# Patient Record
Sex: FEMALE | Race: OTHER | NOT HISPANIC OR LATINO
[De-identification: names, ages, dates, MRNs, and addresses within clinical notes are randomized per-mention and may not be internally consistent; named-entity substitution may affect disease eponyms.]

---

## 2020-07-20 ENCOUNTER — APPOINTMENT (OUTPATIENT)
Dept: SURGERY | Facility: CLINIC | Age: 65
End: 2020-07-20
Payer: MEDICARE

## 2020-07-20 VITALS
TEMPERATURE: 95.8 F | HEART RATE: 89 BPM | SYSTOLIC BLOOD PRESSURE: 118 MMHG | OXYGEN SATURATION: 100 % | BODY MASS INDEX: 27.92 KG/M2 | WEIGHT: 195 LBS | HEIGHT: 70 IN | DIASTOLIC BLOOD PRESSURE: 90 MMHG

## 2020-07-20 DIAGNOSIS — N28.9 DISORDER OF KIDNEY AND URETER, UNSPECIFIED: ICD-10-CM

## 2020-07-20 DIAGNOSIS — Z90.710 ACQUIRED ABSENCE OF BOTH CERVIX AND UTERUS: ICD-10-CM

## 2020-07-20 DIAGNOSIS — K56.609 UNSPECIFIED INTESTINAL OBSTRUCTION, UNSPECIFIED AS TO PARTIAL VERSUS COMPLETE OBSTRUCTION: ICD-10-CM

## 2020-07-20 DIAGNOSIS — E78.00 PURE HYPERCHOLESTEROLEMIA, UNSPECIFIED: ICD-10-CM

## 2020-07-20 PROCEDURE — 99204 OFFICE O/P NEW MOD 45 MIN: CPT

## 2020-07-20 RX ORDER — ASPIRIN 81 MG
81 TABLET, DELAYED RELEASE (ENTERIC COATED) ORAL
Refills: 0 | Status: ACTIVE | COMMUNITY

## 2020-07-20 RX ORDER — FOLIC ACID 20 MG
CAPSULE ORAL
Refills: 0 | Status: ACTIVE | COMMUNITY

## 2020-07-20 RX ORDER — SACUBITRIL AND VALSARTAN 49; 51 MG/1; MG/1
TABLET, FILM COATED ORAL
Refills: 0 | Status: ACTIVE | COMMUNITY

## 2020-07-20 RX ORDER — SEVELAMER CARBONATE 800 MG/1
TABLET, FILM COATED ORAL
Refills: 0 | Status: ACTIVE | COMMUNITY

## 2020-07-20 RX ORDER — ATORVASTATIN CALCIUM 80 MG/1
TABLET, FILM COATED ORAL
Refills: 0 | Status: ACTIVE | COMMUNITY

## 2020-07-29 NOTE — HISTORY OF PRESENT ILLNESS
[de-identified] : 64 year old female. Referred by Dr. Joey Buckley. Multiple medical comorbid conditions. Reports having procedure in 2014 secondary to blockage of intestine requiring intestine to be removed. Reports in hospital for 1 month during that time. Sees Dr. Buckley for vascular access secondary to longstanding end stage renal disease. Gets dialysis M/W/F through vascular dialysis. She reports having bulge and abdominal pain/discomfort associated with an incisional hernia. Denies any obstructive symptoms. Wishes to discuss potential repair.

## 2020-07-29 NOTE — ASSESSMENT
[FreeTextEntry1] : 64 year old female with incisional hernias. Potentially multiple. She does have significant medical comorbidity. She requires a CT scan of the abdomen and pelvis to determine anatomy and evaluate musculature and masses as well as for potential pre operative planning. She may be having procedure with Dr. Marcio farr. CT scan ordered. Notably greater than 50% of today's 45 minute initial visit was spent on counseling and coordination of care.

## 2020-07-29 NOTE — PHYSICAL EXAM
[JVD] : no jugular venous distention  [Normal Breath Sounds] : Normal breath sounds [Normal Heart Sounds] : normal heart sounds [Abdomen Tenderness] : ~T ~M No abdominal tenderness [Abdominal Masses] : No abdominal masses [Tender] : was nontender [Enlarged] : not enlarged [Alert] : alert [Oriented to Time] : oriented to time [Oriented to Person] : oriented to person [Oriented to Place] : oriented to place [Calm] : calm [de-identified] : DEANA. Mady. Appropriate. Comfortable. [de-identified] : Abdomen is soft, non tender and non distended. Do not appreciate any overt mass. There are incisions that area healed abdominal wall. Reducible incisional hernias. Potentially multiple fascial defects.  [de-identified] : Pupils equal. No Scleral Icterus. NCAT. [de-identified] : Supple. Trachea midline. No overt lymphadenopathy. No JVD - Dialysis catheter chest.

## 2020-08-10 ENCOUNTER — APPOINTMENT (OUTPATIENT)
Dept: CT IMAGING | Facility: HOSPITAL | Age: 65
End: 2020-08-10

## 2021-11-01 ENCOUNTER — APPOINTMENT (OUTPATIENT)
Dept: SURGERY | Facility: CLINIC | Age: 66
End: 2021-11-01
Payer: MEDICARE

## 2021-11-01 VITALS
DIASTOLIC BLOOD PRESSURE: 62 MMHG | WEIGHT: 201 LBS | HEIGHT: 70 IN | SYSTOLIC BLOOD PRESSURE: 96 MMHG | HEART RATE: 90 BPM | TEMPERATURE: 97.4 F | OXYGEN SATURATION: 98 % | BODY MASS INDEX: 28.77 KG/M2

## 2021-11-01 DIAGNOSIS — R19.07 GENERALIZED INTRA-ABDOMINAL AND PELVIC SWELLING, MASS AND LUMP: ICD-10-CM

## 2021-11-01 PROCEDURE — 99212 OFFICE O/P EST SF 10 MIN: CPT

## 2021-11-04 ENCOUNTER — NON-APPOINTMENT (OUTPATIENT)
Age: 66
End: 2021-11-04

## 2021-11-04 PROBLEM — R19.07 GENERALIZED ABDOMINAL MASS: Status: ACTIVE | Noted: 2020-07-20

## 2021-11-04 RX ORDER — PREDNISONE 50 MG/1
50 TABLET ORAL
Qty: 3 | Refills: 0 | Status: ACTIVE | COMMUNITY
Start: 2021-11-04 | End: 1900-01-01

## 2021-11-04 NOTE — HISTORY OF PRESENT ILLNESS
[de-identified] : 64 year old female. Referred by Dr. Joey Buckley. Multiple medical comorbid conditions. Reports having procedure in 2014 secondary to blockage of intestine requiring intestine to be removed. Reports in hospital for 1 month during that time. Sees Dr. Buckley for vascular access secondary to longstanding end stage renal disease. Gets dialysis M/W/F through vascular dialysis. She reports having bulge and abdominal pain/discomfort associated with an incisional hernia. Denies any obstructive symptoms. Wishes to discuss potential repair.  [de-identified] : 11-1-2021: Patient returns to office today. Reports she is overall doing well. Reports since last visit the hernia has increased in size. Reports it continues to be bothersome to her, causing intermittent abdominal pain and discomfort. Continues to get dialysis. Reports that  and her are discussing PD cath placement, and would like to have the hernia repaired prior to this. She has not had the CT scan performed yet that was discussed last visit. She denies any fever, chills, chest pain, nausea, or constipation.

## 2021-11-04 NOTE — PHYSICAL EXAM
[Normal Breath Sounds] : Normal breath sounds [Normal Heart Sounds] : normal heart sounds [Alert] : alert [Oriented to Person] : oriented to person [Oriented to Place] : oriented to place [Oriented to Time] : oriented to time [Calm] : calm [JVD] : no jugular venous distention  [Abdominal Masses] : No abdominal masses [Abdomen Tenderness] : ~T ~M No abdominal tenderness [Tender] : was nontender [Enlarged] : not enlarged [de-identified] : EDANA. Mady. Appropriate. Comfortable. [de-identified] : Pupils equal. No Scleral Icterus. NCAT. [de-identified] : Supple. Trachea midline. No overt lymphadenopathy. No JVD - Dialysis catheter chest.  [de-identified] : Abdomen is soft, non tender and non distended. Do not appreciate any overt mass. There are incisions that area healed abdominal wall. Reducible incisional hernias. Potentially multiple fascial defects.

## 2021-11-04 NOTE — ASSESSMENT
[FreeTextEntry1] : Case discussed with attending, . 67 y/o female with incisional hernias, likely multiple. Discussed plan for a CT scan of the abdomen and pelvis to further evaluate abdomen/presence of hernia, abdominal wall integrity and for pre operative planning. Offered to help patient set up time/date for CT scan, patient wishes to do so on her own as she gets dialysis and needs to plan around that. Will call patient with results. Discussed her coming in to the office to review CT scan and talk about surgical options, as there is potential to fix the hernia and place the PD catheter together. Will discuss in detail with her and speak with  after her next visit. All questions answered. \par \par Notably greater than 50% of today's 15 minute follow up visit was spent on counseling and coordination of her care.

## 2021-11-10 ENCOUNTER — APPOINTMENT (OUTPATIENT)
Dept: CT IMAGING | Facility: HOSPITAL | Age: 66
End: 2021-11-10

## 2021-11-10 ENCOUNTER — OUTPATIENT (OUTPATIENT)
Dept: OUTPATIENT SERVICES | Facility: HOSPITAL | Age: 66
LOS: 1 days | End: 2021-11-10
Payer: MEDICARE

## 2021-11-10 ENCOUNTER — RESULT REVIEW (OUTPATIENT)
Age: 66
End: 2021-11-10

## 2021-11-10 PROCEDURE — 74177 CT ABD & PELVIS W/CONTRAST: CPT | Mod: MH

## 2021-11-10 PROCEDURE — 74177 CT ABD & PELVIS W/CONTRAST: CPT | Mod: 26,MH

## 2021-11-16 ENCOUNTER — NON-APPOINTMENT (OUTPATIENT)
Age: 66
End: 2021-11-16

## 2021-11-29 ENCOUNTER — APPOINTMENT (OUTPATIENT)
Dept: SURGERY | Facility: CLINIC | Age: 66
End: 2021-11-29
Payer: MEDICARE

## 2021-11-29 VITALS
WEIGHT: 198 LBS | OXYGEN SATURATION: 96 % | BODY MASS INDEX: 28.35 KG/M2 | HEIGHT: 70 IN | TEMPERATURE: 97.3 F | HEART RATE: 101 BPM

## 2021-11-29 VITALS
DIASTOLIC BLOOD PRESSURE: 82 MMHG | WEIGHT: 198 LBS | OXYGEN SATURATION: 96 % | HEIGHT: 70 IN | HEART RATE: 101 BPM | SYSTOLIC BLOOD PRESSURE: 141 MMHG | TEMPERATURE: 97.3 F | BODY MASS INDEX: 28.35 KG/M2

## 2021-11-29 DIAGNOSIS — K83.8 OTHER SPECIFIED DISEASES OF BILIARY TRACT: ICD-10-CM

## 2021-11-29 PROCEDURE — 99213 OFFICE O/P EST LOW 20 MIN: CPT

## 2021-12-22 NOTE — DATA REVIEWED
[FreeTextEntry1] : CT scan abd/pevlis (11/16/21): Discussed and reviewed in detail. \par 1.  Large supraumbilical midline ventral hernia containing large and small bowel loops.\par 2.  Mild biliary dilatation, indeterminate etiology. No radiopaque biliary calculi. Consider MRI/MRCP with contrast for further assessment as clinically warranted.\par 3.  Age-indeterminate nondisplaced fractures without healing bridging callus formation right pubic tubercle and left superior and inferior pubic rami.\par 4.  Enlarged polycystic kidneys. Few hyperattenuating lesions may be related to hemorrhagic or proteinaceous content, assessment limited without dedicated renal protocol.

## 2021-12-22 NOTE — HISTORY OF PRESENT ILLNESS
[de-identified] : 64 year old female. Referred by Dr. Joey Buckley. Multiple medical comorbid conditions. Reports having procedure in 2014 secondary to blockage of intestine requiring intestine to be removed. Reports in hospital for 1 month during that time. Sees Dr. Buckley for vascular access secondary to longstanding end stage renal disease. Gets dialysis M/W/F through vascular dialysis. She reports having bulge and abdominal pain/discomfort associated with an incisional hernia. Denies any obstructive symptoms. Wishes to discuss potential repair.  [de-identified] : 11-1-2021: Patient returns to office today. Reports she is overall doing well. Reports since last visit the hernia has increased in size. Reports it continues to be bothersome to her, causing intermittent abdominal pain and discomfort. Continues to get dialysis. Reports that  and her are discussing PD cath placement, and would like to have the hernia repaired prior to this. She has not had the CT scan performed yet that was discussed last visit. She denies any fever, chills, chest pain, nausea, or constipation. \par \par 11-: Returns to office today. Continues to overall do well. Report the hernia has not changed in size, however still causing some discomfort. She underwent a CT scan of the abd/pevlis and is here today to discuss results/next steps.

## 2021-12-22 NOTE — ASSESSMENT
[FreeTextEntry1] : Case discussed with attending, . 65 y/o female with incisional hernias, likely multiple. Discussed next step in care for her to undergo a MRCP to further evaluate mild biliary dilatation found on CT. Will call patient with results. \par \par ATTENDING: Patient requires MRCP to determine potential cause of her biliary dilation prior to surgical planning.

## 2022-01-01 ENCOUNTER — INPATIENT (INPATIENT)
Facility: HOSPITAL | Age: 67
LOS: 8 days | Discharge: HOME | DRG: 252 | End: 2022-11-26
Attending: SURGERY | Admitting: SURGERY
Payer: MEDICARE

## 2022-01-01 ENCOUNTER — TRANSCRIPTION ENCOUNTER (OUTPATIENT)
Age: 67
End: 2022-01-01

## 2022-01-01 ENCOUNTER — RESULT REVIEW (OUTPATIENT)
Age: 67
End: 2022-01-01

## 2022-01-01 ENCOUNTER — OUTPATIENT (OUTPATIENT)
Dept: OUTPATIENT SERVICES | Facility: HOSPITAL | Age: 67
LOS: 1 days | End: 2022-01-01
Payer: MEDICARE

## 2022-01-01 VITALS
OXYGEN SATURATION: 94 % | SYSTOLIC BLOOD PRESSURE: 93 MMHG | RESPIRATION RATE: 17 BRPM | HEART RATE: 106 BPM | DIASTOLIC BLOOD PRESSURE: 70 MMHG

## 2022-01-01 VITALS
HEART RATE: 95 BPM | RESPIRATION RATE: 20 BRPM | DIASTOLIC BLOOD PRESSURE: 88 MMHG | OXYGEN SATURATION: 98 % | SYSTOLIC BLOOD PRESSURE: 130 MMHG | WEIGHT: 194.01 LBS | TEMPERATURE: 98 F | HEIGHT: 70 IN

## 2022-01-01 DIAGNOSIS — Z98.890 OTHER SPECIFIED POSTPROCEDURAL STATES: Chronic | ICD-10-CM

## 2022-01-01 DIAGNOSIS — Z90.710 ACQUIRED ABSENCE OF BOTH CERVIX AND UTERUS: ICD-10-CM

## 2022-01-01 DIAGNOSIS — I25.10 ATHEROSCLEROTIC HEART DISEASE OF NATIVE CORONARY ARTERY WITHOUT ANGINA PECTORIS: ICD-10-CM

## 2022-01-01 DIAGNOSIS — Z99.2 DEPENDENCE ON RENAL DIALYSIS: ICD-10-CM

## 2022-01-01 DIAGNOSIS — Z88.2 ALLERGY STATUS TO SULFONAMIDES: ICD-10-CM

## 2022-01-01 DIAGNOSIS — E83.39 OTHER DISORDERS OF PHOSPHORUS METABOLISM: ICD-10-CM

## 2022-01-01 DIAGNOSIS — I13.2 HYPERTENSIVE HEART AND CHRONIC KIDNEY DISEASE WITH HEART FAILURE AND WITH STAGE 5 CHRONIC KIDNEY DISEASE, OR END STAGE RENAL DISEASE: ICD-10-CM

## 2022-01-01 DIAGNOSIS — T82.898A OTHER SPECIFIED COMPLICATION OF VASCULAR PROSTHETIC DEVICES, IMPLANTS AND GRAFTS, INITIAL ENCOUNTER: ICD-10-CM

## 2022-01-01 DIAGNOSIS — I42.8 OTHER CARDIOMYOPATHIES: ICD-10-CM

## 2022-01-01 DIAGNOSIS — Z88.8 ALLERGY STATUS TO OTHER DRUGS, MEDICAMENTS AND BIOLOGICAL SUBSTANCES STATUS: ICD-10-CM

## 2022-01-01 DIAGNOSIS — I50.22 CHRONIC SYSTOLIC (CONGESTIVE) HEART FAILURE: ICD-10-CM

## 2022-01-01 DIAGNOSIS — Z87.891 PERSONAL HISTORY OF NICOTINE DEPENDENCE: ICD-10-CM

## 2022-01-01 DIAGNOSIS — N18.6 END STAGE RENAL DISEASE: ICD-10-CM

## 2022-01-01 DIAGNOSIS — Z88.0 ALLERGY STATUS TO PENICILLIN: ICD-10-CM

## 2022-01-01 DIAGNOSIS — Z01.818 ENCOUNTER FOR OTHER PREPROCEDURAL EXAMINATION: ICD-10-CM

## 2022-01-01 DIAGNOSIS — M16.12 UNILATERAL PRIMARY OSTEOARTHRITIS, LEFT HIP: ICD-10-CM

## 2022-01-01 DIAGNOSIS — Z79.82 LONG TERM (CURRENT) USE OF ASPIRIN: ICD-10-CM
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
## 2022-01-01 DIAGNOSIS — Z86.711 PERSONAL HISTORY OF PULMONARY EMBOLISM: ICD-10-CM

## 2022-01-01 DIAGNOSIS — Z90.710 ACQUIRED ABSENCE OF BOTH CERVIX AND UTERUS: Chronic | ICD-10-CM

## 2022-01-01 DIAGNOSIS — Z86.718 PERSONAL HISTORY OF OTHER VENOUS THROMBOSIS AND EMBOLISM: ICD-10-CM

## 2022-01-01 DIAGNOSIS — E78.5 HYPERLIPIDEMIA, UNSPECIFIED: ICD-10-CM

## 2022-01-01 DIAGNOSIS — R57.8 OTHER SHOCK: ICD-10-CM
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
## 2022-01-01 DIAGNOSIS — I77.0 ARTERIOVENOUS FISTULA, ACQUIRED: Chronic | ICD-10-CM

## 2022-01-01 DIAGNOSIS — D63.1 ANEMIA IN CHRONIC KIDNEY DISEASE: ICD-10-CM

## 2022-01-01 DIAGNOSIS — E87.5 HYPERKALEMIA: ICD-10-CM

## 2022-01-01 LAB
A1C WITH ESTIMATED AVERAGE GLUCOSE RESULT: 5.3 % — SIGNIFICANT CHANGE UP (ref 4–5.6)
ALBUMIN SERPL ELPH-MCNC: 3.5 G/DL — SIGNIFICANT CHANGE UP (ref 3.3–5)
ALBUMIN SERPL ELPH-MCNC: 3.8 G/DL — SIGNIFICANT CHANGE UP (ref 3.3–5)
ALBUMIN SERPL ELPH-MCNC: 4.2 G/DL — SIGNIFICANT CHANGE UP (ref 3.3–5)
ALBUMIN SERPL ELPH-MCNC: 4.3 G/DL — SIGNIFICANT CHANGE UP (ref 3.3–5)
ALP SERPL-CCNC: 105 U/L — SIGNIFICANT CHANGE UP (ref 40–120)
ALP SERPL-CCNC: 116 U/L — SIGNIFICANT CHANGE UP (ref 40–120)
ALP SERPL-CCNC: 150 U/L — HIGH (ref 40–120)
ALP SERPL-CCNC: 87 U/L — SIGNIFICANT CHANGE UP (ref 40–120)
ALT FLD-CCNC: 5 U/L — LOW (ref 10–45)
ALT FLD-CCNC: 6 U/L — LOW (ref 10–45)
ALT FLD-CCNC: 6 U/L — LOW (ref 10–45)
ALT FLD-CCNC: <5 U/L — LOW (ref 10–45)
ANION GAP SERPL CALC-SCNC: 10 MMOL/L — SIGNIFICANT CHANGE UP (ref 5–17)
ANION GAP SERPL CALC-SCNC: 10 MMOL/L — SIGNIFICANT CHANGE UP (ref 5–17)
ANION GAP SERPL CALC-SCNC: 11 MMOL/L — SIGNIFICANT CHANGE UP (ref 5–17)
ANION GAP SERPL CALC-SCNC: 12 MMOL/L — SIGNIFICANT CHANGE UP (ref 5–17)
ANION GAP SERPL CALC-SCNC: 12 MMOL/L — SIGNIFICANT CHANGE UP (ref 5–17)
ANION GAP SERPL CALC-SCNC: 13 MMOL/L — SIGNIFICANT CHANGE UP (ref 5–17)
ANION GAP SERPL CALC-SCNC: 15 MMOL/L — SIGNIFICANT CHANGE UP (ref 5–17)
ANION GAP SERPL CALC-SCNC: 16 MMOL/L — SIGNIFICANT CHANGE UP (ref 5–17)
ANION GAP SERPL CALC-SCNC: 18 MMOL/L — HIGH (ref 5–17)
ANION GAP SERPL CALC-SCNC: 18 MMOL/L — HIGH (ref 5–17)
ANION GAP SERPL CALC-SCNC: 23 MMOL/L — HIGH (ref 5–17)
ANION GAP SERPL CALC-SCNC: 8 MMOL/L — SIGNIFICANT CHANGE UP (ref 5–17)
ANION GAP SERPL CALC-SCNC: 8 MMOL/L — SIGNIFICANT CHANGE UP (ref 5–17)
ANION GAP SERPL CALC-SCNC: 9 MMOL/L — SIGNIFICANT CHANGE UP (ref 5–17)
ANISOCYTOSIS BLD QL: SLIGHT — SIGNIFICANT CHANGE UP
APTT BLD: 27.3 SEC — LOW (ref 27.5–35.5)
APTT BLD: 28.2 SEC — SIGNIFICANT CHANGE UP (ref 27.5–35.5)
APTT BLD: 29.1 SEC — SIGNIFICANT CHANGE UP (ref 27.5–35.5)
APTT BLD: 30.5 SEC — SIGNIFICANT CHANGE UP (ref 27.5–35.5)
AST SERPL-CCNC: 12 U/L — SIGNIFICANT CHANGE UP (ref 10–40)
AST SERPL-CCNC: 12 U/L — SIGNIFICANT CHANGE UP (ref 10–40)
AST SERPL-CCNC: 8 U/L — LOW (ref 10–40)
AST SERPL-CCNC: 9 U/L — LOW (ref 10–40)
BASOPHILS # BLD AUTO: 0 K/UL — SIGNIFICANT CHANGE UP (ref 0–0.2)
BASOPHILS # BLD AUTO: 0.02 K/UL — SIGNIFICANT CHANGE UP (ref 0–0.2)
BASOPHILS # BLD AUTO: 0.02 K/UL — SIGNIFICANT CHANGE UP (ref 0–0.2)
BASOPHILS NFR BLD AUTO: 0 % — SIGNIFICANT CHANGE UP (ref 0–2)
BASOPHILS NFR BLD AUTO: 0.4 % — SIGNIFICANT CHANGE UP (ref 0–2)
BASOPHILS NFR BLD AUTO: 0.6 % — SIGNIFICANT CHANGE UP (ref 0–2)
BILIRUB DIRECT SERPL-MCNC: 0.2 MG/DL — SIGNIFICANT CHANGE UP (ref 0–0.3)
BILIRUB INDIRECT FLD-MCNC: 0.2 MG/DL — SIGNIFICANT CHANGE UP (ref 0.2–1)
BILIRUB SERPL-MCNC: 0.4 MG/DL — SIGNIFICANT CHANGE UP (ref 0.2–1.2)
BILIRUB SERPL-MCNC: 0.4 MG/DL — SIGNIFICANT CHANGE UP (ref 0.2–1.2)
BILIRUB SERPL-MCNC: 0.5 MG/DL — SIGNIFICANT CHANGE UP (ref 0.2–1.2)
BILIRUB SERPL-MCNC: 0.5 MG/DL — SIGNIFICANT CHANGE UP (ref 0.2–1.2)
BILIRUB SERPL-MCNC: 0.6 MG/DL — SIGNIFICANT CHANGE UP (ref 0.2–1.2)
BLD GP AB SCN SERPL QL: NEGATIVE — SIGNIFICANT CHANGE UP
BUN SERPL-MCNC: 19 MG/DL — SIGNIFICANT CHANGE UP (ref 7–23)
BUN SERPL-MCNC: 22 MG/DL — SIGNIFICANT CHANGE UP (ref 7–23)
BUN SERPL-MCNC: 22 MG/DL — SIGNIFICANT CHANGE UP (ref 7–23)
BUN SERPL-MCNC: 25 MG/DL — HIGH (ref 7–23)
BUN SERPL-MCNC: 27 MG/DL — HIGH (ref 7–23)
BUN SERPL-MCNC: 32 MG/DL — HIGH (ref 7–23)
BUN SERPL-MCNC: 35 MG/DL — HIGH (ref 7–23)
BUN SERPL-MCNC: 39 MG/DL — HIGH (ref 7–23)
BUN SERPL-MCNC: 42 MG/DL — HIGH (ref 7–23)
BUN SERPL-MCNC: 42 MG/DL — HIGH (ref 7–23)
BUN SERPL-MCNC: 43 MG/DL — HIGH (ref 7–23)
BUN SERPL-MCNC: 44 MG/DL — HIGH (ref 7–23)
BUN SERPL-MCNC: 47 MG/DL — HIGH (ref 7–23)
BUN SERPL-MCNC: 56 MG/DL — HIGH (ref 7–23)
BUN SERPL-MCNC: 56 MG/DL — HIGH (ref 7–23)
BUN SERPL-MCNC: 64 MG/DL — HIGH (ref 7–23)
BURR CELLS BLD QL SMEAR: SLIGHT — SIGNIFICANT CHANGE UP
CALCIUM SERPL-MCNC: 7.4 MG/DL — LOW (ref 8.4–10.5)
CALCIUM SERPL-MCNC: 7.4 MG/DL — LOW (ref 8.4–10.5)
CALCIUM SERPL-MCNC: 7.9 MG/DL — LOW (ref 8.4–10.5)
CALCIUM SERPL-MCNC: 7.9 MG/DL — LOW (ref 8.4–10.5)
CALCIUM SERPL-MCNC: 8 MG/DL — LOW (ref 8.4–10.5)
CALCIUM SERPL-MCNC: 8.2 MG/DL — LOW (ref 8.4–10.5)
CALCIUM SERPL-MCNC: 8.3 MG/DL — LOW (ref 8.4–10.5)
CALCIUM SERPL-MCNC: 8.5 MG/DL — SIGNIFICANT CHANGE UP (ref 8.4–10.5)
CALCIUM SERPL-MCNC: 8.5 MG/DL — SIGNIFICANT CHANGE UP (ref 8.4–10.5)
CALCIUM SERPL-MCNC: 8.7 MG/DL — SIGNIFICANT CHANGE UP (ref 8.4–10.5)
CALCIUM SERPL-MCNC: 8.7 MG/DL — SIGNIFICANT CHANGE UP (ref 8.4–10.5)
CALCIUM SERPL-MCNC: 9.2 MG/DL — SIGNIFICANT CHANGE UP (ref 8.4–10.5)
CHLORIDE SERPL-SCNC: 100 MMOL/L — SIGNIFICANT CHANGE UP (ref 96–108)
CHLORIDE SERPL-SCNC: 100 MMOL/L — SIGNIFICANT CHANGE UP (ref 96–108)
CHLORIDE SERPL-SCNC: 101 MMOL/L — SIGNIFICANT CHANGE UP (ref 96–108)
CHLORIDE SERPL-SCNC: 101 MMOL/L — SIGNIFICANT CHANGE UP (ref 96–108)
CHLORIDE SERPL-SCNC: 102 MMOL/L — SIGNIFICANT CHANGE UP (ref 96–108)
CHLORIDE SERPL-SCNC: 103 MMOL/L — SIGNIFICANT CHANGE UP (ref 96–108)
CHLORIDE SERPL-SCNC: 104 MMOL/L — SIGNIFICANT CHANGE UP (ref 96–108)
CHLORIDE SERPL-SCNC: 104 MMOL/L — SIGNIFICANT CHANGE UP (ref 96–108)
CHLORIDE SERPL-SCNC: 105 MMOL/L — SIGNIFICANT CHANGE UP (ref 96–108)
CHLORIDE SERPL-SCNC: 96 MMOL/L — SIGNIFICANT CHANGE UP (ref 96–108)
CHLORIDE SERPL-SCNC: 97 MMOL/L — SIGNIFICANT CHANGE UP (ref 96–108)
CHLORIDE SERPL-SCNC: 99 MMOL/L — SIGNIFICANT CHANGE UP (ref 96–108)
CK MB CFR SERPL CALC: 2.2 NG/ML — SIGNIFICANT CHANGE UP (ref 0–6.7)
CK SERPL-CCNC: 49 U/L — SIGNIFICANT CHANGE UP (ref 25–170)
CO2 SERPL-SCNC: 16 MMOL/L — LOW (ref 22–31)
CO2 SERPL-SCNC: 18 MMOL/L — LOW (ref 22–31)
CO2 SERPL-SCNC: 19 MMOL/L — LOW (ref 22–31)
CO2 SERPL-SCNC: 21 MMOL/L — LOW (ref 22–31)
CO2 SERPL-SCNC: 22 MMOL/L — SIGNIFICANT CHANGE UP (ref 22–31)
CO2 SERPL-SCNC: 23 MMOL/L — SIGNIFICANT CHANGE UP (ref 22–31)
CO2 SERPL-SCNC: 23 MMOL/L — SIGNIFICANT CHANGE UP (ref 22–31)
CO2 SERPL-SCNC: 24 MMOL/L — SIGNIFICANT CHANGE UP (ref 22–31)
CO2 SERPL-SCNC: 24 MMOL/L — SIGNIFICANT CHANGE UP (ref 22–31)
CO2 SERPL-SCNC: 25 MMOL/L — SIGNIFICANT CHANGE UP (ref 22–31)
CO2 SERPL-SCNC: 28 MMOL/L — SIGNIFICANT CHANGE UP (ref 22–31)
CREAT SERPL-MCNC: 10.57 MG/DL — HIGH (ref 0.5–1.3)
CREAT SERPL-MCNC: 10.92 MG/DL — HIGH (ref 0.5–1.3)
CREAT SERPL-MCNC: 11.45 MG/DL — HIGH (ref 0.5–1.3)
CREAT SERPL-MCNC: 2.95 MG/DL — HIGH (ref 0.5–1.3)
CREAT SERPL-MCNC: 3.17 MG/DL — HIGH (ref 0.5–1.3)
CREAT SERPL-MCNC: 3.34 MG/DL — HIGH (ref 0.5–1.3)
CREAT SERPL-MCNC: 3.64 MG/DL — HIGH (ref 0.5–1.3)
CREAT SERPL-MCNC: 3.8 MG/DL — HIGH (ref 0.5–1.3)
CREAT SERPL-MCNC: 3.82 MG/DL — HIGH (ref 0.5–1.3)
CREAT SERPL-MCNC: 5.03 MG/DL — HIGH (ref 0.5–1.3)
CREAT SERPL-MCNC: 6.04 MG/DL — HIGH (ref 0.5–1.3)
CREAT SERPL-MCNC: 6.38 MG/DL — HIGH (ref 0.5–1.3)
CREAT SERPL-MCNC: 6.59 MG/DL — HIGH (ref 0.5–1.3)
CREAT SERPL-MCNC: 6.98 MG/DL — HIGH (ref 0.5–1.3)
CREAT SERPL-MCNC: 7.71 MG/DL — HIGH (ref 0.5–1.3)
CREAT SERPL-MCNC: 8.83 MG/DL — HIGH (ref 0.5–1.3)
CULTURE RESULTS: NO GROWTH — SIGNIFICANT CHANGE UP
CULTURE RESULTS: NO GROWTH — SIGNIFICANT CHANGE UP
DACRYOCYTES BLD QL SMEAR: SLIGHT — SIGNIFICANT CHANGE UP
EGFR: 13 ML/MIN/1.73M2 — LOW
EGFR: 15 ML/MIN/1.73M2 — LOW
EGFR: 16 ML/MIN/1.73M2 — LOW
EGFR: 17 ML/MIN/1.73M2 — LOW
EGFR: 3 ML/MIN/1.73M2 — LOW
EGFR: 4 ML/MIN/1.73M2 — LOW
EGFR: 4 ML/MIN/1.73M2 — LOW
EGFR: 5 ML/MIN/1.73M2 — LOW
EGFR: 5 ML/MIN/1.73M2 — LOW
EGFR: 6 ML/MIN/1.73M2 — LOW
EGFR: 7 ML/MIN/1.73M2 — LOW
EGFR: 9 ML/MIN/1.73M2 — LOW
ELLIPTOCYTES BLD QL SMEAR: SLIGHT — SIGNIFICANT CHANGE UP
EOSINOPHIL # BLD AUTO: 0 K/UL — SIGNIFICANT CHANGE UP (ref 0–0.5)
EOSINOPHIL # BLD AUTO: 0.05 K/UL — SIGNIFICANT CHANGE UP (ref 0–0.5)
EOSINOPHIL # BLD AUTO: 0.19 K/UL — SIGNIFICANT CHANGE UP (ref 0–0.5)
EOSINOPHIL NFR BLD AUTO: 0 % — SIGNIFICANT CHANGE UP (ref 0–6)
EOSINOPHIL NFR BLD AUTO: 1.6 % — SIGNIFICANT CHANGE UP (ref 0–6)
EOSINOPHIL NFR BLD AUTO: 4.2 % — SIGNIFICANT CHANGE UP (ref 0–6)
ESTIMATED AVERAGE GLUCOSE: 105 MG/DL — SIGNIFICANT CHANGE UP (ref 68–114)
GIANT PLATELETS BLD QL SMEAR: PRESENT — SIGNIFICANT CHANGE UP
GLUCOSE BLDC GLUCOMTR-MCNC: 132 MG/DL — HIGH (ref 70–99)
GLUCOSE BLDC GLUCOMTR-MCNC: 134 MG/DL — HIGH (ref 70–99)
GLUCOSE BLDC GLUCOMTR-MCNC: 96 MG/DL — SIGNIFICANT CHANGE UP (ref 70–99)
GLUCOSE SERPL-MCNC: 103 MG/DL — HIGH (ref 70–99)
GLUCOSE SERPL-MCNC: 103 MG/DL — HIGH (ref 70–99)
GLUCOSE SERPL-MCNC: 104 MG/DL — HIGH (ref 70–99)
GLUCOSE SERPL-MCNC: 110 MG/DL — HIGH (ref 70–99)
GLUCOSE SERPL-MCNC: 112 MG/DL — HIGH (ref 70–99)
GLUCOSE SERPL-MCNC: 114 MG/DL — HIGH (ref 70–99)
GLUCOSE SERPL-MCNC: 116 MG/DL — HIGH (ref 70–99)
GLUCOSE SERPL-MCNC: 117 MG/DL — HIGH (ref 70–99)
GLUCOSE SERPL-MCNC: 127 MG/DL — HIGH (ref 70–99)
GLUCOSE SERPL-MCNC: 128 MG/DL — HIGH (ref 70–99)
GLUCOSE SERPL-MCNC: 148 MG/DL — HIGH (ref 70–99)
GLUCOSE SERPL-MCNC: 73 MG/DL — SIGNIFICANT CHANGE UP (ref 70–99)
GLUCOSE SERPL-MCNC: 82 MG/DL — SIGNIFICANT CHANGE UP (ref 70–99)
GLUCOSE SERPL-MCNC: 85 MG/DL — SIGNIFICANT CHANGE UP (ref 70–99)
GLUCOSE SERPL-MCNC: 90 MG/DL — SIGNIFICANT CHANGE UP (ref 70–99)
GLUCOSE SERPL-MCNC: 98 MG/DL — SIGNIFICANT CHANGE UP (ref 70–99)
GRAM STN FLD: SIGNIFICANT CHANGE UP
GRAM STN FLD: SIGNIFICANT CHANGE UP
HBV CORE AB SER-ACNC: SIGNIFICANT CHANGE UP
HBV SURFACE AB SER-ACNC: SIGNIFICANT CHANGE UP
HBV SURFACE AG SER-ACNC: SIGNIFICANT CHANGE UP
HCT VFR BLD CALC: 29.1 % — LOW (ref 34.5–45)
HCT VFR BLD CALC: 30.6 % — LOW (ref 34.5–45)
HCT VFR BLD CALC: 32.1 % — LOW (ref 34.5–45)
HCT VFR BLD CALC: 32.7 % — LOW (ref 34.5–45)
HCT VFR BLD CALC: 34.6 % — SIGNIFICANT CHANGE UP (ref 34.5–45)
HCT VFR BLD CALC: 35.2 % — SIGNIFICANT CHANGE UP (ref 34.5–45)
HCT VFR BLD CALC: 35.7 % — SIGNIFICANT CHANGE UP (ref 34.5–45)
HCT VFR BLD CALC: 37.4 % — SIGNIFICANT CHANGE UP (ref 34.5–45)
HCT VFR BLD CALC: 38 % — SIGNIFICANT CHANGE UP (ref 34.5–45)
HCT VFR BLD CALC: 41.5 % — SIGNIFICANT CHANGE UP (ref 34.5–45)
HCT VFR BLD CALC: 42.7 % — SIGNIFICANT CHANGE UP (ref 34.5–45)
HCT VFR BLD CALC: 43.1 % — SIGNIFICANT CHANGE UP (ref 34.5–45)
HCT VFR BLD CALC: 50.6 % — HIGH (ref 34.5–45)
HCV AB S/CO SERPL IA: 0.04 S/CO — SIGNIFICANT CHANGE UP
HCV AB SERPL-IMP: SIGNIFICANT CHANGE UP
HEPARIN-PF4 AB RESULT: <0.6 U/ML — SIGNIFICANT CHANGE UP (ref 0–0.9)
HGB BLD-MCNC: 10 G/DL — LOW (ref 11.5–15.5)
HGB BLD-MCNC: 10.6 G/DL — LOW (ref 11.5–15.5)
HGB BLD-MCNC: 10.8 G/DL — LOW (ref 11.5–15.5)
HGB BLD-MCNC: 10.8 G/DL — LOW (ref 11.5–15.5)
HGB BLD-MCNC: 11.3 G/DL — LOW (ref 11.5–15.5)
HGB BLD-MCNC: 11.6 G/DL — SIGNIFICANT CHANGE UP (ref 11.5–15.5)
HGB BLD-MCNC: 12.9 G/DL — SIGNIFICANT CHANGE UP (ref 11.5–15.5)
HGB BLD-MCNC: 13 G/DL — SIGNIFICANT CHANGE UP (ref 11.5–15.5)
HGB BLD-MCNC: 13.4 G/DL — SIGNIFICANT CHANGE UP (ref 11.5–15.5)
HGB BLD-MCNC: 15.2 G/DL — SIGNIFICANT CHANGE UP (ref 11.5–15.5)
HGB BLD-MCNC: 9.2 G/DL — LOW (ref 11.5–15.5)
HGB BLD-MCNC: 9.6 G/DL — LOW (ref 11.5–15.5)
HGB BLD-MCNC: 9.8 G/DL — LOW (ref 11.5–15.5)
IMM GRANULOCYTES NFR BLD AUTO: 0.3 % — SIGNIFICANT CHANGE UP (ref 0–0.9)
IMM GRANULOCYTES NFR BLD AUTO: 0.9 % — SIGNIFICANT CHANGE UP (ref 0–0.9)
INR BLD: 1.05 — SIGNIFICANT CHANGE UP (ref 0.88–1.16)
INR BLD: 1.09 — SIGNIFICANT CHANGE UP (ref 0.88–1.16)
INR BLD: 1.1 — SIGNIFICANT CHANGE UP (ref 0.88–1.16)
INR BLD: 1.11 — SIGNIFICANT CHANGE UP (ref 0.88–1.16)
ISTAT ARTERIAL BE: -4 MMOL/L — LOW (ref -2–3)
ISTAT ARTERIAL BE: -5 MMOL/L — LOW (ref -2–3)
ISTAT ARTERIAL BE: -6 MMOL/L — LOW (ref -2–3)
ISTAT ARTERIAL BE: -7 MMOL/L — LOW (ref -2–3)
ISTAT ARTERIAL GLUCOSE: 101 MG/DL — HIGH (ref 70–99)
ISTAT ARTERIAL GLUCOSE: 127 MG/DL — HIGH (ref 70–99)
ISTAT ARTERIAL GLUCOSE: 157 MG/DL — HIGH (ref 70–99)
ISTAT ARTERIAL GLUCOSE: 162 MG/DL — HIGH (ref 70–99)
ISTAT ARTERIAL HCO3: 18 MMOL/L — LOW (ref 22–26)
ISTAT ARTERIAL HCO3: 18 MMOL/L — LOW (ref 22–26)
ISTAT ARTERIAL HCO3: 20 MMOL/L — LOW (ref 22–26)
ISTAT ARTERIAL HCO3: 22 MMOL/L — SIGNIFICANT CHANGE UP (ref 22–26)
ISTAT ARTERIAL HEMATOCRIT: 43 % — SIGNIFICANT CHANGE UP (ref 34.5–45)
ISTAT ARTERIAL HEMATOCRIT: 44 % — SIGNIFICANT CHANGE UP (ref 34.5–45)
ISTAT ARTERIAL HEMATOCRIT: 44 % — SIGNIFICANT CHANGE UP (ref 34.5–45)
ISTAT ARTERIAL HEMATOCRIT: 45 % — SIGNIFICANT CHANGE UP (ref 34.5–45)
ISTAT ARTERIAL HEMOGLOBIN: 14.6 G/DL — SIGNIFICANT CHANGE UP (ref 11.5–15.5)
ISTAT ARTERIAL HEMOGLOBIN: 15 G/DL — SIGNIFICANT CHANGE UP (ref 11.5–15.5)
ISTAT ARTERIAL HEMOGLOBIN: 15 G/DL — SIGNIFICANT CHANGE UP (ref 11.5–15.5)
ISTAT ARTERIAL HEMOGLOBIN: 15.3 G/DL — SIGNIFICANT CHANGE UP (ref 11.5–15.5)
ISTAT ARTERIAL IONIZED CALCIUM: 1.09 MMOL/L — LOW (ref 1.12–1.3)
ISTAT ARTERIAL IONIZED CALCIUM: 1.13 MMOL/L — SIGNIFICANT CHANGE UP (ref 1.12–1.3)
ISTAT ARTERIAL IONIZED CALCIUM: 1.13 MMOL/L — SIGNIFICANT CHANGE UP (ref 1.12–1.3)
ISTAT ARTERIAL IONIZED CALCIUM: 1.23 MMOL/L — SIGNIFICANT CHANGE UP (ref 1.12–1.3)
ISTAT ARTERIAL PCO2: 32 MMHG — LOW (ref 35–45)
ISTAT ARTERIAL PCO2: 33 MMHG — LOW (ref 35–45)
ISTAT ARTERIAL PCO2: 38 MMHG — SIGNIFICANT CHANGE UP (ref 35–45)
ISTAT ARTERIAL PCO2: 42 MMHG — SIGNIFICANT CHANGE UP (ref 35–45)
ISTAT ARTERIAL PH: 7.32 — LOW (ref 7.35–7.45)
ISTAT ARTERIAL PH: 7.33 — LOW (ref 7.35–7.45)
ISTAT ARTERIAL PH: 7.35 — SIGNIFICANT CHANGE UP (ref 7.35–7.45)
ISTAT ARTERIAL PH: 7.35 — SIGNIFICANT CHANGE UP (ref 7.35–7.45)
ISTAT ARTERIAL PO2: 191 MMHG — HIGH (ref 80–105)
ISTAT ARTERIAL PO2: 76 MMHG — LOW (ref 80–105)
ISTAT ARTERIAL PO2: 86 MMHG — SIGNIFICANT CHANGE UP (ref 80–105)
ISTAT ARTERIAL PO2: <66 MMHG — LOW (ref 80–105)
ISTAT ARTERIAL POTASSIUM: 4.9 MMOL/L — SIGNIFICANT CHANGE UP (ref 3.5–5.3)
ISTAT ARTERIAL POTASSIUM: 5.4 MMOL/L — HIGH (ref 3.5–5.3)
ISTAT ARTERIAL POTASSIUM: 6.6 MMOL/L — CRITICAL HIGH (ref 3.5–5.3)
ISTAT ARTERIAL POTASSIUM: 6.9 MMOL/L — CRITICAL HIGH (ref 3.5–5.3)
ISTAT ARTERIAL SO2: 100 % — HIGH (ref 95–98)
ISTAT ARTERIAL SO2: 84 % — LOW (ref 95–98)
ISTAT ARTERIAL SO2: 95 % — SIGNIFICANT CHANGE UP (ref 95–98)
ISTAT ARTERIAL SO2: 96 % — SIGNIFICANT CHANGE UP (ref 95–98)
ISTAT ARTERIAL SODIUM: 138 MMOL/L — SIGNIFICANT CHANGE UP (ref 135–145)
ISTAT ARTERIAL SODIUM: 139 MMOL/L — SIGNIFICANT CHANGE UP (ref 135–145)
ISTAT ARTERIAL SODIUM: 140 MMOL/L — SIGNIFICANT CHANGE UP (ref 135–145)
ISTAT ARTERIAL SODIUM: 142 MMOL/L — SIGNIFICANT CHANGE UP (ref 135–145)
ISTAT ARTERIAL TCO2: 19 MMOL/L — LOW (ref 22–31)
ISTAT ARTERIAL TCO2: 19 MMOL/L — LOW (ref 22–31)
ISTAT ARTERIAL TCO2: 21 MMOL/L — LOW (ref 22–31)
ISTAT ARTERIAL TCO2: 23 MMOL/L — SIGNIFICANT CHANGE UP (ref 22–31)
ISTAT VENOUS BE: -1 MMOL/L — SIGNIFICANT CHANGE UP (ref -2–3)
ISTAT VENOUS GLUCOSE: 79 MG/DL — SIGNIFICANT CHANGE UP (ref 70–99)
ISTAT VENOUS HCO3: 24 MMOL/L — SIGNIFICANT CHANGE UP (ref 23–28)
ISTAT VENOUS HEMATOCRIT: 44 % — SIGNIFICANT CHANGE UP (ref 34.5–45)
ISTAT VENOUS HEMOGLOBIN: 15 GM/DL — SIGNIFICANT CHANGE UP (ref 11.5–15.5)
ISTAT VENOUS IONIZED CALCIUM: 1.15 MMOL/L — SIGNIFICANT CHANGE UP (ref 1.12–1.3)
ISTAT VENOUS PCO2: 42 MMHG — SIGNIFICANT CHANGE UP (ref 41–51)
ISTAT VENOUS PH: 7.38 — SIGNIFICANT CHANGE UP (ref 7.31–7.41)
ISTAT VENOUS PO2: <66 MMHG — SIGNIFICANT CHANGE UP (ref 35–40)
ISTAT VENOUS POTASSIUM: 5.1 MMOL/L — SIGNIFICANT CHANGE UP (ref 3.5–5.3)
ISTAT VENOUS SO2: 70 % — SIGNIFICANT CHANGE UP
ISTAT VENOUS SODIUM: 141 MMOL/L — SIGNIFICANT CHANGE UP (ref 135–145)
ISTAT VENOUS TCO2: 26 MMOL/L — SIGNIFICANT CHANGE UP (ref 22–31)
LACTATE SERPL-SCNC: 1.8 MMOL/L — SIGNIFICANT CHANGE UP (ref 0.5–2)
LACTATE SERPL-SCNC: 2.9 MMOL/L — HIGH (ref 0.5–2)
LYMPHOCYTES # BLD AUTO: 0.15 K/UL — LOW (ref 1–3.3)
LYMPHOCYTES # BLD AUTO: 0.52 K/UL — LOW (ref 1–3.3)
LYMPHOCYTES # BLD AUTO: 1 K/UL — SIGNIFICANT CHANGE UP (ref 1–3.3)
LYMPHOCYTES # BLD AUTO: 1.7 % — LOW (ref 13–44)
LYMPHOCYTES # BLD AUTO: 16.7 % — SIGNIFICANT CHANGE UP (ref 13–44)
LYMPHOCYTES # BLD AUTO: 22.3 % — SIGNIFICANT CHANGE UP (ref 13–44)
MAGNESIUM SERPL-MCNC: 1.6 MG/DL — SIGNIFICANT CHANGE UP (ref 1.6–2.6)
MAGNESIUM SERPL-MCNC: 1.6 MG/DL — SIGNIFICANT CHANGE UP (ref 1.6–2.6)
MAGNESIUM SERPL-MCNC: 1.7 MG/DL — SIGNIFICANT CHANGE UP (ref 1.6–2.6)
MAGNESIUM SERPL-MCNC: 1.7 MG/DL — SIGNIFICANT CHANGE UP (ref 1.6–2.6)
MAGNESIUM SERPL-MCNC: 1.8 MG/DL — SIGNIFICANT CHANGE UP (ref 1.6–2.6)
MAGNESIUM SERPL-MCNC: 1.8 MG/DL — SIGNIFICANT CHANGE UP (ref 1.6–2.6)
MAGNESIUM SERPL-MCNC: 1.9 MG/DL — SIGNIFICANT CHANGE UP (ref 1.6–2.6)
MAGNESIUM SERPL-MCNC: 2 MG/DL — SIGNIFICANT CHANGE UP (ref 1.6–2.6)
MANUAL SMEAR VERIFICATION: SIGNIFICANT CHANGE UP
MCHC RBC-ENTMCNC: 29.3 PG — SIGNIFICANT CHANGE UP (ref 27–34)
MCHC RBC-ENTMCNC: 29.4 PG — SIGNIFICANT CHANGE UP (ref 27–34)
MCHC RBC-ENTMCNC: 29.4 PG — SIGNIFICANT CHANGE UP (ref 27–34)
MCHC RBC-ENTMCNC: 29.5 PG — SIGNIFICANT CHANGE UP (ref 27–34)
MCHC RBC-ENTMCNC: 29.7 PG — SIGNIFICANT CHANGE UP (ref 27–34)
MCHC RBC-ENTMCNC: 29.7 PG — SIGNIFICANT CHANGE UP (ref 27–34)
MCHC RBC-ENTMCNC: 29.8 PG — SIGNIFICANT CHANGE UP (ref 27–34)
MCHC RBC-ENTMCNC: 29.9 GM/DL — LOW (ref 32–36)
MCHC RBC-ENTMCNC: 29.9 PG — SIGNIFICANT CHANGE UP (ref 27–34)
MCHC RBC-ENTMCNC: 29.9 PG — SIGNIFICANT CHANGE UP (ref 27–34)
MCHC RBC-ENTMCNC: 30 GM/DL — LOW (ref 32–36)
MCHC RBC-ENTMCNC: 30 PG — SIGNIFICANT CHANGE UP (ref 27–34)
MCHC RBC-ENTMCNC: 30 PG — SIGNIFICANT CHANGE UP (ref 27–34)
MCHC RBC-ENTMCNC: 30.1 PG — SIGNIFICANT CHANGE UP (ref 27–34)
MCHC RBC-ENTMCNC: 30.1 PG — SIGNIFICANT CHANGE UP (ref 27–34)
MCHC RBC-ENTMCNC: 30.2 GM/DL — LOW (ref 32–36)
MCHC RBC-ENTMCNC: 30.3 GM/DL — LOW (ref 32–36)
MCHC RBC-ENTMCNC: 30.5 GM/DL — LOW (ref 32–36)
MCHC RBC-ENTMCNC: 30.5 GM/DL — LOW (ref 32–36)
MCHC RBC-ENTMCNC: 30.6 GM/DL — LOW (ref 32–36)
MCHC RBC-ENTMCNC: 30.6 GM/DL — LOW (ref 32–36)
MCHC RBC-ENTMCNC: 30.7 GM/DL — LOW (ref 32–36)
MCHC RBC-ENTMCNC: 31.3 GM/DL — LOW (ref 32–36)
MCHC RBC-ENTMCNC: 31.4 GM/DL — LOW (ref 32–36)
MCHC RBC-ENTMCNC: 31.4 GM/DL — LOW (ref 32–36)
MCHC RBC-ENTMCNC: 31.6 GM/DL — LOW (ref 32–36)
MCV RBC AUTO: 94.8 FL — SIGNIFICANT CHANGE UP (ref 80–100)
MCV RBC AUTO: 95 FL — SIGNIFICANT CHANGE UP (ref 80–100)
MCV RBC AUTO: 95.5 FL — SIGNIFICANT CHANGE UP (ref 80–100)
MCV RBC AUTO: 96.1 FL — SIGNIFICANT CHANGE UP (ref 80–100)
MCV RBC AUTO: 96.2 FL — SIGNIFICANT CHANGE UP (ref 80–100)
MCV RBC AUTO: 96.7 FL — SIGNIFICANT CHANGE UP (ref 80–100)
MCV RBC AUTO: 97.3 FL — SIGNIFICANT CHANGE UP (ref 80–100)
MCV RBC AUTO: 97.7 FL — SIGNIFICANT CHANGE UP (ref 80–100)
MCV RBC AUTO: 97.9 FL — SIGNIFICANT CHANGE UP (ref 80–100)
MCV RBC AUTO: 98 FL — SIGNIFICANT CHANGE UP (ref 80–100)
MCV RBC AUTO: 98.1 FL — SIGNIFICANT CHANGE UP (ref 80–100)
MCV RBC AUTO: 98.4 FL — SIGNIFICANT CHANGE UP (ref 80–100)
MCV RBC AUTO: 98.8 FL — SIGNIFICANT CHANGE UP (ref 80–100)
MELD SCORE WITH DIALYSIS: 24 POINTS — SIGNIFICANT CHANGE UP
MELD SCORE WITHOUT DIALYSIS: 21 POINTS — SIGNIFICANT CHANGE UP
MONOCYTES # BLD AUTO: 0 K/UL — SIGNIFICANT CHANGE UP (ref 0–0.9)
MONOCYTES # BLD AUTO: 0.31 K/UL — SIGNIFICANT CHANGE UP (ref 0–0.9)
MONOCYTES # BLD AUTO: 0.56 K/UL — SIGNIFICANT CHANGE UP (ref 0–0.9)
MONOCYTES NFR BLD AUTO: 0 % — LOW (ref 2–14)
MONOCYTES NFR BLD AUTO: 10 % — SIGNIFICANT CHANGE UP (ref 2–14)
MONOCYTES NFR BLD AUTO: 12.5 % — SIGNIFICANT CHANGE UP (ref 2–14)
NEUTROPHILS # BLD AUTO: 2.2 K/UL — SIGNIFICANT CHANGE UP (ref 1.8–7.4)
NEUTROPHILS # BLD AUTO: 2.68 K/UL — SIGNIFICANT CHANGE UP (ref 1.8–7.4)
NEUTROPHILS # BLD AUTO: 8.55 K/UL — HIGH (ref 1.8–7.4)
NEUTROPHILS NFR BLD AUTO: 59.7 % — SIGNIFICANT CHANGE UP (ref 43–77)
NEUTROPHILS NFR BLD AUTO: 70.8 % — SIGNIFICANT CHANGE UP (ref 43–77)
NEUTROPHILS NFR BLD AUTO: 96.5 % — HIGH (ref 43–77)
NRBC # BLD: 0 /100 WBCS — SIGNIFICANT CHANGE UP (ref 0–0)
OVALOCYTES BLD QL SMEAR: SLIGHT — SIGNIFICANT CHANGE UP
PF4 HEPARIN CMPLX AB SER-ACNC: NEGATIVE — SIGNIFICANT CHANGE UP
PHOSPHATE SERPL-MCNC: 2.2 MG/DL — LOW (ref 2.5–4.5)
PHOSPHATE SERPL-MCNC: 2.3 MG/DL — LOW (ref 2.5–4.5)
PHOSPHATE SERPL-MCNC: 2.3 MG/DL — LOW (ref 2.5–4.5)
PHOSPHATE SERPL-MCNC: 2.6 MG/DL — SIGNIFICANT CHANGE UP (ref 2.5–4.5)
PHOSPHATE SERPL-MCNC: 2.6 MG/DL — SIGNIFICANT CHANGE UP (ref 2.5–4.5)
PHOSPHATE SERPL-MCNC: 3.2 MG/DL — SIGNIFICANT CHANGE UP (ref 2.5–4.5)
PHOSPHATE SERPL-MCNC: 3.5 MG/DL — SIGNIFICANT CHANGE UP (ref 2.5–4.5)
PHOSPHATE SERPL-MCNC: 3.6 MG/DL — SIGNIFICANT CHANGE UP (ref 2.5–4.5)
PHOSPHATE SERPL-MCNC: 4 MG/DL — SIGNIFICANT CHANGE UP (ref 2.5–4.5)
PHOSPHATE SERPL-MCNC: 4.5 MG/DL — SIGNIFICANT CHANGE UP (ref 2.5–4.5)
PHOSPHATE SERPL-MCNC: 4.5 MG/DL — SIGNIFICANT CHANGE UP (ref 2.5–4.5)
PHOSPHATE SERPL-MCNC: 4.7 MG/DL — HIGH (ref 2.5–4.5)
PLAT MORPH BLD: ABNORMAL
PLATELET # BLD AUTO: 102 K/UL — LOW (ref 150–400)
PLATELET # BLD AUTO: 118 K/UL — LOW (ref 150–400)
PLATELET # BLD AUTO: 122 K/UL — LOW (ref 150–400)
PLATELET # BLD AUTO: 129 K/UL — LOW (ref 150–400)
PLATELET # BLD AUTO: 149 K/UL — LOW (ref 150–400)
PLATELET # BLD AUTO: 154 K/UL — SIGNIFICANT CHANGE UP (ref 150–400)
PLATELET # BLD AUTO: 155 K/UL — SIGNIFICANT CHANGE UP (ref 150–400)
PLATELET # BLD AUTO: 155 K/UL — SIGNIFICANT CHANGE UP (ref 150–400)
PLATELET # BLD AUTO: 158 K/UL — SIGNIFICANT CHANGE UP (ref 150–400)
PLATELET # BLD AUTO: 174 K/UL — SIGNIFICANT CHANGE UP (ref 150–400)
PLATELET # BLD AUTO: 76 K/UL — LOW (ref 150–400)
PLATELET # BLD AUTO: 78 K/UL — LOW (ref 150–400)
PLATELET # BLD AUTO: 84 K/UL — LOW (ref 150–400)
POIKILOCYTOSIS BLD QL AUTO: SLIGHT — SIGNIFICANT CHANGE UP
POLYCHROMASIA BLD QL SMEAR: SLIGHT — SIGNIFICANT CHANGE UP
POTASSIUM SERPL-MCNC: 3.7 MMOL/L — SIGNIFICANT CHANGE UP (ref 3.5–5.3)
POTASSIUM SERPL-MCNC: 3.9 MMOL/L — SIGNIFICANT CHANGE UP (ref 3.5–5.3)
POTASSIUM SERPL-MCNC: 3.9 MMOL/L — SIGNIFICANT CHANGE UP (ref 3.5–5.3)
POTASSIUM SERPL-MCNC: 4.1 MMOL/L — SIGNIFICANT CHANGE UP (ref 3.5–5.3)
POTASSIUM SERPL-MCNC: 4.3 MMOL/L — SIGNIFICANT CHANGE UP (ref 3.5–5.3)
POTASSIUM SERPL-MCNC: 4.3 MMOL/L — SIGNIFICANT CHANGE UP (ref 3.5–5.3)
POTASSIUM SERPL-MCNC: 4.4 MMOL/L — SIGNIFICANT CHANGE UP (ref 3.5–5.3)
POTASSIUM SERPL-MCNC: 4.4 MMOL/L — SIGNIFICANT CHANGE UP (ref 3.5–5.3)
POTASSIUM SERPL-MCNC: 4.5 MMOL/L — SIGNIFICANT CHANGE UP (ref 3.5–5.3)
POTASSIUM SERPL-MCNC: 4.6 MMOL/L — SIGNIFICANT CHANGE UP (ref 3.5–5.3)
POTASSIUM SERPL-MCNC: 5 MMOL/L — SIGNIFICANT CHANGE UP (ref 3.5–5.3)
POTASSIUM SERPL-MCNC: 5.1 MMOL/L — SIGNIFICANT CHANGE UP (ref 3.5–5.3)
POTASSIUM SERPL-MCNC: 5.2 MMOL/L — SIGNIFICANT CHANGE UP (ref 3.5–5.3)
POTASSIUM SERPL-MCNC: 5.2 MMOL/L — SIGNIFICANT CHANGE UP (ref 3.5–5.3)
POTASSIUM SERPL-MCNC: 5.3 MMOL/L — SIGNIFICANT CHANGE UP (ref 3.5–5.3)
POTASSIUM SERPL-MCNC: 5.9 MMOL/L — HIGH (ref 3.5–5.3)
POTASSIUM SERPL-SCNC: 3.7 MMOL/L — SIGNIFICANT CHANGE UP (ref 3.5–5.3)
POTASSIUM SERPL-SCNC: 3.9 MMOL/L — SIGNIFICANT CHANGE UP (ref 3.5–5.3)
POTASSIUM SERPL-SCNC: 3.9 MMOL/L — SIGNIFICANT CHANGE UP (ref 3.5–5.3)
POTASSIUM SERPL-SCNC: 4.1 MMOL/L — SIGNIFICANT CHANGE UP (ref 3.5–5.3)
POTASSIUM SERPL-SCNC: 4.3 MMOL/L — SIGNIFICANT CHANGE UP (ref 3.5–5.3)
POTASSIUM SERPL-SCNC: 4.3 MMOL/L — SIGNIFICANT CHANGE UP (ref 3.5–5.3)
POTASSIUM SERPL-SCNC: 4.4 MMOL/L — SIGNIFICANT CHANGE UP (ref 3.5–5.3)
POTASSIUM SERPL-SCNC: 4.4 MMOL/L — SIGNIFICANT CHANGE UP (ref 3.5–5.3)
POTASSIUM SERPL-SCNC: 4.5 MMOL/L — SIGNIFICANT CHANGE UP (ref 3.5–5.3)
POTASSIUM SERPL-SCNC: 4.6 MMOL/L — SIGNIFICANT CHANGE UP (ref 3.5–5.3)
POTASSIUM SERPL-SCNC: 5 MMOL/L — SIGNIFICANT CHANGE UP (ref 3.5–5.3)
POTASSIUM SERPL-SCNC: 5.1 MMOL/L — SIGNIFICANT CHANGE UP (ref 3.5–5.3)
POTASSIUM SERPL-SCNC: 5.2 MMOL/L — SIGNIFICANT CHANGE UP (ref 3.5–5.3)
POTASSIUM SERPL-SCNC: 5.2 MMOL/L — SIGNIFICANT CHANGE UP (ref 3.5–5.3)
POTASSIUM SERPL-SCNC: 5.3 MMOL/L — SIGNIFICANT CHANGE UP (ref 3.5–5.3)
POTASSIUM SERPL-SCNC: 5.9 MMOL/L — HIGH (ref 3.5–5.3)
PROT SERPL-MCNC: 5.4 G/DL — LOW (ref 6–8.3)
PROT SERPL-MCNC: 6.2 G/DL — SIGNIFICANT CHANGE UP (ref 6–8.3)
PROT SERPL-MCNC: 6.3 G/DL — SIGNIFICANT CHANGE UP (ref 6–8.3)
PROT SERPL-MCNC: 7 G/DL — SIGNIFICANT CHANGE UP (ref 6–8.3)
PROTHROM AB SERPL-ACNC: 12.5 SEC — SIGNIFICANT CHANGE UP (ref 10.5–13.4)
PROTHROM AB SERPL-ACNC: 13 SEC — SIGNIFICANT CHANGE UP (ref 10.5–13.4)
PROTHROM AB SERPL-ACNC: 13.1 SEC — SIGNIFICANT CHANGE UP (ref 10.5–13.4)
PROTHROM AB SERPL-ACNC: 13.2 SEC — SIGNIFICANT CHANGE UP (ref 10.5–13.4)
RBC # BLD: 3.07 M/UL — LOW (ref 3.8–5.2)
RBC # BLD: 3.22 M/UL — LOW (ref 3.8–5.2)
RBC # BLD: 3.28 M/UL — LOW (ref 3.8–5.2)
RBC # BLD: 3.4 M/UL — LOW (ref 3.8–5.2)
RBC # BLD: 3.54 M/UL — LOW (ref 3.8–5.2)
RBC # BLD: 3.59 M/UL — LOW (ref 3.8–5.2)
RBC # BLD: 3.67 M/UL — LOW (ref 3.8–5.2)
RBC # BLD: 3.8 M/UL — SIGNIFICANT CHANGE UP (ref 3.8–5.2)
RBC # BLD: 3.93 M/UL — SIGNIFICANT CHANGE UP (ref 3.8–5.2)
RBC # BLD: 4.32 M/UL — SIGNIFICANT CHANGE UP (ref 3.8–5.2)
RBC # BLD: 4.4 M/UL — SIGNIFICANT CHANGE UP (ref 3.8–5.2)
RBC # BLD: 4.47 M/UL — SIGNIFICANT CHANGE UP (ref 3.8–5.2)
RBC # BLD: 5.12 M/UL — SIGNIFICANT CHANGE UP (ref 3.8–5.2)
RBC # FLD: 14.9 % — HIGH (ref 10.3–14.5)
RBC # FLD: 15.2 % — HIGH (ref 10.3–14.5)
RBC # FLD: 15.2 % — HIGH (ref 10.3–14.5)
RBC # FLD: 15.3 % — HIGH (ref 10.3–14.5)
RBC # FLD: 15.4 % — HIGH (ref 10.3–14.5)
RBC # FLD: 15.5 % — HIGH (ref 10.3–14.5)
RBC # FLD: 15.6 % — HIGH (ref 10.3–14.5)
RBC # FLD: 15.7 % — HIGH (ref 10.3–14.5)
RBC # FLD: 15.9 % — HIGH (ref 10.3–14.5)
RBC BLD AUTO: ABNORMAL
RH IG SCN BLD-IMP: POSITIVE — SIGNIFICANT CHANGE UP
SARS-COV-2 RNA SPEC QL NAA+PROBE: NEGATIVE — SIGNIFICANT CHANGE UP
SARS-COV-2 RNA SPEC QL NAA+PROBE: SIGNIFICANT CHANGE UP
SODIUM SERPL-SCNC: 132 MMOL/L — LOW (ref 135–145)
SODIUM SERPL-SCNC: 132 MMOL/L — LOW (ref 135–145)
SODIUM SERPL-SCNC: 133 MMOL/L — LOW (ref 135–145)
SODIUM SERPL-SCNC: 133 MMOL/L — LOW (ref 135–145)
SODIUM SERPL-SCNC: 134 MMOL/L — LOW (ref 135–145)
SODIUM SERPL-SCNC: 135 MMOL/L — SIGNIFICANT CHANGE UP (ref 135–145)
SODIUM SERPL-SCNC: 136 MMOL/L — SIGNIFICANT CHANGE UP (ref 135–145)
SODIUM SERPL-SCNC: 136 MMOL/L — SIGNIFICANT CHANGE UP (ref 135–145)
SODIUM SERPL-SCNC: 137 MMOL/L — SIGNIFICANT CHANGE UP (ref 135–145)
SODIUM SERPL-SCNC: 139 MMOL/L — SIGNIFICANT CHANGE UP (ref 135–145)
SODIUM SERPL-SCNC: 139 MMOL/L — SIGNIFICANT CHANGE UP (ref 135–145)
SODIUM SERPL-SCNC: 144 MMOL/L — SIGNIFICANT CHANGE UP (ref 135–145)
SODIUM SERPL-SCNC: 146 MMOL/L — HIGH (ref 135–145)
SPECIMEN SOURCE: SIGNIFICANT CHANGE UP
SPHEROCYTES BLD QL SMEAR: SLIGHT — SIGNIFICANT CHANGE UP
SRA INTERP SER-IMP: SIGNIFICANT CHANGE UP
SURGICAL PATHOLOGY STUDY: SIGNIFICANT CHANGE UP
SURGICAL PATHOLOGY STUDY: SIGNIFICANT CHANGE UP
TROPONIN T SERPL-MCNC: <0.01 NG/ML — SIGNIFICANT CHANGE UP (ref 0–0.01)
TRYPTASE SERPL-MCNC: 5.4 UG/L — SIGNIFICANT CHANGE UP
VANCOMYCIN FLD-MCNC: 13.8 UG/ML — SIGNIFICANT CHANGE UP
VANCOMYCIN TROUGH SERPL-MCNC: 13.4 UG/ML — SIGNIFICANT CHANGE UP (ref 10–20)
VANCOMYCIN TROUGH SERPL-MCNC: 18.3 UG/ML — SIGNIFICANT CHANGE UP (ref 10–20)
VARIANT LYMPHS # BLD: 1.8 % — SIGNIFICANT CHANGE UP (ref 0–6)
WBC # BLD: 2.88 K/UL — LOW (ref 3.8–10.5)
WBC # BLD: 3.08 K/UL — LOW (ref 3.8–10.5)
WBC # BLD: 3.12 K/UL — LOW (ref 3.8–10.5)
WBC # BLD: 3.24 K/UL — LOW (ref 3.8–10.5)
WBC # BLD: 3.66 K/UL — LOW (ref 3.8–10.5)
WBC # BLD: 3.69 K/UL — LOW (ref 3.8–10.5)
WBC # BLD: 4.41 K/UL — SIGNIFICANT CHANGE UP (ref 3.8–10.5)
WBC # BLD: 4.49 K/UL — SIGNIFICANT CHANGE UP (ref 3.8–10.5)
WBC # BLD: 4.83 K/UL — SIGNIFICANT CHANGE UP (ref 3.8–10.5)
WBC # BLD: 4.97 K/UL — SIGNIFICANT CHANGE UP (ref 3.8–10.5)
WBC # BLD: 6.97 K/UL — SIGNIFICANT CHANGE UP (ref 3.8–10.5)
WBC # BLD: 8.69 K/UL — SIGNIFICANT CHANGE UP (ref 3.8–10.5)
WBC # BLD: 8.86 K/UL — SIGNIFICANT CHANGE UP (ref 3.8–10.5)
WBC # FLD AUTO: 2.88 K/UL — LOW (ref 3.8–10.5)
WBC # FLD AUTO: 3.08 K/UL — LOW (ref 3.8–10.5)
WBC # FLD AUTO: 3.12 K/UL — LOW (ref 3.8–10.5)
WBC # FLD AUTO: 3.24 K/UL — LOW (ref 3.8–10.5)
WBC # FLD AUTO: 3.66 K/UL — LOW (ref 3.8–10.5)
WBC # FLD AUTO: 3.69 K/UL — LOW (ref 3.8–10.5)
WBC # FLD AUTO: 4.41 K/UL — SIGNIFICANT CHANGE UP (ref 3.8–10.5)
WBC # FLD AUTO: 4.49 K/UL — SIGNIFICANT CHANGE UP (ref 3.8–10.5)
WBC # FLD AUTO: 4.83 K/UL — SIGNIFICANT CHANGE UP (ref 3.8–10.5)
WBC # FLD AUTO: 4.97 K/UL — SIGNIFICANT CHANGE UP (ref 3.8–10.5)
WBC # FLD AUTO: 6.97 K/UL — SIGNIFICANT CHANGE UP (ref 3.8–10.5)
WBC # FLD AUTO: 8.69 K/UL — SIGNIFICANT CHANGE UP (ref 3.8–10.5)
WBC # FLD AUTO: 8.86 K/UL — SIGNIFICANT CHANGE UP (ref 3.8–10.5)

## 2022-01-01 PROCEDURE — 93306 TTE W/DOPPLER COMPLETE: CPT

## 2022-01-01 PROCEDURE — 83735 ASSAY OF MAGNESIUM: CPT

## 2022-01-01 PROCEDURE — 87070 CULTURE OTHR SPECIMN AEROBIC: CPT

## 2022-01-01 PROCEDURE — 88304 TISSUE EXAM BY PATHOLOGIST: CPT

## 2022-01-01 PROCEDURE — P9037: CPT

## 2022-01-01 PROCEDURE — 93971 EXTREMITY STUDY: CPT

## 2022-01-01 PROCEDURE — 88304 TISSUE EXAM BY PATHOLOGIST: CPT | Mod: 26

## 2022-01-01 PROCEDURE — 93971 EXTREMITY STUDY: CPT | Mod: 26,LT

## 2022-01-01 PROCEDURE — 85730 THROMBOPLASTIN TIME PARTIAL: CPT

## 2022-01-01 PROCEDURE — 71045 X-RAY EXAM CHEST 1 VIEW: CPT | Mod: 26

## 2022-01-01 PROCEDURE — 86850 RBC ANTIBODY SCREEN: CPT

## 2022-01-01 PROCEDURE — 85610 PROTHROMBIN TIME: CPT

## 2022-01-01 PROCEDURE — 97116 GAIT TRAINING THERAPY: CPT

## 2022-01-01 PROCEDURE — 87205 SMEAR GRAM STAIN: CPT

## 2022-01-01 PROCEDURE — 97161 PT EVAL LOW COMPLEX 20 MIN: CPT

## 2022-01-01 PROCEDURE — 76000 FLUOROSCOPY <1 HR PHYS/QHP: CPT

## 2022-01-01 PROCEDURE — 87075 CULTR BACTERIA EXCEPT BLOOD: CPT

## 2022-01-01 PROCEDURE — 80076 HEPATIC FUNCTION PANEL: CPT

## 2022-01-01 PROCEDURE — 93005 ELECTROCARDIOGRAM TRACING: CPT

## 2022-01-01 PROCEDURE — 80053 COMPREHEN METABOLIC PANEL: CPT

## 2022-01-01 PROCEDURE — 99233 SBSQ HOSP IP/OBS HIGH 50: CPT | Mod: GC

## 2022-01-01 PROCEDURE — 86900 BLOOD TYPING SEROLOGIC ABO: CPT

## 2022-01-01 PROCEDURE — 86022 PLATELET ANTIBODIES: CPT

## 2022-01-01 PROCEDURE — 86803 HEPATITIS C AB TEST: CPT

## 2022-01-01 PROCEDURE — 85027 COMPLETE CBC AUTOMATED: CPT

## 2022-01-01 PROCEDURE — 84132 ASSAY OF SERUM POTASSIUM: CPT

## 2022-01-01 PROCEDURE — 83520 IMMUNOASSAY QUANT NOS NONAB: CPT

## 2022-01-01 PROCEDURE — 82330 ASSAY OF CALCIUM: CPT

## 2022-01-01 PROCEDURE — 86901 BLOOD TYPING SEROLOGIC RH(D): CPT

## 2022-01-01 PROCEDURE — 97530 THERAPEUTIC ACTIVITIES: CPT

## 2022-01-01 PROCEDURE — 36430 TRANSFUSION BLD/BLD COMPNT: CPT

## 2022-01-01 PROCEDURE — 99223 1ST HOSP IP/OBS HIGH 75: CPT | Mod: GC

## 2022-01-01 PROCEDURE — P9100: CPT

## 2022-01-01 PROCEDURE — 99291 CRITICAL CARE FIRST HOUR: CPT

## 2022-01-01 PROCEDURE — 71045 X-RAY EXAM CHEST 1 VIEW: CPT

## 2022-01-01 PROCEDURE — 84484 ASSAY OF TROPONIN QUANT: CPT

## 2022-01-01 PROCEDURE — 93010 ELECTROCARDIOGRAM REPORT: CPT

## 2022-01-01 PROCEDURE — 80048 BASIC METABOLIC PNL TOTAL CA: CPT

## 2022-01-01 PROCEDURE — 83605 ASSAY OF LACTIC ACID: CPT

## 2022-01-01 PROCEDURE — 80202 ASSAY OF VANCOMYCIN: CPT

## 2022-01-01 PROCEDURE — 36415 COLL VENOUS BLD VENIPUNCTURE: CPT

## 2022-01-01 PROCEDURE — U0005: CPT

## 2022-01-01 PROCEDURE — 82803 BLOOD GASES ANY COMBINATION: CPT

## 2022-01-01 PROCEDURE — 90935 HEMODIALYSIS ONE EVALUATION: CPT

## 2022-01-01 PROCEDURE — 83036 HEMOGLOBIN GLYCOSYLATED A1C: CPT

## 2022-01-01 PROCEDURE — 99232 SBSQ HOSP IP/OBS MODERATE 35: CPT | Mod: GC

## 2022-01-01 PROCEDURE — 85014 HEMATOCRIT: CPT

## 2022-01-01 PROCEDURE — 90935 HEMODIALYSIS ONE EVALUATION: CPT | Mod: GC

## 2022-01-01 PROCEDURE — C1768: CPT

## 2022-01-01 PROCEDURE — 86706 HEP B SURFACE ANTIBODY: CPT

## 2022-01-01 PROCEDURE — P9045: CPT

## 2022-01-01 PROCEDURE — 85025 COMPLETE CBC W/AUTO DIFF WBC: CPT

## 2022-01-01 PROCEDURE — 82553 CREATINE MB FRACTION: CPT

## 2022-01-01 PROCEDURE — 99233 SBSQ HOSP IP/OBS HIGH 50: CPT

## 2022-01-01 PROCEDURE — C1889: CPT

## 2022-01-01 PROCEDURE — 82550 ASSAY OF CK (CPK): CPT

## 2022-01-01 PROCEDURE — 90945 DIALYSIS ONE EVALUATION: CPT

## 2022-01-01 PROCEDURE — 86704 HEP B CORE ANTIBODY TOTAL: CPT

## 2022-01-01 PROCEDURE — 82962 GLUCOSE BLOOD TEST: CPT

## 2022-01-01 PROCEDURE — 84295 ASSAY OF SERUM SODIUM: CPT

## 2022-01-01 PROCEDURE — 86923 COMPATIBILITY TEST ELECTRIC: CPT

## 2022-01-01 PROCEDURE — U0003: CPT

## 2022-01-01 PROCEDURE — 82947 ASSAY GLUCOSE BLOOD QUANT: CPT

## 2022-01-01 PROCEDURE — 87635 SARS-COV-2 COVID-19 AMP PRB: CPT

## 2022-01-01 PROCEDURE — 87340 HEPATITIS B SURFACE AG IA: CPT

## 2022-01-01 PROCEDURE — 93306 TTE W/DOPPLER COMPLETE: CPT | Mod: 26

## 2022-01-01 PROCEDURE — C9399: CPT

## 2022-01-01 PROCEDURE — 84100 ASSAY OF PHOSPHORUS: CPT

## 2022-01-01 PROCEDURE — 36000 PLACE NEEDLE IN VEIN: CPT

## 2022-01-01 DEVICE — IMPLANTABLE DEVICE: Type: IMPLANTABLE DEVICE | Site: RIGHT | Status: FUNCTIONAL

## 2022-01-01 DEVICE — SURGIFLO HEMOSTATIC MATRIX KIT: Type: IMPLANTABLE DEVICE | Site: RIGHT | Status: FUNCTIONAL

## 2022-01-01 DEVICE — CLIP APPLIER ETHICON LIGACLIP 9 3/8" SMALL: Type: IMPLANTABLE DEVICE | Site: RIGHT | Status: FUNCTIONAL

## 2022-01-01 DEVICE — SURGICEL FIBRILLAR 4 X 4": Type: IMPLANTABLE DEVICE | Site: RIGHT | Status: FUNCTIONAL

## 2022-01-01 DEVICE — CLIP APPLIER ETHICON LIGACLIP 11.5" MEDIUM: Type: IMPLANTABLE DEVICE | Site: RIGHT | Status: FUNCTIONAL

## 2022-01-01 RX ORDER — DIPHENHYDRAMINE HCL 50 MG
50 CAPSULE ORAL ONCE
Refills: 0 | Status: COMPLETED | OUTPATIENT
Start: 2022-01-01 | End: 2022-01-01

## 2022-01-01 RX ORDER — SEVELAMER CARBONATE 2400 MG/1
2 POWDER, FOR SUSPENSION ORAL
Qty: 0 | Refills: 0 | DISCHARGE

## 2022-01-01 RX ORDER — INSULIN HUMAN 100 [IU]/ML
10 INJECTION, SOLUTION SUBCUTANEOUS ONCE
Refills: 0 | Status: COMPLETED | OUTPATIENT
Start: 2022-01-01 | End: 2022-01-01

## 2022-01-01 RX ORDER — SODIUM CHLORIDE 9 MG/ML
500 INJECTION INTRAMUSCULAR; INTRAVENOUS; SUBCUTANEOUS ONCE
Refills: 0 | Status: COMPLETED | OUTPATIENT
Start: 2022-01-01 | End: 2022-01-01

## 2022-01-01 RX ORDER — BENZOCAINE AND MENTHOL 5; 1 G/100ML; G/100ML
1 LIQUID ORAL
Refills: 0 | Status: DISCONTINUED | OUTPATIENT
Start: 2022-01-01 | End: 2022-01-01

## 2022-01-01 RX ORDER — SEVELAMER CARBONATE 2400 MG/1
800 POWDER, FOR SUSPENSION ORAL
Refills: 0 | Status: DISCONTINUED | OUTPATIENT
Start: 2022-01-01 | End: 2022-01-01

## 2022-01-01 RX ORDER — CEFAZOLIN SODIUM 1 G
2000 VIAL (EA) INJECTION EVERY 12 HOURS
Refills: 0 | Status: DISCONTINUED | OUTPATIENT
Start: 2022-01-01 | End: 2022-01-01

## 2022-01-01 RX ORDER — DEXTROSE 50 % IN WATER 50 %
50 SYRINGE (ML) INTRAVENOUS ONCE
Refills: 0 | Status: COMPLETED | OUTPATIENT
Start: 2022-01-01 | End: 2022-01-01

## 2022-01-01 RX ORDER — MIDODRINE HYDROCHLORIDE 2.5 MG/1
2.5 TABLET ORAL EVERY 8 HOURS
Refills: 0 | Status: DISCONTINUED | OUTPATIENT
Start: 2022-01-01 | End: 2022-01-01

## 2022-01-01 RX ORDER — LIDOCAINE 4 G/100G
1 CREAM TOPICAL DAILY
Refills: 0 | Status: DISCONTINUED | OUTPATIENT
Start: 2022-01-01 | End: 2022-01-01

## 2022-01-01 RX ORDER — LIDOCAINE HCL 20 MG/ML
1 VIAL (ML) INJECTION ONCE
Refills: 0 | Status: DISCONTINUED | OUTPATIENT
Start: 2022-01-01 | End: 2022-01-01

## 2022-01-01 RX ORDER — ASPIRIN/CALCIUM CARB/MAGNESIUM 324 MG
81 TABLET ORAL DAILY
Refills: 0 | Status: DISCONTINUED | OUTPATIENT
Start: 2022-01-01 | End: 2022-01-01

## 2022-01-01 RX ORDER — VANCOMYCIN HCL 1 G
750 VIAL (EA) INTRAVENOUS ONCE
Refills: 0 | Status: COMPLETED | OUTPATIENT
Start: 2022-01-01 | End: 2022-01-01

## 2022-01-01 RX ORDER — NOREPINEPHRINE BITARTRATE/D5W 8 MG/250ML
0.05 PLASTIC BAG, INJECTION (ML) INTRAVENOUS
Qty: 8 | Refills: 0 | Status: DISCONTINUED | OUTPATIENT
Start: 2022-01-01 | End: 2022-01-01

## 2022-01-01 RX ORDER — SACUBITRIL AND VALSARTAN 24; 26 MG/1; MG/1
1 TABLET, FILM COATED ORAL
Qty: 0 | Refills: 0 | DISCHARGE

## 2022-01-01 RX ORDER — MAGNESIUM SULFATE 500 MG/ML
2 VIAL (ML) INJECTION ONCE
Refills: 0 | Status: COMPLETED | OUTPATIENT
Start: 2022-01-01 | End: 2022-01-01

## 2022-01-01 RX ORDER — ACETAMINOPHEN 500 MG
650 TABLET ORAL ONCE
Refills: 0 | Status: COMPLETED | OUTPATIENT
Start: 2022-01-01 | End: 2022-01-01

## 2022-01-01 RX ORDER — SEVELAMER CARBONATE 2400 MG/1
1 POWDER, FOR SUSPENSION ORAL
Qty: 0 | Refills: 0 | DISCHARGE

## 2022-01-01 RX ORDER — VANCOMYCIN HCL 1 G
500 VIAL (EA) INTRAVENOUS ONCE
Refills: 0 | Status: COMPLETED | OUTPATIENT
Start: 2022-01-01 | End: 2022-01-01

## 2022-01-01 RX ORDER — VANCOMYCIN HCL 1 G
1000 VIAL (EA) INTRAVENOUS ONCE
Refills: 0 | Status: DISCONTINUED | OUTPATIENT
Start: 2022-01-01 | End: 2022-01-01

## 2022-01-01 RX ORDER — ALBUMIN HUMAN 25 %
250 VIAL (ML) INTRAVENOUS
Refills: 0 | Status: COMPLETED | OUTPATIENT
Start: 2022-01-01 | End: 2022-01-01

## 2022-01-01 RX ORDER — GLUCAGON INJECTION, SOLUTION 0.5 MG/.1ML
1 INJECTION, SOLUTION SUBCUTANEOUS ONCE
Refills: 0 | Status: DISCONTINUED | OUTPATIENT
Start: 2022-01-01 | End: 2022-01-01

## 2022-01-01 RX ORDER — VANCOMYCIN HCL 1 G
1000 VIAL (EA) INTRAVENOUS ONCE
Refills: 0 | Status: COMPLETED | OUTPATIENT
Start: 2022-01-01 | End: 2022-01-01

## 2022-01-01 RX ORDER — ALBUMIN HUMAN 25 %
250 VIAL (ML) INTRAVENOUS ONCE
Refills: 0 | Status: COMPLETED | OUTPATIENT
Start: 2022-01-01 | End: 2022-01-01

## 2022-01-01 RX ORDER — SODIUM CHLORIDE 9 MG/ML
1000 INJECTION, SOLUTION INTRAVENOUS
Refills: 0 | Status: DISCONTINUED | OUTPATIENT
Start: 2022-01-01 | End: 2022-01-01

## 2022-01-01 RX ORDER — INSULIN LISPRO 100/ML
VIAL (ML) SUBCUTANEOUS ONCE
Refills: 0 | Status: COMPLETED | OUTPATIENT
Start: 2022-01-01 | End: 2022-01-01

## 2022-01-01 RX ORDER — EPINEPHRINE 0.3 MG/.3ML
0.03 INJECTION INTRAMUSCULAR; SUBCUTANEOUS
Qty: 4 | Refills: 0 | Status: DISCONTINUED | OUTPATIENT
Start: 2022-01-01 | End: 2022-01-01

## 2022-01-01 RX ORDER — ONDANSETRON 8 MG/1
4 TABLET, FILM COATED ORAL ONCE
Refills: 0 | Status: COMPLETED | OUTPATIENT
Start: 2022-01-01 | End: 2022-01-01

## 2022-01-01 RX ORDER — SODIUM CHLORIDE 9 MG/ML
500 INJECTION INTRAMUSCULAR; INTRAVENOUS; SUBCUTANEOUS ONCE
Refills: 0 | Status: DISCONTINUED | OUTPATIENT
Start: 2022-01-01 | End: 2022-01-01

## 2022-01-01 RX ORDER — MIDODRINE HYDROCHLORIDE 2.5 MG/1
5 TABLET ORAL ONCE
Refills: 0 | Status: COMPLETED | OUTPATIENT
Start: 2022-01-01 | End: 2022-01-01

## 2022-01-01 RX ORDER — DEXTROSE 50 % IN WATER 50 %
25 SYRINGE (ML) INTRAVENOUS ONCE
Refills: 0 | Status: DISCONTINUED | OUTPATIENT
Start: 2022-01-01 | End: 2022-01-01

## 2022-01-01 RX ORDER — MIDODRINE HYDROCHLORIDE 2.5 MG/1
1 TABLET ORAL
Qty: 0 | Refills: 0 | DISCHARGE
Start: 2022-01-01

## 2022-01-01 RX ORDER — ACETAMINOPHEN 500 MG
1000 TABLET ORAL ONCE
Refills: 0 | Status: COMPLETED | OUTPATIENT
Start: 2022-01-01 | End: 2022-01-01

## 2022-01-01 RX ORDER — CHLORHEXIDINE GLUCONATE 213 G/1000ML
1 SOLUTION TOPICAL ONCE
Refills: 0 | Status: COMPLETED | OUTPATIENT
Start: 2022-01-01 | End: 2022-01-01

## 2022-01-01 RX ORDER — LIDOCAINE 4 G/100G
1 CREAM TOPICAL EVERY 24 HOURS
Refills: 0 | Status: DISCONTINUED | OUTPATIENT
Start: 2022-01-01 | End: 2022-01-01

## 2022-01-01 RX ORDER — CLOPIDOGREL BISULFATE 75 MG/1
75 TABLET, FILM COATED ORAL DAILY
Refills: 0 | Status: DISCONTINUED | OUTPATIENT
Start: 2022-01-01 | End: 2022-01-01

## 2022-01-01 RX ORDER — DEXTROSE 50 % IN WATER 50 %
15 SYRINGE (ML) INTRAVENOUS ONCE
Refills: 0 | Status: DISCONTINUED | OUTPATIENT
Start: 2022-01-01 | End: 2022-01-01

## 2022-01-01 RX ORDER — BACITRACIN ZINC 500 UNIT/G
1 OINTMENT IN PACKET (EA) TOPICAL ONCE
Refills: 0 | Status: COMPLETED | OUTPATIENT
Start: 2022-01-01 | End: 2022-01-01

## 2022-01-01 RX ORDER — METOPROLOL TARTRATE 50 MG
1 TABLET ORAL
Qty: 0 | Refills: 0 | DISCHARGE

## 2022-01-01 RX ORDER — AMLODIPINE BESYLATE 2.5 MG/1
1 TABLET ORAL
Qty: 0 | Refills: 0 | DISCHARGE

## 2022-01-01 RX ORDER — EPINEPHRINE 0.3 MG/.3ML
0.01 INJECTION INTRAMUSCULAR; SUBCUTANEOUS
Qty: 4 | Refills: 0 | Status: DISCONTINUED | OUTPATIENT
Start: 2022-01-01 | End: 2022-01-01

## 2022-01-01 RX ORDER — POTASSIUM PHOSPHATE, MONOBASIC POTASSIUM PHOSPHATE, DIBASIC 236; 224 MG/ML; MG/ML
15 INJECTION, SOLUTION INTRAVENOUS ONCE
Refills: 0 | Status: COMPLETED | OUTPATIENT
Start: 2022-01-01 | End: 2022-01-01

## 2022-01-01 RX ORDER — VANCOMYCIN HCL 1 G
500 VIAL (EA) INTRAVENOUS ONCE
Refills: 0 | Status: DISCONTINUED | OUTPATIENT
Start: 2022-01-01 | End: 2022-01-01

## 2022-01-01 RX ORDER — MIDODRINE HYDROCHLORIDE 2.5 MG/1
10 TABLET ORAL EVERY 8 HOURS
Refills: 0 | Status: DISCONTINUED | OUTPATIENT
Start: 2022-01-01 | End: 2022-01-01

## 2022-01-01 RX ORDER — BACITRACIN ZINC 500 UNIT/G
1 OINTMENT IN PACKET (EA) TOPICAL DAILY
Refills: 0 | Status: DISCONTINUED | OUTPATIENT
Start: 2022-01-01 | End: 2022-01-01

## 2022-01-01 RX ORDER — MIDODRINE HYDROCHLORIDE 2.5 MG/1
5 TABLET ORAL EVERY 8 HOURS
Refills: 0 | Status: DISCONTINUED | OUTPATIENT
Start: 2022-01-01 | End: 2022-01-01

## 2022-01-01 RX ORDER — BENZOCAINE 10 %
1 GEL (GRAM) MUCOUS MEMBRANE ONCE
Refills: 0 | Status: COMPLETED | OUTPATIENT
Start: 2022-01-01 | End: 2022-01-01

## 2022-01-01 RX ORDER — ACETAMINOPHEN 500 MG
650 TABLET ORAL EVERY 6 HOURS
Refills: 0 | Status: DISCONTINUED | OUTPATIENT
Start: 2022-01-01 | End: 2022-01-01

## 2022-01-01 RX ORDER — SENNA PLUS 8.6 MG/1
2 TABLET ORAL AT BEDTIME
Refills: 0 | Status: DISCONTINUED | OUTPATIENT
Start: 2022-01-01 | End: 2022-01-01

## 2022-01-01 RX ORDER — ERYTHROPOIETIN 10000 [IU]/ML
4000 INJECTION, SOLUTION INTRAVENOUS; SUBCUTANEOUS ONCE
Refills: 0 | Status: COMPLETED | OUTPATIENT
Start: 2022-01-01 | End: 2022-01-01

## 2022-01-01 RX ORDER — HYDROMORPHONE HYDROCHLORIDE 2 MG/ML
0.25 INJECTION INTRAMUSCULAR; INTRAVENOUS; SUBCUTANEOUS ONCE
Refills: 0 | Status: DISCONTINUED | OUTPATIENT
Start: 2022-01-01 | End: 2022-01-01

## 2022-01-01 RX ORDER — POLYETHYLENE GLYCOL 3350 17 G/17G
17 POWDER, FOR SOLUTION ORAL DAILY
Refills: 0 | Status: DISCONTINUED | OUTPATIENT
Start: 2022-01-01 | End: 2022-01-01

## 2022-01-01 RX ORDER — DEXTROSE 50 % IN WATER 50 %
12.5 SYRINGE (ML) INTRAVENOUS ONCE
Refills: 0 | Status: DISCONTINUED | OUTPATIENT
Start: 2022-01-01 | End: 2022-01-01

## 2022-01-01 RX ORDER — CHLORHEXIDINE GLUCONATE 213 G/1000ML
1 SOLUTION TOPICAL DAILY
Refills: 0 | Status: DISCONTINUED | OUTPATIENT
Start: 2022-01-01 | End: 2022-01-01

## 2022-01-01 RX ORDER — FAMOTIDINE 10 MG/ML
20 INJECTION INTRAVENOUS ONCE
Refills: 0 | Status: COMPLETED | OUTPATIENT
Start: 2022-01-01 | End: 2022-01-01

## 2022-01-01 RX ORDER — FLUCONAZOLE 150 MG/1
1 TABLET ORAL
Qty: 0 | Refills: 0 | DISCHARGE

## 2022-01-01 RX ADMIN — Medication 25 GRAM(S): at 06:42

## 2022-01-01 RX ADMIN — MIDODRINE HYDROCHLORIDE 5 MILLIGRAM(S): 2.5 TABLET ORAL at 10:16

## 2022-01-01 RX ADMIN — SEVELAMER CARBONATE 800 MILLIGRAM(S): 2400 POWDER, FOR SUSPENSION ORAL at 07:22

## 2022-01-01 RX ADMIN — Medication 100 MILLIGRAM(S): at 12:32

## 2022-01-01 RX ADMIN — Medication 125 MILLILITER(S): at 15:06

## 2022-01-01 RX ADMIN — LIDOCAINE 1 PATCH: 4 CREAM TOPICAL at 08:49

## 2022-01-01 RX ADMIN — Medication 8.25 MICROGRAM(S)/KG/MIN: at 11:19

## 2022-01-01 RX ADMIN — Medication 50 MILLIGRAM(S): at 18:27

## 2022-01-01 RX ADMIN — Medication 81 MILLIGRAM(S): at 11:20

## 2022-01-01 RX ADMIN — Medication 1000 MILLIGRAM(S): at 00:22

## 2022-01-01 RX ADMIN — Medication 100 MILLIGRAM(S): at 21:14

## 2022-01-01 RX ADMIN — SEVELAMER CARBONATE 800 MILLIGRAM(S): 2400 POWDER, FOR SUSPENSION ORAL at 12:02

## 2022-01-01 RX ADMIN — MIDODRINE HYDROCHLORIDE 5 MILLIGRAM(S): 2.5 TABLET ORAL at 21:25

## 2022-01-01 RX ADMIN — MIDODRINE HYDROCHLORIDE 10 MILLIGRAM(S): 2.5 TABLET ORAL at 16:05

## 2022-01-01 RX ADMIN — Medication 400 MILLIGRAM(S): at 10:10

## 2022-01-01 RX ADMIN — MIDODRINE HYDROCHLORIDE 10 MILLIGRAM(S): 2.5 TABLET ORAL at 16:18

## 2022-01-01 RX ADMIN — Medication 1 SPRAY(S): at 08:36

## 2022-01-01 RX ADMIN — CLOPIDOGREL BISULFATE 75 MILLIGRAM(S): 75 TABLET, FILM COATED ORAL at 15:31

## 2022-01-01 RX ADMIN — SEVELAMER CARBONATE 800 MILLIGRAM(S): 2400 POWDER, FOR SUSPENSION ORAL at 08:07

## 2022-01-01 RX ADMIN — Medication 10 MILLIGRAM(S): at 17:50

## 2022-01-01 RX ADMIN — LIDOCAINE 1 PATCH: 4 CREAM TOPICAL at 21:00

## 2022-01-01 RX ADMIN — MIDODRINE HYDROCHLORIDE 2.5 MILLIGRAM(S): 2.5 TABLET ORAL at 22:04

## 2022-01-01 RX ADMIN — ONDANSETRON 4 MILLIGRAM(S): 8 TABLET, FILM COATED ORAL at 21:51

## 2022-01-01 RX ADMIN — SEVELAMER CARBONATE 800 MILLIGRAM(S): 2400 POWDER, FOR SUSPENSION ORAL at 14:22

## 2022-01-01 RX ADMIN — POLYETHYLENE GLYCOL 3350 17 GRAM(S): 17 POWDER, FOR SOLUTION ORAL at 14:10

## 2022-01-01 RX ADMIN — SEVELAMER CARBONATE 800 MILLIGRAM(S): 2400 POWDER, FOR SUSPENSION ORAL at 12:58

## 2022-01-01 RX ADMIN — MIDODRINE HYDROCHLORIDE 5 MILLIGRAM(S): 2.5 TABLET ORAL at 21:09

## 2022-01-01 RX ADMIN — Medication 650 MILLIGRAM(S): at 15:50

## 2022-01-01 RX ADMIN — LIDOCAINE 1 PATCH: 4 CREAM TOPICAL at 09:55

## 2022-01-01 RX ADMIN — MIDODRINE HYDROCHLORIDE 5 MILLIGRAM(S): 2.5 TABLET ORAL at 05:16

## 2022-01-01 RX ADMIN — LIDOCAINE 1 PATCH: 4 CREAM TOPICAL at 18:27

## 2022-01-01 RX ADMIN — SEVELAMER CARBONATE 800 MILLIGRAM(S): 2400 POWDER, FOR SUSPENSION ORAL at 12:46

## 2022-01-01 RX ADMIN — MIDODRINE HYDROCHLORIDE 5 MILLIGRAM(S): 2.5 TABLET ORAL at 16:40

## 2022-01-01 RX ADMIN — MIDODRINE HYDROCHLORIDE 5 MILLIGRAM(S): 2.5 TABLET ORAL at 13:38

## 2022-01-01 RX ADMIN — Medication 650 MILLIGRAM(S): at 12:20

## 2022-01-01 RX ADMIN — SEVELAMER CARBONATE 800 MILLIGRAM(S): 2400 POWDER, FOR SUSPENSION ORAL at 07:26

## 2022-01-01 RX ADMIN — Medication 25 GRAM(S): at 06:36

## 2022-01-01 RX ADMIN — Medication 650 MILLIGRAM(S): at 01:33

## 2022-01-01 RX ADMIN — CHLORHEXIDINE GLUCONATE 1 APPLICATION(S): 213 SOLUTION TOPICAL at 10:22

## 2022-01-01 RX ADMIN — Medication 81 MILLIGRAM(S): at 14:23

## 2022-01-01 RX ADMIN — Medication 50 MILLIGRAM(S): at 01:03

## 2022-01-01 RX ADMIN — Medication 250 MILLIGRAM(S): at 08:09

## 2022-01-01 RX ADMIN — Medication 8.25 MICROGRAM(S)/KG/MIN: at 16:44

## 2022-01-01 RX ADMIN — MIDODRINE HYDROCHLORIDE 2.5 MILLIGRAM(S): 2.5 TABLET ORAL at 13:57

## 2022-01-01 RX ADMIN — Medication 81 MILLIGRAM(S): at 16:19

## 2022-01-01 RX ADMIN — Medication 125 MILLILITER(S): at 18:49

## 2022-01-01 RX ADMIN — Medication 250 MILLIGRAM(S): at 19:47

## 2022-01-01 RX ADMIN — MIDODRINE HYDROCHLORIDE 10 MILLIGRAM(S): 2.5 TABLET ORAL at 00:49

## 2022-01-01 RX ADMIN — CLOPIDOGREL BISULFATE 75 MILLIGRAM(S): 75 TABLET, FILM COATED ORAL at 11:27

## 2022-01-01 RX ADMIN — Medication 400 MILLIGRAM(S): at 23:52

## 2022-01-01 RX ADMIN — CLOPIDOGREL BISULFATE 75 MILLIGRAM(S): 75 TABLET, FILM COATED ORAL at 12:00

## 2022-01-01 RX ADMIN — Medication 650 MILLIGRAM(S): at 11:27

## 2022-01-01 RX ADMIN — ERYTHROPOIETIN 4000 UNIT(S): 10000 INJECTION, SOLUTION INTRAVENOUS; SUBCUTANEOUS at 12:05

## 2022-01-01 RX ADMIN — LIDOCAINE 1 PATCH: 4 CREAM TOPICAL at 22:47

## 2022-01-01 RX ADMIN — SEVELAMER CARBONATE 800 MILLIGRAM(S): 2400 POWDER, FOR SUSPENSION ORAL at 18:17

## 2022-01-01 RX ADMIN — Medication 650 MILLIGRAM(S): at 07:30

## 2022-01-01 RX ADMIN — SEVELAMER CARBONATE 800 MILLIGRAM(S): 2400 POWDER, FOR SUSPENSION ORAL at 07:50

## 2022-01-01 RX ADMIN — SEVELAMER CARBONATE 800 MILLIGRAM(S): 2400 POWDER, FOR SUSPENSION ORAL at 16:50

## 2022-01-01 RX ADMIN — Medication 1000 MILLIGRAM(S): at 19:30

## 2022-01-01 RX ADMIN — MIDODRINE HYDROCHLORIDE 10 MILLIGRAM(S): 2.5 TABLET ORAL at 05:50

## 2022-01-01 RX ADMIN — SODIUM CHLORIDE 500 MILLILITER(S): 9 INJECTION INTRAMUSCULAR; INTRAVENOUS; SUBCUTANEOUS at 17:56

## 2022-01-01 RX ADMIN — BENZOCAINE AND MENTHOL 1 LOZENGE: 5; 1 LIQUID ORAL at 10:21

## 2022-01-01 RX ADMIN — Medication 250 MILLIGRAM(S): at 16:25

## 2022-01-01 RX ADMIN — CLOPIDOGREL BISULFATE 75 MILLIGRAM(S): 75 TABLET, FILM COATED ORAL at 11:20

## 2022-01-01 RX ADMIN — INSULIN HUMAN 10 UNIT(S): 100 INJECTION, SOLUTION SUBCUTANEOUS at 06:49

## 2022-01-01 RX ADMIN — SEVELAMER CARBONATE 800 MILLIGRAM(S): 2400 POWDER, FOR SUSPENSION ORAL at 17:18

## 2022-01-01 RX ADMIN — LIDOCAINE 1 PATCH: 4 CREAM TOPICAL at 21:11

## 2022-01-01 RX ADMIN — Medication 8.25 MICROGRAM(S)/KG/MIN: at 21:51

## 2022-01-01 RX ADMIN — MIDODRINE HYDROCHLORIDE 10 MILLIGRAM(S): 2.5 TABLET ORAL at 07:49

## 2022-01-01 RX ADMIN — LIDOCAINE 1 PATCH: 4 CREAM TOPICAL at 20:20

## 2022-01-01 RX ADMIN — CLOPIDOGREL BISULFATE 75 MILLIGRAM(S): 75 TABLET, FILM COATED ORAL at 16:19

## 2022-01-01 RX ADMIN — SODIUM CHLORIDE 500 MILLILITER(S): 9 INJECTION INTRAMUSCULAR; INTRAVENOUS; SUBCUTANEOUS at 22:00

## 2022-01-01 RX ADMIN — MIDODRINE HYDROCHLORIDE 5 MILLIGRAM(S): 2.5 TABLET ORAL at 12:01

## 2022-01-01 RX ADMIN — CHLORHEXIDINE GLUCONATE 1 APPLICATION(S): 213 SOLUTION TOPICAL at 11:47

## 2022-01-01 RX ADMIN — CHLORHEXIDINE GLUCONATE 1 APPLICATION(S): 213 SOLUTION TOPICAL at 21:57

## 2022-01-01 RX ADMIN — Medication 125 MILLILITER(S): at 14:33

## 2022-01-01 RX ADMIN — MIDODRINE HYDROCHLORIDE 2.5 MILLIGRAM(S): 2.5 TABLET ORAL at 09:40

## 2022-01-01 RX ADMIN — Medication 1000 MILLIGRAM(S): at 09:25

## 2022-01-01 RX ADMIN — BENZOCAINE AND MENTHOL 1 LOZENGE: 5; 1 LIQUID ORAL at 14:16

## 2022-01-01 RX ADMIN — LIDOCAINE 1 PATCH: 4 CREAM TOPICAL at 18:06

## 2022-01-01 RX ADMIN — CHLORHEXIDINE GLUCONATE 1 APPLICATION(S): 213 SOLUTION TOPICAL at 06:14

## 2022-01-01 RX ADMIN — MIDODRINE HYDROCHLORIDE 10 MILLIGRAM(S): 2.5 TABLET ORAL at 07:33

## 2022-01-01 RX ADMIN — Medication 81 MILLIGRAM(S): at 13:57

## 2022-01-01 RX ADMIN — Medication 81 MILLIGRAM(S): at 11:46

## 2022-01-01 RX ADMIN — LIDOCAINE 1 PATCH: 4 CREAM TOPICAL at 07:26

## 2022-01-01 RX ADMIN — Medication 81 MILLIGRAM(S): at 15:31

## 2022-01-01 RX ADMIN — MIDODRINE HYDROCHLORIDE 5 MILLIGRAM(S): 2.5 TABLET ORAL at 09:21

## 2022-01-01 RX ADMIN — Medication 81 MILLIGRAM(S): at 12:00

## 2022-01-01 RX ADMIN — LIDOCAINE 1 PATCH: 4 CREAM TOPICAL at 15:21

## 2022-01-01 RX ADMIN — Medication 650 MILLIGRAM(S): at 08:30

## 2022-01-01 RX ADMIN — POTASSIUM PHOSPHATE, MONOBASIC POTASSIUM PHOSPHATE, DIBASIC 62.5 MILLIMOLE(S): 236; 224 INJECTION, SOLUTION INTRAVENOUS at 07:25

## 2022-01-01 RX ADMIN — Medication 400 MILLIGRAM(S): at 09:07

## 2022-01-01 RX ADMIN — MIDODRINE HYDROCHLORIDE 5 MILLIGRAM(S): 2.5 TABLET ORAL at 05:15

## 2022-01-01 RX ADMIN — Medication 50 MILLILITER(S): at 06:52

## 2022-01-01 RX ADMIN — MIDODRINE HYDROCHLORIDE 10 MILLIGRAM(S): 2.5 TABLET ORAL at 10:24

## 2022-01-01 RX ADMIN — Medication 650 MILLIGRAM(S): at 16:54

## 2022-01-01 RX ADMIN — MIDODRINE HYDROCHLORIDE 2.5 MILLIGRAM(S): 2.5 TABLET ORAL at 06:18

## 2022-01-01 RX ADMIN — FAMOTIDINE 20 MILLIGRAM(S): 10 INJECTION INTRAVENOUS at 22:21

## 2022-01-01 RX ADMIN — Medication 650 MILLIGRAM(S): at 00:33

## 2022-01-01 RX ADMIN — Medication 125 MILLILITER(S): at 22:45

## 2022-01-01 RX ADMIN — CLOPIDOGREL BISULFATE 75 MILLIGRAM(S): 75 TABLET, FILM COATED ORAL at 14:23

## 2022-01-01 RX ADMIN — MIDODRINE HYDROCHLORIDE 5 MILLIGRAM(S): 2.5 TABLET ORAL at 05:27

## 2022-01-01 RX ADMIN — SEVELAMER CARBONATE 800 MILLIGRAM(S): 2400 POWDER, FOR SUSPENSION ORAL at 10:36

## 2022-01-01 RX ADMIN — Medication 50 MILLIGRAM(S): at 21:58

## 2022-01-01 RX ADMIN — Medication 81 MILLIGRAM(S): at 11:27

## 2022-01-01 RX ADMIN — MIDODRINE HYDROCHLORIDE 5 MILLIGRAM(S): 2.5 TABLET ORAL at 21:38

## 2022-01-01 RX ADMIN — LIDOCAINE 1 PATCH: 4 CREAM TOPICAL at 09:13

## 2022-01-01 RX ADMIN — Medication 50 MILLIGRAM(S): at 07:05

## 2022-01-01 RX ADMIN — SODIUM CHLORIDE 2000 MILLILITER(S): 9 INJECTION INTRAMUSCULAR; INTRAVENOUS; SUBCUTANEOUS at 11:49

## 2022-01-01 RX ADMIN — SEVELAMER CARBONATE 800 MILLIGRAM(S): 2400 POWDER, FOR SUSPENSION ORAL at 17:21

## 2022-01-01 RX ADMIN — CHLORHEXIDINE GLUCONATE 1 APPLICATION(S): 213 SOLUTION TOPICAL at 06:35

## 2022-01-01 RX ADMIN — SEVELAMER CARBONATE 800 MILLIGRAM(S): 2400 POWDER, FOR SUSPENSION ORAL at 11:46

## 2022-01-01 RX ADMIN — Medication 400 MILLIGRAM(S): at 19:00

## 2022-01-01 RX ADMIN — Medication 650 MILLIGRAM(S): at 15:54

## 2022-01-01 RX ADMIN — MIDODRINE HYDROCHLORIDE 10 MILLIGRAM(S): 2.5 TABLET ORAL at 00:33

## 2022-01-01 RX ADMIN — Medication 650 MILLIGRAM(S): at 14:58

## 2022-01-01 RX ADMIN — MIDODRINE HYDROCHLORIDE 10 MILLIGRAM(S): 2.5 TABLET ORAL at 21:06

## 2022-01-01 RX ADMIN — CHLORHEXIDINE GLUCONATE 1 APPLICATION(S): 213 SOLUTION TOPICAL at 05:16

## 2022-01-01 RX ADMIN — MIDODRINE HYDROCHLORIDE 10 MILLIGRAM(S): 2.5 TABLET ORAL at 07:36

## 2022-01-01 RX ADMIN — Medication 1000 MILLIGRAM(S): at 10:40

## 2022-01-01 RX ADMIN — Medication 1 APPLICATION(S): at 19:53

## 2022-01-01 RX ADMIN — LIDOCAINE 1 PATCH: 4 CREAM TOPICAL at 21:10

## 2022-01-01 RX ADMIN — MIDODRINE HYDROCHLORIDE 10 MILLIGRAM(S): 2.5 TABLET ORAL at 00:01

## 2022-01-01 RX ADMIN — SEVELAMER CARBONATE 800 MILLIGRAM(S): 2400 POWDER, FOR SUSPENSION ORAL at 16:48

## 2022-01-01 RX ADMIN — Medication 1 APPLICATION(S): at 09:51

## 2022-01-01 RX ADMIN — MIDODRINE HYDROCHLORIDE 10 MILLIGRAM(S): 2.5 TABLET ORAL at 15:31

## 2022-01-01 RX ADMIN — SEVELAMER CARBONATE 800 MILLIGRAM(S): 2400 POWDER, FOR SUSPENSION ORAL at 17:22

## 2022-01-01 RX ADMIN — MIDODRINE HYDROCHLORIDE 10 MILLIGRAM(S): 2.5 TABLET ORAL at 14:10

## 2022-01-12 ENCOUNTER — APPOINTMENT (OUTPATIENT)
Dept: MRI IMAGING | Facility: HOSPITAL | Age: 67
End: 2022-01-12

## 2022-01-12 ENCOUNTER — OUTPATIENT (OUTPATIENT)
Dept: OUTPATIENT SERVICES | Facility: HOSPITAL | Age: 67
LOS: 1 days | End: 2022-01-12
Payer: MEDICARE

## 2022-01-12 PROCEDURE — 74183 MRI ABD W/O CNTR FLWD CNTR: CPT | Mod: 26,MG

## 2022-01-12 PROCEDURE — G1004: CPT

## 2022-01-12 PROCEDURE — 74183 MRI ABD W/O CNTR FLWD CNTR: CPT | Mod: MG

## 2022-01-12 PROCEDURE — A9585: CPT

## 2022-02-07 ENCOUNTER — APPOINTMENT (OUTPATIENT)
Dept: SURGERY | Facility: CLINIC | Age: 67
End: 2022-02-07
Payer: MEDICARE

## 2022-02-07 VITALS
WEIGHT: 200 LBS | DIASTOLIC BLOOD PRESSURE: 82 MMHG | BODY MASS INDEX: 28.63 KG/M2 | TEMPERATURE: 96.5 F | SYSTOLIC BLOOD PRESSURE: 137 MMHG | HEART RATE: 84 BPM | HEIGHT: 70 IN

## 2022-02-07 DIAGNOSIS — Z99.2 END STAGE RENAL DISEASE: ICD-10-CM

## 2022-02-07 DIAGNOSIS — K46.9 UNSPECIFIED ABDOMINAL HERNIA W/OUT OBSTRUCTION OR GANGRENE: ICD-10-CM

## 2022-02-07 DIAGNOSIS — N18.6 END STAGE RENAL DISEASE: ICD-10-CM

## 2022-02-07 PROCEDURE — 99213 OFFICE O/P EST LOW 20 MIN: CPT

## 2022-02-17 PROBLEM — K46.9 HERNIA: Status: ACTIVE | Noted: 2020-07-20

## 2022-02-17 PROBLEM — N18.6 ESRD ON DIALYSIS: Status: ACTIVE | Noted: 2020-07-20

## 2022-02-17 NOTE — ASSESSMENT
[FreeTextEntry1] : Case discussed/pt seen with attending, . Pleasant 67 y/o female with ESRD and incisional hernia. Wishes to have hernia repaired.\par \par We discussed the risk,benefits and alternatives to OPEN incision hernia repair with mesh, abdominal wall reconstruction, and placement of PD catheter in detail including but not limited to bleeding, infection, death, disability, blood clots, cardiac, pulmonary, neurological problems, prolonged hospital course, need for additional procedures, need for implants, recurrence of the hernia, displeasure with cosmetic outcome and other issues. Patient expressed understanding and wishes to proceed with surgery. Will coordinate surgery with . All questions were answered. Notably greater than 50% of today's 20 minute visit was spent on counseling and coordination of her care.

## 2022-02-17 NOTE — PHYSICAL EXAM
[Normal Breath Sounds] : Normal breath sounds [Normal Heart Sounds] : normal heart sounds [Alert] : alert [Oriented to Person] : oriented to person [Oriented to Place] : oriented to place [Oriented to Time] : oriented to time [Calm] : calm [Abdominal Masses] : No abdominal masses [JVD] : no jugular venous distention  [Abdomen Tenderness] : ~T ~M No abdominal tenderness [Tender] : was nontender [Enlarged] : not enlarged [de-identified] : DEANA. Mady. Appropriate. Comfortable. [de-identified] : Pupils equal. No Scleral Icterus. NCAT. [de-identified] : Supple. Trachea midline. No overt lymphadenopathy. No JVD - Dialysis catheter chest.  [de-identified] : Abdomen is soft, non tender and non distended. Do not appreciate any overt mass. There are incisions that area healed abdominal wall. Reducible incisional hernias. Potentially multiple fascial defects.

## 2022-02-17 NOTE — HISTORY OF PRESENT ILLNESS
[de-identified] : 64 year old female. Referred by Dr. Joey Buckley. Multiple medical comorbid conditions. Reports having procedure in 2014 secondary to blockage of intestine requiring intestine to be removed. Reports in hospital for 1 month during that time. Sees Dr. Buckley for vascular access secondary to longstanding end stage renal disease. Gets dialysis M/W/F through vascular dialysis. She reports having bulge and abdominal pain/discomfort associated with an incisional hernia. Denies any obstructive symptoms. Wishes to discuss potential repair.  [de-identified] : 11-1-2021: Patient returns to office today. Reports she is overall doing well. Reports since last visit the hernia has increased in size. Reports it continues to be bothersome to her, causing intermittent abdominal pain and discomfort. Continues to get dialysis. Reports that  and her are discussing PD cath placement, and would like to have the hernia repaired prior to this. She has not had the CT scan performed yet that was discussed last visit. She denies any fever, chills, chest pain, nausea, or constipation. \par \par 11-: Returns to office today. Continues to overall do well. Report the hernia has not changed in size, however still causing some discomfort. She underwent a CT scan of the abd/pevlis and is here today to discuss results/next steps. \par \par 02-: Returns to office. Continues to do well. Recently underwent MRCP. Still having discomfort at site of hernia. Wants to discuss surgery today.

## 2022-11-16 NOTE — ASU PATIENT PROFILE, ADULT - FALL HARM RISK - UNIVERSAL INTERVENTIONS
Bed in lowest position, wheels locked, appropriate side rails in place/Call bell, personal items and telephone in reach/Instruct patient to call for assistance before getting out of bed or chair/Non-slip footwear when patient is out of bed/[place] to call system/Physically safe environment - no spills, clutter or unnecessary equipment/Purposeful Proactive Rounding/Room/bathroom lighting operational, light cord in reach

## 2022-11-16 NOTE — ASU PATIENT PROFILE, ADULT - NSICDXPASTSURGICALHX_GEN_ALL_CORE_FT
PAST SURGICAL HISTORY:  AVF (arteriovenous fistula) right left    History of surgery IVC filter    History of surgery RLE PTA stent / atherectomy  7/2021    S/P AIEDN-BSO 2003    Stented coronary artery      PAST SURGICAL HISTORY:  AVF (arteriovenous fistula) right left    History of atherectomy stent    History of surgery IVC filter    History of surgery RLE PTA stent / atherectomy  7/2021    S/P AIDEN-BSO 2003

## 2022-11-16 NOTE — ASU PATIENT PROFILE, ADULT - NSICDXPASTMEDICALHX_GEN_ALL_CORE_FT
PAST MEDICAL HISTORY:  Brown tumor brain    CAD (coronary atherosclerotic disease)     ESRD on dialysis     H/O ventral hernia     HLD (hyperlipidemia)     HTN (hypertension)      PAST MEDICAL HISTORY:  Brown tumor brain    CAD (coronary atherosclerotic disease)     DVT, lower extremity left    ESRD on dialysis last 11/15/22    H/O ventral hernia     HLD (hyperlipidemia)     HTN (hypertension)

## 2022-11-17 NOTE — PATIENT PROFILE ADULT - FALL HARM RISK - HARM RISK INTERVENTIONS
Assistance with ambulation/Assistance OOB with selected safe patient handling equipment/Communicate Risk of Fall with Harm to all staff/Discuss with provider need for PT consult/Monitor for mental status changes/Monitor gait and stability/Provide patient with walking aids - walker, cane, crutches/Reinforce activity limits and safety measures with patient and family/Review medications for side effects contributing to fall risk/Sit up slowly, dangle for a short time, stand at bedside before walking/Tailored Fall Risk Interventions/Toileting schedule using arm’s reach rule for commode and bathroom/Use of alarms - bed, chair and/or voice tab/Visual Cue: Yellow wristband and red socks/Bed in lowest position, wheels locked, appropriate side rails in place/Call bell, personal items and telephone in reach/Instruct patient to call for assistance before getting out of bed or chair/Non-slip footwear when patient is out of bed/[place] to call system/Physically safe environment - no spills, clutter or unnecessary equipment/Purposeful Proactive Rounding/Room/bathroom lighting operational, light cord in reach

## 2022-11-17 NOTE — H&P ADULT - NSHPPHYSICALEXAM_GEN_ALL_CORE
Vital Signs Last 24 Hrs  T(C): 36.6 (17 Nov 2022 11:40), Max: 36.6 (17 Nov 2022 11:40)  T(F): --  HR: 95 (17 Nov 2022 11:40) (95 - 95)  BP: 130/88 (17 Nov 2022 11:40) (130/88 - 130/88)  BP(mean): --  RR: 20 (17 Nov 2022 11:40) (20 - 20)  SpO2: 98% (17 Nov 2022 11:40) (98% - 98%)    General: AAOx3, NAD, laying comfortably in bed  Cardio: S1,S2, No MRG, RRR  Pulm: nonlabored breahting  Chest: L TDC in place  Abdomen: soft, NTND  Extremities: WWP, peripheral pulses appreciated  Vasc: s/p RUE AVG revision, incisions CDI

## 2022-11-17 NOTE — H&P ADULT - HISTORY OF PRESENT ILLNESS
Patient understands condition, prognosis and need for follow up care. 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

## 2022-11-17 NOTE — PATIENT PROFILE ADULT - FUNCTIONAL ASSESSMENT - BASIC MOBILITY SECTION LABEL
Lab: [id number] Anticipated Plan (Based On Presumed Biopsy Results)?: No Depth Of Biopsy: dermis Billing Type: Third-Party Bill Cryotherapy Text: The wound bed was treated with cryotherapy after the biopsy was performed. Additional Anesthesia Volume In Cc (Will Not Render If 0): 0 Wound Care: Bacitracin Biopsy Type: H and E Lab Facility: [id number] Post-Care Instructions: I reviewed with the patient in detail post-care instructions. Patient is to keep the biopsy site dry overnight, and then apply bacitracin twice daily until healed. Patient may apply hydrogen peroxide soaks to remove any crusting. X Size Of Lesion In Cm: 0.5 Electrodesiccation Text: The wound bed was treated with electrodesiccation after the biopsy was performed. Notification Instructions: Patient will be notified of biopsy results. However, patient instructed to call the office if not contacted within 2 weeks. Consent: Verbal consent was obtained and risks were reviewed including but not limited to scarring, infection, bleeding, scabbing, incomplete removal, nerve damage and allergy to anesthesia. Was A Bandage Applied: Yes Type Of Destruction Used: Curettage Anesthesia Volume In Cc (Will Not Render If 0): 0.4 Dressing: bandage . Electrodesiccation And Curettage Text: The wound bed was treated with electrodesiccation and curettage after the biopsy was performed. Detail Level: Detailed Hemostasis: Drysol and Electrocautery Biopsy Method: double edge Personna blade Silver Nitrate Text: The wound bed was treated with silver nitrate after the biopsy was performed. Curettage Text: The wound bed was treated with curettage after the biopsy was performed. Anesthesia Type: 2% lidocaine with epinephrine Size Of Lesion In Cm: 0.6

## 2022-11-17 NOTE — H&P ADULT - ASSESSMENT
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 Now s/p AVG revision, thrombectomy, and explantation of old graft on 11/17. Admitted to the SICU post-operatively for hemodynamic monitoring    Neuro: Pain/nausea control PRN, avoid narcotics  CV: MAP>65, h/o HFrEF (40%), CAD, AS/MR, PAD, restart ASA (11/18), holding home meclazine/entresto/atorvastatin  Pulm: On NC, wean as tolerated  GI: NPO, no IVF  : doesnt, void 2/2 ESRD  Renal: Nephro recs for HD, renvela 800mg  Endo: mISS  ID: (Vanc 1g preop), no post-op abx  DVT Ppx: no HSQ, SCDs  Wounds/Lines/Drains: RUE incision x3, L TDC, L femoral chong, PIV  Dispo: SICU

## 2022-11-17 NOTE — H&P ADULT - NSICDXPASTMEDICALHX_GEN_ALL_CORE_FT
PAST MEDICAL HISTORY:  Brown tumor brain    CAD (coronary atherosclerotic disease)     DVT, lower extremity left    ESRD on dialysis last 11/15/22    H/O ventral hernia     HLD (hyperlipidemia)     HTN (hypertension)

## 2022-11-17 NOTE — H&P ADULT - NSICDXPASTSURGICALHX_GEN_ALL_CORE_FT
PAST SURGICAL HISTORY:  AVF (arteriovenous fistula) right left    History of atherectomy stent    History of surgery IVC filter    History of surgery RLE PTA stent / atherectomy  7/2021    S/P AIDEN-BSO 2003

## 2022-11-18 NOTE — CONSULT NOTE ADULT - SUBJECTIVE AND OBJECTIVE BOX
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 on 11/17 for RUE AVG revision.     PMH: HFrEF (EF 40%), nonobstructive CAD, NICM, HTN, HLD, ESRD 2/2 PCKD on HD, PE (2018) s/p IVC filter, mild AS, mild MR, PAD, OA, h/o thrombus in vascular access x3 ( R AVG, R AVF, L AVG),   PSx: IVC Filter placement (2018), cardiac cath (2018), h/o R TDC, L AVF, R AVG, AIDEN/BSO (2003), L TDC (6/8/2022), RLE PTA/stent/atherectomy (7/19/2021)  Social Hx: fomer smoker, no etoh, no illicits    Meds: aspirin 80mg qd, atorvastatin 40mg qd, meclazine 25mg qd, entresto 51mg qd, renvela 800mg   (17 Nov 2022 20:35)      SICU Addendum:  67F w/ HFrEF of 40%, CAD, HTN, HLD, ESRD on HD s/p multiple AVFs and AVGs, prev PE s/p IVC filter presents for elective RUE AVG revision today. Pt went to the OR for previous graft excision, new looped brachiobrachial graft today. Pt was noted to be oozy from the arterial anastamosis s/p fibrillar. Pt was given DDAVP and promptly was noted to be desatting to the high 80s, and hypotensive. Pt was stabilized and case was finished. Pt was taken to SICU post-op w/ oxygen and pressor requirements for HD monitoring.    PAST MEDICAL & SURGICAL HISTORY:  HTN (hypertension)      HLD (hyperlipidemia)      CAD (coronary atherosclerotic disease)      ESRD on dialysis  last 11/15/22      H/O ventral hernia      Brown tumor  brain      DVT, lower extremity  left      History of surgery  IVC filter      AVF (arteriovenous fistula)  right left      S/P AIDEN-BSO  2003      History of surgery  RLE PTA stent / atherectomy  7/2021      History of atherectomy  stent          MEDICATIONS  (STANDING):  dextrose 5%. 1000 milliLiter(s) (50 mL/Hr) IV Continuous <Continuous>  dextrose 5%. 1000 milliLiter(s) (100 mL/Hr) IV Continuous <Continuous>  dextrose 50% Injectable 25 Gram(s) IV Push Once  dextrose 50% Injectable 12.5 Gram(s) IV Push Once  dextrose 50% Injectable 25 Gram(s) IV Push Once  EPINEPHrine    Infusion 0.01 MICROgram(s)/kG/Min (3.3 mL/Hr) IV Continuous <Continuous>  glucagon  Injectable 1 milliGRAM(s) IntraMuscular Once  norepinephrine Infusion 0.05 MICROgram(s)/kG/Min (8.25 mL/Hr) IV Continuous <Continuous>  sodium chloride 0.9% Bolus 500 milliLiter(s) IV Bolus once    MEDICATIONS  (PRN):  dextrose Oral Gel 15 Gram(s) Oral Once PRN Blood Glucose LESS THAN 70 milliGRAM(s)/deciliter      Allergies    iodine (Hives; Pruritus)  penicillin (Swelling)  sulfa drugs (Angioedema)    Intolerances        SOCIAL HISTORY:    FAMILY HISTORY:  No pertinent family history in first degree relatives      ICU Vital Signs Last 24 Hrs  T(C): 36.8 (17 Nov 2022 21:25), Max: 36.8 (17 Nov 2022 21:25)  T(F): 98.2 (17 Nov 2022 21:25), Max: 98.2 (17 Nov 2022 21:25)  HR: 95 (17 Nov 2022 11:40) (95 - 95)  BP: 130/88 (17 Nov 2022 11:40) (130/88 - 130/88)  RR: 20 (17 Nov 2022 11:40) (20 - 20)  SpO2: 98% (17 Nov 2022 11:40) (98% - 98%)      Physical Exam:  General: NAD  HEENT: NC/AT, EOMI  Pulmonary: Nonlabored breathing, no respiratory distress, CTA-B, POCUS A lines w/ minimal B lines  Cardiovascular: NSR, no murmurs  Abdominal: soft, NT/ND, +BS, no organomegaly  Extremities: WWP, 5/5 strength x 4, no clubbing/cyanosis/edema, RUE incisions w/ kerlix & gauze wrapping  Neuro: A/O x3, CNs II-XII grossly intact, normal motor/sensation, no focal deficits    LABS:                        13.4   8.86  )-----------( 129      ( 17 Nov 2022 21:30 )             42.7     11-17    139  |  103  |  56<H>  ----------------------------<  110<H>  5.2   |  18<L>  |  11.45<H>    Ca    9.2      17 Nov 2022 21:30  Phos  4.5     11-17  Mg     2.0     11-17    TPro  6.3  /  Alb  3.8  /  TBili  0.5  /  DBili  x   /  AST  9<L>  /  ALT  <5<L>  /  AlkPhos  116  11-17    PT/INR - ( 17 Nov 2022 21:30 )   PT: 12.5 sec;   INR: 1.05          PTT - ( 17 Nov 2022 21:30 )  PTT:27.3 sec    CAPILLARY BLOOD GLUCOSE      POCT Blood Glucose.: 96 mg/dL (17 Nov 2022 21:30)    LIVER FUNCTIONS - ( 17 Nov 2022 21:30 )  Alb: 3.8 g/dL / Pro: 6.3 g/dL / ALK PHOS: 116 U/L / ALT: <5 U/L / AST: 9 U/L / GGT: x       
  HPI:  67F w/ HFrEF of 40%, CAD, HTN, HLD, ESRD 2/2 PCKD on HD s/p multiple AVFs and AVGs, prev PE s/p IVC filter presents for elective RUE AVG revision today. Pt went to the OR for previous graft excision, new looped brachiobrachial graft today. Pt was noted to be oozy from the arterial anastamosis s/p fibrillar. Pt was given DDAVP and promptly was noted to be desatting to the high 80s, and hypotensive. Pt was stabilized and case was finished. Pt was taken to SICU post-op w/ oxygen and pressor requirements for HD monitoring. Nephrology consulted for RRT.           Meds: aspirin 80mg qd, atorvastatin 40mg qd, meclazine 25mg qd, entresto 51mg qd, renvela 800mg   (17 Nov 2022 20:35)   Nephrology consulted for dialysis.     PAST MEDICAL & SURGICAL HISTORY:  HTN (hypertension)      HLD (hyperlipidemia)      CAD (coronary atherosclerotic disease)      ESRD on dialysis  last 11/15/22      H/O ventral hernia      Brown tumor  brain      DVT, lower extremity  left      History of surgery  IVC filter      AVF (arteriovenous fistula)  right left      S/P ADIEN-BSO  2003      History of surgery  RLE PTA stent / atherectomy  7/2021      History of atherectomy  stent            Allergies:  iodine (Hives; Pruritus)  penicillin (Swelling)  sulfa drugs (Angioedema)      Home Medications:   Aspir 81 oral delayed release tablet: 1 tab(s) orally once a day  atorvastatin 40 mg oral tablet: 1 tab(s) orally once a day  clopidogrel 75 mg oral tablet: 1 tab(s) orally once a day  Entresto 49 mg-51 mg oral tablet: 1 tab(s) orally 2 times a day  fluconazole 100 mg oral tablet: 1 tab(s) orally once a day  folic acid 1 mg oral tablet: 1 tab(s) orally once a day  meclizine 25 mg oral tablet: 1 tab(s) orally 3 times a day, As Needed  methylPREDNISolone 32 mg oral tablet:   metoprolol succinate 25 mg oral tablet, extended release: 1 tab(s) orally once a day  Renvela 800 mg oral tablet: 1 tab(s) orally 3 times a day (with meals)  sacubitril-valsartan 24 mg-26 mg oral tablet: 1 tab(s) orally 2 times a day  sevelamer carbonate 2.4 g oral powder for reconstitution: 1 each orally 3 times a day (with meals)  sevelamer hydrochloride 800 mg oral tablet: 2 tab(s) orally 3 times a day  thiamine 100 mg oral tablet: 1 tab(s) orally once a day      Hospital Medications:   MEDICATIONS  (STANDING):  aspirin  chewable 81 milliGRAM(s) Oral daily  ceFAZolin   IVPB 2000 milliGRAM(s) IV Intermittent every 12 hours  chlorhexidine 2% Cloths 1 Application(s) Topical daily  CRRT Treatment    <Continuous>  dextrose 5%. 1000 milliLiter(s) (50 mL/Hr) IV Continuous <Continuous>  dextrose 5%. 1000 milliLiter(s) (100 mL/Hr) IV Continuous <Continuous>  dextrose 50% Injectable 25 Gram(s) IV Push Once  dextrose 50% Injectable 12.5 Gram(s) IV Push Once  dextrose 50% Injectable 25 Gram(s) IV Push Once  glucagon  Injectable 1 milliGRAM(s) IntraMuscular Once  midodrine 2.5 milliGRAM(s) Oral every 8 hours  norepinephrine Infusion 0.05 MICROgram(s)/kG/Min (8.25 mL/Hr) IV Continuous <Continuous>  PureFlow Dialysate RFP-400 (K 2 / Ca 3) 5000 milliLiter(s) (3000 mL/Hr) CRRT <Continuous>  sevelamer carbonate 800 milliGRAM(s) Oral three times a day with meals      SOCIAL HISTORY:  Denies ETOh, Smoking,     Family History:  FAMILY HISTORY:  No pertinent family history in first degree relatives          VITALS:  T(F): 97.5 (11-18-22 @ 18:32), Max: 98.5 (11-18-22 @ 09:00)  HR: 60 (11-18-22 @ 19:38)  BP: 127/60 (11-18-22 @ 19:00)  RR: 20 (11-18-22 @ 19:38)  SpO2: 97% (11-18-22 @ 19:38)  Wt(kg): --    11-17 @ 07:01  -  11-18 @ 07:00  --------------------------------------------------------  IN: 875.8 mL / OUT: 0 mL / NET: 875.8 mL    11-18 @ 07:01  -  11-18 @ 19:51  --------------------------------------------------------  IN: 2268.8 mL / OUT: 0 mL / NET: 2268.8 mL        CAPILLARY BLOOD GLUCOSE      POCT Blood Glucose.: 132 mg/dL (18 Nov 2022 06:51)  POCT Blood Glucose.: 134 mg/dL (18 Nov 2022 02:38)  POCT Blood Glucose.: 96 mg/dL (17 Nov 2022 21:30)      Review of Systems:  CONSTITUTIONAL: No fever   RESPIRATORY: No shortness of breath, cough  CARDIOVASCULAR: No Chest pain, shortness of breath  GASTROINTESTINAL: No abdominal pain, nausea, vomiting, diarrhea  GENITOURINARY: No urinary frequency, gross hematuria, dysuria  NEUROLOGICAL: No headache, weakness  SKIN: No rash or skin lesion  MUSCULOSKELETAL: No swelling  Psych: Denies suicidal or homicidal ideation    PHYSICAL EXAM:  GENERAL: Alert, awake, oriented x3 on NC  HEENT: ZOHAIB, EOMI, neck supple, no JVP  CHEST/LUNG: Bilateral clear breath sounds  HEART: Regular rate and rhythm, no murmur, no gallops, no rub   ABDOMEN: Soft, nontender, non distended  : No flank or supra pubic tenderness.  EXTREMITIES: no pedal edema  Neurology: AAOx3, no asterixis   SKIN: No rash or skin lesion   ACCESS: LUE AVF w/bruit and thrill     LABS:  11-18    139  |  105  |  64<H>  ----------------------------<  148<H>  5.9<H>   |  16<L>  |  10.92<H>    Ca    8.5      18 Nov 2022 05:23  Phos  4.7     11-18  Mg     1.9     11-18    TPro  6.3  /  Alb  3.8  /  TBili  0.5  /  DBili      /  AST  9<L>  /  ALT  <5<L>  /  AlkPhos  116  11-17    Creatinine Trend: 10.92 <--, 11.45 <--                        13.0   8.69  )-----------( 149      ( 18 Nov 2022 05:23 )             41.5     Urine Studies:

## 2022-11-18 NOTE — DIETITIAN INITIAL EVALUATION ADULT - NS FNS DIET ORDER
Diet, Renal Restrictions:   For patients receiving Renal Replacement - No Protein Restr, No Conc K, No Conc Phos, Low Sodium (11-18-22 @ 08:03)

## 2022-11-18 NOTE — DIETITIAN INITIAL EVALUATION ADULT - PERTINENT MEDS FT
MEDICATIONS  (STANDING):  chlorhexidine 2% Cloths 1 Application(s) Topical daily  CRRT Treatment    <Continuous>  dextrose 5%. 1000 milliLiter(s) (50 mL/Hr) IV Continuous <Continuous>  dextrose 5%. 1000 milliLiter(s) (100 mL/Hr) IV Continuous <Continuous>  dextrose 50% Injectable 25 Gram(s) IV Push Once  dextrose 50% Injectable 12.5 Gram(s) IV Push Once  dextrose 50% Injectable 25 Gram(s) IV Push Once  glucagon  Injectable 1 milliGRAM(s) IntraMuscular Once  midodrine 2.5 milliGRAM(s) Oral every 8 hours  norepinephrine Infusion 0.05 MICROgram(s)/kG/Min (8.25 mL/Hr) IV Continuous <Continuous>  PureFlow Dialysate RFP-400 (K 2 / Ca 3) 5000 milliLiter(s) (3000 mL/Hr) CRRT <Continuous>  sevelamer carbonate 800 milliGRAM(s) Oral three times a day with meals  sodium chloride 0.9% Bolus 500 milliLiter(s) IV Bolus once    MEDICATIONS  (PRN):  dextrose Oral Gel 15 Gram(s) Oral Once PRN Blood Glucose LESS THAN 70 milliGRAM(s)/deciliter

## 2022-11-18 NOTE — PROGRESS NOTE ADULT - ASSESSMENT
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 Now s/p RUE AVG revision w/ brachiobrachial graft, thrombectomy, and explantation of old graft on 11/17. Operative course complicated by difficult hemostasis requiring 24 ug DDAVP which resulted in desaturation and hypotension. POC iSTAT K 6.6 at that time, which downtrended to 4.9 s/p 10u insulin, 1g Caglu. , . Pt admitted to SICU post op for persistent hypotension requiring pressors.    Neuro: Pain/nausea control PRN, avoid narcotics  CV: Vasodilitory and hypovolemic shock s/p 500cc bolus, SBP goal > 90, Levo GTT, h/o HFrEF (40%), CAD, AS/MR, PAD, restart ASA (11/18), holding home meclazine/entresto/atorvastatin. Midodrine 2.5q8.  Pulm: On NC, wean as tolerated  GI: Regular, no IVF  : anuric, ESRD  Renal: Nephro recs for HD, cont home renvela 800mg, hold hep w/ HD x1 wk  Endo: mISS  ID: (Vanc 1g preop), no post-op abx  DVT Ppx: no HSQ, SCDs  Wounds/Lines/Drains: RUE incision x3, L TDC (11/17-), L femoral chong (11/17-), PIV  Dispo: SICU

## 2022-11-18 NOTE — CONSULT NOTE ADULT - ASSESSMENT
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 on 11/17 for RUE AVG revision. Now s/p RUE AVG revision w/ brachiobrachial graft, thrombectomy, and explantation of old graft on 11/17. Operative course complicated by difficult hemostasis requiring 24 ug DDAVP which resulted in desaturation and hypotension. POC iSTAT K 6.6 at that time, which downtrended to 4.9 s/p 10u insulin, 1g Caglu. , . Pt admitted to SICU post op for persistent hypotension requiring pressors.    Neuro: Pain/nausea control PRN, avoid narcotics  CV: MAP>65, Levo GTT, h/o HFrEF (40%), CAD, AS/MR, PAD, restart ASA (11/18), holding home meclazine/entresto/atorvastatin  Pulm: On NC, wean as tolerated  GI: NPO, no IVF  : anuric, ESRD  Renal: Nephro recs for HD, renvela 800mg, hold hep w/ HD x1 wk  Endo: mISS  ID: (Vanc 1g preop), no post-op abx  DVT Ppx: no HSQ, SCDs  Wounds/Lines/Drains: RUE incision x3, L TDC (11/17-), L femoral chong (11/17-), PIV  Dispo: SICU    
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 Pt went to the OR for previous graft excision, new looped brachiobrachial graft today. Operative course complicated by oozy from the arterial anastamosis P t was given DDAVP and promptly was noted to be desatting to the high 80s, and hypotensive. Transferred to SICU post-op w/ oxygen and pressor requirements for HD monitoring. Nephrology consulted for RRT.       ESRD 2/2 PCKD   Electrolytes noted, on pressors   Will start CVVHD to help with clearance   CRRT Treatment:   Modality: CVVHD, Filter: NxStage CAR-505, Target Blood Flow: 300 mL/Min  Target Fluid Balance: No fluid removal (11-18-22 @ 11:08) [Active]  Q6hrs BMP and phos while on CVVHD   Renal Diet   Okay to give bolus of NS as needed   Once patient hemodynamically stable will transition back to iHD   Continue with midodrine

## 2022-11-18 NOTE — PROGRESS NOTE ADULT - SUBJECTIVE AND OBJECTIVE BOX
SUBJECTIVE: Patient seen and examined bedside in the SICU. Pt reports feeling well this morning with no acute complaints. Pt denies headache, chest pain, SOB, nausea, or palpitations RUE pain well controlled.     midodrine 2.5 milliGRAM(s) Oral every 8 hours  norepinephrine Infusion 0.05 MICROgram(s)/kG/Min IV Continuous <Continuous>      Vital Signs Last 24 Hrs  T(C): 36.9 (18 Nov 2022 09:00), Max: 36.9 (18 Nov 2022 09:00)  T(F): 98.5 (18 Nov 2022 09:00), Max: 98.5 (18 Nov 2022 09:00)  HR: 89 (18 Nov 2022 11:00) (44 - 100)  BP: 117/55 (18 Nov 2022 11:00) (89/54 - 149/66)  BP(mean): 79 (18 Nov 2022 11:00) (60 - 95)  RR: 22 (18 Nov 2022 11:00) (11 - 26)  SpO2: 98% (18 Nov 2022 11:00) (94% - 100%)    Parameters below as of 18 Nov 2022 11:00  Patient On (Oxygen Delivery Method): room air      I&O's Detail    17 Nov 2022 07:01  -  18 Nov 2022 07:00  --------------------------------------------------------  IN:    IV PiggyBack: 200 mL    Norepinephrine: 175.8 mL    Sodium Chloride 0.9% Bolus: 500 mL  Total IN: 875.8 mL    OUT:  Total OUT: 0 mL    Total NET: 875.8 mL      18 Nov 2022 07:01  -  18 Nov 2022 12:04  --------------------------------------------------------  IN:    Norepinephrine: 13.2 mL    Oral Fluid: 100 mL    Sodium Chloride 0.9% Bolus: 500 mL  Total IN: 613.2 mL    OUT:  Total OUT: 0 mL    Total NET: 613.2 mL          General: NAD, resting comfortably in bed  C/V: NSR  Pulm: Nonlabored breathing, no respiratory distress  Abd: soft, NT/ND.  RUE: s/p AVG revision, incision CDI, flow through graft appreciated with U/S. Distal pulses dopplerable  Extrem: WWP, no edema, SCDs in place        LABS:                        13.0   8.69  )-----------( 149      ( 18 Nov 2022 05:23 )             41.5     11-18    139  |  105  |  64<H>  ----------------------------<  148<H>  5.9<H>   |  16<L>  |  10.92<H>    Ca    8.5      18 Nov 2022 05:23  Phos  4.7     11-18  Mg     1.9     11-18    TPro  6.3  /  Alb  3.8  /  TBili  0.5  /  DBili  x   /  AST  9<L>  /  ALT  <5<L>  /  AlkPhos  116  11-17    PT/INR - ( 17 Nov 2022 21:30 )   PT: 12.5 sec;   INR: 1.05          PTT - ( 17 Nov 2022 21:30 )  PTT:27.3 sec      RADIOLOGY & ADDITIONAL STUDIES:

## 2022-11-18 NOTE — PROVIDER CONTACT NOTE (MEDICATION) - SITUATION
Cefazolin ivpb in progress, pt c/o itching from inside her ear, arms and legs, no hives noted, no c/o sob. PA Sampson called stat seen patient.

## 2022-11-18 NOTE — DIETITIAN INITIAL EVALUATION ADULT - PERTINENT LABORATORY DATA
11-18    139  |  105  |  64<H>  ----------------------------<  148<H>  5.9<H>   |  16<L>  |  10.92<H>    Ca    8.5      18 Nov 2022 05:23  Phos  4.7     11-18  Mg     1.9     11-18    TPro  6.3  /  Alb  3.8  /  TBili  0.5  /  DBili  x   /  AST  9<L>  /  ALT  <5<L>  /  AlkPhos  116  11-17  POCT Blood Glucose.: 132 mg/dL (11-18-22 @ 06:51)  A1C with Estimated Average Glucose Result: 5.3 % (11-18-22 @ 05:23)

## 2022-11-18 NOTE — DIETITIAN INITIAL EVALUATION ADULT - ADD RECOMMEND
1. Monitor PO intake/appetite, GI distress, diet tolerance, labs, weights.  2. Honor pt food preferences as able.  3. SW consult.   4. RD to remain available for additional nutrition interventions as needed.  * High Nutrition Risk.

## 2022-11-18 NOTE — PROGRESS NOTE ADULT - SUBJECTIVE AND OBJECTIVE BOX
SUBJECTIVE: Pt seen at bedside and reports she is feeling well and has no complaints overnight. Pt states she did not pass flatus overnight and does not urinate at baseline due to her ESRD. She does not complain of any pain. She denies headache, dizziness, lightheadedness, sob, chest pain, abdominal pain.       MEDICATIONS  (STANDING):  chlorhexidine 2% Cloths 1 Application(s) Topical daily  dextrose 5%. 1000 milliLiter(s) (50 mL/Hr) IV Continuous <Continuous>  dextrose 5%. 1000 milliLiter(s) (100 mL/Hr) IV Continuous <Continuous>  dextrose 50% Injectable 25 Gram(s) IV Push Once  dextrose 50% Injectable 12.5 Gram(s) IV Push Once  dextrose 50% Injectable 25 Gram(s) IV Push Once  glucagon  Injectable 1 milliGRAM(s) IntraMuscular Once  midodrine 2.5 milliGRAM(s) Oral every 8 hours  norepinephrine Infusion 0.05 MICROgram(s)/kG/Min (8.25 mL/Hr) IV Continuous <Continuous>  sevelamer carbonate 800 milliGRAM(s) Oral three times a day with meals    MEDICATIONS  (PRN):  dextrose Oral Gel 15 Gram(s) Oral Once PRN Blood Glucose LESS THAN 70 milliGRAM(s)/deciliter      Vital Signs Last 24 Hrs  T(C): 36.9 (18 Nov 2022 09:00), Max: 36.9 (18 Nov 2022 09:00)  T(F): 98.5 (18 Nov 2022 09:00), Max: 98.5 (18 Nov 2022 09:00)  HR: 93 (18 Nov 2022 09:00) (44 - 100)  BP: 97/51 (18 Nov 2022 09:00) (90/45 - 149/66)  BP(mean): 69 (18 Nov 2022 09:00) (62 - 95)  RR: 28 (18 Nov 2022 09:00) (11 - 28)  SpO2: 100% (18 Nov 2022 09:00) (96% - 100%)    Parameters below as of 18 Nov 2022 08:39  Patient On (Oxygen Delivery Method): room air        Physical Exam:  General: NAD, resting comfortably in bed  Pulmonary: Nonlabored breathing, no respiratory distress  Cardiovascular: NSR  Abdominal: soft, NT/ND  Extremities: WWP, normal strength; R arm dressing appears dry and intact  Neuro: A/O x 3,Pulses: palpable distal pulses    I&O's Summary    17 Nov 2022 07:01  -  18 Nov 2022 07:00  --------------------------------------------------------  IN: 875.8 mL / OUT: 0 mL / NET: 875.8 mL    18 Nov 2022 07:01  -  18 Nov 2022 10:20  --------------------------------------------------------  IN: 13.2 mL / OUT: 0 mL / NET: 13.2 mL        LABS:                        13.0   8.69  )-----------( 149      ( 18 Nov 2022 05:23 )             41.5     11-18    139  |  105  |  64<H>  ----------------------------<  148<H>  5.9<H>   |  16<L>  |  10.92<H>    Ca    8.5      18 Nov 2022 05:23  Phos  4.7     11-18  Mg     1.9     11-18    TPro  6.3  /  Alb  3.8  /  TBili  0.5  /  DBili  x   /  AST  9<L>  /  ALT  <5<L>  /  AlkPhos  116  11-17    PT/INR - ( 17 Nov 2022 21:30 )   PT: 12.5 sec;   INR: 1.05          PTT - ( 17 Nov 2022 21:30 )  PTT:27.3 sec    CAPILLARY BLOOD GLUCOSE      POCT Blood Glucose.: 132 mg/dL (18 Nov 2022 06:51)  POCT Blood Glucose.: 134 mg/dL (18 Nov 2022 02:38)  POCT Blood Glucose.: 96 mg/dL (17 Nov 2022 21:30)    LIVER FUNCTIONS - ( 17 Nov 2022 21:30 )  Alb: 3.8 g/dL / Pro: 6.3 g/dL / ALK PHOS: 116 U/L / ALT: <5 U/L / AST: 9 U/L / GGT: x             RADIOLOGY & ADDITIONAL STUDIES:

## 2022-11-18 NOTE — PROGRESS NOTE ADULT - ASSESSMENT
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 on 11/17 for RUE AVG revision. Now s/p RUE AVG revision w/ brachiobrachial graft, thrombectomy, and explantation of old graft on 11/17. Operative course complicated by difficult hemostasis requiring 24 ug DDAVP which resulted in desaturation and hypotension. POC iSTAT K 6.6 at that time, which downtrended to 4.9 s/p 10u insulin, 1g Caglu. , . Pt admitted to SICU on 11/17 post op for persistent hypotension requiring pressors. Overnight on 11/17 Pt was placed on Levo up to .2 for pressure control. Pt looked clinically dry and was provided 500 cc bolus.Post op Labs: LA 2.9, K: 5.2, cardiac enzymes neg, tryptase sent, no Eos on diff. Weaning off of Levo slowly. Repeat LA 1.8 @3am.  11/18 Plan:  -  Mag was mildly low and repleted  - Insulin and glucose for K 5.9  - F/u tryptase to evaluate potential anaphylaxis to ddavp  - TTE ordered to continue to evaluate hypotension; SBP goal > 90, Midodrine 2.5q8 started. Renal consulted  - Pt persistently anuric secondary to ESRD  - Consider starting pt on CVVH  given persistent miild hypotension   - Holding HSQ  - F/u dopplers of R shoulder

## 2022-11-18 NOTE — DIETITIAN INITIAL EVALUATION ADULT - OTHER INFO
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 Now s/p AVG revision, thrombectomy, and explantation of old graft on 11/17. Admitted to the SICU post-operatively for hemodynamic monitoring. Pt may need to start on CVVH  given persistent mild hypotension. TTE ordered to continue to evaluate hypotension; SBP goal > 90. BP This /55 while in SICU. Seen this AM, playing on phone and having a hard time staying focused. Ordered for Renal diet. Breakfast dee at bedside, untouched during visit. Unsure reason why. Reports PTA only eating 1 meal/day, breakfast which is usually a bagel. Reports if her daughter is not home to cook she does not eat. Does report recently getting a home aide. Unsure if they help with meal prep. Reports she cannot make food herself d/t issues standing 2/2 numbness in leg. Suggest use of [employer] however pt reports she does not know how to use microwave and does not want to learn. Suggested meal prep from daughter and offered to call pt daughter, however pt declined. Provided pt with RD contact info should she change her mind. Pt reports wt loss d/t PO intake x1/day, not taking ONS (assume Meeting<75% EER). UBW per pt 299 pounds and reports being down to 194 pounds. Would suggest wt loss of 105 pounds, ? accuracy as pt able to provide little details/ did not seem confident. NFPE had been deferred. Is At risk for malnutrition however not able to DX today. No pain. Britton 18, 1+right hand Edema, No pressure ulcers (SX site and skin tear noted; L under nostril).  Please see below for nutritions recommendations. Paged team.

## 2022-11-18 NOTE — CONSULT NOTE ADULT - ATTENDING COMMENTS
I:   Hypotension after surgical procedure after DDAVP administered.   A: ESRD.  Possible allergic reaction to DDAVP.   P: Keep MAP > 65. Continue CVVHD. Recommend consutling cardiology and allergy. Follow K.
67 yoF with ESRD on HD with multiple fistula in the past with permacath was taken to the OR today for AVF revision. Pt was having issues with bleeding from the fistula and subsequently given DDAVP by anesthesia. Post DDVAP pt became hypotensive and was started on vasopressor. Pt also noted to be hyperkalemic on initial labs and was medically treated.   Pt subsequently admitted to the SICU on vasopressor.   I have seen and evaluated pt at bedside with medical resident.   I have personally reviewed pt's vitals, lab, imaging.     A/P   Fistula malfunction s/p revision  Hyperkalemia  Hypotension    Currently is awake and alert.   PRN pain meds.   On NC as needed to keep O2 sat>90  Continue pt on levo and titrate up to keep MAP>60.   Continues to be bradycardic. No evidence of hypoperfusion. Does not need epi drip at this time.   Need to r/o MI. Check EKG and trop.   Periop abx.   IVF bolus.   Recheck lactic acid.   Monitor urine output. Replace electrolytes as needed.   Continue all other supportive measures.   Admit to SICU.

## 2022-11-18 NOTE — PROGRESS NOTE ADULT - SUBJECTIVE AND OBJECTIVE BOX
POD #1 s/p revision of right avg with intraop hypoperfusion ? sec to anaphylactic reaction to DDAVP ?     Now alert, eating, pain controlled  122/59  95%  Levo drip  RUE: no thrill, no Q in AVG via duplex, hand warm, pos pulsox tracing  left groin - chong  feet - cool Bilat, wiggles toes    9>42<149  k 5.9    A: AVG - thrombosed, ongoing low dose pressor requirement    P: Wean pressors as tolerated  Aim for open  avg thrombectomy/fistulagram in OR Sunday if hemodynamics good and off pressors  Steroid prep for allergy  CVVH per renal  IV abx until femoral line out    dw ICU resident and family

## 2022-11-18 NOTE — PROVIDER CONTACT NOTE (MEDICATION) - ASSESSMENT
Patient itching inside her ears, arms and legs, no c/o sob, no hives noted. ALTAGRACIA Mock at bedside assessed patient

## 2022-11-18 NOTE — DIETITIAN INITIAL EVALUATION ADULT - OTHER CALCULATIONS
5'10''  pounds+-10%  Wt 194 pounds BMI 27.8 %IEQ=903  IBW used to calculate energy needs due to pt's current body weight exceeding 120% of IBW   adjust for age and plan for possible CVVHD, will adjust for iHD PRN; fluids per team

## 2022-11-19 NOTE — PROGRESS NOTE ADULT - ASSESSMENT
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 on 11/17 for RUE AVG revision. Now s/p RUE AVG revision w/ brachiobrachial graft, thrombectomy, and explantation of old graft on 11/17. Operative course complicated by difficult hemostasis requiring 24 ug DDAVP which resulted in desaturation and hypotension. POC iSTAT K 6.6 at that time, which downtrended to 4.9 s/p 10u insulin, 1g Caglu. , . Pt admitted to SICU post op for persistent hypotension requiring pressors.    Neuro: Pain: Tylenol and Lidoderm. Avoid narcotics.   CV: Vasodilitory and hypovolemic shock s/p 500cc bolus, SBP goal > 90, now off levo gtt, h/o HFrEF (40%), CAD, AS/MR, Repeat TTE (11/18) EF 55-60%, mild AS, PAD: restart ASA (11/18), holding home meclazine/entresto/atorvastatin. Increase Midodrine 5q8.   Pulm: On NC, wean as tolerated  GI: Regular, no IVF  /Renal: anuric, ESRD. Continue CVVHD until OR, cont home renvela 800mg, hold hep w/ HD x1 wk. Plan for OR 11/20 revision, NPOatMN, Covid neg, T&S/Coags for AM.   Endo: mISS  Heme: Thrombocytopenia pending HIT panel  ID: (Vanc 1g preop), no post-op abx  DVT Ppx: no HSQ, SCDs  Wounds/Lines/Drains: RUE incision x3, L TDC (11/17-), L femoral chong (11/17-), PIV  Dispo: SICU

## 2022-11-19 NOTE — PROGRESS NOTE ADULT - SUBJECTIVE AND OBJECTIVE BOX
Patient is a 67y Female seen and evaluated at bedside on CVVHD patient states that she is feeling well her breathing is improving on RA- she denies any CP SOB N V tolerating CVVHD /59 K 3.9 Hgb 10.8      Meds:    acetaminophen     Tablet .. 650 every 6 hours PRN  aspirin  chewable 81 daily  benzocaine 15 mG/menthol 3.6 mG Lozenge 1 every 2 hours PRN  chlorhexidine 2% Cloths 1 daily  CRRT Treatment  <Continuous>  CRRT Treatment  <Continuous>  dextrose 5%. 1000 <Continuous>  dextrose 5%. 1000 <Continuous>  dextrose 50% Injectable 25 Once  dextrose 50% Injectable 12.5 Once  dextrose 50% Injectable 25 Once  dextrose Oral Gel 15 Once PRN  glucagon  Injectable 1 Once  lidocaine   4% Patch 1 every 24 hours  midodrine. 5 every 8 hours  Phoxillum Filtration BK 4 / 2.5 5000 <Continuous>  predniSONE   Tablet 50 <User Schedule>  PureFlow Dialysate RFP-400 (K 2 / Ca 3) 5000 <Continuous>  sevelamer carbonate 800 three times a day with meals      T(C): , Max: 36.4 (11-18-22 @ 18:32)  T(F): , Max: 97.6 (11-18-22 @ 22:05)  HR: 56 (11-19-22 @ 14:00)  BP: 125/59 (11-19-22 @ 14:00)  BP(mean): 84 (11-19-22 @ 14:00)  RR: 17 (11-19-22 @ 14:00)  SpO2: 95% (11-19-22 @ 14:00)  Wt(kg): --    11-18 @ 07:01  -  11-19 @ 07:00  --------------------------------------------------------  IN: 2383.8 mL / OUT: 0 mL / NET: 2383.8 mL    11-19 @ 07:01  -  11-19 @ 14:38  --------------------------------------------------------  IN: 240 mL / OUT: 0 mL / NET: 240 mL          Review of Systems:  all other ROS negative       PHYSICAL EXAM:  GENERAL: NAD resting in bed   CHEST/LUNG: CTA no accessory muscle use   HEART: normal S1S2, RRR  ABDOMEN: Soft, Nontender, +BS, No flank tenderness bilateral  EXTREMITIES: No clubbing, cyanosis, or edema   ACCESS: AVG (thrombosed)       LABS:                        10.8   3.12  )-----------( 84       ( 19 Nov 2022 06:17 )             35.2     11-19    137  |  103  |  22  ----------------------------<  90  3.9   |  25  |  3.80<H>    Ca    8.3<L>      19 Nov 2022 10:58  Phos  2.3     11-19  Mg     1.7     11-19    TPro  6.3  /  Alb  3.8  /  TBili  0.5  /  DBili  x   /  AST  9<L>  /  ALT  <5<L>  /  AlkPhos  116  11-17      PT/INR - ( 17 Nov 2022 21:30 )   PT: 12.5 sec;   INR: 1.05          PTT - ( 17 Nov 2022 21:30 )  PTT:27.3 sec          RADIOLOGY & ADDITIONAL STUDIES:           Patient is a 67y Female seen and evaluated at bedside on CVVHD patient states that she is feeling well her breathing is improving on RA- she denies any CP SOB N V tolerating CVVHD /59 K 3.9 Hgb 10.8      Meds:    acetaminophen     Tablet .. 650 every 6 hours PRN  aspirin  chewable 81 daily  benzocaine 15 mG/menthol 3.6 mG Lozenge 1 every 2 hours PRN  chlorhexidine 2% Cloths 1 daily  CRRT Treatment  <Continuous>  CRRT Treatment  <Continuous>  dextrose 5%. 1000 <Continuous>  dextrose 5%. 1000 <Continuous>  dextrose 50% Injectable 25 Once  dextrose 50% Injectable 12.5 Once  dextrose 50% Injectable 25 Once  dextrose Oral Gel 15 Once PRN  glucagon  Injectable 1 Once  lidocaine   4% Patch 1 every 24 hours  midodrine. 5 every 8 hours  Phoxillum Filtration BK 4 / 2.5 5000 <Continuous>  predniSONE   Tablet 50 <User Schedule>  PureFlow Dialysate RFP-400 (K 2 / Ca 3) 5000 <Continuous>  sevelamer carbonate 800 three times a day with meals      T(C): , Max: 36.4 (11-18-22 @ 18:32)  T(F): , Max: 97.6 (11-18-22 @ 22:05)  HR: 56 (11-19-22 @ 14:00)  BP: 125/59 (11-19-22 @ 14:00)  BP(mean): 84 (11-19-22 @ 14:00)  RR: 17 (11-19-22 @ 14:00)  SpO2: 95% (11-19-22 @ 14:00)  Wt(kg): --    11-18 @ 07:01  -  11-19 @ 07:00  --------------------------------------------------------  IN: 2383.8 mL / OUT: 0 mL / NET: 2383.8 mL    11-19 @ 07:01  -  11-19 @ 14:38  --------------------------------------------------------  IN: 240 mL / OUT: 0 mL / NET: 240 mL          Review of Systems:  all other ROS negative       PHYSICAL EXAM:  GENERAL: NAD resting in bed   CHEST/LUNG: CTA no accessory muscle use   HEART: normal S1S2, RRR  ABDOMEN: Soft, Nontender, +BS, No flank tenderness bilateral  EXTREMITIES: No clubbing, cyanosis, or edema   ACCESS: AVG (thrombosed) , L HD cath accessed    LABS:                        10.8   3.12  )-----------( 84       ( 19 Nov 2022 06:17 )             35.2     11-19    137  |  103  |  22  ----------------------------<  90  3.9   |  25  |  3.80<H>    Ca    8.3<L>      19 Nov 2022 10:58  Phos  2.3     11-19  Mg     1.7     11-19    TPro  6.3  /  Alb  3.8  /  TBili  0.5  /  DBili  x   /  AST  9<L>  /  ALT  <5<L>  /  AlkPhos  116  11-17      PT/INR - ( 17 Nov 2022 21:30 )   PT: 12.5 sec;   INR: 1.05          PTT - ( 17 Nov 2022 21:30 )  PTT:27.3 sec          RADIOLOGY & ADDITIONAL STUDIES:

## 2022-11-19 NOTE — PROGRESS NOTE ADULT - ASSESSMENT
67F w/ ESRD 2/2 PCKD on  HD HTN HLD s/p AVG revision on 11/18 with intraop complication of hypotension/hypoxia 2/2 ? DDAVp anaphylactic reaction now initiated on CVVHD on 11/18  ESRD 2/2 PCKD   Electrolytes noted, on pressors   CVVHD prescription as follows: Qb 300ml/min no UF DFR 1.8L/hour phoxillum given hypophosphatemia  Target Fluid Balance: No fluid removal (11-18-22 @ 11:08) [Active]  Q6hrs BMP and phos while on CVVHD   Renal Diet   Once patient hemodynamically stable will transition back to iHD   Continue with midodrine   maintain MAP >70  pending OR for AVG thrombectomy/fistulogram on 11/20 if HDS    Saumya Jo D.O  PGY 5 nephrology fellow  [phone]

## 2022-11-19 NOTE — PROGRESS NOTE ADULT - SUBJECTIVE AND OBJECTIVE BOX
INTERVAL/OVERNIGHT EVENTS: Weaned off Levo gtt at 6:30pm. Ancef given for groin Argyle prophylaxis, but pt noted itching and restlessness so Ancef discontinued, benadryl and pepcid given w relief. CVVH cartridge clotted, so it was exchanged and functional now.     SUBJECTIVE: This AM is feeling well without complaints. No N/V or abd pain. Only complaint is sore throat and L hip pain--which is chronic OA pain that resolves w Tylenol, Bengay, and massage gun.     Neurologic Medications  acetaminophen     Tablet .. 650 milliGRAM(s) Oral every 6 hours PRN Mild Pain (1 - 3)    Cardiovascular Medications  midodrine. 5 milliGRAM(s) Oral every 8 hours    Hematologic/Oncologic Medications  aspirin  chewable 81 milliGRAM(s) Oral daily    Endocrine/Metabolic Medications  predniSONE   Tablet 50 milliGRAM(s) Oral <User Schedule>    Topical/Other Medications  benzocaine 15 mG/menthol 3.6 mG Lozenge 1 Lozenge Oral every 2 hours PRN Sore Throat  chlorhexidine 2% Cloths 1 Application(s) Topical daily  CRRT Treatment    <Continuous>  lidocaine   4% Patch 1 Patch Transdermal every 24 hours  Phoxillum Filtration BK 4 / 2.5 5000 milliLiter(s) CRRT <Continuous>  sevelamer carbonate 800 milliGRAM(s) Oral three times a day with meals    MEDICATIONS  (PRN):  acetaminophen     Tablet .. 650 milliGRAM(s) Oral every 6 hours PRN Mild Pain (1 - 3)  benzocaine 15 mG/menthol 3.6 mG Lozenge 1 Lozenge Oral every 2 hours PRN Sore Throat    I&O's Detail    18 Nov 2022 07:01  -  19 Nov 2022 07:00  --------------------------------------------------------  IN:    Albumin 5%  - 250 mL: 250 mL    IV PiggyBack: 525 mL    Norepinephrine: 13.2 mL    Norepinephrine: 105.6 mL    Oral Fluid: 490 mL    Sodium Chloride 0.9% Bolus: 1000 mL  Total IN: 2383.8 mL    OUT:    Other (mL): 0 mL  Total OUT: 0 mL    Total NET: 2383.8 mL    19 Nov 2022 07:01  -  19 Nov 2022 17:51  --------------------------------------------------------  IN:    Oral Fluid: 240 mL  Total IN: 240 mL    OUT:    Other (mL): 0 mL  Total OUT: 0 mL    Total NET: 240 mL    Vital Signs Last 24 Hrs  T(C): 35.9 (19 Nov 2022 13:27), Max: 36.4 (18 Nov 2022 18:32)  T(F): 96.6 (19 Nov 2022 13:27), Max: 97.6 (18 Nov 2022 22:05)  HR: 86 (19 Nov 2022 17:00) (52 - 94)  BP: 128/74 (19 Nov 2022 17:00) (100/54 - 134/65)  BP(mean): 95 (19 Nov 2022 17:00) (72 - 95)  RR: 22 (19 Nov 2022 17:00) (12 - 27)  SpO2: 98% (19 Nov 2022 17:00) (94% - 100%)    Parameters below as of 19 Nov 2022 17:00  Patient On (Oxygen Delivery Method): room air    GENERAL: NAD, resting comfortably in bed, follows commands and answers questions appropriately, AxOx3  HEENT: NCAT, MMM, no JVD  C/V: Normal rate, normal peripheral perfusion, no murmurs  PULM: Nonlabored breathing, no respiratory distress, CTA b/l  ABD: Soft, ND, NT, no rebound tenderness, no guarding  EXTREM: WWP, no edema, SCDs in place, R arm dressing in place and tegaderm is dry and intact.   NEURO: No focal deficits    LABS:                        10.8   3.12  )-----------( 84       ( 19 Nov 2022 06:17 )             35.2     11-19    137  |  103  |  22  ----------------------------<  90  3.9   |  25  |  3.80<H>    Ca    8.3<L>      19 Nov 2022 10:58  Phos  2.3     11-19  Mg     1.7     11-19    TPro  6.3  /  Alb  3.8  /  TBili  0.5  /  DBili  x   /  AST  9<L>  /  ALT  <5<L>  /  AlkPhos  116  11-17    PT/INR - ( 17 Nov 2022 21:30 )   PT: 12.5 sec;   INR: 1.05        PTT - ( 17 Nov 2022 21:30 )  PTT:27.3 sec    RADIOLOGY & ADDITIONAL STUDIES:    Culture - Body Fluid with Gram Stain (collected 11-17-22 @ 15:06)  Source: .Body Fluid right arm graft fluid #2 or spec  Gram Stain (11-17-22 @ 15:52):    No organisms seen    Rare to few White blood cells  Preliminary Report (11-18-22 @ 09:12):    No growth to date    Culture - Body Fluid with Gram Stain (collected 11-17-22 @ 15:06)  Source: .Body Fluid right arm graft fluid  Gram Stain (11-17-22 @ 15:53):    No organisms seen    Rare to few White blood cells  Preliminary Report (11-18-22 @ 09:12):    No growth to date

## 2022-11-19 NOTE — PROGRESS NOTE ADULT - SUBJECTIVE AND OBJECTIVE BOX
Subjective: Patient seen and examined bedside in the SICU. Pt reports feeling well this morning with no acute complaints. Pt denies headache, chest pain, SOB, nausea, or palpitations RUE pain well controlled. Off pressors.     Objective:     Vital Signs Last 24 Hrs  T(C): 36.3 (19 Nov 2022 09:13), Max: 36.9 (18 Nov 2022 12:00)  T(F): 97.3 (19 Nov 2022 09:13), Max: 98.5 (18 Nov 2022 12:00)  HR: 55 (19 Nov 2022 09:00) (51 - 94)  BP: 121/60 (19 Nov 2022 09:00) (86/44 - 128/62)  BP(mean): 86 (19 Nov 2022 09:00) (51 - 89)  RR: 20 (19 Nov 2022 09:00) (12 - 27)  SpO2: 98% (19 Nov 2022 09:00) (94% - 100%)    Parameters below as of 19 Nov 2022 09:00  Patient On (Oxygen Delivery Method): room air    I&O's Summary    18 Nov 2022 07:01  -  19 Nov 2022 07:00  --------------------------------------------------------  IN: 2383.8 mL / OUT: 0 mL / NET: 2383.8 mL    19 Nov 2022 07:01  -  19 Nov 2022 10:06  --------------------------------------------------------  IN: 240 mL / OUT: 0 mL / NET: 240 mL    Physical Exam  General: NAD, resting comfortably in bed  C/V: NSR  Pulm: Nonlabored breathing, no respiratory distress  Abd: soft, NT/ND.  RUE: s/p AVG revision, incision CDI, flow through graft appreciated with U/S. Distal pulses dopplerable  Extrem: WWP, no edema, SCDs in place    LABS:                        10.8   3.12  )-----------( 84       ( 19 Nov 2022 06:17 )             35.2     11-19    137  |  103  |  39<H>  ----------------------------<  98  4.1   |  22  |  6.04<H>    Ca    8.3<L>      19 Nov 2022 03:44  Phos  3.6     11-19  Mg     1.8     11-19    TPro  6.3  /  Alb  3.8  /  TBili  0.5  /  DBili  x   /  AST  9<L>  /  ALT  <5<L>  /  AlkPhos  116  11-17    PT/INR - ( 17 Nov 2022 21:30 )   PT: 12.5 sec;   INR: 1.05          PTT - ( 17 Nov 2022 21:30 )  PTT:27.3 sec    A/P: 66 yo F pt with PMH HFrEF (EF 40%), nonobstructive CAD, NICM, HTN, HLD, ESRD 2/2 PCKD on HD, PE (2018) s/p IVC filter, mild AS, mild MR, PAD, OA, h/o thrombus in vascular access x3 ( R AVG, R AVF, L AVG), ventral hernia, 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 Now s/p RUE AVG revision w/ brachiobrachial graft, thrombectomy, and explantation of old graft on 11/17. Operative course complicated by difficult hemostasis requiring 24 ug DDAVP which resulted in desaturation and hypotension. POC iSTAT K 6.6 at that time, which downtrended to 4.9 s/p 10u insulin, 1g Caglu. , . Pt admitted to SICU post op for persistent hypotension requiring pressors, now off pressors.     -Preop for angio/declot/possible AVG revision tomorrow.   -Patient requires premedication due to iodine allergy  -Care per SICU with attention to hemodynamics.     Appreciate SICU care.

## 2022-11-19 NOTE — PROGRESS NOTE ADULT - SUBJECTIVE AND OBJECTIVE BOX
pod # 2    no c/o    af  133/61 52 100%ra off Levo sp bolus of albumin  RT UE: inc cdi. no dc or soi    B LE: cool, wiggles toes    3>35<84  K 3.9    A: doing well  P: OR tomorrow for open declot, fg  premedicate for iodine allergy  CVVH per renal  follow PLT count  NPO p MN

## 2022-11-20 NOTE — PROGRESS NOTE ADULT - ASSESSMENT
67F w/ ESRD 2/2 PCKD on  HD HTN HLD s/p AVG revision on 11/18 with intraop complication of hypotension/hypoxia 2/2 ? DDAVp anaphylactic reaction now initiated on CVVHD on 11/18-11/20; patient now s.p AVG thrombectomy on 11/20  ESRD 2/2 PCKD   Electrolytes noted, on pressors   no acute indications for HD today given borderline hemodynamics and acceptable electrolytes  will assess need for iHD v. CVVHD tomorrow 11/21 based on patients hemodynamics and pressor requirements   Renal Diet   Continue with midodrine   maintain MAP >70      Saumya Jo D.O  PGY 5 nephrology fellow  [phone]

## 2022-11-20 NOTE — PROGRESS NOTE ADULT - SUBJECTIVE AND OBJECTIVE BOX
O/N: No acute events overnight. CVVHD held at 6AM.     S: No acute events. NPO since MN. OR today for RUE graft revision.     O: ICU Vital Signs Last 24 Hrs  T(F): 97.8 (11-20-22 @ 04:30), Max: 98.7 (11-19-22 @ 21:55)  HR: 69 (11-20-22 @ 08:23) (52 - 90)  BP: 142/65 (11-20-22 @ 08:23) (110/69 - 152/66)  BP(mean): 94 (11-20-22 @ 08:23) (78 - 97)  ABP: 111/59 (11-20-22 @ 08:00)  RR: 24 (11-20-22 @ 08:23) (15 - 32)  SpO2: 93% (11-20-22 @ 08:23) (91% - 100%)    PHYSICAL EXAM:   Neurological: NAD, resting comfortably in bed, follows commands and answers questions appropriately, AxOx3  HEENT: NCAT, MMM, no JVD  C/V: Normal rate, normal peripheral perfusion, no murmurs  PULM: Nonlabored breathing, no respiratory distress, CTA b/l  ABD: Soft, ND, NT, no rebound tenderness, no guarding  EXTREM: WWP, no edema, SCDs in place, R arm dressing in place and tegaderm is dry and intact.   NEURO: No focal deficits      LABS:    11-20    133<L>  |  100  |  22  ----------------------------<  116<H>  5.0   |  21<L>  |  2.95<H>    Ca    7.9<L>      20 Nov 2022 05:04  Phos  3.2     11-20  Mg     2.0     11-20    TPro  x   /  Alb  x   /  TBili  0.6  /  DBili  x   /  AST  x   /  ALT  x   /  AlkPhos  x   11-20                        11.3   3.69  )-----------( 76       ( 20 Nov 2022 05:04 )             37.4   PT/INR - ( 20 Nov 2022 05:04 )   PT: 13.0 sec;   INR: 1.09          PTT - ( 20 Nov 2022 05:04 )  PTT:28.2 sec  CAPILLARY BLOOD GLUCOSE        MEDICATIONS  (STANDING):  aspirin  chewable 81 milliGRAM(s) Oral daily  chlorhexidine 2% Cloths 1 Application(s) Topical daily  CRRT Treatment    <Continuous>  dextrose 5%. 1000 milliLiter(s) (50 mL/Hr) IV Continuous <Continuous>  dextrose 5%. 1000 milliLiter(s) (100 mL/Hr) IV Continuous <Continuous>  dextrose 50% Injectable 25 Gram(s) IV Push Once  dextrose 50% Injectable 12.5 Gram(s) IV Push Once  dextrose 50% Injectable 25 Gram(s) IV Push Once  glucagon  Injectable 1 milliGRAM(s) IntraMuscular Once  lidocaine   4% Patch 1 Patch Transdermal every 24 hours  midodrine. 5 milliGRAM(s) Oral every 8 hours  Phoxillum Filtration BK 4 / 2.5 5000 milliLiter(s) (1800 mL/Hr) CRRT <Continuous>  sevelamer carbonate 800 milliGRAM(s) Oral three times a day with meals    MEDICATIONS  (PRN):  acetaminophen     Tablet .. 650 milliGRAM(s) Oral every 6 hours PRN Mild Pain (1 - 3)  benzocaine 15 mG/menthol 3.6 mG Lozenge 1 Lozenge Oral every 2 hours PRN Sore Throat  dextrose Oral Gel 15 Gram(s) Oral Once PRN Blood Glucose LESS THAN 70 milliGRAM(s)/deciliter      Wu:	  [X] None	[ ] Daily Wu Order Placed	   Indication:	  [ ] Strict I and O's    [ ] Obstruction     [ ] Incontinence + Stage 3 or 4 Decubitus  Central Line:  [ ] None	   [ ]  Medication / TPN Administration     [ ] No Peripheral IV

## 2022-11-20 NOTE — PRE-ANESTHESIA EVALUATION ADULT - NSANTHOSAYNRD_GEN_A_CORE
No. LIZ screening performed.  STOP BANG Legend: 0-2 = LOW Risk; 3-4 = INTERMEDIATE Risk; 5-8 = HIGH Risk
No. LIZ screening performed.  STOP BANG Legend: 0-2 = LOW Risk; 3-4 = INTERMEDIATE Risk; 5-8 = HIGH Risk

## 2022-11-20 NOTE — PROGRESS NOTE ADULT - SUBJECTIVE AND OBJECTIVE BOX
POST-OPERATIVE NOTE    Procedure: Left AV graft revision and thrombectomy    Diagnosis/Indication: AVG occlusion    Surgeon: Marcio    S: Pt has no complaints. Denies CP, SOB, N/V. Pain controlled with medication.    O:  T(C): 36.6 (11-20-22 @ 18:09), Max: 36.6 (11-20-22 @ 18:09)  T(F): 97.9 (11-20-22 @ 18:09), Max: 97.9 (11-20-22 @ 18:09)  HR: 89 (11-20-22 @ 18:00) (60 - 89)  BP: 131/56 (11-20-22 @ 18:00) (108/53 - 132/63)  RR: 16 (11-20-22 @ 18:00) (12 - 18)  SpO2: 99% (11-20-22 @ 18:00) (95% - 100%)  Wt(kg): --                        11.6   3.24  )-----------( 122      ( 20 Nov 2022 14:11 )             38.0     11-20    132<L>  |  100  |  27<H>  ----------------------------<  128<H>  5.2   |  22  |  3.64<H>    Ca    8.0<L>      20 Nov 2022 14:11  Phos  4.0     11-20  Mg     1.8     11-20    TPro  x   /  Alb  x   /  TBili  0.6  /  DBili  x   /  AST  x   /  ALT  x   /  AlkPhos  x   11-20    General: AAOX3  Neurological: NAD, resting comfortably in bed, follows commands and answers questions appropriately, AxOx3  HEENT: NCAT, MMM, no JVD  C/V: Normal rate, normal peripheral perfusion, no murmurs  PULM: Nonlabored breathing, no respiratory distress, CTA b/l  ABD: Soft, ND, NT, no rebound tenderness, no guarding  EXTREM: WWP, no edema, SCDs in place, R arm dressing in place and tegaderm is dry and intact.   NEURO: No focal deficits      A/P: 67yFemale s/p above procedure  Diet: Renal  IVF: no  Pain/nausea control  Dialysis per Renal

## 2022-11-20 NOTE — BRIEF OPERATIVE NOTE - OPERATION/FINDINGS
Pt placed in supine position. Prepped and draped in sterile fashion. Vessels marked with ultrasound pre-procedure. Decision made to proceed with brachio-brachial loop AVG. Incision made along right medial arm and subcutaneous tissue dissected with electrocautery. Brachial artery, vein and nerve indentitied. Vein and artery skeletonized. Incision extended proximally. Old occluded AVG identified and dissected off of tissue with electrocautery and removed in segments. Cultures sent to micro for gram stain which revealed no organisms. Graft followed to anastomosis with brachial artery and removed. Pt heparinized and 6mm PTFE graft then anastamosed to brachial artery at previous site. Loop tunnel created with two counter incisions and graft brought through to brachial vein in loop fashion. Heparin reversed with protamine. Anastamosis created and wound packed with fibrilar. After 10 minutes, continued oozing noted from arterial anastamosis and decision made to give 6amps of DDVAP. Hemostasis subsequently achieved, however pt became hypotensive and desatted to 89 requiring  epi pushes (stabilized by anesthesia). Tissue closed in layers. Appropriate signals appreciated on doppler post-operatively.
RUE AVF open thrombectomy. Graftotomy made close to arterial anastomosis. Arterial plug removed. thrombectomy of graft performed with return of some clot. Graft closed with hemashield patch. fistulogram performed showing brisk wash out of graft with occlusion of central veins. Incision closed in 3 layers. great thrill on doppler at conclusion of case. good radial signal.  Contrast: 85cc  IVF: 800u, 1u plt  EBL: 100cc

## 2022-11-20 NOTE — PROGRESS NOTE ADULT - ASSESSMENT
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 Now s/p RUE AVG revision w/ brachiobrachial graft, thrombectomy, and explantation of old graft on 11/17. Operative course complicated by difficult hemostasis requiring 24 ug DDAVP which resulted in desaturation and hypotension. POC iSTAT K 6.6 at that time, which downtrended to 4.9 s/p 10u insulin, 1g Caglu. , . Pt admitted to SICU post op for persistent hypotension requiring pressors.    Neuro: AOx3. Pain: Tylenol and Lidoderm. Avoid narcotics.   CV: Vasodilatory and hypovolemic shock s/p 500cc bolus, SBP goal > 90, now off levo gtt, h/o HFrEF (40%), CAD, AS/MR, Repeat TTE (11/18) EF 55-60%, mild AS, PAD: restart ASA (11/18), holding home meclazine/entresto/atorvastatin. Midodrine 5q8.   Pulm: On NC, wean as tolerated  GI: NPO- OR today 11/20  : anuric, ESRD. S/p CVVHD (11/19-20)  Renal: Nephro recs for HD, cont home renvela 800mg, hold hep w/ HD x1 wk  Endo: mISS  Heme: Thrombocytopenia HIT panel neg. Hgb 11.3 >10.8.   ID: (Vanc 1g preop), no post-op abx  DVT Ppx: no HSQ, SCDs  Wounds/Lines/Drains: RUE incision x3, L TDC (11/17-), L femoral chong (11/17-), PIV  Dispo: SICU

## 2022-11-20 NOTE — BRIEF OPERATIVE NOTE - NSICDXBRIEFPOSTOP_GEN_ALL_CORE_FT
POST-OP DIAGNOSIS:  AV fistula occlusion 17-Nov-2022 21:28:53  Cristhian Lee  
POST-OP DIAGNOSIS:  AV fistula occlusion 17-Nov-2022 21:28:53  Cristhian Lee

## 2022-11-20 NOTE — BRIEF OPERATIVE NOTE - NSICDXBRIEFPREOP_GEN_ALL_CORE_FT
PRE-OP DIAGNOSIS:  AV fistula occlusion 17-Nov-2022 21:28:48  Cristhian Lee  
PRE-OP DIAGNOSIS:  AV fistula occlusion 17-Nov-2022 21:28:48  Cristhian Lee

## 2022-11-20 NOTE — BRIEF OPERATIVE NOTE - NSICDXBRIEFPROCEDURE_GEN_ALL_CORE_FT
PROCEDURES:  Revision, AV fistula dialysis graft, with thrombectomy 17-Nov-2022 21:28:22  Cristhian Lee  
PROCEDURES:  Revision, AV fistula dialysis graft, with thrombectomy 17-Nov-2022 21:28:22  Cristhian Lee

## 2022-11-20 NOTE — PROGRESS NOTE ADULT - SUBJECTIVE AND OBJECTIVE BOX
Patient is a 67y Female seen and evaluated at bedside. patient is s/p AVG thrombectomy with borderline hemodynamics currently on levophed /46 K 5.2 bicarb 22       Meds:    acetaminophen     Tablet .. 650 every 6 hours PRN  albumin human  5% IVPB 250 every 2 hours  aspirin  chewable 81 daily  benzocaine 15 mG/menthol 3.6 mG Lozenge 1 every 2 hours PRN  chlorhexidine 2% Cloths 1 daily  CRRT Treatment  <Continuous>  dextrose 5%. 1000 <Continuous>  dextrose 5%. 1000 <Continuous>  dextrose 50% Injectable 25 Once  dextrose 50% Injectable 12.5 Once  dextrose 50% Injectable 25 Once  dextrose Oral Gel 15 Once PRN  glucagon  Injectable 1 Once  lidocaine   4% Patch 1 every 24 hours  midodrine. 5 every 8 hours  norepinephrine Infusion 0.05 <Continuous>  Phoxillum Filtration BK 4 / 2.5 5000 <Continuous>  sevelamer carbonate 800 three times a day with meals      T(C): , Max: 37.1 (11-19-22 @ 21:55)  T(F): , Max: 98.7 (11-19-22 @ 21:55)  HR: 75 (11-20-22 @ 14:30)  BP: 107/46 (11-20-22 @ 14:30)  BP(mean): 67 (11-20-22 @ 14:30)  RR: 18 (11-20-22 @ 14:30)  SpO2: 100% (11-20-22 @ 14:30)  Wt(kg): --    11-19 @ 07:01  -  11-20 @ 07:00  --------------------------------------------------------  IN: 610 mL / OUT: 0 mL / NET: 610 mL      Height (cm): 177.8 (11-20 @ 08:23)  Weight (kg): 88 (11-20 @ 08:23)  BMI (kg/m2): 27.8 (11-20 @ 08:23)  BSA (m2): 2.06 (11-20 @ 08:23)    Review of Systems:  all other ROS negative       PHYSICAL EXAM:  GENERAL: resting on NC  CHEST/LUNG: decreased breath sounds at the bases  HEART: normal S1S2, RRR  ABDOMEN: Soft, Nontender, +BS, No flank tenderness bilateral  EXTREMITIES: No clubbing, cyanosis, or edema   NEUROLOGY: AAO x3, no focal neurological deficit  ACCESS: R AVG (s/p revision on 11/20) L IJ Vas Cath      LABS:                        11.6   3.24  )-----------( 122      ( 20 Nov 2022 14:11 )             38.0     11-20    132<L>  |  100  |  27<H>  ----------------------------<  128<H>  5.2   |  22  |  3.64<H>    Ca    8.0<L>      20 Nov 2022 14:11  Phos  4.0     11-20  Mg     1.8     11-20    TPro  x   /  Alb  x   /  TBili  0.6  /  DBili  x   /  AST  x   /  ALT  x   /  AlkPhos  x   11-20      PT/INR - ( 20 Nov 2022 05:04 )   PT: 13.0 sec;   INR: 1.09          PTT - ( 20 Nov 2022 05:04 )  PTT:28.2 sec          RADIOLOGY & ADDITIONAL STUDIES:           Patient is a 67y Female seen and evaluated at bedside. patient is s/p AVG thrombectomy with borderline hemodynamics currently on levophed /46 K 5.2 bicarb 22       Meds:    acetaminophen     Tablet .. 650 every 6 hours PRN  albumin human  5% IVPB 250 every 2 hours  aspirin  chewable 81 daily  benzocaine 15 mG/menthol 3.6 mG Lozenge 1 every 2 hours PRN  chlorhexidine 2% Cloths 1 daily  CRRT Treatment  <Continuous>  dextrose 5%. 1000 <Continuous>  dextrose 5%. 1000 <Continuous>  dextrose 50% Injectable 25 Once  dextrose 50% Injectable 12.5 Once  dextrose 50% Injectable 25 Once  dextrose Oral Gel 15 Once PRN  glucagon  Injectable 1 Once  lidocaine   4% Patch 1 every 24 hours  midodrine. 5 every 8 hours  norepinephrine Infusion 0.05 <Continuous>  Phoxillum Filtration BK 4 / 2.5 5000 <Continuous>  sevelamer carbonate 800 three times a day with meals      T(C): , Max: 37.1 (11-19-22 @ 21:55)  T(F): , Max: 98.7 (11-19-22 @ 21:55)  HR: 75 (11-20-22 @ 14:30)  BP: 107/46 (11-20-22 @ 14:30)  BP(mean): 67 (11-20-22 @ 14:30)  RR: 18 (11-20-22 @ 14:30)  SpO2: 100% (11-20-22 @ 14:30)  Wt(kg): --    11-19 @ 07:01  -  11-20 @ 07:00  --------------------------------------------------------  IN: 610 mL / OUT: 0 mL / NET: 610 mL      Height (cm): 177.8 (11-20 @ 08:23)  Weight (kg): 88 (11-20 @ 08:23)  BMI (kg/m2): 27.8 (11-20 @ 08:23)  BSA (m2): 2.06 (11-20 @ 08:23)    Review of Systems:  all other ROS negative       PHYSICAL EXAM:  GENERAL: resting on NC  CHEST/LUNG: decreased breath sounds at the bases  HEART: normal S1S2, RRR  ABDOMEN: Soft, Nontender, +BS, No flank tenderness bilateral  EXTREMITIES: No clubbing, cyanosis, or edema   NEUROLOGY: Awake, no focal neurological deficit  ACCESS: R AVG (s/p revision on 11/20) L IJ Vas Cath      LABS:                        11.6   3.24  )-----------( 122      ( 20 Nov 2022 14:11 )             38.0     11-20    132<L>  |  100  |  27<H>  ----------------------------<  128<H>  5.2   |  22  |  3.64<H>    Ca    8.0<L>      20 Nov 2022 14:11  Phos  4.0     11-20  Mg     1.8     11-20    TPro  x   /  Alb  x   /  TBili  0.6  /  DBili  x   /  AST  x   /  ALT  x   /  AlkPhos  x   11-20      PT/INR - ( 20 Nov 2022 05:04 )   PT: 13.0 sec;   INR: 1.09          PTT - ( 20 Nov 2022 05:04 )  PTT:28.2 sec          RADIOLOGY & ADDITIONAL STUDIES:

## 2022-11-20 NOTE — PROGRESS NOTE ADULT - TIME BILLING
As above; Coordination of care with primary team, discussed KRT planning  This excludes any time spent on separate procedures or teaching.

## 2022-11-21 NOTE — PROGRESS NOTE ADULT - ASSESSMENT
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 Now s/p RUE AVG revision w/ brachiobrachial graft, thrombectomy, and explantation of old graft on 11/17. Operative course complicated by difficult hemostasis requiring 24 ug DDAVP which resulted in desaturation and hypotension. POC iSTAT K 6.6 at that time, which downtrended to 4.9 s/p 10u insulin, 1g Caglu. , . Pt admitted to SICU post op for persistent hypotension requiring pressors.    Neuro: AOx3. Pain: Tylenol and Lidoderm. Avoid narcotics.   CV: Vasodilatory and hypovolemic shock, SBP goal > 90,  h/o HFrEF (40%), CAD, AS/MR, Repeat TTE (11/18) EF 55-60%, mild AS, PAD: restart ASA (11/18), holding home meclazine/entresto/atorvastatin. Midodrine 5q8. ; pt on minimal levo; goal to wean off ; pt is s/p 500 and 250 bolus of albumin  Pulm: On NC, wean as tolerated  GI: Renal diet  : anuric, ESRD. S/p CVVHD (11/19-20); Consider CVVHH again today  Renal: Nephro recs for HD, cont home renvela 800mg, hold hep w/ HD x1 wk  Endo: mISS  Heme: Thrombocytopenia HIT panel neg. Hgb 11.3 >10.8. ; Consider resuming ASA  ID: (Vanc 1g preop), no post-op abx  DVT Ppx: no HSQ, SCDs  Wounds/Lines/Drains: RUE incision x3, L TDC (11/17-), L femoral chong (11/17-), PIV ; Consider removing A-line and provide pt with dose of Vancomycin prior to removal    Dispo: SICU

## 2022-11-21 NOTE — PROGRESS NOTE ADULT - SUBJECTIVE AND OBJECTIVE BOX
Overnight: Levo wasincreased back to 0.1, albumin 5% 250 was provided, levo uptrended, IV was replaced at this time and levo downtrended to 0.05    Subjective: No acute events after RUE graft revision and thrombectomy. Pt feels well and has no acute complaints, no pain, headache, dizziness, sob, chest pain, palpitations, abodminal pain.       MEDICATIONS  (STANDING):  aspirin  chewable 81 milliGRAM(s) Oral daily  chlorhexidine 2% Cloths 1 Application(s) Topical daily  dextrose 5%. 1000 milliLiter(s) (50 mL/Hr) IV Continuous <Continuous>  dextrose 5%. 1000 milliLiter(s) (100 mL/Hr) IV Continuous <Continuous>  dextrose 50% Injectable 25 Gram(s) IV Push Once  dextrose 50% Injectable 12.5 Gram(s) IV Push Once  dextrose 50% Injectable 25 Gram(s) IV Push Once  glucagon  Injectable 1 milliGRAM(s) IntraMuscular Once  lidocaine   4% Patch 1 Patch Transdermal every 24 hours  midodrine. 5 milliGRAM(s) Oral every 8 hours  norepinephrine Infusion 0.05 MICROgram(s)/kG/Min (8.25 mL/Hr) IV Continuous <Continuous>  sevelamer carbonate 800 milliGRAM(s) Oral three times a day with meals    MEDICATIONS  (PRN):  acetaminophen     Tablet .. 650 milliGRAM(s) Oral every 6 hours PRN Mild Pain (1 - 3)  benzocaine 15 mG/menthol 3.6 mG Lozenge 1 Lozenge Oral every 2 hours PRN Sore Throat  dextrose Oral Gel 15 Gram(s) Oral Once PRN Blood Glucose LESS THAN 70 milliGRAM(s)/deciliter      Vital Signs Last 24 Hrs  T(C): 36.7 (21 Nov 2022 04:29), Max: 37 (20 Nov 2022 13:45)  T(F): 98.1 (21 Nov 2022 04:29), Max: 98.6 (20 Nov 2022 13:45)  HR: 62 (21 Nov 2022 06:00) (58 - 89)  BP: 119/56 (21 Nov 2022 06:00) (88/44 - 147/67)  BP(mean): 81 (21 Nov 2022 06:00) (63 - 97)  RR: 14 (21 Nov 2022 06:00) (12 - 25)  SpO2: 97% (21 Nov 2022 06:00) (91% - 100%)    Parameters below as of 21 Nov 2022 06:00  Patient On (Oxygen Delivery Method): room air        Physical Exam:  Neurological: NAD, resting comfortably in bed, follows commands and answers questions appropriately, AxOx3  HEENT: NCAT, MMM, no JVD  C/V: Normal rate, normal peripheral perfusion, no murmurs  PULM: Nonlabored breathing, no respiratory distress, CTA b/l  ABD: Soft, ND, NT, no rebound tenderness, no guarding  EXTREM: WWP, no edema, SCDs in place, R arm dressing in place and tegaderm is dry and intact.   NEURO: No focal deficits  I&O's Summary    19 Nov 2022 07:01  -  20 Nov 2022 07:00  --------------------------------------------------------  IN: 610 mL / OUT: 0 mL / NET: 610 mL    20 Nov 2022 07:01  -  21 Nov 2022 06:31  --------------------------------------------------------  IN: 846.3 mL / OUT: 0 mL / NET: 846.3 mL        LABS:                        10.8   6.97  )-----------( 154      ( 21 Nov 2022 06:05 )             35.7     11-20    132<L>  |  100  |  27<H>  ----------------------------<  128<H>  5.2   |  22  |  3.64<H>    Ca    8.0<L>      20 Nov 2022 14:11  Phos  4.0     11-20  Mg     1.8     11-20    TPro  x   /  Alb  x   /  TBili  0.6  /  DBili  x   /  AST  x   /  ALT  x   /  AlkPhos  x   11-20    PT/INR - ( 20 Nov 2022 05:04 )   PT: 13.0 sec;   INR: 1.09          PTT - ( 20 Nov 2022 05:04 )  PTT:28.2 sec    CAPILLARY BLOOD GLUCOSE            RADIOLOGY & ADDITIONAL STUDIES:

## 2022-11-21 NOTE — PROGRESS NOTE ADULT - SUBJECTIVE AND OBJECTIVE BOX
Patient is a 67y Female seen and evaluated at bedside. No acute distress, does not offer any complaints. Patient Hemodynamically stable on low dose levophed. Patient states that at her outpatient unit her blood pressure tend to run around 90-110SBP. She also states that when she reaches her EDW of 89Kg is when she starts to feel dizzy, and light headed. Patient seen on HD today, started on low dose levophed and tolerating well with blood pressure of 125/86.       Meds:    acetaminophen     Tablet .. 650 every 6 hours PRN  aspirin  chewable 81 daily  benzocaine 15 mG/menthol 3.6 mG Lozenge 1 every 2 hours PRN  chlorhexidine 2% Cloths 1 daily  clopidogrel Tablet 75 daily  dextrose 5%. 1000 <Continuous>  dextrose 5%. 1000 <Continuous>  dextrose 50% Injectable 25 Once  dextrose 50% Injectable 12.5 Once  dextrose 50% Injectable 25 Once  dextrose Oral Gel 15 Once PRN  glucagon  Injectable 1 Once  lidocaine   4% Patch 1 every 24 hours  midodrine. 5 every 8 hours  norepinephrine Infusion 0.05 <Continuous>  sevelamer carbonate 800 three times a day with meals      T(C): , Max: 37 (11-20-22 @ 13:45)  T(F): , Max: 98.6 (11-20-22 @ 13:45)  HR: 76 (11-21-22 @ 12:00)  BP: 125/86 (11-21-22 @ 12:00)  BP(mean): 85 (11-21-22 @ 12:00)  RR: 18 (11-21-22 @ 12:00)  SpO2: 97% (11-21-22 @ 12:00)  Wt(kg): --    11-20 @ 07:01  -  11-21 @ 07:00  --------------------------------------------------------  IN: 852.9 mL / OUT: 0 mL / NET: 852.9 mL    11-21 @ 07:01  -  11-21 @ 13:13  --------------------------------------------------------  IN: 383.2 mL / OUT: 0 mL / NET: 383.2 mL          Review of Systems:  ROS negative except as per HPI      PHYSICAL EXAM:  GENERAL: NAD, awake and alert   NECK: supple, No JVD  CHEST/LUNG: Clear to auscultation bilaterally  HEART: normal S1S2, RRR  ABDOMEN: Soft, Nontender, +BS, No flank tenderness bilateral  EXTREMITIES: No clubbing, cyanosis, b/l 1+ edema   NEUROLOGY: AAO x3, no focal neurological deficit  ACCESS: Left TDC, c/d/i, RUE AVF, with thrill (not ready for use)       LABS:                        10.8   6.97  )-----------( 154      ( 21 Nov 2022 06:05 )             35.7     11-21    133<L>  |  101  |  42<H>  ----------------------------<  114<H>  5.1   |  23  |  5.03<H>    Ca    8.0<L>      21 Nov 2022 06:05  Phos  4.5     11-21  Mg     2.0     11-21    TPro  5.4<L>  /  Alb  3.5  /  TBili  0.4  /  DBili  0.2  /  AST  8<L>  /  ALT  5<L>  /  AlkPhos  87  11-21    Hepatitis B Surface Antibody: Nonreact (11-21 @ 09:43)  Hepatitis C Virus S/CO Ratio: 0.04 S/CO (11-21 @ 09:43)    PT/INR - ( 20 Nov 2022 05:04 )   PT: 13.0 sec;   INR: 1.09          PTT - ( 20 Nov 2022 05:04 )  PTT:28.2 sec          RADIOLOGY & ADDITIONAL STUDIES:

## 2022-11-21 NOTE — PROGRESS NOTE ADULT - ASSESSMENT
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 Now s/p RUE AVG revision w/ brachiobrachial graft, thrombectomy, and explantation of old graft on (11/17). Operative course complicated by difficult hemostasis requiring 24 ug DDAVP which resulted in desaturation and hypotension. Pt transferred to SICU, now off levophed gtt, but course c/b graft failure. Pt s/p repeat RUE AVG revision with thrombectomy, angiogram (11/20).     Keep RUE elevated  Continue midodrine and wean levo as tolerated  Okay to restart home plavix (no loading dose)  HD per renal - no heparin for 1 week  Care per SICU

## 2022-11-21 NOTE — PROGRESS NOTE ADULT - SUBJECTIVE AND OBJECTIVE BOX
sp Declot, revision, fg    pain controlled  af vss, good bp off levo, chong out, 99% sat  RT UE: good doppler signal over axillary vein, inc some serous dc, no soi, mild 4arm edema    7>36<154    A: patent avg, normal hemodynamics  P: incisional care, elevation, ace wrap  HD per renal

## 2022-11-21 NOTE — PROGRESS NOTE ADULT - ASSESSMENT
67F w/ ESRD 2/2 PCKD on  HD HTN HLD s/p AVG revision on  with intraop complication of hypotension/hypoxia 2/2  DDAVp anaphylactic reaction  initiated on CVVHD on -; patient now s.p AVG thrombectomy on . Nephro consulted for HD     ESRD 2/2 PCKD   HD at [facility] center   EDW: 90Kg   Nephrologist: Dr. Thompson  Electrolytes noted, on low dose levophed hemodynamically stable     Plan:  HD today with UF and clearance as prescribed below   Hemodialysis Treatment.:     Schedule: Once, Modality: Hemodialysis, Access: Internal Jugular Central Venous Catheter    Dialyzer: Optiflux [id number], Time: 240 Min    Blood Flow: 400 mL/Min , Dialysate Flow: 500 mL/Min, Dialysate Temp: 36.5, Tubinmm (Adult)    Target Fluid Removal: 1 Liters    Dialysate Electrolytes (mEq/L): Potassium 2, Calcium 2.5, Sodium 138, Bicarbonate 35     Please adjust the dose of midodrine to 8M-12PM and 4PM and continue with 5mg   Decrease Temp on HD to help with BP   Renal diet   Daily BMP   Please obtain Hep panel before initial HD treatment     Access:  Left TDC c/d/i   RUE AVG     Hypotension   SBP low 90s requiring levophed, and midodrine.   ECHO shows no pericardial effusion, making cardiac tamponade less likely   Continue midodrine as above     Anemia   Hgb stable at 10.8  No indication for EPO or iron at this time     Renal Bone Disease:  Calcium: 8.0  Phos: 4.5  Trend phos daily with labs   C/w sevelamer 800mg TID with meals

## 2022-11-21 NOTE — PROGRESS NOTE ADULT - SUBJECTIVE AND OBJECTIVE BOX
SUBJECTIVE: Patient seen and examined bedside in the SICU. Pt reports feeling well this morning with no acute complaints. States RUE pain well controlled. Denies chest pain, sob, nausea, vomiting. Tolerating diet.     aspirin  chewable 81 milliGRAM(s) Oral daily  clopidogrel Tablet 75 milliGRAM(s) Oral daily  midodrine. 5 milliGRAM(s) Oral every 8 hours  norepinephrine Infusion 0.05 MICROgram(s)/kG/Min IV Continuous <Continuous>      Vital Signs Last 24 Hrs  T(C): 36.7 (21 Nov 2022 04:29), Max: 37 (20 Nov 2022 13:45)  T(F): 98.1 (21 Nov 2022 04:29), Max: 98.6 (20 Nov 2022 13:45)  HR: 74 (21 Nov 2022 09:00) (58 - 89)  BP: 151/63 (21 Nov 2022 09:00) (88/44 - 151/63)  BP(mean): 91 (21 Nov 2022 09:00) (63 - 91)  RR: 18 (21 Nov 2022 09:00) (12 - 25)  SpO2: 95% (21 Nov 2022 09:00) (95% - 100%)    Parameters below as of 21 Nov 2022 10:00  Patient On (Oxygen Delivery Method): room air      I&O's Detail    20 Nov 2022 07:01  -  21 Nov 2022 07:00  --------------------------------------------------------  IN:    Albumin 5%  - 250 mL: 625 mL    Norepinephrine: 227.9 mL  Total IN: 852.9 mL    OUT:  Total OUT: 0 mL    Total NET: 852.9 mL      21 Nov 2022 07:01  -  21 Nov 2022 10:58  --------------------------------------------------------  IN:    IV PiggyBack: 250 mL    Norepinephrine: 13.2 mL    Oral Fluid: 120 mL  Total IN: 383.2 mL    OUT:  Total OUT: 0 mL    Total NET: 383.2 mL          General: NAD, resting comfortably in bed  C/V: NSR  Pulm: Nonlabored breathing, no respiratory distress  Abd: soft, NT/ND.  Extrem: WWP  RUE: incisions CDI, mildly tender, edematous to the hand (nonpitting), doppler signal appreciated proximal to graft          LABS:                        10.8   6.97  )-----------( 154      ( 21 Nov 2022 06:05 )             35.7     11-21    133<L>  |  101  |  42<H>  ----------------------------<  114<H>  5.1   |  23  |  5.03<H>    Ca    8.0<L>      21 Nov 2022 06:05  Phos  4.5     11-21  Mg     2.0     11-21    TPro  5.4<L>  /  Alb  3.5  /  TBili  0.4  /  DBili  0.2  /  AST  8<L>  /  ALT  5<L>  /  AlkPhos  87  11-21    PT/INR - ( 20 Nov 2022 05:04 )   PT: 13.0 sec;   INR: 1.09          PTT - ( 20 Nov 2022 05:04 )  PTT:28.2 sec      RADIOLOGY & ADDITIONAL STUDIES:

## 2022-11-22 NOTE — PROGRESS NOTE ADULT - SUBJECTIVE AND OBJECTIVE BOX
Overnight: Pt had local wound care with Dr. Buckley ; during day shift a-line was d/c, Vanc trough was obtained and found to be 13.8 prior to providing 1 dose of vanc 1000. Plavix was restarted. HD was performed with 1L taken off with levo at 0.3. Levo was weaned off at that point.     Subjective: No acute events overnight.  Pt feels well and has no acute complaints, no pain, headache, dizziness, sob, chest pain, palpitations, abdominal pain. Pt is eating, drinking normally.     Objective:  MEDICATIONS  (STANDING):  aspirin  chewable 81 milliGRAM(s) Oral daily  chlorhexidine 2% Cloths 1 Application(s) Topical daily  clopidogrel Tablet 75 milliGRAM(s) Oral daily  dextrose 5%. 1000 milliLiter(s) (50 mL/Hr) IV Continuous <Continuous>  dextrose 5%. 1000 milliLiter(s) (100 mL/Hr) IV Continuous <Continuous>  dextrose 50% Injectable 25 Gram(s) IV Push Once  dextrose 50% Injectable 12.5 Gram(s) IV Push Once  dextrose 50% Injectable 25 Gram(s) IV Push Once  glucagon  Injectable 1 milliGRAM(s) IntraMuscular Once  lidocaine   4% Patch 1 Patch Transdermal every 24 hours  midodrine. 5 milliGRAM(s) Oral every 8 hours  potassium phosphate IVPB 15 milliMole(s) IV Intermittent once  sevelamer carbonate 800 milliGRAM(s) Oral three times a day with meals    MEDICATIONS  (PRN):  acetaminophen     Tablet .. 650 milliGRAM(s) Oral every 6 hours PRN Mild Pain (1 - 3)  benzocaine 15 mG/menthol 3.6 mG Lozenge 1 Lozenge Oral every 2 hours PRN Sore Throat  dextrose Oral Gel 15 Gram(s) Oral Once PRN Blood Glucose LESS THAN 70 milliGRAM(s)/deciliter      Vital Signs Last 24 Hrs  T(C): 36.4 (22 Nov 2022 06:05), Max: 37.1 (21 Nov 2022 18:28)  T(F): 97.6 (22 Nov 2022 06:05), Max: 98.7 (21 Nov 2022 18:28)  HR: 81 (22 Nov 2022 06:00) (66 - 100)  BP: 108/44 (22 Nov 2022 06:00) (86/69 - 151/63)  BP(mean): 63 (22 Nov 2022 06:00) (63 - 91)  RR: 11 (22 Nov 2022 06:00) (11 - 24)  SpO2: 96% (22 Nov 2022 06:00) (92% - 100%)    Parameters below as of 22 Nov 2022 06:00  Patient On (Oxygen Delivery Method): room air      Physical Exam:  Neurological: NAD, resting comfortably in bed, follows commands and answers questions appropriately, AxOx3  HEENT: NCAT, MMM, no JVD  C/V: Normal rate, normal peripheral perfusion, no murmurs  PULM: Nonlabored breathing, no respiratory distress, CTA b/l  ABD: Soft, ND, NT, no rebound tenderness, no guarding  EXTREM: WWP, no edema, SCDs in place, R arm dressing in place and tegaderm is dry and intact.   NEURO: No focal deficits    I&O's Summary    20 Nov 2022 07:01  -  21 Nov 2022 07:00  --------------------------------------------------------  IN: 852.9 mL / OUT: 0 mL / NET: 852.9 mL    21 Nov 2022 07:01  -  22 Nov 2022 06:54  --------------------------------------------------------  IN: 673.2 mL / OUT: 0 mL / NET: 673.2 mL        LABS:                        9.8    4.41  )-----------( 102      ( 22 Nov 2022 05:37 )             32.1     11-22    135  |  102  |  32<H>  ----------------------------<  73  4.3   |  25  |  3.82<H>    Ca    7.4<L>      22 Nov 2022 05:37  Phos  2.6     11-22  Mg     1.6     11-22    TPro  5.4<L>  /  Alb  3.5  /  TBili  0.4  /  DBili  0.2  /  AST  8<L>  /  ALT  5<L>  /  AlkPhos  87  11-21        CAPILLARY BLOOD GLUCOSE        LIVER FUNCTIONS - ( 21 Nov 2022 06:05 )  Alb: 3.5 g/dL / Pro: 5.4 g/dL / ALK PHOS: 87 U/L / ALT: 5 U/L / AST: 8 U/L / GGT: x             RADIOLOGY & ADDITIONAL STUDIES:

## 2022-11-22 NOTE — PROGRESS NOTE ADULT - SUBJECTIVE AND OBJECTIVE BOX
SUBJECTIVE: Patient seen and examined bedside in the SICU. Pt reports feeling well this morning. States that she has mild right arm pain and swelling. Denies chest pain, sob, nausea, or vomiting. Tolerating diet.    aspirin  chewable 81 milliGRAM(s) Oral daily  clopidogrel Tablet 75 milliGRAM(s) Oral daily  midodrine. 5 milliGRAM(s) Oral every 8 hours      Vital Signs Last 24 Hrs  T(C): 36.9 (22 Nov 2022 09:00), Max: 37.1 (21 Nov 2022 18:28)  T(F): 98.5 (22 Nov 2022 09:00), Max: 98.7 (21 Nov 2022 18:28)  HR: 104 (22 Nov 2022 09:00) (68 - 104)  BP: 118/58 (22 Nov 2022 08:00) (86/69 - 138/51)  BP(mean): 83 (22 Nov 2022 08:00) (63 - 85)  RR: 26 (22 Nov 2022 09:00) (11 - 26)  SpO2: 97% (22 Nov 2022 09:00) (92% - 100%)    Parameters below as of 22 Nov 2022 08:00  Patient On (Oxygen Delivery Method): room air      I&O's Detail    21 Nov 2022 07:01  -  22 Nov 2022 07:00  --------------------------------------------------------  IN:    IV PiggyBack: 300 mL    Norepinephrine: 13.2 mL    Oral Fluid: 360 mL  Total IN: 673.2 mL    OUT:  Total OUT: 0 mL    Total NET: 673.2 mL      22 Nov 2022 07:01  -  22 Nov 2022 09:03  --------------------------------------------------------  IN:    IV PiggyBack: 62.5 mL    Oral Fluid: 120 mL  Total IN: 182.5 mL    OUT:  Total OUT: 0 mL    Total NET: 182.5 mL        General: NAD, resting comfortably in bed  C/V: NSR  Pulm: Nonlabored breathing, no respiratory distress  Abd: soft, NT/ND.  Extrem: WWP  RUE: incisions CDI, mildly tender, edematous to the hand (nonpitting), doppler signal appreciated proximal to graft        LABS:                        9.8    4.41  )-----------( 102      ( 22 Nov 2022 05:37 )             32.1     11-22    135  |  102  |  32<H>  ----------------------------<  73  4.3   |  25  |  3.82<H>    Ca    7.4<L>      22 Nov 2022 05:37  Phos  2.6     11-22  Mg     1.6     11-22    TPro  5.4<L>  /  Alb  3.5  /  TBili  0.4  /  DBili  0.2  /  AST  8<L>  /  ALT  5<L>  /  AlkPhos  87  11-21          RADIOLOGY & ADDITIONAL STUDIES:

## 2022-11-22 NOTE — PROGRESS NOTE ADULT - ASSESSMENT
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 Now s/p RUE AVG revision w/ brachiobrachial graft, thrombectomy, and explantation of old graft on 11/17. Operative course complicated by difficult hemostasis requiring 24 ug DDAVP which resulted in desaturation and hypotension. POC iSTAT K 6.6 at that time, which downtrended to 4.9 s/p 10u insulin, 1g Caglu. , . Pt admitted to SICU post op for persistent hypotension requiring pressors.      Neuro: Pain: Tylenol and Lidoderm. Avoid narcotics.   CV: Vasodilitory and hypovolemic shock  SBP goal > 90, now off levo gtt, h/o HFrEF (40%), CAD, AS/MR, Repeat TTE (11/18) EF 55-60%, mild AS, PAD: restarted ASA and Plavix holding home meclazine/entresto/atorvastatin. Continue pt on Midodrine 5q8 and consider weaning as tolerated   Pulm: On NC, wean as tolerated  GI: Regular, no IVF  : anuric, ESRD  Renal: Nephro recs for HD, cont home renvela 800mg, hold hep w/ HD x1 wk  Endo: mISS  Heme: Thrombocytopenia HIT panel neg,; Consider LUE duplex prior to possible midline if pt requires addition IV access  ID: (Vanc 1g preop), no post-op abx; 1 additional dose Vanc with a-line removal 11/21  DVT Ppx: no HSQ, SCDs  Wounds/Lines/Drains: RUE incision x3, L TDC (11/17-11/19), L femoral chong (11/17-11/21), PIV  Dispo: Consider stepping down pt today

## 2022-11-22 NOTE — PROGRESS NOTE ADULT - ASSESSMENT
68 yo woman with ESRD due to PKD , HTN,  developed acute bleed and hypotension at time of AVG revision- given dDAVP for bleeding--sustained anaphylactic reaction. Needed to undergo CVVHD 11/18-20, s/p AVG thrombectomy on 11/20. Started back on iHD with aid of low dose levophed, tolerated procedure well and met goal of 1L UF.         ESRD 2/2 PCKD   HD at [facility]   EDW: 90Kg   Nephrologist: Dr. Thompson  Electrolytes noted, euvolemic on exam     Plan:  Next HD session 11/23 with UF   Please adjust the dose of midodrine to 8M-12PM and 4PM and continue with 10mg   Decrease Temp on HD to help with BP   Encourage PO intake   Renal diet   Daily BMP   Please obtain daily weight on patient, will help to obtain her EDW and how much fluid off patient can tolerate.     Access:  Left TDC c/d/i   RUE AVG     Hypotension   SBP low 90s requiring levophed, and midodrine.   ECHO shows no pericardial effusion, making cardiac tamponade less likely   Continue midodrine as above     Anemia   Hgb stable at 9.8  No indication for EPO or iron at this time     Renal Bone Disease:  Calcium: 7.4  Phos: 2.6  Trend phos daily with labs   Hold  sevelamer 800mg TID with meals

## 2022-11-22 NOTE — PHYSICAL THERAPY INITIAL EVALUATION ADULT - IMPAIRMENTS CONTRIBUTING TO GAIT DEVIATIONS, PT EVAL
[facility] [place] [facility]    [address]    [place] [address]    Phone:  [phone]       Thank You for choosing us for your health care visit. We are glad to serve you and happy to provide you with this summary of your visit. Please help us to ensure we have accurate records. If you find anything that needs to be changed, please let our staff know as soon as possible.          Your Demographic Information     Patient Name Sex Robel Moreno Male 1985       Ethnic Group Patient Race    Not of  or  Origin White      Your Visit Details     Date & Time Provider Department    2017 5:00 PM Gabby Mccord, MARGRET [facility]      Your To Do List     Follow-Up    Return if symptoms worsen or fail to improve.      We Ordered or Performed the Following     AEROSOL INHALATION ACUTE     XR CHEST PA AND LATERAL       Conditions Discussed Today or Order-Related Diagnoses        Comments    Wheezing    -  Primary     Acute viral bronchitis           Your Vitals Were     BP Pulse Temp Resp Height Weight    122/75 110 98.8 °F (37.1 °C) 18 6' 1\" (1.854 m) 200 lb (90.7 kg)    BMI Smoking Status                26.39 kg/m2 Current Every Day Smoker          Medications Prescribed or Re-Ordered Today     methylPREDNISolone (MEDROL, LOKESH,) 4 MG tablet    Sig: Take as directed    Class: Eprescribe    Pharmacy: [id number] - [place], [address] AT [facility] [address] [employer] Ph #: [phone]      Your Current Medications Are        Disp Refills Start End    methylPREDNISolone (MEDROLLOKESH,) 4 MG tablet 1 packet 0 2017     Sig: Take as directed    Class: Eprescribe    VICODIN 5-500 MG PO TABS 28 0 2005     Sig - Route: 1 TABLET EVERY 4 TO 6 HOURS AS NEEDED - Oral      Allergies     No Known Allergies      Medications Administered Today        Admin Date Action Route                albuterol-ipratropium 2.5 mg/0.5 mg (DUONEB) nebulizer solution 3 mL 05/05/2017 Given Nebulization                 Results of Testing Done Today       Patient Portal Signup     Manage health care for you and your family anytime, anywhere with the new [employer], your free online resource for quick and easy access to personal health information, scheduling appointments, refilling prescriptions, viewing test results, paying bills and more.  Sign up for a free and secure account. Please follow the instructions below to securely complete your enrollment.     1. Go to [email or web address]  2. Click Sign Up Now   3. Enter the Activation Code when prompted     Activation Code: [email or web address]  Expires: 6/4/2017  5:37 PM    If you have questions related to [facility], you can email [email or web address] or call [phone] to talk to our [facility] staff.  For questions related to your health, contact your physician’s office.  Please remember to dial 911 for medical emergencies.                Patient Instructions      Bronchitis, Viral (Adult)    You have a viral bronchitis. Bronchitis is inflammation and swelling of the lining of the lungs. This is often caused by an infection. Symptoms include a dry, hacking cough that is worse at night. The cough may bring up yellow-green mucus. You may also feel short of breath or wheeze. Other symptoms may include tiredness, chest discomfort, and chills.  Bronchitis that is caused by a virus is not treated with antibiotics. Instead, medicines may be given to help relieve symptoms. Symptoms can last up to 2 weeks, although the cough may last much longer.  This illness is contagious during the first few days and is spread through the air by coughing and sneezing, or by direct contact (touching the sick person and then touching your own eyes, nose, or mouth).  Most viral illnesses resolve within 10 to 14 days with rest and simple home remedies, although they may sometimes last for  several weeks.  Home care  · If symptoms are severe, rest at home for the first 2 to 3 days. When you go back to your usual activities, don't let yourself get too tired.  · Do not smoke. Also avoid being exposed to secondhand smoke.  · You may use over-the-counter medicine to control fever or pain, unless another pain medicine was prescribed. (Note: If you have chronic liver or kidney disease or have ever had a stomach ulcer or gastrointestinal bleeding, talk with your healthcare provider before using these medicines. Also talk to your provider if you are taking medicine to prevent blood clots.) Aspirin should never be given to anyone younger than 18 years of age who is ill with a viral infection or fever. It may cause severe liver or brain damage.  · Your appetite may be poor, so a light diet is fine. Avoid dehydration by drinking 6 to 8 glasses of fluids per day (such as water, soft drinks, sports drinks, juices, tea, or soup). Extra fluids will help loosen secretions in the nose and lungs.  · Over-the-counter cough, cold, and sore-throat medicines will not shorten the length of the illness, but they may help to reduce symptoms. (Note: Do not use decongestants if you have high blood pressure.)  Follow-up care  Follow up with your healthcare provider, or as advised.  If you had an X-ray or ECG (electrocardiogram), a specialist will review it. You will be notified of any new findings that may affect your care.  Note: If you are age 65 or older, or if you have a chronic lung disease or condition that affects your immune system, or you smoke, talk to your healthcare provider about having pneumococcal vaccinations and a yearly influenza vaccination (flu shot).  When to seek medical advice   Call your healthcare provider right away if any of these occur:  · Fever of 100.4°F (38°C) or higher  · Coughing up increased amounts of colored sputum  · Weakness, drowsiness, headache, facial pain, ear pain, or a stiff neck  Call  911, or get immediate medical care  Contact emergency services right away if any of these occur:  · Coughing up blood  · Worsening weakness, drowsiness, headache, or stiff neck  · Trouble breathing, wheezing, or pain with breathing  © 7848-0025 [employer]. [address] [place] [address]. All rights reserved. This information is not intended as a substitute for professional medical care. Always follow your healthcare professional's instructions.    Interested in decreasing your wait time in the Walk-in/Urgent Care Clinic?  Same day reservations can now be made on line.  Go to [email or web address] to see the wait times at each [facility] Walk-in[facility]Urgent Care and to make a reservation at the site that is most convenient for you. We will do our best to honor your reservation time but please understand that wait times are subject to change once you arrive at the clinic.      Thank you for visiting the [facility], [place]     It is difficult to recognize all elements of any illness or injury in a single visit. The examination, treatment and x-rays received are on a preliminary basis only. A radiologist will also review your x-rays for final reading. Call your primary care provider if you have questions or problems before your next appointment. If you are unable to  reach your Primary Care Provider (PCP), please call or return to the Walk-In Clinic.  If symptoms worsen or do not resolve please follow-up with your Primary Care Provider (PCP), Walk-In or the nearest  Emergency Room for emergency symptoms.  If you are unsure of whom to follow up with, call [phone] and ask to speak with either your PCP, the Walk-In nurse or the on-call Provider if you are calling after hours.      If you are referred to a specialist or scheduled for a test, our Referrals department will call you with your appointment date and time within 3 business days. If you have not heard from them in this time  frame, please call [phone] and ask for the Referrals department.     Test results: Unless otherwise instructed, you should be notified of test results within one week. Please call our office if you do not hear from us within this time frame at [phone].     Hours:  Monday through Friday: 7:00 am to 7:00 pm  Saturday: 8:00 am to 2:00 pm  Sunday: 8:00 am to 12:00 noon.  Holiday hours may vary, please call [phone] on Holidays.  Walk-In is closed on Memorial Day, Thanksgiving Day, Ritesh Day and  New Year's Day.      Thank you again for visiting [facility], [place].  Gabby Mccord NP    Help us to grow our quality of service!  We want to improve - and you can help!  You may receive a survey in the mail.  This is your opportunity to tell us what excellent service you received and where we could use improvement.  We value your input!             impaired balance/pain

## 2022-11-22 NOTE — PROGRESS NOTE ADULT - SUBJECTIVE AND OBJECTIVE BOX
Patient is a 67y Female seen and evaluated at bedside. No acute distress, does not offer any complaints. Patient off levophed and blood pressure stable. Tolerated HD yesterday with 1L UF with aid of low dose levo.       Meds:    acetaminophen     Tablet .. 650 every 6 hours PRN  aspirin  chewable 81 daily  benzocaine 15 mG/menthol 3.6 mG Lozenge 1 every 2 hours PRN  chlorhexidine 2% Cloths 1 daily  clopidogrel Tablet 75 daily  dextrose 5%. 1000 <Continuous>  dextrose 5%. 1000 <Continuous>  dextrose 50% Injectable 25 Once  dextrose 50% Injectable 12.5 Once  dextrose 50% Injectable 25 Once  dextrose Oral Gel 15 Once PRN  glucagon  Injectable 1 Once  lidocaine   4% Patch 1 every 24 hours  midodrine. 10 every 8 hours  polyethylene glycol 3350 17 daily  senna 2 at bedtime  sevelamer carbonate 800 three times a day with meals      T(C): , Max: 37.1 (11-21-22 @ 18:28)  T(F): , Max: 98.7 (11-21-22 @ 18:28)  HR: 108 (11-22-22 @ 14:00)  BP: 121/45 (11-22-22 @ 14:00)  BP(mean): 65 (11-22-22 @ 14:00)  RR: 17 (11-22-22 @ 14:00)  SpO2: 98% (11-22-22 @ 14:00)  Wt(kg): --    11-21 @ 07:01  -  11-22 @ 07:00  --------------------------------------------------------  IN: 673.2 mL / OUT: 0 mL / NET: 673.2 mL    11-22 @ 07:01  -  11-22 @ 14:12  --------------------------------------------------------  IN: 182.5 mL / OUT: 0 mL / NET: 182.5 mL          Review of Systems:  ROS negative except as per HPI      PHYSICAL EXAM:  GENERAL: NAD, awake and alert   NECK: supple, No JVD  CHEST/LUNG: Clear to auscultation bilaterally  HEART: normal S1S2, RRR  ABDOMEN: Soft, Nontender, +BS, No flank tenderness bilateral  EXTREMITIES: No clubbing, cyanosis, b/l 1+ edema   NEUROLOGY: AAO x3, no focal neurological deficit  ACCESS: Left TDC, c/d/i, RUE AVF, with thrill (not ready for use)     LABS:                        9.8    4.41  )-----------( 102      ( 22 Nov 2022 05:37 )             32.1     11-22    135  |  102  |  32<H>  ----------------------------<  73  4.3   |  25  |  3.82<H>    Ca    7.4<L>      22 Nov 2022 05:37  Phos  2.6     11-22  Mg     1.6     11-22    TPro  5.4<L>  /  Alb  3.5  /  TBili  0.4  /  DBili  0.2  /  AST  8<L>  /  ALT  5<L>  /  AlkPhos  87  11-21                RADIOLOGY & ADDITIONAL STUDIES:

## 2022-11-22 NOTE — PHYSICAL THERAPY INITIAL EVALUATION ADULT - GENERAL OBSERVATIONS, REHAB EVAL
Pt received in chair with complaints of pain and wanting to return to bed, +ace wrap RUE, IV, ekg, L chest port. Pt presents with decreased functional strength with transfers.

## 2022-11-22 NOTE — PHYSICAL THERAPY INITIAL EVALUATION ADULT - PERTINENT HX OF CURRENT PROBLEM, REHAB EVAL
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 on 11/17 for RUE AVG revision. Now s/p AVG revision, thrombectomy, and explantation of old graft on 11/17. Admitted to the SICU post-operatively for hemodynamic monitoring

## 2022-11-22 NOTE — PHYSICAL THERAPY INITIAL EVALUATION ADULT - ADDITIONAL COMMENTS
Pt resides with her daughter Tsering in a first floor apartment with 4STE. PTA patient used a cane  to ambulate but also has walker which patient reports not using. Patient has shower chair, and grab  bars. Patient has HHA (M-F/5H)., daughter assists on weekends. Pt states she is able to ambulate 1-2 blocks.

## 2022-11-22 NOTE — CHART NOTE - NSCHARTNOTEFT_GEN_A_CORE
Admitting Diagnosis:   Patient is a 67y old  Female who presents with a chief complaint of AVG revision (2022 09:01)      PAST MEDICAL & SURGICAL HISTORY:  HTN (hypertension)      HLD (hyperlipidemia)      CAD (coronary atherosclerotic disease)      ESRD on dialysis  last 11/15/22      H/O ventral hernia      Brown tumor  brain      DVT, lower extremity  left      History of surgery  IVC filter      AVF (arteriovenous fistula)  right left      S/P AIDEN-BSO        History of surgery  RLE PTA stent / atherectomy  2021      History of atherectomy  stent          Current Nutrition Order:  Renal Diet, nepro x2/day 425kcal/20gm prot per 1 can      PO Intake: Good (%) [   ]  Fair (50-75%) [   ] Poor (<25%) [   ]--Please see Below     GI Issues:   Denies N/V, c/o constipation - would like bowel reg ordered       Pain:  C/o R Arm Pain     Skin Integrity:  Britton 18  1+ Edema, right arm  No pressure ulcers; SX site noted, Skin Tear to L nostril noted     Labs:       135  |  102  |  32<H>  ----------------------------<  73  4.3   |  25  |  3.82<H>    Ca    7.4<L>      2022 05:37  Phos  2.6       Mg     1.6         TPro  5.4<L>  /  Alb  3.5  /  TBili  0.4  /  DBili  0.2  /  AST  8<L>  /  ALT  5<L>  /  AlkPhos  87      CAPILLARY BLOOD GLUCOSE          Medications:  MEDICATIONS  (STANDING):  aspirin  chewable 81 milliGRAM(s) Oral daily  chlorhexidine 2% Cloths 1 Application(s) Topical daily  clopidogrel Tablet 75 milliGRAM(s) Oral daily  dextrose 5%. 1000 milliLiter(s) (50 mL/Hr) IV Continuous <Continuous>  dextrose 5%. 1000 milliLiter(s) (100 mL/Hr) IV Continuous <Continuous>  dextrose 50% Injectable 25 Gram(s) IV Push Once  dextrose 50% Injectable 12.5 Gram(s) IV Push Once  dextrose 50% Injectable 25 Gram(s) IV Push Once  glucagon  Injectable 1 milliGRAM(s) IntraMuscular Once  lidocaine   4% Patch 1 Patch Transdermal every 24 hours  midodrine. 10 milliGRAM(s) Oral every 8 hours  sevelamer carbonate 800 milliGRAM(s) Oral three times a day with meals    MEDICATIONS  (PRN):  acetaminophen     Tablet .. 650 milliGRAM(s) Oral every 6 hours PRN Mild Pain (1 - 3)  benzocaine 15 mG/menthol 3.6 mG Lozenge 1 Lozenge Oral every 2 hours PRN Sore Throat  dextrose Oral Gel 15 Gram(s) Oral Once PRN Blood Glucose LESS THAN 70 milliGRAM(s)/deciliter      '10'  pounds+-10%  Wt 194 pounds BMI 27.8 %BMB=638     239.4 pounds   235.8 pounds, 238 pounds   *Daily wts are more consistent compared to admit wt, assume admit wt is stated wt from pt    **PCM: Reports PTA only eating 1 meal/day, breakfast which is usually a bagel. Reports if her daughter is not home to cook she does not eat. Does report recently getting a home aide. Unsure if they help with meal prep. Reports she cannot make food herself d/t issues standing 2/2 numbness in leg. Suggested use of [employer] however pt reports she does not know how to use microwave and does not want to learn. Suggested meal prep from daughter and offered to call pt daughter, however pt declined. Provided pt with RD contact info should she change her mind. Pt reports wt loss d/t PO intake x1/day, not taking ONS (assume Meeting<75% EER). UBW per pt 299 pounds and reports being down to 194 pounds. Would suggest wt loss of 105 pounds, ? accuracy as pt able to provide little details/ did not seem confident. NFPE had been deferred. Is At risk for malnutrition however not able to DX today.     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 Now s/p AVG revision, thrombectomy, and explantation of old graft on . Operative course complicated by difficult hemostasis requiring 24 ug DDAVP which resulted in desaturation and hypotension. Admitted to the SICU post-operatively for hemodynamic monitoring. Pt course c/b graft failure. Pt needed to undergo CVVHD -. Now S/p RUE AVF open thrombectomy . Possible for SDU.     Pt Seen this afternoon, consuming lunch. Ordered for Renal diet + ONS. At Breakfast had 100% of bread turkey baccon and tea, 50% of eggs (does not usually like). Lunch at bedside consisted of salad, crackers, steak, fruit. Limited PO thus far during meal. Seen with Tabatha at bedside. Reports she has not yet tried however is agreeable to do so.   Labs: Na 135-low prior, K Mg Phos WDL (renvela ordered), Ca 7.4, AST SGOT 8, ALT SGPT 5, POCT 132 134.   Please see below for nutritions recommendations. Recs made with team.     Previous Nutrition Diagnosis: Increased Nutrient Needs RT increased needs AEB HD / CVVHD, post op, Malnutrition Risk  Active [X   ]  Resolved [   ]  GOAL: Pt will meet at least 75% of nutrient needs    Recommendations:  1. Cont with Diet/ oral nutrition supplements.   2. Monitor %PO intake.  >> If pt unwilling to drink oral nutrition supplements would d/c order.  >> If PO remains poor vs BP remains low d/c Renal. Add No Conc K/Phos Pending Labs.   3. Monitor Skin, Wt, Pain, GI (add bowel reg PRN), GOC.  4. RD to remain available for additional nutrition interventions as needed.     Education: Encouraged PO intake during meals & reviewed importance. Spoke about protein foods and oral nutrition supplements. Understanding stated, provide additional motivation as needed.     Risk Level: High [  ] Moderate [ X  ] Low [   ]

## 2022-11-22 NOTE — PROGRESS NOTE ADULT - ASSESSMENT
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 Now s/p RUE AVG revision w/ brachiobrachial graft, thrombectomy, and explantation of old graft on (11/17). Operative course complicated by difficult hemostasis requiring 24 ug DDAVP which resulted in desaturation and hypotension. Pt transferred to SICU, now off levophed gtt, but course c/b graft failure. Pt s/p repeat RUE AVG revision with thrombectomy, angiogram (11/20).     Incisional care, elevation  F/U renal recs  Rest of care per SICU

## 2022-11-22 NOTE — PROGRESS NOTE ADULT - SUBJECTIVE AND OBJECTIVE BOX
up in chair, pain controlled  afebrile, bp ~ 150,   RT UE:mild edema, low amanda doppler over avg, ss dc from incision    A/P: wound care, elevation, ace wrap - continue abx until drainage stops

## 2022-11-23 NOTE — PROGRESS NOTE ADULT - SUBJECTIVE AND OBJECTIVE BOX
Patient is a 67y Female seen and evaluated at bedside. No acute distress, does not offer any complaints. Seen on HD and tolerating procedure well. Blood pressure during -66. Goal UF 1L.       Meds:    acetaminophen     Tablet .. 650 every 6 hours PRN  aspirin  chewable 81 daily  benzocaine 15 mG/menthol 3.6 mG Lozenge 1 every 2 hours PRN  chlorhexidine 2% Cloths 1 daily  clopidogrel Tablet 75 daily  dextrose 5%. 1000 <Continuous>  dextrose 5%. 1000 <Continuous>  dextrose 50% Injectable 25 Once  dextrose 50% Injectable 12.5 Once  dextrose 50% Injectable 25 Once  dextrose Oral Gel 15 Once PRN  glucagon  Injectable 1 Once  lidocaine   4% Patch 1 every 24 hours  midodrine. 10 every 8 hours  polyethylene glycol 3350 17 daily  senna 2 at bedtime  sevelamer carbonate 800 three times a day with meals  vancomycin  IVPB 750 once      T(C): , Max: 37.8 (11-22-22 @ 15:31)  T(F): , Max: 100 (11-22-22 @ 15:31)  HR: 78 (11-23-22 @ 13:00)  BP: 113/54 (11-23-22 @ 13:00)  BP(mean): 78 (11-23-22 @ 13:00)  RR: 20 (11-23-22 @ 13:00)  SpO2: 100% (11-23-22 @ 13:00)  Wt(kg): --    11-22 @ 07:01  -  11-23 @ 07:00  --------------------------------------------------------  IN: 982.5 mL / OUT: 0 mL / NET: 982.5 mL    11-23 @ 07:01  -  11-23 @ 13:59  --------------------------------------------------------  IN: 340 mL / OUT: 0 mL / NET: 340 mL          Review of Systems:  ROS negative except as per HPI      PHYSICAL EXAM:  GENERAL: NAD, awake and alert   NECK: supple, No JVD  CHEST/LUNG: Clear to auscultation bilaterally  HEART: normal S1S2, RRR  ABDOMEN: Soft, Nontender, +BS, No flank tenderness bilateral  EXTREMITIES: No clubbing, cyanosis, no edema   NEUROLOGY: AAO x3, no focal neurological deficit  ACCESS: Left TDC, c/d/i, RUE AVF, with thrill (not ready for use)       LABS:                        9.6    4.83  )-----------( 118      ( 23 Nov 2022 10:57 )             30.6     11-23    132<L>  |  99  |  43<H>  ----------------------------<  112<H>  3.9   |  25  |  6.38<H>    Ca    7.4<L>      23 Nov 2022 10:57  Phos  2.3     11-23  Mg     1.9     11-23                  RADIOLOGY & ADDITIONAL STUDIES:

## 2022-11-23 NOTE — PROGRESS NOTE ADULT - ASSESSMENT
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 Now s/p RUE AVG revision w/ brachiobrachial graft, thrombectomy, and explantation of old graft on (11/17). Operative course complicated by difficult hemostasis requiring 24 ug DDAVP which resulted in desaturation and hypotension. Pt transferred to SICU, now off levophed gtt, but course c/b graft failure. Pt s/p repeat RUE AVG revision with thrombectomy, angiogram (11/20).     Neuro: Pain: Tylenol and Lidoderm. Avoid narcotics.   CV: Vasodilitory and hypovolemic shock  SBP goal > 90, now off levo gtt, h/o HFrEF (40%), CAD, AS/MR, Repeat TTE (11/18) EF 55-60%, mild AS, PAD: home ASA and Plavix, holding home meclazine/entresto/atorvastatin. Midodrine 10q8.   Pulm: On NC, wean as tolerated  GI: Renal Diet, Constipation- Cont Senna Miralax. Dulcolax x1 on 11/22  : anuric, ESRD  Renal: Nephro recs for HD, cont home renvela 800mg, hold hep w/ HD x1 wk  Endo: mISS  Heme: Thrombocytopenia HIT panel neg,  ID:Vanco( 11/23-) no post-op abx  (Vanc 1g preop),   DVT Ppx: no HSQ, SCDs + Superficial thrombus of Left Cephalic vein.   Wounds/Lines/Drains: RUE incision x3, PIV D/c: L TDC (11/17-11/19), L femoral chong (11/17-11/21),   Dispo: SICU

## 2022-11-23 NOTE — DISCHARGE NOTE PROVIDER - NSDCCPCAREPLAN_GEN_ALL_CORE_FT
PRINCIPAL DISCHARGE DIAGNOSIS  Diagnosis: AV graft thrombosis  Assessment and Plan of Treatment:       SECONDARY DISCHARGE DIAGNOSES  Diagnosis: ESRD on dialysis  Assessment and Plan of Treatment:     Diagnosis: Chronic HFrEF (heart failure with reduced ejection fraction)  Assessment and Plan of Treatment:     Diagnosis: CAD (coronary artery disease)  Assessment and Plan of Treatment:     Diagnosis: HTN (hypertension)  Assessment and Plan of Treatment:     Diagnosis: HLD (hyperlipidemia)  Assessment and Plan of Treatment:     Diagnosis: Polycystic renal disease  Assessment and Plan of Treatment:

## 2022-11-23 NOTE — DISCHARGE NOTE PROVIDER - NSDCCPTREATMENT_GEN_ALL_CORE_FT
PRINCIPAL PROCEDURE  Procedure: Revision, AV fistula dialysis graft, with thrombectomy  Findings and Treatment:       SECONDARY PROCEDURE  Procedure: Revision, AV fistula dialysis graft, with thrombectomy  Findings and Treatment:     Procedure: Fistulogram  Findings and Treatment:

## 2022-11-23 NOTE — PROGRESS NOTE ADULT - SUBJECTIVE AND OBJECTIVE BOX
Alert, pain controlled  afebrile vss sat - ok  RT UE: 4arm soft, not swollen, upper arm increasingly swollen. + doppler signal over avg, some serous drainage on dsg  Tolerated HD without pressors    A/P: patent avg. Increasing upper arm swelling suggests seroma/possible wound or graft transudate.  wound care  elevate arm  ace wrap  follow course  vanco with HD until drainage ceases to decrease risk of graft infection

## 2022-11-23 NOTE — DISCHARGE NOTE PROVIDER - NSDCFUADDINST_GEN_ALL_CORE_FT
Follow up with Dr. Buckley in 1 week. Call the office at the number below to schedule your appointment. You may shower; soap and water over incision sites. Do not scrub. Pat dry when done. No tub bathing or swimming until cleared. Keep incision sites out of the sun as scars will darken. Ambulate as tolerated, but no heavy lifting (>10lbs) or strenuous exercise. You may resume regular diet. You should be urinating at least 3-4x per day. Call the office if you experience increasing abdominal pain, nausea, vomiting, or temperature >101 F.   Follow up with Dr. Buckley on Monday 11/28. Call the office at the number below to schedule your appointment. You may shower; soap and water over incision sites. Do not scrub. Pat dry when done. No tub bathing or swimming until cleared. Keep incision sites out of the sun as scars will darken. Ambulate as tolerated, but no heavy lifting (>10lbs) or strenuous exercise. You may resume regular diet. You should be urinating at least 3-4x per day. Call the office if you experience increasing abdominal pain, nausea, vomiting, or temperature >101 F.  ACTIVITY: Ambulate as tolerated, but no heavy lifting (>10lbs) or strenuous exercise.  DIET: You may resume your renal diet. Call the office if you experience increasing pain, redness, swelling or drainage from incision sites/wounds, or temperature >101.4F.   NEW MEDICATIONS:  ADDITIONAL FOLLOW UP AFTER DISCHARGE:  DISCHARGE DESTINATION:     Follow up with Dr. Buckley on Monday 11/28. Call the office at the number below to schedule your appointment.   You may shower; soap and water over incision sites. Do not scrub. Pat dry when done. No tub bathing or swimming until cleared. Keep incision sites out of the sun as scars will darken. Ambulate as tolerated, but no heavy lifting (>10lbs) or strenuous exercise. You may resume regular diet. You should be urinating at least 3-4x per day. Call the office if you experience increasing abdominal pain, nausea, vomiting, or temperature >101 F.  ACTIVITY: Ambulate as tolerated, but no heavy lifting (>10lbs) or strenuous exercise.  DIET: You may resume your renal diet. Call the office if you experience increasing pain, redness, swelling or drainage from incision sites/wounds, or temperature >101.4F.   NEW MEDICATIONS: Please take midodrine 10mg before AND after dialysis (should be taken on 3 days weekly). Please continue holding sevelamer as per nephrology.  Please stop taking entresto for now until you follow up with your nephrologist and primary care physician in 1 week for medication optimization, since your blood pressure has been soft throughout this hospitalization.   ADDITIONAL FOLLOW UP AFTER DISCHARGE:  Please follow up with nephrologist at [facility] [place] in 1 week from discharge.   Please follow up with your primary care physician in 1-2 weeks for medication optimization.   DISCHARGE DESTINATION: home with home services

## 2022-11-23 NOTE — PROGRESS NOTE ADULT - SUBJECTIVE AND OBJECTIVE BOX
SUBJECTIVE: Patient seen and examined bedside in the SICU. Pt reports feeling well without acute complaints. Denies chest pain, sob, nausea, or vomiting. Incisional pain well controlled. Denies numbness/tingling of the extremities. Tolerating diet.     aspirin  chewable 81 milliGRAM(s) Oral daily  clopidogrel Tablet 75 milliGRAM(s) Oral daily  midodrine. 10 milliGRAM(s) Oral every 8 hours  vancomycin  IVPB 1000 milliGRAM(s) IV Intermittent once      Vital Signs Last 24 Hrs  T(C): 36.7 (23 Nov 2022 10:55), Max: 37.8 (22 Nov 2022 15:31)  T(F): 98 (23 Nov 2022 10:55), Max: 100 (22 Nov 2022 15:31)  HR: 73 (23 Nov 2022 11:00) (73 - 117)  BP: 105/50 (23 Nov 2022 11:00) (91/43 - 182/99)  BP(mean): 72 (23 Nov 2022 11:00) (60 - 118)  RR: 10 (23 Nov 2022 11:00) (10 - 43)  SpO2: 93% (23 Nov 2022 11:00) (92% - 100%)    Parameters below as of 23 Nov 2022 12:00  Patient On (Oxygen Delivery Method): room air      I&O's Detail    22 Nov 2022 07:01  -  23 Nov 2022 07:00  --------------------------------------------------------  IN:    IV PiggyBack: 262.5 mL    Oral Fluid: 720 mL  Total IN: 982.5 mL    OUT:  Total OUT: 0 mL    Total NET: 982.5 mL      23 Nov 2022 07:01  -  23 Nov 2022 11:43  --------------------------------------------------------  IN:    IV PiggyBack: 100 mL    Oral Fluid: 240 mL  Total IN: 340 mL    OUT:  Total OUT: 0 mL    Total NET: 340 mL        General: NAD, resting comfortably in bed  C/V: NSR  Pulm: Nonlabored breathing, no respiratory distress  Abd: soft, NT/ND.  Extrem: WWP  RUE: incisions CDI, mildly tender, edematous to the hand (nonpitting), doppler signal appreciated proximal to graft          LABS:                        9.6    4.83  )-----------( 118      ( 23 Nov 2022 10:57 )             30.6     11-23    132<L>  |  99  |  43<H>  ----------------------------<  112<H>  3.9   |  25  |  6.38<H>    Ca    7.4<L>      23 Nov 2022 10:57  Phos  2.3     11-23  Mg     1.9     11-23            RADIOLOGY & ADDITIONAL STUDIES:

## 2022-11-23 NOTE — PROGRESS NOTE ADULT - SUBJECTIVE AND OBJECTIVE BOX
Overnight: No acute events overnight. Pt declined AM blood draw, will collect labs before dialysis.    SUBJECTIVE: Pt feels well and has no acute complaints. No CP, SOB, palpitations, abd pain. Pt eating and drinking normally.      MEDICATIONS  (STANDING):  aspirin  chewable 81 milliGRAM(s) Oral daily  chlorhexidine 2% Cloths 1 Application(s) Topical daily  clopidogrel Tablet 75 milliGRAM(s) Oral daily  dextrose 5%. 1000 milliLiter(s) (50 mL/Hr) IV Continuous <Continuous>  dextrose 5%. 1000 milliLiter(s) (100 mL/Hr) IV Continuous <Continuous>  dextrose 50% Injectable 25 Gram(s) IV Push Once  dextrose 50% Injectable 12.5 Gram(s) IV Push Once  dextrose 50% Injectable 25 Gram(s) IV Push Once  glucagon  Injectable 1 milliGRAM(s) IntraMuscular Once  lidocaine   4% Patch 1 Patch Transdermal every 24 hours  midodrine. 10 milliGRAM(s) Oral every 8 hours  polyethylene glycol 3350 17 Gram(s) Oral daily  senna 2 Tablet(s) Oral at bedtime  sevelamer carbonate 800 milliGRAM(s) Oral three times a day with meals    MEDICATIONS  (PRN):  acetaminophen     Tablet .. 650 milliGRAM(s) Oral every 6 hours PRN Mild Pain (1 - 3)  benzocaine 15 mG/menthol 3.6 mG Lozenge 1 Lozenge Oral every 2 hours PRN Sore Throat  dextrose Oral Gel 15 Gram(s) Oral Once PRN Blood Glucose LESS THAN 70 milliGRAM(s)/deciliter      Vital Signs Last 24 Hrs  T(C): 37 (23 Nov 2022 09:00), Max: 37.8 (22 Nov 2022 15:31)  T(F): 98.6 (23 Nov 2022 09:00), Max: 100 (22 Nov 2022 15:31)  HR: 87 (23 Nov 2022 09:00) (78 - 117)  BP: 112/53 (23 Nov 2022 09:00) (91/43 - 182/99)  BP(mean): 77 (23 Nov 2022 09:00) (56 - 118)  RR: 12 (23 Nov 2022 09:00) (10 - 43)  SpO2: 95% (23 Nov 2022 09:00) (93% - 100%)    Parameters below as of 23 Nov 2022 10:00  Patient On (Oxygen Delivery Method): room air        Physical Exam:  General: NAD, resting comfortably and eating in bed,   HEENT: MMM, no JVD  C/V: RRR  PULM: CTAB  ABD: Soft, ND, NT, no rebound tenderness, no guarding  EXTREM: WWP, no edema, SCDs in place, R arm dressing in place and tegaderm is dry and intact.   NEURO: AOx3, follows commands and answers questions appropriately    I&O's Summary    22 Nov 2022 07:01  -  23 Nov 2022 07:00  --------------------------------------------------------  IN: 982.5 mL / OUT: 0 mL / NET: 982.5 mL    23 Nov 2022 07:01  -  23 Nov 2022 09:15  --------------------------------------------------------  IN: 340 mL / OUT: 0 mL / NET: 340 mL        LABS:                        9.8    4.41  )-----------( 102      ( 22 Nov 2022 05:37 )             32.1     11-22    135  |  102  |  32<H>  ----------------------------<  73  4.3   |  25  |  3.82<H>    Ca    7.4<L>      22 Nov 2022 05:37  Phos  2.6     11-22  Mg     1.6     11-22          CAPILLARY BLOOD GLUCOSE            RADIOLOGY & ADDITIONAL STUDIES:    < from: US Duplex Venous Upper Ext Ltd, Left (11.22.22 @ 15:17) >  FINDINGS:    The left internal jugular, subclavian, axillary and brachial veins are   patent and compressible where applicable.  The basilic veins (superficial   vein) is patent and without thrombus.    There is noncompressibility of the left cephalic vein at the level of the   forearm secondary to nonocclusive intraluminal thrombus. Cephalic vein   not visualized in the arm/above antecubital fossa.    Dialysis catheter seen within left subclavian vein.    IMPRESSION:  1.  Superficial thrombosis left cephalic vein in forearm.  2.  No evidence of left upper extremity deep venous thrombosis.    < end of copied text >     Overnight: No acute events overnight. Pt declined AM blood draw, will collect labs before dialysis.    SUBJECTIVE: Pt feels well and has no acute complaints. No CP, SOB, palpitations, abd pain. Pt eating and drinking normally. Last BM was yesterday at around 2pm; she does not feel the need to have another BM at this time.      MEDICATIONS  (STANDING):  aspirin  chewable 81 milliGRAM(s) Oral daily  chlorhexidine 2% Cloths 1 Application(s) Topical daily  clopidogrel Tablet 75 milliGRAM(s) Oral daily  dextrose 5%. 1000 milliLiter(s) (50 mL/Hr) IV Continuous <Continuous>  dextrose 5%. 1000 milliLiter(s) (100 mL/Hr) IV Continuous <Continuous>  dextrose 50% Injectable 25 Gram(s) IV Push Once  dextrose 50% Injectable 12.5 Gram(s) IV Push Once  dextrose 50% Injectable 25 Gram(s) IV Push Once  glucagon  Injectable 1 milliGRAM(s) IntraMuscular Once  lidocaine   4% Patch 1 Patch Transdermal every 24 hours  midodrine. 10 milliGRAM(s) Oral every 8 hours  polyethylene glycol 3350 17 Gram(s) Oral daily  senna 2 Tablet(s) Oral at bedtime  sevelamer carbonate 800 milliGRAM(s) Oral three times a day with meals    MEDICATIONS  (PRN):  acetaminophen     Tablet .. 650 milliGRAM(s) Oral every 6 hours PRN Mild Pain (1 - 3)  benzocaine 15 mG/menthol 3.6 mG Lozenge 1 Lozenge Oral every 2 hours PRN Sore Throat  dextrose Oral Gel 15 Gram(s) Oral Once PRN Blood Glucose LESS THAN 70 milliGRAM(s)/deciliter      Vital Signs Last 24 Hrs  T(C): 37 (23 Nov 2022 09:00), Max: 37.8 (22 Nov 2022 15:31)  T(F): 98.6 (23 Nov 2022 09:00), Max: 100 (22 Nov 2022 15:31)  HR: 87 (23 Nov 2022 09:00) (78 - 117)  BP: 112/53 (23 Nov 2022 09:00) (91/43 - 182/99)  BP(mean): 77 (23 Nov 2022 09:00) (56 - 118)  RR: 12 (23 Nov 2022 09:00) (10 - 43)  SpO2: 95% (23 Nov 2022 09:00) (93% - 100%)    Parameters below as of 23 Nov 2022 10:00  Patient On (Oxygen Delivery Method): room air        Physical Exam:  General: NAD, resting comfortably and eating in bed,   HEENT: MMM, no JVD  C/V: RRR  PULM: CTAB  ABD: Soft, ND, NT, no rebound tenderness, no guarding  EXTREM: WWP, no edema, SCDs in place, R arm dressing in place and tegaderm is dry and intact.   NEURO: AOx3, follows commands and answers questions appropriately    I&O's Summary    22 Nov 2022 07:01  -  23 Nov 2022 07:00  --------------------------------------------------------  IN: 982.5 mL / OUT: 0 mL / NET: 982.5 mL    23 Nov 2022 07:01  -  23 Nov 2022 09:15  --------------------------------------------------------  IN: 340 mL / OUT: 0 mL / NET: 340 mL        LABS:                        9.8    4.41  )-----------( 102      ( 22 Nov 2022 05:37 )             32.1     11-22    135  |  102  |  32<H>  ----------------------------<  73  4.3   |  25  |  3.82<H>    Ca    7.4<L>      22 Nov 2022 05:37  Phos  2.6     11-22  Mg     1.6     11-22          CAPILLARY BLOOD GLUCOSE            RADIOLOGY & ADDITIONAL STUDIES:    < from: US Duplex Venous Upper Ext Ltd, Left (11.22.22 @ 15:17) >  FINDINGS:    The left internal jugular, subclavian, axillary and brachial veins are   patent and compressible where applicable.  The basilic veins (superficial   vein) is patent and without thrombus.    There is noncompressibility of the left cephalic vein at the level of the   forearm secondary to nonocclusive intraluminal thrombus. Cephalic vein   not visualized in the arm/above antecubital fossa.    Dialysis catheter seen within left subclavian vein.    IMPRESSION:  1.  Superficial thrombosis left cephalic vein in forearm.  2.  No evidence of left upper extremity deep venous thrombosis.    < end of copied text >

## 2022-11-23 NOTE — DISCHARGE NOTE PROVIDER - CARE PROVIDER_API CALL
Joey Buckley)  Surgery; Vascular Surgery  [address] [facility], [address] [place] [address]  Phone: [phone]  Fax: [phone]  Follow Up Time:    Joey Buckley)  Surgery; Vascular Surgery  [address] [facility], [address] [place] [address]  Phone: [phone]  Fax: [phone]  Scheduled Appointment: 11/28/2022

## 2022-11-23 NOTE — PROGRESS NOTE ADULT - ASSESSMENT
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 on 11/17 for RUE AVG revision. Now s/p RUE AVG revision w/ brachiobrachial graft, thrombectomy, and explantation of old graft on 11/17. Operative course complicated by difficult hemostasis requiring 24 ug DDAVP which resulted in desaturation and hypotension. POC iSTAT K 6.6 at that time, which downtrended to 4.9 s/p 10u insulin, 1g Caglu. , . Pt admitted to SICU post op for persistent hypotension requiring pressors.      Neuro: Pain: Tylenol and Lidoderm. Avoid narcotics.   CV: Vasodilitory and hypovolemic shock  SBP goal > 90, now off levo gtt, h/o HFrEF (40%), CAD, AS/MR, Repeat TTE (11/18) EF 55-60%, mild AS, PAD: restarted ASA and Plavix holding home meclazine/entresto/atorvastatin. Midodrine increased to 10mg q8h.  Pulm: RA  GI: Regular, no IVF  : anuric, ESRD  Renal: Nephro recs for HD, cont home renvela 800mg, hold hep w/ HD x1 wk; plan for HD today and reevaluate BP after HD  Endo: mISS  Heme: Thrombocytopenia HIT panel neg; LUE duplex showed superficial thrombosis left cephalic vein in forearm and no evidence of left upper extremity deep venous thrombosis.  ID: (Vanc 1g preop), no post-op abx; 1 additional dose Vanc with a-line removal 11/21; surgery team starting vanc given reported incisional drainage and cefazolin/PCN allergy, will obtain pre-HD vanc level and dose accordingly after HD  DVT Ppx: no HSQ, SCDs  Wounds/Lines/Drains: RUE incision x3, L TDC (11/17-11/19), L femoral chong (11/17-11/21), PIV  Dispo: Consider stepping down pt today   67-year-old woman with PMH of HFrEF (EF 40%), nonobstructive CAD, NICM, HTN, HLD, ESRD 2/2 PCKD on HD, PE (2018) s/p IVC filter, mild AS, mild MR, PAD, OA, h/o thrombus in vascular access x3 (R AVG, R AVF, L AVG), ventral hernia, 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 Now s/p RUE AVG revision w/ brachiobrachial graft, thrombectomy, and explantation of old graft on 11/17. Operative course complicated by difficult hemostasis requiring 24 ug DDAVP which resulted in desaturation and hypotension. POC iSTAT K 6.6 at that time, which downtrended to 4.9 s/p 10u insulin, 1g Caglu. , . Pt admitted to SICU post op for persistent hypotension requiring pressors.      Neuro: Pain: Tylenol and Lidoderm. Avoid narcotics.   CV: Vasodilitory and hypovolemic shock  SBP goal > 90, now off levo gtt, h/o HFrEF (40%), CAD, AS/MR, Repeat TTE (11/18) EF 55-60%, mild AS, PAD: restarted ASA and Plavix holding home meclazine/entresto/atorvastatin. Midodrine increased to 10mg q8h.  Pulm: RA  GI: Regular, no IVF  : anuric, ESRD  Renal: Nephro recs for HD, cont home renvela 800mg, hold hep w/ HD x1 wk; plan for HD today and reevaluate BP after HD  Endo: mISS  Heme: Thrombocytopenia HIT panel neg; LUE duplex showed superficial thrombosis left cephalic vein in forearm and no evidence of left upper extremity deep venous thrombosis.  ID: (Vanc 1g preop), no post-op abx; 1 additional dose Vanc with a-line removal 11/21; surgery team starting vanc given reported incisional drainage and cefazolin/PCN allergy, will obtain pre-HD vanc level and dose accordingly after HD  DVT Ppx: no HSQ, SCDs  Wounds/Lines/Drains: RUE incision x3, L TDC (11/17-11/19), L femoral chong (11/17-11/21), PIV  Dispo: Consider stepping down pt today

## 2022-11-23 NOTE — DISCHARGE NOTE PROVIDER - NSDCMRMEDTOKEN_GEN_ALL_CORE_FT
Aspir 81 oral delayed release tablet: 1 tab(s) orally once a day  atorvastatin 40 mg oral tablet: 1 tab(s) orally once a day  clopidogrel 75 mg oral tablet: 1 tab(s) orally once a day  Entresto 49 mg-51 mg oral tablet: 1 tab(s) orally 2 times a day  fluconazole 100 mg oral tablet: 1 tab(s) orally once a day  folic acid 1 mg oral tablet: 1 tab(s) orally once a day  meclizine 25 mg oral tablet: 1 tab(s) orally 3 times a day, As Needed  methylPREDNISolone 32 mg oral tablet:   metoprolol succinate 25 mg oral tablet, extended release: 1 tab(s) orally once a day  Renvela 800 mg oral tablet: 1 tab(s) orally 3 times a day (with meals)  sacubitril-valsartan 24 mg-26 mg oral tablet: 1 tab(s) orally 2 times a day  sevelamer carbonate 2.4 g oral powder for reconstitution: 1 each orally 3 times a day (with meals)  sevelamer hydrochloride 800 mg oral tablet: 2 tab(s) orally 3 times a day  thiamine 100 mg oral tablet: 1 tab(s) orally once a day   3 in 1 commode: 1 unit(s) implant prn   Aspir 81 oral delayed release tablet: 1 tab(s) orally once a day  atorvastatin 40 mg oral tablet: 1 tab(s) orally once a day  clopidogrel 75 mg oral tablet: 1 tab(s) orally once a day  Entresto 49 mg-51 mg oral tablet: 1 tab(s) orally 2 times a day  fluconazole 100 mg oral tablet: 1 tab(s) orally once a day  folic acid 1 mg oral tablet: 1 tab(s) orally once a day  meclizine 25 mg oral tablet: 1 tab(s) orally 3 times a day, As Needed  methylPREDNISolone 32 mg oral tablet:   metoprolol succinate 25 mg oral tablet, extended release: 1 tab(s) orally once a day  Renvela 800 mg oral tablet: 1 tab(s) orally 3 times a day (with meals)  sacubitril-valsartan 24 mg-26 mg oral tablet: 1 tab(s) orally 2 times a day  sevelamer carbonate 2.4 g oral powder for reconstitution: 1 each orally 3 times a day (with meals)  sevelamer hydrochloride 800 mg oral tablet: 2 tab(s) orally 3 times a day  thiamine 100 mg oral tablet: 1 tab(s) orally once a day   3 in 1 commode: 1 unit(s) implant prn   Aspir 81 oral delayed release tablet: 1 tab(s) orally once a day  atorvastatin 40 mg oral tablet: 1 tab(s) orally once a day  clopidogrel 75 mg oral tablet: 1 tab(s) orally once a day  folic acid 1 mg oral tablet: 1 tab(s) orally once a day  meclizine 25 mg oral tablet: 1 tab(s) orally 3 times a day, As Needed  midodrine 10 mg oral tablet: 1 tab(s) orally every 8 hours  thiamine 100 mg oral tablet: 1 tab(s) orally once a day

## 2022-11-23 NOTE — PROGRESS NOTE ADULT - ASSESSMENT
68 yo woman with ESRD due to PKD , HTN,  developed acute bleed and hypotension at time of AVG revision- given dDAVP for bleeding--sustained anaphylactic reaction. Needed to undergo CVVHD 11/18-20, s/p AVG thrombectomy on 11/20 Tolerated HD today and met goal of 1L UF.       ESRD 2/2 PCKD   HD at [facility]   EDW: 90Kg   Nephrologist: Dr. Thompson  Electrolytes noted, euvolemic on exam     Plan:  Tolerated HD well with no hypotensive episodes  Next HD session will be 11/25 will reassess volume status for UF   Encourage PO intake   Renal diet   Daily BMP   Please obtain daily weight on patient, will help to obtain her EDW and how much fluid off patient can tolerate.     Access:  Left TDC c/d/i   RUE AVG not ready to use     Hypotension:   SBP remained stable   ECHO shows no pericardial effusion, making cardiac tamponade less likely   Continue midodrine - 8M-12PM and 4PM and continue with 10mg   UF with HD as tolerated   Decrease Dialysate Temp to 35 degrees C to help with BP     Anemia   Hgb stable at 9.6  No indication for EPO or iron at this time     Renal Bone Disease:  Calcium: 7.4  Phos: 2.3  Trend phos daily with labs   Hold  sevelamer 800mg TID with meals

## 2022-11-24 NOTE — PROGRESS NOTE ADULT - SUBJECTIVE AND OBJECTIVE BOX
Subjective: Continues to have swelling of RUE. denies numbness or weakness of right hand   denies fevers or chills   Denies CP, SOB     ROS:   Denies Headache, blurred vision, Chest Pain, SOB, Abdominal pain, nausea or vomiting     Social   aspirin  chewable 81  clopidogrel Tablet 75  midodrine. 10      Allergies    Ancef (Rash; Urticaria)  DDAVP (Hypotension)  iodine (Hives; Pruritus)  penicillin (Swelling)  sulfa drugs (Angioedema)    Intolerances        Vital Signs Last 24 Hrs  T(C): 36.3 (24 Nov 2022 06:16), Max: 37 (23 Nov 2022 09:00)  T(F): 97.3 (24 Nov 2022 06:16), Max: 98.6 (23 Nov 2022 09:00)  HR: 78 (24 Nov 2022 08:12) (67 - 121)  BP: 115/58 (24 Nov 2022 08:12) (103/54 - 146/62)  BP(mean): 89 (23 Nov 2022 17:21) (72 - 89)  RR: 16 (24 Nov 2022 08:12) (10 - 23)  SpO2: 95% (24 Nov 2022 08:12) (92% - 100%)    Parameters below as of 24 Nov 2022 08:12  Patient On (Oxygen Delivery Method): room air      I&O's Summary    23 Nov 2022 07:01  -  24 Nov 2022 07:00  --------------------------------------------------------  IN: 340 mL / OUT: 0 mL / NET: 340 mL        Physical Exam:  General: NAD  Pulmonary: b/l breath sounds   Cardiovascular: s1s2  Abdominal: soft   Extremities: RUE with 2+ swelling of upper arm noted   Right hand motor and sensation intact   incision clean and dry         LABS:                        9.6    4.83  )-----------( 118      ( 23 Nov 2022 10:57 )             30.6     11-23    132<L>  |  99  |  43<H>  ----------------------------<  112<H>  3.9   |  25  |  6.38<H>    Ca    7.4<L>      23 Nov 2022 10:57  Phos  2.3     11-23  Mg     1.9     11-23        ---------------------------------------------------------------------------  PLEASE CHECK WHEN PRESENT:     [  ]Heart Failure     [  ] Acute     [  ] Acute on Chronic     [  ] Chronic  -------------------------------------------------------------------     [  ]Diastolic [HFpEF]     [ x]Systolic [HFrEF]     [  ]Combined [HFpEF & HFrEF]  .................................................................................     [  ]Other:     [ ] Pulmonary Hypertension     [ ] Afib     [ x] Hypertensive Heart Disease  -------------------------------------------------------------------  [ ] Respiratory failure  [ ] Acute cor pulmonale  [ ] Asthma/COPD Exacerbation  [ ] Pleural effusion  [ ] Aspiration pneumonia  [ ] Obstructive Sleep Apnea  -------------------------------------------------------------------  [  ]RICARDO     [  ]ATN     [  ]Reneal Medullary Necrosis     [  ]Renal Cortical Necrosis     [  ]Other Pathological Lesions:    [  ]CKD 1  [  ]CKD 2  [  ]CKD 3  [  ]CKD 4  [x  ]CKD 5  [  ]Other  -------------------------------------------------------------------  [  ]Diabetes  [  ] Diabetic PVD Ulcer  [  ] Neuropathic ulcer to DM  [  ] Diabetes with Nephropathy  [  ] Osteomyelitis due to diabetes  [  ] Hyperglycemia   [  ]hypoglycemia   --------------------------------------------------------------------  [  ]Malnutrition: See Nutrition Note  [  ]Cachexia  [  ]Other:   [  ]Supplement Ordered:  [  ]Morbid Obesity (BMI >=40]  ---------------------------------------------------------------------  [ ] Sepsis/severe sepsis/septic shock  [ x] hypovolemic shock   [ ] Noninfectious SIRS  [ ] UTI  [ ] Pneumonia  -----------------------------------------------------------------------  [ ] Acidosis/alkalosis  [ ] Fluid overload  [ ] Hypokalemia  [x ] Hyperkalemia  [ ] Hypomagnesemia  [ x] Hypophosphatemia  [x ] Hyperphosphatemia  [ x] HYponatremia  ------------------------------------------------------------------------  [ ] Acute blood loss anemia  [ ] Post op blood loss anemia  [ ] Iron deficiency anemia  [x ] Anemia due to chronic disease  [ ] Hypercoagulable state  [x] Thrombocytopenia  ----------------------------------------------------------------------  [ ] Cerebral infarction  [ ] Transient ischemia attack  [ ] Encephalopathy - Toxic or Metabolic    68 YO f w/ Hx of HF, CAD, HTN, HLD, ESRD on HD who is s/p RUE AVG revision complicated by hematoma and graft thrombosis now s/p thrombectomy     RUE AVG:   -s/p revision and thrombectomy   -Doppler tone noted in graft   -Continue local wound care  -Plan for bedside doppler today and possible OR for drainage of seroma   -continue compression and elevation of arm for edema   -continue ASA and plavix     HFrEF:/ HTN  -Last echo 11/18 with EF 55-60%   Hypovolemic shock now resolved   - Midodrin 10 q8 for soft BP   -holding home Entresto and statin       CKD:   -ESRD on HD   last HD 11/23  -HD through Left HD catheter   -Holding Sevelamer per renal     Anemia:   -continue to monitor Hgb   -stable Hgb at 9.6 today     ID:  -afebrile and off abx     Diet:   -NPO for possible OR today     Activity:   -as tolerated   elevate right arm at all times     DVTPPx:   -Holding SQH     Dispo: Pending progress   PT recs home OT

## 2022-11-24 NOTE — PROGRESS NOTE ADULT - SUBJECTIVE AND OBJECTIVE BOX
alert, pain controlled  af vss, good BP    RT UE: increased 4arm edema, upper arm: soft, less swollen, some skin breakdown, mild ss dc on dsg    DUPLEX: patent avg, no dario-graft fluid on superficial loop, some pockets of compressible fluid in deep incision.    AP: improved vs yesterday. DDX edema: postop,  htn secondary to central vein issues, possible graft ultrafiltration - since the arm is softer and less tense will observe. Continue elevation, compression, chx dsg change, topical abx to areas of skin breakdown

## 2022-11-25 NOTE — OCCUPATIONAL THERAPY INITIAL EVALUATION ADULT - DIAGNOSIS, OT EVAL
Patient POD #5 s/p RUE AVG revision with thrombectomy, angiogram, presents with decreased RUE strength/ROM, deficits with overall balance and activity tolerance impacting independence with functional activities and mobility.

## 2022-11-25 NOTE — PROGRESS NOTE ADULT - SUBJECTIVE AND OBJECTIVE BOX
O/N: DARREL NOONAN               ---------------------------------------------------------------------------  PLEASE CHECK WHEN PRESENT:     [  ]Heart Failure     [  ] Acute     [  ] Acute on Chronic     [  ] Chronic  -------------------------------------------------------------------     [  ]Diastolic [HFpEF]     [ x]Systolic [HFrEF]     [  ]Combined [HFpEF & HFrEF]  .................................................................................     [  ]Other:     [ ] Pulmonary Hypertension     [ ] Afib     [ x] Hypertensive Heart Disease  -------------------------------------------------------------------  [ ] Respiratory failure  [ ] Acute cor pulmonale  [ ] Asthma/COPD Exacerbation  [ ] Pleural effusion  [ ] Aspiration pneumonia  [ ] Obstructive Sleep Apnea  -------------------------------------------------------------------  [  ]RICARDO     [  ]ATN     [  ]Reneal Medullary Necrosis     [  ]Renal Cortical Necrosis     [  ]Other Pathological Lesions:    [  ]CKD 1  [  ]CKD 2  [  ]CKD 3  [  ]CKD 4  [x  ]CKD 5  [  ]Other  -------------------------------------------------------------------  [  ]Diabetes  [  ] Diabetic PVD Ulcer  [  ] Neuropathic ulcer to DM  [  ] Diabetes with Nephropathy  [  ] Osteomyelitis due to diabetes  [  ] Hyperglycemia   [  ]hypoglycemia   --------------------------------------------------------------------  [  ]Malnutrition: See Nutrition Note  [  ]Cachexia  [  ]Other:   [  ]Supplement Ordered:  [  ]Morbid Obesity (BMI >=40]  ---------------------------------------------------------------------  [ ] Sepsis/severe sepsis/septic shock  [ x] hypovolemic shock   [ ] Noninfectious SIRS  [ ] UTI  [ ] Pneumonia  -----------------------------------------------------------------------  [ ] Acidosis/alkalosis  [ ] Fluid overload  [ ] Hypokalemia  [x ] Hyperkalemia  [ ] Hypomagnesemia  [ x] Hypophosphatemia  [x ] Hyperphosphatemia  [ x] HYponatremia  ------------------------------------------------------------------------  [ ] Acute blood loss anemia  [ ] Post op blood loss anemia  [ ] Iron deficiency anemia  [x ] Anemia due to chronic disease  [ ] Hypercoagulable state  [x] Thrombocytopenia  ----------------------------------------------------------------------  [ ] Cerebral infarction  [ ] Transient ischemia attack  [ ] Encephalopathy - Toxic or Metabolic    68 YO f w/ Hx of HF, CAD, HTN, HLD, ESRD on HD who is s/p RUE AVG revision complicated by hematoma and graft thrombosis now s/p thrombectomy     RUE AVG:   -s/p revision and thrombectomy   -Doppler tone noted in graft   -Continue local wound care  -Plan for bedside doppler today and possible OR for drainage of seroma   -continue compression and elevation of arm for edema   -continue ASA and plavix     HFrEF:/ HTN  -Last echo 11/18 with EF 55-60%   Hypovolemic shock now resolved   - Midodrin 10 q8 for soft BP   -holding home Entresto and statin       CKD:   -ESRD on HD   last HD 11/23  -HD through Left HD catheter   -Holding Sevelamer per renal     Anemia:   -continue to monitor Hgb   -stable Hgb at 9.6 today     ID:  -afebrile and off abx     Diet:   -NPO for possible OR today     Activity:   -as tolerated   elevate right arm at all times     DVTPPx:   -Holding SQH     Dispo: Pending progress   PT recs home OT    O/N: SHAISTA, VSS     Subjective: Patient seen and examined at the bedside. No complaints. Denies RUE pain.     ROS:   Denies Headache, blurred vision, Chest Pain, SOB, Abdominal pain, nausea or vomiting     Social   aspirin  chewable 81  clopidogrel Tablet 75  midodrine. 10      Allergies    Ancef (Rash; Urticaria)  DDAVP (Hypotension)  iodine (Hives; Pruritus)  penicillin (Swelling)  sulfa drugs (Angioedema)    Intolerances        Vital Signs Last 24 Hrs  T(C): 36.4 (25 Nov 2022 13:35), Max: 36.9 (24 Nov 2022 22:39)  T(F): 97.5 (25 Nov 2022 13:35), Max: 98.5 (24 Nov 2022 22:39)  HR: 83 (25 Nov 2022 14:00) (68 - 88)  BP: 100/46 (25 Nov 2022 14:00) (100/46 - 154/70)  BP(mean): 67 (25 Nov 2022 14:00) (65 - 105)  RR: 18 (25 Nov 2022 14:00) (16 - 18)  SpO2: 97% (25 Nov 2022 14:00) (95% - 98%)    Parameters below as of 25 Nov 2022 14:00  Patient On (Oxygen Delivery Method): room air      I&O's Summary    24 Nov 2022 07:01  -  25 Nov 2022 07:00  --------------------------------------------------------  IN: 600 mL / OUT: 0 mL / NET: 600 mL    25 Nov 2022 07:01  -  25 Nov 2022 14:52  --------------------------------------------------------  IN: 400 mL / OUT: 1400 mL / NET: -1000 mL        Physical Exam:  General: NAD  Pulmonary: No respiratory distress  Cardiovascular: RRR  Abdominal: soft   Extremities: RUE with 2+ swelling of upper arm noted   Right hand motor and sensation intact   incision clean and dry           LABS:                        10.0   4.97  )-----------( 174      ( 25 Nov 2022 10:57 )             32.7     11-25    136  |  97  |  47<H>  ----------------------------<  82  4.4   |  24  |  7.71<H>    Ca    8.3<L>      25 Nov 2022 10:57  Phos  3.6     11-25  Mg     2.0     11-25      ---------------------------------------------------------------------------  PLEASE CHECK WHEN PRESENT:     [  ]Heart Failure     [  ] Acute     [  ] Acute on Chronic     [  ] Chronic  -------------------------------------------------------------------     [  ]Diastolic [HFpEF]     [ x]Systolic [HFrEF]     [  ]Combined [HFpEF & HFrEF]  .................................................................................     [  ]Other:     [ ] Pulmonary Hypertension     [ ] Afib     [ x] Hypertensive Heart Disease  -------------------------------------------------------------------  [ ] Respiratory failure  [ ] Acute cor pulmonale  [ ] Asthma/COPD Exacerbation  [ ] Pleural effusion  [ ] Aspiration pneumonia  [ ] Obstructive Sleep Apnea  -------------------------------------------------------------------  [  ]RICARDO     [  ]ATN     [  ]Reneal Medullary Necrosis     [  ]Renal Cortical Necrosis     [  ]Other Pathological Lesions:    [  ]CKD 1  [  ]CKD 2  [  ]CKD 3  [  ]CKD 4  [x  ]CKD 5  [  ]Other  -------------------------------------------------------------------  [  ]Diabetes  [  ] Diabetic PVD Ulcer  [  ] Neuropathic ulcer to DM  [  ] Diabetes with Nephropathy  [  ] Osteomyelitis due to diabetes  [  ] Hyperglycemia   [  ]hypoglycemia   --------------------------------------------------------------------  [  ]Malnutrition: See Nutrition Note  [  ]Cachexia  [  ]Other:   [  ]Supplement Ordered:  [  ]Morbid Obesity (BMI >=40]  ---------------------------------------------------------------------  [ ] Sepsis/severe sepsis/septic shock  [ x] hypovolemic shock   [ ] Noninfectious SIRS  [ ] UTI  [ ] Pneumonia  -----------------------------------------------------------------------  [ ] Acidosis/alkalosis  [ ] Fluid overload  [ ] Hypokalemia  [x ] Hyperkalemia  [ ] Hypomagnesemia  [ x] Hypophosphatemia  [x ] Hyperphosphatemia  [ x] HYponatremia  ------------------------------------------------------------------------  [ ] Acute blood loss anemia  [ ] Post op blood loss anemia  [ ] Iron deficiency anemia  [x ] Anemia due to chronic disease  [ ] Hypercoagulable state  [x] Thrombocytopenia  ----------------------------------------------------------------------  [ ] Cerebral infarction  [ ] Transient ischemia attack  [ ] Encephalopathy - Toxic or Metabolic    66 YO f w/ Hx of HF, CAD, HTN, HLD, ESRD on HD who is s/p RUE AVG revision complicated by hematoma and graft thrombosis now s/p thrombectomy     RUE AVG:   -s/p revision and thrombectomy   -Doppler tone noted in graft   -Continue local wound care  -Plan for bedside doppler today and possible OR for drainage of seroma   -continue compression and elevation of arm for edema   -continue ASA and plavix     HFrEF:/ HTN  -Last echo 11/18 with EF 55-60%   Hypovolemic shock now resolved   - Midodrin 10 q8 for soft BP   -holding home Entresto and statin       CKD:   -ESRD on HD   last HD 11/23  -HD through Left HD catheter   -Holding Sevelamer per renal     Anemia:   -continue to monitor Hgb   -stable Hgb at 9.6 today     ID:  -afebrile and off abx     Diet:   -NPO for possible OR today     Activity:   -as tolerated   elevate right arm at all times     DVTPPx:   -Holding SQH     Dispo: Pending progress   PT recs home OT

## 2022-11-25 NOTE — PROGRESS NOTE ADULT - SUBJECTIVE AND OBJECTIVE BOX
Patient is a 67y Female seen and evaluated at bedside. No acute distress, does not offer any complaints. Blood pressure stable, tolerated HD 11/23 with 1L UF. Plan for HD today.       Meds:    acetaminophen     Tablet .. 650 every 6 hours PRN  aspirin  chewable 81 daily  bacitracin   Ointment 1 daily  benzocaine 15 mG/menthol 3.6 mG Lozenge 1 every 2 hours PRN  chlorhexidine 2% Cloths 1 daily  clopidogrel Tablet 75 daily  dextrose 5%. 1000 <Continuous>  dextrose 5%. 1000 <Continuous>  dextrose 50% Injectable 25 Once  dextrose 50% Injectable 12.5 Once  dextrose 50% Injectable 25 Once  dextrose Oral Gel 15 Once PRN  glucagon  Injectable 1 Once  lidocaine   4% Patch 1 every 24 hours  midodrine. 10 every 8 hours  polyethylene glycol 3350 17 daily  senna 2 at bedtime      T(C): , Max: 36.9 (11-24-22 @ 22:39)  T(F): , Max: 98.5 (11-24-22 @ 22:39)  HR: 68 (11-25-22 @ 08:36)  BP: 114/45 (11-25-22 @ 08:36)  BP(mean): 65 (11-25-22 @ 08:36)  RR: 18 (11-25-22 @ 08:36)  SpO2: 97% (11-25-22 @ 08:36)  Wt(kg): --    11-24 @ 07:01  -  11-25 @ 07:00  --------------------------------------------------------  IN: 600 mL / OUT: 0 mL / NET: 600 mL    11-25 @ 07:01  -  11-25 @ 10:38  --------------------------------------------------------  IN: 0 mL / OUT: 0 mL / NET: 0 mL          Review of Systems:  ROS negative except as per HPI      PHYSICAL EXAM:  GENERAL: NAD, awake and alert   NECK: supple, No JVD  CHEST/LUNG: Clear to auscultation bilaterally  HEART: normal S1S2, RRR  ABDOMEN: Soft, Nontender, +BS, No flank tenderness bilateral  EXTREMITIES: No clubbing, cyanosis, no edema   NEUROLOGY: AAO x3, no focal neurological deficit  ACCESS: Left TDC, c/d/i, RUE AVF, with thrill (not ready for use)        LABS:                        9.6    4.83  )-----------( 118      ( 23 Nov 2022 10:57 )             30.6     11-23    132<L>  |  99  |  43<H>  ----------------------------<  112<H>  3.9   |  25  |  6.38<H>    Ca    7.4<L>      23 Nov 2022 10:57  Phos  2.3     11-23  Mg     1.9     11-23                  RADIOLOGY & ADDITIONAL STUDIES:

## 2022-11-25 NOTE — PROGRESS NOTE ADULT - ASSESSMENT
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 Needed to undergo CVVHD 11/18-20, s/p AVG thrombectomy on 11/20,  Tolerated HD  11/23 and met goal of 1L UF.       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 Thompson  Electrolytes noted, euvolemic on exam     Plan:  HD session today with 1L UF   Encourage PO intake   Renal diet   Daily BMP   Weight Before HD today 101.9kg standing     Access:  Left TDC c/d/i   RUE AVG not ready to use     Hypotension:   SBP remained stable   ECHO shows no pericardial effusion, making cardiac tamponade less likely     Plan:   Continue midodrine - 8M-12PM and 4PM and continue with 10mg, patient to continue with this dose as outpatient   Estimated DRY Weight should be 100kg   UF with HD as tolerated   Decrease Dialysate Temp to 35 degrees C to help with BP     Anemia   Hgb stable at 9.6  No indication for EPO or iron at this time     Renal Bone Disease:  Calcium: 7.4  Phos: 2.3  Trend phos daily with labs   Hold  sevelamer 800mg TID with meals

## 2022-11-25 NOTE — OCCUPATIONAL THERAPY INITIAL EVALUATION ADULT - ADDITIONAL COMMENTS
Patient reports living with her daughter in a first floor apartment building with 4 JACKIE. Patient states she was independent with most ADL's and functional mobility with SC. Patient has a HHA M-F for 5 hours a day who assist with cooking/cleaning/grocery shopping. Patient daughter assists on the weekends. Patient is R hand dominant and owns a bathtub shower with shower chair, RW and grab bars. Patient states she was able to ambulate 1-2 blocks prior to admission.

## 2022-11-25 NOTE — OCCUPATIONAL THERAPY INITIAL EVALUATION ADULT - PERTINENT HX OF CURRENT PROBLEM, REHAB EVAL
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 Now s/p RUE AVG revision w/ brachiobrachial graft, thrombectomy, and explantation of old graft on (11/17). Operative course complicated by difficult hemostasis requiring 24 ug DDAVP which resulted in desaturation and hypotension. Pt transferred to SICU, now off levophed gtt, but course c/b graft failure. Pt s/p repeat RUE AVG revision with thrombectomy, angiogram (11/20).

## 2022-11-25 NOTE — PROGRESS NOTE ADULT - SUBJECTIVE AND OBJECTIVE BOX
alert, less pain    af vss    RT US: decreased edema, upper arm softer, stable skin breakdown, less dc on dsg    DUPLEX: patent avg with good color Q at PRF of 80. Stable fluid in deep incision. Minimal to no fluid in tunnel.    A: improving  P: wound care, elevation, dc planning, HD

## 2022-11-25 NOTE — OCCUPATIONAL THERAPY INITIAL EVALUATION ADULT - GENERAL OBSERVATIONS, REHAB EVAL
Patient A&Ox4, agreeable and tolerated session fairly. Patient received semisupine in bed +tele, +heplock IV, +RUE ace wrapped C/D/I and elevated, L Chest port, room air, NAD.

## 2022-11-26 NOTE — DISCHARGE NOTE NURSING/CASE MANAGEMENT/SOCIAL WORK - PATIENT PORTAL LINK FT
You can access the FollowMyHealth Patient Portal offered by [facility] by registering at the following website: [email or web address]. By joining [employer]’s FollowMyHealth portal, you will also be able to view your health information using other applications (apps) compatible with our system.

## 2022-11-26 NOTE — PROGRESS NOTE ADULT - SUBJECTIVE AND OBJECTIVE BOX
alert, no pain  af vss  RT UE: decreased edema, no dc, no soi, pos LRDS over axilla    A: doing well    P: dc home, fu office monday, wound care, vanco sp hd

## 2022-11-26 NOTE — PROGRESS NOTE ADULT - SUBJECTIVE AND OBJECTIVE BOX
O/N: vanc given,                     ---------------------------------------------------------------------------  PLEASE CHECK WHEN PRESENT:     [  ]Heart Failure     [  ] Acute     [  ] Acute on Chronic     [  ] Chronic  -------------------------------------------------------------------     [  ]Diastolic [HFpEF]     [ x]Systolic [HFrEF]     [  ]Combined [HFpEF & HFrEF]  .................................................................................     [  ]Other:     [ ] Pulmonary Hypertension     [ ] Afib     [ x] Hypertensive Heart Disease  -------------------------------------------------------------------  [ ] Respiratory failure  [ ] Acute cor pulmonale  [ ] Asthma/COPD Exacerbation  [ ] Pleural effusion  [ ] Aspiration pneumonia  [ ] Obstructive Sleep Apnea  -------------------------------------------------------------------  [  ]RICARDO     [  ]ATN     [  ]Reneal Medullary Necrosis     [  ]Renal Cortical Necrosis     [  ]Other Pathological Lesions:    [  ]CKD 1  [  ]CKD 2  [  ]CKD 3  [  ]CKD 4  [x  ]CKD 5  [  ]Other  -------------------------------------------------------------------  [  ]Diabetes  [  ] Diabetic PVD Ulcer  [  ] Neuropathic ulcer to DM  [  ] Diabetes with Nephropathy  [  ] Osteomyelitis due to diabetes  [  ] Hyperglycemia   [  ]hypoglycemia   --------------------------------------------------------------------  [  ]Malnutrition: See Nutrition Note  [  ]Cachexia  [  ]Other:   [  ]Supplement Ordered:  [  ]Morbid Obesity (BMI >=40]  ---------------------------------------------------------------------  [ ] Sepsis/severe sepsis/septic shock  [ x] hypovolemic shock   [ ] Noninfectious SIRS  [ ] UTI  [ ] Pneumonia  -----------------------------------------------------------------------  [ ] Acidosis/alkalosis  [ ] Fluid overload  [ ] Hypokalemia  [x ] Hyperkalemia  [ ] Hypomagnesemia  [ x] Hypophosphatemia  [x ] Hyperphosphatemia  [ x] HYponatremia  ------------------------------------------------------------------------  [ ] Acute blood loss anemia  [ ] Post op blood loss anemia  [ ] Iron deficiency anemia  [x ] Anemia due to chronic disease  [ ] Hypercoagulable state  [x] Thrombocytopenia  ----------------------------------------------------------------------  [ ] Cerebral infarction  [ ] Transient ischemia attack  [ ] Encephalopathy - Toxic or Metabolic    68 YO f w/ Hx of HF, CAD, HTN, HLD, ESRD on HD who is s/p RUE AVG revision complicated by hematoma and graft thrombosis now s/p thrombectomy     RUE AVG:   -s/p revision and thrombectomy   -Doppler tone noted in graft   -Continue local wound care  -Plan for bedside doppler today and possible OR for drainage of seroma   -continue compression and elevation of arm for edema   -continue ASA and plavix     HFrEF:/ HTN  -Last echo 11/18 with EF 55-60%   Hypovolemic shock now resolved   - Midodrin 10 q8 for soft BP   -holding home Entresto and statin       CKD:   -ESRD on HD   last HD 11/23  -HD through Left HD catheter   -Holding Sevelamer per renal     Anemia:   -continue to monitor Hgb   -stable Hgb at 9.6 today     ID:  -afebrile and off abx     Diet:   -NPO for possible OR today     Activity:   -as tolerated   elevate right arm at all times     DVTPPx:   -Holding SQH     Dispo: Pending progress   PT recs home OT      O/N: vanc given.    Subjective:   Patient seen and examined by chief resident and team on AM rounds. Patient without any acute complaints. Patient will undergo HD this AM.     ROS:   Denies Headache, blurred vision, Chest Pain, SOB, Abdominal pain, nausea or vomiting     Social   aspirin  chewable 81  clopidogrel Tablet 75  midodrine. 10      Allergies    Ancef (Rash; Urticaria)  DDAVP (Hypotension)  iodine (Hives; Pruritus)  penicillin (Swelling)  sulfa drugs (Angioedema)    Intolerances        Vital Signs Last 24 Hrs  T(C): 37.1 (26 Nov 2022 05:32), Max: 37.1 (26 Nov 2022 05:32)  T(F): 98.8 (26 Nov 2022 05:32), Max: 98.8 (26 Nov 2022 05:32)  HR: 82 (26 Nov 2022 05:33) (68 - 96)  BP: 105/46 (26 Nov 2022 05:33) (89/53 - 154/70)  BP(mean): 66 (26 Nov 2022 05:33) (65 - 105)  RR: 17 (26 Nov 2022 05:33) (17 - 18)  SpO2: 100% (26 Nov 2022 05:33) (95% - 100%)    Parameters below as of 26 Nov 2022 05:33  Patient On (Oxygen Delivery Method): nasal cannula  O2 Flow (L/min): 2    I&O's Summary    25 Nov 2022 07:01  -  26 Nov 2022 07:00  --------------------------------------------------------  IN: 580 mL / OUT: 1400 mL / NET: -820 mL        Physical Exam:  General: NAD  Pulmonary: No respiratory distress, breathing comfortably on RA.   Cardiovascular: RRR  Abdominal: soft, NTND  Extremities: RUE with swelling of upper arm. Motor and sensation grossly intact. Incision c/d/i.       LABS:                        10.0   4.97  )-----------( 174      ( 25 Nov 2022 10:57 )             32.7     11-25    136  |  97  |  47<H>  ----------------------------<  82  4.4   |  24  |  7.71<H>    Ca    8.3<L>      25 Nov 2022 10:57  Phos  3.6     11-25  Mg     2.0     11-25      Radiology and Additional Studies:    ---------------------------------------------------------------------------  PLEASE CHECK WHEN PRESENT:     [  ]Heart Failure     [  ] Acute     [  ] Acute on Chronic     [  ] Chronic  -------------------------------------------------------------------     [  ]Diastolic [HFpEF]     [ x]Systolic [HFrEF]     [  ]Combined [HFpEF & HFrEF]  .................................................................................     [  ]Other:     [ ] Pulmonary Hypertension     [ ] Afib     [ x] Hypertensive Heart Disease  -------------------------------------------------------------------  [ ] Respiratory failure  [ ] Acute cor pulmonale  [ ] Asthma/COPD Exacerbation  [ ] Pleural effusion  [ ] Aspiration pneumonia  [ ] Obstructive Sleep Apnea  -------------------------------------------------------------------  [  ]RICARDO     [  ]ATN     [  ]Reneal Medullary Necrosis     [  ]Renal Cortical Necrosis     [  ]Other Pathological Lesions:    [  ]CKD 1  [  ]CKD 2  [  ]CKD 3  [  ]CKD 4  [x  ]CKD 5  [  ]Other  -------------------------------------------------------------------  [  ]Diabetes  [  ] Diabetic PVD Ulcer  [  ] Neuropathic ulcer to DM  [  ] Diabetes with Nephropathy  [  ] Osteomyelitis due to diabetes  [  ] Hyperglycemia   [  ]hypoglycemia   --------------------------------------------------------------------  [  ]Malnutrition: See Nutrition Note  [  ]Cachexia  [  ]Other:   [  ]Supplement Ordered:  [  ]Morbid Obesity (BMI >=40]  ---------------------------------------------------------------------  [ ] Sepsis/severe sepsis/septic shock  [ x] hypovolemic shock   [ ] Noninfectious SIRS  [ ] UTI  [ ] Pneumonia  -----------------------------------------------------------------------  [ ] Acidosis/alkalosis  [ ] Fluid overload  [ ] Hypokalemia  [x ] Hyperkalemia  [ ] Hypomagnesemia  [ x] Hypophosphatemia  [x ] Hyperphosphatemia  [ x] HYponatremia  ------------------------------------------------------------------------  [ ] Acute blood loss anemia  [ ] Post op blood loss anemia  [ ] Iron deficiency anemia  [x ] Anemia due to chronic disease  [ ] Hypercoagulable state  [x] Thrombocytopenia  ----------------------------------------------------------------------  [ ] Cerebral infarction  [ ] Transient ischemia attack  [ ] Encephalopathy - Toxic or Metabolic    66 YO f w/ Hx of HF, CAD, HTN, HLD, ESRD on HD who is s/p RUE AVG revision complicated by hematoma and graft thrombosis now s/p thrombectomy     RUE AVG:   -s/p revision and thrombectomy   -Doppler tone noted in graft   -Continue local wound care  -Plan for bedside doppler today and possible OR for drainage of seroma   -continue compression and elevation of arm for edema   -continue ASA and plavix     HFrEF:/ HTN  -Last echo 11/18 with EF 55-60%   Hypovolemic shock now resolved   - Midodrin 10 q8 for soft BP   -holding home Entresto and statin       CKD:   -ESRD on HD   last HD 11/23  -HD through Left HD catheter   -Holding Sevelamer per renal     Anemia:   -continue to monitor Hgb   -stable Hgb     ID:  - c/w Vancomycin after HD    Diet:   - Renal diet    Activity:   -as tolerated   elevate right arm at all times     DVTPPx:   -Holding SQH     Dispo:  - medically cleared for discharge, will d/c after HD today  - PT recs home OT

## 2022-11-26 NOTE — DISCHARGE NOTE NURSING/CASE MANAGEMENT/SOCIAL WORK - NSDCPEFALRISK_GEN_ALL_CORE
For information on Fall & Injury Prevention, visit: [email or web address] OR  [email or web address] OR  https://www.cdc.gov/steadi/patient.html

## 2022-11-26 NOTE — PROGRESS NOTE ADULT - REASON FOR ADMISSION
AVG revision

## 2022-11-26 NOTE — PROGRESS NOTE ADULT - SUBJECTIVE AND OBJECTIVE BOX
Patient is a 67y Female seen and evaluated at bedside during dialysis. Tolerating well. VS stable. Pt denies any symptoms.        Meds:    acetaminophen     Tablet .. 650 every 6 hours PRN  aspirin  chewable 81 daily  bacitracin   Ointment 1 daily  benzocaine 15 mG/menthol 3.6 mG Lozenge 1 every 2 hours PRN  clopidogrel Tablet 75 daily  dextrose 5%. 1000 <Continuous>  dextrose 5%. 1000 <Continuous>  dextrose 50% Injectable 25 Once  dextrose 50% Injectable 12.5 Once  dextrose 50% Injectable 25 Once  dextrose Oral Gel 15 Once PRN  glucagon  Injectable 1 Once  lidocaine   4% Patch 1 every 24 hours  midodrine. 10 every 8 hours  polyethylene glycol 3350 17 daily  senna 2 at bedtime  vancomycin  IVPB 500 once      T(C): , Max: 37.1 (11-26-22 @ 05:32)  T(F): , Max: 98.8 (11-26-22 @ 05:32)  HR: 75 (11-26-22 @ 10:00)  BP: 151/71 (11-26-22 @ 10:00)  BP(mean): 90 (11-26-22 @ 08:40)  RR: 18 (11-26-22 @ 10:00)  SpO2: 97% (11-26-22 @ 10:00)  Wt(kg): --    11-25 @ 07:01  -  11-26 @ 07:00  --------------------------------------------------------  IN: 580 mL / OUT: 1400 mL / NET: -820 mL    11-26 @ 07:01  -  11-26 @ 12:25  --------------------------------------------------------  IN: 180 mL / OUT: 0 mL / NET: 180 mL          Review of Systems:  ROS negative except as per HPI      PHYSICAL EXAM:  GENERAL: NAD, awake and alert   NECK: supple, No JVD  CHEST/LUNG: Clear to auscultation bilaterally  HEART: normal S1S2, RRR  ABDOMEN: Soft, Nontender, +BS, No flank tenderness bilateral  EXTREMITIES: No clubbing, cyanosis, no edema   NEUROLOGY: AAO x3, no focal neurological deficit  ACCESS: Left TDC, c/d/i, RUE AVF, with thrill (not ready for use)        LABS:                        9.2    4.49  )-----------( 158      ( 26 Nov 2022 11:00 )             29.1     11-26    137  |  96  |  42<H>  ----------------------------<  104<H>  4.4   |  28  |  6.59<H>    Ca    8.0<L>      26 Nov 2022 11:00  Phos  3.5     11-26  Mg     1.9     11-26                  RADIOLOGY & ADDITIONAL STUDIES:

## 2022-11-26 NOTE — PROGRESS NOTE ADULT - ASSESSMENT
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 Needed to undergo CVVHD -, s/p AVG thrombectomy on ,  Tolerated HD   and met goal of 1L UF.       ESRD 2/2 PCKD   HD at [facility]   EDW: 100kg   Nephrologist: Dr. Thompson  Electrolytes noted, euvolemic on exam     Plan:  HD session today as below:    Hemodialysis Treatment.:     Schedule: Once, Modality: Hemodialysis, Access: Internal Jugular Central Venous Catheter    Dialyzer: Optiflux [id number], Time: 150 Min    Blood Flow: 400 mL/Min , Dialysate Flow: 500 mL/Min, Dialysate Temp: 36.5, Tubinmm (Adult)    Target Fluid Removal: 1 Liters    Dialysate Electrolytes (mEq/L): Potassium 2, Calcium 2.5, Sodium 138, Bicarbonate 35 (22 @ 08:18) [Completed]  epoetin александр-epbx (RETACRIT) Injectable 4000 Unit(s) IV Push once, 22 @ 08:20, Routine, Stop order after: 1 Doses      Encourage PO intake   Renal diet   Daily BMP       Access:  Left TDC c/d/i   RUE AVG not ready to use     Hypotension:   SBP remained stable   ECHO shows no pericardial effusion, making cardiac tamponade less likely     Plan:   Continue midodrine - 8M-12PM and 4PM and continue with 10mg, patient to continue with this dose as outpatient   Estimated DRY Weight should be 100kg   UF with HD as tolerated   Decrease Dialysate Temp to 35 degrees C to help with BP     Anemia   Hgb stable at 9.2  EPO w/ HD    Renal Bone Disease:  Calcium: 8  Phos: 3.5  Trend phos daily with labs   Hold  sevelamer 800mg TID with meals

## 2022-11-26 NOTE — PROGRESS NOTE ADULT - ATTENDING COMMENTS
I:   Seen on dialysis.   A: ESRD.   P: Continue dialysis. Keep MAP > 65.
I:  Hypotension improved.   A: ESRD.   P: Continue dialysis. Keep MAP > 65.
events noted, chart reviewed  68 yo woman with ESRD developed acute bleed and hypotension at time of AVG revision- given dDAVP for bleeding--sustained anaphylactic reaction.  needed to undergo CVVHD 11/18-20  s/p AVG thrombectomy on 11/20.    h/o HTN, PKD    seen and evaluated while on dialysis  tolerating the procedure well  continue with full treatment as presctibed    right upper arm in ACE wrap and 
seen on HD with Dr Muniz, agree with above  tolerating rx, VSS  cont HD as above
I agree with the fellow's findings and plans as written above with the following additions/amendments:    Seen and examined at bedside on CVVHD, tolerating well. improved hemodynamics. Continue CVVHD as above, further recs as above
I agree with the fellow's findings and plans as written above with the following additions/amendments:    Seen and examined at bedside. BPs low after surgery, monitoring for stability. No acute indication for KRT, lytes stable and well dialyzed, no signs of hypervolemia. Will monitor for stability for either iHD or CRRT tomorrow. Further recs as above

## 2022-11-26 NOTE — PROGRESS NOTE ADULT - PROVIDER SPECIALTY LIST ADULT
Nephrology
Nephrology
SICU
SICU
Vascular Surgery
Nephrology
SICU
Vascular Surgery
Nephrology
Nephrology
SICU
SICU
Vascular Surgery
Nephrology
Nephrology
SICU
Vascular Surgery

## 2023-01-01 ENCOUNTER — APPOINTMENT (OUTPATIENT)
Dept: HEART AND VASCULAR | Facility: CLINIC | Age: 68
End: 2023-01-01

## 2023-01-01 ENCOUNTER — TRANSCRIPTION ENCOUNTER (OUTPATIENT)
Age: 68
End: 2023-01-01

## 2023-01-01 ENCOUNTER — RESULT REVIEW (OUTPATIENT)
Age: 68
End: 2023-01-01

## 2023-01-01 ENCOUNTER — INPATIENT (INPATIENT)
Facility: HOSPITAL | Age: 68
LOS: 23 days | DRG: 853 | End: 2023-06-12
Attending: INTERNAL MEDICINE
Payer: MEDICARE

## 2023-01-01 ENCOUNTER — APPOINTMENT (OUTPATIENT)
Dept: BREAST CENTER | Facility: CLINIC | Age: 68
End: 2023-01-01

## 2023-01-01 ENCOUNTER — APPOINTMENT (OUTPATIENT)
Dept: OTOLARYNGOLOGY | Facility: CLINIC | Age: 68
End: 2023-01-01

## 2023-01-01 ENCOUNTER — APPOINTMENT (OUTPATIENT)
Dept: ENDOCRINOLOGY | Facility: CLINIC | Age: 68
End: 2023-01-01

## 2023-01-01 ENCOUNTER — INPATIENT (INPATIENT)
Facility: HOSPITAL | Age: 68
LOS: 5 days | Discharge: NURSING, REHAB OR LONG-TERM CARE | DRG: 377 | End: 2023-05-09
Attending: INTERNAL MEDICINE
Payer: MEDICARE

## 2023-01-01 ENCOUNTER — INPATIENT (INPATIENT)
Facility: HOSPITAL | Age: 68
LOS: 30 days | Discharge: NURSING, REHAB OR LONG-TERM CARE | DRG: 871 | End: 2023-02-02
Attending: STUDENT IN AN ORGANIZED HEALTH CARE EDUCATION/TRAINING PROGRAM | Admitting: STUDENT IN AN ORGANIZED HEALTH CARE EDUCATION/TRAINING PROGRAM
Payer: MEDICARE

## 2023-01-01 VITALS
SYSTOLIC BLOOD PRESSURE: 103 MMHG | OXYGEN SATURATION: 100 % | DIASTOLIC BLOOD PRESSURE: 57 MMHG | WEIGHT: 203.27 LBS | RESPIRATION RATE: 17 BRPM | HEART RATE: 88 BPM | TEMPERATURE: 98 F

## 2023-01-01 VITALS
OXYGEN SATURATION: 97 % | TEMPERATURE: 98 F | HEART RATE: 99 BPM | RESPIRATION RATE: 17 BRPM | DIASTOLIC BLOOD PRESSURE: 99 MMHG | WEIGHT: 268.96 LBS | SYSTOLIC BLOOD PRESSURE: 125 MMHG

## 2023-01-01 VITALS — TEMPERATURE: 98 F | WEIGHT: 175.05 LBS | RESPIRATION RATE: 18 BRPM | HEART RATE: 70 BPM

## 2023-01-01 VITALS
WEIGHT: 188.05 LBS | HEIGHT: 70 IN | HEART RATE: 108 BPM | RESPIRATION RATE: 16 BRPM | TEMPERATURE: 98 F | SYSTOLIC BLOOD PRESSURE: 109 MMHG | DIASTOLIC BLOOD PRESSURE: 86 MMHG

## 2023-01-01 VITALS
HEART RATE: 100 BPM | DIASTOLIC BLOOD PRESSURE: 45 MMHG | OXYGEN SATURATION: 97 % | SYSTOLIC BLOOD PRESSURE: 73 MMHG | RESPIRATION RATE: 18 BRPM | TEMPERATURE: 98 F

## 2023-01-01 DIAGNOSIS — Z98.890 OTHER SPECIFIED POSTPROCEDURAL STATES: Chronic | ICD-10-CM

## 2023-01-01 DIAGNOSIS — K43.9 VENTRAL HERNIA WITHOUT OBSTRUCTION OR GANGRENE: ICD-10-CM

## 2023-01-01 DIAGNOSIS — N18.6 END STAGE RENAL DISEASE: ICD-10-CM

## 2023-01-01 DIAGNOSIS — L89.152 PRESSURE ULCER OF SACRAL REGION, STAGE 2: ICD-10-CM

## 2023-01-01 DIAGNOSIS — Z95.828 PRESENCE OF OTHER VASCULAR IMPLANTS AND GRAFTS: ICD-10-CM

## 2023-01-01 DIAGNOSIS — Z20.822 CONTACT WITH AND (SUSPECTED) EXPOSURE TO COVID-19: ICD-10-CM

## 2023-01-01 DIAGNOSIS — Z29.9 ENCOUNTER FOR PROPHYLACTIC MEASURES, UNSPECIFIED: ICD-10-CM

## 2023-01-01 DIAGNOSIS — Z79.82 LONG TERM (CURRENT) USE OF ASPIRIN: ICD-10-CM

## 2023-01-01 DIAGNOSIS — Z88.2 ALLERGY STATUS TO SULFONAMIDES: ICD-10-CM

## 2023-01-01 DIAGNOSIS — B96.4 PROTEUS (MIRABILIS) (MORGANII) AS THE CAUSE OF DISEASES CLASSIFIED ELSEWHERE: ICD-10-CM

## 2023-01-01 DIAGNOSIS — K92.2 GASTROINTESTINAL HEMORRHAGE, UNSPECIFIED: ICD-10-CM

## 2023-01-01 DIAGNOSIS — Z90.710 ACQUIRED ABSENCE OF BOTH CERVIX AND UTERUS: Chronic | ICD-10-CM

## 2023-01-01 DIAGNOSIS — I99.9 UNSPECIFIED DISORDER OF CIRCULATORY SYSTEM: ICD-10-CM

## 2023-01-01 DIAGNOSIS — I50.22 CHRONIC SYSTOLIC (CONGESTIVE) HEART FAILURE: ICD-10-CM

## 2023-01-01 DIAGNOSIS — Z16.12 EXTENDED SPECTRUM BETA LACTAMASE (ESBL) RESISTANCE: ICD-10-CM

## 2023-01-01 DIAGNOSIS — I34.0 NONRHEUMATIC MITRAL (VALVE) INSUFFICIENCY: ICD-10-CM

## 2023-01-01 DIAGNOSIS — Z51.5 ENCOUNTER FOR PALLIATIVE CARE: ICD-10-CM

## 2023-01-01 DIAGNOSIS — N85.8 OTHER SPECIFIED NONINFLAMMATORY DISORDERS OF UTERUS: ICD-10-CM

## 2023-01-01 DIAGNOSIS — J98.11 ATELECTASIS: ICD-10-CM

## 2023-01-01 DIAGNOSIS — Z99.2 DEPENDENCE ON RENAL DIALYSIS: ICD-10-CM

## 2023-01-01 DIAGNOSIS — I95.9 HYPOTENSION, UNSPECIFIED: ICD-10-CM

## 2023-01-01 DIAGNOSIS — R63.8 OTHER SYMPTOMS AND SIGNS CONCERNING FOOD AND FLUID INTAKE: ICD-10-CM

## 2023-01-01 DIAGNOSIS — N61.0 MASTITIS WITHOUT ABSCESS: ICD-10-CM

## 2023-01-01 DIAGNOSIS — I95.89 OTHER HYPOTENSION: ICD-10-CM

## 2023-01-01 DIAGNOSIS — I50.32 CHRONIC DIASTOLIC (CONGESTIVE) HEART FAILURE: ICD-10-CM

## 2023-01-01 DIAGNOSIS — I42.8 OTHER CARDIOMYOPATHIES: ICD-10-CM

## 2023-01-01 DIAGNOSIS — R65.21 SEVERE SEPSIS WITH SEPTIC SHOCK: ICD-10-CM

## 2023-01-01 DIAGNOSIS — E04.2 NONTOXIC MULTINODULAR GOITER: ICD-10-CM

## 2023-01-01 DIAGNOSIS — Z90.710 ACQUIRED ABSENCE OF BOTH CERVIX AND UTERUS: ICD-10-CM

## 2023-01-01 DIAGNOSIS — G47.33 OBSTRUCTIVE SLEEP APNEA (ADULT) (PEDIATRIC): ICD-10-CM

## 2023-01-01 DIAGNOSIS — I77.0 ARTERIOVENOUS FISTULA, ACQUIRED: Chronic | ICD-10-CM

## 2023-01-01 DIAGNOSIS — R53.2 FUNCTIONAL QUADRIPLEGIA: ICD-10-CM

## 2023-01-01 DIAGNOSIS — Z66 DO NOT RESUSCITATE: ICD-10-CM

## 2023-01-01 DIAGNOSIS — R92.1 MAMMOGRAPHIC CALCIFICATION FOUND ON DIAGNOSTIC IMAGING OF BREAST: ICD-10-CM

## 2023-01-01 DIAGNOSIS — I08.0 RHEUMATIC DISORDERS OF BOTH MITRAL AND AORTIC VALVES: ICD-10-CM

## 2023-01-01 DIAGNOSIS — E78.5 HYPERLIPIDEMIA, UNSPECIFIED: ICD-10-CM

## 2023-01-01 DIAGNOSIS — E87.1 HYPO-OSMOLALITY AND HYPONATREMIA: ICD-10-CM

## 2023-01-01 DIAGNOSIS — A41.9 SEPSIS, UNSPECIFIED ORGANISM: ICD-10-CM

## 2023-01-01 DIAGNOSIS — R57.8 OTHER SHOCK: ICD-10-CM

## 2023-01-01 DIAGNOSIS — K59.00 CONSTIPATION, UNSPECIFIED: ICD-10-CM

## 2023-01-01 DIAGNOSIS — D62 ACUTE POSTHEMORRHAGIC ANEMIA: ICD-10-CM

## 2023-01-01 DIAGNOSIS — I73.9 PERIPHERAL VASCULAR DISEASE, UNSPECIFIED: ICD-10-CM

## 2023-01-01 DIAGNOSIS — I13.2 HYPERTENSIVE HEART AND CHRONIC KIDNEY DISEASE WITH HEART FAILURE AND WITH STAGE 5 CHRONIC KIDNEY DISEASE, OR END STAGE RENAL DISEASE: ICD-10-CM

## 2023-01-01 DIAGNOSIS — R53.81 OTHER MALAISE: ICD-10-CM

## 2023-01-01 DIAGNOSIS — D63.1 ANEMIA IN CHRONIC KIDNEY DISEASE: ICD-10-CM

## 2023-01-01 DIAGNOSIS — I25.10 ATHEROSCLEROTIC HEART DISEASE OF NATIVE CORONARY ARTERY WITHOUT ANGINA PECTORIS: ICD-10-CM

## 2023-01-01 DIAGNOSIS — I26.99 OTHER PULMONARY EMBOLISM WITHOUT ACUTE COR PULMONALE: ICD-10-CM

## 2023-01-01 DIAGNOSIS — D48.0 NEOPLASM OF UNCERTAIN BEHAVIOR OF BONE AND ARTICULAR CARTILAGE: ICD-10-CM

## 2023-01-01 DIAGNOSIS — Z91.041 RADIOGRAPHIC DYE ALLERGY STATUS: ICD-10-CM

## 2023-01-01 DIAGNOSIS — E44.0 MODERATE PROTEIN-CALORIE MALNUTRITION: ICD-10-CM

## 2023-01-01 DIAGNOSIS — R60.0 LOCALIZED EDEMA: ICD-10-CM

## 2023-01-01 DIAGNOSIS — Z79.01 LONG TERM (CURRENT) USE OF ANTICOAGULANTS: ICD-10-CM

## 2023-01-01 DIAGNOSIS — N25.0 RENAL OSTEODYSTROPHY: ICD-10-CM

## 2023-01-01 DIAGNOSIS — M19.90 UNSPECIFIED OSTEOARTHRITIS, UNSPECIFIED SITE: ICD-10-CM

## 2023-01-01 DIAGNOSIS — Z86.718 PERSONAL HISTORY OF OTHER VENOUS THROMBOSIS AND EMBOLISM: ICD-10-CM

## 2023-01-01 DIAGNOSIS — R52 PAIN, UNSPECIFIED: ICD-10-CM

## 2023-01-01 DIAGNOSIS — Z90.722 ACQUIRED ABSENCE OF OVARIES, BILATERAL: ICD-10-CM

## 2023-01-01 DIAGNOSIS — E21.3 HYPERPARATHYROIDISM, UNSPECIFIED: ICD-10-CM

## 2023-01-01 DIAGNOSIS — R60.9 EDEMA, UNSPECIFIED: ICD-10-CM

## 2023-01-01 DIAGNOSIS — E43 UNSPECIFIED SEVERE PROTEIN-CALORIE MALNUTRITION: ICD-10-CM

## 2023-01-01 DIAGNOSIS — Q78.9 OSTEOCHONDRODYSPLASIA, UNSPECIFIED: ICD-10-CM

## 2023-01-01 DIAGNOSIS — Z79.02 LONG TERM (CURRENT) USE OF ANTITHROMBOTICS/ANTIPLATELETS: ICD-10-CM

## 2023-01-01 DIAGNOSIS — N28.1 CYST OF KIDNEY, ACQUIRED: ICD-10-CM

## 2023-01-01 DIAGNOSIS — R65.10 SYSTEMIC INFLAMMATORY RESPONSE SYNDROME (SIRS) OF NON-INFECTIOUS ORIGIN WITHOUT ACUTE ORGAN DYSFUNCTION: ICD-10-CM

## 2023-01-01 DIAGNOSIS — N25.81 SECONDARY HYPERPARATHYROIDISM OF RENAL ORIGIN: ICD-10-CM

## 2023-01-01 DIAGNOSIS — R79.1 ABNORMAL COAGULATION PROFILE: ICD-10-CM

## 2023-01-01 DIAGNOSIS — E87.5 HYPERKALEMIA: ICD-10-CM

## 2023-01-01 DIAGNOSIS — D64.9 ANEMIA, UNSPECIFIED: ICD-10-CM

## 2023-01-01 DIAGNOSIS — Q61.3 POLYCYSTIC KIDNEY, UNSPECIFIED: ICD-10-CM

## 2023-01-01 DIAGNOSIS — Z71.89 OTHER SPECIFIED COUNSELING: ICD-10-CM

## 2023-01-01 DIAGNOSIS — K92.1 MELENA: ICD-10-CM

## 2023-01-01 DIAGNOSIS — I82.402 ACUTE EMBOLISM AND THROMBOSIS OF UNSPECIFIED DEEP VEINS OF LEFT LOWER EXTREMITY: ICD-10-CM

## 2023-01-01 DIAGNOSIS — Z79.899 OTHER LONG TERM (CURRENT) DRUG THERAPY: ICD-10-CM

## 2023-01-01 DIAGNOSIS — E83.59 OTHER DISORDERS OF CALCIUM METABOLISM: ICD-10-CM

## 2023-01-01 DIAGNOSIS — F03.90 UNSPECIFIED DEMENTIA WITHOUT BEHAVIORAL DISTURBANCE: ICD-10-CM

## 2023-01-01 DIAGNOSIS — I42.9 CARDIOMYOPATHY, UNSPECIFIED: ICD-10-CM

## 2023-01-01 DIAGNOSIS — E16.2 HYPOGLYCEMIA, UNSPECIFIED: ICD-10-CM

## 2023-01-01 DIAGNOSIS — D68.9 COAGULATION DEFECT, UNSPECIFIED: ICD-10-CM

## 2023-01-01 DIAGNOSIS — Z86.711 PERSONAL HISTORY OF PULMONARY EMBOLISM: ICD-10-CM

## 2023-01-01 DIAGNOSIS — Z88.0 ALLERGY STATUS TO PENICILLIN: ICD-10-CM

## 2023-01-01 DIAGNOSIS — T14.8XXA OTHER INJURY OF UNSPECIFIED BODY REGION, INITIAL ENCOUNTER: ICD-10-CM

## 2023-01-01 DIAGNOSIS — K62.89 OTHER SPECIFIED DISEASES OF ANUS AND RECTUM: ICD-10-CM

## 2023-01-01 DIAGNOSIS — G89.29 OTHER CHRONIC PAIN: ICD-10-CM

## 2023-01-01 DIAGNOSIS — M89.8X9 OTHER SPECIFIED DISORDERS OF BONE, UNSPECIFIED SITE: ICD-10-CM

## 2023-01-01 DIAGNOSIS — Z95.820 PERIPHERAL VASCULAR ANGIOPLASTY STATUS WITH IMPLANTS AND GRAFTS: ICD-10-CM

## 2023-01-01 DIAGNOSIS — R53.83 OTHER FATIGUE: ICD-10-CM

## 2023-01-01 DIAGNOSIS — K64.9 UNSPECIFIED HEMORRHOIDS: ICD-10-CM

## 2023-01-01 DIAGNOSIS — N64.4 MASTODYNIA: ICD-10-CM

## 2023-01-01 DIAGNOSIS — I50.42 CHRONIC COMBINED SYSTOLIC (CONGESTIVE) AND DIASTOLIC (CONGESTIVE) HEART FAILURE: ICD-10-CM

## 2023-01-01 DIAGNOSIS — Z80.3 FAMILY HISTORY OF MALIGNANT NEOPLASM OF BREAST: ICD-10-CM

## 2023-01-01 DIAGNOSIS — R50.9 FEVER, UNSPECIFIED: ICD-10-CM

## 2023-01-01 DIAGNOSIS — B95.2 ENTEROCOCCUS AS THE CAUSE OF DISEASES CLASSIFIED ELSEWHERE: ICD-10-CM

## 2023-01-01 DIAGNOSIS — R41.82 ALTERED MENTAL STATUS, UNSPECIFIED: ICD-10-CM

## 2023-01-01 DIAGNOSIS — K63.3 ULCER OF INTESTINE: ICD-10-CM

## 2023-01-01 DIAGNOSIS — N64.1 FAT NECROSIS OF BREAST: ICD-10-CM

## 2023-01-01 DIAGNOSIS — I11.0 HYPERTENSIVE HEART DISEASE WITH HEART FAILURE: ICD-10-CM

## 2023-01-01 DIAGNOSIS — E83.52 HYPERCALCEMIA: ICD-10-CM

## 2023-01-01 DIAGNOSIS — Z91.15 PATIENT'S NONCOMPLIANCE WITH RENAL DIALYSIS: ICD-10-CM

## 2023-01-01 LAB
% ALBUMIN: 46.9 % — SIGNIFICANT CHANGE UP
% ALPHA 1: 13.3 % — SIGNIFICANT CHANGE UP
% ALPHA 2: 15.1 % — SIGNIFICANT CHANGE UP
% BETA: 13.1 % — SIGNIFICANT CHANGE UP
% GAMMA: 11.6 % — SIGNIFICANT CHANGE UP
-  AMPICILLIN/SULBACTAM: SIGNIFICANT CHANGE UP
-  AMPICILLIN/SULBACTAM: SIGNIFICANT CHANGE UP
-  AMPICILLIN: SIGNIFICANT CHANGE UP
-  AZTREONAM: SIGNIFICANT CHANGE UP
-  CEFAZOLIN: SIGNIFICANT CHANGE UP
-  CEFAZOLIN: SIGNIFICANT CHANGE UP
-  CEFEPIME: SIGNIFICANT CHANGE UP
-  CEFTRIAXONE: SIGNIFICANT CHANGE UP
-  CEFTRIAXONE: SIGNIFICANT CHANGE UP
-  CIPROFLOXACIN: SIGNIFICANT CHANGE UP
-  DAPTOMYCIN: SIGNIFICANT CHANGE UP
-  ERTAPENEM: SIGNIFICANT CHANGE UP
-  ERTAPENEM: SIGNIFICANT CHANGE UP
-  GENTAMICIN: SIGNIFICANT CHANGE UP
-  GENTAMICIN: SIGNIFICANT CHANGE UP
-  LEVOFLOXACIN: SIGNIFICANT CHANGE UP
-  LINEZOLID: SIGNIFICANT CHANGE UP
-  MEROPENEM: SIGNIFICANT CHANGE UP
-  MEROPENEM: SIGNIFICANT CHANGE UP
-  PIPERACILLIN/TAZOBACTAM: SIGNIFICANT CHANGE UP
-  TOBRAMYCIN: SIGNIFICANT CHANGE UP
-  TOBRAMYCIN: SIGNIFICANT CHANGE UP
-  TRIMETHOPRIM/SULFAMETHOXAZOLE: SIGNIFICANT CHANGE UP
-  TRIMETHOPRIM/SULFAMETHOXAZOLE: SIGNIFICANT CHANGE UP
-  VANCOMYCIN: SIGNIFICANT CHANGE UP
24R-OH-CALCIDIOL SERPL-MCNC: 38.5 NG/ML — SIGNIFICANT CHANGE UP (ref 30–80)
A1C WITH ESTIMATED AVERAGE GLUCOSE RESULT: 4.3 % — SIGNIFICANT CHANGE UP (ref 4–5.6)
ACANTHOCYTES BLD QL SMEAR: SIGNIFICANT CHANGE UP
ACANTHOCYTES BLD QL SMEAR: SLIGHT — SIGNIFICANT CHANGE UP
ACANTHOCYTES BLD QL SMEAR: SLIGHT — SIGNIFICANT CHANGE UP
ALBUMIN SERPL ELPH-MCNC: 1.8 G/DL — LOW (ref 3.3–5)
ALBUMIN SERPL ELPH-MCNC: 1.8 G/DL — LOW (ref 3.3–5)
ALBUMIN SERPL ELPH-MCNC: 1.9 G/DL — LOW (ref 3.3–5)
ALBUMIN SERPL ELPH-MCNC: 2 G/DL — LOW (ref 3.3–5)
ALBUMIN SERPL ELPH-MCNC: 2.1 G/DL — LOW (ref 3.3–5)
ALBUMIN SERPL ELPH-MCNC: 2.2 G/DL — LOW (ref 3.3–5)
ALBUMIN SERPL ELPH-MCNC: 2.3 G/DL — LOW (ref 3.3–5)
ALBUMIN SERPL ELPH-MCNC: 2.3 G/DL — LOW (ref 3.3–5)
ALBUMIN SERPL ELPH-MCNC: 2.4 G/DL — LOW (ref 3.3–5)
ALBUMIN SERPL ELPH-MCNC: 2.5 G/DL — LOW (ref 3.3–5)
ALBUMIN SERPL ELPH-MCNC: 2.6 G/DL — LOW (ref 3.3–5)
ALBUMIN SERPL ELPH-MCNC: 2.7 G/DL — LOW (ref 3.3–5)
ALBUMIN SERPL ELPH-MCNC: 2.8 G/DL — LOW (ref 3.3–5)
ALBUMIN SERPL ELPH-MCNC: 2.8 G/DL — LOW (ref 3.6–5.5)
ALBUMIN SERPL ELPH-MCNC: 3 G/DL — LOW (ref 3.3–5)
ALBUMIN SERPL ELPH-MCNC: 3.2 G/DL — LOW (ref 3.3–5)
ALBUMIN SERPL ELPH-MCNC: 3.6 G/DL — SIGNIFICANT CHANGE UP (ref 3.3–5)
ALBUMIN SERPL ELPH-MCNC: 3.7 G/DL — SIGNIFICANT CHANGE UP (ref 3.3–5)
ALBUMIN/GLOB SERPL ELPH: 0.9 RATIO — SIGNIFICANT CHANGE UP
ALDOST SERPL-MCNC: 14.5 NG/DL — SIGNIFICANT CHANGE UP
ALP SERPL-CCNC: 101 U/L — SIGNIFICANT CHANGE UP (ref 40–120)
ALP SERPL-CCNC: 103 U/L — SIGNIFICANT CHANGE UP (ref 40–120)
ALP SERPL-CCNC: 107 U/L — SIGNIFICANT CHANGE UP (ref 40–120)
ALP SERPL-CCNC: 108 U/L — SIGNIFICANT CHANGE UP (ref 40–120)
ALP SERPL-CCNC: 111 U/L — SIGNIFICANT CHANGE UP (ref 40–120)
ALP SERPL-CCNC: 113 U/L — SIGNIFICANT CHANGE UP (ref 40–120)
ALP SERPL-CCNC: 114 U/L — SIGNIFICANT CHANGE UP (ref 40–120)
ALP SERPL-CCNC: 119 U/L — SIGNIFICANT CHANGE UP (ref 40–120)
ALP SERPL-CCNC: 121 U/L — HIGH (ref 40–120)
ALP SERPL-CCNC: 122 U/L — HIGH (ref 40–120)
ALP SERPL-CCNC: 126 U/L — HIGH (ref 40–120)
ALP SERPL-CCNC: 128 U/L — HIGH (ref 40–120)
ALP SERPL-CCNC: 128 U/L — HIGH (ref 40–120)
ALP SERPL-CCNC: 129 U/L — HIGH (ref 40–120)
ALP SERPL-CCNC: 131 U/L — HIGH (ref 40–120)
ALP SERPL-CCNC: 132 U/L — HIGH (ref 40–120)
ALP SERPL-CCNC: 132 U/L — HIGH (ref 40–120)
ALP SERPL-CCNC: 134 U/L — HIGH (ref 40–120)
ALP SERPL-CCNC: 135 U/L — HIGH (ref 40–120)
ALP SERPL-CCNC: 136 U/L — HIGH (ref 40–120)
ALP SERPL-CCNC: 143 U/L — HIGH (ref 40–120)
ALP SERPL-CCNC: 151 U/L — HIGH (ref 40–120)
ALP SERPL-CCNC: 151 U/L — HIGH (ref 40–120)
ALP SERPL-CCNC: 152 U/L — HIGH (ref 40–120)
ALP SERPL-CCNC: 154 U/L — HIGH (ref 40–120)
ALP SERPL-CCNC: 155 U/L — HIGH (ref 40–120)
ALP SERPL-CCNC: 167 U/L — HIGH (ref 40–120)
ALP SERPL-CCNC: 175 U/L — HIGH (ref 40–120)
ALP SERPL-CCNC: 61 U/L — SIGNIFICANT CHANGE UP (ref 40–120)
ALP SERPL-CCNC: 62 U/L — SIGNIFICANT CHANGE UP (ref 40–120)
ALP SERPL-CCNC: 65 U/L — SIGNIFICANT CHANGE UP (ref 40–120)
ALP SERPL-CCNC: 65 U/L — SIGNIFICANT CHANGE UP (ref 40–120)
ALP SERPL-CCNC: 70 U/L — SIGNIFICANT CHANGE UP (ref 40–120)
ALP SERPL-CCNC: 74 U/L — SIGNIFICANT CHANGE UP (ref 40–120)
ALP SERPL-CCNC: 75 U/L — SIGNIFICANT CHANGE UP (ref 40–120)
ALP SERPL-CCNC: 78 U/L — SIGNIFICANT CHANGE UP (ref 40–120)
ALP SERPL-CCNC: 85 U/L — SIGNIFICANT CHANGE UP (ref 40–120)
ALP SERPL-CCNC: 86 U/L — SIGNIFICANT CHANGE UP (ref 40–120)
ALP SERPL-CCNC: 94 U/L — SIGNIFICANT CHANGE UP (ref 40–120)
ALP SERPL-CCNC: 95 U/L — SIGNIFICANT CHANGE UP (ref 40–120)
ALP SERPL-CCNC: SIGNIFICANT CHANGE UP U/L (ref 40–120)
ALPHA1 GLOB SERPL ELPH-MCNC: 0.8 G/DL — HIGH (ref 0.1–0.4)
ALPHA2 GLOB SERPL ELPH-MCNC: 0.9 G/DL — SIGNIFICANT CHANGE UP (ref 0.5–1)
ALT FLD-CCNC: 10 U/L — SIGNIFICANT CHANGE UP (ref 10–45)
ALT FLD-CCNC: 11 U/L — SIGNIFICANT CHANGE UP (ref 10–45)
ALT FLD-CCNC: 13 U/L — SIGNIFICANT CHANGE UP (ref 10–45)
ALT FLD-CCNC: 13 U/L — SIGNIFICANT CHANGE UP (ref 10–45)
ALT FLD-CCNC: 14 U/L — SIGNIFICANT CHANGE UP (ref 10–45)
ALT FLD-CCNC: 14 U/L — SIGNIFICANT CHANGE UP (ref 10–45)
ALT FLD-CCNC: 16 U/L — SIGNIFICANT CHANGE UP (ref 10–45)
ALT FLD-CCNC: 19 U/L — SIGNIFICANT CHANGE UP (ref 10–45)
ALT FLD-CCNC: 20 U/L — SIGNIFICANT CHANGE UP (ref 10–45)
ALT FLD-CCNC: 21 U/L — SIGNIFICANT CHANGE UP (ref 10–45)
ALT FLD-CCNC: 5 U/L — LOW (ref 10–45)
ALT FLD-CCNC: 6 U/L — LOW (ref 10–45)
ALT FLD-CCNC: 7 U/L — LOW (ref 10–45)
ALT FLD-CCNC: 8 U/L — LOW (ref 10–45)
ALT FLD-CCNC: 9 U/L — LOW (ref 10–45)
ALT FLD-CCNC: <5 U/L — LOW (ref 10–45)
ALT FLD-CCNC: SIGNIFICANT CHANGE UP (ref 10–45)
ALT FLD-CCNC: SIGNIFICANT CHANGE UP U/L (ref 10–45)
ANA TITR SER: NEGATIVE — SIGNIFICANT CHANGE UP
ANION GAP SERPL CALC-SCNC: 10 MMOL/L — SIGNIFICANT CHANGE UP (ref 5–17)
ANION GAP SERPL CALC-SCNC: 10 MMOL/L — SIGNIFICANT CHANGE UP (ref 5–17)
ANION GAP SERPL CALC-SCNC: 11 MMOL/L — SIGNIFICANT CHANGE UP (ref 5–17)
ANION GAP SERPL CALC-SCNC: 12 MMOL/L — SIGNIFICANT CHANGE UP (ref 5–17)
ANION GAP SERPL CALC-SCNC: 13 MMOL/L — SIGNIFICANT CHANGE UP (ref 5–17)
ANION GAP SERPL CALC-SCNC: 14 MMOL/L — SIGNIFICANT CHANGE UP (ref 5–17)
ANION GAP SERPL CALC-SCNC: 15 MMOL/L — SIGNIFICANT CHANGE UP (ref 5–17)
ANION GAP SERPL CALC-SCNC: 16 MMOL/L — SIGNIFICANT CHANGE UP (ref 5–17)
ANION GAP SERPL CALC-SCNC: 16 MMOL/L — SIGNIFICANT CHANGE UP (ref 5–17)
ANION GAP SERPL CALC-SCNC: 19 MMOL/L — HIGH (ref 5–17)
ANION GAP SERPL CALC-SCNC: 20 MMOL/L — HIGH (ref 5–17)
ANION GAP SERPL CALC-SCNC: 20 MMOL/L — HIGH (ref 5–17)
ANION GAP SERPL CALC-SCNC: 21 MMOL/L — HIGH (ref 5–17)
ANION GAP SERPL CALC-SCNC: 23 MMOL/L — HIGH (ref 5–17)
ANION GAP SERPL CALC-SCNC: 23 MMOL/L — HIGH (ref 5–17)
ANION GAP SERPL CALC-SCNC: 31 MMOL/L — HIGH (ref 5–17)
ANION GAP SERPL CALC-SCNC: 5 MMOL/L — SIGNIFICANT CHANGE UP (ref 5–17)
ANION GAP SERPL CALC-SCNC: 9 MMOL/L — SIGNIFICANT CHANGE UP (ref 5–17)
ANION GAP SERPL CALC-SCNC: 9 MMOL/L — SIGNIFICANT CHANGE UP (ref 5–17)
ANISOCYTOSIS BLD QL: SIGNIFICANT CHANGE UP
ANISOCYTOSIS BLD QL: SLIGHT — SIGNIFICANT CHANGE UP
APCR PPP: 2.08 RATIO — SIGNIFICANT CHANGE UP
APTT BLD: 102.3 SEC — HIGH (ref 27.5–35.5)
APTT BLD: 115.2 SEC — HIGH (ref 27.5–35.5)
APTT BLD: 132.5 SEC — CRITICAL HIGH (ref 27.5–35.5)
APTT BLD: 27.5 SEC — SIGNIFICANT CHANGE UP (ref 27.5–35.5)
APTT BLD: 29.7 SEC — SIGNIFICANT CHANGE UP (ref 27.5–35.5)
APTT BLD: 30.3 SEC — SIGNIFICANT CHANGE UP (ref 27.5–35.5)
APTT BLD: 30.4 SEC — SIGNIFICANT CHANGE UP (ref 27.5–35.5)
APTT BLD: 30.4 SEC — SIGNIFICANT CHANGE UP (ref 27.5–35.5)
APTT BLD: 30.5 SEC — SIGNIFICANT CHANGE UP (ref 27.5–35.5)
APTT BLD: 31.6 SEC — SIGNIFICANT CHANGE UP (ref 27.5–35.5)
APTT BLD: 35.3 SEC — SIGNIFICANT CHANGE UP (ref 27.5–35.5)
APTT BLD: 35.7 SEC — HIGH (ref 27.5–35.5)
APTT BLD: 36.2 SEC — HIGH (ref 27.5–35.5)
APTT BLD: 37.5 SEC — HIGH (ref 27.5–35.5)
APTT BLD: 38.6 SEC — HIGH (ref 27.5–35.5)
APTT BLD: 39.4 SEC — HIGH (ref 27.5–35.5)
APTT BLD: 41.5 SEC — HIGH (ref 27.5–35.5)
APTT BLD: 43.2 SEC — HIGH (ref 27.5–35.5)
APTT BLD: 44.3 SEC — HIGH (ref 27.5–35.5)
APTT BLD: 44.4 SEC — HIGH (ref 27.5–35.5)
APTT BLD: 48.1 SEC — HIGH (ref 27.5–35.5)
APTT BLD: 48.3 SEC — HIGH (ref 27.5–35.5)
APTT BLD: 50.4 SEC — HIGH (ref 27.5–35.5)
APTT BLD: 51.2 SEC — HIGH (ref 27.5–35.5)
APTT BLD: 56 SEC — HIGH (ref 27.5–35.5)
APTT BLD: 59.6 SEC — HIGH (ref 27.5–35.5)
APTT BLD: 61 SEC — HIGH (ref 27.5–35.5)
APTT BLD: 61.8 SEC — HIGH (ref 27.5–35.5)
APTT BLD: 62 SEC — HIGH (ref 27.5–35.5)
APTT BLD: 62.9 SEC — HIGH (ref 27.5–35.5)
APTT BLD: 64.1 SEC — HIGH (ref 27.5–35.5)
APTT BLD: 64.6 SEC — HIGH (ref 27.5–35.5)
APTT BLD: 65.7 SEC — HIGH (ref 27.5–35.5)
APTT BLD: 66.4 SEC — HIGH (ref 27.5–35.5)
APTT BLD: 68.3 SEC — HIGH (ref 27.5–35.5)
APTT BLD: 69.2 SEC — HIGH (ref 27.5–35.5)
APTT BLD: 72.1 SEC — HIGH (ref 27.5–35.5)
APTT BLD: 77.8 SEC — HIGH (ref 27.5–35.5)
APTT BLD: 78.8 SEC — HIGH (ref 27.5–35.5)
APTT BLD: 78.9 SEC — HIGH (ref 27.5–35.5)
APTT BLD: 79 SEC — HIGH (ref 27.5–35.5)
APTT BLD: 79.4 SEC — HIGH (ref 27.5–35.5)
APTT BLD: 79.6 SEC — HIGH (ref 27.5–35.5)
APTT BLD: 84.9 SEC — HIGH (ref 27.5–35.5)
APTT BLD: 90.8 SEC — HIGH (ref 27.5–35.5)
APTT BLD: 91.6 SEC — HIGH (ref 27.5–35.5)
APTT BLD: 92.6 SEC — HIGH (ref 27.5–35.5)
APTT BLD: 99.9 SEC — HIGH (ref 27.5–35.5)
APTT BLD: >200 SEC — CRITICAL HIGH (ref 27.5–35.5)
AST SERPL-CCNC: 10 U/L — SIGNIFICANT CHANGE UP (ref 10–40)
AST SERPL-CCNC: 10 U/L — SIGNIFICANT CHANGE UP (ref 10–40)
AST SERPL-CCNC: 11 U/L — SIGNIFICANT CHANGE UP (ref 10–40)
AST SERPL-CCNC: 12 U/L — SIGNIFICANT CHANGE UP (ref 10–40)
AST SERPL-CCNC: 13 U/L — SIGNIFICANT CHANGE UP (ref 10–40)
AST SERPL-CCNC: 14 U/L — SIGNIFICANT CHANGE UP (ref 10–40)
AST SERPL-CCNC: 14 U/L — SIGNIFICANT CHANGE UP (ref 10–40)
AST SERPL-CCNC: 15 U/L — SIGNIFICANT CHANGE UP (ref 10–40)
AST SERPL-CCNC: 18 U/L — SIGNIFICANT CHANGE UP (ref 10–40)
AST SERPL-CCNC: 19 U/L — SIGNIFICANT CHANGE UP (ref 10–40)
AST SERPL-CCNC: 21 U/L — SIGNIFICANT CHANGE UP (ref 10–40)
AST SERPL-CCNC: 22 U/L — SIGNIFICANT CHANGE UP (ref 10–40)
AST SERPL-CCNC: 23 U/L — SIGNIFICANT CHANGE UP (ref 10–40)
AST SERPL-CCNC: 25 U/L — SIGNIFICANT CHANGE UP (ref 10–40)
AST SERPL-CCNC: 28 U/L — SIGNIFICANT CHANGE UP (ref 10–40)
AST SERPL-CCNC: 30 U/L — SIGNIFICANT CHANGE UP (ref 10–40)
AST SERPL-CCNC: 31 U/L — SIGNIFICANT CHANGE UP (ref 10–40)
AST SERPL-CCNC: 33 U/L — SIGNIFICANT CHANGE UP (ref 10–40)
AST SERPL-CCNC: 9 U/L — LOW (ref 10–40)
AST SERPL-CCNC: SIGNIFICANT CHANGE UP (ref 10–40)
AST SERPL-CCNC: SIGNIFICANT CHANGE UP U/L (ref 10–40)
AT III ACT/NOR PPP CHRO: 54 % — LOW (ref 85–135)
AT III AG PPP IA-MCNC: 16 MG/DL — LOW (ref 19–31)
AUTO DIFF PNL BLD: NEGATIVE — SIGNIFICANT CHANGE UP
B-GLOBULIN SERPL ELPH-MCNC: 0.8 G/DL — SIGNIFICANT CHANGE UP (ref 0.5–1)
B2 GLYCOPROT1 AB SER QL: NEGATIVE — SIGNIFICANT CHANGE UP
B2 GLYCOPROT1 AB SER QL: NEGATIVE — SIGNIFICANT CHANGE UP
BASE EXCESS BLDV CALC-SCNC: -12.7 MMOL/L — LOW (ref -2–3)
BASOPHILS # BLD AUTO: 0 K/UL — SIGNIFICANT CHANGE UP (ref 0–0.2)
BASOPHILS # BLD AUTO: 0.01 K/UL — SIGNIFICANT CHANGE UP (ref 0–0.2)
BASOPHILS # BLD AUTO: 0.02 K/UL — SIGNIFICANT CHANGE UP (ref 0–0.2)
BASOPHILS # BLD AUTO: 0.03 K/UL — SIGNIFICANT CHANGE UP (ref 0–0.2)
BASOPHILS # BLD AUTO: 0.04 K/UL — SIGNIFICANT CHANGE UP (ref 0–0.2)
BASOPHILS # BLD AUTO: 0.05 K/UL — SIGNIFICANT CHANGE UP (ref 0–0.2)
BASOPHILS # BLD AUTO: 0.06 K/UL — SIGNIFICANT CHANGE UP (ref 0–0.2)
BASOPHILS # BLD AUTO: 0.06 K/UL — SIGNIFICANT CHANGE UP (ref 0–0.2)
BASOPHILS # BLD AUTO: 0.09 K/UL — SIGNIFICANT CHANGE UP (ref 0–0.2)
BASOPHILS # BLD AUTO: 0.09 K/UL — SIGNIFICANT CHANGE UP (ref 0–0.2)
BASOPHILS # BLD AUTO: 0.12 K/UL — SIGNIFICANT CHANGE UP (ref 0–0.2)
BASOPHILS NFR BLD AUTO: 0 % — SIGNIFICANT CHANGE UP (ref 0–2)
BASOPHILS NFR BLD AUTO: 0.1 % — SIGNIFICANT CHANGE UP (ref 0–2)
BASOPHILS NFR BLD AUTO: 0.2 % — SIGNIFICANT CHANGE UP (ref 0–2)
BASOPHILS NFR BLD AUTO: 0.3 % — SIGNIFICANT CHANGE UP (ref 0–2)
BASOPHILS NFR BLD AUTO: 0.4 % — SIGNIFICANT CHANGE UP (ref 0–2)
BASOPHILS NFR BLD AUTO: 0.5 % — SIGNIFICANT CHANGE UP (ref 0–2)
BASOPHILS NFR BLD AUTO: 0.5 % — SIGNIFICANT CHANGE UP (ref 0–2)
BASOPHILS NFR BLD AUTO: 0.7 % — SIGNIFICANT CHANGE UP (ref 0–2)
BASOPHILS NFR BLD AUTO: 0.8 % — SIGNIFICANT CHANGE UP (ref 0–2)
BASOPHILS NFR BLD AUTO: 0.9 % — SIGNIFICANT CHANGE UP (ref 0–2)
BASOPHILS NFR BLD AUTO: 1 % — SIGNIFICANT CHANGE UP (ref 0–2)
BILIRUB DIRECT SERPL-MCNC: 0.2 MG/DL — SIGNIFICANT CHANGE UP (ref 0–0.3)
BILIRUB INDIRECT FLD-MCNC: 0.2 MG/DL — SIGNIFICANT CHANGE UP (ref 0.2–1)
BILIRUB SERPL-MCNC: 0.2 MG/DL — SIGNIFICANT CHANGE UP (ref 0.2–1.2)
BILIRUB SERPL-MCNC: 0.3 MG/DL — SIGNIFICANT CHANGE UP (ref 0.2–1.2)
BILIRUB SERPL-MCNC: 0.4 MG/DL — SIGNIFICANT CHANGE UP (ref 0.2–1.2)
BILIRUB SERPL-MCNC: 0.5 MG/DL — SIGNIFICANT CHANGE UP (ref 0.2–1.2)
BILIRUB SERPL-MCNC: 0.6 MG/DL — SIGNIFICANT CHANGE UP (ref 0.2–1.2)
BILIRUB SERPL-MCNC: 0.8 MG/DL — SIGNIFICANT CHANGE UP (ref 0.2–1.2)
BILIRUB SERPL-MCNC: 0.8 MG/DL — SIGNIFICANT CHANGE UP (ref 0.2–1.2)
BILIRUB SERPL-MCNC: 1 MG/DL — SIGNIFICANT CHANGE UP (ref 0.2–1.2)
BLD GP AB SCN SERPL QL: NEGATIVE — SIGNIFICANT CHANGE UP
BUN SERPL-MCNC: 12 MG/DL — SIGNIFICANT CHANGE UP (ref 7–23)
BUN SERPL-MCNC: 14 MG/DL — SIGNIFICANT CHANGE UP (ref 7–23)
BUN SERPL-MCNC: 15 MG/DL — SIGNIFICANT CHANGE UP (ref 7–23)
BUN SERPL-MCNC: 16 MG/DL — SIGNIFICANT CHANGE UP (ref 7–23)
BUN SERPL-MCNC: 169 MG/DL — HIGH (ref 7–23)
BUN SERPL-MCNC: 17 MG/DL — SIGNIFICANT CHANGE UP (ref 7–23)
BUN SERPL-MCNC: 18 MG/DL — SIGNIFICANT CHANGE UP (ref 7–23)
BUN SERPL-MCNC: 19 MG/DL — SIGNIFICANT CHANGE UP (ref 7–23)
BUN SERPL-MCNC: 20 MG/DL — SIGNIFICANT CHANGE UP (ref 7–23)
BUN SERPL-MCNC: 20 MG/DL — SIGNIFICANT CHANGE UP (ref 7–23)
BUN SERPL-MCNC: 21 MG/DL — SIGNIFICANT CHANGE UP (ref 7–23)
BUN SERPL-MCNC: 22 MG/DL — SIGNIFICANT CHANGE UP (ref 7–23)
BUN SERPL-MCNC: 22 MG/DL — SIGNIFICANT CHANGE UP (ref 7–23)
BUN SERPL-MCNC: 24 MG/DL — HIGH (ref 7–23)
BUN SERPL-MCNC: 24 MG/DL — HIGH (ref 7–23)
BUN SERPL-MCNC: 26 MG/DL — HIGH (ref 7–23)
BUN SERPL-MCNC: 28 MG/DL — HIGH (ref 7–23)
BUN SERPL-MCNC: 29 MG/DL — HIGH (ref 7–23)
BUN SERPL-MCNC: 30 MG/DL — HIGH (ref 7–23)
BUN SERPL-MCNC: 31 MG/DL — HIGH (ref 7–23)
BUN SERPL-MCNC: 31 MG/DL — HIGH (ref 7–23)
BUN SERPL-MCNC: 32 MG/DL — HIGH (ref 7–23)
BUN SERPL-MCNC: 33 MG/DL — HIGH (ref 7–23)
BUN SERPL-MCNC: 34 MG/DL — HIGH (ref 7–23)
BUN SERPL-MCNC: 35 MG/DL — HIGH (ref 7–23)
BUN SERPL-MCNC: 36 MG/DL — HIGH (ref 7–23)
BUN SERPL-MCNC: 39 MG/DL — HIGH (ref 7–23)
BUN SERPL-MCNC: 39 MG/DL — HIGH (ref 7–23)
BUN SERPL-MCNC: 40 MG/DL — HIGH (ref 7–23)
BUN SERPL-MCNC: 42 MG/DL — HIGH (ref 7–23)
BUN SERPL-MCNC: 43 MG/DL — HIGH (ref 7–23)
BUN SERPL-MCNC: 45 MG/DL — HIGH (ref 7–23)
BUN SERPL-MCNC: 45 MG/DL — HIGH (ref 7–23)
BUN SERPL-MCNC: 46 MG/DL — HIGH (ref 7–23)
BUN SERPL-MCNC: 47 MG/DL — HIGH (ref 7–23)
BUN SERPL-MCNC: 48 MG/DL — HIGH (ref 7–23)
BUN SERPL-MCNC: 49 MG/DL — HIGH (ref 7–23)
BUN SERPL-MCNC: 49 MG/DL — HIGH (ref 7–23)
BUN SERPL-MCNC: 50 MG/DL — HIGH (ref 7–23)
BUN SERPL-MCNC: 53 MG/DL — HIGH (ref 7–23)
BUN SERPL-MCNC: 57 MG/DL — HIGH (ref 7–23)
BUN SERPL-MCNC: 62 MG/DL — HIGH (ref 7–23)
BUN SERPL-MCNC: 83 MG/DL — HIGH (ref 7–23)
BUN SERPL-MCNC: 93 MG/DL — HIGH (ref 7–23)
BURR CELLS BLD QL SMEAR: PRESENT — SIGNIFICANT CHANGE UP
BURR CELLS BLD QL SMEAR: SIGNIFICANT CHANGE UP
BURR CELLS BLD QL SMEAR: SIGNIFICANT CHANGE UP
BURR CELLS BLD QL SMEAR: SLIGHT — SIGNIFICANT CHANGE UP
C-ANCA SER-ACNC: NEGATIVE — SIGNIFICANT CHANGE UP
C3 SERPL-MCNC: 101 MG/DL — SIGNIFICANT CHANGE UP (ref 81–157)
C4 SERPL-MCNC: 48 MG/DL — HIGH (ref 13–39)
CA-I BLDA-SCNC: SIGNIFICANT CHANGE UP MMOL/L (ref 1.15–1.33)
CA-I SERPL-SCNC: 1.04 MMOL/L — LOW (ref 1.15–1.33)
CALCIUM SERPL-MCNC: 10 MG/DL — SIGNIFICANT CHANGE UP (ref 8.4–10.5)
CALCIUM SERPL-MCNC: 10.2 MG/DL — SIGNIFICANT CHANGE UP (ref 8.4–10.5)
CALCIUM SERPL-MCNC: 10.5 MG/DL — SIGNIFICANT CHANGE UP (ref 8.4–10.5)
CALCIUM SERPL-MCNC: 10.5 MG/DL — SIGNIFICANT CHANGE UP (ref 8.4–10.5)
CALCIUM SERPL-MCNC: 10.6 MG/DL — HIGH (ref 8.4–10.5)
CALCIUM SERPL-MCNC: 10.7 MG/DL — HIGH (ref 8.4–10.5)
CALCIUM SERPL-MCNC: 11 MG/DL — HIGH (ref 8.4–10.5)
CALCIUM SERPL-MCNC: 11 MG/DL — HIGH (ref 8.4–10.5)
CALCIUM SERPL-MCNC: 11.1 MG/DL — HIGH (ref 8.4–10.5)
CALCIUM SERPL-MCNC: 11.9 MG/DL — HIGH (ref 8.4–10.5)
CALCIUM SERPL-MCNC: 7.6 MG/DL — LOW (ref 8.4–10.5)
CALCIUM SERPL-MCNC: 7.6 MG/DL — LOW (ref 8.4–10.5)
CALCIUM SERPL-MCNC: 7.7 MG/DL — LOW (ref 8.4–10.5)
CALCIUM SERPL-MCNC: 7.7 MG/DL — LOW (ref 8.4–10.5)
CALCIUM SERPL-MCNC: 7.8 MG/DL — LOW (ref 8.4–10.5)
CALCIUM SERPL-MCNC: 7.9 MG/DL — LOW (ref 8.4–10.5)
CALCIUM SERPL-MCNC: 7.9 MG/DL — LOW (ref 8.4–10.5)
CALCIUM SERPL-MCNC: 8 MG/DL — LOW (ref 8.4–10.5)
CALCIUM SERPL-MCNC: 8.1 MG/DL — LOW (ref 8.4–10.5)
CALCIUM SERPL-MCNC: 8.2 MG/DL — LOW (ref 8.4–10.5)
CALCIUM SERPL-MCNC: 8.3 MG/DL — LOW (ref 8.4–10.5)
CALCIUM SERPL-MCNC: 8.4 MG/DL — SIGNIFICANT CHANGE UP (ref 8.4–10.5)
CALCIUM SERPL-MCNC: 8.5 MG/DL — SIGNIFICANT CHANGE UP (ref 8.4–10.5)
CALCIUM SERPL-MCNC: 8.6 MG/DL — SIGNIFICANT CHANGE UP (ref 8.4–10.5)
CALCIUM SERPL-MCNC: 8.7 MG/DL — SIGNIFICANT CHANGE UP (ref 8.4–10.5)
CALCIUM SERPL-MCNC: 8.8 MG/DL — SIGNIFICANT CHANGE UP (ref 8.4–10.5)
CALCIUM SERPL-MCNC: 8.9 MG/DL — SIGNIFICANT CHANGE UP (ref 8.4–10.5)
CALCIUM SERPL-MCNC: 9 MG/DL — SIGNIFICANT CHANGE UP (ref 8.4–10.5)
CALCIUM SERPL-MCNC: 9 MG/DL — SIGNIFICANT CHANGE UP (ref 8.4–10.5)
CALCIUM SERPL-MCNC: 9.1 MG/DL — SIGNIFICANT CHANGE UP (ref 8.4–10.5)
CALCIUM SERPL-MCNC: 9.4 MG/DL — SIGNIFICANT CHANGE UP (ref 8.4–10.5)
CALCIUM SERPL-MCNC: 9.5 MG/DL — SIGNIFICANT CHANGE UP (ref 8.4–10.5)
CALCIUM SERPL-MCNC: 9.5 MG/DL — SIGNIFICANT CHANGE UP (ref 8.4–10.5)
CALCIUM SERPL-MCNC: 9.6 MG/DL — SIGNIFICANT CHANGE UP (ref 8.4–10.5)
CALCIUM SERPL-MCNC: 9.6 MG/DL — SIGNIFICANT CHANGE UP (ref 8.4–10.5)
CALCIUM SERPL-MCNC: 9.7 MG/DL — SIGNIFICANT CHANGE UP (ref 8.4–10.5)
CALCIUM SERPL-MCNC: 9.8 MG/DL — SIGNIFICANT CHANGE UP (ref 8.4–10.5)
CALCIUM SERPL-MCNC: 9.8 MG/DL — SIGNIFICANT CHANGE UP (ref 8.4–10.5)
CALCIUM SERPL-MCNC: 9.9 MG/DL — SIGNIFICANT CHANGE UP (ref 8.4–10.5)
CANCER AG125 SERPL-ACNC: 196 U/ML — HIGH
CANCER AG19-9 SERPL-ACNC: 172 U/ML — HIGH
CARDIOLIPIN AB SER-ACNC: NEGATIVE — SIGNIFICANT CHANGE UP
CARDIOLIPIN AB SER-ACNC: NEGATIVE — SIGNIFICANT CHANGE UP
CEA SERPL-MCNC: 6.1 NG/ML — HIGH (ref 0–3.8)
CHLORIDE SERPL-SCNC: 100 MMOL/L — SIGNIFICANT CHANGE UP (ref 96–108)
CHLORIDE SERPL-SCNC: 102 MMOL/L — SIGNIFICANT CHANGE UP (ref 96–108)
CHLORIDE SERPL-SCNC: 84 MMOL/L — LOW (ref 96–108)
CHLORIDE SERPL-SCNC: 88 MMOL/L — LOW (ref 96–108)
CHLORIDE SERPL-SCNC: 89 MMOL/L — LOW (ref 96–108)
CHLORIDE SERPL-SCNC: 90 MMOL/L — LOW (ref 96–108)
CHLORIDE SERPL-SCNC: 91 MMOL/L — LOW (ref 96–108)
CHLORIDE SERPL-SCNC: 92 MMOL/L — LOW (ref 96–108)
CHLORIDE SERPL-SCNC: 93 MMOL/L — LOW (ref 96–108)
CHLORIDE SERPL-SCNC: 94 MMOL/L — LOW (ref 96–108)
CHLORIDE SERPL-SCNC: 95 MMOL/L — LOW (ref 96–108)
CHLORIDE SERPL-SCNC: 96 MMOL/L — SIGNIFICANT CHANGE UP (ref 96–108)
CHLORIDE SERPL-SCNC: 97 MMOL/L — SIGNIFICANT CHANGE UP (ref 96–108)
CHLORIDE SERPL-SCNC: 97 MMOL/L — SIGNIFICANT CHANGE UP (ref 96–108)
CHLORIDE SERPL-SCNC: 98 MMOL/L — SIGNIFICANT CHANGE UP (ref 96–108)
CHLORIDE SERPL-SCNC: 99 MMOL/L — SIGNIFICANT CHANGE UP (ref 96–108)
CK SERPL-CCNC: 13 U/L — LOW (ref 25–170)
CK SERPL-CCNC: 21 U/L — LOW (ref 25–170)
CO2 BLDV-SCNC: 16 MMOL/L — LOW (ref 22–26)
CO2 SERPL-SCNC: 16 MMOL/L — LOW (ref 22–31)
CO2 SERPL-SCNC: 19 MMOL/L — LOW (ref 22–31)
CO2 SERPL-SCNC: 20 MMOL/L — LOW (ref 22–31)
CO2 SERPL-SCNC: 20 MMOL/L — LOW (ref 22–31)
CO2 SERPL-SCNC: 21 MMOL/L — LOW (ref 22–31)
CO2 SERPL-SCNC: 22 MMOL/L — SIGNIFICANT CHANGE UP (ref 22–31)
CO2 SERPL-SCNC: 23 MMOL/L — SIGNIFICANT CHANGE UP (ref 22–31)
CO2 SERPL-SCNC: 24 MMOL/L — SIGNIFICANT CHANGE UP (ref 22–31)
CO2 SERPL-SCNC: 25 MMOL/L — SIGNIFICANT CHANGE UP (ref 22–31)
CO2 SERPL-SCNC: 26 MMOL/L — SIGNIFICANT CHANGE UP (ref 22–31)
CO2 SERPL-SCNC: 27 MMOL/L — SIGNIFICANT CHANGE UP (ref 22–31)
CO2 SERPL-SCNC: 28 MMOL/L — SIGNIFICANT CHANGE UP (ref 22–31)
CO2 SERPL-SCNC: 29 MMOL/L — SIGNIFICANT CHANGE UP (ref 22–31)
CO2 SERPL-SCNC: 29 MMOL/L — SIGNIFICANT CHANGE UP (ref 22–31)
CONFIRM APTT STACLOT: POSITIVE
CORTICOSTEROID BINDING GLOBULIN RESULT: 0.7 MG/DL — LOW
CORTICOSTEROID BINDING GLOBULIN RESULT: 1.4 MG/DL — LOW
CORTIS AM PEAK SERPL-MCNC: 18.97 UG/DL — HIGH (ref 6.02–18.4)
CORTIS F PM SERPL-MCNC: 26.43 UG/DL — HIGH (ref 2.68–10.5)
CORTIS F PM SERPL-MCNC: 33.92 UG/DL — HIGH (ref 2.68–10.5)
CORTIS F PM SERPL-MCNC: 36.05 UG/DL — HIGH (ref 2.68–10.5)
CORTIS F/TOTAL MFR SERPL: 76 % — SIGNIFICANT CHANGE UP
CORTIS F/TOTAL MFR SERPL: 78 % — SIGNIFICANT CHANGE UP
CORTIS SERPL-MCNC: 21 UG/DL — HIGH
CORTIS SERPL-MCNC: 39 UG/DL — HIGH
CORTISOL, FREE RESULT: 16 UG/DL — HIGH
CORTISOL, FREE RESULT: 29 UG/DL — HIGH
CREAT SERPL-MCNC: 1.81 MG/DL — HIGH (ref 0.5–1.3)
CREAT SERPL-MCNC: 10.54 MG/DL — HIGH (ref 0.5–1.3)
CREAT SERPL-MCNC: 11.13 MG/DL — HIGH (ref 0.5–1.3)
CREAT SERPL-MCNC: 17.58 MG/DL — HIGH (ref 0.5–1.3)
CREAT SERPL-MCNC: 2.06 MG/DL — HIGH (ref 0.5–1.3)
CREAT SERPL-MCNC: 2.08 MG/DL — HIGH (ref 0.5–1.3)
CREAT SERPL-MCNC: 2.18 MG/DL — HIGH (ref 0.5–1.3)
CREAT SERPL-MCNC: 2.23 MG/DL — HIGH (ref 0.5–1.3)
CREAT SERPL-MCNC: 2.25 MG/DL — HIGH (ref 0.5–1.3)
CREAT SERPL-MCNC: 2.28 MG/DL — HIGH (ref 0.5–1.3)
CREAT SERPL-MCNC: 2.29 MG/DL — HIGH (ref 0.5–1.3)
CREAT SERPL-MCNC: 2.36 MG/DL — HIGH (ref 0.5–1.3)
CREAT SERPL-MCNC: 2.49 MG/DL — HIGH (ref 0.5–1.3)
CREAT SERPL-MCNC: 2.54 MG/DL — HIGH (ref 0.5–1.3)
CREAT SERPL-MCNC: 2.57 MG/DL — HIGH (ref 0.5–1.3)
CREAT SERPL-MCNC: 2.57 MG/DL — HIGH (ref 0.5–1.3)
CREAT SERPL-MCNC: 2.64 MG/DL — HIGH (ref 0.5–1.3)
CREAT SERPL-MCNC: 2.68 MG/DL — HIGH (ref 0.5–1.3)
CREAT SERPL-MCNC: 2.71 MG/DL — HIGH (ref 0.5–1.3)
CREAT SERPL-MCNC: 2.72 MG/DL — HIGH (ref 0.5–1.3)
CREAT SERPL-MCNC: 2.75 MG/DL — HIGH (ref 0.5–1.3)
CREAT SERPL-MCNC: 2.83 MG/DL — HIGH (ref 0.5–1.3)
CREAT SERPL-MCNC: 2.85 MG/DL — HIGH (ref 0.5–1.3)
CREAT SERPL-MCNC: 2.94 MG/DL — HIGH (ref 0.5–1.3)
CREAT SERPL-MCNC: 2.97 MG/DL — HIGH (ref 0.5–1.3)
CREAT SERPL-MCNC: 3.06 MG/DL — HIGH (ref 0.5–1.3)
CREAT SERPL-MCNC: 3.06 MG/DL — HIGH (ref 0.5–1.3)
CREAT SERPL-MCNC: 3.12 MG/DL — HIGH (ref 0.5–1.3)
CREAT SERPL-MCNC: 3.14 MG/DL — HIGH (ref 0.5–1.3)
CREAT SERPL-MCNC: 3.3 MG/DL — HIGH (ref 0.5–1.3)
CREAT SERPL-MCNC: 3.56 MG/DL — HIGH (ref 0.5–1.3)
CREAT SERPL-MCNC: 3.61 MG/DL — HIGH (ref 0.5–1.3)
CREAT SERPL-MCNC: 3.9 MG/DL — HIGH (ref 0.5–1.3)
CREAT SERPL-MCNC: 4.08 MG/DL — HIGH (ref 0.5–1.3)
CREAT SERPL-MCNC: 4.28 MG/DL — HIGH (ref 0.5–1.3)
CREAT SERPL-MCNC: 4.57 MG/DL — HIGH (ref 0.5–1.3)
CREAT SERPL-MCNC: 4.63 MG/DL — HIGH (ref 0.5–1.3)
CREAT SERPL-MCNC: 4.84 MG/DL — HIGH (ref 0.5–1.3)
CREAT SERPL-MCNC: 5.39 MG/DL — HIGH (ref 0.5–1.3)
CREAT SERPL-MCNC: 5.55 MG/DL — HIGH (ref 0.5–1.3)
CREAT SERPL-MCNC: 5.62 MG/DL — HIGH (ref 0.5–1.3)
CREAT SERPL-MCNC: 5.83 MG/DL — HIGH (ref 0.5–1.3)
CREAT SERPL-MCNC: 5.83 MG/DL — HIGH (ref 0.5–1.3)
CREAT SERPL-MCNC: 5.84 MG/DL — HIGH (ref 0.5–1.3)
CREAT SERPL-MCNC: 5.89 MG/DL — HIGH (ref 0.5–1.3)
CREAT SERPL-MCNC: 6.03 MG/DL — HIGH (ref 0.5–1.3)
CREAT SERPL-MCNC: 6.04 MG/DL — HIGH (ref 0.5–1.3)
CREAT SERPL-MCNC: 6.13 MG/DL — HIGH (ref 0.5–1.3)
CREAT SERPL-MCNC: 6.14 MG/DL — HIGH (ref 0.5–1.3)
CREAT SERPL-MCNC: 6.31 MG/DL — HIGH (ref 0.5–1.3)
CREAT SERPL-MCNC: 6.35 MG/DL — HIGH (ref 0.5–1.3)
CREAT SERPL-MCNC: 6.44 MG/DL — HIGH (ref 0.5–1.3)
CREAT SERPL-MCNC: 6.52 MG/DL — HIGH (ref 0.5–1.3)
CREAT SERPL-MCNC: 6.59 MG/DL — HIGH (ref 0.5–1.3)
CREAT SERPL-MCNC: 6.65 MG/DL — HIGH (ref 0.5–1.3)
CREAT SERPL-MCNC: 6.7 MG/DL — HIGH (ref 0.5–1.3)
CREAT SERPL-MCNC: 6.81 MG/DL — HIGH (ref 0.5–1.3)
CREAT SERPL-MCNC: 6.9 MG/DL — HIGH (ref 0.5–1.3)
CREAT SERPL-MCNC: 6.94 MG/DL — HIGH (ref 0.5–1.3)
CREAT SERPL-MCNC: 7.02 MG/DL — HIGH (ref 0.5–1.3)
CREAT SERPL-MCNC: 7.14 MG/DL — HIGH (ref 0.5–1.3)
CREAT SERPL-MCNC: 7.38 MG/DL — HIGH (ref 0.5–1.3)
CREAT SERPL-MCNC: 7.49 MG/DL — HIGH (ref 0.5–1.3)
CREAT SERPL-MCNC: 7.75 MG/DL — HIGH (ref 0.5–1.3)
CREAT SERPL-MCNC: 8.38 MG/DL — HIGH (ref 0.5–1.3)
CRYOGLOB SERPL-MCNC: NEGATIVE — SIGNIFICANT CHANGE UP
CULTURE RESULTS: NO GROWTH — SIGNIFICANT CHANGE UP
CULTURE RESULTS: SIGNIFICANT CHANGE UP
DACRYOCYTES BLD QL SMEAR: SLIGHT — SIGNIFICANT CHANGE UP
DNA PLOIDY SPEC FC-IMP: SIGNIFICANT CHANGE UP
DRVVT SCREEN TO CONFIRM RATIO: SIGNIFICANT CHANGE UP
EGFR: 10 ML/MIN/1.73M2 — LOW
EGFR: 10 ML/MIN/1.73M2 — LOW
EGFR: 11 ML/MIN/1.73M2 — LOW
EGFR: 12 ML/MIN/1.73M2 — LOW
EGFR: 12 ML/MIN/1.73M2 — LOW
EGFR: 13 ML/MIN/1.73M2 — LOW
EGFR: 14 ML/MIN/1.73M2 — LOW
EGFR: 15 ML/MIN/1.73M2 — LOW
EGFR: 16 ML/MIN/1.73M2 — LOW
EGFR: 17 ML/MIN/1.73M2 — LOW
EGFR: 17 ML/MIN/1.73M2 — LOW
EGFR: 18 ML/MIN/1.73M2 — LOW
EGFR: 18 ML/MIN/1.73M2 — LOW
EGFR: 19 ML/MIN/1.73M2 — LOW
EGFR: 2 ML/MIN/1.73M2 — LOW
EGFR: 20 ML/MIN/1.73M2 — LOW
EGFR: 21 ML/MIN/1.73M2 — LOW
EGFR: 22 ML/MIN/1.73M2 — LOW
EGFR: 23 ML/MIN/1.73M2 — LOW
EGFR: 23 ML/MIN/1.73M2 — LOW
EGFR: 24 ML/MIN/1.73M2 — LOW
EGFR: 24 ML/MIN/1.73M2 — LOW
EGFR: 25 ML/MIN/1.73M2 — LOW
EGFR: 26 ML/MIN/1.73M2 — LOW
EGFR: 26 ML/MIN/1.73M2 — LOW
EGFR: 3 ML/MIN/1.73M2 — LOW
EGFR: 31 ML/MIN/1.73M2 — LOW
EGFR: 4 ML/MIN/1.73M2 — LOW
EGFR: 5 ML/MIN/1.73M2 — LOW
EGFR: 5 ML/MIN/1.73M2 — LOW
EGFR: 6 ML/MIN/1.73M2 — LOW
EGFR: 7 ML/MIN/1.73M2 — LOW
EGFR: 8 ML/MIN/1.73M2 — LOW
EGFR: 9 ML/MIN/1.73M2 — LOW
ELLIPTOCYTES BLD QL SMEAR: SIGNIFICANT CHANGE UP
EOSINOPHIL # BLD AUTO: 0 K/UL — SIGNIFICANT CHANGE UP (ref 0–0.5)
EOSINOPHIL # BLD AUTO: 0.01 K/UL — SIGNIFICANT CHANGE UP (ref 0–0.5)
EOSINOPHIL # BLD AUTO: 0.02 K/UL — SIGNIFICANT CHANGE UP (ref 0–0.5)
EOSINOPHIL # BLD AUTO: 0.03 K/UL — SIGNIFICANT CHANGE UP (ref 0–0.5)
EOSINOPHIL # BLD AUTO: 0.04 K/UL — SIGNIFICANT CHANGE UP (ref 0–0.5)
EOSINOPHIL # BLD AUTO: 0.04 K/UL — SIGNIFICANT CHANGE UP (ref 0–0.5)
EOSINOPHIL # BLD AUTO: 0.05 K/UL — SIGNIFICANT CHANGE UP (ref 0–0.5)
EOSINOPHIL # BLD AUTO: 0.05 K/UL — SIGNIFICANT CHANGE UP (ref 0–0.5)
EOSINOPHIL # BLD AUTO: 0.06 K/UL — SIGNIFICANT CHANGE UP (ref 0–0.5)
EOSINOPHIL # BLD AUTO: 0.07 K/UL — SIGNIFICANT CHANGE UP (ref 0–0.5)
EOSINOPHIL # BLD AUTO: 0.07 K/UL — SIGNIFICANT CHANGE UP (ref 0–0.5)
EOSINOPHIL # BLD AUTO: 0.08 K/UL — SIGNIFICANT CHANGE UP (ref 0–0.5)
EOSINOPHIL # BLD AUTO: 0.09 K/UL — SIGNIFICANT CHANGE UP (ref 0–0.5)
EOSINOPHIL # BLD AUTO: 0.11 K/UL — SIGNIFICANT CHANGE UP (ref 0–0.5)
EOSINOPHIL # BLD AUTO: 0.13 K/UL — SIGNIFICANT CHANGE UP (ref 0–0.5)
EOSINOPHIL # BLD AUTO: 0.22 K/UL — SIGNIFICANT CHANGE UP (ref 0–0.5)
EOSINOPHIL NFR BLD AUTO: 0 % — SIGNIFICANT CHANGE UP (ref 0–6)
EOSINOPHIL NFR BLD AUTO: 0.1 % — SIGNIFICANT CHANGE UP (ref 0–6)
EOSINOPHIL NFR BLD AUTO: 0.2 % — SIGNIFICANT CHANGE UP (ref 0–6)
EOSINOPHIL NFR BLD AUTO: 0.3 % — SIGNIFICANT CHANGE UP (ref 0–6)
EOSINOPHIL NFR BLD AUTO: 0.4 % — SIGNIFICANT CHANGE UP (ref 0–6)
EOSINOPHIL NFR BLD AUTO: 0.5 % — SIGNIFICANT CHANGE UP (ref 0–6)
EOSINOPHIL NFR BLD AUTO: 0.5 % — SIGNIFICANT CHANGE UP (ref 0–6)
EOSINOPHIL NFR BLD AUTO: 0.6 % — SIGNIFICANT CHANGE UP (ref 0–6)
EOSINOPHIL NFR BLD AUTO: 0.9 % — SIGNIFICANT CHANGE UP (ref 0–6)
EOSINOPHIL NFR BLD AUTO: 1 % — SIGNIFICANT CHANGE UP (ref 0–6)
EOSINOPHIL NFR BLD AUTO: 1.3 % — SIGNIFICANT CHANGE UP (ref 0–6)
EOSINOPHIL NFR BLD AUTO: 1.3 % — SIGNIFICANT CHANGE UP (ref 0–6)
EOSINOPHIL NFR BLD AUTO: 1.7 % — SIGNIFICANT CHANGE UP (ref 0–6)
EOSINOPHIL NFR BLD AUTO: 2.1 % — SIGNIFICANT CHANGE UP (ref 0–6)
ERYTHROCYTE [SEDIMENTATION RATE] IN BLOOD: 109 MM/HR — HIGH
ESTIMATED AVERAGE GLUCOSE: 77 MG/DL — SIGNIFICANT CHANGE UP (ref 68–114)
FERRITIN SERPL-MCNC: 1209 NG/ML — HIGH (ref 15–150)
FERRITIN SERPL-MCNC: 1857 NG/ML — HIGH (ref 15–150)
FERRITIN SERPL-MCNC: 1899 NG/ML — HIGH (ref 15–150)
FERRITIN SERPL-MCNC: 1938 NG/ML — HIGH (ref 15–150)
FLUAV AG NPH QL: SIGNIFICANT CHANGE UP
FLUBV AG NPH QL: SIGNIFICANT CHANGE UP
GAMMA GLOBULIN: 0.7 G/DL — SIGNIFICANT CHANGE UP (ref 0.6–1.6)
GAMMA INTERFERON BACKGROUND BLD IA-ACNC: 0.09 IU/ML — SIGNIFICANT CHANGE UP
GAS PNL BLDA: SIGNIFICANT CHANGE UP
GAS PNL BLDV: 129 MMOL/L — LOW (ref 136–145)
GAS PNL BLDV: SIGNIFICANT CHANGE UP
GIANT PLATELETS BLD QL SMEAR: PRESENT — SIGNIFICANT CHANGE UP
GLUCOSE BLDC GLUCOMTR-MCNC: 100 MG/DL — HIGH (ref 70–99)
GLUCOSE BLDC GLUCOMTR-MCNC: 100 MG/DL — HIGH (ref 70–99)
GLUCOSE BLDC GLUCOMTR-MCNC: 101 MG/DL — HIGH (ref 70–99)
GLUCOSE BLDC GLUCOMTR-MCNC: 101 MG/DL — HIGH (ref 70–99)
GLUCOSE BLDC GLUCOMTR-MCNC: 102 MG/DL — HIGH (ref 70–99)
GLUCOSE BLDC GLUCOMTR-MCNC: 102 MG/DL — HIGH (ref 70–99)
GLUCOSE BLDC GLUCOMTR-MCNC: 103 MG/DL — HIGH (ref 70–99)
GLUCOSE BLDC GLUCOMTR-MCNC: 103 MG/DL — HIGH (ref 70–99)
GLUCOSE BLDC GLUCOMTR-MCNC: 104 MG/DL — HIGH (ref 70–99)
GLUCOSE BLDC GLUCOMTR-MCNC: 104 MG/DL — HIGH (ref 70–99)
GLUCOSE BLDC GLUCOMTR-MCNC: 105 MG/DL — HIGH (ref 70–99)
GLUCOSE BLDC GLUCOMTR-MCNC: 105 MG/DL — HIGH (ref 70–99)
GLUCOSE BLDC GLUCOMTR-MCNC: 107 MG/DL — HIGH (ref 70–99)
GLUCOSE BLDC GLUCOMTR-MCNC: 109 MG/DL — HIGH (ref 70–99)
GLUCOSE BLDC GLUCOMTR-MCNC: 111 MG/DL — HIGH (ref 70–99)
GLUCOSE BLDC GLUCOMTR-MCNC: 112 MG/DL — HIGH (ref 70–99)
GLUCOSE BLDC GLUCOMTR-MCNC: 113 MG/DL — HIGH (ref 70–99)
GLUCOSE BLDC GLUCOMTR-MCNC: 113 MG/DL — HIGH (ref 70–99)
GLUCOSE BLDC GLUCOMTR-MCNC: 114 MG/DL — HIGH (ref 70–99)
GLUCOSE BLDC GLUCOMTR-MCNC: 115 MG/DL — HIGH (ref 70–99)
GLUCOSE BLDC GLUCOMTR-MCNC: 117 MG/DL — HIGH (ref 70–99)
GLUCOSE BLDC GLUCOMTR-MCNC: 118 MG/DL — HIGH (ref 70–99)
GLUCOSE BLDC GLUCOMTR-MCNC: 118 MG/DL — HIGH (ref 70–99)
GLUCOSE BLDC GLUCOMTR-MCNC: 119 MG/DL — HIGH (ref 70–99)
GLUCOSE BLDC GLUCOMTR-MCNC: 119 MG/DL — HIGH (ref 70–99)
GLUCOSE BLDC GLUCOMTR-MCNC: 122 MG/DL — HIGH (ref 70–99)
GLUCOSE BLDC GLUCOMTR-MCNC: 122 MG/DL — HIGH (ref 70–99)
GLUCOSE BLDC GLUCOMTR-MCNC: 123 MG/DL — HIGH (ref 70–99)
GLUCOSE BLDC GLUCOMTR-MCNC: 124 MG/DL — HIGH (ref 70–99)
GLUCOSE BLDC GLUCOMTR-MCNC: 126 MG/DL — HIGH (ref 70–99)
GLUCOSE BLDC GLUCOMTR-MCNC: 126 MG/DL — HIGH (ref 70–99)
GLUCOSE BLDC GLUCOMTR-MCNC: 127 MG/DL — HIGH (ref 70–99)
GLUCOSE BLDC GLUCOMTR-MCNC: 128 MG/DL — HIGH (ref 70–99)
GLUCOSE BLDC GLUCOMTR-MCNC: 129 MG/DL — HIGH (ref 70–99)
GLUCOSE BLDC GLUCOMTR-MCNC: 131 MG/DL — HIGH (ref 70–99)
GLUCOSE BLDC GLUCOMTR-MCNC: 132 MG/DL — HIGH (ref 70–99)
GLUCOSE BLDC GLUCOMTR-MCNC: 133 MG/DL — HIGH (ref 70–99)
GLUCOSE BLDC GLUCOMTR-MCNC: 135 MG/DL — HIGH (ref 70–99)
GLUCOSE BLDC GLUCOMTR-MCNC: 136 MG/DL — HIGH (ref 70–99)
GLUCOSE BLDC GLUCOMTR-MCNC: 139 MG/DL — HIGH (ref 70–99)
GLUCOSE BLDC GLUCOMTR-MCNC: 140 MG/DL — HIGH (ref 70–99)
GLUCOSE BLDC GLUCOMTR-MCNC: 140 MG/DL — HIGH (ref 70–99)
GLUCOSE BLDC GLUCOMTR-MCNC: 141 MG/DL — HIGH (ref 70–99)
GLUCOSE BLDC GLUCOMTR-MCNC: 142 MG/DL — HIGH (ref 70–99)
GLUCOSE BLDC GLUCOMTR-MCNC: 143 MG/DL — HIGH (ref 70–99)
GLUCOSE BLDC GLUCOMTR-MCNC: 144 MG/DL — HIGH (ref 70–99)
GLUCOSE BLDC GLUCOMTR-MCNC: 152 MG/DL — HIGH (ref 70–99)
GLUCOSE BLDC GLUCOMTR-MCNC: 160 MG/DL — HIGH (ref 70–99)
GLUCOSE BLDC GLUCOMTR-MCNC: 160 MG/DL — HIGH (ref 70–99)
GLUCOSE BLDC GLUCOMTR-MCNC: 164 MG/DL — HIGH (ref 70–99)
GLUCOSE BLDC GLUCOMTR-MCNC: 192 MG/DL — HIGH (ref 70–99)
GLUCOSE BLDC GLUCOMTR-MCNC: 193 MG/DL — HIGH (ref 70–99)
GLUCOSE BLDC GLUCOMTR-MCNC: 196 MG/DL — HIGH (ref 70–99)
GLUCOSE BLDC GLUCOMTR-MCNC: 40 MG/DL — CRITICAL LOW (ref 70–99)
GLUCOSE BLDC GLUCOMTR-MCNC: 414 MG/DL — HIGH (ref 70–99)
GLUCOSE BLDC GLUCOMTR-MCNC: 45 MG/DL — CRITICAL LOW (ref 70–99)
GLUCOSE BLDC GLUCOMTR-MCNC: 45 MG/DL — CRITICAL LOW (ref 70–99)
GLUCOSE BLDC GLUCOMTR-MCNC: 47 MG/DL — CRITICAL LOW (ref 70–99)
GLUCOSE BLDC GLUCOMTR-MCNC: 51 MG/DL — CRITICAL LOW (ref 70–99)
GLUCOSE BLDC GLUCOMTR-MCNC: 52 MG/DL — CRITICAL LOW (ref 70–99)
GLUCOSE BLDC GLUCOMTR-MCNC: 60 MG/DL — LOW (ref 70–99)
GLUCOSE BLDC GLUCOMTR-MCNC: 65 MG/DL — LOW (ref 70–99)
GLUCOSE BLDC GLUCOMTR-MCNC: 66 MG/DL — LOW (ref 70–99)
GLUCOSE BLDC GLUCOMTR-MCNC: 67 MG/DL — LOW (ref 70–99)
GLUCOSE BLDC GLUCOMTR-MCNC: 68 MG/DL — LOW (ref 70–99)
GLUCOSE BLDC GLUCOMTR-MCNC: 69 MG/DL — LOW (ref 70–99)
GLUCOSE BLDC GLUCOMTR-MCNC: 70 MG/DL — SIGNIFICANT CHANGE UP (ref 70–99)
GLUCOSE BLDC GLUCOMTR-MCNC: 71 MG/DL — SIGNIFICANT CHANGE UP (ref 70–99)
GLUCOSE BLDC GLUCOMTR-MCNC: 71 MG/DL — SIGNIFICANT CHANGE UP (ref 70–99)
GLUCOSE BLDC GLUCOMTR-MCNC: 72 MG/DL — SIGNIFICANT CHANGE UP (ref 70–99)
GLUCOSE BLDC GLUCOMTR-MCNC: 73 MG/DL — SIGNIFICANT CHANGE UP (ref 70–99)
GLUCOSE BLDC GLUCOMTR-MCNC: 73 MG/DL — SIGNIFICANT CHANGE UP (ref 70–99)
GLUCOSE BLDC GLUCOMTR-MCNC: 75 MG/DL — SIGNIFICANT CHANGE UP (ref 70–99)
GLUCOSE BLDC GLUCOMTR-MCNC: 76 MG/DL — SIGNIFICANT CHANGE UP (ref 70–99)
GLUCOSE BLDC GLUCOMTR-MCNC: 76 MG/DL — SIGNIFICANT CHANGE UP (ref 70–99)
GLUCOSE BLDC GLUCOMTR-MCNC: 77 MG/DL — SIGNIFICANT CHANGE UP (ref 70–99)
GLUCOSE BLDC GLUCOMTR-MCNC: 77 MG/DL — SIGNIFICANT CHANGE UP (ref 70–99)
GLUCOSE BLDC GLUCOMTR-MCNC: 78 MG/DL — SIGNIFICANT CHANGE UP (ref 70–99)
GLUCOSE BLDC GLUCOMTR-MCNC: 78 MG/DL — SIGNIFICANT CHANGE UP (ref 70–99)
GLUCOSE BLDC GLUCOMTR-MCNC: 79 MG/DL — SIGNIFICANT CHANGE UP (ref 70–99)
GLUCOSE BLDC GLUCOMTR-MCNC: 80 MG/DL — SIGNIFICANT CHANGE UP (ref 70–99)
GLUCOSE BLDC GLUCOMTR-MCNC: 81 MG/DL — SIGNIFICANT CHANGE UP (ref 70–99)
GLUCOSE BLDC GLUCOMTR-MCNC: 81 MG/DL — SIGNIFICANT CHANGE UP (ref 70–99)
GLUCOSE BLDC GLUCOMTR-MCNC: 82 MG/DL — SIGNIFICANT CHANGE UP (ref 70–99)
GLUCOSE BLDC GLUCOMTR-MCNC: 83 MG/DL — SIGNIFICANT CHANGE UP (ref 70–99)
GLUCOSE BLDC GLUCOMTR-MCNC: 83 MG/DL — SIGNIFICANT CHANGE UP (ref 70–99)
GLUCOSE BLDC GLUCOMTR-MCNC: 84 MG/DL — SIGNIFICANT CHANGE UP (ref 70–99)
GLUCOSE BLDC GLUCOMTR-MCNC: 85 MG/DL — SIGNIFICANT CHANGE UP (ref 70–99)
GLUCOSE BLDC GLUCOMTR-MCNC: 86 MG/DL — SIGNIFICANT CHANGE UP (ref 70–99)
GLUCOSE BLDC GLUCOMTR-MCNC: 86 MG/DL — SIGNIFICANT CHANGE UP (ref 70–99)
GLUCOSE BLDC GLUCOMTR-MCNC: 87 MG/DL — SIGNIFICANT CHANGE UP (ref 70–99)
GLUCOSE BLDC GLUCOMTR-MCNC: 88 MG/DL — SIGNIFICANT CHANGE UP (ref 70–99)
GLUCOSE BLDC GLUCOMTR-MCNC: 89 MG/DL — SIGNIFICANT CHANGE UP (ref 70–99)
GLUCOSE BLDC GLUCOMTR-MCNC: 90 MG/DL — SIGNIFICANT CHANGE UP (ref 70–99)
GLUCOSE BLDC GLUCOMTR-MCNC: 91 MG/DL — SIGNIFICANT CHANGE UP (ref 70–99)
GLUCOSE BLDC GLUCOMTR-MCNC: 92 MG/DL — SIGNIFICANT CHANGE UP (ref 70–99)
GLUCOSE BLDC GLUCOMTR-MCNC: 93 MG/DL — SIGNIFICANT CHANGE UP (ref 70–99)
GLUCOSE BLDC GLUCOMTR-MCNC: 93 MG/DL — SIGNIFICANT CHANGE UP (ref 70–99)
GLUCOSE BLDC GLUCOMTR-MCNC: 94 MG/DL — SIGNIFICANT CHANGE UP (ref 70–99)
GLUCOSE BLDC GLUCOMTR-MCNC: 95 MG/DL — SIGNIFICANT CHANGE UP (ref 70–99)
GLUCOSE BLDC GLUCOMTR-MCNC: 96 MG/DL — SIGNIFICANT CHANGE UP (ref 70–99)
GLUCOSE BLDC GLUCOMTR-MCNC: 97 MG/DL — SIGNIFICANT CHANGE UP (ref 70–99)
GLUCOSE BLDC GLUCOMTR-MCNC: 98 MG/DL — SIGNIFICANT CHANGE UP (ref 70–99)
GLUCOSE BLDC GLUCOMTR-MCNC: 99 MG/DL — SIGNIFICANT CHANGE UP (ref 70–99)
GLUCOSE BLDC GLUCOMTR-MCNC: 99 MG/DL — SIGNIFICANT CHANGE UP (ref 70–99)
GLUCOSE SERPL-MCNC: 100 MG/DL — HIGH (ref 70–99)
GLUCOSE SERPL-MCNC: 101 MG/DL — HIGH (ref 70–99)
GLUCOSE SERPL-MCNC: 102 MG/DL — HIGH (ref 70–99)
GLUCOSE SERPL-MCNC: 105 MG/DL — HIGH (ref 70–99)
GLUCOSE SERPL-MCNC: 105 MG/DL — HIGH (ref 70–99)
GLUCOSE SERPL-MCNC: 106 MG/DL — HIGH (ref 70–99)
GLUCOSE SERPL-MCNC: 106 MG/DL — HIGH (ref 70–99)
GLUCOSE SERPL-MCNC: 107 MG/DL — HIGH (ref 70–99)
GLUCOSE SERPL-MCNC: 108 MG/DL — HIGH (ref 70–99)
GLUCOSE SERPL-MCNC: 108 MG/DL — HIGH (ref 70–99)
GLUCOSE SERPL-MCNC: 109 MG/DL — HIGH (ref 70–99)
GLUCOSE SERPL-MCNC: 110 MG/DL — HIGH (ref 70–99)
GLUCOSE SERPL-MCNC: 111 MG/DL — HIGH (ref 70–99)
GLUCOSE SERPL-MCNC: 115 MG/DL — HIGH (ref 70–99)
GLUCOSE SERPL-MCNC: 118 MG/DL — HIGH (ref 70–99)
GLUCOSE SERPL-MCNC: 127 MG/DL — HIGH (ref 70–99)
GLUCOSE SERPL-MCNC: 129 MG/DL — HIGH (ref 70–99)
GLUCOSE SERPL-MCNC: 130 MG/DL — HIGH (ref 70–99)
GLUCOSE SERPL-MCNC: 138 MG/DL — HIGH (ref 70–99)
GLUCOSE SERPL-MCNC: 147 MG/DL — HIGH (ref 70–99)
GLUCOSE SERPL-MCNC: 172 MG/DL — HIGH (ref 70–99)
GLUCOSE SERPL-MCNC: 228 MG/DL — HIGH (ref 70–99)
GLUCOSE SERPL-MCNC: 50 MG/DL — CRITICAL LOW (ref 70–99)
GLUCOSE SERPL-MCNC: 55 MG/DL — LOW (ref 70–99)
GLUCOSE SERPL-MCNC: 59 MG/DL — LOW (ref 70–99)
GLUCOSE SERPL-MCNC: 64 MG/DL — LOW (ref 70–99)
GLUCOSE SERPL-MCNC: 66 MG/DL — LOW (ref 70–99)
GLUCOSE SERPL-MCNC: 69 MG/DL — LOW (ref 70–99)
GLUCOSE SERPL-MCNC: 70 MG/DL — SIGNIFICANT CHANGE UP (ref 70–99)
GLUCOSE SERPL-MCNC: 72 MG/DL — SIGNIFICANT CHANGE UP (ref 70–99)
GLUCOSE SERPL-MCNC: 74 MG/DL — SIGNIFICANT CHANGE UP (ref 70–99)
GLUCOSE SERPL-MCNC: 75 MG/DL — SIGNIFICANT CHANGE UP (ref 70–99)
GLUCOSE SERPL-MCNC: 75 MG/DL — SIGNIFICANT CHANGE UP (ref 70–99)
GLUCOSE SERPL-MCNC: 76 MG/DL — SIGNIFICANT CHANGE UP (ref 70–99)
GLUCOSE SERPL-MCNC: 76 MG/DL — SIGNIFICANT CHANGE UP (ref 70–99)
GLUCOSE SERPL-MCNC: 78 MG/DL — SIGNIFICANT CHANGE UP (ref 70–99)
GLUCOSE SERPL-MCNC: 79 MG/DL — SIGNIFICANT CHANGE UP (ref 70–99)
GLUCOSE SERPL-MCNC: 80 MG/DL — SIGNIFICANT CHANGE UP (ref 70–99)
GLUCOSE SERPL-MCNC: 80 MG/DL — SIGNIFICANT CHANGE UP (ref 70–99)
GLUCOSE SERPL-MCNC: 81 MG/DL — SIGNIFICANT CHANGE UP (ref 70–99)
GLUCOSE SERPL-MCNC: 83 MG/DL — SIGNIFICANT CHANGE UP (ref 70–99)
GLUCOSE SERPL-MCNC: 83 MG/DL — SIGNIFICANT CHANGE UP (ref 70–99)
GLUCOSE SERPL-MCNC: 84 MG/DL — SIGNIFICANT CHANGE UP (ref 70–99)
GLUCOSE SERPL-MCNC: 84 MG/DL — SIGNIFICANT CHANGE UP (ref 70–99)
GLUCOSE SERPL-MCNC: 85 MG/DL — SIGNIFICANT CHANGE UP (ref 70–99)
GLUCOSE SERPL-MCNC: 86 MG/DL — SIGNIFICANT CHANGE UP (ref 70–99)
GLUCOSE SERPL-MCNC: 87 MG/DL — SIGNIFICANT CHANGE UP (ref 70–99)
GLUCOSE SERPL-MCNC: 89 MG/DL — SIGNIFICANT CHANGE UP (ref 70–99)
GLUCOSE SERPL-MCNC: 90 MG/DL — SIGNIFICANT CHANGE UP (ref 70–99)
GLUCOSE SERPL-MCNC: 91 MG/DL — SIGNIFICANT CHANGE UP (ref 70–99)
GLUCOSE SERPL-MCNC: 91 MG/DL — SIGNIFICANT CHANGE UP (ref 70–99)
GLUCOSE SERPL-MCNC: 92 MG/DL — SIGNIFICANT CHANGE UP (ref 70–99)
GLUCOSE SERPL-MCNC: 92 MG/DL — SIGNIFICANT CHANGE UP (ref 70–99)
GLUCOSE SERPL-MCNC: 93 MG/DL — SIGNIFICANT CHANGE UP (ref 70–99)
GLUCOSE SERPL-MCNC: 97 MG/DL — SIGNIFICANT CHANGE UP (ref 70–99)
GLUCOSE SERPL-MCNC: 99 MG/DL — SIGNIFICANT CHANGE UP (ref 70–99)
GRAM STN FLD: SIGNIFICANT CHANGE UP
GRAM STN FLD: SIGNIFICANT CHANGE UP
HAPTOGLOB SERPL-MCNC: 203 MG/DL — HIGH (ref 34–200)
HAV IGM SER-ACNC: SIGNIFICANT CHANGE UP
HBV CORE AB SER-ACNC: SIGNIFICANT CHANGE UP
HBV CORE IGM SER-ACNC: SIGNIFICANT CHANGE UP
HBV SURFACE AB SER-ACNC: SIGNIFICANT CHANGE UP
HBV SURFACE AB SER-ACNC: SIGNIFICANT CHANGE UP
HBV SURFACE AG SER-ACNC: SIGNIFICANT CHANGE UP
HCO3 BLDV-SCNC: SIGNIFICANT CHANGE UP MMOL/L (ref 22–29)
HCT VFR BLD CALC: 22.4 % — LOW (ref 34.5–45)
HCT VFR BLD CALC: 22.9 % — LOW (ref 34.5–45)
HCT VFR BLD CALC: 23 % — LOW (ref 34.5–45)
HCT VFR BLD CALC: 23.1 % — LOW (ref 34.5–45)
HCT VFR BLD CALC: 23.2 % — LOW (ref 34.5–45)
HCT VFR BLD CALC: 23.5 % — LOW (ref 34.5–45)
HCT VFR BLD CALC: 23.9 % — LOW (ref 34.5–45)
HCT VFR BLD CALC: 24.1 % — LOW (ref 34.5–45)
HCT VFR BLD CALC: 24.3 % — LOW (ref 34.5–45)
HCT VFR BLD CALC: 24.3 % — LOW (ref 34.5–45)
HCT VFR BLD CALC: 24.4 % — LOW (ref 34.5–45)
HCT VFR BLD CALC: 24.4 % — LOW (ref 34.5–45)
HCT VFR BLD CALC: 24.5 % — LOW (ref 34.5–45)
HCT VFR BLD CALC: 24.9 % — LOW (ref 34.5–45)
HCT VFR BLD CALC: 25 % — LOW (ref 34.5–45)
HCT VFR BLD CALC: 25.2 % — LOW (ref 34.5–45)
HCT VFR BLD CALC: 25.2 % — LOW (ref 34.5–45)
HCT VFR BLD CALC: 25.4 % — LOW (ref 34.5–45)
HCT VFR BLD CALC: 25.5 % — LOW (ref 34.5–45)
HCT VFR BLD CALC: 26.1 % — LOW (ref 34.5–45)
HCT VFR BLD CALC: 26.3 % — LOW (ref 34.5–45)
HCT VFR BLD CALC: 26.3 % — LOW (ref 34.5–45)
HCT VFR BLD CALC: 26.5 % — LOW (ref 34.5–45)
HCT VFR BLD CALC: 26.6 % — LOW (ref 34.5–45)
HCT VFR BLD CALC: 27 % — LOW (ref 34.5–45)
HCT VFR BLD CALC: 27 % — LOW (ref 34.5–45)
HCT VFR BLD CALC: 27.1 % — LOW (ref 34.5–45)
HCT VFR BLD CALC: 27.2 % — LOW (ref 34.5–45)
HCT VFR BLD CALC: 27.2 % — LOW (ref 34.5–45)
HCT VFR BLD CALC: 27.3 % — LOW (ref 34.5–45)
HCT VFR BLD CALC: 27.4 % — LOW (ref 34.5–45)
HCT VFR BLD CALC: 27.4 % — LOW (ref 34.5–45)
HCT VFR BLD CALC: 27.5 % — LOW (ref 34.5–45)
HCT VFR BLD CALC: 27.5 % — LOW (ref 34.5–45)
HCT VFR BLD CALC: 27.6 % — LOW (ref 34.5–45)
HCT VFR BLD CALC: 27.7 % — LOW (ref 34.5–45)
HCT VFR BLD CALC: 28 % — LOW (ref 34.5–45)
HCT VFR BLD CALC: 28.2 % — LOW (ref 34.5–45)
HCT VFR BLD CALC: 28.4 % — LOW (ref 34.5–45)
HCT VFR BLD CALC: 28.5 % — LOW (ref 34.5–45)
HCT VFR BLD CALC: 28.6 % — LOW (ref 34.5–45)
HCT VFR BLD CALC: 28.7 % — LOW (ref 34.5–45)
HCT VFR BLD CALC: 28.7 % — LOW (ref 34.5–45)
HCT VFR BLD CALC: 28.8 % — LOW (ref 34.5–45)
HCT VFR BLD CALC: 29.1 % — LOW (ref 34.5–45)
HCT VFR BLD CALC: 29.1 % — LOW (ref 34.5–45)
HCT VFR BLD CALC: 29.7 % — LOW (ref 34.5–45)
HCT VFR BLD CALC: 29.8 % — LOW (ref 34.5–45)
HCT VFR BLD CALC: 30.1 % — LOW (ref 34.5–45)
HCT VFR BLD CALC: 30.1 % — LOW (ref 34.5–45)
HCT VFR BLD CALC: 30.6 % — LOW (ref 34.5–45)
HCT VFR BLD CALC: 30.8 % — LOW (ref 34.5–45)
HCT VFR BLD CALC: 31.1 % — LOW (ref 34.5–45)
HCT VFR BLD CALC: 31.2 % — LOW (ref 34.5–45)
HCT VFR BLD CALC: 31.4 % — LOW (ref 34.5–45)
HCT VFR BLD CALC: 31.4 % — LOW (ref 34.5–45)
HCT VFR BLD CALC: 31.8 % — LOW (ref 34.5–45)
HCT VFR BLD CALC: 31.8 % — LOW (ref 34.5–45)
HCT VFR BLD CALC: 32 % — LOW (ref 34.5–45)
HCT VFR BLD CALC: 32.9 % — LOW (ref 34.5–45)
HCT VFR BLD CALC: 33 % — LOW (ref 34.5–45)
HCV AB S/CO SERPL IA: 0.04 S/CO — SIGNIFICANT CHANGE UP
HCV AB S/CO SERPL IA: 0.04 S/CO — SIGNIFICANT CHANGE UP
HCV AB S/CO SERPL IA: 0.1 S/CO — SIGNIFICANT CHANGE UP (ref 0–0.99)
HCV AB SERPL-IMP: SIGNIFICANT CHANGE UP
HCYS SERPL-MCNC: 7.7 UMOL/L — SIGNIFICANT CHANGE UP
HGB BLD-MCNC: 6.2 G/DL — CRITICAL LOW (ref 11.5–15.5)
HGB BLD-MCNC: 6.7 G/DL — CRITICAL LOW (ref 11.5–15.5)
HGB BLD-MCNC: 6.8 G/DL — CRITICAL LOW (ref 11.5–15.5)
HGB BLD-MCNC: 6.9 G/DL — CRITICAL LOW (ref 11.5–15.5)
HGB BLD-MCNC: 6.9 G/DL — CRITICAL LOW (ref 11.5–15.5)
HGB BLD-MCNC: 7 G/DL — CRITICAL LOW (ref 11.5–15.5)
HGB BLD-MCNC: 7.1 G/DL — LOW (ref 11.5–15.5)
HGB BLD-MCNC: 7.1 G/DL — LOW (ref 11.5–15.5)
HGB BLD-MCNC: 7.2 G/DL — LOW (ref 11.5–15.5)
HGB BLD-MCNC: 7.2 G/DL — LOW (ref 11.5–15.5)
HGB BLD-MCNC: 7.3 G/DL — LOW (ref 11.5–15.5)
HGB BLD-MCNC: 7.4 G/DL — LOW (ref 11.5–15.5)
HGB BLD-MCNC: 7.4 G/DL — LOW (ref 11.5–15.5)
HGB BLD-MCNC: 7.5 G/DL — LOW (ref 11.5–15.5)
HGB BLD-MCNC: 7.6 G/DL — LOW (ref 11.5–15.5)
HGB BLD-MCNC: 7.7 G/DL — LOW (ref 11.5–15.5)
HGB BLD-MCNC: 7.8 G/DL — LOW (ref 11.5–15.5)
HGB BLD-MCNC: 7.8 G/DL — LOW (ref 11.5–15.5)
HGB BLD-MCNC: 7.9 G/DL — LOW (ref 11.5–15.5)
HGB BLD-MCNC: 8 G/DL — LOW (ref 11.5–15.5)
HGB BLD-MCNC: 8.1 G/DL — LOW (ref 11.5–15.5)
HGB BLD-MCNC: 8.2 G/DL — LOW (ref 11.5–15.5)
HGB BLD-MCNC: 8.3 G/DL — LOW (ref 11.5–15.5)
HGB BLD-MCNC: 8.4 G/DL — LOW (ref 11.5–15.5)
HGB BLD-MCNC: 8.5 G/DL — LOW (ref 11.5–15.5)
HGB BLD-MCNC: 8.6 G/DL — LOW (ref 11.5–15.5)
HGB BLD-MCNC: 8.7 G/DL — LOW (ref 11.5–15.5)
HGB BLD-MCNC: 8.8 G/DL — LOW (ref 11.5–15.5)
HGB BLD-MCNC: 8.8 G/DL — LOW (ref 11.5–15.5)
HGB BLD-MCNC: 8.9 G/DL — LOW (ref 11.5–15.5)
HGB BLD-MCNC: 8.9 G/DL — LOW (ref 11.5–15.5)
HGB BLD-MCNC: 9 G/DL — LOW (ref 11.5–15.5)
HGB BLD-MCNC: 9.1 G/DL — LOW (ref 11.5–15.5)
HGB BLD-MCNC: 9.2 G/DL — LOW (ref 11.5–15.5)
HGB BLD-MCNC: 9.2 G/DL — LOW (ref 11.5–15.5)
HGB BLD-MCNC: 9.4 G/DL — LOW (ref 11.5–15.5)
HGB BLD-MCNC: 9.5 G/DL — LOW (ref 11.5–15.5)
HGB BLD-MCNC: 9.6 G/DL — LOW (ref 11.5–15.5)
HGB BLD-MCNC: 9.8 G/DL — LOW (ref 11.5–15.5)
HYPOCHROMIA BLD QL: SIGNIFICANT CHANGE UP
HYPOCHROMIA BLD QL: SLIGHT — SIGNIFICANT CHANGE UP
IMM GRANULOCYTES NFR BLD AUTO: 0.5 % — SIGNIFICANT CHANGE UP (ref 0–0.9)
IMM GRANULOCYTES NFR BLD AUTO: 0.6 % — SIGNIFICANT CHANGE UP (ref 0–0.9)
IMM GRANULOCYTES NFR BLD AUTO: 0.7 % — SIGNIFICANT CHANGE UP (ref 0–0.9)
IMM GRANULOCYTES NFR BLD AUTO: 0.8 % — SIGNIFICANT CHANGE UP (ref 0–0.9)
IMM GRANULOCYTES NFR BLD AUTO: 0.9 % — SIGNIFICANT CHANGE UP (ref 0–0.9)
IMM GRANULOCYTES NFR BLD AUTO: 1 % — HIGH (ref 0–0.9)
IMM GRANULOCYTES NFR BLD AUTO: 1.1 % — HIGH (ref 0–0.9)
IMM GRANULOCYTES NFR BLD AUTO: 1.2 % — HIGH (ref 0–0.9)
IMM GRANULOCYTES NFR BLD AUTO: 1.3 % — HIGH (ref 0–0.9)
IMM GRANULOCYTES NFR BLD AUTO: 1.4 % — HIGH (ref 0–0.9)
IMM GRANULOCYTES NFR BLD AUTO: 1.6 % — HIGH (ref 0–0.9)
IMM GRANULOCYTES NFR BLD AUTO: 2 % — HIGH (ref 0–0.9)
IMM GRANULOCYTES NFR BLD AUTO: 2.1 % — HIGH (ref 0–0.9)
IMM GRANULOCYTES NFR BLD AUTO: 2.1 % — HIGH (ref 0–0.9)
IMM GRANULOCYTES NFR BLD AUTO: 2.8 % — HIGH (ref 0–0.9)
IMM GRANULOCYTES NFR BLD AUTO: 4.6 % — HIGH (ref 0–0.9)
INR BLD: 1.14 — SIGNIFICANT CHANGE UP (ref 0.88–1.16)
INR BLD: 1.19 — HIGH (ref 0.88–1.16)
INR BLD: 1.25 — HIGH (ref 0.88–1.16)
INR BLD: 1.26 — HIGH (ref 0.88–1.16)
INR BLD: 1.27 — HIGH (ref 0.88–1.16)
INR BLD: 1.27 — HIGH (ref 0.88–1.16)
INR BLD: 1.29 — HIGH (ref 0.88–1.16)
INR BLD: 1.29 — HIGH (ref 0.88–1.16)
INR BLD: 1.3 — HIGH (ref 0.88–1.16)
INR BLD: 1.32 — HIGH (ref 0.88–1.16)
INR BLD: 1.32 — HIGH (ref 0.88–1.16)
INR BLD: 1.35 — HIGH (ref 0.88–1.16)
INR BLD: 1.36 — HIGH (ref 0.88–1.16)
INR BLD: 1.39 — HIGH (ref 0.88–1.16)
INR BLD: 1.39 — HIGH (ref 0.88–1.16)
INR BLD: 1.41 — HIGH (ref 0.88–1.16)
INR BLD: 1.54 — HIGH (ref 0.88–1.16)
INR BLD: 1.75 — HIGH (ref 0.88–1.16)
INR BLD: 1.76 — HIGH (ref 0.88–1.16)
INR BLD: 2.03 — HIGH (ref 0.88–1.16)
INR BLD: 2.48 — HIGH (ref 0.88–1.16)
INR BLD: 2.5 — HIGH (ref 0.88–1.16)
INR BLD: 3.58 — HIGH (ref 0.88–1.16)
INTERPRETATION SERPL IFE-IMP: SIGNIFICANT CHANGE UP
IRON SATN MFR SERPL: 15 UG/DL — LOW (ref 30–160)
IRON SATN MFR SERPL: 15 UG/DL — LOW (ref 30–160)
IRON SATN MFR SERPL: 19 % — SIGNIFICANT CHANGE UP (ref 14–50)
IRON SATN MFR SERPL: 20 % — SIGNIFICANT CHANGE UP (ref 14–50)
IRON SATN MFR SERPL: 24 UG/DL — LOW (ref 30–160)
IRON SATN MFR SERPL: 32 UG/DL — SIGNIFICANT CHANGE UP (ref 30–160)
IRON SATN MFR SERPL: 35 % — SIGNIFICANT CHANGE UP (ref 14–50)
IRON SATN MFR SERPL: 37 % — SIGNIFICANT CHANGE UP (ref 14–50)
LA NT DPL PPP QL: 40.1 SEC — SIGNIFICANT CHANGE UP
LACTATE SERPL-SCNC: 0.8 MMOL/L — SIGNIFICANT CHANGE UP (ref 0.5–2)
LACTATE SERPL-SCNC: 1.2 MMOL/L — SIGNIFICANT CHANGE UP (ref 0.5–2)
LACTATE SERPL-SCNC: 1.2 MMOL/L — SIGNIFICANT CHANGE UP (ref 0.5–2)
LACTATE SERPL-SCNC: 1.4 MMOL/L — SIGNIFICANT CHANGE UP (ref 0.5–2)
LACTATE SERPL-SCNC: 1.5 MMOL/L — SIGNIFICANT CHANGE UP (ref 0.5–2)
LACTATE SERPL-SCNC: 1.7 MMOL/L — SIGNIFICANT CHANGE UP (ref 0.5–2)
LACTATE SERPL-SCNC: 1.8 MMOL/L — SIGNIFICANT CHANGE UP (ref 0.5–2)
LACTATE SERPL-SCNC: 1.9 MMOL/L — SIGNIFICANT CHANGE UP (ref 0.5–2)
LACTATE SERPL-SCNC: 2.7 MMOL/L — HIGH (ref 0.5–2)
LDH SERPL L TO P-CCNC: 107 U/L — SIGNIFICANT CHANGE UP (ref 50–242)
LDH SERPL L TO P-CCNC: 171 U/L — SIGNIFICANT CHANGE UP (ref 50–242)
LYMPHOCYTES # BLD AUTO: 0.35 K/UL — LOW (ref 1–3.3)
LYMPHOCYTES # BLD AUTO: 0.45 K/UL — LOW (ref 1–3.3)
LYMPHOCYTES # BLD AUTO: 0.46 K/UL — LOW (ref 1–3.3)
LYMPHOCYTES # BLD AUTO: 0.54 K/UL — LOW (ref 1–3.3)
LYMPHOCYTES # BLD AUTO: 0.6 K/UL — LOW (ref 1–3.3)
LYMPHOCYTES # BLD AUTO: 0.62 K/UL — LOW (ref 1–3.3)
LYMPHOCYTES # BLD AUTO: 0.66 K/UL — LOW (ref 1–3.3)
LYMPHOCYTES # BLD AUTO: 0.7 K/UL — LOW (ref 1–3.3)
LYMPHOCYTES # BLD AUTO: 0.71 K/UL — LOW (ref 1–3.3)
LYMPHOCYTES # BLD AUTO: 0.72 K/UL — LOW (ref 1–3.3)
LYMPHOCYTES # BLD AUTO: 0.74 K/UL — LOW (ref 1–3.3)
LYMPHOCYTES # BLD AUTO: 0.78 K/UL — LOW (ref 1–3.3)
LYMPHOCYTES # BLD AUTO: 0.79 K/UL — LOW (ref 1–3.3)
LYMPHOCYTES # BLD AUTO: 0.81 K/UL — LOW (ref 1–3.3)
LYMPHOCYTES # BLD AUTO: 0.84 K/UL — LOW (ref 1–3.3)
LYMPHOCYTES # BLD AUTO: 0.85 K/UL — LOW (ref 1–3.3)
LYMPHOCYTES # BLD AUTO: 0.89 K/UL — LOW (ref 1–3.3)
LYMPHOCYTES # BLD AUTO: 0.93 K/UL — LOW (ref 1–3.3)
LYMPHOCYTES # BLD AUTO: 0.96 K/UL — LOW (ref 1–3.3)
LYMPHOCYTES # BLD AUTO: 0.97 K/UL — LOW (ref 1–3.3)
LYMPHOCYTES # BLD AUTO: 1.05 K/UL — SIGNIFICANT CHANGE UP (ref 1–3.3)
LYMPHOCYTES # BLD AUTO: 1.09 K/UL — SIGNIFICANT CHANGE UP (ref 1–3.3)
LYMPHOCYTES # BLD AUTO: 1.09 K/UL — SIGNIFICANT CHANGE UP (ref 1–3.3)
LYMPHOCYTES # BLD AUTO: 1.14 K/UL — SIGNIFICANT CHANGE UP (ref 1–3.3)
LYMPHOCYTES # BLD AUTO: 1.16 K/UL — SIGNIFICANT CHANGE UP (ref 1–3.3)
LYMPHOCYTES # BLD AUTO: 1.17 K/UL — SIGNIFICANT CHANGE UP (ref 1–3.3)
LYMPHOCYTES # BLD AUTO: 1.17 K/UL — SIGNIFICANT CHANGE UP (ref 1–3.3)
LYMPHOCYTES # BLD AUTO: 1.22 K/UL — SIGNIFICANT CHANGE UP (ref 1–3.3)
LYMPHOCYTES # BLD AUTO: 1.23 K/UL — SIGNIFICANT CHANGE UP (ref 1–3.3)
LYMPHOCYTES # BLD AUTO: 1.23 K/UL — SIGNIFICANT CHANGE UP (ref 1–3.3)
LYMPHOCYTES # BLD AUTO: 1.24 K/UL — SIGNIFICANT CHANGE UP (ref 1–3.3)
LYMPHOCYTES # BLD AUTO: 1.27 K/UL — SIGNIFICANT CHANGE UP (ref 1–3.3)
LYMPHOCYTES # BLD AUTO: 1.32 K/UL — SIGNIFICANT CHANGE UP (ref 1–3.3)
LYMPHOCYTES # BLD AUTO: 1.32 K/UL — SIGNIFICANT CHANGE UP (ref 1–3.3)
LYMPHOCYTES # BLD AUTO: 1.41 K/UL — SIGNIFICANT CHANGE UP (ref 1–3.3)
LYMPHOCYTES # BLD AUTO: 1.46 K/UL — SIGNIFICANT CHANGE UP (ref 1–3.3)
LYMPHOCYTES # BLD AUTO: 1.46 K/UL — SIGNIFICANT CHANGE UP (ref 1–3.3)
LYMPHOCYTES # BLD AUTO: 1.47 K/UL — SIGNIFICANT CHANGE UP (ref 1–3.3)
LYMPHOCYTES # BLD AUTO: 1.5 K/UL — SIGNIFICANT CHANGE UP (ref 1–3.3)
LYMPHOCYTES # BLD AUTO: 1.52 K/UL — SIGNIFICANT CHANGE UP (ref 1–3.3)
LYMPHOCYTES # BLD AUTO: 1.55 K/UL — SIGNIFICANT CHANGE UP (ref 1–3.3)
LYMPHOCYTES # BLD AUTO: 1.6 K/UL — SIGNIFICANT CHANGE UP (ref 1–3.3)
LYMPHOCYTES # BLD AUTO: 1.63 K/UL — SIGNIFICANT CHANGE UP (ref 1–3.3)
LYMPHOCYTES # BLD AUTO: 1.65 K/UL — SIGNIFICANT CHANGE UP (ref 1–3.3)
LYMPHOCYTES # BLD AUTO: 1.72 K/UL — SIGNIFICANT CHANGE UP (ref 1–3.3)
LYMPHOCYTES # BLD AUTO: 1.8 K/UL — SIGNIFICANT CHANGE UP (ref 1–3.3)
LYMPHOCYTES # BLD AUTO: 1.81 K/UL — SIGNIFICANT CHANGE UP (ref 1–3.3)
LYMPHOCYTES # BLD AUTO: 1.95 K/UL — SIGNIFICANT CHANGE UP (ref 1–3.3)
LYMPHOCYTES # BLD AUTO: 10.3 % — LOW (ref 13–44)
LYMPHOCYTES # BLD AUTO: 10.7 % — LOW (ref 13–44)
LYMPHOCYTES # BLD AUTO: 11.4 % — LOW (ref 13–44)
LYMPHOCYTES # BLD AUTO: 11.5 % — LOW (ref 13–44)
LYMPHOCYTES # BLD AUTO: 12.1 % — LOW (ref 13–44)
LYMPHOCYTES # BLD AUTO: 12.2 % — LOW (ref 13–44)
LYMPHOCYTES # BLD AUTO: 12.3 % — LOW (ref 13–44)
LYMPHOCYTES # BLD AUTO: 13 % — SIGNIFICANT CHANGE UP (ref 13–44)
LYMPHOCYTES # BLD AUTO: 13.8 % — SIGNIFICANT CHANGE UP (ref 13–44)
LYMPHOCYTES # BLD AUTO: 14 % — SIGNIFICANT CHANGE UP (ref 13–44)
LYMPHOCYTES # BLD AUTO: 15.1 % — SIGNIFICANT CHANGE UP (ref 13–44)
LYMPHOCYTES # BLD AUTO: 15.5 % — SIGNIFICANT CHANGE UP (ref 13–44)
LYMPHOCYTES # BLD AUTO: 16.9 % — SIGNIFICANT CHANGE UP (ref 13–44)
LYMPHOCYTES # BLD AUTO: 17.7 % — SIGNIFICANT CHANGE UP (ref 13–44)
LYMPHOCYTES # BLD AUTO: 19.8 % — SIGNIFICANT CHANGE UP (ref 13–44)
LYMPHOCYTES # BLD AUTO: 2.17 K/UL — SIGNIFICANT CHANGE UP (ref 1–3.3)
LYMPHOCYTES # BLD AUTO: 2.6 % — LOW (ref 13–44)
LYMPHOCYTES # BLD AUTO: 2.6 % — LOW (ref 13–44)
LYMPHOCYTES # BLD AUTO: 20.9 % — SIGNIFICANT CHANGE UP (ref 13–44)
LYMPHOCYTES # BLD AUTO: 24.1 % — SIGNIFICANT CHANGE UP (ref 13–44)
LYMPHOCYTES # BLD AUTO: 24.5 % — SIGNIFICANT CHANGE UP (ref 13–44)
LYMPHOCYTES # BLD AUTO: 25.1 % — SIGNIFICANT CHANGE UP (ref 13–44)
LYMPHOCYTES # BLD AUTO: 27.1 % — SIGNIFICANT CHANGE UP (ref 13–44)
LYMPHOCYTES # BLD AUTO: 29 % — SIGNIFICANT CHANGE UP (ref 13–44)
LYMPHOCYTES # BLD AUTO: 3.5 % — LOW (ref 13–44)
LYMPHOCYTES # BLD AUTO: 3.5 % — LOW (ref 13–44)
LYMPHOCYTES # BLD AUTO: 3.6 % — LOW (ref 13–44)
LYMPHOCYTES # BLD AUTO: 3.7 % — LOW (ref 13–44)
LYMPHOCYTES # BLD AUTO: 30.8 % — SIGNIFICANT CHANGE UP (ref 13–44)
LYMPHOCYTES # BLD AUTO: 30.9 % — SIGNIFICANT CHANGE UP (ref 13–44)
LYMPHOCYTES # BLD AUTO: 4.2 % — LOW (ref 13–44)
LYMPHOCYTES # BLD AUTO: 4.3 % — LOW (ref 13–44)
LYMPHOCYTES # BLD AUTO: 4.9 % — LOW (ref 13–44)
LYMPHOCYTES # BLD AUTO: 5.2 % — LOW (ref 13–44)
LYMPHOCYTES # BLD AUTO: 5.3 % — LOW (ref 13–44)
LYMPHOCYTES # BLD AUTO: 5.3 % — LOW (ref 13–44)
LYMPHOCYTES # BLD AUTO: 6.1 % — LOW (ref 13–44)
LYMPHOCYTES # BLD AUTO: 6.4 % — LOW (ref 13–44)
LYMPHOCYTES # BLD AUTO: 6.6 % — LOW (ref 13–44)
LYMPHOCYTES # BLD AUTO: 6.8 % — LOW (ref 13–44)
LYMPHOCYTES # BLD AUTO: 6.8 % — LOW (ref 13–44)
LYMPHOCYTES # BLD AUTO: 6.9 % — LOW (ref 13–44)
LYMPHOCYTES # BLD AUTO: 6.9 % — LOW (ref 13–44)
LYMPHOCYTES # BLD AUTO: 7.1 % — LOW (ref 13–44)
LYMPHOCYTES # BLD AUTO: 7.1 % — LOW (ref 13–44)
LYMPHOCYTES # BLD AUTO: 7.9 % — LOW (ref 13–44)
LYMPHOCYTES # BLD AUTO: 8.4 % — LOW (ref 13–44)
LYMPHOCYTES # BLD AUTO: 8.5 % — LOW (ref 13–44)
LYMPHOCYTES # BLD AUTO: 8.6 % — LOW (ref 13–44)
LYMPHOCYTES # BLD AUTO: 8.7 % — LOW (ref 13–44)
LYMPHOCYTES # BLD AUTO: 8.7 % — LOW (ref 13–44)
LYMPHOCYTES # BLD AUTO: 9.7 % — LOW (ref 13–44)
M TB IFN-G BLD-IMP: NEGATIVE — SIGNIFICANT CHANGE UP
M TB IFN-G CD4+ BCKGRND COR BLD-ACNC: 0.01 IU/ML — SIGNIFICANT CHANGE UP
M TB IFN-G CD4+CD8+ BCKGRND COR BLD-ACNC: 0.01 IU/ML — SIGNIFICANT CHANGE UP
MACROCYTES BLD QL: SIGNIFICANT CHANGE UP
MACROCYTES BLD QL: SLIGHT — SIGNIFICANT CHANGE UP
MAGNESIUM SERPL-MCNC: 1.5 MG/DL — LOW (ref 1.6–2.6)
MAGNESIUM SERPL-MCNC: 1.6 MG/DL — SIGNIFICANT CHANGE UP (ref 1.6–2.6)
MAGNESIUM SERPL-MCNC: 1.7 MG/DL — SIGNIFICANT CHANGE UP (ref 1.6–2.6)
MAGNESIUM SERPL-MCNC: 1.8 MG/DL — SIGNIFICANT CHANGE UP (ref 1.6–2.6)
MAGNESIUM SERPL-MCNC: 1.9 MG/DL — SIGNIFICANT CHANGE UP (ref 1.6–2.6)
MAGNESIUM SERPL-MCNC: 2 MG/DL — SIGNIFICANT CHANGE UP (ref 1.6–2.6)
MAGNESIUM SERPL-MCNC: 2.1 MG/DL — SIGNIFICANT CHANGE UP (ref 1.6–2.6)
MAGNESIUM SERPL-MCNC: 2.2 MG/DL — SIGNIFICANT CHANGE UP (ref 1.6–2.6)
MANUAL SMEAR VERIFICATION: SIGNIFICANT CHANGE UP
MCHC RBC-ENTMCNC: 23.6 PG — LOW (ref 27–34)
MCHC RBC-ENTMCNC: 24.4 PG — LOW (ref 27–34)
MCHC RBC-ENTMCNC: 24.8 PG — LOW (ref 27–34)
MCHC RBC-ENTMCNC: 24.9 PG — LOW (ref 27–34)
MCHC RBC-ENTMCNC: 25.2 PG — LOW (ref 27–34)
MCHC RBC-ENTMCNC: 25.3 PG — LOW (ref 27–34)
MCHC RBC-ENTMCNC: 25.4 PG — LOW (ref 27–34)
MCHC RBC-ENTMCNC: 25.5 PG — LOW (ref 27–34)
MCHC RBC-ENTMCNC: 25.6 PG — LOW (ref 27–34)
MCHC RBC-ENTMCNC: 25.6 PG — LOW (ref 27–34)
MCHC RBC-ENTMCNC: 25.7 PG — LOW (ref 27–34)
MCHC RBC-ENTMCNC: 25.9 PG — LOW (ref 27–34)
MCHC RBC-ENTMCNC: 26 PG — LOW (ref 27–34)
MCHC RBC-ENTMCNC: 26.1 PG — LOW (ref 27–34)
MCHC RBC-ENTMCNC: 26.3 PG — LOW (ref 27–34)
MCHC RBC-ENTMCNC: 26.3 PG — LOW (ref 27–34)
MCHC RBC-ENTMCNC: 26.4 PG — LOW (ref 27–34)
MCHC RBC-ENTMCNC: 26.4 PG — LOW (ref 27–34)
MCHC RBC-ENTMCNC: 26.5 PG — LOW (ref 27–34)
MCHC RBC-ENTMCNC: 26.5 PG — LOW (ref 27–34)
MCHC RBC-ENTMCNC: 26.6 PG — LOW (ref 27–34)
MCHC RBC-ENTMCNC: 26.7 GM/DL — LOW (ref 32–36)
MCHC RBC-ENTMCNC: 26.8 PG — LOW (ref 27–34)
MCHC RBC-ENTMCNC: 26.9 PG — LOW (ref 27–34)
MCHC RBC-ENTMCNC: 27 PG — SIGNIFICANT CHANGE UP (ref 27–34)
MCHC RBC-ENTMCNC: 27 PG — SIGNIFICANT CHANGE UP (ref 27–34)
MCHC RBC-ENTMCNC: 27.1 PG — SIGNIFICANT CHANGE UP (ref 27–34)
MCHC RBC-ENTMCNC: 27.2 PG — SIGNIFICANT CHANGE UP (ref 27–34)
MCHC RBC-ENTMCNC: 27.3 PG — SIGNIFICANT CHANGE UP (ref 27–34)
MCHC RBC-ENTMCNC: 27.4 PG — SIGNIFICANT CHANGE UP (ref 27–34)
MCHC RBC-ENTMCNC: 27.5 GM/DL — LOW (ref 32–36)
MCHC RBC-ENTMCNC: 27.5 PG — SIGNIFICANT CHANGE UP (ref 27–34)
MCHC RBC-ENTMCNC: 27.6 GM/DL — LOW (ref 32–36)
MCHC RBC-ENTMCNC: 27.7 GM/DL — LOW (ref 32–36)
MCHC RBC-ENTMCNC: 27.7 PG — SIGNIFICANT CHANGE UP (ref 27–34)
MCHC RBC-ENTMCNC: 27.8 GM/DL — LOW (ref 32–36)
MCHC RBC-ENTMCNC: 27.9 GM/DL — LOW (ref 32–36)
MCHC RBC-ENTMCNC: 27.9 GM/DL — LOW (ref 32–36)
MCHC RBC-ENTMCNC: 28.2 GM/DL — LOW (ref 32–36)
MCHC RBC-ENTMCNC: 28.2 GM/DL — LOW (ref 32–36)
MCHC RBC-ENTMCNC: 28.3 PG — SIGNIFICANT CHANGE UP (ref 27–34)
MCHC RBC-ENTMCNC: 28.4 PG — SIGNIFICANT CHANGE UP (ref 27–34)
MCHC RBC-ENTMCNC: 28.4 PG — SIGNIFICANT CHANGE UP (ref 27–34)
MCHC RBC-ENTMCNC: 28.5 GM/DL — LOW (ref 32–36)
MCHC RBC-ENTMCNC: 28.6 GM/DL — LOW (ref 32–36)
MCHC RBC-ENTMCNC: 28.6 PG — SIGNIFICANT CHANGE UP (ref 27–34)
MCHC RBC-ENTMCNC: 28.6 PG — SIGNIFICANT CHANGE UP (ref 27–34)
MCHC RBC-ENTMCNC: 28.7 GM/DL — LOW (ref 32–36)
MCHC RBC-ENTMCNC: 28.8 GM/DL — LOW (ref 32–36)
MCHC RBC-ENTMCNC: 28.8 GM/DL — LOW (ref 32–36)
MCHC RBC-ENTMCNC: 28.8 PG — SIGNIFICANT CHANGE UP (ref 27–34)
MCHC RBC-ENTMCNC: 28.9 GM/DL — LOW (ref 32–36)
MCHC RBC-ENTMCNC: 29 GM/DL — LOW (ref 32–36)
MCHC RBC-ENTMCNC: 29.1 GM/DL — LOW (ref 32–36)
MCHC RBC-ENTMCNC: 29.1 GM/DL — LOW (ref 32–36)
MCHC RBC-ENTMCNC: 29.1 PG — SIGNIFICANT CHANGE UP (ref 27–34)
MCHC RBC-ENTMCNC: 29.2 GM/DL — LOW (ref 32–36)
MCHC RBC-ENTMCNC: 29.2 GM/DL — LOW (ref 32–36)
MCHC RBC-ENTMCNC: 29.2 PG — SIGNIFICANT CHANGE UP (ref 27–34)
MCHC RBC-ENTMCNC: 29.2 PG — SIGNIFICANT CHANGE UP (ref 27–34)
MCHC RBC-ENTMCNC: 29.3 GM/DL — LOW (ref 32–36)
MCHC RBC-ENTMCNC: 29.3 PG — SIGNIFICANT CHANGE UP (ref 27–34)
MCHC RBC-ENTMCNC: 29.3 PG — SIGNIFICANT CHANGE UP (ref 27–34)
MCHC RBC-ENTMCNC: 29.4 PG — SIGNIFICANT CHANGE UP (ref 27–34)
MCHC RBC-ENTMCNC: 29.4 PG — SIGNIFICANT CHANGE UP (ref 27–34)
MCHC RBC-ENTMCNC: 29.6 GM/DL — LOW (ref 32–36)
MCHC RBC-ENTMCNC: 29.6 PG — SIGNIFICANT CHANGE UP (ref 27–34)
MCHC RBC-ENTMCNC: 29.7 GM/DL — LOW (ref 32–36)
MCHC RBC-ENTMCNC: 29.7 PG — SIGNIFICANT CHANGE UP (ref 27–34)
MCHC RBC-ENTMCNC: 29.8 GM/DL — LOW (ref 32–36)
MCHC RBC-ENTMCNC: 29.9 GM/DL — LOW (ref 32–36)
MCHC RBC-ENTMCNC: 30 GM/DL — LOW (ref 32–36)
MCHC RBC-ENTMCNC: 30.1 GM/DL — LOW (ref 32–36)
MCHC RBC-ENTMCNC: 30.2 GM/DL — LOW (ref 32–36)
MCHC RBC-ENTMCNC: 30.2 GM/DL — LOW (ref 32–36)
MCHC RBC-ENTMCNC: 30.3 GM/DL — LOW (ref 32–36)
MCHC RBC-ENTMCNC: 30.5 GM/DL — LOW (ref 32–36)
MCHC RBC-ENTMCNC: 30.6 GM/DL — LOW (ref 32–36)
MCHC RBC-ENTMCNC: 30.7 GM/DL — LOW (ref 32–36)
MCHC RBC-ENTMCNC: 30.8 GM/DL — LOW (ref 32–36)
MCHC RBC-ENTMCNC: 30.9 GM/DL — LOW (ref 32–36)
MCHC RBC-ENTMCNC: 31.3 GM/DL — LOW (ref 32–36)
MCHC RBC-ENTMCNC: 31.4 GM/DL — LOW (ref 32–36)
MCHC RBC-ENTMCNC: 31.6 GM/DL — LOW (ref 32–36)
MCHC RBC-ENTMCNC: 31.9 GM/DL — LOW (ref 32–36)
MCHC RBC-ENTMCNC: 32.1 GM/DL — SIGNIFICANT CHANGE UP (ref 32–36)
MCHC RBC-ENTMCNC: 32.5 GM/DL — SIGNIFICANT CHANGE UP (ref 32–36)
MCV RBC AUTO: 84.5 FL — SIGNIFICANT CHANGE UP (ref 80–100)
MCV RBC AUTO: 85.3 FL — SIGNIFICANT CHANGE UP (ref 80–100)
MCV RBC AUTO: 85.6 FL — SIGNIFICANT CHANGE UP (ref 80–100)
MCV RBC AUTO: 85.9 FL — SIGNIFICANT CHANGE UP (ref 80–100)
MCV RBC AUTO: 85.9 FL — SIGNIFICANT CHANGE UP (ref 80–100)
MCV RBC AUTO: 86.2 FL — SIGNIFICANT CHANGE UP (ref 80–100)
MCV RBC AUTO: 86.3 FL — SIGNIFICANT CHANGE UP (ref 80–100)
MCV RBC AUTO: 86.6 FL — SIGNIFICANT CHANGE UP (ref 80–100)
MCV RBC AUTO: 86.9 FL — SIGNIFICANT CHANGE UP (ref 80–100)
MCV RBC AUTO: 87 FL — SIGNIFICANT CHANGE UP (ref 80–100)
MCV RBC AUTO: 87.1 FL — SIGNIFICANT CHANGE UP (ref 80–100)
MCV RBC AUTO: 87.2 FL — SIGNIFICANT CHANGE UP (ref 80–100)
MCV RBC AUTO: 87.3 FL — SIGNIFICANT CHANGE UP (ref 80–100)
MCV RBC AUTO: 87.6 FL — SIGNIFICANT CHANGE UP (ref 80–100)
MCV RBC AUTO: 87.7 FL — SIGNIFICANT CHANGE UP (ref 80–100)
MCV RBC AUTO: 87.7 FL — SIGNIFICANT CHANGE UP (ref 80–100)
MCV RBC AUTO: 87.8 FL — SIGNIFICANT CHANGE UP (ref 80–100)
MCV RBC AUTO: 88 FL — SIGNIFICANT CHANGE UP (ref 80–100)
MCV RBC AUTO: 88.2 FL — SIGNIFICANT CHANGE UP (ref 80–100)
MCV RBC AUTO: 88.4 FL — SIGNIFICANT CHANGE UP (ref 80–100)
MCV RBC AUTO: 88.5 FL — SIGNIFICANT CHANGE UP (ref 80–100)
MCV RBC AUTO: 88.9 FL — SIGNIFICANT CHANGE UP (ref 80–100)
MCV RBC AUTO: 89.1 FL — SIGNIFICANT CHANGE UP (ref 80–100)
MCV RBC AUTO: 89.1 FL — SIGNIFICANT CHANGE UP (ref 80–100)
MCV RBC AUTO: 89.4 FL — SIGNIFICANT CHANGE UP (ref 80–100)
MCV RBC AUTO: 89.5 FL — SIGNIFICANT CHANGE UP (ref 80–100)
MCV RBC AUTO: 89.7 FL — SIGNIFICANT CHANGE UP (ref 80–100)
MCV RBC AUTO: 89.8 FL — SIGNIFICANT CHANGE UP (ref 80–100)
MCV RBC AUTO: 89.9 FL — SIGNIFICANT CHANGE UP (ref 80–100)
MCV RBC AUTO: 90 FL — SIGNIFICANT CHANGE UP (ref 80–100)
MCV RBC AUTO: 90.4 FL — SIGNIFICANT CHANGE UP (ref 80–100)
MCV RBC AUTO: 90.5 FL — SIGNIFICANT CHANGE UP (ref 80–100)
MCV RBC AUTO: 90.6 FL — SIGNIFICANT CHANGE UP (ref 80–100)
MCV RBC AUTO: 90.8 FL — SIGNIFICANT CHANGE UP (ref 80–100)
MCV RBC AUTO: 90.9 FL — SIGNIFICANT CHANGE UP (ref 80–100)
MCV RBC AUTO: 91.2 FL — SIGNIFICANT CHANGE UP (ref 80–100)
MCV RBC AUTO: 91.2 FL — SIGNIFICANT CHANGE UP (ref 80–100)
MCV RBC AUTO: 91.3 FL — SIGNIFICANT CHANGE UP (ref 80–100)
MCV RBC AUTO: 91.3 FL — SIGNIFICANT CHANGE UP (ref 80–100)
MCV RBC AUTO: 91.4 FL — SIGNIFICANT CHANGE UP (ref 80–100)
MCV RBC AUTO: 91.6 FL — SIGNIFICANT CHANGE UP (ref 80–100)
MCV RBC AUTO: 91.7 FL — SIGNIFICANT CHANGE UP (ref 80–100)
MCV RBC AUTO: 91.8 FL — SIGNIFICANT CHANGE UP (ref 80–100)
MCV RBC AUTO: 92 FL — SIGNIFICANT CHANGE UP (ref 80–100)
MCV RBC AUTO: 92.1 FL — SIGNIFICANT CHANGE UP (ref 80–100)
MCV RBC AUTO: 92.2 FL — SIGNIFICANT CHANGE UP (ref 80–100)
MCV RBC AUTO: 92.3 FL — SIGNIFICANT CHANGE UP (ref 80–100)
MCV RBC AUTO: 92.3 FL — SIGNIFICANT CHANGE UP (ref 80–100)
MCV RBC AUTO: 92.6 FL — SIGNIFICANT CHANGE UP (ref 80–100)
MCV RBC AUTO: 92.8 FL — SIGNIFICANT CHANGE UP (ref 80–100)
MCV RBC AUTO: 92.9 FL — SIGNIFICANT CHANGE UP (ref 80–100)
MCV RBC AUTO: 92.9 FL — SIGNIFICANT CHANGE UP (ref 80–100)
MCV RBC AUTO: 93 FL — SIGNIFICANT CHANGE UP (ref 80–100)
MCV RBC AUTO: 93 FL — SIGNIFICANT CHANGE UP (ref 80–100)
MCV RBC AUTO: 93.4 FL — SIGNIFICANT CHANGE UP (ref 80–100)
MCV RBC AUTO: 93.5 FL — SIGNIFICANT CHANGE UP (ref 80–100)
MCV RBC AUTO: 93.9 FL — SIGNIFICANT CHANGE UP (ref 80–100)
MCV RBC AUTO: 94.2 FL — SIGNIFICANT CHANGE UP (ref 80–100)
MCV RBC AUTO: 94.2 FL — SIGNIFICANT CHANGE UP (ref 80–100)
MCV RBC AUTO: 94.3 FL — SIGNIFICANT CHANGE UP (ref 80–100)
MCV RBC AUTO: 94.7 FL — SIGNIFICANT CHANGE UP (ref 80–100)
MCV RBC AUTO: 94.7 FL — SIGNIFICANT CHANGE UP (ref 80–100)
MCV RBC AUTO: 94.8 FL — SIGNIFICANT CHANGE UP (ref 80–100)
MCV RBC AUTO: 95.1 FL — SIGNIFICANT CHANGE UP (ref 80–100)
MCV RBC AUTO: 95.2 FL — SIGNIFICANT CHANGE UP (ref 80–100)
MCV RBC AUTO: 95.5 FL — SIGNIFICANT CHANGE UP (ref 80–100)
MCV RBC AUTO: 96.4 FL — SIGNIFICANT CHANGE UP (ref 80–100)
METHOD TYPE: SIGNIFICANT CHANGE UP
MICROCYTES BLD QL: SIGNIFICANT CHANGE UP
MICROCYTES BLD QL: SIGNIFICANT CHANGE UP
MICROCYTES BLD QL: SLIGHT — SIGNIFICANT CHANGE UP
MONOCYTES # BLD AUTO: 0 K/UL — SIGNIFICANT CHANGE UP (ref 0–0.9)
MONOCYTES # BLD AUTO: 0 K/UL — SIGNIFICANT CHANGE UP (ref 0–0.9)
MONOCYTES # BLD AUTO: 0.09 K/UL — SIGNIFICANT CHANGE UP (ref 0–0.9)
MONOCYTES # BLD AUTO: 0.1 K/UL — SIGNIFICANT CHANGE UP (ref 0–0.9)
MONOCYTES # BLD AUTO: 0.1 K/UL — SIGNIFICANT CHANGE UP (ref 0–0.9)
MONOCYTES # BLD AUTO: 0.11 K/UL — SIGNIFICANT CHANGE UP (ref 0–0.9)
MONOCYTES # BLD AUTO: 0.12 K/UL — SIGNIFICANT CHANGE UP (ref 0–0.9)
MONOCYTES # BLD AUTO: 0.13 K/UL — SIGNIFICANT CHANGE UP (ref 0–0.9)
MONOCYTES # BLD AUTO: 0.14 K/UL — SIGNIFICANT CHANGE UP (ref 0–0.9)
MONOCYTES # BLD AUTO: 0.15 K/UL — SIGNIFICANT CHANGE UP (ref 0–0.9)
MONOCYTES # BLD AUTO: 0.16 K/UL — SIGNIFICANT CHANGE UP (ref 0–0.9)
MONOCYTES # BLD AUTO: 0.17 K/UL — SIGNIFICANT CHANGE UP (ref 0–0.9)
MONOCYTES # BLD AUTO: 0.23 K/UL — SIGNIFICANT CHANGE UP (ref 0–0.9)
MONOCYTES # BLD AUTO: 0.25 K/UL — SIGNIFICANT CHANGE UP (ref 0–0.9)
MONOCYTES # BLD AUTO: 0.27 K/UL — SIGNIFICANT CHANGE UP (ref 0–0.9)
MONOCYTES # BLD AUTO: 0.3 K/UL — SIGNIFICANT CHANGE UP (ref 0–0.9)
MONOCYTES # BLD AUTO: 0.32 K/UL — SIGNIFICANT CHANGE UP (ref 0–0.9)
MONOCYTES # BLD AUTO: 0.35 K/UL — SIGNIFICANT CHANGE UP (ref 0–0.9)
MONOCYTES # BLD AUTO: 0.35 K/UL — SIGNIFICANT CHANGE UP (ref 0–0.9)
MONOCYTES # BLD AUTO: 0.38 K/UL — SIGNIFICANT CHANGE UP (ref 0–0.9)
MONOCYTES # BLD AUTO: 0.41 K/UL — SIGNIFICANT CHANGE UP (ref 0–0.9)
MONOCYTES # BLD AUTO: 0.42 K/UL — SIGNIFICANT CHANGE UP (ref 0–0.9)
MONOCYTES # BLD AUTO: 0.42 K/UL — SIGNIFICANT CHANGE UP (ref 0–0.9)
MONOCYTES # BLD AUTO: 0.43 K/UL — SIGNIFICANT CHANGE UP (ref 0–0.9)
MONOCYTES # BLD AUTO: 0.44 K/UL — SIGNIFICANT CHANGE UP (ref 0–0.9)
MONOCYTES # BLD AUTO: 0.48 K/UL — SIGNIFICANT CHANGE UP (ref 0–0.9)
MONOCYTES # BLD AUTO: 0.5 K/UL — SIGNIFICANT CHANGE UP (ref 0–0.9)
MONOCYTES # BLD AUTO: 0.51 K/UL — SIGNIFICANT CHANGE UP (ref 0–0.9)
MONOCYTES # BLD AUTO: 0.55 K/UL — SIGNIFICANT CHANGE UP (ref 0–0.9)
MONOCYTES # BLD AUTO: 0.55 K/UL — SIGNIFICANT CHANGE UP (ref 0–0.9)
MONOCYTES # BLD AUTO: 0.57 K/UL — SIGNIFICANT CHANGE UP (ref 0–0.9)
MONOCYTES # BLD AUTO: 0.58 K/UL — SIGNIFICANT CHANGE UP (ref 0–0.9)
MONOCYTES # BLD AUTO: 0.59 K/UL — SIGNIFICANT CHANGE UP (ref 0–0.9)
MONOCYTES # BLD AUTO: 0.61 K/UL — SIGNIFICANT CHANGE UP (ref 0–0.9)
MONOCYTES # BLD AUTO: 0.61 K/UL — SIGNIFICANT CHANGE UP (ref 0–0.9)
MONOCYTES # BLD AUTO: 0.62 K/UL — SIGNIFICANT CHANGE UP (ref 0–0.9)
MONOCYTES # BLD AUTO: 0.63 K/UL — SIGNIFICANT CHANGE UP (ref 0–0.9)
MONOCYTES # BLD AUTO: 0.65 K/UL — SIGNIFICANT CHANGE UP (ref 0–0.9)
MONOCYTES # BLD AUTO: 0.66 K/UL — SIGNIFICANT CHANGE UP (ref 0–0.9)
MONOCYTES # BLD AUTO: 0.69 K/UL — SIGNIFICANT CHANGE UP (ref 0–0.9)
MONOCYTES # BLD AUTO: 0.7 K/UL — SIGNIFICANT CHANGE UP (ref 0–0.9)
MONOCYTES # BLD AUTO: 0.71 K/UL — SIGNIFICANT CHANGE UP (ref 0–0.9)
MONOCYTES # BLD AUTO: 0.72 K/UL — SIGNIFICANT CHANGE UP (ref 0–0.9)
MONOCYTES # BLD AUTO: 0.76 K/UL — SIGNIFICANT CHANGE UP (ref 0–0.9)
MONOCYTES # BLD AUTO: 0.84 K/UL — SIGNIFICANT CHANGE UP (ref 0–0.9)
MONOCYTES # BLD AUTO: 0.92 K/UL — HIGH (ref 0–0.9)
MONOCYTES # BLD AUTO: 0.93 K/UL — HIGH (ref 0–0.9)
MONOCYTES NFR BLD AUTO: 0 % — LOW (ref 2–14)
MONOCYTES NFR BLD AUTO: 0 % — LOW (ref 2–14)
MONOCYTES NFR BLD AUTO: 0.6 % — LOW (ref 2–14)
MONOCYTES NFR BLD AUTO: 0.8 % — LOW (ref 2–14)
MONOCYTES NFR BLD AUTO: 0.9 % — LOW (ref 2–14)
MONOCYTES NFR BLD AUTO: 1.2 % — LOW (ref 2–14)
MONOCYTES NFR BLD AUTO: 1.4 % — LOW (ref 2–14)
MONOCYTES NFR BLD AUTO: 1.8 % — LOW (ref 2–14)
MONOCYTES NFR BLD AUTO: 10.4 % — SIGNIFICANT CHANGE UP (ref 2–14)
MONOCYTES NFR BLD AUTO: 10.4 % — SIGNIFICANT CHANGE UP (ref 2–14)
MONOCYTES NFR BLD AUTO: 10.6 % — SIGNIFICANT CHANGE UP (ref 2–14)
MONOCYTES NFR BLD AUTO: 10.9 % — SIGNIFICANT CHANGE UP (ref 2–14)
MONOCYTES NFR BLD AUTO: 2 % — SIGNIFICANT CHANGE UP (ref 2–14)
MONOCYTES NFR BLD AUTO: 2.6 % — SIGNIFICANT CHANGE UP (ref 2–14)
MONOCYTES NFR BLD AUTO: 2.7 % — SIGNIFICANT CHANGE UP (ref 2–14)
MONOCYTES NFR BLD AUTO: 2.7 % — SIGNIFICANT CHANGE UP (ref 2–14)
MONOCYTES NFR BLD AUTO: 2.8 % — SIGNIFICANT CHANGE UP (ref 2–14)
MONOCYTES NFR BLD AUTO: 2.9 % — SIGNIFICANT CHANGE UP (ref 2–14)
MONOCYTES NFR BLD AUTO: 3.5 % — SIGNIFICANT CHANGE UP (ref 2–14)
MONOCYTES NFR BLD AUTO: 3.5 % — SIGNIFICANT CHANGE UP (ref 2–14)
MONOCYTES NFR BLD AUTO: 3.7 % — SIGNIFICANT CHANGE UP (ref 2–14)
MONOCYTES NFR BLD AUTO: 3.9 % — SIGNIFICANT CHANGE UP (ref 2–14)
MONOCYTES NFR BLD AUTO: 4.2 % — SIGNIFICANT CHANGE UP (ref 2–14)
MONOCYTES NFR BLD AUTO: 4.2 % — SIGNIFICANT CHANGE UP (ref 2–14)
MONOCYTES NFR BLD AUTO: 4.3 % — SIGNIFICANT CHANGE UP (ref 2–14)
MONOCYTES NFR BLD AUTO: 4.4 % — SIGNIFICANT CHANGE UP (ref 2–14)
MONOCYTES NFR BLD AUTO: 4.6 % — SIGNIFICANT CHANGE UP (ref 2–14)
MONOCYTES NFR BLD AUTO: 5.2 % — SIGNIFICANT CHANGE UP (ref 2–14)
MONOCYTES NFR BLD AUTO: 5.4 % — SIGNIFICANT CHANGE UP (ref 2–14)
MONOCYTES NFR BLD AUTO: 5.5 % — SIGNIFICANT CHANGE UP (ref 2–14)
MONOCYTES NFR BLD AUTO: 5.7 % — SIGNIFICANT CHANGE UP (ref 2–14)
MONOCYTES NFR BLD AUTO: 5.7 % — SIGNIFICANT CHANGE UP (ref 2–14)
MONOCYTES NFR BLD AUTO: 5.8 % — SIGNIFICANT CHANGE UP (ref 2–14)
MONOCYTES NFR BLD AUTO: 6.5 % — SIGNIFICANT CHANGE UP (ref 2–14)
MONOCYTES NFR BLD AUTO: 7 % — SIGNIFICANT CHANGE UP (ref 2–14)
MONOCYTES NFR BLD AUTO: 7.1 % — SIGNIFICANT CHANGE UP (ref 2–14)
MONOCYTES NFR BLD AUTO: 7.4 % — SIGNIFICANT CHANGE UP (ref 2–14)
MONOCYTES NFR BLD AUTO: 8 % — SIGNIFICANT CHANGE UP (ref 2–14)
MONOCYTES NFR BLD AUTO: 8.5 % — SIGNIFICANT CHANGE UP (ref 2–14)
MONOCYTES NFR BLD AUTO: 9 % — SIGNIFICANT CHANGE UP (ref 2–14)
MONOCYTES NFR BLD AUTO: 9.2 % — SIGNIFICANT CHANGE UP (ref 2–14)
MONOCYTES NFR BLD AUTO: 9.2 % — SIGNIFICANT CHANGE UP (ref 2–14)
MPO AB + PR3 PNL SER: SIGNIFICANT CHANGE UP
MRSA PCR RESULT.: NEGATIVE — SIGNIFICANT CHANGE UP
MYELOCYTES NFR BLD: 0.9 % — HIGH (ref 0–0)
MYELOCYTES NFR BLD: 1.7 % — HIGH (ref 0–0)
NEUTROPHILS # BLD AUTO: 10.11 K/UL — HIGH (ref 1.8–7.4)
NEUTROPHILS # BLD AUTO: 10.29 K/UL — HIGH (ref 1.8–7.4)
NEUTROPHILS # BLD AUTO: 10.68 K/UL — HIGH (ref 1.8–7.4)
NEUTROPHILS # BLD AUTO: 11.05 K/UL — HIGH (ref 1.8–7.4)
NEUTROPHILS # BLD AUTO: 11.28 K/UL — HIGH (ref 1.8–7.4)
NEUTROPHILS # BLD AUTO: 11.49 K/UL — HIGH (ref 1.8–7.4)
NEUTROPHILS # BLD AUTO: 11.51 K/UL — HIGH (ref 1.8–7.4)
NEUTROPHILS # BLD AUTO: 11.58 K/UL — HIGH (ref 1.8–7.4)
NEUTROPHILS # BLD AUTO: 11.79 K/UL — HIGH (ref 1.8–7.4)
NEUTROPHILS # BLD AUTO: 12.22 K/UL — HIGH (ref 1.8–7.4)
NEUTROPHILS # BLD AUTO: 12.34 K/UL — HIGH (ref 1.8–7.4)
NEUTROPHILS # BLD AUTO: 12.52 K/UL — HIGH (ref 1.8–7.4)
NEUTROPHILS # BLD AUTO: 12.77 K/UL — HIGH (ref 1.8–7.4)
NEUTROPHILS # BLD AUTO: 12.79 K/UL — HIGH (ref 1.8–7.4)
NEUTROPHILS # BLD AUTO: 13.11 K/UL — HIGH (ref 1.8–7.4)
NEUTROPHILS # BLD AUTO: 13.14 K/UL — HIGH (ref 1.8–7.4)
NEUTROPHILS # BLD AUTO: 13.56 K/UL — HIGH (ref 1.8–7.4)
NEUTROPHILS # BLD AUTO: 14.11 K/UL — HIGH (ref 1.8–7.4)
NEUTROPHILS # BLD AUTO: 14.12 K/UL — HIGH (ref 1.8–7.4)
NEUTROPHILS # BLD AUTO: 14.2 K/UL — HIGH (ref 1.8–7.4)
NEUTROPHILS # BLD AUTO: 15.01 K/UL — HIGH (ref 1.8–7.4)
NEUTROPHILS # BLD AUTO: 15.84 K/UL — HIGH (ref 1.8–7.4)
NEUTROPHILS # BLD AUTO: 16.16 K/UL — HIGH (ref 1.8–7.4)
NEUTROPHILS # BLD AUTO: 17.09 K/UL — HIGH (ref 1.8–7.4)
NEUTROPHILS # BLD AUTO: 18.06 K/UL — HIGH (ref 1.8–7.4)
NEUTROPHILS # BLD AUTO: 2.88 K/UL — SIGNIFICANT CHANGE UP (ref 1.8–7.4)
NEUTROPHILS # BLD AUTO: 3.12 K/UL — SIGNIFICANT CHANGE UP (ref 1.8–7.4)
NEUTROPHILS # BLD AUTO: 3.61 K/UL — SIGNIFICANT CHANGE UP (ref 1.8–7.4)
NEUTROPHILS # BLD AUTO: 3.91 K/UL — SIGNIFICANT CHANGE UP (ref 1.8–7.4)
NEUTROPHILS # BLD AUTO: 3.94 K/UL — SIGNIFICANT CHANGE UP (ref 1.8–7.4)
NEUTROPHILS # BLD AUTO: 3.96 K/UL — SIGNIFICANT CHANGE UP (ref 1.8–7.4)
NEUTROPHILS # BLD AUTO: 4.41 K/UL — SIGNIFICANT CHANGE UP (ref 1.8–7.4)
NEUTROPHILS # BLD AUTO: 4.45 K/UL — SIGNIFICANT CHANGE UP (ref 1.8–7.4)
NEUTROPHILS # BLD AUTO: 4.86 K/UL — SIGNIFICANT CHANGE UP (ref 1.8–7.4)
NEUTROPHILS # BLD AUTO: 4.89 K/UL — SIGNIFICANT CHANGE UP (ref 1.8–7.4)
NEUTROPHILS # BLD AUTO: 5.06 K/UL — SIGNIFICANT CHANGE UP (ref 1.8–7.4)
NEUTROPHILS # BLD AUTO: 5.12 K/UL — SIGNIFICANT CHANGE UP (ref 1.8–7.4)
NEUTROPHILS # BLD AUTO: 6.48 K/UL — SIGNIFICANT CHANGE UP (ref 1.8–7.4)
NEUTROPHILS # BLD AUTO: 7.97 K/UL — HIGH (ref 1.8–7.4)
NEUTROPHILS # BLD AUTO: 8.78 K/UL — HIGH (ref 1.8–7.4)
NEUTROPHILS # BLD AUTO: 8.9 K/UL — HIGH (ref 1.8–7.4)
NEUTROPHILS # BLD AUTO: 8.97 K/UL — HIGH (ref 1.8–7.4)
NEUTROPHILS # BLD AUTO: 9.03 K/UL — HIGH (ref 1.8–7.4)
NEUTROPHILS # BLD AUTO: 9.08 K/UL — HIGH (ref 1.8–7.4)
NEUTROPHILS # BLD AUTO: 9.47 K/UL — HIGH (ref 1.8–7.4)
NEUTROPHILS # BLD AUTO: 9.62 K/UL — HIGH (ref 1.8–7.4)
NEUTROPHILS # BLD AUTO: 9.65 K/UL — HIGH (ref 1.8–7.4)
NEUTROPHILS # BLD AUTO: 9.66 K/UL — HIGH (ref 1.8–7.4)
NEUTROPHILS # BLD AUTO: 9.68 K/UL — HIGH (ref 1.8–7.4)
NEUTROPHILS # BLD AUTO: 9.7 K/UL — HIGH (ref 1.8–7.4)
NEUTROPHILS # BLD AUTO: 9.82 K/UL — HIGH (ref 1.8–7.4)
NEUTROPHILS NFR BLD AUTO: 54.5 % — SIGNIFICANT CHANGE UP (ref 43–77)
NEUTROPHILS NFR BLD AUTO: 55.9 % — SIGNIFICANT CHANGE UP (ref 43–77)
NEUTROPHILS NFR BLD AUTO: 58.1 % — SIGNIFICANT CHANGE UP (ref 43–77)
NEUTROPHILS NFR BLD AUTO: 61.4 % — SIGNIFICANT CHANGE UP (ref 43–77)
NEUTROPHILS NFR BLD AUTO: 66 % — SIGNIFICANT CHANGE UP (ref 43–77)
NEUTROPHILS NFR BLD AUTO: 66.1 % — SIGNIFICANT CHANGE UP (ref 43–77)
NEUTROPHILS NFR BLD AUTO: 69.3 % — SIGNIFICANT CHANGE UP (ref 43–77)
NEUTROPHILS NFR BLD AUTO: 70.5 % — SIGNIFICANT CHANGE UP (ref 43–77)
NEUTROPHILS NFR BLD AUTO: 70.6 % — SIGNIFICANT CHANGE UP (ref 43–77)
NEUTROPHILS NFR BLD AUTO: 71 % — SIGNIFICANT CHANGE UP (ref 43–77)
NEUTROPHILS NFR BLD AUTO: 73 % — SIGNIFICANT CHANGE UP (ref 43–77)
NEUTROPHILS NFR BLD AUTO: 73.4 % — SIGNIFICANT CHANGE UP (ref 43–77)
NEUTROPHILS NFR BLD AUTO: 74.6 % — SIGNIFICANT CHANGE UP (ref 43–77)
NEUTROPHILS NFR BLD AUTO: 78.5 % — HIGH (ref 43–77)
NEUTROPHILS NFR BLD AUTO: 80.4 % — HIGH (ref 43–77)
NEUTROPHILS NFR BLD AUTO: 80.9 % — HIGH (ref 43–77)
NEUTROPHILS NFR BLD AUTO: 81.9 % — HIGH (ref 43–77)
NEUTROPHILS NFR BLD AUTO: 82.3 % — HIGH (ref 43–77)
NEUTROPHILS NFR BLD AUTO: 82.9 % — HIGH (ref 43–77)
NEUTROPHILS NFR BLD AUTO: 83.2 % — HIGH (ref 43–77)
NEUTROPHILS NFR BLD AUTO: 83.7 % — HIGH (ref 43–77)
NEUTROPHILS NFR BLD AUTO: 84.1 % — HIGH (ref 43–77)
NEUTROPHILS NFR BLD AUTO: 84.4 % — HIGH (ref 43–77)
NEUTROPHILS NFR BLD AUTO: 84.8 % — HIGH (ref 43–77)
NEUTROPHILS NFR BLD AUTO: 85 % — HIGH (ref 43–77)
NEUTROPHILS NFR BLD AUTO: 85 % — HIGH (ref 43–77)
NEUTROPHILS NFR BLD AUTO: 86 % — HIGH (ref 43–77)
NEUTROPHILS NFR BLD AUTO: 86.1 % — HIGH (ref 43–77)
NEUTROPHILS NFR BLD AUTO: 86.6 % — HIGH (ref 43–77)
NEUTROPHILS NFR BLD AUTO: 86.6 % — HIGH (ref 43–77)
NEUTROPHILS NFR BLD AUTO: 87 % — HIGH (ref 43–77)
NEUTROPHILS NFR BLD AUTO: 87.1 % — HIGH (ref 43–77)
NEUTROPHILS NFR BLD AUTO: 87.8 % — HIGH (ref 43–77)
NEUTROPHILS NFR BLD AUTO: 87.9 % — HIGH (ref 43–77)
NEUTROPHILS NFR BLD AUTO: 88.7 % — HIGH (ref 43–77)
NEUTROPHILS NFR BLD AUTO: 88.8 % — HIGH (ref 43–77)
NEUTROPHILS NFR BLD AUTO: 89.1 % — HIGH (ref 43–77)
NEUTROPHILS NFR BLD AUTO: 89.3 % — HIGH (ref 43–77)
NEUTROPHILS NFR BLD AUTO: 90.6 % — HIGH (ref 43–77)
NEUTROPHILS NFR BLD AUTO: 91.2 % — HIGH (ref 43–77)
NEUTROPHILS NFR BLD AUTO: 91.2 % — HIGH (ref 43–77)
NEUTROPHILS NFR BLD AUTO: 91.7 % — HIGH (ref 43–77)
NEUTROPHILS NFR BLD AUTO: 92 % — HIGH (ref 43–77)
NEUTROPHILS NFR BLD AUTO: 92.2 % — HIGH (ref 43–77)
NEUTROPHILS NFR BLD AUTO: 92.2 % — HIGH (ref 43–77)
NEUTROPHILS NFR BLD AUTO: 93.1 % — HIGH (ref 43–77)
NEUTROPHILS NFR BLD AUTO: 93.7 % — HIGH (ref 43–77)
NEUTROPHILS NFR BLD AUTO: 94 % — HIGH (ref 43–77)
NEUTROPHILS NFR BLD AUTO: 94.5 % — HIGH (ref 43–77)
NEUTROPHILS NFR BLD AUTO: 95.6 % — HIGH (ref 43–77)
NEUTROPHILS NFR BLD AUTO: 96.5 % — HIGH (ref 43–77)
NEUTS BAND # BLD: 1.7 % — SIGNIFICANT CHANGE UP (ref 0–8)
NEUTS BAND # BLD: 1.8 % — SIGNIFICANT CHANGE UP (ref 0–8)
NEUTS BAND # BLD: 2.6 % — SIGNIFICANT CHANGE UP (ref 0–8)
NEUTS BAND # BLD: 5.2 % — SIGNIFICANT CHANGE UP (ref 0–8)
NIGHT BLUE STAIN TISS: SIGNIFICANT CHANGE UP
NON-GYNECOLOGICAL CYTOLOGY STUDY: SIGNIFICANT CHANGE UP
NON-GYNECOLOGICAL CYTOLOGY STUDY: SIGNIFICANT CHANGE UP
NRBC # BLD: 0 /100 WBCS — SIGNIFICANT CHANGE UP (ref 0–0)
NRBC # BLD: 2 /100 — HIGH (ref 0–0)
NRBC # BLD: 4 /100 — HIGH (ref 0–0)
NRBC # BLD: SIGNIFICANT CHANGE UP /100 WBCS (ref 0–0)
NRBC # BLD: SIGNIFICANT CHANGE UP /100 WBCS (ref 0–0)
OB PNL STL: POSITIVE
ORGANISM # SPEC MICROSCOPIC CNT: SIGNIFICANT CHANGE UP
OVALOCYTES BLD QL SMEAR: SIGNIFICANT CHANGE UP
OVALOCYTES BLD QL SMEAR: SLIGHT — SIGNIFICANT CHANGE UP
P-ANCA SER-ACNC: NEGATIVE — SIGNIFICANT CHANGE UP
PCO2 BLDV: 40 MMHG — SIGNIFICANT CHANGE UP (ref 39–42)
PH BLDV: 7.18 — SIGNIFICANT CHANGE UP (ref 7.32–7.43)
PHOSPHATE SERPL-MCNC: 1.4 MG/DL — LOW (ref 2.5–4.5)
PHOSPHATE SERPL-MCNC: 2.2 MG/DL — LOW (ref 2.5–4.5)
PHOSPHATE SERPL-MCNC: 2.2 MG/DL — LOW (ref 2.5–4.5)
PHOSPHATE SERPL-MCNC: 2.4 MG/DL — LOW (ref 2.5–4.5)
PHOSPHATE SERPL-MCNC: 2.5 MG/DL — SIGNIFICANT CHANGE UP (ref 2.5–4.5)
PHOSPHATE SERPL-MCNC: 2.5 MG/DL — SIGNIFICANT CHANGE UP (ref 2.5–4.5)
PHOSPHATE SERPL-MCNC: 2.6 MG/DL — SIGNIFICANT CHANGE UP (ref 2.5–4.5)
PHOSPHATE SERPL-MCNC: 2.7 MG/DL — SIGNIFICANT CHANGE UP (ref 2.5–4.5)
PHOSPHATE SERPL-MCNC: 2.7 MG/DL — SIGNIFICANT CHANGE UP (ref 2.5–4.5)
PHOSPHATE SERPL-MCNC: 2.8 MG/DL — SIGNIFICANT CHANGE UP (ref 2.5–4.5)
PHOSPHATE SERPL-MCNC: 2.9 MG/DL — SIGNIFICANT CHANGE UP (ref 2.5–4.5)
PHOSPHATE SERPL-MCNC: 3 MG/DL — SIGNIFICANT CHANGE UP (ref 2.5–4.5)
PHOSPHATE SERPL-MCNC: 3.1 MG/DL — SIGNIFICANT CHANGE UP (ref 2.5–4.5)
PHOSPHATE SERPL-MCNC: 3.1 MG/DL — SIGNIFICANT CHANGE UP (ref 2.5–4.5)
PHOSPHATE SERPL-MCNC: 3.2 MG/DL — SIGNIFICANT CHANGE UP (ref 2.5–4.5)
PHOSPHATE SERPL-MCNC: 3.2 MG/DL — SIGNIFICANT CHANGE UP (ref 2.5–4.5)
PHOSPHATE SERPL-MCNC: 3.3 MG/DL — SIGNIFICANT CHANGE UP (ref 2.5–4.5)
PHOSPHATE SERPL-MCNC: 3.4 MG/DL — SIGNIFICANT CHANGE UP (ref 2.5–4.5)
PHOSPHATE SERPL-MCNC: 3.7 MG/DL — SIGNIFICANT CHANGE UP (ref 2.5–4.5)
PHOSPHATE SERPL-MCNC: 3.8 MG/DL — SIGNIFICANT CHANGE UP (ref 2.5–4.5)
PHOSPHATE SERPL-MCNC: 3.9 MG/DL — SIGNIFICANT CHANGE UP (ref 2.5–4.5)
PHOSPHATE SERPL-MCNC: 4 MG/DL — SIGNIFICANT CHANGE UP (ref 2.5–4.5)
PHOSPHATE SERPL-MCNC: 4.1 MG/DL — SIGNIFICANT CHANGE UP (ref 2.5–4.5)
PHOSPHATE SERPL-MCNC: 4.2 MG/DL — SIGNIFICANT CHANGE UP (ref 2.5–4.5)
PHOSPHATE SERPL-MCNC: 4.3 MG/DL — SIGNIFICANT CHANGE UP (ref 2.5–4.5)
PHOSPHATE SERPL-MCNC: 4.4 MG/DL — SIGNIFICANT CHANGE UP (ref 2.5–4.5)
PHOSPHATE SERPL-MCNC: 4.4 MG/DL — SIGNIFICANT CHANGE UP (ref 2.5–4.5)
PHOSPHATE SERPL-MCNC: 4.7 MG/DL — HIGH (ref 2.5–4.5)
PHOSPHATE SERPL-MCNC: 4.9 MG/DL — HIGH (ref 2.5–4.5)
PHOSPHATE SERPL-MCNC: 5 MG/DL — HIGH (ref 2.5–4.5)
PHOSPHATE SERPL-MCNC: 5.2 MG/DL — HIGH (ref 2.5–4.5)
PHOSPHATE SERPL-MCNC: 5.2 MG/DL — HIGH (ref 2.5–4.5)
PHOSPHATE SERPL-MCNC: 5.4 MG/DL — HIGH (ref 2.5–4.5)
PHOSPHATE SERPL-MCNC: 5.4 MG/DL — HIGH (ref 2.5–4.5)
PHOSPHATE SERPL-MCNC: 5.5 MG/DL — HIGH (ref 2.5–4.5)
PHOSPHATE SERPL-MCNC: 5.8 MG/DL — HIGH (ref 2.5–4.5)
PHOSPHATE SERPL-MCNC: 5.8 MG/DL — HIGH (ref 2.5–4.5)
PHOSPHATE SERPL-MCNC: 6 MG/DL — HIGH (ref 2.5–4.5)
PHOSPHATE SERPL-MCNC: 6.3 MG/DL — HIGH (ref 2.5–4.5)
PHOSPHATE SERPL-MCNC: 7 MG/DL — HIGH (ref 2.5–4.5)
PHOSPHATE SERPL-MCNC: 7.2 MG/DL — HIGH (ref 2.5–4.5)
PLAT MORPH BLD: ABNORMAL
PLAT MORPH BLD: NORMAL — SIGNIFICANT CHANGE UP
PLATELET # BLD AUTO: 105 K/UL — LOW (ref 150–400)
PLATELET # BLD AUTO: 115 K/UL — LOW (ref 150–400)
PLATELET # BLD AUTO: 121 K/UL — LOW (ref 150–400)
PLATELET # BLD AUTO: 125 K/UL — LOW (ref 150–400)
PLATELET # BLD AUTO: 129 K/UL — LOW (ref 150–400)
PLATELET # BLD AUTO: 133 K/UL — LOW (ref 150–400)
PLATELET # BLD AUTO: 135 K/UL — LOW (ref 150–400)
PLATELET # BLD AUTO: 142 K/UL — LOW (ref 150–400)
PLATELET # BLD AUTO: 145 K/UL — LOW (ref 150–400)
PLATELET # BLD AUTO: 146 K/UL — LOW (ref 150–400)
PLATELET # BLD AUTO: 147 K/UL — LOW (ref 150–400)
PLATELET # BLD AUTO: 149 K/UL — LOW (ref 150–400)
PLATELET # BLD AUTO: 151 K/UL — SIGNIFICANT CHANGE UP (ref 150–400)
PLATELET # BLD AUTO: 153 K/UL — SIGNIFICANT CHANGE UP (ref 150–400)
PLATELET # BLD AUTO: 153 K/UL — SIGNIFICANT CHANGE UP (ref 150–400)
PLATELET # BLD AUTO: 155 K/UL — SIGNIFICANT CHANGE UP (ref 150–400)
PLATELET # BLD AUTO: 164 K/UL — SIGNIFICANT CHANGE UP (ref 150–400)
PLATELET # BLD AUTO: 172 K/UL — SIGNIFICANT CHANGE UP (ref 150–400)
PLATELET # BLD AUTO: 176 K/UL — SIGNIFICANT CHANGE UP (ref 150–400)
PLATELET # BLD AUTO: 179 K/UL — SIGNIFICANT CHANGE UP (ref 150–400)
PLATELET # BLD AUTO: 181 K/UL — SIGNIFICANT CHANGE UP (ref 150–400)
PLATELET # BLD AUTO: 184 K/UL — SIGNIFICANT CHANGE UP (ref 150–400)
PLATELET # BLD AUTO: 185 K/UL — SIGNIFICANT CHANGE UP (ref 150–400)
PLATELET # BLD AUTO: 199 K/UL — SIGNIFICANT CHANGE UP (ref 150–400)
PLATELET # BLD AUTO: 200 K/UL — SIGNIFICANT CHANGE UP (ref 150–400)
PLATELET # BLD AUTO: 201 K/UL — SIGNIFICANT CHANGE UP (ref 150–400)
PLATELET # BLD AUTO: 203 K/UL — SIGNIFICANT CHANGE UP (ref 150–400)
PLATELET # BLD AUTO: 204 K/UL — SIGNIFICANT CHANGE UP (ref 150–400)
PLATELET # BLD AUTO: 210 K/UL — SIGNIFICANT CHANGE UP (ref 150–400)
PLATELET # BLD AUTO: 215 K/UL — SIGNIFICANT CHANGE UP (ref 150–400)
PLATELET # BLD AUTO: 219 K/UL — SIGNIFICANT CHANGE UP (ref 150–400)
PLATELET # BLD AUTO: 222 K/UL — SIGNIFICANT CHANGE UP (ref 150–400)
PLATELET # BLD AUTO: 223 K/UL — SIGNIFICANT CHANGE UP (ref 150–400)
PLATELET # BLD AUTO: 236 K/UL — SIGNIFICANT CHANGE UP (ref 150–400)
PLATELET # BLD AUTO: 240 K/UL — SIGNIFICANT CHANGE UP (ref 150–400)
PLATELET # BLD AUTO: 254 K/UL — SIGNIFICANT CHANGE UP (ref 150–400)
PLATELET # BLD AUTO: 256 K/UL — SIGNIFICANT CHANGE UP (ref 150–400)
PLATELET # BLD AUTO: 264 K/UL — SIGNIFICANT CHANGE UP (ref 150–400)
PLATELET # BLD AUTO: 269 K/UL — SIGNIFICANT CHANGE UP (ref 150–400)
PLATELET # BLD AUTO: 269 K/UL — SIGNIFICANT CHANGE UP (ref 150–400)
PLATELET # BLD AUTO: 296 K/UL — SIGNIFICANT CHANGE UP (ref 150–400)
PLATELET # BLD AUTO: 299 K/UL — SIGNIFICANT CHANGE UP (ref 150–400)
PLATELET # BLD AUTO: 303 K/UL — SIGNIFICANT CHANGE UP (ref 150–400)
PLATELET # BLD AUTO: 309 K/UL — SIGNIFICANT CHANGE UP (ref 150–400)
PLATELET # BLD AUTO: 32 K/UL — LOW (ref 150–400)
PLATELET # BLD AUTO: 322 K/UL — SIGNIFICANT CHANGE UP (ref 150–400)
PLATELET # BLD AUTO: 33 K/UL — LOW (ref 150–400)
PLATELET # BLD AUTO: 342 K/UL — SIGNIFICANT CHANGE UP (ref 150–400)
PLATELET # BLD AUTO: 35 K/UL — LOW (ref 150–400)
PLATELET # BLD AUTO: 352 K/UL — SIGNIFICANT CHANGE UP (ref 150–400)
PLATELET # BLD AUTO: 353 K/UL — SIGNIFICANT CHANGE UP (ref 150–400)
PLATELET # BLD AUTO: 364 K/UL — SIGNIFICANT CHANGE UP (ref 150–400)
PLATELET # BLD AUTO: 44 K/UL — LOW (ref 150–400)
PLATELET # BLD AUTO: 46 K/UL — LOW (ref 150–400)
PLATELET # BLD AUTO: 46 K/UL — LOW (ref 150–400)
PLATELET # BLD AUTO: 51 K/UL — LOW (ref 150–400)
PLATELET # BLD AUTO: 52 K/UL — LOW (ref 150–400)
PLATELET # BLD AUTO: 52 K/UL — LOW (ref 150–400)
PLATELET # BLD AUTO: 53 K/UL — LOW (ref 150–400)
PLATELET # BLD AUTO: 66 K/UL — LOW (ref 150–400)
PLATELET # BLD AUTO: 81 K/UL — LOW (ref 150–400)
PLATELET # BLD AUTO: 81 K/UL — LOW (ref 150–400)
PLATELET # BLD AUTO: 84 K/UL — LOW (ref 150–400)
PLATELET # BLD AUTO: 91 K/UL — LOW (ref 150–400)
PLATELET # BLD AUTO: 94 K/UL — LOW (ref 150–400)
PLATELET # BLD AUTO: 95 K/UL — LOW (ref 150–400)
PLATELET # BLD AUTO: 96 K/UL — LOW (ref 150–400)
PO2 BLDV: <33 MMHG — SIGNIFICANT CHANGE UP (ref 25–45)
POIKILOCYTOSIS BLD QL AUTO: SIGNIFICANT CHANGE UP
POIKILOCYTOSIS BLD QL AUTO: SLIGHT — SIGNIFICANT CHANGE UP
POLYCHROMASIA BLD QL SMEAR: SIGNIFICANT CHANGE UP
POLYCHROMASIA BLD QL SMEAR: SLIGHT — SIGNIFICANT CHANGE UP
POTASSIUM BLDV-SCNC: 8 MMOL/L — CRITICAL HIGH (ref 3.5–5.1)
POTASSIUM SERPL-MCNC: 3 MMOL/L — LOW (ref 3.5–5.3)
POTASSIUM SERPL-MCNC: 3.2 MMOL/L — LOW (ref 3.5–5.3)
POTASSIUM SERPL-MCNC: 3.3 MMOL/L — LOW (ref 3.5–5.3)
POTASSIUM SERPL-MCNC: 3.3 MMOL/L — LOW (ref 3.5–5.3)
POTASSIUM SERPL-MCNC: 3.4 MMOL/L — LOW (ref 3.5–5.3)
POTASSIUM SERPL-MCNC: 3.5 MMOL/L — SIGNIFICANT CHANGE UP (ref 3.5–5.3)
POTASSIUM SERPL-MCNC: 3.6 MMOL/L — SIGNIFICANT CHANGE UP (ref 3.5–5.3)
POTASSIUM SERPL-MCNC: 3.7 MMOL/L — SIGNIFICANT CHANGE UP (ref 3.5–5.3)
POTASSIUM SERPL-MCNC: 3.8 MMOL/L — SIGNIFICANT CHANGE UP (ref 3.5–5.3)
POTASSIUM SERPL-MCNC: 3.9 MMOL/L — SIGNIFICANT CHANGE UP (ref 3.5–5.3)
POTASSIUM SERPL-MCNC: 4 MMOL/L — SIGNIFICANT CHANGE UP (ref 3.5–5.3)
POTASSIUM SERPL-MCNC: 4.1 MMOL/L — SIGNIFICANT CHANGE UP (ref 3.5–5.3)
POTASSIUM SERPL-MCNC: 4.2 MMOL/L — SIGNIFICANT CHANGE UP (ref 3.5–5.3)
POTASSIUM SERPL-MCNC: 4.3 MMOL/L — SIGNIFICANT CHANGE UP (ref 3.5–5.3)
POTASSIUM SERPL-MCNC: 4.4 MMOL/L — SIGNIFICANT CHANGE UP (ref 3.5–5.3)
POTASSIUM SERPL-MCNC: 4.5 MMOL/L — SIGNIFICANT CHANGE UP (ref 3.5–5.3)
POTASSIUM SERPL-MCNC: 4.6 MMOL/L — SIGNIFICANT CHANGE UP (ref 3.5–5.3)
POTASSIUM SERPL-MCNC: 4.6 MMOL/L — SIGNIFICANT CHANGE UP (ref 3.5–5.3)
POTASSIUM SERPL-MCNC: 4.7 MMOL/L — SIGNIFICANT CHANGE UP (ref 3.5–5.3)
POTASSIUM SERPL-MCNC: 4.7 MMOL/L — SIGNIFICANT CHANGE UP (ref 3.5–5.3)
POTASSIUM SERPL-MCNC: 4.8 MMOL/L — SIGNIFICANT CHANGE UP (ref 3.5–5.3)
POTASSIUM SERPL-MCNC: 4.8 MMOL/L — SIGNIFICANT CHANGE UP (ref 3.5–5.3)
POTASSIUM SERPL-MCNC: 4.9 MMOL/L — SIGNIFICANT CHANGE UP (ref 3.5–5.3)
POTASSIUM SERPL-MCNC: 5 MMOL/L — SIGNIFICANT CHANGE UP (ref 3.5–5.3)
POTASSIUM SERPL-MCNC: 5.2 MMOL/L — SIGNIFICANT CHANGE UP (ref 3.5–5.3)
POTASSIUM SERPL-MCNC: 5.4 MMOL/L — HIGH (ref 3.5–5.3)
POTASSIUM SERPL-MCNC: 7.8 MMOL/L — CRITICAL HIGH (ref 3.5–5.3)
POTASSIUM SERPL-MCNC: SIGNIFICANT CHANGE UP (ref 3.5–5.3)
POTASSIUM SERPL-MCNC: SIGNIFICANT CHANGE UP MMOL/L (ref 3.5–5.3)
POTASSIUM SERPL-SCNC: 3 MMOL/L — LOW (ref 3.5–5.3)
POTASSIUM SERPL-SCNC: 3.2 MMOL/L — LOW (ref 3.5–5.3)
POTASSIUM SERPL-SCNC: 3.3 MMOL/L — LOW (ref 3.5–5.3)
POTASSIUM SERPL-SCNC: 3.3 MMOL/L — LOW (ref 3.5–5.3)
POTASSIUM SERPL-SCNC: 3.4 MMOL/L — LOW (ref 3.5–5.3)
POTASSIUM SERPL-SCNC: 3.5 MMOL/L — SIGNIFICANT CHANGE UP (ref 3.5–5.3)
POTASSIUM SERPL-SCNC: 3.6 MMOL/L — SIGNIFICANT CHANGE UP (ref 3.5–5.3)
POTASSIUM SERPL-SCNC: 3.7 MMOL/L — SIGNIFICANT CHANGE UP (ref 3.5–5.3)
POTASSIUM SERPL-SCNC: 3.8 MMOL/L — SIGNIFICANT CHANGE UP (ref 3.5–5.3)
POTASSIUM SERPL-SCNC: 3.9 MMOL/L — SIGNIFICANT CHANGE UP (ref 3.5–5.3)
POTASSIUM SERPL-SCNC: 4 MMOL/L — SIGNIFICANT CHANGE UP (ref 3.5–5.3)
POTASSIUM SERPL-SCNC: 4.1 MMOL/L — SIGNIFICANT CHANGE UP (ref 3.5–5.3)
POTASSIUM SERPL-SCNC: 4.2 MMOL/L — SIGNIFICANT CHANGE UP (ref 3.5–5.3)
POTASSIUM SERPL-SCNC: 4.3 MMOL/L — SIGNIFICANT CHANGE UP (ref 3.5–5.3)
POTASSIUM SERPL-SCNC: 4.4 MMOL/L — SIGNIFICANT CHANGE UP (ref 3.5–5.3)
POTASSIUM SERPL-SCNC: 4.5 MMOL/L — SIGNIFICANT CHANGE UP (ref 3.5–5.3)
POTASSIUM SERPL-SCNC: 4.6 MMOL/L — SIGNIFICANT CHANGE UP (ref 3.5–5.3)
POTASSIUM SERPL-SCNC: 4.6 MMOL/L — SIGNIFICANT CHANGE UP (ref 3.5–5.3)
POTASSIUM SERPL-SCNC: 4.7 MMOL/L — SIGNIFICANT CHANGE UP (ref 3.5–5.3)
POTASSIUM SERPL-SCNC: 4.7 MMOL/L — SIGNIFICANT CHANGE UP (ref 3.5–5.3)
POTASSIUM SERPL-SCNC: 4.8 MMOL/L — SIGNIFICANT CHANGE UP (ref 3.5–5.3)
POTASSIUM SERPL-SCNC: 4.8 MMOL/L — SIGNIFICANT CHANGE UP (ref 3.5–5.3)
POTASSIUM SERPL-SCNC: 4.9 MMOL/L — SIGNIFICANT CHANGE UP (ref 3.5–5.3)
POTASSIUM SERPL-SCNC: 5 MMOL/L — SIGNIFICANT CHANGE UP (ref 3.5–5.3)
POTASSIUM SERPL-SCNC: 5.2 MMOL/L — SIGNIFICANT CHANGE UP (ref 3.5–5.3)
POTASSIUM SERPL-SCNC: 5.4 MMOL/L — HIGH (ref 3.5–5.3)
POTASSIUM SERPL-SCNC: 7.8 MMOL/L — CRITICAL HIGH (ref 3.5–5.3)
POTASSIUM SERPL-SCNC: SIGNIFICANT CHANGE UP (ref 3.5–5.3)
POTASSIUM SERPL-SCNC: SIGNIFICANT CHANGE UP MMOL/L (ref 3.5–5.3)
PROCALCITONIN SERPL-MCNC: 12.06 NG/ML — HIGH (ref 0.02–0.1)
PROCALCITONIN SERPL-MCNC: 13.29 NG/ML — HIGH (ref 0.02–0.1)
PROCALCITONIN SERPL-MCNC: 74.26 NG/ML — HIGH (ref 0.02–0.1)
PROMYELOCYTES # FLD: 1.7 % — HIGH (ref 0–0)
PROT C ACT/NOR PPP: 83 % — SIGNIFICANT CHANGE UP (ref 74–150)
PROT PATTERN SERPL ELPH-IMP: SIGNIFICANT CHANGE UP
PROT S FREE PPP-ACNC: 45 % — LOW (ref 63–140)
PROT SERPL-MCNC: 4.1 G/DL — LOW (ref 6–8.3)
PROT SERPL-MCNC: 4.4 G/DL — LOW (ref 6–8.3)
PROT SERPL-MCNC: 4.4 G/DL — LOW (ref 6–8.3)
PROT SERPL-MCNC: 4.5 G/DL — LOW (ref 6–8.3)
PROT SERPL-MCNC: 4.5 G/DL — LOW (ref 6–8.3)
PROT SERPL-MCNC: 4.6 G/DL — LOW (ref 6–8.3)
PROT SERPL-MCNC: 4.6 G/DL — LOW (ref 6–8.3)
PROT SERPL-MCNC: 4.7 G/DL — LOW (ref 6–8.3)
PROT SERPL-MCNC: 4.8 G/DL — LOW (ref 6–8.3)
PROT SERPL-MCNC: 4.8 G/DL — LOW (ref 6–8.3)
PROT SERPL-MCNC: 4.9 G/DL — LOW (ref 6–8.3)
PROT SERPL-MCNC: 5 G/DL — LOW (ref 6–8.3)
PROT SERPL-MCNC: 5.3 G/DL — LOW (ref 6–8.3)
PROT SERPL-MCNC: 5.3 G/DL — LOW (ref 6–8.3)
PROT SERPL-MCNC: 5.4 G/DL — LOW (ref 6–8.3)
PROT SERPL-MCNC: 5.5 G/DL — LOW (ref 6–8.3)
PROT SERPL-MCNC: 5.6 G/DL — LOW (ref 6–8.3)
PROT SERPL-MCNC: 5.7 G/DL — LOW (ref 6–8.3)
PROT SERPL-MCNC: 5.8 G/DL — LOW (ref 6–8.3)
PROT SERPL-MCNC: 5.9 G/DL — LOW (ref 6–8.3)
PROT SERPL-MCNC: 6 G/DL — SIGNIFICANT CHANGE UP (ref 6–8.3)
PROT SERPL-MCNC: 6.6 G/DL — SIGNIFICANT CHANGE UP (ref 6–8.3)
PROT SERPL-MCNC: 6.7 G/DL — SIGNIFICANT CHANGE UP (ref 6–8.3)
PROT SERPL-MCNC: 7.1 G/DL — SIGNIFICANT CHANGE UP (ref 6–8.3)
PROT SERPL-MCNC: 7.1 G/DL — SIGNIFICANT CHANGE UP (ref 6–8.3)
PROTHROM AB SERPL-ACNC: 13.6 SEC — HIGH (ref 10.5–13.4)
PROTHROM AB SERPL-ACNC: 14.2 SEC — HIGH (ref 10.5–13.4)
PROTHROM AB SERPL-ACNC: 14.9 SEC — HIGH (ref 10.5–13.4)
PROTHROM AB SERPL-ACNC: 15 SEC — HIGH (ref 10.5–13.4)
PROTHROM AB SERPL-ACNC: 15.1 SEC — HIGH (ref 10.5–13.4)
PROTHROM AB SERPL-ACNC: 15.2 SEC — HIGH (ref 10.5–13.4)
PROTHROM AB SERPL-ACNC: 15.4 SEC — HIGH (ref 10.5–13.4)
PROTHROM AB SERPL-ACNC: 15.4 SEC — HIGH (ref 10.5–13.4)
PROTHROM AB SERPL-ACNC: 15.5 SEC — HIGH (ref 10.5–13.4)
PROTHROM AB SERPL-ACNC: 15.8 SEC — HIGH (ref 10.5–13.4)
PROTHROM AB SERPL-ACNC: 15.8 SEC — HIGH (ref 10.5–13.4)
PROTHROM AB SERPL-ACNC: 16.1 SEC — HIGH (ref 10.5–13.4)
PROTHROM AB SERPL-ACNC: 16.2 SEC — HIGH (ref 10.5–13.4)
PROTHROM AB SERPL-ACNC: 16.6 SEC — HIGH (ref 10.5–13.4)
PROTHROM AB SERPL-ACNC: 16.6 SEC — HIGH (ref 10.5–13.4)
PROTHROM AB SERPL-ACNC: 16.8 SEC — HIGH (ref 10.5–13.4)
PROTHROM AB SERPL-ACNC: 18.4 SEC — HIGH (ref 10.5–13.4)
PROTHROM AB SERPL-ACNC: 21 SEC — HIGH (ref 10.5–13.4)
PROTHROM AB SERPL-ACNC: 21.1 SEC — HIGH (ref 10.5–13.4)
PROTHROM AB SERPL-ACNC: 24.3 SEC — HIGH (ref 10.5–13.4)
PROTHROM AB SERPL-ACNC: 29.8 SEC — HIGH (ref 10.5–13.4)
PROTHROM AB SERPL-ACNC: 30 SEC — HIGH (ref 10.5–13.4)
PROTHROM AB SERPL-ACNC: 43.1 SEC — HIGH (ref 10.5–13.4)
PS EXPRESSION INTERPRETATION: SIGNIFICANT CHANGE UP
PS EXPRESSION PF4 30 MCG/100U HEPARIN: 0 % — SIGNIFICANT CHANGE UP
PS EXPRESSION PF4 30 MCG: 0 % — SIGNIFICANT CHANGE UP
PS EXPRESSION RESULT: NEGATIVE — SIGNIFICANT CHANGE UP
PTH-INTACT FLD-MCNC: 351 PG/ML — HIGH (ref 15–65)
PTH-INTACT FLD-MCNC: 479 PG/ML — HIGH (ref 15–65)
PTH-INTACT FLD-MCNC: 492 PG/ML — HIGH (ref 15–65)
PTH-INTACT FLD-MCNC: 532 PG/ML — HIGH (ref 15–65)
PTH-INTACT FLD-MCNC: 799 PG/ML — HIGH (ref 15–65)
PTR INTERPRETATION: SIGNIFICANT CHANGE UP
QUANT TB PLUS MITOGEN MINUS NIL: 0.97 IU/ML — SIGNIFICANT CHANGE UP
RAPID RVP RESULT: SIGNIFICANT CHANGE UP
RAPID RVP RESULT: SIGNIFICANT CHANGE UP
RBC # BLD: 2.43 M/UL — LOW (ref 3.8–5.2)
RBC # BLD: 2.43 M/UL — LOW (ref 3.8–5.2)
RBC # BLD: 2.44 M/UL — LOW (ref 3.8–5.2)
RBC # BLD: 2.45 M/UL — LOW (ref 3.8–5.2)
RBC # BLD: 2.45 M/UL — LOW (ref 3.8–5.2)
RBC # BLD: 2.46 M/UL — LOW (ref 3.8–5.2)
RBC # BLD: 2.5 M/UL — LOW (ref 3.8–5.2)
RBC # BLD: 2.52 M/UL — LOW (ref 3.8–5.2)
RBC # BLD: 2.52 M/UL — LOW (ref 3.8–5.2)
RBC # BLD: 2.59 M/UL — LOW (ref 3.8–5.2)
RBC # BLD: 2.59 M/UL — LOW (ref 3.8–5.2)
RBC # BLD: 2.63 M/UL — LOW (ref 3.8–5.2)
RBC # BLD: 2.64 M/UL — LOW (ref 3.8–5.2)
RBC # BLD: 2.73 M/UL — LOW (ref 3.8–5.2)
RBC # BLD: 2.74 M/UL — LOW (ref 3.8–5.2)
RBC # BLD: 2.75 M/UL — LOW (ref 3.8–5.2)
RBC # BLD: 2.76 M/UL — LOW (ref 3.8–5.2)
RBC # BLD: 2.78 M/UL — LOW (ref 3.8–5.2)
RBC # BLD: 2.82 M/UL — LOW (ref 3.8–5.2)
RBC # BLD: 2.85 M/UL — LOW (ref 3.8–5.2)
RBC # BLD: 2.88 M/UL — LOW (ref 3.8–5.2)
RBC # BLD: 2.89 M/UL — LOW (ref 3.8–5.2)
RBC # BLD: 2.9 M/UL — LOW (ref 3.8–5.2)
RBC # BLD: 2.9 M/UL — LOW (ref 3.8–5.2)
RBC # BLD: 2.93 M/UL — LOW (ref 3.8–5.2)
RBC # BLD: 2.93 M/UL — LOW (ref 3.8–5.2)
RBC # BLD: 2.98 M/UL — LOW (ref 3.8–5.2)
RBC # BLD: 2.98 M/UL — LOW (ref 3.8–5.2)
RBC # BLD: 2.99 M/UL — LOW (ref 3.8–5.2)
RBC # BLD: 3.01 M/UL — LOW (ref 3.8–5.2)
RBC # BLD: 3.03 M/UL — LOW (ref 3.8–5.2)
RBC # BLD: 3.05 M/UL — LOW (ref 3.8–5.2)
RBC # BLD: 3.06 M/UL — LOW (ref 3.8–5.2)
RBC # BLD: 3.06 M/UL — LOW (ref 3.8–5.2)
RBC # BLD: 3.07 M/UL — LOW (ref 3.8–5.2)
RBC # BLD: 3.08 M/UL — LOW (ref 3.8–5.2)
RBC # BLD: 3.08 M/UL — LOW (ref 3.8–5.2)
RBC # BLD: 3.11 M/UL — LOW (ref 3.8–5.2)
RBC # BLD: 3.12 M/UL — LOW (ref 3.8–5.2)
RBC # BLD: 3.13 M/UL — LOW (ref 3.8–5.2)
RBC # BLD: 3.13 M/UL — LOW (ref 3.8–5.2)
RBC # BLD: 3.14 M/UL — LOW (ref 3.8–5.2)
RBC # BLD: 3.16 M/UL — LOW (ref 3.8–5.2)
RBC # BLD: 3.17 M/UL — LOW (ref 3.8–5.2)
RBC # BLD: 3.17 M/UL — LOW (ref 3.8–5.2)
RBC # BLD: 3.2 M/UL — LOW (ref 3.8–5.2)
RBC # BLD: 3.22 M/UL — LOW (ref 3.8–5.2)
RBC # BLD: 3.22 M/UL — LOW (ref 3.8–5.2)
RBC # BLD: 3.24 M/UL — LOW (ref 3.8–5.2)
RBC # BLD: 3.25 M/UL — LOW (ref 3.8–5.2)
RBC # BLD: 3.26 M/UL — LOW (ref 3.8–5.2)
RBC # BLD: 3.29 M/UL — LOW (ref 3.8–5.2)
RBC # BLD: 3.33 M/UL — LOW (ref 3.8–5.2)
RBC # BLD: 3.33 M/UL — LOW (ref 3.8–5.2)
RBC # BLD: 3.36 M/UL — LOW (ref 3.8–5.2)
RBC # BLD: 3.36 M/UL — LOW (ref 3.8–5.2)
RBC # BLD: 3.39 M/UL — LOW (ref 3.8–5.2)
RBC # BLD: 3.42 M/UL — LOW (ref 3.8–5.2)
RBC # BLD: 3.48 M/UL — LOW (ref 3.8–5.2)
RBC # BLD: 3.49 M/UL — LOW (ref 3.8–5.2)
RBC # BLD: 3.5 M/UL — LOW (ref 3.8–5.2)
RBC # BLD: 3.54 M/UL — LOW (ref 3.8–5.2)
RBC # FLD: 17.1 % — HIGH (ref 10.3–14.5)
RBC # FLD: 17.2 % — HIGH (ref 10.3–14.5)
RBC # FLD: 17.3 % — HIGH (ref 10.3–14.5)
RBC # FLD: 17.3 % — HIGH (ref 10.3–14.5)
RBC # FLD: 17.4 % — HIGH (ref 10.3–14.5)
RBC # FLD: 17.4 % — HIGH (ref 10.3–14.5)
RBC # FLD: 17.5 % — HIGH (ref 10.3–14.5)
RBC # FLD: 17.6 % — HIGH (ref 10.3–14.5)
RBC # FLD: 17.6 % — HIGH (ref 10.3–14.5)
RBC # FLD: 17.7 % — HIGH (ref 10.3–14.5)
RBC # FLD: 17.8 % — HIGH (ref 10.3–14.5)
RBC # FLD: 17.9 % — HIGH (ref 10.3–14.5)
RBC # FLD: 17.9 % — HIGH (ref 10.3–14.5)
RBC # FLD: 18 % — HIGH (ref 10.3–14.5)
RBC # FLD: 18.1 % — HIGH (ref 10.3–14.5)
RBC # FLD: 18.2 % — HIGH (ref 10.3–14.5)
RBC # FLD: 18.3 % — HIGH (ref 10.3–14.5)
RBC # FLD: 18.4 % — HIGH (ref 10.3–14.5)
RBC # FLD: 18.7 % — HIGH (ref 10.3–14.5)
RBC # FLD: 18.8 % — HIGH (ref 10.3–14.5)
RBC # FLD: 18.9 % — HIGH (ref 10.3–14.5)
RBC # FLD: 19 % — HIGH (ref 10.3–14.5)
RBC # FLD: 19 % — HIGH (ref 10.3–14.5)
RBC # FLD: 19.3 % — HIGH (ref 10.3–14.5)
RBC # FLD: 19.4 % — HIGH (ref 10.3–14.5)
RBC # FLD: 19.4 % — HIGH (ref 10.3–14.5)
RBC # FLD: 19.6 % — HIGH (ref 10.3–14.5)
RBC # FLD: 19.7 % — HIGH (ref 10.3–14.5)
RBC # FLD: 19.7 % — HIGH (ref 10.3–14.5)
RBC # FLD: 20 % — HIGH (ref 10.3–14.5)
RBC # FLD: 20.2 % — HIGH (ref 10.3–14.5)
RBC # FLD: 21.5 % — HIGH (ref 10.3–14.5)
RBC # FLD: 22.7 % — HIGH (ref 10.3–14.5)
RBC # FLD: 23 % — HIGH (ref 10.3–14.5)
RBC # FLD: 23.2 % — HIGH (ref 10.3–14.5)
RBC # FLD: 23.3 % — HIGH (ref 10.3–14.5)
RBC # FLD: 23.4 % — HIGH (ref 10.3–14.5)
RBC # FLD: 23.5 % — HIGH (ref 10.3–14.5)
RBC # FLD: 23.5 % — HIGH (ref 10.3–14.5)
RBC # FLD: 23.6 % — HIGH (ref 10.3–14.5)
RBC # FLD: 23.6 % — HIGH (ref 10.3–14.5)
RBC # FLD: 23.7 % — HIGH (ref 10.3–14.5)
RBC # FLD: 23.8 % — HIGH (ref 10.3–14.5)
RBC # FLD: 23.9 % — HIGH (ref 10.3–14.5)
RBC # FLD: 23.9 % — HIGH (ref 10.3–14.5)
RBC # FLD: 24.3 % — HIGH (ref 10.3–14.5)
RBC # FLD: 24.4 % — HIGH (ref 10.3–14.5)
RBC # FLD: 24.5 % — HIGH (ref 10.3–14.5)
RBC # FLD: 24.9 % — HIGH (ref 10.3–14.5)
RBC # FLD: 25.1 % — HIGH (ref 10.3–14.5)
RBC # FLD: 25.3 % — HIGH (ref 10.3–14.5)
RBC # FLD: 25.8 % — HIGH (ref 10.3–14.5)
RBC BLD AUTO: ABNORMAL
RBC BLD AUTO: SIGNIFICANT CHANGE UP
RBC BLD AUTO: SIGNIFICANT CHANGE UP
RENIN PLAS-CCNC: 0.56 NG/ML/HR — SIGNIFICANT CHANGE UP (ref 0.17–5.38)
RETICS #: 73.7 K/UL — SIGNIFICANT CHANGE UP (ref 25–125)
RETICS #: 85.6 K/UL — SIGNIFICANT CHANGE UP (ref 25–125)
RETICS/RBC NFR: 3 % — HIGH (ref 0.5–2.5)
RETICS/RBC NFR: 3.6 % — HIGH (ref 0.5–2.5)
RH IG SCN BLD-IMP: POSITIVE — SIGNIFICANT CHANGE UP
RHEUMATOID FACT SERPL-ACNC: 10 IU/ML — SIGNIFICANT CHANGE UP (ref 0–13)
RSV RNA NPH QL NAA+NON-PROBE: SIGNIFICANT CHANGE UP
S AUREUS DNA NOSE QL NAA+PROBE: NEGATIVE — SIGNIFICANT CHANGE UP
SAO2 % BLDV: 35 % — LOW (ref 67–88)
SARS-COV-2 RNA SPEC QL NAA+PROBE: NEGATIVE — SIGNIFICANT CHANGE UP
SARS-COV-2 RNA SPEC QL NAA+PROBE: SIGNIFICANT CHANGE UP
SCHISTOCYTES BLD QL AUTO: SIGNIFICANT CHANGE UP
SCHISTOCYTES BLD QL AUTO: SLIGHT — SIGNIFICANT CHANGE UP
SMUDGE CELLS # BLD: PRESENT — SIGNIFICANT CHANGE UP
SMUDGE CELLS # BLD: PRESENT — SIGNIFICANT CHANGE UP
SODIUM SERPL-SCNC: 124 MMOL/L — LOW (ref 135–145)
SODIUM SERPL-SCNC: 125 MMOL/L — LOW (ref 135–145)
SODIUM SERPL-SCNC: 126 MMOL/L — LOW (ref 135–145)
SODIUM SERPL-SCNC: 127 MMOL/L — LOW (ref 135–145)
SODIUM SERPL-SCNC: 128 MMOL/L — LOW (ref 135–145)
SODIUM SERPL-SCNC: 129 MMOL/L — LOW (ref 135–145)
SODIUM SERPL-SCNC: 130 MMOL/L — LOW (ref 135–145)
SODIUM SERPL-SCNC: 131 MMOL/L — LOW (ref 135–145)
SODIUM SERPL-SCNC: 132 MMOL/L — LOW (ref 135–145)
SODIUM SERPL-SCNC: 133 MMOL/L — LOW (ref 135–145)
SODIUM SERPL-SCNC: 134 MMOL/L — LOW (ref 135–145)
SODIUM SERPL-SCNC: 135 MMOL/L — SIGNIFICANT CHANGE UP (ref 135–145)
SODIUM SERPL-SCNC: 137 MMOL/L — SIGNIFICANT CHANGE UP (ref 135–145)
SODIUM SERPL-SCNC: 138 MMOL/L — SIGNIFICANT CHANGE UP (ref 135–145)
SODIUM SERPL-SCNC: 139 MMOL/L — SIGNIFICANT CHANGE UP (ref 135–145)
SODIUM SERPL-SCNC: 139 MMOL/L — SIGNIFICANT CHANGE UP (ref 135–145)
SODIUM SERPL-SCNC: 140 MMOL/L — SIGNIFICANT CHANGE UP (ref 135–145)
SODIUM SERPL-SCNC: 141 MMOL/L — SIGNIFICANT CHANGE UP (ref 135–145)
SODIUM SERPL-SCNC: 143 MMOL/L — SIGNIFICANT CHANGE UP (ref 135–145)
SPECIMEN SOURCE: SIGNIFICANT CHANGE UP
SPHEROCYTES BLD QL SMEAR: SIGNIFICANT CHANGE UP
SPHEROCYTES BLD QL SMEAR: SLIGHT — SIGNIFICANT CHANGE UP
STOMATOCYTES BLD QL SMEAR: SLIGHT — SIGNIFICANT CHANGE UP
SURGICAL PATHOLOGY STUDY: SIGNIFICANT CHANGE UP
T4 FREE SERPL-MCNC: 0.68 NG/DL — LOW (ref 0.93–1.7)
T4 FREE SERPL-MCNC: 0.96 NG/DL — SIGNIFICANT CHANGE UP (ref 0.93–1.7)
TARGETS BLD QL SMEAR: SIGNIFICANT CHANGE UP
TARGETS BLD QL SMEAR: SLIGHT — SIGNIFICANT CHANGE UP
TIBC SERPL-MCNC: 68 UG/DL — LOW (ref 220–430)
TIBC SERPL-MCNC: 75 UG/DL — LOW (ref 220–430)
TIBC SERPL-MCNC: 79 UG/DL — LOW (ref 220–430)
TIBC SERPL-MCNC: 86 UG/DL — LOW (ref 220–430)
TRANSFERRIN SERPL-MCNC: 65 MG/DL — LOW (ref 200–360)
TRANSFERRIN SERPL-MCNC: 76 MG/DL — LOW (ref 200–360)
TSH SERPL-MCNC: 1.52 UIU/ML — SIGNIFICANT CHANGE UP (ref 0.27–4.2)
TSH SERPL-MCNC: 1.61 UIU/ML — SIGNIFICANT CHANGE UP (ref 0.27–4.2)
TSH SERPL-MCNC: 2.86 UIU/ML — SIGNIFICANT CHANGE UP (ref 0.27–4.2)
TSH SERPL-MCNC: 3.24 UIU/ML — SIGNIFICANT CHANGE UP (ref 0.27–4.2)
UIBC SERPL-MCNC: 51 UG/DL — LOW (ref 110–370)
UIBC SERPL-MCNC: 54 UG/DL — LOW (ref 110–370)
UIBC SERPL-MCNC: 60 UG/DL — LOW (ref 110–370)
UIBC SERPL-MCNC: 64 UG/DL — LOW (ref 110–370)
URATE SERPL-MCNC: 3.7 MG/DL — SIGNIFICANT CHANGE UP (ref 2.5–7)
URATE SERPL-MCNC: 3.9 MG/DL — SIGNIFICANT CHANGE UP (ref 2.5–7)
VANCOMYCIN FLD-MCNC: 15.3 UG/ML — SIGNIFICANT CHANGE UP
VANCOMYCIN FLD-MCNC: 17 UG/ML — SIGNIFICANT CHANGE UP
VANCOMYCIN FLD-MCNC: 17.5 UG/ML — SIGNIFICANT CHANGE UP
VANCOMYCIN TROUGH SERPL-MCNC: 13.2 UG/ML — SIGNIFICANT CHANGE UP (ref 10–20)
VANCOMYCIN TROUGH SERPL-MCNC: 18.9 UG/ML — SIGNIFICANT CHANGE UP (ref 10–20)
VANCOMYCIN TROUGH SERPL-MCNC: 20.3 UG/ML — HIGH (ref 10–20)
VANCOMYCIN TROUGH SERPL-MCNC: 21.8 UG/ML — HIGH (ref 10–20)
VIT D25+D1,25 OH+D1,25 PNL SERPL-MCNC: 33.9 PG/ML — SIGNIFICANT CHANGE UP (ref 19.9–79.3)
WBC # BLD: 10.28 K/UL — SIGNIFICANT CHANGE UP (ref 3.8–10.5)
WBC # BLD: 10.43 K/UL — SIGNIFICANT CHANGE UP (ref 3.8–10.5)
WBC # BLD: 10.47 K/UL — SIGNIFICANT CHANGE UP (ref 3.8–10.5)
WBC # BLD: 10.73 K/UL — HIGH (ref 3.8–10.5)
WBC # BLD: 10.75 K/UL — HIGH (ref 3.8–10.5)
WBC # BLD: 10.85 K/UL — HIGH (ref 3.8–10.5)
WBC # BLD: 10.85 K/UL — HIGH (ref 3.8–10.5)
WBC # BLD: 10.88 K/UL — HIGH (ref 3.8–10.5)
WBC # BLD: 11.06 K/UL — HIGH (ref 3.8–10.5)
WBC # BLD: 11.16 K/UL — HIGH (ref 3.8–10.5)
WBC # BLD: 11.4 K/UL — HIGH (ref 3.8–10.5)
WBC # BLD: 11.67 K/UL — HIGH (ref 3.8–10.5)
WBC # BLD: 11.72 K/UL — HIGH (ref 3.8–10.5)
WBC # BLD: 11.95 K/UL — HIGH (ref 3.8–10.5)
WBC # BLD: 12.11 K/UL — HIGH (ref 3.8–10.5)
WBC # BLD: 12.33 K/UL — HIGH (ref 3.8–10.5)
WBC # BLD: 12.34 K/UL — HIGH (ref 3.8–10.5)
WBC # BLD: 12.35 K/UL — HIGH (ref 3.8–10.5)
WBC # BLD: 12.59 K/UL — HIGH (ref 3.8–10.5)
WBC # BLD: 13.14 K/UL — HIGH (ref 3.8–10.5)
WBC # BLD: 13.18 K/UL — HIGH (ref 3.8–10.5)
WBC # BLD: 13.21 K/UL — HIGH (ref 3.8–10.5)
WBC # BLD: 13.21 K/UL — HIGH (ref 3.8–10.5)
WBC # BLD: 13.38 K/UL — HIGH (ref 3.8–10.5)
WBC # BLD: 13.77 K/UL — HIGH (ref 3.8–10.5)
WBC # BLD: 13.96 K/UL — HIGH (ref 3.8–10.5)
WBC # BLD: 14.3 K/UL — HIGH (ref 3.8–10.5)
WBC # BLD: 14.33 K/UL — HIGH (ref 3.8–10.5)
WBC # BLD: 14.38 K/UL — HIGH (ref 3.8–10.5)
WBC # BLD: 14.42 K/UL — HIGH (ref 3.8–10.5)
WBC # BLD: 14.66 K/UL — HIGH (ref 3.8–10.5)
WBC # BLD: 14.68 K/UL — HIGH (ref 3.8–10.5)
WBC # BLD: 14.75 K/UL — HIGH (ref 3.8–10.5)
WBC # BLD: 14.8 K/UL — HIGH (ref 3.8–10.5)
WBC # BLD: 15.57 K/UL — HIGH (ref 3.8–10.5)
WBC # BLD: 15.76 K/UL — HIGH (ref 3.8–10.5)
WBC # BLD: 15.84 K/UL — HIGH (ref 3.8–10.5)
WBC # BLD: 15.88 K/UL — HIGH (ref 3.8–10.5)
WBC # BLD: 17.68 K/UL — HIGH (ref 3.8–10.5)
WBC # BLD: 17.71 K/UL — HIGH (ref 3.8–10.5)
WBC # BLD: 17.83 K/UL — HIGH (ref 3.8–10.5)
WBC # BLD: 17.85 K/UL — HIGH (ref 3.8–10.5)
WBC # BLD: 19.27 K/UL — HIGH (ref 3.8–10.5)
WBC # BLD: 5.09 K/UL — SIGNIFICANT CHANGE UP (ref 3.8–10.5)
WBC # BLD: 5.22 K/UL — SIGNIFICANT CHANGE UP (ref 3.8–10.5)
WBC # BLD: 5.28 K/UL — SIGNIFICANT CHANGE UP (ref 3.8–10.5)
WBC # BLD: 5.51 K/UL — SIGNIFICANT CHANGE UP (ref 3.8–10.5)
WBC # BLD: 5.58 K/UL — SIGNIFICANT CHANGE UP (ref 3.8–10.5)
WBC # BLD: 5.69 K/UL — SIGNIFICANT CHANGE UP (ref 3.8–10.5)
WBC # BLD: 5.97 K/UL — SIGNIFICANT CHANGE UP (ref 3.8–10.5)
WBC # BLD: 6 K/UL — SIGNIFICANT CHANGE UP (ref 3.8–10.5)
WBC # BLD: 6.16 K/UL — SIGNIFICANT CHANGE UP (ref 3.8–10.5)
WBC # BLD: 6.21 K/UL — SIGNIFICANT CHANGE UP (ref 3.8–10.5)
WBC # BLD: 6.35 K/UL — SIGNIFICANT CHANGE UP (ref 3.8–10.5)
WBC # BLD: 6.42 K/UL — SIGNIFICANT CHANGE UP (ref 3.8–10.5)
WBC # BLD: 6.79 K/UL — SIGNIFICANT CHANGE UP (ref 3.8–10.5)
WBC # BLD: 6.88 K/UL — SIGNIFICANT CHANGE UP (ref 3.8–10.5)
WBC # BLD: 6.94 K/UL — SIGNIFICANT CHANGE UP (ref 3.8–10.5)
WBC # BLD: 6.96 K/UL — SIGNIFICANT CHANGE UP (ref 3.8–10.5)
WBC # BLD: 6.97 K/UL — SIGNIFICANT CHANGE UP (ref 3.8–10.5)
WBC # BLD: 7.19 K/UL — SIGNIFICANT CHANGE UP (ref 3.8–10.5)
WBC # BLD: 8.64 K/UL — SIGNIFICANT CHANGE UP (ref 3.8–10.5)
WBC # BLD: 8.66 K/UL — SIGNIFICANT CHANGE UP (ref 3.8–10.5)
WBC # BLD: 8.67 K/UL — SIGNIFICANT CHANGE UP (ref 3.8–10.5)
WBC # BLD: 9.73 K/UL — SIGNIFICANT CHANGE UP (ref 3.8–10.5)
WBC # BLD: 9.83 K/UL — SIGNIFICANT CHANGE UP (ref 3.8–10.5)
WBC # BLD: 9.96 K/UL — SIGNIFICANT CHANGE UP (ref 3.8–10.5)
WBC # FLD AUTO: 10.28 K/UL — SIGNIFICANT CHANGE UP (ref 3.8–10.5)
WBC # FLD AUTO: 10.43 K/UL — SIGNIFICANT CHANGE UP (ref 3.8–10.5)
WBC # FLD AUTO: 10.47 K/UL — SIGNIFICANT CHANGE UP (ref 3.8–10.5)
WBC # FLD AUTO: 10.73 K/UL — HIGH (ref 3.8–10.5)
WBC # FLD AUTO: 10.75 K/UL — HIGH (ref 3.8–10.5)
WBC # FLD AUTO: 10.85 K/UL — HIGH (ref 3.8–10.5)
WBC # FLD AUTO: 10.85 K/UL — HIGH (ref 3.8–10.5)
WBC # FLD AUTO: 10.88 K/UL — HIGH (ref 3.8–10.5)
WBC # FLD AUTO: 11.06 K/UL — HIGH (ref 3.8–10.5)
WBC # FLD AUTO: 11.16 K/UL — HIGH (ref 3.8–10.5)
WBC # FLD AUTO: 11.4 K/UL — HIGH (ref 3.8–10.5)
WBC # FLD AUTO: 11.67 K/UL — HIGH (ref 3.8–10.5)
WBC # FLD AUTO: 11.72 K/UL — HIGH (ref 3.8–10.5)
WBC # FLD AUTO: 11.95 K/UL — HIGH (ref 3.8–10.5)
WBC # FLD AUTO: 12.11 K/UL — HIGH (ref 3.8–10.5)
WBC # FLD AUTO: 12.33 K/UL — HIGH (ref 3.8–10.5)
WBC # FLD AUTO: 12.34 K/UL — HIGH (ref 3.8–10.5)
WBC # FLD AUTO: 12.35 K/UL — HIGH (ref 3.8–10.5)
WBC # FLD AUTO: 12.59 K/UL — HIGH (ref 3.8–10.5)
WBC # FLD AUTO: 13.14 K/UL — HIGH (ref 3.8–10.5)
WBC # FLD AUTO: 13.18 K/UL — HIGH (ref 3.8–10.5)
WBC # FLD AUTO: 13.21 K/UL — HIGH (ref 3.8–10.5)
WBC # FLD AUTO: 13.21 K/UL — HIGH (ref 3.8–10.5)
WBC # FLD AUTO: 13.38 K/UL — HIGH (ref 3.8–10.5)
WBC # FLD AUTO: 13.77 K/UL — HIGH (ref 3.8–10.5)
WBC # FLD AUTO: 13.96 K/UL — HIGH (ref 3.8–10.5)
WBC # FLD AUTO: 14.3 K/UL — HIGH (ref 3.8–10.5)
WBC # FLD AUTO: 14.33 K/UL — HIGH (ref 3.8–10.5)
WBC # FLD AUTO: 14.38 K/UL — HIGH (ref 3.8–10.5)
WBC # FLD AUTO: 14.42 K/UL — HIGH (ref 3.8–10.5)
WBC # FLD AUTO: 14.66 K/UL — HIGH (ref 3.8–10.5)
WBC # FLD AUTO: 14.68 K/UL — HIGH (ref 3.8–10.5)
WBC # FLD AUTO: 14.75 K/UL — HIGH (ref 3.8–10.5)
WBC # FLD AUTO: 14.8 K/UL — HIGH (ref 3.8–10.5)
WBC # FLD AUTO: 15.57 K/UL — HIGH (ref 3.8–10.5)
WBC # FLD AUTO: 15.76 K/UL — HIGH (ref 3.8–10.5)
WBC # FLD AUTO: 15.84 K/UL — HIGH (ref 3.8–10.5)
WBC # FLD AUTO: 15.88 K/UL — HIGH (ref 3.8–10.5)
WBC # FLD AUTO: 17.68 K/UL — HIGH (ref 3.8–10.5)
WBC # FLD AUTO: 17.71 K/UL — HIGH (ref 3.8–10.5)
WBC # FLD AUTO: 17.83 K/UL — HIGH (ref 3.8–10.5)
WBC # FLD AUTO: 17.85 K/UL — HIGH (ref 3.8–10.5)
WBC # FLD AUTO: 19.27 K/UL — HIGH (ref 3.8–10.5)
WBC # FLD AUTO: 5.09 K/UL — SIGNIFICANT CHANGE UP (ref 3.8–10.5)
WBC # FLD AUTO: 5.22 K/UL — SIGNIFICANT CHANGE UP (ref 3.8–10.5)
WBC # FLD AUTO: 5.28 K/UL — SIGNIFICANT CHANGE UP (ref 3.8–10.5)
WBC # FLD AUTO: 5.51 K/UL — SIGNIFICANT CHANGE UP (ref 3.8–10.5)
WBC # FLD AUTO: 5.58 K/UL — SIGNIFICANT CHANGE UP (ref 3.8–10.5)
WBC # FLD AUTO: 5.69 K/UL — SIGNIFICANT CHANGE UP (ref 3.8–10.5)
WBC # FLD AUTO: 5.97 K/UL — SIGNIFICANT CHANGE UP (ref 3.8–10.5)
WBC # FLD AUTO: 6 K/UL — SIGNIFICANT CHANGE UP (ref 3.8–10.5)
WBC # FLD AUTO: 6.16 K/UL — SIGNIFICANT CHANGE UP (ref 3.8–10.5)
WBC # FLD AUTO: 6.21 K/UL — SIGNIFICANT CHANGE UP (ref 3.8–10.5)
WBC # FLD AUTO: 6.35 K/UL — SIGNIFICANT CHANGE UP (ref 3.8–10.5)
WBC # FLD AUTO: 6.42 K/UL — SIGNIFICANT CHANGE UP (ref 3.8–10.5)
WBC # FLD AUTO: 6.79 K/UL — SIGNIFICANT CHANGE UP (ref 3.8–10.5)
WBC # FLD AUTO: 6.88 K/UL — SIGNIFICANT CHANGE UP (ref 3.8–10.5)
WBC # FLD AUTO: 6.94 K/UL — SIGNIFICANT CHANGE UP (ref 3.8–10.5)
WBC # FLD AUTO: 6.96 K/UL — SIGNIFICANT CHANGE UP (ref 3.8–10.5)
WBC # FLD AUTO: 6.97 K/UL — SIGNIFICANT CHANGE UP (ref 3.8–10.5)
WBC # FLD AUTO: 7.19 K/UL — SIGNIFICANT CHANGE UP (ref 3.8–10.5)
WBC # FLD AUTO: 8.64 K/UL — SIGNIFICANT CHANGE UP (ref 3.8–10.5)
WBC # FLD AUTO: 8.66 K/UL — SIGNIFICANT CHANGE UP (ref 3.8–10.5)
WBC # FLD AUTO: 8.67 K/UL — SIGNIFICANT CHANGE UP (ref 3.8–10.5)
WBC # FLD AUTO: 9.73 K/UL — SIGNIFICANT CHANGE UP (ref 3.8–10.5)
WBC # FLD AUTO: 9.83 K/UL — SIGNIFICANT CHANGE UP (ref 3.8–10.5)
WBC # FLD AUTO: 9.96 K/UL — SIGNIFICANT CHANGE UP (ref 3.8–10.5)

## 2023-01-01 PROCEDURE — 99233 SBSQ HOSP IP/OBS HIGH 50: CPT

## 2023-01-01 PROCEDURE — 86431 RHEUMATOID FACTOR QUANT: CPT

## 2023-01-01 PROCEDURE — 90935 HEMODIALYSIS ONE EVALUATION: CPT

## 2023-01-01 PROCEDURE — 76937 US GUIDE VASCULAR ACCESS: CPT | Mod: 26

## 2023-01-01 PROCEDURE — 83970 ASSAY OF PARATHORMONE: CPT

## 2023-01-01 PROCEDURE — 85730 THROMBOPLASTIN TIME PARTIAL: CPT

## 2023-01-01 PROCEDURE — 96374 THER/PROPH/DIAG INJ IV PUSH: CPT

## 2023-01-01 PROCEDURE — 87205 SMEAR GRAM STAIN: CPT

## 2023-01-01 PROCEDURE — 83735 ASSAY OF MAGNESIUM: CPT

## 2023-01-01 PROCEDURE — 83605 ASSAY OF LACTIC ACID: CPT

## 2023-01-01 PROCEDURE — 93306 TTE W/DOPPLER COMPLETE: CPT

## 2023-01-01 PROCEDURE — 78815 PET IMAGE W/CT SKULL-THIGH: CPT

## 2023-01-01 PROCEDURE — 99233 SBSQ HOSP IP/OBS HIGH 50: CPT | Mod: GC

## 2023-01-01 PROCEDURE — 99291 CRITICAL CARE FIRST HOUR: CPT

## 2023-01-01 PROCEDURE — 93971 EXTREMITY STUDY: CPT

## 2023-01-01 PROCEDURE — 83550 IRON BINDING TEST: CPT

## 2023-01-01 PROCEDURE — C8929: CPT

## 2023-01-01 PROCEDURE — 85303 CLOT INHIBIT PROT C ACTIVITY: CPT

## 2023-01-01 PROCEDURE — 76641 ULTRASOUND BREAST COMPLETE: CPT

## 2023-01-01 PROCEDURE — 83090 ASSAY OF HOMOCYSTEINE: CPT

## 2023-01-01 PROCEDURE — 74018 RADEX ABDOMEN 1 VIEW: CPT | Mod: 26

## 2023-01-01 PROCEDURE — 10005 FNA BX W/US GDN 1ST LES: CPT

## 2023-01-01 PROCEDURE — 80053 COMPREHEN METABOLIC PANEL: CPT

## 2023-01-01 PROCEDURE — 88173 CYTOPATH EVAL FNA REPORT: CPT

## 2023-01-01 PROCEDURE — 36556 INSERT NON-TUNNEL CV CATH: CPT | Mod: GC

## 2023-01-01 PROCEDURE — 87635 SARS-COV-2 COVID-19 AMP PRB: CPT

## 2023-01-01 PROCEDURE — 93308 TTE F-UP OR LMTD: CPT | Mod: 26,GC

## 2023-01-01 PROCEDURE — 74176 CT ABD & PELVIS W/O CONTRAST: CPT

## 2023-01-01 PROCEDURE — 87340 HEPATITIS B SURFACE AG IA: CPT

## 2023-01-01 PROCEDURE — C1769: CPT

## 2023-01-01 PROCEDURE — 94660 CPAP INITIATION&MGMT: CPT

## 2023-01-01 PROCEDURE — 93970 EXTREMITY STUDY: CPT | Mod: 26

## 2023-01-01 PROCEDURE — 93306 TTE W/DOPPLER COMPLETE: CPT | Mod: 26

## 2023-01-01 PROCEDURE — 10006 FNA BX W/US GDN EA ADDL: CPT

## 2023-01-01 PROCEDURE — 86146 BETA-2 GLYCOPROTEIN ANTIBODY: CPT

## 2023-01-01 PROCEDURE — 36620 INSERTION CATHETER ARTERY: CPT

## 2023-01-01 PROCEDURE — 0225U NFCT DS DNA&RNA 21 SARSCOV2: CPT

## 2023-01-01 PROCEDURE — 86901 BLOOD TYPING SEROLOGIC RH(D): CPT

## 2023-01-01 PROCEDURE — 85300 ANTITHROMBIN III ACTIVITY: CPT

## 2023-01-01 PROCEDURE — 87116 MYCOBACTERIA CULTURE: CPT

## 2023-01-01 PROCEDURE — 87015 SPECIMEN INFECT AGNT CONCNTJ: CPT

## 2023-01-01 PROCEDURE — 85025 COMPLETE CBC W/AUTO DIFF WBC: CPT

## 2023-01-01 PROCEDURE — 99223 1ST HOSP IP/OBS HIGH 75: CPT | Mod: GC

## 2023-01-01 PROCEDURE — 86038 ANTINUCLEAR ANTIBODIES: CPT

## 2023-01-01 PROCEDURE — 70491 CT SOFT TISSUE NECK W/DYE: CPT | Mod: 26

## 2023-01-01 PROCEDURE — 99232 SBSQ HOSP IP/OBS MODERATE 35: CPT

## 2023-01-01 PROCEDURE — 99232 SBSQ HOSP IP/OBS MODERATE 35: CPT | Mod: GC

## 2023-01-01 PROCEDURE — 76604 US EXAM CHEST: CPT | Mod: 26,GC

## 2023-01-01 PROCEDURE — 90937 HEMODIALYSIS REPEATED EVAL: CPT

## 2023-01-01 PROCEDURE — 70496 CT ANGIOGRAPHY HEAD: CPT | Mod: 26

## 2023-01-01 PROCEDURE — 99497 ADVNCD CARE PLAN 30 MIN: CPT

## 2023-01-01 PROCEDURE — 86923 COMPATIBILITY TEST ELECTRIC: CPT

## 2023-01-01 PROCEDURE — 80202 ASSAY OF VANCOMYCIN: CPT

## 2023-01-01 PROCEDURE — 85652 RBC SED RATE AUTOMATED: CPT

## 2023-01-01 PROCEDURE — 36573 INSJ PICC RS&I 5 YR+: CPT

## 2023-01-01 PROCEDURE — 87206 SMEAR FLUORESCENT/ACID STAI: CPT

## 2023-01-01 PROCEDURE — 36000 PLACE NEEDLE IN VEIN: CPT

## 2023-01-01 PROCEDURE — C1729: CPT

## 2023-01-01 PROCEDURE — 36600 WITHDRAWAL OF ARTERIAL BLOOD: CPT | Mod: 59

## 2023-01-01 PROCEDURE — 88305 TISSUE EXAM BY PATHOLOGIST: CPT

## 2023-01-01 PROCEDURE — 99221 1ST HOSP IP/OBS SF/LOW 40: CPT

## 2023-01-01 PROCEDURE — 99231 SBSQ HOSP IP/OBS SF/LOW 25: CPT | Mod: GC

## 2023-01-01 PROCEDURE — 11043 DBRDMT MUSC&/FSCA 1ST 20/<: CPT

## 2023-01-01 PROCEDURE — 93971 EXTREMITY STUDY: CPT | Mod: 26,RT

## 2023-01-01 PROCEDURE — 85610 PROTHROMBIN TIME: CPT

## 2023-01-01 PROCEDURE — 87070 CULTURE OTHR SPECIMN AEROBIC: CPT

## 2023-01-01 PROCEDURE — U0005: CPT

## 2023-01-01 PROCEDURE — 76705 ECHO EXAM OF ABDOMEN: CPT

## 2023-01-01 PROCEDURE — 99222 1ST HOSP IP/OBS MODERATE 55: CPT

## 2023-01-01 PROCEDURE — 99285 EMERGENCY DEPT VISIT HI MDM: CPT

## 2023-01-01 PROCEDURE — 76376 3D RENDER W/INTRP POSTPROCES: CPT | Mod: 26

## 2023-01-01 PROCEDURE — A9556: CPT

## 2023-01-01 PROCEDURE — 82595 ASSAY OF CRYOGLOBULIN: CPT

## 2023-01-01 PROCEDURE — 99358 PROLONG SERVICE W/O CONTACT: CPT | Mod: NC

## 2023-01-01 PROCEDURE — 90935 HEMODIALYSIS ONE EVALUATION: CPT | Mod: GC

## 2023-01-01 PROCEDURE — 76775 US EXAM ABDO BACK WALL LIM: CPT | Mod: 26,GC

## 2023-01-01 PROCEDURE — 80076 HEPATIC FUNCTION PANEL: CPT

## 2023-01-01 PROCEDURE — 85598 HEXAGNAL PHOSPH PLTLT NEUTRL: CPT

## 2023-01-01 PROCEDURE — 74174 CTA ABD&PLVS W/CONTRAST: CPT

## 2023-01-01 PROCEDURE — 86922 COMPATIBILITY TEST ANTIGLOB: CPT

## 2023-01-01 PROCEDURE — 99498 ADVNCD CARE PLAN ADDL 30 MIN: CPT | Mod: 25

## 2023-01-01 PROCEDURE — 93971 EXTREMITY STUDY: CPT | Mod: 26,LT

## 2023-01-01 PROCEDURE — 74018 RADEX ABDOMEN 1 VIEW: CPT

## 2023-01-01 PROCEDURE — 82533 TOTAL CORTISOL: CPT

## 2023-01-01 PROCEDURE — A9552: CPT

## 2023-01-01 PROCEDURE — 82803 BLOOD GASES ANY COMBINATION: CPT

## 2023-01-01 PROCEDURE — 99232 SBSQ HOSP IP/OBS MODERATE 35: CPT | Mod: 59

## 2023-01-01 PROCEDURE — 97162 PT EVAL MOD COMPLEX 30 MIN: CPT

## 2023-01-01 PROCEDURE — 74176 CT ABD & PELVIS W/O CONTRAST: CPT | Mod: 26

## 2023-01-01 PROCEDURE — 80048 BASIC METABOLIC PNL TOTAL CA: CPT

## 2023-01-01 PROCEDURE — 99497 ADVNCD CARE PLAN 30 MIN: CPT | Mod: 25

## 2023-01-01 PROCEDURE — 71045 X-RAY EXAM CHEST 1 VIEW: CPT | Mod: 26

## 2023-01-01 PROCEDURE — 94640 AIRWAY INHALATION TREATMENT: CPT

## 2023-01-01 PROCEDURE — 86078 PHYS BLOOD BANK SERV REACTJ: CPT

## 2023-01-01 PROCEDURE — 82728 ASSAY OF FERRITIN: CPT

## 2023-01-01 PROCEDURE — 82962 GLUCOSE BLOOD TEST: CPT

## 2023-01-01 PROCEDURE — 85027 COMPLETE CBC AUTOMATED: CPT

## 2023-01-01 PROCEDURE — 86850 RBC ANTIBODY SCREEN: CPT

## 2023-01-01 PROCEDURE — P9016: CPT

## 2023-01-01 PROCEDURE — 99239 HOSP IP/OBS DSCHRG MGMT >30: CPT

## 2023-01-01 PROCEDURE — 84295 ASSAY OF SERUM SODIUM: CPT

## 2023-01-01 PROCEDURE — 85613 RUSSELL VIPER VENOM DILUTED: CPT

## 2023-01-01 PROCEDURE — 71045 X-RAY EXAM CHEST 1 VIEW: CPT

## 2023-01-01 PROCEDURE — 88173 CYTOPATH EVAL FNA REPORT: CPT | Mod: 26

## 2023-01-01 PROCEDURE — 70450 CT HEAD/BRAIN W/O DYE: CPT | Mod: 26

## 2023-01-01 PROCEDURE — 76536 US EXAM OF HEAD AND NECK: CPT

## 2023-01-01 PROCEDURE — 97530 THERAPEUTIC ACTIVITIES: CPT

## 2023-01-01 PROCEDURE — 86706 HEP B SURFACE ANTIBODY: CPT

## 2023-01-01 PROCEDURE — 10030 IMG GID FLU COLL DRG SFT TIS: CPT

## 2023-01-01 PROCEDURE — 84100 ASSAY OF PHOSPHORUS: CPT

## 2023-01-01 PROCEDURE — 99222 1ST HOSP IP/OBS MODERATE 55: CPT | Mod: GC

## 2023-01-01 PROCEDURE — 86147 CARDIOLIPIN ANTIBODY EA IG: CPT

## 2023-01-01 PROCEDURE — 81240 F2 GENE: CPT

## 2023-01-01 PROCEDURE — 84443 ASSAY THYROID STIM HORMONE: CPT

## 2023-01-01 PROCEDURE — 93005 ELECTROCARDIOGRAM TRACING: CPT

## 2023-01-01 PROCEDURE — 74178 CT ABD&PLV WO CNTR FLWD CNTR: CPT | Mod: 26

## 2023-01-01 PROCEDURE — 71250 CT THORAX DX C-: CPT

## 2023-01-01 PROCEDURE — 99291 CRITICAL CARE FIRST HOUR: CPT | Mod: 25

## 2023-01-01 PROCEDURE — 84132 ASSAY OF SERUM POTASSIUM: CPT

## 2023-01-01 PROCEDURE — 84244 ASSAY OF RENIN: CPT

## 2023-01-01 PROCEDURE — 82306 VITAMIN D 25 HYDROXY: CPT

## 2023-01-01 PROCEDURE — 76536 US EXAM OF HEAD AND NECK: CPT | Mod: 26

## 2023-01-01 PROCEDURE — 85301 ANTITHROMBIN III ANTIGEN: CPT

## 2023-01-01 PROCEDURE — 84155 ASSAY OF PROTEIN SERUM: CPT

## 2023-01-01 PROCEDURE — 86304 IMMUNOASSAY TUMOR CA 125: CPT

## 2023-01-01 PROCEDURE — 84145 PROCALCITONIN (PCT): CPT

## 2023-01-01 PROCEDURE — 82310 ASSAY OF CALCIUM: CPT

## 2023-01-01 PROCEDURE — 36000 PLACE NEEDLE IN VEIN: CPT | Mod: 59

## 2023-01-01 PROCEDURE — 97110 THERAPEUTIC EXERCISES: CPT

## 2023-01-01 PROCEDURE — 82585 ASSAY OF CRYOFIBRINOGEN: CPT

## 2023-01-01 PROCEDURE — 84550 ASSAY OF BLOOD/URIC ACID: CPT

## 2023-01-01 PROCEDURE — 83615 LACTATE (LD) (LDH) ENZYME: CPT

## 2023-01-01 PROCEDURE — 87102 FUNGUS ISOLATION CULTURE: CPT

## 2023-01-01 PROCEDURE — 86480 TB TEST CELL IMMUN MEASURE: CPT

## 2023-01-01 PROCEDURE — 87640 STAPH A DNA AMP PROBE: CPT

## 2023-01-01 PROCEDURE — 86334 IMMUNOFIX E-PHORESIS SERUM: CPT

## 2023-01-01 PROCEDURE — 83540 ASSAY OF IRON: CPT

## 2023-01-01 PROCEDURE — 36415 COLL VENOUS BLD VENIPUNCTURE: CPT

## 2023-01-01 PROCEDURE — 82272 OCCULT BLD FECES 1-3 TESTS: CPT

## 2023-01-01 PROCEDURE — 76705 ECHO EXAM OF ABDOMEN: CPT | Mod: 26

## 2023-01-01 PROCEDURE — 86803 HEPATITIS C AB TEST: CPT

## 2023-01-01 PROCEDURE — 71250 CT THORAX DX C-: CPT | Mod: 26

## 2023-01-01 PROCEDURE — 84466 ASSAY OF TRANSFERRIN: CPT

## 2023-01-01 PROCEDURE — 87075 CULTR BACTERIA EXCEPT BLOOD: CPT

## 2023-01-01 PROCEDURE — 85307 ASSAY ACTIVATED PROTEIN C: CPT

## 2023-01-01 PROCEDURE — C1751: CPT

## 2023-01-01 PROCEDURE — 81241 F5 GENE: CPT

## 2023-01-01 PROCEDURE — 82652 VIT D 1 25-DIHYDROXY: CPT

## 2023-01-01 PROCEDURE — 87040 BLOOD CULTURE FOR BACTERIA: CPT

## 2023-01-01 PROCEDURE — 36556 INSERT NON-TUNNEL CV CATH: CPT

## 2023-01-01 PROCEDURE — 78815 PET IMAGE W/CT SKULL-THIGH: CPT | Mod: 26,PI

## 2023-01-01 PROCEDURE — 87641 MR-STAPH DNA AMP PROBE: CPT

## 2023-01-01 PROCEDURE — 78802 RP LOCLZJ TUM WHBDY 1 D IMG: CPT | Mod: 26

## 2023-01-01 PROCEDURE — 93970 EXTREMITY STUDY: CPT

## 2023-01-01 PROCEDURE — 76641 ULTRASOUND BREAST COMPLETE: CPT | Mod: 26,50

## 2023-01-01 PROCEDURE — 76830 TRANSVAGINAL US NON-OB: CPT

## 2023-01-01 PROCEDURE — 36430 TRANSFUSION BLD/BLD COMPNT: CPT

## 2023-01-01 PROCEDURE — 85306 CLOT INHIBIT PROT S FREE: CPT

## 2023-01-01 PROCEDURE — P9047: CPT

## 2023-01-01 PROCEDURE — 86036 ANCA SCREEN EACH ANTIBODY: CPT

## 2023-01-01 PROCEDURE — 88305 TISSUE EXAM BY PATHOLOGIST: CPT | Mod: 26

## 2023-01-01 PROCEDURE — 86160 COMPLEMENT ANTIGEN: CPT

## 2023-01-01 PROCEDURE — 76830 TRANSVAGINAL US NON-OB: CPT | Mod: 26

## 2023-01-01 PROCEDURE — 70450 CT HEAD/BRAIN W/O DYE: CPT

## 2023-01-01 PROCEDURE — ZZZZZ: CPT

## 2023-01-01 PROCEDURE — 86900 BLOOD TYPING SEROLOGIC ABO: CPT

## 2023-01-01 PROCEDURE — 86704 HEP B CORE ANTIBODY TOTAL: CPT

## 2023-01-01 PROCEDURE — 70492 CT SFT TSUE NCK W/O & W/DYE: CPT

## 2023-01-01 PROCEDURE — 82330 ASSAY OF CALCIUM: CPT

## 2023-01-01 PROCEDURE — 76856 US EXAM PELVIC COMPLETE: CPT | Mod: 26

## 2023-01-01 PROCEDURE — 77001 FLUOROGUIDE FOR VEIN DEVICE: CPT | Mod: 26

## 2023-01-01 PROCEDURE — 86301 IMMUNOASSAY TUMOR CA 19-9: CPT

## 2023-01-01 PROCEDURE — 74174 CTA ABD&PLVS W/CONTRAST: CPT | Mod: 26

## 2023-01-01 PROCEDURE — 82378 CARCINOEMBRYONIC ANTIGEN: CPT

## 2023-01-01 PROCEDURE — 80074 ACUTE HEPATITIS PANEL: CPT

## 2023-01-01 PROCEDURE — 93975 VASCULAR STUDY: CPT | Mod: 26

## 2023-01-01 PROCEDURE — 36558 INSERT TUNNELED CV CATH: CPT | Mod: LT

## 2023-01-01 PROCEDURE — 78802 RP LOCLZJ TUM WHBDY 1 D IMG: CPT

## 2023-01-01 PROCEDURE — 93010 ELECTROCARDIOGRAM REPORT: CPT

## 2023-01-01 PROCEDURE — 99223 1ST HOSP IP/OBS HIGH 75: CPT

## 2023-01-01 PROCEDURE — 70496 CT ANGIOGRAPHY HEAD: CPT

## 2023-01-01 PROCEDURE — 87637 SARSCOV2&INF A&B&RSV AMP PRB: CPT

## 2023-01-01 PROCEDURE — 85045 AUTOMATED RETICULOCYTE COUNT: CPT

## 2023-01-01 PROCEDURE — 84439 ASSAY OF FREE THYROXINE: CPT

## 2023-01-01 PROCEDURE — U0003: CPT

## 2023-01-01 PROCEDURE — 84165 PROTEIN E-PHORESIS SERUM: CPT

## 2023-01-01 PROCEDURE — 76856 US EXAM PELVIC COMPLETE: CPT

## 2023-01-01 PROCEDURE — 82088 ASSAY OF ALDOSTERONE: CPT

## 2023-01-01 RX ORDER — POTASSIUM CHLORIDE 20 MEQ
20 PACKET (EA) ORAL
Refills: 0 | Status: DISCONTINUED | OUTPATIENT
Start: 2023-01-01 | End: 2023-01-01

## 2023-01-01 RX ORDER — MIDODRINE HYDROCHLORIDE 2.5 MG/1
20 TABLET ORAL EVERY 8 HOURS
Refills: 0 | Status: DISCONTINUED | OUTPATIENT
Start: 2023-01-01 | End: 2023-01-01

## 2023-01-01 RX ORDER — MIDODRINE HYDROCHLORIDE 2.5 MG/1
40 TABLET ORAL EVERY 8 HOURS
Refills: 0 | Status: DISCONTINUED | OUTPATIENT
Start: 2023-01-01 | End: 2023-01-01

## 2023-01-01 RX ORDER — CHLORHEXIDINE GLUCONATE 213 G/1000ML
1 SOLUTION TOPICAL
Refills: 0 | Status: DISCONTINUED | OUTPATIENT
Start: 2023-01-01 | End: 2023-01-01

## 2023-01-01 RX ORDER — FENTANYL CITRATE 50 UG/ML
25 INJECTION INTRAVENOUS ONCE
Refills: 0 | Status: DISCONTINUED | OUTPATIENT
Start: 2023-01-01 | End: 2023-01-01

## 2023-01-01 RX ORDER — ATORVASTATIN CALCIUM 80 MG/1
1 TABLET, FILM COATED ORAL
Qty: 0 | Refills: 0 | DISCHARGE

## 2023-01-01 RX ORDER — MEROPENEM 1 G/30ML
500 INJECTION INTRAVENOUS EVERY 24 HOURS
Refills: 0 | Status: COMPLETED | OUTPATIENT
Start: 2023-01-01 | End: 2023-01-01

## 2023-01-01 RX ORDER — ATORVASTATIN CALCIUM 80 MG/1
40 TABLET, FILM COATED ORAL AT BEDTIME
Refills: 0 | Status: DISCONTINUED | OUTPATIENT
Start: 2023-01-01 | End: 2023-01-01

## 2023-01-01 RX ORDER — FLUDROCORTISONE ACETATE 0.1 MG/1
0.2 TABLET ORAL EVERY 24 HOURS
Refills: 0 | Status: DISCONTINUED | OUTPATIENT
Start: 2023-01-01 | End: 2023-01-01

## 2023-01-01 RX ORDER — DEXTROSE 10 % IN WATER 10 %
250 INTRAVENOUS SOLUTION INTRAVENOUS ONCE
Refills: 0 | Status: COMPLETED | OUTPATIENT
Start: 2023-01-01 | End: 2023-01-01

## 2023-01-01 RX ORDER — MAGNESIUM SULFATE 500 MG/ML
1 VIAL (ML) INJECTION ONCE
Refills: 0 | Status: COMPLETED | OUTPATIENT
Start: 2023-01-01 | End: 2023-01-01

## 2023-01-01 RX ORDER — DEXTROSE 50 % IN WATER 50 %
15 SYRINGE (ML) INTRAVENOUS ONCE
Refills: 0 | Status: DISCONTINUED | OUTPATIENT
Start: 2023-01-01 | End: 2023-01-01

## 2023-01-01 RX ORDER — FLUDROCORTISONE ACETATE 0.1 MG/1
0.1 TABLET ORAL EVERY 24 HOURS
Refills: 0 | Status: DISCONTINUED | OUTPATIENT
Start: 2023-01-01 | End: 2023-01-01

## 2023-01-01 RX ORDER — APIXABAN 2.5 MG/1
5 TABLET, FILM COATED ORAL EVERY 12 HOURS
Refills: 0 | Status: DISCONTINUED | OUTPATIENT
Start: 2023-01-01 | End: 2023-01-01

## 2023-01-01 RX ORDER — DEXTROSE 50 % IN WATER 50 %
50 SYRINGE (ML) INTRAVENOUS ONCE
Refills: 0 | Status: COMPLETED | OUTPATIENT
Start: 2023-01-01 | End: 2023-01-01

## 2023-01-01 RX ORDER — VANCOMYCIN HCL 1 G
1250 VIAL (EA) INTRAVENOUS ONCE
Refills: 0 | Status: COMPLETED | OUTPATIENT
Start: 2023-01-01 | End: 2023-01-01

## 2023-01-01 RX ORDER — ERYTHROPOIETIN 10000 [IU]/ML
8000 INJECTION, SOLUTION INTRAVENOUS; SUBCUTANEOUS ONCE
Refills: 0 | Status: COMPLETED | OUTPATIENT
Start: 2023-01-01 | End: 2023-01-01

## 2023-01-01 RX ORDER — SODIUM CHLORIDE 9 MG/ML
1000 INJECTION, SOLUTION INTRAVENOUS
Refills: 0 | Status: DISCONTINUED | OUTPATIENT
Start: 2023-01-01 | End: 2023-01-01

## 2023-01-01 RX ORDER — DIPHENHYDRAMINE HCL 50 MG
50 CAPSULE ORAL ONCE
Refills: 0 | Status: DISCONTINUED | OUTPATIENT
Start: 2023-01-01 | End: 2023-01-01

## 2023-01-01 RX ORDER — PANTOPRAZOLE SODIUM 20 MG/1
40 TABLET, DELAYED RELEASE ORAL EVERY 12 HOURS
Refills: 0 | Status: DISCONTINUED | OUTPATIENT
Start: 2023-01-01 | End: 2023-01-01

## 2023-01-01 RX ORDER — HEPARIN SODIUM 5000 [USP'U]/ML
5000 INJECTION INTRAVENOUS; SUBCUTANEOUS EVERY 8 HOURS
Refills: 0 | Status: DISCONTINUED | OUTPATIENT
Start: 2023-01-01 | End: 2023-01-01

## 2023-01-01 RX ORDER — DEXTROSE 10 % IN WATER 10 %
1000 INTRAVENOUS SOLUTION INTRAVENOUS
Refills: 0 | Status: DISCONTINUED | OUTPATIENT
Start: 2023-01-01 | End: 2023-01-01

## 2023-01-01 RX ORDER — DEXTROSE 50 % IN WATER 50 %
25 SYRINGE (ML) INTRAVENOUS ONCE
Refills: 0 | Status: DISCONTINUED | OUTPATIENT
Start: 2023-01-01 | End: 2023-01-01

## 2023-01-01 RX ORDER — HYDROMORPHONE HYDROCHLORIDE 2 MG/ML
0.5 INJECTION INTRAMUSCULAR; INTRAVENOUS; SUBCUTANEOUS ONCE
Refills: 0 | Status: DISCONTINUED | OUTPATIENT
Start: 2023-01-01 | End: 2023-01-01

## 2023-01-01 RX ORDER — FLUDROCORTISONE ACETATE 0.1 MG/1
0.1 TABLET ORAL ONCE
Refills: 0 | Status: COMPLETED | OUTPATIENT
Start: 2023-01-01 | End: 2023-01-01

## 2023-01-01 RX ORDER — HEPARIN SODIUM 5000 [USP'U]/ML
1300 INJECTION INTRAVENOUS; SUBCUTANEOUS
Qty: 25000 | Refills: 0 | Status: DISCONTINUED | OUTPATIENT
Start: 2023-01-01 | End: 2023-01-01

## 2023-01-01 RX ORDER — HYDROMORPHONE HYDROCHLORIDE 2 MG/ML
30 INJECTION INTRAMUSCULAR; INTRAVENOUS; SUBCUTANEOUS
Refills: 0 | Status: DISCONTINUED | OUTPATIENT
Start: 2023-01-01 | End: 2023-01-01

## 2023-01-01 RX ORDER — SEVELAMER CARBONATE 2400 MG/1
800 POWDER, FOR SUSPENSION ORAL
Refills: 0 | Status: DISCONTINUED | OUTPATIENT
Start: 2023-01-01 | End: 2023-01-01

## 2023-01-01 RX ORDER — MORPHINE SULFATE 50 MG/1
2 CAPSULE, EXTENDED RELEASE ORAL ONCE
Refills: 0 | Status: DISCONTINUED | OUTPATIENT
Start: 2023-01-01 | End: 2023-01-01

## 2023-01-01 RX ORDER — HYDROMORPHONE HYDROCHLORIDE 2 MG/ML
0.5 INJECTION INTRAMUSCULAR; INTRAVENOUS; SUBCUTANEOUS EVERY 4 HOURS
Refills: 0 | Status: DISCONTINUED | OUTPATIENT
Start: 2023-01-01 | End: 2023-01-01

## 2023-01-01 RX ORDER — IBUPROFEN 200 MG
400 TABLET ORAL EVERY 6 HOURS
Refills: 0 | Status: DISCONTINUED | OUTPATIENT
Start: 2023-01-01 | End: 2023-01-01

## 2023-01-01 RX ORDER — PHENYLEPHRINE HYDROCHLORIDE 10 MG/ML
0.1 INJECTION INTRAVENOUS
Qty: 160 | Refills: 0 | Status: DISCONTINUED | OUTPATIENT
Start: 2023-01-01 | End: 2023-01-01

## 2023-01-01 RX ORDER — ALBUMIN HUMAN 25 %
50 VIAL (ML) INTRAVENOUS
Qty: 0 | Refills: 0 | DISCHARGE
Start: 2023-01-01

## 2023-01-01 RX ORDER — MAGNESIUM SULFATE 500 MG/ML
2 VIAL (ML) INJECTION ONCE
Refills: 0 | Status: COMPLETED | OUTPATIENT
Start: 2023-01-01 | End: 2023-01-01

## 2023-01-01 RX ORDER — HYDROMORPHONE HYDROCHLORIDE 2 MG/ML
1 INJECTION INTRAMUSCULAR; INTRAVENOUS; SUBCUTANEOUS ONCE
Refills: 0 | Status: DISCONTINUED | OUTPATIENT
Start: 2023-01-01 | End: 2023-01-01

## 2023-01-01 RX ORDER — MIDODRINE HYDROCHLORIDE 2.5 MG/1
30 TABLET ORAL EVERY 8 HOURS
Refills: 0 | Status: DISCONTINUED | OUTPATIENT
Start: 2023-01-01 | End: 2023-01-01

## 2023-01-01 RX ORDER — POTASSIUM CHLORIDE 20 MEQ
20 PACKET (EA) ORAL ONCE
Refills: 0 | Status: COMPLETED | OUTPATIENT
Start: 2023-01-01 | End: 2023-01-01

## 2023-01-01 RX ORDER — HYDROMORPHONE HYDROCHLORIDE 2 MG/ML
4 INJECTION INTRAMUSCULAR; INTRAVENOUS; SUBCUTANEOUS
Refills: 0 | Status: DISCONTINUED | OUTPATIENT
Start: 2023-01-01 | End: 2023-01-01

## 2023-01-01 RX ORDER — NOREPINEPHRINE BITARTRATE/D5W 8 MG/250ML
0.02 PLASTIC BAG, INJECTION (ML) INTRAVENOUS
Qty: 8 | Refills: 0 | Status: DISCONTINUED | OUTPATIENT
Start: 2023-01-01 | End: 2023-01-01

## 2023-01-01 RX ORDER — HYDROMORPHONE HYDROCHLORIDE 2 MG/ML
2 INJECTION INTRAMUSCULAR; INTRAVENOUS; SUBCUTANEOUS EVERY 4 HOURS
Refills: 0 | Status: DISCONTINUED | OUTPATIENT
Start: 2023-01-01 | End: 2023-01-01

## 2023-01-01 RX ORDER — MIDODRINE HYDROCHLORIDE 2.5 MG/1
30 TABLET ORAL ONCE
Refills: 0 | Status: COMPLETED | OUTPATIENT
Start: 2023-01-01 | End: 2023-01-01

## 2023-01-01 RX ORDER — ACETAMINOPHEN 500 MG
1000 TABLET ORAL ONCE
Refills: 0 | Status: COMPLETED | OUTPATIENT
Start: 2023-01-01 | End: 2023-01-01

## 2023-01-01 RX ORDER — SEVELAMER CARBONATE 2400 MG/1
1600 POWDER, FOR SUSPENSION ORAL
Refills: 0 | Status: DISCONTINUED | OUTPATIENT
Start: 2023-01-01 | End: 2023-01-01

## 2023-01-01 RX ORDER — SODIUM CHLORIDE 9 MG/ML
500 INJECTION, SOLUTION INTRAVENOUS ONCE
Refills: 0 | Status: DISCONTINUED | OUTPATIENT
Start: 2023-01-01 | End: 2023-01-01

## 2023-01-01 RX ORDER — CEFEPIME 1 G/1
1000 INJECTION, POWDER, FOR SOLUTION INTRAMUSCULAR; INTRAVENOUS EVERY 24 HOURS
Refills: 0 | Status: COMPLETED | OUTPATIENT
Start: 2023-01-01 | End: 2023-01-01

## 2023-01-01 RX ORDER — DEXTROSE 10 % IN WATER 10 %
100 INTRAVENOUS SOLUTION INTRAVENOUS ONCE
Refills: 0 | Status: COMPLETED | OUTPATIENT
Start: 2023-01-01 | End: 2023-01-01

## 2023-01-01 RX ORDER — DEXTROSE 10 % IN WATER 10 %
250 INTRAVENOUS SOLUTION INTRAVENOUS ONCE
Refills: 0 | Status: DISCONTINUED | OUTPATIENT
Start: 2023-01-01 | End: 2023-01-01

## 2023-01-01 RX ORDER — VANCOMYCIN HCL 1 G
500 VIAL (EA) INTRAVENOUS ONCE
Refills: 0 | Status: COMPLETED | OUTPATIENT
Start: 2023-01-01 | End: 2023-01-01

## 2023-01-01 RX ORDER — HYDROMORPHONE HYDROCHLORIDE 2 MG/ML
1 INJECTION INTRAMUSCULAR; INTRAVENOUS; SUBCUTANEOUS
Refills: 0 | Status: DISCONTINUED | OUTPATIENT
Start: 2023-01-01 | End: 2023-01-01

## 2023-01-01 RX ORDER — SODIUM CHLORIDE 9 MG/ML
1 INJECTION INTRAMUSCULAR; INTRAVENOUS; SUBCUTANEOUS EVERY 12 HOURS
Refills: 0 | Status: DISCONTINUED | OUTPATIENT
Start: 2023-01-01 | End: 2023-01-01

## 2023-01-01 RX ORDER — COLLAGENASE CLOSTRIDIUM HIST. 250 UNIT/G
1 OINTMENT (GRAM) TOPICAL DAILY
Refills: 0 | Status: DISCONTINUED | OUTPATIENT
Start: 2023-01-01 | End: 2023-01-01

## 2023-01-01 RX ORDER — DIPHENHYDRAMINE HCL 50 MG
50 CAPSULE ORAL ONCE
Refills: 0 | Status: COMPLETED | OUTPATIENT
Start: 2023-01-01 | End: 2023-01-01

## 2023-01-01 RX ORDER — PIPERACILLIN AND TAZOBACTAM 4; .5 G/20ML; G/20ML
4.5 INJECTION, POWDER, LYOPHILIZED, FOR SOLUTION INTRAVENOUS ONCE
Refills: 0 | Status: DISCONTINUED | OUTPATIENT
Start: 2023-01-01 | End: 2023-01-01

## 2023-01-01 RX ORDER — CEFEPIME 1 G/1
1000 INJECTION, POWDER, FOR SOLUTION INTRAMUSCULAR; INTRAVENOUS EVERY 24 HOURS
Refills: 0 | Status: DISCONTINUED | OUTPATIENT
Start: 2023-01-01 | End: 2023-01-01

## 2023-01-01 RX ORDER — ERYTHROPOIETIN 10000 [IU]/ML
10000 INJECTION, SOLUTION INTRAVENOUS; SUBCUTANEOUS ONCE
Refills: 0 | Status: DISCONTINUED | OUTPATIENT
Start: 2023-01-01 | End: 2023-01-01

## 2023-01-01 RX ORDER — VANCOMYCIN HCL 1 G
1250 VIAL (EA) INTRAVENOUS ONCE
Refills: 0 | Status: DISCONTINUED | OUTPATIENT
Start: 2023-01-01 | End: 2023-01-01

## 2023-01-01 RX ORDER — METOPROLOL TARTRATE 50 MG
5 TABLET ORAL ONCE
Refills: 0 | Status: COMPLETED | OUTPATIENT
Start: 2023-01-01 | End: 2023-01-01

## 2023-01-01 RX ORDER — INSULIN HUMAN 100 [IU]/ML
5 INJECTION, SOLUTION SUBCUTANEOUS ONCE
Refills: 0 | Status: COMPLETED | OUTPATIENT
Start: 2023-01-01 | End: 2023-01-01

## 2023-01-01 RX ORDER — FLUDROCORTISONE ACETATE 0.1 MG/1
0.5 TABLET ORAL EVERY 24 HOURS
Refills: 0 | Status: DISCONTINUED | OUTPATIENT
Start: 2023-01-01 | End: 2023-01-01

## 2023-01-01 RX ORDER — ONDANSETRON 8 MG/1
4 TABLET, FILM COATED ORAL ONCE
Refills: 0 | Status: COMPLETED | OUTPATIENT
Start: 2023-01-01 | End: 2023-01-01

## 2023-01-01 RX ORDER — SOD SULF/SODIUM/NAHCO3/KCL/PEG
4000 SOLUTION, RECONSTITUTED, ORAL ORAL ONCE
Refills: 0 | Status: COMPLETED | OUTPATIENT
Start: 2023-01-01 | End: 2023-01-01

## 2023-01-01 RX ORDER — SODIUM CHLORIDE 9 MG/ML
250 INJECTION, SOLUTION INTRAVENOUS ONCE
Refills: 0 | Status: DISCONTINUED | OUTPATIENT
Start: 2023-01-01 | End: 2023-01-01

## 2023-01-01 RX ORDER — ALBUMIN HUMAN 25 %
50 VIAL (ML) INTRAVENOUS
Refills: 0 | Status: COMPLETED | OUTPATIENT
Start: 2023-01-01 | End: 2023-01-01

## 2023-01-01 RX ORDER — ACETAMINOPHEN 500 MG
650 TABLET ORAL ONCE
Refills: 0 | Status: COMPLETED | OUTPATIENT
Start: 2023-01-01 | End: 2023-01-01

## 2023-01-01 RX ORDER — HEPARIN SODIUM 5000 [USP'U]/ML
7500 INJECTION INTRAVENOUS; SUBCUTANEOUS EVERY 8 HOURS
Refills: 0 | Status: DISCONTINUED | OUTPATIENT
Start: 2023-01-01 | End: 2023-01-01

## 2023-01-01 RX ORDER — COLCHICINE 0.6 MG
0.3 TABLET ORAL DAILY
Refills: 0 | Status: DISCONTINUED | OUTPATIENT
Start: 2023-01-01 | End: 2023-01-01

## 2023-01-01 RX ORDER — PANTOPRAZOLE SODIUM 20 MG/1
1 TABLET, DELAYED RELEASE ORAL
Qty: 0 | Refills: 0 | DISCHARGE
Start: 2023-01-01

## 2023-01-01 RX ORDER — VANCOMYCIN HCL 1 G
1000 VIAL (EA) INTRAVENOUS ONCE
Refills: 0 | Status: COMPLETED | OUTPATIENT
Start: 2023-01-01 | End: 2023-01-01

## 2023-01-01 RX ORDER — HYDROMORPHONE HYDROCHLORIDE 2 MG/ML
0.5 INJECTION INTRAMUSCULAR; INTRAVENOUS; SUBCUTANEOUS
Refills: 0 | Status: DISCONTINUED | OUTPATIENT
Start: 2023-01-01 | End: 2023-01-01

## 2023-01-01 RX ORDER — GLUCAGON INJECTION, SOLUTION 0.5 MG/.1ML
1 INJECTION, SOLUTION SUBCUTANEOUS ONCE
Refills: 0 | Status: COMPLETED | OUTPATIENT
Start: 2023-01-01

## 2023-01-01 RX ORDER — HEPARIN SODIUM 5000 [USP'U]/ML
1000 INJECTION INTRAVENOUS; SUBCUTANEOUS
Qty: 25000 | Refills: 0 | Status: DISCONTINUED | OUTPATIENT
Start: 2023-01-01 | End: 2023-01-01

## 2023-01-01 RX ORDER — MIDODRINE HYDROCHLORIDE 2.5 MG/1
15 TABLET ORAL EVERY 8 HOURS
Refills: 0 | Status: DISCONTINUED | OUTPATIENT
Start: 2023-01-01 | End: 2023-01-01

## 2023-01-01 RX ORDER — HYDROMORPHONE HYDROCHLORIDE 2 MG/ML
2 INJECTION INTRAMUSCULAR; INTRAVENOUS; SUBCUTANEOUS
Refills: 0 | Status: DISCONTINUED | OUTPATIENT
Start: 2023-01-01 | End: 2023-01-01

## 2023-01-01 RX ORDER — SODIUM CHLORIDE 9 MG/ML
10 INJECTION INTRAMUSCULAR; INTRAVENOUS; SUBCUTANEOUS
Refills: 0 | Status: DISCONTINUED | OUTPATIENT
Start: 2023-01-01 | End: 2023-01-01

## 2023-01-01 RX ORDER — DEXTROSE 50 % IN WATER 50 %
15 SYRINGE (ML) INTRAVENOUS ONCE
Refills: 0 | Status: COMPLETED | OUTPATIENT
Start: 2023-01-01 | End: 2023-01-01

## 2023-01-01 RX ORDER — PHENYLEPHRINE HYDROCHLORIDE 10 MG/ML
4 INJECTION INTRAVENOUS
Qty: 160 | Refills: 0 | Status: DISCONTINUED | OUTPATIENT
Start: 2023-01-01 | End: 2023-01-01

## 2023-01-01 RX ORDER — DIPHENHYDRAMINE HCL 50 MG
25 CAPSULE ORAL ONCE
Refills: 0 | Status: DISCONTINUED | OUTPATIENT
Start: 2023-01-01 | End: 2023-01-01

## 2023-01-01 RX ORDER — DIPHENHYDRAMINE HCL 50 MG
25 CAPSULE ORAL ONCE
Refills: 0 | Status: COMPLETED | OUTPATIENT
Start: 2023-01-01 | End: 2023-01-01

## 2023-01-01 RX ORDER — FENTANYL CITRATE 50 UG/ML
25 INJECTION INTRAVENOUS
Refills: 0 | Status: DISCONTINUED | OUTPATIENT
Start: 2023-01-01 | End: 2023-01-01

## 2023-01-01 RX ORDER — MEROPENEM 1 G/30ML
500 INJECTION INTRAVENOUS EVERY 24 HOURS
Refills: 0 | Status: DISCONTINUED | OUTPATIENT
Start: 2023-01-01 | End: 2023-01-01

## 2023-01-01 RX ORDER — CINACALCET 30 MG/1
90 TABLET, FILM COATED ORAL DAILY
Refills: 0 | Status: DISCONTINUED | OUTPATIENT
Start: 2023-01-01 | End: 2023-01-01

## 2023-01-01 RX ORDER — SEVELAMER CARBONATE 2400 MG/1
2 POWDER, FOR SUSPENSION ORAL
Qty: 0 | Refills: 0 | DISCHARGE
Start: 2023-01-01

## 2023-01-01 RX ORDER — HYDROMORPHONE HYDROCHLORIDE 2 MG/ML
0.25 INJECTION INTRAMUSCULAR; INTRAVENOUS; SUBCUTANEOUS ONCE
Refills: 0 | Status: DISCONTINUED | OUTPATIENT
Start: 2023-01-01 | End: 2023-01-01

## 2023-01-01 RX ORDER — ERYTHROPOIETIN 10000 [IU]/ML
10000 INJECTION, SOLUTION INTRAVENOUS; SUBCUTANEOUS ONCE
Refills: 0 | Status: COMPLETED | OUTPATIENT
Start: 2023-01-01 | End: 2023-01-01

## 2023-01-01 RX ORDER — HYDROMORPHONE HYDROCHLORIDE 2 MG/ML
1 INJECTION INTRAMUSCULAR; INTRAVENOUS; SUBCUTANEOUS EVERY 4 HOURS
Refills: 0 | Status: DISCONTINUED | OUTPATIENT
Start: 2023-01-01 | End: 2023-01-01

## 2023-01-01 RX ORDER — CINACALCET 30 MG/1
30 TABLET, FILM COATED ORAL DAILY
Refills: 0 | Status: DISCONTINUED | OUTPATIENT
Start: 2023-01-01 | End: 2023-01-01

## 2023-01-01 RX ORDER — HYDROMORPHONE HYDROCHLORIDE 2 MG/ML
1 INJECTION INTRAMUSCULAR; INTRAVENOUS; SUBCUTANEOUS ONCE
Refills: 0 | Status: COMPLETED | OUTPATIENT
Start: 2023-01-01 | End: 2023-01-01

## 2023-01-01 RX ORDER — HEPARIN SODIUM 5000 [USP'U]/ML
20 INJECTION INTRAVENOUS; SUBCUTANEOUS
Qty: 25000 | Refills: 0 | Status: DISCONTINUED | OUTPATIENT
Start: 2023-01-01 | End: 2023-01-01

## 2023-01-01 RX ORDER — APIXABAN 2.5 MG/1
2.5 TABLET, FILM COATED ORAL EVERY 12 HOURS
Refills: 0 | Status: DISCONTINUED | OUTPATIENT
Start: 2023-01-01 | End: 2023-01-01

## 2023-01-01 RX ORDER — ALBUTEROL 90 UG/1
2.5 AEROSOL, METERED ORAL ONCE
Refills: 0 | Status: COMPLETED | OUTPATIENT
Start: 2023-01-01 | End: 2023-01-01

## 2023-01-01 RX ORDER — NALOXONE HYDROCHLORIDE 4 MG/.1ML
0.1 SPRAY NASAL
Refills: 0 | Status: DISCONTINUED | OUTPATIENT
Start: 2023-01-01 | End: 2023-01-01

## 2023-01-01 RX ORDER — CYCLOBENZAPRINE HYDROCHLORIDE 10 MG/1
5 TABLET, FILM COATED ORAL THREE TIMES A DAY
Refills: 0 | Status: DISCONTINUED | OUTPATIENT
Start: 2023-01-01 | End: 2023-01-01

## 2023-01-01 RX ORDER — CALAMINE AND ZINC OXIDE AND PHENOL 160; 10 MG/ML; MG/ML
1 LOTION TOPICAL THREE TIMES A DAY
Refills: 0 | Status: DISCONTINUED | OUTPATIENT
Start: 2023-01-01 | End: 2023-01-01

## 2023-01-01 RX ORDER — DEXTROSE 50 % IN WATER 50 %
12.5 SYRINGE (ML) INTRAVENOUS ONCE
Refills: 0 | Status: DISCONTINUED | OUTPATIENT
Start: 2023-01-01 | End: 2023-01-01

## 2023-01-01 RX ORDER — HYDROMORPHONE HYDROCHLORIDE 2 MG/ML
3 INJECTION INTRAMUSCULAR; INTRAVENOUS; SUBCUTANEOUS EVERY 4 HOURS
Refills: 0 | Status: DISCONTINUED | OUTPATIENT
Start: 2023-01-01 | End: 2023-01-01

## 2023-01-01 RX ORDER — CINACALCET 30 MG/1
1 TABLET, FILM COATED ORAL
Qty: 0 | Refills: 0 | DISCHARGE
Start: 2023-01-01

## 2023-01-01 RX ORDER — SODIUM CHLORIDE 9 MG/ML
500 INJECTION INTRAMUSCULAR; INTRAVENOUS; SUBCUTANEOUS ONCE
Refills: 0 | Status: COMPLETED | OUTPATIENT
Start: 2023-01-01 | End: 2023-01-01

## 2023-01-01 RX ORDER — DEXTROSE 50 % IN WATER 50 %
25 SYRINGE (ML) INTRAVENOUS ONCE
Refills: 0 | Status: COMPLETED | OUTPATIENT
Start: 2023-01-01 | End: 2023-01-01

## 2023-01-01 RX ORDER — LIDOCAINE 4 G/100G
2 CREAM TOPICAL DAILY
Refills: 0 | Status: DISCONTINUED | OUTPATIENT
Start: 2023-01-01 | End: 2023-01-01

## 2023-01-01 RX ORDER — LIDOCAINE 4 G/100G
1 CREAM TOPICAL THREE TIMES A DAY
Refills: 0 | Status: DISCONTINUED | OUTPATIENT
Start: 2023-01-01 | End: 2023-01-01

## 2023-01-01 RX ORDER — SODIUM THIOSULFATE
25 CRYSTALS MISCELLANEOUS ONCE
Refills: 0 | Status: DISCONTINUED | OUTPATIENT
Start: 2023-01-01 | End: 2023-01-01

## 2023-01-01 RX ORDER — SODIUM CHLORIDE 9 MG/ML
1 INJECTION INTRAMUSCULAR; INTRAVENOUS; SUBCUTANEOUS EVERY 12 HOURS
Refills: 0 | Status: COMPLETED | OUTPATIENT
Start: 2023-01-01 | End: 2023-01-01

## 2023-01-01 RX ORDER — GLUCAGON INJECTION, SOLUTION 0.5 MG/.1ML
1 INJECTION, SOLUTION SUBCUTANEOUS ONCE
Refills: 0 | Status: DISCONTINUED | OUTPATIENT
Start: 2023-01-01 | End: 2023-01-01

## 2023-01-01 RX ORDER — VANCOMYCIN HCL 1 G
750 VIAL (EA) INTRAVENOUS EVERY 24 HOURS
Refills: 0 | Status: DISCONTINUED | OUTPATIENT
Start: 2023-01-01 | End: 2023-01-01

## 2023-01-01 RX ORDER — LIDOCAINE 4 G/100G
1 CREAM TOPICAL EVERY 6 HOURS
Refills: 0 | Status: DISCONTINUED | OUTPATIENT
Start: 2023-01-01 | End: 2023-01-01

## 2023-01-01 RX ORDER — CALAMINE AND ZINC OXIDE AND PHENOL 160; 10 MG/ML; MG/ML
1 LOTION TOPICAL
Qty: 0 | Refills: 0 | DISCHARGE
Start: 2023-01-01

## 2023-01-01 RX ORDER — MAGNESIUM SULFATE 500 MG/ML
2 VIAL (ML) INJECTION ONCE
Refills: 0 | Status: DISCONTINUED | OUTPATIENT
Start: 2023-01-01 | End: 2023-01-01

## 2023-01-01 RX ORDER — MECLIZINE HCL 12.5 MG
1 TABLET ORAL
Qty: 0 | Refills: 0 | DISCHARGE

## 2023-01-01 RX ORDER — ACETAMINOPHEN 500 MG
650 TABLET ORAL EVERY 6 HOURS
Refills: 0 | Status: DISCONTINUED | OUTPATIENT
Start: 2023-01-01 | End: 2023-01-01

## 2023-01-01 RX ORDER — POTASSIUM CHLORIDE 20 MEQ
30 PACKET (EA) ORAL ONCE
Refills: 0 | Status: COMPLETED | OUTPATIENT
Start: 2023-01-01 | End: 2023-01-01

## 2023-01-01 RX ORDER — HEPARIN SODIUM 5000 [USP'U]/ML
1900 INJECTION INTRAVENOUS; SUBCUTANEOUS
Qty: 25000 | Refills: 0 | Status: DISCONTINUED | OUTPATIENT
Start: 2023-01-01 | End: 2023-01-01

## 2023-01-01 RX ORDER — FLUDROCORTISONE ACETATE 0.1 MG/1
0.1 TABLET ORAL DAILY
Refills: 0 | Status: DISCONTINUED | OUTPATIENT
Start: 2023-01-01 | End: 2023-01-01

## 2023-01-01 RX ORDER — DEXTROSE 50 % IN WATER 50 %
50 SYRINGE (ML) INTRAVENOUS ONCE
Refills: 0 | Status: DISCONTINUED | OUTPATIENT
Start: 2023-01-01 | End: 2023-01-01

## 2023-01-01 RX ORDER — SENNA PLUS 8.6 MG/1
2 TABLET ORAL AT BEDTIME
Refills: 0 | Status: DISCONTINUED | OUTPATIENT
Start: 2023-01-01 | End: 2023-01-01

## 2023-01-01 RX ORDER — HYDROMORPHONE HYDROCHLORIDE 2 MG/ML
1 INJECTION INTRAMUSCULAR; INTRAVENOUS; SUBCUTANEOUS
Qty: 0 | Refills: 0 | DISCHARGE
Start: 2023-01-01

## 2023-01-01 RX ORDER — CEFEPIME 1 G/1
INJECTION, POWDER, FOR SOLUTION INTRAMUSCULAR; INTRAVENOUS
Refills: 0 | Status: DISCONTINUED | OUTPATIENT
Start: 2023-01-01 | End: 2023-01-01

## 2023-01-01 RX ORDER — ALBUMIN HUMAN 25 %
50 VIAL (ML) INTRAVENOUS
Refills: 0 | Status: DISCONTINUED | OUTPATIENT
Start: 2023-01-01 | End: 2023-01-01

## 2023-01-01 RX ORDER — ACETAMINOPHEN 500 MG
975 TABLET ORAL EVERY 8 HOURS
Refills: 0 | Status: DISCONTINUED | OUTPATIENT
Start: 2023-01-01 | End: 2023-01-01

## 2023-01-01 RX ORDER — SODIUM HYPOCHLORITE 0.125 %
1 SOLUTION, NON-ORAL MISCELLANEOUS EVERY 12 HOURS
Refills: 0 | Status: DISCONTINUED | OUTPATIENT
Start: 2023-01-01 | End: 2023-01-01

## 2023-01-01 RX ORDER — MIDODRINE HYDROCHLORIDE 2.5 MG/1
4 TABLET ORAL
Qty: 0 | Refills: 0 | DISCHARGE
Start: 2023-01-01

## 2023-01-01 RX ORDER — DEXTROSE 10 % IN WATER 10 %
25 INTRAVENOUS SOLUTION INTRAVENOUS ONCE
Refills: 0 | Status: DISCONTINUED | OUTPATIENT
Start: 2023-01-01 | End: 2023-01-01

## 2023-01-01 RX ORDER — HEPARIN SODIUM 5000 [USP'U]/ML
1400 INJECTION INTRAVENOUS; SUBCUTANEOUS
Qty: 25000 | Refills: 0 | Status: DISCONTINUED | OUTPATIENT
Start: 2023-01-01 | End: 2023-01-01

## 2023-01-01 RX ORDER — ERYTHROPOIETIN 10000 [IU]/ML
8000 INJECTION, SOLUTION INTRAVENOUS; SUBCUTANEOUS
Qty: 0 | Refills: 0 | DISCHARGE
Start: 2023-01-01

## 2023-01-01 RX ORDER — MAGNESIUM SULFATE 500 MG/ML
1 VIAL (ML) INJECTION ONCE
Refills: 0 | Status: DISCONTINUED | OUTPATIENT
Start: 2023-01-01 | End: 2023-01-01

## 2023-01-01 RX ORDER — LIDOCAINE 4 G/100G
1 CREAM TOPICAL ONCE
Refills: 0 | Status: COMPLETED | OUTPATIENT
Start: 2023-01-01 | End: 2023-01-01

## 2023-01-01 RX ORDER — CINACALCET 30 MG/1
120 TABLET, FILM COATED ORAL
Qty: 0 | Refills: 0 | DISCHARGE
Start: 2023-01-01

## 2023-01-01 RX ORDER — MIDODRINE HYDROCHLORIDE 2.5 MG/1
40 TABLET ORAL
Refills: 0 | Status: DISCONTINUED | OUTPATIENT
Start: 2023-01-01 | End: 2023-01-01

## 2023-01-01 RX ORDER — VANCOMYCIN HCL 1 G
750 VIAL (EA) INTRAVENOUS ONCE
Refills: 0 | Status: COMPLETED | OUTPATIENT
Start: 2023-01-01 | End: 2023-01-01

## 2023-01-01 RX ORDER — HYDROMORPHONE HYDROCHLORIDE 2 MG/ML
0.25 INJECTION INTRAMUSCULAR; INTRAVENOUS; SUBCUTANEOUS EVERY 4 HOURS
Refills: 0 | Status: DISCONTINUED | OUTPATIENT
Start: 2023-01-01 | End: 2023-01-01

## 2023-01-01 RX ORDER — DAPTOMYCIN 500 MG/10ML
400 INJECTION, POWDER, LYOPHILIZED, FOR SOLUTION INTRAVENOUS
Refills: 0 | Status: COMPLETED | OUTPATIENT
Start: 2023-01-01 | End: 2023-01-01

## 2023-01-01 RX ORDER — LIDOCAINE HCL 20 MG/ML
20 VIAL (ML) INJECTION ONCE
Refills: 0 | Status: COMPLETED | OUTPATIENT
Start: 2023-01-01 | End: 2023-01-01

## 2023-01-01 RX ORDER — CALCIUM GLUCONATE 100 MG/ML
1 VIAL (ML) INTRAVENOUS ONCE
Refills: 0 | Status: DISCONTINUED | OUTPATIENT
Start: 2023-01-01 | End: 2023-01-01

## 2023-01-01 RX ORDER — LIDOCAINE 4 G/100G
1 CREAM TOPICAL
Qty: 0 | Refills: 0 | DISCHARGE
Start: 2023-01-01

## 2023-01-01 RX ORDER — FOLIC ACID 0.8 MG
1 TABLET ORAL DAILY
Refills: 0 | Status: DISCONTINUED | OUTPATIENT
Start: 2023-01-01 | End: 2023-01-01

## 2023-01-01 RX ORDER — HEPARIN SODIUM 5000 [USP'U]/ML
15 INJECTION INTRAVENOUS; SUBCUTANEOUS
Qty: 25000 | Refills: 0 | Status: DISCONTINUED | OUTPATIENT
Start: 2023-01-01 | End: 2023-01-01

## 2023-01-01 RX ORDER — SODIUM THIOSULFATE
25 CRYSTALS MISCELLANEOUS ONCE
Refills: 0 | Status: COMPLETED | OUTPATIENT
Start: 2023-01-01 | End: 2023-01-01

## 2023-01-01 RX ORDER — CLOPIDOGREL BISULFATE 75 MG/1
75 TABLET, FILM COATED ORAL EVERY 24 HOURS
Refills: 0 | Status: DISCONTINUED | OUTPATIENT
Start: 2023-01-01 | End: 2023-01-01

## 2023-01-01 RX ORDER — NOREPINEPHRINE BITARTRATE/D5W 8 MG/250ML
0.05 PLASTIC BAG, INJECTION (ML) INTRAVENOUS
Qty: 8 | Refills: 0 | Status: DISCONTINUED | OUTPATIENT
Start: 2023-01-01 | End: 2023-01-01

## 2023-01-01 RX ORDER — AZTREONAM 2 G
1000 VIAL (EA) INJECTION ONCE
Refills: 0 | Status: COMPLETED | OUTPATIENT
Start: 2023-01-01 | End: 2023-01-01

## 2023-01-01 RX ORDER — HEPARIN SODIUM 5000 [USP'U]/ML
INJECTION INTRAVENOUS; SUBCUTANEOUS
Qty: 25000 | Refills: 0 | Status: DISCONTINUED | OUTPATIENT
Start: 2023-01-01 | End: 2023-01-01

## 2023-01-01 RX ORDER — APIXABAN 2.5 MG/1
2.5 TABLET, FILM COATED ORAL ONCE
Refills: 0 | Status: COMPLETED | OUTPATIENT
Start: 2023-01-01 | End: 2023-01-01

## 2023-01-01 RX ORDER — POLYETHYLENE GLYCOL 3350 17 G/17G
17 POWDER, FOR SOLUTION ORAL
Qty: 0 | Refills: 0 | DISCHARGE
Start: 2023-01-01

## 2023-01-01 RX ORDER — MUPIROCIN 20 MG/G
1 OINTMENT TOPICAL ONCE
Refills: 0 | Status: COMPLETED | OUTPATIENT
Start: 2023-01-01 | End: 2023-01-01

## 2023-01-01 RX ORDER — HYDROCORTISONE 20 MG
50 TABLET ORAL ONCE
Refills: 0 | Status: COMPLETED | OUTPATIENT
Start: 2023-01-01 | End: 2023-01-01

## 2023-01-01 RX ORDER — HEPARIN SODIUM 5000 [USP'U]/ML
2000 INJECTION INTRAVENOUS; SUBCUTANEOUS
Qty: 25000 | Refills: 0 | Status: DISCONTINUED | OUTPATIENT
Start: 2023-01-01 | End: 2023-01-01

## 2023-01-01 RX ORDER — ONDANSETRON 8 MG/1
4 TABLET, FILM COATED ORAL EVERY 6 HOURS
Refills: 0 | Status: DISCONTINUED | OUTPATIENT
Start: 2023-01-01 | End: 2023-01-01

## 2023-01-01 RX ORDER — COSYNTROPIN 0.25 MG/ML
0.25 INJECTION, SOLUTION INTRAVENOUS ONCE
Refills: 0 | Status: COMPLETED | OUTPATIENT
Start: 2023-01-01 | End: 2023-01-01

## 2023-01-01 RX ORDER — FLUDROCORTISONE ACETATE 0.1 MG/1
0.3 TABLET ORAL EVERY 24 HOURS
Refills: 0 | Status: DISCONTINUED | OUTPATIENT
Start: 2023-01-01 | End: 2023-01-01

## 2023-01-01 RX ORDER — PHENYLEPHRINE HYDROCHLORIDE 10 MG/ML
0.5 INJECTION INTRAVENOUS
Qty: 40 | Refills: 0 | Status: DISCONTINUED | OUTPATIENT
Start: 2023-01-01 | End: 2023-01-01

## 2023-01-01 RX ORDER — CHLORHEXIDINE GLUCONATE 213 G/1000ML
1 SOLUTION TOPICAL DAILY
Refills: 0 | Status: DISCONTINUED | OUTPATIENT
Start: 2023-01-01 | End: 2023-01-01

## 2023-01-01 RX ORDER — HYDROMORPHONE HYDROCHLORIDE 2 MG/ML
0.25 INJECTION INTRAMUSCULAR; INTRAVENOUS; SUBCUTANEOUS
Refills: 0 | Status: DISCONTINUED | OUTPATIENT
Start: 2023-01-01 | End: 2023-01-01

## 2023-01-01 RX ORDER — CINACALCET 30 MG/1
30 TABLET, FILM COATED ORAL ONCE
Refills: 0 | Status: COMPLETED | OUTPATIENT
Start: 2023-01-01 | End: 2023-01-01

## 2023-01-01 RX ORDER — SENNA PLUS 8.6 MG/1
2 TABLET ORAL
Qty: 0 | Refills: 0 | DISCHARGE
Start: 2023-01-01

## 2023-01-01 RX ORDER — LACTULOSE 10 G/15ML
200 SOLUTION ORAL ONCE
Refills: 0 | Status: COMPLETED | OUTPATIENT
Start: 2023-01-01 | End: 2023-01-01

## 2023-01-01 RX ORDER — GLUCAGON INJECTION, SOLUTION 0.5 MG/.1ML
1 INJECTION, SOLUTION SUBCUTANEOUS ONCE
Refills: 0 | Status: COMPLETED | OUTPATIENT
Start: 2023-01-01 | End: 2023-01-01

## 2023-01-01 RX ORDER — NOREPINEPHRINE BITARTRATE/D5W 8 MG/250ML
0.05 PLASTIC BAG, INJECTION (ML) INTRAVENOUS
Qty: 16 | Refills: 0 | Status: DISCONTINUED | OUTPATIENT
Start: 2023-01-01 | End: 2023-01-01

## 2023-01-01 RX ORDER — SODIUM CHLORIDE 9 MG/ML
1000 INJECTION INTRAMUSCULAR; INTRAVENOUS; SUBCUTANEOUS
Refills: 0 | Status: DISCONTINUED | OUTPATIENT
Start: 2023-01-01 | End: 2023-01-01

## 2023-01-01 RX ORDER — HEPARIN SODIUM 5000 [USP'U]/ML
1600 INJECTION INTRAVENOUS; SUBCUTANEOUS
Qty: 25000 | Refills: 0 | Status: DISCONTINUED | OUTPATIENT
Start: 2023-01-01 | End: 2023-01-01

## 2023-01-01 RX ORDER — ASCORBIC ACID 60 MG
500 TABLET,CHEWABLE ORAL DAILY
Refills: 0 | Status: DISCONTINUED | OUTPATIENT
Start: 2023-01-01 | End: 2023-01-01

## 2023-01-01 RX ORDER — FENTANYL CITRATE 50 UG/ML
50 INJECTION INTRAVENOUS ONCE
Refills: 0 | Status: DISCONTINUED | OUTPATIENT
Start: 2023-01-01 | End: 2023-01-01

## 2023-01-01 RX ORDER — COLLAGENASE CLOSTRIDIUM HIST. 250 UNIT/G
1 OINTMENT (GRAM) TOPICAL
Qty: 0 | Refills: 0 | DISCHARGE
Start: 2023-01-01

## 2023-01-01 RX ORDER — APIXABAN 2.5 MG/1
1 TABLET, FILM COATED ORAL
Qty: 0 | Refills: 0 | DISCHARGE
Start: 2023-01-01

## 2023-01-01 RX ORDER — ASPIRIN/CALCIUM CARB/MAGNESIUM 324 MG
1 TABLET ORAL
Qty: 0 | Refills: 0 | DISCHARGE

## 2023-01-01 RX ORDER — LIDOCAINE HYDROCHLORIDE AND EPINEPHRINE 10; 10 MG/ML; UG/ML
10 INJECTION, SOLUTION INFILTRATION; PERINEURAL ONCE
Refills: 0 | Status: COMPLETED | OUTPATIENT
Start: 2023-01-01 | End: 2023-01-01

## 2023-01-01 RX ORDER — HYDROCORTISONE 20 MG
100 TABLET ORAL EVERY 8 HOURS
Refills: 0 | Status: DISCONTINUED | OUTPATIENT
Start: 2023-01-01 | End: 2023-01-01

## 2023-01-01 RX ORDER — FLUDROCORTISONE ACETATE 0.1 MG/1
2 TABLET ORAL
Qty: 0 | Refills: 0 | DISCHARGE
Start: 2023-01-01

## 2023-01-01 RX ORDER — COSYNTROPIN 0.25 MG/ML
0.25 INJECTION, SOLUTION INTRAVENOUS ONCE
Refills: 0 | Status: DISCONTINUED | OUTPATIENT
Start: 2023-01-01 | End: 2023-01-01

## 2023-01-01 RX ORDER — CLOPIDOGREL BISULFATE 75 MG/1
1 TABLET, FILM COATED ORAL
Qty: 0 | Refills: 0 | DISCHARGE

## 2023-01-01 RX ORDER — SODIUM BICARBONATE 1 MEQ/ML
50 SYRINGE (ML) INTRAVENOUS ONCE
Refills: 0 | Status: COMPLETED | OUTPATIENT
Start: 2023-01-01 | End: 2023-01-01

## 2023-01-01 RX ORDER — POLYETHYLENE GLYCOL 3350 17 G/17G
17 POWDER, FOR SOLUTION ORAL EVERY 12 HOURS
Refills: 0 | Status: DISCONTINUED | OUTPATIENT
Start: 2023-01-01 | End: 2023-01-01

## 2023-01-01 RX ORDER — CEFEPIME 1 G/1
1000 INJECTION, POWDER, FOR SOLUTION INTRAMUSCULAR; INTRAVENOUS ONCE
Refills: 0 | Status: COMPLETED | OUTPATIENT
Start: 2023-01-01 | End: 2023-01-01

## 2023-01-01 RX ORDER — CYCLOBENZAPRINE HYDROCHLORIDE 10 MG/1
1 TABLET, FILM COATED ORAL
Qty: 0 | Refills: 0 | DISCHARGE
Start: 2023-01-01

## 2023-01-01 RX ORDER — ASCORBIC ACID 60 MG
1 TABLET,CHEWABLE ORAL
Qty: 0 | Refills: 0 | DISCHARGE

## 2023-01-01 RX ORDER — PROTHROMBIN COMPLEX CONCENTRATE (HUMAN) 25.5; 16.5; 24; 22; 22; 26 [IU]/ML; [IU]/ML; [IU]/ML; [IU]/ML; [IU]/ML; [IU]/ML
4000 POWDER, FOR SOLUTION INTRAVENOUS ONCE
Refills: 0 | Status: COMPLETED | OUTPATIENT
Start: 2023-01-01 | End: 2023-01-01

## 2023-01-01 RX ORDER — AZTREONAM 2 G
1000 VIAL (EA) INJECTION
Refills: 0 | Status: DISCONTINUED | OUTPATIENT
Start: 2023-01-01 | End: 2023-01-01

## 2023-01-01 RX ORDER — CINACALCET 30 MG/1
120 TABLET, FILM COATED ORAL EVERY 24 HOURS
Refills: 0 | Status: DISCONTINUED | OUTPATIENT
Start: 2023-01-01 | End: 2023-01-01

## 2023-01-01 RX ORDER — SEVELAMER CARBONATE 2400 MG/1
3 POWDER, FOR SUSPENSION ORAL
Qty: 0 | Refills: 0 | DISCHARGE

## 2023-01-01 RX ORDER — MIDODRINE HYDROCHLORIDE 2.5 MG/1
20 TABLET ORAL ONCE
Refills: 0 | Status: DISCONTINUED | OUTPATIENT
Start: 2023-01-01 | End: 2023-01-01

## 2023-01-01 RX ORDER — ALBUMIN HUMAN 25 %
50 VIAL (ML) INTRAVENOUS ONCE
Refills: 0 | Status: COMPLETED | OUTPATIENT
Start: 2023-01-01 | End: 2023-01-01

## 2023-01-01 RX ORDER — POLYETHYLENE GLYCOL 3350 17 G/17G
17 POWDER, FOR SOLUTION ORAL DAILY
Refills: 0 | Status: DISCONTINUED | OUTPATIENT
Start: 2023-01-01 | End: 2023-01-01

## 2023-01-01 RX ORDER — ACETAMINOPHEN 500 MG
1000 TABLET ORAL EVERY 6 HOURS
Refills: 0 | Status: DISCONTINUED | OUTPATIENT
Start: 2023-01-01 | End: 2023-01-01

## 2023-01-01 RX ORDER — AZTREONAM 2 G
125 VIAL (EA) INJECTION ONCE
Refills: 0 | Status: DISCONTINUED | OUTPATIENT
Start: 2023-01-01 | End: 2023-01-01

## 2023-01-01 RX ORDER — MIDODRINE HYDROCHLORIDE 2.5 MG/1
25 TABLET ORAL EVERY 8 HOURS
Refills: 0 | Status: DISCONTINUED | OUTPATIENT
Start: 2023-01-01 | End: 2023-01-01

## 2023-01-01 RX ORDER — FOLIC ACID 0.8 MG
1 TABLET ORAL
Qty: 0 | Refills: 0 | DISCHARGE

## 2023-01-01 RX ORDER — SACUBITRIL AND VALSARTAN 24; 26 MG/1; MG/1
1 TABLET, FILM COATED ORAL
Qty: 0 | Refills: 0 | DISCHARGE

## 2023-01-01 RX ORDER — PANTOPRAZOLE SODIUM 20 MG/1
80 TABLET, DELAYED RELEASE ORAL ONCE
Refills: 0 | Status: COMPLETED | OUTPATIENT
Start: 2023-01-01 | End: 2023-01-01

## 2023-01-01 RX ORDER — NYSTATIN CREAM 100000 [USP'U]/G
1 CREAM TOPICAL
Refills: 0 | Status: DISCONTINUED | OUTPATIENT
Start: 2023-01-01 | End: 2023-01-01

## 2023-01-01 RX ORDER — IRON SUCROSE 20 MG/ML
200 INJECTION, SOLUTION INTRAVENOUS EVERY 24 HOURS
Refills: 0 | Status: DISCONTINUED | OUTPATIENT
Start: 2023-01-01 | End: 2023-01-01

## 2023-01-01 RX ORDER — PETROLATUM,WHITE
1 JELLY (GRAM) TOPICAL DAILY
Refills: 0 | Status: DISCONTINUED | OUTPATIENT
Start: 2023-01-01 | End: 2023-01-01

## 2023-01-01 RX ORDER — VASOPRESSIN 20 [USP'U]/ML
0.02 INJECTION INTRAVENOUS
Qty: 40 | Refills: 0 | Status: DISCONTINUED | OUTPATIENT
Start: 2023-01-01 | End: 2023-01-01

## 2023-01-01 RX ORDER — HEPARIN SODIUM 5000 [USP'U]/ML
1200 INJECTION INTRAVENOUS; SUBCUTANEOUS
Qty: 25000 | Refills: 0 | Status: DISCONTINUED | OUTPATIENT
Start: 2023-01-01 | End: 2023-01-01

## 2023-01-01 RX ORDER — SEVELAMER CARBONATE 2400 MG/1
1600 POWDER, FOR SUSPENSION ORAL
Qty: 0 | Refills: 0 | DISCHARGE
Start: 2023-01-01

## 2023-01-01 RX ORDER — CINACALCET 30 MG/1
60 TABLET, FILM COATED ORAL DAILY
Refills: 0 | Status: DISCONTINUED | OUTPATIENT
Start: 2023-01-01 | End: 2023-01-01

## 2023-01-01 RX ORDER — HEPARIN SODIUM 5000 [USP'U]/ML
1100 INJECTION INTRAVENOUS; SUBCUTANEOUS
Qty: 25000 | Refills: 0 | Status: DISCONTINUED | OUTPATIENT
Start: 2023-01-01 | End: 2023-01-01

## 2023-01-01 RX ORDER — EPINEPHRINE 0.3 MG/.3ML
0.3 INJECTION INTRAMUSCULAR; SUBCUTANEOUS ONCE
Refills: 0 | Status: DISCONTINUED | OUTPATIENT
Start: 2023-01-01 | End: 2023-01-01

## 2023-01-01 RX ORDER — CLOPIDOGREL BISULFATE 75 MG/1
75 TABLET, FILM COATED ORAL DAILY
Refills: 0 | Status: DISCONTINUED | OUTPATIENT
Start: 2023-01-01 | End: 2023-01-01

## 2023-01-01 RX ORDER — ACETAMINOPHEN 500 MG
975 TABLET ORAL ONCE
Refills: 0 | Status: DISCONTINUED | OUTPATIENT
Start: 2023-01-01 | End: 2023-01-01

## 2023-01-01 RX ORDER — THIAMINE MONONITRATE (VIT B1) 100 MG
1 TABLET ORAL
Qty: 0 | Refills: 0 | DISCHARGE

## 2023-01-01 RX ORDER — FLUCONAZOLE 150 MG/1
1 TABLET ORAL
Qty: 0 | Refills: 0 | DISCHARGE

## 2023-01-01 RX ORDER — CALCIUM GLUCONATE 100 MG/ML
1 VIAL (ML) INTRAVENOUS ONCE
Refills: 0 | Status: COMPLETED | OUTPATIENT
Start: 2023-01-01 | End: 2023-01-01

## 2023-01-01 RX ORDER — POLYETHYLENE GLYCOL 3350 17 G/17G
17 POWDER, FOR SOLUTION ORAL
Refills: 0 | Status: DISCONTINUED | OUTPATIENT
Start: 2023-01-01 | End: 2023-01-01

## 2023-01-01 RX ORDER — ACETAMINOPHEN 500 MG
1280 TABLET ORAL ONCE
Refills: 0 | Status: DISCONTINUED | OUTPATIENT
Start: 2023-01-01 | End: 2023-01-01

## 2023-01-01 RX ORDER — SEVELAMER CARBONATE 2400 MG/1
1600 POWDER, FOR SUSPENSION ORAL ONCE
Refills: 0 | Status: COMPLETED | OUTPATIENT
Start: 2023-01-01 | End: 2023-01-01

## 2023-01-01 RX ORDER — VANCOMYCIN HCL 1 G
500 VIAL (EA) INTRAVENOUS ONCE
Refills: 0 | Status: DISCONTINUED | OUTPATIENT
Start: 2023-01-01 | End: 2023-01-01

## 2023-01-01 RX ADMIN — HYDROMORPHONE HYDROCHLORIDE 2 MILLIGRAM(S): 2 INJECTION INTRAMUSCULAR; INTRAVENOUS; SUBCUTANEOUS at 08:30

## 2023-01-01 RX ADMIN — HEPARIN SODIUM 5000 UNIT(S): 5000 INJECTION INTRAVENOUS; SUBCUTANEOUS at 07:10

## 2023-01-01 RX ADMIN — CINACALCET 120 MILLIGRAM(S): 30 TABLET, FILM COATED ORAL at 21:49

## 2023-01-01 RX ADMIN — POLYETHYLENE GLYCOL 3350 17 GRAM(S): 17 POWDER, FOR SOLUTION ORAL at 12:02

## 2023-01-01 RX ADMIN — MIDODRINE HYDROCHLORIDE 30 MILLIGRAM(S): 2.5 TABLET ORAL at 12:02

## 2023-01-01 RX ADMIN — Medication 0.5 MILLIGRAM(S): at 14:36

## 2023-01-01 RX ADMIN — ATORVASTATIN CALCIUM 40 MILLIGRAM(S): 80 TABLET, FILM COATED ORAL at 20:50

## 2023-01-01 RX ADMIN — HYDROMORPHONE HYDROCHLORIDE 1 MILLIGRAM(S): 2 INJECTION INTRAMUSCULAR; INTRAVENOUS; SUBCUTANEOUS at 14:10

## 2023-01-01 RX ADMIN — HYDROMORPHONE HYDROCHLORIDE 0.25 MILLIGRAM(S): 2 INJECTION INTRAMUSCULAR; INTRAVENOUS; SUBCUTANEOUS at 13:15

## 2023-01-01 RX ADMIN — Medication 1000 MILLIGRAM(S): at 07:45

## 2023-01-01 RX ADMIN — HYDROMORPHONE HYDROCHLORIDE 1 MILLIGRAM(S): 2 INJECTION INTRAMUSCULAR; INTRAVENOUS; SUBCUTANEOUS at 10:23

## 2023-01-01 RX ADMIN — SODIUM CHLORIDE 30 MILLILITER(S): 9 INJECTION, SOLUTION INTRAVENOUS at 06:44

## 2023-01-01 RX ADMIN — HYDROMORPHONE HYDROCHLORIDE 0.5 MILLIGRAM(S): 2 INJECTION INTRAMUSCULAR; INTRAVENOUS; SUBCUTANEOUS at 23:55

## 2023-01-01 RX ADMIN — POLYETHYLENE GLYCOL 3350 17 GRAM(S): 17 POWDER, FOR SOLUTION ORAL at 18:38

## 2023-01-01 RX ADMIN — MIDODRINE HYDROCHLORIDE 30 MILLIGRAM(S): 2.5 TABLET ORAL at 17:13

## 2023-01-01 RX ADMIN — HEPARIN SODIUM 20 UNIT(S)/HR: 5000 INJECTION INTRAVENOUS; SUBCUTANEOUS at 21:42

## 2023-01-01 RX ADMIN — HYDROMORPHONE HYDROCHLORIDE 0.5 MILLIGRAM(S): 2 INJECTION INTRAMUSCULAR; INTRAVENOUS; SUBCUTANEOUS at 09:46

## 2023-01-01 RX ADMIN — HYDROMORPHONE HYDROCHLORIDE 0.25 MILLIGRAM(S): 2 INJECTION INTRAMUSCULAR; INTRAVENOUS; SUBCUTANEOUS at 22:38

## 2023-01-01 RX ADMIN — MEROPENEM 100 MILLIGRAM(S): 1 INJECTION INTRAVENOUS at 19:12

## 2023-01-01 RX ADMIN — NYSTATIN CREAM 1 APPLICATION(S): 100000 CREAM TOPICAL at 18:01

## 2023-01-01 RX ADMIN — HYDROMORPHONE HYDROCHLORIDE 4 MILLIGRAM(S): 2 INJECTION INTRAMUSCULAR; INTRAVENOUS; SUBCUTANEOUS at 11:30

## 2023-01-01 RX ADMIN — HEPARIN SODIUM 16 UNIT(S)/HR: 5000 INJECTION INTRAVENOUS; SUBCUTANEOUS at 08:01

## 2023-01-01 RX ADMIN — Medication 1 APPLICATION(S): at 10:17

## 2023-01-01 RX ADMIN — Medication 25 GRAM(S): at 18:23

## 2023-01-01 RX ADMIN — CHLORHEXIDINE GLUCONATE 1 APPLICATION(S): 213 SOLUTION TOPICAL at 05:26

## 2023-01-01 RX ADMIN — NYSTATIN CREAM 1 APPLICATION(S): 100000 CREAM TOPICAL at 05:49

## 2023-01-01 RX ADMIN — HYDROMORPHONE HYDROCHLORIDE 0.25 MILLIGRAM(S): 2 INJECTION INTRAMUSCULAR; INTRAVENOUS; SUBCUTANEOUS at 17:54

## 2023-01-01 RX ADMIN — HEPARIN SODIUM 19 UNIT(S)/HR: 5000 INJECTION INTRAVENOUS; SUBCUTANEOUS at 10:11

## 2023-01-01 RX ADMIN — PANTOPRAZOLE SODIUM 40 MILLIGRAM(S): 20 TABLET, DELAYED RELEASE ORAL at 00:56

## 2023-01-01 RX ADMIN — Medication 50 MILLILITER(S): at 12:12

## 2023-01-01 RX ADMIN — Medication 100 MILLIGRAM(S): at 16:16

## 2023-01-01 RX ADMIN — ERYTHROPOIETIN 8000 UNIT(S): 10000 INJECTION, SOLUTION INTRAVENOUS; SUBCUTANEOUS at 18:39

## 2023-01-01 RX ADMIN — Medication 1 APPLICATION(S): at 22:22

## 2023-01-01 RX ADMIN — Medication 3.72 MICROGRAM(S)/KG/MIN: at 05:19

## 2023-01-01 RX ADMIN — POLYETHYLENE GLYCOL 3350 17 GRAM(S): 17 POWDER, FOR SOLUTION ORAL at 14:25

## 2023-01-01 RX ADMIN — Medication 100 MILLIGRAM(S): at 17:55

## 2023-01-01 RX ADMIN — Medication 1 TABLET(S): at 13:41

## 2023-01-01 RX ADMIN — Medication 1 APPLICATION(S): at 13:28

## 2023-01-01 RX ADMIN — Medication 166.67 MILLIGRAM(S): at 18:37

## 2023-01-01 RX ADMIN — Medication 1 DROP(S): at 06:35

## 2023-01-01 RX ADMIN — HYDROMORPHONE HYDROCHLORIDE 0.25 MILLIGRAM(S): 2 INJECTION INTRAMUSCULAR; INTRAVENOUS; SUBCUTANEOUS at 03:06

## 2023-01-01 RX ADMIN — MIDODRINE HYDROCHLORIDE 40 MILLIGRAM(S): 2.5 TABLET ORAL at 15:06

## 2023-01-01 RX ADMIN — PHENYLEPHRINE HYDROCHLORIDE 16 MICROGRAM(S)/KG/MIN: 10 INJECTION INTRAVENOUS at 10:36

## 2023-01-01 RX ADMIN — SEVELAMER CARBONATE 1600 MILLIGRAM(S): 2400 POWDER, FOR SUSPENSION ORAL at 09:55

## 2023-01-01 RX ADMIN — HYDROMORPHONE HYDROCHLORIDE 0.25 MILLIGRAM(S): 2 INJECTION INTRAMUSCULAR; INTRAVENOUS; SUBCUTANEOUS at 17:22

## 2023-01-01 RX ADMIN — Medication 500 MILLIGRAM(S): at 11:17

## 2023-01-01 RX ADMIN — HYDROMORPHONE HYDROCHLORIDE 0.25 MILLIGRAM(S): 2 INJECTION INTRAMUSCULAR; INTRAVENOUS; SUBCUTANEOUS at 17:11

## 2023-01-01 RX ADMIN — HEPARIN SODIUM 7500 UNIT(S): 5000 INJECTION INTRAVENOUS; SUBCUTANEOUS at 15:01

## 2023-01-01 RX ADMIN — CHLORHEXIDINE GLUCONATE 1 APPLICATION(S): 213 SOLUTION TOPICAL at 05:42

## 2023-01-01 RX ADMIN — HYDROMORPHONE HYDROCHLORIDE 1 MILLIGRAM(S): 2 INJECTION INTRAMUSCULAR; INTRAVENOUS; SUBCUTANEOUS at 15:00

## 2023-01-01 RX ADMIN — Medication 20 MILLILITER(S): at 16:00

## 2023-01-01 RX ADMIN — PHENYLEPHRINE HYDROCHLORIDE 1.6 MICROGRAM(S)/KG/MIN: 10 INJECTION INTRAVENOUS at 12:23

## 2023-01-01 RX ADMIN — PANTOPRAZOLE SODIUM 40 MILLIGRAM(S): 20 TABLET, DELAYED RELEASE ORAL at 18:36

## 2023-01-01 RX ADMIN — FLUDROCORTISONE ACETATE 0.3 MILLIGRAM(S): 0.1 TABLET ORAL at 17:24

## 2023-01-01 RX ADMIN — HYDROMORPHONE HYDROCHLORIDE 0.25 MILLIGRAM(S): 2 INJECTION INTRAMUSCULAR; INTRAVENOUS; SUBCUTANEOUS at 03:05

## 2023-01-01 RX ADMIN — HYDROMORPHONE HYDROCHLORIDE 0.25 MILLIGRAM(S): 2 INJECTION INTRAMUSCULAR; INTRAVENOUS; SUBCUTANEOUS at 05:50

## 2023-01-01 RX ADMIN — Medication 1 TABLET(S): at 11:27

## 2023-01-01 RX ADMIN — SEVELAMER CARBONATE 1600 MILLIGRAM(S): 2400 POWDER, FOR SUSPENSION ORAL at 16:24

## 2023-01-01 RX ADMIN — HYDROMORPHONE HYDROCHLORIDE 4 MILLIGRAM(S): 2 INJECTION INTRAMUSCULAR; INTRAVENOUS; SUBCUTANEOUS at 06:58

## 2023-01-01 RX ADMIN — ERYTHROPOIETIN 8000 UNIT(S): 10000 INJECTION, SOLUTION INTRAVENOUS; SUBCUTANEOUS at 13:16

## 2023-01-01 RX ADMIN — ERYTHROPOIETIN 8000 UNIT(S): 10000 INJECTION, SOLUTION INTRAVENOUS; SUBCUTANEOUS at 11:10

## 2023-01-01 RX ADMIN — ERYTHROPOIETIN 10000 UNIT(S): 10000 INJECTION, SOLUTION INTRAVENOUS; SUBCUTANEOUS at 13:15

## 2023-01-01 RX ADMIN — HYDROMORPHONE HYDROCHLORIDE 0.5 MILLIGRAM(S): 2 INJECTION INTRAMUSCULAR; INTRAVENOUS; SUBCUTANEOUS at 13:28

## 2023-01-01 RX ADMIN — HYDROMORPHONE HYDROCHLORIDE 4 MILLIGRAM(S): 2 INJECTION INTRAMUSCULAR; INTRAVENOUS; SUBCUTANEOUS at 08:00

## 2023-01-01 RX ADMIN — Medication 500 MILLILITER(S): at 13:07

## 2023-01-01 RX ADMIN — SEVELAMER CARBONATE 1600 MILLIGRAM(S): 2400 POWDER, FOR SUSPENSION ORAL at 10:38

## 2023-01-01 RX ADMIN — Medication 100 MILLIGRAM(S): at 16:45

## 2023-01-01 RX ADMIN — HYDROMORPHONE HYDROCHLORIDE 0.5 MILLIGRAM(S): 2 INJECTION INTRAMUSCULAR; INTRAVENOUS; SUBCUTANEOUS at 14:26

## 2023-01-01 RX ADMIN — MIDODRINE HYDROCHLORIDE 40 MILLIGRAM(S): 2.5 TABLET ORAL at 14:31

## 2023-01-01 RX ADMIN — HYDROMORPHONE HYDROCHLORIDE 2 MILLIGRAM(S): 2 INJECTION INTRAMUSCULAR; INTRAVENOUS; SUBCUTANEOUS at 10:11

## 2023-01-01 RX ADMIN — Medication 1 APPLICATION(S): at 12:12

## 2023-01-01 RX ADMIN — Medication 1 TABLET(S): at 11:24

## 2023-01-01 RX ADMIN — Medication 5 MILLIGRAM(S): at 21:10

## 2023-01-01 RX ADMIN — Medication 1 TABLET(S): at 11:23

## 2023-01-01 RX ADMIN — ATORVASTATIN CALCIUM 40 MILLIGRAM(S): 80 TABLET, FILM COATED ORAL at 21:40

## 2023-01-01 RX ADMIN — Medication 7.44 MICROGRAM(S)/KG/MIN: at 03:26

## 2023-01-01 RX ADMIN — Medication 5 MILLIGRAM(S): at 22:24

## 2023-01-01 RX ADMIN — ATORVASTATIN CALCIUM 40 MILLIGRAM(S): 80 TABLET, FILM COATED ORAL at 21:58

## 2023-01-01 RX ADMIN — HYDROMORPHONE HYDROCHLORIDE 4 MILLIGRAM(S): 2 INJECTION INTRAMUSCULAR; INTRAVENOUS; SUBCUTANEOUS at 22:15

## 2023-01-01 RX ADMIN — SODIUM CHLORIDE 500 MILLILITER(S): 9 INJECTION INTRAMUSCULAR; INTRAVENOUS; SUBCUTANEOUS at 18:00

## 2023-01-01 RX ADMIN — MIDODRINE HYDROCHLORIDE 30 MILLIGRAM(S): 2.5 TABLET ORAL at 01:37

## 2023-01-01 RX ADMIN — HYDROMORPHONE HYDROCHLORIDE 2 MILLIGRAM(S): 2 INJECTION INTRAMUSCULAR; INTRAVENOUS; SUBCUTANEOUS at 23:00

## 2023-01-01 RX ADMIN — PANTOPRAZOLE SODIUM 40 MILLIGRAM(S): 20 TABLET, DELAYED RELEASE ORAL at 19:27

## 2023-01-01 RX ADMIN — CLOPIDOGREL BISULFATE 75 MILLIGRAM(S): 75 TABLET, FILM COATED ORAL at 11:27

## 2023-01-01 RX ADMIN — ATORVASTATIN CALCIUM 40 MILLIGRAM(S): 80 TABLET, FILM COATED ORAL at 22:44

## 2023-01-01 RX ADMIN — POLYETHYLENE GLYCOL 3350 17 GRAM(S): 17 POWDER, FOR SOLUTION ORAL at 17:36

## 2023-01-01 RX ADMIN — Medication 500 MILLIGRAM(S): at 13:07

## 2023-01-01 RX ADMIN — APIXABAN 2.5 MILLIGRAM(S): 2.5 TABLET, FILM COATED ORAL at 07:07

## 2023-01-01 RX ADMIN — HEPARIN SODIUM 5000 UNIT(S): 5000 INJECTION INTRAVENOUS; SUBCUTANEOUS at 05:41

## 2023-01-01 RX ADMIN — SEVELAMER CARBONATE 1600 MILLIGRAM(S): 2400 POWDER, FOR SUSPENSION ORAL at 07:59

## 2023-01-01 RX ADMIN — HEPARIN SODIUM 5000 UNIT(S): 5000 INJECTION INTRAVENOUS; SUBCUTANEOUS at 16:00

## 2023-01-01 RX ADMIN — MORPHINE SULFATE 2 MILLIGRAM(S): 50 CAPSULE, EXTENDED RELEASE ORAL at 23:25

## 2023-01-01 RX ADMIN — ATORVASTATIN CALCIUM 40 MILLIGRAM(S): 80 TABLET, FILM COATED ORAL at 21:17

## 2023-01-01 RX ADMIN — MIDODRINE HYDROCHLORIDE 40 MILLIGRAM(S): 2.5 TABLET ORAL at 06:22

## 2023-01-01 RX ADMIN — HYDROMORPHONE HYDROCHLORIDE 2 MILLIGRAM(S): 2 INJECTION INTRAMUSCULAR; INTRAVENOUS; SUBCUTANEOUS at 17:37

## 2023-01-01 RX ADMIN — HYDROMORPHONE HYDROCHLORIDE 4 MILLIGRAM(S): 2 INJECTION INTRAMUSCULAR; INTRAVENOUS; SUBCUTANEOUS at 14:14

## 2023-01-01 RX ADMIN — Medication 3.72 MICROGRAM(S)/KG/MIN: at 21:13

## 2023-01-01 RX ADMIN — CLOPIDOGREL BISULFATE 75 MILLIGRAM(S): 75 TABLET, FILM COATED ORAL at 11:12

## 2023-01-01 RX ADMIN — Medication 400 MILLIGRAM(S): at 22:49

## 2023-01-01 RX ADMIN — CINACALCET 90 MILLIGRAM(S): 30 TABLET, FILM COATED ORAL at 15:22

## 2023-01-01 RX ADMIN — SEVELAMER CARBONATE 1600 MILLIGRAM(S): 2400 POWDER, FOR SUSPENSION ORAL at 15:48

## 2023-01-01 RX ADMIN — HYDROMORPHONE HYDROCHLORIDE 2 MILLIGRAM(S): 2 INJECTION INTRAMUSCULAR; INTRAVENOUS; SUBCUTANEOUS at 13:00

## 2023-01-01 RX ADMIN — HEPARIN SODIUM 17 UNIT(S)/HR: 5000 INJECTION INTRAVENOUS; SUBCUTANEOUS at 14:38

## 2023-01-01 RX ADMIN — ERYTHROPOIETIN 10000 UNIT(S): 10000 INJECTION, SOLUTION INTRAVENOUS; SUBCUTANEOUS at 12:09

## 2023-01-01 RX ADMIN — ERYTHROPOIETIN 8000 UNIT(S): 10000 INJECTION, SOLUTION INTRAVENOUS; SUBCUTANEOUS at 13:43

## 2023-01-01 RX ADMIN — HYDROMORPHONE HYDROCHLORIDE 3 MILLIGRAM(S): 2 INJECTION INTRAMUSCULAR; INTRAVENOUS; SUBCUTANEOUS at 06:13

## 2023-01-01 RX ADMIN — Medication 1 APPLICATION(S): at 11:41

## 2023-01-01 RX ADMIN — HEPARIN SODIUM 5000 UNIT(S): 5000 INJECTION INTRAVENOUS; SUBCUTANEOUS at 06:11

## 2023-01-01 RX ADMIN — Medication 100 MILLIGRAM(S): at 21:05

## 2023-01-01 RX ADMIN — Medication 1 MILLIGRAM(S): at 12:28

## 2023-01-01 RX ADMIN — HYDROMORPHONE HYDROCHLORIDE 0.5 MILLIGRAM(S): 2 INJECTION INTRAMUSCULAR; INTRAVENOUS; SUBCUTANEOUS at 19:38

## 2023-01-01 RX ADMIN — HYDROMORPHONE HYDROCHLORIDE 4 MILLIGRAM(S): 2 INJECTION INTRAMUSCULAR; INTRAVENOUS; SUBCUTANEOUS at 01:38

## 2023-01-01 RX ADMIN — HYDROMORPHONE HYDROCHLORIDE 1 MILLIGRAM(S): 2 INJECTION INTRAMUSCULAR; INTRAVENOUS; SUBCUTANEOUS at 22:37

## 2023-01-01 RX ADMIN — Medication 100 MILLIGRAM(S): at 21:01

## 2023-01-01 RX ADMIN — HYDROMORPHONE HYDROCHLORIDE 0.5 MILLIGRAM(S): 2 INJECTION INTRAMUSCULAR; INTRAVENOUS; SUBCUTANEOUS at 08:59

## 2023-01-01 RX ADMIN — Medication 650 MILLIGRAM(S): at 16:30

## 2023-01-01 RX ADMIN — NYSTATIN CREAM 1 APPLICATION(S): 100000 CREAM TOPICAL at 06:58

## 2023-01-01 RX ADMIN — Medication 50 MILLIGRAM(S): at 11:20

## 2023-01-01 RX ADMIN — Medication 1 TABLET(S): at 12:29

## 2023-01-01 RX ADMIN — APIXABAN 2.5 MILLIGRAM(S): 2.5 TABLET, FILM COATED ORAL at 17:44

## 2023-01-01 RX ADMIN — PANTOPRAZOLE SODIUM 40 MILLIGRAM(S): 20 TABLET, DELAYED RELEASE ORAL at 23:23

## 2023-01-01 RX ADMIN — HYDROMORPHONE HYDROCHLORIDE 0.5 MILLIGRAM(S): 2 INJECTION INTRAMUSCULAR; INTRAVENOUS; SUBCUTANEOUS at 09:23

## 2023-01-01 RX ADMIN — Medication 1 APPLICATION(S): at 17:06

## 2023-01-01 RX ADMIN — Medication 25 GRAM(S): at 06:03

## 2023-01-01 RX ADMIN — MIDODRINE HYDROCHLORIDE 40 MILLIGRAM(S): 2.5 TABLET ORAL at 15:47

## 2023-01-01 RX ADMIN — Medication 5 MILLIGRAM(S): at 21:03

## 2023-01-01 RX ADMIN — CHLORHEXIDINE GLUCONATE 1 APPLICATION(S): 213 SOLUTION TOPICAL at 05:49

## 2023-01-01 RX ADMIN — Medication 100 MILLIGRAM(S): at 13:42

## 2023-01-01 RX ADMIN — ATORVASTATIN CALCIUM 40 MILLIGRAM(S): 80 TABLET, FILM COATED ORAL at 21:03

## 2023-01-01 RX ADMIN — Medication 1 TABLET(S): at 14:45

## 2023-01-01 RX ADMIN — HYDROMORPHONE HYDROCHLORIDE 0.5 MILLIGRAM(S): 2 INJECTION INTRAMUSCULAR; INTRAVENOUS; SUBCUTANEOUS at 10:00

## 2023-01-01 RX ADMIN — CHLORHEXIDINE GLUCONATE 1 APPLICATION(S): 213 SOLUTION TOPICAL at 06:20

## 2023-01-01 RX ADMIN — HEPARIN SODIUM 7500 UNIT(S): 5000 INJECTION INTRAVENOUS; SUBCUTANEOUS at 14:24

## 2023-01-01 RX ADMIN — HEPARIN SODIUM 7500 UNIT(S): 5000 INJECTION INTRAVENOUS; SUBCUTANEOUS at 06:46

## 2023-01-01 RX ADMIN — CHLORHEXIDINE GLUCONATE 1 APPLICATION(S): 213 SOLUTION TOPICAL at 06:46

## 2023-01-01 RX ADMIN — HEPARIN SODIUM 19 UNIT(S)/HR: 5000 INJECTION INTRAVENOUS; SUBCUTANEOUS at 22:45

## 2023-01-01 RX ADMIN — Medication 5 MILLIGRAM(S): at 22:25

## 2023-01-01 RX ADMIN — HYDROMORPHONE HYDROCHLORIDE 0.25 MILLIGRAM(S): 2 INJECTION INTRAMUSCULAR; INTRAVENOUS; SUBCUTANEOUS at 00:00

## 2023-01-01 RX ADMIN — Medication 0.3 MILLIGRAM(S): at 15:46

## 2023-01-01 RX ADMIN — HYDROMORPHONE HYDROCHLORIDE 1 MILLIGRAM(S): 2 INJECTION INTRAMUSCULAR; INTRAVENOUS; SUBCUTANEOUS at 03:00

## 2023-01-01 RX ADMIN — Medication 1 APPLICATION(S): at 09:55

## 2023-01-01 RX ADMIN — ATORVASTATIN CALCIUM 40 MILLIGRAM(S): 80 TABLET, FILM COATED ORAL at 22:13

## 2023-01-01 RX ADMIN — HYDROMORPHONE HYDROCHLORIDE 1 MILLIGRAM(S): 2 INJECTION INTRAMUSCULAR; INTRAVENOUS; SUBCUTANEOUS at 19:56

## 2023-01-01 RX ADMIN — Medication 650 MILLIGRAM(S): at 12:08

## 2023-01-01 RX ADMIN — SEVELAMER CARBONATE 1600 MILLIGRAM(S): 2400 POWDER, FOR SUSPENSION ORAL at 13:27

## 2023-01-01 RX ADMIN — HYDROMORPHONE HYDROCHLORIDE 0.25 MILLIGRAM(S): 2 INJECTION INTRAMUSCULAR; INTRAVENOUS; SUBCUTANEOUS at 18:30

## 2023-01-01 RX ADMIN — HYDROMORPHONE HYDROCHLORIDE 0.5 MILLIGRAM(S): 2 INJECTION INTRAMUSCULAR; INTRAVENOUS; SUBCUTANEOUS at 16:01

## 2023-01-01 RX ADMIN — Medication 650 MILLIGRAM(S): at 13:00

## 2023-01-01 RX ADMIN — HYDROMORPHONE HYDROCHLORIDE 0.25 MILLIGRAM(S): 2 INJECTION INTRAMUSCULAR; INTRAVENOUS; SUBCUTANEOUS at 10:30

## 2023-01-01 RX ADMIN — CHLORHEXIDINE GLUCONATE 1 APPLICATION(S): 213 SOLUTION TOPICAL at 06:15

## 2023-01-01 RX ADMIN — APIXABAN 2.5 MILLIGRAM(S): 2.5 TABLET, FILM COATED ORAL at 17:39

## 2023-01-01 RX ADMIN — MEROPENEM 100 MILLIGRAM(S): 1 INJECTION INTRAVENOUS at 22:23

## 2023-01-01 RX ADMIN — SODIUM CHLORIDE 5 MILLILITER(S): 9 INJECTION INTRAMUSCULAR; INTRAVENOUS; SUBCUTANEOUS at 04:37

## 2023-01-01 RX ADMIN — SEVELAMER CARBONATE 1600 MILLIGRAM(S): 2400 POWDER, FOR SUSPENSION ORAL at 07:57

## 2023-01-01 RX ADMIN — Medication 0.3 MILLIGRAM(S): at 11:30

## 2023-01-01 RX ADMIN — HYDROMORPHONE HYDROCHLORIDE 0.25 MILLIGRAM(S): 2 INJECTION INTRAMUSCULAR; INTRAVENOUS; SUBCUTANEOUS at 14:48

## 2023-01-01 RX ADMIN — HYDROMORPHONE HYDROCHLORIDE 0.25 MILLIGRAM(S): 2 INJECTION INTRAMUSCULAR; INTRAVENOUS; SUBCUTANEOUS at 10:03

## 2023-01-01 RX ADMIN — HYDROMORPHONE HYDROCHLORIDE 3 MILLIGRAM(S): 2 INJECTION INTRAMUSCULAR; INTRAVENOUS; SUBCUTANEOUS at 19:51

## 2023-01-01 RX ADMIN — HYDROMORPHONE HYDROCHLORIDE 1 MILLIGRAM(S): 2 INJECTION INTRAMUSCULAR; INTRAVENOUS; SUBCUTANEOUS at 14:45

## 2023-01-01 RX ADMIN — HYDROMORPHONE HYDROCHLORIDE 1 MILLIGRAM(S): 2 INJECTION INTRAMUSCULAR; INTRAVENOUS; SUBCUTANEOUS at 19:38

## 2023-01-01 RX ADMIN — HYDROMORPHONE HYDROCHLORIDE 2 MILLIGRAM(S): 2 INJECTION INTRAMUSCULAR; INTRAVENOUS; SUBCUTANEOUS at 17:47

## 2023-01-01 RX ADMIN — SEVELAMER CARBONATE 1600 MILLIGRAM(S): 2400 POWDER, FOR SUSPENSION ORAL at 14:45

## 2023-01-01 RX ADMIN — MIDODRINE HYDROCHLORIDE 30 MILLIGRAM(S): 2.5 TABLET ORAL at 12:22

## 2023-01-01 RX ADMIN — Medication 100 MILLIGRAM(S): at 05:00

## 2023-01-01 RX ADMIN — MEROPENEM 100 MILLIGRAM(S): 1 INJECTION INTRAVENOUS at 21:11

## 2023-01-01 RX ADMIN — HYDROMORPHONE HYDROCHLORIDE 4 MILLIGRAM(S): 2 INJECTION INTRAMUSCULAR; INTRAVENOUS; SUBCUTANEOUS at 23:00

## 2023-01-01 RX ADMIN — HYDROMORPHONE HYDROCHLORIDE 0.5 MILLIGRAM(S): 2 INJECTION INTRAMUSCULAR; INTRAVENOUS; SUBCUTANEOUS at 16:30

## 2023-01-01 RX ADMIN — SEVELAMER CARBONATE 1600 MILLIGRAM(S): 2400 POWDER, FOR SUSPENSION ORAL at 20:08

## 2023-01-01 RX ADMIN — SODIUM CHLORIDE 1 GRAM(S): 9 INJECTION INTRAMUSCULAR; INTRAVENOUS; SUBCUTANEOUS at 18:21

## 2023-01-01 RX ADMIN — HYDROMORPHONE HYDROCHLORIDE 0.5 MILLIGRAM(S): 2 INJECTION INTRAMUSCULAR; INTRAVENOUS; SUBCUTANEOUS at 03:15

## 2023-01-01 RX ADMIN — HYDROMORPHONE HYDROCHLORIDE 0.5 MILLIGRAM(S): 2 INJECTION INTRAMUSCULAR; INTRAVENOUS; SUBCUTANEOUS at 13:20

## 2023-01-01 RX ADMIN — PHENYLEPHRINE HYDROCHLORIDE 1.6 MICROGRAM(S)/KG/MIN: 10 INJECTION INTRAVENOUS at 20:57

## 2023-01-01 RX ADMIN — Medication 650 MILLIGRAM(S): at 19:55

## 2023-01-01 RX ADMIN — Medication 5 MILLIGRAM(S): at 22:47

## 2023-01-01 RX ADMIN — HYDROMORPHONE HYDROCHLORIDE 0.25 MILLIGRAM(S): 2 INJECTION INTRAMUSCULAR; INTRAVENOUS; SUBCUTANEOUS at 12:48

## 2023-01-01 RX ADMIN — HYDROMORPHONE HYDROCHLORIDE 0.5 MILLIGRAM(S): 2 INJECTION INTRAMUSCULAR; INTRAVENOUS; SUBCUTANEOUS at 00:05

## 2023-01-01 RX ADMIN — MIDODRINE HYDROCHLORIDE 30 MILLIGRAM(S): 2.5 TABLET ORAL at 22:31

## 2023-01-01 RX ADMIN — Medication 1 APPLICATION(S): at 15:50

## 2023-01-01 RX ADMIN — Medication 1 MILLIGRAM(S): at 11:14

## 2023-01-01 RX ADMIN — CHLORHEXIDINE GLUCONATE 1 APPLICATION(S): 213 SOLUTION TOPICAL at 06:02

## 2023-01-01 RX ADMIN — HYDROMORPHONE HYDROCHLORIDE 2 MILLIGRAM(S): 2 INJECTION INTRAMUSCULAR; INTRAVENOUS; SUBCUTANEOUS at 19:22

## 2023-01-01 RX ADMIN — NYSTATIN CREAM 1 APPLICATION(S): 100000 CREAM TOPICAL at 18:21

## 2023-01-01 RX ADMIN — SEVELAMER CARBONATE 1600 MILLIGRAM(S): 2400 POWDER, FOR SUSPENSION ORAL at 12:05

## 2023-01-01 RX ADMIN — Medication 3.72 MICROGRAM(S)/KG/MIN: at 17:20

## 2023-01-01 RX ADMIN — Medication 1 MILLIGRAM(S): at 15:09

## 2023-01-01 RX ADMIN — ATORVASTATIN CALCIUM 40 MILLIGRAM(S): 80 TABLET, FILM COATED ORAL at 22:24

## 2023-01-01 RX ADMIN — HYDROMORPHONE HYDROCHLORIDE 3 MILLIGRAM(S): 2 INJECTION INTRAMUSCULAR; INTRAVENOUS; SUBCUTANEOUS at 12:52

## 2023-01-01 RX ADMIN — ONDANSETRON 4 MILLIGRAM(S): 8 TABLET, FILM COATED ORAL at 20:48

## 2023-01-01 RX ADMIN — ATORVASTATIN CALCIUM 40 MILLIGRAM(S): 80 TABLET, FILM COATED ORAL at 21:04

## 2023-01-01 RX ADMIN — Medication 1 TABLET(S): at 10:19

## 2023-01-01 RX ADMIN — Medication 1 APPLICATION(S): at 22:03

## 2023-01-01 RX ADMIN — MIDODRINE HYDROCHLORIDE 30 MILLIGRAM(S): 2.5 TABLET ORAL at 05:41

## 2023-01-01 RX ADMIN — PANTOPRAZOLE SODIUM 40 MILLIGRAM(S): 20 TABLET, DELAYED RELEASE ORAL at 15:08

## 2023-01-01 RX ADMIN — POLYETHYLENE GLYCOL 3350 17 GRAM(S): 17 POWDER, FOR SOLUTION ORAL at 15:44

## 2023-01-01 RX ADMIN — CHLORHEXIDINE GLUCONATE 1 APPLICATION(S): 213 SOLUTION TOPICAL at 07:07

## 2023-01-01 RX ADMIN — MIDODRINE HYDROCHLORIDE 30 MILLIGRAM(S): 2.5 TABLET ORAL at 13:54

## 2023-01-01 RX ADMIN — Medication 1 MILLIGRAM(S): at 11:23

## 2023-01-01 RX ADMIN — MIDODRINE HYDROCHLORIDE 30 MILLIGRAM(S): 2.5 TABLET ORAL at 22:06

## 2023-01-01 RX ADMIN — HYDROMORPHONE HYDROCHLORIDE 1 MILLIGRAM(S): 2 INJECTION INTRAMUSCULAR; INTRAVENOUS; SUBCUTANEOUS at 17:10

## 2023-01-01 RX ADMIN — HYDROMORPHONE HYDROCHLORIDE 2 MILLIGRAM(S): 2 INJECTION INTRAMUSCULAR; INTRAVENOUS; SUBCUTANEOUS at 22:57

## 2023-01-01 RX ADMIN — Medication 0.3 MILLIGRAM(S): at 13:11

## 2023-01-01 RX ADMIN — PANTOPRAZOLE SODIUM 40 MILLIGRAM(S): 20 TABLET, DELAYED RELEASE ORAL at 17:11

## 2023-01-01 RX ADMIN — CINACALCET 30 MILLIGRAM(S): 30 TABLET, FILM COATED ORAL at 14:29

## 2023-01-01 RX ADMIN — HYDROMORPHONE HYDROCHLORIDE 2 MILLIGRAM(S): 2 INJECTION INTRAMUSCULAR; INTRAVENOUS; SUBCUTANEOUS at 11:14

## 2023-01-01 RX ADMIN — MEROPENEM 100 MILLIGRAM(S): 1 INJECTION INTRAVENOUS at 21:50

## 2023-01-01 RX ADMIN — LIDOCAINE 1 APPLICATION(S): 4 CREAM TOPICAL at 10:39

## 2023-01-01 RX ADMIN — HYDROMORPHONE HYDROCHLORIDE 0.25 MILLIGRAM(S): 2 INJECTION INTRAMUSCULAR; INTRAVENOUS; SUBCUTANEOUS at 15:03

## 2023-01-01 RX ADMIN — Medication 500 MILLILITER(S): at 20:40

## 2023-01-01 RX ADMIN — Medication 1 TABLET(S): at 12:22

## 2023-01-01 RX ADMIN — HYDROMORPHONE HYDROCHLORIDE 4 MILLIGRAM(S): 2 INJECTION INTRAMUSCULAR; INTRAVENOUS; SUBCUTANEOUS at 13:54

## 2023-01-01 RX ADMIN — Medication 30 MILLILITER(S): at 13:06

## 2023-01-01 RX ADMIN — MIDODRINE HYDROCHLORIDE 40 MILLIGRAM(S): 2.5 TABLET ORAL at 22:59

## 2023-01-01 RX ADMIN — CHLORHEXIDINE GLUCONATE 1 APPLICATION(S): 213 SOLUTION TOPICAL at 05:34

## 2023-01-01 RX ADMIN — HYDROMORPHONE HYDROCHLORIDE 0.25 MILLIGRAM(S): 2 INJECTION INTRAMUSCULAR; INTRAVENOUS; SUBCUTANEOUS at 00:37

## 2023-01-01 RX ADMIN — Medication 3.72 MICROGRAM(S)/KG/MIN: at 09:25

## 2023-01-01 RX ADMIN — FLUDROCORTISONE ACETATE 0.2 MILLIGRAM(S): 0.1 TABLET ORAL at 21:26

## 2023-01-01 RX ADMIN — HEPARIN SODIUM 1500 UNIT(S)/HR: 5000 INJECTION INTRAVENOUS; SUBCUTANEOUS at 11:40

## 2023-01-01 RX ADMIN — HYDROMORPHONE HYDROCHLORIDE 4 MILLIGRAM(S): 2 INJECTION INTRAMUSCULAR; INTRAVENOUS; SUBCUTANEOUS at 15:29

## 2023-01-01 RX ADMIN — SENNA PLUS 2 TABLET(S): 8.6 TABLET ORAL at 22:34

## 2023-01-01 RX ADMIN — Medication 100 MILLIGRAM(S): at 05:37

## 2023-01-01 RX ADMIN — HYDROMORPHONE HYDROCHLORIDE 4 MILLIGRAM(S): 2 INJECTION INTRAMUSCULAR; INTRAVENOUS; SUBCUTANEOUS at 09:17

## 2023-01-01 RX ADMIN — MIDODRINE HYDROCHLORIDE 40 MILLIGRAM(S): 2.5 TABLET ORAL at 15:09

## 2023-01-01 RX ADMIN — Medication 1 APPLICATION(S): at 19:33

## 2023-01-01 RX ADMIN — Medication 1 APPLICATION(S): at 21:09

## 2023-01-01 RX ADMIN — ONDANSETRON 4 MILLIGRAM(S): 8 TABLET, FILM COATED ORAL at 13:43

## 2023-01-01 RX ADMIN — Medication 650 MILLIGRAM(S): at 12:15

## 2023-01-01 RX ADMIN — HYDROMORPHONE HYDROCHLORIDE 4 MILLIGRAM(S): 2 INJECTION INTRAMUSCULAR; INTRAVENOUS; SUBCUTANEOUS at 16:00

## 2023-01-01 RX ADMIN — MIDODRINE HYDROCHLORIDE 40 MILLIGRAM(S): 2.5 TABLET ORAL at 15:00

## 2023-01-01 RX ADMIN — Medication 1 TABLET(S): at 11:29

## 2023-01-01 RX ADMIN — MEROPENEM 100 MILLIGRAM(S): 1 INJECTION INTRAVENOUS at 18:54

## 2023-01-01 RX ADMIN — SEVELAMER CARBONATE 800 MILLIGRAM(S): 2400 POWDER, FOR SUSPENSION ORAL at 22:36

## 2023-01-01 RX ADMIN — CHLORHEXIDINE GLUCONATE 1 APPLICATION(S): 213 SOLUTION TOPICAL at 05:00

## 2023-01-01 RX ADMIN — SEVELAMER CARBONATE 1600 MILLIGRAM(S): 2400 POWDER, FOR SUSPENSION ORAL at 12:13

## 2023-01-01 RX ADMIN — Medication 650 MILLIGRAM(S): at 03:17

## 2023-01-01 RX ADMIN — LACTULOSE 200 GRAM(S): 10 SOLUTION ORAL at 11:35

## 2023-01-01 RX ADMIN — Medication 30 MILLILITER(S): at 09:25

## 2023-01-01 RX ADMIN — MIDODRINE HYDROCHLORIDE 40 MILLIGRAM(S): 2.5 TABLET ORAL at 21:42

## 2023-01-01 RX ADMIN — SEVELAMER CARBONATE 1600 MILLIGRAM(S): 2400 POWDER, FOR SUSPENSION ORAL at 20:11

## 2023-01-01 RX ADMIN — PANTOPRAZOLE SODIUM 40 MILLIGRAM(S): 20 TABLET, DELAYED RELEASE ORAL at 17:55

## 2023-01-01 RX ADMIN — Medication 50 MILLIGRAM(S): at 05:54

## 2023-01-01 RX ADMIN — Medication 1 APPLICATION(S): at 22:20

## 2023-01-01 RX ADMIN — HYDROMORPHONE HYDROCHLORIDE 0.25 MILLIGRAM(S): 2 INJECTION INTRAMUSCULAR; INTRAVENOUS; SUBCUTANEOUS at 14:44

## 2023-01-01 RX ADMIN — SEVELAMER CARBONATE 1600 MILLIGRAM(S): 2400 POWDER, FOR SUSPENSION ORAL at 07:51

## 2023-01-01 RX ADMIN — LIDOCAINE 1 APPLICATION(S): 4 CREAM TOPICAL at 16:18

## 2023-01-01 RX ADMIN — PANTOPRAZOLE SODIUM 40 MILLIGRAM(S): 20 TABLET, DELAYED RELEASE ORAL at 13:42

## 2023-01-01 RX ADMIN — CINACALCET 120 MILLIGRAM(S): 30 TABLET, FILM COATED ORAL at 22:14

## 2023-01-01 RX ADMIN — PANTOPRAZOLE SODIUM 40 MILLIGRAM(S): 20 TABLET, DELAYED RELEASE ORAL at 11:16

## 2023-01-01 RX ADMIN — HYDROMORPHONE HYDROCHLORIDE 1 MILLIGRAM(S): 2 INJECTION INTRAMUSCULAR; INTRAVENOUS; SUBCUTANEOUS at 22:30

## 2023-01-01 RX ADMIN — HYDROMORPHONE HYDROCHLORIDE 0.5 MILLIGRAM(S): 2 INJECTION INTRAMUSCULAR; INTRAVENOUS; SUBCUTANEOUS at 07:40

## 2023-01-01 RX ADMIN — CINACALCET 90 MILLIGRAM(S): 30 TABLET, FILM COATED ORAL at 14:45

## 2023-01-01 RX ADMIN — HYDROMORPHONE HYDROCHLORIDE 0.25 MILLIGRAM(S): 2 INJECTION INTRAMUSCULAR; INTRAVENOUS; SUBCUTANEOUS at 08:26

## 2023-01-01 RX ADMIN — Medication 650 MILLIGRAM(S): at 16:12

## 2023-01-01 RX ADMIN — PHENYLEPHRINE HYDROCHLORIDE 1.6 MICROGRAM(S)/KG/MIN: 10 INJECTION INTRAVENOUS at 00:14

## 2023-01-01 RX ADMIN — HYDROMORPHONE HYDROCHLORIDE 1 MILLIGRAM(S): 2 INJECTION INTRAMUSCULAR; INTRAVENOUS; SUBCUTANEOUS at 11:30

## 2023-01-01 RX ADMIN — Medication 1 APPLICATION(S): at 14:01

## 2023-01-01 RX ADMIN — SEVELAMER CARBONATE 1600 MILLIGRAM(S): 2400 POWDER, FOR SUSPENSION ORAL at 19:34

## 2023-01-01 RX ADMIN — HYDROMORPHONE HYDROCHLORIDE 4 MILLIGRAM(S): 2 INJECTION INTRAMUSCULAR; INTRAVENOUS; SUBCUTANEOUS at 19:41

## 2023-01-01 RX ADMIN — POLYETHYLENE GLYCOL 3350 17 GRAM(S): 17 POWDER, FOR SOLUTION ORAL at 23:02

## 2023-01-01 RX ADMIN — HYDROMORPHONE HYDROCHLORIDE 4 MILLIGRAM(S): 2 INJECTION INTRAMUSCULAR; INTRAVENOUS; SUBCUTANEOUS at 21:08

## 2023-01-01 RX ADMIN — Medication 1 APPLICATION(S): at 11:07

## 2023-01-01 RX ADMIN — LIDOCAINE 2 PATCH: 4 CREAM TOPICAL at 11:22

## 2023-01-01 RX ADMIN — Medication 1000 MILLIGRAM(S): at 23:17

## 2023-01-01 RX ADMIN — MIDODRINE HYDROCHLORIDE 40 MILLIGRAM(S): 2.5 TABLET ORAL at 06:25

## 2023-01-01 RX ADMIN — HYDROMORPHONE HYDROCHLORIDE 0.5 MILLIGRAM(S): 2 INJECTION INTRAMUSCULAR; INTRAVENOUS; SUBCUTANEOUS at 19:16

## 2023-01-01 RX ADMIN — Medication 400 GRAM(S): at 13:16

## 2023-01-01 RX ADMIN — CINACALCET 30 MILLIGRAM(S): 30 TABLET, FILM COATED ORAL at 11:25

## 2023-01-01 RX ADMIN — MIDODRINE HYDROCHLORIDE 30 MILLIGRAM(S): 2.5 TABLET ORAL at 21:03

## 2023-01-01 RX ADMIN — HYDROMORPHONE HYDROCHLORIDE 1 MILLIGRAM(S): 2 INJECTION INTRAMUSCULAR; INTRAVENOUS; SUBCUTANEOUS at 13:45

## 2023-01-01 RX ADMIN — Medication 62.5 MILLIMOLE(S): at 08:38

## 2023-01-01 RX ADMIN — SEVELAMER CARBONATE 800 MILLIGRAM(S): 2400 POWDER, FOR SUSPENSION ORAL at 17:41

## 2023-01-01 RX ADMIN — HEPARIN SODIUM 19 UNIT(S)/HR: 5000 INJECTION INTRAVENOUS; SUBCUTANEOUS at 21:02

## 2023-01-01 RX ADMIN — Medication 50 MILLIGRAM(S): at 15:49

## 2023-01-01 RX ADMIN — PHENYLEPHRINE HYDROCHLORIDE 1.6 MICROGRAM(S)/KG/MIN: 10 INJECTION INTRAVENOUS at 17:26

## 2023-01-01 RX ADMIN — FLUDROCORTISONE ACETATE 0.1 MILLIGRAM(S): 0.1 TABLET ORAL at 15:01

## 2023-01-01 RX ADMIN — POLYETHYLENE GLYCOL 3350 17 GRAM(S): 17 POWDER, FOR SOLUTION ORAL at 18:00

## 2023-01-01 RX ADMIN — SEVELAMER CARBONATE 1600 MILLIGRAM(S): 2400 POWDER, FOR SUSPENSION ORAL at 09:05

## 2023-01-01 RX ADMIN — HYDROMORPHONE HYDROCHLORIDE 0.5 MILLIGRAM(S): 2 INJECTION INTRAMUSCULAR; INTRAVENOUS; SUBCUTANEOUS at 18:46

## 2023-01-01 RX ADMIN — ERYTHROPOIETIN 8000 UNIT(S): 10000 INJECTION, SOLUTION INTRAVENOUS; SUBCUTANEOUS at 12:36

## 2023-01-01 RX ADMIN — Medication 1 MILLIGRAM(S): at 17:55

## 2023-01-01 RX ADMIN — ATORVASTATIN CALCIUM 40 MILLIGRAM(S): 80 TABLET, FILM COATED ORAL at 22:36

## 2023-01-01 RX ADMIN — HYDROMORPHONE HYDROCHLORIDE 0.25 MILLIGRAM(S): 2 INJECTION INTRAMUSCULAR; INTRAVENOUS; SUBCUTANEOUS at 18:00

## 2023-01-01 RX ADMIN — Medication 0.3 MILLIGRAM(S): at 12:11

## 2023-01-01 RX ADMIN — HYDROMORPHONE HYDROCHLORIDE 0.25 MILLIGRAM(S): 2 INJECTION INTRAMUSCULAR; INTRAVENOUS; SUBCUTANEOUS at 20:45

## 2023-01-01 RX ADMIN — Medication 650 MILLIGRAM(S): at 21:22

## 2023-01-01 RX ADMIN — SEVELAMER CARBONATE 1600 MILLIGRAM(S): 2400 POWDER, FOR SUSPENSION ORAL at 08:16

## 2023-01-01 RX ADMIN — Medication 650 MILLIGRAM(S): at 05:35

## 2023-01-01 RX ADMIN — HEPARIN SODIUM 5000 UNIT(S): 5000 INJECTION INTRAVENOUS; SUBCUTANEOUS at 21:33

## 2023-01-01 RX ADMIN — HYDROMORPHONE HYDROCHLORIDE 0.25 MILLIGRAM(S): 2 INJECTION INTRAMUSCULAR; INTRAVENOUS; SUBCUTANEOUS at 00:02

## 2023-01-01 RX ADMIN — SEVELAMER CARBONATE 1600 MILLIGRAM(S): 2400 POWDER, FOR SUSPENSION ORAL at 07:27

## 2023-01-01 RX ADMIN — HYDROMORPHONE HYDROCHLORIDE 4 MILLIGRAM(S): 2 INJECTION INTRAMUSCULAR; INTRAVENOUS; SUBCUTANEOUS at 18:01

## 2023-01-01 RX ADMIN — FLUDROCORTISONE ACETATE 0.2 MILLIGRAM(S): 0.1 TABLET ORAL at 06:01

## 2023-01-01 RX ADMIN — MUPIROCIN 1 APPLICATION(S): 20 OINTMENT TOPICAL at 19:11

## 2023-01-01 RX ADMIN — Medication 1000 MILLIGRAM(S): at 22:30

## 2023-01-01 RX ADMIN — ERYTHROPOIETIN 8000 UNIT(S): 10000 INJECTION, SOLUTION INTRAVENOUS; SUBCUTANEOUS at 12:53

## 2023-01-01 RX ADMIN — CHLORHEXIDINE GLUCONATE 1 APPLICATION(S): 213 SOLUTION TOPICAL at 06:00

## 2023-01-01 RX ADMIN — CLOPIDOGREL BISULFATE 75 MILLIGRAM(S): 75 TABLET, FILM COATED ORAL at 18:42

## 2023-01-01 RX ADMIN — Medication 650 MILLIGRAM(S): at 19:12

## 2023-01-01 RX ADMIN — ATORVASTATIN CALCIUM 40 MILLIGRAM(S): 80 TABLET, FILM COATED ORAL at 22:06

## 2023-01-01 RX ADMIN — MIDODRINE HYDROCHLORIDE 40 MILLIGRAM(S): 2.5 TABLET ORAL at 22:24

## 2023-01-01 RX ADMIN — Medication 400 MILLIGRAM(S): at 17:40

## 2023-01-01 RX ADMIN — Medication 975 MILLIGRAM(S): at 23:52

## 2023-01-01 RX ADMIN — PHENYLEPHRINE HYDROCHLORIDE 1.6 MICROGRAM(S)/KG/MIN: 10 INJECTION INTRAVENOUS at 15:00

## 2023-01-01 RX ADMIN — Medication 0.3 MILLIGRAM(S): at 15:35

## 2023-01-01 RX ADMIN — HYDROMORPHONE HYDROCHLORIDE 1 MILLIGRAM(S): 2 INJECTION INTRAMUSCULAR; INTRAVENOUS; SUBCUTANEOUS at 14:54

## 2023-01-01 RX ADMIN — Medication 30 MILLILITER(S): at 16:43

## 2023-01-01 RX ADMIN — Medication 1 APPLICATION(S): at 14:35

## 2023-01-01 RX ADMIN — CLOPIDOGREL BISULFATE 75 MILLIGRAM(S): 75 TABLET, FILM COATED ORAL at 14:44

## 2023-01-01 RX ADMIN — PHENYLEPHRINE HYDROCHLORIDE 64 MICROGRAM(S)/KG/MIN: 10 INJECTION INTRAVENOUS at 17:56

## 2023-01-01 RX ADMIN — Medication 5.46 MICROGRAM(S)/KG/MIN: at 06:32

## 2023-01-01 RX ADMIN — HEPARIN SODIUM 5000 UNIT(S): 5000 INJECTION INTRAVENOUS; SUBCUTANEOUS at 06:37

## 2023-01-01 RX ADMIN — PANTOPRAZOLE SODIUM 40 MILLIGRAM(S): 20 TABLET, DELAYED RELEASE ORAL at 17:23

## 2023-01-01 RX ADMIN — SEVELAMER CARBONATE 1600 MILLIGRAM(S): 2400 POWDER, FOR SUSPENSION ORAL at 14:44

## 2023-01-01 RX ADMIN — Medication 25 GRAM(S): at 05:28

## 2023-01-01 RX ADMIN — ATORVASTATIN CALCIUM 40 MILLIGRAM(S): 80 TABLET, FILM COATED ORAL at 23:00

## 2023-01-01 RX ADMIN — MIDODRINE HYDROCHLORIDE 40 MILLIGRAM(S): 2.5 TABLET ORAL at 06:08

## 2023-01-01 RX ADMIN — MIDODRINE HYDROCHLORIDE 40 MILLIGRAM(S): 2.5 TABLET ORAL at 13:26

## 2023-01-01 RX ADMIN — SEVELAMER CARBONATE 800 MILLIGRAM(S): 2400 POWDER, FOR SUSPENSION ORAL at 11:37

## 2023-01-01 RX ADMIN — Medication 975 MILLIGRAM(S): at 15:10

## 2023-01-01 RX ADMIN — Medication 650 MILLIGRAM(S): at 21:36

## 2023-01-01 RX ADMIN — Medication 400 MILLIGRAM(S): at 17:50

## 2023-01-01 RX ADMIN — CHLORHEXIDINE GLUCONATE 1 APPLICATION(S): 213 SOLUTION TOPICAL at 06:37

## 2023-01-01 RX ADMIN — HYDROMORPHONE HYDROCHLORIDE 0.5 MILLIGRAM(S): 2 INJECTION INTRAMUSCULAR; INTRAVENOUS; SUBCUTANEOUS at 20:15

## 2023-01-01 RX ADMIN — Medication 1 MILLIGRAM(S): at 11:17

## 2023-01-01 RX ADMIN — APIXABAN 2.5 MILLIGRAM(S): 2.5 TABLET, FILM COATED ORAL at 05:41

## 2023-01-01 RX ADMIN — APIXABAN 2.5 MILLIGRAM(S): 2.5 TABLET, FILM COATED ORAL at 17:41

## 2023-01-01 RX ADMIN — Medication 100 GRAM(S): at 23:22

## 2023-01-01 RX ADMIN — HEPARIN SODIUM 7500 UNIT(S): 5000 INJECTION INTRAVENOUS; SUBCUTANEOUS at 06:24

## 2023-01-01 RX ADMIN — FLUDROCORTISONE ACETATE 0.5 MILLIGRAM(S): 0.1 TABLET ORAL at 21:05

## 2023-01-01 RX ADMIN — HYDROMORPHONE HYDROCHLORIDE 0.25 MILLIGRAM(S): 2 INJECTION INTRAMUSCULAR; INTRAVENOUS; SUBCUTANEOUS at 06:00

## 2023-01-01 RX ADMIN — Medication 650 MILLIGRAM(S): at 05:21

## 2023-01-01 RX ADMIN — SEVELAMER CARBONATE 1600 MILLIGRAM(S): 2400 POWDER, FOR SUSPENSION ORAL at 08:44

## 2023-01-01 RX ADMIN — Medication 50 MILLILITER(S): at 17:04

## 2023-01-01 RX ADMIN — Medication 650 MILLIGRAM(S): at 05:52

## 2023-01-01 RX ADMIN — MIDODRINE HYDROCHLORIDE 40 MILLIGRAM(S): 2.5 TABLET ORAL at 22:16

## 2023-01-01 RX ADMIN — SEVELAMER CARBONATE 1600 MILLIGRAM(S): 2400 POWDER, FOR SUSPENSION ORAL at 13:00

## 2023-01-01 RX ADMIN — GLUCAGON INJECTION, SOLUTION 1 MILLIGRAM(S): 0.5 INJECTION, SOLUTION SUBCUTANEOUS at 06:33

## 2023-01-01 RX ADMIN — CINACALCET 60 MILLIGRAM(S): 30 TABLET, FILM COATED ORAL at 15:39

## 2023-01-01 RX ADMIN — NYSTATIN CREAM 1 APPLICATION(S): 100000 CREAM TOPICAL at 05:18

## 2023-01-01 RX ADMIN — CHLORHEXIDINE GLUCONATE 1 APPLICATION(S): 213 SOLUTION TOPICAL at 12:20

## 2023-01-01 RX ADMIN — HYDROMORPHONE HYDROCHLORIDE 2 MILLIGRAM(S): 2 INJECTION INTRAMUSCULAR; INTRAVENOUS; SUBCUTANEOUS at 02:51

## 2023-01-01 RX ADMIN — SODIUM CHLORIDE 30 MILLILITER(S): 9 INJECTION, SOLUTION INTRAVENOUS at 21:14

## 2023-01-01 RX ADMIN — HYDROMORPHONE HYDROCHLORIDE 0.25 MILLIGRAM(S): 2 INJECTION INTRAMUSCULAR; INTRAVENOUS; SUBCUTANEOUS at 05:44

## 2023-01-01 RX ADMIN — HYDROMORPHONE HYDROCHLORIDE 1 MILLIGRAM(S): 2 INJECTION INTRAMUSCULAR; INTRAVENOUS; SUBCUTANEOUS at 10:27

## 2023-01-01 RX ADMIN — HYDROMORPHONE HYDROCHLORIDE 0.5 MILLIGRAM(S): 2 INJECTION INTRAMUSCULAR; INTRAVENOUS; SUBCUTANEOUS at 15:00

## 2023-01-01 RX ADMIN — HYDROMORPHONE HYDROCHLORIDE 0.25 MILLIGRAM(S): 2 INJECTION INTRAMUSCULAR; INTRAVENOUS; SUBCUTANEOUS at 15:15

## 2023-01-01 RX ADMIN — HEPARIN SODIUM 5000 UNIT(S): 5000 INJECTION INTRAVENOUS; SUBCUTANEOUS at 05:38

## 2023-01-01 RX ADMIN — DAPTOMYCIN 116 MILLIGRAM(S): 500 INJECTION, POWDER, LYOPHILIZED, FOR SOLUTION INTRAVENOUS at 16:24

## 2023-01-01 RX ADMIN — Medication 400 MILLIGRAM(S): at 20:49

## 2023-01-01 RX ADMIN — HYDROMORPHONE HYDROCHLORIDE 4 MILLIGRAM(S): 2 INJECTION INTRAMUSCULAR; INTRAVENOUS; SUBCUTANEOUS at 05:41

## 2023-01-01 RX ADMIN — PANTOPRAZOLE SODIUM 40 MILLIGRAM(S): 20 TABLET, DELAYED RELEASE ORAL at 17:41

## 2023-01-01 RX ADMIN — Medication 1 TABLET(S): at 15:02

## 2023-01-01 RX ADMIN — Medication 10 MILLIGRAM(S): at 15:12

## 2023-01-01 RX ADMIN — Medication 4000 MILLILITER(S): at 09:31

## 2023-01-01 RX ADMIN — FLUDROCORTISONE ACETATE 0.2 MILLIGRAM(S): 0.1 TABLET ORAL at 22:35

## 2023-01-01 RX ADMIN — Medication 1 MILLIGRAM(S): at 18:14

## 2023-01-01 RX ADMIN — Medication 975 MILLIGRAM(S): at 14:49

## 2023-01-01 RX ADMIN — Medication 1 APPLICATION(S): at 13:56

## 2023-01-01 RX ADMIN — APIXABAN 2.5 MILLIGRAM(S): 2.5 TABLET, FILM COATED ORAL at 06:12

## 2023-01-01 RX ADMIN — Medication 1000 MILLIGRAM(S): at 22:02

## 2023-01-01 RX ADMIN — POLYETHYLENE GLYCOL 3350 17 GRAM(S): 17 POWDER, FOR SOLUTION ORAL at 11:23

## 2023-01-01 RX ADMIN — PANTOPRAZOLE SODIUM 80 MILLIGRAM(S): 20 TABLET, DELAYED RELEASE ORAL at 12:59

## 2023-01-01 RX ADMIN — FLUDROCORTISONE ACETATE 0.5 MILLIGRAM(S): 0.1 TABLET ORAL at 18:01

## 2023-01-01 RX ADMIN — Medication 30 MILLILITER(S): at 01:57

## 2023-01-01 RX ADMIN — Medication 0.3 MILLIGRAM(S): at 14:44

## 2023-01-01 RX ADMIN — MIDODRINE HYDROCHLORIDE 40 MILLIGRAM(S): 2.5 TABLET ORAL at 23:03

## 2023-01-01 RX ADMIN — Medication 5.46 MICROGRAM(S)/KG/MIN: at 02:16

## 2023-01-01 RX ADMIN — Medication 25 MILLILITER(S): at 06:24

## 2023-01-01 RX ADMIN — HEPARIN SODIUM 5000 UNIT(S): 5000 INJECTION INTRAVENOUS; SUBCUTANEOUS at 21:09

## 2023-01-01 RX ADMIN — MIDODRINE HYDROCHLORIDE 30 MILLIGRAM(S): 2.5 TABLET ORAL at 02:26

## 2023-01-01 RX ADMIN — MIDODRINE HYDROCHLORIDE 40 MILLIGRAM(S): 2.5 TABLET ORAL at 15:01

## 2023-01-01 RX ADMIN — Medication 0.5 MILLIGRAM(S): at 13:23

## 2023-01-01 RX ADMIN — SEVELAMER CARBONATE 1600 MILLIGRAM(S): 2400 POWDER, FOR SUSPENSION ORAL at 19:05

## 2023-01-01 RX ADMIN — APIXABAN 5 MILLIGRAM(S): 2.5 TABLET, FILM COATED ORAL at 17:50

## 2023-01-01 RX ADMIN — HYDROMORPHONE HYDROCHLORIDE 1 MILLIGRAM(S): 2 INJECTION INTRAMUSCULAR; INTRAVENOUS; SUBCUTANEOUS at 16:19

## 2023-01-01 RX ADMIN — Medication 650 MILLIGRAM(S): at 06:23

## 2023-01-01 RX ADMIN — ONDANSETRON 4 MILLIGRAM(S): 8 TABLET, FILM COATED ORAL at 13:15

## 2023-01-01 RX ADMIN — MIDODRINE HYDROCHLORIDE 30 MILLIGRAM(S): 2.5 TABLET ORAL at 13:46

## 2023-01-01 RX ADMIN — HEPARIN SODIUM 1100 UNIT(S)/HR: 5000 INJECTION INTRAVENOUS; SUBCUTANEOUS at 04:34

## 2023-01-01 RX ADMIN — MIDODRINE HYDROCHLORIDE 40 MILLIGRAM(S): 2.5 TABLET ORAL at 23:07

## 2023-01-01 RX ADMIN — Medication 166.67 MILLIGRAM(S): at 17:39

## 2023-01-01 RX ADMIN — Medication 3.72 MICROGRAM(S)/KG/MIN: at 08:38

## 2023-01-01 RX ADMIN — HYDROMORPHONE HYDROCHLORIDE 3 MILLIGRAM(S): 2 INJECTION INTRAMUSCULAR; INTRAVENOUS; SUBCUTANEOUS at 10:03

## 2023-01-01 RX ADMIN — HEPARIN SODIUM 5000 UNIT(S): 5000 INJECTION INTRAVENOUS; SUBCUTANEOUS at 22:23

## 2023-01-01 RX ADMIN — HYDROMORPHONE HYDROCHLORIDE 1 MILLIGRAM(S): 2 INJECTION INTRAMUSCULAR; INTRAVENOUS; SUBCUTANEOUS at 15:18

## 2023-01-01 RX ADMIN — SEVELAMER CARBONATE 800 MILLIGRAM(S): 2400 POWDER, FOR SUSPENSION ORAL at 12:01

## 2023-01-01 RX ADMIN — Medication 50 MILLILITER(S): at 05:46

## 2023-01-01 RX ADMIN — Medication 100 MILLIGRAM(S): at 06:48

## 2023-01-01 RX ADMIN — Medication 100 MILLIGRAM(S): at 05:50

## 2023-01-01 RX ADMIN — HYDROMORPHONE HYDROCHLORIDE 0.5 MILLIGRAM(S): 2 INJECTION INTRAMUSCULAR; INTRAVENOUS; SUBCUTANEOUS at 16:00

## 2023-01-01 RX ADMIN — HYDROMORPHONE HYDROCHLORIDE 2 MILLIGRAM(S): 2 INJECTION INTRAMUSCULAR; INTRAVENOUS; SUBCUTANEOUS at 01:35

## 2023-01-01 RX ADMIN — HYDROMORPHONE HYDROCHLORIDE 4 MILLIGRAM(S): 2 INJECTION INTRAMUSCULAR; INTRAVENOUS; SUBCUTANEOUS at 07:55

## 2023-01-01 RX ADMIN — PANTOPRAZOLE SODIUM 40 MILLIGRAM(S): 20 TABLET, DELAYED RELEASE ORAL at 09:36

## 2023-01-01 RX ADMIN — Medication 1 APPLICATION(S): at 12:07

## 2023-01-01 RX ADMIN — CHLORHEXIDINE GLUCONATE 1 APPLICATION(S): 213 SOLUTION TOPICAL at 06:57

## 2023-01-01 RX ADMIN — HYDROMORPHONE HYDROCHLORIDE 1 MILLIGRAM(S): 2 INJECTION INTRAMUSCULAR; INTRAVENOUS; SUBCUTANEOUS at 14:00

## 2023-01-01 RX ADMIN — PANTOPRAZOLE SODIUM 40 MILLIGRAM(S): 20 TABLET, DELAYED RELEASE ORAL at 22:50

## 2023-01-01 RX ADMIN — CHLORHEXIDINE GLUCONATE 1 APPLICATION(S): 213 SOLUTION TOPICAL at 07:01

## 2023-01-01 RX ADMIN — HYDROMORPHONE HYDROCHLORIDE 0.25 MILLIGRAM(S): 2 INJECTION INTRAMUSCULAR; INTRAVENOUS; SUBCUTANEOUS at 09:15

## 2023-01-01 RX ADMIN — Medication 975 MILLIGRAM(S): at 06:21

## 2023-01-01 RX ADMIN — LIDOCAINE 1 APPLICATION(S): 4 CREAM TOPICAL at 19:56

## 2023-01-01 RX ADMIN — Medication 50 MILLILITER(S): at 13:17

## 2023-01-01 RX ADMIN — CINACALCET 90 MILLIGRAM(S): 30 TABLET, FILM COATED ORAL at 13:01

## 2023-01-01 RX ADMIN — HYDROMORPHONE HYDROCHLORIDE 0.25 MILLIGRAM(S): 2 INJECTION INTRAMUSCULAR; INTRAVENOUS; SUBCUTANEOUS at 10:20

## 2023-01-01 RX ADMIN — Medication 30 MILLILITER(S): at 06:10

## 2023-01-01 RX ADMIN — INSULIN HUMAN 5 UNIT(S): 100 INJECTION, SOLUTION SUBCUTANEOUS at 23:53

## 2023-01-01 RX ADMIN — PANTOPRAZOLE SODIUM 40 MILLIGRAM(S): 20 TABLET, DELAYED RELEASE ORAL at 17:21

## 2023-01-01 RX ADMIN — Medication 5 MILLIGRAM(S): at 18:23

## 2023-01-01 RX ADMIN — CINACALCET 120 MILLIGRAM(S): 30 TABLET, FILM COATED ORAL at 23:04

## 2023-01-01 RX ADMIN — Medication 1000 MILLIGRAM(S): at 23:30

## 2023-01-01 RX ADMIN — HYDROMORPHONE HYDROCHLORIDE 2 MILLIGRAM(S): 2 INJECTION INTRAMUSCULAR; INTRAVENOUS; SUBCUTANEOUS at 11:50

## 2023-01-01 RX ADMIN — MIDODRINE HYDROCHLORIDE 30 MILLIGRAM(S): 2.5 TABLET ORAL at 05:00

## 2023-01-01 RX ADMIN — Medication 1 TABLET(S): at 13:56

## 2023-01-01 RX ADMIN — HYDROMORPHONE HYDROCHLORIDE 0.25 MILLIGRAM(S): 2 INJECTION INTRAMUSCULAR; INTRAVENOUS; SUBCUTANEOUS at 21:29

## 2023-01-01 RX ADMIN — Medication 1 TABLET(S): at 12:05

## 2023-01-01 RX ADMIN — Medication 975 MILLIGRAM(S): at 22:39

## 2023-01-01 RX ADMIN — HYDROMORPHONE HYDROCHLORIDE 0.25 MILLIGRAM(S): 2 INJECTION INTRAMUSCULAR; INTRAVENOUS; SUBCUTANEOUS at 12:43

## 2023-01-01 RX ADMIN — MIDODRINE HYDROCHLORIDE 30 MILLIGRAM(S): 2.5 TABLET ORAL at 20:13

## 2023-01-01 RX ADMIN — HYDROMORPHONE HYDROCHLORIDE 0.25 MILLIGRAM(S): 2 INJECTION INTRAMUSCULAR; INTRAVENOUS; SUBCUTANEOUS at 22:27

## 2023-01-01 RX ADMIN — Medication 500 MILLIGRAM(S): at 17:55

## 2023-01-01 RX ADMIN — Medication 3.2 MICROGRAM(S)/KG/MIN: at 12:34

## 2023-01-01 RX ADMIN — Medication 50 MILLILITER(S): at 14:44

## 2023-01-01 RX ADMIN — HYDROMORPHONE HYDROCHLORIDE 0.5 MILLIGRAM(S): 2 INJECTION INTRAMUSCULAR; INTRAVENOUS; SUBCUTANEOUS at 15:31

## 2023-01-01 RX ADMIN — Medication 50 MILLILITER(S): at 13:09

## 2023-01-01 RX ADMIN — Medication 3.72 MICROGRAM(S)/KG/MIN: at 10:35

## 2023-01-01 RX ADMIN — HYDROMORPHONE HYDROCHLORIDE 2 MILLIGRAM(S): 2 INJECTION INTRAMUSCULAR; INTRAVENOUS; SUBCUTANEOUS at 08:35

## 2023-01-01 RX ADMIN — Medication 400 MILLIGRAM(S): at 10:51

## 2023-01-01 RX ADMIN — CHLORHEXIDINE GLUCONATE 1 APPLICATION(S): 213 SOLUTION TOPICAL at 05:41

## 2023-01-01 RX ADMIN — Medication 100 MILLIGRAM(S): at 06:00

## 2023-01-01 RX ADMIN — HEPARIN SODIUM 15 UNIT(S)/HR: 5000 INJECTION INTRAVENOUS; SUBCUTANEOUS at 21:29

## 2023-01-01 RX ADMIN — SEVELAMER CARBONATE 800 MILLIGRAM(S): 2400 POWDER, FOR SUSPENSION ORAL at 08:38

## 2023-01-01 RX ADMIN — CINACALCET 30 MILLIGRAM(S): 30 TABLET, FILM COATED ORAL at 14:36

## 2023-01-01 RX ADMIN — Medication 50 MILLILITER(S): at 12:43

## 2023-01-01 RX ADMIN — POLYETHYLENE GLYCOL 3350 17 GRAM(S): 17 POWDER, FOR SOLUTION ORAL at 11:32

## 2023-01-01 RX ADMIN — PANTOPRAZOLE SODIUM 40 MILLIGRAM(S): 20 TABLET, DELAYED RELEASE ORAL at 08:16

## 2023-01-01 RX ADMIN — ALBUTEROL 2.5 MILLIGRAM(S): 90 AEROSOL, METERED ORAL at 23:54

## 2023-01-01 RX ADMIN — Medication 4 MICROGRAM(S)/KG/MIN: at 06:53

## 2023-01-01 RX ADMIN — SEVELAMER CARBONATE 1600 MILLIGRAM(S): 2400 POWDER, FOR SUSPENSION ORAL at 18:47

## 2023-01-01 RX ADMIN — HYDROMORPHONE HYDROCHLORIDE 4 MILLIGRAM(S): 2 INJECTION INTRAMUSCULAR; INTRAVENOUS; SUBCUTANEOUS at 05:20

## 2023-01-01 RX ADMIN — LIDOCAINE 1 APPLICATION(S): 4 CREAM TOPICAL at 06:54

## 2023-01-01 RX ADMIN — MIDODRINE HYDROCHLORIDE 40 MILLIGRAM(S): 2.5 TABLET ORAL at 05:28

## 2023-01-01 RX ADMIN — HYDROMORPHONE HYDROCHLORIDE 0.25 MILLIGRAM(S): 2 INJECTION INTRAMUSCULAR; INTRAVENOUS; SUBCUTANEOUS at 04:13

## 2023-01-01 RX ADMIN — Medication 650 MILLIGRAM(S): at 12:23

## 2023-01-01 RX ADMIN — MIDODRINE HYDROCHLORIDE 40 MILLIGRAM(S): 2.5 TABLET ORAL at 23:01

## 2023-01-01 RX ADMIN — POLYETHYLENE GLYCOL 3350 17 GRAM(S): 17 POWDER, FOR SOLUTION ORAL at 14:50

## 2023-01-01 RX ADMIN — HYDROMORPHONE HYDROCHLORIDE 4 MILLIGRAM(S): 2 INJECTION INTRAMUSCULAR; INTRAVENOUS; SUBCUTANEOUS at 10:15

## 2023-01-01 RX ADMIN — CHLORHEXIDINE GLUCONATE 1 APPLICATION(S): 213 SOLUTION TOPICAL at 05:32

## 2023-01-01 RX ADMIN — HYDROMORPHONE HYDROCHLORIDE 4 MILLIGRAM(S): 2 INJECTION INTRAMUSCULAR; INTRAVENOUS; SUBCUTANEOUS at 22:08

## 2023-01-01 RX ADMIN — Medication 5 MILLIGRAM(S): at 21:48

## 2023-01-01 RX ADMIN — HYDROMORPHONE HYDROCHLORIDE 4 MILLIGRAM(S): 2 INJECTION INTRAMUSCULAR; INTRAVENOUS; SUBCUTANEOUS at 05:54

## 2023-01-01 RX ADMIN — LIDOCAINE 1 APPLICATION(S): 4 CREAM TOPICAL at 08:17

## 2023-01-01 RX ADMIN — Medication 500 MILLIGRAM(S): at 15:01

## 2023-01-01 RX ADMIN — Medication 3.72 MICROGRAM(S)/KG/MIN: at 01:54

## 2023-01-01 RX ADMIN — ERYTHROPOIETIN 8000 UNIT(S): 10000 INJECTION, SOLUTION INTRAVENOUS; SUBCUTANEOUS at 13:36

## 2023-01-01 RX ADMIN — MIDODRINE HYDROCHLORIDE 40 MILLIGRAM(S): 2.5 TABLET ORAL at 22:13

## 2023-01-01 RX ADMIN — HYDROMORPHONE HYDROCHLORIDE 4 MILLIGRAM(S): 2 INJECTION INTRAMUSCULAR; INTRAVENOUS; SUBCUTANEOUS at 21:00

## 2023-01-01 RX ADMIN — Medication 0.3 MILLIGRAM(S): at 12:53

## 2023-01-01 RX ADMIN — Medication 1 APPLICATION(S): at 22:51

## 2023-01-01 RX ADMIN — MIDODRINE HYDROCHLORIDE 40 MILLIGRAM(S): 2.5 TABLET ORAL at 15:11

## 2023-01-01 RX ADMIN — Medication 40 MILLIGRAM(S): at 11:20

## 2023-01-01 RX ADMIN — SEVELAMER CARBONATE 800 MILLIGRAM(S): 2400 POWDER, FOR SUSPENSION ORAL at 08:03

## 2023-01-01 RX ADMIN — HYDROMORPHONE HYDROCHLORIDE 4 MILLIGRAM(S): 2 INJECTION INTRAMUSCULAR; INTRAVENOUS; SUBCUTANEOUS at 12:35

## 2023-01-01 RX ADMIN — Medication 1 MILLIGRAM(S): at 14:26

## 2023-01-01 RX ADMIN — HYDROMORPHONE HYDROCHLORIDE 3 MILLIGRAM(S): 2 INJECTION INTRAMUSCULAR; INTRAVENOUS; SUBCUTANEOUS at 11:15

## 2023-01-01 RX ADMIN — Medication 1 APPLICATION(S): at 05:49

## 2023-01-01 RX ADMIN — MIDODRINE HYDROCHLORIDE 30 MILLIGRAM(S): 2.5 TABLET ORAL at 05:49

## 2023-01-01 RX ADMIN — HYDROMORPHONE HYDROCHLORIDE 3 MILLIGRAM(S): 2 INJECTION INTRAMUSCULAR; INTRAVENOUS; SUBCUTANEOUS at 16:53

## 2023-01-01 RX ADMIN — HEPARIN SODIUM 7500 UNIT(S): 5000 INJECTION INTRAVENOUS; SUBCUTANEOUS at 13:54

## 2023-01-01 RX ADMIN — Medication 500 MILLIGRAM(S): at 11:14

## 2023-01-01 RX ADMIN — HYDROMORPHONE HYDROCHLORIDE 4 MILLIGRAM(S): 2 INJECTION INTRAMUSCULAR; INTRAVENOUS; SUBCUTANEOUS at 09:02

## 2023-01-01 RX ADMIN — MIDODRINE HYDROCHLORIDE 40 MILLIGRAM(S): 2.5 TABLET ORAL at 07:07

## 2023-01-01 RX ADMIN — Medication 15 GRAM(S): at 06:51

## 2023-01-01 RX ADMIN — HYDROMORPHONE HYDROCHLORIDE 2 MILLIGRAM(S): 2 INJECTION INTRAMUSCULAR; INTRAVENOUS; SUBCUTANEOUS at 15:24

## 2023-01-01 RX ADMIN — HYDROMORPHONE HYDROCHLORIDE 2 MILLIGRAM(S): 2 INJECTION INTRAMUSCULAR; INTRAVENOUS; SUBCUTANEOUS at 07:59

## 2023-01-01 RX ADMIN — HYDROMORPHONE HYDROCHLORIDE 0.25 MILLIGRAM(S): 2 INJECTION INTRAMUSCULAR; INTRAVENOUS; SUBCUTANEOUS at 03:18

## 2023-01-01 RX ADMIN — POLYETHYLENE GLYCOL 3350 17 GRAM(S): 17 POWDER, FOR SOLUTION ORAL at 06:22

## 2023-01-01 RX ADMIN — Medication 1000 MILLIGRAM(S): at 19:42

## 2023-01-01 RX ADMIN — Medication 1 TABLET(S): at 11:14

## 2023-01-01 RX ADMIN — HYDROMORPHONE HYDROCHLORIDE 4 MILLIGRAM(S): 2 INJECTION INTRAMUSCULAR; INTRAVENOUS; SUBCUTANEOUS at 01:16

## 2023-01-01 RX ADMIN — CINACALCET 120 MILLIGRAM(S): 30 TABLET, FILM COATED ORAL at 23:33

## 2023-01-01 RX ADMIN — MIDODRINE HYDROCHLORIDE 15 MILLIGRAM(S): 2.5 TABLET ORAL at 02:11

## 2023-01-01 RX ADMIN — HYDROMORPHONE HYDROCHLORIDE 0.25 MILLIGRAM(S): 2 INJECTION INTRAMUSCULAR; INTRAVENOUS; SUBCUTANEOUS at 18:20

## 2023-01-01 RX ADMIN — CHLORHEXIDINE GLUCONATE 1 APPLICATION(S): 213 SOLUTION TOPICAL at 06:44

## 2023-01-01 RX ADMIN — MIDODRINE HYDROCHLORIDE 30 MILLIGRAM(S): 2.5 TABLET ORAL at 18:57

## 2023-01-01 RX ADMIN — HYDROMORPHONE HYDROCHLORIDE 1 MILLIGRAM(S): 2 INJECTION INTRAMUSCULAR; INTRAVENOUS; SUBCUTANEOUS at 19:00

## 2023-01-01 RX ADMIN — HEPARIN SODIUM 5000 UNIT(S): 5000 INJECTION INTRAVENOUS; SUBCUTANEOUS at 05:34

## 2023-01-01 RX ADMIN — HYDROMORPHONE HYDROCHLORIDE 1 MILLIGRAM(S): 2 INJECTION INTRAMUSCULAR; INTRAVENOUS; SUBCUTANEOUS at 09:16

## 2023-01-01 RX ADMIN — HYDROMORPHONE HYDROCHLORIDE 0.25 MILLIGRAM(S): 2 INJECTION INTRAMUSCULAR; INTRAVENOUS; SUBCUTANEOUS at 20:22

## 2023-01-01 RX ADMIN — ERYTHROPOIETIN 8000 UNIT(S): 10000 INJECTION, SOLUTION INTRAVENOUS; SUBCUTANEOUS at 17:49

## 2023-01-01 RX ADMIN — HYDROMORPHONE HYDROCHLORIDE 1 MILLIGRAM(S): 2 INJECTION INTRAMUSCULAR; INTRAVENOUS; SUBCUTANEOUS at 21:57

## 2023-01-01 RX ADMIN — Medication 50 MILLILITER(S): at 21:11

## 2023-01-01 RX ADMIN — HYDROMORPHONE HYDROCHLORIDE 0.25 MILLIGRAM(S): 2 INJECTION INTRAMUSCULAR; INTRAVENOUS; SUBCUTANEOUS at 10:57

## 2023-01-01 RX ADMIN — Medication 1000 MILLIGRAM(S): at 22:00

## 2023-01-01 RX ADMIN — Medication 1000 MILLIGRAM(S): at 10:15

## 2023-01-01 RX ADMIN — POLYETHYLENE GLYCOL 3350 17 GRAM(S): 17 POWDER, FOR SOLUTION ORAL at 17:55

## 2023-01-01 RX ADMIN — HEPARIN SODIUM 5000 UNIT(S): 5000 INJECTION INTRAVENOUS; SUBCUTANEOUS at 22:24

## 2023-01-01 RX ADMIN — FLUDROCORTISONE ACETATE 0.1 MILLIGRAM(S): 0.1 TABLET ORAL at 06:47

## 2023-01-01 RX ADMIN — ATORVASTATIN CALCIUM 40 MILLIGRAM(S): 80 TABLET, FILM COATED ORAL at 22:35

## 2023-01-01 RX ADMIN — MIDODRINE HYDROCHLORIDE 40 MILLIGRAM(S): 2.5 TABLET ORAL at 07:20

## 2023-01-01 RX ADMIN — Medication 1 TABLET(S): at 15:09

## 2023-01-01 RX ADMIN — HYDROMORPHONE HYDROCHLORIDE 1 MILLIGRAM(S): 2 INJECTION INTRAMUSCULAR; INTRAVENOUS; SUBCUTANEOUS at 10:00

## 2023-01-01 RX ADMIN — MEROPENEM 100 MILLIGRAM(S): 1 INJECTION INTRAVENOUS at 18:51

## 2023-01-01 RX ADMIN — Medication 650 MILLIGRAM(S): at 03:02

## 2023-01-01 RX ADMIN — HYDROMORPHONE HYDROCHLORIDE 0.5 MILLIGRAM(S): 2 INJECTION INTRAMUSCULAR; INTRAVENOUS; SUBCUTANEOUS at 12:31

## 2023-01-01 RX ADMIN — CHLORHEXIDINE GLUCONATE 1 APPLICATION(S): 213 SOLUTION TOPICAL at 07:20

## 2023-01-01 RX ADMIN — MIDODRINE HYDROCHLORIDE 40 MILLIGRAM(S): 2.5 TABLET ORAL at 23:52

## 2023-01-01 RX ADMIN — HYDROMORPHONE HYDROCHLORIDE 4 MILLIGRAM(S): 2 INJECTION INTRAMUSCULAR; INTRAVENOUS; SUBCUTANEOUS at 13:30

## 2023-01-01 RX ADMIN — HYDROMORPHONE HYDROCHLORIDE 0.25 MILLIGRAM(S): 2 INJECTION INTRAMUSCULAR; INTRAVENOUS; SUBCUTANEOUS at 08:57

## 2023-01-01 RX ADMIN — PANTOPRAZOLE SODIUM 40 MILLIGRAM(S): 20 TABLET, DELAYED RELEASE ORAL at 17:01

## 2023-01-01 RX ADMIN — HYDROMORPHONE HYDROCHLORIDE 0.5 MILLIGRAM(S): 2 INJECTION INTRAMUSCULAR; INTRAVENOUS; SUBCUTANEOUS at 10:56

## 2023-01-01 RX ADMIN — HEPARIN SODIUM 20 UNIT(S)/HR: 5000 INJECTION INTRAVENOUS; SUBCUTANEOUS at 09:43

## 2023-01-01 RX ADMIN — MIDODRINE HYDROCHLORIDE 40 MILLIGRAM(S): 2.5 TABLET ORAL at 14:25

## 2023-01-01 RX ADMIN — HYDROMORPHONE HYDROCHLORIDE 0.25 MILLIGRAM(S): 2 INJECTION INTRAMUSCULAR; INTRAVENOUS; SUBCUTANEOUS at 10:38

## 2023-01-01 RX ADMIN — MIDODRINE HYDROCHLORIDE 40 MILLIGRAM(S): 2.5 TABLET ORAL at 06:21

## 2023-01-01 RX ADMIN — HEPARIN SODIUM 5000 UNIT(S): 5000 INJECTION INTRAVENOUS; SUBCUTANEOUS at 22:01

## 2023-01-01 RX ADMIN — Medication 3.72 MICROGRAM(S)/KG/MIN: at 04:30

## 2023-01-01 RX ADMIN — HYDROMORPHONE HYDROCHLORIDE 2 MILLIGRAM(S): 2 INJECTION INTRAMUSCULAR; INTRAVENOUS; SUBCUTANEOUS at 12:01

## 2023-01-01 RX ADMIN — APIXABAN 2.5 MILLIGRAM(S): 2.5 TABLET, FILM COATED ORAL at 19:52

## 2023-01-01 RX ADMIN — HYDROMORPHONE HYDROCHLORIDE 2 MILLIGRAM(S): 2 INJECTION INTRAMUSCULAR; INTRAVENOUS; SUBCUTANEOUS at 19:29

## 2023-01-01 RX ADMIN — MIDODRINE HYDROCHLORIDE 30 MILLIGRAM(S): 2.5 TABLET ORAL at 11:25

## 2023-01-01 RX ADMIN — FENTANYL CITRATE 50 MICROGRAM(S): 50 INJECTION INTRAVENOUS at 16:53

## 2023-01-01 RX ADMIN — HYDROMORPHONE HYDROCHLORIDE 1 MILLIGRAM(S): 2 INJECTION INTRAMUSCULAR; INTRAVENOUS; SUBCUTANEOUS at 07:00

## 2023-01-01 RX ADMIN — HYDROMORPHONE HYDROCHLORIDE 0.5 MILLIGRAM(S): 2 INJECTION INTRAMUSCULAR; INTRAVENOUS; SUBCUTANEOUS at 19:26

## 2023-01-01 RX ADMIN — HEPARIN SODIUM 5000 UNIT(S): 5000 INJECTION INTRAVENOUS; SUBCUTANEOUS at 05:20

## 2023-01-01 RX ADMIN — HYDROMORPHONE HYDROCHLORIDE 0.25 MILLIGRAM(S): 2 INJECTION INTRAMUSCULAR; INTRAVENOUS; SUBCUTANEOUS at 14:35

## 2023-01-01 RX ADMIN — SEVELAMER CARBONATE 1600 MILLIGRAM(S): 2400 POWDER, FOR SUSPENSION ORAL at 10:20

## 2023-01-01 RX ADMIN — Medication 1 APPLICATION(S): at 07:37

## 2023-01-01 RX ADMIN — SEVELAMER CARBONATE 1600 MILLIGRAM(S): 2400 POWDER, FOR SUSPENSION ORAL at 18:31

## 2023-01-01 RX ADMIN — Medication 1 APPLICATION(S): at 17:53

## 2023-01-01 RX ADMIN — MIDODRINE HYDROCHLORIDE 40 MILLIGRAM(S): 2.5 TABLET ORAL at 22:35

## 2023-01-01 RX ADMIN — ATORVASTATIN CALCIUM 40 MILLIGRAM(S): 80 TABLET, FILM COATED ORAL at 20:13

## 2023-01-01 RX ADMIN — HYDROMORPHONE HYDROCHLORIDE 0.25 MILLIGRAM(S): 2 INJECTION INTRAMUSCULAR; INTRAVENOUS; SUBCUTANEOUS at 04:40

## 2023-01-01 RX ADMIN — Medication 650 MILLIGRAM(S): at 02:17

## 2023-01-01 RX ADMIN — Medication 650 MILLIGRAM(S): at 15:55

## 2023-01-01 RX ADMIN — Medication 400 MILLIGRAM(S): at 00:14

## 2023-01-01 RX ADMIN — HEPARIN SODIUM 7500 UNIT(S): 5000 INJECTION INTRAVENOUS; SUBCUTANEOUS at 15:11

## 2023-01-01 RX ADMIN — CLOPIDOGREL BISULFATE 75 MILLIGRAM(S): 75 TABLET, FILM COATED ORAL at 12:02

## 2023-01-01 RX ADMIN — HYDROMORPHONE HYDROCHLORIDE 4 MILLIGRAM(S): 2 INJECTION INTRAMUSCULAR; INTRAVENOUS; SUBCUTANEOUS at 12:00

## 2023-01-01 RX ADMIN — Medication 1 APPLICATION(S): at 14:46

## 2023-01-01 RX ADMIN — Medication 100 MILLIGRAM(S): at 22:30

## 2023-01-01 RX ADMIN — HYDROMORPHONE HYDROCHLORIDE 0.5 MILLIGRAM(S): 2 INJECTION INTRAMUSCULAR; INTRAVENOUS; SUBCUTANEOUS at 06:00

## 2023-01-01 RX ADMIN — HYDROMORPHONE HYDROCHLORIDE 1 MILLIGRAM(S): 2 INJECTION INTRAMUSCULAR; INTRAVENOUS; SUBCUTANEOUS at 05:23

## 2023-01-01 RX ADMIN — MIDODRINE HYDROCHLORIDE 40 MILLIGRAM(S): 2.5 TABLET ORAL at 14:05

## 2023-01-01 RX ADMIN — POLYETHYLENE GLYCOL 3350 17 GRAM(S): 17 POWDER, FOR SOLUTION ORAL at 12:11

## 2023-01-01 RX ADMIN — HYDROMORPHONE HYDROCHLORIDE 1 MILLIGRAM(S): 2 INJECTION INTRAMUSCULAR; INTRAVENOUS; SUBCUTANEOUS at 18:01

## 2023-01-01 RX ADMIN — MIDODRINE HYDROCHLORIDE 40 MILLIGRAM(S): 2.5 TABLET ORAL at 06:53

## 2023-01-01 RX ADMIN — HYDROMORPHONE HYDROCHLORIDE 0.5 MILLIGRAM(S): 2 INJECTION INTRAMUSCULAR; INTRAVENOUS; SUBCUTANEOUS at 12:46

## 2023-01-01 RX ADMIN — CINACALCET 30 MILLIGRAM(S): 30 TABLET, FILM COATED ORAL at 12:02

## 2023-01-01 RX ADMIN — Medication 1 TABLET(S): at 11:42

## 2023-01-01 RX ADMIN — CINACALCET 30 MILLIGRAM(S): 30 TABLET, FILM COATED ORAL at 11:28

## 2023-01-01 RX ADMIN — HEPARIN SODIUM 7500 UNIT(S): 5000 INJECTION INTRAVENOUS; SUBCUTANEOUS at 22:23

## 2023-01-01 RX ADMIN — MIDODRINE HYDROCHLORIDE 40 MILLIGRAM(S): 2.5 TABLET ORAL at 15:12

## 2023-01-01 RX ADMIN — HYDROMORPHONE HYDROCHLORIDE 2 MILLIGRAM(S): 2 INJECTION INTRAMUSCULAR; INTRAVENOUS; SUBCUTANEOUS at 19:46

## 2023-01-01 RX ADMIN — Medication 1000 MILLIGRAM(S): at 13:28

## 2023-01-01 RX ADMIN — HYDROMORPHONE HYDROCHLORIDE 0.5 MILLIGRAM(S): 2 INJECTION INTRAMUSCULAR; INTRAVENOUS; SUBCUTANEOUS at 05:47

## 2023-01-01 RX ADMIN — Medication 100 MILLILITER(S): at 09:31

## 2023-01-01 RX ADMIN — PANTOPRAZOLE SODIUM 40 MILLIGRAM(S): 20 TABLET, DELAYED RELEASE ORAL at 23:35

## 2023-01-01 RX ADMIN — Medication 500 MILLILITER(S): at 09:31

## 2023-01-01 RX ADMIN — HYDROMORPHONE HYDROCHLORIDE 2 MILLIGRAM(S): 2 INJECTION INTRAMUSCULAR; INTRAVENOUS; SUBCUTANEOUS at 07:51

## 2023-01-01 RX ADMIN — Medication 1 APPLICATION(S): at 05:34

## 2023-01-01 RX ADMIN — MORPHINE SULFATE 2 MILLIGRAM(S): 50 CAPSULE, EXTENDED RELEASE ORAL at 15:20

## 2023-01-01 RX ADMIN — Medication 50 MILLILITER(S): at 14:06

## 2023-01-01 RX ADMIN — HYDROMORPHONE HYDROCHLORIDE 0.25 MILLIGRAM(S): 2 INJECTION INTRAMUSCULAR; INTRAVENOUS; SUBCUTANEOUS at 21:00

## 2023-01-01 RX ADMIN — HYDROMORPHONE HYDROCHLORIDE 1 MILLIGRAM(S): 2 INJECTION INTRAMUSCULAR; INTRAVENOUS; SUBCUTANEOUS at 10:20

## 2023-01-01 RX ADMIN — Medication 1000 MILLIGRAM(S): at 03:16

## 2023-01-01 RX ADMIN — SEVELAMER CARBONATE 1600 MILLIGRAM(S): 2400 POWDER, FOR SUSPENSION ORAL at 08:34

## 2023-01-01 RX ADMIN — SEVELAMER CARBONATE 1600 MILLIGRAM(S): 2400 POWDER, FOR SUSPENSION ORAL at 11:32

## 2023-01-01 RX ADMIN — HYDROMORPHONE HYDROCHLORIDE 0.25 MILLIGRAM(S): 2 INJECTION INTRAMUSCULAR; INTRAVENOUS; SUBCUTANEOUS at 02:12

## 2023-01-01 RX ADMIN — Medication 1 APPLICATION(S): at 18:02

## 2023-01-01 RX ADMIN — HYDROMORPHONE HYDROCHLORIDE 0.5 MILLIGRAM(S): 2 INJECTION INTRAMUSCULAR; INTRAVENOUS; SUBCUTANEOUS at 15:21

## 2023-01-01 RX ADMIN — Medication 500 MILLIGRAM(S): at 11:10

## 2023-01-01 RX ADMIN — LIDOCAINE 1 APPLICATION(S): 4 CREAM TOPICAL at 15:16

## 2023-01-01 RX ADMIN — NYSTATIN CREAM 1 APPLICATION(S): 100000 CREAM TOPICAL at 06:24

## 2023-01-01 RX ADMIN — Medication 100 GRAM(S): at 07:16

## 2023-01-01 RX ADMIN — Medication 1 APPLICATION(S): at 12:03

## 2023-01-01 RX ADMIN — Medication 400 MILLIGRAM(S): at 21:40

## 2023-01-01 RX ADMIN — HYDROMORPHONE HYDROCHLORIDE 1 MILLIGRAM(S): 2 INJECTION INTRAMUSCULAR; INTRAVENOUS; SUBCUTANEOUS at 10:30

## 2023-01-01 RX ADMIN — HYDROMORPHONE HYDROCHLORIDE 0.5 MILLIGRAM(S): 2 INJECTION INTRAMUSCULAR; INTRAVENOUS; SUBCUTANEOUS at 08:02

## 2023-01-01 RX ADMIN — ATORVASTATIN CALCIUM 40 MILLIGRAM(S): 80 TABLET, FILM COATED ORAL at 22:23

## 2023-01-01 RX ADMIN — HEPARIN SODIUM 17 UNIT(S)/HR: 5000 INJECTION INTRAVENOUS; SUBCUTANEOUS at 15:31

## 2023-01-01 RX ADMIN — POLYETHYLENE GLYCOL 3350 17 GRAM(S): 17 POWDER, FOR SOLUTION ORAL at 11:14

## 2023-01-01 RX ADMIN — HEPARIN SODIUM 7500 UNIT(S): 5000 INJECTION INTRAVENOUS; SUBCUTANEOUS at 05:53

## 2023-01-01 RX ADMIN — Medication 40 MILLIGRAM(S): at 15:48

## 2023-01-01 RX ADMIN — CHLORHEXIDINE GLUCONATE 1 APPLICATION(S): 213 SOLUTION TOPICAL at 06:14

## 2023-01-01 RX ADMIN — FENTANYL CITRATE 50 MICROGRAM(S): 50 INJECTION INTRAVENOUS at 17:00

## 2023-01-01 RX ADMIN — Medication 650 MILLIGRAM(S): at 21:34

## 2023-01-01 RX ADMIN — MIDODRINE HYDROCHLORIDE 30 MILLIGRAM(S): 2.5 TABLET ORAL at 19:12

## 2023-01-01 RX ADMIN — CINACALCET 60 MILLIGRAM(S): 30 TABLET, FILM COATED ORAL at 11:27

## 2023-01-01 RX ADMIN — HYDROMORPHONE HYDROCHLORIDE 1 MILLIGRAM(S): 2 INJECTION INTRAMUSCULAR; INTRAVENOUS; SUBCUTANEOUS at 10:50

## 2023-01-01 RX ADMIN — Medication 500 MILLIGRAM(S): at 11:29

## 2023-01-01 RX ADMIN — HYDROMORPHONE HYDROCHLORIDE 4 MILLIGRAM(S): 2 INJECTION INTRAMUSCULAR; INTRAVENOUS; SUBCUTANEOUS at 18:43

## 2023-01-01 RX ADMIN — HYDROMORPHONE HYDROCHLORIDE 2 MILLIGRAM(S): 2 INJECTION INTRAMUSCULAR; INTRAVENOUS; SUBCUTANEOUS at 05:25

## 2023-01-01 RX ADMIN — SEVELAMER CARBONATE 1600 MILLIGRAM(S): 2400 POWDER, FOR SUSPENSION ORAL at 17:14

## 2023-01-01 RX ADMIN — HYDROMORPHONE HYDROCHLORIDE 4 MILLIGRAM(S): 2 INJECTION INTRAMUSCULAR; INTRAVENOUS; SUBCUTANEOUS at 16:03

## 2023-01-01 RX ADMIN — Medication 650 MILLIGRAM(S): at 19:56

## 2023-01-01 RX ADMIN — MIDODRINE HYDROCHLORIDE 40 MILLIGRAM(S): 2.5 TABLET ORAL at 07:48

## 2023-01-01 RX ADMIN — PHENYLEPHRINE HYDROCHLORIDE 64 MICROGRAM(S)/KG/MIN: 10 INJECTION INTRAVENOUS at 12:00

## 2023-01-01 RX ADMIN — Medication 500 MILLIGRAM(S): at 12:29

## 2023-01-01 RX ADMIN — POLYETHYLENE GLYCOL 3350 17 GRAM(S): 17 POWDER, FOR SOLUTION ORAL at 10:18

## 2023-01-01 RX ADMIN — MIDODRINE HYDROCHLORIDE 30 MILLIGRAM(S): 2.5 TABLET ORAL at 13:42

## 2023-01-01 RX ADMIN — HYDROMORPHONE HYDROCHLORIDE 0.25 MILLIGRAM(S): 2 INJECTION INTRAMUSCULAR; INTRAVENOUS; SUBCUTANEOUS at 17:58

## 2023-01-01 RX ADMIN — CHLORHEXIDINE GLUCONATE 1 APPLICATION(S): 213 SOLUTION TOPICAL at 05:48

## 2023-01-01 RX ADMIN — Medication 10 MILLIGRAM(S): at 08:33

## 2023-01-01 RX ADMIN — MIDODRINE HYDROCHLORIDE 30 MILLIGRAM(S): 2.5 TABLET ORAL at 05:18

## 2023-01-01 RX ADMIN — MIDODRINE HYDROCHLORIDE 40 MILLIGRAM(S): 2.5 TABLET ORAL at 00:15

## 2023-01-01 RX ADMIN — Medication 500 MILLILITER(S): at 10:18

## 2023-01-01 RX ADMIN — HEPARIN SODIUM 7500 UNIT(S): 5000 INJECTION INTRAVENOUS; SUBCUTANEOUS at 21:04

## 2023-01-01 RX ADMIN — FLUDROCORTISONE ACETATE 0.2 MILLIGRAM(S): 0.1 TABLET ORAL at 22:13

## 2023-01-01 RX ADMIN — FLUDROCORTISONE ACETATE 0.2 MILLIGRAM(S): 0.1 TABLET ORAL at 05:23

## 2023-01-01 RX ADMIN — Medication 15 GRAM(S): at 05:27

## 2023-01-01 RX ADMIN — Medication 50 MILLIGRAM(S): at 14:20

## 2023-01-01 RX ADMIN — MIDODRINE HYDROCHLORIDE 40 MILLIGRAM(S): 2.5 TABLET ORAL at 07:26

## 2023-01-01 RX ADMIN — SEVELAMER CARBONATE 1600 MILLIGRAM(S): 2400 POWDER, FOR SUSPENSION ORAL at 17:43

## 2023-01-01 RX ADMIN — HYDROMORPHONE HYDROCHLORIDE 0.25 MILLIGRAM(S): 2 INJECTION INTRAMUSCULAR; INTRAVENOUS; SUBCUTANEOUS at 08:29

## 2023-01-01 RX ADMIN — ATORVASTATIN CALCIUM 40 MILLIGRAM(S): 80 TABLET, FILM COATED ORAL at 22:15

## 2023-01-01 RX ADMIN — Medication 1 APPLICATION(S): at 14:30

## 2023-01-01 RX ADMIN — SENNA PLUS 2 TABLET(S): 8.6 TABLET ORAL at 23:00

## 2023-01-01 RX ADMIN — Medication 1 MILLIGRAM(S): at 15:02

## 2023-01-01 RX ADMIN — SEVELAMER CARBONATE 1600 MILLIGRAM(S): 2400 POWDER, FOR SUSPENSION ORAL at 15:35

## 2023-01-01 RX ADMIN — Medication 500 MILLILITER(S): at 04:36

## 2023-01-01 RX ADMIN — HYDROMORPHONE HYDROCHLORIDE 2 MILLIGRAM(S): 2 INJECTION INTRAMUSCULAR; INTRAVENOUS; SUBCUTANEOUS at 02:34

## 2023-01-01 RX ADMIN — HYDROMORPHONE HYDROCHLORIDE 0.25 MILLIGRAM(S): 2 INJECTION INTRAMUSCULAR; INTRAVENOUS; SUBCUTANEOUS at 09:57

## 2023-01-01 RX ADMIN — CHLORHEXIDINE GLUCONATE 1 APPLICATION(S): 213 SOLUTION TOPICAL at 08:16

## 2023-01-01 RX ADMIN — ERYTHROPOIETIN 10000 UNIT(S): 10000 INJECTION, SOLUTION INTRAVENOUS; SUBCUTANEOUS at 12:13

## 2023-01-01 RX ADMIN — HYDROMORPHONE HYDROCHLORIDE 1 MILLIGRAM(S): 2 INJECTION INTRAMUSCULAR; INTRAVENOUS; SUBCUTANEOUS at 09:32

## 2023-01-01 RX ADMIN — Medication 1 MILLIGRAM(S): at 13:07

## 2023-01-01 RX ADMIN — HYDROMORPHONE HYDROCHLORIDE 1 MILLIGRAM(S): 2 INJECTION INTRAMUSCULAR; INTRAVENOUS; SUBCUTANEOUS at 14:30

## 2023-01-01 RX ADMIN — ATORVASTATIN CALCIUM 40 MILLIGRAM(S): 80 TABLET, FILM COATED ORAL at 22:50

## 2023-01-01 RX ADMIN — APIXABAN 2.5 MILLIGRAM(S): 2.5 TABLET, FILM COATED ORAL at 10:11

## 2023-01-01 RX ADMIN — HYDROMORPHONE HYDROCHLORIDE 0.5 MILLIGRAM(S): 2 INJECTION INTRAMUSCULAR; INTRAVENOUS; SUBCUTANEOUS at 09:30

## 2023-01-01 RX ADMIN — MEROPENEM 100 MILLIGRAM(S): 1 INJECTION INTRAVENOUS at 22:22

## 2023-01-01 RX ADMIN — SEVELAMER CARBONATE 800 MILLIGRAM(S): 2400 POWDER, FOR SUSPENSION ORAL at 13:58

## 2023-01-01 RX ADMIN — Medication 3.72 MICROGRAM(S)/KG/MIN: at 19:22

## 2023-01-01 RX ADMIN — HYDROMORPHONE HYDROCHLORIDE 0.5 MILLIGRAM(S): 2 INJECTION INTRAMUSCULAR; INTRAVENOUS; SUBCUTANEOUS at 08:51

## 2023-01-01 RX ADMIN — ATORVASTATIN CALCIUM 40 MILLIGRAM(S): 80 TABLET, FILM COATED ORAL at 21:33

## 2023-01-01 RX ADMIN — Medication 1 APPLICATION(S): at 11:11

## 2023-01-01 RX ADMIN — CINACALCET 90 MILLIGRAM(S): 30 TABLET, FILM COATED ORAL at 12:11

## 2023-01-01 RX ADMIN — HYDROMORPHONE HYDROCHLORIDE 4 MILLIGRAM(S): 2 INJECTION INTRAMUSCULAR; INTRAVENOUS; SUBCUTANEOUS at 23:27

## 2023-01-01 RX ADMIN — Medication 400 MILLIGRAM(S): at 07:13

## 2023-01-01 RX ADMIN — ATORVASTATIN CALCIUM 40 MILLIGRAM(S): 80 TABLET, FILM COATED ORAL at 22:29

## 2023-01-01 RX ADMIN — Medication 1 APPLICATION(S): at 18:34

## 2023-01-01 RX ADMIN — FLUDROCORTISONE ACETATE 0.2 MILLIGRAM(S): 0.1 TABLET ORAL at 11:16

## 2023-01-01 RX ADMIN — Medication 500 MILLILITER(S): at 00:47

## 2023-01-01 RX ADMIN — SODIUM CHLORIDE 1 GRAM(S): 9 INJECTION INTRAMUSCULAR; INTRAVENOUS; SUBCUTANEOUS at 22:47

## 2023-01-01 RX ADMIN — Medication 4 MICROGRAM(S)/KG/MIN: at 01:03

## 2023-01-01 RX ADMIN — FLUDROCORTISONE ACETATE 0.5 MILLIGRAM(S): 0.1 TABLET ORAL at 18:22

## 2023-01-01 RX ADMIN — SENNA PLUS 2 TABLET(S): 8.6 TABLET ORAL at 22:35

## 2023-01-01 RX ADMIN — LIDOCAINE 1 APPLICATION(S): 4 CREAM TOPICAL at 07:21

## 2023-01-01 RX ADMIN — MIDODRINE HYDROCHLORIDE 40 MILLIGRAM(S): 2.5 TABLET ORAL at 09:06

## 2023-01-01 RX ADMIN — HYDROMORPHONE HYDROCHLORIDE 1 MILLIGRAM(S): 2 INJECTION INTRAMUSCULAR; INTRAVENOUS; SUBCUTANEOUS at 14:14

## 2023-01-01 RX ADMIN — MIDODRINE HYDROCHLORIDE 40 MILLIGRAM(S): 2.5 TABLET ORAL at 15:44

## 2023-01-01 RX ADMIN — HYDROMORPHONE HYDROCHLORIDE 1 MILLIGRAM(S): 2 INJECTION INTRAMUSCULAR; INTRAVENOUS; SUBCUTANEOUS at 20:42

## 2023-01-01 RX ADMIN — ATORVASTATIN CALCIUM 40 MILLIGRAM(S): 80 TABLET, FILM COATED ORAL at 21:28

## 2023-01-01 RX ADMIN — MIDODRINE HYDROCHLORIDE 40 MILLIGRAM(S): 2.5 TABLET ORAL at 14:45

## 2023-01-01 RX ADMIN — CEFEPIME 100 MILLIGRAM(S): 1 INJECTION, POWDER, FOR SOLUTION INTRAMUSCULAR; INTRAVENOUS at 15:38

## 2023-01-01 RX ADMIN — SEVELAMER CARBONATE 1600 MILLIGRAM(S): 2400 POWDER, FOR SUSPENSION ORAL at 08:43

## 2023-01-01 RX ADMIN — HYDROMORPHONE HYDROCHLORIDE 2 MILLIGRAM(S): 2 INJECTION INTRAMUSCULAR; INTRAVENOUS; SUBCUTANEOUS at 22:49

## 2023-01-01 RX ADMIN — MIDODRINE HYDROCHLORIDE 30 MILLIGRAM(S): 2.5 TABLET ORAL at 06:00

## 2023-01-01 RX ADMIN — PHENYLEPHRINE HYDROCHLORIDE 16 MICROGRAM(S)/KG/MIN: 10 INJECTION INTRAVENOUS at 06:44

## 2023-01-01 RX ADMIN — APIXABAN 2.5 MILLIGRAM(S): 2.5 TABLET, FILM COATED ORAL at 06:07

## 2023-01-01 RX ADMIN — HYDROMORPHONE HYDROCHLORIDE 4 MILLIGRAM(S): 2 INJECTION INTRAMUSCULAR; INTRAVENOUS; SUBCUTANEOUS at 04:46

## 2023-01-01 RX ADMIN — HYDROMORPHONE HYDROCHLORIDE 1 MILLIGRAM(S): 2 INJECTION INTRAMUSCULAR; INTRAVENOUS; SUBCUTANEOUS at 06:41

## 2023-01-01 RX ADMIN — MIDODRINE HYDROCHLORIDE 40 MILLIGRAM(S): 2.5 TABLET ORAL at 01:20

## 2023-01-01 RX ADMIN — ATORVASTATIN CALCIUM 40 MILLIGRAM(S): 80 TABLET, FILM COATED ORAL at 21:02

## 2023-01-01 RX ADMIN — Medication 5 MILLIGRAM(S): at 21:04

## 2023-01-01 RX ADMIN — HYDROMORPHONE HYDROCHLORIDE 4 MILLIGRAM(S): 2 INJECTION INTRAMUSCULAR; INTRAVENOUS; SUBCUTANEOUS at 20:02

## 2023-01-01 RX ADMIN — POLYETHYLENE GLYCOL 3350 17 GRAM(S): 17 POWDER, FOR SOLUTION ORAL at 14:27

## 2023-01-01 RX ADMIN — MIDODRINE HYDROCHLORIDE 30 MILLIGRAM(S): 2.5 TABLET ORAL at 21:19

## 2023-01-01 RX ADMIN — ATORVASTATIN CALCIUM 40 MILLIGRAM(S): 80 TABLET, FILM COATED ORAL at 00:55

## 2023-01-01 RX ADMIN — Medication 50 MILLILITER(S): at 18:00

## 2023-01-01 RX ADMIN — Medication 1 TABLET(S): at 11:32

## 2023-01-01 RX ADMIN — HYDROMORPHONE HYDROCHLORIDE 2 MILLIGRAM(S): 2 INJECTION INTRAMUSCULAR; INTRAVENOUS; SUBCUTANEOUS at 11:32

## 2023-01-01 RX ADMIN — Medication 20 MILLIEQUIVALENT(S): at 14:24

## 2023-01-01 RX ADMIN — APIXABAN 2.5 MILLIGRAM(S): 2.5 TABLET, FILM COATED ORAL at 17:14

## 2023-01-01 RX ADMIN — Medication 3.72 MICROGRAM(S)/KG/MIN: at 16:51

## 2023-01-01 RX ADMIN — MIDODRINE HYDROCHLORIDE 40 MILLIGRAM(S): 2.5 TABLET ORAL at 00:27

## 2023-01-01 RX ADMIN — Medication 500 MILLIGRAM(S): at 13:41

## 2023-01-01 RX ADMIN — CHLORHEXIDINE GLUCONATE 1 APPLICATION(S): 213 SOLUTION TOPICAL at 06:39

## 2023-01-01 RX ADMIN — Medication 650 MILLIGRAM(S): at 12:19

## 2023-01-01 RX ADMIN — Medication 25 MILLIGRAM(S): at 01:54

## 2023-01-01 RX ADMIN — HYDROMORPHONE HYDROCHLORIDE 0.25 MILLIGRAM(S): 2 INJECTION INTRAMUSCULAR; INTRAVENOUS; SUBCUTANEOUS at 12:11

## 2023-01-01 RX ADMIN — SEVELAMER CARBONATE 1600 MILLIGRAM(S): 2400 POWDER, FOR SUSPENSION ORAL at 09:23

## 2023-01-01 RX ADMIN — HYDROMORPHONE HYDROCHLORIDE 4 MILLIGRAM(S): 2 INJECTION INTRAMUSCULAR; INTRAVENOUS; SUBCUTANEOUS at 08:30

## 2023-01-01 RX ADMIN — HYDROMORPHONE HYDROCHLORIDE 1 MILLIGRAM(S): 2 INJECTION INTRAMUSCULAR; INTRAVENOUS; SUBCUTANEOUS at 09:11

## 2023-01-01 RX ADMIN — CINACALCET 120 MILLIGRAM(S): 30 TABLET, FILM COATED ORAL at 00:16

## 2023-01-01 RX ADMIN — CHLORHEXIDINE GLUCONATE 1 APPLICATION(S): 213 SOLUTION TOPICAL at 06:27

## 2023-01-01 RX ADMIN — Medication 1 DROP(S): at 22:13

## 2023-01-01 RX ADMIN — NYSTATIN CREAM 1 APPLICATION(S): 100000 CREAM TOPICAL at 06:30

## 2023-01-01 RX ADMIN — HYDROMORPHONE HYDROCHLORIDE 0.25 MILLIGRAM(S): 2 INJECTION INTRAMUSCULAR; INTRAVENOUS; SUBCUTANEOUS at 02:50

## 2023-01-01 RX ADMIN — FLUDROCORTISONE ACETATE 0.2 MILLIGRAM(S): 0.1 TABLET ORAL at 14:27

## 2023-01-01 RX ADMIN — Medication 1 DROP(S): at 15:21

## 2023-01-01 RX ADMIN — HYDROMORPHONE HYDROCHLORIDE 0.25 MILLIGRAM(S): 2 INJECTION INTRAMUSCULAR; INTRAVENOUS; SUBCUTANEOUS at 20:50

## 2023-01-01 RX ADMIN — SEVELAMER CARBONATE 1600 MILLIGRAM(S): 2400 POWDER, FOR SUSPENSION ORAL at 10:04

## 2023-01-01 RX ADMIN — Medication 1 TABLET(S): at 12:01

## 2023-01-01 RX ADMIN — CINACALCET 90 MILLIGRAM(S): 30 TABLET, FILM COATED ORAL at 12:28

## 2023-01-01 RX ADMIN — HYDROMORPHONE HYDROCHLORIDE 4 MILLIGRAM(S): 2 INJECTION INTRAMUSCULAR; INTRAVENOUS; SUBCUTANEOUS at 23:35

## 2023-01-01 RX ADMIN — Medication 650 MILLIGRAM(S): at 19:00

## 2023-01-01 RX ADMIN — Medication 650 MILLIGRAM(S): at 22:11

## 2023-01-01 RX ADMIN — Medication 1 APPLICATION(S): at 12:54

## 2023-01-01 RX ADMIN — Medication 100 GRAM(S): at 14:44

## 2023-01-01 RX ADMIN — Medication 1 DROP(S): at 12:21

## 2023-01-01 RX ADMIN — CLOPIDOGREL BISULFATE 75 MILLIGRAM(S): 75 TABLET, FILM COATED ORAL at 12:53

## 2023-01-01 RX ADMIN — HYDROMORPHONE HYDROCHLORIDE 4 MILLIGRAM(S): 2 INJECTION INTRAMUSCULAR; INTRAVENOUS; SUBCUTANEOUS at 00:58

## 2023-01-01 RX ADMIN — HYDROMORPHONE HYDROCHLORIDE 0.25 MILLIGRAM(S): 2 INJECTION INTRAMUSCULAR; INTRAVENOUS; SUBCUTANEOUS at 17:30

## 2023-01-01 RX ADMIN — Medication 400 MILLIGRAM(S): at 11:24

## 2023-01-01 RX ADMIN — APIXABAN 2.5 MILLIGRAM(S): 2.5 TABLET, FILM COATED ORAL at 05:46

## 2023-01-01 RX ADMIN — Medication 1 DROP(S): at 12:27

## 2023-01-01 RX ADMIN — Medication 5 MILLIGRAM(S): at 22:50

## 2023-01-01 RX ADMIN — MIDODRINE HYDROCHLORIDE 30 MILLIGRAM(S): 2.5 TABLET ORAL at 19:30

## 2023-01-01 RX ADMIN — NYSTATIN CREAM 1 APPLICATION(S): 100000 CREAM TOPICAL at 17:29

## 2023-01-01 RX ADMIN — HEPARIN SODIUM 5000 UNIT(S): 5000 INJECTION INTRAVENOUS; SUBCUTANEOUS at 14:31

## 2023-01-01 RX ADMIN — CINACALCET 120 MILLIGRAM(S): 30 TABLET, FILM COATED ORAL at 23:19

## 2023-01-01 RX ADMIN — CINACALCET 90 MILLIGRAM(S): 30 TABLET, FILM COATED ORAL at 14:00

## 2023-01-01 RX ADMIN — HYDROMORPHONE HYDROCHLORIDE 4 MILLIGRAM(S): 2 INJECTION INTRAMUSCULAR; INTRAVENOUS; SUBCUTANEOUS at 11:12

## 2023-01-01 RX ADMIN — SEVELAMER CARBONATE 1600 MILLIGRAM(S): 2400 POWDER, FOR SUSPENSION ORAL at 16:35

## 2023-01-01 RX ADMIN — Medication 1 TABLET(S): at 15:34

## 2023-01-01 RX ADMIN — HYDROMORPHONE HYDROCHLORIDE 0.25 MILLIGRAM(S): 2 INJECTION INTRAMUSCULAR; INTRAVENOUS; SUBCUTANEOUS at 13:01

## 2023-01-01 RX ADMIN — SEVELAMER CARBONATE 1600 MILLIGRAM(S): 2400 POWDER, FOR SUSPENSION ORAL at 10:39

## 2023-01-01 RX ADMIN — PANTOPRAZOLE SODIUM 40 MILLIGRAM(S): 20 TABLET, DELAYED RELEASE ORAL at 05:58

## 2023-01-01 RX ADMIN — Medication 1 TABLET(S): at 15:48

## 2023-01-01 RX ADMIN — CLOPIDOGREL BISULFATE 75 MILLIGRAM(S): 75 TABLET, FILM COATED ORAL at 15:46

## 2023-01-01 RX ADMIN — HEPARIN SODIUM 19 UNIT(S)/HR: 5000 INJECTION INTRAVENOUS; SUBCUTANEOUS at 09:25

## 2023-01-01 RX ADMIN — HYDROMORPHONE HYDROCHLORIDE 0.5 MILLIGRAM(S): 2 INJECTION INTRAMUSCULAR; INTRAVENOUS; SUBCUTANEOUS at 03:53

## 2023-01-01 RX ADMIN — ATORVASTATIN CALCIUM 40 MILLIGRAM(S): 80 TABLET, FILM COATED ORAL at 21:06

## 2023-01-01 RX ADMIN — MIDODRINE HYDROCHLORIDE 30 MILLIGRAM(S): 2.5 TABLET ORAL at 01:00

## 2023-01-01 RX ADMIN — Medication 5 MILLIGRAM(S): at 23:17

## 2023-01-01 RX ADMIN — HYDROMORPHONE HYDROCHLORIDE 1 MILLIGRAM(S): 2 INJECTION INTRAMUSCULAR; INTRAVENOUS; SUBCUTANEOUS at 10:44

## 2023-01-01 RX ADMIN — HYDROMORPHONE HYDROCHLORIDE 3 MILLIGRAM(S): 2 INJECTION INTRAMUSCULAR; INTRAVENOUS; SUBCUTANEOUS at 13:55

## 2023-01-01 RX ADMIN — Medication 650 MILLIGRAM(S): at 13:19

## 2023-01-01 RX ADMIN — CLOPIDOGREL BISULFATE 75 MILLIGRAM(S): 75 TABLET, FILM COATED ORAL at 12:22

## 2023-01-01 RX ADMIN — CLOPIDOGREL BISULFATE 75 MILLIGRAM(S): 75 TABLET, FILM COATED ORAL at 11:42

## 2023-01-01 RX ADMIN — MIDODRINE HYDROCHLORIDE 40 MILLIGRAM(S): 2.5 TABLET ORAL at 15:42

## 2023-01-01 RX ADMIN — POLYETHYLENE GLYCOL 3350 17 GRAM(S): 17 POWDER, FOR SOLUTION ORAL at 15:03

## 2023-01-01 RX ADMIN — HYDROMORPHONE HYDROCHLORIDE 0.25 MILLIGRAM(S): 2 INJECTION INTRAMUSCULAR; INTRAVENOUS; SUBCUTANEOUS at 17:00

## 2023-01-01 RX ADMIN — ATORVASTATIN CALCIUM 40 MILLIGRAM(S): 80 TABLET, FILM COATED ORAL at 22:42

## 2023-01-01 RX ADMIN — HYDROMORPHONE HYDROCHLORIDE 0.5 MILLIGRAM(S): 2 INJECTION INTRAMUSCULAR; INTRAVENOUS; SUBCUTANEOUS at 23:02

## 2023-01-01 RX ADMIN — HEPARIN SODIUM 5000 UNIT(S): 5000 INJECTION INTRAVENOUS; SUBCUTANEOUS at 06:14

## 2023-01-01 RX ADMIN — CHLORHEXIDINE GLUCONATE 1 APPLICATION(S): 213 SOLUTION TOPICAL at 05:01

## 2023-01-01 RX ADMIN — HEPARIN SODIUM 19 UNIT(S)/HR: 5000 INJECTION INTRAVENOUS; SUBCUTANEOUS at 08:20

## 2023-01-01 RX ADMIN — CLOPIDOGREL BISULFATE 75 MILLIGRAM(S): 75 TABLET, FILM COATED ORAL at 15:49

## 2023-01-01 RX ADMIN — Medication 400 GRAM(S): at 12:14

## 2023-01-01 RX ADMIN — MIDODRINE HYDROCHLORIDE 30 MILLIGRAM(S): 2.5 TABLET ORAL at 20:45

## 2023-01-01 RX ADMIN — SEVELAMER CARBONATE 800 MILLIGRAM(S): 2400 POWDER, FOR SUSPENSION ORAL at 08:55

## 2023-01-01 RX ADMIN — HEPARIN SODIUM 5000 UNIT(S): 5000 INJECTION INTRAVENOUS; SUBCUTANEOUS at 13:30

## 2023-01-01 RX ADMIN — HYDROMORPHONE HYDROCHLORIDE 1 MILLIGRAM(S): 2 INJECTION INTRAMUSCULAR; INTRAVENOUS; SUBCUTANEOUS at 13:30

## 2023-01-01 RX ADMIN — Medication 1 TABLET(S): at 18:09

## 2023-01-01 RX ADMIN — SENNA PLUS 2 TABLET(S): 8.6 TABLET ORAL at 20:58

## 2023-01-01 RX ADMIN — Medication 1 APPLICATION(S): at 17:33

## 2023-01-01 RX ADMIN — Medication 1 APPLICATION(S): at 11:22

## 2023-01-01 RX ADMIN — PHENYLEPHRINE HYDROCHLORIDE 1.6 MICROGRAM(S)/KG/MIN: 10 INJECTION INTRAVENOUS at 12:56

## 2023-01-01 RX ADMIN — HYDROMORPHONE HYDROCHLORIDE 4 MILLIGRAM(S): 2 INJECTION INTRAMUSCULAR; INTRAVENOUS; SUBCUTANEOUS at 15:47

## 2023-01-01 RX ADMIN — PANTOPRAZOLE SODIUM 40 MILLIGRAM(S): 20 TABLET, DELAYED RELEASE ORAL at 21:05

## 2023-01-01 RX ADMIN — PROTHROMBIN COMPLEX CONCENTRATE (HUMAN) 400 INTERNATIONAL UNIT(S): 25.5; 16.5; 24; 22; 22; 26 POWDER, FOR SOLUTION INTRAVENOUS at 13:33

## 2023-01-01 RX ADMIN — VASOPRESSIN 3 UNIT(S)/MIN: 20 INJECTION INTRAVENOUS at 05:47

## 2023-01-01 RX ADMIN — MIDODRINE HYDROCHLORIDE 30 MILLIGRAM(S): 2.5 TABLET ORAL at 03:11

## 2023-01-01 RX ADMIN — SEVELAMER CARBONATE 1600 MILLIGRAM(S): 2400 POWDER, FOR SUSPENSION ORAL at 09:31

## 2023-01-01 RX ADMIN — Medication 650 MILLIGRAM(S): at 21:32

## 2023-01-01 RX ADMIN — HYDROMORPHONE HYDROCHLORIDE 0.5 MILLIGRAM(S): 2 INJECTION INTRAMUSCULAR; INTRAVENOUS; SUBCUTANEOUS at 07:00

## 2023-01-01 RX ADMIN — HYDROMORPHONE HYDROCHLORIDE 0.5 MILLIGRAM(S): 2 INJECTION INTRAMUSCULAR; INTRAVENOUS; SUBCUTANEOUS at 18:20

## 2023-01-01 RX ADMIN — APIXABAN 2.5 MILLIGRAM(S): 2.5 TABLET, FILM COATED ORAL at 06:28

## 2023-01-01 RX ADMIN — HYDROMORPHONE HYDROCHLORIDE 0.5 MILLIGRAM(S): 2 INJECTION INTRAMUSCULAR; INTRAVENOUS; SUBCUTANEOUS at 22:53

## 2023-01-01 RX ADMIN — CINACALCET 60 MILLIGRAM(S): 30 TABLET, FILM COATED ORAL at 12:49

## 2023-01-01 RX ADMIN — HYDROMORPHONE HYDROCHLORIDE 4 MILLIGRAM(S): 2 INJECTION INTRAMUSCULAR; INTRAVENOUS; SUBCUTANEOUS at 10:17

## 2023-01-01 RX ADMIN — HYDROMORPHONE HYDROCHLORIDE 1 MILLIGRAM(S): 2 INJECTION INTRAMUSCULAR; INTRAVENOUS; SUBCUTANEOUS at 03:42

## 2023-01-01 RX ADMIN — HYDROMORPHONE HYDROCHLORIDE 0.25 MILLIGRAM(S): 2 INJECTION INTRAMUSCULAR; INTRAVENOUS; SUBCUTANEOUS at 13:30

## 2023-01-01 RX ADMIN — Medication 1 MILLIGRAM(S): at 11:10

## 2023-01-01 RX ADMIN — HYDROMORPHONE HYDROCHLORIDE 1 MILLIGRAM(S): 2 INJECTION INTRAMUSCULAR; INTRAVENOUS; SUBCUTANEOUS at 16:50

## 2023-01-01 RX ADMIN — CINACALCET 120 MILLIGRAM(S): 30 TABLET, FILM COATED ORAL at 01:52

## 2023-01-01 RX ADMIN — HYDROMORPHONE HYDROCHLORIDE 0.25 MILLIGRAM(S): 2 INJECTION INTRAMUSCULAR; INTRAVENOUS; SUBCUTANEOUS at 13:13

## 2023-01-01 RX ADMIN — MEROPENEM 100 MILLIGRAM(S): 1 INJECTION INTRAVENOUS at 20:16

## 2023-01-01 RX ADMIN — LIDOCAINE 2 PATCH: 4 CREAM TOPICAL at 20:29

## 2023-01-01 RX ADMIN — Medication 500 MILLIGRAM(S): at 11:23

## 2023-01-01 RX ADMIN — SODIUM CHLORIDE 500 MILLILITER(S): 9 INJECTION INTRAMUSCULAR; INTRAVENOUS; SUBCUTANEOUS at 13:45

## 2023-01-01 RX ADMIN — FLUDROCORTISONE ACETATE 0.2 MILLIGRAM(S): 0.1 TABLET ORAL at 11:12

## 2023-01-01 RX ADMIN — SEVELAMER CARBONATE 1600 MILLIGRAM(S): 2400 POWDER, FOR SUSPENSION ORAL at 08:55

## 2023-01-01 RX ADMIN — MIDODRINE HYDROCHLORIDE 30 MILLIGRAM(S): 2.5 TABLET ORAL at 04:02

## 2023-01-01 RX ADMIN — PANTOPRAZOLE SODIUM 40 MILLIGRAM(S): 20 TABLET, DELAYED RELEASE ORAL at 14:25

## 2023-01-01 RX ADMIN — HYDROMORPHONE HYDROCHLORIDE 2 MILLIGRAM(S): 2 INJECTION INTRAMUSCULAR; INTRAVENOUS; SUBCUTANEOUS at 19:20

## 2023-01-01 RX ADMIN — HYDROMORPHONE HYDROCHLORIDE 4 MILLIGRAM(S): 2 INJECTION INTRAMUSCULAR; INTRAVENOUS; SUBCUTANEOUS at 16:15

## 2023-01-01 RX ADMIN — HYDROMORPHONE HYDROCHLORIDE 1 MILLIGRAM(S): 2 INJECTION INTRAMUSCULAR; INTRAVENOUS; SUBCUTANEOUS at 19:58

## 2023-01-01 RX ADMIN — HYDROMORPHONE HYDROCHLORIDE 0.25 MILLIGRAM(S): 2 INJECTION INTRAMUSCULAR; INTRAVENOUS; SUBCUTANEOUS at 23:45

## 2023-01-01 RX ADMIN — HYDROMORPHONE HYDROCHLORIDE 0.5 MILLIGRAM(S): 2 INJECTION INTRAMUSCULAR; INTRAVENOUS; SUBCUTANEOUS at 06:11

## 2023-01-01 RX ADMIN — ALBUTEROL 2.5 MILLIGRAM(S): 90 AEROSOL, METERED ORAL at 23:14

## 2023-01-01 RX ADMIN — ATORVASTATIN CALCIUM 40 MILLIGRAM(S): 80 TABLET, FILM COATED ORAL at 21:19

## 2023-01-01 RX ADMIN — Medication 400 GRAM(S): at 11:20

## 2023-01-01 RX ADMIN — ERYTHROPOIETIN 8000 UNIT(S): 10000 INJECTION, SOLUTION INTRAVENOUS; SUBCUTANEOUS at 15:22

## 2023-01-01 RX ADMIN — HYDROMORPHONE HYDROCHLORIDE 0.25 MILLIGRAM(S): 2 INJECTION INTRAMUSCULAR; INTRAVENOUS; SUBCUTANEOUS at 13:43

## 2023-01-01 RX ADMIN — ATORVASTATIN CALCIUM 40 MILLIGRAM(S): 80 TABLET, FILM COATED ORAL at 22:03

## 2023-01-01 RX ADMIN — Medication 650 MILLIGRAM(S): at 00:00

## 2023-01-01 RX ADMIN — HYDROMORPHONE HYDROCHLORIDE 4 MILLIGRAM(S): 2 INJECTION INTRAMUSCULAR; INTRAVENOUS; SUBCUTANEOUS at 14:30

## 2023-01-01 RX ADMIN — HEPARIN SODIUM 7500 UNIT(S): 5000 INJECTION INTRAVENOUS; SUBCUTANEOUS at 05:00

## 2023-01-01 RX ADMIN — HYDROMORPHONE HYDROCHLORIDE 0.25 MILLIGRAM(S): 2 INJECTION INTRAMUSCULAR; INTRAVENOUS; SUBCUTANEOUS at 17:45

## 2023-01-01 RX ADMIN — Medication 1 ENEMA: at 22:36

## 2023-01-01 RX ADMIN — MIDODRINE HYDROCHLORIDE 30 MILLIGRAM(S): 2.5 TABLET ORAL at 06:48

## 2023-01-01 RX ADMIN — SEVELAMER CARBONATE 1600 MILLIGRAM(S): 2400 POWDER, FOR SUSPENSION ORAL at 13:07

## 2023-01-01 RX ADMIN — Medication 1 TABLET(S): at 11:12

## 2023-01-01 RX ADMIN — HYDROMORPHONE HYDROCHLORIDE 0.25 MILLIGRAM(S): 2 INJECTION INTRAMUSCULAR; INTRAVENOUS; SUBCUTANEOUS at 17:17

## 2023-01-01 RX ADMIN — MIDODRINE HYDROCHLORIDE 40 MILLIGRAM(S): 2.5 TABLET ORAL at 03:04

## 2023-01-01 RX ADMIN — PHENYLEPHRINE HYDROCHLORIDE 64 MICROGRAM(S)/KG/MIN: 10 INJECTION INTRAVENOUS at 22:25

## 2023-01-01 RX ADMIN — HEPARIN SODIUM 5000 UNIT(S): 5000 INJECTION INTRAVENOUS; SUBCUTANEOUS at 22:30

## 2023-01-01 RX ADMIN — HYDROMORPHONE HYDROCHLORIDE 0.25 MILLIGRAM(S): 2 INJECTION INTRAMUSCULAR; INTRAVENOUS; SUBCUTANEOUS at 04:43

## 2023-01-01 RX ADMIN — PANTOPRAZOLE SODIUM 40 MILLIGRAM(S): 20 TABLET, DELAYED RELEASE ORAL at 06:50

## 2023-01-01 RX ADMIN — CINACALCET 120 MILLIGRAM(S): 30 TABLET, FILM COATED ORAL at 17:51

## 2023-01-01 RX ADMIN — Medication 1 APPLICATION(S): at 06:57

## 2023-01-01 RX ADMIN — MIDODRINE HYDROCHLORIDE 30 MILLIGRAM(S): 2.5 TABLET ORAL at 05:46

## 2023-01-01 RX ADMIN — PANTOPRAZOLE SODIUM 40 MILLIGRAM(S): 20 TABLET, DELAYED RELEASE ORAL at 17:25

## 2023-01-01 RX ADMIN — HYDROMORPHONE HYDROCHLORIDE 0.5 MILLIGRAM(S): 2 INJECTION INTRAMUSCULAR; INTRAVENOUS; SUBCUTANEOUS at 12:52

## 2023-01-01 RX ADMIN — PANTOPRAZOLE SODIUM 40 MILLIGRAM(S): 20 TABLET, DELAYED RELEASE ORAL at 18:00

## 2023-01-01 RX ADMIN — Medication 30 MILLIEQUIVALENT(S): at 22:31

## 2023-01-01 RX ADMIN — HYDROMORPHONE HYDROCHLORIDE 0.5 MILLIGRAM(S): 2 INJECTION INTRAMUSCULAR; INTRAVENOUS; SUBCUTANEOUS at 09:29

## 2023-01-01 RX ADMIN — SEVELAMER CARBONATE 800 MILLIGRAM(S): 2400 POWDER, FOR SUSPENSION ORAL at 11:08

## 2023-01-01 RX ADMIN — HYDROMORPHONE HYDROCHLORIDE 0.25 MILLIGRAM(S): 2 INJECTION INTRAMUSCULAR; INTRAVENOUS; SUBCUTANEOUS at 10:55

## 2023-01-01 RX ADMIN — HYDROMORPHONE HYDROCHLORIDE 0.5 MILLIGRAM(S): 2 INJECTION INTRAMUSCULAR; INTRAVENOUS; SUBCUTANEOUS at 06:15

## 2023-01-01 RX ADMIN — PANTOPRAZOLE SODIUM 40 MILLIGRAM(S): 20 TABLET, DELAYED RELEASE ORAL at 01:23

## 2023-01-01 RX ADMIN — Medication 1 TABLET(S): at 13:06

## 2023-01-01 RX ADMIN — FLUDROCORTISONE ACETATE 0.5 MILLIGRAM(S): 0.1 TABLET ORAL at 17:56

## 2023-01-01 RX ADMIN — HYDROMORPHONE HYDROCHLORIDE 0.5 MILLIGRAM(S): 2 INJECTION INTRAMUSCULAR; INTRAVENOUS; SUBCUTANEOUS at 07:36

## 2023-01-01 RX ADMIN — HYDROMORPHONE HYDROCHLORIDE 0.25 MILLIGRAM(S): 2 INJECTION INTRAMUSCULAR; INTRAVENOUS; SUBCUTANEOUS at 01:00

## 2023-01-01 RX ADMIN — HYDROMORPHONE HYDROCHLORIDE 1 MILLIGRAM(S): 2 INJECTION INTRAMUSCULAR; INTRAVENOUS; SUBCUTANEOUS at 15:24

## 2023-01-01 RX ADMIN — FLUDROCORTISONE ACETATE 0.2 MILLIGRAM(S): 0.1 TABLET ORAL at 17:44

## 2023-01-01 RX ADMIN — HYDROMORPHONE HYDROCHLORIDE 0.5 MILLIGRAM(S): 2 INJECTION INTRAMUSCULAR; INTRAVENOUS; SUBCUTANEOUS at 19:01

## 2023-01-01 RX ADMIN — CINACALCET 90 MILLIGRAM(S): 30 TABLET, FILM COATED ORAL at 13:56

## 2023-01-01 RX ADMIN — HYDROMORPHONE HYDROCHLORIDE 0.5 MILLIGRAM(S): 2 INJECTION INTRAMUSCULAR; INTRAVENOUS; SUBCUTANEOUS at 09:50

## 2023-01-01 RX ADMIN — Medication 1 APPLICATION(S): at 13:07

## 2023-01-01 RX ADMIN — SEVELAMER CARBONATE 800 MILLIGRAM(S): 2400 POWDER, FOR SUSPENSION ORAL at 07:39

## 2023-01-01 RX ADMIN — HYDROMORPHONE HYDROCHLORIDE 0.25 MILLIGRAM(S): 2 INJECTION INTRAMUSCULAR; INTRAVENOUS; SUBCUTANEOUS at 07:47

## 2023-01-01 RX ADMIN — Medication 1000 MILLIGRAM(S): at 12:15

## 2023-01-01 RX ADMIN — ERYTHROPOIETIN 8000 UNIT(S): 10000 INJECTION, SOLUTION INTRAVENOUS; SUBCUTANEOUS at 16:38

## 2023-01-01 RX ADMIN — HYDROMORPHONE HYDROCHLORIDE 0.25 MILLIGRAM(S): 2 INJECTION INTRAMUSCULAR; INTRAVENOUS; SUBCUTANEOUS at 14:00

## 2023-01-01 RX ADMIN — Medication 50 MILLILITER(S): at 05:50

## 2023-01-01 RX ADMIN — MIDODRINE HYDROCHLORIDE 40 MILLIGRAM(S): 2.5 TABLET ORAL at 07:39

## 2023-01-01 RX ADMIN — FLUDROCORTISONE ACETATE 0.3 MILLIGRAM(S): 0.1 TABLET ORAL at 17:06

## 2023-01-01 RX ADMIN — FLUDROCORTISONE ACETATE 0.3 MILLIGRAM(S): 0.1 TABLET ORAL at 17:21

## 2023-01-01 RX ADMIN — PANTOPRAZOLE SODIUM 40 MILLIGRAM(S): 20 TABLET, DELAYED RELEASE ORAL at 05:44

## 2023-01-01 RX ADMIN — HYDROMORPHONE HYDROCHLORIDE 0.5 MILLIGRAM(S): 2 INJECTION INTRAMUSCULAR; INTRAVENOUS; SUBCUTANEOUS at 19:58

## 2023-01-01 RX ADMIN — HEPARIN SODIUM 7500 UNIT(S): 5000 INJECTION INTRAVENOUS; SUBCUTANEOUS at 23:17

## 2023-01-01 RX ADMIN — CHLORHEXIDINE GLUCONATE 1 APPLICATION(S): 213 SOLUTION TOPICAL at 06:56

## 2023-01-01 RX ADMIN — Medication 650 MILLIGRAM(S): at 11:35

## 2023-01-01 RX ADMIN — HYDROMORPHONE HYDROCHLORIDE 4 MILLIGRAM(S): 2 INJECTION INTRAMUSCULAR; INTRAVENOUS; SUBCUTANEOUS at 13:18

## 2023-01-01 RX ADMIN — HYDROMORPHONE HYDROCHLORIDE 4 MILLIGRAM(S): 2 INJECTION INTRAMUSCULAR; INTRAVENOUS; SUBCUTANEOUS at 06:34

## 2023-01-01 RX ADMIN — MIDODRINE HYDROCHLORIDE 40 MILLIGRAM(S): 2.5 TABLET ORAL at 13:22

## 2023-01-01 RX ADMIN — Medication 1 APPLICATION(S): at 18:14

## 2023-01-01 RX ADMIN — FLUDROCORTISONE ACETATE 0.1 MILLIGRAM(S): 0.1 TABLET ORAL at 12:14

## 2023-01-01 RX ADMIN — PANTOPRAZOLE SODIUM 40 MILLIGRAM(S): 20 TABLET, DELAYED RELEASE ORAL at 05:48

## 2023-01-01 RX ADMIN — HYDROMORPHONE HYDROCHLORIDE 0.25 MILLIGRAM(S): 2 INJECTION INTRAMUSCULAR; INTRAVENOUS; SUBCUTANEOUS at 23:04

## 2023-01-01 RX ADMIN — HYDROMORPHONE HYDROCHLORIDE 2 MILLIGRAM(S): 2 INJECTION INTRAMUSCULAR; INTRAVENOUS; SUBCUTANEOUS at 21:00

## 2023-01-01 RX ADMIN — Medication 400 MILLIGRAM(S): at 03:12

## 2023-01-01 RX ADMIN — HYDROMORPHONE HYDROCHLORIDE 0.25 MILLIGRAM(S): 2 INJECTION INTRAMUSCULAR; INTRAVENOUS; SUBCUTANEOUS at 19:04

## 2023-01-01 RX ADMIN — HYDROMORPHONE HYDROCHLORIDE 1 MILLIGRAM(S): 2 INJECTION INTRAMUSCULAR; INTRAVENOUS; SUBCUTANEOUS at 09:45

## 2023-01-01 RX ADMIN — SEVELAMER CARBONATE 1600 MILLIGRAM(S): 2400 POWDER, FOR SUSPENSION ORAL at 18:12

## 2023-01-01 RX ADMIN — SODIUM CHLORIDE 5 MILLILITER(S): 9 INJECTION INTRAMUSCULAR; INTRAVENOUS; SUBCUTANEOUS at 21:18

## 2023-01-01 RX ADMIN — FLUDROCORTISONE ACETATE 0.5 MILLIGRAM(S): 0.1 TABLET ORAL at 19:28

## 2023-01-01 RX ADMIN — HYDROMORPHONE HYDROCHLORIDE 0.5 MILLIGRAM(S): 2 INJECTION INTRAMUSCULAR; INTRAVENOUS; SUBCUTANEOUS at 04:15

## 2023-01-01 RX ADMIN — SEVELAMER CARBONATE 800 MILLIGRAM(S): 2400 POWDER, FOR SUSPENSION ORAL at 07:45

## 2023-01-01 RX ADMIN — HYDROMORPHONE HYDROCHLORIDE 30 MILLILITER(S): 2 INJECTION INTRAMUSCULAR; INTRAVENOUS; SUBCUTANEOUS at 13:31

## 2023-01-01 RX ADMIN — ATORVASTATIN CALCIUM 40 MILLIGRAM(S): 80 TABLET, FILM COATED ORAL at 23:19

## 2023-01-01 RX ADMIN — HYDROMORPHONE HYDROCHLORIDE 0.25 MILLIGRAM(S): 2 INJECTION INTRAMUSCULAR; INTRAVENOUS; SUBCUTANEOUS at 23:36

## 2023-01-01 RX ADMIN — CHLORHEXIDINE GLUCONATE 1 APPLICATION(S): 213 SOLUTION TOPICAL at 05:59

## 2023-01-01 RX ADMIN — HYDROMORPHONE HYDROCHLORIDE 0.25 MILLIGRAM(S): 2 INJECTION INTRAMUSCULAR; INTRAVENOUS; SUBCUTANEOUS at 20:09

## 2023-01-01 RX ADMIN — HYDROMORPHONE HYDROCHLORIDE 0.5 MILLIGRAM(S): 2 INJECTION INTRAMUSCULAR; INTRAVENOUS; SUBCUTANEOUS at 09:11

## 2023-01-01 RX ADMIN — HYDROMORPHONE HYDROCHLORIDE 0.5 MILLIGRAM(S): 2 INJECTION INTRAMUSCULAR; INTRAVENOUS; SUBCUTANEOUS at 06:39

## 2023-01-01 RX ADMIN — HYDROMORPHONE HYDROCHLORIDE 0.25 MILLIGRAM(S): 2 INJECTION INTRAMUSCULAR; INTRAVENOUS; SUBCUTANEOUS at 10:35

## 2023-01-01 RX ADMIN — POLYETHYLENE GLYCOL 3350 17 GRAM(S): 17 POWDER, FOR SOLUTION ORAL at 13:01

## 2023-01-01 RX ADMIN — MIDODRINE HYDROCHLORIDE 40 MILLIGRAM(S): 2.5 TABLET ORAL at 06:50

## 2023-01-01 RX ADMIN — CHLORHEXIDINE GLUCONATE 1 APPLICATION(S): 213 SOLUTION TOPICAL at 06:01

## 2023-01-01 RX ADMIN — HYDROMORPHONE HYDROCHLORIDE 0.5 MILLIGRAM(S): 2 INJECTION INTRAMUSCULAR; INTRAVENOUS; SUBCUTANEOUS at 09:38

## 2023-01-01 RX ADMIN — Medication 1000 MILLIGRAM(S): at 00:30

## 2023-01-01 RX ADMIN — ATORVASTATIN CALCIUM 40 MILLIGRAM(S): 80 TABLET, FILM COATED ORAL at 21:53

## 2023-01-01 RX ADMIN — CINACALCET 120 MILLIGRAM(S): 30 TABLET, FILM COATED ORAL at 23:01

## 2023-01-01 RX ADMIN — HYDROMORPHONE HYDROCHLORIDE 0.25 MILLIGRAM(S): 2 INJECTION INTRAMUSCULAR; INTRAVENOUS; SUBCUTANEOUS at 23:10

## 2023-01-01 RX ADMIN — Medication 1 TABLET(S): at 14:05

## 2023-01-01 RX ADMIN — FLUDROCORTISONE ACETATE 0.2 MILLIGRAM(S): 0.1 TABLET ORAL at 05:31

## 2023-01-01 RX ADMIN — HYDROMORPHONE HYDROCHLORIDE 2 MILLIGRAM(S): 2 INJECTION INTRAMUSCULAR; INTRAVENOUS; SUBCUTANEOUS at 16:00

## 2023-01-01 RX ADMIN — PHENYLEPHRINE HYDROCHLORIDE 1.6 MICROGRAM(S)/KG/MIN: 10 INJECTION INTRAVENOUS at 12:27

## 2023-01-01 RX ADMIN — Medication 1000 MILLIGRAM(S): at 01:03

## 2023-01-01 RX ADMIN — HYDROMORPHONE HYDROCHLORIDE 0.5 MILLIGRAM(S): 2 INJECTION INTRAMUSCULAR; INTRAVENOUS; SUBCUTANEOUS at 15:05

## 2023-01-01 RX ADMIN — LIDOCAINE 2 PATCH: 4 CREAM TOPICAL at 01:05

## 2023-01-01 RX ADMIN — HYDROMORPHONE HYDROCHLORIDE 0.25 MILLIGRAM(S): 2 INJECTION INTRAMUSCULAR; INTRAVENOUS; SUBCUTANEOUS at 18:49

## 2023-01-01 RX ADMIN — HYDROMORPHONE HYDROCHLORIDE 0.5 MILLIGRAM(S): 2 INJECTION INTRAMUSCULAR; INTRAVENOUS; SUBCUTANEOUS at 22:16

## 2023-01-01 RX ADMIN — HYDROMORPHONE HYDROCHLORIDE 2 MILLIGRAM(S): 2 INJECTION INTRAMUSCULAR; INTRAVENOUS; SUBCUTANEOUS at 05:45

## 2023-01-01 RX ADMIN — HYDROMORPHONE HYDROCHLORIDE 2 MILLIGRAM(S): 2 INJECTION INTRAMUSCULAR; INTRAVENOUS; SUBCUTANEOUS at 13:06

## 2023-01-01 RX ADMIN — SEVELAMER CARBONATE 1600 MILLIGRAM(S): 2400 POWDER, FOR SUSPENSION ORAL at 12:29

## 2023-01-01 RX ADMIN — FLUDROCORTISONE ACETATE 0.5 MILLIGRAM(S): 0.1 TABLET ORAL at 21:01

## 2023-01-01 RX ADMIN — HYDROMORPHONE HYDROCHLORIDE 0.25 MILLIGRAM(S): 2 INJECTION INTRAMUSCULAR; INTRAVENOUS; SUBCUTANEOUS at 15:05

## 2023-01-01 RX ADMIN — HYDROMORPHONE HYDROCHLORIDE 1 MILLIGRAM(S): 2 INJECTION INTRAMUSCULAR; INTRAVENOUS; SUBCUTANEOUS at 21:53

## 2023-01-01 RX ADMIN — HYDROMORPHONE HYDROCHLORIDE 0.5 MILLIGRAM(S): 2 INJECTION INTRAMUSCULAR; INTRAVENOUS; SUBCUTANEOUS at 02:58

## 2023-01-01 RX ADMIN — Medication 1 TABLET(S): at 12:48

## 2023-01-01 RX ADMIN — SEVELAMER CARBONATE 800 MILLIGRAM(S): 2400 POWDER, FOR SUSPENSION ORAL at 18:54

## 2023-01-01 RX ADMIN — HYDROMORPHONE HYDROCHLORIDE 0.25 MILLIGRAM(S): 2 INJECTION INTRAMUSCULAR; INTRAVENOUS; SUBCUTANEOUS at 22:49

## 2023-01-01 RX ADMIN — MORPHINE SULFATE 2 MILLIGRAM(S): 50 CAPSULE, EXTENDED RELEASE ORAL at 14:25

## 2023-01-01 RX ADMIN — HYDROMORPHONE HYDROCHLORIDE 2 MILLIGRAM(S): 2 INJECTION INTRAMUSCULAR; INTRAVENOUS; SUBCUTANEOUS at 01:02

## 2023-01-01 RX ADMIN — SEVELAMER CARBONATE 1600 MILLIGRAM(S): 2400 POWDER, FOR SUSPENSION ORAL at 12:21

## 2023-01-01 RX ADMIN — Medication 400 GRAM(S): at 17:00

## 2023-01-01 RX ADMIN — Medication 30 MILLILITER(S): at 04:36

## 2023-01-01 RX ADMIN — Medication 1 DROP(S): at 06:03

## 2023-01-01 RX ADMIN — HYDROMORPHONE HYDROCHLORIDE 1 MILLIGRAM(S): 2 INJECTION INTRAMUSCULAR; INTRAVENOUS; SUBCUTANEOUS at 19:28

## 2023-01-01 RX ADMIN — Medication 25 MILLIGRAM(S): at 12:06

## 2023-01-01 RX ADMIN — FLUDROCORTISONE ACETATE 0.2 MILLIGRAM(S): 0.1 TABLET ORAL at 05:25

## 2023-01-01 RX ADMIN — Medication 0.3 MILLIGRAM(S): at 11:42

## 2023-01-01 RX ADMIN — Medication 1 TABLET(S): at 12:53

## 2023-01-01 RX ADMIN — Medication 500 MILLIGRAM(S): at 15:09

## 2023-01-01 RX ADMIN — MIDODRINE HYDROCHLORIDE 30 MILLIGRAM(S): 2.5 TABLET ORAL at 14:59

## 2023-01-01 RX ADMIN — HYDROMORPHONE HYDROCHLORIDE 0.25 MILLIGRAM(S): 2 INJECTION INTRAMUSCULAR; INTRAVENOUS; SUBCUTANEOUS at 04:49

## 2023-01-01 RX ADMIN — Medication 500 MILLIGRAM(S): at 12:04

## 2023-01-01 RX ADMIN — HYDROMORPHONE HYDROCHLORIDE 0.25 MILLIGRAM(S): 2 INJECTION INTRAMUSCULAR; INTRAVENOUS; SUBCUTANEOUS at 09:16

## 2023-01-01 RX ADMIN — NYSTATIN CREAM 1 APPLICATION(S): 100000 CREAM TOPICAL at 17:06

## 2023-01-01 RX ADMIN — MIDODRINE HYDROCHLORIDE 30 MILLIGRAM(S): 2.5 TABLET ORAL at 04:13

## 2023-01-01 RX ADMIN — APIXABAN 2.5 MILLIGRAM(S): 2.5 TABLET, FILM COATED ORAL at 20:10

## 2023-01-01 RX ADMIN — HYDROMORPHONE HYDROCHLORIDE 2 MILLIGRAM(S): 2 INJECTION INTRAMUSCULAR; INTRAVENOUS; SUBCUTANEOUS at 16:23

## 2023-01-01 RX ADMIN — SEVELAMER CARBONATE 1600 MILLIGRAM(S): 2400 POWDER, FOR SUSPENSION ORAL at 12:18

## 2023-01-01 RX ADMIN — HYDROMORPHONE HYDROCHLORIDE 1 MILLIGRAM(S): 2 INJECTION INTRAMUSCULAR; INTRAVENOUS; SUBCUTANEOUS at 20:15

## 2023-01-01 RX ADMIN — MIDODRINE HYDROCHLORIDE 40 MILLIGRAM(S): 2.5 TABLET ORAL at 06:27

## 2023-01-01 RX ADMIN — SODIUM CHLORIDE 1 GRAM(S): 9 INJECTION INTRAMUSCULAR; INTRAVENOUS; SUBCUTANEOUS at 11:15

## 2023-01-01 RX ADMIN — MIDODRINE HYDROCHLORIDE 30 MILLIGRAM(S): 2.5 TABLET ORAL at 22:50

## 2023-01-01 RX ADMIN — CINACALCET 120 MILLIGRAM(S): 30 TABLET, FILM COATED ORAL at 13:45

## 2023-01-01 RX ADMIN — HYDROMORPHONE HYDROCHLORIDE 2 MILLIGRAM(S): 2 INJECTION INTRAMUSCULAR; INTRAVENOUS; SUBCUTANEOUS at 15:25

## 2023-01-01 RX ADMIN — Medication 25 GRAM(S): at 17:21

## 2023-01-01 RX ADMIN — HYDROMORPHONE HYDROCHLORIDE 4 MILLIGRAM(S): 2 INJECTION INTRAMUSCULAR; INTRAVENOUS; SUBCUTANEOUS at 13:11

## 2023-01-01 RX ADMIN — Medication 0.3 MILLIGRAM(S): at 11:24

## 2023-01-01 RX ADMIN — HEPARIN SODIUM 5000 UNIT(S): 5000 INJECTION INTRAVENOUS; SUBCUTANEOUS at 13:50

## 2023-01-01 RX ADMIN — FLUDROCORTISONE ACETATE 0.2 MILLIGRAM(S): 0.1 TABLET ORAL at 09:55

## 2023-01-01 RX ADMIN — HYDROMORPHONE HYDROCHLORIDE 0.25 MILLIGRAM(S): 2 INJECTION INTRAMUSCULAR; INTRAVENOUS; SUBCUTANEOUS at 22:40

## 2023-01-01 RX ADMIN — MIDODRINE HYDROCHLORIDE 40 MILLIGRAM(S): 2.5 TABLET ORAL at 19:32

## 2023-01-01 RX ADMIN — DAPTOMYCIN 116 MILLIGRAM(S): 500 INJECTION, POWDER, LYOPHILIZED, FOR SOLUTION INTRAVENOUS at 19:26

## 2023-01-01 RX ADMIN — Medication 5 MILLIGRAM(S): at 10:10

## 2023-01-01 RX ADMIN — Medication 1 TABLET(S): at 11:17

## 2023-01-01 RX ADMIN — Medication 650 MILLIGRAM(S): at 07:00

## 2023-01-01 RX ADMIN — FLUDROCORTISONE ACETATE 0.1 MILLIGRAM(S): 0.1 TABLET ORAL at 12:37

## 2023-01-01 RX ADMIN — SEVELAMER CARBONATE 1600 MILLIGRAM(S): 2400 POWDER, FOR SUSPENSION ORAL at 19:25

## 2023-01-01 RX ADMIN — FLUDROCORTISONE ACETATE 0.2 MILLIGRAM(S): 0.1 TABLET ORAL at 22:15

## 2023-01-01 RX ADMIN — Medication 50 MILLIGRAM(S): at 11:04

## 2023-01-01 RX ADMIN — CINACALCET 120 MILLIGRAM(S): 30 TABLET, FILM COATED ORAL at 15:02

## 2023-01-01 RX ADMIN — HYDROMORPHONE HYDROCHLORIDE 4 MILLIGRAM(S): 2 INJECTION INTRAMUSCULAR; INTRAVENOUS; SUBCUTANEOUS at 23:53

## 2023-01-01 RX ADMIN — LIDOCAINE 1 PATCH: 4 CREAM TOPICAL at 17:18

## 2023-01-01 RX ADMIN — MORPHINE SULFATE 2 MILLIGRAM(S): 50 CAPSULE, EXTENDED RELEASE ORAL at 00:30

## 2023-01-01 RX ADMIN — Medication 1 TABLET(S): at 11:11

## 2023-01-01 RX ADMIN — Medication 1 APPLICATION(S): at 13:20

## 2023-01-01 RX ADMIN — SEVELAMER CARBONATE 1600 MILLIGRAM(S): 2400 POWDER, FOR SUSPENSION ORAL at 17:44

## 2023-01-01 RX ADMIN — Medication 1 TABLET(S): at 12:13

## 2023-01-01 RX ADMIN — Medication 250 MILLIGRAM(S): at 21:19

## 2023-01-01 RX ADMIN — Medication 1 TABLET(S): at 15:46

## 2023-01-01 RX ADMIN — Medication 50 MILLIGRAM(S): at 22:03

## 2023-01-01 RX ADMIN — Medication 30 MILLILITER(S): at 08:33

## 2023-01-01 RX ADMIN — HEPARIN SODIUM 7500 UNIT(S): 5000 INJECTION INTRAVENOUS; SUBCUTANEOUS at 05:49

## 2023-01-01 RX ADMIN — Medication 50 MILLIGRAM(S): at 17:39

## 2023-01-01 RX ADMIN — HYDROMORPHONE HYDROCHLORIDE 2 MILLIGRAM(S): 2 INJECTION INTRAMUSCULAR; INTRAVENOUS; SUBCUTANEOUS at 17:00

## 2023-01-01 RX ADMIN — HYDROMORPHONE HYDROCHLORIDE 0.5 MILLIGRAM(S): 2 INJECTION INTRAMUSCULAR; INTRAVENOUS; SUBCUTANEOUS at 15:33

## 2023-01-01 RX ADMIN — MIDODRINE HYDROCHLORIDE 40 MILLIGRAM(S): 2.5 TABLET ORAL at 22:36

## 2023-01-01 RX ADMIN — PANTOPRAZOLE SODIUM 40 MILLIGRAM(S): 20 TABLET, DELAYED RELEASE ORAL at 05:38

## 2023-01-01 RX ADMIN — CHLORHEXIDINE GLUCONATE 1 APPLICATION(S): 213 SOLUTION TOPICAL at 06:21

## 2023-01-01 RX ADMIN — Medication 20 MILLIEQUIVALENT(S): at 23:23

## 2023-01-01 RX ADMIN — APIXABAN 2.5 MILLIGRAM(S): 2.5 TABLET, FILM COATED ORAL at 17:40

## 2023-01-01 RX ADMIN — CHLORHEXIDINE GLUCONATE 1 APPLICATION(S): 213 SOLUTION TOPICAL at 07:49

## 2023-01-01 RX ADMIN — SENNA PLUS 2 TABLET(S): 8.6 TABLET ORAL at 21:20

## 2023-01-01 RX ADMIN — APIXABAN 2.5 MILLIGRAM(S): 2.5 TABLET, FILM COATED ORAL at 06:11

## 2023-01-01 RX ADMIN — Medication 1 MILLIGRAM(S): at 14:25

## 2023-01-01 RX ADMIN — PANTOPRAZOLE SODIUM 40 MILLIGRAM(S): 20 TABLET, DELAYED RELEASE ORAL at 22:34

## 2023-01-01 RX ADMIN — HYDROMORPHONE HYDROCHLORIDE 0.25 MILLIGRAM(S): 2 INJECTION INTRAMUSCULAR; INTRAVENOUS; SUBCUTANEOUS at 01:11

## 2023-01-01 RX ADMIN — Medication 7.44 MICROGRAM(S)/KG/MIN: at 07:18

## 2023-01-01 RX ADMIN — MIDODRINE HYDROCHLORIDE 40 MILLIGRAM(S): 2.5 TABLET ORAL at 21:58

## 2023-01-01 RX ADMIN — PANTOPRAZOLE SODIUM 40 MILLIGRAM(S): 20 TABLET, DELAYED RELEASE ORAL at 06:23

## 2023-01-01 RX ADMIN — Medication 30 MILLILITER(S): at 15:29

## 2023-01-01 RX ADMIN — MIDODRINE HYDROCHLORIDE 30 MILLIGRAM(S): 2.5 TABLET ORAL at 11:13

## 2023-01-01 RX ADMIN — CINACALCET 120 MILLIGRAM(S): 30 TABLET, FILM COATED ORAL at 23:58

## 2023-01-01 RX ADMIN — Medication 1 TABLET(S): at 12:02

## 2023-01-01 RX ADMIN — HYDROMORPHONE HYDROCHLORIDE 2 MILLIGRAM(S): 2 INJECTION INTRAMUSCULAR; INTRAVENOUS; SUBCUTANEOUS at 00:02

## 2023-01-01 RX ADMIN — MEROPENEM 100 MILLIGRAM(S): 1 INJECTION INTRAVENOUS at 19:03

## 2023-01-01 RX ADMIN — Medication 100 MILLIGRAM(S): at 05:18

## 2023-01-01 RX ADMIN — CHLORHEXIDINE GLUCONATE 1 APPLICATION(S): 213 SOLUTION TOPICAL at 06:32

## 2023-01-01 RX ADMIN — CHLORHEXIDINE GLUCONATE 1 APPLICATION(S): 213 SOLUTION TOPICAL at 07:08

## 2023-01-01 RX ADMIN — HYDROMORPHONE HYDROCHLORIDE 2 MILLIGRAM(S): 2 INJECTION INTRAMUSCULAR; INTRAVENOUS; SUBCUTANEOUS at 18:55

## 2023-01-01 RX ADMIN — MIDODRINE HYDROCHLORIDE 25 MILLIGRAM(S): 2.5 TABLET ORAL at 10:11

## 2023-01-01 RX ADMIN — Medication 650 MILLIGRAM(S): at 23:03

## 2023-01-01 RX ADMIN — APIXABAN 2.5 MILLIGRAM(S): 2.5 TABLET, FILM COATED ORAL at 07:08

## 2023-01-01 RX ADMIN — HYDROMORPHONE HYDROCHLORIDE 4 MILLIGRAM(S): 2 INJECTION INTRAMUSCULAR; INTRAVENOUS; SUBCUTANEOUS at 12:24

## 2023-01-01 RX ADMIN — Medication 25 GRAM(S): at 07:37

## 2023-01-01 RX ADMIN — MIDODRINE HYDROCHLORIDE 30 MILLIGRAM(S): 2.5 TABLET ORAL at 11:11

## 2023-01-01 RX ADMIN — HYDROMORPHONE HYDROCHLORIDE 0.5 MILLIGRAM(S): 2 INJECTION INTRAMUSCULAR; INTRAVENOUS; SUBCUTANEOUS at 09:52

## 2023-01-01 RX ADMIN — Medication 0.3 MILLIGRAM(S): at 12:02

## 2023-01-01 RX ADMIN — Medication 1 APPLICATION(S): at 11:24

## 2023-01-01 RX ADMIN — HEPARIN SODIUM 1500 UNIT(S)/HR: 5000 INJECTION INTRAVENOUS; SUBCUTANEOUS at 17:34

## 2023-01-01 RX ADMIN — MIDODRINE HYDROCHLORIDE 40 MILLIGRAM(S): 2.5 TABLET ORAL at 22:51

## 2023-01-01 RX ADMIN — Medication 0.3 MILLIGRAM(S): at 11:32

## 2023-01-01 RX ADMIN — POLYETHYLENE GLYCOL 3350 17 GRAM(S): 17 POWDER, FOR SOLUTION ORAL at 14:36

## 2023-01-01 RX ADMIN — MIDODRINE HYDROCHLORIDE 40 MILLIGRAM(S): 2.5 TABLET ORAL at 13:43

## 2023-01-01 RX ADMIN — HYDROMORPHONE HYDROCHLORIDE 2 MILLIGRAM(S): 2 INJECTION INTRAMUSCULAR; INTRAVENOUS; SUBCUTANEOUS at 13:50

## 2023-01-01 RX ADMIN — MIDODRINE HYDROCHLORIDE 30 MILLIGRAM(S): 2.5 TABLET ORAL at 15:13

## 2023-01-01 RX ADMIN — ERYTHROPOIETIN 8000 UNIT(S): 10000 INJECTION, SOLUTION INTRAVENOUS; SUBCUTANEOUS at 13:05

## 2023-01-01 RX ADMIN — MEROPENEM 100 MILLIGRAM(S): 1 INJECTION INTRAVENOUS at 00:27

## 2023-01-01 RX ADMIN — Medication 1 TABLET(S): at 14:25

## 2023-01-01 RX ADMIN — CINACALCET 90 MILLIGRAM(S): 30 TABLET, FILM COATED ORAL at 14:06

## 2023-01-01 RX ADMIN — Medication 1 APPLICATION(S): at 06:12

## 2023-01-01 RX ADMIN — CHLORHEXIDINE GLUCONATE 1 APPLICATION(S): 213 SOLUTION TOPICAL at 05:56

## 2023-01-01 RX ADMIN — MIDODRINE HYDROCHLORIDE 30 MILLIGRAM(S): 2.5 TABLET ORAL at 11:23

## 2023-01-01 RX ADMIN — ATORVASTATIN CALCIUM 40 MILLIGRAM(S): 80 TABLET, FILM COATED ORAL at 21:20

## 2023-01-01 RX ADMIN — MIDODRINE HYDROCHLORIDE 40 MILLIGRAM(S): 2.5 TABLET ORAL at 05:40

## 2023-01-01 RX ADMIN — HYDROMORPHONE HYDROCHLORIDE 1 MILLIGRAM(S): 2 INJECTION INTRAMUSCULAR; INTRAVENOUS; SUBCUTANEOUS at 15:50

## 2023-01-01 RX ADMIN — HYDROMORPHONE HYDROCHLORIDE 1 MILLIGRAM(S): 2 INJECTION INTRAMUSCULAR; INTRAVENOUS; SUBCUTANEOUS at 17:30

## 2023-01-01 RX ADMIN — Medication 650 MILLIGRAM(S): at 07:10

## 2023-01-01 RX ADMIN — HEPARIN SODIUM 5000 UNIT(S): 5000 INJECTION INTRAVENOUS; SUBCUTANEOUS at 15:44

## 2023-01-01 RX ADMIN — HEPARIN SODIUM 7500 UNIT(S): 5000 INJECTION INTRAVENOUS; SUBCUTANEOUS at 22:29

## 2023-01-01 RX ADMIN — SEVELAMER CARBONATE 800 MILLIGRAM(S): 2400 POWDER, FOR SUSPENSION ORAL at 12:14

## 2023-01-01 RX ADMIN — MIDODRINE HYDROCHLORIDE 40 MILLIGRAM(S): 2.5 TABLET ORAL at 06:40

## 2023-01-01 RX ADMIN — HYDROMORPHONE HYDROCHLORIDE 4 MILLIGRAM(S): 2 INJECTION INTRAMUSCULAR; INTRAVENOUS; SUBCUTANEOUS at 22:53

## 2023-01-01 RX ADMIN — Medication 1 APPLICATION(S): at 17:42

## 2023-01-01 RX ADMIN — Medication 5 MILLIGRAM(S): at 22:00

## 2023-01-01 RX ADMIN — SODIUM CHLORIDE 500 MILLILITER(S): 9 INJECTION INTRAMUSCULAR; INTRAVENOUS; SUBCUTANEOUS at 07:59

## 2023-01-01 RX ADMIN — ONDANSETRON 4 MILLIGRAM(S): 8 TABLET, FILM COATED ORAL at 10:11

## 2023-01-01 RX ADMIN — ATORVASTATIN CALCIUM 40 MILLIGRAM(S): 80 TABLET, FILM COATED ORAL at 22:51

## 2023-01-01 RX ADMIN — Medication 1 APPLICATION(S): at 17:45

## 2023-01-01 RX ADMIN — Medication 975 MILLIGRAM(S): at 14:54

## 2023-01-01 RX ADMIN — HYDROMORPHONE HYDROCHLORIDE 0.5 MILLIGRAM(S): 2 INJECTION INTRAMUSCULAR; INTRAVENOUS; SUBCUTANEOUS at 15:50

## 2023-01-01 RX ADMIN — HYDROMORPHONE HYDROCHLORIDE 1 MILLIGRAM(S): 2 INJECTION INTRAMUSCULAR; INTRAVENOUS; SUBCUTANEOUS at 07:34

## 2023-01-01 RX ADMIN — HYDROMORPHONE HYDROCHLORIDE 4 MILLIGRAM(S): 2 INJECTION INTRAMUSCULAR; INTRAVENOUS; SUBCUTANEOUS at 12:40

## 2023-01-01 RX ADMIN — HYDROMORPHONE HYDROCHLORIDE 1 MILLIGRAM(S): 2 INJECTION INTRAMUSCULAR; INTRAVENOUS; SUBCUTANEOUS at 15:17

## 2023-01-01 RX ADMIN — HYDROMORPHONE HYDROCHLORIDE 0.25 MILLIGRAM(S): 2 INJECTION INTRAMUSCULAR; INTRAVENOUS; SUBCUTANEOUS at 07:22

## 2023-01-01 RX ADMIN — HYDROMORPHONE HYDROCHLORIDE 0.5 MILLIGRAM(S): 2 INJECTION INTRAMUSCULAR; INTRAVENOUS; SUBCUTANEOUS at 12:34

## 2023-01-01 RX ADMIN — HEPARIN SODIUM 5000 UNIT(S): 5000 INJECTION INTRAVENOUS; SUBCUTANEOUS at 21:03

## 2023-01-01 RX ADMIN — HYDROMORPHONE HYDROCHLORIDE 0.5 MILLIGRAM(S): 2 INJECTION INTRAMUSCULAR; INTRAVENOUS; SUBCUTANEOUS at 08:30

## 2023-01-01 RX ADMIN — Medication 975 MILLIGRAM(S): at 06:27

## 2023-01-01 RX ADMIN — Medication 650 MILLIGRAM(S): at 05:51

## 2023-01-01 RX ADMIN — Medication 400 MILLIGRAM(S): at 23:15

## 2023-01-01 RX ADMIN — SENNA PLUS 2 TABLET(S): 8.6 TABLET ORAL at 21:28

## 2023-01-01 RX ADMIN — HEPARIN SODIUM 7500 UNIT(S): 5000 INJECTION INTRAVENOUS; SUBCUTANEOUS at 22:00

## 2023-01-01 RX ADMIN — ATORVASTATIN CALCIUM 40 MILLIGRAM(S): 80 TABLET, FILM COATED ORAL at 21:42

## 2023-01-01 RX ADMIN — HEPARIN SODIUM 5000 UNIT(S): 5000 INJECTION INTRAVENOUS; SUBCUTANEOUS at 21:22

## 2023-01-01 RX ADMIN — HYDROMORPHONE HYDROCHLORIDE 0.5 MILLIGRAM(S): 2 INJECTION INTRAMUSCULAR; INTRAVENOUS; SUBCUTANEOUS at 08:36

## 2023-01-01 RX ADMIN — Medication 0.3 MILLIGRAM(S): at 12:22

## 2023-01-01 RX ADMIN — SEVELAMER CARBONATE 1600 MILLIGRAM(S): 2400 POWDER, FOR SUSPENSION ORAL at 18:57

## 2023-01-01 RX ADMIN — Medication 50 MILLIGRAM(S): at 12:22

## 2023-01-01 RX ADMIN — HYDROMORPHONE HYDROCHLORIDE 4 MILLIGRAM(S): 2 INJECTION INTRAMUSCULAR; INTRAVENOUS; SUBCUTANEOUS at 06:42

## 2023-01-01 RX ADMIN — HYDROMORPHONE HYDROCHLORIDE 1 MILLIGRAM(S): 2 INJECTION INTRAMUSCULAR; INTRAVENOUS; SUBCUTANEOUS at 02:53

## 2023-01-01 RX ADMIN — Medication 400 MILLIGRAM(S): at 15:49

## 2023-01-01 RX ADMIN — Medication 100 MILLIGRAM(S): at 15:00

## 2023-01-01 RX ADMIN — Medication 1 APPLICATION(S): at 06:34

## 2023-01-01 RX ADMIN — HYDROMORPHONE HYDROCHLORIDE 2 MILLIGRAM(S): 2 INJECTION INTRAMUSCULAR; INTRAVENOUS; SUBCUTANEOUS at 05:30

## 2023-01-01 RX ADMIN — Medication 1 APPLICATION(S): at 16:52

## 2023-01-01 RX ADMIN — SEVELAMER CARBONATE 800 MILLIGRAM(S): 2400 POWDER, FOR SUSPENSION ORAL at 07:52

## 2023-01-01 RX ADMIN — MIDODRINE HYDROCHLORIDE 25 MILLIGRAM(S): 2.5 TABLET ORAL at 03:27

## 2023-01-01 RX ADMIN — HYDROMORPHONE HYDROCHLORIDE 0.25 MILLIGRAM(S): 2 INJECTION INTRAMUSCULAR; INTRAVENOUS; SUBCUTANEOUS at 23:42

## 2023-01-01 RX ADMIN — Medication 50 MILLILITER(S): at 23:54

## 2023-01-01 RX ADMIN — MIDODRINE HYDROCHLORIDE 30 MILLIGRAM(S): 2.5 TABLET ORAL at 21:05

## 2023-01-01 RX ADMIN — SEVELAMER CARBONATE 1600 MILLIGRAM(S): 2400 POWDER, FOR SUSPENSION ORAL at 15:47

## 2023-01-01 RX ADMIN — PANTOPRAZOLE SODIUM 40 MILLIGRAM(S): 20 TABLET, DELAYED RELEASE ORAL at 21:44

## 2023-01-01 RX ADMIN — SEVELAMER CARBONATE 800 MILLIGRAM(S): 2400 POWDER, FOR SUSPENSION ORAL at 08:21

## 2023-01-01 RX ADMIN — SEVELAMER CARBONATE 1600 MILLIGRAM(S): 2400 POWDER, FOR SUSPENSION ORAL at 19:44

## 2023-01-01 RX ADMIN — Medication 166.67 MILLIGRAM(S): at 13:43

## 2023-01-01 RX ADMIN — CINACALCET 120 MILLIGRAM(S): 30 TABLET, FILM COATED ORAL at 22:32

## 2023-01-01 RX ADMIN — MIDODRINE HYDROCHLORIDE 30 MILLIGRAM(S): 2.5 TABLET ORAL at 13:27

## 2023-01-01 RX ADMIN — MEROPENEM 100 MILLIGRAM(S): 1 INJECTION INTRAVENOUS at 13:45

## 2023-01-01 RX ADMIN — HYDROMORPHONE HYDROCHLORIDE 1 MILLIGRAM(S): 2 INJECTION INTRAMUSCULAR; INTRAVENOUS; SUBCUTANEOUS at 19:13

## 2023-01-01 RX ADMIN — MIDODRINE HYDROCHLORIDE 40 MILLIGRAM(S): 2.5 TABLET ORAL at 06:11

## 2023-01-01 RX ADMIN — LIDOCAINE 1 PATCH: 4 CREAM TOPICAL at 04:35

## 2023-01-01 RX ADMIN — HYDROMORPHONE HYDROCHLORIDE 4 MILLIGRAM(S): 2 INJECTION INTRAMUSCULAR; INTRAVENOUS; SUBCUTANEOUS at 16:40

## 2023-01-01 RX ADMIN — HYDROMORPHONE HYDROCHLORIDE 0.5 MILLIGRAM(S): 2 INJECTION INTRAMUSCULAR; INTRAVENOUS; SUBCUTANEOUS at 23:49

## 2023-01-01 RX ADMIN — HYDROMORPHONE HYDROCHLORIDE 0.25 MILLIGRAM(S): 2 INJECTION INTRAMUSCULAR; INTRAVENOUS; SUBCUTANEOUS at 09:00

## 2023-01-01 RX ADMIN — HYDROMORPHONE HYDROCHLORIDE 0.5 MILLIGRAM(S): 2 INJECTION INTRAMUSCULAR; INTRAVENOUS; SUBCUTANEOUS at 03:55

## 2023-01-01 RX ADMIN — HEPARIN SODIUM 5000 UNIT(S): 5000 INJECTION INTRAVENOUS; SUBCUTANEOUS at 21:11

## 2023-01-01 RX ADMIN — CINACALCET 60 MILLIGRAM(S): 30 TABLET, FILM COATED ORAL at 13:28

## 2023-01-01 RX ADMIN — HYDROMORPHONE HYDROCHLORIDE 4 MILLIGRAM(S): 2 INJECTION INTRAMUSCULAR; INTRAVENOUS; SUBCUTANEOUS at 16:05

## 2023-01-01 RX ADMIN — HYDROMORPHONE HYDROCHLORIDE 0.25 MILLIGRAM(S): 2 INJECTION INTRAMUSCULAR; INTRAVENOUS; SUBCUTANEOUS at 08:17

## 2023-01-01 RX ADMIN — SEVELAMER CARBONATE 1600 MILLIGRAM(S): 2400 POWDER, FOR SUSPENSION ORAL at 10:13

## 2023-01-01 RX ADMIN — MIDODRINE HYDROCHLORIDE 40 MILLIGRAM(S): 2.5 TABLET ORAL at 16:09

## 2023-01-01 RX ADMIN — HYDROMORPHONE HYDROCHLORIDE 0.25 MILLIGRAM(S): 2 INJECTION INTRAMUSCULAR; INTRAVENOUS; SUBCUTANEOUS at 12:16

## 2023-01-01 RX ADMIN — Medication 100 GRAM(S): at 16:08

## 2023-01-01 RX ADMIN — HYDROMORPHONE HYDROCHLORIDE 4 MILLIGRAM(S): 2 INJECTION INTRAMUSCULAR; INTRAVENOUS; SUBCUTANEOUS at 20:53

## 2023-01-01 RX ADMIN — HYDROMORPHONE HYDROCHLORIDE 1 MILLIGRAM(S): 2 INJECTION INTRAMUSCULAR; INTRAVENOUS; SUBCUTANEOUS at 22:22

## 2023-01-01 RX ADMIN — POLYETHYLENE GLYCOL 3350 17 GRAM(S): 17 POWDER, FOR SOLUTION ORAL at 15:10

## 2023-01-01 RX ADMIN — PANTOPRAZOLE SODIUM 40 MILLIGRAM(S): 20 TABLET, DELAYED RELEASE ORAL at 22:25

## 2023-01-01 RX ADMIN — HEPARIN SODIUM 7500 UNIT(S): 5000 INJECTION INTRAVENOUS; SUBCUTANEOUS at 05:19

## 2023-01-01 RX ADMIN — PHENYLEPHRINE HYDROCHLORIDE 1.6 MICROGRAM(S)/KG/MIN: 10 INJECTION INTRAVENOUS at 01:00

## 2023-01-01 RX ADMIN — Medication 5 MILLIGRAM(S): at 21:11

## 2023-01-01 RX ADMIN — MIDODRINE HYDROCHLORIDE 40 MILLIGRAM(S): 2.5 TABLET ORAL at 22:23

## 2023-01-01 RX ADMIN — CHLORHEXIDINE GLUCONATE 1 APPLICATION(S): 213 SOLUTION TOPICAL at 06:25

## 2023-01-01 RX ADMIN — COSYNTROPIN 0.25 MILLIGRAM(S): 0.25 INJECTION, SOLUTION INTRAVENOUS at 18:54

## 2023-01-01 RX ADMIN — POLYETHYLENE GLYCOL 3350 17 GRAM(S): 17 POWDER, FOR SOLUTION ORAL at 17:21

## 2023-01-01 RX ADMIN — Medication 1 DROP(S): at 11:22

## 2023-01-01 RX ADMIN — HEPARIN SODIUM 20 UNIT(S)/HR: 5000 INJECTION INTRAVENOUS; SUBCUTANEOUS at 09:40

## 2023-01-01 RX ADMIN — HYDROMORPHONE HYDROCHLORIDE 2 MILLIGRAM(S): 2 INJECTION INTRAMUSCULAR; INTRAVENOUS; SUBCUTANEOUS at 18:30

## 2023-01-01 RX ADMIN — HYDROMORPHONE HYDROCHLORIDE 0.25 MILLIGRAM(S): 2 INJECTION INTRAMUSCULAR; INTRAVENOUS; SUBCUTANEOUS at 08:48

## 2023-01-01 RX ADMIN — CINACALCET 120 MILLIGRAM(S): 30 TABLET, FILM COATED ORAL at 23:21

## 2023-01-01 RX ADMIN — HYDROMORPHONE HYDROCHLORIDE 2 MILLIGRAM(S): 2 INJECTION INTRAMUSCULAR; INTRAVENOUS; SUBCUTANEOUS at 12:00

## 2023-01-01 RX ADMIN — Medication 650 MILLIGRAM(S): at 12:20

## 2023-01-01 RX ADMIN — Medication 400 GRAM(S): at 12:37

## 2023-01-01 RX ADMIN — HYDROMORPHONE HYDROCHLORIDE 4 MILLIGRAM(S): 2 INJECTION INTRAMUSCULAR; INTRAVENOUS; SUBCUTANEOUS at 14:09

## 2023-01-01 RX ADMIN — HYDROMORPHONE HYDROCHLORIDE 0.25 MILLIGRAM(S): 2 INJECTION INTRAMUSCULAR; INTRAVENOUS; SUBCUTANEOUS at 03:39

## 2023-01-01 RX ADMIN — PHENYLEPHRINE HYDROCHLORIDE 64 MICROGRAM(S)/KG/MIN: 10 INJECTION INTRAVENOUS at 17:29

## 2023-01-01 RX ADMIN — APIXABAN 2.5 MILLIGRAM(S): 2.5 TABLET, FILM COATED ORAL at 05:56

## 2023-01-01 RX ADMIN — MIDODRINE HYDROCHLORIDE 30 MILLIGRAM(S): 2.5 TABLET ORAL at 10:12

## 2023-01-01 RX ADMIN — MORPHINE SULFATE 2 MILLIGRAM(S): 50 CAPSULE, EXTENDED RELEASE ORAL at 15:35

## 2023-01-01 RX ADMIN — MIDODRINE HYDROCHLORIDE 30 MILLIGRAM(S): 2.5 TABLET ORAL at 14:30

## 2023-01-01 RX ADMIN — Medication 1 TABLET(S): at 12:28

## 2023-01-01 RX ADMIN — HYDROMORPHONE HYDROCHLORIDE 2 MILLIGRAM(S): 2 INJECTION INTRAMUSCULAR; INTRAVENOUS; SUBCUTANEOUS at 20:16

## 2023-01-01 RX ADMIN — NYSTATIN CREAM 1 APPLICATION(S): 100000 CREAM TOPICAL at 18:29

## 2023-01-01 RX ADMIN — Medication 1 APPLICATION(S): at 05:00

## 2023-01-01 RX ADMIN — MIDODRINE HYDROCHLORIDE 40 MILLIGRAM(S): 2.5 TABLET ORAL at 07:54

## 2023-01-01 RX ADMIN — Medication 100 MILLIGRAM(S): at 18:19

## 2023-01-01 RX ADMIN — Medication 1 APPLICATION(S): at 16:00

## 2023-01-01 RX ADMIN — PHENYLEPHRINE HYDROCHLORIDE 64 MICROGRAM(S)/KG/MIN: 10 INJECTION INTRAVENOUS at 03:06

## 2023-01-01 RX ADMIN — Medication 100 MILLIGRAM(S): at 23:22

## 2023-01-01 RX ADMIN — Medication 1 APPLICATION(S): at 12:21

## 2023-01-01 RX ADMIN — Medication 500 MILLIGRAM(S): at 14:24

## 2023-01-01 RX ADMIN — ATORVASTATIN CALCIUM 40 MILLIGRAM(S): 80 TABLET, FILM COATED ORAL at 23:35

## 2023-01-01 RX ADMIN — SEVELAMER CARBONATE 800 MILLIGRAM(S): 2400 POWDER, FOR SUSPENSION ORAL at 13:18

## 2023-01-01 RX ADMIN — HYDROMORPHONE HYDROCHLORIDE 0.5 MILLIGRAM(S): 2 INJECTION INTRAMUSCULAR; INTRAVENOUS; SUBCUTANEOUS at 08:40

## 2023-01-01 RX ADMIN — SEVELAMER CARBONATE 1600 MILLIGRAM(S): 2400 POWDER, FOR SUSPENSION ORAL at 11:15

## 2023-01-01 RX ADMIN — ATORVASTATIN CALCIUM 40 MILLIGRAM(S): 80 TABLET, FILM COATED ORAL at 21:27

## 2023-01-01 RX ADMIN — MIDODRINE HYDROCHLORIDE 40 MILLIGRAM(S): 2.5 TABLET ORAL at 21:03

## 2023-01-01 RX ADMIN — HYDROMORPHONE HYDROCHLORIDE 4 MILLIGRAM(S): 2 INJECTION INTRAMUSCULAR; INTRAVENOUS; SUBCUTANEOUS at 17:43

## 2023-01-01 RX ADMIN — HEPARIN SODIUM 19 UNIT(S)/HR: 5000 INJECTION INTRAVENOUS; SUBCUTANEOUS at 21:40

## 2023-01-01 RX ADMIN — HYDROMORPHONE HYDROCHLORIDE 4 MILLIGRAM(S): 2 INJECTION INTRAMUSCULAR; INTRAVENOUS; SUBCUTANEOUS at 11:24

## 2023-01-01 RX ADMIN — MIDODRINE HYDROCHLORIDE 40 MILLIGRAM(S): 2.5 TABLET ORAL at 13:01

## 2023-01-01 RX ADMIN — SEVELAMER CARBONATE 1600 MILLIGRAM(S): 2400 POWDER, FOR SUSPENSION ORAL at 12:28

## 2023-01-01 RX ADMIN — HYDROMORPHONE HYDROCHLORIDE 0.25 MILLIGRAM(S): 2 INJECTION INTRAMUSCULAR; INTRAVENOUS; SUBCUTANEOUS at 06:31

## 2023-01-01 RX ADMIN — MIDODRINE HYDROCHLORIDE 40 MILLIGRAM(S): 2.5 TABLET ORAL at 12:14

## 2023-01-01 RX ADMIN — ERYTHROPOIETIN 8000 UNIT(S): 10000 INJECTION, SOLUTION INTRAVENOUS; SUBCUTANEOUS at 14:17

## 2023-01-01 RX ADMIN — HEPARIN SODIUM 7500 UNIT(S): 5000 INJECTION INTRAVENOUS; SUBCUTANEOUS at 06:00

## 2023-01-01 RX ADMIN — HYDROMORPHONE HYDROCHLORIDE 1 MILLIGRAM(S): 2 INJECTION INTRAMUSCULAR; INTRAVENOUS; SUBCUTANEOUS at 10:07

## 2023-01-01 RX ADMIN — SEVELAMER CARBONATE 800 MILLIGRAM(S): 2400 POWDER, FOR SUSPENSION ORAL at 17:25

## 2023-01-01 RX ADMIN — HYDROMORPHONE HYDROCHLORIDE 2 MILLIGRAM(S): 2 INJECTION INTRAMUSCULAR; INTRAVENOUS; SUBCUTANEOUS at 21:14

## 2023-01-01 RX ADMIN — HEPARIN SODIUM 5000 UNIT(S): 5000 INJECTION INTRAVENOUS; SUBCUTANEOUS at 21:53

## 2023-01-01 RX ADMIN — NYSTATIN CREAM 1 APPLICATION(S): 100000 CREAM TOPICAL at 05:59

## 2023-01-01 RX ADMIN — CHLORHEXIDINE GLUCONATE 1 APPLICATION(S): 213 SOLUTION TOPICAL at 05:45

## 2023-01-01 RX ADMIN — Medication 650 MILLIGRAM(S): at 06:07

## 2023-01-01 RX ADMIN — HYDROMORPHONE HYDROCHLORIDE 4 MILLIGRAM(S): 2 INJECTION INTRAMUSCULAR; INTRAVENOUS; SUBCUTANEOUS at 14:18

## 2023-01-01 RX ADMIN — HYDROMORPHONE HYDROCHLORIDE 0.25 MILLIGRAM(S): 2 INJECTION INTRAMUSCULAR; INTRAVENOUS; SUBCUTANEOUS at 13:45

## 2023-01-01 RX ADMIN — MIDODRINE HYDROCHLORIDE 40 MILLIGRAM(S): 2.5 TABLET ORAL at 14:26

## 2023-01-01 RX ADMIN — MIDODRINE HYDROCHLORIDE 40 MILLIGRAM(S): 2.5 TABLET ORAL at 07:29

## 2023-01-01 RX ADMIN — MIDODRINE HYDROCHLORIDE 40 MILLIGRAM(S): 2.5 TABLET ORAL at 09:49

## 2023-01-01 RX ADMIN — Medication 400 MILLIGRAM(S): at 23:58

## 2023-01-01 RX ADMIN — Medication 500 MILLIGRAM(S): at 10:19

## 2023-01-01 RX ADMIN — Medication 5 MILLIGRAM(S): at 21:08

## 2023-01-01 RX ADMIN — HYDROMORPHONE HYDROCHLORIDE 1 MILLIGRAM(S): 2 INJECTION INTRAMUSCULAR; INTRAVENOUS; SUBCUTANEOUS at 23:00

## 2023-01-01 RX ADMIN — Medication 1 TABLET(S): at 13:18

## 2023-01-01 RX ADMIN — Medication 1 MILLIGRAM(S): at 12:13

## 2023-01-01 RX ADMIN — Medication 1 APPLICATION(S): at 19:30

## 2023-01-01 RX ADMIN — CHLORHEXIDINE GLUCONATE 1 APPLICATION(S): 213 SOLUTION TOPICAL at 05:39

## 2023-01-01 RX ADMIN — Medication 50 MILLIGRAM(S): at 07:26

## 2023-01-01 RX ADMIN — Medication 7.44 MICROGRAM(S)/KG/MIN: at 06:11

## 2023-01-01 RX ADMIN — Medication 5 MILLIGRAM(S): at 22:02

## 2023-01-01 RX ADMIN — Medication 1 DROP(S): at 06:54

## 2023-01-01 RX ADMIN — Medication 1 DROP(S): at 18:32

## 2023-01-01 RX ADMIN — HYDROMORPHONE HYDROCHLORIDE 0.5 MILLIGRAM(S): 2 INJECTION INTRAMUSCULAR; INTRAVENOUS; SUBCUTANEOUS at 15:35

## 2023-01-01 RX ADMIN — MIDODRINE HYDROCHLORIDE 40 MILLIGRAM(S): 2.5 TABLET ORAL at 22:37

## 2023-01-01 RX ADMIN — HYDROMORPHONE HYDROCHLORIDE 0.25 MILLIGRAM(S): 2 INJECTION INTRAMUSCULAR; INTRAVENOUS; SUBCUTANEOUS at 16:53

## 2023-01-01 RX ADMIN — SEVELAMER CARBONATE 1600 MILLIGRAM(S): 2400 POWDER, FOR SUSPENSION ORAL at 17:55

## 2023-01-01 RX ADMIN — HYDROMORPHONE HYDROCHLORIDE 2 MILLIGRAM(S): 2 INJECTION INTRAMUSCULAR; INTRAVENOUS; SUBCUTANEOUS at 11:23

## 2023-01-01 RX ADMIN — SEVELAMER CARBONATE 1600 MILLIGRAM(S): 2400 POWDER, FOR SUSPENSION ORAL at 09:14

## 2023-01-01 RX ADMIN — HEPARIN SODIUM 5000 UNIT(S): 5000 INJECTION INTRAVENOUS; SUBCUTANEOUS at 13:45

## 2023-01-01 RX ADMIN — Medication 1 MILLIGRAM(S): at 15:12

## 2023-01-01 RX ADMIN — Medication 650 MILLIGRAM(S): at 19:48

## 2023-01-01 RX ADMIN — NYSTATIN CREAM 1 APPLICATION(S): 100000 CREAM TOPICAL at 06:31

## 2023-01-01 RX ADMIN — HEPARIN SODIUM 7500 UNIT(S): 5000 INJECTION INTRAVENOUS; SUBCUTANEOUS at 21:06

## 2023-01-01 RX ADMIN — HEPARIN SODIUM 5000 UNIT(S): 5000 INJECTION INTRAVENOUS; SUBCUTANEOUS at 05:47

## 2023-01-01 RX ADMIN — HYDROMORPHONE HYDROCHLORIDE 0.25 MILLIGRAM(S): 2 INJECTION INTRAMUSCULAR; INTRAVENOUS; SUBCUTANEOUS at 09:03

## 2023-01-01 RX ADMIN — Medication 400 MILLIGRAM(S): at 08:48

## 2023-01-01 RX ADMIN — ATORVASTATIN CALCIUM 40 MILLIGRAM(S): 80 TABLET, FILM COATED ORAL at 23:17

## 2023-01-01 RX ADMIN — NYSTATIN CREAM 1 APPLICATION(S): 100000 CREAM TOPICAL at 17:53

## 2023-01-01 RX ADMIN — HYDROMORPHONE HYDROCHLORIDE 4 MILLIGRAM(S): 2 INJECTION INTRAMUSCULAR; INTRAVENOUS; SUBCUTANEOUS at 15:05

## 2023-01-01 RX ADMIN — HYDROMORPHONE HYDROCHLORIDE 4 MILLIGRAM(S): 2 INJECTION INTRAMUSCULAR; INTRAVENOUS; SUBCUTANEOUS at 22:24

## 2023-01-01 RX ADMIN — Medication 650 MILLIGRAM(S): at 06:35

## 2023-01-01 RX ADMIN — Medication 1 APPLICATION(S): at 07:00

## 2023-01-01 RX ADMIN — Medication 25 GRAM(S): at 03:09

## 2023-01-01 RX ADMIN — Medication 100 MILLIGRAM(S): at 22:48

## 2023-01-01 RX ADMIN — Medication 400 GRAM(S): at 14:06

## 2023-01-01 RX ADMIN — ATORVASTATIN CALCIUM 40 MILLIGRAM(S): 80 TABLET, FILM COATED ORAL at 21:47

## 2023-01-01 RX ADMIN — Medication 1000 MILLIGRAM(S): at 09:30

## 2023-01-01 RX ADMIN — Medication 1 MILLIGRAM(S): at 12:04

## 2023-01-01 RX ADMIN — HYDROMORPHONE HYDROCHLORIDE 1 MILLIGRAM(S): 2 INJECTION INTRAMUSCULAR; INTRAVENOUS; SUBCUTANEOUS at 14:19

## 2023-01-01 RX ADMIN — HYDROMORPHONE HYDROCHLORIDE 1 MILLIGRAM(S): 2 INJECTION INTRAMUSCULAR; INTRAVENOUS; SUBCUTANEOUS at 13:42

## 2023-01-01 RX ADMIN — HYDROMORPHONE HYDROCHLORIDE 0.5 MILLIGRAM(S): 2 INJECTION INTRAMUSCULAR; INTRAVENOUS; SUBCUTANEOUS at 12:08

## 2023-01-01 RX ADMIN — SEVELAMER CARBONATE 1600 MILLIGRAM(S): 2400 POWDER, FOR SUSPENSION ORAL at 12:53

## 2023-01-01 RX ADMIN — MIDODRINE HYDROCHLORIDE 30 MILLIGRAM(S): 2.5 TABLET ORAL at 17:01

## 2023-01-01 RX ADMIN — POLYETHYLENE GLYCOL 3350 17 GRAM(S): 17 POWDER, FOR SOLUTION ORAL at 11:10

## 2023-01-01 RX ADMIN — ATORVASTATIN CALCIUM 40 MILLIGRAM(S): 80 TABLET, FILM COATED ORAL at 21:10

## 2023-01-01 RX ADMIN — Medication 1 APPLICATION(S): at 12:57

## 2023-01-01 RX ADMIN — MIDODRINE HYDROCHLORIDE 30 MILLIGRAM(S): 2.5 TABLET ORAL at 19:05

## 2023-01-01 RX ADMIN — HYDROMORPHONE HYDROCHLORIDE 1 MILLIGRAM(S): 2 INJECTION INTRAMUSCULAR; INTRAVENOUS; SUBCUTANEOUS at 20:58

## 2023-01-01 RX ADMIN — SENNA PLUS 2 TABLET(S): 8.6 TABLET ORAL at 22:47

## 2023-01-01 RX ADMIN — Medication 1 APPLICATION(S): at 12:32

## 2023-01-01 RX ADMIN — ATORVASTATIN CALCIUM 40 MILLIGRAM(S): 80 TABLET, FILM COATED ORAL at 22:47

## 2023-01-01 RX ADMIN — NYSTATIN CREAM 1 APPLICATION(S): 100000 CREAM TOPICAL at 06:19

## 2023-01-01 RX ADMIN — POLYETHYLENE GLYCOL 3350 17 GRAM(S): 17 POWDER, FOR SOLUTION ORAL at 12:06

## 2023-01-01 RX ADMIN — Medication 5 MILLIGRAM(S): at 22:31

## 2023-01-01 RX ADMIN — HYDROMORPHONE HYDROCHLORIDE 2 MILLIGRAM(S): 2 INJECTION INTRAMUSCULAR; INTRAVENOUS; SUBCUTANEOUS at 09:43

## 2023-01-01 RX ADMIN — HYDROMORPHONE HYDROCHLORIDE 1 MILLIGRAM(S): 2 INJECTION INTRAMUSCULAR; INTRAVENOUS; SUBCUTANEOUS at 06:33

## 2023-01-01 RX ADMIN — HYDROMORPHONE HYDROCHLORIDE 0.25 MILLIGRAM(S): 2 INJECTION INTRAMUSCULAR; INTRAVENOUS; SUBCUTANEOUS at 04:34

## 2023-01-01 RX ADMIN — HYDROMORPHONE HYDROCHLORIDE 2 MILLIGRAM(S): 2 INJECTION INTRAMUSCULAR; INTRAVENOUS; SUBCUTANEOUS at 18:22

## 2023-01-01 RX ADMIN — CLOPIDOGREL BISULFATE 75 MILLIGRAM(S): 75 TABLET, FILM COATED ORAL at 12:40

## 2023-01-01 RX ADMIN — HYDROMORPHONE HYDROCHLORIDE 0.25 MILLIGRAM(S): 2 INJECTION INTRAMUSCULAR; INTRAVENOUS; SUBCUTANEOUS at 11:32

## 2023-01-01 RX ADMIN — MIDODRINE HYDROCHLORIDE 40 MILLIGRAM(S): 2.5 TABLET ORAL at 06:47

## 2023-01-01 RX ADMIN — Medication 1 APPLICATION(S): at 18:09

## 2023-01-01 RX ADMIN — Medication 500 MILLIGRAM(S): at 12:13

## 2023-01-01 RX ADMIN — PANTOPRAZOLE SODIUM 40 MILLIGRAM(S): 20 TABLET, DELAYED RELEASE ORAL at 05:20

## 2023-01-01 RX ADMIN — MIDODRINE HYDROCHLORIDE 40 MILLIGRAM(S): 2.5 TABLET ORAL at 07:06

## 2023-01-01 RX ADMIN — Medication 50 MILLIGRAM(S): at 06:03

## 2023-01-01 RX ADMIN — MIDODRINE HYDROCHLORIDE 40 MILLIGRAM(S): 2.5 TABLET ORAL at 23:33

## 2023-01-01 RX ADMIN — HYDROMORPHONE HYDROCHLORIDE 4 MILLIGRAM(S): 2 INJECTION INTRAMUSCULAR; INTRAVENOUS; SUBCUTANEOUS at 07:30

## 2023-01-01 RX ADMIN — Medication 3.72 MICROGRAM(S)/KG/MIN: at 13:59

## 2023-01-01 RX ADMIN — SEVELAMER CARBONATE 800 MILLIGRAM(S): 2400 POWDER, FOR SUSPENSION ORAL at 17:10

## 2023-01-01 RX ADMIN — Medication 1 APPLICATION(S): at 09:26

## 2023-01-01 RX ADMIN — PANTOPRAZOLE SODIUM 40 MILLIGRAM(S): 20 TABLET, DELAYED RELEASE ORAL at 11:11

## 2023-01-01 RX ADMIN — HYDROMORPHONE HYDROCHLORIDE 2 MILLIGRAM(S): 2 INJECTION INTRAMUSCULAR; INTRAVENOUS; SUBCUTANEOUS at 09:00

## 2023-01-01 RX ADMIN — SENNA PLUS 2 TABLET(S): 8.6 TABLET ORAL at 22:07

## 2023-01-01 RX ADMIN — APIXABAN 2.5 MILLIGRAM(S): 2.5 TABLET, FILM COATED ORAL at 06:40

## 2023-01-01 RX ADMIN — Medication 1 TABLET(S): at 15:12

## 2023-01-01 RX ADMIN — HYDROMORPHONE HYDROCHLORIDE 0.25 MILLIGRAM(S): 2 INJECTION INTRAMUSCULAR; INTRAVENOUS; SUBCUTANEOUS at 17:18

## 2023-01-01 RX ADMIN — SEVELAMER CARBONATE 1600 MILLIGRAM(S): 2400 POWDER, FOR SUSPENSION ORAL at 07:46

## 2023-01-01 RX ADMIN — Medication 5 MILLIGRAM(S): at 22:35

## 2023-01-01 RX ADMIN — HYDROMORPHONE HYDROCHLORIDE 4 MILLIGRAM(S): 2 INJECTION INTRAMUSCULAR; INTRAVENOUS; SUBCUTANEOUS at 12:07

## 2023-01-01 RX ADMIN — SEVELAMER CARBONATE 800 MILLIGRAM(S): 2400 POWDER, FOR SUSPENSION ORAL at 19:12

## 2023-01-01 RX ADMIN — HEPARIN SODIUM 17 UNIT(S)/HR: 5000 INJECTION INTRAVENOUS; SUBCUTANEOUS at 21:00

## 2023-01-01 RX ADMIN — SODIUM CHLORIDE 30 MILLILITER(S): 9 INJECTION, SOLUTION INTRAVENOUS at 09:23

## 2023-01-01 RX ADMIN — CINACALCET 60 MILLIGRAM(S): 30 TABLET, FILM COATED ORAL at 12:54

## 2023-01-01 RX ADMIN — Medication 50 MILLILITER(S): at 07:15

## 2023-01-01 RX ADMIN — HYDROMORPHONE HYDROCHLORIDE 2 MILLIGRAM(S): 2 INJECTION INTRAMUSCULAR; INTRAVENOUS; SUBCUTANEOUS at 16:36

## 2023-01-01 RX ADMIN — MIDODRINE HYDROCHLORIDE 40 MILLIGRAM(S): 2.5 TABLET ORAL at 15:54

## 2023-01-01 RX ADMIN — FLUDROCORTISONE ACETATE 0.2 MILLIGRAM(S): 0.1 TABLET ORAL at 19:11

## 2023-01-01 RX ADMIN — PANTOPRAZOLE SODIUM 40 MILLIGRAM(S): 20 TABLET, DELAYED RELEASE ORAL at 17:40

## 2023-01-01 RX ADMIN — MIDODRINE HYDROCHLORIDE 40 MILLIGRAM(S): 2.5 TABLET ORAL at 13:50

## 2023-01-01 RX ADMIN — HYDROMORPHONE HYDROCHLORIDE 0.25 MILLIGRAM(S): 2 INJECTION INTRAMUSCULAR; INTRAVENOUS; SUBCUTANEOUS at 21:54

## 2023-01-01 RX ADMIN — HYDROMORPHONE HYDROCHLORIDE 4 MILLIGRAM(S): 2 INJECTION INTRAMUSCULAR; INTRAVENOUS; SUBCUTANEOUS at 23:49

## 2023-01-01 RX ADMIN — HYDROMORPHONE HYDROCHLORIDE 4 MILLIGRAM(S): 2 INJECTION INTRAMUSCULAR; INTRAVENOUS; SUBCUTANEOUS at 00:00

## 2023-01-01 RX ADMIN — CINACALCET 30 MILLIGRAM(S): 30 TABLET, FILM COATED ORAL at 12:06

## 2023-01-01 RX ADMIN — Medication 0.3 MILLIGRAM(S): at 12:40

## 2023-01-01 RX ADMIN — POLYETHYLENE GLYCOL 3350 17 GRAM(S): 17 POWDER, FOR SOLUTION ORAL at 11:24

## 2023-01-01 RX ADMIN — HYDROMORPHONE HYDROCHLORIDE 0.25 MILLIGRAM(S): 2 INJECTION INTRAMUSCULAR; INTRAVENOUS; SUBCUTANEOUS at 14:42

## 2023-01-01 RX ADMIN — HYDROMORPHONE HYDROCHLORIDE 0.25 MILLIGRAM(S): 2 INJECTION INTRAMUSCULAR; INTRAVENOUS; SUBCUTANEOUS at 07:10

## 2023-01-01 RX ADMIN — HYDROMORPHONE HYDROCHLORIDE 3 MILLIGRAM(S): 2 INJECTION INTRAMUSCULAR; INTRAVENOUS; SUBCUTANEOUS at 18:02

## 2023-01-01 RX ADMIN — CINACALCET 30 MILLIGRAM(S): 30 TABLET, FILM COATED ORAL at 11:13

## 2023-01-01 RX ADMIN — HEPARIN SODIUM 5000 UNIT(S): 5000 INJECTION INTRAVENOUS; SUBCUTANEOUS at 14:43

## 2023-01-01 RX ADMIN — APIXABAN 2.5 MILLIGRAM(S): 2.5 TABLET, FILM COATED ORAL at 06:01

## 2023-01-01 RX ADMIN — ERYTHROPOIETIN 8000 UNIT(S): 10000 INJECTION, SOLUTION INTRAVENOUS; SUBCUTANEOUS at 12:16

## 2023-01-01 RX ADMIN — SEVELAMER CARBONATE 800 MILLIGRAM(S): 2400 POWDER, FOR SUSPENSION ORAL at 18:38

## 2023-01-01 RX ADMIN — Medication 1 MILLIGRAM(S): at 10:19

## 2023-01-01 RX ADMIN — MIDODRINE HYDROCHLORIDE 30 MILLIGRAM(S): 2.5 TABLET ORAL at 05:48

## 2023-01-01 RX ADMIN — PHENYLEPHRINE HYDROCHLORIDE 1.6 MICROGRAM(S)/KG/MIN: 10 INJECTION INTRAVENOUS at 09:57

## 2023-01-01 RX ADMIN — LIDOCAINE 1 APPLICATION(S): 4 CREAM TOPICAL at 10:48

## 2023-01-01 RX ADMIN — MIDODRINE HYDROCHLORIDE 40 MILLIGRAM(S): 2.5 TABLET ORAL at 15:37

## 2023-01-01 RX ADMIN — PANTOPRAZOLE SODIUM 40 MILLIGRAM(S): 20 TABLET, DELAYED RELEASE ORAL at 06:24

## 2023-01-01 RX ADMIN — MIDODRINE HYDROCHLORIDE 40 MILLIGRAM(S): 2.5 TABLET ORAL at 14:00

## 2023-01-01 RX ADMIN — Medication 166.67 MILLIGRAM(S): at 06:49

## 2023-01-01 RX ADMIN — MIDODRINE HYDROCHLORIDE 40 MILLIGRAM(S): 2.5 TABLET ORAL at 17:24

## 2023-01-01 RX ADMIN — HYDROMORPHONE HYDROCHLORIDE 1 MILLIGRAM(S): 2 INJECTION INTRAMUSCULAR; INTRAVENOUS; SUBCUTANEOUS at 07:30

## 2023-01-01 RX ADMIN — Medication 1 APPLICATION(S): at 06:30

## 2023-01-01 RX ADMIN — CHLORHEXIDINE GLUCONATE 1 APPLICATION(S): 213 SOLUTION TOPICAL at 12:30

## 2023-01-01 RX ADMIN — HYDROMORPHONE HYDROCHLORIDE 1 MILLIGRAM(S): 2 INJECTION INTRAMUSCULAR; INTRAVENOUS; SUBCUTANEOUS at 20:34

## 2023-01-01 RX ADMIN — CHLORHEXIDINE GLUCONATE 1 APPLICATION(S): 213 SOLUTION TOPICAL at 05:31

## 2023-01-01 RX ADMIN — PANTOPRAZOLE SODIUM 40 MILLIGRAM(S): 20 TABLET, DELAYED RELEASE ORAL at 14:05

## 2023-01-01 RX ADMIN — Medication 3.72 MICROGRAM(S)/KG/MIN: at 01:25

## 2023-01-01 RX ADMIN — HYDROMORPHONE HYDROCHLORIDE 0.25 MILLIGRAM(S): 2 INJECTION INTRAMUSCULAR; INTRAVENOUS; SUBCUTANEOUS at 03:00

## 2023-01-01 RX ADMIN — Medication 40 MILLIGRAM(S): at 11:08

## 2023-01-01 RX ADMIN — SODIUM CHLORIDE 30 MILLILITER(S): 9 INJECTION, SOLUTION INTRAVENOUS at 10:23

## 2023-01-01 RX ADMIN — HYDROMORPHONE HYDROCHLORIDE 4 MILLIGRAM(S): 2 INJECTION INTRAMUSCULAR; INTRAVENOUS; SUBCUTANEOUS at 09:55

## 2023-01-01 RX ADMIN — ATORVASTATIN CALCIUM 40 MILLIGRAM(S): 80 TABLET, FILM COATED ORAL at 21:48

## 2023-01-01 RX ADMIN — CLOPIDOGREL BISULFATE 75 MILLIGRAM(S): 75 TABLET, FILM COATED ORAL at 11:32

## 2023-01-01 RX ADMIN — MIDODRINE HYDROCHLORIDE 40 MILLIGRAM(S): 2.5 TABLET ORAL at 07:27

## 2023-01-01 RX ADMIN — Medication 1 TABLET(S): at 11:10

## 2023-01-01 RX ADMIN — HYDROMORPHONE HYDROCHLORIDE 1 MILLIGRAM(S): 2 INJECTION INTRAMUSCULAR; INTRAVENOUS; SUBCUTANEOUS at 13:38

## 2023-01-01 RX ADMIN — HYDROMORPHONE HYDROCHLORIDE 1 MILLIGRAM(S): 2 INJECTION INTRAMUSCULAR; INTRAVENOUS; SUBCUTANEOUS at 03:10

## 2023-01-01 RX ADMIN — APIXABAN 2.5 MILLIGRAM(S): 2.5 TABLET, FILM COATED ORAL at 17:22

## 2023-01-01 RX ADMIN — HYDROMORPHONE HYDROCHLORIDE 0.5 MILLIGRAM(S): 2 INJECTION INTRAMUSCULAR; INTRAVENOUS; SUBCUTANEOUS at 13:50

## 2023-01-01 RX ADMIN — ERYTHROPOIETIN 8000 UNIT(S): 10000 INJECTION, SOLUTION INTRAVENOUS; SUBCUTANEOUS at 13:00

## 2023-01-01 RX ADMIN — MIDODRINE HYDROCHLORIDE 30 MILLIGRAM(S): 2.5 TABLET ORAL at 22:45

## 2023-01-01 RX ADMIN — ATORVASTATIN CALCIUM 40 MILLIGRAM(S): 80 TABLET, FILM COATED ORAL at 21:11

## 2023-01-01 RX ADMIN — NYSTATIN CREAM 1 APPLICATION(S): 100000 CREAM TOPICAL at 18:15

## 2023-01-01 RX ADMIN — SODIUM CHLORIDE 5 MILLILITER(S): 9 INJECTION INTRAMUSCULAR; INTRAVENOUS; SUBCUTANEOUS at 22:01

## 2023-01-01 RX ADMIN — DAPTOMYCIN 116 MILLIGRAM(S): 500 INJECTION, POWDER, LYOPHILIZED, FOR SOLUTION INTRAVENOUS at 18:34

## 2023-01-01 RX ADMIN — HYDROMORPHONE HYDROCHLORIDE 0.5 MILLIGRAM(S): 2 INJECTION INTRAMUSCULAR; INTRAVENOUS; SUBCUTANEOUS at 14:30

## 2023-01-01 RX ADMIN — APIXABAN 2.5 MILLIGRAM(S): 2.5 TABLET, FILM COATED ORAL at 17:25

## 2023-01-01 RX ADMIN — Medication 1 APPLICATION(S): at 21:18

## 2023-01-01 RX ADMIN — HEPARIN SODIUM 19 UNIT(S)/HR: 5000 INJECTION INTRAVENOUS; SUBCUTANEOUS at 20:40

## 2023-01-01 RX ADMIN — HYDROMORPHONE HYDROCHLORIDE 0.25 MILLIGRAM(S): 2 INJECTION INTRAMUSCULAR; INTRAVENOUS; SUBCUTANEOUS at 10:15

## 2023-01-01 RX ADMIN — POLYETHYLENE GLYCOL 3350 17 GRAM(S): 17 POWDER, FOR SOLUTION ORAL at 15:12

## 2023-01-01 RX ADMIN — SENNA PLUS 2 TABLET(S): 8.6 TABLET ORAL at 20:49

## 2023-01-01 RX ADMIN — HEPARIN SODIUM 7500 UNIT(S): 5000 INJECTION INTRAVENOUS; SUBCUTANEOUS at 05:38

## 2023-01-01 RX ADMIN — HYDROMORPHONE HYDROCHLORIDE 1 MILLIGRAM(S): 2 INJECTION INTRAMUSCULAR; INTRAVENOUS; SUBCUTANEOUS at 07:39

## 2023-01-01 RX ADMIN — Medication 650 MILLIGRAM(S): at 20:34

## 2023-01-01 RX ADMIN — HYDROMORPHONE HYDROCHLORIDE 2 MILLIGRAM(S): 2 INJECTION INTRAMUSCULAR; INTRAVENOUS; SUBCUTANEOUS at 21:48

## 2023-01-01 RX ADMIN — HYDROMORPHONE HYDROCHLORIDE 1 MILLIGRAM(S): 2 INJECTION INTRAMUSCULAR; INTRAVENOUS; SUBCUTANEOUS at 06:34

## 2023-01-01 RX ADMIN — HYDROMORPHONE HYDROCHLORIDE 0.25 MILLIGRAM(S): 2 INJECTION INTRAMUSCULAR; INTRAVENOUS; SUBCUTANEOUS at 22:10

## 2023-01-01 RX ADMIN — HYDROMORPHONE HYDROCHLORIDE 1 MILLIGRAM(S): 2 INJECTION INTRAMUSCULAR; INTRAVENOUS; SUBCUTANEOUS at 18:42

## 2023-01-01 RX ADMIN — ERYTHROPOIETIN 10000 UNIT(S): 10000 INJECTION, SOLUTION INTRAVENOUS; SUBCUTANEOUS at 11:25

## 2023-01-01 RX ADMIN — Medication 1000 MILLIGRAM(S): at 11:05

## 2023-01-01 RX ADMIN — CLOPIDOGREL BISULFATE 75 MILLIGRAM(S): 75 TABLET, FILM COATED ORAL at 12:48

## 2023-01-01 RX ADMIN — SENNA PLUS 2 TABLET(S): 8.6 TABLET ORAL at 21:26

## 2023-01-01 RX ADMIN — HYDROMORPHONE HYDROCHLORIDE 4 MILLIGRAM(S): 2 INJECTION INTRAMUSCULAR; INTRAVENOUS; SUBCUTANEOUS at 00:07

## 2023-01-01 RX ADMIN — Medication 3.72 MICROGRAM(S)/KG/MIN: at 18:21

## 2023-01-01 RX ADMIN — CINACALCET 30 MILLIGRAM(S): 30 TABLET, FILM COATED ORAL at 11:31

## 2023-01-01 RX ADMIN — HYDROMORPHONE HYDROCHLORIDE 0.5 MILLIGRAM(S): 2 INJECTION INTRAMUSCULAR; INTRAVENOUS; SUBCUTANEOUS at 11:13

## 2023-01-01 RX ADMIN — FLUDROCORTISONE ACETATE 0.1 MILLIGRAM(S): 0.1 TABLET ORAL at 16:24

## 2023-01-01 RX ADMIN — Medication 650 MILLIGRAM(S): at 17:31

## 2023-01-01 RX ADMIN — HYDROMORPHONE HYDROCHLORIDE 0.5 MILLIGRAM(S): 2 INJECTION INTRAMUSCULAR; INTRAVENOUS; SUBCUTANEOUS at 07:06

## 2023-01-01 RX ADMIN — HYDROMORPHONE HYDROCHLORIDE 4 MILLIGRAM(S): 2 INJECTION INTRAMUSCULAR; INTRAVENOUS; SUBCUTANEOUS at 01:50

## 2023-01-01 RX ADMIN — Medication 1 TABLET(S): at 13:00

## 2023-01-01 RX ADMIN — Medication 5 MILLIGRAM(S): at 09:45

## 2023-01-01 RX ADMIN — HYDROMORPHONE HYDROCHLORIDE 0.25 MILLIGRAM(S): 2 INJECTION INTRAMUSCULAR; INTRAVENOUS; SUBCUTANEOUS at 10:46

## 2023-01-01 RX ADMIN — HYDROMORPHONE HYDROCHLORIDE 4 MILLIGRAM(S): 2 INJECTION INTRAMUSCULAR; INTRAVENOUS; SUBCUTANEOUS at 22:30

## 2023-01-01 RX ADMIN — HYDROMORPHONE HYDROCHLORIDE 2 MILLIGRAM(S): 2 INJECTION INTRAMUSCULAR; INTRAVENOUS; SUBCUTANEOUS at 10:16

## 2023-01-01 RX ADMIN — APIXABAN 5 MILLIGRAM(S): 2.5 TABLET, FILM COATED ORAL at 06:24

## 2023-01-01 RX ADMIN — HYDROMORPHONE HYDROCHLORIDE 2 MILLIGRAM(S): 2 INJECTION INTRAMUSCULAR; INTRAVENOUS; SUBCUTANEOUS at 18:42

## 2023-01-01 RX ADMIN — Medication 975 MILLIGRAM(S): at 22:01

## 2023-01-01 RX ADMIN — ATORVASTATIN CALCIUM 40 MILLIGRAM(S): 80 TABLET, FILM COATED ORAL at 20:44

## 2023-01-01 RX ADMIN — SODIUM CHLORIDE 10 MILLILITER(S): 9 INJECTION INTRAMUSCULAR; INTRAVENOUS; SUBCUTANEOUS at 18:54

## 2023-01-01 RX ADMIN — POLYETHYLENE GLYCOL 3350 17 GRAM(S): 17 POWDER, FOR SOLUTION ORAL at 06:53

## 2023-01-01 RX ADMIN — HYDROMORPHONE HYDROCHLORIDE 3 MILLIGRAM(S): 2 INJECTION INTRAMUSCULAR; INTRAVENOUS; SUBCUTANEOUS at 20:30

## 2023-01-01 RX ADMIN — HYDROMORPHONE HYDROCHLORIDE 0.5 MILLIGRAM(S): 2 INJECTION INTRAMUSCULAR; INTRAVENOUS; SUBCUTANEOUS at 11:37

## 2023-01-01 RX ADMIN — HYDROMORPHONE HYDROCHLORIDE 0.5 MILLIGRAM(S): 2 INJECTION INTRAMUSCULAR; INTRAVENOUS; SUBCUTANEOUS at 01:13

## 2023-01-01 RX ADMIN — HYDROMORPHONE HYDROCHLORIDE 0.5 MILLIGRAM(S): 2 INJECTION INTRAMUSCULAR; INTRAVENOUS; SUBCUTANEOUS at 15:37

## 2023-01-01 RX ADMIN — MIDODRINE HYDROCHLORIDE 40 MILLIGRAM(S): 2.5 TABLET ORAL at 00:54

## 2023-01-01 RX ADMIN — HYDROMORPHONE HYDROCHLORIDE 0.5 MILLIGRAM(S): 2 INJECTION INTRAMUSCULAR; INTRAVENOUS; SUBCUTANEOUS at 03:00

## 2023-01-01 RX ADMIN — FLUDROCORTISONE ACETATE 0.2 MILLIGRAM(S): 0.1 TABLET ORAL at 19:35

## 2023-01-01 RX ADMIN — PANTOPRAZOLE SODIUM 40 MILLIGRAM(S): 20 TABLET, DELAYED RELEASE ORAL at 15:02

## 2023-01-01 RX ADMIN — ATORVASTATIN CALCIUM 40 MILLIGRAM(S): 80 TABLET, FILM COATED ORAL at 22:02

## 2023-01-01 RX ADMIN — Medication 30 MILLILITER(S): at 13:45

## 2023-01-01 RX ADMIN — CINACALCET 30 MILLIGRAM(S): 30 TABLET, FILM COATED ORAL at 12:21

## 2023-01-01 RX ADMIN — ATORVASTATIN CALCIUM 40 MILLIGRAM(S): 80 TABLET, FILM COATED ORAL at 21:43

## 2023-01-01 RX ADMIN — SEVELAMER CARBONATE 1600 MILLIGRAM(S): 2400 POWDER, FOR SUSPENSION ORAL at 17:01

## 2023-01-01 RX ADMIN — CINACALCET 120 MILLIGRAM(S): 30 TABLET, FILM COATED ORAL at 22:00

## 2023-01-01 RX ADMIN — ATORVASTATIN CALCIUM 40 MILLIGRAM(S): 80 TABLET, FILM COATED ORAL at 22:16

## 2023-01-01 RX ADMIN — MIDODRINE HYDROCHLORIDE 30 MILLIGRAM(S): 2.5 TABLET ORAL at 23:17

## 2023-01-01 RX ADMIN — NYSTATIN CREAM 1 APPLICATION(S): 100000 CREAM TOPICAL at 19:31

## 2023-01-01 RX ADMIN — CINACALCET 90 MILLIGRAM(S): 30 TABLET, FILM COATED ORAL at 12:21

## 2023-01-01 RX ADMIN — Medication 1000 MILLIGRAM(S): at 21:35

## 2023-01-01 RX ADMIN — SENNA PLUS 2 TABLET(S): 8.6 TABLET ORAL at 21:42

## 2023-01-01 RX ADMIN — APIXABAN 2.5 MILLIGRAM(S): 2.5 TABLET, FILM COATED ORAL at 19:30

## 2023-01-01 RX ADMIN — ERYTHROPOIETIN 8000 UNIT(S): 10000 INJECTION, SOLUTION INTRAVENOUS; SUBCUTANEOUS at 14:48

## 2023-01-01 RX ADMIN — POLYETHYLENE GLYCOL 3350 17 GRAM(S): 17 POWDER, FOR SOLUTION ORAL at 10:02

## 2023-01-01 RX ADMIN — MIDODRINE HYDROCHLORIDE 40 MILLIGRAM(S): 2.5 TABLET ORAL at 21:48

## 2023-01-01 RX ADMIN — MIDODRINE HYDROCHLORIDE 30 MILLIGRAM(S): 2.5 TABLET ORAL at 06:14

## 2023-01-01 RX ADMIN — HYDROMORPHONE HYDROCHLORIDE 0.25 MILLIGRAM(S): 2 INJECTION INTRAMUSCULAR; INTRAVENOUS; SUBCUTANEOUS at 15:50

## 2023-01-01 RX ADMIN — MIDODRINE HYDROCHLORIDE 25 MILLIGRAM(S): 2.5 TABLET ORAL at 10:03

## 2023-01-01 RX ADMIN — HYDROMORPHONE HYDROCHLORIDE 2 MILLIGRAM(S): 2 INJECTION INTRAMUSCULAR; INTRAVENOUS; SUBCUTANEOUS at 00:35

## 2023-01-01 RX ADMIN — PANTOPRAZOLE SODIUM 40 MILLIGRAM(S): 20 TABLET, DELAYED RELEASE ORAL at 06:56

## 2023-01-01 RX ADMIN — Medication 1 APPLICATION(S): at 12:01

## 2023-01-01 RX ADMIN — HYDROMORPHONE HYDROCHLORIDE 0.5 MILLIGRAM(S): 2 INJECTION INTRAMUSCULAR; INTRAVENOUS; SUBCUTANEOUS at 12:32

## 2023-01-01 RX ADMIN — CINACALCET 120 MILLIGRAM(S): 30 TABLET, FILM COATED ORAL at 23:52

## 2023-01-01 RX ADMIN — HYDROMORPHONE HYDROCHLORIDE 4 MILLIGRAM(S): 2 INJECTION INTRAMUSCULAR; INTRAVENOUS; SUBCUTANEOUS at 05:44

## 2023-01-01 RX ADMIN — Medication 400 MILLIGRAM(S): at 21:20

## 2023-01-01 RX ADMIN — CINACALCET 120 MILLIGRAM(S): 30 TABLET, FILM COATED ORAL at 21:05

## 2023-01-01 RX ADMIN — Medication 1 APPLICATION(S): at 05:19

## 2023-01-01 RX ADMIN — SODIUM CHLORIDE 30 MILLILITER(S): 9 INJECTION, SOLUTION INTRAVENOUS at 17:59

## 2023-01-01 RX ADMIN — FLUDROCORTISONE ACETATE 0.2 MILLIGRAM(S): 0.1 TABLET ORAL at 17:39

## 2023-01-01 RX ADMIN — HYDROMORPHONE HYDROCHLORIDE 0.5 MILLIGRAM(S): 2 INJECTION INTRAMUSCULAR; INTRAVENOUS; SUBCUTANEOUS at 08:19

## 2023-01-01 RX ADMIN — CHLORHEXIDINE GLUCONATE 1 APPLICATION(S): 213 SOLUTION TOPICAL at 06:17

## 2023-01-01 RX ADMIN — MIDODRINE HYDROCHLORIDE 30 MILLIGRAM(S): 2.5 TABLET ORAL at 22:05

## 2023-01-01 RX ADMIN — HYDROMORPHONE HYDROCHLORIDE 0.5 MILLIGRAM(S): 2 INJECTION INTRAMUSCULAR; INTRAVENOUS; SUBCUTANEOUS at 05:57

## 2023-01-01 RX ADMIN — HYDROMORPHONE HYDROCHLORIDE 0.25 MILLIGRAM(S): 2 INJECTION INTRAMUSCULAR; INTRAVENOUS; SUBCUTANEOUS at 08:43

## 2023-01-01 RX ADMIN — CINACALCET 120 MILLIGRAM(S): 30 TABLET, FILM COATED ORAL at 21:04

## 2023-01-01 RX ADMIN — APIXABAN 2.5 MILLIGRAM(S): 2.5 TABLET, FILM COATED ORAL at 18:42

## 2023-01-01 RX ADMIN — MIDODRINE HYDROCHLORIDE 40 MILLIGRAM(S): 2.5 TABLET ORAL at 07:21

## 2023-01-01 RX ADMIN — HYDROMORPHONE HYDROCHLORIDE 3 MILLIGRAM(S): 2 INJECTION INTRAMUSCULAR; INTRAVENOUS; SUBCUTANEOUS at 13:24

## 2023-01-01 RX ADMIN — HYDROMORPHONE HYDROCHLORIDE 1 MILLIGRAM(S): 2 INJECTION INTRAMUSCULAR; INTRAVENOUS; SUBCUTANEOUS at 18:43

## 2023-01-01 RX ADMIN — HYDROMORPHONE HYDROCHLORIDE 0.25 MILLIGRAM(S): 2 INJECTION INTRAMUSCULAR; INTRAVENOUS; SUBCUTANEOUS at 06:40

## 2023-01-01 RX ADMIN — HEPARIN SODIUM 5000 UNIT(S): 5000 INJECTION INTRAVENOUS; SUBCUTANEOUS at 21:10

## 2023-01-01 RX ADMIN — HYDROMORPHONE HYDROCHLORIDE 0.25 MILLIGRAM(S): 2 INJECTION INTRAMUSCULAR; INTRAVENOUS; SUBCUTANEOUS at 15:39

## 2023-01-01 RX ADMIN — ERYTHROPOIETIN 8000 UNIT(S): 10000 INJECTION, SOLUTION INTRAVENOUS; SUBCUTANEOUS at 16:30

## 2023-01-01 RX ADMIN — Medication 1 TABLET(S): at 13:27

## 2023-01-01 RX ADMIN — ATORVASTATIN CALCIUM 40 MILLIGRAM(S): 80 TABLET, FILM COATED ORAL at 21:08

## 2023-01-01 RX ADMIN — HYDROMORPHONE HYDROCHLORIDE 4 MILLIGRAM(S): 2 INJECTION INTRAMUSCULAR; INTRAVENOUS; SUBCUTANEOUS at 02:00

## 2023-01-01 RX ADMIN — HYDROMORPHONE HYDROCHLORIDE 4 MILLIGRAM(S): 2 INJECTION INTRAMUSCULAR; INTRAVENOUS; SUBCUTANEOUS at 04:44

## 2023-01-01 RX ADMIN — HYDROMORPHONE HYDROCHLORIDE 4 MILLIGRAM(S): 2 INJECTION INTRAMUSCULAR; INTRAVENOUS; SUBCUTANEOUS at 23:05

## 2023-01-01 RX ADMIN — Medication 975 MILLIGRAM(S): at 22:50

## 2023-01-01 RX ADMIN — HYDROMORPHONE HYDROCHLORIDE 0.25 MILLIGRAM(S): 2 INJECTION INTRAMUSCULAR; INTRAVENOUS; SUBCUTANEOUS at 05:58

## 2023-01-01 RX ADMIN — HYDROMORPHONE HYDROCHLORIDE 2 MILLIGRAM(S): 2 INJECTION INTRAMUSCULAR; INTRAVENOUS; SUBCUTANEOUS at 22:47

## 2023-01-01 RX ADMIN — HYDROMORPHONE HYDROCHLORIDE 0.5 MILLIGRAM(S): 2 INJECTION INTRAMUSCULAR; INTRAVENOUS; SUBCUTANEOUS at 11:25

## 2023-01-01 RX ADMIN — SEVELAMER CARBONATE 800 MILLIGRAM(S): 2400 POWDER, FOR SUSPENSION ORAL at 17:00

## 2023-01-01 RX ADMIN — Medication 1 TABLET(S): at 13:43

## 2023-01-01 RX ADMIN — SEVELAMER CARBONATE 800 MILLIGRAM(S): 2400 POWDER, FOR SUSPENSION ORAL at 08:08

## 2023-01-01 RX ADMIN — CINACALCET 60 MILLIGRAM(S): 30 TABLET, FILM COATED ORAL at 15:47

## 2023-01-01 RX ADMIN — Medication 50 MILLIGRAM(S): at 00:07

## 2023-01-01 RX ADMIN — CINACALCET 90 MILLIGRAM(S): 30 TABLET, FILM COATED ORAL at 12:29

## 2023-01-01 RX ADMIN — HEPARIN SODIUM 13 UNIT(S)/HR: 5000 INJECTION INTRAVENOUS; SUBCUTANEOUS at 15:39

## 2023-01-01 RX ADMIN — DAPTOMYCIN 116 MILLIGRAM(S): 500 INJECTION, POWDER, LYOPHILIZED, FOR SOLUTION INTRAVENOUS at 17:05

## 2023-01-01 RX ADMIN — HYDROMORPHONE HYDROCHLORIDE 0.25 MILLIGRAM(S): 2 INJECTION INTRAMUSCULAR; INTRAVENOUS; SUBCUTANEOUS at 21:11

## 2023-01-01 RX ADMIN — HYDROMORPHONE HYDROCHLORIDE 0.25 MILLIGRAM(S): 2 INJECTION INTRAMUSCULAR; INTRAVENOUS; SUBCUTANEOUS at 07:00

## 2023-01-01 RX ADMIN — SEVELAMER CARBONATE 1600 MILLIGRAM(S): 2400 POWDER, FOR SUSPENSION ORAL at 17:42

## 2023-01-01 RX ADMIN — SEVELAMER CARBONATE 1600 MILLIGRAM(S): 2400 POWDER, FOR SUSPENSION ORAL at 17:26

## 2023-01-01 RX ADMIN — SEVELAMER CARBONATE 800 MILLIGRAM(S): 2400 POWDER, FOR SUSPENSION ORAL at 17:22

## 2023-01-01 RX ADMIN — CINACALCET 30 MILLIGRAM(S): 30 TABLET, FILM COATED ORAL at 11:23

## 2023-01-01 RX ADMIN — Medication 1000 MILLIGRAM(S): at 12:34

## 2023-01-01 RX ADMIN — HEPARIN SODIUM 7500 UNIT(S): 5000 INJECTION INTRAVENOUS; SUBCUTANEOUS at 22:43

## 2023-01-01 RX ADMIN — HYDROMORPHONE HYDROCHLORIDE 4 MILLIGRAM(S): 2 INJECTION INTRAMUSCULAR; INTRAVENOUS; SUBCUTANEOUS at 15:27

## 2023-01-01 RX ADMIN — HYDROMORPHONE HYDROCHLORIDE 3 MILLIGRAM(S): 2 INJECTION INTRAMUSCULAR; INTRAVENOUS; SUBCUTANEOUS at 14:45

## 2023-01-01 RX ADMIN — MIDODRINE HYDROCHLORIDE 15 MILLIGRAM(S): 2.5 TABLET ORAL at 18:43

## 2023-01-01 RX ADMIN — Medication 1 MILLIGRAM(S): at 11:29

## 2023-01-01 RX ADMIN — HEPARIN SODIUM 5000 UNIT(S): 5000 INJECTION INTRAVENOUS; SUBCUTANEOUS at 15:06

## 2023-01-01 RX ADMIN — HYDROMORPHONE HYDROCHLORIDE 1 MILLIGRAM(S): 2 INJECTION INTRAMUSCULAR; INTRAVENOUS; SUBCUTANEOUS at 16:35

## 2023-01-01 RX ADMIN — HYDROMORPHONE HYDROCHLORIDE 0.25 MILLIGRAM(S): 2 INJECTION INTRAMUSCULAR; INTRAVENOUS; SUBCUTANEOUS at 16:13

## 2023-01-01 RX ADMIN — LIDOCAINE 1 APPLICATION(S): 4 CREAM TOPICAL at 17:43

## 2023-01-01 RX ADMIN — MORPHINE SULFATE 2 MILLIGRAM(S): 50 CAPSULE, EXTENDED RELEASE ORAL at 14:11

## 2023-01-01 RX ADMIN — Medication 50 MILLIGRAM(S): at 04:11

## 2023-01-01 RX ADMIN — MIDODRINE HYDROCHLORIDE 40 MILLIGRAM(S): 2.5 TABLET ORAL at 14:51

## 2023-01-01 RX ADMIN — Medication 1 APPLICATION(S): at 22:26

## 2023-01-01 RX ADMIN — CHLORHEXIDINE GLUCONATE 1 APPLICATION(S): 213 SOLUTION TOPICAL at 06:11

## 2023-01-01 RX ADMIN — HYDROMORPHONE HYDROCHLORIDE 3 MILLIGRAM(S): 2 INJECTION INTRAMUSCULAR; INTRAVENOUS; SUBCUTANEOUS at 06:48

## 2023-01-01 RX ADMIN — HYDROMORPHONE HYDROCHLORIDE 4 MILLIGRAM(S): 2 INJECTION INTRAMUSCULAR; INTRAVENOUS; SUBCUTANEOUS at 22:29

## 2023-01-01 RX ADMIN — HEPARIN SODIUM 19 UNIT(S)/HR: 5000 INJECTION INTRAVENOUS; SUBCUTANEOUS at 09:08

## 2023-01-01 RX ADMIN — HEPARIN SODIUM 7500 UNIT(S): 5000 INJECTION INTRAVENOUS; SUBCUTANEOUS at 15:09

## 2023-01-01 RX ADMIN — HYDROMORPHONE HYDROCHLORIDE 1 MILLIGRAM(S): 2 INJECTION INTRAMUSCULAR; INTRAVENOUS; SUBCUTANEOUS at 07:24

## 2023-01-01 RX ADMIN — CEFEPIME 100 MILLIGRAM(S): 1 INJECTION, POWDER, FOR SOLUTION INTRAMUSCULAR; INTRAVENOUS at 22:06

## 2023-01-01 RX ADMIN — CHLORHEXIDINE GLUCONATE 1 APPLICATION(S): 213 SOLUTION TOPICAL at 06:28

## 2023-01-01 RX ADMIN — PANTOPRAZOLE SODIUM 40 MILLIGRAM(S): 20 TABLET, DELAYED RELEASE ORAL at 12:29

## 2023-01-01 RX ADMIN — HYDROMORPHONE HYDROCHLORIDE 0.25 MILLIGRAM(S): 2 INJECTION INTRAMUSCULAR; INTRAVENOUS; SUBCUTANEOUS at 17:56

## 2023-01-01 RX ADMIN — PHENYLEPHRINE HYDROCHLORIDE 1.6 MICROGRAM(S)/KG/MIN: 10 INJECTION INTRAVENOUS at 22:27

## 2023-01-01 RX ADMIN — HYDROMORPHONE HYDROCHLORIDE 0.5 MILLIGRAM(S): 2 INJECTION INTRAMUSCULAR; INTRAVENOUS; SUBCUTANEOUS at 00:47

## 2023-01-01 RX ADMIN — HYDROMORPHONE HYDROCHLORIDE 2 MILLIGRAM(S): 2 INJECTION INTRAMUSCULAR; INTRAVENOUS; SUBCUTANEOUS at 12:30

## 2023-01-01 RX ADMIN — Medication 1 APPLICATION(S): at 12:22

## 2023-01-01 RX ADMIN — Medication 1000 MILLIGRAM(S): at 16:11

## 2023-01-01 RX ADMIN — Medication 650 MILLIGRAM(S): at 18:25

## 2023-01-01 RX ADMIN — Medication 1 TABLET(S): at 15:22

## 2023-01-01 RX ADMIN — Medication 40 MILLIGRAM(S): at 03:03

## 2023-01-01 RX ADMIN — MIDODRINE HYDROCHLORIDE 40 MILLIGRAM(S): 2.5 TABLET ORAL at 06:39

## 2023-01-01 RX ADMIN — HYDROMORPHONE HYDROCHLORIDE 1 MILLIGRAM(S): 2 INJECTION INTRAMUSCULAR; INTRAVENOUS; SUBCUTANEOUS at 14:40

## 2023-01-01 RX ADMIN — Medication 4000 MILLILITER(S): at 08:14

## 2023-01-01 RX ADMIN — APIXABAN 2.5 MILLIGRAM(S): 2.5 TABLET, FILM COATED ORAL at 18:08

## 2023-01-01 RX ADMIN — Medication 0.3 MILLIGRAM(S): at 12:48

## 2023-01-01 RX ADMIN — MIDODRINE HYDROCHLORIDE 40 MILLIGRAM(S): 2.5 TABLET ORAL at 06:35

## 2023-01-01 RX ADMIN — HYDROMORPHONE HYDROCHLORIDE 2 MILLIGRAM(S): 2 INJECTION INTRAMUSCULAR; INTRAVENOUS; SUBCUTANEOUS at 18:00

## 2023-01-01 RX ADMIN — CINACALCET 120 MILLIGRAM(S): 30 TABLET, FILM COATED ORAL at 23:22

## 2023-01-01 RX ADMIN — Medication 650 MILLIGRAM(S): at 11:23

## 2023-01-01 RX ADMIN — MIDODRINE HYDROCHLORIDE 30 MILLIGRAM(S): 2.5 TABLET ORAL at 16:22

## 2023-01-01 RX ADMIN — Medication 25 GRAM(S): at 03:02

## 2023-01-01 RX ADMIN — HEPARIN SODIUM 5000 UNIT(S): 5000 INJECTION INTRAVENOUS; SUBCUTANEOUS at 14:07

## 2023-01-01 RX ADMIN — Medication 650 MILLIGRAM(S): at 04:35

## 2023-01-01 RX ADMIN — CLOPIDOGREL BISULFATE 75 MILLIGRAM(S): 75 TABLET, FILM COATED ORAL at 15:34

## 2023-01-01 RX ADMIN — PANTOPRAZOLE SODIUM 40 MILLIGRAM(S): 20 TABLET, DELAYED RELEASE ORAL at 06:01

## 2023-01-01 RX ADMIN — SEVELAMER CARBONATE 800 MILLIGRAM(S): 2400 POWDER, FOR SUSPENSION ORAL at 13:06

## 2023-01-01 RX ADMIN — CHLORHEXIDINE GLUCONATE 1 APPLICATION(S): 213 SOLUTION TOPICAL at 06:34

## 2023-01-01 RX ADMIN — CHLORHEXIDINE GLUCONATE 1 APPLICATION(S): 213 SOLUTION TOPICAL at 05:19

## 2023-01-01 RX ADMIN — HYDROMORPHONE HYDROCHLORIDE 1 MILLIGRAM(S): 2 INJECTION INTRAMUSCULAR; INTRAVENOUS; SUBCUTANEOUS at 02:37

## 2023-01-01 RX ADMIN — SEVELAMER CARBONATE 1600 MILLIGRAM(S): 2400 POWDER, FOR SUSPENSION ORAL at 08:03

## 2023-01-01 RX ADMIN — HYDROMORPHONE HYDROCHLORIDE 4 MILLIGRAM(S): 2 INJECTION INTRAMUSCULAR; INTRAVENOUS; SUBCUTANEOUS at 09:19

## 2023-01-01 RX ADMIN — MIDODRINE HYDROCHLORIDE 30 MILLIGRAM(S): 2.5 TABLET ORAL at 19:39

## 2023-01-01 RX ADMIN — Medication 650 MILLIGRAM(S): at 17:00

## 2023-01-01 RX ADMIN — CLOPIDOGREL BISULFATE 75 MILLIGRAM(S): 75 TABLET, FILM COATED ORAL at 11:23

## 2023-01-01 RX ADMIN — MIDODRINE HYDROCHLORIDE 40 MILLIGRAM(S): 2.5 TABLET ORAL at 23:37

## 2023-01-01 RX ADMIN — HEPARIN SODIUM 1500 UNIT(S)/HR: 5000 INJECTION INTRAVENOUS; SUBCUTANEOUS at 06:37

## 2023-01-01 RX ADMIN — HYDROMORPHONE HYDROCHLORIDE 0.5 MILLIGRAM(S): 2 INJECTION INTRAMUSCULAR; INTRAVENOUS; SUBCUTANEOUS at 04:36

## 2023-01-01 RX ADMIN — Medication 100 MILLIGRAM(S): at 21:50

## 2023-01-01 RX ADMIN — HEPARIN SODIUM 13 UNIT(S)/HR: 5000 INJECTION INTRAVENOUS; SUBCUTANEOUS at 21:11

## 2023-01-01 RX ADMIN — HYDROMORPHONE HYDROCHLORIDE 1 MILLIGRAM(S): 2 INJECTION INTRAMUSCULAR; INTRAVENOUS; SUBCUTANEOUS at 13:31

## 2023-01-01 RX ADMIN — HYDROMORPHONE HYDROCHLORIDE 1 MILLIGRAM(S): 2 INJECTION INTRAMUSCULAR; INTRAVENOUS; SUBCUTANEOUS at 09:03

## 2023-01-01 RX ADMIN — Medication 1 TABLET(S): at 12:21

## 2023-01-01 RX ADMIN — Medication 1 APPLICATION(S): at 18:20

## 2023-01-01 RX ADMIN — CHLORHEXIDINE GLUCONATE 1 APPLICATION(S): 213 SOLUTION TOPICAL at 06:04

## 2023-01-01 RX ADMIN — HYDROMORPHONE HYDROCHLORIDE 0.5 MILLIGRAM(S): 2 INJECTION INTRAMUSCULAR; INTRAVENOUS; SUBCUTANEOUS at 14:00

## 2023-01-01 RX ADMIN — PANTOPRAZOLE SODIUM 40 MILLIGRAM(S): 20 TABLET, DELAYED RELEASE ORAL at 21:03

## 2023-01-01 RX ADMIN — CINACALCET 120 MILLIGRAM(S): 30 TABLET, FILM COATED ORAL at 22:45

## 2023-01-01 RX ADMIN — SENNA PLUS 2 TABLET(S): 8.6 TABLET ORAL at 22:16

## 2023-01-01 RX ADMIN — CINACALCET 120 MILLIGRAM(S): 30 TABLET, FILM COATED ORAL at 23:05

## 2023-01-01 RX ADMIN — HYDROMORPHONE HYDROCHLORIDE 4 MILLIGRAM(S): 2 INJECTION INTRAMUSCULAR; INTRAVENOUS; SUBCUTANEOUS at 15:00

## 2023-01-01 RX ADMIN — FLUDROCORTISONE ACETATE 0.2 MILLIGRAM(S): 0.1 TABLET ORAL at 10:19

## 2023-01-01 RX ADMIN — HYDROMORPHONE HYDROCHLORIDE 0.25 MILLIGRAM(S): 2 INJECTION INTRAMUSCULAR; INTRAVENOUS; SUBCUTANEOUS at 13:59

## 2023-01-01 RX ADMIN — HYDROMORPHONE HYDROCHLORIDE 0.25 MILLIGRAM(S): 2 INJECTION INTRAMUSCULAR; INTRAVENOUS; SUBCUTANEOUS at 20:52

## 2023-01-01 RX ADMIN — Medication 1 TABLET(S): at 12:11

## 2023-01-01 RX ADMIN — HYDROMORPHONE HYDROCHLORIDE 0.25 MILLIGRAM(S): 2 INJECTION INTRAMUSCULAR; INTRAVENOUS; SUBCUTANEOUS at 07:15

## 2023-01-01 RX ADMIN — HYDROMORPHONE HYDROCHLORIDE 1 MILLIGRAM(S): 2 INJECTION INTRAMUSCULAR; INTRAVENOUS; SUBCUTANEOUS at 08:25

## 2023-01-01 RX ADMIN — HYDROMORPHONE HYDROCHLORIDE 1 MILLIGRAM(S): 2 INJECTION INTRAMUSCULAR; INTRAVENOUS; SUBCUTANEOUS at 13:21

## 2023-01-01 RX ADMIN — Medication 100 MILLIGRAM(S): at 13:06

## 2023-01-01 RX ADMIN — HYDROMORPHONE HYDROCHLORIDE 4 MILLIGRAM(S): 2 INJECTION INTRAMUSCULAR; INTRAVENOUS; SUBCUTANEOUS at 23:08

## 2023-01-01 RX ADMIN — Medication 100 MILLIGRAM(S): at 14:25

## 2023-01-01 RX ADMIN — MIDODRINE HYDROCHLORIDE 40 MILLIGRAM(S): 2.5 TABLET ORAL at 08:38

## 2023-01-01 RX ADMIN — Medication 1 APPLICATION(S): at 01:21

## 2023-01-01 RX ADMIN — CINACALCET 30 MILLIGRAM(S): 30 TABLET, FILM COATED ORAL at 19:39

## 2023-01-01 RX ADMIN — FLUDROCORTISONE ACETATE 0.2 MILLIGRAM(S): 0.1 TABLET ORAL at 14:31

## 2023-01-01 RX ADMIN — Medication 100 MILLIGRAM(S): at 15:10

## 2023-01-01 RX ADMIN — SODIUM CHLORIDE 1 GRAM(S): 9 INJECTION INTRAMUSCULAR; INTRAVENOUS; SUBCUTANEOUS at 18:08

## 2023-01-01 RX ADMIN — MIDODRINE HYDROCHLORIDE 40 MILLIGRAM(S): 2.5 TABLET ORAL at 23:20

## 2023-01-01 RX ADMIN — ATORVASTATIN CALCIUM 40 MILLIGRAM(S): 80 TABLET, FILM COATED ORAL at 22:34

## 2023-01-01 RX ADMIN — HYDROMORPHONE HYDROCHLORIDE 0.25 MILLIGRAM(S): 2 INJECTION INTRAMUSCULAR; INTRAVENOUS; SUBCUTANEOUS at 00:01

## 2023-01-01 RX ADMIN — CHLORHEXIDINE GLUCONATE 1 APPLICATION(S): 213 SOLUTION TOPICAL at 06:08

## 2023-01-01 RX ADMIN — HYDROMORPHONE HYDROCHLORIDE 0.5 MILLIGRAM(S): 2 INJECTION INTRAMUSCULAR; INTRAVENOUS; SUBCUTANEOUS at 02:46

## 2023-01-01 RX ADMIN — CHLORHEXIDINE GLUCONATE 1 APPLICATION(S): 213 SOLUTION TOPICAL at 05:36

## 2023-01-01 RX ADMIN — HYDROMORPHONE HYDROCHLORIDE 1 MILLIGRAM(S): 2 INJECTION INTRAMUSCULAR; INTRAVENOUS; SUBCUTANEOUS at 06:15

## 2023-01-01 RX ADMIN — Medication 400 MILLIGRAM(S): at 18:31

## 2023-01-01 RX ADMIN — HYDROMORPHONE HYDROCHLORIDE 2 MILLIGRAM(S): 2 INJECTION INTRAMUSCULAR; INTRAVENOUS; SUBCUTANEOUS at 11:17

## 2023-01-01 RX ADMIN — HYDROMORPHONE HYDROCHLORIDE 2 MILLIGRAM(S): 2 INJECTION INTRAMUSCULAR; INTRAVENOUS; SUBCUTANEOUS at 05:34

## 2023-01-01 RX ADMIN — Medication 3.72 MICROGRAM(S)/KG/MIN: at 22:36

## 2023-01-01 RX ADMIN — MIDODRINE HYDROCHLORIDE 40 MILLIGRAM(S): 2.5 TABLET ORAL at 15:02

## 2023-01-01 RX ADMIN — CHLORHEXIDINE GLUCONATE 1 APPLICATION(S): 213 SOLUTION TOPICAL at 15:25

## 2023-01-01 RX ADMIN — POLYETHYLENE GLYCOL 3350 17 GRAM(S): 17 POWDER, FOR SOLUTION ORAL at 06:50

## 2023-01-01 RX ADMIN — HYDROMORPHONE HYDROCHLORIDE 0.5 MILLIGRAM(S): 2 INJECTION INTRAMUSCULAR; INTRAVENOUS; SUBCUTANEOUS at 19:56

## 2023-01-01 RX ADMIN — Medication 1 TABLET(S): at 14:27

## 2023-01-01 RX ADMIN — SODIUM CHLORIDE 500 MILLILITER(S): 9 INJECTION INTRAMUSCULAR; INTRAVENOUS; SUBCUTANEOUS at 19:34

## 2023-01-01 RX ADMIN — HYDROMORPHONE HYDROCHLORIDE 0.25 MILLIGRAM(S): 2 INJECTION INTRAMUSCULAR; INTRAVENOUS; SUBCUTANEOUS at 22:25

## 2023-01-01 RX ADMIN — CHLORHEXIDINE GLUCONATE 1 APPLICATION(S): 213 SOLUTION TOPICAL at 11:24

## 2023-01-01 RX ADMIN — HEPARIN SODIUM 5000 UNIT(S): 5000 INJECTION INTRAVENOUS; SUBCUTANEOUS at 05:35

## 2023-01-01 RX ADMIN — HYDROMORPHONE HYDROCHLORIDE 2 MILLIGRAM(S): 2 INJECTION INTRAMUSCULAR; INTRAVENOUS; SUBCUTANEOUS at 11:11

## 2023-01-01 RX ADMIN — Medication 1 APPLICATION(S): at 17:21

## 2023-01-01 RX ADMIN — Medication 1 APPLICATION(S): at 19:11

## 2023-01-01 RX ADMIN — HEPARIN SODIUM 12 UNIT(S)/HR: 5000 INJECTION INTRAVENOUS; SUBCUTANEOUS at 04:44

## 2023-01-01 RX ADMIN — Medication 30 MILLILITER(S): at 18:36

## 2023-01-01 RX ADMIN — Medication 0.3 MILLIGRAM(S): at 13:28

## 2023-01-01 RX ADMIN — HYDROMORPHONE HYDROCHLORIDE 4 MILLIGRAM(S): 2 INJECTION INTRAMUSCULAR; INTRAVENOUS; SUBCUTANEOUS at 18:49

## 2023-01-01 RX ADMIN — Medication 1 TABLET(S): at 12:06

## 2023-01-01 RX ADMIN — HYDROMORPHONE HYDROCHLORIDE 0.5 MILLIGRAM(S): 2 INJECTION INTRAMUSCULAR; INTRAVENOUS; SUBCUTANEOUS at 05:42

## 2023-01-01 RX ADMIN — HYDROMORPHONE HYDROCHLORIDE 1 MILLIGRAM(S): 2 INJECTION INTRAMUSCULAR; INTRAVENOUS; SUBCUTANEOUS at 08:45

## 2023-01-01 RX ADMIN — HYDROMORPHONE HYDROCHLORIDE 1 MILLIGRAM(S): 2 INJECTION INTRAMUSCULAR; INTRAVENOUS; SUBCUTANEOUS at 02:43

## 2023-01-01 RX ADMIN — Medication 25 MILLILITER(S): at 17:38

## 2023-01-01 RX ADMIN — Medication 100 GRAM(S): at 12:21

## 2023-01-01 RX ADMIN — MIDODRINE HYDROCHLORIDE 30 MILLIGRAM(S): 2.5 TABLET ORAL at 04:03

## 2023-01-01 RX ADMIN — MIDODRINE HYDROCHLORIDE 30 MILLIGRAM(S): 2.5 TABLET ORAL at 09:47

## 2023-01-01 RX ADMIN — FLUDROCORTISONE ACETATE 0.5 MILLIGRAM(S): 0.1 TABLET ORAL at 17:28

## 2023-01-01 RX ADMIN — Medication 500 MILLILITER(S): at 15:32

## 2023-01-01 RX ADMIN — HEPARIN SODIUM 1000 UNIT(S)/HR: 5000 INJECTION INTRAVENOUS; SUBCUTANEOUS at 21:37

## 2023-01-01 RX ADMIN — Medication 1 APPLICATION(S): at 19:40

## 2023-01-01 RX ADMIN — ERYTHROPOIETIN 10000 UNIT(S): 10000 INJECTION, SOLUTION INTRAVENOUS; SUBCUTANEOUS at 12:39

## 2023-01-01 RX ADMIN — Medication 30 MILLILITER(S): at 20:29

## 2023-01-01 RX ADMIN — Medication 650 MILLIGRAM(S): at 22:38

## 2023-01-01 RX ADMIN — Medication 5 MILLIGRAM(S): at 09:14

## 2023-01-01 RX ADMIN — Medication 0.3 MILLIGRAM(S): at 11:12

## 2023-01-01 RX ADMIN — Medication 50 MILLIGRAM(S): at 12:00

## 2023-01-01 RX ADMIN — HYDROMORPHONE HYDROCHLORIDE 0.25 MILLIGRAM(S): 2 INJECTION INTRAMUSCULAR; INTRAVENOUS; SUBCUTANEOUS at 16:44

## 2023-01-01 RX ADMIN — CHLORHEXIDINE GLUCONATE 1 APPLICATION(S): 213 SOLUTION TOPICAL at 19:39

## 2023-01-01 RX ADMIN — Medication 100 MILLIGRAM(S): at 15:02

## 2023-01-01 RX ADMIN — HEPARIN SODIUM 7500 UNIT(S): 5000 INJECTION INTRAVENOUS; SUBCUTANEOUS at 14:59

## 2023-01-01 RX ADMIN — Medication 1 TABLET(S): at 11:31

## 2023-01-01 RX ADMIN — APIXABAN 5 MILLIGRAM(S): 2.5 TABLET, FILM COATED ORAL at 18:42

## 2023-01-01 RX ADMIN — HYDROMORPHONE HYDROCHLORIDE 0.25 MILLIGRAM(S): 2 INJECTION INTRAMUSCULAR; INTRAVENOUS; SUBCUTANEOUS at 02:43

## 2023-01-01 RX ADMIN — Medication 500 MILLILITER(S): at 02:17

## 2023-01-01 RX ADMIN — MIDODRINE HYDROCHLORIDE 25 MILLIGRAM(S): 2.5 TABLET ORAL at 17:00

## 2023-01-01 RX ADMIN — HYDROMORPHONE HYDROCHLORIDE 4 MILLIGRAM(S): 2 INJECTION INTRAMUSCULAR; INTRAVENOUS; SUBCUTANEOUS at 10:45

## 2023-01-01 RX ADMIN — MIDODRINE HYDROCHLORIDE 30 MILLIGRAM(S): 2.5 TABLET ORAL at 05:56

## 2023-01-01 RX ADMIN — Medication 20 MILLILITER(S): at 14:00

## 2023-01-01 RX ADMIN — HYDROMORPHONE HYDROCHLORIDE 4 MILLIGRAM(S): 2 INJECTION INTRAMUSCULAR; INTRAVENOUS; SUBCUTANEOUS at 21:05

## 2023-01-01 RX ADMIN — MIDODRINE HYDROCHLORIDE 40 MILLIGRAM(S): 2.5 TABLET ORAL at 22:47

## 2023-01-01 RX ADMIN — MIDODRINE HYDROCHLORIDE 40 MILLIGRAM(S): 2.5 TABLET ORAL at 23:19

## 2023-01-01 RX ADMIN — PANTOPRAZOLE SODIUM 40 MILLIGRAM(S): 20 TABLET, DELAYED RELEASE ORAL at 09:06

## 2023-01-01 RX ADMIN — NYSTATIN CREAM 1 APPLICATION(S): 100000 CREAM TOPICAL at 17:47

## 2023-01-01 RX ADMIN — Medication 50 MILLILITER(S): at 16:02

## 2023-01-01 RX ADMIN — Medication 30 MILLILITER(S): at 13:59

## 2023-01-01 RX ADMIN — Medication 1000 MILLIGRAM(S): at 18:01

## 2023-01-01 RX ADMIN — HYDROMORPHONE HYDROCHLORIDE 0.25 MILLIGRAM(S): 2 INJECTION INTRAMUSCULAR; INTRAVENOUS; SUBCUTANEOUS at 02:49

## 2023-01-01 RX ADMIN — Medication 3.72 MICROGRAM(S)/KG/MIN: at 08:33

## 2023-01-01 RX ADMIN — Medication 400 MILLIGRAM(S): at 09:29

## 2023-01-01 RX ADMIN — HYDROMORPHONE HYDROCHLORIDE 0.25 MILLIGRAM(S): 2 INJECTION INTRAMUSCULAR; INTRAVENOUS; SUBCUTANEOUS at 19:40

## 2023-01-01 RX ADMIN — Medication 400 MILLIGRAM(S): at 13:07

## 2023-01-01 RX ADMIN — MIDODRINE HYDROCHLORIDE 30 MILLIGRAM(S): 2.5 TABLET ORAL at 20:00

## 2023-01-01 RX ADMIN — HEPARIN SODIUM 19 UNIT(S)/HR: 5000 INJECTION INTRAVENOUS; SUBCUTANEOUS at 09:47

## 2023-01-01 RX ADMIN — PANTOPRAZOLE SODIUM 40 MILLIGRAM(S): 20 TABLET, DELAYED RELEASE ORAL at 05:36

## 2023-01-01 RX ADMIN — CINACALCET 90 MILLIGRAM(S): 30 TABLET, FILM COATED ORAL at 11:41

## 2023-01-01 RX ADMIN — MIDODRINE HYDROCHLORIDE 40 MILLIGRAM(S): 2.5 TABLET ORAL at 22:02

## 2023-01-01 RX ADMIN — HEPARIN SODIUM 7500 UNIT(S): 5000 INJECTION INTRAVENOUS; SUBCUTANEOUS at 21:43

## 2023-01-01 RX ADMIN — Medication 3.72 MICROGRAM(S)/KG/MIN: at 07:17

## 2023-01-01 RX ADMIN — MIDODRINE HYDROCHLORIDE 40 MILLIGRAM(S): 2.5 TABLET ORAL at 21:26

## 2023-01-01 RX ADMIN — MIDODRINE HYDROCHLORIDE 40 MILLIGRAM(S): 2.5 TABLET ORAL at 17:51

## 2023-01-01 RX ADMIN — HYDROMORPHONE HYDROCHLORIDE 1 MILLIGRAM(S): 2 INJECTION INTRAMUSCULAR; INTRAVENOUS; SUBCUTANEOUS at 22:39

## 2023-01-01 RX ADMIN — Medication 400 MILLIGRAM(S): at 12:05

## 2023-01-01 RX ADMIN — Medication 1 TABLET(S): at 14:36

## 2023-01-01 RX ADMIN — CINACALCET 120 MILLIGRAM(S): 30 TABLET, FILM COATED ORAL at 22:25

## 2023-01-01 RX ADMIN — HEPARIN SODIUM 5000 UNIT(S): 5000 INJECTION INTRAVENOUS; SUBCUTANEOUS at 15:42

## 2023-01-01 RX ADMIN — Medication 975 MILLIGRAM(S): at 15:24

## 2023-01-01 RX ADMIN — PANTOPRAZOLE SODIUM 40 MILLIGRAM(S): 20 TABLET, DELAYED RELEASE ORAL at 18:38

## 2023-01-01 RX ADMIN — PANTOPRAZOLE SODIUM 40 MILLIGRAM(S): 20 TABLET, DELAYED RELEASE ORAL at 09:02

## 2023-01-01 RX ADMIN — SENNA PLUS 2 TABLET(S): 8.6 TABLET ORAL at 22:36

## 2023-01-01 RX ADMIN — Medication 500 MILLIGRAM(S): at 14:27

## 2023-01-01 RX ADMIN — CHLORHEXIDINE GLUCONATE 1 APPLICATION(S): 213 SOLUTION TOPICAL at 06:18

## 2023-01-01 RX ADMIN — Medication 250 MILLIGRAM(S): at 22:08

## 2023-01-01 RX ADMIN — POLYETHYLENE GLYCOL 3350 17 GRAM(S): 17 POWDER, FOR SOLUTION ORAL at 06:36

## 2023-01-01 RX ADMIN — Medication 4 MICROGRAM(S)/KG/MIN: at 14:29

## 2023-01-01 RX ADMIN — HYDROMORPHONE HYDROCHLORIDE 0.25 MILLIGRAM(S): 2 INJECTION INTRAMUSCULAR; INTRAVENOUS; SUBCUTANEOUS at 21:58

## 2023-01-01 RX ADMIN — HYDROMORPHONE HYDROCHLORIDE 1 MILLIGRAM(S): 2 INJECTION INTRAMUSCULAR; INTRAVENOUS; SUBCUTANEOUS at 10:40

## 2023-01-01 RX ADMIN — HYDROMORPHONE HYDROCHLORIDE 0.25 MILLIGRAM(S): 2 INJECTION INTRAMUSCULAR; INTRAVENOUS; SUBCUTANEOUS at 20:59

## 2023-01-01 RX ADMIN — MIDODRINE HYDROCHLORIDE 40 MILLIGRAM(S): 2.5 TABLET ORAL at 08:34

## 2023-01-01 RX ADMIN — SEVELAMER CARBONATE 1600 MILLIGRAM(S): 2400 POWDER, FOR SUSPENSION ORAL at 13:48

## 2023-01-01 RX ADMIN — HYDROMORPHONE HYDROCHLORIDE 0.5 MILLIGRAM(S): 2 INJECTION INTRAMUSCULAR; INTRAVENOUS; SUBCUTANEOUS at 14:03

## 2023-01-01 RX ADMIN — Medication 50 MILLILITER(S): at 12:17

## 2023-01-01 RX ADMIN — Medication 1 APPLICATION(S): at 10:28

## 2023-01-01 RX ADMIN — Medication 50 MILLIEQUIVALENT(S): at 23:07

## 2023-01-01 RX ADMIN — Medication 5 MILLIGRAM(S): at 22:43

## 2023-01-01 RX ADMIN — Medication 650 MILLIGRAM(S): at 05:00

## 2023-01-01 RX ADMIN — Medication 650 MILLIGRAM(S): at 21:08

## 2023-01-01 RX ADMIN — ATORVASTATIN CALCIUM 40 MILLIGRAM(S): 80 TABLET, FILM COATED ORAL at 22:21

## 2023-01-01 RX ADMIN — HYDROMORPHONE HYDROCHLORIDE 0.25 MILLIGRAM(S): 2 INJECTION INTRAMUSCULAR; INTRAVENOUS; SUBCUTANEOUS at 00:31

## 2023-01-01 RX ADMIN — HEPARIN SODIUM 7500 UNIT(S): 5000 INJECTION INTRAVENOUS; SUBCUTANEOUS at 13:07

## 2023-01-01 RX ADMIN — Medication 30 MILLILITER(S): at 16:51

## 2023-01-01 RX ADMIN — HYDROMORPHONE HYDROCHLORIDE 0.5 MILLIGRAM(S): 2 INJECTION INTRAMUSCULAR; INTRAVENOUS; SUBCUTANEOUS at 12:00

## 2023-01-01 RX ADMIN — MIDODRINE HYDROCHLORIDE 40 MILLIGRAM(S): 2.5 TABLET ORAL at 15:05

## 2023-01-01 RX ADMIN — PANTOPRAZOLE SODIUM 40 MILLIGRAM(S): 20 TABLET, DELAYED RELEASE ORAL at 05:33

## 2023-01-01 RX ADMIN — Medication 100 MILLIGRAM(S): at 23:16

## 2023-01-01 RX ADMIN — SENNA PLUS 2 TABLET(S): 8.6 TABLET ORAL at 22:52

## 2023-01-01 RX ADMIN — HYDROMORPHONE HYDROCHLORIDE 2 MILLIGRAM(S): 2 INJECTION INTRAMUSCULAR; INTRAVENOUS; SUBCUTANEOUS at 09:46

## 2023-01-01 RX ADMIN — HYDROMORPHONE HYDROCHLORIDE 1 MILLIGRAM(S): 2 INJECTION INTRAMUSCULAR; INTRAVENOUS; SUBCUTANEOUS at 06:00

## 2023-01-01 RX ADMIN — HYDROMORPHONE HYDROCHLORIDE 0.5 MILLIGRAM(S): 2 INJECTION INTRAMUSCULAR; INTRAVENOUS; SUBCUTANEOUS at 12:02

## 2023-01-01 RX ADMIN — Medication 50 MILLILITER(S): at 13:30

## 2023-01-01 RX ADMIN — HYDROMORPHONE HYDROCHLORIDE 0.5 MILLIGRAM(S): 2 INJECTION INTRAMUSCULAR; INTRAVENOUS; SUBCUTANEOUS at 19:06

## 2023-01-01 RX ADMIN — HYDROMORPHONE HYDROCHLORIDE 0.25 MILLIGRAM(S): 2 INJECTION INTRAMUSCULAR; INTRAVENOUS; SUBCUTANEOUS at 22:29

## 2023-01-01 RX ADMIN — HYDROMORPHONE HYDROCHLORIDE 0.5 MILLIGRAM(S): 2 INJECTION INTRAMUSCULAR; INTRAVENOUS; SUBCUTANEOUS at 04:21

## 2023-01-01 RX ADMIN — HYDROMORPHONE HYDROCHLORIDE 0.5 MILLIGRAM(S): 2 INJECTION INTRAMUSCULAR; INTRAVENOUS; SUBCUTANEOUS at 18:35

## 2023-01-01 RX ADMIN — POLYETHYLENE GLYCOL 3350 17 GRAM(S): 17 POWDER, FOR SOLUTION ORAL at 06:51

## 2023-01-01 RX ADMIN — HYDROMORPHONE HYDROCHLORIDE 0.25 MILLIGRAM(S): 2 INJECTION INTRAMUSCULAR; INTRAVENOUS; SUBCUTANEOUS at 15:00

## 2023-01-01 RX ADMIN — HYDROMORPHONE HYDROCHLORIDE 0.25 MILLIGRAM(S): 2 INJECTION INTRAMUSCULAR; INTRAVENOUS; SUBCUTANEOUS at 13:28

## 2023-01-01 RX ADMIN — Medication 50 MILLILITER(S): at 17:52

## 2023-01-01 RX ADMIN — Medication 0.3 MILLIGRAM(S): at 15:47

## 2023-01-01 RX ADMIN — Medication 0.5 MILLIGRAM(S): at 06:46

## 2023-01-01 RX ADMIN — MIDODRINE HYDROCHLORIDE 15 MILLIGRAM(S): 2.5 TABLET ORAL at 11:39

## 2023-01-01 RX ADMIN — MIDODRINE HYDROCHLORIDE 40 MILLIGRAM(S): 2.5 TABLET ORAL at 00:36

## 2023-01-01 RX ADMIN — NYSTATIN CREAM 1 APPLICATION(S): 100000 CREAM TOPICAL at 17:20

## 2023-01-01 RX ADMIN — HYDROMORPHONE HYDROCHLORIDE 0.5 MILLIGRAM(S): 2 INJECTION INTRAMUSCULAR; INTRAVENOUS; SUBCUTANEOUS at 13:38

## 2023-01-01 RX ADMIN — CLOPIDOGREL BISULFATE 75 MILLIGRAM(S): 75 TABLET, FILM COATED ORAL at 13:27

## 2023-01-01 RX ADMIN — HYDROMORPHONE HYDROCHLORIDE 1 MILLIGRAM(S): 2 INJECTION INTRAMUSCULAR; INTRAVENOUS; SUBCUTANEOUS at 16:21

## 2023-01-01 RX ADMIN — ATORVASTATIN CALCIUM 40 MILLIGRAM(S): 80 TABLET, FILM COATED ORAL at 20:59

## 2023-01-01 RX ADMIN — NYSTATIN CREAM 1 APPLICATION(S): 100000 CREAM TOPICAL at 06:22

## 2023-01-01 RX ADMIN — HYDROMORPHONE HYDROCHLORIDE 0.25 MILLIGRAM(S): 2 INJECTION INTRAMUSCULAR; INTRAVENOUS; SUBCUTANEOUS at 11:01

## 2023-01-01 RX ADMIN — LIDOCAINE 1 PATCH: 4 CREAM TOPICAL at 07:00

## 2023-01-01 RX ADMIN — HYDROMORPHONE HYDROCHLORIDE 0.5 MILLIGRAM(S): 2 INJECTION INTRAMUSCULAR; INTRAVENOUS; SUBCUTANEOUS at 12:45

## 2023-01-01 RX ADMIN — HYDROMORPHONE HYDROCHLORIDE 0.25 MILLIGRAM(S): 2 INJECTION INTRAMUSCULAR; INTRAVENOUS; SUBCUTANEOUS at 06:52

## 2023-01-01 NOTE — ED PROVIDER NOTE - PROGRESS NOTE DETAILS
eugenia - discussed w icu. to go to ICU bed stat. renal fellow on way. eugenia / shirley  received on sign out. pt ESRD  - missed dialysis x8days.  thus far - hyperK 7.8 w ekg changes.   icu and renal aware. already got calcium / albuterol / bicarb.   now glucose resulted at 105 - ordered for 5un insulin and dextrose.   at time of sign out pending admission to icu eugenia / shirley  received on sign out. pt ESRD  - missed dialysis x8days.  thus far - hyperK 7.8 w ekg changes.   icu and renal aware. already got calcium / albuterol / bicarb. added insulin (renal dose) / dextrose   now glucose resulted at 105 - ordered for 5un insulin and dextrose.   at time of sign out pending admission to icu

## 2023-01-01 NOTE — ED ADULT NURSE NOTE - OBJECTIVE STATEMENT
Patient w/ ESRD (HD MWF), HTN, HLD, c/o weakness and abdominal pain for the past few days. Last hemodialysis on [facility], stated haven't gone to HD due to generalized weakness and unable to get out of bed. Patient stated having diarrhea and vomiting. Patient had a bilious emesis in the ED. Patient denies cp, sob, fever/chill. Unable to get pulse-ox, no respiratory distress noted. B/L UE grafting for HD but not in use, RUE graft in 2003. Patient stated dose not produce urine. Patient on HD via left chest port, dressing c/d/i.

## 2023-01-01 NOTE — ED ADULT NURSE NOTE - CHIEF COMPLAINT QUOTE
Diarrhea/vomitting x1week.  Unable to obtain pulse ox, primary RN notified.  Bilateral grafts for dialysis to both arms.  Zo for dialysis in place, MWF, but patient missed an entire week.

## 2023-01-01 NOTE — ED PROVIDER NOTE - CCCP TRG CHIEF CMPLNT
Quality 226: Preventive Care And Screening: Tobacco Use: Screening And Cessation Intervention: Patient screened for tobacco use and is an ex/non-smoker Quality 111:Pneumonia Vaccination Status For Older Adults: Pneumococcal vaccine (PPSV23) administered on or after patient’s 60th birthday and before the end of the measurement period Detail Level: Detailed Quality 110: Preventive Care And Screening: Influenza Immunization: Influenza Immunization Administered during Influenza season Quality 431: Preventive Care And Screening: Unhealthy Alcohol Use - Screening: Patient not identified as an unhealthy alcohol user when screened for unhealthy alcohol use using a systematic screening method abdominal pain

## 2023-01-01 NOTE — ED PROVIDER NOTE - CONSTITUTIONAL, MLM
normal... elderly female, awake, alert, oriented to person, place, time/situation and in no apparent distress.

## 2023-01-01 NOTE — ED PROVIDER NOTE - OBJECTIVE STATEMENT
67 F with ESRD due to PKD , HTN, co gen malaise weakness for 1 week 67 F with ESRD due to PKD , HTN, co gen malaise weakness for 1 week- now w crampy abd pain watery int diarrhea- 2-3 bm's per day  now w nausea- vomited x1 upon arrival  no f/c no cp no sob  has not had HD since Dec 24th- 8 d ago  severity- severe- exac- noncomp w HD

## 2023-01-01 NOTE — ED ADULT TRIAGE NOTE - CHIEF COMPLAINT QUOTE
Diarrhea/vomitting x1week.  Unable to obtain pulse ox, primary RN notified. Diarrhea/vomitting x1week.  Unable to obtain pulse ox, primary RN notified.  Bilateral grafts for dialysis to both arms.  Zo for dialysis in place, MWF, but patient missed an entire week.

## 2023-01-02 PROBLEM — I10 ESSENTIAL (PRIMARY) HYPERTENSION: Chronic | Status: ACTIVE | Noted: 2022-01-01

## 2023-01-02 PROBLEM — I82.409 ACUTE EMBOLISM AND THROMBOSIS OF UNSPECIFIED DEEP VEINS OF UNSPECIFIED LOWER EXTREMITY: Chronic | Status: ACTIVE | Noted: 2022-01-01

## 2023-01-02 PROBLEM — E21.0 PRIMARY HYPERPARATHYROIDISM: Chronic | Status: ACTIVE | Noted: 2022-01-01

## 2023-01-02 PROBLEM — I25.10 ATHEROSCLEROTIC HEART DISEASE OF NATIVE CORONARY ARTERY WITHOUT ANGINA PECTORIS: Chronic | Status: ACTIVE | Noted: 2022-01-01

## 2023-01-02 PROBLEM — N18.6 END STAGE RENAL DISEASE: Chronic | Status: ACTIVE | Noted: 2022-01-01

## 2023-01-02 PROBLEM — Z87.19 PERSONAL HISTORY OF OTHER DISEASES OF THE DIGESTIVE SYSTEM: Chronic | Status: ACTIVE | Noted: 2022-01-01

## 2023-01-02 PROBLEM — E78.5 HYPERLIPIDEMIA, UNSPECIFIED: Chronic | Status: ACTIVE | Noted: 2022-01-01

## 2023-01-02 NOTE — CHART NOTE - NSCHARTNOTEFT_GEN_A_CORE
Infectious Diseases Anti-infective Approval Note    Medication: aztreonam  Dose*: 1g  Route: IV  Frequency: q24h  Duration**: 2d    *Dose may be adjusted as needed for alterations in renal function.    **Reflects duration of approval and not necessarily duration of therapy     THIS IS NOT AN INFECTIOUS DISEASES CONSULTATION

## 2023-01-02 NOTE — CONSULT NOTE ADULT - SUBJECTIVE AND OBJECTIVE BOX
HPI:  67 F with ESRD due to PKD , HTN, co gen malaise weakness for 1 week- now w crampy abd pain watery int diarrhea- 2-3 bm's per day.   	now w nausea- vomited x1 upon arrival     Nephrology consulted for dialysis.     PAST MEDICAL & SURGICAL HISTORY:  HTN (hypertension)      HLD (hyperlipidemia)      CAD (coronary atherosclerotic disease)      ESRD on dialysis  last 11/15/22      H/O ventral hernia      Brown tumor  brain      DVT, lower extremity  left      History of surgery  IVC filter      AVF (arteriovenous fistula)  right left      S/P AIDEN-BSO  2003      History of surgery  RLE PTA stent / atherectomy  7/2021      History of atherectomy  stent            Allergies:  Ancef (Rash; Urticaria)  DDAVP (Hypotension)  iodine (Hives; Pruritus)  penicillin (Swelling)  sulfa drugs (Angioedema)      Home Medications:   3 in 1 commode: 1 unit(s) implant prn   Aspir 81 oral delayed release tablet: 1 tab(s) orally once a day  atorvastatin 40 mg oral tablet: 1 tab(s) orally once a day  clopidogrel 75 mg oral tablet: 1 tab(s) orally once a day  folic acid 1 mg oral tablet: 1 tab(s) orally once a day  meclizine 25 mg oral tablet: 1 tab(s) orally 3 times a day, As Needed  midodrine 10 mg oral tablet: 1 tab(s) orally every 8 hours  thiamine 100 mg oral tablet: 1 tab(s) orally once a day      Hospital Medications:   MEDICATIONS  (STANDING):      SOCIAL HISTORY:  Denies ETOh, Smoking,     Family History:  FAMILY HISTORY:  No pertinent family history in first degree relatives          VITALS:  T(F): 97.5 (01-01-23 @ 20:52), Max: 97.5 (01-01-23 @ 20:52)  HR: 108 (01-01-23 @ 20:52)  BP: 109/86 (01-01-23 @ 20:52)  RR: 16 (01-01-23 @ 20:52)  SpO2: --  Wt(kg): --    Height (cm): 177.8 (01-01 @ 20:52)  Weight (kg): 85.3 (01-01 @ 20:52)  BMI (kg/m2): 27 (01-01 @ 20:52)  BSA (m2): 2.03 (01-01 @ 20:52)  CAPILLARY BLOOD GLUCOSE      POCT Blood Glucose.: 85 mg/dL (01 Jan 2023 23:43)      Review of Systems:  CONSTITUTIONAL: No fever   RESPIRATORY: No shortness of breath, cough  CARDIOVASCULAR: No Chest pain, shortness of breath  GASTROINTESTINAL: yes  abdominal pain, nausea, vomiting, diarrhea  GENITOURINARY: No urinary frequency, gross hematuria, dysuria  NEUROLOGICAL: yes headache, weakness  SKIN: No rash or skin lesion  MUSCULOSKELETAL: No swelling  Psych: Denies suicidal or homicidal ideation    PHYSICAL EXAM:  GENERAL: Alert, awake, oriented x3 on RA, mild distress   HEENT: ZOHAIB, EOMI, neck supple, no JVP MMM  CHEST/LUNG: Bilateral clear breath sounds  HEART: Regular rate and rhythm, no murmur, no gallops, no rub   ABDOMEN: Soft, nontender, non distended  : No flank or supra pubic tenderness.  EXTREMITIES: no pedal edema, cold extremities   Neurology: AAOx3, no asterixis   SKIN: No rash or skin lesion  ACCESS: [facility] c/d/i     LABS:  01-01    131<L>  |  84<L>  |  169<H>  ----------------------------<  105<H>  7.8<HH>   |  16<L>  |  17.58<H>    Ca    8.8      01 Jan 2023 22:52    TPro  7.1  /  Alb  3.6  /  TBili  0.8  /  DBili      /  AST  25  /  ALT  16  /  AlkPhos  95  01-01    Creatinine Trend: 17.58 <--                        9.8    15.76 )-----------( 309      ( 01 Jan 2023 22:52 )             32.0     Urine Studies:

## 2023-01-02 NOTE — PATIENT PROFILE ADULT - FALL HARM RISK - HARM RISK INTERVENTIONS

## 2023-01-02 NOTE — H&P ADULT - ATTENDING COMMENTS
elderly female with missed HD since 12/25 found to have k >8 with ekg changes. hypoglycemic and tachcardic. afebrile but in a long standing HD patient she might not be able to mount a febrile response. does appear toxic. we will give coverage for nosocomial infection. lindsey allergic. will give vanc and aztreonam and follow cultures. needed multiple rounds of d50, we will likely start a d10 drip if the patient cannot take PO. serial ekgs. repeat all labs following hd.

## 2023-01-02 NOTE — H&P ADULT - HISTORY OF PRESENT ILLNESS
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 Complaining of crampy abdominal pain and waterry diarrhea (2-3 BMs/day), now with nausea and vomiting prior to arrival. Has not gone to HD since 12/24 (8 days ago). Denies fever, chills, CP, SOB.        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 Complaining of crampy abdominal pain and waterry diarrhea (2-3 BMs/day), now with nausea and vomiting prior to arrival. Has not gone to HD since 12/24 (8 days ago). Denies fever, chills, CP, SOB.       In the ED:  Initial vital signs: T: 97.5 F, HR: 108, BP: 109/86, R: 16, SpO2: 100% on RA  ED course:   Labs: significant for WBC 15.76, H/H 9.8/32.0, Plt 309, Na 131, K 7.8, Cl 84, AG 31, BUN/Cr 169/17.58  EKG: peaked T waves  Medications: calcium gluconate, dilaudid 0.5 x1, regular insulin 5, morphine 2mg IV, sodium bicarb, dextrose, albuterol 2.5x3  Consults: none

## 2023-01-02 NOTE — H&P ADULT - NSHPLABSRESULTS_GEN_ALL_CORE
LABS:                         9.6    15.84 )-----------( 352      ( 02 Jan 2023 05:30 )             30.1     01-02    x   |  x   |  x   ----------------------------<  93  x    |  25  |  x     Ca    9.0      02 Jan 2023 05:30  Phos  2.6     01-02  Mg     1.8     01-02    TPro  7.1  /  Alb  3.6  /  TBili  0.8  /  DBili  x   /  AST  25  /  ALT  16  /  AlkPhos  95  01-01    PT/INR - ( 02 Jan 2023 05:30 )   PT: 14.9 sec;   INR: 1.25          PTT - ( 02 Jan 2023 05:30 )  PTT:30.4 sec        CAPILLARY BLOOD GLUCOSE  141 (02 Jan 2023 00:30)      POCT Blood Glucose.: 45 mg/dL (02 Jan 2023 06:09)  POCT Blood Glucose.: 47 mg/dL (02 Jan 2023 06:07)  POCT Blood Glucose.: 71 mg/dL (02 Jan 2023 03:13)  POCT Blood Glucose.: 85 mg/dL (02 Jan 2023 01:58)  POCT Blood Glucose.: 40 mg/dL (02 Jan 2023 01:53)  POCT Blood Glucose.: 85 mg/dL (01 Jan 2023 23:43)      Lactate, Blood: 1.9 mmol/L (01-01 @ 22:49)      RADIOLOGY, EKG & ADDITIONAL TESTS:

## 2023-01-02 NOTE — CONSULT NOTE ADULT - ASSESSMENT
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 Complaining of crampy abdominal pain and waterry diarrhea (2-3 BMs/day), now with nausea and vomiting prior to arrival. Has not gone to HD since 12/24 (8 days ago). ICU consulted for hyperkalemia with peaked T waves. Planned for emergent HD.     #Hyperkalemia #ESRD with missed HD #Abdominal pain-  Patient presents with K >8 on VBG with abdominal symptoms and missed HD x8 days. ICU consulted i/s/o hyperkalemia. Renal consulted  - Planned for emergency HD    Dispo: ICU

## 2023-01-02 NOTE — PATIENT PROFILE ADULT - DO YOU NEED ADDITIONAL SERVICES TO MANAGE ANY OF THESE MEDICAL CONDITIONS AT HOME?
Requested Prescriptions   Pending Prescriptions Disp Refills     amphetamine-dextroamphetamine (ADDERALL) 5 MG tablet  Last Written Prescription Date:  09/06/2019  Last Fill Quantity: 30 tablet,  # refills: 0   Last Office Visit: 9/6/2019 Princess Suarez MD   Future Office Visit:      30 tablet 0     Sig: Take 1 tablet (5 mg) by mouth daily In the afternoon as needed       There is no refill protocol information for this order     Routing refill request to provider for review/approval because:  Drug not on the [facility] or [facility] refill protocol or controlled substance          no

## 2023-01-02 NOTE — CONSULT NOTE ADULT - SUBJECTIVE AND OBJECTIVE BOX
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 Complaining of crampy abdominal pain and waterry diarrhea (2-3 BMs/day), now with nausea and vomiting prior to arrival. Has not gone to HD since 12/24 (8 days ago). Denies fever, chills, CP, SOB.     ROS:  Otherwise negative, except as specified in HPI.    ALLERGIES:    MEDICATIONS:    VITAL SIGNS:  ICU Vital Signs Last 24 Hrs  T(C): 36.4 (01 Jan 2023 20:52), Max: 36.4 (01 Jan 2023 20:52)  T(F): 97.5 (01 Jan 2023 20:52), Max: 97.5 (01 Jan 2023 20:52)  HR: 108 (02 Jan 2023 00:12) (108 - 108)  BP: 134/63 (02 Jan 2023 00:12) (109/86 - 134/63)  BP(mean): --  ABP: --  ABP(mean): --  RR: 16 (02 Jan 2023 00:12) (16 - 16)  SpO2: --    O2 Parameters below as of 02 Jan 2023 00:12  Patient On (Oxygen Delivery Method): room air          CAPILLARY BLOOD GLUCOSE      POCT Blood Glucose.: 85 mg/dL (01 Jan 2023 23:43)      PHYSICAL EXAM:  Constitutional: resting comfortably in bed, NAD  HEENT: NC/AT; PERRL, anicteric sclera; no oropharyngeal erythema or exudates; MMM  Neck: supple, no appreciable JVD  Respiratory: CTA B/L, no W/R/R; respirations appear non-labored, conversive in full sentences  Cardiovascular: +S1/S2, RRR  Gastrointestinal: abdomen soft, NT/ND  Extremities: WWP; no clubbing, cyanosis or edema  Vascular: 2+ radial, femoral, and DP/PT pulses B/L  Dermatologic: skin normal color and turgor; no visible rashes  Neurological:     LABS:                        9.8    15.76 )-----------( 309      ( 01 Jan 2023 22:52 )             32.0     01-01    131<L>  |  84<L>  |  169<H>  ----------------------------<  105<H>  7.8<HH>   |  16<L>  |  17.58<H>    Ca    8.8      01 Jan 2023 22:52    TPro  7.1  /  Alb  3.6  /  TBili  0.8  /  DBili  x   /  AST  25  /  ALT  16  /  AlkPhos  95  01-01    PT/INR - ( 01 Jan 2023 22:52 )   PT: 15.8 sec;   INR: 1.32          PTT - ( 01 Jan 2023 22:52 )  PTT:30.4 sec  Lactate, Blood: 1.9 mmol/L (01-01-23 @ 22:49)          Blood Gas Profile w/Lytes - Venous: Performed in Lab (01-01-23 @ 22:43)      EKG: Reviewed.    RADIOLOGY & ADDITIONAL TESTS: Reviewed. 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 Complaining of crampy abdominal pain and waterry diarrhea (2-3 BMs/day), now with nausea and vomiting prior to arrival. Has not gone to HD since 12/24 (8 days ago). Denies fever, chills, CP, SOB.     In the ED:  Initial vital signs: T: 97.5 F, HR: 108, BP: 109/86, R: 16, SpO2: 100% on RA  ED course:   Labs: significant for WBC 15.76, H/H 9.8/32.0, Plt 309, Na 131, K 7.8, Cl 84, AG 31, BUN/Cr 169/17.58  EKG: peaked T waves  Medications: calcium gluconate, dilaudid 0.5 x1, regular insulin 5, morphine 2mg IV, sodium bicarb, dextrose, albuterol 2.5x3  Consults: ICU, Renal    ROS:  Otherwise negative, except as specified in HPI.    ALLERGIES:    MEDICATIONS:    VITAL SIGNS:  ICU Vital Signs Last 24 Hrs  T(C): 36.4 (01 Jan 2023 20:52), Max: 36.4 (01 Jan 2023 20:52)  T(F): 97.5 (01 Jan 2023 20:52), Max: 97.5 (01 Jan 2023 20:52)  HR: 108 (02 Jan 2023 00:12) (108 - 108)  BP: 134/63 (02 Jan 2023 00:12) (109/86 - 134/63)  BP(mean): --  ABP: --  ABP(mean): --  RR: 16 (02 Jan 2023 00:12) (16 - 16)  SpO2: --    O2 Parameters below as of 02 Jan 2023 00:12  Patient On (Oxygen Delivery Method): room air          CAPILLARY BLOOD GLUCOSE      POCT Blood Glucose.: 85 mg/dL (01 Jan 2023 23:43)      PHYSICAL EXAM:  Constitutional: resting comfortably in bed, mildly somnolent  HEENT: NC/AT; PERRL, anicteric sclera; no oropharyngeal erythema or exudates; MMM  Neck: supple, no appreciable JVD  Respiratory: b/l crackles  Cardiovascular: +S1/S2, RRR  Gastrointestinal: abdomen soft, NT/ND  Extremities: B/l AVF + L chest port  Vascular: 2+ radial, femoral, and DP/PT pulses B/L  Dermatologic: skin normal color and turgor; no visible rashes  Neurological: AOx3    LABS:                        9.8    15.76 )-----------( 309      ( 01 Jan 2023 22:52 )             32.0     01-01    131<L>  |  84<L>  |  169<H>  ----------------------------<  105<H>  7.8<HH>   |  16<L>  |  17.58<H>    Ca    8.8      01 Jan 2023 22:52    TPro  7.1  /  Alb  3.6  /  TBili  0.8  /  DBili  x   /  AST  25  /  ALT  16  /  AlkPhos  95  01-01    PT/INR - ( 01 Jan 2023 22:52 )   PT: 15.8 sec;   INR: 1.32          PTT - ( 01 Jan 2023 22:52 )  PTT:30.4 sec  Lactate, Blood: 1.9 mmol/L (01-01-23 @ 22:49)          Blood Gas Profile w/Lytes - Venous: Performed in Lab (01-01-23 @ 22:43)      EKG: Reviewed.    RADIOLOGY & ADDITIONAL TESTS: Reviewed.

## 2023-01-02 NOTE — PROVIDER CONTACT NOTE (CHANGE IN STATUS NOTIFICATION) - ACTION/TREATMENT ORDERED:
CODE stroke called, provider at bedside
continue to monitor. provider not too concern about increase heart rate. Believes patient needs to be dialysis to prevent further arrhthymias. abx to be ordered for possible infection

## 2023-01-02 NOTE — PROVIDER CONTACT NOTE (OTHER) - ACTION/TREATMENT ORDERED:
providers are unable to be at bedside now, will attempt to get ultrasound guided PIV when possible providers are unable to be at bedside now, will attempt to get ultrasound guided PIV when possible, hold meds until access is obtained

## 2023-01-02 NOTE — PROGRESS NOTE ADULT - ASSESSMENT
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 Complaining of crampy abdominal pain and waterry diarrhea (2-3 BMs/day), now with nausea and vomiting prior to arrival. Has not gone to HD since 12/24 (8 days ago). Admitted for emergent HD    NEURO  No active issues    PULM    CARDIAC  #SIRS  On admission met 2/4 SIRS criteria with HR > 90, WBC >12. Lactate 1.9. Afebrile.    - Vancomycin for empiric coverage with HD      #HFrEF  TTE 11/22 normal LV and systolic, EF 59%, grade II diastolic dysfuncton, dilated RV, reduced RV,   Home meds: Entresto 49/51mg  - holding home meds, resume when appropriate      #CAD  Cardiac cath in 2018.   Home meds: atorvastatin 40mg, lipitor 75mg  - holding meds, resume when appropriate      GI  No active issues    RENAL  #Hyperkalemia  Patient with Hx of ESRD with missed HD presents with K >8 on VBG with abdominal symptoms and missed HD x8 days. ICU consulted i/s/o hyperkalemia. Renal consulted.  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 Complaining of crampy abdominal pain and waterry diarrhea (2-3 BMs/day), now with nausea and vomiting prior to arrival. Has not gone to HD since 12/24 (8 days ago). Admitted for emergent HD.     NEURO  COLLIN    PULM  COLLIN    CARDIOVASCULAR  #Shock  Pt presented meeting 2/4 SIRS. Hypotensive with MAPs to 60, requiring Levophed. (Pt normally takes midodrine 10mg prior to HD sessions and did not today). On PE, extremities cool. Procal 12. Likely septic shock, source possibly pulmonary,   - On Levophed with increasing pressor requirements, titrate PRN with goal MAP 60-65    #Sepsis  On admission met 2/4 SIRS criteria with HR > 90, WBC >12. Lactate 1.9. Afebrile. Procal 12. S/p Vancomycin 1250mg x 1, Aztreonam x 1 (pt allergic to Cefazolin, PCN).  - Bilious emesis x 2 in AM, pt c/o R breast pain   - No signs of acute abdomen  - CXR: R basilar atelectasis vs infiltrate   - obtain urgent CT chest, abdomen pelvis  - f/u BCx    #HFrEF  TTE 11/22 normal LV and systolic, EF 59%, grade II diastolic dysfuncton, dilated RV, reduced RV,   Home meds: Entresto 49/51mg  - holding home meds, resume when appropriate    #CAD  Cardiac cath in 2018.   Home meds: atorvastatin 40mg, plavix 75mg qd  - holding meds, resume when appropriate    GI  #Abdominal Pain  C/o vague abdominal pain, a/w bilious emesis, no nausea.    RENAL  #Hyperkalemia  Patient with Hx of ESRD with missed HD presents with K >8 on VBG with abdominal symptoms and missed HD x8 days. ICU consulted i/s/o hyperkalemia. Renal consulted.  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   - large R breast mass with axillary LAD, c/f breast cancer  - f/u R breast U/S    Preventative  F: None  E: Replete PRN  N: holding  DVT: heparin 5000IU subq q8h   Code Status: Full Code  Dispo: ICU

## 2023-01-02 NOTE — CONSULT NOTE ADULT - ASSESSMENT
67 F with ESRD on HD, missed HD, last HD was 12/24, found to have potassium of 8 with EKG changes, Nephrology consulted for urgent HD         Dialysis Management   Electrolytes noted, Bicarb of 16, Potassium of 8, medically managed with Insulin, Calcium Gluconate, D50 while awaiting HD   Patient will need ICU or tele bed   HD as prescribed   Hold home BP meds   Renal Diet   Daily BMP   Renally dose meds   Trend phos daily wit labs

## 2023-01-02 NOTE — PROVIDER CONTACT NOTE (OTHER) - ACTION/TREATMENT ORDERED:
Provider to assess at bedside. Dr. Jose Eduardo Jacques assessed patient at bedside at 140AM. Stated that PIV should be on the left arm. Can leave the PIV in the Right arm for now. He and his team will try for US PIV later

## 2023-01-02 NOTE — PROGRESS NOTE ADULT - SUBJECTIVE AND OBJECTIVE BOX
OVERNIGHT EVENTS/INTERVAL HPI: Patient admitted overnight. Had 2 episodes of green bilious-appearing emesis in AM. Pt c/o severe R breast pain and vague abdominal pain, not localized. She states she had diarrhea several times in the past week. She denies fever, chills, HA, cough, chest pain, dyspnea, dysuria.     Of note, the patient visited the ED at [facility] on 12/24/22 for nausea, abdominal pain, weakness. CTAP was completed which was unremarkable, as were labs. Pt was given outpatient referral for breast U/S. No acute interventions were completed at the time.     OBJECTIVE:  Vital Signs Last 24 Hrs  T(C): 36.9 (02 Jan 2023 14:28), Max: 37 (02 Jan 2023 02:10)  T(F): 98.4 (02 Jan 2023 14:28), Max: 98.6 (02 Jan 2023 02:10)  HR: 104 (02 Jan 2023 15:00) (104 - 126)  BP: 83/39 (02 Jan 2023 15:00) (66/29 - 136/77)  BP(mean): 57 (02 Jan 2023 15:00) (41 - 97)  RR: 18 (02 Jan 2023 15:00) (14 - 18)  SpO2: 100% (02 Jan 2023 15:00) (79% - 100%)    Parameters below as of 02 Jan 2023 15:00  Patient On (Oxygen Delivery Method): room air      I&O's Detail    01 Jan 2023 07:01  -  02 Jan 2023 07:00  --------------------------------------------------------  IN:    Other (mL): 500 mL  Total IN: 500 mL    OUT:    Other (mL): 900 mL  Total OUT: 900 mL    Total NET: -400 mL      02 Jan 2023 07:01  -  02 Jan 2023 15:17  --------------------------------------------------------  IN:    IV PiggyBack: 400 mL    Norepinephrine: 32 mL    Norepinephrine: 45 mL  Total IN: 477 mL    OUT:    Emesis (mL): 650 mL  Total OUT: 650 mL    Total NET: -173 mL    Physical Exam:  GENERAL: Awake, mumbling, aware and able to speak but not fully responsive to pain   NEURO: A&Ox4  HEENT: no conjunctivitis or scleral icterus, oral mucosa moist  NECK: Supple, no JVD  CARDIAC: Regular rate and rhythm, +S1/S2, no murmurs/rubs/gallops  PULM: Breathing comfortably on RA, clear to auscultation bilaterally, no wheezes/rales/rhonchi  ABDOMEN: Soft, obese, tender to movement of pannus, nontender to deep palpation of abdomen,, nondistended, +bs, no rebound tenderness or fluid wave  BREAST: large bumpy, immobile R breast mass approx 4cm lateral to nipple measuring est 8cm x 5cm, R axillary lymphadenopathy, L breast with smaller mass just lateral to nipple line  SKIN: LE cold to touch to knees, UE cool to touch  VASC: 2+ peripheral pulses, no edema, no LE tenderness, triple lumen catheter at R groin    Medications:  MEDICATIONS  (STANDING):  chlorhexidine 4% Liquid 1 Application(s) Topical <User Schedule>  heparin   Injectable 5000 Unit(s) SubCutaneous every 8 hours  midodrine. 20 milliGRAM(s) Oral every 8 hours  norepinephrine Infusion 0.05 MICROgram(s)/kG/Min (4 mL/Hr) IV Continuous <Continuous>    MEDICATIONS  (PRN):      Labs:                        9.6    15.84 )-----------( 352      ( 02 Jan 2023 05:30 )             30.1     01-02    137  |  91<L>  |  49<H>  ----------------------------<  93  4.0   |  25  |  6.13<H>    Ca    9.0      02 Jan 2023 05:30  Phos  2.6     01-02  Mg     1.8     01-02    TPro  7.1  /  Alb  3.7  /  TBili  1.0  /  DBili  x   /  AST  19  /  ALT  16  /  AlkPhos  95  01-02    PT/INR - ( 02 Jan 2023 05:30 )   PT: 14.9 sec;   INR: 1.25          PTT - ( 02 Jan 2023 05:30 )  PTT:30.4 sec      COVID-19 PCR: Negative (23 Nov 2022 19:55)  COVID-19 PCR: NotDetec (19 Nov 2022 11:56)      Radiology: Reviewed

## 2023-01-02 NOTE — CHART NOTE - NSCHARTNOTEFT_GEN_A_CORE
Stroke code called as patient found obtunded, AOx3, not responsive, LKW 2AM, s/p dialysis. Received 0.5mg dilaudid 3am for pain. BP 99/60, glucose 45. Symptoms more likely due to hypogylcemia. Stroke ACP and primary team agree to cancel code. Stroke code called as patient found obtunded, AOx0, no verbal output, not following commands. LKW 2AM, s/p dialysis. Received 0.5mg dilaudid 3am for pain. BP 99/60, glucose 45. Symptoms more likely due to hypogylcemia. Stroke ACP and primary team agree to cancel code.

## 2023-01-02 NOTE — H&P ADULT - ASSESSMENT
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 Complaining of crampy abdominal pain and waterry diarrhea (2-3 BMs/day), now with nausea and vomiting prior to arrival. Has not gone to HD since 12/24 (8 days ago). Admitted for emergent HD    NEURO  No active issues    PULM    CARDIAC  #SIRS  On admission met 2/4 SIRS criteria with HR > 90, WBC >12. Lactate 1.9. Afebrile.    - Vancomycin for empiric coverage with HD      #HFrEF  TTE 11/22 normal LV and systolic, EF 59%, grade II diastolic dysfuncton, dilated RV, reduced RV,   Home meds: Entresto 49/51mg  - holding home meds, resume when appropriate      #CAD  Cardiac cath in 2018.   Home meds: atorvastatin 40mg, lipitor 75mg  - holding meds, resume when appropriate      GI  No active issues    RENAL  #Hyperkalemia  Patient with Hx of ESRD with missed HD presents with K >8 on VBG with abdominal symptoms and missed HD x8 days. ICU consulted i/s/o hyperkalemia. Renal consulted.  - Planned for emergency HD  - f/u post HD labs    ENDO  No active issues    MSK  No active issues    Heme/Onc  No active issues    MSK  No active issues    Preventative  F: none  E: replete prn  N: holding  DVT:   Code Status: Full Code  Dispo: ICU

## 2023-01-02 NOTE — PATIENT PROFILE ADULT - FALL HARM RISK - CONCLUSION
Subjective  The patient is 7 year old male and is here for well child check. Anticipatory guidance was given. Development is within normal limits.  Caregiver's questions were answered.  Bright Futures handouts and safety issues were discussed.  Future recommended dental and vision exams were discussed. Says the child has slight problems with a L and a slight lisp. I encouraged her to have speech therapy see the child    Objective  Visit Vitals  BP 92/64   Pulse 96   Temp 98 °F (36.7 °C) (Temporal Artery)   Ht 3' 9.5\" (1.156 m)   Wt 19.8 kg   BMI 14.81 kg/m²    No head circumference on file for this encounter.   General The child was interactive and in no acute distress.  Lungs were clear to auscultation bilaterally  Heart had a regular rate and rhythm without murmur   Abdomen was soft and non-tender, there were no masses appreciated, no  hepatosplenomegaly  Hips were in place with no dislocations and good range of motion  Extremities no deformities, distal pulses were 2+ with no deformities, good range of motion  Spine was straight and intact  Neck was supple and without lymphadenopathy. Good range of motion  Oropharynx was clear and moist, no exudate posteriorly.  Nose clear  Eyes sclerae white, EOMI, PERRL  Ears showed normal grey TM's, canals showed no irritation.  Skin no rashes   normal Forrest 1 male.  Testes descended bilaterally.    Assessment and Plan  Well child. See orders for any vaccines given. All vaccine components were discussed as well as side effects Return to clinic at next well child check.  Call sooner as needed.  Annual flu vaccines for the family were recommended.   Fall with Harm Risk

## 2023-01-02 NOTE — H&P ADULT - NSHPPHYSICALEXAM_GEN_ALL_CORE
PHYSICAL EXAM:    Vital Signs Last 24 Hrs  T(C): 36.7 (02 Jan 2023 05:10), Max: 37 (02 Jan 2023 02:10)  T(F): 98.1 (02 Jan 2023 05:10), Max: 98.6 (02 Jan 2023 02:10)  HR: 116 (02 Jan 2023 05:10) (105 - 126)  BP: 91/53 (02 Jan 2023 05:10) (89/46 - 136/77)  BP(mean): 67 (02 Jan 2023 05:10) (64 - 97)  RR: 17 (02 Jan 2023 05:10) (14 - 17)  SpO2: 96% (02 Jan 2023 05:10) (95% - 96%)    Parameters below as of 02 Jan 2023 05:10  Patient On (Oxygen Delivery Method): room air      I&O's Summary    01 Jan 2023 07:01  -  02 Jan 2023 06:17  --------------------------------------------------------  IN: 500 mL / OUT: 900 mL / NET: -400 mL        General: NAD, well developed  HEENT: NC/AT; EOMI, PERRLA, anicteric sclera; moist mucosal membranes.  Neck: supple, trachea midline  Cardiovascular: RRR, +S1/S2; NO M/R/G  Respiratory: CTA B/L; no W/R/R  Gastrointestinal: soft, NT/ND; +BSx4  Extremities: WWP; no edema or cyanosis  Vascular: 2+ radial, DP/PT pulses B/L  Neurological: AAOx3; no focal deficits PHYSICAL EXAM:    Vital Signs Last 24 Hrs  T(C): 36.7 (02 Jan 2023 05:10), Max: 37 (02 Jan 2023 02:10)  T(F): 98.1 (02 Jan 2023 05:10), Max: 98.6 (02 Jan 2023 02:10)  HR: 116 (02 Jan 2023 05:10) (105 - 126)  BP: 91/53 (02 Jan 2023 05:10) (89/46 - 136/77)  BP(mean): 67 (02 Jan 2023 05:10) (64 - 97)  RR: 17 (02 Jan 2023 05:10) (14 - 17)  SpO2: 96% (02 Jan 2023 05:10) (95% - 96%)    Parameters below as of 02 Jan 2023 05:10  Patient On (Oxygen Delivery Method): room air      I&O's Summary    01 Jan 2023 07:01  -  02 Jan 2023 06:17  --------------------------------------------------------  IN: 500 mL / OUT: 900 mL / NET: -400 mL        PHYSICAL EXAM:  Constitutional: resting comfortably in bed, mildly somnolent  HEENT: NC/AT; PERRL, anicteric sclera; no oropharyngeal erythema or exudates; MMM  Neck: supple, no appreciable JVD  Respiratory: b/l crackles  Cardiovascular: +S1/S2, RRR  Gastrointestinal: abdomen soft, NT/ND  Extremities: B/l AVF + L chest port  Vascular: 2+ radial, femoral, and DP/PT pulses B/L  Dermatologic: skin normal color and turgor; no visible rashes  Neurological: AOx3

## 2023-01-02 NOTE — PROVIDER CONTACT NOTE (OTHER) - REASON
Poor PIV access, unable to get another PIV Poor PIV access, unable to get another PIV, unable to given IVPB meds

## 2023-01-03 NOTE — PROGRESS NOTE ADULT - ASSESSMENT
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 Complaining of crampy abdominal pain and waterry diarrhea (2-3 BMs/day), now with nausea and vomiting prior to arrival. Has not gone to HD since 12/24 (8 days ago). Admitted for emergent HD.     NEURO  COLLIN    PULM  COLLIN    CARDIOVASCULAR  #Shock  Pt presented meeting 2/4 SIRS. Hypotensive with MAPs to 60, requiring Levophed. (Pt normally takes midodrine 10mg prior to HD sessions and did not today). On PE, extremities cool. Procal 12. Likely septic shock, source possibly pulmonary,   - On Levophed with increasing pressor requirements, titrate PRN with goal MAP 60-65    #Sepsis  On admission met 2/4 SIRS criteria with HR > 90, WBC >12. Lactate 1.9. Afebrile. Procal 12. S/p Vancomycin 1250mg x 1, Aztreonam x 1 (pt allergic to Cefazolin, PCN).  - Bilious emesis x 2 in AM, pt c/o R breast pain   - No signs of acute abdomen  - CXR: R basilar atelectasis vs infiltrate   - obtain urgent CT chest, abdomen pelvis  - f/u BCx    #HFrEF  TTE 11/22 normal LV and systolic, EF 59%, grade II diastolic dysfuncton, dilated RV, reduced RV,   Home meds: Entresto 49/51mg  - holding home meds, resume when appropriate    #CAD  Cardiac cath in 2018.   Home meds: atorvastatin 40mg, plavix 75mg qd  - holding meds, resume when appropriate    GI  #Abdominal Pain  C/o vague abdominal pain, a/w bilious emesis, no nausea.    RENAL  #Hyperkalemia  Patient with Hx of ESRD with missed HD presents with K >8 on VBG with abdominal symptoms and missed HD x8 days. ICU consulted i/s/o hyperkalemia. Renal consulted.  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     NEURO  COLLIN    PULM  COLLIN    CARDIOVASCULAR  #Shock  Pt presented meeting 2/4 SIRS. Hypotensive with MAPs to 60, requiring Levophed. (Pt normally takes midodrine 10mg prior to HD sessions and did not today). On PE, extremities cool. Procal 12. Likely septic shock, source possibly pulmonary,   - On Levophed with increasing pressor requirements, titrate PRN with goal MAP 60-65    #Sepsis  On admission met 2/4 SIRS criteria with HR > 90, WBC >12. Lactate 1.9. Afebrile. Procal 12. S/p Vancomycin 1250mg x 1, Aztreonam x 1 (pt allergic to Cefazolin, PCN).  - Bilious emesis x 2 in AM, pt c/o R breast pain   - No signs of acute abdomen  - CXR: R basilar atelectasis vs infiltrate   - obtain urgent CT chest, abdomen pelvis  - f/u BCx    #HFrEF  TTE 11/22 normal LV and systolic, EF 59%, grade II diastolic dysfuncton, dilated RV, reduced RV,   Home meds: Entresto 49/51mg  - holding home meds, resume when appropriate    #CAD  Cardiac cath in 2018.   Home meds: atorvastatin 40mg, plavix 75mg qd  - holding meds, resume when appropriate    GI  #Abdominal Pain  C/o vague abdominal pain, a/w bilious emesis, no nausea.    RENAL  #Hyperkalemia  Patient with Hx of ESRD with missed HD presents with K >8 on VBG with abdominal symptoms and missed HD x8 days. ICU consulted i/s/o hyperkalemia. Renal consulted.  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     NEURO  #Pain Control  Pt in 10/10 pain of bilateral breasts, worst on R. Pain not responsive to Tylenol IV or Percocet.   - c/w Dilaudid 0.25mg IVP q4h PRN    PULM  #Atelectasis  Noted on CT chest to have atelectasis at b/l lung bases.  - Incentive spirometry    CARDIOVASCULAR  #Septic Shock  Pt presented meeting 2/4 SIRS. Hypotensive with MAPs to 60, requiring Levophed. Extremities cool on exam. Likely septic shock 2/2 infection of R breast, bilateral breasts, or seroma/hematoma of RUE. HR > 90, WBC >12. Lactate 1.9. Afebrile. Procal 12. S/p Vancomycin 1250mg x 1, Aztreonam x 1 (pt allergic to Cefazolin, PCN).  - On Levophed with increasing pressor requirements, titrate PRN with goal MAP 60-65  - CT:  7.0 cm fluid collection is present near an AV fistula in the right upper arm. Possibly abscess.   - Consult vascular surgery for fluid collection: seroma vs hematoma vs abscess at site of AV Fistula  - c/w Meropenem 500mg q24h, obtain ID approval  - start Vancomycin 1250mg, dosed based on HD schedule  - obtain BCx     #HFrEF  TTE 11/22 normal LV and systolic, EF 59%, grade II diastolic dysfuncton, dilated RV, reduced RV,   Home meds: Entresto 49/51mg  - hold Entresto i/s/o septic shock    #CAD  Hx of cardiac cath in 2018  Home meds: atorvastatin 40mg, plavix 75mg qd  - c/w home atorvastatin 40mg, Plavix 75mg qd    GI  #Abdominal Pain  C/o vague abdominal pain, a/w bilious emesis x 2, no nausea or further episodes of emesis   - see endocrine for plan    RENAL  #ESRD 2/2 PCKD  Pt has been on HD for "years" 2/2 PCKD. AV Fistula of EDWIN clotted in 2014, has received HD thru HD cath since. New AV Fistula in 11/2022, not yet matured.   - f/u nephrology recs  - s/p HD 1/3, 0L removed d/t overall fluid status     #Hyperkalemia  Patient with Hx of ESRD with missed HD presents with K >8 on VBG with abdominal symptoms and missed HD x8 days. ICU consulted i/s/o hyperkalemia. Renal consulted.  - s/p HD, 1L removed 1/2  - monitor with BMP's BID    ENDO  #R/o Hyperparathyroidism  At risk for secondary HPTT d/t renal failure   - pt has vague abdominal pain with nausea  - multiple brown tumors on CT with multiple fractures of pubic rami, pubic bone, thoracolumbar spine  - obtain intact PTH, ionized Ca    MSK  #Renal Osteodystrophy  - on CT: Increased density of the bone marrow is consistent with renal osteodystrophy    Heme/Onc  #R/o Breast Cancer  Family hx of breast cancer in mother, sister.   - large R breast mass, palpable L breast mass with R axillary LAD, c/f breast cancer, breast has peau d'orange appearance  - f/u U/S bilateral breasts    Preventative  F: None  E: None, HD pt  N: Renal Restricted Diet  DVT: heparin 5000IU subq q8h   Code Status: Full Code  Dispo: ICU

## 2023-01-03 NOTE — CONSULT NOTE ADULT - ASSESSMENT
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 Vascular consulted for RUE fistula evaluation to r/o source of sepsis. On PE no erythema, increased warmth or drainage to RUE wound, low suspicion for abscess of fistula RUE fistula at this time.     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 Vascular consulted for RUE fistula evaluation to r/o source of sepsis. 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 CT showing 7.0 cm fluid collection is present near an AV fistula in the right   upper arm. Vascular consulted for RUE fistula evaluation to r/o source of sepsis. On PE no erythema, increased warmth or drainage to RUE wound. Low suspicion for graft as source of infection.     Plan:  Agree with primary team for Gallium scan to determine infection source  Recommend peripheral and central blood cultures  Wound care recs: BID dressing changes (once daily with Santyl DSD once daily with Bactroban DSD)  Rec adding ESR to AM labs tomorrow   Please call IR to see if they can aspirate fluid collection present near AV fistula noted on CT and send that off for culture     Vascular surgery will follow. Please call [phone] with any questions or concerns

## 2023-01-03 NOTE — CHART NOTE - NSCHARTNOTEFT_GEN_A_CORE
Infectious Diseases Anti-infective Approval Note    Medication:  Meropenem   Dose:  500 mg  Route:  IV  Frequency:  daily  Duration**:  7 days    **Duration refers to duration of approval, not recommended duration of treatment    Dose may be adjusted as needed for alterations in renal function.    *THIS IS NOT AN INFECTIOUS DISEASES CONSULTATION* 22-Jun-2018

## 2023-01-03 NOTE — PROGRESS NOTE ADULT - SUBJECTIVE AND OBJECTIVE BOX
******INCOMPLETE******    OVERNIGHT EVENTS/INTERVAL HPI:    OBJECTIVE:  Vital Signs Last 24 Hrs  T(C): 36.3 (03 Jan 2023 05:30), Max: 36.9 (02 Jan 2023 14:28)  T(F): 97.4 (03 Jan 2023 05:30), Max: 98.4 (02 Jan 2023 14:28)  HR: 106 (03 Jan 2023 06:00) (82 - 117)  BP: 103/53 (03 Jan 2023 06:00) (66/29 - 128/59)  BP(mean): 71 (03 Jan 2023 06:00) (41 - 85)  RR: 14 (03 Jan 2023 05:15) (14 - 18)  SpO2: 99% (03 Jan 2023 06:00) (79% - 100%)    Parameters below as of 03 Jan 2023 02:00  Patient On (Oxygen Delivery Method): room air      I&O's Detail    02 Jan 2023 07:01  -  03 Jan 2023 07:00  --------------------------------------------------------  IN:    IV PiggyBack: 500 mL    Norepinephrine: 247.4 mL    Norepinephrine: 45 mL    Oral Fluid: 880 mL  Total IN: 1672.4 mL    OUT:    Emesis (mL): 650 mL  Total OUT: 650 mL    Total NET: 1022.4 mL        Physical Exam:  GENERAL: Awake, alert and interactive, no acute distress, appears comfortable  NEURO: A&Ox4, no focal deficits, 5/5 strength in all ext, reflexes 2+ throughout, CN 2-12 intact  HEENT: Normocephalic, atraumatic, no conjunctivitis or scleral icterus, oral mucosa moist, no oral lesions noted  NECK: Supple, no LAD, no JVD, thyroid not palpable  CARDIAC: Regular rate and rhythm, +S1/S2, no murmurs/rubs/gallops  PULM: Breathing comfortably on RA, clear to auscultation bilaterally, no wheezes/rales/rhonchi  ABDOMEN: Soft, nontender, nondistended, +bs, no hepatosplenomegaly, no rebound tenderness or fluid wave, no CVA tenderness  : Deferred  MSK: Range of motion grossly intact, no back tenderness  SKIN: Warm and dry, no rashes, lesions  VASC: Cap refil < 2 sec, 2+ peripheral pulses, no edema, no LE tenderness  Psych: Appropriate affect    Medications:  MEDICATIONS  (STANDING):  chlorhexidine 4% Liquid 1 Application(s) Topical <User Schedule>  heparin   Injectable 5000 Unit(s) SubCutaneous every 8 hours  meropenem  IVPB 500 milliGRAM(s) IV Intermittent every 24 hours  norepinephrine Infusion 0.05 MICROgram(s)/kG/Min (4 mL/Hr) IV Continuous <Continuous>    MEDICATIONS  (PRN):  acetaminophen     Tablet .. 650 milliGRAM(s) Oral every 6 hours PRN Temp greater or equal to 38C (100.4F), Moderate Pain (4 - 6)      Labs:                        9.0    14.38 )-----------( 364      ( 03 Jan 2023 05:30 )             28.0     01-03    134<L>  |  88<L>  |  93<H>  ----------------------------<  172<H>  5.4<H>   |  23  |  11.13<H>    Ca    8.9      03 Jan 2023 05:30  Phos  7.0     01-03  Mg     2.2     01-03    TPro  6.7  /  Alb  3.2<L>  /  TBili  0.6  /  DBili  x   /  AST  23  /  ALT  16  /  AlkPhos  86  01-03    PT/INR - ( 03 Jan 2023 05:30 )   PT: 16.8 sec;   INR: 1.41          PTT - ( 03 Jan 2023 05:30 )  PTT:29.7 sec      COVID-19 PCR: Negative (23 Nov 2022 19:55)  COVID-19 PCR: NotDetec (19 Nov 2022 11:56)      Radiology: Reviewed OVERNIGHT EVENTS/INTERVAL HPI: Pt in pain overnight, received Tylenol, still in pain so patient removed Dilaudid 0.5mg IVP. Pt became hypotensive and required Levophed 0.6. Aztreonam was switched to Meropenem.     SUBJECTIVE: Patient examined at bedside, stating she has severe R and L breast pain. She denies fever, chills, HA, chest pain, palpitations, nausea, abdominal pain, diarrhea, pain at site of HD cath.     OBJECTIVE:  Vital Signs Last 24 Hrs  T(C): 36.3 (03 Jan 2023 05:30), Max: 36.9 (02 Jan 2023 14:28)  T(F): 97.4 (03 Jan 2023 05:30), Max: 98.4 (02 Jan 2023 14:28)  HR: 106 (03 Jan 2023 06:00) (82 - 117)  BP: 103/53 (03 Jan 2023 06:00) (66/29 - 128/59)  BP(mean): 71 (03 Jan 2023 06:00) (41 - 85)  RR: 14 (03 Jan 2023 05:15) (14 - 18)  SpO2: 99% (03 Jan 2023 06:00) (79% - 100%)    Parameters below as of 03 Jan 2023 02:00  Patient On (Oxygen Delivery Method): room air      I&O's Detail    02 Jan 2023 07:01  -  03 Jan 2023 07:00  --------------------------------------------------------  IN:    IV PiggyBack: 500 mL    Norepinephrine: 247.4 mL    Norepinephrine: 45 mL    Oral Fluid: 880 mL  Total IN: 1672.4 mL    OUT:    Emesis (mL): 650 mL  Total OUT: 650 mL    Total NET: 1022.4 mL    Physical Exam:  GENERAL: awake, alert, in significant pain  NEURO: A&Ox4  HEENT: no conjunctivitis or scleral icterus, oral mucosa moist  NECK: Supple, no JVD  CARDIAC: tachycardic, regular rhythm, +S1/S2, no murmurs/rubs/gallops  PULM: Breathing comfortably on RA, clear to auscultation bilaterally, no wheezes/rales/rhonchi, HD catheter R chest  ABDOMEN: Soft, obese, nontender to light/deep palpation of abdomen, nondistended, +bs, no rebound tenderness or fluid wave, ventral hernia non reducible   BREAST: peau d'orange appearance of lateral R breast, large immobile R breast mass with irregular borders approx 4cm lateral to nipple measuring est 8cm x 5cm, R axillary lymphadenopathy, L breast with smaller mass just lateral to nipple line also tender to palpation  SKIN: LE cold to touch to ankle, DP/PT pulses 1+, UE cool to touch pulses not palpable   VASC: 2+ peripheral pulses, no edema, no LE tenderness, triple lumen catheter at R groin    Medications:  MEDICATIONS  (STANDING):  chlorhexidine 4% Liquid 1 Application(s) Topical <User Schedule>  heparin   Injectable 5000 Unit(s) SubCutaneous every 8 hours  meropenem  IVPB 500 milliGRAM(s) IV Intermittent every 24 hours  norepinephrine Infusion 0.05 MICROgram(s)/kG/Min (4 mL/Hr) IV Continuous <Continuous>    MEDICATIONS  (PRN):  acetaminophen     Tablet .. 650 milliGRAM(s) Oral every 6 hours PRN Temp greater or equal to 38C (100.4F), Moderate Pain (4 - 6)      Labs:                        9.0    14.38 )-----------( 364      ( 03 Jan 2023 05:30 )             28.0     01-03    134<L>  |  88<L>  |  93<H>  ----------------------------<  172<H>  5.4<H>   |  23  |  11.13<H>    Ca    8.9      03 Jan 2023 05:30  Phos  7.0     01-03  Mg     2.2     01-03    TPro  6.7  /  Alb  3.2<L>  /  TBili  0.6  /  DBili  x   /  AST  23  /  ALT  16  /  AlkPhos  86  01-03    PT/INR - ( 03 Jan 2023 05:30 )   PT: 16.8 sec;   INR: 1.41          PTT - ( 03 Jan 2023 05:30 )  PTT:29.7 sec      COVID-19 PCR: Negative (23 Nov 2022 19:55)  COVID-19 PCR: NotDetec (19 Nov 2022 11:56)      Radiology: Reviewed OVERNIGHT EVENTS/INTERVAL HPI: Pt in pain overnight, received Tylenol, still in pain so patient removed Dilaudid 0.5mg IVP. Pt became hypotensive and required Levophed 0.6. Aztreonam was switched to Meropenem.     SUBJECTIVE: Patient examined at bedside, stating she has severe R and L breast pain. She denies fever, chills, HA, chest pain, palpitations, nausea, abdominal pain, diarrhea, pain at site of HD cath.     OBJECTIVE:  Vital Signs Last 24 Hrs  T(C): 36.3 (03 Jan 2023 05:30), Max: 36.9 (02 Jan 2023 14:28)  T(F): 97.4 (03 Jan 2023 05:30), Max: 98.4 (02 Jan 2023 14:28)  HR: 106 (03 Jan 2023 06:00) (82 - 117)  BP: 103/53 (03 Jan 2023 06:00) (66/29 - 128/59)  BP(mean): 71 (03 Jan 2023 06:00) (41 - 85)  RR: 14 (03 Jan 2023 05:15) (14 - 18)  SpO2: 99% (03 Jan 2023 06:00) (79% - 100%)    Parameters below as of 03 Jan 2023 02:00  Patient On (Oxygen Delivery Method): room air      I&O's Detail    02 Jan 2023 07:01  -  03 Jan 2023 07:00  --------------------------------------------------------  IN:    IV PiggyBack: 500 mL    Norepinephrine: 247.4 mL    Norepinephrine: 45 mL    Oral Fluid: 880 mL  Total IN: 1672.4 mL    OUT:    Emesis (mL): 650 mL  Total OUT: 650 mL    Total NET: 1022.4 mL    Physical Exam:  GENERAL: awake, alert, in significant pain  NEURO: A&Ox4  HEENT: no conjunctivitis or scleral icterus, oral mucosa moist  NECK: Supple, no JVD  CARDIAC: tachycardic, regular rhythm, +S1/S2, no murmurs/rubs/gallops  PULM: Breathing comfortably on RA, clear to auscultation bilaterally, no wheezes/rales/rhonchi, HD catheter R chest  ABDOMEN: Soft, obese, nontender to light/deep palpation of abdomen, nondistended, +bs, no rebound tenderness or fluid wave, ventral hernia non reducible   BREAST: peau d'orange appearance of lateral R breast, large immobile R breast mass with irregular borders approx 4cm lateral to nipple measuring est 8cm x 5cm, R axillary lymphadenopathy, L breast with smaller mass just lateral to nipple line also tender to palpation  SKIN: LE cold to touch to ankle, DP/PT pulses 1+, UE cool to touch pulses not palpable   VASC: 2+ peripheral pulses, no edema, no LE tenderness, triple lumen catheter at R groin    Medications:  MEDICATIONS  (STANDING):  chlorhexidine 4% Liquid 1 Application(s) Topical <User Schedule>  heparin   Injectable 5000 Unit(s) SubCutaneous every 8 hours  meropenem  IVPB 500 milliGRAM(s) IV Intermittent every 24 hours  norepinephrine Infusion 0.05 MICROgram(s)/kG/Min (4 mL/Hr) IV Continuous <Continuous>    MEDICATIONS  (PRN):  acetaminophen     Tablet .. 650 milliGRAM(s) Oral every 6 hours PRN Temp greater or equal to 38C (100.4F), Moderate Pain (4 - 6)      Labs:                        9.0    14.38 )-----------( 364      ( 03 Jan 2023 05:30 )             28.0     01-03    134<L>  |  88<L>  |  93<H>  ----------------------------<  172<H>  5.4<H>   |  23  |  11.13<H>    Ca    8.9      03 Jan 2023 05:30  Phos  7.0     01-03  Mg     2.2     01-03    TPro  6.7  /  Alb  3.2<L>  /  TBili  0.6  /  DBili  x   /  AST  23  /  ALT  16  /  AlkPhos  86  01-03    PT/INR - ( 03 Jan 2023 05:30 )   PT: 16.8 sec;   INR: 1.41          PTT - ( 03 Jan 2023 05:30 )  PTT:29.7 sec      COVID-19 PCR: Negative (23 Nov 2022 19:55)  COVID-19 PCR: NotDetec (19 Nov 2022 11:56)      Radiology:  Lungs and large airways: Small amount of consolidation/atelectasis in the   lower lobes, right greater than left. Linear atelectasis right middle   lobe and left upper lobe.    Pleura:  No pleural effusion.    Mediastinum and hilar regions: No thoracic lymphadenopathy.    Heart and pericardium:  Heart size is normal. No pericardial effusion.    Vessels:  Severe coronary artery calcification. Mild amount of calcified   plaque thoracic aorta. Moderate calcified plaque abdominal aorta. Larger   amount of calcified plaque splenic artery and bilateral pelvic arteries.   AV fistula is present in the right upper arm. A 7.0 x 5.1 cm fluid   collection is located near the fistula, possibly postoperative seroma or   hematoma. No gas within this collection. Vascular stents present in the   right axillary region. There is also a stent extending from the medial   aspect of the right subclavian vein through the right brachiocephalic   vein into the superior vena cava. There is a left-sided central line   extending through that stent into the right atrium. Multiple collateral   vessels right chest wall. IVC filter again in place, with some of its   prongs projecting beyond the lumen of the vessel. Mildly calcified aortic   valve. Tortuous thoracic aorta.    Chest wall and lower neck:  Enlarged, multinodular thyroid projects into   the superior mediastinum. There is a 1.8 x 1.5 cm solid nodule slightly   inferiorly in the anterior mediastinum, without definite communication   with the thyroid. However, this may still represent thyroid tissue, as   its morphology is similar in appearance to the remainder of the thyroid.    Liver:  Multiple hepatic cysts again present.    Gallbladder and biliary tree: No radiopaque stones gallbladder. No   intrahepatic biliary ductal dilatation. Common bile duct again mildly   dilated, measuring0.9 cm.    Spleen:  Normal.    Pancreas:  A few pancreatic calcifications are again noted.    Adrenal glands:  Normal.    Kidneys: Kidneys again enlarged bilaterally with parenchyma nearly   completely replaced by innumerable simple and complex cystic masses,   consistent with autosomal dominant polycystic kidney disease. It is not   possible to determine if there are any solid renal masses on this   noncontrast exam.    Abdominal and pelvic adenopathy:  No lymphadenopathy in abdomen or pelvis.    Ascites: None.    Gastrointestinal tract: Nonobstructed loops of small bowel and portion of   transverse colon again present within large, broad-based ventral hernia.   Small bowel anastomosis in the pelvis.    Pelvic organs: A 6.1 x 4.9 cm cystic mass is again present in the right   adnexa. Left ovary unremarkable. Again post hysterectomy. No urine in   bladder, limiting evaluation.    Soft tissues: Normal.    Bones: Scoliosis and degenerative changes of the spine. Severe   degenerative changesof the shoulders. Increased density of the bone   marrow is consistent with renal osteodystrophy. There are also multiple   lucent bone lesions, which could represent Brown tumors. There is again   nonunion of fractures of the right superior and inferior pubic rami and   both pubic bones. Multiple compression fractures in the thoracolumbar   spine.      Impression: 1. Small amount of consolidation in both lower lobes, right   greater than left. This is felt to be more likely to represent   atelectasis than pneumonia.    2. A 7.0 cm fluid collection is present near an AV fistula in the right   upper arm. This could represent a postoperative seroma or hematoma, but   clinical correlation is recommended to rule out abscess given the history   ofsepsis.    3. There is again a large broad-based ventral hernia containing   nonobstructed loops of small and large bowel.    4. A 6.1 cm cystic mass is again present in the right adnexa. This is an   abnormal finding in a postmenopausal female. Recommend gynecology   consultation as neoplasm should be excluded.

## 2023-01-03 NOTE — CONSULT NOTE ADULT - SUBJECTIVE AND OBJECTIVE BOX
Vascular Attending: Dr. 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 Complaining of crampy abdominal pain and watery diarrhea (2-3 BMs/day), now with nausea and vomiting prior to arrival. Had not gone to HD since 12/24. Denies fever, chills, CP, SOB.     In the ED:  Initial vital signs: T: 97.5 F, HR: 108, BP: 109/86, R: 16, SpO2: 100% on RA  ED course:   Labs: significant for WBC 15.76, H/H 9.8/32.0, Plt 309, Na 131, K 7.8, Cl 84, AG 31, BUN/Cr 169/17.58  EKG: peaked T waves  Medications: calcium gluconate, Dilaudid 0.5 x1, regular insulin 5, morphine 2mg IV, sodium bicarb, dextrose, albuterol 2.5x3  Consults: none    (02 Jan 2023 05:26)    Vascular Addendum: 68 yo F pt with PMH HFrEF (EF 40%), nonobstructive CAD, HTN, HLD, ESRD 2/2 PCKD on HD, PE (2018) s/p IVC filter, PAD, OA, h/o thrombus in vascular access x3 ( R AVG, R AVF, L AVG), ventral hernia, 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 Pts daughter at bedside helped provide medical history as pt was lethargic. Pts daughter states her mother began feeling ill with nausea, non-bloody emesis, and Diarrhea after hemodialysis on [place]. She believes her mother was fed undercooked Oxtail by her Aid earlier that day and attributes her mothers illness to food poisoning. Pts daughter reports her mothers RUE wound is cleaned and re-bandaged daily and she denies any drainage or malodor to the wound. She reports the RUE fistula has not been used for dialysis. Pt denies any pain to the RUE only complaining of pain to B/L legs which is chronic in nature according to patient (ambulates with cane). Pts daughter notes she has been "more out of it than usual" since she got sick on [place]. Daughter reports pt has had slight cough but nothing more than usual. Daughter reports pt is compliant with all home medications. Pt denies any current nausea, vomiting fevers, chills, SOB.       PAST MEDICAL & SURGICAL HISTORY:  HTN (hypertension)      HLD (hyperlipidemia)      CAD (coronary atherosclerotic disease)      ESRD on dialysis  last 11/15/22      H/O ventral hernia      Brown tumor  brain      DVT, lower extremity  left      History of surgery  IVC filter      AVF (arteriovenous fistula)  right left      S/P AIDEN-BSO  2003      History of surgery  RLE PTA stent / atherectomy  7/2021      History of atherectomy  stent        REVIEW OF SYSTEMS  CONSTITUTIONAL: No weakness, fevers or chills  EYES/ENT: No visual changes; No throat pain   NECK: No pain or stiffness  RESPIRATORY: No cough, wheezing, hemoptysis; No shortness of breath  CARDIOVASCULAR: No chest pain or palpitations  GASTROINTESTINAL: No abdominal or epigastric pain. No nausea, vomiting, or hematemesis; No diarrhea or constipation. No melena or hematochezia.  GENITOURINARY: Pt does not produce urine (According to daughter)  NEUROLOGICAL: No numbness or weakness  SKIN: RUE wound      MEDICATIONS  (STANDING):  chlorhexidine 4% Liquid 1 Application(s) Topical <User Schedule>  heparin   Injectable 5000 Unit(s) SubCutaneous every 8 hours  norepinephrine Infusion 0.05 MICROgram(s)/kG/Min (4 mL/Hr) IV Continuous <Continuous>  vancomycin  IVPB 1200 milliGRAM(s) IV Intermittent once    MEDICATIONS  (PRN):  acetaminophen     Tablet .. 650 milliGRAM(s) Oral every 6 hours PRN Temp greater or equal to 38C (100.4F), Moderate Pain (4 - 6)  HYDROmorphone  Injectable 0.25 milliGRAM(s) IV Push every 4 hours PRN Severe Pain (7 - 10)      Allergies    Ancef (Rash; Urticaria)  DDAVP (Hypotension)  iodine (Hives; Pruritus)  penicillin (Swelling)  sulfa drugs (Angioedema)    Intolerances    SOCIAL HISTORY: lives at home has aid, ambulates with cane    FAMILY HISTORY:  No pertinent family history in first degree relatives      Vital Signs Last 24 Hrs  T(C): 36.1 (03 Jan 2023 13:10), Max: 37.1 (03 Jan 2023 09:22)  T(F): 97 (03 Jan 2023 13:10), Max: 98.7 (03 Jan 2023 09:22)  HR: 108 (03 Jan 2023 13:10) (82 - 110)  BP: 108/52 (03 Jan 2023 13:10) (73/30 - 128/59)  BP(mean): 66 (03 Jan 2023 12:00) (43 - 86)  RR: 19 (03 Jan 2023 13:10) (14 - 20)  SpO2: 99% (03 Jan 2023 13:10) (94% - 100%)    Parameters below as of 03 Jan 2023 13:10  Patient On (Oxygen Delivery Method): room air        PHYSICAL EXAM:  GENERAL: Pt lethargic, answering 1-2 questions then falling asleep, no acute distress, appears comfortable  HEENT: Normocephalic, atraumatic, no conjunctivitis or scleral icterus, oral mucosa moist  NECK: Supple, no LAD, no JVD  CARDIAC: Regular rate and rhythm, +S1/S2, no murmurs/rubs/gallops  PULM: Breathing comfortably on RA  ABDOMEN: Soft, nontender, nondistended, +BS, no rebound tenderness or fluid wave, no CVA tenderness  MSK: Range of motion grossly intact, no back tenderness, Active and Passive ROM to RUE intact, intact muscle strength to RUE, Right  strength preserved, palpable mobile nodule noted to RUE near wound   SKIN: Warm and dry, Hyperpigmented scaring noted to right bicep and forearm, wound noted to right upper inner arm fibrotic base, no drainage, no erythema, non-painful, no increased warmth   VASC: Cap refil < 2 sec, 2+ peripheral pulses, no edema, no LE tenderness, no calf pain B/L, no pain to dorsiflexion or plantarflexion b/l.   Psych: Appropriate affect      LABS:                        9.0    14.38 )-----------( 364      ( 03 Jan 2023 05:30 )             28.0     01-03    134<L>  |  88<L>  |  93<H>  ----------------------------<  172<H>  5.4<H>   |  23  |  11.13<H>    Ca    8.9      03 Jan 2023 05:30  Phos  7.0     01-03  Mg     2.2     01-03    TPro  6.7  /  Alb  3.2<L>  /  TBili  0.6  /  DBili  x   /  AST  23  /  ALT  16  /  AlkPhos  86  01-03    PT/INR - ( 03 Jan 2023 05:30 )   PT: 16.8 sec;   INR: 1.41          PTT - ( 03 Jan 2023 05:30 )  PTT:29.7 sec      RADIOLOGY & ADDITIONAL STUDIES  < from: CT Chest No Cont (01.03.23 @ 05:30) >    Impression: 1. Small amount of consolidation in both lower lobes, right   greater than left. This is felt to be more likely to represent   atelectasis than pneumonia.    2. A 7.0 cm fluid collection is present near an AV fistula in the right   upper arm. This could represent a postoperative seroma or hematoma, but   clinical correlation is recommended to rule out abscess given the history   of sepsis.    3. There is again a large broad-based ventral hernia containing   non-obstructed loops of small and large bowel.    4. A 6.1 cm cystic mass is again present in the right adnexa. This is an   abnormal finding in a postmenopausal female. Recommend gynecology   consultation as neoplasm should be excluded.  --- End of Report ---  < end of copied text >      < from: CT Abdomen and Pelvis No Cont (01.03.23 @ 05:34) >  Impression: 1. Small amount of consolidation in both lower lobes, right   greater than left. This is felt to be more likely to represent   atelectasis than pneumonia.    2. A 7.0 cm fluid collection is present near an AV fistula in the right   upper arm. This could represent a postoperative seroma or hematoma, but   clinical correlation is recommended to rule out abscess given the history   of sepsis.    3. There is again a large broad-based ventral hernia containing   non-obstructed loops of small and large bowel.    4. A 6.1 cm cystic mass is again present in the right adnexa. This is an   abnormal finding in a postmenopausal female. Recommend gynecology   consultation as neoplasm should be excluded.  --- End of Report ---  < end of copied text >

## 2023-01-03 NOTE — PROGRESS NOTE ADULT - ASSESSMENT
67 F with ESRD on HD presenting in setting of missed HD since 12/24 found to be hyperkalemic to 8 now resolved.     Assessment/Plan:   #ESRD on HD TTS  HD today in setting of hyperkalemia to 5.4  UF with HD  Renal diet    #HTN   Hypotensive 80s-100s  -Hold BP meds  -On levophed    #access   RIJ TDC    #anemia  Hgb within goal  -Epo w/ HD  -Check iron profile and ferritin    #renal bone disease   Ca ~8.9  Phos ~7   -Start sevelamer 800mg tid w/ meals      Thank you for the opportunity to participate in the care of your patient. The nephrology service remains available to assist with any questions or concerns. Please feel free to reach us by paging the on-call nephrology fellow for urgent issues or as below.     John Lisa D.O.  PGY-5, Nephrology Fellow    P: [phone]

## 2023-01-03 NOTE — PROVIDER CONTACT NOTE (HYPOGLYCEMIA EVENT) - NS PROVIDER CONTACT BACKGROUND-HYPO
Age: 67y    Gender: Female    POCT Blood Glucose:  92 mg/dL (01-03-23 @ 17:26)  66 mg/dL (01-03-23 @ 16:59)  115 mg/dL (01-03-23 @ 10:11)  123 mg/dL (01-03-23 @ 06:35)  142 mg/dL (01-03-23 @ 04:20)  143 mg/dL (01-03-23 @ 02:18)  114 mg/dL (01-03-23 @ 00:19)  94 mg/dL (01-02-23 @ 23:39)      eMAR:  dextrose 50% Injectable   50 milliLiter(s) IV Push (01-03-23 @ 17:04)    Pt asymptomatic during episode.  Encouraging PO intake per NP following repeat FS. Pt tolerating limited PO intake with encouragement.

## 2023-01-03 NOTE — PROGRESS NOTE ADULT - ATTENDING COMMENTS
ESRD with hyperkalemia, severe breast pain, possible mastitis with sepsis, multiple fractures  physical as above  continue pressors for MAP > 65  empiric vanco/meropenem  gallium scan  U/S of fistula c/w slight fluid collection  will d/w IR sampling of the collection plus question of biopsy of breast and thyroid  SPEP but question if bone loss from hyperparathyroidism contributing to fractures  TFTs

## 2023-01-03 NOTE — PROGRESS NOTE ADULT - SUBJECTIVE AND OBJECTIVE BOX
--------------------------------------------------------------------------------  Chief Complaint: ESRD/Ongoing hemodialysis requirement    24 hour events/subjective:  Seen this morning, remains stable complaining of having pain. Ordered for HD today, originally was to have 1L of UF. Given pain meds and has remained hypotensive to the 's. Per team request gave UF removed back to the patient    PAST HISTORY  --------------------------------------------------------------------------------  No significant changes to PMH, PSH, FHx, SHx, unless otherwise noted    ALLERGIES & MEDICATIONS  --------------------------------------------------------------------------------  Allergies    Ancef (Rash; Urticaria)  DDAVP (Hypotension)  iodine (Hives; Pruritus)  penicillin (Swelling)  sulfa drugs (Angioedema)    Intolerances    Standing Inpatient Medications  chlorhexidine 4% Liquid 1 Application(s) Topical <User Schedule>  heparin   Injectable 5000 Unit(s) SubCutaneous every 8 hours  meropenem  IVPB 500 milliGRAM(s) IV Intermittent every 24 hours  norepinephrine Infusion 0.05 MICROgram(s)/kG/Min IV Continuous <Continuous>  vancomycin  IVPB 1200 milliGRAM(s) IV Intermittent once    PRN Inpatient Medications  acetaminophen     Tablet .. 650 milliGRAM(s) Oral every 6 hours PRN  HYDROmorphone  Injectable 0.25 milliGRAM(s) IV Push every 4 hours PRN      REVIEW OF SYSTEMS  --------------------------------------------------------------------------------  All other systems were reviewed and are negative, except as noted.    VITALS/PHYSICAL EXAM  --------------------------------------------------------------------------------  T(C): 36.1 (23 @ 13:10), Max: 37.1 (23 @ 09:22)  HR: 108 (23 @ 13:10) (82 - 110)  BP: 108/52 (23 @ 13:10) (73/30 - 128/59)  RR: 19 (23 @ 13:10) (14 - 20)  SpO2: 99% (23 @ 13:10) (94% - 100%)  Wt(kg): --  Drug Dosing Weight  Height (cm): 177.8 (2023 20:52)  Weight (kg): 85.3 (2023 20:52)  BMI (kg/m2): 27 (2023 20:52)  BSA (m2): 2.03 (2023 20:52)  Height (cm): 177.8 (23 @ 20:52)  Weight (kg): 85.3 (23 @ 20:52)  BMI (kg/m2): 27 (23 @ 20:52)  BSA (m2): 2.03 (23 @ 20:52)      23 @ 07:  -  23 @ 07:00  --------------------------------------------------------  IN: 1678.7 mL / OUT: 650 mL / NET: 1028.7 mL    23 @ 07:01  -  23 @ 13:43  --------------------------------------------------------  IN: 362 mL / OUT: 0 mL / NET: 362 mL      PHYSICAL EXAM:  GENERAL: Alert, awake, oriented x3 on RA, mild distress  HEENT: ZOHAIB, EOMI, neck supple, no JVP MMM  CHEST/LUNG: Bilateral clear breath sounds  HEART: Regular rate and rhythm, no murmur, no gallops, no rub   ABDOMEN: Soft, nontender, non distended  : No flank or supra pubic tenderness.  EXTREMITIES: no pedal edema, cold extremities   Neurology: AAOx3, no asterixis   SKIN: No rash or skin lesion  ACCESS: Right C c/d/i     LABS/STUDIES  --------------------------------------------------------------------------------              9.0    14.38 >-----------<  364      [23 @ 05:30]              28.0     134  |  88  |  93  ----------------------------<  172      [23 @ 05:30]  5.4   |  23  |  11.13        Ca     8.9     [23 @ 05:30]      Mg     2.2     [23 @ 05:30]      Phos  7.0     [23 @ 05:30]    TPro  6.7  /  Alb  3.2  /  TBili  0.6  /  DBili  x   /  AST  23  /  ALT  16  /  AlkPhos  86  [23 @ 05:30]    PT/INR: PT 16.8 , INR 1.41       [23 05:30]  PTT: 29.7       [23 05:30]        HBsAb Nonreact      [23]  HBsAg Nonreact      [23]  HCV 0.04, Nonreact      [23:]      RADIOLOGY:  --------------------------------------------------------------------------------------    Hemoglobin: 9.0 g/dL (23 05:30)  Phosphorus Level, Serum: 7.0 mg/dL (23 05:30)  Hemoglobin: 9.0 g/dL (23 15:28)  Phosphorus Level, Serum: 5.8 mg/dL (23 15:28)    Albumin, Serum: 3.2 g/dL (23 05:30)  Albumin, Serum: 3.0 g/dL (23 15:28)    T(C): 36.1 (23 @ 13:10), Max: 37.1 (23 @ 09:22)  HR: 108 (23 13:10) (82 - 110)  BP: 108/52 (23 @ 13:10) (73/30 - 128/59)  RR: 19 (23 @ 13:10) (14 - 20)  SpO2: 99% (23 @ 13:10) (94% - 100%)  epoetin александр-epbx (RETACRIT) Injectable 8000 Unit(s) IV Push once, 23 @ 07:45, Routine, Stop order after: 1 Doses      Hemodialysis Treatment.:     Schedule: Once, Modality: Hemodialysis, Access: Internal Jugular Central Venous Catheter    Dialyzer: Optiflux [id number], Time: 180 Min    Blood Flow: 400 mL/Min , Dialysate Flow: 500 mL/Min, Dialysate Temp: 36.5, Tubinmm (Adult)    Target Fluid Removal: 1 Liters    Dialysate Electrolytes (mEq/L): Potassium 2, Calcium 2.5, Sodium 138, Bicarbonate 35 (23 @ 07:45) [Completed]    Seen on HD, UF originally 1L, will change to 0 and return fluid back to patient.  --------------------------------------------------------------------------------  Chief Complaint: ESRD/Ongoing hemodialysis requirement    24 hour events/subjective:  Seen this morning, remains stable complaining of having pain. Ordered for HD today, originally was to have 1L of UF. Given pain meds and has remained hypotensive to the 's. Per team request gave UF removed back to the patient    PAST HISTORY  --------------------------------------------------------------------------------  No significant changes to PMH, PSH, FHx, SHx, unless otherwise noted    ALLERGIES & MEDICATIONS  --------------------------------------------------------------------------------  Allergies    Ancef (Rash; Urticaria)  DDAVP (Hypotension)  iodine (Hives; Pruritus)  penicillin (Swelling)  sulfa drugs (Angioedema)    Intolerances    Standing Inpatient Medications  chlorhexidine 4% Liquid 1 Application(s) Topical <User Schedule>  heparin   Injectable 5000 Unit(s) SubCutaneous every 8 hours  meropenem  IVPB 500 milliGRAM(s) IV Intermittent every 24 hours  norepinephrine Infusion 0.05 MICROgram(s)/kG/Min IV Continuous <Continuous>  vancomycin  IVPB 1200 milliGRAM(s) IV Intermittent once    PRN Inpatient Medications  acetaminophen     Tablet .. 650 milliGRAM(s) Oral every 6 hours PRN  HYDROmorphone  Injectable 0.25 milliGRAM(s) IV Push every 4 hours PRN      REVIEW OF SYSTEMS  --------------------------------------------------------------------------------  All other systems were reviewed and are negative, except as noted.    VITALS/PHYSICAL EXAM  --------------------------------------------------------------------------------  T(C): 36.1 (23 @ 13:10), Max: 37.1 (23 @ 09:22)  HR: 108 (23 @ 13:10) (82 - 110)  BP: 108/52 (23 @ 13:10) (73/30 - 128/59)  RR: 19 (23 @ 13:10) (14 - 20)  SpO2: 99% (23 @ 13:10) (94% - 100%)  Wt(kg): --  Drug Dosing Weight  Height (cm): 177.8 (2023 20:52)  Weight (kg): 85.3 (2023 20:52)  BMI (kg/m2): 27 (2023 20:52)  BSA (m2): 2.03 (2023 20:52)  Height (cm): 177.8 (23 @ 20:52)  Weight (kg): 85.3 (23 @ 20:52)  BMI (kg/m2): 27 (23 @ 20:52)  BSA (m2): 2.03 (23 @ 20:52)      23 @ 07:  -  23 @ 07:00  --------------------------------------------------------  IN: 1678.7 mL / OUT: 650 mL / NET: 1028.7 mL    23 @ 07:01  -  23 @ 13:43  --------------------------------------------------------  IN: 362 mL / OUT: 0 mL / NET: 362 mL      PHYSICAL EXAM:  GENERAL: Alert, awake, oriented x3 on RA, mild distress  HEENT: ZOHAIB, EOMI, neck supple, no JVP MMM  CHEST/LUNG: Bilateral clear breath sounds  HEART: Regular rate and rhythm, no murmur, no gallops, no rub   ABDOMEN: Soft, nontender, non distended  : No flank or supra pubic tenderness.  EXTREMITIES: no pedal edema, cold extremities   Neurology: AAOx3, no asterixis   SKIN: No rash or skin lesion  ACCESS: [facility] c/d/i     LABS/STUDIES  --------------------------------------------------------------------------------              9.0    14.38 >-----------<  364      [23 @ 05:30]              28.0     134  |  88  |  93  ----------------------------<  172      [23 @ 05:30]  5.4   |  23  |  11.13        Ca     8.9     [23 @ 05:30]      Mg     2.2     [23 @ 05:30]      Phos  7.0     [23 @ 05:30]    TPro  6.7  /  Alb  3.2  /  TBili  0.6  /  DBili  x   /  AST  23  /  ALT  16  /  AlkPhos  86  [23 @ 05:30]    PT/INR: PT 16.8 , INR 1.41       [23 05:30]  PTT: 29.7       [23 05:30]        HBsAb Nonreact      [23]  HBsAg Nonreact      [23]  HCV 0.04, Nonreact      [23:]      RADIOLOGY:  --------------------------------------------------------------------------------------    Hemoglobin: 9.0 g/dL (23 05:30)  Phosphorus Level, Serum: 7.0 mg/dL (23 05:30)  Hemoglobin: 9.0 g/dL (23 15:28)  Phosphorus Level, Serum: 5.8 mg/dL (23 15:28)    Albumin, Serum: 3.2 g/dL (23 05:30)  Albumin, Serum: 3.0 g/dL (23 15:28)    T(C): 36.1 (23 @ 13:10), Max: 37.1 (23 @ 09:22)  HR: 108 (23 13:10) (82 - 110)  BP: 108/52 (23 @ 13:10) (73/30 - 128/59)  RR: 19 (23 @ 13:10) (14 - 20)  SpO2: 99% (23 @ 13:10) (94% - 100%)  epoetin александр-epbx (RETACRIT) Injectable 8000 Unit(s) IV Push once, 23 @ 07:45, Routine, Stop order after: 1 Doses      Hemodialysis Treatment.:     Schedule: Once, Modality: Hemodialysis, Access: Internal Jugular Central Venous Catheter    Dialyzer: Optiflux [id number], Time: 180 Min    Blood Flow: 400 mL/Min , Dialysate Flow: 500 mL/Min, Dialysate Temp: 36.5, Tubinmm (Adult)    Target Fluid Removal: 1 Liters    Dialysate Electrolytes (mEq/L): Potassium 2, Calcium 2.5, Sodium 138, Bicarbonate 35 (23 @ 07:45) [Completed]    Seen on HD, UF originally 1L, will change to 0 and return fluid back to patient.

## 2023-01-03 NOTE — PROGRESS NOTE ADULT - SUBJECTIVE AND OBJECTIVE BOX
Patient admitted for sepsis, c/o GI symptoms - N/V/D. Sp Right AVG. Denies pain right arm. Afebrile. 102/53. RT UE: no edema, erythema or tenderness. Small superifical wound ulcer.  DUPLEX: no perigraft fluid. Good color flow. There is a deep loculated fluid collection on the medial side of the arm, superficial to the graft. This was seen on CT as well. + Leukocytosis 14>28< with left shift 83N. Bilateral dependent breast edema left>right.    A: Sepsis with good hemodynamics. Possible sources - gi, breast, TDC, rt arm.    Suggest:     Blood cx- from TDC and peripheral  US Guided aspiration of fluid collection via IR  Gallium scan  ESR  Wound care: santyl alternating with bactroban    will follow

## 2023-01-03 NOTE — PROVIDER CONTACT NOTE (OTHER) - ACTION/TREATMENT ORDERED:
as per provider, unable  to order additional pain meds due to last night's reaction. provider stated he will assess pt at bedside when possible

## 2023-01-04 NOTE — DIETITIAN INITIAL EVALUATION ADULT - PERTINENT LABORATORY DATA
01-04    133<L>  |  93<L>  |  50<H>  ----------------------------<  111<H>  4.8   |  26  |  6.70<H>    Ca    8.2<L>      04 Jan 2023 04:32  Phos  5.5     01-04  Mg     1.6     01-04    TPro  6.0  /  Alb  x   /  TBili  x   /  DBili  x   /  AST  x   /  ALT  x   /  AlkPhos  x   01-03  POCT Blood Glucose.: 112 mg/dL (01-04-23 @ 06:43)  A1C with Estimated Average Glucose Result: 5.3 % (11-18-22 @ 05:23)

## 2023-01-04 NOTE — PROGRESS NOTE ADULT - ATTENDING COMMENTS
CAD, HTN, ESRD, septic shock with ? mastitis vs infected hematoma around AV graft, pain, hyperparathyroidism  physical as above  breast surgery doubts cancer or mastitis and more likely chronic venous congestion  await cultures and gallium scan  likely aspiration of hematoma if possible  continue vanco/meropenem  pressor needs decreasing  dilaudid for pain  endocrine re high PTH  check vitamin D levels  decision making of high complexity

## 2023-01-04 NOTE — CONSULT NOTE ADULT - ASSESSMENT
This is a 66 yo F pt with PMH HFrEF? 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 She is currently admitted for septic shock 2/2 possible RUE fistula infection, pending gallium scan. Cardiology consulted for new finding of LVOT obstruction on recent TTE.     #LVOT obstruction   Patient follows up with Dr. Rock Simmons as outpatient. Does not have a history of HCM. Denies family history of HCM or sudden cardiac arrest.   Her last TTE from November showed normal wall thickness and without any gradients.  TTE 1/3/22 showed hyperdynamic LV function, LISETTE with LVOT gradient 100mmHg consistent with severe gradient  - LVOT gradient likely in the setting of hyperdynamic LV function 2/2 septic shock and hypovolemia  - Recommend giving fluids  - If pressors are needed, phenylephrine would be choice of pressor    # HFrecEF?   Per documentation, reportedly had a low EF in the past. Recent LV function is normal   Per pt, she was on Entresto at some point  - Please obtain further collateral     # Non obstructive CAD   -c/w ASA and lipitor 40   - On plavix per vascular for graft patency??    Cardiology to follow

## 2023-01-04 NOTE — DIETITIAN INITIAL EVALUATION ADULT - OTHER INFO
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 Complaining of crampy abdominal pain and waterry diarrhea (2-3 BMs/day), now with nausea and vomiting prior to arrival. Has not gone to HD since 12/24 (8 days ago). 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 Complaining of crampy abdominal pain and waterry diarrhea (2-3 BMs/day), now with nausea and vomiting prior to arrival. Has not gone to HD since 12/24 (8 days ago). Admitted for emergent HD    Pt care discussed in IDT rounds. Rx and labs reviewed. Pt presents with no overt signs of nutritional wasting. Pt presents for emergent HD; c/o N/V, abdominal pain, and watery diarrhea. Pt received HD; net fluids. C/o 9/10 bilat. breast pain; pain management per MD team -monitor impact on PO intake. Plan to add ONS regimen to promote oral intake at this time. No c/o difficult chew/swallow. NKA to food. RDN will continue to reassess, intervene, and monitor as appropriate.     Pain: 9/10, bilat breasts   GI: Abdomen ND/NT, +BS x4, LMB 1/3 diarrhea   Skin: Dehist AVF to R arm, +1 gen. edema

## 2023-01-04 NOTE — PROGRESS NOTE ADULT - SUBJECTIVE AND OBJECTIVE BOX
******INCOMPLETE******    OVERNIGHT EVENTS/INTERVAL HPI:    OBJECTIVE:  Vital Signs Last 24 Hrs  T(C): 36.4 (04 Jan 2023 01:04), Max: 37.1 (03 Jan 2023 09:22)  T(F): 97.5 (04 Jan 2023 01:04), Max: 98.7 (03 Jan 2023 09:22)  HR: 104 (04 Jan 2023 05:15) (83 - 113)  BP: 101/46 (04 Jan 2023 05:15) (73/33 - 126/46)  BP(mean): 66 (04 Jan 2023 05:15) (48 - 86)  RR: 22 (04 Jan 2023 05:15) (12 - 24)  SpO2: 100% (04 Jan 2023 05:15) (88% - 100%)    Parameters below as of 04 Jan 2023 05:15  Patient On (Oxygen Delivery Method): room air      I&O's Detail    02 Jan 2023 07:01  -  03 Jan 2023 07:00  --------------------------------------------------------  IN:    IV PiggyBack: 500 mL    Norepinephrine: 45 mL    Norepinephrine: 253.7 mL    Oral Fluid: 880 mL  Total IN: 1678.7 mL    OUT:    Emesis (mL): 650 mL  Total OUT: 650 mL    Total NET: 1028.7 mL      03 Jan 2023 07:01  -  04 Jan 2023 06:26  --------------------------------------------------------  IN:    IV PiggyBack: 450 mL    Norepinephrine: 325.4 mL    Oral Fluid: 500 mL  Total IN: 1275.4 mL    OUT:    Other (mL): 0 mL  Total OUT: 0 mL    Total NET: 1275.4 mL        Physical Exam:  GENERAL: Awake, alert and interactive, no acute distress, appears comfortable  NEURO: A&Ox4, no focal deficits, 5/5 strength in all ext, reflexes 2+ throughout, CN 2-12 intact  HEENT: Normocephalic, atraumatic, no conjunctivitis or scleral icterus, oral mucosa moist, no oral lesions noted  NECK: Supple, no LAD, no JVD, thyroid not palpable  CARDIAC: Regular rate and rhythm, +S1/S2, no murmurs/rubs/gallops  PULM: Breathing comfortably on RA, clear to auscultation bilaterally, no wheezes/rales/rhonchi  ABDOMEN: Soft, nontender, nondistended, +bs, no hepatosplenomegaly, no rebound tenderness or fluid wave, no CVA tenderness  : Deferred  MSK: Range of motion grossly intact, no back tenderness  SKIN: Warm and dry, no rashes, lesions  VASC: Cap refil < 2 sec, 2+ peripheral pulses, no edema, no LE tenderness  Psych: Appropriate affect    Medications:  MEDICATIONS  (STANDING):  chlorhexidine 4% Liquid 1 Application(s) Topical <User Schedule>  collagenase Ointment 1 Application(s) Topical daily  heparin   Injectable 5000 Unit(s) SubCutaneous every 8 hours  meropenem  IVPB 500 milliGRAM(s) IV Intermittent every 24 hours  mupirocin 2% Ointment 1 Application(s) Topical once  norepinephrine Infusion 0.05 MICROgram(s)/kG/Min (4 mL/Hr) IV Continuous <Continuous>  sevelamer carbonate 800 milliGRAM(s) Oral three times a day with meals    MEDICATIONS  (PRN):  acetaminophen     Tablet .. 650 milliGRAM(s) Oral every 6 hours PRN Temp greater or equal to 38C (100.4F), Moderate Pain (4 - 6)  HYDROmorphone  Injectable 0.25 milliGRAM(s) IV Push every 4 hours PRN Severe Pain (7 - 10)      Labs:                        8.6    14.68 )-----------( 353      ( 04 Jan 2023 04:32 )             27.5     01-04    133<L>  |  93<L>  |  50<H>  ----------------------------<  111<H>  4.8   |  26  |  6.70<H>    Ca    8.2<L>      04 Jan 2023 04:32  Phos  5.5     01-04  Mg     1.6     01-04    TPro  6.0  /  Alb  x   /  TBili  x   /  DBili  x   /  AST  x   /  ALT  x   /  AlkPhos  x   01-03    PT/INR - ( 03 Jan 2023 05:30 )   PT: 16.8 sec;   INR: 1.41          PTT - ( 03 Jan 2023 05:30 )  PTT:29.7 sec      COVID-19 PCR: Negative (23 Nov 2022 19:55)  COVID-19 PCR: NotDetec (19 Nov 2022 11:56)      Radiology: Reviewed OVERNIGHT EVENTS/INTERVAL HPI: Patient had increasing pressor requirements overnight, up to 0.5 mcg/kg    SUBJECTIVE: Patient examined at bedside where she stated she was in a significant amount of breast pain and breast pruritis, up to 10/10 pain. Dilaudid 0.25mg helps but pt feels the effect wears off before next dose. Denies HA, chest pain, chest pressure, abdominal pain, nausea, vomiting.     OBJECTIVE:  Vital Signs Last 24 Hrs  T(C): 36.4 (04 Jan 2023 01:04), Max: 37.1 (03 Jan 2023 09:22)  T(F): 97.5 (04 Jan 2023 01:04), Max: 98.7 (03 Jan 2023 09:22)  HR: 104 (04 Jan 2023 05:15) (83 - 113)  BP: 101/46 (04 Jan 2023 05:15) (73/33 - 126/46)  BP(mean): 66 (04 Jan 2023 05:15) (48 - 86)  RR: 22 (04 Jan 2023 05:15) (12 - 24)  SpO2: 100% (04 Jan 2023 05:15) (88% - 100%)    Parameters below as of 04 Jan 2023 05:15  Patient On (Oxygen Delivery Method): room air      I&O's Detail    02 Jan 2023 07:01  -  03 Jan 2023 07:00  --------------------------------------------------------  IN:    IV PiggyBack: 500 mL    Norepinephrine: 45 mL    Norepinephrine: 253.7 mL    Oral Fluid: 880 mL  Total IN: 1678.7 mL    OUT:    Emesis (mL): 650 mL  Total OUT: 650 mL    Total NET: 1028.7 mL      03 Jan 2023 07:01  -  04 Jan 2023 06:26  --------------------------------------------------------  IN:    IV PiggyBack: 450 mL    Norepinephrine: 325.4 mL    Oral Fluid: 500 mL  Total IN: 1275.4 mL    OUT:    Other (mL): 0 mL  Total OUT: 0 mL    Total NET: 1275.4 mL        hysical Exam:  GENERAL: awake, alert, in significant pain  NEURO: A&Ox4  HEENT: no conjunctivitis or scleral icterus, oral mucosa moist  NECK: Supple, no JVD  CARDIAC: tachycardic, regular rhythm, +S1/S2, no murmurs/rubs/gallops  PULM: Breathing comfortably on RA, clear to auscultation bilaterally, no wheezes/rales/rhonchi, HD catheter R chest  ABDOMEN: Soft, obese, nontender to light/deep palpation of abdomen, nondistended, +bs, no rebound tenderness or fluid wave, ventral hernia non reducible   BREAST: peau d'orange appearance of lateral R breast, large immobile R breast mass with irregular borders approx 4cm lateral to nipple measuring est 8cm x 5cm, R axillary lymphadenopathy, L breast with smaller mass just lateral to nipple line also tender to palpation  SKIN: LE cold to touch to ankle, DP/PT pulses 1+, UE cool to touch pulses not palpable   VASC: 2+ peripheral pulses, no edema, no LE tenderness, triple lumen catheter at R groin    Medications:  MEDICATIONS  (STANDING):  chlorhexidine 4% Liquid 1 Application(s) Topical <User Schedule>  collagenase Ointment 1 Application(s) Topical daily  heparin   Injectable 5000 Unit(s) SubCutaneous every 8 hours  meropenem  IVPB 500 milliGRAM(s) IV Intermittent every 24 hours  mupirocin 2% Ointment 1 Application(s) Topical once  norepinephrine Infusion 0.05 MICROgram(s)/kG/Min (4 mL/Hr) IV Continuous <Continuous>  sevelamer carbonate 800 milliGRAM(s) Oral three times a day with meals    MEDICATIONS  (PRN):  acetaminophen     Tablet .. 650 milliGRAM(s) Oral every 6 hours PRN Temp greater or equal to 38C (100.4F), Moderate Pain (4 - 6)  HYDROmorphone  Injectable 0.25 milliGRAM(s) IV Push every 4 hours PRN Severe Pain (7 - 10)      Labs:                        8.6    14.68 )-----------( 353      ( 04 Jan 2023 04:32 )             27.5     01-04    133<L>  |  93<L>  |  50<H>  ----------------------------<  111<H>  4.8   |  26  |  6.70<H>    Ca    8.2<L>      04 Jan 2023 04:32  Phos  5.5     01-04  Mg     1.6     01-04    TPro  6.0  /  Alb  x   /  TBili  x   /  DBili  x   /  AST  x   /  ALT  x   /  AlkPhos  x   01-03    PT/INR - ( 03 Jan 2023 05:30 )   PT: 16.8 sec;   INR: 1.41          PTT - ( 03 Jan 2023 05:30 )  PTT:29.7 sec      COVID-19 PCR: Negative (23 Nov 2022 19:55)  COVID-19 PCR: NotDetec (19 Nov 2022 11:56)

## 2023-01-04 NOTE — CONSULT NOTE ADULT - SUBJECTIVE AND OBJECTIVE BOX
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       In the ED:  Initial vital signs: T: 97.5 F, HR: 108, BP: 109/86, R: 16, SpO2: 100% on RA  ED course:   Labs: significant for WBC 15.76, H/H 9.8/32.0, Plt 309, Na 131, K 7.8, Cl 84, AG 31, BUN/Cr 169/17.58  EKG: peaked T waves  Medications: calcium gluconate, dilaudid 0.5 x1, regular insulin 5, morphine 2mg IV, sodium bicarb, dextrose, albuterol 2.5x3  Consults: none    (02 Jan 2023 05:26)    Transferred to MICU last night for hypotension now on pressor support.    NEURO  #Pain Control  Pt in 10/10 pain of bilateral breasts, worst on R. Pain not responsive to Tylenol IV or Percocet.   - c/w Dilaudid 0.25mg IVP q4h PRN    PULM  #Atelectasis  Noted on CT chest to have atelectasis at b/l lung bases.  - Incentive spirometry    CARDIOVASCULAR  #Septic Shock  Pt presented meeting 2/4 SIRS. Hypotensive with MAPs to 60, requiring Levophed. Extremities cool on exam. Likely septic shock 2/2 infection of R breast, bilateral breasts, or seroma/hematoma of RUE. HR > 90, WBC >12. Lactate 1.9. Afebrile. Procal 12. S/p Vancomycin 1250mg x 1, Aztreonam x 1 (pt allergic to Cefazolin, PCN).  - On Levophed with increasing pressor requirements, titrate PRN with goal MAP 60-65  - CT:  7.0 cm fluid collection is present near an AV fistula in the right upper arm. Possibly abscess.   - Consult vascular surgery for fluid collection: seroma vs hematoma vs abscess at site of AV Fistula  - c/w Meropenem 500mg q24h, obtain ID approval  - start Vancomycin 1250mg, dosed based on HD schedule  - obtain BCx     #HFrEF  TTE 11/22 normal LV and systolic, EF 59%, grade II diastolic dysfuncton, dilated RV, reduced RV,   Home meds: Entresto 49/51mg  - hold Entresto i/s/o septic shock    #CAD  Hx of cardiac cath in 2018  Home meds: atorvastatin 40mg, plavix 75mg qd  - c/w home atorvastatin 40mg, Plavix 75mg qd    GI  #Abdominal Pain  C/o vague abdominal pain, a/w bilious emesis x 2, no nausea or further episodes of emesis   - see endocrine for plan    RENAL  #ESRD 2/2 PCKD  Pt has been on HD for "years" 2/2 PCKD. AV Fistula of EDWIN barnettted in 2014, has received HD thru HD cath since. New AV Fistula in 11/2022, not yet matured.   - f/u nephrology recs  - s/p HD 1/3, 0L removed d/t overall fluid status     #Hyperkalemia  Patient with Hx of ESRD with missed HD presents with K >8 on VBG with abdominal symptoms and missed HD x8 days. ICU consulted i/s/o hyperkalemia. Renal consulted.  - s/p HD, 1L removed 1/2  - monitor with BMP's BID    ENDO  #R/o Hyperparathyroidism  At risk for secondary HPTT d/t renal failure   - pt has vague abdominal pain with nausea  - multiple brown tumors on CT with multiple fractures of pubic rami, pubic bone, thoracolumbar spine  - obtain intact PTH, ionized Ca    MSK  #Renal Osteodystrophy  - on CT: Increased density of the bone marrow is consistent with renal osteodystrophy    Heme/Onc  #R/o Breast Cancer  Family hx of breast cancer in mother, sister.   - large R breast mass, palpable L breast mass with R axillary LAD, c/f breast cancer, breast has peau d'orange appearance  - f/u U/S bilateral breasts      FH:  DM:  Thyroid:  Autoimmune:  Other:    SH:  Smoking  Etoh:  Recreational Drugs:  Social Life:    PMH & Surgical Hx:  HYPERKALEMIA  HTN (hypertension)  HLD (hyperlipidemia)  CAD (coronary atherosclerotic disease)  ESRD on dialysis  H/O ventral hernia  Brown tumor  DVT, lower extremity  Hyperkalemia  Stented coronary artery  History of surgery  AVF (arteriovenous fistula)  S/P AIDEN-BSO  History of surgery  History of atherectomy  DIARRHEA    Current Meds:  acetaminophen     Tablet .. 650 milliGRAM(s) Oral every 6 hours PRN  atorvastatin 40 milliGRAM(s) Oral at bedtime  chlorhexidine 4% Liquid 1 Application(s) Topical <User Schedule>  collagenase Ointment 1 Application(s) Topical daily  heparin   Injectable 5000 Unit(s) SubCutaneous every 8 hours  HYDROmorphone  Injectable 0.5 milliGRAM(s) IV Push every 4 hours PRN  meropenem  IVPB 500 milliGRAM(s) IV Intermittent every 24 hours  mupirocin 2% Ointment 1 Application(s) Topical once  phenylephrine    Infusion 0.5 MICROgram(s)/kG/Min IV Continuous <Continuous>  sevelamer carbonate 800 milliGRAM(s) Oral three times a day with meals      Allergies:  Ancef (Rash; Urticaria)  DDAVP (Hypotension)  iodine (Hives; Pruritus)  penicillin (Swelling)  sulfa drugs (Angioedema)      ROS:  Denies the following except as indicated.    General: weight loss/weight gain, decreased appetite, fatigue  Eyes: Blurry vision, double vision, visual changes  ENT: Throat pain, changes in voice,   CV: palpitations, SOB, CP, cough  GI: NVD, difficulty swallowing, abdominal pain  : polyuria, dysuria  Endo: abnormal menses, temperature intolerance, decreased libido  MSK: weakness, joint pain  Skin: rash, dryness, diaphoresis  Heme: Easy bruising, bleeding  Neuro: HA, dizziness, lightheadedness, numbness/ tingling  Psych: Anxiety, Depression    Vital Signs Last 24 Hrs  T(C): 37.1 (04 Jan 2023 13:52), Max: 37.1 (04 Jan 2023 13:52)  T(F): 98.8 (04 Jan 2023 13:52), Max: 98.8 (04 Jan 2023 13:52)  HR: 107 (04 Jan 2023 14:00) (83 - 117)  BP: 103/51 (04 Jan 2023 14:00) (73/33 - 137/88)  BP(mean): 73 (04 Jan 2023 14:00) (48 - 108)  RR: 20 (04 Jan 2023 14:00) (13 - 27)  SpO2: 99% (04 Jan 2023 14:00) (88% - 100%)    Parameters below as of 04 Jan 2023 14:00  Patient On (Oxygen Delivery Method): room air      Height (cm): 177.8 (01-01 @ 20:52)  Weight (kg): 85.3 (01-01 @ 20:52)  BMI (kg/m2): 27 (01-01 @ 20:52)      Constitutional: wn/wd in NAD.   HEENT: NCAT, MMM, OP clear, EOMI, , no proptosis or lid retraction  Neck: no thyromegaly or palpable thyroid nodules   Respiratory: lungs CTAB.  Cardiovascular: regular rhythm, normal S1 and S2, no audible murmurs, no peripheral edema  GI: soft, NT/ND, no masses/HSM appreciated.  Neurology: no tremors, DTR 2+  Skin: no visible rashes/lesions. no acanthosis nigricans. no hyperlipotrophy. no cushing's stigmata.  Psychiatric: AAO x 3, normal affect/mood.  Ext: radial pulses intact, DP pulses intact, extremities warm, no cyanosis, clubbing or edema.       LABS:                        8.6    14.68 )-----------( 353      ( 04 Jan 2023 04:32 )             27.5     01-04    133<L>  |  93<L>  |  50<H>  ----------------------------<  111<H>  4.8   |  26  |  6.70<H>    Ca    8.2<L>      04 Jan 2023 04:32  Phos  5.5     01-04  Mg     1.6     01-04    TPro  6.0  /  Alb  x   /  TBili  x   /  DBili  x   /  AST  x   /  ALT  x   /  AlkPhos  x   01-03    PT/INR - ( 03 Jan 2023 05:30 )   PT: 16.8 sec;   INR: 1.41          PTT - ( 03 Jan 2023 05:30 )  PTT:29.7 sec      Thyroid Stimulating Hormone, Serum: 1.610 (01-03 @ 05:30)      RADIOLOGY & ADDITIONAL STUDIES:  CAPILLARY BLOOD GLUCOSE      POCT Blood Glucose.: 112 mg/dL (04 Jan 2023 06:43)  POCT Blood Glucose.: 92 mg/dL (03 Jan 2023 21:30)  POCT Blood Glucose.: 92 mg/dL (03 Jan 2023 17:26)  POCT Blood Glucose.: 66 mg/dL (03 Jan 2023 16:59)   HPI:68 yo F pt with PMH HFrEF (EF 40%), nonobstructive CAD, NICM, HTN, HLD, ESRD 2/2 PCKD on HD, PE (2018) s/p IVC filter, mild AS, mild MR, PAD, OA, h/o thrombus in vascular access x3 ( R AVG, R AVF, L AVG), ventral hernia, 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     In the ED:  Initial vital signs: T: 97.5 F, HR: 108, BP: 109/86, R: 16, SpO2: 100% on RA  ED course:   Labs: significant for WBC 15.76, H/H 9.8/32.0, Plt 309, Na 131, K 7.8, Cl 84, AG 31, BUN/Cr 169/17.58  EKG: peaked T waves  Medications: calcium gluconate, dilaudid 0.5 x1, regular insulin 5, morphine 2mg IV, sodium bicarb, dextrose, albuterol 2.5x3    Pt presented meeting 2/4 SIRS. Hypotensive with MAPs to 60, requiring Levophed. Extremities cool on exam. Likely septic shock 2/2 infection of R breast, bilateral breasts, or seroma/hematoma of RUE. HR > 90, WBC >12. Lactate 1.9. Afebrile. Procal 12. S/p Vancomycin 1250mg x 1, Aztreonam x 1 (pt allergic to Cefazolin, PCN).  - On Levophed with increasing pressor requirements, titrate PRN with goal MAP 60-65  - CT:  7.0 cm fluid collection is present near an AV fistula in the right upper arm. Possibly abscess.   - Consult vascular surgery for fluid collection: seroma vs hematoma vs abscess at site of AV Fistula - low suspicion, recommending gallium scan.  - c/w Meropenem 500mg q24h, obtain ID approval  - start Vancomycin 1250mg, dosed based on HD schedule  - obtain BCx     Transferred to MICU last night for hypotension now on pressor support.    NEURO  #Pain Control  Pt in 10/10 pain of bilateral breasts, worst on R. Pain not responsive to Tylenol IV or Percocet.   - c/w Dilaudid 0.25mg IVP q4h PRN    PULM  #Atelectasis  Noted on CT chest to have atelectasis at b/l lung bases.  - Incentive spirometry    CARDIOVASCULAR  #Septic Shock      #HFrEF  TTE 11/22 normal LV and systolic, EF 59%, grade II diastolic dysfuncton, dilated RV, reduced RV,   Home meds: Entresto 49/51mg  - hold Entresto i/s/o septic shock    #CAD  Hx of cardiac cath in 2018  Home meds: atorvastatin 40mg, plavix 75mg qd  - c/w home atorvastatin 40mg, Plavix 75mg qd    GI  #Abdominal Pain  C/o vague abdominal pain, a/w bilious emesis x 2, no nausea or further episodes of emesis   - see endocrine for plan    RENAL  #ESRD 2/2 PCKD  Pt has been on HD for "years" 2/2 PCKD. AV Fistula of EDWIN barnettted in 2014, has received HD thru HD cath since. New AV Fistula in 11/2022, not yet matured.   - f/u nephrology recs  - s/p HD 1/3, 0L removed d/t overall fluid status     #Hyperkalemia  Patient with Hx of ESRD with missed HD presents with K >8 on VBG with abdominal symptoms and missed HD x8 days. ICU consulted i/s/o hyperkalemia. Renal consulted.  - s/p HD, 1L removed 1/2  - monitor with BMP's BID    ENDO  #R/o Hyperparathyroidism  At risk for secondary HPTT d/t renal failure   - pt has vague abdominal pain with nausea  - multiple brown tumors on CT with multiple fractures of pubic rami, pubic bone, thoracolumbar spine  - obtain intact PTH, ionized Ca    MSK  #Renal Osteodystrophy  - on CT: Increased density of the bone marrow is consistent with renal osteodystrophy    Heme/Onc  #R/o Breast Cancer  Family hx of breast cancer in mother, sister.   - large R breast mass, palpable L breast mass with R axillary LAD, c/f breast cancer, breast has peau d'orange appearance  - f/u U/S bilateral breasts      FH:  DM:  Thyroid:  Autoimmune:  Other:    SH:  Smoking  Etoh:  Recreational Drugs:  Social Life:    PMH & Surgical Hx:  HYPERKALEMIA  HTN (hypertension)  HLD (hyperlipidemia)  CAD (coronary atherosclerotic disease)  ESRD on dialysis  H/O ventral hernia  Brown tumor  DVT, lower extremity  Hyperkalemia  Stented coronary artery  History of surgery  AVF (arteriovenous fistula)  S/P AIDEN-BSO  History of surgery  History of atherectomy  DIARRHEA    Current Meds:  acetaminophen     Tablet .. 650 milliGRAM(s) Oral every 6 hours PRN  atorvastatin 40 milliGRAM(s) Oral at bedtime  chlorhexidine 4% Liquid 1 Application(s) Topical <User Schedule>  collagenase Ointment 1 Application(s) Topical daily  heparin   Injectable 5000 Unit(s) SubCutaneous every 8 hours  HYDROmorphone  Injectable 0.5 milliGRAM(s) IV Push every 4 hours PRN  meropenem  IVPB 500 milliGRAM(s) IV Intermittent every 24 hours  mupirocin 2% Ointment 1 Application(s) Topical once  phenylephrine    Infusion 0.5 MICROgram(s)/kG/Min IV Continuous <Continuous>  sevelamer carbonate 800 milliGRAM(s) Oral three times a day with meals      Allergies:  Ancef (Rash; Urticaria)  DDAVP (Hypotension)  iodine (Hives; Pruritus)  penicillin (Swelling)  sulfa drugs (Angioedema)      ROS:  Denies the following except as indicated.    General: weight loss/weight gain, decreased appetite, fatigue  Eyes: Blurry vision, double vision, visual changes  ENT: Throat pain, changes in voice,   CV: palpitations, SOB, CP, cough  GI: NVD, difficulty swallowing, abdominal pain  : polyuria, dysuria  Endo: abnormal menses, temperature intolerance, decreased libido  MSK: weakness, joint pain  Skin: rash, dryness, diaphoresis  Heme: Easy bruising, bleeding  Neuro: HA, dizziness, lightheadedness, numbness/ tingling  Psych: Anxiety, Depression    Vital Signs Last 24 Hrs  T(C): 37.1 (04 Jan 2023 13:52), Max: 37.1 (04 Jan 2023 13:52)  T(F): 98.8 (04 Jan 2023 13:52), Max: 98.8 (04 Jan 2023 13:52)  HR: 107 (04 Jan 2023 14:00) (83 - 117)  BP: 103/51 (04 Jan 2023 14:00) (73/33 - 137/88)  BP(mean): 73 (04 Jan 2023 14:00) (48 - 108)  RR: 20 (04 Jan 2023 14:00) (13 - 27)  SpO2: 99% (04 Jan 2023 14:00) (88% - 100%)    Parameters below as of 04 Jan 2023 14:00  Patient On (Oxygen Delivery Method): room air      Height (cm): 177.8 (01-01 @ 20:52)  Weight (kg): 85.3 (01-01 @ 20:52)  BMI (kg/m2): 27 (01-01 @ 20:52)      Constitutional: wn/wd in NAD.   HEENT: NCAT, MMM, OP clear, EOMI, , no proptosis or lid retraction  Neck: no thyromegaly or palpable thyroid nodules   Respiratory: lungs CTAB.  Cardiovascular: regular rhythm, normal S1 and S2, no audible murmurs, no peripheral edema  GI: soft, NT/ND, no masses/HSM appreciated.  Neurology: no tremors, DTR 2+  Skin: no visible rashes/lesions. no acanthosis nigricans. no hyperlipotrophy. no cushing's stigmata.  Psychiatric: AAO x 3, normal affect/mood.  Ext: radial pulses intact, DP pulses intact, extremities warm, no cyanosis, clubbing or edema.       LABS:                        8.6    14.68 )-----------( 353      ( 04 Jan 2023 04:32 )             27.5     01-04    133<L>  |  93<L>  |  50<H>  ----------------------------<  111<H>  4.8   |  26  |  6.70<H>    Ca    8.2<L>      04 Jan 2023 04:32  Phos  5.5     01-04  Mg     1.6     01-04    TPro  6.0  /  Alb  x   /  TBili  x   /  DBili  x   /  AST  x   /  ALT  x   /  AlkPhos  x   01-03    PT/INR - ( 03 Jan 2023 05:30 )   PT: 16.8 sec;   INR: 1.41          PTT - ( 03 Jan 2023 05:30 )  PTT:29.7 sec      Thyroid Stimulating Hormone, Serum: 1.610 (01-03 @ 05:30)      RADIOLOGY & ADDITIONAL STUDIES:  CAPILLARY BLOOD GLUCOSE      POCT Blood Glucose.: 112 mg/dL (04 Jan 2023 06:43)  POCT Blood Glucose.: 92 mg/dL (03 Jan 2023 21:30)  POCT Blood Glucose.: 92 mg/dL (03 Jan 2023 17:26)  POCT Blood Glucose.: 66 mg/dL (03 Jan 2023 16:59)   HPI:66 yo F pt with PMH HFrEF (EF 40%), nonobstructive CAD, NICM, HTN, HLD, ESRD 2/2 PCKD on HD, PE (2018) s/p IVC filter, mild AS, mild MR, PAD, OA, h/o thrombus in vascular access x3 ( R AVG, R AVF, L AVG), ventral hernia, 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     In the ED:  Initial vital signs: T: 97.5 F, HR: 108, BP: 109/86, R: 16, SpO2: 100% on RA  ED course:   Labs: significant for WBC 15.76, H/H 9.8/32.0, Plt 309, Na 131, K 7.8, Cl 84, AG 31, BUN/Cr 169/17.58  EKG: peaked T waves  Medications: calcium gluconate, dilaudid 0.5 x1, regular insulin 5, morphine 2mg IV, sodium bicarb, dextrose, albuterol 2.5x3    Pt presented meeting 2/4 SIRS. Hypotensive with MAPs to 60, requiring Levophed. Extremities cool on exam. Likely septic shock 2/2 infection of R breast, bilateral breasts, or seroma/hematoma of RUE. HR > 90, WBC >12. Lactate 1.9. Afebrile. Procal 12. S/p Vancomycin 1250mg x 1, Aztreonam x 1 (pt allergic to Cefazolin, PCN).  - On Levophed with increasing pressor requirements, titrate PRN with goal MAP 60-65  - CT:  7.0 cm fluid collection is present near an AV fistula in the right upper arm. Possibly abscess.   - c/w Meropenem 500mg q24h, obtain ID approval  - start Vancomycin 1250mg, dosed based on HD schedule  - Family hx of breast cancer in mother, sister.   - large R breast mass, palpable L breast mass with R axillary LAD, c/f breast cancer, breast has peau d'orange appearance    Endocrine contact for elevated PTH with N/V.  - multiple brown tumors on CT with multiple fractures of pubic rami, pubic bone, thoracolumbar spine and Increased density of the bone marrow is consistent with renal osteodystrophy  - PTH elevated to 479 with Ca 8.8 and albumin 3.2. Vit D pending.  - Also noted to have enlarged multinodular thyroid projecting into superior mediastinum with 1.8 x 1.5 cm solid nodule slightly inferiorly in the anterior mediastinum. US ordered. TSH 1.6 free T4 0.68.    Patient seen  at the bedside. Reporting 10/10 breast pain R>L. Minimal participatory in interview. Denies taking Calcium or Vitamin d supplements. Has not had DEXA, but aware of spine fractures. Denies steroid uses. HD started approx 2 years ago. No recall of being told PTH was elevated. Denies thyroid history.      PMH & Surgical Hx:  HYPERKALEMIA  HTN (hypertension)  HLD (hyperlipidemia)  CAD (coronary atherosclerotic disease)  ESRD on dialysis  H/O ventral hernia  Brown tumor  DVT, lower extremity  Hyperkalemia  Stented coronary artery  History of surgery  AVF (arteriovenous fistula)  S/P AIDEN-BSO  History of surgery  History of atherectomy  DIARRHEA    Current Meds:  acetaminophen     Tablet .. 650 milliGRAM(s) Oral every 6 hours PRN  atorvastatin 40 milliGRAM(s) Oral at bedtime  chlorhexidine 4% Liquid 1 Application(s) Topical <User Schedule>  collagenase Ointment 1 Application(s) Topical daily  heparin   Injectable 5000 Unit(s) SubCutaneous every 8 hours  HYDROmorphone  Injectable 0.5 milliGRAM(s) IV Push every 4 hours PRN  meropenem  IVPB 500 milliGRAM(s) IV Intermittent every 24 hours  mupirocin 2% Ointment 1 Application(s) Topical once  phenylephrine    Infusion 0.5 MICROgram(s)/kG/Min IV Continuous <Continuous>  sevelamer carbonate 800 milliGRAM(s) Oral three times a day with meals      Allergies:  Ancef (Rash; Urticaria)  DDAVP (Hypotension)  iodine (Hives; Pruritus)  penicillin (Swelling)  sulfa drugs (Angioedema)      ROS:  See HPI, otherwise negative.    Vital Signs Last 24 Hrs  T(C): 37.1 (04 Jan 2023 13:52), Max: 37.1 (04 Jan 2023 13:52)  T(F): 98.8 (04 Jan 2023 13:52), Max: 98.8 (04 Jan 2023 13:52)  HR: 107 (04 Jan 2023 14:00) (83 - 117)  BP: 103/51 (04 Jan 2023 14:00) (73/33 - 137/88)  BP(mean): 73 (04 Jan 2023 14:00) (48 - 108)  RR: 20 (04 Jan 2023 14:00) (13 - 27)  SpO2: 99% (04 Jan 2023 14:00) (88% - 100%)    Parameters below as of 04 Jan 2023 14:00  Patient On (Oxygen Delivery Method): room air      Height (cm): 177.8 (01-01 @ 20:52)  Weight (kg): 85.3 (01-01 @ 20:52)  BMI (kg/m2): 27 (01-01 @ 20:52)      Constitutional: Uncomfortable. Obese.  HEENT: NCAT, MMM, OP clear, EOMI, , no proptosis or lid retraction  Neck: + thyromegaly.  Respiratory: lungs CTAB.  Cardiovascular: regular rhythm, normal S1 and S2, no audible murmurs, no peripheral edema  GI: soft, NT/ND, no masses/HSM appreciated.  Neurology: no tremors, DTR 2+  Skin: no visible rashes/lesions.   Psychiatric: AAO x 3, normal affect/mood.  Ext: radial pulses intact, DP pulses intact, extremities warm, no cyanosis, clubbing or edema.       LABS:                        8.6    14.68 )-----------( 353      ( 04 Jan 2023 04:32 )             27.5     01-04    133<L>  |  93<L>  |  50<H>  ----------------------------<  111<H>  4.8   |  26  |  6.70<H>    Ca    8.2<L>      04 Jan 2023 04:32  Phos  5.5     01-04  Mg     1.6     01-04    TPro  6.0  /  Alb  x   /  TBili  x   /  DBili  x   /  AST  x   /  ALT  x   /  AlkPhos  x   01-03    PT/INR - ( 03 Jan 2023 05:30 )   PT: 16.8 sec;   INR: 1.41          PTT - ( 03 Jan 2023 05:30 )  PTT:29.7 sec      Thyroid Stimulating Hormone, Serum: 1.610 (01-03 @ 05:30)      RADIOLOGY & ADDITIONAL STUDIES:  CAPILLARY BLOOD GLUCOSE      POCT Blood Glucose.: 112 mg/dL (04 Jan 2023 06:43)  POCT Blood Glucose.: 92 mg/dL (03 Jan 2023 21:30)  POCT Blood Glucose.: 92 mg/dL (03 Jan 2023 17:26)  POCT Blood Glucose.: 66 mg/dL (03 Jan 2023 16:59)

## 2023-01-04 NOTE — DIETITIAN INITIAL EVALUATION ADULT - ADD RECOMMEND
-Continue current diet   -Add Nepro TID  -Encourage good PO intake   -Honor food preferences as able  -Monitor chemistry, GI fxn, and skin integrity

## 2023-01-04 NOTE — CONSULT NOTE ADULT - ASSESSMENT
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

## 2023-01-04 NOTE — DIETITIAN INITIAL EVALUATION ADULT - PERTINENT MEDS FT
MEDICATIONS  (STANDING):  atorvastatin 40 milliGRAM(s) Oral at bedtime  chlorhexidine 4% Liquid 1 Application(s) Topical <User Schedule>  collagenase Ointment 1 Application(s) Topical daily  heparin   Injectable 5000 Unit(s) SubCutaneous every 8 hours  meropenem  IVPB 500 milliGRAM(s) IV Intermittent every 24 hours  mupirocin 2% Ointment 1 Application(s) Topical once  phenylephrine    Infusion 0.5 MICROgram(s)/kG/Min (16 mL/Hr) IV Continuous <Continuous>  sevelamer carbonate 800 milliGRAM(s) Oral three times a day with meals    MEDICATIONS  (PRN):  acetaminophen     Tablet .. 650 milliGRAM(s) Oral every 6 hours PRN Temp greater or equal to 38C (100.4F), Moderate Pain (4 - 6)  HYDROmorphone  Injectable 0.5 milliGRAM(s) IV Push every 4 hours PRN Severe Pain (7 - 10)

## 2023-01-04 NOTE — PROGRESS NOTE ADULT - SUBJECTIVE AND OBJECTIVE BOX
Subjective: Pt transferred to . Pt denies pain to RUE this AM. Pts RUE dressing C/D/I.     ROS:   Denies Headache, blurred vision, Chest Pain, SOB, Abdominal pain, nausea or vomiting     Social   meropenem  IVPB 500  heparin   Injectable 5000  meropenem  IVPB 500  norepinephrine Infusion 0.05      Allergies    Ancef (Rash; Urticaria)  DDAVP (Hypotension)  iodine (Hives; Pruritus)  penicillin (Swelling)  sulfa drugs (Angioedema)    Intolerances        Vital Signs Last 24 Hrs  T(C): 36.9 (04 Jan 2023 06:05), Max: 37.1 (03 Jan 2023 09:22)  T(F): 98.4 (04 Jan 2023 06:05), Max: 98.7 (03 Jan 2023 09:22)  HR: 95 (04 Jan 2023 07:00) (83 - 113)  BP: 118/71 (04 Jan 2023 07:00) (73/33 - 126/46)  BP(mean): 85 (04 Jan 2023 07:00) (48 - 87)  RR: 16 (04 Jan 2023 07:00) (12 - 24)  SpO2: 97% (04 Jan 2023 07:00) (88% - 100%)    Parameters below as of 04 Jan 2023 07:00  Patient On (Oxygen Delivery Method): room air      I&O's Summary    03 Jan 2023 07:01  -  04 Jan 2023 07:00  --------------------------------------------------------  IN: 1275.4 mL / OUT: 0 mL / NET: 1275.4 mL        Physical Exam:  GENERAL: Pt awake, no acute distress, appears comfortable  HEENT: Normocephalic, atraumatic, no conjunctivitis or scleral icterus, oral mucosa moist  NECK: Supple, no LAD, no JVD  CARDIAC: Regular rate and rhythm, +S1/S2, no murmurs/rubs/gallops  PULM: Breathing comfortably on RA  ABDOMEN: Soft, nontender, nondistended, +BS, no rebound tenderness or fluid wave, no CVA tenderness  MSK: Range of motion grossly intact, no back tenderness, Active and Passive ROM to RUE intact, intact muscle strength to RUE, palpable mobile nodule noted to RUE near wound   SKIN: Warm and dry, Hyperpigmented scaring noted to right bicep and forearm, wound noted to right upper inner arm fibrotic base, no drainage, no erythema, non-painful, no increased warmth   VASC: Cap refil < 2 sec, 2+ peripheral pulses, no edema, no LE tenderness, no calf pain B/L, no pain to dorsiflexion or plantarflexion b/l.   Psych: Appropriate affect        LABS:                        8.6    14.68 )-----------( 353      ( 04 Jan 2023 04:32 )             27.5     01-04    133<L>  |  93<L>  |  50<H>  ----------------------------<  111<H>  4.8   |  26  |  6.70<H>    Ca    8.2<L>      04 Jan 2023 04:32  Phos  5.5     01-04  Mg     1.6     01-04    TPro  6.0  /  Alb  x   /  TBili  x   /  DBili  x   /  AST  x   /  ALT  x   /  AlkPhos  x   01-03    PT/INR - ( 03 Jan 2023 05:30 )   PT: 16.8 sec;   INR: 1.41          PTT - ( 03 Jan 2023 05:30 )  PTT:29.7 sec      A/P: 66 yo F with PMH HFrEF (EF 40%), nonobstructive CAD, HTN, HLD, ESRD 2/2 PCKD on HD, PE (2018) s/p IVC filter, PAD, OA, h/o thrombus in vascular access x3 ( R AVG, R AVF, L AVG), ventral hernia, 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 CT showing 7.0 cm fluid collection is present near an AV fistula in the right   upper arm. Vascular consulted for RUE fistula evaluation to r/o source of sepsis. On PE no erythema, increased warmth or drainage to RUE wound. Low suspicion for graft as source of infection. ESR increased to 109.    Plan:  F/U Gallium scan to determine infection source  F/u blood cultures NG @ 12 hrs    Wound care recs: BID dressing changes (once daily with Santyl DSD once daily with Bactroban DSD), Dressing changed with santyl this AM 1/4  Will follow up with ICU team about calling IR to see if they can aspirate fluid collection present near AV fistula noted on CT and send that off for culture     Vascular surgery will follow. Please call [phone] with any questions or concerns

## 2023-01-04 NOTE — CONSULT NOTE ADULT - SUBJECTIVE AND OBJECTIVE BOX
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 Complaining of crampy abdominal pain and waterry diarrhea (2-3 BMs/day), now with nausea and vomiting prior to arrival. Has not gone to HD since 12/24 (8 days ago). Denies fever, chills, CP, SOB.       In the ED:  Initial vital signs: T: 97.5 F, HR: 108, BP: 109/86, R: 16, SpO2: 100% on RA  ED course:   Labs: significant for WBC 15.76, H/H 9.8/32.0, Plt 309, Na 131, K 7.8, Cl 84, AG 31, BUN/Cr 169/17.58  EKG: peaked T waves  Medications: calcium gluconate, dilaudid 0.5 x1, regular insulin 5, morphine 2mg IV, sodium bicarb, dextrose, albuterol 2.5x3  Consults: none    (02 Jan 2023 05:26)      ROS: A 10-point review of systems was otherwise negative.    PAST MEDICAL & SURGICAL HISTORY:  HTN (hypertension)      HLD (hyperlipidemia)      CAD (coronary atherosclerotic disease)      ESRD on dialysis  last 11/15/22      H/O ventral hernia      Brown tumor  brain      DVT, lower extremity  left      History of surgery  IVC filter      AVF (arteriovenous fistula)  right left      S/P AIDEN-BSO  2003      History of surgery  RLE PTA stent / atherectomy  7/2021      History of atherectomy  stent          SOCIAL HISTORY:  FAMILY HISTORY:  No pertinent family history in first degree relatives        ALLERGIES: 	  Ancef (Rash; Urticaria)  DDAVP (Hypotension)  iodine (Hives; Pruritus)  penicillin (Swelling)  sulfa drugs (Angioedema)            MEDICATIONS:  acetaminophen     Tablet .. 650 milliGRAM(s) Oral every 6 hours PRN  atorvastatin 40 milliGRAM(s) Oral at bedtime  chlorhexidine 4% Liquid 1 Application(s) Topical <User Schedule>  collagenase Ointment 1 Application(s) Topical daily  heparin   Injectable 5000 Unit(s) SubCutaneous every 8 hours  HYDROmorphone  Injectable 0.5 milliGRAM(s) IV Push every 4 hours PRN  meropenem  IVPB 500 milliGRAM(s) IV Intermittent every 24 hours  mupirocin 2% Ointment 1 Application(s) Topical once  phenylephrine    Infusion 0.5 MICROgram(s)/kG/Min IV Continuous <Continuous>  sevelamer carbonate 800 milliGRAM(s) Oral three times a day with meals      PHYSICAL EXAM:  T(C): 36.9 (01-04-23 @ 09:31), Max: 37 (01-03-23 @ 19:33)  HR: 110 (01-04-23 @ 13:00) (83 - 117)  BP: 102/48 (01-04-23 @ 13:00) (73/33 - 137/88)  RR: 14 (01-04-23 @ 13:00) (12 - 27)  SpO2: 94% (01-04-23 @ 13:00) (88% - 100%)  Wt(kg): --    GEN: Awake, comfortable. NAD.   HEENT: NCAT, PERRL, EOMI. Mucosa moist. No JVD.   RESP: CTA b/l  CV: RRR, normal s1/s2. No m/r/g.  ABD: Soft, NTND. BS+  EXT: Warm. No edema, clubbing, or cyanosis.   NEURO: AAOx3. No focal deficits.    I&O's Summary    03 Jan 2023 07:01  -  04 Jan 2023 07:00  --------------------------------------------------------  IN: 1446.6 mL / OUT: 0 mL / NET: 1446.6 mL    04 Jan 2023 07:01  -  04 Jan 2023 13:27  --------------------------------------------------------  IN: 536.9 mL / OUT: 0 mL / NET: 536.9 mL        	  LABS:	 	    CARDIAC MARKERS:                                  8.6    14.68 )-----------( 353      ( 04 Jan 2023 04:32 )             27.5     01-04    133<L>  |  93<L>  |  50<H>  ----------------------------<  111<H>  4.8   |  26  |  6.70<H>    Ca    8.2<L>      04 Jan 2023 04:32  Phos  5.5     01-04  Mg     1.6     01-04    TPro  6.0  /  Alb  x   /  TBili  x   /  DBili  x   /  AST  x   /  ALT  x   /  AlkPhos  x   01-03    proBNP:   Lipid Profile:   HgA1c:   TSH:     TELEMETRY: 	    ECG:  	  RADIOLOGY:   ECHO:  STRESS:  CATH:   HPI:  66 yo F pt with PMH HFrEF (EF 40%), nonobstructive CAD, HTN, HLD, ESRD 2/2 PCKD on HD, PE (2018) s/p IVC filter, PAD, OA, h/o thrombus in vascular access x3 ( R AVG, R AVF, L AVG), who presented with weakness and generalized malaise for 1 week. She is currently admitted for septic shock 2/2 possible RUE fistula infection, pending gallium scan. Cardiology consulted for new finding of LVOT obstruction on recent TTE. Patient follows up with Dr. Rock Simmons as outpatient. Does not have a history of HCM. Denies family history of HCM or sudden cardiac arrest.      (02 Jan 2023 05:26)      ROS: A 10-point review of systems was otherwise negative.    PAST MEDICAL & SURGICAL HISTORY:  HTN (hypertension)      HLD (hyperlipidemia)      CAD (coronary atherosclerotic disease)      ESRD on dialysis  last 11/15/22      H/O ventral hernia      Brown tumor  brain      DVT, lower extremity  left      History of surgery  IVC filter      AVF (arteriovenous fistula)  right left      S/P AIDEN-BSO  2003      History of surgery  RLE PTA stent / atherectomy  7/2021      History of atherectomy  stent          SOCIAL HISTORY:  FAMILY HISTORY:  No pertinent family history in first degree relatives        ALLERGIES: 	  Ancef (Rash; Urticaria)  DDAVP (Hypotension)  iodine (Hives; Pruritus)  penicillin (Swelling)  sulfa drugs (Angioedema)            MEDICATIONS:  acetaminophen     Tablet .. 650 milliGRAM(s) Oral every 6 hours PRN  atorvastatin 40 milliGRAM(s) Oral at bedtime  chlorhexidine 4% Liquid 1 Application(s) Topical <User Schedule>  collagenase Ointment 1 Application(s) Topical daily  heparin   Injectable 5000 Unit(s) SubCutaneous every 8 hours  HYDROmorphone  Injectable 0.5 milliGRAM(s) IV Push every 4 hours PRN  meropenem  IVPB 500 milliGRAM(s) IV Intermittent every 24 hours  mupirocin 2% Ointment 1 Application(s) Topical once  phenylephrine    Infusion 0.5 MICROgram(s)/kG/Min IV Continuous <Continuous>  sevelamer carbonate 800 milliGRAM(s) Oral three times a day with meals      PHYSICAL EXAM:  T(C): 36.9 (01-04-23 @ 09:31), Max: 37 (01-03-23 @ 19:33)  HR: 110 (01-04-23 @ 13:00) (83 - 117)  BP: 102/48 (01-04-23 @ 13:00) (73/33 - 137/88)  RR: 14 (01-04-23 @ 13:00) (12 - 27)  SpO2: 94% (01-04-23 @ 13:00) (88% - 100%)  Wt(kg): --    GEN: Morbidly obese female in NAD   HEENT: NCAT, PERRL, EOMI. Mucosa moist. No JVD.   RESP: CTA b/l  CV: RRR, normal s1/s2. No m/r/g.  ABD: Soft, NTND. BS+  EXT: Warm. No edema, clubbing, or cyanosis.     I&O's Summary    03 Jan 2023 07:01  -  04 Jan 2023 07:00  --------------------------------------------------------  IN: 1446.6 mL / OUT: 0 mL / NET: 1446.6 mL    04 Jan 2023 07:01  -  04 Jan 2023 13:27  --------------------------------------------------------  IN: 536.9 mL / OUT: 0 mL / NET: 536.9 mL        	  LABS:	 	    CARDIAC MARKERS:                                  8.6    14.68 )-----------( 353      ( 04 Jan 2023 04:32 )             27.5     01-04    133<L>  |  93<L>  |  50<H>  ----------------------------<  111<H>  4.8   |  26  |  6.70<H>    Ca    8.2<L>      04 Jan 2023 04:32  Phos  5.5     01-04  Mg     1.6     01-04    TPro  6.0  /  Alb  x   /  TBili  x   /  DBili  x   /  AST  x   /  ALT  x   /  AlkPhos  x   01-03    < from: TTE Echo Complete w/ Contrast w/ Doppler (01.03.23 @ 14:41) >  -----  CONCLUSIONS:     1. Hyperdynamic left ventricular systolic function.   2. Systolic anterior motion of the mitral valve resulting in an LVOT   gradient of 100.00 mmHg and severe LVOT obstruction.   3. Mitral regurgitation is present (at least mild, severity may be   underestimated).   4. Probably normal right ventricular systolic function.   5. Aortic leaflets are thickened. Valve opening is not well visualized.   Valve gradients cannot be assessed in the setting of elevated LVOT   velocities.   6. There is at least mild tricuspid regurgitation.   7. Pulmonary artery systolic pressure is 32 mmHg.   8. No pericardial effusion.   9. Round echolucent structure in liver may represent cyst; dedicated   abdominal imaging can be obtained if clinically indicated.  10. Compared to the previous TTE performed on 11/18/2022, left   ventricular function is now hyperdynamic and systolic anterior motion of   the mitral valve is noted with severe LVOT gradient. Right ventricular   functionappears probably normal on this study.  11. Findings were discussed with clinical team on 1/3/2023 at 5:20pm.    < end of copied text >      < from: TTE Echo Complete w/o Contrast w/ Doppler (11.18.22 @ 09:41) >  CONCLUSIONS:     1. Normal leftventricular size and systolic function.   2. Grade II left ventricular diastolic dysfunction.   3. Dilated right ventricular size.   4. Reduced right ventricular systolic function.   5. Normal atria.   6. Mild-to-moderate aortic stenosis.   7. Pulmonary artery systolic pressure is 22 mmHg.   8. Trivial pericardial effusion. Ascites present   9. No prior echo is available for comparison.    < end of copied text >

## 2023-01-04 NOTE — DIETITIAN INITIAL EVALUATION ADULT - OTHER CALCULATIONS
Fluids per team. IBW used to calculate needs due to pt's current body weight exceeding 120% of IBW adjusted for HD

## 2023-01-04 NOTE — CONSULT NOTE ADULT - SUBJECTIVE AND OBJECTIVE BOX
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 Complaining of crampy abdominal pain and waterry diarrhea (2-3 BMs/day), now with nausea and vomiting prior to arrival. Has not gone to HD since 12/24 (8 days ago). Denies fever, chills, CP, SOB.       In the ED:  Initial vital signs: T: 97.5 F, HR: 108, BP: 109/86, R: 16, SpO2: 100% on RA  ED course:   Labs: significant for WBC 15.76, H/H 9.8/32.0, Plt 309, Na 131, K 7.8, Cl 84, AG 31, BUN/Cr 169/17.58  EKG: peaked T waves  Medications: calcium gluconate, dilaudid 0.5 x1, regular insulin 5, morphine 2mg IV, sodium bicarb, dextrose, albuterol 2.5x3  Consults: none    (02 Jan 2023 05:26)      Attending:  Dr. Hatch    Surgery Addendum: Patient with history as stated above. 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 Since admission patient has been complaining of bilateral breast pain. Surgery consulted for further eval and r/o inflammatory breast cancer. US showed no sonographic evidence of breast abscess, cyst or focal mass to correspond with the clinical finding of bilateral breast masses.Patient reports that she has been having breast pain for years associated with itchiness. She has used benadryl which helped with the itching. Her breast pain became worse since the her AVF surgery in November. She denies fevers or chills at home. Her last mammo was several years ago and she reports it was normal.     Betty score 3.3%   - Mother and sister had breast cancer  - 1st menstrual cycle at 18  - 1st kid when she was over 32        PAST MEDICAL & SURGICAL HISTORY:  HTN (hypertension)      HLD (hyperlipidemia)      CAD (coronary atherosclerotic disease)      ESRD on dialysis  last 11/15/22      H/O ventral hernia      Brown tumor  brain      DVT, lower extremity  left      History of surgery  IVC filter      AVF (arteriovenous fistula)  right left      S/P AIDEN-BSO  2003      History of surgery  RLE PTA stent / atherectomy  7/2021      History of atherectomy  stent          MEDICATIONS  (STANDING):  atorvastatin 40 milliGRAM(s) Oral at bedtime  chlorhexidine 4% Liquid 1 Application(s) Topical <User Schedule>  collagenase Ointment 1 Application(s) Topical daily  heparin   Injectable 5000 Unit(s) SubCutaneous every 8 hours  meropenem  IVPB 500 milliGRAM(s) IV Intermittent every 24 hours  mupirocin 2% Ointment 1 Application(s) Topical once  phenylephrine    Infusion 0.5 MICROgram(s)/kG/Min (16 mL/Hr) IV Continuous <Continuous>  sevelamer carbonate 800 milliGRAM(s) Oral three times a day with meals    MEDICATIONS  (PRN):  acetaminophen     Tablet .. 650 milliGRAM(s) Oral every 6 hours PRN Temp greater or equal to 38C (100.4F), Moderate Pain (4 - 6)  HYDROmorphone  Injectable 0.5 milliGRAM(s) IV Push every 4 hours PRN Severe Pain (7 - 10)      Allergies    Ancef (Rash; Urticaria)  DDAVP (Hypotension)  iodine (Hives; Pruritus)  penicillin (Swelling)  sulfa drugs (Angioedema)    Intolerances        SOCIAL HISTORY:    FAMILY HISTORY:  No pertinent family history in first degree relatives        Vital Signs Last 24 Hrs  T(C): 37.1 (04 Jan 2023 13:52), Max: 37.1 (04 Jan 2023 13:52)  T(F): 98.8 (04 Jan 2023 13:52), Max: 98.8 (04 Jan 2023 13:52)  HR: 103 (04 Jan 2023 15:00) (83 - 117)  BP: 117/58 (04 Jan 2023 15:00) (73/33 - 137/88)  BP(mean): 83 (04 Jan 2023 15:00) (48 - 108)  RR: 24 (04 Jan 2023 15:00) (13 - 27)  SpO2: 97% (04 Jan 2023 15:00) (88% - 100%)    Parameters below as of 04 Jan 2023 15:00  Patient On (Oxygen Delivery Method): room air        I&O's Summary    03 Jan 2023 07:01  -  04 Jan 2023 07:00  --------------------------------------------------------  IN: 1446.6 mL / OUT: 0 mL / NET: 1446.6 mL    04 Jan 2023 07:01  -  04 Jan 2023 15:44  --------------------------------------------------------  IN: 544.1 mL / OUT: 0 mL / NET: 544.1 mL        Physical Exam:  General: NAD, resting comfortably  HEENT: NC/AT, EOMI,   Pulmonary: normal resp effort  Cardiovascular: NSR  Breast: Right breast, erythema, possible masslike tissue palpated, tender to palpation  Left breast, erythema with excoriations noted, no purulent drainage, no malodor, tender to palpation  Abdominal: soft, ND/NT, no organomegaly  Neuro: A/O x 3, CNs II-XII grossly intact, no focal deficits          LABS:                        8.6    14.68 )-----------( 353      ( 04 Jan 2023 04:32 )             27.5     01-04    133<L>  |  93<L>  |  50<H>  ----------------------------<  111<H>  4.8   |  26  |  6.70<H>    Ca    8.2<L>      04 Jan 2023 04:32  Phos  5.5     01-04  Mg     1.6     01-04    TPro  6.0  /  Alb  x   /  TBili  x   /  DBili  x   /  AST  x   /  ALT  x   /  AlkPhos  x   01-03    PT/INR - ( 03 Jan 2023 05:30 )   PT: 16.8 sec;   INR: 1.41          PTT - ( 03 Jan 2023 05:30 )  PTT:29.7 sec    CAPILLARY BLOOD GLUCOSE      POCT Blood Glucose.: 112 mg/dL (04 Jan 2023 06:43)  POCT Blood Glucose.: 92 mg/dL (03 Jan 2023 21:30)  POCT Blood Glucose.: 92 mg/dL (03 Jan 2023 17:26)  POCT Blood Glucose.: 66 mg/dL (03 Jan 2023 16:59)    LIVER FUNCTIONS - ( 03 Jan 2023 14:24 )  Alb: x     / Pro: 6.0 g/dL / ALK PHOS: x     / ALT: x     / AST: x     / GGT: x             Cultures:  Culture Results:   No growth at 12 hours (01-03 @ 17:25)  Culture Results:   No growth at 12 hours (01-03 @ 17:25)      RADIOLOGY & ADDITIONAL STUDIES:    ACC: [id number] EXAM: CT CHEST    PROCEDURE DATE: 01/03/2023        INTERPRETATION: CT SCAN OF CHEST, ABDOMEN AND PELVIS    History: Sepsis. Pneumonia. Abdominal pain. Hyperkalemia.    Technique: CT scan of chest, abdomen and pelvis performed from lung apices through pubic symphysis. Intravenous and oral contrast material were not administered, as ordered. Axial, coronal, and sagittal multiplanar reformatted images were produced. Thin section axial images and axial MIPS of the chest were also produced.    Comparison: MRI of abdomen from 1/12/2022. CT scan of abdomen and pelvis from 11/10/2021.    Findings:    Lungs and large airways: Small amount of consolidation/atelectasis in the lower lobes, right greater than left. Linear atelectasis right middle lobe and left upper lobe.    Pleura: No pleural effusion.    Mediastinum and hilar regions: No thoracic lymphadenopathy.    Heart and pericardium: Heart size is normal. No pericardial effusion.    Vessels: Severe coronary artery calcification. Mild amount of calcified plaque thoracic aorta. Moderate calcified plaque abdominal aorta. Larger amount of calcified plaque splenic artery and bilateral pelvic arteries. AV fistula is present in the right upper arm. A 7.0 x 5.1 cm fluid collection is located near the fistula, possibly postoperative seroma or hematoma. No gas within this collection. Vascular stents present in the right axillary region. There is also a stent extending from the medial aspect of the right subclavian vein through the right brachiocephalic vein into the superior vena cava. There is a left-sided central line extending through that stent into the right atrium. Multiple collateral vessels right chest wall. IVC filter again in place, with some of its prongs projecting beyond the lumen of the vessel. Mildly calcified aortic valve. Tortuous thoracic aorta.    Chest wall and lower neck: Enlarged, multinodular thyroid projects into the superior mediastinum. There is a 1.8 x 1.5 cm solid nodule slightly inferiorly in the anterior mediastinum, without definite communication with the thyroid. However, this may still represent thyroid tissue, as its morphology is similar in appearance to the remainder of the thyroid.    Liver: Multiple hepatic cysts again present.    Gallbladder and biliary tree: No radiopaque stones gallbladder. No intrahepatic biliary ductal dilatation. Common bile duct again mildly dilated, measuring 0.9 cm.    Spleen: Normal.    Pancreas: A few pancreatic calcifications are again noted.    Adrenal glands: Normal.    Kidneys: Kidneys again enlarged bilaterally with parenchyma nearly completely replaced by innumerable simple and complex cystic masses, consistent with autosomal dominant polycystic kidney disease. It is not possible to determine if there are any solid renal masses on this noncontrast exam.    Abdominal and pelvic adenopathy: No lymphadenopathy in abdomen or pelvis.    Ascites: None.    Gastrointestinal tract: Nonobstructed loops of small bowel and portion of transverse colon again present within large, broad-based ventral hernia. Small bowel anastomosis in the pelvis.    Pelvic organs: A 6.1 x 4.9 cm cystic mass is again present in the right adnexa. Left ovary unremarkable. Again post hysterectomy. No urine in bladder, limiting evaluation.    Soft tissues: Normal.    Bones: Scoliosis and degenerative changes of the spine. Severe degenerative changes of the shoulders. Increased density of the bone marrow is consistent with renal osteodystrophy. There are also multiple lucent bone lesions, which could represent Brown tumors. There is again nonunion of fractures of the right superior and inferior pubic rami and both pubic bones. Multiple compression fractures in the thoracolumbar spine.      Impression: 1. Small amount of consolidation in both lower lobes, right greater than left. This is felt to be more likely to represent atelectasis than pneumonia.    2. A 7.0 cm fluid collection is present near an AV fistula in the right upper arm. This could represent a postoperative seroma or hematoma, but clinical correlation is recommended to rule out abscess given the history of sepsis.    3. There is again a large broad-based ventral hernia containing nonobstructed loops of small and large bowel.    4. A 6.1 cm cystic mass is again present in the right adnexa. This is an abnormal finding in a postmenopausal female. Recommend gynecology consultation as neoplasm should be excluded.    --- End of Report ---      BREAST US  INTERPRETATION: CLINICAL INDICATION: Mastitis. Rule out abscess. Examination performed at the bedside in the ICU. Patient states that she has had bilateral breast masses since 2017 and has had several prior workups at [facility]. She states that she was not diagnosed with breast cancer. She states that the breast masses frequently itch and she rubs and scratches the areas. She states that the skin something breaks down. Patient has renal failure and is on dialysis.    TECHNIQUE: Limited sonographic evaluation of both breasts was performed over the palpable masses in both breasts. Evaluation was performed by the technologist and radiologist. This study was performed exclusively for the purpose of excluding the presence of abscess in the clinical setting of mastitis.?  ?  FINDINGS: On clinical examination there are bilateral breast masses corresponding to the area of clinical concern. The skin over these elaina is darkened. Over the left breast there is some skin abrasions. However, on ultrasound there are no focal findings other than superficial collateral bloodflow. Clinical exam shows the right breast mass in the upper outer quadrant to clinically measure about 8 cm and on the left about 6 cm.      IMPRESSION:  No sonographic evidence of breast abscess, cyst or focal mass to correspond with the clinical finding of bilateral breast masses.    Recommend bilateral mammogram, when the patient is clinically able. Also, recommend obtaining the patient's prior records from [facility].    If symptoms do not resolve clinically, additional imaging in a dedicated breast imaging center should be performed to exclude the possibility of malignancy.    Discussed with the patient and her daughter at the time of the examination.

## 2023-01-04 NOTE — CONSULT NOTE ADULT - ASSESSMENT
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 US showed no sonographic evidence of breast abscess, cyst or focal mass to correspond with the clinical finding of bilateral breast masses and CT chest (1/3) showed 6.1 cm cystic mass in the right adnexa. Betty score 3.3%. Physical findings on breast exam could be secondary to congestion from permcath, IVF or HF, less likely to be from inflammatory breast cancer.     Plan:  - No acute surgical intervention at this time  - 5c will continue to follow  - Plan discussed with chief resident and Dr. Hatch

## 2023-01-04 NOTE — DIETITIAN INITIAL EVALUATION ADULT - NS FNS DIET ORDER
Diet, Renal Restrictions:   For patients receiving Renal Replacement - No Protein Restr, No Conc K, No Conc Phos, Low Sodium  Supplement Feeding Modality:  Oral  Nepro Cans or Servings Per Day:  2       Frequency:  Three Times a day (01-04-23 @ 09:53)

## 2023-01-04 NOTE — PROGRESS NOTE ADULT - ASSESSMENT
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     NEURO  #Pain Control  Pt in 10/10 pain of bilateral breasts, worst on R. Pain not responsive to Tylenol IV or Percocet.   - c/w Dilaudid 0.25mg IVP q4h PRN    PULM  #Atelectasis  Noted on CT chest to have atelectasis at b/l lung bases.  - Incentive spirometry    CARDIOVASCULAR  #Septic Shock  Pt presented meeting 2/4 SIRS. Hypotensive with MAPs to 60, requiring Levophed. Extremities cool on exam. Likely septic shock 2/2 infection of R breast, bilateral breasts, or seroma/hematoma of RUE. HR > 90, WBC >12. Lactate 1.9. Afebrile. Procal 12. S/p Vancomycin 1250mg x 1, Aztreonam x 1 (pt allergic to Cefazolin, PCN).  - On Levophed with increasing pressor requirements, titrate PRN with goal MAP 60-65  - CT:  7.0 cm fluid collection is present near an AV fistula in the right upper arm. Possibly abscess.   - Consult vascular surgery for fluid collection: seroma vs hematoma vs abscess at site of AV Fistula  - c/w Meropenem 500mg q24h, obtain ID approval  - start Vancomycin 1250mg, dosed based on HD schedule  - obtain BCx     #HFrEF  TTE 11/22 normal LV and systolic, EF 59%, grade II diastolic dysfuncton, dilated RV, reduced RV,   Home meds: Entresto 49/51mg  - hold Entresto i/s/o septic shock    #CAD  Hx of cardiac cath in 2018  Home meds: atorvastatin 40mg, plavix 75mg qd  - c/w home atorvastatin 40mg, Plavix 75mg qd    GI  #Abdominal Pain  C/o vague abdominal pain, a/w bilious emesis x 2, no nausea or further episodes of emesis   - see endocrine for plan    RENAL  #ESRD 2/2 PCKD  Pt has been on HD for "years" 2/2 PCKD. AV Fistula of EDWIN clotted in 2014, has received HD thru HD cath since. New AV Fistula in 11/2022, not yet matured.   - f/u nephrology recs  - s/p HD 1/3, 0L removed d/t overall fluid status     #Hyperkalemia  Patient with Hx of ESRD with missed HD presents with K >8 on VBG with abdominal symptoms and missed HD x8 days. ICU consulted i/s/o hyperkalemia. Renal consulted.  - s/p HD, 1L removed 1/2  - monitor with BMP's BID    ENDO  #R/o Hyperparathyroidism  At risk for secondary HPTT d/t renal failure   - pt has vague abdominal pain with nausea  - multiple brown tumors on CT with multiple fractures of pubic rami, pubic bone, thoracolumbar spine  - obtain intact PTH, ionized Ca    MSK  #Renal Osteodystrophy  - on CT: Increased density of the bone marrow is consistent with renal osteodystrophy    Heme/Onc  #R/o Breast Cancer  Family hx of breast cancer in mother, sister.   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     NEURO  #Pain Control  Pt in 10/10 pain of bilateral breasts, worst on R. Pain responsive to Dilaudid but effect wears off after ~2-3 hours.   - c/w Dilaudid 0.5mg IVP q4h PRN    PULM  #Atelectasis  Noted on CT chest to have atelectasis at b/l lung bases.  - Incentive spirometry    CARDIOVASCULAR  #Septic Shock  Pt presented meeting 2/4 SIRS. Hypotensive with MAPs to 60, requiring Levophed. Extremities cool on exam. Likely septic shock 2/2 infection of R breast, bilateral breasts, or seroma/hematoma of RUE. HR > 90, WBC >12. Lactate 1.9. Afebrile. Procal 12. S/p Vancomycin 1250mg x 1, Aztreonam x 1 (pt allergic to Cefazolin, PCN).  - On Levophed with increasing pressor requirements, titrate PRN with goal MAP 60-65  - CT:  7.0 cm fluid collection is present near an AV fistula in the right upper arm. Possibly abscess. Consult IR for aspiration/culture.   - Per vascular, obtain ESR (elevated at 109), consult IR for aspiration of seroma vs hematoma of R arm  - Consult surgery for pain/erythema/warmth of R breast  - c/w Meropenem 500mg q24h, obtain ID approval  - c/w Vancomycin 1250mg, dosed based on HD schedule  - BCx: BGTD x 12h    #HFrEF  TTE 11/22 normal LV and systolic, EF 59%, grade II diastolic dysfuncton, dilated RV, reduced RV,   Home meds: Entresto 49/51mg  - hold Entresto i/s/o septic shock    #CAD  Hx of cardiac cath in 2018  Home meds: atorvastatin 40mg, plavix 75mg qd  - c/w home atorvastatin 40mg  - hold home Plavix 75mg qd    GI  #Abdominal Pain  C/o vague abdominal pain, a/w bilious emesis x 2, no nausea or further episodes of emesis   - see endocrine for plan    RENAL  #ESRD 2/2 PCKD  Pt has been on HD for "years" 2/2 PCKD. AV Fistula of EDWIN clotted in 2014, has received HD thru HD cath since. New AV Fistula in 11/2022, not yet matured.   - f/u nephrology recs  - s/p HD 1/3, 0L removed d/t overall fluid status     #Hyperkalemia  Patient with Hx of ESRD with missed HD presents with K >8 on VBG with abdominal symptoms and missed HD x8 days. ICU consulted i/s/o hyperkalemia. Renal consulted.  - s/p HD, 1L removed 1/2  - monitor with BMP's BID    ID  Pt presented in septic shock with leukocytosis, tachycardia, hypotension requiring pressors. Unknown source.   - see #septic shock for plan  - consult ID for empiric antibiotic coverage  - c/w meropenem 500mg qd    ENDO  #R/o Hyperparathyroidism  At risk for secondary HPTT d/t renal failure   - pt has vague abdominal pain with nausea  - multiple brown tumors on CT with multiple fractures of pubic rami, pubic bone, thoracolumbar spine  - obtain intact PTH, ionized Ca  - consult endocrinology, f/u recs    MSK  #Renal Osteodystrophy  - on CT: Increased density of the bone marrow is consistent with renal osteodystrophy    Heme/Onc  #R/o Breast Cancer  Family hx of breast cancer in mother, sister.   - large R breast mass, palpable L breast mass with R axillary LAD, c/f breast cancer, breast has peau d'orange appearance with associated erythema, warmth  - US bilateral breasts: No sonographic evidence of breast abscess, cyst or focal mass to correspond with the clinical finding of bilateral breast masses.  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     NEURO  #Pain Control  Pt in 10/10 pain of bilateral breasts, worst on R. Pain responsive to Dilaudid but effect wears off after ~2-3 hours.   - c/w Dilaudid 0.5mg IVP q4h PRN    PULM  #Atelectasis  Noted on CT chest to have atelectasis at b/l lung bases.  - Incentive spirometry    CARDIOVASCULAR  #Septic Shock  Pt presented meeting 2/4 SIRS. Hypotensive with MAPs to 60, requiring Levophed. Extremities cool on exam. Likely septic shock 2/2 infection of R breast, bilateral breasts, or seroma/hematoma of RUE. HR > 90, WBC >12. Lactate 1.9. Afebrile. Procal 12. S/p Vancomycin 1250mg x 1, Aztreonam x 1 (pt allergic to Cefazolin, PCN).  - On Levophed with increasing pressor requirements, titrate PRN with goal MAP 60-65  - CT:  7.0 cm fluid collection is present near an AV fistula in the right upper arm. Possibly abscess. Consult IR for aspiration/culture.   - Per vascular, obtain ESR (elevated at 109), consult IR for aspiration of seroma vs hematoma of R arm  - Consult surgery for pain/erythema/warmth of R breast  - c/w Meropenem 500mg q24h, obtain ID approval  - c/w Vancomycin 1250mg, dosed based on HD schedule  - BCx: BGTD x 12h    #HFrEF  TTE 11/22 normal LV and systolic, EF 59%, grade II diastolic dysfuncton, dilated RV, reduced RV,   Home meds: Entresto 49/51mg  - hold Entresto i/s/o septic shock    #CAD  Hx of cardiac cath in 2018  Home meds: atorvastatin 40mg, plavix 75mg qd  - c/w home atorvastatin 40mg  - hold home Plavix 75mg qd    GI  #Abdominal Pain  C/o vague abdominal pain, a/w bilious emesis x 2, no nausea or further episodes of emesis   - see endocrine for plan    RENAL  #ESRD 2/2 PCKD  Pt has been on HD for "years" 2/2 PCKD. AV Fistula of EDWIN clotted in 2014, has received HD thru HD cath since. New AV Fistula in 11/2022, not yet matured.   - f/u nephrology recs  - s/p HD 1/3, 0L removed d/t overall fluid status     #Hyperkalemia  Patient with Hx of ESRD with missed HD presents with K >8 on VBG with abdominal symptoms and missed HD x8 days. ICU consulted i/s/o hyperkalemia. Renal consulted.  - s/p HD, 1L removed 1/2  - monitor with BMP's BID    ID  Pt presented in septic shock with leukocytosis, tachycardia, hypotension requiring pressors. Unknown source.   - see #septic shock for plan  - consult ID for empiric antibiotic coverage  - f/u gallium scan   - c/w meropenem 500mg qd    ENDO  #R/o Hyperparathyroidism  At risk for secondary HPTT d/t renal failure   - pt has vague abdominal pain with nausea  - multiple brown tumors on CT with multiple fractures of pubic rami, pubic bone, thoracolumbar spine  - obtain intact PTH, ionized Ca  - consult endocrinology, f/u recs    MSK  #Renal Osteodystrophy  - on CT: Increased density of the bone marrow is consistent with renal osteodystrophy    Heme/Onc  #R/o Breast Cancer  Family hx of breast cancer in mother, sister.   - large R breast mass, palpable L breast mass with R axillary LAD, c/f breast cancer, breast has peau d'orange appearance with associated erythema, warmth  - US bilateral breasts: No sonographic evidence of breast abscess, cyst or focal mass to correspond with the clinical finding of bilateral breast masses.  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     NEURO  #Pain Control  Pt in 10/10 pain of bilateral breasts, worst on R. Pain responsive to Dilaudid but effect wears off after ~2-3 hours.   - c/w Dilaudid 0.5mg IVP q4h PRN    PULM  #Atelectasis  Noted on CT chest to have atelectasis at b/l lung bases.  - Incentive spirometry    CARDIOVASCULAR  #Septic Shock  Pt presented meeting 2/4 SIRS. Hypotensive with MAPs to 60, requiring Levophed. Extremities cool on exam. Likely septic shock 2/2 infection of R breast, bilateral breasts, or seroma/hematoma of RUE. HR > 90, WBC >12. Lactate 1.9. Afebrile. Procal 12. S/p Vancomycin 1250mg x 1, Aztreonam x 1 (pt allergic to Cefazolin, PCN).  - On Levophed with increasing pressor requirements, titrate PRN with goal MAP 60-65  - CT:  7.0 cm fluid collection is present near an AV fistula in the right upper arm. Possibly abscess. Consult IR for aspiration/culture.   - Per vascular, obtain ESR (elevated at 109), consult IR for aspiration of seroma vs hematoma of R arm  - Consult surgery for pain/erythema/warmth of R breast  - c/w Meropenem 500mg q24h, obtain ID approval  - c/w Vancomycin 1250mg, dosed based on HD schedule  - BCx: BGTD x 12h    #HFrEF  TTE 11/22 normal LV and systolic, EF 59%, grade II diastolic dysfuncton, dilated RV, reduced RV,   Home meds: Entresto 49/51mg  - hold Entresto i/s/o septic shock    #CAD  Hx of cardiac cath in 2018  Home meds: atorvastatin 40mg, plavix 75mg qd  - c/w home atorvastatin 40mg  - hold home Plavix 75mg qd    GI  #Abdominal Pain  C/o vague abdominal pain, a/w bilious emesis x 2, no nausea or further episodes of emesis   - see endocrine for plan    RENAL  #ESRD 2/2 PCKD  Pt has been on HD for "years" 2/2 PCKD. AV Fistula of EDWIN clotted in 2014, has received HD thru HD cath since. New AV Fistula in 11/2022, not yet matured.   - f/u nephrology recs  - s/p HD 1/3, 0L removed d/t overall fluid status     #Hyperkalemia  Patient with Hx of ESRD with missed HD presents with K >8 on VBG with abdominal symptoms and missed HD x8 days. ICU consulted i/s/o hyperkalemia. Renal consulted.  - s/p HD, 1L removed 1/2  - monitor with BMP's BID    ID  Pt presented in septic shock with leukocytosis, tachycardia, hypotension requiring pressors. Unknown source.   - see #septic shock for plan  - consult ID for empiric antibiotic coverage  - f/u gallium scan   - c/w meropenem 500mg qd    ENDO  #R/o Hyperparathyroidism  At risk for secondary HPTT d/t renal failure   - pt has vague abdominal pain with nausea  - multiple brown tumors on CT with multiple fractures of pubic rami, pubic bone, thoracolumbar spine  - obtain intact PTH, ionized Ca  - consult endocrinology, f/u recs    MSK  #Renal Osteodystrophy  - on CT: Increased density of the bone marrow is consistent with renal osteodystrophy    Heme/Onc  #R/o Breast Cancer  Family hx of breast cancer in mother, sister.   - large R breast mass, palpable L breast mass with R axillary LAD, c/f breast cancer, breast has peau d'orange appearance with associated erythema, warmth  - US bilateral breasts: No sonographic evidence of breast abscess, cyst or focal mass to correspond with the clinical finding of bilateral breast masses.  - consult surgery for painful bilateral breast masses     GYN  Uterine mass, 6.1cm mass in R adnexa seen incidentally on CTAP  NTD    Preventative  F: None  E: None, HD pt  N: Renal Restricted Diet  DVT: heparin 5000IU subq q8h   Code Status: Full Code  Dispo: ICU

## 2023-01-04 NOTE — CONSULT NOTE ADULT - ASSESSMENT
68 yo F with HTN, HLD, CAD, HFrEF, ESRD (PCKD) on HD, PE (2018) s/p IVC filter, PAD, OA, h/o thrombus in vascular access X 3, ventral hernia, osteoclastoma with septic shock, initially refractory, now off pressors on vancomycin and meropenem.  Never febrile, but may not be able to mount, ongoing leukocytosis.  Source not clear, concern for CLABSI, though no blood cultures were sent until after she had received antibiotics for 36h.  No clear evidence for pulmonary source, abd exam is benign, per Surgery, unlikely to be mastitis.  Has fluid collection R upper arm - US suggestive of hematoma vs. seroma.    Suggest:  - Continue to f/u blood cultures X 2 from 1/3  - Agree with plan for gallium scan  - Continue vancomycin - would dose by trough prior to HD.  If <10, would give 1 g, 10-17.5 give 750 mg, 1.6-15, give 500 mg after HD.  - Continue meropenem 500 mg IV q24h  Recommendations discussed with primary team.  Will follow with you - team 1.  Dr. Naranjo will assume care tomorrow.

## 2023-01-04 NOTE — CONSULT NOTE ADULT - SUBJECTIVE AND OBJECTIVE BOX
HPI:  66 yo F with HTN, HLD, CAD, HFrEF, ESRD (PCKD) on HD, PE (2018) s/p IVC filter, PAD, OA, h/o thrombu in vascular access X 3, ventral hernia, osteoclastoma admitted 1/2 with K+ 7.8.  ID consult for septic shock.  She reports having had pain L breast since November.  She presented on 1/1 with 1 week h/o weakness and watery diarrhea, having missed HD since 12/24.  No fever or chills.  In the ED, she had T 97.5, , /86.  Labs with WBC 15.8 with PMN predom, Na 131, K+ 7.8, CO2 16, , Cr 17.6.  ECG showed peaked T waves.  She was treated for hyperkalemia and admitted for emergent HD.  She was hypotensive requiring pressors and was started on vancomycin and aztreonam, with possible pulmonary source vs. mastitis.  She had increased pressor requirements – aztreonam was d/wero and meropenem started on 1/3.  CT C/A/P w/o IVC showed small amt of consolidation/atelectasis in lower lobes, R>L (felt more likely atelectasis than pneumonia, with a 7 X 5.1 cm fluid collection near AVF RUE, mild intrahepatic biliary ductal dilatation with CBD 0.9 cm diam, 6.1 X 4.9 cm cystic mass in R adnexa (previously seen).  She continued to have increasing pressor requirements overnight into 1/4.  US RUE showed DVT involving the right internal jugular vein and right subclavian vein and right axillary vein. SVT involving the right cephalic vein, and an 8.4 cm complex avascular collection in the proximal right arm representing old hematoma/seroma.  US breasts showed no sonographic evidence of breast abscess, cyst or focal mass to correspond with the clinical finding of bilateral breast masses.  She was seen by Surgery – breast findings felt secondary to congestion from permacath or HF, less likely from inflammatory breast CA.  Her pressor requirements decreased.  She has been afebrile since admission.        PAST MEDICAL & SURGICAL HISTORY:  HTN (hypertension)      HLD (hyperlipidemia)      CAD (coronary atherosclerotic disease)      ESRD on dialysis  last 11/15/22      H/O ventral hernia      Brown tumor  brain      DVT, lower extremity  left      History of surgery  IVC filter      AVF (arteriovenous fistula)  right left      S/P AIDEN-BSO  2003      History of surgery  RLE PTA stent / atherectomy  7/2021      History of atherectomy  stent              MEDICATIONS  (STANDING):  atorvastatin 40 milliGRAM(s) Oral at bedtime  chlorhexidine 4% Liquid 1 Application(s) Topical <User Schedule>  collagenase Ointment 1 Application(s) Topical daily  heparin   Injectable 5000 Unit(s) SubCutaneous every 8 hours  meropenem  IVPB 500 milliGRAM(s) IV Intermittent every 24 hours  phenylephrine    Infusion 0.5 MICROgram(s)/kG/Min (16 mL/Hr) IV Continuous <Continuous>  sevelamer carbonate 800 milliGRAM(s) Oral three times a day with meals    MEDICATIONS  (PRN):  acetaminophen     Tablet .. 650 milliGRAM(s) Oral every 6 hours PRN Temp greater or equal to 38C (100.4F), Moderate Pain (4 - 6)  HYDROmorphone  Injectable 0.5 milliGRAM(s) IV Push every 4 hours PRN Severe Pain (7 - 10)      Allergies    Ancef (Rash; Urticaria)  DDAVP (Hypotension)  iodine (Hives; Pruritus)  penicillin (Swelling - given by GYN ~6 years ago)  sulfa drugs (Angioedema)    Intolerances        SOCIAL HISTORY:  Born in [place], has lived in [place] since 1964.  Lives with a daughter and son.  Has 3 children, no pets, no T/A/D    FAMILY HISTORY:  No pertinent family history in first degree relatives    ROS:  No HA, photophobia, neck stiffness, rhinorrhea, sore throat, cough, CP, SOB, N, V, diarrhea, abd pain, dysuria, hematuria, frequency, muscle/joint symptoms, bruising/bleeding.      Vital Signs Last 24 Hrs  T(C): 36.4 (04 Jan 2023 19:00), Max: 37.1 (04 Jan 2023 13:52)  T(F): 97.6 (04 Jan 2023 19:00), Max: 98.8 (04 Jan 2023 13:52)  HR: 102 (04 Jan 2023 20:00) (83 - 117)  BP: 121/61 (04 Jan 2023 20:00) (73/33 - 137/88)  BP(mean): 88 (04 Jan 2023 20:00) (48 - 108)  RR: 17 (04 Jan 2023 20:00) (14 - 27)  SpO2: 94% (04 Jan 2023 20:00) (88% - 100%)    Parameters below as of 04 Jan 2023 20:00  Patient On (Oxygen Delivery Method): room air        PE:  Ill-appearing, in pain from L breast  HEENT:  NC, PERRL, sclerae anicteric, conjunctivae clear.  Sinuses nontender, no nasal exudate.  No buccal or pharyngeal lesions, erythema or exudate  Neck:  Supple, no adenopathy  Chest: R upper chest HD catheter, exit site without purulence or warmth  Breasts:  ~8 cm mass L lateral breast with superficial abrasions of skin with scant clear drainage, tender, no warmth  Lungs:  Clear to auscultation anteriorly  Cor:  RRR, S1, S2, no murmur appreciated  Abd:  Large ventral hernia, normoactive BS.  Soft, nontender, no masses, guarding or rebound.  Liver and spleen not enlarged  Extrem: RUE AVG (?) - nontender  Skin:  No rashes.    LABS:                        8.6    14.68 )-----------( 353      ( 04 Jan 2023 04:32 )             27.5     01-04    133<L>  |  93<L>  |  50<H>  ----------------------------<  111<H>  4.8   |  26  |  6.70<H>    Ca    8.2<L>      04 Jan 2023 04:32  Phos  5.5     01-04  Mg     1.6     01-04    TPro  6.0  /  Alb  x   /  TBili  x   /  DBili  x   /  AST  x   /  ALT  x   /  AlkPhos  x   01-03        RADIOLOGY & ADDITIONAL STUDIES:  < from: CT Abdomen and Pelvis No Cont (01.03.23 @ 05:34) >  Findings:    Lungs and large airways: Small amount of consolidation/atelectasis in the   lower lobes, right greater than left. Linear atelectasis right middle   lobe and left upper lobe.    Pleura:  No pleural effusion.    Mediastinum and hilar regions: No thoracic lymphadenopathy.    Heart and pericardium:  Heart size is normal. No pericardial effusion.    Vessels:  Severe coronary artery calcification. Mild amount of calcified   plaque thoracic aorta. Moderate calcified plaque abdominal aorta. Larger   amount of calcified plaque splenic artery and bilateral pelvic arteries.   AV fistula is present in the right upper arm. A 7.0 x 5.1 cm fluid   collection is located near the fistula, possibly postoperative seroma or   hematoma. No gas within this collection. Vascular stents present in the   right axillary region. There is also a stent extending from the medial   aspect of the right subclavian vein through the right brachiocephalic   vein into the superior vena cava. There is a left-sided central line   extending through that stent into the right atrium. Multiple collateral   vessels right chest wall. IVC filter again in place, with some of its   prongs projecting beyond the lumen of the vessel. Mildly calcified aortic   valve. Tortuous thoracic aorta.    Chest wall and lower neck:  Enlarged, multinodular thyroid projects into   the superior mediastinum. There is a 1.8 x 1.5 cm solid nodule slightly   inferiorly in the anterior mediastinum, without definite communication   with the thyroid. However, this may still represent thyroid tissue, as   its morphology is similar in appearance to the remainder of the thyroid.    Liver:  Multiple hepatic cysts again present.    Gallbladder and biliary tree: No radiopaque stones gallbladder. No   intrahepatic biliary ductal dilatation. Common bile duct again mildly   dilated, measuring0.9 cm.    Spleen:  Normal.    Pancreas:  A few pancreatic calcifications are again noted.    Adrenal glands:  Normal.    Kidneys: Kidneys again enlarged bilaterally with parenchyma nearly   completely replaced by innumerable simple and complex cystic masses,   consistent with autosomal dominant polycystic kidney disease. It is not   possible to determine if there are any solid renal masses on this   noncontrast exam.    Abdominal and pelvic adenopathy:  No lymphadenopathy in abdomen or pelvis.    Ascites: None.    Gastrointestinal tract: Nonobstructed loops of small bowel and portion of   transverse colon again present within large, broad-based ventral hernia.   Small bowel anastomosis in the pelvis.    Pelvic organs: A 6.1 x 4.9 cm cystic mass is again present in the right   adnexa. Left ovary unremarkable. Again post hysterectomy. No urine in   bladder, limiting evaluation.    Soft tissues: Normal.    Bones: Scoliosis and degenerative changes of the spine. Severe   degenerative changesof the shoulders. Increased density of the bone   marrow is consistent with renal osteodystrophy. There are also multiple   lucent bone lesions, which could represent Brown tumors. There is again   nonunion of fractures of the right superior and inferior pubic rami and   both pubic bones. Multiple compression fractures in the thoracolumbar   spine.      Impression: 1. Small amount of consolidation in both lower lobes, right   greater than left. This is felt to be more likely to represent   atelectasis than pneumonia.    2. A 7.0 cm fluid collection is present near an AV fistula in the right   upper arm. This could represent a postoperative seroma or hematoma, but   clinical correlation is recommended to rule out abscess given the history   ofsepsis.    3. There is again a large broad-based ventral hernia containing   nonobstructed loops of small and large bowel.    4. A 6.1 cm cystic mass is again present in the right adnexa. This is an   abnormal finding in a postmenopausal female. Recommend gynecology   consultation as neoplasm should be excluded.    < end of copied text >    < from: US Duplex Venous Upper Ext Ltd, Right (01.04.23 @ 19:34) >  IMPRESSION:    DVT involving the right internal jugular vein and right subclavian vein   and right axillary vein. SVT involving the right cephalic vein.    8.4 cm complex avascular collection in the proximal right arm   representing old hematoma/seroma.    < end of copied text >    < from: US Abdomen Limited (01.03.23 @ 14:20) >  FINDINGS:  Liver: 18.7 cm. Multiple hepatic cysts, largest measuring 5.6 x 4.7 x 5.4   cm in the left hepatic lobe and 4.0 x 4.0 x 4.0 cm in the right hepatic   lobe.  Bile ducts: Common bile duct measures 8 mm. No visualized gallstone in   the common bile duct. No intrahepatic biliary ductal dilatation.  Gallbladder: Mild gallbladder wall thickening, 0.34 cm. No stones. No   sonographic Rubi's sign.  Pancreas: Visualized portions are within normal limits.  Right kidney: Markedly enlarged and replaced by innumerable cysts, some   demonstrating wall calcifications.  Left kidney: Markedly enlarged and replaced by innumerable cysts, some   demonstrating wall calcifications.  Ascites: None.  Aorta and IVC: Visualized portions are within normal limits.    IMPRESSION:  1.  Hepatic cysts. No suspicious hepatic mass.  2.  No cholelithiasis. CBD mildly dilated 0.8 cm.  3.  Enlarged polycystic kidneys.    < end of copied text >

## 2023-01-05 NOTE — PROGRESS NOTE ADULT - ATTENDING COMMENTS
HFpEF, CAD, HTN, ESRD with PCK, sepsis with possible mastitis or infection of AVF hematoma  physical as above  still on phenylephrine, to place arterial line  hematoma aspirated, await culture on vanco/jay  continue HD  await gallium scan  breast surgery and vascular agree likely reason for breast pain is venous congestion  pain service consult  decision making of high complexity

## 2023-01-05 NOTE — PROGRESS NOTE ADULT - SUBJECTIVE AND OBJECTIVE BOX
INTERVAL HPI/OVERNIGHT EVENTS:    OVERNIGHT: No overnight events.  SUBJECTIVE: Patient seen and examined at bedside.       Allergies    Ancef (Rash; Urticaria)  DDAVP (Hypotension)  iodine (Hives; Pruritus)  penicillin (Swelling)  sulfa drugs (Angioedema)    Intolerances    ANTIBIOTICS/RELEVANT:  antimicrobials  meropenem  IVPB 500 milliGRAM(s) IV Intermittent every 24 hours    immunologic:    OTHER:  acetaminophen     Tablet .. 650 milliGRAM(s) Oral every 6 hours PRN  atorvastatin 40 milliGRAM(s) Oral at bedtime  chlorhexidine 4% Liquid 1 Application(s) Topical <User Schedule>  collagenase Ointment 1 Application(s) Topical daily  heparin   Injectable 5000 Unit(s) SubCutaneous every 8 hours  HYDROmorphone  Injectable 0.5 milliGRAM(s) IV Push every 4 hours PRN  phenylephrine    Infusion 0.5 MICROgram(s)/kG/Min IV Continuous <Continuous>  sevelamer carbonate 800 milliGRAM(s) Oral three times a day with meals      Objective:  Vital Signs Last 24 Hrs  T(C): 36.6 (05 Jan 2023 13:10), Max: 36.6 (04 Jan 2023 22:37)  T(F): 97.9 (05 Jan 2023 13:10), Max: 97.9 (05 Jan 2023 06:04)  HR: 107 (05 Jan 2023 18:00) (87 - 121)  BP: 96/46 (05 Jan 2023 18:00) (70/34 - 134/62)  BP(mean): 67 (05 Jan 2023 18:00) (49 - 91)  RR: 18 (05 Jan 2023 18:00) (16 - 28)  SpO2: 96% (05 Jan 2023 18:00) (91% - 99%)    Parameters below as of 05 Jan 2023 18:00  Patient On (Oxygen Delivery Method): room air    PHYSICAL EXAM:    General:  Ill-appearing, in pain from L breast  HEENT:  NC, PERRL, sclerae anicteric, conjunctivae clear.  Sinuses nontender, no nasal exudate.  No buccal or pharyngeal lesions, erythema or exudate  Neck:  Supple, no adenopathy  Chest: R upper chest HD catheter, exit site without purulence or warmth  Breasts:  ~8 cm mass L lateral breast with superficial abrasions of skin with scant clear drainage, tender, no warmth  Lungs:  Clear to auscultation anteriorly  Cor:  RRR, S1, S2, no murmur appreciated  Abd:  Large ventral hernia, normoactive BS.  Soft, nontender, no masses, guarding or rebound.  Liver and spleen not enlarged  Extrem: RUE AVG (?) - nontender  Skin:  No rashes.    LABS:                        7.7    13.96 )-----------( 215      ( 05 Jan 2023 05:30 )             24.5     01-05    129<L>  |  92<L>  |  62<H>  ----------------------------<  78  4.5   |  23  |  8.38<H>    Ca    8.0<L>      05 Jan 2023 05:30  Phos  6.3     01-05  Mg     1.6     01-05      PT/INR - ( 05 Jan 2023 05:30 )   PT: 15.8 sec;   INR: 1.32          PTT - ( 05 Jan 2023 05:30 )  PTT:30.3 sec    RECENT CULTURES:  01-03 @ 17:25  .Blood Blood-Peripheral  --  --  --    No growth at 2 days.  --      RADIOLOGY & ADDITIONAL STUDIES:

## 2023-01-05 NOTE — CONSULT NOTE ADULT - SUBJECTIVE AND OBJECTIVE BOX
Pain Management Consult Note - [facility] [phone]    Chief Complaint: breast pain    HPI: Patient seen and examined today. This is a 68 y/o female PMH of HFrEF, CAD, NICM, HTN, HLD, ESRD on HD, PE, PAD, AS. 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 Patient reports breast pain began about a month ago, states she first started noticing it after she had a procedure done at [facility]. Patient reports pain underneath and around the breasts, states pain is sharp and burning. Patient reports pain comes and goes and is not associated with numbness or tingling. At home, patient reports taking no pain medications aside from Tylenol and pain creams. In the hospital patient remains on Dilaudid 0.5 mg IVP q4h prn, reports this helps but doesn't last long. As per primary team, patient has a drop in BP after receiving. Patient denies side effects from current pain regimen. Patient is istop positive for Percocet 5/325 mg po q8h prn last filled 11/2022.    Pain is _X__ sharp ____dull ___burning ___achy ___ Intensity: ____ mild _X__mod __X_severe     Location ____surgical site ____cervical _____lumbar ____abd ____upper ext____lower ext    Worse with __X__activity __X__movement _____physical therapy___ Rest    Improved with _X___medication ____rest ____physical therapy    ROS: Const:  _N__febrile   Eyes:___ENT:___CV: __Y_chest pain  Resp: __N__sob  GI:__N_nausea __N_vomiting _N__abd pain _Y__npo ___clears __full diet __bm  :___ Musk: __Y_pain ___spasm  Skin:___ Neuro:  _N__sedation__N_confusion_N__ numbness ___weakness __N_paresth  Psych:N__anxiety  Endo:___ Heme:___Allergy:____ANCEF, DDAVP, IODINE, PCN, SULFA DRUGS_____, _Y__all others reviewed and negative    PAST MEDICAL & SURGICAL HISTORY:  HTN (hypertension)  HLD (hyperlipidemia)  CAD (coronary atherosclerotic disease)  ESRD on dialysis  last 11/15/22  H/O ventral hernia  Brown tumor  brain  DVT, lower extremity  left=  History of surgery  IVC filter  AVF (arteriovenous fistula)  right left  S/P AIDEN-BSO  2003  History of surgery  RLE PTA stent / atherectomy  7/2021  History of atherectomy  stent    SH: N___Tobacco   _N__Alcohol                          FH:FAMILY HISTORY:  No pertinent family history in first degree relatives    acetaminophen     Tablet .. 650 milliGRAM(s) Oral every 6 hours PRN  atorvastatin 40 milliGRAM(s) Oral at bedtime  chlorhexidine 4% Liquid 1 Application(s) Topical <User Schedule>  collagenase Ointment 1 Application(s) Topical daily  heparin   Injectable 5000 Unit(s) SubCutaneous every 8 hours  HYDROmorphone  Injectable 0.5 milliGRAM(s) IV Push every 4 hours PRN  meropenem  IVPB 500 milliGRAM(s) IV Intermittent every 24 hours  phenylephrine    Infusion 0.5 MICROgram(s)/kG/Min IV Continuous <Continuous>  sevelamer carbonate 800 milliGRAM(s) Oral three times a day with meals  vancomycin  IVPB 500 milliGRAM(s) IV Intermittent once      T(C): 36.6 (01-05-23 @ 13:10), Max: 36.6 (01-04-23 @ 22:37)  HR: 121 (01-05-23 @ 15:30) (87 - 121)  BP: 109/52 (01-05-23 @ 15:30) (70/34 - 134/62)  RR: 20 (01-05-23 @ 15:30) (16 - 28)  SpO2: 96% (01-05-23 @ 15:30) (91% - 99%)  Wt(kg): --    T(C): 36.6 (01-05-23 @ 13:10), Max: 36.6 (01-04-23 @ 22:37)  HR: 121 (01-05-23 @ 15:30) (87 - 121)  BP: 109/52 (01-05-23 @ 15:30) (70/34 - 134/62)  RR: 20 (01-05-23 @ 15:30) (16 - 28)  SpO2: 96% (01-05-23 @ 15:30) (91% - 99%)  Wt(kg): --    T(C): 36.6 (01-05-23 @ 13:10), Max: 36.6 (01-04-23 @ 22:37)  HR: 121 (01-05-23 @ 15:30) (87 - 121)  BP: 109/52 (01-05-23 @ 15:30) (70/34 - 134/62)  RR: 20 (01-05-23 @ 15:30) (16 - 28)  SpO2: 96% (01-05-23 @ 15:30) (91% - 99%)  Wt(kg): --    PHYSICAL EXAM:  Gen Appearance:X ___no acute distress _X__appropriate        Neuro: ___SILT feet____ EOM Intact Psych: AAOX3__, __X_mood/affect appropriate        Eyes: __X_conjunctiva WNL  ___X__ Pupils equal and round        ENT: X___ears and nose atraumatic__X_ Hearing grossly intact        Neck: X___trachea midline, no visible masses ___thyroid without palpable mass    Resp: _X__Nml WOB____No tactile fremitus ___clear to auscultation    Cardio: _X__extremities free from edema ____pedal pulses palpable    GI/Abdomen: ___soft _____ Nontender___X___Nondistended_____HSM    Lymphatic: ___no palpable nodes in neck  ___no palpable nodes calves and feet    Skin/Wound: ___Incision, ___Dressing c/d/i,   ____surrounding tissues soft,  ___drain/chest tube present____    Muscular: EHL ___/5  Gastrocnemius___/5    __X_absent clubbing/cyanosis      ASSESSMENT: This is a 67y old Female with a history of HFrEF, CAD, NICM, HTN, HLD, ESRD on HD, PE, PAD, AS. 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     Recommended Treatment PLAN:  1. Consider starting Dilaudid 2 mg PO q4h PRN severe pain  2. Consider de-escalating to Dilaudid 0.25 mg IVP q4h PRN breakthrough pain  3. Consider continuing Tylenol 650 mg PO q6h PRN mild pain  4. Consider starting daily lidocaine patch to affected area  HOLD ALL OPIOIDS FOR SEDATION, RR<10, O2SAT <93%, SBP <96  Plan discussed with Dr. Camejo

## 2023-01-05 NOTE — PROGRESS NOTE ADULT - SUBJECTIVE AND OBJECTIVE BOX
SUBJECTIVE: Pt seen and examined at bedside this am by surgery team. Patient was off pressors during the day, but on pressors overnight. Has remained afebrile. WBC 13.9 > 15.8. BCx no growth to date. Denies fevers/chills.   US 1/4 w/ R arm hematoma/seroma; DVT RIJ/Subclavian/Axillary vein. 8.4 cm complex avascular collection in proximal R arm representing old hematoma/seroma.   Breast US 1/3 w/o evidence of breast abscess/cyst/mass.       Vital Signs Last 24 Hrs  T(C): 36.6 (05 Jan 2023 06:04), Max: 37.1 (04 Jan 2023 13:52)  T(F): 97.9 (05 Jan 2023 06:04), Max: 98.8 (04 Jan 2023 13:52)  HR: 92 (05 Jan 2023 07:00) (87 - 117)  BP: 110/58 (05 Jan 2023 07:00) (70/34 - 137/88)  BP(mean): 73 (05 Jan 2023 07:00) (49 - 108)  RR: 18 (05 Jan 2023 07:00) (14 - 27)  SpO2: 96% (05 Jan 2023 07:00) (91% - 100%)    Parameters below as of 05 Jan 2023 07:00  Patient On (Oxygen Delivery Method): room air        PHYSICAL EXAM:   Gen: Awake, alert, NAD, resting comfortably   CV: RRR, on phenylephrine   Pulm: no respiratory distress on RA  Right breast: erythema, no purulence, soft with underlying firm tissue, tender to palpation  Left breast: erythema with excoriations, no purulence, soft with underlying firm tissue, tender to palpation  Abd: soft, ND, NTTP, no rebound or guarding   Ext: WWP, no edema, SCDs in place     I&O's Detail    04 Jan 2023 07:01  -  05 Jan 2023 07:00  --------------------------------------------------------  IN:    IV PiggyBack: 30 mL    Norepinephrine: 62.9 mL    Oral Fluid: 550 mL    Phenylephrine: 189.6 mL    Phenylephrine: 22.3 mL  Total IN: 854.8 mL    OUT:  Total OUT: 0 mL    Total NET: 854.8 mL          LABS:                        7.7    13.96 )-----------( 215      ( 05 Jan 2023 05:30 )             24.5     01-05    129<L>  |  92<L>  |  62<H>  ----------------------------<  78  4.5   |  23  |  8.38<H>    Ca    8.0<L>      05 Jan 2023 05:30  Phos  6.3     01-05  Mg     1.6     01-05    TPro  6.0  /  Alb  x   /  TBili  x   /  DBili  x   /  AST  x   /  ALT  x   /  AlkPhos  x   01-03    LIVER FUNCTIONS - ( 03 Jan 2023 14:24 )  Alb: x     / Pro: 6.0 g/dL / ALK PHOS: x     / ALT: x     / AST: x     / GGT: x           PT/INR - ( 05 Jan 2023 05:30 )   PT: 15.8 sec;   INR: 1.32          PTT - ( 05 Jan 2023 05:30 )  PTT:30.3 sec  CAPILLARY BLOOD GLUCOSE      POCT Blood Glucose.: 100 mg/dL (04 Jan 2023 23:47)      RADIOLOGY & ADDITIONAL STUDIES:     SUBJECTIVE: Pt seen and examined at bedside this am by surgery team. Patient was off pressors during the day, but on pressors overnight. Has remained afebrile. WBC 13.9 > 15.8. BCx no growth to date. Denies fevers/chills.   US 1/4 w/ R arm hematoma/seroma; DVT RIJ/Subclavian/Axillary vein. 8.4 cm complex avascular collection in proximal R arm representing old hematoma/seroma.   Breast US 1/3 w/o evidence of breast abscess/cyst/mass.   Will follow up results of Gallium scan and IR consult for drainage of R arm hematoma/seroma.       Vital Signs Last 24 Hrs  T(C): 36.6 (05 Jan 2023 06:04), Max: 37.1 (04 Jan 2023 13:52)  T(F): 97.9 (05 Jan 2023 06:04), Max: 98.8 (04 Jan 2023 13:52)  HR: 92 (05 Jan 2023 07:00) (87 - 117)  BP: 110/58 (05 Jan 2023 07:00) (70/34 - 137/88)  BP(mean): 73 (05 Jan 2023 07:00) (49 - 108)  RR: 18 (05 Jan 2023 07:00) (14 - 27)  SpO2: 96% (05 Jan 2023 07:00) (91% - 100%)    Parameters below as of 05 Jan 2023 07:00  Patient On (Oxygen Delivery Method): room air        PHYSICAL EXAM:   Gen: Awake, alert, NAD, resting comfortably   CV: RRR, on phenylephrine   Pulm: no respiratory distress on RA  Right breast: erythema, no purulence, soft with underlying firm tissue, tender to palpation  Left breast: erythema with excoriations, no purulence, soft with underlying firm tissue, tender to palpation  Abd: soft, ND, NTTP, no rebound or guarding   Ext: WWP, no edema, SCDs in place     I&O's Detail    04 Jan 2023 07:01  -  05 Jan 2023 07:00  --------------------------------------------------------  IN:    IV PiggyBack: 30 mL    Norepinephrine: 62.9 mL    Oral Fluid: 550 mL    Phenylephrine: 189.6 mL    Phenylephrine: 22.3 mL  Total IN: 854.8 mL    OUT:  Total OUT: 0 mL    Total NET: 854.8 mL          LABS:                        7.7    13.96 )-----------( 215      ( 05 Jan 2023 05:30 )             24.5     01-05    129<L>  |  92<L>  |  62<H>  ----------------------------<  78  4.5   |  23  |  8.38<H>    Ca    8.0<L>      05 Jan 2023 05:30  Phos  6.3     01-05  Mg     1.6     01-05    TPro  6.0  /  Alb  x   /  TBili  x   /  DBili  x   /  AST  x   /  ALT  x   /  AlkPhos  x   01-03    LIVER FUNCTIONS - ( 03 Jan 2023 14:24 )  Alb: x     / Pro: 6.0 g/dL / ALK PHOS: x     / ALT: x     / AST: x     / GGT: x           PT/INR - ( 05 Jan 2023 05:30 )   PT: 15.8 sec;   INR: 1.32          PTT - ( 05 Jan 2023 05:30 )  PTT:30.3 sec  CAPILLARY BLOOD GLUCOSE      POCT Blood Glucose.: 100 mg/dL (04 Jan 2023 23:47)      RADIOLOGY & ADDITIONAL STUDIES:

## 2023-01-05 NOTE — PROGRESS NOTE ADULT - SUBJECTIVE AND OBJECTIVE BOX
SUBJECTIVE / INTERVAL HPI: Patient was seen and examined this morning.     CAPILLARY BLOOD GLUCOSE & INSULIN RECEIVED  100 mg/dL (01-04 @ 23:47)  78 mg/dL (01-05 @ 05:30)      REVIEW OF SYSTEMS  Constitutional:  Negative fever, chills or loss of appetite.  Eyes:  Negative blurry vision or double vision.  Cardiovascular:  Negative for chest pain or palpitations.  Respiratory:  Negative for cough, wheezing, or shortness of breath.    Gastrointestinal:  Negative for nausea, vomiting, diarrhea, constipation, or abdominal pain.  Genitourinary:  Negative frequency, urgency or dysuria.  Neurologic:  No headache, confusion, dizziness, lightheadedness.    PHYSICAL EXAM  Vital Signs Last 24 Hrs  T(C): 37.2 (05 Jan 2023 18:52), Max: 37.2 (05 Jan 2023 18:52)  T(F): 99 (05 Jan 2023 18:52), Max: 99 (05 Jan 2023 18:52)  HR: 110 (05 Jan 2023 20:00) (87 - 121)  BP: 100/53 (05 Jan 2023 20:00) (70/34 - 134/62)  BP(mean): 73 (05 Jan 2023 20:00) (49 - 91)  RR: 21 (05 Jan 2023 20:00) (16 - 28)  SpO2: 95% (05 Jan 2023 20:00) (91% - 99%)    Parameters below as of 05 Jan 2023 19:00  Patient On (Oxygen Delivery Method): room air        Constitutional: Awake, alert, in no acute distress.   HEENT: Normocephalic, atraumatic, ZOHAIB.  Respiratory: Lungs clear to ausculation bilaterally.   Cardiovascular: regular rhythm, normal S1 and S2, no audible murmurs.   GI: soft, non-tender, non-distended, bowel sounds present.  Extremities: No lower extremity edema.  Psychiatric: AAO x 3. Normal affect/mood.     LABS  CBC - WBC/HGB/HTC/PLT: 13.96/7.7/24.5/215 (01-05-23)  BMP - Na/K/Cl/Bicarb/BUN/Cr/Gluc/AG/eGFR: 129/4.5/92/23/62/8.38/78/14/5 (01-05-23)  Ca - 8.0 (01-05-23)  Phos - 6.3 (01-05-23)  Mg - 1.6 (01-05-23)  LFT - Alb/Tprot/Tbili/Dbili/AlkPhos/ALT/AST: --/6.0/--/--/--/--/-- (01-03-23)  PT/aPTT/INR: 15.8/30.3/1.32 (01-05-23)   Thyroid Stimulating Hormone, Serum: 1.610 (01-03-23)  Total T4/Free T4: --/0.682 (01-03-23)    MEDICATIONS  MEDICATIONS  (STANDING):  atorvastatin 40 milliGRAM(s) Oral at bedtime  chlorhexidine 4% Liquid 1 Application(s) Topical <User Schedule>  collagenase Ointment 1 Application(s) Topical daily  heparin   Injectable 5000 Unit(s) SubCutaneous every 8 hours  meropenem  IVPB 500 milliGRAM(s) IV Intermittent every 24 hours  phenylephrine    Infusion 0.5 MICROgram(s)/kG/Min (16 mL/Hr) IV Continuous <Continuous>  sevelamer carbonate 800 milliGRAM(s) Oral three times a day with meals    MEDICATIONS  (PRN):  acetaminophen     Tablet .. 650 milliGRAM(s) Oral every 6 hours PRN Temp greater or equal to 38C (100.4F), Moderate Pain (4 - 6)  HYDROmorphone  Injectable 0.5 milliGRAM(s) IV Push every 4 hours PRN Severe Pain (7 - 10)    ASSESSMENT / RECOMMENDATIONS    A1C: 5.3 %  BUN: 62  Creatinine: 8.38  GFR: 5  Weight: 85.3  BMI: 27  EF:     # Type 2 diabetes mellitus  - Please continue lantus *** units at bedtime.   - Continue lispro *** units before each meal.  - Continue lispro moderate / low dose sliding scale before meals and at bedtime.  - Patient's fingerstick glucose goal is 100-180 mg/dL.    - For discharge, patient can ***.    - Patient can follow up at discharge with [facility] by calling [phone] to make an appointment.      Case discussed with Dr. Villafuerte. Primary team updated.       Yang Jarrell    Endocrinology Fellow    Service Pager: [phone]    SUBJECTIVE / INTERVAL HPI: Patient was seen and examined this morning. She complained of experiencing chronic back and head pain. She reported previously taking sensipar but doesn't remember why the medication was stopped. Vitamin D levels still in process. Thyroid ultrasound showing 3 nodules in the right lobe, largest of 3.3 cm; two of them meeting criteria for biopsy.     PHYSICAL EXAM  Vital Signs Last 24 Hrs  T(C): 37.2 (05 Jan 2023 18:52), Max: 37.2 (05 Jan 2023 18:52)  T(F): 99 (05 Jan 2023 18:52), Max: 99 (05 Jan 2023 18:52)  HR: 110 (05 Jan 2023 20:00) (87 - 121)  BP: 100/53 (05 Jan 2023 20:00) (70/34 - 134/62)  BP(mean): 73 (05 Jan 2023 20:00) (49 - 91)  RR: 21 (05 Jan 2023 20:00) (16 - 28)  SpO2: 95% (05 Jan 2023 20:00) (91% - 99%)    Parameters below as of 05 Jan 2023 19:00  Patient On (Oxygen Delivery Method): room air    Constitutional: Awake, alert, obese elderly female in no acute distress.   HEENT: Normocephalic, atraumatic, ZOHAIB. (+) Thyromegaly.   Respiratory: Lungs clear to ausculation bilaterally.   Cardiovascular: regular rhythm, normal S1 and S2, no audible murmurs.   GI: soft, non-tender, non-distended, bowel sounds present.  Extremities: No lower extremity edema.  Psychiatric: AAO x 3. Normal affect/mood.     LABS  CBC - WBC/HGB/HTC/PLT: 13.96/7.7/24.5/215 (01-05-23)  BMP - Na/K/Cl/Bicarb/BUN/Cr/Gluc/AG/eGFR: 129/4.5/92/23/62/8.38/78/14/5 (01-05-23)  Ca - 8.0 (01-05-23)  Phos - 6.3 (01-05-23)  Mg - 1.6 (01-05-23)  LFT - Alb/Tprot/Tbili/Dbili/AlkPhos/ALT/AST: --/6.0/--/--/--/--/-- (01-03-23)  PT/aPTT/INR: 15.8/30.3/1.32 (01-05-23)   Thyroid Stimulating Hormone, Serum: 1.610 (01-03-23)  Total T4/Free T4: --/0.682 (01-03-23)    MEDICATIONS  MEDICATIONS  (STANDING):  atorvastatin 40 milliGRAM(s) Oral at bedtime  chlorhexidine 4% Liquid 1 Application(s) Topical <User Schedule>  collagenase Ointment 1 Application(s) Topical daily  heparin   Injectable 5000 Unit(s) SubCutaneous every 8 hours  meropenem  IVPB 500 milliGRAM(s) IV Intermittent every 24 hours  phenylephrine    Infusion 0.5 MICROgram(s)/kG/Min (16 mL/Hr) IV Continuous <Continuous>  sevelamer carbonate 800 milliGRAM(s) Oral three times a day with meals    MEDICATIONS  (PRN):  acetaminophen     Tablet .. 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    A1C: 5.3 %  BUN: 62  Creatinine: 8.38  GFR: 5  Weight: 85.3  BMI: 27    # Hyperparathyroidism  - PTH elevated to 479 with Ca 8.8 and albumin 3.2. Vit D pending.  - Suspect secondary hyperparathyroidism. Follow vitamin 1,25 level in process.     # Multinodular goiter  - CT: enlarged multinodular thyroid projecting into superior mediastinum with 1.8 x 1.5 cm solid nodule slightly inferiorly in the anterior mediastinum.   - Thyroid ultrasound: Fine-needle aspiration recommended for 4.3 cm right thyroid and 2.6 cm left thyroid solid nodules per SCOTT guidelines.  - TSH 1.6 free T4 0.68.  - TSH could be inappropriately normal for low free T4 due to euthyroid sick syndrome.  - Repeat TFTs in a few days.    Case discussed with Dr. Villafuerte. Primary team updated.       Yang Jarrell    Endocrinology Fellow    Service Pager: [phone]

## 2023-01-05 NOTE — PROGRESS NOTE ADULT - ASSESSMENT
67 F with ESRD on HD presenting in setting of missed HD found to by hyperkalemic then c/b septic shock from possible fluid collection near RUE AVF or breast infection.     Assessment/Plan:   #ESRD on HD TTS  Initially hyperkalemic in setting of missed hd, then had episode of septic shock to go with IR today for drainage of arm  HD today as per schedule  Electrolytes noted K 4.5, Sodium 129  UF with HD  Renal diet    #HTN   BP slowly improving  -Hold BP meds  -Completely off pressors    #access   [facility] TDC  RUE AVF not being used    #anemia  Hgb 7.7  -Epo w/ HD  -Check iron profile and ferritin    #renal bone disease   Ca ~8.9  Phos ~6.3   -c/w sevelamer 800mg tid w/ meals      Thank you for the opportunity to participate in the care of your patient. The nephrology service remains available to assist with any questions or concerns. Please feel free to reach us by paging the on-call nephrology fellow for urgent issues or as below.     John Lisa D.O.  PGY-5, Nephrology Fellow    P: [phone]

## 2023-01-05 NOTE — PROGRESS NOTE ADULT - ASSESSMENT
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     NEURO  #Pain Control  Pt in 10/10 pain of bilateral breasts, worst on R. Pain responsive to Dilaudid but effect wears off after ~2-3 hours.   - c/w Dilaudid 0.5mg IVP q4h PRN    PULM  #Atelectasis  Noted on CT chest to have atelectasis at b/l lung bases.  - Incentive spirometry    CARDIOVASCULAR  #Septic Shock  Pt presented meeting 2/4 SIRS. Hypotensive with MAPs to 60, requiring Levophed. Extremities cool on exam. Likely septic shock 2/2 infection of R breast, bilateral breasts, or seroma/hematoma of RUE. HR > 90, WBC >12. Lactate 1.9. Afebrile. Procal 12. S/p Vancomycin 1250mg x 1, Aztreonam x 1 (pt allergic to Cefazolin, PCN).  - On Levophed with increasing pressor requirements, titrate PRN with goal MAP 60-65  - CT:  7.0 cm fluid collection is present near an AV fistula in the right upper arm. Possibly abscess. Consult IR for aspiration/culture.   - Per vascular, obtain ESR (elevated at 109), consult IR for aspiration of seroma vs hematoma of R arm  - Consult surgery for pain/erythema/warmth of R breast  - c/w Meropenem 500mg q24h, obtain ID approval  - c/w Vancomycin 1250mg, dosed based on HD schedule  - BCx: BGTD x 12h    #HFrEF  TTE 11/22 normal LV and systolic, EF 59%, grade II diastolic dysfuncton, dilated RV, reduced RV,   Home meds: Entresto 49/51mg  - hold Entresto i/s/o septic shock    #CAD  Hx of cardiac cath in 2018  Home meds: atorvastatin 40mg, plavix 75mg qd  - c/w home atorvastatin 40mg  - hold home Plavix 75mg qd    GI  #Abdominal Pain  C/o vague abdominal pain, a/w bilious emesis x 2, no nausea or further episodes of emesis   - see endocrine for plan    RENAL  #ESRD 2/2 PCKD  Pt has been on HD for "years" 2/2 PCKD. AV Fistula of EDWIN clotted in 2014, has received HD thru HD cath since. New AV Fistula in 11/2022, not yet matured.   - f/u nephrology recs  - s/p HD 1/3, 0L removed d/t overall fluid status     #Hyperkalemia  Patient with Hx of ESRD with missed HD presents with K >8 on VBG with abdominal symptoms and missed HD x8 days. ICU consulted i/s/o hyperkalemia. Renal consulted.  - s/p HD, 1L removed 1/2  - monitor with BMP's BID    ID  Pt presented in septic shock with leukocytosis, tachycardia, hypotension requiring pressors. Unknown source.   - see #septic shock for plan  - consult ID for empiric antibiotic coverage  - f/u gallium scan   - c/w meropenem 500mg qd    ENDO  #R/o Hyperparathyroidism  At risk for secondary HPTT d/t renal failure   - pt has vague abdominal pain with nausea  - multiple brown tumors on CT with multiple fractures of pubic rami, pubic bone, thoracolumbar spine  - obtain intact PTH, ionized Ca  - consult endocrinology, f/u recs    MSK  #Renal Osteodystrophy  - on CT: Increased density of the bone marrow is consistent with renal osteodystrophy    Heme/Onc  #R/o Breast Cancer  Family hx of breast cancer in mother, sister.   - large R breast mass, palpable L breast mass with R axillary LAD, c/f breast cancer, breast has peau d'orange appearance with associated erythema, warmth  - US bilateral breasts: No sonographic evidence of breast abscess, cyst or focal mass to correspond with the clinical finding of bilateral breast masses.  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     NEURO  #Pain Control  Pt in 10/10 pain of bilateral breasts, worst on R. Pain responsive to Dilaudid but effect wears off after ~2-3 hours.   - consult pain management  - Recommendations: Dilaudid 2mg po q4h PRN for severe pain, Dilaudid 0.25mg IVP q4h PRN for severe breakthrough pain, Tylenol 650mg po q6h PRN for mild pain, daily lidocaine patch, HOLD ALL OPIOIDS FOR SEDATION, RR<10, O2SAT <93%, SBP <96    PULM  #Atelectasis  Noted on CT chest to have atelectasis at b/l lung bases.  - Incentive spirometry    CARDIOVASCULAR  #Septic Shock  Pt presented meeting 2/4 SIRS. Hypotensive with MAPs to 60, requiring Levophed. Extremities cool on exam. Septic picture of unknown source. HR > 90, WBC >12. Lactate 1.9. Afebrile. Procal 12. S/p Vancomycin 1250mg x 1, Aztreonam x 1 (pt allergic to Cefazolin, PCN).  - On Phenylephrine 0.5mcg/kg/min  - CT:  7.0 cm fluid collection is present near an AV fistula in the right upper arm. Possibly abscess. Drained by IR 1/5 with serosanguinous fluid (sent for fluid analysis)  - Per vascular, obtain ESR (elevated at 109)  - Consult surgery for pain/erythema/warmth of R breast - pending gallium scan  - c/w Meropenem 500mg q24h  - c/w Vancomycin 500mg IV after HD sessions, last trough 21.   - BCx: NGTD x 2 days    #HFrEF  TTE 11/22 normal LV and systolic, EF 59%, grade II diastolic dysfuncton, dilated RV, reduced RV,   Home meds: Entresto 49/51mg  - hold Entresto i/s/o septic shock    #CAD  Hx of cardiac cath in 2018  Home meds: atorvastatin 40mg, plavix 75mg qd  - c/w home atorvastatin 40mg  - hold home Plavix 75mg qd    GI  #Abdominal Pain  C/o vague abdominal pain, a/w bilious emesis x 2, no nausea or further episodes of emesis   - see endocrine for plan    RENAL  #ESRD 2/2 PCKD  Pt has been on HD for "years" 2/2 PCKD. AV Fistula of EDWIN clotted in 2014, has received HD thru HD cath since. New AV Fistula in 11/2022, not yet matured.   - f/u nephrology recs  - s/p HD 1/, 0L removed d/t overall fluid status     #Hyperkalemia  Patient with Hx of ESRD with missed HD presents with K >8 on VBG with abdominal symptoms and missed HD x8 days. ICU consulted i/s/o hyperkalemia. Renal consulted.  - s/p HD, 1L removed 1/2  - monitor with BMP's BID    ID  Pt presented in septic shock with leukocytosis, tachycardia, hypotension requiring pressors. Unknown source.   - see #septic shock for plan  - consult ID for empiric antibiotic coverage  - f/u gallium scan   - c/w meropenem 500mg qd    ENDO  #R/o Hyperparathyroidism  At risk for secondary HPTT d/t renal failure   - pt has vague abdominal pain with nausea  - multiple brown tumors on CT with multiple fractures of pubic rami, pubic bone, thoracolumbar spine  - obtain intact PTH, ionized Ca  - consult endocrinology, f/u recs    MSK  #Renal Osteodystrophy  - on CT: Increased density of the bone marrow is consistent with renal osteodystrophy    Heme/Onc  #R/o Breast Cancer  Family hx of breast cancer in mother, sister.   - large R breast mass, palpable L breast mass with R axillary LAD, c/f breast cancer, breast has peau d'orange appearance with associated erythema, warmth  - US bilateral breasts: No sonographic evidence of breast abscess, cyst or focal mass to correspond with the clinical finding of bilateral breast masses.  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     NEURO  #Pain Control  Pt in 10/10 pain of bilateral breasts, worst on R. Pain responsive to Dilaudid but effect wears off after ~2-3 hours.   - consult pain management  - Recommendations: Dilaudid 2mg po q4h PRN for severe pain, Dilaudid 0.25mg IVP q4h PRN for severe breakthrough pain, Tylenol 650mg po q6h PRN for mild pain, daily lidocaine patch, HOLD ALL OPIOIDS FOR SEDATION, RR<10, O2SAT <93%, SBP <96    PULM  #Atelectasis  Noted on CT chest to have atelectasis at b/l lung bases.  - Incentive spirometry    CARDIOVASCULAR  #Septic Shock  Pt presented meeting 2/4 SIRS. Hypotensive with MAPs to 60, requiring Levophed. Extremities cool on exam. Septic picture of unknown source. HR > 90, WBC >12. Lactate 1.9. Afebrile. Procal 12. S/p Vancomycin 1250mg x 1, Aztreonam x 1 (pt allergic to Cefazolin, PCN).  - On Phenylephrine 0.5mcg/kg/min  - CT:  7.0 cm fluid collection is present near an AV fistula in the right upper arm. Possibly abscess. Drained by IR 1/5 with serosanguinous fluid (sent for fluid analysis)  - Per vascular, obtain ESR (elevated at 109)  - Consult surgery for pain/erythema/warmth of R breast - pending gallium scan  - c/w Meropenem 500mg q24h  - c/w Vancomycin 500mg IV after HD sessions, last trough 21.   - BCx: NGTD x 2 days    #HFrEF  TTE 11/22 normal LV and systolic, EF 59%, grade II diastolic dysfuncton, dilated RV, reduced RV,   Home meds: Entresto 49/51mg  - hold Entresto i/s/o septic shock    #CAD  Hx of cardiac cath in 2018  Home meds: atorvastatin 40mg, plavix 75mg qd  - c/w home atorvastatin 40mg  - hold home Plavix 75mg qd    GI  #Abdominal Pain  C/o vague abdominal pain, a/w bilious emesis x 2, no nausea or further episodes of emesis   - see endocrine for plan    RENAL  #ESRD 2/2 PCKD  Pt has been on HD for "years" 2/2 PCKD. AV Fistula of EDWIN clotted in 2014, has received HD thru HD cath since. New AV Fistula in 11/2022, not yet matured.   - f/u nephrology recs  - s/p HD 1/5, 400cc removed. Goal net even    #Hyperkalemia  Patient with Hx of ESRD with missed HD presents with K >8 on VBG with abdominal symptoms and missed HD x8 days. ICU consulted i/s/o hyperkalemia. Renal consulted.  - s/p HD 1/5  - monitor with BMP's BID    ID  Pt presented in septic shock with leukocytosis, tachycardia, hypotension requiring pressors. Unknown source.   - see #septic shock for plan  - f/u ID recs  - f/u gallium scan   - c/w meropenem 500mg qd    ENDO  #R/o Hyperparathyroidism  At risk for secondary HPTT d/t renal failure   - pt has vague abdominal pain with nausea  - multiple brown tumors on CT with multiple fractures of pubic rami, pubic bone, thoracolumbar spine  - intact , Vit D 38.5  - consult endocrinology, f/u recs    MSK  #Renal Osteodystrophy  - on CT: Increased density of the bone marrow is consistent with renal osteodystrophy    Heme/Onc  #R/o Breast Cancer  Family hx of breast cancer in mother, sister.   - large R breast mass, palpable L breast mass with R axillary LAD, c/f breast cancer, breast has peau d'orange appearance with associated erythema, warmth  - US bilateral breasts: No sonographic evidence of breast abscess, cyst or focal mass to correspond with the clinical finding of bilateral breast masses.  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     NEURO  #Pain Control  Pt in 10/10 pain of bilateral breasts, worst on R. Pain responsive to Dilaudid but effect wears off after ~2-3 hours.   - consult pain management  - Recommendations: Dilaudid 2mg po q4h PRN for severe pain, Dilaudid 0.25mg IVP q4h PRN for severe breakthrough pain, Tylenol 650mg po q6h PRN for mild pain, daily lidocaine patch, HOLD ALL OPIOIDS FOR SEDATION, RR<10, O2SAT <93%, SBP <96    PULM  #Atelectasis  Noted on CT chest to have atelectasis at b/l lung bases.  - Incentive spirometry    CARDIOVASCULAR  #Septic Shock  Pt presented meeting 2/4 SIRS. Hypotensive with MAPs to 60, requiring Levophed. Extremities cool on exam. Septic picture of unknown source. HR > 90, WBC >12. Lactate 1.9. Afebrile. Procal 12. S/p Vancomycin 1250mg x 1, Aztreonam x 1 (pt allergic to Cefazolin, PCN).  - On Phenylephrine 0.5mcg/kg/min  - CT:  7.0 cm fluid collection is present near an AV fistula in the right upper arm. Possibly abscess. Drained by IR 1/5 with serosanguinous fluid (sent for fluid analysis)  - Per vascular, obtain ESR (elevated at 109)  - Consult surgery for pain/erythema/warmth of R breast - pending gallium scan  - c/w Meropenem 500mg q24h  - c/w Vancomycin 500mg IV after HD sessions, last trough 21.   - BCx: NGTD x 2 days    #LVOT with LISETTE  likely i/s/o hyperdynamic LV function 2/2 septic shock and hypovolemia  - per cardiology, rec repeating TTE after septic shock resolves    #HFrEF  TTE 11/22 normal LV and systolic, EF 59%, grade II diastolic dysfuncton, dilated RV, reduced RV,   Home meds: Entresto 49/51mg  - hold Entresto i/s/o septic shock    #CAD  Hx of cardiac cath in 2018  Home meds: atorvastatin 40mg, plavix 75mg qd  - c/w home atorvastatin 40mg  - hold home Plavix 75mg qd    GI  #Abdominal Pain  C/o vague abdominal pain, a/w bilious emesis x 2, no nausea or further episodes of emesis   - see endocrine for plan    RENAL  #ESRD 2/2 PCKD  Pt has been on HD for "years" 2/2 PCKD. AV Fistula of EDWIN clotted in 2014, has received HD thru HD cath since. New AV Fistula in 11/2022, not yet matured.   - f/u nephrology recs  - s/p HD 1/5, 400cc removed. Goal net even    #Hyperkalemia  Patient with Hx of ESRD with missed HD presents with K >8 on VBG with abdominal symptoms and missed HD x8 days. ICU consulted i/s/o hyperkalemia. Renal consulted.  - s/p HD 1/5  - monitor with BMP's BID    ID  Pt presented in septic shock with leukocytosis, tachycardia, hypotension requiring pressors. Unknown source.   - see #septic shock for plan  - f/u ID recs  - f/u gallium scan   - c/w meropenem 500mg qd    ENDO  #R/o Hyperparathyroidism  At risk for secondary HPTT d/t renal failure   - pt has vague abdominal pain with nausea  - multiple brown tumors on CT with multiple fractures of pubic rami, pubic bone, thoracolumbar spine  - intact , Vit D 38.5  - consult endocrinology, f/u recs    MSK  #Renal Osteodystrophy  - on CT: Increased density of the bone marrow is consistent with renal osteodystrophy    Heme/Onc  #Breast Pain, bilateral  Family hx of breast cancer in mother, sister.   - large R breast mass, palpable L breast mass, breast has peau d'orange appearance with associated erythema, warmth  - US bilateral breasts: No sonographic evidence of breast abscess, cyst or focal mass to correspond with the clinical finding of bilateral breast masses.  - surgery: recc nonurgent outpatient mammogram when clinically stable    GYN  Uterine mass, 6.1cm mass in R adnexa seen incidentally on CTAP  NTD    Preventative  F: None  E: None, HD pt  N: Renal Restricted Diet  DVT: heparin 5000IU subq q8h   Code Status: Full Code  Dispo: ICU

## 2023-01-05 NOTE — PROGRESS NOTE ADULT - ATTENDING COMMENTS
Pt seen on rounds this afternoon.  Was more awake, able to provide more history, but this was still very limited.    Has been on HD since 2006  Was not aware of any problems with calcium level or PTH.  Was previously on Sensipar, but not recently.  She does not know why it was stopped.  She was not taking Zemplar or Calcitriol as an outpatient  She complains of chronic back pain, and also of a headache she seems to describe as skull pain.  Thyroid ultrasound shows a moderately enlarged gland with heterogeneous echotexture.  There were 3 nodules in the R lobe (largest 3.3 cm), one in the left lobe (2.3 cm).  Both of these nodules warrant biopsy--the right lobe lesion because of size and its shape (taller > wide), and the left lobe nodule based on size  There remains a possibility her bone lesions could be metastatic thyroid CA, but I doubt it  The 1,25-D level is still pending.  As noted yesterday, extensive bone disease in ESRD pts with her level of PTH is rather uncommon.  It would be an indication for surgery if they were in fact brown tumors, but I am suspicious that they may not be.  We also would need to find out if her PTH level would respond to Sensipar before considering surgery.  Gallium scan is apparently being considered  ?? PET-CT

## 2023-01-05 NOTE — CONSULT NOTE ADULT - NS ATTEND AMEND GEN_ALL_CORE FT
67y old Female with a history of HFrEF, CAD, NICM, HTN, HLD, ESRD on HD, PE, PAD, AS. 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 Pt evaluated for pain control. Chart reviewed, medications/allergies reviewed. Pain management options discussed, q's addressed satisfactorily. We will consider a comprehensive medical regimen and titrate to pain control and side effects. We will follow up. Dr. Camejo
Pt seen on rounds this afternoon.  Consult requested to evaluate hyperparathyroidism.  57-yo woman with ESRD secondary to PKD, also with multiple other medical problems including CAD, previous PE (s/p IVC filter), valvular HD (AS, MR), HTN, HLP admitted with weakness, abdominal pain, N/V and diarrhea.  Work-up since admission (imaging) has disclosed multiple other problems including skull lesions (mixed lytic/sclerotic), multinodular thyroid with substernal extension, lytic bone lesions on CT of abdomen, bilateral breast masses, multiple vertebral compression fractures, and R adnexal mass.    PTH is elevated to 479 pg/ml, calcium level is 8.8 mg%  Minimal history is obtainable from the pt, who is sleepy and answers with only a few words after being awakened.  Her hyperparathyroidism is almost certainly secondary hyperpara from her renal disease.  Her bone lesions are described as possible "brown tumors" on the report, but I am suspicious that she would have such extensive bone lesions from her hyperparathyroidism at a PTH level in the 400 range--though the duration of her disease is unknown.  Need to see her 1,25-D level in terms of evaluating the PTH.  Need a thyroid ultrasound to evaluate the goiter  Basically I am skeptical as to whether these bone lesions are from hyperpara, primarily because of the description of the skull lesions as mixed lytic/sclerotic--would be very suspicious of malignancy, antoine breast CA--though she also has the adnexal mass which has not yet been evaluated.

## 2023-01-05 NOTE — PROGRESS NOTE ADULT - ASSESSMENT
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 CT showing 7.0 cm fluid collection is present near an AV fistula in the right   upper arm. Vascular consulted for RUE fistula evaluation to r/o source of sepsis. On PE no erythema, increased warmth or drainage to RUE wound. Low suspicion for graft as source of infection. ESR increased to 109.    Recommendations:    -F/U Gallium scan to determine infection source  -F/u blood cultures NG @ 1 day  -Wound care recs: BID dressing changes (once daily with Santyl DSD once daily with Bactroban DSD)  -IR consult for drainage of collection around right UE AVF  -Rest of care per primary team  -Vascular surgery Team 3C will continue to follow. Please call [phone] with any questions or concerns.

## 2023-01-05 NOTE — PROGRESS NOTE ADULT - SUBJECTIVE AND OBJECTIVE BOX
Internal Medicine Progress Note  Dara Bradford, PGY-1    ******INCOMPLETE******    OVERNIGHT EVENTS/INTERVAL HPI:    OBJECTIVE:  Vital Signs Last 24 Hrs  T(C): 36.4 (05 Jan 2023 01:02), Max: 37.1 (04 Jan 2023 13:52)  T(F): 97.6 (05 Jan 2023 01:02), Max: 98.8 (04 Jan 2023 13:52)  HR: 94 (05 Jan 2023 06:00) (87 - 117)  BP: 122/60 (05 Jan 2023 06:00) (70/34 - 137/88)  BP(mean): 87 (05 Jan 2023 06:00) (49 - 108)  RR: 16 (05 Jan 2023 06:00) (14 - 27)  SpO2: 96% (05 Jan 2023 06:00) (91% - 100%)    Parameters below as of 05 Jan 2023 06:00  Patient On (Oxygen Delivery Method): room air      I&O's Detail    03 Jan 2023 07:01  -  04 Jan 2023 07:00  --------------------------------------------------------  IN:    IV PiggyBack: 450 mL    Norepinephrine: 496.6 mL    Oral Fluid: 500 mL  Total IN: 1446.6 mL    OUT:    Other (mL): 0 mL  Total OUT: 0 mL    Total NET: 1446.6 mL      04 Jan 2023 07:01  -  05 Jan 2023 06:33  --------------------------------------------------------  IN:    IV PiggyBack: 30 mL    Norepinephrine: 62.9 mL    Oral Fluid: 550 mL    Phenylephrine: 170.4 mL    Phenylephrine: 22.3 mL  Total IN: 835.6 mL    OUT:  Total OUT: 0 mL    Total NET: 835.6 mL        Physical Exam:  GENERAL: Awake, alert and interactive, no acute distress, appears comfortable  NEURO: A&Ox4, no focal deficits, 5/5 strength in all ext, reflexes 2+ throughout, CN 2-12 intact  HEENT: Normocephalic, atraumatic, no conjunctivitis or scleral icterus, oral mucosa moist, no oral lesions noted  NECK: Supple, no LAD, no JVD, thyroid not palpable  CARDIAC: Regular rate and rhythm, +S1/S2, no murmurs/rubs/gallops  PULM: Breathing comfortably on RA, clear to auscultation bilaterally, no wheezes/rales/rhonchi  ABDOMEN: Soft, nontender, nondistended, +bs, no hepatosplenomegaly, no rebound tenderness or fluid wave, no CVA tenderness  : Deferred  MSK: Range of motion grossly intact, no back tenderness  SKIN: Warm and dry, no rashes, lesions  VASC: Cap refil < 2 sec, 2+ peripheral pulses, no edema, no LE tenderness  Psych: Appropriate affect    Medications:  MEDICATIONS  (STANDING):  atorvastatin 40 milliGRAM(s) Oral at bedtime  chlorhexidine 4% Liquid 1 Application(s) Topical <User Schedule>  collagenase Ointment 1 Application(s) Topical daily  heparin   Injectable 5000 Unit(s) SubCutaneous every 8 hours  meropenem  IVPB 500 milliGRAM(s) IV Intermittent every 24 hours  phenylephrine    Infusion 0.5 MICROgram(s)/kG/Min (16 mL/Hr) IV Continuous <Continuous>  sevelamer carbonate 800 milliGRAM(s) Oral three times a day with meals    MEDICATIONS  (PRN):  acetaminophen     Tablet .. 650 milliGRAM(s) Oral every 6 hours PRN Temp greater or equal to 38C (100.4F), Moderate Pain (4 - 6)  HYDROmorphone  Injectable 0.5 milliGRAM(s) IV Push every 4 hours PRN Severe Pain (7 - 10)      Labs:                        7.7    13.96 )-----------( 215      ( 05 Jan 2023 05:30 )             24.5     01-05    129<L>  |  92<L>  |  62<H>  ----------------------------<  78  4.5   |  23  |  8.38<H>    Ca    8.0<L>      05 Jan 2023 05:30  Phos  6.3     01-05  Mg     1.6     01-05    TPro  6.0  /  Alb  x   /  TBili  x   /  DBili  x   /  AST  x   /  ALT  x   /  AlkPhos  x   01-03    PT/INR - ( 05 Jan 2023 05:30 )   PT: 15.8 sec;   INR: 1.32          PTT - ( 05 Jan 2023 05:30 )  PTT:30.3 sec      COVID-19 PCR: Negative (23 Nov 2022 19:55)  COVID-19 PCR: NotDetec (19 Nov 2022 11:56)      Radiology: Reviewed OVERNIGHT EVENTS/INTERVAL HPI: BP decreased overnight, phenylephrine gtt was increased to 0.5mcg/kg/min.     SUBJECTIVE: Patient examined at bedside where she was moaning in pain localized to bilateral breasts, R > L, as well as pruritis of bilateral breasts. She denies HA, dizziness, lightheadedness, dyspnea, chest pain, palpitations, nausea, abdominal pain, diarrhea.     OBJECTIVE:  Vital Signs Last 24 Hrs  T(C): 36.4 (05 Jan 2023 01:02), Max: 37.1 (04 Jan 2023 13:52)  T(F): 97.6 (05 Jan 2023 01:02), Max: 98.8 (04 Jan 2023 13:52)  HR: 94 (05 Jan 2023 06:00) (87 - 117)  BP: 122/60 (05 Jan 2023 06:00) (70/34 - 137/88)  BP(mean): 87 (05 Jan 2023 06:00) (49 - 108)  RR: 16 (05 Jan 2023 06:00) (14 - 27)  SpO2: 96% (05 Jan 2023 06:00) (91% - 100%)    Parameters below as of 05 Jan 2023 06:00  Patient On (Oxygen Delivery Method): room air      I&O's Detail    03 Jan 2023 07:01  -  04 Jan 2023 07:00  --------------------------------------------------------  IN:    IV PiggyBack: 450 mL    Norepinephrine: 496.6 mL    Oral Fluid: 500 mL  Total IN: 1446.6 mL    OUT:    Other (mL): 0 mL  Total OUT: 0 mL    Total NET: 1446.6 mL      04 Jan 2023 07:01  -  05 Jan 2023 06:33  --------------------------------------------------------  IN:    IV PiggyBack: 30 mL    Norepinephrine: 62.9 mL    Oral Fluid: 550 mL    Phenylephrine: 170.4 mL    Phenylephrine: 22.3 mL  Total IN: 835.6 mL    OUT:  Total OUT: 0 mL    Total NET: 835.6 mL    Physical Exam:  GENERAL: awake, alert, in significant pain  NEURO: A&Ox4  HEENT: no conjunctivitis or scleral icterus, oral mucosa moist  NECK: Supple, no JVD  CARDIAC: tachycardic, regular rhythm, +S1/S2, no murmurs/rubs/gallops  PULM: Breathing comfortably on RA, clear to auscultation bilaterally, no wheezes/rales/rhonchi, HD catheter R chest  ABDOMEN: Soft, obese, nontender to light/deep palpation of abdomen, nondistended, +bs, no rebound tenderness or fluid wave, ventral hernia non reducible   BREAST: peau d'orange appearance of lateral R breast, large immobile R breast mass with irregular borders approx 4cm lateral to nipple measuring est 8cm x 5cm, R axillary lymphadenopathy, L breast with smaller mass just lateral to nipple line also tender to palpation  SKIN: LE cold to touch to ankle, DP/PT pulses 1+, UE cool to touch pulses not palpable, 3cm dehisced wound nonpurulent nonerythematous R medial upper arm near axilla  VASC: 2+ peripheral pulses, no edema, no LE tenderness, triple lumen catheter at R groin    Medications:  MEDICATIONS  (STANDING):  atorvastatin 40 milliGRAM(s) Oral at bedtime  chlorhexidine 4% Liquid 1 Application(s) Topical <User Schedule>  collagenase Ointment 1 Application(s) Topical daily  heparin   Injectable 5000 Unit(s) SubCutaneous every 8 hours  meropenem  IVPB 500 milliGRAM(s) IV Intermittent every 24 hours  phenylephrine    Infusion 0.5 MICROgram(s)/kG/Min (16 mL/Hr) IV Continuous <Continuous>  sevelamer carbonate 800 milliGRAM(s) Oral three times a day with meals    MEDICATIONS  (PRN):  acetaminophen     Tablet .. 650 milliGRAM(s) Oral every 6 hours PRN Temp greater or equal to 38C (100.4F), Moderate Pain (4 - 6)  HYDROmorphone  Injectable 0.5 milliGRAM(s) IV Push every 4 hours PRN Severe Pain (7 - 10)      Labs:                        7.7    13.96 )-----------( 215      ( 05 Jan 2023 05:30 )             24.5     01-05    129<L>  |  92<L>  |  62<H>  ----------------------------<  78  4.5   |  23  |  8.38<H>    Ca    8.0<L>      05 Jan 2023 05:30  Phos  6.3     01-05  Mg     1.6     01-05    TPro  6.0  /  Alb  x   /  TBili  x   /  DBili  x   /  AST  x   /  ALT  x   /  AlkPhos  x   01-03    PT/INR - ( 05 Jan 2023 05:30 )   PT: 15.8 sec;   INR: 1.32          PTT - ( 05 Jan 2023 05:30 )  PTT:30.3 sec      COVID-19 PCR: Negative (23 Nov 2022 19:55)  COVID-19 PCR: NotDetec (19 Nov 2022 11:56)      Radiology: Reviewed OVERNIGHT EVENTS/INTERVAL HPI: BP decreased overnight, phenylephrine gtt was increased to 0.5mcg/kg/min.     SUBJECTIVE: Patient examined at bedside where she was moaning in pain localized to bilateral breasts, R > L, as well as pruritis of bilateral breasts. She denies HA, dizziness, lightheadedness, dyspnea, chest pain, palpitations, nausea, abdominal pain, diarrhea.     OBJECTIVE:  Vital Signs Last 24 Hrs  T(C): 36.4 (05 Jan 2023 01:02), Max: 37.1 (04 Jan 2023 13:52)  T(F): 97.6 (05 Jan 2023 01:02), Max: 98.8 (04 Jan 2023 13:52)  HR: 94 (05 Jan 2023 06:00) (87 - 117)  BP: 122/60 (05 Jan 2023 06:00) (70/34 - 137/88)  BP(mean): 87 (05 Jan 2023 06:00) (49 - 108)  RR: 16 (05 Jan 2023 06:00) (14 - 27)  SpO2: 96% (05 Jan 2023 06:00) (91% - 100%)    Parameters below as of 05 Jan 2023 06:00  Patient On (Oxygen Delivery Method): room air      I&O's Detail    03 Jan 2023 07:01  -  04 Jan 2023 07:00  --------------------------------------------------------  IN:    IV PiggyBack: 450 mL    Norepinephrine: 496.6 mL    Oral Fluid: 500 mL  Total IN: 1446.6 mL    OUT:    Other (mL): 0 mL  Total OUT: 0 mL    Total NET: 1446.6 mL      04 Jan 2023 07:01  -  05 Jan 2023 06:33  --------------------------------------------------------  IN:    IV PiggyBack: 30 mL    Norepinephrine: 62.9 mL    Oral Fluid: 550 mL    Phenylephrine: 170.4 mL    Phenylephrine: 22.3 mL  Total IN: 835.6 mL    OUT:  Total OUT: 0 mL    Total NET: 835.6 mL    Physical Exam:  GENERAL: awake, alert, in significant pain  NEURO: A&Ox4  HEENT: no conjunctivitis or scleral icterus, oral mucosa moist  NECK: Supple, no JVD  CARDIAC: tachycardic, regular rhythm, +S1/S2, no murmurs/rubs/gallops  PULM: Breathing comfortably on RA, clear to auscultation bilaterally, no wheezes/rales/rhonchi, HD catheter R chest  ABDOMEN: Soft, obese, nontender to light/deep palpation of abdomen, nondistended, +bs, no rebound tenderness or fluid wave, ventral hernia non reducible   BREAST: peau d'orange appearance of lateral R breast, large immobile R breast mass with irregular borders approx 4cm lateral to nipple measuring est 8cm x 5cm, R axillary lymphadenopathy, L breast with smaller mass just lateral to nipple line also tender to palpation  SKIN: LE cold to touch to ankle, DP/PT pulses 1+, UE cool to touch pulses not palpable, 3cm dehisced wound nonpurulent nonerythematous R medial upper arm near axilla  VASC: 2+ peripheral pulses, no edema, no LE tenderness, triple lumen catheter at R groin    Medications:  MEDICATIONS  (STANDING):  atorvastatin 40 milliGRAM(s) Oral at bedtime  chlorhexidine 4% Liquid 1 Application(s) Topical <User Schedule>  collagenase Ointment 1 Application(s) Topical daily  heparin   Injectable 5000 Unit(s) SubCutaneous every 8 hours  meropenem  IVPB 500 milliGRAM(s) IV Intermittent every 24 hours  phenylephrine    Infusion 0.5 MICROgram(s)/kG/Min (16 mL/Hr) IV Continuous <Continuous>  sevelamer carbonate 800 milliGRAM(s) Oral three times a day with meals    MEDICATIONS  (PRN):  acetaminophen     Tablet .. 650 milliGRAM(s) Oral every 6 hours PRN Temp greater or equal to 38C (100.4F), Moderate Pain (4 - 6)  HYDROmorphone  Injectable 0.5 milliGRAM(s) IV Push every 4 hours PRN Severe Pain (7 - 10)      Labs:                        7.7    13.96 )-----------( 215      ( 05 Jan 2023 05:30 )             24.5     01-05    129<L>  |  92<L>  |  62<H>  ----------------------------<  78  4.5   |  23  |  8.38<H>    Ca    8.0<L>      05 Jan 2023 05:30  Phos  6.3     01-05  Mg     1.6     01-05    TPro  6.0  /  Alb  x   /  TBili  x   /  DBili  x   /  AST  x   /  ALT  x   /  AlkPhos  x   01-03    PT/INR - ( 05 Jan 2023 05:30 )   PT: 15.8 sec;   INR: 1.32          PTT - ( 05 Jan 2023 05:30 )  PTT:30.3 sec      COVID-19 PCR: Negative (23 Nov 2022 19:55)  COVID-19 PCR: NotDetec (19 Nov 2022 11:56)      Radiology:   UEX U/S    IMPRESSION:    DVT involving the right internal jugular vein and right subclavian vein   and right axillary vein. SVT involving the right cephalic vein.    8.4 cm complex avascular collection in the proximal right arm   representing old hematoma/seroma.

## 2023-01-05 NOTE — PROGRESS NOTE ADULT - SUBJECTIVE AND OBJECTIVE BOX
SUBJECTIVE: Patient seen and examined bedside; no events overnight, sleepy this am, but arousable, no complaints during dressing change.    heparin   Injectable 5000 Unit(s) SubCutaneous every 8 hours  meropenem  IVPB 500 milliGRAM(s) IV Intermittent every 24 hours  phenylephrine    Infusion 0.5 MICROgram(s)/kG/Min IV Continuous <Continuous>      Vital Signs Last 24 Hrs  T(C): 36.6 (05 Jan 2023 06:04), Max: 37.1 (04 Jan 2023 13:52)  T(F): 97.9 (05 Jan 2023 06:04), Max: 98.8 (04 Jan 2023 13:52)  HR: 92 (05 Jan 2023 07:00) (87 - 117)  BP: 110/58 (05 Jan 2023 07:00) (70/34 - 137/88)  BP(mean): 73 (05 Jan 2023 07:00) (49 - 108)  RR: 18 (05 Jan 2023 07:00) (14 - 27)  SpO2: 96% (05 Jan 2023 07:00) (91% - 100%)    Parameters below as of 05 Jan 2023 07:00  Patient On (Oxygen Delivery Method): room air      I&O's Detail    04 Jan 2023 07:01  -  05 Jan 2023 07:00  --------------------------------------------------------  IN:    IV PiggyBack: 30 mL    Norepinephrine: 62.9 mL    Oral Fluid: 550 mL    Phenylephrine: 208.8 mL    Phenylephrine: 22.3 mL  Total IN: 874 mL    OUT:  Total OUT: 0 mL    Total NET: 874 mL      PE:    General: NAD, resting comfortably in bed  C/V: S1 s2, RRR  Pulm: Nonlabored breathing, no respiratory distress  Abd: Soft, NTND  Extrem: WWP; right middle upper arm wound with clean pink base, no obvious signs of infection, unchanged from prior exam        LABS:                        7.7    13.96 )-----------( 215      ( 05 Jan 2023 05:30 )             24.5     01-05    129<L>  |  92<L>  |  62<H>  ----------------------------<  78  4.5   |  23  |  8.38<H>    Ca    8.0<L>      05 Jan 2023 05:30  Phos  6.3     01-05  Mg     1.6     01-05    TPro  6.0  /  Alb  x   /  TBili  x   /  DBili  x   /  AST  x   /  ALT  x   /  AlkPhos  x   01-03    PT/INR - ( 05 Jan 2023 05:30 )   PT: 15.8 sec;   INR: 1.32          PTT - ( 05 Jan 2023 05:30 )  PTT:30.3 sec      RADIOLOGY & ADDITIONAL STUDIES:

## 2023-01-05 NOTE — PROGRESS NOTE ADULT - SUBJECTIVE AND OBJECTIVE BOX
INTERVAL EVENTS:  Remains on phenylephrine.     PAST MEDICAL & SURGICAL HISTORY:  HTN (hypertension)    HLD (hyperlipidemia)    CAD (coronary atherosclerotic disease)    ESRD on dialysis  last 11/15/22    H/O ventral hernia    Brown tumor  brain    DVT, lower extremity  left    Stented coronary artery    History of surgery  IVC filter    AVF (arteriovenous fistula)  right left    S/P AIDEN-BSO  2003    History of surgery  RLE PTA stent / atherectomy  7/2021    History of atherectomy  stent        MEDICATIONS  (STANDING):  atorvastatin 40 milliGRAM(s) Oral at bedtime  chlorhexidine 4% Liquid 1 Application(s) Topical <User Schedule>  collagenase Ointment 1 Application(s) Topical daily  epoetin александр-epbx (RETACRIT) Injectable 8000 Unit(s) IV Push once  heparin   Injectable 5000 Unit(s) SubCutaneous every 8 hours  meropenem  IVPB 500 milliGRAM(s) IV Intermittent every 24 hours  phenylephrine    Infusion 0.5 MICROgram(s)/kG/Min (16 mL/Hr) IV Continuous <Continuous>  sevelamer carbonate 800 milliGRAM(s) Oral three times a day with meals    MEDICATIONS  (PRN):  acetaminophen     Tablet .. 650 milliGRAM(s) Oral every 6 hours PRN Temp greater or equal to 38C (100.4F), Moderate Pain (4 - 6)  HYDROmorphone  Injectable 0.5 milliGRAM(s) IV Push every 4 hours PRN Severe Pain (7 - 10)    Vital Signs Last 24 Hrs  T(C): 36.4 (05 Jan 2023 09:47), Max: 37.1 (04 Jan 2023 13:52)  T(F): 97.6 (05 Jan 2023 09:47), Max: 98.8 (04 Jan 2023 13:52)  HR: 90 (05 Jan 2023 09:00) (87 - 110)  BP: 114/54 (05 Jan 2023 09:00) (70/34 - 126/53)  BP(mean): 78 (05 Jan 2023 09:00) (49 - 88)  RR: 18 (05 Jan 2023 09:00) (14 - 28)  SpO2: 97% (05 Jan 2023 09:00) (91% - 99%)    Parameters below as of 05 Jan 2023 09:00  Patient On (Oxygen Delivery Method): room air        PHYSICAL EXAM:  GEN: Morbidly obese female in NAD   HEENT: NCAT, PERRL, EOMI. Mucosa moist. No JVD.   RESP: CTA b/l  CV: RRR, normal s1/s2. No m/r/g.  ABD: Soft, NTND. BS+  EXT: Warm. No edema, clubbing, or cyanosis.     LABS:                        7.7    13.96 )-----------( 215      ( 05 Jan 2023 05:30 )             24.5     01-05    129<L>  |  92<L>  |  62<H>  ----------------------------<  78  4.5   |  23  |  8.38<H>    Ca    8.0<L>      05 Jan 2023 05:30  Phos  6.3     01-05  Mg     1.6     01-05    TPro  6.0  /  Alb  x   /  TBili  x   /  DBili  x   /  AST  x   /  ALT  x   /  AlkPhos  x   01-03        PT/INR - ( 05 Jan 2023 05:30 )   PT: 15.8 sec;   INR: 1.32          PTT - ( 05 Jan 2023 05:30 )  PTT:30.3 sec    I&O's Summary    04 Jan 2023 07:01  -  05 Jan 2023 07:00  --------------------------------------------------------  IN: 874 mL / OUT: 0 mL / NET: 874 mL    05 Jan 2023 07:01  -  05 Jan 2023 10:07  --------------------------------------------------------  IN: 16 mL / OUT: 0 mL / NET: 16 mL      RADIOLOGY & ADDITIONAL STUDIES:  TELEMETRY:  EKG:         INTERVAL EVENTS:  Remains on phenylephrine.     PAST MEDICAL & SURGICAL HISTORY:  HTN (hypertension)    HLD (hyperlipidemia)    CAD (coronary atherosclerotic disease)    ESRD on dialysis  last 11/15/22    H/O ventral hernia    Brown tumor  brain    DVT, lower extremity  left    Stented coronary artery    History of surgery  IVC filter    AVF (arteriovenous fistula)  right left    S/P AIDEN-BSO  2003    History of surgery  RLE PTA stent / atherectomy  7/2021    History of atherectomy  stent        MEDICATIONS  (STANDING):  atorvastatin 40 milliGRAM(s) Oral at bedtime  chlorhexidine 4% Liquid 1 Application(s) Topical <User Schedule>  collagenase Ointment 1 Application(s) Topical daily  epoetin александр-epbx (RETACRIT) Injectable 8000 Unit(s) IV Push once  heparin   Injectable 5000 Unit(s) SubCutaneous every 8 hours  meropenem  IVPB 500 milliGRAM(s) IV Intermittent every 24 hours  phenylephrine    Infusion 0.5 MICROgram(s)/kG/Min (16 mL/Hr) IV Continuous <Continuous>  sevelamer carbonate 800 milliGRAM(s) Oral three times a day with meals    MEDICATIONS  (PRN):  acetaminophen     Tablet .. 650 milliGRAM(s) Oral every 6 hours PRN Temp greater or equal to 38C (100.4F), Moderate Pain (4 - 6)  HYDROmorphone  Injectable 0.5 milliGRAM(s) IV Push every 4 hours PRN Severe Pain (7 - 10)    Vital Signs Last 24 Hrs  T(C): 36.4 (05 Jan 2023 09:47), Max: 37.1 (04 Jan 2023 13:52)  T(F): 97.6 (05 Jan 2023 09:47), Max: 98.8 (04 Jan 2023 13:52)  HR: 90 (05 Jan 2023 09:00) (87 - 110)  BP: 114/54 (05 Jan 2023 09:00) (70/34 - 126/53)  BP(mean): 78 (05 Jan 2023 09:00) (49 - 88)  RR: 18 (05 Jan 2023 09:00) (14 - 28)  SpO2: 97% (05 Jan 2023 09:00) (91% - 99%)    Parameters below as of 05 Jan 2023 09:00  Patient On (Oxygen Delivery Method): room air        PHYSICAL EXAM:  GEN: Morbidly obese female in NAD   HEENT: NCAT, PERRL, EOMI. Mucosa moist. No JVD.   RESP: CTA b/l  CV: RRR, normal s1/s2. Systolic murmur LUSB   ABD: Soft, NTND. BS+  EXT: Warm. No edema, clubbing, or cyanosis.     LABS:                        7.7    13.96 )-----------( 215      ( 05 Jan 2023 05:30 )             24.5     01-05    129<L>  |  92<L>  |  62<H>  ----------------------------<  78  4.5   |  23  |  8.38<H>    Ca    8.0<L>      05 Jan 2023 05:30  Phos  6.3     01-05  Mg     1.6     01-05    TPro  6.0  /  Alb  x   /  TBili  x   /  DBili  x   /  AST  x   /  ALT  x   /  AlkPhos  x   01-03        PT/INR - ( 05 Jan 2023 05:30 )   PT: 15.8 sec;   INR: 1.32          PTT - ( 05 Jan 2023 05:30 )  PTT:30.3 sec    I&O's Summary    04 Jan 2023 07:01  -  05 Jan 2023 07:00  --------------------------------------------------------  IN: 874 mL / OUT: 0 mL / NET: 874 mL    05 Jan 2023 07:01  -  05 Jan 2023 10:07  --------------------------------------------------------  IN: 16 mL / OUT: 0 mL / NET: 16 mL      RADIOLOGY & ADDITIONAL STUDIES:  TELEMETRY:  EKG:

## 2023-01-05 NOTE — PROGRESS NOTE ADULT - ASSESSMENT
68 yo F with HTN, HLD, CAD, HFrEF, ESRD (PCKD) on HD, PE (2018) s/p IVC filter, PAD, OA, h/o thrombus in vascular access X 3, ventral hernia, osteoclastoma with septic shock, initially refractory, now off pressors on vancomycin and meropenem.  Never febrile, but may not be able to mount, ongoing leukocytosis.      Source not clear, concern for CLABSI, though no blood cultures were sent until after she had received antibiotics for 36h.  No clear evidence for pulmonary source, abd exam is benign, per Surgery, unlikely to be mastitis. Patient has a fluid collection R upper arm - US suggestive of hematoma vs. seroma near AVF.     - Continue to f/u blood cultures X 2 from 1/3- NGTD  - Agree with plan for gallium scan  - Continue vancomycin - would dose by trough prior to HD.  If <10, would give 1 g, 10-17.5 give 750 mg, 17.6-25, give 500 mg after HD, >25 Hold Vancomycin. Please give Vancomycin after HD is completed   - Recommend IR drainage of the fluid collection in the right upper arm, please follow up cultures  - Continue meropenem 500 mg IV q24h    ID will continue to follow

## 2023-01-05 NOTE — CONSULT NOTE ADULT - ASSESSMENT
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 Pt with RUE fluid collection near fistula. US showing multiple RUE DVTs and right cephalic SVT as well as 8.4cm fluid collection. IR consulted for drainage of collection. Case reviewed with Dr. Soler, plan for procedure with sedation and ultrasound guidance.     Recommendations: Maintain NPO x 8 hours prior to procedure. Please ensure Covid swab is up to date (within last 72 hours).    Communicated with: Dr. Bradford MICU team

## 2023-01-05 NOTE — PROGRESS NOTE ADULT - ASSESSMENT
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 US showed no sonographic evidence of breast abscess, cyst or focal mass and CT chest (1/3) showed 6.1 cm cystic mass in the right adnexa. Betty score 3.3%. Physical findings on breast exam could be secondary to edema from breast collaterals, HF, IVF, or perm cath infection. Less likely to be from inflammatory breast cancer.     Plan:  - No acute surgical intervention at this time  - Recommend non-urgent mammogram when the patient is clinically stable  - 5c will continue to follow  - Plan discussed with Dr. 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 US showed no sonographic evidence of breast abscess, cyst or focal mass and CT chest (1/3) showed 6.1 cm cystic mass in the right adnexa. CT with some venous congestion in Right breast from blockage at AVF. Betty score 3.3%. Physical findings on breast exam could be secondary to edema from breast collaterals, HF, IVF, or perm cath infection. Less likely to be from inflammatory breast cancer.     Plan:  - No acute surgical intervention at this time  - Recommend non-urgent mammogram when the patient is clinically stable  - 5c will continue to follow  - Plan discussed with Dr. Hatch

## 2023-01-05 NOTE — PROGRESS NOTE ADULT - SUBJECTIVE AND OBJECTIVE BOX
alert, some pain rt arm and breasts    afebrile, bp - nl    R UE: no edema or erythema, ulcer clean, pigtail in place -> removed, pulse over graft  TEDDY: serosanguinous    wbc 13.9    BC - nsf  Wound fluid- gs and cx pending    Gallium scan -ordered    Follow cx and ga scan

## 2023-01-05 NOTE — PROGRESS NOTE ADULT - ASSESSMENT
This is a 66 yo F pt with PMH HFrEF? 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 She is currently admitted for septic shock 2/2 possible RUE fistula infection, pending gallium scan. Cardiology consulted for new finding of LVOT obstruction on recent TTE.     #LVOT obstruction   Patient follows up with Dr. Rock Simmons as outpatient. Does not have a history of HCM. Denies family history of HCM or sudden cardiac arrest.   Her last TTE from November showed normal wall thickness and without any gradients.  TTE 1/3/22 showed hyperdynamic LV function, LISETTE with LVOT gradient 100mmHg consistent with severe gradient  - LVOT gradient likely in the setting of hyperdynamic LV function 2/2 septic shock and hypovolemia  - Recommend giving fluids  - If pressors are needed, phenylephrine would be choice of pressor    # HFrecEF?   Per documentation, reportedly had a low EF in the past. Recent LV function is normal   Per pt, she was on Entresto at some point  - Please obtain further collateral     # Non obstructive CAD   -c/w ASA and lipitor 40   - On plavix per vascular for graft patency??    INCOMPLETE    This is a 68 yo F pt with PMH HFrEF? 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 She is currently admitted for septic shock 2/2 possible RUE fistula infection, pending gallium scan. Cardiology consulted for new finding of LVOT obstruction on recent TTE.     #LVOT obstruction   Patient follows up with Dr. Rock Simmons as outpatient. Does not have a history of HCM. Denies family history of HCM or sudden cardiac arrest.   Her last TTE from November showed normal wall thickness and without any gradients.  TTE 1/3/22 showed hyperdynamic LV function, LISETTE with LVOT gradient 100mmHg consistent with severe gradient  - LVOT gradient likely in the setting of hyperdynamic LV function 2/2 septic shock and hypovolemia. Once sepsis resolves, would repeat TTE to assess gradients as outpatient  - Recommend giving fluids  - If pressors are needed, phenylephrine would be choice of pressor    # HFrecEF?   Per documentation, reportedly had a low EF in the past. Recent LV function is normal   Per pt, she was on Entresto at some point which was discontinued     # Non obstructive CAD   -c/w ASA and lipitor 40   - On plavix per vascular for graft patency??    Cardiology to sign off. Patient can follow up with her OP Cardiologist Dr. Rock Simmons upon discharge.

## 2023-01-05 NOTE — PROGRESS NOTE ADULT - SUBJECTIVE AND OBJECTIVE BOX
--------------------------------------------------------------------------------  Chief Complaint: ESRD/Ongoing hemodialysis requirement    24 hour events/subjective:    Seen this morning, feels well. Now off pressors SBP stable. Will do HD today mainly for clearance.       PAST HISTORY  --------------------------------------------------------------------------------  No significant changes to PMH, PSH, FHx, SHx, unless otherwise noted    ALLERGIES & MEDICATIONS  --------------------------------------------------------------------------------  Allergies    Ancef (Rash; Urticaria)  DDAVP (Hypotension)  iodine (Hives; Pruritus)  penicillin (Swelling)  sulfa drugs (Angioedema)    Intolerances      Standing Inpatient Medications  atorvastatin 40 milliGRAM(s) Oral at bedtime  chlorhexidine 4% Liquid 1 Application(s) Topical <User Schedule>  collagenase Ointment 1 Application(s) Topical daily  epoetin александр-epbx (RETACRIT) Injectable 8000 Unit(s) IV Push once  heparin   Injectable 5000 Unit(s) SubCutaneous every 8 hours  meropenem  IVPB 500 milliGRAM(s) IV Intermittent every 24 hours  phenylephrine    Infusion 0.5 MICROgram(s)/kG/Min IV Continuous <Continuous>  sevelamer carbonate 800 milliGRAM(s) Oral three times a day with meals    PRN Inpatient Medications  acetaminophen     Tablet .. 650 milliGRAM(s) Oral every 6 hours PRN  HYDROmorphone  Injectable 0.5 milliGRAM(s) IV Push every 4 hours PRN      REVIEW OF SYSTEMS  --------------------------------------------------------------------------------  All other systems were reviewed and are negative, except as noted.    VITALS/PHYSICAL EXAM  --------------------------------------------------------------------------------  T(C): 36.4 (23 @ 10:10), Max: 37.1 (23 @ 13:52)  HR: 97 (23 11:00) (87 - 110)  BP: 134/62 (23 @ 11:00) (70/34 - 134/62)  RR: 21 (23 11:00) (14 - 28)  SpO2: 93% (23 @ 11:00) (91% - 99%)  Wt(kg): --  Drug Dosing Weight  Height (cm): 177.8 (2023 20:52)  Weight (kg): 85.3 (2023 20:52)  BMI (kg/m2): 27 (2023 20:52)  BSA (m2): 2.03 (2023 20:52)        23 @ 07:01  -  23 @ 07:00  --------------------------------------------------------  IN: 874 mL / OUT: 0 mL / NET: 874 mL    23 @ 07:01  -  23 @ 11:54  --------------------------------------------------------  IN: 60.9 mL / OUT: 0 mL / NET: 60.9 mL      PHYSICAL EXAM:  GENERAL: Alert, awake, oriented x3 on RA, mild distress  HEENT: ZOHAIB, EOMI, neck supple, no JVP MMM  CHEST/LUNG: Bilateral clear breath sounds  HEART: Regular rate and rhythm, no murmur, no gallops, no rub   ABDOMEN: Soft, nontender, non distended  : No flank or supra pubic tenderness.  EXTREMITIES: no pedal edema, cold extremities   Neurology: AAOx3, no asterixis   SKIN: No rash or skin lesion  ACCESS: [facility] c/d/i, [facility] has not been accessed in years      LABS/STUDIES  --------------------------------------------------------------------------------              7.7    13.96 >-----------<  215      [23 05:30]              24.5     129  |  92  |  62  ----------------------------<  78      [23 05:30]  4.5   |  23  |  8.38        Ca     8.0     [23 05:30]      Mg     1.6     [23 05:30]      Phos  6.3     [23 05:30]    TPro  6.0  /  Alb  x   /  TBili  x   /  DBili  x   /  AST  x   /  ALT  x   /  AlkPhos  x   [23 14:24]    PT/INR: PT 15.8 , INR 1.32       [23 05:30]  PTT: 30.3       [23 05:30]      PTH -- (Ca 8.8)      [01-03-23 @ 14:24]   479  TSH 1.610      [23 @ 05:30]    HBsAb Nonreact      [23:]  HBsAg Nonreact      [23]  HCV 0.04, Nonreact      [23:]      RADIOLOGY:  --------------------------------------------------------------------------------------    Hemoglobin: 7.7 g/dL (23 @ 05:30)  Phosphorus Level, Serum: 6.3 mg/dL (23 @ 05:30)  Hemoglobin: 8.6 g/dL (23 @ 04:32)  Phosphorus Level, Serum: 5.5 mg/dL (23 @ 04:32)    Albumin, Serum: 3.2 g/dL (23 @ 05:30)  Albumin, Serum: 3.0 g/dL (23 @ 15:28)    T(C): 36.4 (23 @ 10:10), Max: 37.1 (23 @ 13:52)  HR: 97 (23 @ 11:00) (87 - 110)  BP: 134/62 (23 @ 11:00) (70/34 - 134/62)  RR: 21 (23 @ 11:00) (14 - 28)  SpO2: 93% (23 @ 11:00) (91% - 99%)  epoetin александр-epbx (RETACRIT) Injectable 8000 Unit(s) IV Push once, 23 @ 07:45, Routine, Stop order after: 1 Doses  epoetin александр-epbx (RETACRIT) Injectable 8000 Unit(s) IV Push once, 23 @ 07:45, Routine, Stop order after: 1 Doses  sevelamer carbonate 800 milliGRAM(s) Oral three times a day with meals, 23 @ 05:28, Routine      Hemodialysis Treatment.:     Schedule: Once, Modality: Hemodialysis, Access: Internal Jugular Central Venous Catheter    Dialyzer: Optiflux [id number], Time: 180 Min    Blood Flow: 400 mL/Min , Dialysate Flow: 500 mL/Min, Dialysate Temp: 36.5, Tubinmm (Adult)    Target Fluid Removal: 0 Liters    Dialysate Electrolytes (mEq/L): Potassium 2, Calcium 2.5, Sodium 138, Bicarbonate 35 (23 @ 07:45) [Completed]    Seen on HD, no complaints. . C/w prescription outlined as above

## 2023-01-05 NOTE — CONSULT NOTE ADULT - SUBJECTIVE AND OBJECTIVE BOX
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 Pt with RUE fluid collection near fistula.      CT showing 7.0 cm fluid collection is present near an AV fistula in the right   upper arm. Vascular consulted for RUE fistula evaluation to r/o source of sepsis. On PE no erythema, increased warmth or drainage to RUE wound. Low suspicion for graft as source of infection. ESR increased to 109. 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 Pt with RUE fluid collection near fistula. US showing multiple RUE DVTs and right cephalic SVT as well as 8.4cm fluid collection. IR consulted for drainage of collection.     Clinical History: HYPERKALEMIA    No pertinent family history in first degree relatives    Handoff    MEWS Score    HTN (hypertension)    HLD (hyperlipidemia)    CAD (coronary atherosclerotic disease)    ESRD on dialysis    H/O ventral hernia    Brown tumor    DVT, lower extremity    Hyperkalemia    Hyperparathyroidism    Multinodular thyroid    Stented coronary artery    History of surgery    AVF (arteriovenous fistula)    S/P AIDEN-BSO    History of surgery    History of atherectomy    DIARRHEA    38    [email or web address]        Meds:acetaminophen     Tablet .. 650 milliGRAM(s) Oral every 6 hours PRN  atorvastatin 40 milliGRAM(s) Oral at bedtime  chlorhexidine 4% Liquid 1 Application(s) Topical <User Schedule>  collagenase Ointment 1 Application(s) Topical daily  epoetin александр-epbx (RETACRIT) Injectable 8000 Unit(s) IV Push once  heparin   Injectable 5000 Unit(s) SubCutaneous every 8 hours  HYDROmorphone  Injectable 0.5 milliGRAM(s) IV Push every 4 hours PRN  meropenem  IVPB 500 milliGRAM(s) IV Intermittent every 24 hours  phenylephrine    Infusion 0.5 MICROgram(s)/kG/Min IV Continuous <Continuous>  sevelamer carbonate 800 milliGRAM(s) Oral three times a day with meals      Allergies:Ancef (Rash; Urticaria)  DDAVP (Hypotension)  iodine (Hives; Pruritus)  penicillin (Swelling)  sulfa drugs (Angioedema)        Labs:                           7.7    13.96 )-----------( 215      ( 05 Jan 2023 05:30 )             24.5     PT/INR - ( 05 Jan 2023 05:30 )   PT: 15.8 sec;   INR: 1.32          PTT - ( 05 Jan 2023 05:30 )  PTT:30.3 sec  01-05    129<L>  |  92<L>  |  62<H>  ----------------------------<  78  4.5   |  23  |  8.38<H>    Ca    8.0<L>      05 Jan 2023 05:30  Phos  6.3     01-05  Mg     1.6     01-05    TPro  6.0  /  Alb  x   /  TBili  x   /  DBili  x   /  AST  x   /  ALT  x   /  AlkPhos  x   01-03          Imaging Findings:   DVT involving the right internal jugular vein and right subclavian vein and right axillary vein. SVT involving the right cephalic vein.  8.4 cm complex avascular collection in the proximal right arm representing old hematoma/seroma.

## 2023-01-05 NOTE — PROGRESS NOTE ADULT - ATTENDING COMMENTS
66 yo woman with ESRD on HD admitted with sepsis.  concern for abscess? fluid collection near RUE AVF or breast infection.     tolerated Hd well  continue full treatment as outlined above

## 2023-01-05 NOTE — PROGRESS NOTE ADULT - ATTENDING COMMENTS
Patient c/o breast pain and L arm pain but was not ttp.  On small dose of jarvis in setting of HD, leukocytosis improving.  1/3 BCx ngtd.  COVID neg.  Was in septic shock due to unclear source.  Awaiting drainage by IR of fluid collection at AVF site, to r/o infection.  Cont empiric vanc/jay for now.    Team 1 will follow you.  Case d/w primary team.    Nora Naranjo MD, MS  Infectious Disease attending  work cell [phone]   For any questions during evening/weekend/holiday, please page ID on call

## 2023-01-05 NOTE — PROGRESS NOTE ADULT - ATTENDING COMMENTS
Initial attending contact date 1/4/23     . See fellow note written above for details. I reviewed the fellow documentation. I have personally seen and examined this patient. I reviewed vitals, labs, medications, cardiac studies, and additional imaging. I agree with the above fellow's findings and plans as written above with the following additions/statements.    67 F with HFrEF? 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 She is currently admitted for septic shock 2/2 possible RUE fistula infection, pending gallium scan. Cardiology consulted for new finding of LVOT obstruction on recent TTE.     -LVOT obstruction   Patient follows up with Dr. Rock Simmons as outpatient. Does not have a history of HOCM. Denies family history of HOCM or sudden cardiac arrest.   Her last TTE from November showed normal wall thickness and without any gradients.  TTE 1/3/22 showed hyperdynamic LV function, LISETTE with LVOT gradient 100mmHg consistent with severe gradient  - LVOT gradient likely in the setting of hyperdynamic LV function 2/2 septic shock and hypovolemia and not true HOCM  - Recommend giving fluids  - If pressors are needed, phenylephrine would be choice of pressor  -repeat echo as outpatient, ? if patient was on entresto previously  -To fu with her outpatient cardiologist upon dc  -Pls reconsult as needed

## 2023-01-06 NOTE — PROGRESS NOTE ADULT - ASSESSMENT
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     NEURO  #Pain Control  Pt in 10/10 pain of bilateral breasts, worst on R. Pain responsive to Dilaudid but effect wears off after ~2-3 hours.   - c/w Dilaudid 0.5mg IVP q4h PRN    PULM  #Atelectasis  Noted on CT chest to have atelectasis at b/l lung bases.  - Incentive spirometry    CARDIOVASCULAR  #Septic Shock  Pt presented meeting 2/4 SIRS. Hypotensive with MAPs to 60, requiring Levophed. Extremities cool on exam. Likely septic shock 2/2 infection of R breast, bilateral breasts, or seroma/hematoma of RUE. HR > 90, WBC >12. Lactate 1.9. Afebrile. Procal 12. S/p Vancomycin 1250mg x 1, Aztreonam x 1 (pt allergic to Cefazolin, PCN).  - On Levophed with increasing pressor requirements, titrate PRN with goal MAP 60-65  - CT:  7.0 cm fluid collection is present near an AV fistula in the right upper arm. Possibly abscess. Consult IR for aspiration/culture.   - Per vascular, obtain ESR (elevated at 109), consult IR for aspiration of seroma vs hematoma of R arm  - Consult surgery for pain/erythema/warmth of R breast  - c/w Meropenem 500mg q24h, obtain ID approval  - c/w Vancomycin 1250mg, dosed based on HD schedule  - BCx: BGTD x 12h    #HFrEF  TTE 11/22 normal LV and systolic, EF 59%, grade II diastolic dysfuncton, dilated RV, reduced RV,   Home meds: Entresto 49/51mg  - hold Entresto i/s/o septic shock    #CAD  Hx of cardiac cath in 2018  Home meds: atorvastatin 40mg, plavix 75mg qd  - c/w home atorvastatin 40mg  - hold home Plavix 75mg qd    GI  #Abdominal Pain  C/o vague abdominal pain, a/w bilious emesis x 2, no nausea or further episodes of emesis   - see endocrine for plan    RENAL  #ESRD 2/2 PCKD  Pt has been on HD for "years" 2/2 PCKD. AV Fistula of EDWIN clotted in 2014, has received HD thru HD cath since. New AV Fistula in 11/2022, not yet matured.   - f/u nephrology recs  - s/p HD 1/3, 0L removed d/t overall fluid status     #Hyperkalemia  Patient with Hx of ESRD with missed HD presents with K >8 on VBG with abdominal symptoms and missed HD x8 days. ICU consulted i/s/o hyperkalemia. Renal consulted.  - s/p HD, 1L removed 1/2  - monitor with BMP's BID    ID  Pt presented in septic shock with leukocytosis, tachycardia, hypotension requiring pressors. Unknown source.   - see #septic shock for plan  - consult ID for empiric antibiotic coverage  - f/u gallium scan   - c/w meropenem 500mg qd    ENDO  #R/o Hyperparathyroidism  At risk for secondary HPTT d/t renal failure   - pt has vague abdominal pain with nausea  - multiple brown tumors on CT with multiple fractures of pubic rami, pubic bone, thoracolumbar spine  - obtain intact PTH, ionized Ca  - consult endocrinology, f/u recs    MSK  #Renal Osteodystrophy  - on CT: Increased density of the bone marrow is consistent with renal osteodystrophy    Heme/Onc  #R/o Breast Cancer  Family hx of breast cancer in mother, sister.   - large R breast mass, palpable L breast mass with R axillary LAD, c/f breast cancer, breast has peau d'orange appearance with associated erythema, warmth  - US bilateral breasts: No sonographic evidence of breast abscess, cyst or focal mass to correspond with the clinical finding of bilateral breast masses.  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     NEURO  #Pain Control  Pt in 10/10 pain of bilateral breasts, worst on R. Pain responsive to Dilaudid but effect wears off after ~2-3 hours.   - consult pain management  - Recommendations: Dilaudid 2mg po q4h PRN for severe pain, Dilaudid 0.25mg IVP q4h PRN for severe breakthrough pain, Tylenol 650mg po q6h PRN for mild pain, daily lidocaine patch, HOLD ALL OPIOIDS FOR SEDATION, RR<10, O2SAT <93%, SBP <96    PULM  #Atelectasis  Noted on CT chest to have atelectasis at b/l lung bases.  - Incentive spirometry    CARDIOVASCULAR  #Septic Shock  Pt presented meeting 2/4 SIRS. Hypotensive with MAPs to 60, requiring Levophed. Extremities cool on exam. Septic picture of unknown source. HR > 90, WBC >12. Lactate 1.9. Afebrile. Procal 12. S/p Vancomycin 1250mg x 1, Aztreonam x 1 (pt allergic to Cefazolin, PCN). .   - On Phenylephrine 5.5mcg/kg/min  - CT:  7.0 cm fluid collection is present near an AV fistula in the right upper arm. Possibly abscess. Drained by IR 1/5 with serosanguinous fluid: no organisms, few WBCs  - F/u vascular recs: wound care to RUE wound  - f/u surgery recs: for pain/erythema/warmth of R breast - pending gallium scan  - f/u ID recs: Meropenem 500mg q24h, c/w Vancomycin 500mg IV after HD sessions, last trough 21.   - BCx: NGTD x 3 days    #LVOT with LISETTE  likely i/s/o hyperdynamic LV function 2/2 septic shock and hypovolemia  - per cardiology, rec repeating TTE after septic shock resolves    #HFrEF  TTE 11/22 normal LV and systolic, EF 59%, grade II diastolic dysfuncton, dilated RV, reduced RV,   Home meds: Entresto 49/51mg  - hold Entresto i/s/o septic shock    #CAD  Hx of cardiac cath in 2018  Home meds: atorvastatin 40mg, plavix 75mg qd  - c/w home atorvastatin 40mg  - hold home Plavix 75mg qd    GI  #Abdominal Pain  C/o vague abdominal pain, a/w bilious emesis x 2, no nausea or further episodes of emesis   - see endocrine for plan    RENAL  #ESRD 2/2 PCKD  Pt has been on HD for "years" 2/2 PCKD. AV Fistula of EDWIN clotted in 2014, has received HD thru HD cath since. New AV Fistula in 11/2022, not yet matured.   - f/u nephrology recs  - s/p HD 1/5, 400cc removed. Goal net even    #Hyperkalemia  Patient with Hx of ESRD with missed HD presents with K >8 on VBG with abdominal symptoms and missed HD x8 days. ICU consulted i/s/o hyperkalemia. Renal consulted.  - s/p HD 1/5  - monitor with BMP's BID    ID  Pt presented in septic shock with leukocytosis, tachycardia, hypotension requiring pressors. Unknown source.   - see #septic shock for plan  - f/u ID recs  - f/u gallium scan   - c/w meropenem 500mg qd    ENDO  #Hyperparathyroidism  At risk for secondary HPTT d/t renal failure   - pt has vague abdominal pain with nausea  - multiple brown tumors on CT with multiple fractures of pubic rami, pubic bone, thoracolumbar spine  - intact , Vit D 38.5  - endocrinology recs: secondary hyperparathyroidism vs other process causing osteoclastic tumors, obtain PET/CT    #Multinodular Goiter  CT: enlarged multinodular thyroid projecting into superior mediastinum with 1.8 x 1.5 cm solid nodule slightly inferiorly in the anterior mediastinum.   - Thyroid ultrasound: Fine-needle aspiration recommended for 4.3 cm right thyroid and 2.6 cm left thyroid solid nodules per SCOTT guidelines.  - TSH 1.6 free T4 0.68.  - TSH could be inappropriately normal for low free T4 due to euthyroid sick syndrome.  - f/u TFTs after shock resolved    MSK  #Renal Osteodystrophy  - on CT: Increased density of the bone marrow is consistent with renal osteodystrophy  - Per endocrine, extent of bone destruction extreme given PTH level, suspect might not be Brown tumors    Heme/Onc  #Breast Pain, bilateral  Family hx of breast cancer in mother, sister.   - large R breast mass, palpable L breast mass, breast has peau d'orange appearance with associated erythema, warmth  - US bilateral breasts: No sonographic evidence of breast abscess, cyst or focal mass to correspond with the clinical finding of bilateral breast masses.  - surgery: recc nonurgent outpatient mammogram when clinically stable    GYN  Uterine mass, 6.1cm mass in R adnexa seen incidentally on CTAP  - consider consulting GYN if oncologic workup is indicated    Preventative  F: None  E: None, HD pt  N: Renal Restricted Diet  DVT: heparin 5000IU subq q8h   Code Status: Full Code  Dispo: ICU

## 2023-01-06 NOTE — PROGRESS NOTE ADULT - SUBJECTIVE AND OBJECTIVE BOX
Pain Management Progress Note - [facility] [phone]    HPI: Patient seen and examined today. Patient reports endorsing strong pain to the bilateral breasts, right side worse than left side. Patient denies numbness or tingling. Patient was started on Dilaudid 2 mg po q4h prn, states she still has severe pain. Patient denies side effects from current pain regimen.     Pertinent PMH: Pain at: ___Back ___Neck___Knee ___Hip ___Shoulder ___ Opioid tolerance    Pain is __X_ sharp ____dull ___burning ___achy ___ Intensity: ____ mild __X__mod _X___severe     Location _____surgical site _____cervical _____lumbar ____abd _____upper ext____lower ext    Worse with ___X_activity _X___movement _____physical therapy___ Rest    Improved with ___X_medication _X___rest ____physical therapy    PAST MEDICAL & SURGICAL HISTORY:  HTN (hypertension)  HLD (hyperlipidemia)  CAD (coronary atherosclerotic disease)  ESRD on dialysis  last 11/15/22  H/O ventral hernia  Brown tumor  brain  DVT, lower extremity  left=  History of surgery  IVC filter  AVF (arteriovenous fistula)  right left  S/P AIDEN-BSO  2003  History of surgery  RLE PTA stent / atherectomy  7/2021  History of atherectomy  stent    MEDICATIONS:  chlorhexidine 2% Cloths  phenylephrine    Infusion  sodium chloride 0.9% Bolus  HYDROmorphone  Injectable  HYDROmorphone  Injectable  HYDROmorphone   Tablet  vancomycin  IVPB  vancomycin  IVPB  HYDROmorphone  Injectable  HYDROmorphone  Injectable  epoetin александр-epbx (RETACRIT) Injectable  HYDROmorphone  Injectable  phenylephrine    Infusion  HYDROmorphone  Injectable  HYDROmorphone  Injectable  phenylephrine    Infusion  phenylephrine    Infusion  atorvastatin  HYDROmorphone  Injectable  sevelamer carbonate  meropenem  IVPB  sodium chloride 0.9% Bolus  dextrose 50% Injectable  dextrose 50% Injectable  mupirocin 2% Ointment  collagenase Ointment  vancomycin  IVPB  HYDROmorphone  Injectable  vancomycin  IVPB  HYDROmorphone  Injectable  oxycodone    5 mG/acetaminophen 325 mG  acetaminophen   IVPB ..  epoetin александр-epbx (RETACRIT) Injectable  meropenem  IVPB  meropenem  IVPB  HYDROmorphone  Injectable  acetaminophen   IVPB ..  acetaminophen     Tablet ..  aztreonam  IVPB  ondansetron Injectable  norepinephrine Infusion  HYDROmorphone  Injectable  acetaminophen   IVPB ..  norepinephrine Infusion  midodrine.  midodrine.  aztreonam  IVPB  heparin   Injectable  aztreonam  IVPB  dextrose 10%.  vancomycin  IVPB  chlorhexidine 4% Liquid  dextrose 40% Gel  HYDROmorphone  Injectable  calcium gluconate IVPB  dextrose 50% Injectable  insulin regular  human recombinant  albuterol    0.083%.  albuterol    0.083%.  morphine  - Injectable  albuterol    0.083%.  calcium gluconate IVPB  sodium bicarbonate  Injectable    ROS: Const:  __N_febrile   Eyes:___ENT:___CV: Y___chest pain  Resp: __N__sob  GI:_N__nausea _N__vomiting __N__abd pain ___npo ___clears ___full diet __bm  :___ Musk: _Y__pain ___spasm  Skin:___ Neuro:  _N__sedation__N_confusion__N__ numbness ___weakness _N__paresthesia  Psych:_N__anxiety  Endo:___ Heme:___Allergy:___ANCEF, DDAVP, IODINE, PCN, SULFA DRUGS      01-06 @ 06:105.84 mg/dL<H>  Hemoglobin: 7.4 g/dL (01-06 @ 06:10)  Hemoglobin: 7.7 g/dL (01-05 @ 05:30)  T(C): 36.8 (01-06-23 @ 09:47), Max: 37.2 (01-05-23 @ 18:52)  HR: 86 (01-06-23 @ 11:00) (86 - 123)  BP: 148/65 (01-06-23 @ 11:00) (88/42 - 148/65)  RR: 20 (01-06-23 @ 11:00) (17 - 33)  SpO2: 96% (01-06-23 @ 11:00) (90% - 99%)  Wt(kg): --     PHYSICAL EXAM:  Gen Appearance: __X_no acute distress _X__appropriate       Neuro: ___SILT feet____ EOM Intact Psych: AAOX_3_, __X_mood/affect appropriate        Eyes: __X_conjunctiva WNL  ____X_ Pupils equal and round        ENT: __X_ears and nose atraumatic__X_ Hearing grossly intact        Neck: _X__trachea midline, no visible masses ___thyroid without palpable mass    Resp: _X__Nml WOB____No tactile fremitus ___clear to auscultation    Cardio: __X_extremities free from edema ____pedal pulses palpable    GI/Abdomen: ___soft _____ Nontender___X___Nondistended_____HSM    Lymphatic: ___no palpable nodes in neck  ___no palpable nodes calves and feet    Skin/Wound: ___Incision, _X__Dressing c/d/i,   ____surrounding tissues soft,  ___drain/chest tube present____    Muscular: EHL ___/5  Gastrocnemius___/5    _X__absent clubbing/cyanosis         ASSESSMENT:  This is a 67y old Female with a history of HFrEF, CAD, NICM, HTN, HLD, ESRD on HD, PE, PAD, AS. 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     Recommended Treatment PLAN:  1. Consider escalating to Dilaudid 2-4 mg PO q4h PRN moderate-severe pain (if BPs allow)  2. Consider continuing Dilaudid 0.25 mg IVP q4h PRN breakthrough pain  3. Consider continuing Tylenol 650 mg PO q6h PRN mild pain  4. Consider starting daily lidocaine patch to affected area  HOLD ALL OPIOIDS FOR SEDATION, RR<10, O2SAT <93%, SBP <96  Plan discussed with Dr. Camejo

## 2023-01-06 NOTE — PROGRESS NOTE ADULT - SUBJECTIVE AND OBJECTIVE BOX
Internal Medicine Progress Note  Dara Bradford, PGY-1    ******INCOMPLETE******    OVERNIGHT EVENTS/INTERVAL HPI:    OBJECTIVE:  Vital Signs Last 24 Hrs  T(C): 36.4 (06 Jan 2023 01:02), Max: 37.2 (05 Jan 2023 18:52)  T(F): 97.6 (06 Jan 2023 01:02), Max: 99 (05 Jan 2023 18:52)  HR: 123 (06 Jan 2023 04:00) (90 - 123)  BP: 97/51 (06 Jan 2023 04:00) (89/53 - 134/62)  BP(mean): 71 (06 Jan 2023 04:00) (66 - 91)  RR: 17 (06 Jan 2023 04:00) (17 - 28)  SpO2: 92% (06 Jan 2023 04:00) (92% - 99%)    Parameters below as of 06 Jan 2023 04:00  Patient On (Oxygen Delivery Method): room air      I&O's Detail    04 Jan 2023 07:01  -  05 Jan 2023 07:00  --------------------------------------------------------  IN:    IV PiggyBack: 30 mL    Norepinephrine: 62.9 mL    Oral Fluid: 550 mL    Phenylephrine: 208.8 mL    Phenylephrine: 22.3 mL  Total IN: 874 mL    OUT:  Total OUT: 0 mL    Total NET: 874 mL      05 Jan 2023 07:01  -  06 Jan 2023 06:35  --------------------------------------------------------  IN:    IV PiggyBack: 150 mL    Other (mL): 400 mL    Phenylephrine: 233.7 mL  Total IN: 783.7 mL    OUT:    Other (mL): 400 mL  Total OUT: 400 mL    Total NET: 383.7 mL        Physical Exam:  GENERAL: Awake, alert and interactive, no acute distress, appears comfortable  NEURO: A&Ox4, no focal deficits, 5/5 strength in all ext, reflexes 2+ throughout, CN 2-12 intact  HEENT: Normocephalic, atraumatic, no conjunctivitis or scleral icterus, oral mucosa moist, no oral lesions noted  NECK: Supple, no LAD, no JVD, thyroid not palpable  CARDIAC: Regular rate and rhythm, +S1/S2, no murmurs/rubs/gallops  PULM: Breathing comfortably on RA, clear to auscultation bilaterally, no wheezes/rales/rhonchi  ABDOMEN: Soft, nontender, nondistended, +bs, no hepatosplenomegaly, no rebound tenderness or fluid wave, no CVA tenderness  : Deferred  MSK: Range of motion grossly intact, no back tenderness  SKIN: Warm and dry, no rashes, lesions  VASC: Cap refil < 2 sec, 2+ peripheral pulses, no edema, no LE tenderness  Psych: Appropriate affect    Medications:  MEDICATIONS  (STANDING):  atorvastatin 40 milliGRAM(s) Oral at bedtime  chlorhexidine 4% Liquid 1 Application(s) Topical <User Schedule>  collagenase Ointment 1 Application(s) Topical daily  heparin   Injectable 5000 Unit(s) SubCutaneous every 8 hours  meropenem  IVPB 500 milliGRAM(s) IV Intermittent every 24 hours  phenylephrine    Infusion 0.5 MICROgram(s)/kG/Min (16 mL/Hr) IV Continuous <Continuous>  sevelamer carbonate 800 milliGRAM(s) Oral three times a day with meals    MEDICATIONS  (PRN):  acetaminophen     Tablet .. 650 milliGRAM(s) Oral every 6 hours PRN Temp greater or equal to 38C (100.4F), Moderate Pain (4 - 6)  HYDROmorphone   Tablet 2 milliGRAM(s) Oral every 4 hours PRN Severe Pain (7 - 10)  HYDROmorphone  Injectable 0.25 milliGRAM(s) IV Push every 4 hours PRN Breakthrough pain      Labs:                        7.4    11.40 )-----------( 256      ( 06 Jan 2023 06:10 )             24.3     01-05    129<L>  |  92<L>  |  62<H>  ----------------------------<  78  4.5   |  23  |  8.38<H>    Ca    8.0<L>      05 Jan 2023 05:30  Phos  6.3     01-05  Mg     1.6     01-05      PT/INR - ( 06 Jan 2023 06:10 )   PT: 15.5 sec;   INR: 1.30          PTT - ( 06 Jan 2023 06:10 )  PTT:31.6 sec      COVID-19 PCR: Negative (05 Jan 2023 08:04)  COVID-19 PCR: Negative (23 Nov 2022 19:55)  COVID-19 PCR: NotDetec (19 Nov 2022 11:56)      Radiology: Reviewed OVERNIGHT EVENTS/INTERVAL HPI:     OBJECTIVE:  Vital Signs Last 24 Hrs  T(C): 36.4 (06 Jan 2023 01:02), Max: 37.2 (05 Jan 2023 18:52)  T(F): 97.6 (06 Jan 2023 01:02), Max: 99 (05 Jan 2023 18:52)  HR: 123 (06 Jan 2023 04:00) (90 - 123)  BP: 97/51 (06 Jan 2023 04:00) (89/53 - 134/62)  BP(mean): 71 (06 Jan 2023 04:00) (66 - 91)  RR: 17 (06 Jan 2023 04:00) (17 - 28)  SpO2: 92% (06 Jan 2023 04:00) (92% - 99%)    Parameters below as of 06 Jan 2023 04:00  Patient On (Oxygen Delivery Method): room air      I&O's Detail    04 Jan 2023 07:01  -  05 Jan 2023 07:00  --------------------------------------------------------  IN:    IV PiggyBack: 30 mL    Norepinephrine: 62.9 mL    Oral Fluid: 550 mL    Phenylephrine: 208.8 mL    Phenylephrine: 22.3 mL  Total IN: 874 mL    OUT:  Total OUT: 0 mL    Total NET: 874 mL      05 Jan 2023 07:01  -  06 Jan 2023 06:35  --------------------------------------------------------  IN:    IV PiggyBack: 150 mL    Other (mL): 400 mL    Phenylephrine: 233.7 mL  Total IN: 783.7 mL    OUT:    Other (mL): 400 mL  Total OUT: 400 mL    Total NET: 383.7 mL        Physical Exam:  GENERAL: Awake, alert and interactive, no acute distress, appears comfortable  NEURO: A&Ox4, no focal deficits, 5/5 strength in all ext, reflexes 2+ throughout, CN 2-12 intact  HEENT: Normocephalic, atraumatic, no conjunctivitis or scleral icterus, oral mucosa moist, no oral lesions noted  NECK: Supple, no LAD, no JVD, thyroid not palpable  CARDIAC: Regular rate and rhythm, +S1/S2, no murmurs/rubs/gallops  PULM: Breathing comfortably on RA, clear to auscultation bilaterally, no wheezes/rales/rhonchi  ABDOMEN: Soft, nontender, nondistended, +bs, no hepatosplenomegaly, no rebound tenderness or fluid wave, no CVA tenderness  : Deferred  MSK: Range of motion grossly intact, no back tenderness  SKIN: Warm and dry, no rashes, lesions  VASC: Cap refil < 2 sec, 2+ peripheral pulses, no edema, no LE tenderness  Psych: Appropriate affect    Medications:  MEDICATIONS  (STANDING):  atorvastatin 40 milliGRAM(s) Oral at bedtime  chlorhexidine 4% Liquid 1 Application(s) Topical <User Schedule>  collagenase Ointment 1 Application(s) Topical daily  heparin   Injectable 5000 Unit(s) SubCutaneous every 8 hours  meropenem  IVPB 500 milliGRAM(s) IV Intermittent every 24 hours  phenylephrine    Infusion 0.5 MICROgram(s)/kG/Min (16 mL/Hr) IV Continuous <Continuous>  sevelamer carbonate 800 milliGRAM(s) Oral three times a day with meals    MEDICATIONS  (PRN):  acetaminophen     Tablet .. 650 milliGRAM(s) Oral every 6 hours PRN Temp greater or equal to 38C (100.4F), Moderate Pain (4 - 6)  HYDROmorphone   Tablet 2 milliGRAM(s) Oral every 4 hours PRN Severe Pain (7 - 10)  HYDROmorphone  Injectable 0.25 milliGRAM(s) IV Push every 4 hours PRN Breakthrough pain      Labs:                        7.4    11.40 )-----------( 256      ( 06 Jan 2023 06:10 )             24.3     01-05    129<L>  |  92<L>  |  62<H>  ----------------------------<  78  4.5   |  23  |  8.38<H>    Ca    8.0<L>      05 Jan 2023 05:30  Phos  6.3     01-05  Mg     1.6     01-05      PT/INR - ( 06 Jan 2023 06:10 )   PT: 15.5 sec;   INR: 1.30          PTT - ( 06 Jan 2023 06:10 )  PTT:31.6 sec      COVID-19 PCR: Negative (05 Jan 2023 08:04)  COVID-19 PCR: Negative (23 Nov 2022 19:55)  COVID-19 PCR: NotDetec (19 Nov 2022 11:56)      Radiology: Reviewed OVERNIGHT EVENTS/INTERVAL HPI: Patient was hypotensive overnight, BP's via A line low to 60s/40s in AM, increased phenylephrine.     SUBJECTIVE: Patient examined at bedside, appears tired, states she feels "disoriented" and like she has a "rotten belly" but cannot specify the type of abdominal pain. She denies HA, chest pain, palpitations, dyspnea,     OBJECTIVE:  Vital Signs Last 24 Hrs  T(C): 36.4 (06 Jan 2023 01:02), Max: 37.2 (05 Jan 2023 18:52)  T(F): 97.6 (06 Jan 2023 01:02), Max: 99 (05 Jan 2023 18:52)  HR: 123 (06 Jan 2023 04:00) (90 - 123)  BP: 97/51 (06 Jan 2023 04:00) (89/53 - 134/62)  BP(mean): 71 (06 Jan 2023 04:00) (66 - 91)  RR: 17 (06 Jan 2023 04:00) (17 - 28)  SpO2: 92% (06 Jan 2023 04:00) (92% - 99%)    Parameters below as of 06 Jan 2023 04:00  Patient On (Oxygen Delivery Method): room air      I&O's Detail    04 Jan 2023 07:01  -  05 Jan 2023 07:00  --------------------------------------------------------  IN:    IV PiggyBack: 30 mL    Norepinephrine: 62.9 mL    Oral Fluid: 550 mL    Phenylephrine: 208.8 mL    Phenylephrine: 22.3 mL  Total IN: 874 mL    OUT:  Total OUT: 0 mL    Total NET: 874 mL      05 Jan 2023 07:01  -  06 Jan 2023 06:35  --------------------------------------------------------  IN:    IV PiggyBack: 150 mL    Other (mL): 400 mL    Phenylephrine: 233.7 mL  Total IN: 783.7 mL    OUT:    Other (mL): 400 mL  Total OUT: 400 mL    Total NET: 383.7 mL        Physical Exam:  GENERAL: Awake, alert and interactive, no acute distress, appears comfortable  NEURO: A&Ox4, no focal deficits, 5/5 strength in all ext, reflexes 2+ throughout, CN 2-12 intact  HEENT: Normocephalic, atraumatic, no conjunctivitis or scleral icterus, oral mucosa moist, no oral lesions noted  NECK: Supple, no LAD, no JVD, thyroid not palpable  CARDIAC: Regular rate and rhythm, +S1/S2, no murmurs/rubs/gallops  PULM: Breathing comfortably on RA, clear to auscultation bilaterally, no wheezes/rales/rhonchi  ABDOMEN: Soft, nontender, nondistended, +bs, no hepatosplenomegaly, no rebound tenderness or fluid wave, no CVA tenderness  : Deferred  MSK: Range of motion grossly intact, no back tenderness  SKIN: Warm and dry, no rashes, lesions  VASC: Cap refil < 2 sec, 2+ peripheral pulses, no edema, no LE tenderness  Psych: Appropriate affect    Medications:  MEDICATIONS  (STANDING):  atorvastatin 40 milliGRAM(s) Oral at bedtime  chlorhexidine 4% Liquid 1 Application(s) Topical <User Schedule>  collagenase Ointment 1 Application(s) Topical daily  heparin   Injectable 5000 Unit(s) SubCutaneous every 8 hours  meropenem  IVPB 500 milliGRAM(s) IV Intermittent every 24 hours  phenylephrine    Infusion 0.5 MICROgram(s)/kG/Min (16 mL/Hr) IV Continuous <Continuous>  sevelamer carbonate 800 milliGRAM(s) Oral three times a day with meals    MEDICATIONS  (PRN):  acetaminophen     Tablet .. 650 milliGRAM(s) Oral every 6 hours PRN Temp greater or equal to 38C (100.4F), Moderate Pain (4 - 6)  HYDROmorphone   Tablet 2 milliGRAM(s) Oral every 4 hours PRN Severe Pain (7 - 10)  HYDROmorphone  Injectable 0.25 milliGRAM(s) IV Push every 4 hours PRN Breakthrough pain      Labs:                        7.4    11.40 )-----------( 256      ( 06 Jan 2023 06:10 )             24.3     01-05    129<L>  |  92<L>  |  62<H>  ----------------------------<  78  4.5   |  23  |  8.38<H>    Ca    8.0<L>      05 Jan 2023 05:30  Phos  6.3     01-05  Mg     1.6     01-05      PT/INR - ( 06 Jan 2023 06:10 )   PT: 15.5 sec;   INR: 1.30          PTT - ( 06 Jan 2023 06:10 )  PTT:31.6 sec      COVID-19 PCR: Negative (05 Jan 2023 08:04)  COVID-19 PCR: Negative (23 Nov 2022 19:55)  COVID-19 PCR: NotDetec (19 Nov 2022 11:56)      Radiology: Reviewed OVERNIGHT EVENTS/INTERVAL HPI: Patient was hypotensive overnight, BP's via A line low to 60s/40s in AM, increased phenylephrine.     SUBJECTIVE: Patient examined at bedside, appears tired, states she feels "disoriented" and like she has a "rotten belly" but cannot specify the type of abdominal pain. She denies HA, chest pain, palpitations, nausea, dyspnea, diarrhea.     OBJECTIVE:  Vital Signs Last 24 Hrs  T(C): 36.4 (06 Jan 2023 01:02), Max: 37.2 (05 Jan 2023 18:52)  T(F): 97.6 (06 Jan 2023 01:02), Max: 99 (05 Jan 2023 18:52)  HR: 123 (06 Jan 2023 04:00) (90 - 123)  BP: 97/51 (06 Jan 2023 04:00) (89/53 - 134/62)  BP(mean): 71 (06 Jan 2023 04:00) (66 - 91)  RR: 17 (06 Jan 2023 04:00) (17 - 28)  SpO2: 92% (06 Jan 2023 04:00) (92% - 99%)    Parameters below as of 06 Jan 2023 04:00  Patient On (Oxygen Delivery Method): room air      I&O's Detail    04 Jan 2023 07:01  -  05 Jan 2023 07:00  --------------------------------------------------------  IN:    IV PiggyBack: 30 mL    Norepinephrine: 62.9 mL    Oral Fluid: 550 mL    Phenylephrine: 208.8 mL    Phenylephrine: 22.3 mL  Total IN: 874 mL    OUT:  Total OUT: 0 mL    Total NET: 874 mL      05 Jan 2023 07:01  -  06 Jan 2023 06:35  --------------------------------------------------------  IN:    IV PiggyBack: 150 mL    Other (mL): 400 mL    Phenylephrine: 233.7 mL  Total IN: 783.7 mL    OUT:    Other (mL): 400 mL  Total OUT: 400 mL    Total NET: 383.7 mL      Physical Exam:  GENERAL: awake, lethargic but arousable  NEURO: A&Ox4  HEENT: no conjunctivitis or scleral icterus, oral mucosa moist  NECK: Supple, no JVD  CARDIAC: tachycardic, regular rhythm, +S1/S2, no murmurs/rubs/gallops  PULM: Breathing comfortably on RA, clear to auscultation bilaterally, no wheezes/rales/rhonchi, HD catheter R chest  ABDOMEN: Soft, obese, tender to deep palpation of LLQ/RLQ, nondistended, +bs, no rebound tenderness or fluid wave, ventral hernia non reducible   BREAST: peau d'orange appearance of lateral R breast, large immobile R breast mass with irregular borders approx 4cm lateral to nipple measuring est 8cm x 5cm, R axillary lymphadenopathy, L breast with smaller mass just lateral to nipple line also tender to palpation  SKIN: UE/LE warm to touch, peripheral pulses nonpalpable, 3cm dehisced wound nonpurulent nonerythematous R medial upper arm near axilla  VASC: no edema, no LE tenderness, triple lumen catheter at R groin, L axillary arterial line    Medications:  MEDICATIONS  (STANDING):  atorvastatin 40 milliGRAM(s) Oral at bedtime  chlorhexidine 4% Liquid 1 Application(s) Topical <User Schedule>  collagenase Ointment 1 Application(s) Topical daily  heparin   Injectable 5000 Unit(s) SubCutaneous every 8 hours  meropenem  IVPB 500 milliGRAM(s) IV Intermittent every 24 hours  phenylephrine    Infusion 0.5 MICROgram(s)/kG/Min (16 mL/Hr) IV Continuous <Continuous>  sevelamer carbonate 800 milliGRAM(s) Oral three times a day with meals    MEDICATIONS  (PRN):  acetaminophen     Tablet .. 650 milliGRAM(s) Oral every 6 hours PRN Temp greater or equal to 38C (100.4F), Moderate Pain (4 - 6)  HYDROmorphone   Tablet 2 milliGRAM(s) Oral every 4 hours PRN Severe Pain (7 - 10)  HYDROmorphone  Injectable 0.25 milliGRAM(s) IV Push every 4 hours PRN Breakthrough pain      Labs:                        7.4    11.40 )-----------( 256      ( 06 Jan 2023 06:10 )             24.3     01-05    129<L>  |  92<L>  |  62<H>  ----------------------------<  78  4.5   |  23  |  8.38<H>    Ca    8.0<L>      05 Jan 2023 05:30  Phos  6.3     01-05  Mg     1.6     01-05      PT/INR - ( 06 Jan 2023 06:10 )   PT: 15.5 sec;   INR: 1.30          PTT - ( 06 Jan 2023 06:10 )  PTT:31.6 sec      COVID-19 PCR: Negative (05 Jan 2023 08:04)  COVID-19 PCR: Negative (23 Nov 2022 19:55)  COVID-19 PCR: NotDetec (19 Nov 2022 11:56)

## 2023-01-06 NOTE — PROGRESS NOTE ADULT - SUBJECTIVE AND OBJECTIVE BOX
SUBJECTIVE: Pt seen and examined at bedside this am by surgery team. Had hemodialysis yesterday. s/p IR drainage of RUE seroma, cultures no growth to date. Blood cultures no growth to date. Gallium scan pending. Afebrile overnight, tachycardic 102-123, on phenylephrine. Complains of racing heart and b/l breast pain.     Vital Signs Last 24 Hrs  T(C): 37.1 (06 Jan 2023 06:04), Max: 37.2 (05 Jan 2023 18:52)  T(F): 98.8 (06 Jan 2023 06:04), Max: 99 (05 Jan 2023 18:52)  HR: 120 (06 Jan 2023 07:00) (90 - 123)  BP: 137/71 (06 Jan 2023 07:00) (89/53 - 137/71)  BP(mean): 85 (06 Jan 2023 07:00) (66 - 97)  RR: 22 (06 Jan 2023 07:00) (17 - 28)  SpO2: 96% (06 Jan 2023 07:00) (92% - 99%)    Parameters below as of 06 Jan 2023 07:00  Patient On (Oxygen Delivery Method): room air        PHYSICAL EXAM:   Gen: AA&Ox3, appears uncomfortable   CV: Tachycardic 120s, on phenylephrine, sinus rhythm  Pulm: no respiratory distress on RA  Breasts: b/l breast tenderness, soft with underlying firmness, no purulence   Abd: soft, ND, NTTP, no rebound or guarding, ventral hernia appreciated   Ext: WWP    I&O's Detail    05 Jan 2023 07:01  -  06 Jan 2023 07:00  --------------------------------------------------------  IN:    IV PiggyBack: 150 mL    Other (mL): 400 mL    Phenylephrine: 421.7 mL  Total IN: 971.7 mL    OUT:    Other (mL): 400 mL  Total OUT: 400 mL    Total NET: 571.7 mL      06 Jan 2023 07:01  -  06 Jan 2023 07:59  --------------------------------------------------------  IN:    Phenylephrine: 22 mL    Sodium Chloride 0.9% Bolus: 500 mL  Total IN: 522 mL    OUT:  Total OUT: 0 mL    Total NET: 522 mL          LABS:                        7.4    11.40 )-----------( 256      ( 06 Jan 2023 06:10 )             24.3     01-06    134<L>  |  94<L>  |  40<H>  ----------------------------<  118<H>  4.0   |  26  |  5.84<H>    Ca    8.0<L>      06 Jan 2023 06:10  Phos  3.9     01-06  Mg     1.6     01-06    TPro  5.8<L>  /  Alb  2.4<L>  /  TBili  0.3  /  DBili  x   /  AST  22  /  ALT  14  /  AlkPhos  94  01-06    LIVER FUNCTIONS - ( 06 Jan 2023 06:10 )  Alb: 2.4 g/dL / Pro: 5.8 g/dL / ALK PHOS: 94 U/L / ALT: 14 U/L / AST: 22 U/L / GGT: x           PT/INR - ( 06 Jan 2023 06:10 )   PT: 15.5 sec;   INR: 1.30          PTT - ( 06 Jan 2023 06:10 )  PTT:31.6 sec  CAPILLARY BLOOD GLUCOSE          RADIOLOGY & ADDITIONAL STUDIES:

## 2023-01-06 NOTE — PROGRESS NOTE ADULT - SUBJECTIVE AND OBJECTIVE BOX
INTERVAL HPI/OVERNIGHT EVENTS:    OVERNIGHT: IR drainage of the seroma    SUBJECTIVE: Patient seen and examined at bedside.     Allergies    Ancef (Rash; Urticaria)  DDAVP (Hypotension)  iodine (Hives; Pruritus)  penicillin (Swelling)  sulfa drugs (Angioedema)    Intolerances    ANTIBIOTICS/RELEVANT:  antimicrobials  meropenem  IVPB 500 milliGRAM(s) IV Intermittent every 24 hours    immunologic:    OTHER:  acetaminophen     Tablet .. 650 milliGRAM(s) Oral every 6 hours PRN  atorvastatin 40 milliGRAM(s) Oral at bedtime  chlorhexidine 2% Cloths 1 Application(s) Topical <User Schedule>  collagenase Ointment 1 Application(s) Topical daily  heparin   Injectable 5000 Unit(s) SubCutaneous every 8 hours  HYDROmorphone   Tablet 2 milliGRAM(s) Oral every 4 hours PRN  HYDROmorphone  Injectable 0.25 milliGRAM(s) IV Push every 4 hours PRN  phenylephrine    Infusion 4 MICROgram(s)/kG/Min IV Continuous <Continuous>  sevelamer carbonate 800 milliGRAM(s) Oral three times a day with meals    Objective:  Vital Signs Last 24 Hrs  T(C): 36.8 (06 Jan 2023 09:47), Max: 37.2 (05 Jan 2023 18:52)  T(F): 98.2 (06 Jan 2023 09:47), Max: 99 (05 Jan 2023 18:52)  HR: 74 (06 Jan 2023 14:00) (74 - 123)  BP: 144/63 (06 Jan 2023 13:00) (88/42 - 171/67)  BP(mean): 90 (06 Jan 2023 13:00) (61 - 97)  RR: 21 (06 Jan 2023 14:00) (17 - 33)  SpO2: 93% (06 Jan 2023 14:00) (90% - 99%)    Parameters below as of 06 Jan 2023 14:00  Patient On (Oxygen Delivery Method): nasal cannula  O2 Flow (L/min): 6    PHYSICAL EXAM:    General:  Ill-appearing, in pain from L breast  HEENT:  NC, PERRL, sclerae anicteric, conjunctivae clear.  Sinuses nontender, no nasal exudate.  No buccal or pharyngeal lesions, erythema or exudate  Neck:  Supple, no adenopathy  Chest: R upper chest HD catheter, exit site without purulence or warmth  Breasts:  ~8 cm mass L lateral breast with superficial abrasions of skin with scant clear drainage, tender, no warmth  Lungs:  Clear to auscultation anteriorly  Cor:  RRR, S1, S2, no murmur appreciated  Abd:  Large ventral hernia, normoactive BS.  Soft, nontender, no masses, guarding or rebound.  Liver and spleen not enlarged  Extrem: RUE AVG - nontender  Skin:  No rashes.    LABS:                        7.4    11.40 )-----------( 256      ( 06 Jan 2023 06:10 )             24.3     01-06    134<L>  |  94<L>  |  40<H>  ----------------------------<  118<H>  4.0   |  26  |  5.84<H>    Ca    8.0<L>      06 Jan 2023 06:10  Phos  3.9     01-06  Mg     1.6     01-06    TPro  5.8<L>  /  Alb  2.4<L>  /  TBili  0.3  /  DBili  x   /  AST  22  /  ALT  14  /  AlkPhos  94  01-06    PT/INR - ( 06 Jan 2023 06:10 )   PT: 15.5 sec;   INR: 1.30          PTT - ( 06 Jan 2023 06:10 )  PTT:31.6 sec      RECENT CULTURES:  01-05 @ 14:45  .Body Fluid right arm collection  --  --  --    No growth to date  --  01-03 @ 17:25  .Blood Blood-Peripheral  --  --  --    No growth at 2 days.  --      RADIOLOGY & ADDITIONAL STUDIES:

## 2023-01-06 NOTE — PROGRESS NOTE ADULT - SUBJECTIVE AND OBJECTIVE BOX
Subjective:  Pt seen and examined this AM. HD yesterday. Pt now s/p IR drainage of RUE seroma 7ccs- CX NGTD. Peripheral blood cultures-NGTD. Afebrile overnight, tachycardic 102-123, on phenylephrine. No complaints this am. Pt is still pending gallium scan.     ROS:   Denies Headache, blurred vision, Chest Pain, SOB, Abdominal pain, nausea or vomiting     Social   meropenem  IVPB 500  heparin   Injectable 5000  meropenem  IVPB 500  phenylephrine    Infusion 0.5      Allergies    Ancef (Rash; Urticaria)  DDAVP (Hypotension)  iodine (Hives; Pruritus)  penicillin (Swelling)  sulfa drugs (Angioedema)    Intolerances        Vital Signs Last 24 Hrs  T(C): 37.1 (06 Jan 2023 06:04), Max: 37.2 (05 Jan 2023 18:52)  T(F): 98.8 (06 Jan 2023 06:04), Max: 99 (05 Jan 2023 18:52)  HR: 115 (06 Jan 2023 08:00) (92 - 123)  BP: 88/42 (06 Jan 2023 08:00) (88/42 - 137/71)  BP(mean): 61 (06 Jan 2023 08:00) (61 - 97)  RR: 26 (06 Jan 2023 08:00) (17 - 33)  SpO2: 96% (06 Jan 2023 08:00) (92% - 99%)    Parameters below as of 06 Jan 2023 08:00  Patient On (Oxygen Delivery Method): room air      I&O's Summary    05 Jan 2023 07:01  -  06 Jan 2023 07:00  --------------------------------------------------------  IN: 971.7 mL / OUT: 400 mL / NET: 571.7 mL    06 Jan 2023 07:01  -  06 Jan 2023 09:10  --------------------------------------------------------  IN: 522 mL / OUT: 0 mL / NET: 522 mL        Physical Exam:  General: NAD, resting comfortably in bed  C/V: S1 s2, RRR  Pulm: Nonlabored breathing, no respiratory distress  Abd: Soft, NTND  Extrem: WWP; right middle upper arm wound with granular/fibrotic base, no drainage no obvious signs of infection, unchanged from prior exam       LABS:                        7.4    11.40 )-----------( 256      ( 06 Jan 2023 06:10 )             24.3     01-06    134<L>  |  94<L>  |  40<H>  ----------------------------<  118<H>  4.0   |  26  |  5.84<H>    Ca    8.0<L>      06 Jan 2023 06:10  Phos  3.9     01-06  Mg     1.6     01-06    TPro  5.8<L>  /  Alb  2.4<L>  /  TBili  0.3  /  DBili  x   /  AST  22  /  ALT  14  /  AlkPhos  94  01-06    PT/INR - ( 06 Jan 2023 06:10 )   PT: 15.5 sec;   INR: 1.30          PTT - ( 06 Jan 2023 06:10 )  PTT:31.6 sec      A/P: 67y YO Female  68 yo F with PMH HFrEF (EF 40%), nonobstructive CAD, HTN, HLD, ESRD 2/2 PCKD on HD, PE (2018) s/p IVC filter, PAD, OA, h/o thrombus in vascular access x3 ( R AVG, R AVF, L AVG), ventral hernia, 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 CT showing 7.0 cm fluid collection is present near an AV fistula in the right   upper arm. Vascular consulted for RUE fistula evaluation to r/o source of sepsis. On PE no erythema, increased warmth or drainage to RUE wound. Low suspicion for graft as source of infection. Pt s/p IR drainage of RUE av fistula collection.     Recommendations:  -F/u Gallium scan to determine infection source  -F/u blood cultures NG @ 2 days  -Wound care recs: BID dressing changes (once daily with Santyl DSD once daily with Bactroban DSD), wound dressed with Santyl 1/6 AM  -F/u cx from IR drainage -NGTD   -Rest of care per primary team  -Vascular surgery Team 3C will continue to follow. Please call [phone] with any questions or concerns.     Subjective:  Pt seen and examined this AM. HD yesterday. Pt now s/p IR drainage of RUE seroma 7ccs- CX NGTD. Peripheral blood cultures-NGTD. Afebrile overnight, tachycardic 102-123, on phenylephrine. No complaints this am. Pt is still pending gallium scan.     ROS:   Denies Headache, blurred vision, Chest Pain, SOB, Abdominal pain, nausea or vomiting     Social   meropenem  IVPB 500  heparin   Injectable 5000  meropenem  IVPB 500  phenylephrine    Infusion 0.5      Allergies    Ancef (Rash; Urticaria)  DDAVP (Hypotension)  iodine (Hives; Pruritus)  penicillin (Swelling)  sulfa drugs (Angioedema)    Intolerances        Vital Signs Last 24 Hrs  T(C): 37.1 (06 Jan 2023 06:04), Max: 37.2 (05 Jan 2023 18:52)  T(F): 98.8 (06 Jan 2023 06:04), Max: 99 (05 Jan 2023 18:52)  HR: 115 (06 Jan 2023 08:00) (92 - 123)  BP: 88/42 (06 Jan 2023 08:00) (88/42 - 137/71)  BP(mean): 61 (06 Jan 2023 08:00) (61 - 97)  RR: 26 (06 Jan 2023 08:00) (17 - 33)  SpO2: 96% (06 Jan 2023 08:00) (92% - 99%)    Parameters below as of 06 Jan 2023 08:00  Patient On (Oxygen Delivery Method): room air      I&O's Summary    05 Jan 2023 07:01  -  06 Jan 2023 07:00  --------------------------------------------------------  IN: 971.7 mL / OUT: 400 mL / NET: 571.7 mL    06 Jan 2023 07:01  -  06 Jan 2023 09:10  --------------------------------------------------------  IN: 522 mL / OUT: 0 mL / NET: 522 mL        Physical Exam:  General: NAD, resting comfortably in bed  C/V: S1 s2, RRR  Pulm: Nonlabored breathing, no respiratory distress  Abd: Soft, NTND  Extrem: WWP; right middle upper arm wound with granular/fibrotic base, no drainage no obvious signs of infection, unchanged from prior exam       LABS:                        7.4    11.40 )-----------( 256      ( 06 Jan 2023 06:10 )             24.3     01-06    134<L>  |  94<L>  |  40<H>  ----------------------------<  118<H>  4.0   |  26  |  5.84<H>    Ca    8.0<L>      06 Jan 2023 06:10  Phos  3.9     01-06  Mg     1.6     01-06    TPro  5.8<L>  /  Alb  2.4<L>  /  TBili  0.3  /  DBili  x   /  AST  22  /  ALT  14  /  AlkPhos  94  01-06    PT/INR - ( 06 Jan 2023 06:10 )   PT: 15.5 sec;   INR: 1.30          PTT - ( 06 Jan 2023 06:10 )  PTT:31.6 sec      A/P:   68 yo F with PMH HFrEF (EF 40%), nonobstructive CAD, HTN, HLD, ESRD 2/2 PCKD on HD, PE (2018) s/p IVC filter, PAD, OA, h/o thrombus in vascular access x3 ( R AVG, R AVF, L AVG), ventral hernia, 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 CT showing 7.0 cm fluid collection is present near an AV fistula in the right   upper arm. Vascular consulted for RUE fistula evaluation to r/o source of sepsis. On PE no erythema, increased warmth or drainage to RUE wound. Low suspicion for graft as source of infection. Pt s/p IR drainage of RUE av fistula collection.     Recommendations:  -F/u Gallium scan to determine infection source  -F/u blood cultures NG @ 2 days  -Wound care recs: BID dressing changes (once daily with Santyl DSD once daily with Bactroban DSD), wound dressed with Santyl 1/6 AM  -F/u cx from IR drainage -NGTD   -Rest of care per primary team  -Vascular surgery Team 3C will continue to follow. Please call [phone] with any questions or concerns.

## 2023-01-06 NOTE — PROGRESS NOTE ADULT - ATTENDING COMMENTS
Patient underwent fluid aspiration of R arm - 10cc came out, culture so far ngtd.  Low suspicion for abscess - likely seroma.  Patient remains on jarvis, hypotensive.  Unclear if this is all due to septic shock or something else is going on.  Awaiting gallium scan to look for the source of infection.  Cont empiric vanc/jay.      Team1  will follow you.  Case d/w primary team.    Nora Naranjo MD, MS  Infectious Disease attending  work cell [phone]   For any questions during evening/weekend/holiday, please page ID on call

## 2023-01-06 NOTE — PROGRESS NOTE ADULT - ASSESSMENT
68 yo F with HTN, HLD, CAD, HFrEF, ESRD (PCKD) on HD, PE (2018) s/p IVC filter, PAD, OA, h/o thrombus in vascular access X 3, ventral hernia, osteoclastoma with septic shock, initially refractory, now off pressors on vancomycin and meropenem.  Never febrile, but may not be able to mount, ongoing leukocytosis.      Source not clear, concern for CLABSI, though no blood cultures were sent until after she had received antibiotics for 36h.  No clear evidence for pulmonary source, abd exam is benign, per Surgery, unlikely to be mastitis. Patient has a fluid collection R upper arm - US suggestive of hematoma vs. seroma near AVF.     - Continue to f/u blood cultures X 2 from 1/3- NGTD  - FU gallium scan  - Continue vancomycin - would dose by trough prior to HD.  If <10, would give 1 g, 10-17.5 give 750 mg, 17.6-25, give 500 mg after HD, >25 Hold Vancomycin. Please give Vancomycin after HD is completed   - FU cultures from IR drainage 1/5- NGTD  - Continue meropenem 500 mg IV q24h    ID will continue to follow

## 2023-01-06 NOTE — PROGRESS NOTE ADULT - ASSESSMENT
67 F with ESRD on HD presenting in setting of missed HD found to by hyperkalemic then c/b septic shock from possible fluid collection near RUE AVF now s/p drainage with IR on 1/5.    Assessment/Plan:   #ESRD on HD TTS  HD on 1/6  Electrolytes noted K 4.0, Sodium 134  UF with HD  Renal diet    #Hx of Hypertension  Overnight worsening hypotension  -Restarted on pressors, titration as per ICU    #access   [facility] TDC - does not appear to be infected or source of infxn  RUE AVF not being used    #anemia  Hgb 7.4  -Epo w/ HD  -Check iron profile and ferritin    #renal bone disease   Ca ~8.0, corrected for albumin wnl  Phos ~3.9  -c/w sevelamer 800mg tid w/ meals      Thank you for the opportunity to participate in the care of your patient. The nephrology service remains available to assist with any questions or concerns. Please feel free to reach us by paging the on-call nephrology fellow for urgent issues or as below.     John Lisa D.O.  PGY-5, Nephrology Fellow    P: [phone]

## 2023-01-06 NOTE — PROGRESS NOTE ADULT - ATTENDING COMMENTS
66 yo woman with ESRD on HD admitted with sepsis.  tolerated dialysis well yesterday, but dip in BP overnight and this AM- improved after IVF bolus  s/p drainage of RUE collection by IR, yesterday 1/5-   No HD planned for today  anticipate next HD treatment for tomorrow, 1/7

## 2023-01-06 NOTE — PROGRESS NOTE ADULT - ATTENDING COMMENTS
HFrEF, CAD, HTN, PCK on HD with severe breast pain, persistent hypotension with possible septic shock, hyperparathyroidism, thyroid mass  physical as above  persistent hypotension requiring pressors  VTI 28 suggesting she does not need more fluid  gallium scan today; the aspirated RUE fluid near the fistula appears sterile  will need thyroid nodules biopsied  PET-CT as suggested by endocrinology  HD tomorrow    It is not clear if this is septic shock and the breast pain which we are treating does not seem related. We are doing an ACTH stimulation test as well today to look for other reasons for the hypotension. Likely will start midodrine tomorrow.

## 2023-01-06 NOTE — PROVIDER CONTACT NOTE (CHANGE IN STATUS NOTIFICATION) - SITUATION
receiving HD at the bedside. 0.5mg dilaudid given HD access for pain. patient becoming lethargic after pain med. Pt. tachy in the 120 -130s now. Unable to read SpO2  ( unable to read Sp02 in the ER too) provider aware.
medical team notified for arterial pressure in the 60s systolic, maps in 40s. NBP on leg had map higher by 30 points . arterial line was leveled, zeroed , flushed, positive blood return . team wanted to go by aterial pressures instead of NBP. for these reasons, pressor requirements increased . pt mentation is stable, [id number]
patient still very lethargic s/p HD and dilaudid. Only responsive to pain, not following commands.
pt. very lethargic, not following commands or verbally answer questions.

## 2023-01-06 NOTE — PROGRESS NOTE ADULT - SUBJECTIVE AND OBJECTIVE BOX
--------------------------------------------------------------------------------  Chief Complaint: ESRD/Ongoing hemodialysis requirement    24 hour events/subjective:  Seen this AM, started on phenylephrine due to hypotension this AM. Tolerated HD well yesterday with 0UF. Went for drainage with IR  yesterday, do not suspect TDC to be a source of infxn    PAST HISTORY  --------------------------------------------------------------------------------  No significant changes to PMH, PSH, FHx, SHx, unless otherwise noted    ALLERGIES & MEDICATIONS  --------------------------------------------------------------------------------  Allergies    Ancef (Rash; Urticaria)  DDAVP (Hypotension)  iodine (Hives; Pruritus)  penicillin (Swelling)  sulfa drugs (Angioedema)    Intolerances      Standing Inpatient Medications  atorvastatin 40 milliGRAM(s) Oral at bedtime  chlorhexidine 4% Liquid 1 Application(s) Topical <User Schedule>  collagenase Ointment 1 Application(s) Topical daily  heparin   Injectable 5000 Unit(s) SubCutaneous every 8 hours  meropenem  IVPB 500 milliGRAM(s) IV Intermittent every 24 hours  phenylephrine    Infusion 0.5 MICROgram(s)/kG/Min IV Continuous <Continuous>  sevelamer carbonate 800 milliGRAM(s) Oral three times a day with meals    PRN Inpatient Medications  acetaminophen     Tablet .. 650 milliGRAM(s) Oral every 6 hours PRN  HYDROmorphone   Tablet 2 milliGRAM(s) Oral every 4 hours PRN  HYDROmorphone  Injectable 0.25 milliGRAM(s) IV Push every 4 hours PRN      REVIEW OF SYSTEMS  --------------------------------------------------------------------------------  All other systems were reviewed and are negative, except as noted.    VITALS/PHYSICAL EXAM  --------------------------------------------------------------------------------  T(C): 36.8 (01-06-23 @ 09:47), Max: 37.2 (01-05-23 @ 18:52)  HR: 115 (01-06-23 @ 08:00) (97 - 123)  BP: 88/42 (01-06-23 @ 08:00) (88/42 - 137/71)  RR: 26 (01-06-23 @ 08:00) (17 - 33)  SpO2: 96% (01-06-23 @ 08:00) (92% - 99%)  Wt(kg): --  Drug Dosing Weight  Height (cm): 177.8 (01 Jan 2023 20:52)  Weight (kg): 85.3 (01 Jan 2023 20:52)  BMI (kg/m2): 27 (01 Jan 2023 20:52)  BSA (m2): 2.03 (01 Jan 2023 20:52)        01-05-23 @ 07:01  -  01-06-23 @ 07:00  --------------------------------------------------------  IN: 971.7 mL / OUT: 400 mL / NET: 571.7 mL    01-06-23 @ 07:01  -  01-06-23 @ 10:26  --------------------------------------------------------  IN: 522 mL / OUT: 0 mL / NET: 522 mL    PHYSICAL EXAM:  GENERAL: Alert, awake, oriented x3 on RA, mild distress  HEENT: ZOHAIB, EOMI, neck supple, no JVP MMM  CHEST/LUNG: Bilateral clear breath sounds  HEART: Regular rate and rhythm, no murmur, no gallops, no rub   ABDOMEN: Soft, nontender, non distended  : No flank or supra pubic tenderness.  EXTREMITIES: no pedal edema, cold extremities   Neurology: AAOx3, no asterixis   SKIN: No rash or skin lesion  ACCESS: [facility] c/d/i, RUE AVF has not been accessed in years    LABS/STUDIES  --------------------------------------------------------------------------------              7.4    11.40 >-----------<  256      [01-06-23 @ 06:10]              24.3     134  |  94  |  40  ----------------------------<  118      [01-06-23 @ 06:10]  4.0   |  26  |  5.84        Ca     8.0     [01-06-23 @ 06:10]      Mg     1.6     [01-06-23 @ 06:10]      Phos  3.9     [01-06-23 @ 06:10]    TPro  5.8  /  Alb  2.4  /  TBili  0.3  /  DBili  x   /  AST  22  /  ALT  14  /  AlkPhos  94  [01-06-23 @ 06:10]    PT/INR: PT 15.5 , INR 1.30       [01-06-23 @ 06:10]  PTT: 31.6       [01-06-23 @ 06:10]      PTH -- (Ca 8.8)      [01-03-23 @ 14:24]   479  Vitamin D (25OH) 38.5      [01-05-23 @ 05:30]  TSH 1.610      [01-03-23 @ 05:30]    HBsAb Nonreact      [01-02-23 @ 02:26]  HBsAg Nonreact      [01-02-23 @ 02:26]  HCV 0.04, Nonreact      [01-02-23 @ 02:26]      RADIOLOGY:  --------------------------------------------------------------------------------------

## 2023-01-06 NOTE — PROGRESS NOTE ADULT - ASSESSMENT
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 US showed no sonographic evidence of breast abscess, cyst or focal mass and CT chest (1/3) showed 6.1 cm cystic mass in the right adnexa. CT with some venous congestion in Right breast from blockage at AVF. Betty score 3.3%. Physical findings on breast exam could be secondary to edema from breast collaterals, HF, IVF, or perm cath infection. Less likely to be from inflammatory breast cancer.   s/p IR drainage of RUE seroma 1/5.     Plan:  - No acute surgical intervention at this time  - f/u gallium scan   - Recommend non-urgent mammogram when the patient is clinically stable  - 5c will continue to follow  - Plan discussed with Dr. 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 US showed no sonographic evidence of breast abscess, cyst or focal mass and CT chest (1/3) showed 6.1 cm cystic mass in the right adnexa. CT with some venous congestion in Right breast from blockage at AVF. Betty score 3.3%. Physical findings on breast exam could be secondary to edema from breast collaterals, HF, IVF, or perm cath infection. Less likely to be from inflammatory breast cancer.   s/p IR drainage of RUE seroma 1/5.     Plan:  - No acute surgical intervention at this time  - f/u gallium scan   - f/u PET-CT  - Recommend non-urgent mammogram when the patient is clinically stable  - 5c will continue to follow  - Plan discussed with Dr. Hatch

## 2023-01-06 NOTE — PROVIDER CONTACT NOTE (CHANGE IN STATUS NOTIFICATION) - NAME OF MD/NP/PA/DO NOTIFIED:
medical team, MD Constantine Hussein NP espinal
Becky Domingo
Sinus tach on tele, lethargic s/p pain medication

## 2023-01-07 NOTE — PROGRESS NOTE ADULT - SUBJECTIVE AND OBJECTIVE BOX
Patient is a 67y Female seen and evaluated at bedside. no acute events overnight /63 on midodrine 15q8 for HD today biacarb 21 K 4.4 Hgb 7.2 SCr 6.52 phos 5.4       Meds:    acetaminophen     Tablet .. 650 every 6 hours PRN  atorvastatin 40 at bedtime  chlorhexidine 2% Cloths 1 <User Schedule>  collagenase Ointment 1 daily  epoetin александр-epbx (RETACRIT) Injectable 8000 once  heparin  Infusion.  <Continuous>  HYDROmorphone   Tablet 2 every 4 hours PRN  HYDROmorphone  Injectable 0.25 every 4 hours PRN  midodrine 15 every 8 hours  phenylephrine    Infusion 4 <Continuous>  sevelamer carbonate 800 three times a day with meals      T(C): , Max: 37.4 (01-07-23 @ 09:10)  T(F): , Max: 99.3 (01-07-23 @ 09:10)  HR: 69 (01-07-23 @ 10:00)  BP: 144/63 (01-06-23 @ 13:00)  BP(mean): 90 (01-06-23 @ 13:00)  RR: 19 (01-07-23 @ 10:00)  SpO2: 100% (01-07-23 @ 10:00)  Wt(kg): --    01-06 @ 07:01 - 01-07 @ 07:00  --------------------------------------------------------  IN: 3188 mL / OUT: 0 mL / NET: 3188 mL    01-07 @ 07:01  -  01-07 @ 10:50  --------------------------------------------------------  IN: 320 mL / OUT: 0 mL / NET: 320 mL          Review of Systems:  all other ROS negative     PHYSICAL EXAM:  GENERAL: NAD resting in bed   NECK: supple, No JVD  CHEST/LUNG: Clear to auscultation bilaterally  HEART: normal S1S2, RRR  ABDOMEN: Soft, Nontender, +BS, No flank tenderness bilateral  EXTREMITIES: No clubbing, cyanosis, or edema   NEUROLOGY: AAO x3, no focal neurological deficit  ACCESS: good thrill and bruit appreciated      LABS:                        7.2    13.14 )-----------( 210      ( 07 Jan 2023 06:01 )             23.0     01-07    135  |  98  |  48<H>  ----------------------------<  129<H>  4.4   |  21<L>  |  6.52<H>    Ca    8.4      07 Jan 2023 06:01  Phos  5.4     01-07  Mg     1.8     01-07    TPro  5.8<L>  /  Alb  2.4<L>  /  TBili  0.3  /  DBili  x   /  AST  22  /  ALT  14  /  AlkPhos  94  01-06      PT/INR - ( 07 Jan 2023 06:01 )   PT: 15.2 sec;   INR: 1.27          PTT - ( 07 Jan 2023 06:01 )  PTT:30.5 sec          RADIOLOGY & ADDITIONAL STUDIES:           Patient is a 67y Female seen and evaluated at bedside. no acute events overnight /63 on midodrine 15q8 for HD today biacarb 21 K 4.4 Hgb 7.2 SCr 6.52 phos 5.4       Meds:    acetaminophen     Tablet .. 650 every 6 hours PRN  atorvastatin 40 at bedtime  chlorhexidine 2% Cloths 1 <User Schedule>  collagenase Ointment 1 daily  epoetin александр-epbx (RETACRIT) Injectable 8000 once  heparin  Infusion.  <Continuous>  HYDROmorphone   Tablet 2 every 4 hours PRN  HYDROmorphone  Injectable 0.25 every 4 hours PRN  midodrine 15 every 8 hours  phenylephrine    Infusion 4 <Continuous>  sevelamer carbonate 800 three times a day with meals      T(C): , Max: 37.4 (23 @ 09:10)  T(F): , Max: 99.3 (23 @ 09:10)  HR: 69 (23 @ 10:00)  BP: 144/63 (23 @ 13:00)  BP(mean): 90 (23 @ 13:00)  RR: 19 (23 @ 10:00)  SpO2: 100% (23 @ 10:00)  Wt(kg): --    : @ 07:00  --------------------------------------------------------  IN: 3188 mL / OUT: 0 mL / NET: 3188 mL     07:  -   @ 10:50  --------------------------------------------------------  IN: 320 mL / OUT: 0 mL / NET: 320 mL          Review of Systems:  all other ROS negative     PHYSICAL EXAM:  GENERAL: NAD resting in bed   NECK: supple, No JVD  CHEST/LUNG: Clear to auscultation bilaterally  HEART: normal S1S2, RRR  ABDOMEN: Soft, Nontender, +BS, No flank tenderness bilateral  EXTREMITIES: No clubbing, cyanosis, or edema   NEUROLOGY: AAO x3, no focal neurological deficit  ACCESS: [facility]      LABS:                        7.2    13.14 )-----------( 210      ( 2023 06:01 )             23.0         135  |  98  |  48<H>  ----------------------------<  129<H>  4.4   |  21<L>  |  6.52<H>    Ca    8.4      2023 06:01  Phos  5.4       Mg     1.8         TPro  5.8<L>  /  Alb  2.4<L>  /  TBili  0.3  /  DBili  x   /  AST  22  /  ALT  14  /  AlkPhos  94        PT/INR - ( 2023 06:01 )   PT: 15.2 sec;   INR: 1.27          PTT - ( 2023 06:01 )  PTT:30.5 sec          RADIOLOGY & ADDITIONAL STUDIES:        Ferritin, Serum: 1209 ng/mL (23 @ 06:01)  Iron Total, Serum: 15 ug/dL (23 @ 06:01)  Iron - Total Binding Capacity.: 79 ug/dL (23 @ 06:01)  % Saturation, Iron: 19 % (23 @ 06:01)    Ferritin, Serum: 1209 ng/mL (23 @ 06:01)  % Saturation, Iron: 19 % (23 @ 06:01)    epoetin александр-epbx (RETACRIT) Injectable 8000 Unit(s) IV Push once, 23 @ 07:45, Routine, Stop order after: 1 Doses  epoetin александр-epbx (RETACRIT) Injectable 8000 Unit(s) IV Push once, 23 @ 07:45, Routine, Stop order after: 1 Doses  epoetin александр-epbx (RETACRIT) Injectable 8000 Unit(s) IV Push once, 23 @ 06:45, Routine, Stop order after: 1 Doses  sevelamer carbonate 800 milliGRAM(s) Oral three times a day with meals, 23 @ 05:28, Routine    Hemodialysis Treatment.:     Schedule: Once, Modality: Hemodialysis, Access: Internal Jugular Central Venous Catheter    Dialyzer: Optiflux [id number], Time: 180 Min    Blood Flow: 400 mL/Min , Dialysate Flow: 500 mL/Min, Dialysate Temp: 36.5, Tubinmm (Adult)    Target Fluid Removal: 0 Liters    Dialysate Electrolytes (mEq/L): Potassium 2, Calcium 2.5, Sodium 138, Bicarbonate 35 (23 @ 06:44) [Active]

## 2023-01-07 NOTE — PROGRESS NOTE ADULT - ASSESSMENT
67 F with ESRD on HD presenting in setting of missed HD found to by hyperkalemic then c/b septic shock from possible fluid collection near RUE AVF now s/p drainage with IR on 1/5.    Assessment/Plan:   #ESRD on HD TTS  HD today clearance with minimal UF given borderline BPs  Electrolytes noted K 4.0, Sodium 134  UF with HD  Renal diet    #Hx of Hypertension  on midodrine  -minimal UF with HD     #access   LIJ TDC - does not appear to be infected or source of infxn  RUE AVF not being used    #anemia  Hgb 7.4  -Epo w/ HD  -Check iron profile and ferritin    #renal bone disease   Ca ~8.0, corrected for albumin wnl  Phos ~3.9  -c/w sevelamer 800mg tid w/ meals      Thank you for the opportunity to participate in the care of your patient. The nephrology service remains available to assist with any questions or concerns. Please feel free to reach us by paging the on-call nephrology fellow for urgent issues or as below.     Saumya Jo D.O  PGY-5, Nephrology Fellow    [phone]

## 2023-01-07 NOTE — PROVIDER CONTACT NOTE (OTHER) - SITUATION
FS 71, FS 84 after apple juice
pt. received from the ED, with 1 PIV. pt. complaining of pain when flushed. unable to get another PIV.
Complaining of breast pain, refusing PO tylenol. Patient stated that PO Tylenol does not help and she is to  nausea to take PO medication at this time.
Pt. FS 40, rechecked in 5 mins, FS 85
complaining of b/l breast pain, BP did not tolerate 0.25mg dilaudid, and IVPB tylenol did not help with pain.
pt. received from ED, complaining of pain round the breast.
s/p ESRD HD, hypotension
Pt. ESRD, Most recent AVF site on right arm as per patient. old AVF site on LEFT arm. ED place PIV on right hand. PIV flushes slowly, but painful as stated by patient. No blood return from PIV.
pt ordered for PRN tylenol 650 MG PO, dilaudid 2mg PO, dilaudid 0.25mg IV push. pt only accepting dilaudid iv push, refusing the rest and says that dilaudid 0.25mg partially works
Patient arterial bloodpressure lower then cuff pressure . Handed over from day shift nurse [place] to go by the non-invasive bloodpressure to titrate  the intravenous phenelphrine, same reitrated by the

## 2023-01-07 NOTE — PROVIDER CONTACT NOTE (OTHER) - ASSESSMENT
VSS, except 110s on the cardiac monitor, FS at 71, PO juice given, will recheck FS. patient complaining on breast pain 8/10
VSS, except sinus tach on the cardiac monitor.
bp on transport monitor read 78/50, HR 92, asymptomatic.  recheck bp 96/50 (59)
explained purpose of alternating between po and iv modalities of pain regimen. pt still refused dilaudid 2mg PO. medical team made aware and extensively discussed purpose of pain regimen, risks, benefits
Night  Ashley Judd aware of same
VSS, except tachy on cardiac monitor
LE[place] muñoz intact for HD, thrill felt on right arm, no thrill felt on left arm.
VSS. PAIN 10/10
VSS, except sinus tach. Complain of pain around the breast. Unable to get another PIV.

## 2023-01-07 NOTE — PROVIDER CONTACT NOTE (OTHER) - BACKGROUND
pt has diffuse breast pain. only accepting iv push dilaudid and does not want oral regimen. medical team made aware
B/l AVF grafts, Left shiley for HD
Reitrated form night doctors to go by non invasive blood-pressure reading . Arterial line was  zeroed flushed and aspirated ,
pt. given, calcium gluconate, regular insulin and sodium bicarbonate in the ER. FS to be rececked after 30 mins of given insulin. FS 85 prior given insulin

## 2023-01-07 NOTE — PROGRESS NOTE ADULT - ATTENDING COMMENTS
Chart reviewed, labs noted, case d/w Dr. Jo.  Pt seen at bedside while on HD.  On Midodrine with minimal UF.  Orders reviewed with HD nurse.  Agree with note above.

## 2023-01-07 NOTE — PROGRESS NOTE ADULT - ASSESSMENT
67F h/o ESRD due to PCKD on HD via HD cath (failedvascular access due to thrombus x 3), CAD, HTN, HLD, PE s/p IVC filter, PAD, OA, osteoclastoma, ventral hernia p/w septic shock of unknown source.  Infectious work-up so far unremarkable - fluid collection near AVF likely seroma and culture ngtd, gallium scan negative, BCx ngtd.  Patient c/o breast pain but no erythema on exam, and unlikely breast ca per team.     - stop meropenem  - cont vanco for now.  Check vanc level before HD.   If <10, would give 1 g, 10-17.5 give 750 mg, 17.6-25, give 500 mg after HD, >25 Hold Vancomycin. Please give Vancomycin after HD is completed   - f/u BCx, fluid culture    Team 1 will follow you.  Case d/w primary team.    Nora Naranjo MD, MS  Infectious Disease attending  work cell [phone]   For any questions during evening/weekend/holiday, please page ID on call

## 2023-01-07 NOTE — PROGRESS NOTE ADULT - ASSESSMENT
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     NEURO  #Pain Control  Pt in 10/10 pain of bilateral breasts, worst on R. Pain responsive to Dilaudid but effect wears off after ~2-3 hours.   - consult pain management  - Recommendations: Dilaudid 2mg po q4h PRN for severe pain, Dilaudid 0.25mg IVP q4h PRN for severe breakthrough pain, Tylenol 650mg po q6h PRN for mild pain, daily lidocaine patch, HOLD ALL OPIOIDS FOR SEDATION, RR<10, O2SAT <93%, SBP <96    PULM  #Atelectasis  Noted on CT chest to have atelectasis at b/l lung bases.  - Incentive spirometry    CARDIOVASCULAR  #Septic Shock  Pt presented meeting 2/4 SIRS. Hypotensive with MAPs to 60, requiring Levophed. Extremities cool on exam. Septic picture of unknown source. HR > 90, WBC >12. Lactate 1.9. Afebrile. Procal 12. S/p Vancomycin 1250mg x 1, Aztreonam x 1 (pt allergic to Cefazolin, PCN). .   - On Phenylephrine 5.5mcg/kg/min  - CT:  7.0 cm fluid collection is present near an AV fistula in the right upper arm. Possibly abscess. Drained by IR 1/5 with serosanguinous fluid: no organisms, few WBCs  - F/u vascular recs: wound care to RUE wound  - f/u surgery recs: for pain/erythema/warmth of R breast - pending gallium scan  - f/u ID recs: Meropenem 500mg q24h, c/w Vancomycin 500mg IV after HD sessions, last trough 21.   - BCx: NGTD x 3 days    #LVOT with LISETTE  likely i/s/o hyperdynamic LV function 2/2 septic shock and hypovolemia  - per cardiology, rec repeating TTE after septic shock resolves    #HFrEF  TTE 11/22 normal LV and systolic, EF 59%, grade II diastolic dysfuncton, dilated RV, reduced RV,   Home meds: Entresto 49/51mg  - hold Entresto i/s/o septic shock    #CAD  Hx of cardiac cath in 2018  Home meds: atorvastatin 40mg, plavix 75mg qd  - c/w home atorvastatin 40mg  - hold home Plavix 75mg qd    GI  #Abdominal Pain  C/o vague abdominal pain, a/w bilious emesis x 2, no nausea or further episodes of emesis   - see endocrine for plan    RENAL  #ESRD 2/2 PCKD  Pt has been on HD for "years" 2/2 PCKD. AV Fistula of EDWIN clotted in 2014, has received HD thru HD cath since. New AV Fistula in 11/2022, not yet matured.   - f/u nephrology recs  - s/p HD 1/5, 400cc removed. Goal net even    #Hyperkalemia  Patient with Hx of ESRD with missed HD presents with K >8 on VBG with abdominal symptoms and missed HD x8 days. ICU consulted i/s/o hyperkalemia. Renal consulted.  - s/p HD 1/5  - monitor with BMP's BID    ID  Pt presented in septic shock with leukocytosis, tachycardia, hypotension requiring pressors. Unknown source.   - see #septic shock for plan  - f/u ID recs  - f/u gallium scan   - c/w meropenem 500mg qd    ENDO  #Hyperparathyroidism  At risk for secondary HPTT d/t renal failure   - pt has vague abdominal pain with nausea  - multiple brown tumors on CT with multiple fractures of pubic rami, pubic bone, thoracolumbar spine  - intact , Vit D 38.5  - endocrinology recs: secondary hyperparathyroidism vs other process causing osteoclastic tumors, obtain PET/CT    #Multinodular Goiter  CT: enlarged multinodular thyroid projecting into superior mediastinum with 1.8 x 1.5 cm solid nodule slightly inferiorly in the anterior mediastinum.   - Thyroid ultrasound: Fine-needle aspiration recommended for 4.3 cm right thyroid and 2.6 cm left thyroid solid nodules per SCOTT guidelines.  - TSH 1.6 free T4 0.68.  - TSH could be inappropriately normal for low free T4 due to euthyroid sick syndrome.  - f/u TFTs after shock resolved    MSK  #Renal Osteodystrophy  - on CT: Increased density of the bone marrow is consistent with renal osteodystrophy  - Per endocrine, extent of bone destruction extreme given PTH level, suspect might not be Brown tumors    Heme/Onc  #Breast Pain, bilateral  Family hx of breast cancer in mother, sister.   - large R breast mass, palpable L breast mass, breast has peau d'orange appearance with associated erythema, warmth  - US bilateral breasts: No sonographic evidence of breast abscess, cyst or focal mass to correspond with the clinical finding of bilateral breast masses.  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     NEURO  #Pain Control  Pt in 10/10 pain of bilateral breasts, worst on R. Pain responsive to Dilaudid but effect wears off after ~2-3 hours.   - f/u pain management recommendations:  - Recommendations: Dilaudid 2mg po q4h PRN for severe pain, Dilaudid 0.25mg IVP q4h PRN for severe breakthrough pain, Tylenol 650mg po q6h PRN for mild pain, daily lidocaine patch, HOLD ALL OPIOIDS FOR SEDATION, RR<10, O2SAT <93%, SBP <96    PULM  #Atelectasis  Noted on CT chest to have atelectasis at b/l lung bases.  - Incentive spirometry    CARDIOVASCULAR  #Shock  Pt presented meeting 2/4 SIRS. Hypotensive with MAPs to 60, requiring Levophed. Septic picture of unknown source. HR > 90, WBC >12. Lactate 1.9. Afebrile. Procal 12. S/p Vancomycin 1250mg x 1, Aztreonam x 1 (pt allergic to Cefazolin, PCN). . Ferritin 1209  Patient still requiring pressors, unknown source, does not appear to fit septic picture at this point (no fever, BCx negative, no focal area of infection identified thus far)  - On Phenylephrine 5.5mcg/kg/min  - Start midodrine 50mg q8h   - CT:  7.0 cm fluid collection is present near an AV fistula in the right upper arm. Possibly abscess. Drained by IR 1/5 with serosanguinous fluid: no organisms, few WBCs  - F/u vascular recs: wound care to RUE wound  - f/u surgery recs: for pain/erythema/warmth of R breast - no surgical intervention at this time  - f/u ID recs: d/c meropenem. c/w Vancomycin 500mg IV after HD sessions, last trough 21.   - BCx: NGTD x 4 days    #LVOT with LISETTE  likely i/s/o hyperdynamic LV function 2/2 shock and hypovolemia  - per cardiology, rec repeating TTE after shock resolves    #HFrEF  TTE 11/22 normal LV and systolic, EF 59%, grade II diastolic dysfuncton, dilated RV, reduced RV,   Home meds: Entresto 49/51mg  - hold Entresto i/s/o shock    #CAD  Hx of cardiac cath in 2018  Home meds: atorvastatin 40mg, plavix 75mg qd  - c/w home atorvastatin 40mg  - hold home Plavix 75mg qd    GI  #Abdominal Pain  C/o vague abdominal pain, a/w bilious emesis x 2, no nausea or further episodes of emesis   (Resolved)    RENAL  #ESRD 2/2 PCKD  Pt has been on HD for "years" 2/2 PCKD. AV Fistula of EDWIN clotted in 2014, has received HD thru HD cath since. New AV Fistula in 11/2022, not yet matured.   - f/u nephrology recs  - s/p HD 1/7. Goal net even    #Hyperkalemia  Patient with Hx of ESRD with missed HD presents with K >8 on VBG with abdominal symptoms and missed HD x8 days. ICU consulted i/s/o hyperkalemia. Renal consulted.  - s/p HD 1/5  - monitor with BMP's qd    ID  Pt presented in septic shock with leukocytosis, tachycardia, hypotension requiring pressors. Unknown source.   - see #shock for plan  - f/u ID recs  - gallium scan: faintly increased activity surrounding the entire lower portion of the right breast consistent with the inflammatory reaction seen on physical examination, small area of activity approximately 10 cm to the right of midline at about the level of the lower border of the   sternum, activity likely to be in chest wall   - d/c meropenem, continue vancomycin 500mg IV s/p HD  - f/u PET/CT ordered     ENDO  #Hyperparathyroidism  At risk for secondary HPTT d/t renal failure   - pt has vague abdominal pain with nausea  - multiple brown tumors on CT with multiple fractures of pubic rami, pubic bone, thoracolumbar spine  - intact , Vit D 38.5  - SPEP/immunofixation negative   - endocrinology recs: secondary hyperparathyroidism vs other process causing osteoclastic tumors, obtain PET/CT    #Multinodular Goiter  CT: enlarged multinodular thyroid projecting into superior mediastinum with 1.8 x 1.5 cm solid nodule slightly inferiorly in the anterior mediastinum.   - Thyroid ultrasound: Fine-needle aspiration recommended for 4.3 cm right thyroid and 2.6 cm left thyroid solid nodules per SCOTT guidelines.  - TSH 1.6 free T4 0.68.  - TSH could be inappropriately normal for low free T4 due to euthyroid sick syndrome.  - f/u TFTs after shock resolved    MSK  #Renal Osteodystrophy  - on CT: Increased density of the bone marrow is consistent with renal osteodystrophy  - Per endocrine, extent of bone destruction extreme given PTH level, suspect might not be Brown tumors    HEME/ONC  #Hx of DVTs  Current DVT of R IJ, R subclavian and R axillary veins. Hx of L AVF clotting. Given hx of clots, current pruritis and gallium scan uptake in chest wall, reasonable to workup coagulopathy  - f/u antiphospholipid panel/rheumatologic workup    #Anemia  Hgb has downtrended from 9.8 on admission to 7.2. Baseline from November appears to be ~13.   - Ferritin elevated: 1209,  - Total iron low (15), TIBC low (79)    #Breast Pain, bilateral  Family hx of breast cancer in mother, sister.   - large R breast mass, palpable L breast mass, R breast has peau d'orange appearance with associated erythema, warmth on R  - US bilateral breasts: No sonographic evidence of breast abscess, cyst or focal mass to correspond with the clinical finding of bilateral breast masses.  - surgery: recc nonurgent outpatient mammogram when clinically stable    GYN  Uterine mass, 6.1cm mass in R adnexa seen incidentally on CTAP  - consider consulting GYN if oncologic workup is indicated    Preventative  F: None  E: None, HD pt  N: Renal Restricted Diet  DVT: heparin 5000IU subq q8h   Code Status: Full Code  Dispo: ICU

## 2023-01-07 NOTE — PROGRESS NOTE ADULT - SUBJECTIVE AND OBJECTIVE BOX
INFECTIOUS DISEASES CONSULT FOLLOW-UP NOTE    INTERVAL HPI/OVERNIGHT EVENTS:  no event overnight  gallium scan neg for infectious source   on jarvis   patient c/o breast pain     ROS:   Constitutional, eyes, ENT, cardiovascular, respiratory, gastrointestinal, genitourinary, integumentary, neurological, psychiatric and heme/lymph are otherwise negative other than noted above       ANTIBIOTICS/RELEVANT:    MEDICATIONS  (STANDING):  atorvastatin 40 milliGRAM(s) Oral at bedtime  chlorhexidine 2% Cloths 1 Application(s) Topical <User Schedule>  collagenase Ointment 1 Application(s) Topical daily  heparin  Infusion.  Unit(s)/Hr (15 mL/Hr) IV Continuous <Continuous>  midodrine 15 milliGRAM(s) Oral every 8 hours  phenylephrine    Infusion 4 MICROgram(s)/kG/Min (64 mL/Hr) IV Continuous <Continuous>  sevelamer carbonate 800 milliGRAM(s) Oral three times a day with meals    MEDICATIONS  (PRN):  acetaminophen     Tablet .. 650 milliGRAM(s) Oral every 6 hours PRN Temp greater or equal to 38C (100.4F), Moderate Pain (4 - 6)  HYDROmorphone   Tablet 2 milliGRAM(s) Oral every 4 hours PRN Severe Pain (7 - 10)  HYDROmorphone  Injectable 0.25 milliGRAM(s) IV Push every 4 hours PRN Breakthrough pain        Vital Signs Last 24 Hrs  T(C): 37.2 (07 Jan 2023 11:55), Max: 37.4 (07 Jan 2023 09:10)  T(F): 99 (07 Jan 2023 11:55), Max: 99.3 (07 Jan 2023 09:10)  HR: 83 (07 Jan 2023 13:00) (62 - 98)  BP: --  BP(mean): --  RR: 18 (07 Jan 2023 13:00) (17 - 24)  SpO2: 93% (07 Jan 2023 13:00) (92% - 100%)    Parameters below as of 07 Jan 2023 13:00  Patient On (Oxygen Delivery Method): nasal cannula  O2 Flow (L/min): 2      01-06-23 @ 07:01  -  01-07-23 @ 07:00  --------------------------------------------------------  IN: 3188 mL / OUT: 0 mL / NET: 3188 mL    01-07-23 @ 07:01  -  01-07-23 @ 13:52  --------------------------------------------------------  IN: 620 mL / OUT: 0 mL / NET: 620 mL      PHYSICAL EXAM:  Constitutional: alert, NAD  Eyes: the sclera and conjunctiva were normal.   ENT: the ears and nose were normal in appearance.   Neck: the appearance of the neck was normal and the neck was supple.   Pulmonary: no respiratory distress and lungs were clear to auscultation bilaterally.   Heart: heart rate was normal and rhythm regular, normal S1 and S2  Vascular:. there was no peripheral edema  Abdomen: normal bowel sounds, soft, non-tender        LABS:                        7.2    13.14 )-----------( 210      ( 07 Jan 2023 06:01 )             23.0     01-07    135  |  98  |  48<H>  ----------------------------<  129<H>  4.4   |  21<L>  |  6.52<H>    Ca    8.4      07 Jan 2023 06:01  Phos  5.4     01-07  Mg     1.8     01-07    TPro  5.8<L>  /  Alb  2.4<L>  /  TBili  0.3  /  DBili  x   /  AST  22  /  ALT  14  /  AlkPhos  94  01-06    PT/INR - ( 07 Jan 2023 06:01 )   PT: 15.2 sec;   INR: 1.27          PTT - ( 07 Jan 2023 06:01 )  PTT:30.5 sec      MICROBIOLOGY:      RADIOLOGY & ADDITIONAL STUDIES:  Reviewed

## 2023-01-07 NOTE — PROGRESS NOTE ADULT - ATTENDING COMMENTS
ESRD with PCK, HTN, CAD, IVC filter now with severe breast pain and intractable hypotension, thyroid and adnexal masses  physical as above  at this point no clear evidence of infection; the gallium had anterior right chest wall uptake but no corresponding physical finding  follow off meropenem, on vanco  start midodrine and titrate phenylephrine to SBP 90  encourage po  pain improved on dilaudid  vascular, ID f/u appreciated  discuss full AC  obtain PET-CT  eventual GYN consult, thyroid biopsies  decision making of high complexity

## 2023-01-07 NOTE — PROGRESS NOTE ADULT - SUBJECTIVE AND OBJECTIVE BOX
S: Patient has no particular complaints.     O: Vitals reviewed, tachycardic 110s and requiring phenylephrine (stable dose) to maintain normotension. Room air.     Exam:   General: Awake, alert. Oriented to her circumstances  CV: HDS, on pressors  Pulm: On room air  RUE: RUE w/ superficial dehiscence of the surgical incision, about the size of a stephanie. Some soft tissue swelling of the right hand. Motor function intact to gross movement/ and sensation intact to light touch.                           7.1    10.88 )-----------( 199      ( 07 Jan 2023 17:11 )             23.1   01-07    135  |  98  |  48<H>  ----------------------------<  129<H>  4.4   |  21<L>  |  6.52<H>    Ca    8.4      07 Jan 2023 06:01  Phos  5.4     01-07  Mg     1.8     01-07    TPro  5.8<L>  /  Alb  2.4<L>  /  TBili  0.3  /  DBili  x   /  AST  22  /  ALT  14  /  AlkPhos  94  01-06    Cultures - BCx NGTD Body fluid cultures - NGTD    Imaging: Gadolinium scan negative for uptake over graft (some nonspecific uptake in breast/chest wall.     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     During imaging, CT showing 7.0 cm fluid collection is present near an AV fistula in the right upper arm. The differential for this collection included benign post-operative hematoma or seroma, but also abscess or vascular device infection, particularly in the setting of septic shock. Vascular consulted for RUE fistula evaluation to rule out this area as a source of sepsis. Reassuring physical exam at initial evaluation without clear indicia of infection related to the graph, small superficial surgical incision dehiscence notwithstanding. Now s/p IR image guided aspiration of perigraft fluid (negative) and gadolinium scan (negative). At this point, do not suspect a graft infection as the provoking factor leading to the patient's presentation. Would recommend continued evaluation of other etiologies of shock in this patient given her ongoing pressor requirement.     Recommendations:  -Will follow up blood cultures and body fluid cultures  -Wound care recs: BID dressing changes (once daily with Santyl DSD once daily with Bactroban DSD). Apply light ace wrap compression to right hand and forearm to help with soft tissue swelling, and please ensure hospital ID badge is not too tight.   -Rest of care per primary team  -Vascular surgery Team 3C will continue to follow. Please call [phone] with any questions or concerns.

## 2023-01-07 NOTE — PROVIDER CONTACT NOTE (OTHER) - ACTION/TREATMENT ORDERED:
continue phenelphrine titration per noninvasive . Day team changed plan at 8am and wished to go by inasive bloodpressure reading to titrate maps above 65 according to that and give 500ml iv fluid

## 2023-01-07 NOTE — PROGRESS NOTE ADULT - SUBJECTIVE AND OBJECTIVE BOX
Internal Medicine Progress Note  Dara Bradford, PGY-1    ******INCOMPLETE******    OVERNIGHT EVENTS/INTERVAL HPI:    OBJECTIVE:  Vital Signs Last 24 Hrs  T(C): 37.1 (07 Jan 2023 02:00), Max: 37.2 (06 Jan 2023 21:58)  T(F): 98.8 (07 Jan 2023 02:00), Max: 98.9 (06 Jan 2023 21:58)  HR: 71 (07 Jan 2023 05:00) (70 - 120)  BP: 144/63 (06 Jan 2023 13:00) (88/42 - 171/67)  BP(mean): 90 (06 Jan 2023 13:00) (61 - 97)  RR: 20 (07 Jan 2023 05:00) (18 - 26)  SpO2: 94% (07 Jan 2023 05:00) (90% - 98%)    Parameters below as of 07 Jan 2023 05:00  Patient On (Oxygen Delivery Method): nasal cannula  O2 Flow (L/min): 4    I&O's Detail    05 Jan 2023 07:01  -  06 Jan 2023 07:00  --------------------------------------------------------  IN:    IV PiggyBack: 150 mL    Other (mL): 400 mL    Phenylephrine: 421.7 mL  Total IN: 971.7 mL    OUT:    Other (mL): 400 mL  Total OUT: 400 mL    Total NET: 571.7 mL      06 Jan 2023 07:01  -  07 Jan 2023 05:45  --------------------------------------------------------  IN:    IV PiggyBack: 150 mL    Oral Fluid: 400 mL    Phenylephrine: 468 mL    Phenylephrine: 1354 mL    Sodium Chloride 0.9% Bolus: 500 mL  Total IN: 2872 mL    OUT:  Total OUT: 0 mL    Total NET: 2872 mL        Physical Exam:  GENERAL: Awake, alert and interactive, no acute distress, appears comfortable  NEURO: A&Ox4, no focal deficits, 5/5 strength in all ext, reflexes 2+ throughout, CN 2-12 intact  HEENT: Normocephalic, atraumatic, no conjunctivitis or scleral icterus, oral mucosa moist, no oral lesions noted  NECK: Supple, no LAD, no JVD, thyroid not palpable  CARDIAC: Regular rate and rhythm, +S1/S2, no murmurs/rubs/gallops  PULM: Breathing comfortably on RA, clear to auscultation bilaterally, no wheezes/rales/rhonchi  ABDOMEN: Soft, nontender, nondistended, +bs, no hepatosplenomegaly, no rebound tenderness or fluid wave, no CVA tenderness  : Deferred  MSK: Range of motion grossly intact, no back tenderness  SKIN: Warm and dry, no rashes, lesions  VASC: Cap refil < 2 sec, 2+ peripheral pulses, no edema, no LE tenderness  Psych: Appropriate affect    Medications:  MEDICATIONS  (STANDING):  atorvastatin 40 milliGRAM(s) Oral at bedtime  chlorhexidine 2% Cloths 1 Application(s) Topical <User Schedule>  collagenase Ointment 1 Application(s) Topical daily  heparin   Injectable 5000 Unit(s) SubCutaneous every 8 hours  meropenem  IVPB 500 milliGRAM(s) IV Intermittent every 24 hours  phenylephrine    Infusion 4 MICROgram(s)/kG/Min (64 mL/Hr) IV Continuous <Continuous>  sevelamer carbonate 800 milliGRAM(s) Oral three times a day with meals    MEDICATIONS  (PRN):  acetaminophen     Tablet .. 650 milliGRAM(s) Oral every 6 hours PRN Temp greater or equal to 38C (100.4F), Moderate Pain (4 - 6)  HYDROmorphone   Tablet 2 milliGRAM(s) Oral every 4 hours PRN Severe Pain (7 - 10)  HYDROmorphone  Injectable 0.25 milliGRAM(s) IV Push every 4 hours PRN Breakthrough pain      Labs:                        7.4    11.40 )-----------( 256      ( 06 Jan 2023 06:10 )             24.3     01-06    134<L>  |  94<L>  |  40<H>  ----------------------------<  118<H>  4.0   |  26  |  5.84<H>    Ca    8.0<L>      06 Jan 2023 06:10  Phos  3.9     01-06  Mg     1.6     01-06    TPro  5.8<L>  /  Alb  2.4<L>  /  TBili  0.3  /  DBili  x   /  AST  22  /  ALT  14  /  AlkPhos  94  01-06    PT/INR - ( 06 Jan 2023 06:10 )   PT: 15.5 sec;   INR: 1.30          PTT - ( 06 Jan 2023 06:10 )  PTT:31.6 sec      COVID-19 PCR: Negative (05 Jan 2023 08:04)  COVID-19 PCR: Negative (23 Nov 2022 19:55)  COVID-19 PCR: NotDetec (19 Nov 2022 11:56)      Radiology:  Internal Medicine Progress Note  Dara Bradford, PGY-1    ******INCOMPLETE******    OVERNIGHT EVENTS/INTERVAL HPI:    OBJECTIVE:  Vital Signs Last 24 Hrs  T(C): 37.1 (07 Jan 2023 02:00), Max: 37.2 (06 Jan 2023 21:58)  T(F): 98.8 (07 Jan 2023 02:00), Max: 98.9 (06 Jan 2023 21:58)  HR: 71 (07 Jan 2023 05:00) (70 - 120)  BP: 144/63 (06 Jan 2023 13:00) (88/42 - 171/67)  BP(mean): 90 (06 Jan 2023 13:00) (61 - 97)  RR: 20 (07 Jan 2023 05:00) (18 - 26)  SpO2: 94% (07 Jan 2023 05:00) (90% - 98%)    Parameters below as of 07 Jan 2023 05:00  Patient On (Oxygen Delivery Method): nasal cannula  O2 Flow (L/min): 4    I&O's Detail    05 Jan 2023 07:01  -  06 Jan 2023 07:00  --------------------------------------------------------  IN:    IV PiggyBack: 150 mL    Other (mL): 400 mL    Phenylephrine: 421.7 mL  Total IN: 971.7 mL    OUT:    Other (mL): 400 mL  Total OUT: 400 mL    Total NET: 571.7 mL      06 Jan 2023 07:01  -  07 Jan 2023 05:45  --------------------------------------------------------  IN:    IV PiggyBack: 150 mL    Oral Fluid: 400 mL    Phenylephrine: 468 mL    Phenylephrine: 1354 mL    Sodium Chloride 0.9% Bolus: 500 mL  Total IN: 2872 mL    OUT:  Total OUT: 0 mL    Total NET: 2872 mL    Physical Exam:  GENERAL: awake, alert, interactive   NEURO: A&Ox4  HEENT: no conjunctivitis or scleral icterus, oral mucosa moist  NECK: Supple, no JVD, palpable multinodular goiter  CARDIAC: tachycardic, regular rhythm, +S1/S2, no murmurs/rubs/gallops  PULM: Breathing comfortably on RA, clear to auscultation bilaterally, poor inspiratory flow at bilateral bases, no wheezes/rales/rhonchi, HD catheter R chest, R anterior chest wall lateral to sternum elevated vs L  ABDOMEN: Soft, obese, nontender to palpation, +bs, no rebound tenderness or fluid wave, ventral hernia non reducible   BREAST: peau d'orange appearance of lateral R breast, large immobile palpable mass with approx 4cm lateral to nipple measuring est 8cm x 5cm, L breast with smaller mass just lateral to nipple line also tender to palpation  SKIN: UE/LE warm to touch, peripheral pulses nonpalpable, 3cm dehisced wound nonpurulent nonerythematous R medial upper arm near axilla  VASC: no edema, no LE tenderness, triple lumen catheter at R groin, L axillary arterial line    Medications:  MEDICATIONS  (STANDING):  atorvastatin 40 milliGRAM(s) Oral at bedtime  chlorhexidine 2% Cloths 1 Application(s) Topical <User Schedule>  collagenase Ointment 1 Application(s) Topical daily  heparin   Injectable 5000 Unit(s) SubCutaneous every 8 hours  meropenem  IVPB 500 milliGRAM(s) IV Intermittent every 24 hours  phenylephrine    Infusion 4 MICROgram(s)/kG/Min (64 mL/Hr) IV Continuous <Continuous>  sevelamer carbonate 800 milliGRAM(s) Oral three times a day with meals    MEDICATIONS  (PRN):  acetaminophen     Tablet .. 650 milliGRAM(s) Oral every 6 hours PRN Temp greater or equal to 38C (100.4F), Moderate Pain (4 - 6)  HYDROmorphone   Tablet 2 milliGRAM(s) Oral every 4 hours PRN Severe Pain (7 - 10)  HYDROmorphone  Injectable 0.25 milliGRAM(s) IV Push every 4 hours PRN Breakthrough pain      Labs:                        7.4    11.40 )-----------( 256      ( 06 Jan 2023 06:10 )             24.3     01-06    134<L>  |  94<L>  |  40<H>  ----------------------------<  118<H>  4.0   |  26  |  5.84<H>    Ca    8.0<L>      06 Jan 2023 06:10  Phos  3.9     01-06  Mg     1.6     01-06    TPro  5.8<L>  /  Alb  2.4<L>  /  TBili  0.3  /  DBili  x   /  AST  22  /  ALT  14  /  AlkPhos  94  01-06    PT/INR - ( 06 Jan 2023 06:10 )   PT: 15.5 sec;   INR: 1.30          PTT - ( 06 Jan 2023 06:10 )  PTT:31.6 sec      COVID-19 PCR: Negative (05 Jan 2023 08:04)  COVID-19 PCR: Negative (23 Nov 2022 19:55)  COVID-19 PCR: NotDetec (19 Nov 2022 11:56)      Radiology:   Procedure: The patient received an intravenous injection of 6 mCi of   gallium-67 citrate on 1/3/2023 and images of the whole body were obtained   at about 72 hours in the anterior and posterior projections.  Additional   oblique images were obtained.    Findings: There is a large area of faintly increased activity surrounding   the entire lower portion of the right breast consistent with the   inflammatory reaction seen on physical examination.    On anterior imaging there is a small area of activity approximately 10 cm   to the right of midline at about the level of the lower border of the   sternum.  Since the activity is only seen on anterior imaging, the   activity is likely to be in the chest wall, especially since there are no   abnormalities in this area of the lung on recent CT scan.    A normal amount of bowel activity is seen in the pelvis and no focal   uptake is appreciated in the right adnexal region.    The spleen has increased activity, a common sign of systemic illness.    Impression: No clear source of infection is identified.  The patient's   inflammatory reaction in the right breast is seen.  There is also a focal   area of uptake in the right anterior chest wall which is of uncertain   significance, but review of the physical examination and recent sonogram   suggests that there is no abscess or local inflammatory reaction in this   area.   Internal Medicine Progress Note  Dara Bradford, PGY-1      OVERNIGHT EVENTS/INTERVAL HPI:    OBJECTIVE:  Vital Signs Last 24 Hrs  T(C): 37.1 (07 Jan 2023 02:00), Max: 37.2 (06 Jan 2023 21:58)  T(F): 98.8 (07 Jan 2023 02:00), Max: 98.9 (06 Jan 2023 21:58)  HR: 71 (07 Jan 2023 05:00) (70 - 120)  BP: 144/63 (06 Jan 2023 13:00) (88/42 - 171/67)  BP(mean): 90 (06 Jan 2023 13:00) (61 - 97)  RR: 20 (07 Jan 2023 05:00) (18 - 26)  SpO2: 94% (07 Jan 2023 05:00) (90% - 98%)    Parameters below as of 07 Jan 2023 05:00  Patient On (Oxygen Delivery Method): nasal cannula  O2 Flow (L/min): 4    I&O's Detail    05 Jan 2023 07:01  -  06 Jan 2023 07:00  --------------------------------------------------------  IN:    IV PiggyBack: 150 mL    Other (mL): 400 mL    Phenylephrine: 421.7 mL  Total IN: 971.7 mL    OUT:    Other (mL): 400 mL  Total OUT: 400 mL    Total NET: 571.7 mL      06 Jan 2023 07:01  -  07 Jan 2023 05:45  --------------------------------------------------------  IN:    IV PiggyBack: 150 mL    Oral Fluid: 400 mL    Phenylephrine: 468 mL    Phenylephrine: 1354 mL    Sodium Chloride 0.9% Bolus: 500 mL  Total IN: 2872 mL    OUT:  Total OUT: 0 mL    Total NET: 2872 mL    Physical Exam:  GENERAL: awake, alert, interactive   NEURO: A&Ox4  HEENT: no conjunctivitis or scleral icterus, oral mucosa moist  NECK: Supple, no JVD, palpable multinodular goiter  CARDIAC: tachycardic, regular rhythm, +S1/S2, no murmurs/rubs/gallops  PULM: Breathing comfortably on RA, clear to auscultation bilaterally, poor inspiratory flow at bilateral bases, no wheezes/rales/rhonchi, HD catheter R chest, R anterior chest wall lateral to sternum elevated vs L  ABDOMEN: Soft, obese, nontender to palpation, +bs, no rebound tenderness or fluid wave, ventral hernia non reducible   BREAST: peau d'orange appearance of lateral R breast, large immobile palpable mass with approx 4cm lateral to nipple measuring est 8cm x 5cm, L breast with smaller mass just lateral to nipple line also tender to palpation  SKIN: UE/LE warm to touch, peripheral pulses nonpalpable, 3cm dehisced wound nonpurulent nonerythematous R medial upper arm near axilla  VASC: no edema, no LE tenderness, triple lumen catheter at R groin, L axillary arterial line    Medications:  MEDICATIONS  (STANDING):  atorvastatin 40 milliGRAM(s) Oral at bedtime  chlorhexidine 2% Cloths 1 Application(s) Topical <User Schedule>  collagenase Ointment 1 Application(s) Topical daily  heparin   Injectable 5000 Unit(s) SubCutaneous every 8 hours  meropenem  IVPB 500 milliGRAM(s) IV Intermittent every 24 hours  phenylephrine    Infusion 4 MICROgram(s)/kG/Min (64 mL/Hr) IV Continuous <Continuous>  sevelamer carbonate 800 milliGRAM(s) Oral three times a day with meals    MEDICATIONS  (PRN):  acetaminophen     Tablet .. 650 milliGRAM(s) Oral every 6 hours PRN Temp greater or equal to 38C (100.4F), Moderate Pain (4 - 6)  HYDROmorphone   Tablet 2 milliGRAM(s) Oral every 4 hours PRN Severe Pain (7 - 10)  HYDROmorphone  Injectable 0.25 milliGRAM(s) IV Push every 4 hours PRN Breakthrough pain      Labs:                        7.4    11.40 )-----------( 256      ( 06 Jan 2023 06:10 )             24.3     01-06    134<L>  |  94<L>  |  40<H>  ----------------------------<  118<H>  4.0   |  26  |  5.84<H>    Ca    8.0<L>      06 Jan 2023 06:10  Phos  3.9     01-06  Mg     1.6     01-06    TPro  5.8<L>  /  Alb  2.4<L>  /  TBili  0.3  /  DBili  x   /  AST  22  /  ALT  14  /  AlkPhos  94  01-06    PT/INR - ( 06 Jan 2023 06:10 )   PT: 15.5 sec;   INR: 1.30          PTT - ( 06 Jan 2023 06:10 )  PTT:31.6 sec      COVID-19 PCR: Negative (05 Jan 2023 08:04)  COVID-19 PCR: Negative (23 Nov 2022 19:55)  COVID-19 PCR: NotDetec (19 Nov 2022 11:56)      Radiology:   Procedure: The patient received an intravenous injection of 6 mCi of   gallium-67 citrate on 1/3/2023 and images of the whole body were obtained   at about 72 hours in the anterior and posterior projections.  Additional   oblique images were obtained.    Findings: There is a large area of faintly increased activity surrounding   the entire lower portion of the right breast consistent with the   inflammatory reaction seen on physical examination.    On anterior imaging there is a small area of activity approximately 10 cm   to the right of midline at about the level of the lower border of the   sternum.  Since the activity is only seen on anterior imaging, the   activity is likely to be in the chest wall, especially since there are no   abnormalities in this area of the lung on recent CT scan.    A normal amount of bowel activity is seen in the pelvis and no focal   uptake is appreciated in the right adnexal region.    The spleen has increased activity, a common sign of systemic illness.    Impression: No clear source of infection is identified.  The patient's   inflammatory reaction in the right breast is seen.  There is also a focal   area of uptake in the right anterior chest wall which is of uncertain   significance, but review of the physical examination and recent sonogram   suggests that there is no abscess or local inflammatory reaction in this   area.

## 2023-01-08 NOTE — PROGRESS NOTE ADULT - ATTENDING COMMENTS
EMERGENCY DEPARTMENT ENCOUNTER    Room Number:  11/11  Date of encounter:  7/9/2021  PCP: Chelsey Weaver MD  Historian: Patient, wife at bedside    I used full protective equipment while examining this patient.  This includes face mask, gloves and protective eyewear.  I washed my hands before entering the room and immediately upon leaving the room      HPI:  Chief Complaint: Trach came out  A complete HPI/ROS/PMH/PSH/SH/FH are unobtainable due to: None    Context: Vinicio More is a 60 y.o. male who presents to the ED c/o trach came out.  Patient transported from nursing home due to accidental dislodgment of tracheostomy tube.  Patient states he no longer uses this trach.  It was placed after surgery when he had become ventilator dependent.  He has not used a ventilator for quite some time and no longer wishes to have a tracheostomy tube.  I have spoken with wife via phone and she agrees and does not want trach replacement.      MEDICAL RECORD REVIEW  I reviewed prior medical records from [facility].  Patient had tracheostomy placement in late April of this year.  He has had no subsequent hospitalizations since that time.    PAST MEDICAL HISTORY  Active Ambulatory Problems     Diagnosis Date Noted   • No Active Ambulatory Problems     Resolved Ambulatory Problems     Diagnosis Date Noted   • No Resolved Ambulatory Problems     No Additional Past Medical History         PAST SURGICAL HISTORY  History reviewed. No pertinent surgical history.      FAMILY HISTORY  History reviewed. No pertinent family history.      SOCIAL HISTORY  Social History     Socioeconomic History   • Marital status: Single     Spouse name: Not on file   • Number of children: Not on file   • Years of education: Not on file   • Highest education level: Not on file         ALLERGIES  Patient has no known allergies.       REVIEW OF SYSTEMS  Review of Systems   Constitutional: Negative for fever.   Respiratory: Negative for shortness of  breath.    Cardiovascular: Negative for chest pain.           PHYSICAL EXAM    I have reviewed the triage vital signs and nursing notes.    ED Triage Vitals   Temp Heart Rate Resp BP SpO2   07/09/21 0646 07/09/21 0646 07/09/21 0646 07/09/21 0646 07/09/21 0646   98 °F (36.7 °C) 110 16 100/70 100 %      Temp src Heart Rate Source Patient Position BP Location FiO2 (%)   07/09/21 0646 07/09/21 0700 07/09/21 0700 07/09/21 0700 --   Oral Monitor Sitting Right arm        Physical Exam  GENERAL: Alert male in no obvious distress.  Triage vitals reviewed notable for slightly elevated pulse of 110.  O2 saturations 100% on 2 L nasal cannula  HENT: nares patent  EYES: no scleral icterus  CV: regular rhythm, regular rate-heart rate running about 100 while I am at bedside  RESPIRATORY: normal effort, clear to auscultation bilaterally without rales or rhonchi.  O2 saturations 100% on 2 L nasal cannula  ABDOMEN: soft, nontender  MUSCULOSKELETAL: no deformity  NEURO: Strength, sensation, and coordination are grossly intact.  Speech and mentation are unremarkable  SKIN: warm, dry-trach stoma without significant erythema or drainage.        PROCEDURES  Procedures      MEDICATIONS GIVEN IN ER    Medications - No data to display      PROGRESS, DATA ANALYSIS, CONSULTS, AND MEDICAL DECISION MAKING    All labs have been independently reviewed by me.  All radiology studies have been reviewed by me and discussed with radiologist dictating the report.   EKG's independently viewed and interpreted by me.  Discussion below represents my analysis of pertinent findings related to patient's condition, differential diagnosis, treatment plan and final disposition.      ED Course as of Jul 09 0714 Fri Jul 09, 2021   0713 [id number] male sent for accidental trach dislodgment.  At this time he no longer requests tracheostomy tube.  The certainly seems reasonable as I see no clear reason for it.  Stoma appears to be fairly small anyway and close  to closing.  I see no signs of infection.    [DB]      ED Course User Index  [DB] Earnest Doty MD       AS OF 07:14 EDT VITALS:    BP - 98/77  HR - 109  TEMP - 98 °F (36.7 °C) (Oral)  O2 SATS - 98%      DIAGNOSIS  Final diagnoses:   Tracheostomy complication, unspecified complication type (CMS/[facility])         DISPOSITION  DISCHARGE    Patient discharged in stable condition.    Reviewed implications of results, diagnosis, meds, responsibility to follow up, warning signs and symptoms of possible worsening, potential complications and reasons to return to ER, including increased chest pain, shortness of breath or as needed.    Patient/Family voiced understanding of above instructions.    Discussed plan for discharge, as there is no emergent indication for admission. Patient referred to primary care provider for BP management due to today's BP. Pt/family is agreeable and understands need for follow up and repeat testing.  Pt is aware that discharge does not mean that nothing is wrong but it indicates no emergency is present that requires admission and they must continue care with follow-up as given below or physician of their choice.     FOLLOW-UP  Chelsey Weaver MD  [address] [facility] [address] [phone]      As needed         Medication List      No changes were made to your prescriptions during this visit.                Earnest Dtoy MD  07/09/21 [id number]     Case reviewed with Dr. Jo.  Pt seen on rounds.  ESRD pt last dialyzed yesterday at bedside. No UF with HD.  Pt remains on pressor support.  Still c/o foot pain; obtain Podiatry consult if possible.   Labs acceptable today.  Likely next HD 1/10.  Continue epo with HD to assist anemia.

## 2023-01-08 NOTE — PROGRESS NOTE ADULT - SUBJECTIVE AND OBJECTIVE BOX
Vascular Surgery Progress Note    Subjective:   Patient seen and examined at bedside. Vitals reviewed and patient noted to be afebrile with no tachycardia. Patient still requiring norepinephrine to maintain MAP > 65.Patient reports minimal PO intake secondary to poor appetite. Denies pain in LUE. States swelling is stable.    ROS:   Denies Headache, blurred vision, Chest Pain, SOB, Abdominal pain, nausea or vomiting     Social   heparin  Infusion.   midodrine 25  phenylephrine    Infusion 0.1      Allergies    Ancef (Rash; Urticaria)  DDAVP (Hypotension)  iodine (Hives; Pruritus)  penicillin (Swelling)  sulfa drugs (Angioedema)    Intolerances        Vital Signs Last 24 Hrs  T(C): 36.9 (08 Jan 2023 08:54), Max: 37.2 (07 Jan 2023 11:55)  T(F): 98.5 (08 Jan 2023 08:54), Max: 99 (07 Jan 2023 11:55)  HR: 70 (08 Jan 2023 09:00) (66 - 106)  BP: --  BP(mean): --  RR: 18 (08 Jan 2023 09:00) (11 - 22)  SpO2: 96% (08 Jan 2023 09:00) (88% - 98%)    Parameters below as of 08 Jan 2023 09:00  Patient On (Oxygen Delivery Method): room air      I&O's Summary    07 Jan 2023 07:01  -  08 Jan 2023 07:00  --------------------------------------------------------  IN: 2712.6 mL / OUT: 400 mL / NET: 2312.6 mL    08 Jan 2023 07:01  -  08 Jan 2023 10:12  --------------------------------------------------------  IN: 183.6 mL / OUT: 0 mL / NET: 183.6 mL        Physical Exam:  General: Resting comfortably in bed, NAD  Pulmonary: Equal chest wall expansion b/l, no respiratory distress  Cardiovascular: NSR  Abdominal: soft, nontender  Extremities: RUE noted noted to have soft tissue swelling above elbow with mild dehiscence of surgical incision. Motor, sensory intact in RUE.       LABS:                        7.2    8.64  )-----------( 179      ( 08 Jan 2023 05:38 )             23.5     01-08    135  |  97  |  28<H>  ----------------------------<  101<H>  4.4   |  26  |  4.28<H>    Ca    8.2<L>      08 Jan 2023 05:38  Phos  4.4     01-08  Mg     1.6     01-08      PT/INR - ( 08 Jan 2023 00:01 )   PT: 16.6 sec;   INR: 1.39          PTT - ( 08 Jan 2023 05:38 )  PTT:69.2 sec

## 2023-01-08 NOTE — PROGRESS NOTE ADULT - ATTENDING COMMENTS
ESRD with PCK, HTN, CAD, IVC filter now with severe breast pain and intractable hypotension, thyroid and adnexal masses  physical as above  at this point no clear evidence of infection; the gallium had anterior right chest wall uptake but no corresponding physical finding  follow off meropenem, on vanco  increase midodrine to 25 TID and titrate phenylephrine to SBP 80  encourage po  pain improved on dilaudid  vascular, ID f/u appreciated  discuss full AC  obtain PET-CT  eventual GYN consult, thyroid biopsies  decision making of high complexity

## 2023-01-08 NOTE — PROGRESS NOTE ADULT - SUBJECTIVE AND OBJECTIVE BOX
Patient is a 67y Female seen and evaluated at bedside. patient remains on phenylephrine no UF with HD yesterday patient still complaining of foot pain K 4.4 bicarb 135 bicarb 26        Meds:    acetaminophen     Tablet .. 650 every 6 hours PRN  atorvastatin 40 at bedtime  calamine/zinc oxide Lotion 1 three times a day PRN  chlorhexidine 2% Cloths 1 <User Schedule>  collagenase Ointment 1 daily  heparin  Infusion.  <Continuous>  HYDROmorphone   Tablet 2 every 4 hours PRN  HYDROmorphone  Injectable 0.25 every 4 hours PRN  midodrine 25 every 8 hours  Nephro-deborah 1 daily  phenylephrine    Infusion 0.1 <Continuous>  sevelamer carbonate 800 three times a day with meals      T(C): , Max: 37.2 (01-07-23 @ 11:55)  T(F): , Max: 99 (01-07-23 @ 11:55)  HR: 71 (01-08-23 @ 10:00)  BP: --  BP(mean): --  RR: 18 (01-08-23 @ 10:00)  SpO2: 98% (01-08-23 @ 10:00)  Wt(kg): --    01-07 @ 07:01 - 01-08 @ 07:00  --------------------------------------------------------  IN: 2712.6 mL / OUT: 400 mL / NET: 2312.6 mL    01-08 @ 07:01 - 01-08 @ 10:49  --------------------------------------------------------  IN: 272.2 mL / OUT: 0 mL / NET: 272.2 mL          Review of Systems:  all other ROS negative       PHYSICAL EXAM:  GENERAL: NAD resting in bed   CHEST/LUNG: Clear to auscultation bilaterally; no accessory muscle use   HEART: normal S1S2, RRR  ABDOMEN: Soft, Nontender, +BS,   EXTREMITIES: No clubbing, cyanosis, or edema   ACCESS: R TDC      LABS:                        7.2    8.64  )-----------( 179      ( 08 Jan 2023 05:38 )             23.5     01-08    135  |  97  |  28<H>  ----------------------------<  101<H>  4.4   |  26  |  4.28<H>    Ca    8.2<L>      08 Jan 2023 05:38  Phos  4.4     01-08  Mg     1.6     01-08        PT/INR - ( 08 Jan 2023 00:01 )   PT: 16.6 sec;   INR: 1.39          PTT - ( 08 Jan 2023 05:38 )  PTT:69.2 sec          RADIOLOGY & ADDITIONAL STUDIES:

## 2023-01-08 NOTE — PROGRESS NOTE ADULT - SUBJECTIVE AND OBJECTIVE BOX
INFECTIOUS DISEASES CONSULT FOLLOW-UP NOTE    INTERVAL HPI/OVERNIGHT EVENTS:  no event overnight  patient remains on low dose jarvis - team thinks BP measure may not be accurate   breat pain improving   no new symptoms     ROS:   Constitutional, eyes, ENT, cardiovascular, respiratory, gastrointestinal, genitourinary, integumentary, neurological, psychiatric and heme/lymph are otherwise negative other than noted above       ANTIBIOTICS/RELEVANT:    MEDICATIONS  (STANDING):  atorvastatin 40 milliGRAM(s) Oral at bedtime  chlorhexidine 2% Cloths 1 Application(s) Topical <User Schedule>  collagenase Ointment 1 Application(s) Topical daily  heparin  Infusion.  Unit(s)/Hr (15 mL/Hr) IV Continuous <Continuous>  midodrine 25 milliGRAM(s) Oral every 8 hours  Nephro-deborah 1 Tablet(s) Oral daily  phenylephrine    Infusion 0.1 MICROgram(s)/kG/Min (1.6 mL/Hr) IV Continuous <Continuous>  sevelamer carbonate 800 milliGRAM(s) Oral three times a day with meals    MEDICATIONS  (PRN):  acetaminophen     Tablet .. 650 milliGRAM(s) Oral every 6 hours PRN Temp greater or equal to 38C (100.4F), Moderate Pain (4 - 6)  calamine/zinc oxide Lotion 1 Application(s) Topical three times a day PRN Itching  HYDROmorphone   Tablet 2 milliGRAM(s) Oral every 4 hours PRN Severe Pain (7 - 10)  HYDROmorphone  Injectable 0.25 milliGRAM(s) IV Push every 4 hours PRN Breakthrough pain        Vital Signs Last 24 Hrs  T(C): 36.7 (08 Jan 2023 14:23), Max: 37.2 (07 Jan 2023 14:55)  T(F): 98.1 (08 Jan 2023 14:23), Max: 99 (07 Jan 2023 14:55)  HR: 76 (08 Jan 2023 14:00) (66 - 106)  BP: --  BP(mean): --  RR: 18 (08 Jan 2023 14:00) (11 - 21)  SpO2: 94% (08 Jan 2023 14:00) (88% - 98%)    Parameters below as of 08 Jan 2023 14:00  Patient On (Oxygen Delivery Method): room air        01-07-23 @ 07:01  -  01-08-23 @ 07:00  --------------------------------------------------------  IN: 2712.6 mL / OUT: 400 mL / NET: 2312.6 mL    01-08-23 @ 07:01  -  01-08-23 @ 14:26  --------------------------------------------------------  IN: 491.6 mL / OUT: 0 mL / NET: 491.6 mL      PHYSICAL EXAM:  Constitutional: alert, NAD  Eyes: the sclera and conjunctiva were normal.   ENT: the ears and nose were normal in appearance.   Neck: the appearance of the neck was normal and the neck was supple.   Pulmonary: no respiratory distress and lungs were clear to auscultation bilaterally.   Heart: heart rate was normal and rhythm regular, normal S1 and S2  Vascular:. there was no peripheral edema  Abdomen: normal bowel sounds, soft, non-tender  chest: lower breast ttp       LABS:                        7.2    8.64  )-----------( 179      ( 08 Jan 2023 05:38 )             23.5     01-08    135  |  97  |  28<H>  ----------------------------<  101<H>  4.4   |  26  |  4.28<H>    Ca    8.2<L>      08 Jan 2023 05:38  Phos  4.4     01-08  Mg     1.6     01-08      PT/INR - ( 08 Jan 2023 00:01 )   PT: 16.6 sec;   INR: 1.39          PTT - ( 08 Jan 2023 05:38 )  PTT:69.2 sec      MICROBIOLOGY:      RADIOLOGY & ADDITIONAL STUDIES:  Reviewed

## 2023-01-08 NOTE — PROGRESS NOTE ADULT - SUBJECTIVE AND OBJECTIVE BOX
SALOMON MUNSON, 67y, Female  MRN-[id number]  Patient is a 67y old  Female who presents with a chief complaint of septic shock, bilateral breast pain (06 Jan 2023 06:19)    OVERNIGHT EVENTS: SHAISTA    SUBJECTIVE: Patient reports pain in her bilateral breasts, same as prior. Also endorses worsening pain in her L foot/toes for the past 3 days. Denies fevers, chills, chest pain, SOB.     12 Point ROS Negative unless noted otherwise above.  -------------------------------------------------------------------------------  VITAL SIGNS:  Vital Signs Last 24 Hrs  T(C): 36.7 (08 Jan 2023 14:23), Max: 36.9 (08 Jan 2023 08:54)  T(F): 98.1 (08 Jan 2023 14:23), Max: 98.5 (08 Jan 2023 08:54)  HR: 72 (08 Jan 2023 14:30) (66 - 106)  BP: --  BP(mean): --  RR: 16 (08 Jan 2023 14:30) (11 - 21)  SpO2: 96% (08 Jan 2023 14:30) (88% - 98%)    Parameters below as of 08 Jan 2023 14:30  Patient On (Oxygen Delivery Method): room air      I&O's Summary    07 Jan 2023 07:01  -  08 Jan 2023 07:00  --------------------------------------------------------  IN: 2712.6 mL / OUT: 400 mL / NET: 2312.6 mL    08 Jan 2023 07:01  -  08 Jan 2023 15:12  --------------------------------------------------------  IN: 554.6 mL / OUT: 0 mL / NET: 554.6 mL    PHYSICAL EXAM:  GENERAL: awake, alert, interactive   NEURO: A&Ox4  HEENT: no conjunctivitis or scleral icterus, oral mucosa moist  NECK: Supple, no JVD, palpable multinodular goiter  CARDIAC: tachycardic, regular rhythm, +S1/S2, no murmurs/rubs/gallops  PULM: Breathing comfortably on RA, clear to auscultation bilaterally, poor inspiratory flow at bilateral bases, no wheezes/rales/rhonchi, HD catheter R chest, R anterior chest wall lateral to sternum elevated vs L  ABDOMEN: Soft, obese, nontender to palpation, +bs, no rebound tenderness or fluid wave, ventral hernia non reducible   BREAST: peau d'orange appearance of lateral R breast, large immobile palpable mass with approx 4cm lateral to nipple measuring est 8cm x 5cm, L breast with smaller mass just lateral to nipple line also tender to palpation  SKIN: UE/LE warm to touch, peripheral pulses nonpalpable, 3cm dehisced wound nonpurulent nonerythematous R medial upper arm near axilla  VASC: no edema, no LE tenderness, triple lumen catheter at R groin, L axillary arterial line  EXT: TTP L foot and toes. No tenderness of R foot    ALLERGIES:  Ancef (Rash; Urticaria)  DDAVP (Hypotension)  iodine (Hives; Pruritus)  penicillin (Swelling)  sulfa drugs (Angioedema)    MEDICATIONS  (STANDING):  atorvastatin 40 milliGRAM(s) Oral at bedtime  chlorhexidine 2% Cloths 1 Application(s) Topical <User Schedule>  collagenase Ointment 1 Application(s) Topical daily  heparin  Infusion.  Unit(s)/Hr (15 mL/Hr) IV Continuous <Continuous>  midodrine 25 milliGRAM(s) Oral every 8 hours  Nephro-deborah 1 Tablet(s) Oral daily  phenylephrine    Infusion 0.1 MICROgram(s)/kG/Min (1.6 mL/Hr) IV Continuous <Continuous>  sevelamer carbonate 800 milliGRAM(s) Oral three times a day with meals    MEDICATIONS  (PRN):  acetaminophen     Tablet .. 650 milliGRAM(s) Oral every 6 hours PRN Temp greater or equal to 38C (100.4F), Moderate Pain (4 - 6)  calamine/zinc oxide Lotion 1 Application(s) Topical three times a day PRN Itching  HYDROmorphone   Tablet 2 milliGRAM(s) Oral every 4 hours PRN Severe Pain (7 - 10)  HYDROmorphone  Injectable 0.25 milliGRAM(s) IV Push every 4 hours PRN Breakthrough pain      -------------------------------------------------------------------------------  LABS:                        7.2    8.64  )-----------( 179      ( 08 Jan 2023 05:38 )             23.5     01-08    135  |  97  |  28<H>  ----------------------------<  101<H>  4.4   |  26  |  4.28<H>    Ca    8.2<L>      08 Jan 2023 05:38  Phos  4.4     01-08  Mg     1.6     01-08    PT/INR - ( 08 Jan 2023 00:01 )   PT: 16.6 sec;   INR: 1.39   PTT - ( 08 Jan 2023 05:38 )  PTT:69.2 sec    COVID-19 PCR: Negative (05 Jan 2023 08:04)  COVID-19 PCR: Negative (23 Nov 2022 19:55)  COVID-19 PCR: NotDetec (19 Nov 2022 11:56)    RADIOLOGY & ADDITIONAL TESTS: Reviewed.  Gallium scan: No clear source of infection is identified.  The patient's inflammatory reaction in the right breast is seen.  There is also a focal area of uptake in the right anterior chest wall which is of uncertain significance, but review of the physical examination and recent sonogram suggests that there is no abscess or local inflammatory reaction in this area.

## 2023-01-08 NOTE — PROGRESS NOTE ADULT - ASSESSMENT
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     During imaging, CT showing 7.0 cm fluid collection is present near an AV fistula in the right upper arm. The differential for this collection included benign post-operative hematoma or seroma, but also abscess or vascular device infection, particularly in the setting of septic shock. Vascular consulted for RUE fistula evaluation to rule out this area as a source of sepsis. Reassuring physical exam at initial evaluation without clear indicia of infection related to the graph, small superficial surgical incision dehiscence notwithstanding. Now s/p IR image guided aspiration of perigraft fluid (negative) and gadolinium scan (negative). At this point, do not suspect a graft infection as the provoking factor leading to the patient's presentation. Patient remaining afebrile overnight with continued pressor requirements.    Recommendations:  -Continue to follow up BCx and IR Cx - both NGTD  -Wound care recs: BID dressing changes (once daily with Santyl DSD once daily with Bactroban DSD). Apply light ace wrap compression to right hand and forearm to help with soft tissue swelling  -Rest of care per primary team  -Vascular surgery Team 3C will continue to follow. Please call [phone] with any questions or concerns.

## 2023-01-08 NOTE — PROGRESS NOTE ADULT - ASSESSMENT
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     NEURO  #Pain Control  Pt in 10/10 pain of bilateral breasts, worst on R. Pain responsive to Dilaudid but effect wears off after ~2-3 hours.   - f/u pain management recommendations:  - Recommendations: Dilaudid 2mg po q4h PRN for severe pain, Dilaudid 0.25mg IVP q4h PRN for severe breakthrough pain, Tylenol 650mg po q6h PRN for mild pain, daily lidocaine patch, HOLD ALL OPIOIDS FOR SEDATION, RR<10, O2SAT <93%, SBP <96    PULM  #Atelectasis  Noted on CT chest to have atelectasis at b/l lung bases.  - Incentive spirometry    CARDIOVASCULAR  #Shock  Pt presented meeting 2/4 SIRS. Hypotensive with MAPs to 60, requiring Levophed. Septic picture of unknown source. HR > 90, WBC >12. Lactate 1.9. Afebrile. Procal 12. S/p Vancomycin 1250mg x 1, Aztreonam x 1 (pt allergic to Cefazolin, PCN). . Ferritin 1209  Patient still requiring pressors, unknown source, does not appear to fit septic picture at this point (no fever, BCx negative, no focal area of infection identified thus far)  - On Phenylephrine 5.5mcg/kg/min  - Start midodrine 50mg q8h   - CT:  7.0 cm fluid collection is present near an AV fistula in the right upper arm. Possibly abscess. Drained by IR 1/5 with serosanguinous fluid: no organisms, few WBCs  - F/u vascular recs: wound care to RUE wound  - f/u surgery recs: for pain/erythema/warmth of R breast - no surgical intervention at this time  - f/u ID recs: d/c meropenem. c/w Vancomycin 500mg IV after HD sessions, last trough 21.   - BCx: NGTD x 4 days    #LVOT with LISETTE  likely i/s/o hyperdynamic LV function 2/2 shock and hypovolemia  - per cardiology, rec repeating TTE after shock resolves    #HFrEF  TTE 11/22 normal LV and systolic, EF 59%, grade II diastolic dysfuncton, dilated RV, reduced RV,   Home meds: Entresto 49/51mg  - hold Entresto i/s/o shock    #CAD  Hx of cardiac cath in 2018  Home meds: atorvastatin 40mg, plavix 75mg qd  - c/w home atorvastatin 40mg  - hold home Plavix 75mg qd    GI  #Abdominal Pain  C/o vague abdominal pain, a/w bilious emesis x 2, no nausea or further episodes of emesis   (Resolved)    RENAL  #ESRD 2/2 PCKD  Pt has been on HD for "years" 2/2 PCKD. AV Fistula of EDWIN clotted in 2014, has received HD thru HD cath since. New AV Fistula in 11/2022, not yet matured.   - f/u nephrology recs  - s/p HD 1/7. Goal net even    #Hyperkalemia  Patient with Hx of ESRD with missed HD presents with K >8 on VBG with abdominal symptoms and missed HD x8 days. ICU consulted i/s/o hyperkalemia. Renal consulted.  - s/p HD 1/5  - monitor with BMP's qd    ID  Pt presented in septic shock with leukocytosis, tachycardia, hypotension requiring pressors. Unknown source.   - see #shock for plan  - f/u ID recs  - gallium scan: faintly increased activity surrounding the entire lower portion of the right breast consistent with the inflammatory reaction seen on physical examination, small area of activity approximately 10 cm to the right of midline at about the level of the lower border of the   sternum, activity likely to be in chest wall   - d/c meropenem, continue vancomycin 500mg IV s/p HD  - f/u PET/CT ordered     ENDO  #Hyperparathyroidism  At risk for secondary HPTT d/t renal failure   - pt has vague abdominal pain with nausea  - multiple brown tumors on CT with multiple fractures of pubic rami, pubic bone, thoracolumbar spine  - intact , Vit D 38.5  - SPEP/immunofixation negative   - endocrinology recs: secondary hyperparathyroidism vs other process causing osteoclastic tumors, obtain PET/CT    #Multinodular Goiter  CT: enlarged multinodular thyroid projecting into superior mediastinum with 1.8 x 1.5 cm solid nodule slightly inferiorly in the anterior mediastinum.   - Thyroid ultrasound: Fine-needle aspiration recommended for 4.3 cm right thyroid and 2.6 cm left thyroid solid nodules per SCOTT guidelines.  - TSH 1.6 free T4 0.68.  - TSH could be inappropriately normal for low free T4 due to euthyroid sick syndrome.  - f/u TFTs after shock resolved    MSK  #Renal Osteodystrophy  - on CT: Increased density of the bone marrow is consistent with renal osteodystrophy  - Per endocrine, extent of bone destruction extreme given PTH level, suspect might not be Brown tumors    HEME/ONC  #Hx of DVTs  Current DVT of R IJ, R subclavian and R axillary veins. Hx of L AVF clotting. Given hx of clots, current pruritis and gallium scan uptake in chest wall, reasonable to workup coagulopathy  - f/u antiphospholipid panel/rheumatologic workup    #Anemia  Hgb has downtrended from 9.8 on admission to 7.2. Baseline from November appears to be ~13.   - Ferritin elevated: 1209,  - Total iron low (15), TIBC low (79)    #Breast Pain, bilateral  Family hx of breast cancer in mother, sister.   - large R breast mass, palpable L breast mass, R breast has peau d'orange appearance with associated erythema, warmth on R  - US bilateral breasts: No sonographic evidence of breast abscess, cyst or focal mass to correspond with the clinical finding of bilateral breast masses.  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     NEURO  #Pain Control  Pt in 10/10 pain of bilateral breasts, worst on R. Pain responsive to Dilaudid but effect wears off after ~2-3 hours.   - f/u pain management recommendations:  - Recommendations: Dilaudid 2mg po q4h PRN for severe pain, Dilaudid 0.25mg IVP q4h PRN for severe breakthrough pain, Tylenol 650mg po q6h PRN for mild pain, daily lidocaine patch, HOLD ALL OPIOIDS FOR SEDATION, RR<10, O2SAT <93%, SBP <96    PULM  #Atelectasis  Noted on CT chest to have atelectasis at b/l lung bases.  - Incentive spirometry    CARDIOVASCULAR  #Shock  Pt presented meeting 2/4 SIRS. Hypotensive with MAPs to 60, requiring Levophed. Septic picture of unknown source. HR > 90, WBC >12. Lactate 1.9. Afebrile. Procal 12. S/p Vancomycin 1250mg x 1, Aztreonam x 1 (pt allergic to Cefazolin, PCN). . Ferritin 1209  Patient still requiring pressors, unknown source, does not appear to fit septic picture at this point (no fever, BCx negative, no focal area of infection identified thus far)  - On Phenylephrine 5.5mcg/kg/min  - Start midodrine 50mg q8h   - CT:  7.0 cm fluid collection is present near an AV fistula in the right upper arm. Possibly abscess. Drained by IR 1/5 with serosanguinous fluid: no organisms, few WBCs  - F/u vascular recs: wound care to RUE wound  - f/u surgery recs: for pain/erythema/warmth of R breast - no surgical intervention at this time  - f/u ID recs: d/c meropenem. c/w Vancomycin 500mg IV after HD sessions, last trough 21.   - BCx: NGTD  - corticotropin stim test wnl    #LVOT with LISETTE  likely i/s/o hyperdynamic LV function 2/2 shock and hypovolemia  - per cardiology, rec repeating TTE after shock resolves    #HFrEF  TTE 11/22 normal LV and systolic, EF 59%, grade II diastolic dysfuncton, dilated RV, reduced RV,   Home meds: Entresto 49/51mg  - hold Entresto i/s/o shock    #CAD  Hx of cardiac cath in 2018  Home meds: atorvastatin 40mg, plavix 75mg qd  - c/w home atorvastatin 40mg  - hold home Plavix 75mg qd    GI  #Abdominal Pain  C/o vague abdominal pain, a/w bilious emesis x 2, no nausea or further episodes of emesis   (Resolved)    RENAL  #ESRD 2/2 PCKD  Pt has been on HD for "years" 2/2 PCKD. AV Fistula of EDWIN clotted in 2014, has received HD thru HD cath since. New AV Fistula in 11/2022, not yet matured.   - f/u nephrology recs  - s/p HD 1/7. Goal net even    #Hyperkalemia  Patient with Hx of ESRD with missed HD presents with K >8 on VBG with abdominal symptoms and missed HD x8 days. ICU consulted i/s/o hyperkalemia. Renal consulted.  - s/p HD 1/5  - monitor with BMP's qd    ID  Pt presented in septic shock with leukocytosis, tachycardia, hypotension requiring pressors. Unknown source.   - see #shock for plan  - f/u ID recs  - gallium scan: faintly increased activity surrounding the entire lower portion of the right breast consistent with the inflammatory reaction seen on physical examination, small area of activity approximately 10 cm to the right of midline at about the level of the lower border of the sternum, activity likely to be in chest wall   - d/c meropenem, continue vancomycin 500mg IV s/p HD  - f/u PET/CT ordered     ENDO  #Hyperparathyroidism  At risk for secondary HPTT d/t renal failure   - pt has vague abdominal pain with nausea  - multiple brown tumors on CT with multiple fractures of pubic rami, pubic bone, thoracolumbar spine  - intact , Vit D 38.5  - SPEP/immunofixation negative   - endocrinology recs: secondary hyperparathyroidism vs other process causing osteoclastic tumors, obtain PET/CT    #Multinodular Goiter  CT: enlarged multinodular thyroid projecting into superior mediastinum with 1.8 x 1.5 cm solid nodule slightly inferiorly in the anterior mediastinum.   - Thyroid ultrasound: Fine-needle aspiration recommended for 4.3 cm right thyroid and 2.6 cm left thyroid solid nodules per SCOTT guidelines.  - TSH 1.6 free T4 0.68.  - TSH could be inappropriately normal for low free T4 due to euthyroid sick syndrome.  - f/u TFTs after shock resolved    MSK  #Renal Osteodystrophy  - on CT: Increased density of the bone marrow is consistent with renal osteodystrophy  - Per endocrine, extent of bone destruction extreme given PTH level, suspect might not be Brown tumors    HEME/ONC  #Hx of DVTs  Current DVT of R IJ, R subclavian and R axillary veins. Hx of L AVF clotting. Given hx of clots, current pruritis and gallium scan uptake in chest wall, reasonable to workup coagulopathy  - f/u antiphospholipid panel/rheumatologic workup    #Anemia  Hgb has downtrended from 9.8 on admission to 7.2. Baseline from November appears to be ~13.   - Ferritin elevated: 1209,  - Total iron low (15), TIBC low (79)    #Breast Pain, bilateral  Family hx of breast cancer in mother, sister.   - large R breast mass, palpable L breast mass, R breast has peau d'orange appearance with associated erythema, warmth on R  - US bilateral breasts: No sonographic evidence of breast abscess, cyst or focal mass to correspond with the clinical finding of bilateral breast masses.  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     NEURO  #Pain Control  Pt in 10/10 pain of bilateral breasts, worst on R. Pain responsive to Dilaudid but effect wears off after ~2-3 hours.   - f/u pain management recommendations:  - Recommendations: Dilaudid 2mg po q4h PRN for severe pain, Dilaudid 0.25mg IVP q4h PRN for severe breakthrough pain, Tylenol 650mg po q6h PRN for mild pain, daily lidocaine patch, HOLD ALL OPIOIDS FOR SEDATION, RR<10, O2SAT <93%, SBP <96    PULM  #Atelectasis  Noted on CT chest to have atelectasis at b/l lung bases.  - Incentive spirometry    CARDIOVASCULAR  #Shock  Pt presented meeting 2/4 SIRS. Hypotensive with MAPs to 60, requiring Levophed. Septic picture of unknown source. HR > 90, WBC >12. Lactate 1.9. Afebrile. Procal 12. S/p Vancomycin 1250mg x 1, Aztreonam x 1 (pt allergic to Cefazolin, PCN). . Ferritin 1209  Patient still requiring pressors, unknown source, does not appear to fit septic picture at this point (no fever, BCx negative, no focal area of infection identified thus far)  - On Phenylephrine 5.5mcg/kg/min  - Start midodrine 50mg q8h   - CT:  7.0 cm fluid collection is present near an AV fistula in the right upper arm. Possibly abscess. Drained by IR 1/5 with serosanguinous fluid: no organisms, few WBCs  - F/u vascular recs: wound care to RUE wound  - f/u surgery recs: for pain/erythema/warmth of R breast - no surgical intervention at this time  - f/u ID recs: d/c meropenem. c/w Vancomycin 500mg IV after HD sessions, last trough 21.   - BCx: NGTD  - corticotropin stim test wnl    #LVOT with LISETTE  likely i/s/o hyperdynamic LV function 2/2 shock and hypovolemia  - per cardiology, rec repeating TTE after shock resolves    #HFrEF  TTE 11/22 normal LV and systolic, EF 59%, grade II diastolic dysfuncton, dilated RV, reduced RV,   Home meds: Entresto 49/51mg  - hold Entresto i/s/o shock    #CAD  Hx of cardiac cath in 2018  Home meds: atorvastatin 40mg, plavix 75mg qd  - c/w home atorvastatin 40mg  - hold home Plavix 75mg qd    GI  #Abdominal Pain  C/o vague abdominal pain, a/w bilious emesis x 2, no nausea or further episodes of emesis   (Resolved)    #Nutrition  Patient reports poor PO intake  -added nephro-deborah     RENAL  #ESRD 2/2 PCKD  Pt has been on HD for "years" 2/2 PCKD. AV Fistula of EDWIN clotted in 2014, has received HD thru HD cath since. New AV Fistula in 11/2022, not yet matured.   - f/u nephrology recs  - s/p HD 1/7. Goal net even    #Hyperkalemia  Patient with Hx of ESRD with missed HD presents with K >8 on VBG with abdominal symptoms and missed HD x8 days. ICU consulted i/s/o hyperkalemia. Renal consulted.  - s/p HD 1/5  - monitor with BMP's qd    ID  Pt presented in septic shock with leukocytosis, tachycardia, hypotension requiring pressors. Unknown source.   - see #shock for plan  - f/u ID recs  - gallium scan: faintly increased activity surrounding the entire lower portion of the right breast consistent with the inflammatory reaction seen on physical examination, small area of activity approximately 10 cm to the right of midline at about the level of the lower border of the sternum, activity likely to be in chest wall   - d/c meropenem, continue vancomycin 500mg IV s/p HD  - f/u PET/CT ordered     ENDO  #Hyperparathyroidism  At risk for secondary HPTT d/t renal failure   - pt has vague abdominal pain with nausea  - multiple brown tumors on CT with multiple fractures of pubic rami, pubic bone, thoracolumbar spine  - intact , Vit D 38.5  - SPEP/immunofixation negative   - endocrinology recs: secondary hyperparathyroidism vs other process causing osteoclastic tumors, obtain PET/CT    #Multinodular Goiter  CT: enlarged multinodular thyroid projecting into superior mediastinum with 1.8 x 1.5 cm solid nodule slightly inferiorly in the anterior mediastinum.   - Thyroid ultrasound: Fine-needle aspiration recommended for 4.3 cm right thyroid and 2.6 cm left thyroid solid nodules per SCOTT guidelines.  - TSH 1.6 free T4 0.68.  - TSH could be inappropriately normal for low free T4 due to euthyroid sick syndrome.  - f/u TFTs after shock resolved    MSK  #Renal Osteodystrophy  - on CT: Increased density of the bone marrow is consistent with renal osteodystrophy  - Per endocrine, extent of bone destruction extreme given PTH level, suspect might not be Brown tumors    HEME/ONC  #Hx of DVTs  Current DVT of R IJ, R subclavian and R axillary veins. Hx of L AVF clotting. Given hx of clots, current pruritis and gallium scan uptake in chest wall, reasonable to workup coagulopathy  - f/u antiphospholipid panel/rheumatologic workup    #Anemia  Hgb has downtrended from 9.8 on admission to 7.2. Baseline from November appears to be ~13.   - Ferritin elevated: 1209,  - Total iron low (15), TIBC low (79)    #Breast Pain, bilateral  Family hx of breast cancer in mother, sister.   - large R breast mass, palpable L breast mass, R breast has peau d'orange appearance with associated erythema, warmth on R  - US bilateral breasts: No sonographic evidence of breast abscess, cyst or focal mass to correspond with the clinical finding of bilateral breast masses.  - surgery: recc nonurgent outpatient mammogram when clinically stable    GYN  Uterine mass, 6.1cm mass in R adnexa seen incidentally on CTAP  - consider consulting GYN if oncologic workup is indicated    Preventative  F: None  E: None, HD pt  N: Renal Restricted Diet  DVT: heparin 5000IU subq q8h   Code Status: Full Code  Dispo: ICU

## 2023-01-08 NOTE — PROGRESS NOTE ADULT - ASSESSMENT
67 F with ESRD on HD presenting in setting of missed HD found to by hyperkalemic then c/b septic shock from possible fluid collection near RUE AVF now s/p drainage with IR on 1/5.    Assessment/Plan:   #ESRD on HD TTS  next HD 1/10   Electrolytes noted K 4.0, Sodium 134  UF with HD  Renal diet    #Hx of Hypertension  on midodrine and phenylephrine  -minimal UF with HD     #access   [facility] TDC - does not appear to be infected or source of infxn  RUE AVF not being used    #anemia  Hgb 7.4  -Epo w/ HD  -Check iron profile and ferritin    #renal bone disease   Ca ~8.0, corrected for albumin wnl  Phos ~3.9  -c/w sevelamer 800mg tid w/ meals      Thank you for the opportunity to participate in the care of your patient. The nephrology service remains available to assist with any questions or concerns. Please feel free to reach us by paging the on-call nephrology fellow for urgent issues or as below.     Saumya Jo D.O  PGY-5, Nephrology Fellow    [phone]

## 2023-01-08 NOTE — PROGRESS NOTE ADULT - ASSESSMENT
67F h/o ESRD due to PCKD on HD via HD cath (failedvascular access due to thrombus x 3), CAD, HTN, HLD, PE s/p IVC filter, PAD, OA, osteoclastoma, ventral hernia p/w septic shock of unknown source.  Infectious work-up so far unremarkable - fluid collection near AVF likely seroma and culture ngtd, gallium scan negative, BCx ngtd.  Patient c/o breast pain but no erythema on exam, and unlikely breast ca per team.  Could be mastitis.    - cont vanco for now.  Check vanc level before HD.   If <10, would give 1 g, 10-17.5 give 750 mg, 17.6-25, give 500 mg after HD, >25 Hold Vancomycin. Please give Vancomycin after HD is completed   - f/u BCx, fluid culture    Team 1 will follow you.  Case d/w primary team.    Nora Naranjo MD, MS  Infectious Disease attending  work cell [phone]   For any questions during evening/weekend/holiday, please page ID on call

## 2023-01-09 NOTE — CHART NOTE - NSCHARTNOTEFT_GEN_A_CORE
Admitting Diagnosis:   Patient is a 67y old  Female who presents with a chief complaint of shock, breast pain (09 Jan 2023 06:09)      PAST MEDICAL & SURGICAL HISTORY:  HTN (hypertension)      HLD (hyperlipidemia)      CAD (coronary atherosclerotic disease)      ESRD on dialysis  last 11/15/22      H/O ventral hernia      Brown tumor  brain      DVT, lower extremity  left      History of surgery  IVC filter      AVF (arteriovenous fistula)  right left      S/P AIDEN-BSO  2003      History of surgery  RLE PTA stent / atherectomy  7/2021      History of atherectomy  stent    Current Nutrition Order: Renal      PO Intake: Good (%) [   ]  Fair (50-75%) [   ] Poor (<25%) [ x ]    GI Issues: Abdomen ND/NT, +BS x4, LBM 1/6    Pain: 8/10, L foot     Skin Integrity: Dehist AVF R arm, +1 gen. edema     Labs:   01-09    131<L>  |  95<L>  |  36<H>  ----------------------------<  76  4.8   |  24  |  5.89<H>    Ca    8.2<L>      09 Jan 2023 05:45  Phos  5.4     01-09  Mg     1.7     01-09    TPro  5.7<L>  /  Alb  2.2<L>  /  TBili  0.2  /  DBili  x   /  AST  21  /  ALT  13  /  AlkPhos  78  01-09    CAPILLARY BLOOD GLUCOSE      POCT Blood Glucose.: 77 mg/dL (09 Jan 2023 05:13)  POCT Blood Glucose.: 84 mg/dL (08 Jan 2023 23:35)      Medications:  MEDICATIONS  (STANDING):  atorvastatin 40 milliGRAM(s) Oral at bedtime  chlorhexidine 2% Cloths 1 Application(s) Topical <User Schedule>  collagenase Ointment 1 Application(s) Topical daily  heparin  Infusion.  Unit(s)/Hr (15 mL/Hr) IV Continuous <Continuous>  lidocaine 1%/epinephrine 1:100,000 Inj 10 milliLiter(s) Local Injection once  midodrine 30 milliGRAM(s) Oral every 8 hours  Nephro-deborah 1 Tablet(s) Oral daily  phenylephrine    Infusion 0.1 MICROgram(s)/kG/Min (1.6 mL/Hr) IV Continuous <Continuous>  polyethylene glycol 3350 17 Gram(s) Oral every 12 hours  senna 2 Tablet(s) Oral at bedtime  sevelamer carbonate 800 milliGRAM(s) Oral three times a day with meals    MEDICATIONS  (PRN):  acetaminophen     Tablet .. 650 milliGRAM(s) Oral every 6 hours PRN Temp greater or equal to 38C (100.4F), Moderate Pain (4 - 6)  calamine/zinc oxide Lotion 1 Application(s) Topical three times a day PRN Itching  HYDROmorphone   Tablet 2 milliGRAM(s) Oral every 4 hours PRN Severe Pain (7 - 10)  HYDROmorphone  Injectable 0.25 milliGRAM(s) IV Push every 4 hours PRN Breakthrough pain    Height for BMI (CENTIMETERS)	177.8 Centimeter(s)  Weight for BMI (lbs)	188.1 lb  Weight for BMI (kg)	85.3 kg  Body Mass Index	26.9    Weight Change:   1/2 - 82.6 kg  1/3 - 84 kg  1/7 - 99.4 kg  -Trend sequential wts. Pt with noted worsening BUE edema     Estimated energy needs:   Weight used for calculations	IBW  Estimated Energy Needs Weight (lbs)	150.3 lb  Estimated Energy Needs Weight (kg)	68.2 kg  Estimated Energy Needs From (anatoliy/kg)	30  Estimated Energy Needs To (anatoliy/kg)	35  Estimated Energy Needs Calculated From (anatoliy/kg)	2046  Estimated Energy Needs Calculated To (anatoliy/kg)	2387  Weight used for calculations	IBW  Estimated Protein Needs Weight (lbs)	150.3 lb  Estimated Protein Needs Weight (kg)	68.2 kg  Estimated Protein Needs From (g/kg)	1.2  Estimated Protein Needs To (g/kg)	1.3  Estimated Protein Needs Calculated From (g/kg)	81.84  Estimated Protein Needs Calculated To (g/kg)	88.66  Estimated Fluid Needs Weight (lbs)	150.3 lb  Estimated Fluid Needs Weight (kg)	68.2 kg  Other Calculations	Fluids per team. 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 Complaining of crampy abdominal pain and waterry diarrhea (2-3 BMs/day), now with nausea and vomiting prior to arrival. Has not gone to HD since 12/24 (8 days ago). 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 Complaining of crampy abdominal pain and waterry diarrhea (2-3 BMs/day), now with nausea and vomiting prior to arrival. Has not gone to HD since 12/24 (8 days ago). Admitted for emergent HD. 1/5: A line placed.     Pt care discussed in IDT rounds. Rx and labs reviewed. Pt remains lethargic and limited engagement in nutrition interview. Pt reportedly starting to eat better. Plan to restart ONS regimen today to promote oral intake. No new reports NVCD or difficult chew/swallow. RDN will continue to reassess, intervene, and monitor as appropriate.     Previous Nutrition Diagnosis: Increased Nutrient Needs... kcal/protein r/t HD AEB known increased nutrient demands for HD.     Active [ x ]  Resolved [   ]    If resolved, new PES:     Goal: Pt will meet 75% or more of protein/energy needs via most appropriate route for nutrition.    Recommendations:  -Continue current diet   -Add Nepro TID  -Encourage good PO intake and ONS complaince   -Honor food preferences as able  -Monitor chemistry, GI fxn, and skin integrity     Risk Level: High [ x ] Moderate [   ] Low [   ]

## 2023-01-09 NOTE — PROGRESS NOTE ADULT - SUBJECTIVE AND OBJECTIVE BOX
SUBJECTIVE: Patient seen and examined bedside; no acute events overnight, feeling well this am, sleepy, but answering questions, tolerated well dressing change.    heparin  Infusion.  Unit(s)/Hr IV Continuous <Continuous>  midodrine 25 milliGRAM(s) Oral every 8 hours  phenylephrine    Infusion 0.1 MICROgram(s)/kG/Min IV Continuous <Continuous>      Vital Signs Last 24 Hrs  T(C): 37.1 (09 Jan 2023 06:05), Max: 37.2 (09 Jan 2023 00:59)  T(F): 98.7 (09 Jan 2023 06:05), Max: 98.9 (09 Jan 2023 00:59)  HR: 72 (09 Jan 2023 07:00) (69 - 84)  BP: --  BP(mean): --  RR: 14 (09 Jan 2023 07:00) (12 - 26)  SpO2: 94% (09 Jan 2023 07:00) (89% - 100%)    Parameters below as of 09 Jan 2023 07:00  Patient On (Oxygen Delivery Method): room air      I&O's Detail    08 Jan 2023 07:01  -  09 Jan 2023 07:00  --------------------------------------------------------  IN:    Heparin Infusion: 345 mL    Phenylephrine: 910.4 mL    Phenylephrine: 227.2 mL  Total IN: 1482.6 mL    OUT:  Total OUT: 0 mL    Total NET: 1482.6 mL      PE:    General: NAD, resting comfortably in bed  C/V: S1 s2, RRR  Pulm: Nonlabored breathing, no respiratory distress  Abd: Soft, NTND  Extrem: WWP; Right UE mildly edematous unchanged from prior exam, medial upper arm wound base pink and clean, no obvious signs of infection        LABS:                        6.9    6.88  )-----------( 185      ( 09 Jan 2023 05:45 )             22.9     01-09    131<L>  |  95<L>  |  36<H>  ----------------------------<  76  4.8   |  24  |  5.89<H>    Ca    8.2<L>      09 Jan 2023 05:45  Phos  5.4     01-09  Mg     1.7     01-09    TPro  5.7<L>  /  Alb  2.2<L>  /  TBili  0.2  /  DBili  x   /  AST  21  /  ALT  13  /  AlkPhos  78  01-09    PT/INR - ( 08 Jan 2023 00:01 )   PT: 16.6 sec;   INR: 1.39          PTT - ( 09 Jan 2023 05:45 )  PTT:61.8 sec      RADIOLOGY & ADDITIONAL STUDIES:

## 2023-01-09 NOTE — PROGRESS NOTE ADULT - SUBJECTIVE AND OBJECTIVE BOX
Internal Medicine Progress Note  Dara Bradford, PGY-1    ******INCOMPLETE******    OVERNIGHT EVENTS/INTERVAL HPI:    OBJECTIVE:  Vital Signs Last 24 Hrs  T(C): 37.2 (09 Jan 2023 00:59), Max: 37.2 (09 Jan 2023 00:59)  T(F): 98.9 (09 Jan 2023 00:59), Max: 98.9 (09 Jan 2023 00:59)  HR: 70 (09 Jan 2023 05:00) (69 - 94)  BP: --  BP(mean): --  RR: 22 (09 Jan 2023 05:00) (12 - 26)  SpO2: 93% (09 Jan 2023 05:00) (89% - 98%)    Parameters below as of 09 Jan 2023 05:00  Patient On (Oxygen Delivery Method): room air      I&O's Detail    07 Jan 2023 07:01  -  08 Jan 2023 07:00  --------------------------------------------------------  IN:    Heparin Infusion: 315 mL    IV PiggyBack: 100 mL    Other (mL): 400 mL    Phenylephrine: 1040 mL    Phenylephrine: 857.6 mL  Total IN: 2712.6 mL    OUT:    Other (mL): 400 mL  Total OUT: 400 mL    Total NET: 2312.6 mL      08 Jan 2023 07:01  -  09 Jan 2023 06:09  --------------------------------------------------------  IN:    Heparin Infusion: 330 mL    Phenylephrine: 227.2 mL    Phenylephrine: 786.4 mL  Total IN: 1343.6 mL    OUT:  Total OUT: 0 mL    Total NET: 1343.6 mL        Physical Exam:  GENERAL: Awake, alert and interactive, no acute distress, appears comfortable  NEURO: A&Ox4, no focal deficits, 5/5 strength in all ext, reflexes 2+ throughout, CN 2-12 intact  HEENT: Normocephalic, atraumatic, no conjunctivitis or scleral icterus, oral mucosa moist, no oral lesions noted  NECK: Supple, no LAD, no JVD, thyroid not palpable  CARDIAC: Regular rate and rhythm, +S1/S2, no murmurs/rubs/gallops  PULM: Breathing comfortably on RA, clear to auscultation bilaterally, no wheezes/rales/rhonchi  ABDOMEN: Soft, nontender, nondistended, +bs, no hepatosplenomegaly, no rebound tenderness or fluid wave, no CVA tenderness  : Deferred  MSK: Range of motion grossly intact, no back tenderness  SKIN: Warm and dry, no rashes, lesions  VASC: Cap refil < 2 sec, 2+ peripheral pulses, no edema, no LE tenderness  Psych: Appropriate affect    Medications:  MEDICATIONS  (STANDING):  atorvastatin 40 milliGRAM(s) Oral at bedtime  chlorhexidine 2% Cloths 1 Application(s) Topical <User Schedule>  collagenase Ointment 1 Application(s) Topical daily  heparin  Infusion.  Unit(s)/Hr (15 mL/Hr) IV Continuous <Continuous>  midodrine 25 milliGRAM(s) Oral every 8 hours  Nephro-deborah 1 Tablet(s) Oral daily  phenylephrine    Infusion 0.1 MICROgram(s)/kG/Min (1.6 mL/Hr) IV Continuous <Continuous>  sevelamer carbonate 800 milliGRAM(s) Oral three times a day with meals    MEDICATIONS  (PRN):  acetaminophen     Tablet .. 650 milliGRAM(s) Oral every 6 hours PRN Temp greater or equal to 38C (100.4F), Moderate Pain (4 - 6)  calamine/zinc oxide Lotion 1 Application(s) Topical three times a day PRN Itching  HYDROmorphone   Tablet 2 milliGRAM(s) Oral every 4 hours PRN Severe Pain (7 - 10)  HYDROmorphone  Injectable 0.25 milliGRAM(s) IV Push every 4 hours PRN Breakthrough pain      Labs:                        6.9    6.88  )-----------( 185      ( 09 Jan 2023 05:45 )             22.9     01-08    135  |  97  |  28<H>  ----------------------------<  101<H>  4.4   |  26  |  4.28<H>    Ca    8.2<L>      08 Jan 2023 05:38  Phos  4.4     01-08  Mg     1.6     01-08      PT/INR - ( 08 Jan 2023 00:01 )   PT: 16.6 sec;   INR: 1.39          PTT - ( 09 Jan 2023 05:45 )  PTT:61.8 sec      COVID-19 PCR: Negative (05 Jan 2023 08:04)  COVID-19 PCR: Negative (23 Nov 2022 19:55)  COVID-19 PCR: NotDetec (19 Nov 2022 11:56)     OVERNIGHT EVENTS/INTERVAL HPI: No acute events overnight.     SUBJECTIVE: Patient resting comfortably when examined at bedside, stated that pain is well-controlled at the moment     OBJECTIVE:  Vital Signs Last 24 Hrs  T(C): 37.2 (09 Jan 2023 00:59), Max: 37.2 (09 Jan 2023 00:59)  T(F): 98.9 (09 Jan 2023 00:59), Max: 98.9 (09 Jan 2023 00:59)  HR: 70 (09 Jan 2023 05:00) (69 - 94)  BP: --  BP(mean): --  RR: 22 (09 Jan 2023 05:00) (12 - 26)  SpO2: 93% (09 Jan 2023 05:00) (89% - 98%)    Parameters below as of 09 Jan 2023 05:00  Patient On (Oxygen Delivery Method): room air      I&O's Detail    07 Jan 2023 07:01  -  08 Jan 2023 07:00  --------------------------------------------------------  IN:    Heparin Infusion: 315 mL    IV PiggyBack: 100 mL    Other (mL): 400 mL    Phenylephrine: 1040 mL    Phenylephrine: 857.6 mL  Total IN: 2712.6 mL    OUT:    Other (mL): 400 mL  Total OUT: 400 mL    Total NET: 2312.6 mL      08 Jan 2023 07:01  -  09 Jan 2023 06:09  --------------------------------------------------------  IN:    Heparin Infusion: 330 mL    Phenylephrine: 227.2 mL    Phenylephrine: 786.4 mL  Total IN: 1343.6 mL    OUT:  Total OUT: 0 mL    Total NET: 1343.6 mL        Physical Exam:  GENERAL: Awake, alert and interactive, no acute distress, appears comfortable  NEURO: A&Ox4, no focal deficits, 5/5 strength in all ext, reflexes 2+ throughout, CN 2-12 intact  HEENT: Normocephalic, atraumatic, no conjunctivitis or scleral icterus, oral mucosa moist, no oral lesions noted  NECK: Supple, no LAD, no JVD, thyroid not palpable  CARDIAC: Regular rate and rhythm, +S1/S2, no murmurs/rubs/gallops  PULM: Breathing comfortably on RA, clear to auscultation bilaterally, no wheezes/rales/rhonchi  ABDOMEN: Soft, nontender, nondistended, +bs, no hepatosplenomegaly, no rebound tenderness or fluid wave, no CVA tenderness  : Deferred  MSK: Range of motion grossly intact, no back tenderness  SKIN: Warm and dry, no rashes, lesions  VASC: Cap refil < 2 sec, 2+ peripheral pulses, no edema, no LE tenderness  Psych: Appropriate affect    Medications:  MEDICATIONS  (STANDING):  atorvastatin 40 milliGRAM(s) Oral at bedtime  chlorhexidine 2% Cloths 1 Application(s) Topical <User Schedule>  collagenase Ointment 1 Application(s) Topical daily  heparin  Infusion.  Unit(s)/Hr (15 mL/Hr) IV Continuous <Continuous>  midodrine 25 milliGRAM(s) Oral every 8 hours  Nephro-deborah 1 Tablet(s) Oral daily  phenylephrine    Infusion 0.1 MICROgram(s)/kG/Min (1.6 mL/Hr) IV Continuous <Continuous>  sevelamer carbonate 800 milliGRAM(s) Oral three times a day with meals    MEDICATIONS  (PRN):  acetaminophen     Tablet .. 650 milliGRAM(s) Oral every 6 hours PRN Temp greater or equal to 38C (100.4F), Moderate Pain (4 - 6)  calamine/zinc oxide Lotion 1 Application(s) Topical three times a day PRN Itching  HYDROmorphone   Tablet 2 milliGRAM(s) Oral every 4 hours PRN Severe Pain (7 - 10)  HYDROmorphone  Injectable 0.25 milliGRAM(s) IV Push every 4 hours PRN Breakthrough pain      Labs:                        6.9    6.88  )-----------( 185      ( 09 Jan 2023 05:45 )             22.9     01-08    135  |  97  |  28<H>  ----------------------------<  101<H>  4.4   |  26  |  4.28<H>    Ca    8.2<L>      08 Jan 2023 05:38  Phos  4.4     01-08  Mg     1.6     01-08      PT/INR - ( 08 Jan 2023 00:01 )   PT: 16.6 sec;   INR: 1.39          PTT - ( 09 Jan 2023 05:45 )  PTT:61.8 sec      COVID-19 PCR: Negative (05 Jan 2023 08:04)  COVID-19 PCR: Negative (23 Nov 2022 19:55)  COVID-19 PCR: NotDetec (19 Nov 2022 11:56)     OVERNIGHT EVENTS/INTERVAL HPI: No acute events overnight.     SUBJECTIVE: Patient resting comfortably when examined at bedside, stated that pain is well-controlled at the moment. Has no HA, lightheadedness, chest pain, dyspnea, UE pain, abdominal pain.     OBJECTIVE:  Vital Signs Last 24 Hrs  T(C): 37.2 (09 Jan 2023 00:59), Max: 37.2 (09 Jan 2023 00:59)  T(F): 98.9 (09 Jan 2023 00:59), Max: 98.9 (09 Jan 2023 00:59)  HR: 70 (09 Jan 2023 05:00) (69 - 94)  BP: --  BP(mean): --  RR: 22 (09 Jan 2023 05:00) (12 - 26)  SpO2: 93% (09 Jan 2023 05:00) (89% - 98%)    Parameters below as of 09 Jan 2023 05:00  Patient On (Oxygen Delivery Method): room air      I&O's Detail    07 Jan 2023 07:01  -  08 Jan 2023 07:00  --------------------------------------------------------  IN:    Heparin Infusion: 315 mL    IV PiggyBack: 100 mL    Other (mL): 400 mL    Phenylephrine: 1040 mL    Phenylephrine: 857.6 mL  Total IN: 2712.6 mL    OUT:    Other (mL): 400 mL  Total OUT: 400 mL    Total NET: 2312.6 mL      08 Jan 2023 07:01  -  09 Jan 2023 06:09  --------------------------------------------------------  IN:    Heparin Infusion: 330 mL    Phenylephrine: 227.2 mL    Phenylephrine: 786.4 mL  Total IN: 1343.6 mL    OUT:  Total OUT: 0 mL    Total NET: 1343.6 mL        Physical Exam:  GENERAL: Awake, alert and interactive, no acute distress, appears comfortable  NEURO: A&Ox4, no focal deficits, 5/5 strength in all ext, reflexes 2+ throughout, CN 2-12 intact  HEENT: Normocephalic, atraumatic, no conjunctivitis or scleral icterus, oral mucosa moist, no oral lesions noted  NECK: Supple, no LAD, no JVD, thyroid not palpable  CARDIAC: Regular rate and rhythm, +S1/S2, no murmurs/rubs/gallops  PULM: Breathing comfortably on RA, clear to auscultation bilaterally, no wheezes/rales/rhonchi  ABDOMEN: Soft, nontender, nondistended, +bs, no hepatosplenomegaly, no rebound tenderness or fluid wave, no CVA tenderness  : Deferred  MSK: Range of motion grossly intact, no back tenderness  SKIN: Warm and dry, no rashes, lesions  VASC: Cap refil < 2 sec, 2+ peripheral pulses, no edema, no LE tenderness  Psych: Appropriate affect    Medications:  MEDICATIONS  (STANDING):  atorvastatin 40 milliGRAM(s) Oral at bedtime  chlorhexidine 2% Cloths 1 Application(s) Topical <User Schedule>  collagenase Ointment 1 Application(s) Topical daily  heparin  Infusion.  Unit(s)/Hr (15 mL/Hr) IV Continuous <Continuous>  midodrine 25 milliGRAM(s) Oral every 8 hours  Nephro-deborah 1 Tablet(s) Oral daily  phenylephrine    Infusion 0.1 MICROgram(s)/kG/Min (1.6 mL/Hr) IV Continuous <Continuous>  sevelamer carbonate 800 milliGRAM(s) Oral three times a day with meals    MEDICATIONS  (PRN):  acetaminophen     Tablet .. 650 milliGRAM(s) Oral every 6 hours PRN Temp greater or equal to 38C (100.4F), Moderate Pain (4 - 6)  calamine/zinc oxide Lotion 1 Application(s) Topical three times a day PRN Itching  HYDROmorphone   Tablet 2 milliGRAM(s) Oral every 4 hours PRN Severe Pain (7 - 10)  HYDROmorphone  Injectable 0.25 milliGRAM(s) IV Push every 4 hours PRN Breakthrough pain      Labs:                        6.9    6.88  )-----------( 185      ( 09 Jan 2023 05:45 )             22.9     01-08    135  |  97  |  28<H>  ----------------------------<  101<H>  4.4   |  26  |  4.28<H>    Ca    8.2<L>      08 Jan 2023 05:38  Phos  4.4     01-08  Mg     1.6     01-08      PT/INR - ( 08 Jan 2023 00:01 )   PT: 16.6 sec;   INR: 1.39          PTT - ( 09 Jan 2023 05:45 )  PTT:61.8 sec      COVID-19 PCR: Negative (05 Jan 2023 08:04)  COVID-19 PCR: Negative (23 Nov 2022 19:55)  COVID-19 PCR: NotDetec (19 Nov 2022 11:56)     OVERNIGHT EVENTS/INTERVAL HPI: No acute events overnight.     SUBJECTIVE: Patient resting comfortably when examined at bedside, stated that pain is well-controlled at the moment. Has no HA, lightheadedness, chest pain, dyspnea, UE pain, abdominal pain.     OBJECTIVE:  Vital Signs Last 24 Hrs  T(C): 37.2 (09 Jan 2023 00:59), Max: 37.2 (09 Jan 2023 00:59)  T(F): 98.9 (09 Jan 2023 00:59), Max: 98.9 (09 Jan 2023 00:59)  HR: 70 (09 Jan 2023 05:00) (69 - 94)  BP: --  BP(mean): --  RR: 22 (09 Jan 2023 05:00) (12 - 26)  SpO2: 93% (09 Jan 2023 05:00) (89% - 98%)    Parameters below as of 09 Jan 2023 05:00  Patient On (Oxygen Delivery Method): room air      I&O's Detail    07 Jan 2023 07:01  -  08 Jan 2023 07:00  --------------------------------------------------------  IN:    Heparin Infusion: 315 mL    IV PiggyBack: 100 mL    Other (mL): 400 mL    Phenylephrine: 1040 mL    Phenylephrine: 857.6 mL  Total IN: 2712.6 mL    OUT:    Other (mL): 400 mL  Total OUT: 400 mL    Total NET: 2312.6 mL      08 Jan 2023 07:01  -  09 Jan 2023 06:09  --------------------------------------------------------  IN:    Heparin Infusion: 330 mL    Phenylephrine: 227.2 mL    Phenylephrine: 786.4 mL  Total IN: 1343.6 mL    OUT:  Total OUT: 0 mL    Total NET: 1343.6 mL    HEENT: no conjunctivitis or scleral icterus, oral mucosa moist  NECK: Supple, no JVD, palpable multinodular goiter  CARDIAC: tachycardic, regular rhythm, +S1/S2, no murmurs/rubs/gallops  PULM: Breathing comfortably on RA, clear to auscultation bilaterally, poor inspiratory flow at bilateral bases, no wheezes/rales/rhonchi, HD catheter R chest, R anterior chest wall lateral to sternum elevated vs L  ABDOMEN: Soft, obese, nontender to palpation, +bs, no rebound tenderness or fluid wave, ventral hernia non reducible   BREAST: peau d'orange appearance of lateral R breast, large immobile palpable mass with approx 4cm lateral to nipple measuring est 8cm x 5cm, L breast with smaller mass just lateral to nipple line also tender to palpation  SKIN: UE/LE warm to touch, peripheral pulses nonpalpable, 3cm dehisced wound nonpurulent nonerythematous R medial upper arm near axilla, erythema superiolateral to R nipple  VASC: no edema, no LE tenderness, triple lumen catheter at R groin, L axillary arterial line  EXT: TTP L foot and toes. No tenderness of R foot    Medications:  MEDICATIONS  (STANDING):  atorvastatin 40 milliGRAM(s) Oral at bedtime  chlorhexidine 2% Cloths 1 Application(s) Topical <User Schedule>  collagenase Ointment 1 Application(s) Topical daily  heparin  Infusion.  Unit(s)/Hr (15 mL/Hr) IV Continuous <Continuous>  midodrine 25 milliGRAM(s) Oral every 8 hours  Nephro-deborah 1 Tablet(s) Oral daily  phenylephrine    Infusion 0.1 MICROgram(s)/kG/Min (1.6 mL/Hr) IV Continuous <Continuous>  sevelamer carbonate 800 milliGRAM(s) Oral three times a day with meals    MEDICATIONS  (PRN):  acetaminophen     Tablet .. 650 milliGRAM(s) Oral every 6 hours PRN Temp greater or equal to 38C (100.4F), Moderate Pain (4 - 6)  calamine/zinc oxide Lotion 1 Application(s) Topical three times a day PRN Itching  HYDROmorphone   Tablet 2 milliGRAM(s) Oral every 4 hours PRN Severe Pain (7 - 10)  HYDROmorphone  Injectable 0.25 milliGRAM(s) IV Push every 4 hours PRN Breakthrough pain      Labs:                        6.9    6.88  )-----------( 185      ( 09 Jan 2023 05:45 )             22.9     01-08    135  |  97  |  28<H>  ----------------------------<  101<H>  4.4   |  26  |  4.28<H>    Ca    8.2<L>      08 Jan 2023 05:38  Phos  4.4     01-08  Mg     1.6     01-08      PT/INR - ( 08 Jan 2023 00:01 )   PT: 16.6 sec;   INR: 1.39          PTT - ( 09 Jan 2023 05:45 )  PTT:61.8 sec      COVID-19 PCR: Negative (05 Jan 2023 08:04)  COVID-19 PCR: Negative (23 Nov 2022 19:55)  COVID-19 PCR: NotDetec (19 Nov 2022 11:56)     OVERNIGHT EVENTS/INTERVAL HPI: No acute events overnight.     SUBJECTIVE: Patient resting comfortably when examined at bedside, stated that pain is well-controlled at the moment. Has no HA, lightheadedness, chest pain, dyspnea, UE pain, abdominal pain.     OBJECTIVE:  Vital Signs Last 24 Hrs  T(C): 37.2 (09 Jan 2023 00:59), Max: 37.2 (09 Jan 2023 00:59)  T(F): 98.9 (09 Jan 2023 00:59), Max: 98.9 (09 Jan 2023 00:59)  HR: 70 (09 Jan 2023 05:00) (69 - 94)  BP: --  BP(mean): --  RR: 22 (09 Jan 2023 05:00) (12 - 26)  SpO2: 93% (09 Jan 2023 05:00) (89% - 98%)    Parameters below as of 09 Jan 2023 05:00  Patient On (Oxygen Delivery Method): room air      I&O's Detail    07 Jan 2023 07:01  -  08 Jan 2023 07:00  --------------------------------------------------------  IN:    Heparin Infusion: 315 mL    IV PiggyBack: 100 mL    Other (mL): 400 mL    Phenylephrine: 1040 mL    Phenylephrine: 857.6 mL  Total IN: 2712.6 mL    OUT:    Other (mL): 400 mL  Total OUT: 400 mL    Total NET: 2312.6 mL      08 Jan 2023 07:01  -  09 Jan 2023 06:09  --------------------------------------------------------  IN:    Heparin Infusion: 330 mL    Phenylephrine: 227.2 mL    Phenylephrine: 786.4 mL  Total IN: 1343.6 mL    OUT:  Total OUT: 0 mL    Total NET: 1343.6 mL    HEENT: no conjunctivitis or scleral icterus, oral mucosa moist  NECK: Supple, no JVD, palpable multinodular goiter  CARDIAC: tachycardic, regular rhythm, +S1/S2, no murmurs/rubs/gallops  PULM: Breathing comfortably on RA, clear to auscultation bilaterally, poor inspiratory flow at bilateral bases, no wheezes/rales/rhonchi, HD catheter R chest, R anterior chest wall lateral to sternum elevated vs L  ABDOMEN: Soft, obese, nontender to palpation, +bs, no rebound tenderness or fluid wave, ventral hernia non reducible   BREAST: peau d'orange appearance of lateral R breast, large immobile palpable mass with approx 4cm lateral to nipple measuring est 8cm x 5cm, L breast with smaller mass just lateral to nipple line also tender to palpation  SKIN: UE/LE warm to touch, peripheral pulses nonpalpable, 3cm dehisced wound nonpurulent nonerythematous R medial upper arm near axilla, erythema superiolateral to R nipple  VASC: no edema, no LE tenderness, triple lumen catheter at R groin, L axillary arterial line, bilateral UE at site of b/l AVF's edematous  EXT: TTP L foot and toes. No tenderness of R foot    Medications:  MEDICATIONS  (STANDING):  atorvastatin 40 milliGRAM(s) Oral at bedtime  chlorhexidine 2% Cloths 1 Application(s) Topical <User Schedule>  collagenase Ointment 1 Application(s) Topical daily  heparin  Infusion.  Unit(s)/Hr (15 mL/Hr) IV Continuous <Continuous>  midodrine 25 milliGRAM(s) Oral every 8 hours  Nephro-deborah 1 Tablet(s) Oral daily  phenylephrine    Infusion 0.1 MICROgram(s)/kG/Min (1.6 mL/Hr) IV Continuous <Continuous>  sevelamer carbonate 800 milliGRAM(s) Oral three times a day with meals    MEDICATIONS  (PRN):  acetaminophen     Tablet .. 650 milliGRAM(s) Oral every 6 hours PRN Temp greater or equal to 38C (100.4F), Moderate Pain (4 - 6)  calamine/zinc oxide Lotion 1 Application(s) Topical three times a day PRN Itching  HYDROmorphone   Tablet 2 milliGRAM(s) Oral every 4 hours PRN Severe Pain (7 - 10)  HYDROmorphone  Injectable 0.25 milliGRAM(s) IV Push every 4 hours PRN Breakthrough pain      Labs:                        6.9    6.88  )-----------( 185      ( 09 Jan 2023 05:45 )             22.9     01-08    135  |  97  |  28<H>  ----------------------------<  101<H>  4.4   |  26  |  4.28<H>    Ca    8.2<L>      08 Jan 2023 05:38  Phos  4.4     01-08  Mg     1.6     01-08      PT/INR - ( 08 Jan 2023 00:01 )   PT: 16.6 sec;   INR: 1.39          PTT - ( 09 Jan 2023 05:45 )  PTT:61.8 sec      COVID-19 PCR: Negative (05 Jan 2023 08:04)  COVID-19 PCR: Negative (23 Nov 2022 19:55)  COVID-19 PCR: NotDetec (19 Nov 2022 11:56)

## 2023-01-09 NOTE — PROGRESS NOTE ADULT - ASSESSMENT
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 US showed no sonographic evidence of breast abscess, cyst or focal mass and CT chest (1/3) showed 6.1 cm cystic mass in the right adnexa. CT with some venous congestion in Right breast from blockage at AVF. Betty score 3.3%. s/p IR drainage of RUE seroma 1/5, cultures negative. Galium scan with no clear source of infection. Physical findings on breast exam could be secondary to edema from breast collaterals, HF, IVF, or perm cath infection. Less likely to be from inflammatory breast cancer.     - No acute surgical intervention at this time  - f/u PET-CT  - Recommend non-urgent mammogram when the patient is clinically stable  - 5c will continue to follow  - Plan discussed with Dr. 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 US showed no sonographic evidence of breast abscess, cyst or focal mass and CT chest (1/3) showed 6.1 cm cystic mass in the right adnexa. CT with some venous congestion in Right breast from blockage at AVF. Betty score 3.3%. s/p IR drainage of RUE seroma 1/5, cultures negative. Galium scan with no clear source of infection. Physical findings on breast exam could be secondary to edema from breast collaterals, HF, IVF, or perm cath infection. Less likely to be from inflammatory breast cancer.     - No acute surgical intervention at this time  - Breast wouldn't explain hypotension  - No source of infection. Consider permacath as source of infection. Consider replacing the line.   - f/u PET-CT  - Recommend non-urgent mammogram when the patient is clinically stable  - 5c will continue to follow  - Plan discussed with Dr. Hatch

## 2023-01-09 NOTE — PROGRESS NOTE ADULT - SUBJECTIVE AND OBJECTIVE BOX
s/p IR aspiration of perigraft fluid 1/5.     SUBJECTIVE: Pt seen and examined at bedside this am by surgery team. Primary complaint this morning is new left foot pain. States she hasn't had foot pain before. Localized to Left heel and Left big toe. Very sensitive to touch. Foot is warm and well perfused. Otherwise, she states her bilateral breast pain is subsiding.   Patient remains afebrile, non tachycardic, on phenylephrine 2.6. WBC now normal 6.8 > 8.6>10.8>13.1.   Patient is ordered for 1U PRBC for Hgb 6.9>7.2. On Heparin drip for UE DVT.   Gallium scan shows no clear source of infection.   IR aspiration cultures negative.   PET-CT ordered, pending.     Vital Signs Last 24 Hrs  T(C): 37.1 (09 Jan 2023 06:05), Max: 37.2 (09 Jan 2023 00:59)  T(F): 98.7 (09 Jan 2023 06:05), Max: 98.9 (09 Jan 2023 00:59)  HR: 72 (09 Jan 2023 07:00) (69 - 84)  BP: --  BP(mean): --  RR: 14 (09 Jan 2023 07:00) (12 - 26)  SpO2: 94% (09 Jan 2023 07:00) (89% - 100%)    Parameters below as of 09 Jan 2023 07:00  Patient On (Oxygen Delivery Method): room air        PHYSICAL EXAM:   Gen: Awake, alert, NAD, resting comfortably, withdrawn affect    CV: Regular rate and rhythm, on phenylephrine 2.6.   Pulm: no respiratory distress on RA  Breast: b/l breast swelling, soft with firmness underneath, mildly tender to palpation (improved)  Abd: soft, ND, NTTP, no rebound or guarding, ventral hernia    Ext: L big toe and L heel very tender to light palpation, other areas of L foot non tender, no lesions appreciated, No R foot tenderness, no calf tenderness bilaterally, b/l extremities wwp    I&O's Detail    08 Jan 2023 07:01  -  09 Jan 2023 07:00  --------------------------------------------------------  IN:    Heparin Infusion: 345 mL    Phenylephrine: 910.4 mL    Phenylephrine: 227.2 mL  Total IN: 1482.6 mL    OUT:  Total OUT: 0 mL    Total NET: 1482.6 mL          LABS:                        6.9    6.88  )-----------( 185      ( 09 Jan 2023 05:45 )             22.9     01-09    131<L>  |  95<L>  |  36<H>  ----------------------------<  76  4.8   |  24  |  5.89<H>    Ca    8.2<L>      09 Jan 2023 05:45  Phos  5.4     01-09  Mg     1.7     01-09    TPro  5.7<L>  /  Alb  2.2<L>  /  TBili  0.2  /  DBili  x   /  AST  21  /  ALT  13  /  AlkPhos  78  01-09    LIVER FUNCTIONS - ( 09 Jan 2023 05:45 )  Alb: 2.2 g/dL / Pro: 5.7 g/dL / ALK PHOS: 78 U/L / ALT: 13 U/L / AST: 21 U/L / GGT: x           PT/INR - ( 08 Jan 2023 00:01 )   PT: 16.6 sec;   INR: 1.39          PTT - ( 09 Jan 2023 05:45 )  PTT:61.8 sec  CAPILLARY BLOOD GLUCOSE      POCT Blood Glucose.: 77 mg/dL (09 Jan 2023 05:13)  POCT Blood Glucose.: 84 mg/dL (08 Jan 2023 23:35)      RADIOLOGY & ADDITIONAL STUDIES:

## 2023-01-09 NOTE — PROCEDURE NOTE - GENERAL PROCEDURE DETAILS
COPIED FROM MOTHER'S CHART    Chart reviewed - no needs identified. SW met with patient to complete initial assessment. Patient states that she experienced postpartum depression after her first 2 children ( & 2017). She states that she has participated in therapy previously, which has been beneficial.  No history of medication for depression/anxiety. Patient denies any ongoing depression/anxiety during this pregnancy. Patient given informational packet on  mood & anxiety disorders (resources/education). Family denies any additional needs from  at this time. Family has 's contact information should any needs/questions arise.     Veronique Ruby, ELEANOR, [address] [place] [address]   [phone] USG Venipuncture with Butterfly Needle for BCx

## 2023-01-09 NOTE — PROGRESS NOTE ADULT - SUBJECTIVE AND OBJECTIVE BOX
SUBJECTIVE / INTERVAL HPI: Patient was seen and examined this morning. She remains on phenylephrine for vasopressor support. SPEP/Immunofixation were negative. Vitamin D 1,25 33.9. Vitamin D-25 was 38.5. Gallium scan didn't show any source of infection, pending PET-CT. She continues to experience breast pain, s/p right breast biopsy today.     REVIEW OF SYSTEMS  Constitutional:  Negative fever, chills or loss of appetite.  Eyes:  Negative blurry vision or double vision.  Chest/Cardiovascular:  (+) Bilateral breast pain. Negative for palpitations.  Respiratory:  Negative for cough, wheezing, or shortness of breath.    Gastrointestinal:  Negative for nausea, vomiting, diarrhea, constipation.  Neurologic:  No headache, confusion, dizziness, lightheadedness.    PHYSICAL EXAM  Vital Signs Last 24 Hrs  T(C): 39.3 (09 Jan 2023 14:33), Max: 39.3 (09 Jan 2023 14:33)  T(F): 102.7 (09 Jan 2023 14:33), Max: 102.7 (09 Jan 2023 14:33)  HR: 90 (09 Jan 2023 17:00) (67 - 132)  BP: --  BP(mean): --  RR: 20 (09 Jan 2023 17:00) (10 - 26)  SpO2: 100% (09 Jan 2023 17:00) (89% - 100%)    Parameters below as of 09 Jan 2023 17:00  Patient On (Oxygen Delivery Method): nasal cannula  O2 Flow (L/min): 2    Constitutional: Awake, alert, female, in mild distress due to breast pain.   HEENT: Normocephalic, atraumatic, ZOHAIB.  Respiratory: Lungs clear to ausculation bilaterally.   Cardiovascular: regular rhythm, normal S1 and S2, no audible murmurs.   GI: soft, non-tender, non-distended, bowel sounds present.  Extremities: No lower extremity edema. (+) B/L arm swelling (R>L), fistula covered with dressings.   Psychiatric: AAO x 3. Normal affect/mood.     LABS  CBC - WBC/HGB/HTC/PLT: 10.85/8.3/26.5/147 (01-09-23)  BMP - Na/K/Cl/Bicarb/BUN/Cr/Gluc/AG/eGFR: 131/4.8/95/24/36/5.89/76/12/7 (01-09-23)  Ca - 8.2 (01-09-23)  Phos - 5.4 (01-09-23)  Mg - 1.7 (01-09-23)  LFT - Alb/Tprot/Tbili/Dbili/AlkPhos/ALT/AST: 2.2/--/0.2/--/78/13/21 (01-09-23)  PT/aPTT/INR: --/61.8/-- (01-09-23)   Thyroid Stimulating Hormone, Serum: 1.610 (01-03-23)  Total T4/Free T4: --/0.682 (01-03-23)    MEDICATIONS  MEDICATIONS  (STANDING):  atorvastatin 40 milliGRAM(s) Oral at bedtime  chlorhexidine 2% Cloths 1 Application(s) Topical <User Schedule>  cinacalcet 30 milliGRAM(s) Oral daily  collagenase Ointment 1 Application(s) Topical daily  lidocaine 1%/epinephrine 1:100,000 Inj 10 milliLiter(s) Local Injection once  midodrine 30 milliGRAM(s) Oral every 8 hours  Nephro-deborah 1 Tablet(s) Oral daily  phenylephrine    Infusion 0.1 MICROgram(s)/kG/Min (1.6 mL/Hr) IV Continuous <Continuous>  polyethylene glycol 3350 17 Gram(s) Oral every 12 hours  senna 2 Tablet(s) Oral at bedtime  sevelamer carbonate 800 milliGRAM(s) Oral three times a day with meals    MEDICATIONS  (PRN):  acetaminophen     Tablet .. 650 milliGRAM(s) Oral every 6 hours PRN Temp greater or equal to 38C (100.4F), Moderate Pain (4 - 6)  calamine/zinc oxide Lotion 1 Application(s) Topical three times a day PRN Itching  HYDROmorphone   Tablet 2 milliGRAM(s) Oral every 4 hours PRN Severe Pain (7 - 10)  HYDROmorphone  Injectable 0.25 milliGRAM(s) IV Push every 4 hours PRN Breakthrough pain    ASSESSMENT / RECOMMENDATIONS  66 yo F pt with PMH HFrEF (EF 40%), nonobstructive CAD, NICM, HTN, HLD, ESRD 2/2 PCKD on HD, PE (2018) s/p IVC filter, mild AS, mild MR, PAD, OA, h/o thrombus in vascular access x3 ( R AVG, R AVF, L AVG), ventral hernia, 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 Endocrinology consulted for elevated PTH and multinodular thyroid.    A1C: 5.3 %  BUN: 36  Creatinine: 5.89  GFR: 7  Weight: 85.3  BMI: 27    # Hyperparathyroidism  - PTH elevated to 479. Ca 8.2. Alb 2.2. Phos 5.4.   - Immunofixation: No monoclonal band identified. Vitamin D 1,25 = 33.9. Vitamin D-25 = 38.5.  - Likely secondary hyperparathyroidism. Unclear if findings in skull are due to osteoclastoma given only relatively mild elevations in PTH (would expect PTH to be ~1500s when associated with brown tumors). Nonetheless, she would benefit from medical therapy to decrease PTH levels. Pending PET-CT to evaluate for any neoplastic process that could be contributing to the findings in her skull.   - Start Sensipar 30 mg daily.     # Multinodular goiter  - CT: enlarged multinodular thyroid projecting into superior mediastinum with 1.8 x 1.5 cm solid nodule slightly inferiorly in the anterior mediastinum.   - Thyroid ultrasound: Fine-needle aspiration recommended for 4.3 cm right thyroid and 2.6 cm left thyroid solid nodules per SCOTT guidelines.  - TSH 1.6 free T4 0.68 (1/3/23). TSH could be inappropriately normal for low free T4 due to euthyroid sick syndrome. Repeat TFT in a few days.     Case discussed with Dr. Villafuerte. Primary team updated.       Yang Jarrell    Endocrinology Fellow    Service Pager: [phone]

## 2023-01-09 NOTE — PROGRESS NOTE ADULT - SUBJECTIVE AND OBJECTIVE BOX
INTERVAL HPI/OVERNIGHT EVENTS:    OVERNIGHT: 1u PRBCs for worsening anemia     SUBJECTIVE: Patient seen and examined at bedside. Still significant right breast pain     Allergies    Ancef (Rash; Urticaria)  DDAVP (Hypotension)  iodine (Hives; Pruritus)  penicillin (Swelling)  sulfa drugs (Angioedema)    Intolerances    ANTIBIOTICS/RELEVANT:  antimicrobials    immunologic:    OTHER:  acetaminophen     Tablet .. 650 milliGRAM(s) Oral every 6 hours PRN  atorvastatin 40 milliGRAM(s) Oral at bedtime  calamine/zinc oxide Lotion 1 Application(s) Topical three times a day PRN  chlorhexidine 2% Cloths 1 Application(s) Topical <User Schedule>  cinacalcet 30 milliGRAM(s) Oral daily  collagenase Ointment 1 Application(s) Topical daily  HYDROmorphone   Tablet 2 milliGRAM(s) Oral every 4 hours PRN  HYDROmorphone  Injectable 0.25 milliGRAM(s) IV Push every 4 hours PRN  lidocaine 1%/epinephrine 1:100,000 Inj 10 milliLiter(s) Local Injection once  midodrine 30 milliGRAM(s) Oral every 8 hours  Nephro-deborah 1 Tablet(s) Oral daily  phenylephrine    Infusion 0.1 MICROgram(s)/kG/Min IV Continuous <Continuous>  polyethylene glycol 3350 17 Gram(s) Oral every 12 hours  senna 2 Tablet(s) Oral at bedtime  sevelamer carbonate 800 milliGRAM(s) Oral three times a day with meals    Objective:  Vital Signs Last 24 Hrs  T(C): 39.3 (09 Jan 2023 14:33), Max: 39.3 (09 Jan 2023 14:33)  T(F): 102.7 (09 Jan 2023 14:33), Max: 102.7 (09 Jan 2023 14:33)  HR: 70 (09 Jan 2023 16:00) (67 - 132)  BP: --  BP(mean): --  RR: 15 (09 Jan 2023 16:00) (10 - 26)  SpO2: 99% (09 Jan 2023 16:00) (89% - 100%)    Parameters below as of 09 Jan 2023 16:00  Patient On (Oxygen Delivery Method): nasal cannula  O2 Flow (L/min): 2    PHYSICAL EXAM:    Constitutional: alert, NAD  Eyes: the sclera and conjunctiva were normal.   ENT: the ears and nose were normal in appearance.   Neck: the appearance of the neck was normal and the neck was supple.   Pulmonary: no respiratory distress and lungs were clear to auscultation bilaterally.   Heart: heart rate was normal and rhythm regular, normal S1 and S2  Vascular:. there was no peripheral edema  Abdomen: normal bowel sounds, soft, non-tender  chest: lower breast ttp- exquisite on the right side       LABS:                        6.9    6.88  )-----------( 185      ( 09 Jan 2023 05:45 )             22.9     01-09    131<L>  |  95<L>  |  36<H>  ----------------------------<  76  4.8   |  24  |  5.89<H>    Ca    8.2<L>      09 Jan 2023 05:45  Phos  5.4     01-09  Mg     1.7     01-09    TPro  5.7<L>  /  Alb  2.2<L>  /  TBili  0.2  /  DBili  x   /  AST  21  /  ALT  13  /  AlkPhos  78  01-09    PT/INR - ( 08 Jan 2023 00:01 )   PT: 16.6 sec;   INR: 1.39          PTT - ( 09 Jan 2023 05:45 )  PTT:61.8 sec    RECENT CULTURES:  01-05 @ 14:45  .Body Fluid right arm collection  --  --  --    No growth to date  --  01-03 @ 17:25  .Blood Blood-Peripheral  --  --  --    No growth at 5 days.  --      RADIOLOGY & ADDITIONAL STUDIES:

## 2023-01-09 NOTE — PROGRESS NOTE ADULT - ASSESSMENT
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     NEURO  #Pain Control  Pt in 10/10 pain of bilateral breasts, worst on R. Pain responsive to Dilaudid but effect wears off after ~2-3 hours.   - f/u pain management recommendations:  - Recommendations: Dilaudid 2mg po q4h PRN for severe pain, Dilaudid 0.25mg IVP q4h PRN for severe breakthrough pain, Tylenol 650mg po q6h PRN for mild pain, daily lidocaine patch, HOLD ALL OPIOIDS FOR SEDATION, RR<10, O2SAT <93%, SBP <96    PULM  #Atelectasis  Noted on CT chest to have atelectasis at b/l lung bases.  - Incentive spirometry    CARDIOVASCULAR  #Shock  Pt presented meeting 2/4 SIRS. Hypotensive with MAPs to 60, requiring Levophed. Septic picture of unknown source. HR > 90, WBC >12. Lactate 1.9. Afebrile. Procal 12. S/p Vancomycin 1250mg x 1, Aztreonam x 1 (pt allergic to Cefazolin, PCN). . Ferritin 1209  Patient still requiring pressors, unknown source, does not appear to fit septic picture at this point (no fever, BCx negative, no focal area of infection identified thus far)  - On Phenylephrine 5.5mcg/kg/min  - Start midodrine 50mg q8h   - CT:  7.0 cm fluid collection is present near an AV fistula in the right upper arm. Possibly abscess. Drained by IR 1/5 with serosanguinous fluid: no organisms, few WBCs  - F/u vascular recs: wound care to RUE wound  - f/u surgery recs: for pain/erythema/warmth of R breast - no surgical intervention at this time  - f/u ID recs: d/c meropenem. c/w Vancomycin 500mg IV after HD sessions, last trough 21.   - BCx: NGTD  - corticotropin stim test wnl    #LVOT with LISETTE  likely i/s/o hyperdynamic LV function 2/2 shock and hypovolemia  - per cardiology, rec repeating TTE after shock resolves    #HFrEF  TTE 11/22 normal LV and systolic, EF 59%, grade II diastolic dysfuncton, dilated RV, reduced RV,   Home meds: Entresto 49/51mg  - hold Entresto i/s/o shock    #CAD  Hx of cardiac cath in 2018  Home meds: atorvastatin 40mg, plavix 75mg qd  - c/w home atorvastatin 40mg  - hold home Plavix 75mg qd    GI  #Abdominal Pain  C/o vague abdominal pain, a/w bilious emesis x 2, no nausea or further episodes of emesis   (Resolved)    #Nutrition  Patient reports poor PO intake  -added nephro-deborah     RENAL  #ESRD 2/2 PCKD  Pt has been on HD for "years" 2/2 PCKD. AV Fistula of EDWIN clotted in 2014, has received HD thru HD cath since. New AV Fistula in 11/2022, not yet matured.   - f/u nephrology recs  - s/p HD 1/7. Goal net even    #Hyperkalemia  Patient with Hx of ESRD with missed HD presents with K >8 on VBG with abdominal symptoms and missed HD x8 days. ICU consulted i/s/o hyperkalemia. Renal consulted.  - s/p HD 1/5  - monitor with BMP's qd    ID  Pt presented in septic shock with leukocytosis, tachycardia, hypotension requiring pressors. Unknown source.   - see #shock for plan  - f/u ID recs  - gallium scan: faintly increased activity surrounding the entire lower portion of the right breast consistent with the inflammatory reaction seen on physical examination, small area of activity approximately 10 cm to the right of midline at about the level of the lower border of the sternum, activity likely to be in chest wall   - d/c meropenem, continue vancomycin 500mg IV s/p HD  - f/u PET/CT ordered     ENDO  #Hyperparathyroidism  At risk for secondary HPTT d/t renal failure   - pt has vague abdominal pain with nausea  - multiple brown tumors on CT with multiple fractures of pubic rami, pubic bone, thoracolumbar spine  - intact , Vit D 38.5  - SPEP/immunofixation negative   - endocrinology recs: secondary hyperparathyroidism vs other process causing osteoclastic tumors, obtain PET/CT    #Multinodular Goiter  CT: enlarged multinodular thyroid projecting into superior mediastinum with 1.8 x 1.5 cm solid nodule slightly inferiorly in the anterior mediastinum.   - Thyroid ultrasound: Fine-needle aspiration recommended for 4.3 cm right thyroid and 2.6 cm left thyroid solid nodules per SCOTT guidelines.  - TSH 1.6 free T4 0.68.  - TSH could be inappropriately normal for low free T4 due to euthyroid sick syndrome.  - f/u TFTs after shock resolved    MSK  #Renal Osteodystrophy  - on CT: Increased density of the bone marrow is consistent with renal osteodystrophy  - Per endocrine, extent of bone destruction extreme given PTH level, suspect might not be Brown tumors    HEME/ONC  #Hx of DVTs  Current DVT of R IJ, R subclavian and R axillary veins. Hx of L AVF clotting. Given hx of clots, current pruritis and gallium scan uptake in chest wall, reasonable to workup coagulopathy  - f/u antiphospholipid panel/rheumatologic workup    #Anemia  Hgb has downtrended from 9.8 on admission to 7.2. Baseline from November appears to be ~13.   - Ferritin elevated: 1209,  - Total iron low (15), TIBC low (79)    #Breast Pain, bilateral  Family hx of breast cancer in mother, sister.   - large R breast mass, palpable L breast mass, R breast has peau d'orange appearance with associated erythema, warmth on R  - US bilateral breasts: No sonographic evidence of breast abscess, cyst or focal mass to correspond with the clinical finding of bilateral breast masses.  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     NEURO  #Pain Control  Pt in 10/10 pain of bilateral breasts, worst on R. Pain responsive to Dilaudid but effect wears off after ~2-3 hours.   - f/u pain management recommendations:  - Recommendations: Dilaudid 2mg po q4h PRN for severe pain, Dilaudid 0.25mg IVP q4h PRN for severe breakthrough pain, Tylenol 650mg po q6h PRN for mild pain, daily lidocaine patch, HOLD ALL OPIOIDS FOR SEDATION, RR<10, O2SAT <93%, SBP <96    PULM  #Atelectasis  Noted on CT chest to have atelectasis at b/l lung bases.  - Incentive spirometry    CARDIOVASCULAR  #Shock  Pt presented meeting 2/4 SIRS. Hypotensive with MAPs to 60, requiring Levophed. Septic picture of unknown source. HR > 90, WBC >12. Lactate 1.9. Afebrile. Procal 12. S/p Vancomycin 1250mg x 1, Aztreonam x 1 (pt allergic to Cefazolin, PCN). . Ferritin 1209  Patient still requiring pressors, unknown source, does not appear to fit septic picture at this point (no fever, BCx negative, no focal area of infection identified thus far)  - On Phenylephrine 5.5mcg/kg/min  - Start midodrine 50mg q8h   - CT:  7.0 cm fluid collection is present near an AV fistula in the right upper arm. Possibly abscess. Drained by IR 1/5 with serosanguinous fluid: no organisms, few WBCs  - F/u vascular recs: wound care to RUE wound  - f/u surgery recs: for pain/erythema/warmth of R breast - no surgical intervention at this time  - f/u ID recs: d/c meropenem. c/w Vancomycin 500mg IV after HD sessions, last trough 21.   - BCx: NGTD  - corticotropin stim test wnl    #LVOT with LISETTE  likely i/s/o hyperdynamic LV function 2/2 shock and hypovolemia  - per cardiology, rec repeating TTE after shock resolves    #HFrEF  TTE 11/22 normal LV and systolic, EF 59%, grade II diastolic dysfuncton, dilated RV, reduced RV,   Home meds: Entresto 49/51mg  - hold Entresto i/s/o shock    #CAD  Hx of cardiac cath in 2018  Home meds: atorvastatin 40mg, plavix 75mg qd  - c/w home atorvastatin 40mg  - hold home Plavix 75mg qd    GI  #Abdominal Pain  C/o vague abdominal pain, a/w bilious emesis x 2, no nausea or further episodes of emesis   (Resolved)    #Nutrition  Patient reports poor PO intake  -added nephro-deborah     RENAL  #ESRD 2/2 PCKD  Pt has been on HD for "years" 2/2 PCKD. AV Fistula of EDWIN clotted in 2014, has received HD thru HD cath since. New AV Fistula in 11/2022, not yet matured.   - f/u nephrology recs  - s/p HD 1/7. Goal net even    #Hyperkalemia  Patient with Hx of ESRD with missed HD presents with K >8 on VBG with abdominal symptoms and missed HD x8 days. ICU consulted i/s/o hyperkalemia. Renal consulted.  - s/p HD 1/5  - monitor with BMP's qd    ID  Pt presented in septic shock with leukocytosis, tachycardia, hypotension requiring pressors. Unknown source.   - see #shock for plan  - f/u ID recs  - gallium scan: faintly increased activity surrounding the entire lower portion of the right breast consistent with the inflammatory reaction seen on physical examination, small area of activity approximately 10 cm to the right of midline at about the level of the lower border of the sternum, activity likely to be in chest wall   - d/c meropenem, continue vancomycin 500mg IV s/p HD  - f/u PET/CT ordered     ENDO  #Hyperparathyroidism  At risk for secondary HPTT d/t renal failure   - pt has vague abdominal pain with nausea  - multiple brown tumors on CT with multiple fractures of pubic rami, pubic bone, thoracolumbar spine  - intact , Vit D 38.5  - SPEP/immunofixation negative   - endocrinology recs: secondary hyperparathyroidism vs other process causing osteoclastic tumors, obtain PET/CT    #Multinodular Goiter  CT: enlarged multinodular thyroid projecting into superior mediastinum with 1.8 x 1.5 cm solid nodule slightly inferiorly in the anterior mediastinum.   - Thyroid ultrasound: Fine-needle aspiration recommended for 4.3 cm right thyroid and 2.6 cm left thyroid solid nodules per SCOTT guidelines.  - TSH 1.6 free T4 0.68.  - TSH could be inappropriately normal for low free T4 due to euthyroid sick syndrome.  - f/u TFTs after shock resolved    MSK  #Renal Osteodystrophy  - on CT: Increased density of the bone marrow is consistent with renal osteodystrophy  - Per endocrine, extent of bone destruction extreme given PTH level, suspect might not be Brown tumors    HEME/ONC  #Hx of DVTs  Current DVT of R IJ, R subclavian and R axillary veins. Hx of L AVF clotting. Given hx of clots, current pruritis and gallium scan uptake in chest wall, reasonable to workup coagulopathy  - f/u antiphospholipid panel/rheumatologic workup    #Anemia  Hgb has downtrended from 9.8 on admission to 7.2. Baseline from November appears to be ~13.   - Ferritin elevated: 1209,  - Total iron low (15), TIBC low (79)    #Breast Pain, bilateral  Family hx of breast cancer in mother, sister.   - large R breast mass, palpable L breast mass, R breast has peau d'orange appearance with associated erythema, warmth on R  - US bilateral breasts: No sonographic evidence of breast abscess, cyst or focal mass to correspond with the clinical finding of bilateral breast masses.  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     NEURO  #Pain Control  Pt in 10/10 pain of bilateral breasts, worst on R. Pain responsive to Dilaudid but effect wears off after ~2-3 hours.   - f/u pain management recommendations:  - Recommendations: Dilaudid 2mg po q4h PRN for severe pain, Dilaudid 0.25mg IVP q4h PRN for severe breakthrough pain, Tylenol 650mg po q6h PRN for mild pain, daily lidocaine patch, HOLD ALL OPIOIDS FOR SEDATION, RR<10, O2SAT <93%, SBP <96    PULM  #Atelectasis  Noted on CT chest to have atelectasis at b/l lung bases.  - Incentive spirometry    CARDIOVASCULAR  #Shock  Pt presented meeting 2/4 SIRS. Hypotensive with MAPs to 60, requiring Levophed. Septic picture of unknown source. HR > 90, WBC >12. Lactate 1.9. Afebrile. Procal 12. S/p Vancomycin 1250mg x 1, Aztreonam x 1 (pt allergic to Cefazolin, PCN). . Ferritin 1209  Patient still requiring pressors, unknown source, does not appear to fit septic picture at this point (no fever, BCx negative, no focal area of infection identified thus far)  - On Phenylephrine 5.5mcg/kg/min  - Start midodrine 50mg q8h   - CT:  7.0 cm fluid collection is present near an AV fistula in the right upper arm. Possibly abscess. Drained by IR 1/5 with serosanguinous fluid: no organisms, few WBCs  - F/u vascular recs: wound care to RUE wound  - f/u surgery recs: for pain/erythema/warmth of R breast - no surgical intervention at this time  - f/u ID recs: d/c meropenem. c/w Vancomycin 500mg IV after HD sessions, last trough 21.   - BCx: NGTD  - corticotropin stim test wnl    #LVOT with LISETTE  likely i/s/o hyperdynamic LV function 2/2 shock and hypovolemia  - per cardiology, rec repeating TTE after shock resolves    #HFrEF  TTE 11/22 normal LV and systolic, EF 59%, grade II diastolic dysfuncton, dilated RV, reduced RV,   Home meds: Entresto 49/51mg  - hold Entresto i/s/o shock    #CAD  Hx of cardiac cath in 2018  Home meds: atorvastatin 40mg, plavix 75mg qd  - c/w home atorvastatin 40mg  - hold home Plavix 75mg qd    GI  #Constipation  - last BM 1/4  - start senna 2 tablets/qd and miralax    #Nutrition  Patient reports poor PO intake  -added nephro-deborah     #Abdominal Pain  C/o vague abdominal pain, a/w bilious emesis x 2, no nausea or further episodes of emesis   (Resolved)    RENAL  #ESRD 2/2 PCKD  Pt has been on HD for "years" 2/2 PCKD. AV Fistula of EDWIN clotted in 2014, has received HD thru HD cath since. New AV Fistula in 11/2022, not yet matured.   - f/u nephrology recs  - s/p HD 1/7. Goal net even    #Hyperkalemia  Patient with Hx of ESRD with missed HD presents with K >8 on VBG with abdominal symptoms and missed HD x8 days. ICU consulted i/s/o hyperkalemia. Renal consulted.  - s/p HD 1/5  - monitor with BMP's qd    ID  Pt presented in septic shock with leukocytosis, tachycardia, hypotension requiring pressors. Unknown source.   - WBC 15 -> 7  - see #shock for plan  - f/u ID recs  - gallium scan: faintly increased activity surrounding the entire lower portion of the right breast consistent with the inflammatory reaction seen on physical examination, small area of activity approximately 10 cm to the right of midline at about the level of the lower border of the sternum, activity likely to be in chest wall   - d/c meropenem, continue vancomycin 500mg IV s/p HD  - f/u PET/CT ordered     ENDO  #Hyperparathyroidism  At risk for secondary HPTT d/t renal failure   - pt has vague abdominal pain with nausea  - multiple brown tumors on CT with multiple fractures of pubic rami, pubic bone, thoracolumbar spine  - intact , Vit D 38.5  - SPEP/immunofixation negative   - endocrinology recs: secondary hyperparathyroidism vs other process causing osteoclastic tumors, obtain PET/CT  - start Sensipar 30mg qd per endo recs    #Multinodular Goiter  CT: enlarged multinodular thyroid projecting into superior mediastinum with 1.8 x 1.5 cm solid nodule slightly inferiorly in the anterior mediastinum.   - Thyroid ultrasound: Fine-needle aspiration recommended for 4.3 cm right thyroid and 2.6 cm left thyroid solid nodules per SCOTT guidelines.  - TSH 1.6 free T4 0.68.  - TSH could be inappropriately normal for low free T4 due to euthyroid sick syndrome.  - f/u TFTs after shock resolved    MSK  #Renal Osteodystrophy  - on CT: Increased density of the bone marrow is consistent with renal osteodystrophy  - Per endocrine, extent of bone destruction extreme given PTH level, suspect might not be Brown tumors    HEME/ONC  #Hx of DVTs  Current DVT of R IJ, R subclavian and R axillary veins. Hx of L AVF clotting. Given hx of clots, current pruritis and gallium scan uptake in chest wall, reasonable to workup coagulopathy  - c/w heparin 15IU   - f/u antiphospholipid panel/rheumatologic workup    #Anemia  Hgb has downtrended from 9.8 on admission to 6.9. Baseline from November appears to be ~13.   - Hgb 6.9, ordered 1U PRBCs. Pt likely had febrile nonhemolytic transfusion reaction (3 hrs into transfusion, developed tachycardia to 150s, HTN to 140s/90s, rigors, Trectal 101.3.) Transfusion stopped. Tylenol given. Sxs resolved.   - Ferritin elevated: 1209,  - Total iron low (15), TIBC low (79)    #Breast Pain, bilateral  Family hx of breast cancer in mother, sister.   - large R breast mass, palpable L breast mass, R breast has peau d'orange appearance with associated erythema, warmth on R  - US bilateral breasts: No sonographic evidence of breast abscess, cyst or focal mass to correspond with the clinical finding of bilateral breast masses.  - surgery: recc nonurgent outpatient mammogram when clinically stable  - breast surgery to obtain bx of R breast     GYN  Uterine mass, 6.1cm mass in R adnexa seen incidentally on CTAP  - consider consulting GYN if oncologic workup is indicated    Preventative  F: None  E: None, HD pt  N: Renal Restricted Diet  DVT: heparin 15IU  Code Status: Full Code  Dispo: ICU

## 2023-01-09 NOTE — PROGRESS NOTE ADULT - ATTENDING COMMENTS
Pt seen on rounds this afternoon and interim events reviewed.  Pt was undergoing biopsy of the R breast at the time of our visit.  Has considerable breast pain, though it is unclear whether this is from the swelling or from the macerated skin on the underside of the breast.  She remains on small doses of pressors.  Gallium scan unremarkable.  PET-CT is pending.  At this point it is still not definite that her bone lesions are "brown tumors," or whether she may have more than one process going on.  On the assumption that they may be related to secondary hyperparathyroidism, and since her PTH is above the target for ESRD pts, would start on Sensipar 30 mg/day to see if her PTH is suppressible.  Would discuss this with Renal.  We may still need to consider parathyroidectomy, antoine if she needs surgery for the thyroid.

## 2023-01-09 NOTE — CHART NOTE - NSCHARTNOTEFT_GEN_A_CORE
Patient's right breast prepped and draped, area cleaned with chlorhexidine. Lidocaine with epinephrine used to numb the area. Punch biopsy completed with scalpel and core biopsy completed - specimens placed in formalin. Incision closed with 3-0 nylon sutures.     EBL - minimal   Patient tolerated procedure well    Dr. Hatch present for all aspects of procedure No

## 2023-01-09 NOTE — PROGRESS NOTE ADULT - ASSESSMENT
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 During imaging, CT showing 7.0 cm fluid collection is present near an AV fistula in the right upper arm. The differential for this collection included benign post-operative hematoma or seroma, but also abscess or vascular device infection, particularly in the setting of septic shock. Vascular consulted for RUE fistula evaluation to rule out this area as a source of sepsis. Reassuring physical exam at initial evaluation without clear indicia of infection related to the graph, small superficial surgical incision dehiscence notwithstanding. Now s/p IR image guided aspiration of perigraft fluid (negative) and gadolinium scan (negative). At this point, do not suspect a graft infection as the provoking factor leading to the patient's presentation. Patient remaining afebrile overnight with continued pressor requirements.    Recommendations:    -Continue to follow up BCx and IR Cx - both NGTD  -Wound care recs: BID dressing changes (once daily with Santyl DSD once daily with Bactroban DSD). Apply light ace wrap compression to right hand and forearm to help with soft tissue swelling  -Rest of care per primary team  -Vascular surgery Team 3C will continue to follow. Please call [phone] with any questions or concerns.

## 2023-01-09 NOTE — PROGRESS NOTE ADULT - ATTENDING COMMENTS
Patient reports feeling ok, reports R breast pain but seems improving from prior. Remains on pressors for unclear source of infection - could be cellulitis of R breast but she has been on empiric broad spectrum abx since 1/2 and I would expect more clinical improvement.  Vanc level 18.9 pre-HD. Give vanc 500mg IV x1 after HD and recheck level before next HD.  Awaiting PET CT.    Team 1 will follow you.  Case d/w primary team.    Nora Naranjo MD, MS  Infectious Disease attending  work cell [phone]   For any questions during evening/weekend/holiday, please page ID on call

## 2023-01-09 NOTE — PROGRESS NOTE ADULT - ATTENDING COMMENTS
ESRD with PCK, HTN, IVC filter, DVT with severe breast pain, hypotension of unclear etiology  physical as above  reviewed again with ID and breast surgery; a core breast biopsy is being done  continuing empiric vancomycin  increasing midodrine to try to decrease IV pressors  pain management appears better  HD  decision making of high complexity

## 2023-01-09 NOTE — PROGRESS NOTE ADULT - ASSESSMENT
67F h/o ESRD due to PCKD on HD via HD cath (failedvascular access due to thrombus x 3), CAD, HTN, HLD, PE s/p IVC filter, PAD, OA, osteoclastoma, ventral hernia p/w septic shock of unknown source.  Infectious work-up so far unremarkable - fluid collection near AVF likely seroma and culture ngtd, gallium scan negative, BCx ngtd.  Patient c/o breast pain but no erythema on exam, and unlikely breast ca per team.  Could be mastitis? Never febrile and without leukocytosis, meropenam has been dced     - cont vanco for now.  Check vanc level before HD.   If <10, would give 1 g, 10-17.5 give 750 mg, 17.6-25, give 500 mg after HD, >25 Hold Vancomycin. Please give Vancomycin after HD is completed   - f/u BCx, fluid culture- NGTD  - Possible that etiology of her hypotension is not infectious   - FU PET CT     Team 1 will follow you.  Case d/w primary team.    Nora Naranjo MD, MS  Infectious Disease attending  work cell [phone]   For any questions during evening/weekend/holiday, please page ID on call

## 2023-01-10 NOTE — PROGRESS NOTE ADULT - ASSESSMENT
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 During imaging, CT showing 7.0 cm fluid collection is present near an AV fistula in the right upper arm. The differential for this collection included benign post-operative hematoma or seroma, but also abscess or vascular device infection, particularly in the setting of septic shock. Vascular consulted for RUE fistula evaluation to rule out this area as a source of sepsis. Reassuring physical exam at initial evaluation without clear indicia of infection related to the graph, small superficial surgical incision dehiscence notwithstanding. Now s/p IR image guided aspiration of perigraft fluid (negative) and gadolinium scan (negative). At this point, do not suspect a graft infection as the provoking factor leading to the patient's presentation. Patient remaining afebrile overnight with continued pressor requirements.    Recommendations:    -Continue to follow up BCx and IR Cx - both NGTD  -Wound care recs: BID dressing changes (once daily with Santyl DSD once daily with Bactroban DSD). Apply light ace wrap compression to right hand and forearm to help with soft tissue swelling  -F/u breast punch and core biopsy  -Appreciate breast surgery recs  -Rest of care per primary team  -Vascular surgery Team 3C will continue to follow. Please call [phone] with any questions or concerns.

## 2023-01-10 NOTE — PROGRESS NOTE ADULT - ASSESSMENT
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      - F/U right breast biopsy pathology  - F/U Cx results (NGTD so far).  - Wound care for access site ulceration as per vascular surgery.   - PET-CT today as per ID / primary team.   - Surgery 5C following.  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      - F/U right breast biopsy pathology  - F/U Cx results (NGTD so far).  - Would continue ABx (vancomycin with HD as per primary team).   - Wound care for access site ulceration as per vascular surgery.   - PET-CT today as per ID / primary team.   - Surgery 5C following.

## 2023-01-10 NOTE — PROGRESS NOTE ADULT - ASSESSMENT
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     NEURO  #Pain Control  Pt in 10/10 pain of bilateral breasts, worst on R. Pain responsive to Dilaudid but effect wears off after ~2-3 hours.   - f/u pain management recommendations:  - Recommendations: Dilaudid 2mg po q4h PRN for severe pain, Dilaudid 0.25mg IVP q4h PRN for severe breakthrough pain, Tylenol 650mg po q6h PRN for mild pain, daily lidocaine patch, HOLD ALL OPIOIDS FOR SEDATION, RR<10, O2SAT <93%, SBP <96    PULM  #Atelectasis  Noted on CT chest to have atelectasis at b/l lung bases.  - Incentive spirometry    CARDIOVASCULAR  #Shock  Pt presented meeting 2/4 SIRS. Hypotensive with MAPs to 60, requiring Levophed. Septic picture of unknown source. HR > 90, WBC >12. Lactate 1.9. Afebrile. Procal 12. S/p Vancomycin 1250mg x 1, Aztreonam x 1 (pt allergic to Cefazolin, PCN). . Ferritin 1209  Patient still requiring pressors, unknown source, does not appear to fit septic picture at this point (no fever, BCx negative, no focal area of infection identified thus far)  - On Phenylephrine 5.5mcg/kg/min  - Start midodrine 50mg q8h   - CT:  7.0 cm fluid collection is present near an AV fistula in the right upper arm. Possibly abscess. Drained by IR 1/5 with serosanguinous fluid: no organisms, few WBCs  - F/u vascular recs: wound care to RUE wound  - f/u surgery recs: for pain/erythema/warmth of R breast - no surgical intervention at this time  - f/u ID recs: d/c meropenem. c/w Vancomycin 500mg IV after HD sessions, last trough 21.   - BCx: NGTD  - corticotropin stim test wnl    #LVOT with LISETTE  likely i/s/o hyperdynamic LV function 2/2 shock and hypovolemia  - per cardiology, rec repeating TTE after shock resolves    #HFrEF  TTE 11/22 normal LV and systolic, EF 59%, grade II diastolic dysfuncton, dilated RV, reduced RV,   Home meds: Entresto 49/51mg  - hold Entresto i/s/o shock    #CAD  Hx of cardiac cath in 2018  Home meds: atorvastatin 40mg, plavix 75mg qd  - c/w home atorvastatin 40mg  - hold home Plavix 75mg qd    GI  #Constipation  - last BM 1/4  - start senna 2 tablets/qd and miralax    #Nutrition  Patient reports poor PO intake  -added nephro-deborah     #Abdominal Pain  C/o vague abdominal pain, a/w bilious emesis x 2, no nausea or further episodes of emesis   (Resolved)    RENAL  #ESRD 2/2 PCKD  Pt has been on HD for "years" 2/2 PCKD. AV Fistula of EDWIN clotted in 2014, has received HD thru HD cath since. New AV Fistula in 11/2022, not yet matured.   - f/u nephrology recs  - s/p HD 1/7. Goal net even    #Hyperkalemia  Patient with Hx of ESRD with missed HD presents with K >8 on VBG with abdominal symptoms and missed HD x8 days. ICU consulted i/s/o hyperkalemia. Renal consulted.  - s/p HD 1/5  - monitor with BMP's qd    ID  Pt presented in septic shock with leukocytosis, tachycardia, hypotension requiring pressors. Unknown source.   - WBC 15 -> 7  - see #shock for plan  - f/u ID recs  - gallium scan: faintly increased activity surrounding the entire lower portion of the right breast consistent with the inflammatory reaction seen on physical examination, small area of activity approximately 10 cm to the right of midline at about the level of the lower border of the sternum, activity likely to be in chest wall   - d/c meropenem, continue vancomycin 500mg IV s/p HD  - f/u PET/CT ordered     ENDO  #Hyperparathyroidism  At risk for secondary HPTT d/t renal failure   - pt has vague abdominal pain with nausea  - multiple brown tumors on CT with multiple fractures of pubic rami, pubic bone, thoracolumbar spine  - intact , Vit D 38.5  - SPEP/immunofixation negative   - endocrinology recs: secondary hyperparathyroidism vs other process causing osteoclastic tumors, obtain PET/CT  - start Sensipar 30mg qd per endo recs    #Multinodular Goiter  CT: enlarged multinodular thyroid projecting into superior mediastinum with 1.8 x 1.5 cm solid nodule slightly inferiorly in the anterior mediastinum.   - Thyroid ultrasound: Fine-needle aspiration recommended for 4.3 cm right thyroid and 2.6 cm left thyroid solid nodules per SCOTT guidelines.  - TSH 1.6 free T4 0.68.  - TSH could be inappropriately normal for low free T4 due to euthyroid sick syndrome.  - f/u TFTs after shock resolved    MSK  #Renal Osteodystrophy  - on CT: Increased density of the bone marrow is consistent with renal osteodystrophy  - Per endocrine, extent of bone destruction extreme given PTH level, suspect might not be Brown tumors    HEME/ONC  #Hx of DVTs  Current DVT of R IJ, R subclavian and R axillary veins. Hx of L AVF clotting. Given hx of clots, current pruritis and gallium scan uptake in chest wall, reasonable to workup coagulopathy  - c/w heparin 15IU   - f/u antiphospholipid panel/rheumatologic workup    #Anemia  Hgb has downtrended from 9.8 on admission to 6.9. Baseline from November appears to be ~13.   - Hgb 6.9, ordered 1U PRBCs. Pt likely had febrile nonhemolytic transfusion reaction (3 hrs into transfusion, developed tachycardia to 150s, HTN to 140s/90s, rigors, Trectal 101.3.) Transfusion stopped. Tylenol given. Sxs resolved.   - Ferritin elevated: 1209,  - Total iron low (15), TIBC low (79)    #Breast Pain, bilateral  Family hx of breast cancer in mother, sister.   - large R breast mass, palpable L breast mass, R breast has peau d'orange appearance with associated erythema, warmth on R  - US bilateral breasts: No sonographic evidence of breast abscess, cyst or focal mass to correspond with the clinical finding of bilateral breast masses.  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     NEURO  #Pain Control  Pt in 10/10 pain of bilateral breasts, worst on R. Pain responsive to Dilaudid but effect wears off after ~2-3 hours.   - f/u pain management recommendations:  - Recommendations: Dilaudid 2mg po q4h PRN for severe pain, Dilaudid 0.25mg IVP q4h PRN for severe breakthrough pain, Tylenol 650mg po q6h PRN for mild pain, daily lidocaine patch, HOLD ALL OPIOIDS FOR SEDATION, RR<10, O2SAT <93%, SBP <96    PULM  #Atelectasis  Noted on CT chest to have atelectasis at b/l lung bases.  - Incentive spirometry    CARDIOVASCULAR  #Shock  Pt presented meeting 2/4 SIRS. Hypotensive with MAPs to 60, requiring Levophed. Septic picture of unknown source. HR > 90, WBC >12. Lactate 1.9. Afebrile. Procal 12. S/p Vancomycin 1250mg x 1, Aztreonam x 1 (pt allergic to Cefazolin, PCN). . Ferritin 1209  Patient still requiring pressors, unknown source, does not appear to fit septic picture at this point (no fever, BCx negative, no focal area of infection identified thus far)  - On Phenylephrine 3.5mcg/kg/min  - c/w midodrine 50mg q8h   - CT:  7.0 cm fluid collection is present near an AV fistula in the right upper arm. Possibly abscess. Drained by IR 1/5 with serosanguinous fluid: no organisms, few WBCs  - F/u vascular recs: wound care to RUE wound  - f/u surgery recs: for pain/erythema/warmth of R breast: biopsied 1/9  - f/u pathology from breast bx  - f/u ID recs: c/w Vancomycin 500mg IV after HD sessions, last trough 18.   - BCx: NGTD  - corticotropin stim test wnl    #LVOT with LISETTE  likely i/s/o hyperdynamic LV function 2/2 shock and hypovolemia  - per cardiology, rec repeating TTE after shock resolves    #HFrEF  TTE 11/22 normal LV and systolic, EF 59%, grade II diastolic dysfuncton, dilated RV, reduced RV,   Home meds: Entresto 49/51mg  - hold Entresto i/s/o shock    #CAD  Hx of cardiac cath in 2018  Home meds: atorvastatin 40mg, plavix 75mg qd  - c/w home atorvastatin 40mg  - hold home Plavix 75mg qd    GI  #Constipation  - last BM 1/4  - start senna 2 tablets/qd and miralax    #Nutrition  Patient reports poor PO intake  -added nephro-deborah     #Abdominal Pain  C/o vague abdominal pain, a/w bilious emesis x 2, no nausea or further episodes of emesis   (Resolved)    RENAL  #ESRD 2/2 PCKD  Pt has been on HD for "years" 2/2 PCKD. AV Fistula of EDWIN clotted in 2014, has received HD thru HD cath since. New AV Fistula in 11/2022, not yet matured.   - f/u nephrology recs  - s/p HD 1/10. Goal net even    #Hyperkalemia  Patient with Hx of ESRD with missed HD presents with K >8 on VBG with abdominal symptoms and missed HD x8 days. ICU consulted i/s/o hyperkalemia. Renal consulted.  - s/p HD 1/10  - monitor with BMP's qd    ID  Pt presented in septic shock with leukocytosis, tachycardia, hypotension requiring pressors. Unknown source.   - WBC 15 -> 7  - see #shock for plan  - f/u ID recs  - gallium scan: faintly increased activity surrounding the entire lower portion of the right breast consistent with the inflammatory reaction seen on physical examination, small area of activity approximately 10 cm to the right of midline at about the level of the lower border of the sternum, activity likely to be in chest wall   - c/w vancomycin 500mg IV s/p HD  - f/u PET/CT, study planned for 1/10 afternoon     ENDO  #Hyperparathyroidism  At risk for secondary HPTT d/t renal failure   - pt has vague abdominal pain with nausea  - multiple brown tumors on CT with multiple fractures of pubic rami, pubic bone, thoracolumbar spine  - intact , Vit D 38.5  - SPEP/immunofixation negative   - endocrinology recs: secondary hyperparathyroidism vs other process causing osteoclastic tumors, obtain PET/CT  - c/w Sensipar 30mg qd per endo recs (started 1/9)  - obtain intact PTH level 1/11 AM to give baseline PTH on Sensipar     #Multinodular Goiter  CT: enlarged multinodular thyroid projecting into superior mediastinum with 1.8 x 1.5 cm solid nodule slightly inferiorly in the anterior mediastinum.   - Thyroid ultrasound: Fine-needle aspiration recommended for 4.3 cm right thyroid and 2.6 cm left thyroid solid nodules per SCOTT guidelines.  - TSH 1.6 free T4 0.68.  - TSH could be inappropriately normal for low free T4 due to euthyroid sick syndrome.  - f/u TFTs after shock resolved    MSK  #Renal Osteodystrophy  - on CT: Increased density of the bone marrow is consistent with renal osteodystrophy  - Per endocrine, extent of bone destruction extreme given PTH level, suspect might not be Brown tumors    HEME/ONC  #Hx of DVTs  Current DVT of R IJ, R subclavian and R axillary veins. Hx of L AVF clotting. Given hx of clots, current pruritis and gallium scan uptake in chest wall, reasonable to workup coagulopathy  - c/w heparin 15IU   - f/u antiphospholipid panel/rheumatologic workup (beta2 glycoprotein, anticardiolipin ordered)     #Anemia  Hgb has downtrended from 9.8 on admission to 6.9. Baseline from November appears to be ~13.   - Hgb 6.9, ordered 1U PRBCs. Pt likely had febrile nonhemolytic transfusion reaction (3 hrs into transfusion, developed tachycardia to 150s, HTN to 140s/90s, rigors, Trectal 101.3.) Transfusion stopped. Tylenol given. Sxs resolved.   - Ferritin elevated: 1209,  - Total iron low (15), TIBC low (79)    #Breast Pain, bilateral  Family hx of breast cancer in mother, sister.   - large R breast mass, palpable L breast mass, R breast has peau d'orange appearance with associated erythema, warmth on R  - US bilateral breasts: No sonographic evidence of breast abscess, cyst or focal mass to correspond with the clinical finding of bilateral breast masses.  - surgery: recc nonurgent outpatient mammogram when clinically stable  - breast surgery to obtain bx of R breast     GYN  Uterine mass, 6.1cm mass in R adnexa seen incidentally on CTAP  - Gyn consulted, think unlikely ovarian mass vs cyst is related to systemic dz as it was seen on imaging in 1/2022  - Pt has hx of gyn surgery, unknown if total hysterectomy or partial  - Per gyn, obtain transvaginal US    Preventative  F: None  E: None, HD pt  N: Renal Restricted Diet  DVT: heparin 15IU  Code Status: Full Code  Dispo: ICU   68 yo F pt with PMH HFrEF (EF 40%), nonobstructive CAD, NICM, HTN, HLD, ESRD 2/2 PCKD on HD, PE (2018) s/p IVC filter, mild AS, mild MR, PAD, OA, h/o thrombus in vascular access x3 (R AVG, R AVF, L AVG), ventral hernia, 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     NEURO  #Pain Control  Pt in 10/10 pain of bilateral breasts, worst on R. Pain responsive to Dilaudid but effect wears off after ~2-3 hours.   - f/u pain management recommendations:  - Recommendations: Dilaudid 2mg po q4h PRN for severe pain, Dilaudid 0.25mg IVP q4h PRN for severe breakthrough pain, Tylenol 650mg po q6h PRN for mild pain, daily lidocaine patch, HOLD ALL OPIOIDS FOR SEDATION, RR<10, O2SAT <93%, SBP <96    PULM  #Atelectasis  Noted on CT chest to have atelectasis at b/l lung bases.  - Incentive spirometry    CARDIOVASCULAR  #Shock  Pt presented meeting 2/4 SIRS. Hypotensive with MAPs to 60, requiring Levophed. Septic picture of unknown source. HR > 90, WBC >12. Lactate 1.9. Afebrile. Procal 12. S/p Vancomycin 1250mg x 1, Aztreonam x 1 (pt allergic to Cefazolin, PCN). . Ferritin 1209  Patient still requiring pressors, unknown source, does not appear to fit septic picture at this point (no fever, BCx negative, no focal area of infection identified thus far)  - On Phenylephrine 3.5mcg/kg/min  - c/w midodrine 50mg q8h   - CT:  7.0 cm fluid collection is present near an AV fistula in the right upper arm. Possibly abscess. Drained by IR 1/5 with serosanguinous fluid: no organisms, few WBCs  - F/u vascular recs: wound care to RUE wound  - f/u surgery recs: for pain/erythema/warmth of R breast: biopsied 1/9  - f/u pathology from breast bx  - f/u ID recs: c/w Vancomycin 500mg IV after HD sessions, last trough 18.   - BCx: NGTD  - corticotropin stim test wnl    #LVOT with LISETTE  likely i/s/o hyperdynamic LV function 2/2 shock and hypovolemia  - per cardiology, rec repeating TTE after shock resolves    #HFrEF  TTE 11/22 normal LV and systolic, EF 59%, grade II diastolic dysfuncton, dilated RV, reduced RV,   Home meds: Entresto 49/51mg  - hold Entresto i/s/o shock    #CAD  Hx of cardiac cath in 2018  Home meds: atorvastatin 40mg, plavix 75mg qd  - c/w home atorvastatin 40mg  - hold home Plavix 75mg qd    GI  #Constipation  - last BM 1/4  - start senna 2 tablets/qd and miralax    #Nutrition  Patient reports poor PO intake  -added nephro-deborah     #Abdominal Pain  C/o vague abdominal pain, a/w bilious emesis x 2, no nausea or further episodes of emesis   (Resolved)    RENAL  #ESRD 2/2 PCKD  Pt has been on HD for "years" 2/2 PCKD. AV Fistula of EDWIN clotted in 2014, has received HD thru HD cath since. New AV Fistula in 11/2022, not yet matured.   - f/u nephrology recs  - s/p HD 1/10. Goal net even    #Hyperkalemia  Patient with Hx of ESRD with missed HD presents with K >8 on VBG with abdominal symptoms and missed HD x8 days. ICU consulted i/s/o hyperkalemia. Renal consulted.  - s/p HD 1/10  - monitor with BMP's qd    ID  Pt presented in septic shock with leukocytosis, tachycardia, hypotension requiring pressors. Unknown source.   - WBC 15 -> 7  - see #shock for plan  - f/u ID recs  - gallium scan: faintly increased activity surrounding the entire lower portion of the right breast consistent with the inflammatory reaction seen on physical examination, small area of activity approximately 10 cm to the right of midline at about the level of the lower border of the sternum, activity likely to be in chest wall   - c/w vancomycin 500mg IV s/p HD  - f/u PET/CT, study planned for 1/10 afternoon     ENDO  #Hyperparathyroidism  At risk for secondary HPTT d/t renal failure   - pt has vague abdominal pain with nausea  - multiple brown tumors on CT with multiple fractures of pubic rami, pubic bone, thoracolumbar spine  - intact , Vit D 38.5  - SPEP/immunofixation negative   - endocrinology recs: secondary hyperparathyroidism vs other process causing osteoclastic tumors, obtain PET/CT  - c/w Sensipar 30mg qd per endo recs (started 1/9)  - obtain intact PTH level 1/11 AM to give baseline PTH on Sensipar     #Multinodular Goiter  CT: enlarged multinodular thyroid projecting into superior mediastinum with 1.8 x 1.5 cm solid nodule slightly inferiorly in the anterior mediastinum.   - Thyroid ultrasound: Fine-needle aspiration recommended for 4.3 cm right thyroid and 2.6 cm left thyroid solid nodules per SCOTT guidelines.  - TSH 1.6 free T4 0.68.  - TSH could be inappropriately normal for low free T4 due to euthyroid sick syndrome.  - f/u TFTs after shock resolved    MSK  #Renal Osteodystrophy  - on CT: Increased density of the bone marrow is consistent with renal osteodystrophy  - Per endocrine, extent of bone destruction extreme given PTH level, suspect might not be Brown tumors    #RUE Edema  RUE became edematous 1/8 extending down through hand. AVF with drained seroma pocket on R arm.  - lymphedema vs bleeding into seroma pocket? vs DVT   - f/u RUE U/S (ordered)     HEME/ONC  #Hx of DVTs  Current DVT of R IJ, R subclavian and R axillary veins. Hx of L AVF clotting. Given hx of clots, current pruritis and gallium scan uptake in chest wall, reasonable to workup coagulopathy  - c/w heparin 15IU   - f/u antiphospholipid panel/rheumatologic workup (beta2 glycoprotein, anticardiolipin ordered)     #Anemia  Hgb has downtrended from 9.8 on admission to 6.9. Baseline from November appears to be ~13.   - Hgb 6.9, ordered 1U PRBCs. Pt likely had febrile nonhemolytic transfusion reaction (3 hrs into transfusion, developed tachycardia to 150s, HTN to 140s/90s, rigors, Trectal 101.3.) Transfusion stopped. Tylenol given. Sxs resolved.   - Ferritin elevated: 1209,  - Total iron low (15), TIBC low (79)    #Breast Pain, bilateral  Family hx of breast cancer in mother, sister.   - large R breast mass, palpable L breast mass, R breast has peau d'orange appearance with associated erythema, warmth on R  - US bilateral breasts: No sonographic evidence of breast abscess, cyst or focal mass to correspond with the clinical finding of bilateral breast masses.  - surgery: recc nonurgent outpatient mammogram when clinically stable  - f/u pathology from breast bx    GYN  Uterine mass, 6.1cm mass in R adnexa seen incidentally on CTAP  - Gyn consulted, think unlikely ovarian mass vs cyst is related to systemic dz as it was seen on imaging in 1/2022  - Pt has hx of gyn surgery, unknown if total hysterectomy or partial  - Per gyn, obtain transvaginal US    Preventative  F: None  E: None, HD pt  N: Renal Restricted Diet  DVT: heparin 15IU  Code Status: Full Code  Dispo: ICU   66 yo F pt with PMH HFrEF (EF 40%), nonobstructive CAD, NICM, HTN, HLD, ESRD 2/2 PCKD on HD, PE (2018) s/p IVC filter, mild AS, mild MR, PAD, OA, h/o thrombus in vascular access x3 (R AVG, R AVF, L AVG), ventral hernia, 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     NEURO  #Pain Control  Pt in 10/10 pain of bilateral breasts, worst on R. Pain responsive to Dilaudid but effect wears off after ~2-3 hours.   - f/u pain management recommendations:  - Recommendations: Dilaudid 2mg po q4h PRN for severe pain, Dilaudid 0.25mg IVP q4h PRN for severe breakthrough pain, Tylenol 650mg po q6h PRN for mild pain, daily lidocaine patch, HOLD ALL OPIOIDS FOR SEDATION, RR<10, O2SAT <93%, SBP <96    PULM  #Atelectasis  Noted on CT chest to have atelectasis at b/l lung bases.  - Incentive spirometry    CARDIOVASCULAR  #Shock  Pt presented meeting 2/4 SIRS. Hypotensive with MAPs to 60, requiring Levophed. Septic picture of unknown source. HR > 90, WBC >12. Lactate 1.9. Afebrile. Procal 12. S/p Vancomycin 1250mg x 1, Aztreonam x 1 (pt allergic to Cefazolin, PCN). . Ferritin 1209  Patient still requiring pressors, unknown source, does not appear to fit septic picture at this point (no fever, BCx negative, no focal area of infection identified thus far)  - On Phenylephrine 3.5mcg/kg/min  - c/w midodrine 50mg q8h   - CT:  7.0 cm fluid collection is present near an AV fistula in the right upper arm. Possibly abscess. Drained by IR 1/5 with serosanguinous fluid: no organisms, few WBCs  - F/u vascular recs: wound care to RUE wound  - f/u surgery recs: for pain/erythema/warmth of R breast: biopsied 1/9  - f/u pathology from breast bx  - f/u ID recs: c/w Vancomycin 500mg IV after HD sessions, last trough 18.   - BCx: NGTD  - corticotropin stim test wnl    #LVOT with LISETTE  likely i/s/o hyperdynamic LV function 2/2 shock and hypovolemia  - per cardiology, rec repeating TTE after shock resolves    #HFrEF  TTE 11/22 normal LV and systolic, EF 59%, grade II diastolic dysfuncton, dilated RV, reduced RV,   Home meds: Entresto 49/51mg  - hold Entresto i/s/o shock    #CAD  Hx of cardiac cath in 2018  Home meds: atorvastatin 40mg, plavix 75mg qd  - c/w home atorvastatin 40mg  - hold home Plavix 75mg qd    GI  #Constipation  - last BM 1/4  - start senna 2 tablets/qd and miralax    #Nutrition  Patient reports poor PO intake  -added nephro-deborah     #Abdominal Pain  C/o vague abdominal pain, a/w bilious emesis x 2, no nausea or further episodes of emesis   (Resolved)    RENAL  #ESRD 2/2 PCKD  Pt has been on HD for "years" 2/2 PCKD. AV Fistula of EDWIN clotted in 2014, has received HD thru HD cath since. New AV Fistula in 11/2022, not yet matured.   - f/u nephrology recs  - s/p HD 1/10. Goal net even    #Hyperkalemia  Patient with Hx of ESRD with missed HD presents with K >8 on VBG with abdominal symptoms and missed HD x8 days. ICU consulted i/s/o hyperkalemia. Renal consulted.  - s/p HD 1/10  - monitor with BMP's qd    #Hyperphosphatemia  - increase sevelamer to 1600mg TID    ID  Pt presented in septic shock with leukocytosis, tachycardia, hypotension requiring pressors. Unknown source.   - WBC 15 -> 7  - see #shock for plan  - f/u ID recs  - gallium scan: faintly increased activity surrounding the entire lower portion of the right breast consistent with the inflammatory reaction seen on physical examination, small area of activity approximately 10 cm to the right of midline at about the level of the lower border of the sternum, activity likely to be in chest wall   - c/w vancomycin 500mg IV s/p HD  - f/u PET/CT, study planned for 1/10 afternoon     ENDO  #Hyperparathyroidism  At risk for secondary HPTT d/t renal failure   - pt has vague abdominal pain with nausea  - multiple brown tumors on CT with multiple fractures of pubic rami, pubic bone, thoracolumbar spine  - intact , Vit D 38.5  - SPEP/immunofixation negative   - endocrinology recs: secondary hyperparathyroidism vs other process causing osteoclastic tumors, obtain PET/CT  - c/w Sensipar 30mg qd per endo recs (started 1/9)  - obtain intact PTH level 1/11 AM to give baseline PTH on Sensipar     #Multinodular Goiter  CT: enlarged multinodular thyroid projecting into superior mediastinum with 1.8 x 1.5 cm solid nodule slightly inferiorly in the anterior mediastinum.   - Thyroid ultrasound: Fine-needle aspiration recommended for 4.3 cm right thyroid and 2.6 cm left thyroid solid nodules per SCOTT guidelines.  - TSH 1.6 free T4 0.68.  - TSH could be inappropriately normal for low free T4 due to euthyroid sick syndrome.  - f/u TFTs after shock resolved    MSK  #Renal Osteodystrophy  - on CT: Increased density of the bone marrow is consistent with renal osteodystrophy  - Per endocrine, extent of bone destruction extreme given PTH level, suspect might not be Brown tumors    #RUE Edema  RUE became edematous 1/8 extending down through hand. AVF with drained seroma pocket on R arm.  - lymphedema vs bleeding into seroma pocket? vs DVT   - f/u RUE U/S (ordered)     HEME/ONC  #Hx of DVTs  Current DVT of R IJ, R subclavian and R axillary veins. Hx of L AVF clotting. Given hx of clots, current pruritis and gallium scan uptake in chest wall, reasonable to workup coagulopathy  - c/w heparin 15IU   - f/u antiphospholipid panel/rheumatologic workup (beta2 glycoprotein, anticardiolipin ordered)     #Anemia  Hgb has downtrended from 9.8 on admission to 6.9. Baseline from November appears to be ~13.   - Hgb 6.9, ordered 1U PRBCs. Pt likely had febrile nonhemolytic transfusion reaction (3 hrs into transfusion, developed tachycardia to 150s, HTN to 140s/90s, rigors, Trectal 101.3.) Transfusion stopped. Tylenol given. Sxs resolved.   - Ferritin elevated: 1209,  - Total iron low (15), TIBC low (79)    #Breast Pain, bilateral  Family hx of breast cancer in mother, sister.   - large R breast mass, palpable L breast mass, R breast has peau d'orange appearance with associated erythema, warmth on R  - US bilateral breasts: No sonographic evidence of breast abscess, cyst or focal mass to correspond with the clinical finding of bilateral breast masses.  - surgery: recc nonurgent outpatient mammogram when clinically stable  - f/u pathology from breast bx    GYN  Uterine mass, 6.1cm mass in R adnexa seen incidentally on CTAP  - Gyn consulted, think unlikely ovarian mass vs cyst is related to systemic dz as it was seen on imaging in 1/2022  - Pt has hx of gyn surgery, unknown if total hysterectomy or partial  - Per gyn, obtain transvaginal US    Preventative  F: None  E: None, HD pt  N: Renal Restricted Diet  DVT: heparin 15IU  Code Status: Full Code  Dispo: ICU

## 2023-01-10 NOTE — CONSULT NOTE ADULT - ASSESSMENT
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 GYN consulted for findings of R ovarian cyst on CT and concern for malignancy.     - No acute OBGYN intervention indicated at this time  - GYN to perform physical exam, including pelvic exam 1/11 per pt request  - Recommend TVUS to further characterize R ovarian cyst  - Recommend obtaining , CA-125, CEA  - F/u PET/CT results  - D/w Dr. Paredes, PGY4  - D/w Dr. Smith, OBGYN Attending    Bam Lowe MD PGY2

## 2023-01-10 NOTE — PROGRESS NOTE ADULT - SUBJECTIVE AND OBJECTIVE BOX
--------------------------------------------------------------------------------  Chief Complaint: ESRD/Ongoing hemodialysis requirement    24 hour events/subjective:    Seen this morning, SBP noted to be in the 80s on phenylephrine. K noted to be 5.0, Na of 130 and Hgb of 7.4. Would prefer to dialyze today for clearance but will require SBP to initiate on to circuit. Discussed with team.      PAST HISTORY  --------------------------------------------------------------------------------  No significant changes to PMH, PSH, FHx, SHx, unless otherwise noted    ALLERGIES & MEDICATIONS  --------------------------------------------------------------------------------  Allergies    Ancef (Rash; Urticaria)  DDAVP (Hypotension)  iodine (Hives; Pruritus)  penicillin (Swelling)  sulfa drugs (Angioedema)    Intolerances      Standing Inpatient Medications  atorvastatin 40 milliGRAM(s) Oral at bedtime  chlorhexidine 2% Cloths 1 Application(s) Topical <User Schedule>  cinacalcet 30 milliGRAM(s) Oral daily  collagenase Ointment 1 Application(s) Topical daily  heparin  Infusion 15 Unit(s)/Hr IV Continuous <Continuous>  midodrine 30 milliGRAM(s) Oral every 8 hours  Nephro-deborah 1 Tablet(s) Oral daily  phenylephrine    Infusion 0.1 MICROgram(s)/kG/Min IV Continuous <Continuous>  polyethylene glycol 3350 17 Gram(s) Oral every 12 hours  senna 2 Tablet(s) Oral at bedtime  sevelamer carbonate 1600 milliGRAM(s) Oral three times a day with meals    PRN Inpatient Medications  acetaminophen     Tablet .. 650 milliGRAM(s) Oral every 6 hours PRN  calamine/zinc oxide Lotion 1 Application(s) Topical three times a day PRN  HYDROmorphone   Tablet 2 milliGRAM(s) Oral every 4 hours PRN  HYDROmorphone  Injectable 0.25 milliGRAM(s) IV Push every 4 hours PRN      REVIEW OF SYSTEMS  --------------------------------------------------------------------------------  All other systems were reviewed and are negative, except as noted.    VITALS/PHYSICAL EXAM  --------------------------------------------------------------------------------  T(C): 37.4 (01-10-23 @ 06:04), Max: 39.3 (23 @ 14:33)  HR: 76 (01-10-23 @ 08:00) (60 - 132)  BP: --  RR: 24 (01-10-23 @ 08:00) (10 - 24)  SpO2: 92% (01-10-23 @ 08:00) (92% - 100%)  Wt(kg): --  Drug Dosing Weight  Height (cm): 177.8 (2023 20:52)  Weight (kg): 85.3 (2023 20:52)  BMI (kg/m2): 27 (2023 20:52)  BSA (m2): 2.03 (2023 20:52)        23 @ 07:01  -  01-10-23 @ 07:00  --------------------------------------------------------  IN: 1550.8 mL / OUT: 0 mL / NET: 1550.8 mL    01-10-23 @ 07:01  -  01-10-23 @ 10:03  --------------------------------------------------------  IN: 59.8 mL / OUT: 0 mL / NET: 59.8 mL      PHYSICAL EXAM:  GENERAL: NAD resting in bed   CHEST/LUNG: Clear to auscultation bilaterally; no accessory muscle use   HEART: normal S1S2, RRR  ABDOMEN: Soft, Nontender, +BS,   EXTREMITIES: No clubbing, cyanosis, or edema   ACCESS: R TDC    LABS/STUDIES  --------------------------------------------------------------------------------              7.4    6.97  >-----------<  153      [01-10-23 @ 05:30]              24.4     130  |  93  |  47  ----------------------------<  92      [01-10-23 @ 05:30]  5.0   |  21  |  6.81        Ca     8.7     [01-10-23 @ 05:30]      Mg     1.6     [01-10-23 @ 05:30]      Phos  7.2     [01-10-23 @ 05:30]    TPro  5.7  /  Alb  2.2  /  TBili  0.2  /  DBili  x   /  AST  21  /  ALT  13  /  AlkPhos  78  [23 @ 05:45]      PTT: 79.0       [01-10-23 @ 05:30]      Iron 15, TIBC 79, %sat 19      [23 @ 06:01]  Ferritin 1209      [23 @ 06:01]  PTH -- (Ca 8.8)      [23 @ 14:24]   479  Vitamin D (25OH) 38.5      [23 05:30]  TSH 1.610      [23 05:30]    HBsAb Nonreact      [23:]  HBsAg Nonreact      [23]  HCV 0.04, Nonreact      [23:]      RADIOLOGY:  --------------------------------------------------------------------------------------    Hemoglobin: 7.4 g/dL (01-10-23 @ 05:30)  Phosphorus Level, Serum: 7.2 mg/dL (01-10-23 @ 05:30)  Hemoglobin: 8.3 g/dL (23 @ 16:38)  Hemoglobin: 6.9 g/dL (23 @ 05:45)    Albumin, Serum: 2.2 g/dL (23 05:45)  Ferritin, Serum: 1209 ng/mL (23 @ 06:01)    T(C): 36.6 (01-10-23 @ 10:05), Max: 39.3 (23 @ 14:33)  HR: 76 (01-10-23 @ 08:00) (60 - 132)  BP: --  RR: 24 (01-10-23 @ 08:00) (10 - 24)  SpO2: 92% (01-10-23 @ 08:00) (92% - 100%)  cinacalcet 30 milliGRAM(s) Oral daily, 23 @ 15:49, Routine  epoetin александр-epbx (RETACRIT) Injectable 8000 Unit(s) IV Push once, 23 @ 07:45, Routine, Stop order after: 1 Doses  epoetin александр-epbx (RETACRIT) Injectable 8000 Unit(s) IV Push once, 23 @ 06:45, Routine, Stop order after: 1 Doses  epoetin александр-epbx (RETACRIT) Injectable 8000 Unit(s) IV Push once, 23 @ 07:45, Routine, Stop order after: 1 Doses  epoetin александр-epbx (RETACRIT) Injectable 8000 Unit(s) IV Push once, 01-10-23 @ 10:08, Routine, Stop order after: 1 Doses  sevelamer carbonate 1600 milliGRAM(s) Oral three times a day with meals, 23 @ 05:28, Routine      Hemodialysis Treatment.:     Schedule: Once, Modality: Hemodialysis, Access: Internal Jugular Central Venous Catheter    Dialyzer: Optiflux [id number], Time: 180 Min    Blood Flow: 400 mL/Min , Dialysate Flow: 500 mL/Min, Dialysate Temp: 36.5, Tubinmm (Adult)    Target Fluid Removal: 0 Liters    Dialysate Electrolytes (mEq/L): Potassium 2, Calcium 2.5, Sodium 138, Bicarbonate 35 (01-10-23 @ 10:08) [Active]   --------------------------------------------------------------------------------  Chief Complaint: ESRD/Ongoing hemodialysis requirement    24 hour events/subjective:    Seen this morning, SBP noted to be in the 80s on phenylephrine. K noted to be 5.0, Na of 130 and Hgb of 7.4. Would prefer to dialyze today for clearance but will require SBP to initiate on to circuit. Discussed with team.      PAST HISTORY  --------------------------------------------------------------------------------  No significant changes to PMH, PSH, FHx, SHx, unless otherwise noted    ALLERGIES & MEDICATIONS  --------------------------------------------------------------------------------  Allergies    Ancef (Rash; Urticaria)  DDAVP (Hypotension)  iodine (Hives; Pruritus)  penicillin (Swelling)  sulfa drugs (Angioedema)    Intolerances      Standing Inpatient Medications  atorvastatin 40 milliGRAM(s) Oral at bedtime  chlorhexidine 2% Cloths 1 Application(s) Topical <User Schedule>  cinacalcet 30 milliGRAM(s) Oral daily  collagenase Ointment 1 Application(s) Topical daily  heparin  Infusion 15 Unit(s)/Hr IV Continuous <Continuous>  midodrine 30 milliGRAM(s) Oral every 8 hours  Nephro-deborah 1 Tablet(s) Oral daily  phenylephrine    Infusion 0.1 MICROgram(s)/kG/Min IV Continuous <Continuous>  polyethylene glycol 3350 17 Gram(s) Oral every 12 hours  senna 2 Tablet(s) Oral at bedtime  sevelamer carbonate 1600 milliGRAM(s) Oral three times a day with meals    PRN Inpatient Medications  acetaminophen     Tablet .. 650 milliGRAM(s) Oral every 6 hours PRN  calamine/zinc oxide Lotion 1 Application(s) Topical three times a day PRN  HYDROmorphone   Tablet 2 milliGRAM(s) Oral every 4 hours PRN  HYDROmorphone  Injectable 0.25 milliGRAM(s) IV Push every 4 hours PRN      REVIEW OF SYSTEMS  --------------------------------------------------------------------------------  All other systems were reviewed and are negative, except as noted.    VITALS/PHYSICAL EXAM  --------------------------------------------------------------------------------  T(C): 37.4 (01-10-23 @ 06:04), Max: 39.3 (23 @ 14:33)  HR: 76 (01-10-23 @ 08:00) (60 - 132)  BP: --  RR: 24 (01-10-23 @ 08:00) (10 - 24)  SpO2: 92% (01-10-23 @ 08:00) (92% - 100%)  Wt(kg): --  Drug Dosing Weight  Height (cm): 177.8 (2023 20:52)  Weight (kg): 85.3 (2023 20:52)  BMI (kg/m2): 27 (2023 20:52)  BSA (m2): 2.03 (2023 20:52)        23 @ 07:01  -  01-10-23 @ 07:00  --------------------------------------------------------  IN: 1550.8 mL / OUT: 0 mL / NET: 1550.8 mL    01-10-23 @ 07:01  -  01-10-23 @ 10:03  --------------------------------------------------------  IN: 59.8 mL / OUT: 0 mL / NET: 59.8 mL      PHYSICAL EXAM:  GENERAL: NAD resting in bed   CHEST/LUNG: Clear to auscultation bilaterally; no accessory muscle use   HEART: normal S1S2, RRR  ABDOMEN: Soft, Nontender, +BS,   EXTREMITIES: No clubbing, cyanosis, or edema   ACCESS: R TDC    LABS/STUDIES  --------------------------------------------------------------------------------              7.4    6.97  >-----------<  153      [01-10-23 @ 05:30]              24.4     130  |  93  |  47  ----------------------------<  92      [01-10-23 @ 05:30]  5.0   |  21  |  6.81        Ca     8.7     [01-10-23 @ 05:30]      Mg     1.6     [01-10-23 @ 05:30]      Phos  7.2     [01-10-23 @ 05:30]    TPro  5.7  /  Alb  2.2  /  TBili  0.2  /  DBili  x   /  AST  21  /  ALT  13  /  AlkPhos  78  [23 @ 05:45]      PTT: 79.0       [01-10-23 @ 05:30]      Iron 15, TIBC 79, %sat 19      [23 @ 06:01]  Ferritin 1209      [23 @ 06:01]  PTH -- (Ca 8.8)      [23 @ 14:24]   479  Vitamin D (25OH) 38.5      [23 05:30]  TSH 1.610      [23 05:30]    HBsAb Nonreact      [23:]  HBsAg Nonreact      [23]  HCV 0.04, Nonreact      [23:]      RADIOLOGY:  --------------------------------------------------------------------------------------    Hemoglobin: 7.4 g/dL (01-10-23 @ 05:30)  Phosphorus Level, Serum: 7.2 mg/dL (01-10-23 @ 05:30)  Hemoglobin: 8.3 g/dL (23 @ 16:38)  Hemoglobin: 6.9 g/dL (23 @ 05:45)    Albumin, Serum: 2.2 g/dL (23 05:45)  Ferritin, Serum: 1209 ng/mL (23 @ 06:01)    T(C): 36.6 (01-10-23 @ 10:05), Max: 39.3 (23 @ 14:33)  HR: 76 (01-10-23 @ 08:00) (60 - 132)  BP: --  RR: 24 (01-10-23 @ 08:00) (10 - 24)  SpO2: 92% (01-10-23 @ 08:00) (92% - 100%)  cinacalcet 30 milliGRAM(s) Oral daily, 23 @ 15:49, Routine  epoetin александр-epbx (RETACRIT) Injectable 8000 Unit(s) IV Push once, 23 @ 07:45, Routine, Stop order after: 1 Doses  epoetin александр-epbx (RETACRIT) Injectable 8000 Unit(s) IV Push once, 23 @ 06:45, Routine, Stop order after: 1 Doses  epoetin александр-epbx (RETACRIT) Injectable 8000 Unit(s) IV Push once, 23 @ 07:45, Routine, Stop order after: 1 Doses  epoetin александр-epbx (RETACRIT) Injectable 8000 Unit(s) IV Push once, 01-10-23 @ 10:08, Routine, Stop order after: 1 Doses  sevelamer carbonate 1600 milliGRAM(s) Oral three times a day with meals, 23 @ 05:28, Routine      Hemodialysis Treatment.:     Schedule: Once, Modality: Hemodialysis, Access: Internal Jugular Central Venous Catheter    Dialyzer: Optiflux [id number], Time: 180 Min    Blood Flow: 400 mL/Min , Dialysate Flow: 500 mL/Min, Dialysate Temp: 36.5, Tubinmm (Adult)    Target Fluid Removal: 0 Liters    Dialysate Electrolytes (mEq/L): Potassium 2, Calcium 2.5, Sodium 138, Bicarbonate 35 (01-10-23 @ 10:08) [Active]    Seen on HD, c/w prescription outlined as above

## 2023-01-10 NOTE — PROGRESS NOTE ADULT - SUBJECTIVE AND OBJECTIVE BOX
**Incomplete Note**     OVERNIGHT EVENTS: SHAISTA    SUBJECTIVE / INTERVAL HPI: Patient seen and examined at bedside. Pt says she is feeling well but tired. Breast pain improved.     12 point ROS negative other than stated above.     VITAL SIGNS:  Vital Signs Last 24 Hrs  T(C): 37.4 (10 Faustino 2023 06:04), Max: 39.3 (09 Jan 2023 14:33)  T(F): 99.3 (10 Faustino 2023 06:04), Max: 102.7 (09 Jan 2023 14:33)  HR: 76 (10 Faustino 2023 08:00) (60 - 132)  BP: --  BP(mean): --  RR: 24 (10 Faustino 2023 08:00) (10 - 24)  SpO2: 92% (10 Faustino 2023 08:00) (92% - 100%)    Parameters below as of 10 Faustino 2023 08:00  Patient On (Oxygen Delivery Method): room air        PHYSICAL EXAM:    General: NAD  HEENT: NC/AT, anicteric sclera; MMM  Neck: supple  Cardiovascular: +S1/S2; RRR  Respiratory: CTA B/L; no W/R/R  Gastrointestinal: soft, NT/ND; +BSx4  Derm: R breast TTP  Vascular: 2+ radial, DP/PT pulses B/L  Neurological: AAOx3    MEDICATIONS:  MEDICATIONS  (STANDING):  atorvastatin 40 milliGRAM(s) Oral at bedtime  chlorhexidine 2% Cloths 1 Application(s) Topical <User Schedule>  cinacalcet 30 milliGRAM(s) Oral daily  collagenase Ointment 1 Application(s) Topical daily  heparin  Infusion 15 Unit(s)/Hr (15 mL/Hr) IV Continuous <Continuous>  midodrine 30 milliGRAM(s) Oral every 8 hours  Nephro-deborah 1 Tablet(s) Oral daily  phenylephrine    Infusion 0.1 MICROgram(s)/kG/Min (1.6 mL/Hr) IV Continuous <Continuous>  polyethylene glycol 3350 17 Gram(s) Oral every 12 hours  senna 2 Tablet(s) Oral at bedtime  sevelamer carbonate 1600 milliGRAM(s) Oral three times a day with meals    MEDICATIONS  (PRN):  acetaminophen     Tablet .. 650 milliGRAM(s) Oral every 6 hours PRN Temp greater or equal to 38C (100.4F), Moderate Pain (4 - 6)  calamine/zinc oxide Lotion 1 Application(s) Topical three times a day PRN Itching  HYDROmorphone   Tablet 2 milliGRAM(s) Oral every 4 hours PRN Severe Pain (7 - 10)  HYDROmorphone  Injectable 0.25 milliGRAM(s) IV Push every 4 hours PRN Breakthrough pain      ALLERGIES:  Allergies    Ancef (Rash; Urticaria)  DDAVP (Hypotension)  iodine (Hives; Pruritus)  penicillin (Swelling)  sulfa drugs (Angioedema)    Intolerances        LABS:                        7.4    6.97  )-----------( 153      ( 10 Faustino 2023 05:30 )             24.4     01-10    130<L>  |  93<L>  |  47<H>  ----------------------------<  92  5.0   |  21<L>  |  6.81<H>    Ca    8.7      10 Faustino 2023 05:30  Phos  7.2     01-10  Mg     1.6     01-10    TPro  5.7<L>  /  Alb  2.2<L>  /  TBili  0.2  /  DBili  x   /  AST  21  /  ALT  13  /  AlkPhos  78  01-09    PTT - ( 10 Faustino 2023 05:30 )  PTT:79.0 sec    CAPILLARY BLOOD GLUCOSE      POCT Blood Glucose.: 77 mg/dL (09 Jan 2023 05:13)      RADIOLOGY & ADDITIONAL TESTS: Reviewed.    ASSESSMENT:    PLAN:  OVERNIGHT EVENTS: SHAISTA    SUBJECTIVE / INTERVAL HPI: Patient seen and examined at bedside. Pt says she is feeling well but tired. Breast pain improved.     12 point ROS negative other than stated above.     VITAL SIGNS:  Vital Signs Last 24 Hrs  T(C): 37.4 (10 Faustino 2023 06:04), Max: 39.3 (09 Jan 2023 14:33)  T(F): 99.3 (10 Faustino 2023 06:04), Max: 102.7 (09 Jan 2023 14:33)  HR: 76 (10 Faustino 2023 08:00) (60 - 132)  BP: --  BP(mean): --  RR: 24 (10 Faustino 2023 08:00) (10 - 24)  SpO2: 92% (10 Faustino 2023 08:00) (92% - 100%)    Parameters below as of 10 Faustino 2023 08:00  Patient On (Oxygen Delivery Method): room air        PHYSICAL EXAM:    General: NAD  HEENT: NC/AT, anicteric sclera; MMM  Neck: supple  Cardiovascular: +S1/S2; RRR  Respiratory: CTA B/L; no W/R/R  Gastrointestinal: soft, NT/ND; +BSx4  Derm: R breast TTP  Vascular: 2+ radial, DP/PT pulses B/L  Neurological: AAOx3    MEDICATIONS:  MEDICATIONS  (STANDING):  atorvastatin 40 milliGRAM(s) Oral at bedtime  chlorhexidine 2% Cloths 1 Application(s) Topical <User Schedule>  cinacalcet 30 milliGRAM(s) Oral daily  collagenase Ointment 1 Application(s) Topical daily  heparin  Infusion 15 Unit(s)/Hr (15 mL/Hr) IV Continuous <Continuous>  midodrine 30 milliGRAM(s) Oral every 8 hours  Nephro-deborah 1 Tablet(s) Oral daily  phenylephrine    Infusion 0.1 MICROgram(s)/kG/Min (1.6 mL/Hr) IV Continuous <Continuous>  polyethylene glycol 3350 17 Gram(s) Oral every 12 hours  senna 2 Tablet(s) Oral at bedtime  sevelamer carbonate 1600 milliGRAM(s) Oral three times a day with meals    MEDICATIONS  (PRN):  acetaminophen     Tablet .. 650 milliGRAM(s) Oral every 6 hours PRN Temp greater or equal to 38C (100.4F), Moderate Pain (4 - 6)  calamine/zinc oxide Lotion 1 Application(s) Topical three times a day PRN Itching  HYDROmorphone   Tablet 2 milliGRAM(s) Oral every 4 hours PRN Severe Pain (7 - 10)  HYDROmorphone  Injectable 0.25 milliGRAM(s) IV Push every 4 hours PRN Breakthrough pain      ALLERGIES:  Allergies    Ancef (Rash; Urticaria)  DDAVP (Hypotension)  iodine (Hives; Pruritus)  penicillin (Swelling)  sulfa drugs (Angioedema)    Intolerances        LABS:                        7.4    6.97  )-----------( 153      ( 10 Faustino 2023 05:30 )             24.4     01-10    130<L>  |  93<L>  |  47<H>  ----------------------------<  92  5.0   |  21<L>  |  6.81<H>    Ca    8.7      10 Faustino 2023 05:30  Phos  7.2     01-10  Mg     1.6     01-10    TPro  5.7<L>  /  Alb  2.2<L>  /  TBili  0.2  /  DBili  x   /  AST  21  /  ALT  13  /  AlkPhos  78  01-09    PTT - ( 10 Faustino 2023 05:30 )  PTT:79.0 sec    CAPILLARY BLOOD GLUCOSE      POCT Blood Glucose.: 77 mg/dL (09 Jan 2023 05:13)      RADIOLOGY & ADDITIONAL TESTS: Reviewed.    ASSESSMENT:    PLAN:

## 2023-01-10 NOTE — PROGRESS NOTE ADULT - SUBJECTIVE AND OBJECTIVE BOX
Internal Medicine Progress Note  Dara Bradford, PGY-1    ******INCOMPLETE******    OVERNIGHT EVENTS/INTERVAL HPI:    OBJECTIVE:  Vital Signs Last 24 Hrs  T(C): 36.7 (10 Faustino 2023 01:03), Max: 39.3 (09 Jan 2023 14:33)  T(F): 98 (10 Faustino 2023 01:03), Max: 102.7 (09 Jan 2023 14:33)  HR: 64 (10 Faustino 2023 06:00) (60 - 132)  BP: --  BP(mean): --  RR: 15 (10 Faustino 2023 06:00) (10 - 22)  SpO2: 95% (10 Faustino 2023 06:00) (92% - 100%)    Parameters below as of 10 Faustino 2023 07:00  Patient On (Oxygen Delivery Method): room air      I&O's Detail    08 Jan 2023 07:01  -  09 Jan 2023 07:00  --------------------------------------------------------  IN:    Heparin Infusion: 360 mL    Phenylephrine: 227.2 mL    Phenylephrine: 910.4 mL  Total IN: 1497.6 mL    OUT:  Total OUT: 0 mL    Total NET: 1497.6 mL      09 Jan 2023 07:01  -  10 Faustino 2023 06:18  --------------------------------------------------------  IN:    Heparin: 180 mL    Heparin Infusion: 120 mL    Phenylephrine: 1000.8 mL    PRBCs (Packed Red Blood Cells): 250 mL  Total IN: 1550.8 mL    OUT:  Total OUT: 0 mL    Total NET: 1550.8 mL        Physical Exam:  GENERAL: Awake, alert and interactive, no acute distress, appears comfortable  NEURO: A&Ox4, no focal deficits, 5/5 strength in all ext, reflexes 2+ throughout, CN 2-12 intact  HEENT: Normocephalic, atraumatic, no conjunctivitis or scleral icterus, oral mucosa moist, no oral lesions noted  NECK: Supple, no LAD, no JVD, thyroid not palpable  CARDIAC: Regular rate and rhythm, +S1/S2, no murmurs/rubs/gallops  PULM: Breathing comfortably on RA, clear to auscultation bilaterally, no wheezes/rales/rhonchi  ABDOMEN: Soft, nontender, nondistended, +bs, no hepatosplenomegaly, no rebound tenderness or fluid wave, no CVA tenderness  : Deferred  MSK: Range of motion grossly intact, no back tenderness  SKIN: Warm and dry, no rashes, lesions  VASC: Cap refil < 2 sec, 2+ peripheral pulses, no edema, no LE tenderness  Psych: Appropriate affect    Medications:  MEDICATIONS  (STANDING):  atorvastatin 40 milliGRAM(s) Oral at bedtime  chlorhexidine 2% Cloths 1 Application(s) Topical <User Schedule>  cinacalcet 30 milliGRAM(s) Oral daily  collagenase Ointment 1 Application(s) Topical daily  heparin  Infusion 15 Unit(s)/Hr (15 mL/Hr) IV Continuous <Continuous>  midodrine 30 milliGRAM(s) Oral every 8 hours  Nephro-deborah 1 Tablet(s) Oral daily  phenylephrine    Infusion 0.1 MICROgram(s)/kG/Min (1.6 mL/Hr) IV Continuous <Continuous>  polyethylene glycol 3350 17 Gram(s) Oral every 12 hours  senna 2 Tablet(s) Oral at bedtime  sevelamer carbonate 800 milliGRAM(s) Oral three times a day with meals    MEDICATIONS  (PRN):  acetaminophen     Tablet .. 650 milliGRAM(s) Oral every 6 hours PRN Temp greater or equal to 38C (100.4F), Moderate Pain (4 - 6)  calamine/zinc oxide Lotion 1 Application(s) Topical three times a day PRN Itching  HYDROmorphone   Tablet 2 milliGRAM(s) Oral every 4 hours PRN Severe Pain (7 - 10)  HYDROmorphone  Injectable 0.25 milliGRAM(s) IV Push every 4 hours PRN Breakthrough pain      Labs:                        8.3    10.85 )-----------( 147      ( 09 Jan 2023 16:38 )             26.5     01-09    131<L>  |  95<L>  |  36<H>  ----------------------------<  76  4.8   |  24  |  5.89<H>    Ca    8.2<L>      09 Jan 2023 05:45  Phos  5.4     01-09  Mg     1.7     01-09    TPro  5.7<L>  /  Alb  2.2<L>  /  TBili  0.2  /  DBili  x   /  AST  21  /  ALT  13  /  AlkPhos  78  01-09    PTT - ( 10 Faustino 2023 01:54 )  PTT:78.9 sec      COVID-19 PCR: Negative (05 Jan 2023 08:04)  COVID-19 PCR: Negative (23 Nov 2022 19:55)  COVID-19 PCR: NotDetec (19 Nov 2022 11:56)   HOSPITAL COURSE:      OVERNIGHT EVENTS/INTERVAL HPI: No acute events overnight.    SUBJECTIVE: Patient examined at bedside, states she is in no acute pain at rest, but feels severe bilateral breast pain with movement. Denies pain in RUE. Denies HA, lightheadedness, chest pain, cough, abdominal pain, nausea.     OBJECTIVE:  Vital Signs Last 24 Hrs  T(C): 36.7 (10 Faustino 2023 01:03), Max: 39.3 (09 Jan 2023 14:33)  T(F): 98 (10 Faustino 2023 01:03), Max: 102.7 (09 Jan 2023 14:33)  HR: 64 (10 Faustino 2023 06:00) (60 - 132)  BP: --  BP(mean): --  RR: 15 (10 Faustino 2023 06:00) (10 - 22)  SpO2: 95% (10 Faustino 2023 06:00) (92% - 100%)    Parameters below as of 10 Faustino 2023 07:00  Patient On (Oxygen Delivery Method): room air      I&O's Detail    08 Jan 2023 07:01  -  09 Jan 2023 07:00  --------------------------------------------------------  IN:    Heparin Infusion: 360 mL    Phenylephrine: 227.2 mL    Phenylephrine: 910.4 mL  Total IN: 1497.6 mL    OUT:  Total OUT: 0 mL    Total NET: 1497.6 mL      09 Jan 2023 07:01  -  10 Faustino 2023 06:18  --------------------------------------------------------  IN:    Heparin: 180 mL    Heparin Infusion: 120 mL    Phenylephrine: 1000.8 mL    PRBCs (Packed Red Blood Cells): 250 mL  Total IN: 1550.8 mL    OUT:  Total OUT: 0 mL    Total NET: 1550.8 mL      Physical Exam:  GENERAL: Awake, alert and interactive, no acute distress, appears comfortable  NEURO: A&Ox4, no focal deficits, 5/5 strength in all ext, reflexes 2+ throughout, CN 2-12 intact  HEENT: Normocephalic, atraumatic, no conjunctivitis or scleral icterus, oral mucosa moist, no oral lesions noted  NECK: Supple, no LAD, no JVD, thyroid not palpable  CARDIAC: Regular rate and rhythm, +S1/S2, no murmurs/rubs/gallops  PULM: Breathing comfortably on RA, clear to auscultation bilaterally, no wheezes/rales/rhonchi  ABDOMEN: Soft, nontender, nondistended, +bs, no hepatosplenomegaly, no rebound tenderness or fluid wave, no CVA tenderness  : Deferred  MSK: Range of motion grossly intact, no back tenderness  SKIN: Warm and dry, no rashes, lesions  VASC: Cap refil < 2 sec, 2+ peripheral pulses, no edema, no LE tenderness  Psych: Appropriate affect    Medications:  MEDICATIONS  (STANDING):  atorvastatin 40 milliGRAM(s) Oral at bedtime  chlorhexidine 2% Cloths 1 Application(s) Topical <User Schedule>  cinacalcet 30 milliGRAM(s) Oral daily  collagenase Ointment 1 Application(s) Topical daily  heparin  Infusion 15 Unit(s)/Hr (15 mL/Hr) IV Continuous <Continuous>  midodrine 30 milliGRAM(s) Oral every 8 hours  Nephro-deborah 1 Tablet(s) Oral daily  phenylephrine    Infusion 0.1 MICROgram(s)/kG/Min (1.6 mL/Hr) IV Continuous <Continuous>  polyethylene glycol 3350 17 Gram(s) Oral every 12 hours  senna 2 Tablet(s) Oral at bedtime  sevelamer carbonate 800 milliGRAM(s) Oral three times a day with meals    MEDICATIONS  (PRN):  acetaminophen     Tablet .. 650 milliGRAM(s) Oral every 6 hours PRN Temp greater or equal to 38C (100.4F), Moderate Pain (4 - 6)  calamine/zinc oxide Lotion 1 Application(s) Topical three times a day PRN Itching  HYDROmorphone   Tablet 2 milliGRAM(s) Oral every 4 hours PRN Severe Pain (7 - 10)  HYDROmorphone  Injectable 0.25 milliGRAM(s) IV Push every 4 hours PRN Breakthrough pain      Labs:                        8.3    10.85 )-----------( 147      ( 09 Jan 2023 16:38 )             26.5     01-09    131<L>  |  95<L>  |  36<H>  ----------------------------<  76  4.8   |  24  |  5.89<H>    Ca    8.2<L>      09 Jan 2023 05:45  Phos  5.4     01-09  Mg     1.7     01-09    TPro  5.7<L>  /  Alb  2.2<L>  /  TBili  0.2  /  DBili  x   /  AST  21  /  ALT  13  /  AlkPhos  78  01-09    PTT - ( 10 Faustino 2023 01:54 )  PTT:78.9 sec      COVID-19 PCR: Negative (05 Jan 2023 08:04)  COVID-19 PCR: Negative (23 Nov 2022 19:55)  COVID-19 PCR: NotDetec (19 Nov 2022 11:56)   HOSPITAL COURSE:        OVERNIGHT EVENTS/INTERVAL HPI: No acute events overnight.    SUBJECTIVE: Patient examined at bedside, states she is in no acute pain at rest, but feels severe bilateral breast pain with movement and pruritis of bilateral breasts. Denies pain in RUE. Denies HA, lightheadedness, chest pain, cough, abdominal pain, nausea.     OBJECTIVE:  Vital Signs Last 24 Hrs  T(C): 36.7 (10 Faustino 2023 01:03), Max: 39.3 (09 Jan 2023 14:33)  T(F): 98 (10 Faustino 2023 01:03), Max: 102.7 (09 Jan 2023 14:33)  HR: 64 (10 Faustino 2023 06:00) (60 - 132)  BP: --  BP(mean): --  RR: 15 (10 Faustino 2023 06:00) (10 - 22)  SpO2: 95% (10 Faustino 2023 06:00) (92% - 100%)    Parameters below as of 10 Faustino 2023 07:00  Patient On (Oxygen Delivery Method): room air      I&O's Detail    08 Jan 2023 07:01  -  09 Jan 2023 07:00  --------------------------------------------------------  IN:    Heparin Infusion: 360 mL    Phenylephrine: 227.2 mL    Phenylephrine: 910.4 mL  Total IN: 1497.6 mL    OUT:  Total OUT: 0 mL    Total NET: 1497.6 mL      09 Jan 2023 07:01  -  10 Faustino 2023 06:18  --------------------------------------------------------  IN:    Heparin: 180 mL    Heparin Infusion: 120 mL    Phenylephrine: 1000.8 mL    PRBCs (Packed Red Blood Cells): 250 mL  Total IN: 1550.8 mL    OUT:  Total OUT: 0 mL    Total NET: 1550.8 mL    PHYSICAL EXAM:  HEENT: no conjunctivitis or scleral icterus, oral mucosa moist  NECK: Supple, no JVD, palpable multinodular goiter  CARDIAC: tachycardic, regular rhythm, +S1/S2, no murmurs/rubs/gallops  PULM: Breathing comfortably on RA, clear to auscultation bilaterally, poor inspiratory flow at bilateral bases, no wheezes/rales/rhonchi, HD catheter R chest, R anterior chest wall lateral to sternum elevated vs L  ABDOMEN: Soft, obese, nontender to palpation, +bs, no rebound tenderness or fluid wave, ventral hernia non reducible   BREAST: peau d'orange appearance of lateral R breast, large immobile palpable mass with approx 4cm lateral to nipple measuring est 8cm x 5cm, L breast with smaller mass just lateral to nipple line also tender to palpation  SKIN: UE/LE warm to touch, peripheral pulses nonpalpable, 3cm dehisced wound nonpurulent nonerythematous R medial upper arm near axilla, erythema superiolateral to R nipple  VASC: no edema, no LE tenderness, triple lumen catheter at R groin, L axillary arterial line, bilateral UE at site of b/l AVF's edematous  EXT: TTP L foot and toes. No tenderness of R foot    Medications:  MEDICATIONS  (STANDING):  atorvastatin 40 milliGRAM(s) Oral at bedtime  chlorhexidine 2% Cloths 1 Application(s) Topical <User Schedule>  cinacalcet 30 milliGRAM(s) Oral daily  collagenase Ointment 1 Application(s) Topical daily  heparin  Infusion 15 Unit(s)/Hr (15 mL/Hr) IV Continuous <Continuous>  midodrine 30 milliGRAM(s) Oral every 8 hours  Nephro-deborah 1 Tablet(s) Oral daily  phenylephrine    Infusion 0.1 MICROgram(s)/kG/Min (1.6 mL/Hr) IV Continuous <Continuous>  polyethylene glycol 3350 17 Gram(s) Oral every 12 hours  senna 2 Tablet(s) Oral at bedtime  sevelamer carbonate 800 milliGRAM(s) Oral three times a day with meals    MEDICATIONS  (PRN):  acetaminophen     Tablet .. 650 milliGRAM(s) Oral every 6 hours PRN Temp greater or equal to 38C (100.4F), Moderate Pain (4 - 6)  calamine/zinc oxide Lotion 1 Application(s) Topical three times a day PRN Itching  HYDROmorphone   Tablet 2 milliGRAM(s) Oral every 4 hours PRN Severe Pain (7 - 10)  HYDROmorphone  Injectable 0.25 milliGRAM(s) IV Push every 4 hours PRN Breakthrough pain      Labs:                        8.3    10.85 )-----------( 147      ( 09 Jan 2023 16:38 )             26.5     01-09    131<L>  |  95<L>  |  36<H>  ----------------------------<  76  4.8   |  24  |  5.89<H>    Ca    8.2<L>      09 Jan 2023 05:45  Phos  5.4     01-09  Mg     1.7     01-09    TPro  5.7<L>  /  Alb  2.2<L>  /  TBili  0.2  /  DBili  x   /  AST  21  /  ALT  13  /  AlkPhos  78  01-09    PTT - ( 10 Faustino 2023 01:54 )  PTT:78.9 sec      COVID-19 PCR: Negative (05 Jan 2023 08:04)  COVID-19 PCR: Negative (23 Nov 2022 19:55)  COVID-19 PCR: NotDetec (19 Nov 2022 11:56)   HOSPITAL COURSE:        OVERNIGHT EVENTS/INTERVAL HPI: No acute events overnight.    SUBJECTIVE: Patient examined at bedside, states she is in no acute pain at rest, but feels severe bilateral breast pain with movement and pruritis of bilateral breasts. Denies pain in RUE. Denies HA, lightheadedness, chest pain, cough, abdominal pain, nausea.     OBJECTIVE:  Vital Signs Last 24 Hrs  T(C): 36.7 (10 Faustino 2023 01:03), Max: 39.3 (09 Jan 2023 14:33)  T(F): 98 (10 Faustino 2023 01:03), Max: 102.7 (09 Jan 2023 14:33)  HR: 64 (10 Faustino 2023 06:00) (60 - 132)  BP: --  BP(mean): --  RR: 15 (10 Faustino 2023 06:00) (10 - 22)  SpO2: 95% (10 Faustino 2023 06:00) (92% - 100%)    Parameters below as of 10 Faustino 2023 07:00  Patient On (Oxygen Delivery Method): room air      I&O's Detail    08 Jan 2023 07:01  -  09 Jan 2023 07:00  --------------------------------------------------------  IN:    Heparin Infusion: 360 mL    Phenylephrine: 227.2 mL    Phenylephrine: 910.4 mL  Total IN: 1497.6 mL    OUT:  Total OUT: 0 mL    Total NET: 1497.6 mL      09 Jan 2023 07:01  -  10 Faustino 2023 06:18  --------------------------------------------------------  IN:    Heparin: 180 mL    Heparin Infusion: 120 mL    Phenylephrine: 1000.8 mL    PRBCs (Packed Red Blood Cells): 250 mL  Total IN: 1550.8 mL    OUT:  Total OUT: 0 mL    Total NET: 1550.8 mL    PHYSICAL EXAM:  HEENT: no conjunctivitis or scleral icterus, oral mucosa moist  NECK: Supple, no JVD, palpable multinodular goiter  CARDIAC: tachycardic, regular rhythm, +S1/S2, no murmurs/rubs/gallops  PULM: Breathing comfortably on RA, clear to auscultation bilaterally, poor inspiratory flow at bilateral bases, no wheezes/rales/rhonchi, HD catheter R chest, R anterior chest wall lateral to sternum elevated vs L  ABDOMEN: Soft, obese, nontender to palpation, +bs, no rebound tenderness or fluid wave, ventral hernia non reducible   BREAST: peau d'orange appearance of lateral R breast, large immobile palpable mass with approx 4cm lateral to nipple measuring est 8cm x 5cm, L breast with smaller mass just lateral to nipple line also tender to palpation  SKIN: UE/LE warm to touch, peripheral pulses nonpalpable, 3cm dehisced wound nonpurulent nonerythematous R medial upper arm near axilla, erythema superiolateral to R nipple  VASC: no edema, no LE tenderness, triple lumen catheter at R groin, L axillary arterial line, bilateral UE at site of b/l AVF's edematous, RUE edematous to hand   EXT: TTP L foot and toes. No tenderness of R foot    Medications:  MEDICATIONS  (STANDING):  atorvastatin 40 milliGRAM(s) Oral at bedtime  chlorhexidine 2% Cloths 1 Application(s) Topical <User Schedule>  cinacalcet 30 milliGRAM(s) Oral daily  collagenase Ointment 1 Application(s) Topical daily  heparin  Infusion 15 Unit(s)/Hr (15 mL/Hr) IV Continuous <Continuous>  midodrine 30 milliGRAM(s) Oral every 8 hours  Nephro-deborah 1 Tablet(s) Oral daily  phenylephrine    Infusion 0.1 MICROgram(s)/kG/Min (1.6 mL/Hr) IV Continuous <Continuous>  polyethylene glycol 3350 17 Gram(s) Oral every 12 hours  senna 2 Tablet(s) Oral at bedtime  sevelamer carbonate 800 milliGRAM(s) Oral three times a day with meals    MEDICATIONS  (PRN):  acetaminophen     Tablet .. 650 milliGRAM(s) Oral every 6 hours PRN Temp greater or equal to 38C (100.4F), Moderate Pain (4 - 6)  calamine/zinc oxide Lotion 1 Application(s) Topical three times a day PRN Itching  HYDROmorphone   Tablet 2 milliGRAM(s) Oral every 4 hours PRN Severe Pain (7 - 10)  HYDROmorphone  Injectable 0.25 milliGRAM(s) IV Push every 4 hours PRN Breakthrough pain      Labs:                        8.3    10.85 )-----------( 147      ( 09 Jan 2023 16:38 )             26.5     01-09    131<L>  |  95<L>  |  36<H>  ----------------------------<  76  4.8   |  24  |  5.89<H>    Ca    8.2<L>      09 Jan 2023 05:45  Phos  5.4     01-09  Mg     1.7     01-09    TPro  5.7<L>  /  Alb  2.2<L>  /  TBili  0.2  /  DBili  x   /  AST  21  /  ALT  13  /  AlkPhos  78  01-09    PTT - ( 10 Faustino 2023 01:54 )  PTT:78.9 sec      COVID-19 PCR: Negative (05 Jan 2023 08:04)  COVID-19 PCR: Negative (23 Nov 2022 19:55)  COVID-19 PCR: NotDetec (19 Nov 2022 11:56)

## 2023-01-10 NOTE — PROGRESS NOTE ADULT - SUBJECTIVE AND OBJECTIVE BOX
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      EVENTS:   - Biopsy taken uneventfully at bedside yesterday - with pathology lab this morning.   - Febrile to 39.3 yesterday afternoon -- BCx sent.     SUBJECTIVE:   - Notes no symptoms and feels fair this morning  - Denies CP, SOB  - Denies worsening right breast pain or new discharge.    MEDICATIONS  (STANDING):  atorvastatin 40 milliGRAM(s) Oral at bedtime  chlorhexidine 2% Cloths 1 Application(s) Topical <User Schedule>  cinacalcet 30 milliGRAM(s) Oral daily  collagenase Ointment 1 Application(s) Topical daily  heparin  Infusion 15 Unit(s)/Hr (15 mL/Hr) IV Continuous <Continuous>  midodrine 30 milliGRAM(s) Oral every 8 hours  Nephro-deborah 1 Tablet(s) Oral daily  phenylephrine    Infusion 0.1 MICROgram(s)/kG/Min (1.6 mL/Hr) IV Continuous <Continuous>  polyethylene glycol 3350 17 Gram(s) Oral every 12 hours  senna 2 Tablet(s) Oral at bedtime  sevelamer carbonate 800 milliGRAM(s) Oral three times a day with meals    MEDICATIONS  (PRN):  acetaminophen     Tablet .. 650 milliGRAM(s) Oral every 6 hours PRN Temp greater or equal to 38C (100.4F), Moderate Pain (4 - 6)  calamine/zinc oxide Lotion 1 Application(s) Topical three times a day PRN Itching  HYDROmorphone   Tablet 2 milliGRAM(s) Oral every 4 hours PRN Severe Pain (7 - 10)  HYDROmorphone  Injectable 0.25 milliGRAM(s) IV Push every 4 hours PRN Breakthrough pain      Vital Signs Last 24 Hrs  T(C): 37.4 (10 Faustino 2023 06:04), Max: 39.3 (09 Jan 2023 14:33)  T(F): 99.3 (10 Faustino 2023 06:04), Max: 102.7 (09 Jan 2023 14:33)  HR: 64 (10 Faustino 2023 06:00) (60 - 132)  BP: --  BP(mean): --  RR: 15 (10 Faustino 2023 06:00) (10 - 22)  SpO2: 95% (10 Faustino 2023 06:00) (92% - 100%)    Parameters below as of 10 Faustino 2023 07:00  Patient On (Oxygen Delivery Method): room air        Neurologic: AAOx3; moving all extremities equally.   CV: Normal rate, regular rhythm  Pulm: Breathing comfortably  Chest: Non-soiled gauze overlying incisional biopsy site on right breast.  Abd: Soft, non-distended; No TTP throughout.   : No Wu  Skin: Ulceration on medial aspect of right arm over access site. No edema/induration surrounding left chest HD Catheter.  Extremities: No edema.  Psychiatric: Interacting normally.     I&O's Detail    09 Jan 2023 07:01  -  10 Faustino 2023 07:00  --------------------------------------------------------  IN:    Heparin: 180 mL    Heparin Infusion: 120 mL    Phenylephrine: 1000.8 mL    PRBCs (Packed Red Blood Cells): 250 mL  Total IN: 1550.8 mL    OUT:  Total OUT: 0 mL    Total NET: 1550.8 mL          LABS:                        8.3    10.85 )-----------( 147      ( 09 Jan 2023 16:38 )             26.5     01-09    131<L>  |  95<L>  |  36<H>  ----------------------------<  76  4.8   |  24  |  5.89<H>    Ca    8.2<L>      09 Jan 2023 05:45  Phos  5.4     01-09  Mg     1.7     01-09    TPro  5.7<L>  /  Alb  2.2<L>  /  TBili  0.2  /  DBili  x   /  AST  21  /  ALT  13  /  AlkPhos  78  01-09    PTT - ( 10 Faustino 2023 01:54 )  PTT:78.9 sec

## 2023-01-10 NOTE — PROGRESS NOTE ADULT - ATTENDING COMMENTS
I: HGB 7.4. Hypotension requiring phenylephrine and midodrine.   A: ESRD. Sepsis. Anemia.   P: Keep MAP > 65. HD today. Epo at HD.

## 2023-01-10 NOTE — PROGRESS NOTE ADULT - ASSESSMENT
67 F with ESRD on HD presenting in setting of missed HD found to by hyperkalemic then c/b septic shock from possible fluid collection near RUE AVF now s/p drainage with IR on 1/5.    Assessment/Plan:   #ESRD on HD TTS  HD today; pressor assisted w/ 0 UF.   Electrolytes noted K 5.0, Sodium 130  UF with HD  Renal diet    #Hx of Hypertension  on midodrine and phenylephrine  - 0UF with HD    #access   [facility] TDC - does not appear to be infected or source of infxn  RUE AVF not being used    #anemia  Hgb 7.4  -Epo w/ HD  -Iron profile noted    #renal bone disease   Ca ~8.0, corrected for albumin wnl  Phos ~7.2  -increase sevelamer to 1600mg tid w/ meals   67 F with ESRD on HD presenting in setting of missed HD found to by hyperkalemic then c/b septic shock from possible fluid collection near RUE AVF now s/p drainage with IR on 1/5.    Assessment/Plan:   #ESRD on HD TTS  HD today; pressor assisted w/ 0 UF.   Electrolytes noted K 5.0, Sodium 130  UF with HD  Renal diet    #Hx of Hypertension  on midodrine and phenylephrine  - 0UF with HD    #access   [facility] TDC - does not appear to be infected or source of infxn  RUE AVF not being used    #anemia  Hgb 7.4  -Epo w/ HD  -Iron profile noted    #renal bone disease   Noted to have brown tumors on most recent CT, in addition to a lytic lesion of her skull. Likely due to hyperparathyroidism. PTH noted to be 479 on most recent check.   Ca ~10.3 corrected for albumin   Phos ~7.2  -increase sevelamer to 1600mg tid w/ meals  -C/w sensipar 30mg Qday

## 2023-01-10 NOTE — PROGRESS NOTE ADULT - ASSESSMENT
67F h/o ESRD due to PCKD on HD via HD cath (failedvascular access due to thrombus x 3), CAD, HTN, HLD, PE s/p IVC filter, PAD, OA, osteoclastoma, ventral hernia p/w septic shock of unknown source.  Infectious work-up so far unremarkable - fluid collection near AVF likely seroma and culture ngtd, gallium scan negative, BCx ngtd.  Patient c/o breast pain but no erythema on exam, and unlikely breast ca per team.  Could be mastitis? Never febrile and without leukocytosis, meropenam has been dced     - cont vanco for now.  Check vanc level before HD.   If <10, would give 1 g, 10-17.5 give 750 mg, 17.6-25, give 500 mg after HD, >25 Hold Vancomycin. Please give Vancomycin after HD is completed   - f/u BCx, fluid culture- NGTD  - Possible that etiology of her hypotension is not infectious   - FU PET CT     Recommendations not final until attending attestation present.

## 2023-01-10 NOTE — CONSULT NOTE ADULT - SUBJECTIVE AND OBJECTIVE BOX
TRANSITIONAL CARE MANAGEMENT - HOSPITAL DISCHARGE FOLLOW-UP    Contacted Ms. Jimenes regarding follow-up for chronic heart failure after hospital discharge. She was discharged from the hospital on 10/08. Review of the After Visit Summary from the recent hospitalization indicates that the patient needs to follow up with PCP.    She feels that she is doing fairly well at home.   Her diet concern is none. Overall, the patient is eating well.   Ambulation: improved  Fever: is not present  Pain: none  Activities of Daily Living (global): Self-care   Patient states that she does have sufficient family support. She feels that she is able to ask for assistance when needed.     Additional patient/family concerns: None .    Discharge medications were verified with the patient. She is fully compliant with the medication regimen prescribed at the time of discharge. She reports that she is not experiencing any medication side effects.    Upon discharge, the patient was to receive no additional services. These services have not been initiated.     Patient has an appointment on 10/14 with Shayy Finch MD. Ms. Jimenes was reminded about the importance of keeping this appointment.      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 In ED pt was found to be hyperkalemic with EKG changes and meeting 2/4 SIRS criteria and requiring pressors and admitted to MICU for emergent hemodialysis. Initially thought to be in septic shock however unable to locate source, s/p IR drainage of seroma at site of R AV fistula. Surgery consulted for severe b/l breast pain and skin changes concerning for peu d'orange as well as b/l palpable breast masses and R axillary LAD. Surgery believes changes likely 2/2 venous congestion rather than malignancy given no evidence of cyst or focal mass on ultrasound, biopsy performed. Endocrine consulted for multiple brown tumors on CT w/ multiple fractures of pubic rami, pubic bone, thoracolumbar spine, and increased density of bone marrow consistent with renal osteodystrophy. Gallium scan performed significant for faintly increased activity surrounding the entire lower portion of the right breast consistent with the inflammatory reaction seen on physical examination, small area of activity approximately 10 cm to the right of midline at about the level of the lower border of the sternum, activity likely to be in chest wall. Given abnormal findings on gallium scan, decision to proceed with PET/CT for malignancy evaluation. Hospital course thus far has been significant for extensive DVT from R IJ to R subclavia and R axilla as well as continued pressor requirements. On CT scan at time of hospital admission, pt found to have 6.1 cm cystic right adnexal mass and GYN consulted to determine if findings could represent malignancy.    Upon review of chart, noted that pt had MR 1/12/22 with findings of partially septated 7.9x5.5cm R ovarian cyst.     Pt seen and evaluated at bedside. Pt states she is feeling well today after HD, pain is well controlled. States that she was unaware of MRI findings from 2022. Denies lightheadedness/dizziness, HA, fevers/chills, chest pain, palpitations, SOB, abdominal pain, N/V/D, or vaginal bleeding. Pt is tolerating PO and having bowel movements. Pt does not make urine.    ObHx: NSVDx3, (82/84/87)  GynHx: s/p abdominal hysterectomy for fibroid uterus (pt unsure if cervix is present), has not seen GYN for "a long time". Denies history of cysts/abnormal pap/STI  PMH: HFrEF, mild AS, mild MR< NICM, HTN, HLD, ESRD on HD, PE s/p IVC filter, PAD, OA, hyperparathyroidism  PSH: multiple AV fistula revisions/repairs, abdominal hysterectomy, ventral hernia repair, partial bowel resection for "narrowing of intestine"  Meds: ASA, plavix, entresto, folic acid    PHYSICAL EXAM:   Vital Signs Last 24 Hrs  T(C): 36.8 (10 Faustino 2023 18:25), Max: 37.4 (10 Faustino 2023 06:04)  T(F): 98.3 (10 Faustino 2023 18:25), Max: 99.3 (10 Faustino 2023 06:04)  HR: 86 (10 Faustino 2023 16:00) (60 - 94)  RR: 12 (10 Faustino 2023 16:00) (11 - 24)  SpO2: 95% (10 Faustino 2023 16:00) (87% - 100%)    Parameters below as of 10 Faustino 2023 16:00  Patient On (Oxygen Delivery Method): room air        **************************  PHYSICAL EXAM - PT DEFERRED AT THIS TIME. Pt is eating dinner and son is at bedside. Would like to defer physical exam by GYN team until tomorrow    LABS:                        7.4    6.97  )-----------( 153      ( 10 Faustino 2023 05:30 )             24.4     01-10    130<L>  |  93<L>  |  47<H>  ----------------------------<  92  5.0   |  21<L>  |  6.81<H>    Ca    8.7      10 Faustino 2023 05:30  Phos  7.2     01-10  Mg     1.6     01-10    TPro  5.7<L>  /  Alb  2.2<L>  /  TBili  0.2  /  DBili  x   /  AST  21  /  ALT  13  /  AlkPhos  78  01-09    PTT - ( 10 Faustino 2023 14:04 )  PTT:48.3 sec        RADIOLOGY & ADDITIONAL STUDIES:    < from: CT Abdomen and Pelvis No Cont (01.03.23 @ 05:34) >    ACC: [id number] EXAM:  CT ABDOMEN AND PELVIS                          PROCEDURE DATE:  01/03/2023          INTERPRETATION:  CT SCAN OF CHEST, ABDOMEN AND PELVIS    History: Sepsis. Pneumonia. Abdominal pain. Hyperkalemia.    Technique: CT scan of chest, abdomen and pelvis performed from lung   apices through pubic symphysis. Intravenous and oral contrast material   were not administered, as ordered. Axial, coronal, and sagittal   multiplanar reformatted images were produced. Thin section axial images   and axial MIPS of the chest were also produced.    Comparison: MRI of abdomen from 1/12/2022. CT scan of abdomen and pelvis   from 11/10/2021.    Findings:    Lungs and large airways: Small amount of consolidation/atelectasis in the   lower lobes, right greater than left. Linear atelectasis right middle   lobe and left upper lobe.    Pleura:  No pleural effusion.    Mediastinum and hilar regions: No thoracic lymphadenopathy.    Heart and pericardium:  Heart size is normal. No pericardial effusion.    Vessels:  Severe coronary artery calcification. Mild amount of calcified   plaque thoracic aorta. Moderate calcified plaque abdominal aorta. Larger   amount of calcified plaque splenic artery and bilateral pelvic arteries.   AV fistula is present in the right upper arm. A 7.0 x 5.1 cm fluid   collection is located near the fistula, possibly postoperative seroma or   hematoma. No gas within this collection. Vascular stents present in the   right axillary region. There is also a stent extending from the medial   aspect of the right subclavian vein through the right brachiocephalic   vein into the superior vena cava. There is a left-sided central line   extending through that stent into the right atrium. Multiple collateral   vessels right chest wall. IVC filter again in place, with some of its   prongs projecting beyond the lumen of the vessel. Mildly calcified aortic   valve. Tortuous thoracic aorta.    Chest wall and lower neck:  Enlarged, multinodular thyroid projects into   the superior mediastinum. There is a 1.8 x 1.5 cm solid nodule slightly   inferiorly in the anterior mediastinum, without definite communication   with the thyroid. However, this may still represent thyroid tissue, as   its morphology is similar in appearance to the remainder of the thyroid.    Liver:  Multiple hepatic cysts again present.    Gallbladder and biliary tree: No radiopaque stones gallbladder. No   intrahepatic biliary ductal dilatation. Common bile duct again mildly   dilated, measuring0.9 cm.    Spleen:  Normal.    Pancreas:  A few pancreatic calcifications are again noted.    Adrenal glands:  Normal.    Kidneys: Kidneys again enlarged bilaterally with parenchyma nearly   completely replaced by innumerable simple and complex cystic masses,   consistent with autosomal dominant polycystic kidney disease. It is not   possible to determine if there are any solid renal masses on this   noncontrast exam.    Abdominal and pelvic adenopathy:  No lymphadenopathy in abdomen or pelvis.    Ascites: None.    Gastrointestinal tract: Nonobstructed loops of small bowel and portion of   transverse colon again present within large, broad-based ventral hernia.   Small bowel anastomosis in the pelvis.    Pelvic organs: A 6.1 x 4.9 cm cystic mass is again present in the right   adnexa. Left ovary unremarkable. Again post hysterectomy. No urine in   bladder, limiting evaluation.    Soft tissues: Normal.    Bones: Scoliosis and degenerative changes of the spine. Severe   degenerative changesof the shoulders. Increased density of the bone   marrow is consistent with renal osteodystrophy. There are also multiple   lucent bone lesions, which could represent Brown tumors. There is again   nonunion of fractures of the right superior and inferior pubic rami and   both pubic bones. Multiple compression fractures in the thoracolumbar   spine.      Impression: 1. Small amount of consolidation in both lower lobes, right   greater than left. This is felt to be more likely to represent   atelectasis than pneumonia.    2. A 7.0 cm fluid collection is present near an AV fistula in the right   upper arm. This could represent a postoperative seroma or hematoma, but   clinical correlation is recommended to rule out abscess given the history   ofsepsis.    3. There is again a large broad-based ventral hernia containing   nonobstructed loops of small and large bowel.    4. A 6.1 cm cystic mass is again present in the right adnexa. This is an   abnormal finding in a postmenopausal female. Recommend gynecology   consultation as neoplasm should be excluded.    --- End of Report ---            TOREY COOPER MD; Attending Radiologist  This document has been electronically signed. Faustino  3 2023  9:23AM    < end of copied text >

## 2023-01-10 NOTE — PROGRESS NOTE ADULT - ATTENDING COMMENTS
Febrile to 102.7 yesterday afternoon and BCx sent, ngtd.  Today patient remains on low dose jarvis, but feels ok, improving R breast pain.   Exam notable for mildly tender R breast with hyperpigmentation.  Patient with ?septic shock for unknown source, possibly due to R breast cellulitis.  Cont IV vanco dose after HD as above.  Awaiting PET CT.  f/u BCx.    Team 1 will follow you.  Case d/w primary team.    Nora Naranjo MD, MS  Infectious Disease attending  work cell [phone]   For any questions during evening/weekend/holiday, please page ID on call

## 2023-01-10 NOTE — PROGRESS NOTE ADULT - ATTENDING COMMENTS
CAD, ESRD with PCK, IVC filter, hyperparathyroidism, persistent hypotension, anemia  physical as above  continue midodrine and phenylephrine  check renin/aldosterone levels  await breast biopsy results  continue vancomycin empirically  no clear evidence of bleeding but doppler RUE with swelling  pain control stable  decision making of high complexity

## 2023-01-10 NOTE — PROGRESS NOTE ADULT - SUBJECTIVE AND OBJECTIVE BOX
SUBJECTIVE: Patient seen and examined bedside; no events overnight, patient feeling well this am, sleepy but arousable, tolerated well dressing change.    heparin  Infusion 15 Unit(s)/Hr IV Continuous <Continuous>  midodrine 30 milliGRAM(s) Oral every 8 hours  phenylephrine    Infusion 0.1 MICROgram(s)/kG/Min IV Continuous <Continuous>      Vital Signs Last 24 Hrs  T(C): 37.4 (10 Faustino 2023 06:04), Max: 39.3 (09 Jan 2023 14:33)  T(F): 99.3 (10 Faustino 2023 06:04), Max: 102.7 (09 Jan 2023 14:33)  HR: 64 (10 Faustino 2023 06:00) (60 - 132)  BP: --  BP(mean): --  RR: 15 (10 Faustino 2023 06:00) (10 - 22)  SpO2: 95% (10 Faustino 2023 06:00) (92% - 100%)    Parameters below as of 10 Faustino 2023 07:00  Patient On (Oxygen Delivery Method): room air      I&O's Detail    09 Jan 2023 07:01  -  10 Faustino 2023 07:00  --------------------------------------------------------  IN:    Heparin: 180 mL    Heparin Infusion: 120 mL    Phenylephrine: 1000.8 mL    PRBCs (Packed Red Blood Cells): 250 mL  Total IN: 1550.8 mL    OUT:  Total OUT: 0 mL    Total NET: 1550.8 mL      PE:    General: NAD, resting comfortably in bed  C/V: S1 s2, RRR  Pulm: Nonlabored breathing, no respiratory distress  Abd: Soft, NTND  Extrem: WWP; Right middle arm wound unchanged from prior exam, clean base, pink, no obvious signs of infection or bleeding        LABS:                        8.3    10.85 )-----------( 147      ( 09 Jan 2023 16:38 )             26.5     01-09    131<L>  |  95<L>  |  36<H>  ----------------------------<  76  4.8   |  24  |  5.89<H>    Ca    8.2<L>      09 Jan 2023 05:45  Phos  5.4     01-09  Mg     1.7     01-09    TPro  5.7<L>  /  Alb  2.2<L>  /  TBili  0.2  /  DBili  x   /  AST  21  /  ALT  13  /  AlkPhos  78  01-09    PTT - ( 10 Faustino 2023 01:54 )  PTT:78.9 sec      RADIOLOGY & ADDITIONAL STUDIES:

## 2023-01-11 NOTE — PROGRESS NOTE ADULT - SUBJECTIVE AND OBJECTIVE BOX
INTERVAL HPI/OVERNIGHT EVENTS:  PTT subtherapeutic (51.2) at 10pm, increased heparin gtt to 19cc/hr.     SUBJECTIVE: Patient seen and examined at bedside.    VITAL SIGNS:  ICU Vital Signs Last 24 Hrs  T(C): 36.9 (11 Jan 2023 13:40), Max: 37.6 (11 Jan 2023 01:04)  T(F): 98.4 (11 Jan 2023 13:40), Max: 99.7 (11 Jan 2023 01:04)  HR: 76 (11 Jan 2023 16:00) (73 - 106)  BP: --  BP(mean): --  ABP: 96/42 (11 Jan 2023 16:00) (80/49 - 108/50)  ABP(mean): 65 (11 Jan 2023 16:00) (57 - 73)  RR: 13 (11 Jan 2023 16:00) (10 - 24)  SpO2: 97% (11 Jan 2023 16:00) (91% - 99%)    O2 Parameters below as of 11 Jan 2023 16:00  Patient On (Oxygen Delivery Method): room air              01-10 @ 07:01 - 01-11 @ 07:00  --------------------------------------------------------  IN: 2696.6 mL / OUT: 0 mL / NET: 2696.6 mL    01-11 @ 07:01 - 01-11 @ 16:47  --------------------------------------------------------  IN: 593.6 mL / OUT: 0 mL / NET: 593.6 mL      CAPILLARY BLOOD GLUCOSE      POCT Blood Glucose.: 77 mg/dL (11 Jan 2023 16:34)      PHYSICAL EXAM:    Constitutional: NAD  HEENT: NC/AT; PERRL, anicteric sclera; MMM  Neck: supple, no JVD  Cardiovascular: +S1/S2, RRR  Respiratory: CTA B/L, no W/R/R  Gastrointestinal: abdomen soft, NT/ND; no rebound or guarding; +BSx4  Genitourinary: no suprapubic tenderness or fullness  Extremities: WWP; no LE edema; no clubbing or cyanosis  Vascular: 2+ radial, DP/PT and femoral pulses B/L  Dermatologic: normal color and turgor; no visible rashes  Neurological:     MEDICATIONS:  MEDICATIONS  (STANDING):  atorvastatin 40 milliGRAM(s) Oral at bedtime  chlorhexidine 2% Cloths 1 Application(s) Topical <User Schedule>  cinacalcet 30 milliGRAM(s) Oral daily  collagenase Ointment 1 Application(s) Topical daily  heparin  Infusion 1900 Unit(s)/Hr (19 mL/Hr) IV Continuous <Continuous>  midodrine 30 milliGRAM(s) Oral every 8 hours  Nephro-deborah 1 Tablet(s) Oral daily  phenylephrine    Infusion 0.1 MICROgram(s)/kG/Min (1.6 mL/Hr) IV Continuous <Continuous>  polyethylene glycol 3350 17 Gram(s) Oral every 12 hours  senna 2 Tablet(s) Oral at bedtime  sevelamer carbonate 1600 milliGRAM(s) Oral three times a day with meals    MEDICATIONS  (PRN):  acetaminophen     Tablet .. 650 milliGRAM(s) Oral every 6 hours PRN Temp greater or equal to 38C (100.4F), Moderate Pain (4 - 6)  calamine/zinc oxide Lotion 1 Application(s) Topical three times a day PRN Itching  HYDROmorphone  Injectable 0.25 milliGRAM(s) IV Push every 4 hours PRN Severe Pain (7 - 10)      ALLERGIES:  Allergies    Ancef (Rash; Urticaria)  DDAVP (Hypotension)  iodine (Hives; Pruritus)  penicillin (Swelling)  sulfa drugs (Angioedema)    Intolerances        LABS:                        6.9    6.96  )-----------( 129      ( 11 Jan 2023 10:49 )             22.4     01-11    130<L>  |  93<L>  |  28<H>  ----------------------------<  87  4.2   |  26  |  4.84<H>    Ca    7.6<L>      11 Jan 2023 04:35  Phos  4.7     01-11  Mg     1.6     01-11    TPro  5.4<L>  /  Alb  2.2<L>  /  TBili  0.2  /  DBili  x   /  AST  21  /  ALT  10  /  AlkPhos  70  01-11    PTT - ( 11 Jan 2023 10:49 )  PTT:68.3 sec      RADIOLOGY & ADDITIONAL TESTS: Reviewed. INTERVAL HPI/OVERNIGHT EVENTS:  PTT subtherapeutic (51.2) at 10pm, increased heparin gtt to 19cc/hr.     SUBJECTIVE: Patient seen and examined at bedside.    VITAL SIGNS:  ICU Vital Signs Last 24 Hrs  T(C): 36.9 (11 Jan 2023 13:40), Max: 37.6 (11 Jan 2023 01:04)  T(F): 98.4 (11 Jan 2023 13:40), Max: 99.7 (11 Jan 2023 01:04)  HR: 76 (11 Jan 2023 16:00) (73 - 106)  BP: --  BP(mean): --  ABP: 96/42 (11 Jan 2023 16:00) (80/49 - 108/50)  ABP(mean): 65 (11 Jan 2023 16:00) (57 - 73)  RR: 13 (11 Jan 2023 16:00) (10 - 24)  SpO2: 97% (11 Jan 2023 16:00) (91% - 99%)    O2 Parameters below as of 11 Jan 2023 16:00  Patient On (Oxygen Delivery Method): room air              01-10 @ 07:01 - 01-11 @ 07:00  --------------------------------------------------------  IN: 2696.6 mL / OUT: 0 mL / NET: 2696.6 mL    01-11 @ 07:01 - 01-11 @ 16:47  --------------------------------------------------------  IN: 593.6 mL / OUT: 0 mL / NET: 593.6 mL      CAPILLARY BLOOD GLUCOSE      POCT Blood Glucose.: 77 mg/dL (11 Jan 2023 16:34)      PHYSICAL EXAM:    Constitutional: middle-aged female with obese body habitus resting in bed  HEENT: no conjunctivitis or scleral icterus, oral mucosa moist  NECK: Supple, no JVD, palpable multinodular goiter  CARDIAC: RRR, +S1/S2, no murmurs/rubs/gallops  PULM: Breathing comfortably on RA, clear to auscultation bilaterally, poor inspiratory flow at bilateral bases, no wheezes/rales/rhonchi, +TDC to R chest wall  ABDOMEN: Soft, obese, nontender to palpation, +bs, no rebound tenderness or fluid wave, non-reducible ventral hernia  BREAST: palpable masses to b/l breasts  SKIN: b/l arms warm to touch; b/l hands slightly cold to touch; 3cm dehisced wound, nonpurulent, nonerythematous, to R medial upper arm near axilla  VASC:  no LE edema or tenderness; +triple lumen catheter at R groin, +L axillary arterial line  EXT: RUE edema, non erythematous, comparable to yesterday    MEDICATIONS:  MEDICATIONS  (STANDING):  atorvastatin 40 milliGRAM(s) Oral at bedtime  chlorhexidine 2% Cloths 1 Application(s) Topical <User Schedule>  cinacalcet 30 milliGRAM(s) Oral daily  collagenase Ointment 1 Application(s) Topical daily  heparin  Infusion 1900 Unit(s)/Hr (19 mL/Hr) IV Continuous <Continuous>  midodrine 30 milliGRAM(s) Oral every 8 hours  Nephro-deborah 1 Tablet(s) Oral daily  phenylephrine    Infusion 0.1 MICROgram(s)/kG/Min (1.6 mL/Hr) IV Continuous <Continuous>  polyethylene glycol 3350 17 Gram(s) Oral every 12 hours  senna 2 Tablet(s) Oral at bedtime  sevelamer carbonate 1600 milliGRAM(s) Oral three times a day with meals    MEDICATIONS  (PRN):  acetaminophen     Tablet .. 650 milliGRAM(s) Oral every 6 hours PRN Temp greater or equal to 38C (100.4F), Moderate Pain (4 - 6)  calamine/zinc oxide Lotion 1 Application(s) Topical three times a day PRN Itching  HYDROmorphone  Injectable 0.25 milliGRAM(s) IV Push every 4 hours PRN Severe Pain (7 - 10)      ALLERGIES:  Allergies    Ancef (Rash; Urticaria)  DDAVP (Hypotension)  iodine (Hives; Pruritus)  penicillin (Swelling)  sulfa drugs (Angioedema)    Intolerances        LABS:                        6.9    6.96  )-----------( 129      ( 11 Jan 2023 10:49 )             22.4     01-11    130<L>  |  93<L>  |  28<H>  ----------------------------<  87  4.2   |  26  |  4.84<H>    Ca    7.6<L>      11 Jan 2023 04:35  Phos  4.7     01-11  Mg     1.6     01-11    TPro  5.4<L>  /  Alb  2.2<L>  /  TBili  0.2  /  DBili  x   /  AST  21  /  ALT  10  /  AlkPhos  70  01-11    PTT - ( 11 Jan 2023 10:49 )  PTT:68.3 sec      RADIOLOGY & ADDITIONAL TESTS: Reviewed. INTERVAL HPI/OVERNIGHT EVENTS:  PTT subtherapeutic (51.2) at 10pm, increased heparin gtt to 19cc/hr.     SUBJECTIVE: Patient seen and examined at bedside. States she does not have any pain currently. When asked specifically about her right upper extremity, states that it is more painful than her left arm, but has been unchanged compared to yesterday. No fevers, chills, chest pain, shortness of breath, HA, lightheadedness, abd pain.    VITAL SIGNS:  ICU Vital Signs Last 24 Hrs  T(C): 36.9 (11 Jan 2023 13:40), Max: 37.6 (11 Jan 2023 01:04)  T(F): 98.4 (11 Jan 2023 13:40), Max: 99.7 (11 Jan 2023 01:04)  HR: 76 (11 Jan 2023 16:00) (73 - 106)  BP: --  BP(mean): --  ABP: 96/42 (11 Jan 2023 16:00) (80/49 - 108/50)  ABP(mean): 65 (11 Jan 2023 16:00) (57 - 73)  RR: 13 (11 Jan 2023 16:00) (10 - 24)  SpO2: 97% (11 Jan 2023 16:00) (91% - 99%)    O2 Parameters below as of 11 Jan 2023 16:00  Patient On (Oxygen Delivery Method): room air              01-10 @ 07:01 - 01-11 @ 07:00  --------------------------------------------------------  IN: 2696.6 mL / OUT: 0 mL / NET: 2696.6 mL    01-11 @ 07:01 - 01-11 @ 16:47  --------------------------------------------------------  IN: 593.6 mL / OUT: 0 mL / NET: 593.6 mL      CAPILLARY BLOOD GLUCOSE      POCT Blood Glucose.: 77 mg/dL (11 Jan 2023 16:34)      PHYSICAL EXAM:    Constitutional: middle-aged female with obese body habitus resting in bed  HEENT: no conjunctivitis or scleral icterus, oral mucosa moist  NECK: Supple, no JVD, palpable multinodular goiter  CARDIAC: RRR, +S1/S2, no murmurs/rubs/gallops  PULM: Breathing comfortably on RA, clear to auscultation bilaterally, poor inspiratory flow at bilateral bases, no wheezes/rales/rhonchi, +TDC to R chest wall  ABDOMEN: Soft, obese, nontender to palpation, +bs, no rebound tenderness or fluid wave, non-reducible ventral hernia  BREAST: palpable masses to b/l breasts  SKIN: b/l arms warm to touch; b/l hands slightly cold to touch; 3cm dehisced wound, nonpurulent, nonerythematous, to R medial upper arm near axilla  VASC:  no LE edema or tenderness; +triple lumen catheter at R groin, +L axillary arterial line  EXT: RUE edema, non erythematous, comparable to yesterday    MEDICATIONS:  MEDICATIONS  (STANDING):  atorvastatin 40 milliGRAM(s) Oral at bedtime  chlorhexidine 2% Cloths 1 Application(s) Topical <User Schedule>  cinacalcet 30 milliGRAM(s) Oral daily  collagenase Ointment 1 Application(s) Topical daily  heparin  Infusion 1900 Unit(s)/Hr (19 mL/Hr) IV Continuous <Continuous>  midodrine 30 milliGRAM(s) Oral every 8 hours  Nephro-deborah 1 Tablet(s) Oral daily  phenylephrine    Infusion 0.1 MICROgram(s)/kG/Min (1.6 mL/Hr) IV Continuous <Continuous>  polyethylene glycol 3350 17 Gram(s) Oral every 12 hours  senna 2 Tablet(s) Oral at bedtime  sevelamer carbonate 1600 milliGRAM(s) Oral three times a day with meals    MEDICATIONS  (PRN):  acetaminophen     Tablet .. 650 milliGRAM(s) Oral every 6 hours PRN Temp greater or equal to 38C (100.4F), Moderate Pain (4 - 6)  calamine/zinc oxide Lotion 1 Application(s) Topical three times a day PRN Itching  HYDROmorphone  Injectable 0.25 milliGRAM(s) IV Push every 4 hours PRN Severe Pain (7 - 10)      ALLERGIES:  Allergies    Ancef (Rash; Urticaria)  DDAVP (Hypotension)  iodine (Hives; Pruritus)  penicillin (Swelling)  sulfa drugs (Angioedema)    Intolerances        LABS:                        6.9    6.96  )-----------( 129      ( 11 Jan 2023 10:49 )             22.4     01-11    130<L>  |  93<L>  |  28<H>  ----------------------------<  87  4.2   |  26  |  4.84<H>    Ca    7.6<L>      11 Jan 2023 04:35  Phos  4.7     01-11  Mg     1.6     01-11    TPro  5.4<L>  /  Alb  2.2<L>  /  TBili  0.2  /  DBili  x   /  AST  21  /  ALT  10  /  AlkPhos  70  01-11    PTT - ( 11 Jan 2023 10:49 )  PTT:68.3 sec      RADIOLOGY & ADDITIONAL TESTS: Reviewed.

## 2023-01-11 NOTE — PROGRESS NOTE ADULT - ATTENDING COMMENTS
HFrEF, CAD, HTN, PCK, ESRD now with breast pain, intractable hypotension, hyperparathyroidism  physical as above  continue midodrine, add florinef as may help with angiotensin stimulation  continue cinacalcet  continue vancomycin empirically  PET scan today  await breast biopsy result  pain control  decision making of high complexity

## 2023-01-11 NOTE — CHART NOTE - NSCHARTNOTEFT_GEN_A_CORE
Pt seen at bedside and agreed to have PE done  GEN: Pt laying in bed comfortable NAD  Lung: CTABL upper lung fields, slight crackles appreciated LL lung field  Card: RRR  Breast:  ~ 15-20 cm R sided solid breast masses underneath nipple palpated non-tender, ulcerated skin noted at 9oclokc position on R breast, L sided 10cm firm mass palpated 3oclock position L breast  ABD: soft, obese, nontender to palpation, +bs, no rebound or guarding, ventrile hernia appreciated and non tender  EXT: nontender calves b/l  SSPEC: no abnormal discharge, only anterior lip of cervix able to be visualized and appeared wnl, unable to complete exam due to pt discmofort  VE: difficult to assess fully due to pt discomfort, no cmt, closed, unable to appreciated any masses b/l

## 2023-01-11 NOTE — PROGRESS NOTE ADULT - SUBJECTIVE AND OBJECTIVE BOX
**Incomplete Note**    OVERNIGHT EVENTS: SHAISTA    SUBJECTIVE / INTERVAL HPI: Patient seen and examined at bedside. Pt says she/he is feeling well. Denies chest pain, SOB, fevers, chills, abd pain.    12 point ROS negative other than stated above.     VITAL SIGNS:  Vital Signs Last 24 Hrs  T(C): 37.2 (11 Jan 2023 06:04), Max: 37.6 (11 Jan 2023 01:04)  T(F): 99 (11 Jan 2023 06:04), Max: 99.7 (11 Jan 2023 01:04)  HR: 77 (11 Jan 2023 08:00) (68 - 97)  BP: --  BP(mean): --  RR: 15 (11 Jan 2023 08:00) (11 - 24)  SpO2: 98% (11 Jan 2023 08:00) (87% - 100%)    Parameters below as of 11 Jan 2023 08:00  Patient On (Oxygen Delivery Method): room air        PHYSICAL EXAM:    General: NAD  HEENT: NC/AT, anicteric sclera; MMM  Neck: supple  Cardiovascular: +S1/S2; RRR  Respiratory: CTA B/L; no W/R/R  Gastrointestinal: soft, NT/ND; +BSx4  Extremities: WWP; no edema, clubbing or cyanosis  Vascular: 2+ radial, DP/PT pulses B/L  Neurological: AAOx3    MEDICATIONS:  MEDICATIONS  (STANDING):  atorvastatin 40 milliGRAM(s) Oral at bedtime  chlorhexidine 2% Cloths 1 Application(s) Topical <User Schedule>  cinacalcet 30 milliGRAM(s) Oral daily  collagenase Ointment 1 Application(s) Topical daily  heparin  Infusion 15 Unit(s)/Hr (19 mL/Hr) IV Continuous <Continuous>  midodrine 30 milliGRAM(s) Oral every 8 hours  Nephro-deborah 1 Tablet(s) Oral daily  phenylephrine    Infusion 0.1 MICROgram(s)/kG/Min (1.6 mL/Hr) IV Continuous <Continuous>  polyethylene glycol 3350 17 Gram(s) Oral every 12 hours  senna 2 Tablet(s) Oral at bedtime  sevelamer carbonate 1600 milliGRAM(s) Oral three times a day with meals    MEDICATIONS  (PRN):  acetaminophen     Tablet .. 650 milliGRAM(s) Oral every 6 hours PRN Temp greater or equal to 38C (100.4F), Moderate Pain (4 - 6)  calamine/zinc oxide Lotion 1 Application(s) Topical three times a day PRN Itching  HYDROmorphone   Tablet 2 milliGRAM(s) Oral every 4 hours PRN Severe Pain (7 - 10)  HYDROmorphone  Injectable 0.25 milliGRAM(s) IV Push every 4 hours PRN Breakthrough pain      ALLERGIES:  Allergies    Ancef (Rash; Urticaria)  DDAVP (Hypotension)  iodine (Hives; Pruritus)  penicillin (Swelling)  sulfa drugs (Angioedema)    Intolerances        LABS:                        7.0    6.94  )-----------( 155      ( 11 Jan 2023 04:35 )             23.2     01-11    130<L>  |  93<L>  |  28<H>  ----------------------------<  87  4.2   |  26  |  4.84<H>    Ca    7.6<L>      11 Jan 2023 04:35  Phos  4.7     01-11  Mg     1.6     01-11    TPro  5.4<L>  /  Alb  2.2<L>  /  TBili  0.2  /  DBili  x   /  AST  21  /  ALT  10  /  AlkPhos  70  01-11    PTT - ( 11 Jan 2023 04:35 )  PTT:64.6 sec    CAPILLARY BLOOD GLUCOSE          RADIOLOGY & ADDITIONAL TESTS: Reviewed.    ASSESSMENT:    PLAN:     Recommendations not final until attending attestation present OVERNIGHT EVENTS: SHAISTA    SUBJECTIVE / INTERVAL HPI: Patient seen and examined at bedside. Pt says she is feeling well. Breast pain improved. Denies chest pain, SOB, fevers, chills, abd pain.    12 point ROS negative other than stated above.     VITAL SIGNS:  Vital Signs Last 24 Hrs  T(C): 37.2 (11 Jan 2023 06:04), Max: 37.6 (11 Jan 2023 01:04)  T(F): 99 (11 Jan 2023 06:04), Max: 99.7 (11 Jan 2023 01:04)  HR: 77 (11 Jan 2023 08:00) (68 - 97)  BP: --  BP(mean): --  RR: 15 (11 Jan 2023 08:00) (11 - 24)  SpO2: 98% (11 Jan 2023 08:00) (87% - 100%)    Parameters below as of 11 Jan 2023 08:00  Patient On (Oxygen Delivery Method): room air        PHYSICAL EXAM:    General: NAD  HEENT: NC/AT, anicteric sclera; MMM  Neck: supple  Cardiovascular: +S1/S2; RRR  Respiratory: CTA B/L; no W/R/R  Gastrointestinal: soft, NT/ND; +BSx4  Derm: R breast TTP  Vascular: 2+ radial, DP/PT pulses B/L  Neurological: AAOx3    MEDICATIONS:  MEDICATIONS  (STANDING):  atorvastatin 40 milliGRAM(s) Oral at bedtime  chlorhexidine 2% Cloths 1 Application(s) Topical <User Schedule>  cinacalcet 30 milliGRAM(s) Oral daily  collagenase Ointment 1 Application(s) Topical daily  heparin  Infusion 15 Unit(s)/Hr (19 mL/Hr) IV Continuous <Continuous>  midodrine 30 milliGRAM(s) Oral every 8 hours  Nephro-deborah 1 Tablet(s) Oral daily  phenylephrine    Infusion 0.1 MICROgram(s)/kG/Min (1.6 mL/Hr) IV Continuous <Continuous>  polyethylene glycol 3350 17 Gram(s) Oral every 12 hours  senna 2 Tablet(s) Oral at bedtime  sevelamer carbonate 1600 milliGRAM(s) Oral three times a day with meals    MEDICATIONS  (PRN):  acetaminophen     Tablet .. 650 milliGRAM(s) Oral every 6 hours PRN Temp greater or equal to 38C (100.4F), Moderate Pain (4 - 6)  calamine/zinc oxide Lotion 1 Application(s) Topical three times a day PRN Itching  HYDROmorphone   Tablet 2 milliGRAM(s) Oral every 4 hours PRN Severe Pain (7 - 10)  HYDROmorphone  Injectable 0.25 milliGRAM(s) IV Push every 4 hours PRN Breakthrough pain      ALLERGIES:  Allergies    Ancef (Rash; Urticaria)  DDAVP (Hypotension)  iodine (Hives; Pruritus)  penicillin (Swelling)  sulfa drugs (Angioedema)    Intolerances        LABS:                        7.0    6.94  )-----------( 155      ( 11 Jan 2023 04:35 )             23.2     01-11    130<L>  |  93<L>  |  28<H>  ----------------------------<  87  4.2   |  26  |  4.84<H>    Ca    7.6<L>      11 Jan 2023 04:35  Phos  4.7     01-11  Mg     1.6     01-11    TPro  5.4<L>  /  Alb  2.2<L>  /  TBili  0.2  /  DBili  x   /  AST  21  /  ALT  10  /  AlkPhos  70  01-11    PTT - ( 11 Jan 2023 04:35 )  PTT:64.6 sec    CAPILLARY BLOOD GLUCOSE          RADIOLOGY & ADDITIONAL TESTS: Reviewed.    ASSESSMENT:    PLAN:     Recommendations not final until attending attestation present

## 2023-01-11 NOTE — PROGRESS NOTE ADULT - ASSESSMENT
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 During imaging, CT showing 7.0 cm fluid collection is present near an AV fistula in the right upper arm. The differential for this collection included benign post-operative hematoma or seroma, but also abscess or vascular device infection, particularly in the setting of septic shock. Vascular consulted for RUE fistula evaluation to rule out this area as a source of sepsis. Reassuring physical exam at initial evaluation without clear indicia of infection related to the graph, small superficial surgical incision dehiscence notwithstanding. Now s/p IR image guided aspiration of perigraft fluid (negative) and gadolinium scan (negative). At this point, do not suspect a graft infection as the provoking factor leading to the patient's presentation. Patient remaining afebrile overnight with continued pressor requirements.    Recommendations:    -Continue to follow up BCx and IR Cx - both NGTD  -Wound care recs: BID dressing changes (once daily with Santyl DSD once daily with Bactroban DSD). Apply light ace wrap compression to right hand and forearm to help with soft tissue swelling  -F/u breast punch and core biopsy  -Appreciate breast surgery recs  -Rest of care per primary team  -Vascular surgery Team 3C will continue to follow. Please call [phone] with any questions or concerns.

## 2023-01-11 NOTE — PROGRESS NOTE ADULT - ATTENDING COMMENTS
No event overnight.  Patient reports improving R breast pain.  Still on low dose jarvis.  WBC normal.  Still in shock - at this point I don't think this is septic shock.  Cont vanco for total 14 days to treat presumed R breast cellulitis (1/2 - 1/15).  Awaiting PET CT.    Team 1 will follow you.  Case d/w primary team.    Nora Naranjo MD, MS  Infectious Disease attending  work cell [phone]   For any questions during evening/weekend/holiday, please page ID on call

## 2023-01-11 NOTE — PROGRESS NOTE ADULT - SUBJECTIVE AND OBJECTIVE BOX
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      EVENTS:   - Biopsy taken uneventfully at bedside 1/9/23- with pathology lab this morning.   - Dialysis 1/10 uneventful. Has been on slightly higher dose of phenylephrine since.    SUBJECTIVE:   - Notes no new symptoms and feels fair this morning  - Denies CP, SOB  - Denies worsening right breast pain or new discharge.    MEDICATIONS  (STANDING):  atorvastatin 40 milliGRAM(s) Oral at bedtime  chlorhexidine 2% Cloths 1 Application(s) Topical <User Schedule>  cinacalcet 30 milliGRAM(s) Oral daily  collagenase Ointment 1 Application(s) Topical daily  heparin  Infusion 1900 Unit(s)/Hr (19 mL/Hr) IV Continuous <Continuous>  midodrine 30 milliGRAM(s) Oral every 8 hours  Nephro-deborah 1 Tablet(s) Oral daily  phenylephrine    Infusion 0.1 MICROgram(s)/kG/Min (1.6 mL/Hr) IV Continuous <Continuous>  polyethylene glycol 3350 17 Gram(s) Oral every 12 hours  senna 2 Tablet(s) Oral at bedtime  sevelamer carbonate 1600 milliGRAM(s) Oral three times a day with meals    MEDICATIONS  (PRN):  acetaminophen     Tablet .. 650 milliGRAM(s) Oral every 6 hours PRN Temp greater or equal to 38C (100.4F), Moderate Pain (4 - 6)  calamine/zinc oxide Lotion 1 Application(s) Topical three times a day PRN Itching  HYDROmorphone  Injectable 0.25 milliGRAM(s) IV Push every 4 hours PRN Severe Pain (7 - 10)      Vital Signs Last 24 Hrs  T(C): 36.8 (11 Jan 2023 11:00), Max: 37.6 (11 Jan 2023 01:04)  T(F): 98.2 (11 Jan 2023 11:00), Max: 99.7 (11 Jan 2023 01:04)  HR: 75 (11 Jan 2023 11:01) (75 - 97)  BP: --  BP(mean): --  RR: 14 (11 Jan 2023 11:01) (10 - 24)  SpO2: 97% (11 Jan 2023 11:01) (87% - 99%)    Parameters below as of 11 Jan 2023 11:01  Patient On (Oxygen Delivery Method): room air        Neurologic: AAOx3; moving all extremities equally.   CV: Normal rate, regular rhythm  Pulm: Breathing comfortably  Chest: Non-soiled gauze overlying incisional biopsy site on right breast. mild induration surrounding biopsy site, stable.  Abd: Soft, non-distended; No TTP throughout.   : No Wu  Skin: Ulceration on medial aspect of right arm over access site. No edema/induration surrounding left chest HD Catheter.  Extremities: No edema.  Psychiatric: Interacting normally.       I&O's Detail    10 Faustino 2023 07:01  -  11 Jan 2023 07:00  --------------------------------------------------------  IN:    Heparin: 410 mL    Oral Fluid: 960 mL    Phenylephrine: 1326.6 mL  Total IN: 2696.6 mL    OUT:  Total OUT: 0 mL    Total NET: 2696.6 mL      11 Jan 2023 07:01  -  11 Jan 2023 11:45  --------------------------------------------------------  IN:    Heparin: 38 mL    Heparin: 38 mL    Phenylephrine: 204.4 mL  Total IN: 280.4 mL    OUT:  Total OUT: 0 mL    Total NET: 280.4 mL          LABS:                        6.9    6.96  )-----------( 129      ( 11 Jan 2023 10:49 )             22.4     01-11    130<L>  |  93<L>  |  28<H>  ----------------------------<  87  4.2   |  26  |  4.84<H>    Ca    7.6<L>      11 Jan 2023 04:35  Phos  4.7     01-11  Mg     1.6     01-11    TPro  5.4<L>  /  Alb  2.2<L>  /  TBili  0.2  /  DBili  x   /  AST  21  /  ALT  10  /  AlkPhos  70  01-11    PTT - ( 11 Jan 2023 10:49 )  PTT:68.3 sec

## 2023-01-11 NOTE — PROGRESS NOTE ADULT - ASSESSMENT
67F h/o ESRD due to PCKD on HD via HD cath (failedvascular access due to thrombus x 3), CAD, HTN, HLD, PE s/p IVC filter, PAD, OA, osteoclastoma, ventral hernia p/w septic shock of unknown source.  Infectious work-up so far unremarkable - fluid collection near AVF likely seroma and culture ngtd, gallium scan negative, BCx ngtd.  Patient c/o breast pain but no erythema on exam, and unlikely breast ca per team. Never febrile and without leukocytosis, meropenam has been dced . Still on phenylephrine.     - cont vanco for now.  Check vanc level before HD.   If <10, would give 1 g, 10-17.5 give 750 mg, 17.6-25, give 500 mg after HD, >25 Hold Vancomycin. Please give Vancomycin after HD is completed   - 1/9 Bcx NGTD  - Possible that etiology of her hypotension is not infectious   - FU PET CT    Recommendations not final until attending attestation present.

## 2023-01-11 NOTE — PROGRESS NOTE ADULT - ASSESSMENT
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     NEURO  #Pain Control  Pt in 10/10 pain of bilateral breasts, worst on R. Pain responsive to Dilaudid but effect wears off after ~2-3 hours.   - f/u pain management recommendations:  - Recommendations: Dilaudid 2mg po q4h PRN for severe pain, Dilaudid 0.25mg IVP q4h PRN for severe breakthrough pain, Tylenol 650mg po q6h PRN for mild pain, daily lidocaine patch, HOLD ALL OPIOIDS FOR SEDATION, RR<10, O2SAT <93%, SBP <96    PULM  #Atelectasis  Noted on CT chest to have atelectasis at b/l lung bases.  - Incentive spirometry    CARDIOVASCULAR  #Shock  Pt presented meeting 2/4 SIRS. Hypotensive with MAPs to 60, requiring Levophed. Septic picture of unknown source. HR > 90, WBC >12. Lactate 1.9. Afebrile. Procal 12. S/p Vancomycin 1250mg x 1, Aztreonam x 1 (pt allergic to Cefazolin, PCN). . Ferritin 1209  Patient still requiring pressors, unknown source, does not appear to fit septic picture at this point (no fever, BCx negative, no focal area of infection identified thus far)  - On Phenylephrine 3.5mcg/kg/min  - c/w midodrine 50mg q8h   - CT:  7.0 cm fluid collection is present near an AV fistula in the right upper arm. Possibly abscess. Drained by IR 1/5 with serosanguinous fluid: no organisms, few WBCs  - F/u vascular recs: wound care to RUE wound  - f/u surgery recs: for pain/erythema/warmth of R breast: biopsied 1/9  - f/u pathology from breast bx  - f/u ID recs: c/w Vancomycin 500mg IV after HD sessions, last trough 18.   - BCx: NGTD  - corticotropin stim test wnl    #LVOT with LISETTE  likely i/s/o hyperdynamic LV function 2/2 shock and hypovolemia  - per cardiology, rec repeating TTE after shock resolves    #HFrEF  TTE 11/22 normal LV and systolic, EF 59%, grade II diastolic dysfuncton, dilated RV, reduced RV,   Home meds: Entresto 49/51mg  - hold Entresto i/s/o shock    #CAD  Hx of cardiac cath in 2018  Home meds: atorvastatin 40mg, plavix 75mg qd  - c/w home atorvastatin 40mg  - hold home Plavix 75mg qd    GI  #Constipation  - last BM 1/4  - start senna 2 tablets/qd and miralax    #Nutrition  Patient reports poor PO intake  -added nephro-deborah     #Abdominal Pain  C/o vague abdominal pain, a/w bilious emesis x 2, no nausea or further episodes of emesis   (Resolved)    RENAL  #ESRD 2/2 PCKD  Pt has been on HD for "years" 2/2 PCKD. AV Fistula of EDWIN clotted in 2014, has received HD thru HD cath since. New AV Fistula in 11/2022, not yet matured.   - f/u nephrology recs  - s/p HD 1/10. Goal net even    #Hyperkalemia  Patient with Hx of ESRD with missed HD presents with K >8 on VBG with abdominal symptoms and missed HD x8 days. ICU consulted i/s/o hyperkalemia. Renal consulted.  - s/p HD 1/10  - monitor with BMP's qd    #Hyperphosphatemia  - increase sevelamer to 1600mg TID    ID  Pt presented in septic shock with leukocytosis, tachycardia, hypotension requiring pressors. Unknown source.   - WBC 15 -> 7  - see #shock for plan  - f/u ID recs  - gallium scan: faintly increased activity surrounding the entire lower portion of the right breast consistent with the inflammatory reaction seen on physical examination, small area of activity approximately 10 cm to the right of midline at about the level of the lower border of the sternum, activity likely to be in chest wall   - c/w vancomycin 500mg IV s/p HD  - f/u PET/CT, study planned for 1/10 afternoon     ENDO  #Hyperparathyroidism  At risk for secondary HPTT d/t renal failure   - pt has vague abdominal pain with nausea  - multiple brown tumors on CT with multiple fractures of pubic rami, pubic bone, thoracolumbar spine  - intact , Vit D 38.5  - SPEP/immunofixation negative   - endocrinology recs: secondary hyperparathyroidism vs other process causing osteoclastic tumors, obtain PET/CT  - c/w Sensipar 30mg qd per endo recs (started 1/9)  - obtain intact PTH level 1/11 AM to give baseline PTH on Sensipar     #Multinodular Goiter  CT: enlarged multinodular thyroid projecting into superior mediastinum with 1.8 x 1.5 cm solid nodule slightly inferiorly in the anterior mediastinum.   - Thyroid ultrasound: Fine-needle aspiration recommended for 4.3 cm right thyroid and 2.6 cm left thyroid solid nodules per SCOTT guidelines.  - TSH 1.6 free T4 0.68.  - TSH could be inappropriately normal for low free T4 due to euthyroid sick syndrome.  - f/u TFTs after shock resolved    MSK  #Renal Osteodystrophy  - on CT: Increased density of the bone marrow is consistent with renal osteodystrophy  - Per endocrine, extent of bone destruction extreme given PTH level, suspect might not be Brown tumors    #RUE Edema  RUE became edematous 1/8 extending down through hand. AVF with drained seroma pocket on R arm.  - lymphedema vs bleeding into seroma pocket? vs DVT   - f/u RUE U/S (ordered)     HEME/ONC  #Hx of DVTs  Current DVT of R IJ, R subclavian and R axillary veins. Hx of L AVF clotting. Given hx of clots, current pruritis and gallium scan uptake in chest wall, reasonable to workup coagulopathy  - c/w heparin 15IU   - f/u antiphospholipid panel/rheumatologic workup (beta2 glycoprotein, anticardiolipin ordered)     #Anemia  Hgb has downtrended from 9.8 on admission to 6.9. Baseline from November appears to be ~13.   - Hgb 6.9, ordered 1U PRBCs. Pt likely had febrile nonhemolytic transfusion reaction (3 hrs into transfusion, developed tachycardia to 150s, HTN to 140s/90s, rigors, Trectal 101.3.) Transfusion stopped. Tylenol given. Sxs resolved.   - Ferritin elevated: 1209,  - Total iron low (15), TIBC low (79)    #Breast Pain, bilateral  Family hx of breast cancer in mother, sister.   - large R breast mass, palpable L breast mass, R breast has peau d'orange appearance with associated erythema, warmth on R  - US bilateral breasts: No sonographic evidence of breast abscess, cyst or focal mass to correspond with the clinical finding of bilateral breast masses.  - surgery: recc nonurgent outpatient mammogram when clinically stable  - f/u pathology from breast bx    GYN  Uterine mass, 6.1cm mass in R adnexa seen incidentally on CTAP  - Gyn consulted, think unlikely ovarian mass vs cyst is related to systemic dz as it was seen on imaging in 1/2022  - Pt has hx of gyn surgery, unknown if total hysterectomy or partial  - Per gyn, obtain transvaginal US    Preventative  F: None  E: None, HD pt  N: Renal Restricted Diet  DVT: heparin 15IU  Code Status: Full Code  Dispo: ICU   68 yo F pt with PMH HFrEF (EF 40%), nonobstructive CAD, NICM, HTN, HLD, ESRD 2/2 PCKD on HD, PE (2018) s/p IVC filter, mild AS, mild MR, PAD, OA, h/o thrombus in vascular access x3 (R AVG, R AVF, L AVG), ventral hernia, 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     NEURO  #Pain Control  Pt in 10/10 pain of bilateral breasts, worst on R. Pain responsive to Dilaudid but effect wears off after ~2-3 hours.   - Pain management consulted, recs appreciated  - Changed pain regimen to Dilaudid 0.25mg IVP q4h for severe pain   - Tylenol 650mg po q6h PRN for mild pain  - HOLD ALL OPIOIDS FOR SEDATION, RR<10, O2SAT <93%, SBP <96    PULM  #Atelectasis  Noted on CT chest to have atelectasis at b/l lung bases.  - Incentive spirometry    CARDIOVASCULAR  #Shock  Pt presented meeting 2/4 SIRS. Hypotensive with MAPs to 60, requiring Levophed. Septic picture of unknown source. HR > 90, WBC >12. Lactate 1.9. Afebrile. Procal 12. S/p Vancomycin 1250mg x 1, Aztreonam x 1 (pt allergic to Cefazolin, PCN). . Ferritin 1209  Patient still requiring pressors, unknown source, does not appear to fit septic picture at this point (no fever, BCx negative, no focal area of infection identified thus far)  - On Phenylephrine 3.5mcg/kg/min  - c/w midodrine 30mg q8h   - CT:  7.0 cm fluid collection is present near an AV fistula in the right upper arm. Possibly abscess. Drained by IR 1/5 with serosanguinous fluid: no organisms, few WBCs  - F/u vascular recs: wound care to RUE wound  - f/u surgery recs: for pain/erythema/warmth of R breast: biopsied 1/9; likely granulomatous mastititis  - f/u pathology from breast bx  - f/u ID recs: c/w Vancomycin 500mg IV after HD sessions, last trough 18.   - BCx: NGTD  - corticotropin stim test wnl    #LVOT with LISETTE  likely i/s/o hyperdynamic LV function 2/2 shock and hypovolemia  - per cardiology, rec repeating TTE after shock resolves    #HFrEF  TTE 11/22 normal LV and systolic, EF 59%, grade II diastolic dysfuncton, dilated RV, reduced RV,   Home meds: Entresto 49/51mg  - hold Entresto i/s/o shock    #CAD  Hx of cardiac cath in 2018  Home meds: atorvastatin 40mg, plavix 75mg qd  - c/w home atorvastatin 40mg  - hold home Plavix 75mg qd    GI  #Constipation  - last BM 1/4  - start senna 2 tablets/qd and miralax    #Nutrition  Patient reports poor PO intake  -added nephro-deborah     #Abdominal Pain  C/o vague abdominal pain, a/w bilious emesis x 2, no nausea or further episodes of emesis   (Resolved)    RENAL  #ESRD 2/2 PCKD  Pt has been on HD for "years" 2/2 PCKD. AV Fistula of EDWIN clotted in 2014, has received HD thru HD cath since. New AV Fistula in 11/2022, not yet matured.   - f/u nephrology recs  - s/p HD 1/10. Goal net even    #Hyperkalemia  Patient with Hx of ESRD with missed HD presents with K >8 on VBG with abdominal symptoms and missed HD x8 days. ICU consulted i/s/o hyperkalemia. Renal consulted.  - s/p HD 1/10  - monitor with BMP's qd    #Hyperphosphatemia  - increase sevelamer to 1600mg TID    ID  Pt presented in septic shock with leukocytosis, tachycardia, hypotension requiring pressors. Unknown source.   - WBC 15 -> 7  - see #shock for plan  - f/u ID recs  - gallium scan: faintly increased activity surrounding the entire lower portion of the right breast consistent with the inflammatory reaction seen on physical examination, small area of activity approximately 10 cm to the right of midline at about the level of the lower border of the sternum, activity likely to be in chest wall   - c/w vancomycin 500mg IV s/p HD  - f/u PET/CT, study planned for 1/10 afternoon     ENDO  #Hyperparathyroidism  At risk for secondary HPTT d/t renal failure   - pt has vague abdominal pain with nausea  - multiple brown tumors on CT with multiple fractures of pubic rami, pubic bone, thoracolumbar spine  - intact , Vit D 38.5  - SPEP/immunofixation negative   - endocrinology recs: secondary hyperparathyroidism vs other process causing osteoclastic tumors, obtain PET/CT  - c/w Sensipar 30mg qd per endo recs (started 1/9)  - obtain intact PTH level 1/11 AM to give baseline PTH on Sensipar     #Multinodular Goiter  CT: enlarged multinodular thyroid projecting into superior mediastinum with 1.8 x 1.5 cm solid nodule slightly inferiorly in the anterior mediastinum.   - Thyroid ultrasound: Fine-needle aspiration recommended for 4.3 cm right thyroid and 2.6 cm left thyroid solid nodules per SCOTT guidelines.  - TSH 1.6 free T4 0.68.  - TSH could be inappropriately normal for low free T4 due to euthyroid sick syndrome.  - f/u TFTs after shock resolved    MSK  #Renal Osteodystrophy  - on CT: Increased density of the bone marrow is consistent with renal osteodystrophy  - Per endocrine, extent of bone destruction extreme given PTH level, suspect might not be Brown tumors    #RUE Edema  RUE became edematous 1/8 extending down through hand. AVF with drained seroma pocket on R arm.  - lymphedema vs bleeding into seroma pocket? vs DVT   - f/u RUE U/S (ordered)     HEME/ONC  #Hx of DVTs  Current DVT of R IJ, R subclavian and R axillary veins. Hx of L AVF clotting. Given hx of clots, current pruritis and gallium scan uptake in chest wall, reasonable to workup coagulopathy  - c/w heparin 15IU   - f/u antiphospholipid panel/rheumatologic workup (beta2 glycoprotein, anticardiolipin ordered)     #Anemia  Hgb has downtrended from 9.8 on admission to 6.9. Baseline from November appears to be ~13.   - Hgb 6.9, ordered 1U PRBCs. Pt likely had febrile nonhemolytic transfusion reaction (3 hrs into transfusion, developed tachycardia to 150s, HTN to 140s/90s, rigors, Trectal 101.3.) Transfusion stopped. Tylenol given. Sxs resolved.   - Ferritin elevated: 1209,  - Total iron low (15), TIBC low (79)    #Breast Pain, bilateral  Family hx of breast cancer in mother, sister.   - large R breast mass, palpable L breast mass, R breast has peau d'orange appearance with associated erythema, warmth on R  - US bilateral breasts: No sonographic evidence of breast abscess, cyst or focal mass to correspond with the clinical finding of bilateral breast masses.  - surgery: recc nonurgent outpatient mammogram when clinically stable  - f/u pathology from breast bx    GYN  Uterine mass, 6.1cm mass in R adnexa seen incidentally on CTAP  - Gyn consulted, think unlikely ovarian mass vs cyst is related to systemic dz as it was seen on imaging in 1/2022  - Pt has hx of gyn surgery, unknown if total hysterectomy or partial  - Per gyn, obtain transvaginal US    Preventative  F: None  E: None, HD pt  N: Renal Restricted Diet  DVT: heparin 15IU  Code Status: Full Code  Dispo: ICU   66 yo F pt with PMH HFrEF (EF 40%), nonobstructive CAD, NICM, HTN, HLD, ESRD 2/2 PCKD on HD, PE (2018) s/p IVC filter, mild AS, mild MR, PAD, OA, h/o thrombus in vascular access x3 (R AVG, R AVF, L AVG), ventral hernia, 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     NEURO  #Pain Control  Pt in 10/10 pain of bilateral breasts, worst on R. Pain responsive to Dilaudid but effect wears off after ~2-3 hours.   - Pain management consulted, recs appreciated  - Changed pain regimen to Dilaudid 0.25mg IVP q4h for severe pain   - Tylenol 650mg po q6h PRN for mild pain  - HOLD ALL OPIOIDS FOR SEDATION, RR<10, O2SAT <93%, SBP <96    PULM  #Atelectasis  Noted on CT chest to have atelectasis at b/l lung bases.  - Incentive spirometry    CARDIOVASCULAR  #Shock  Pt presented meeting 2/4 SIRS. Hypotensive with MAPs to 60, requiring Levophed. Septic picture of unknown source. HR > 90, WBC >12. Lactate 1.9. Afebrile. Procal 12. S/p Vancomycin 1250mg x 1, Aztreonam x 1 (pt allergic to Cefazolin, PCN). . Ferritin 1209  Patient still requiring pressors, unknown source, does not appear to fit septic picture at this point (no fever, BCx negative, no focal area of infection identified thus far)  - On Phenylephrine 3mcg/kg/min  - c/w midodrine 30mg q8h   - CT:  7.0 cm fluid collection is present near an AV fistula in the right upper arm. Possibly abscess. Drained by IR 1/5 with serosanguinous fluid: no organisms, few WBCs  - F/u vascular recs: wound care to RUE wound (BID dressing changes (once daily with Santyl DSD once daily with Bactroban DSD). Apply light ace wrap compression to right hand and forearm to help with soft tissue swelling)  - f/u surgery recs: for pain/erythema/warmth of R breast: biopsied 1/9; likely granulomatous mastitis, unlikely to cause hypotension  - f/u ID recs: c/w Vancomycin to tx presumed R breast cellulitis (1/2-1/15); check vanc level before HD and dose based on level   - BCx: NGTD  - corticotropin stim test wnl    #LVOT with LISETTE  likely i/s/o hyperdynamic LV function 2/2 shock and hypovolemia  - per cardiology, rec repeating TTE after shock resolves    #HFrEF  TTE 11/22 normal LV and systolic, EF 59%, grade II diastolic dysfuncton, dilated RV, reduced RV,   Home meds: Entresto 49/51mg  - hold Entresto i/s/o shock    #CAD  Hx of cardiac cath in 2018  Home meds: atorvastatin 40mg, plavix 75mg qd  - c/w home atorvastatin 40mg  - hold home Plavix 75mg qd    GI  #Constipation  - c/w senna 2 tablets/qd and miralax    #Nutrition  Patient reports poor PO intake  -c/w nephro-deborah     #Abdominal Pain  C/o vague abdominal pain, a/w bilious emesis x 2, no nausea or further episodes of emesis   (Resolved)    RENAL  #ESRD 2/2 PCKD  Pt has been on HD for "years" 2/2 PCKD. AV Fistula of EDWIN clotted in 2014, has received HD thru HD cath since. New AV Fistula in 11/2022, not yet matured.   - f/u nephrology recs  - s/p HD 1/10. Goal net even    #Hyperkalemia  Patient with Hx of ESRD with missed HD presents with K >8 on VBG with abdominal symptoms and missed HD x8 days. ICU consulted i/s/o hyperkalemia. Renal consulted.  - s/p HD 1/10  - monitor with BMP's qd    #Hyperphosphatemia  - c/w sevelamer to 1600mg TID    ID  Pt presented in septic shock with leukocytosis, tachycardia, hypotension requiring pressors. Unknown source.   - WBC 15 -> 7  - see #shock for plan  - f/u ID recs  - gallium scan: faintly increased activity surrounding the entire lower portion of the right breast consistent with the inflammatory reaction seen on physical examination, small area of activity approximately 10 cm to the right of midline at about the level of the lower border of the sternum, activity likely to be in chest wall   - c/w vancomycin, dosed by level, before HD, x2 weeks  - F/u PET/CT    ENDO  #Hyperparathyroidism  At risk for secondary HPTT d/t renal failure   - pt has vague abdominal pain with nausea  - multiple brown tumors on CT with multiple fractures of pubic rami, pubic bone, thoracolumbar spine  - intact , Vit D 38.5  - SPEP/immunofixation negative   - endocrinology recs: secondary hyperparathyroidism vs other process causing osteoclastic tumors, obtain PET/CT  - c/w Sensipar 30mg qd per endo recs (started 1/9)  - 1/11 AM intact  (baseline PTH on Sensipar )    #Multinodular Goiter  CT: enlarged multinodular thyroid projecting into superior mediastinum with 1.8 x 1.5 cm solid nodule slightly inferiorly in the anterior mediastinum.   - Thyroid ultrasound: Fine-needle aspiration recommended for 4.3 cm right thyroid and 2.6 cm left thyroid solid nodules per SCOTT guidelines.  - TSH 1.6 free T4 0.68.  - TSH could be inappropriately normal for low free T4 due to euthyroid sick syndrome.  - f/u TFTs after shock resolved    MSK  #Renal Osteodystrophy  - on CT: Increased density of the bone marrow is consistent with renal osteodystrophy  - Per endocrine, extent of bone destruction extreme given PTH level, suspect might not be Brown tumors    #RUE Edema  RUE became edematous 1/8 extending down through hand. AVF with drained seroma pocket on R arm.  - lymphedema vs bleeding into seroma pocket? vs DVT   - f/u RUE U/S (ordered)     HEME/ONC  #Hx of DVTs  Current DVT of R IJ, R subclavian and R axillary veins. Hx of L AVF clotting. Given hx of clots, current pruritis and gallium scan uptake in chest wall, reasonable to workup coagulopathy  - c/w heparin gtt @19cc/hr    - f/u antiphospholipid panel/rheumatologic workup (beta2 glycoprotein, anticardiolipin ordered)     #Anemia  Hgb has downtrended from 9.8 on admission to 6.9. Baseline from November appears to be ~13. 1/9 Hb 6.9, ordered 1U pRBC after which pt likely had febrile nonhemolytic transfusion reaction (3 hrs into transfusion, developed tachycardia to 150s, HTN to 140s/90s, rigors, Trectal 101.3.) Transfusion stopped. Tylenol given. Sxs resolved.   - Ferritin elevated: 1209  - Total iron low (15), TIBC low (79)  - 1/11 Hb 6.9, ordered 1U pRBC, will monitor for transfusion reactions    #Breast Pain, bilateral  Family hx of breast cancer in mother, sister.   - large R breast mass, palpable L breast mass, R breast has peau d'orange appearance with associated erythema, warmth on R  - US bilateral breasts: No sonographic evidence of breast abscess, cyst or focal mass to correspond with the clinical finding of bilateral breast masses.  - surgery: recc nonurgent outpatient mammogram when clinically stable  - breast bx: granulomatous mastitis    GYN  Uterine mass, 6.1cm mass in R adnexa seen incidentally on CTAP  - Gyn consulted, think unlikely ovarian mass vs cyst is related to systemic dz as it was seen on imaging in 1/2022  - Pt has hx of gyn surgery, unknown if total hysterectomy or partial  -  172 (high), CA-125 196 (high), CEA 6.1 (high)  - F/u TVUS  - F/u PET/CT      Preventative  F: None  E: None, HD pt  N: Renal Restricted Diet  DVT: heparin @19cc/hr   Code Status: Full Code  Dispo: ICU   66 yo F pt with PMH HFrEF (EF 40%), nonobstructive CAD, NICM, HTN, HLD, ESRD 2/2 PCKD on HD, PE (2018) s/p IVC filter, mild AS, mild MR, PAD, OA, h/o thrombus in vascular access x3 (R AVG, R AVF, L AVG), ventral hernia, 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     NEURO  #Pain Control  Pt in 10/10 pain of bilateral breasts, worst on R. Pain responsive to Dilaudid but effect wears off after ~2-3 hours.   - Pain management consulted, recs appreciated  - Changed pain regimen to Dilaudid 0.25mg IVP q4h for severe pain   - Tylenol 650mg po q6h PRN for mild pain  - HOLD ALL OPIOIDS FOR SEDATION, RR<10, O2SAT <93%, SBP <96    PULM  #Atelectasis  Noted on CT chest to have atelectasis at b/l lung bases.  - Incentive spirometry    CARDIOVASCULAR  #Shock  Pt presented meeting 2/4 SIRS. Hypotensive with MAPs to 60, requiring Levophed. Septic picture of unknown source. HR > 90, WBC >12. Lactate 1.9. Afebrile. Procal 12. S/p Vancomycin 1250mg x 1, Aztreonam x 1 (pt allergic to Cefazolin, PCN). . Ferritin 1209  Patient still requiring pressors, unknown source, does not appear to fit septic picture at this point (no fever, BCx negative, no focal area of infection identified thus far)  - On Phenylephrine 3mcg/kg/min  - c/w midodrine 30mg q8h   - Start Florinef 0.1mg qd  - CT:  7.0 cm fluid collection is present near an AV fistula in the right upper arm. Possibly abscess. Drained by IR 1/5 with serosanguinous fluid: no organisms, few WBCs  - F/u vascular recs: wound care to RUE wound (BID dressing changes (once daily with Santyl DSD once daily with Bactroban DSD). Apply light ace wrap compression to right hand and forearm to help with soft tissue swelling)  - f/u surgery recs: for pain/erythema/warmth of R breast: biopsied 1/9; likely granulomatous mastitis, unlikely to cause hypotension  - f/u ID recs: c/w Vancomycin to tx presumed R breast cellulitis (1/2-1/15); check vanc level before HD and dose based on level   - BCx: NGTD  - corticotropin stim test wnl    #LVOT with LISETTE  likely i/s/o hyperdynamic LV function 2/2 shock and hypovolemia  - per cardiology, rec repeating TTE after shock resolves    #HFrEF  TTE 11/22 normal LV and systolic, EF 59%, grade II diastolic dysfuncton, dilated RV, reduced RV,   Home meds: Entresto 49/51mg  - hold Entresto i/s/o shock    #CAD  Hx of cardiac cath in 2018  Home meds: atorvastatin 40mg, plavix 75mg qd  - c/w home atorvastatin 40mg  - hold home Plavix 75mg qd    GI  #Constipation  - c/w senna 2 tablets/qd and miralax    #Nutrition  Patient reports poor PO intake  -c/w nephro-deborah     #Abdominal Pain  C/o vague abdominal pain, a/w bilious emesis x 2, no nausea or further episodes of emesis   (Resolved)    RENAL  #ESRD 2/2 PCKD  Pt has been on HD for "years" 2/2 PCKD. AV Fistula of EDWIN clotted in 2014, has received HD thru HD cath since. New AV Fistula in 11/2022, not yet matured.   - f/u nephrology recs  - s/p HD 1/10. Goal net even    #Hyperkalemia  Patient with Hx of ESRD with missed HD presents with K >8 on VBG with abdominal symptoms and missed HD x8 days. ICU consulted i/s/o hyperkalemia. Renal consulted.  - s/p HD 1/10  - monitor with BMP's qd    #Hyperphosphatemia  - c/w sevelamer to 1600mg TID    ID  Pt presented in septic shock with leukocytosis, tachycardia, hypotension requiring pressors. Unknown source.   - WBC 15 -> 7  - see #shock for plan  - f/u ID recs  - gallium scan: faintly increased activity surrounding the entire lower portion of the right breast consistent with the inflammatory reaction seen on physical examination, small area of activity approximately 10 cm to the right of midline at about the level of the lower border of the sternum, activity likely to be in chest wall   - c/w vancomycin, dosed by level, before HD, x2 weeks  - F/u PET/CT    ENDO  #Hyperparathyroidism  At risk for secondary HPTT d/t renal failure   - pt has vague abdominal pain with nausea  - multiple brown tumors on CT with multiple fractures of pubic rami, pubic bone, thoracolumbar spine  - intact , Vit D 38.5  - SPEP/immunofixation negative   - endocrinology recs: secondary hyperparathyroidism vs other process causing osteoclastic tumors, obtain PET/CT  - c/w Sensipar 30mg qd per endo recs (started 1/9)  - 1/11 AM intact  (baseline PTH on Sensipar )    #Multinodular Goiter  CT: enlarged multinodular thyroid projecting into superior mediastinum with 1.8 x 1.5 cm solid nodule slightly inferiorly in the anterior mediastinum.   - Thyroid ultrasound: Fine-needle aspiration recommended for 4.3 cm right thyroid and 2.6 cm left thyroid solid nodules per SCOTT guidelines.  - TSH 1.6 free T4 0.68.  - TSH could be inappropriately normal for low free T4 due to euthyroid sick syndrome.  - f/u TFTs after shock resolved    MSK  #Renal Osteodystrophy  - on CT: Increased density of the bone marrow is consistent with renal osteodystrophy  - Per endocrine, extent of bone destruction extreme given PTH level, suspect might not be Brown tumors    #RUE Edema  RUE became edematous 1/8 extending down through hand. AVF with drained seroma pocket on R arm.  - lymphedema vs bleeding into seroma pocket? vs DVT   - f/u RUE U/S (ordered)     HEME/ONC  #Hx of DVTs  Current DVT of R IJ, R subclavian and R axillary veins. Hx of L AVF clotting. Given hx of clots, current pruritis and gallium scan uptake in chest wall, reasonable to workup coagulopathy  - c/w heparin gtt @19cc/hr    - f/u antiphospholipid panel/rheumatologic workup (beta2 glycoprotein, anticardiolipin ordered)     #Anemia  Hgb has downtrended from 9.8 on admission to 6.9. Baseline from November appears to be ~13. 1/9 Hb 6.9, ordered 1U pRBC after which pt likely had febrile nonhemolytic transfusion reaction (3 hrs into transfusion, developed tachycardia to 150s, HTN to 140s/90s, rigors, Trectal 101.3.) Transfusion stopped. Tylenol given. Sxs resolved.   - Ferritin elevated: 1209  - Total iron low (15), TIBC low (79)  - 1/11 Hb 6.9, ordered 1U pRBC, will monitor for transfusion reactions    #Breast Pain, bilateral  Family hx of breast cancer in mother, sister.   - large R breast mass, palpable L breast mass, R breast has peau d'orange appearance with associated erythema, warmth on R  - US bilateral breasts: No sonographic evidence of breast abscess, cyst or focal mass to correspond with the clinical finding of bilateral breast masses.  - surgery: recc nonurgent outpatient mammogram when clinically stable  - breast bx: granulomatous mastitis    GYN  Uterine mass, 6.1cm mass in R adnexa seen incidentally on CTAP  - Gyn consulted, think unlikely ovarian mass vs cyst is related to systemic dz as it was seen on imaging in 1/2022  - Pt has hx of gyn surgery, unknown if total hysterectomy or partial  -  172 (high), CA-125 196 (high), CEA 6.1 (high)  - F/u TVUS  - F/u PET/CT      Preventative  F: None  E: None, HD pt  N: Renal Restricted Diet  DVT: heparin @19cc/hr   Code Status: Full Code  Dispo: ICU

## 2023-01-11 NOTE — PROGRESS NOTE ADULT - ASSESSMENT
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      - F/U right breast biopsy pathology -- preliminary result shows granuloma, likely consistent with granulomatous mastitis. This entity would not be expected to cause hypotension, so would consider alternate possibilities.   - For management of probable granulomatous mastitis, would consult rheumatology.   - F/U Cx results (NGTD so far). ABx as per primary team.  - Wound care for access site ulceration as per vascular surgery.   - PET-CT today as per ID / primary team.   - Surgery 5C following.

## 2023-01-11 NOTE — PROGRESS NOTE ADULT - SUBJECTIVE AND OBJECTIVE BOX
SUBJECTIVE: Patient seen and examined bedside; no events overnight, patient doing well today, watching tv, tolerated well dressing change.    heparin  Infusion 1900 Unit(s)/Hr IV Continuous <Continuous>  midodrine 30 milliGRAM(s) Oral every 8 hours  phenylephrine    Infusion 0.1 MICROgram(s)/kG/Min IV Continuous <Continuous>      Vital Signs Last 24 Hrs  T(C): 36.9 (11 Jan 2023 13:40), Max: 37.6 (11 Jan 2023 01:04)  T(F): 98.4 (11 Jan 2023 13:40), Max: 99.7 (11 Jan 2023 01:04)  HR: 73 (11 Jan 2023 14:00) (73 - 106)  BP: --  BP(mean): --  RR: 14 (11 Jan 2023 14:00) (10 - 24)  SpO2: 97% (11 Jan 2023 14:00) (91% - 99%)    Parameters below as of 11 Jan 2023 14:00  Patient On (Oxygen Delivery Method): room air      I&O's Detail    10 Faustino 2023 07:01  -  11 Jan 2023 07:00  --------------------------------------------------------  IN:    Heparin: 410 mL    Oral Fluid: 960 mL    Phenylephrine: 1326.6 mL  Total IN: 2696.6 mL    OUT:  Total OUT: 0 mL    Total NET: 2696.6 mL      11 Jan 2023 07:01  -  11 Jan 2023 14:59  --------------------------------------------------------  IN:    Heparin: 38 mL    Heparin: 95 mL    Phenylephrine: 346 mL  Total IN: 479 mL    OUT:  Total OUT: 0 mL    Total NET: 479 mL      PE:    General: NAD, resting comfortably in bed  C/V: S1 s2, RRR  Pulm: Nonlabored breathing, no respiratory distress  Abd: Soft, NTND  Extrem: WWP, right upper extremity wound unchanged from prior exam, clean base, pink tissue, no obvious signs of bleeding or infection        LABS:                        6.9    6.96  )-----------( 129      ( 11 Jan 2023 10:49 )             22.4     01-11    130<L>  |  93<L>  |  28<H>  ----------------------------<  87  4.2   |  26  |  4.84<H>    Ca    7.6<L>      11 Jan 2023 04:35  Phos  4.7     01-11  Mg     1.6     01-11    TPro  5.4<L>  /  Alb  2.2<L>  /  TBili  0.2  /  DBili  x   /  AST  21  /  ALT  10  /  AlkPhos  70  01-11    PTT - ( 11 Jan 2023 10:49 )  PTT:68.3 sec      RADIOLOGY & ADDITIONAL STUDIES:

## 2023-01-12 NOTE — CONSULT NOTE ADULT - ASSESSMENT
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 Palliative care with triggered consult for GOC discussion.

## 2023-01-12 NOTE — CONSULT NOTE ADULT - PROBLEM SELECTOR RECOMMENDATION 9
Patient presented with bilateral breast pain with large R breast mass and palpable L breast mass. R breast has peau d'orange appearance with associated erythema, warmth. Patient with family hx of breast cancer in mother, sister. US bilateral breasts: No sonographic evidence of breast abscess, cyst or focal mass to correspond with the clinical finding of bilateral breast masses. Surgery was consulted and recommend nonurgent outpatient mammogram when clinically stable. 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 Patient still complaining of pain that is not well managed. States that PO does not work as well and as fast as IV medications.   - Goal to help alleviate pain, however avoid opiates as patient is hypotensive  - Can give 1g Ofirmev q8h  - Lidocaine patch to each breast if it will provide relief Patient presented with bilateral breast pain with large R breast mass and palpable L breast mass. R breast has peau d'orange appearance with associated erythema, warmth. Patient with family hx of breast cancer in mother, sister. US bilateral breasts: No sonographic evidence of breast abscess, cyst or focal mass to correspond with the clinical finding of bilateral breast masses. Surgery was consulted and recommend nonurgent outpatient mammogram when clinically stable. 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 Patient still complaining of pain that is not well managed. States that PO does not work as well and as fast as IV medications.   - Goal to help alleviate pain, however avoid opiates as patient is hypotensive  - Can give 1g Ofirmev q8h or tylenol 1000mg PO q8h  - Would do a trial of topical lidocaine 4% cream TID PRN to b/l breasts

## 2023-01-12 NOTE — PROGRESS NOTE ADULT - ATTENDING COMMENTS
patient remains on jarvis.  Feels better, reports breat pain stable.  Remains afebrile, WBC normal.  Per surgery, path thoswed granulomatous mastitis and awaiting PET CT result.  Plan for vanc 2 weeks course (until 1/15/23) to treat presumed breat cellulitis as the possible cause of septic shock.  Currently unclear the cause of shock and why she still needs jarvis.    Team 1 will follow you.  Case d/w primary team.    Nora Naranjo MD, MS  Infectious Disease attending  work cell [phone]   For any questions during evening/weekend/holiday, please page ID on call

## 2023-01-12 NOTE — PROGRESS NOTE ADULT - ASSESSMENT
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     NEURO  #Pain Control  Pt in 10/10 pain of bilateral breasts, worst on R. Pain responsive to Dilaudid but effect wears off after ~2-3 hours.   - Pain management consulted, recs appreciated  - Changed pain regimen to Dilaudid 0.25mg IVP q4h for severe pain   - Tylenol 650mg po q6h PRN for mild pain  - HOLD ALL OPIOIDS FOR SEDATION, RR<10, O2SAT <93%, SBP <96    PULM  #Atelectasis  Noted on CT chest to have atelectasis at b/l lung bases.  - Incentive spirometry    CARDIOVASCULAR  #Shock  Pt presented meeting 2/4 SIRS. Hypotensive with MAPs to 60, requiring Levophed. Septic picture of unknown source. HR > 90, WBC >12. Lactate 1.9. Afebrile. Procal 12. S/p Vancomycin 1250mg x 1, Aztreonam x 1 (pt allergic to Cefazolin, PCN). . Ferritin 1209  Patient still requiring pressors, unknown source, does not appear to fit septic picture at this point (no fever, BCx negative, no focal area of infection identified thus far)  - On Phenylephrine 3mcg/kg/min  - c/w midodrine 30mg q8h   - Start Florinef 0.1mg qd  - CT:  7.0 cm fluid collection is present near an AV fistula in the right upper arm. Possibly abscess. Drained by IR 1/5 with serosanguinous fluid: no organisms, few WBCs  - F/u vascular recs: wound care to RUE wound (BID dressing changes (once daily with Santyl DSD once daily with Bactroban DSD). Apply light ace wrap compression to right hand and forearm to help with soft tissue swelling)  - f/u surgery recs: for pain/erythema/warmth of R breast: biopsied 1/9; likely granulomatous mastitis, unlikely to cause hypotension  - f/u ID recs: c/w Vancomycin to tx presumed R breast cellulitis (1/2-1/15); check vanc level before HD and dose based on level   - BCx: NGTD  - corticotropin stim test wnl    #LVOT with LISETTE  likely i/s/o hyperdynamic LV function 2/2 shock and hypovolemia  - per cardiology, rec repeating TTE after shock resolves    #HFrEF  TTE 11/22 normal LV and systolic, EF 59%, grade II diastolic dysfuncton, dilated RV, reduced RV,   Home meds: Entresto 49/51mg  - hold Entresto i/s/o shock    #CAD  Hx of cardiac cath in 2018  Home meds: atorvastatin 40mg, plavix 75mg qd  - c/w home atorvastatin 40mg  - hold home Plavix 75mg qd    GI  #Constipation  - c/w senna 2 tablets/qd and miralax    #Nutrition  Patient reports poor PO intake  -c/w nephro-deborah     #Abdominal Pain  C/o vague abdominal pain, a/w bilious emesis x 2, no nausea or further episodes of emesis   (Resolved)    RENAL  #ESRD 2/2 PCKD  Pt has been on HD for "years" 2/2 PCKD. AV Fistula of EDWIN clotted in 2014, has received HD thru HD cath since. New AV Fistula in 11/2022, not yet matured.   - f/u nephrology recs  - s/p HD 1/10. Goal net even    #Hyperkalemia  Patient with Hx of ESRD with missed HD presents with K >8 on VBG with abdominal symptoms and missed HD x8 days. ICU consulted i/s/o hyperkalemia. Renal consulted.  - s/p HD 1/10  - monitor with BMP's qd    #Hyperphosphatemia  - c/w sevelamer to 1600mg TID    ID  Pt presented in septic shock with leukocytosis, tachycardia, hypotension requiring pressors. Unknown source.   - WBC 15 -> 7  - see #shock for plan  - f/u ID recs  - gallium scan: faintly increased activity surrounding the entire lower portion of the right breast consistent with the inflammatory reaction seen on physical examination, small area of activity approximately 10 cm to the right of midline at about the level of the lower border of the sternum, activity likely to be in chest wall   - c/w vancomycin, dosed by level, before HD, x2 weeks  - F/u PET/CT    ENDO  #Hyperparathyroidism  At risk for secondary HPTT d/t renal failure   - pt has vague abdominal pain with nausea  - multiple brown tumors on CT with multiple fractures of pubic rami, pubic bone, thoracolumbar spine  - intact , Vit D 38.5  - SPEP/immunofixation negative   - endocrinology recs: secondary hyperparathyroidism vs other process causing osteoclastic tumors, obtain PET/CT  - c/w Sensipar 30mg qd per endo recs (started 1/9)  - 1/11 AM intact  (baseline PTH on Sensipar )    #Multinodular Goiter  CT: enlarged multinodular thyroid projecting into superior mediastinum with 1.8 x 1.5 cm solid nodule slightly inferiorly in the anterior mediastinum.   - Thyroid ultrasound: Fine-needle aspiration recommended for 4.3 cm right thyroid and 2.6 cm left thyroid solid nodules per SCOTT guidelines.  - TSH 1.6 free T4 0.68.  - TSH could be inappropriately normal for low free T4 due to euthyroid sick syndrome.  - f/u TFTs after shock resolved    MSK  #Renal Osteodystrophy  - on CT: Increased density of the bone marrow is consistent with renal osteodystrophy  - Per endocrine, extent of bone destruction extreme given PTH level, suspect might not be Brown tumors    #RUE Edema  RUE became edematous 1/8 extending down through hand. AVF with drained seroma pocket on R arm.  - lymphedema vs bleeding into seroma pocket? vs DVT   - f/u RUE U/S (ordered)     HEME/ONC  #Hx of DVTs  Current DVT of R IJ, R subclavian and R axillary veins. Hx of L AVF clotting. Given hx of clots, current pruritis and gallium scan uptake in chest wall, reasonable to workup coagulopathy  - c/w heparin gtt @19cc/hr    - f/u antiphospholipid panel/rheumatologic workup (beta2 glycoprotein, anticardiolipin ordered)     #Anemia  Hgb has downtrended from 9.8 on admission to 6.9. Baseline from November appears to be ~13. 1/9 Hb 6.9, ordered 1U pRBC after which pt likely had febrile nonhemolytic transfusion reaction (3 hrs into transfusion, developed tachycardia to 150s, HTN to 140s/90s, rigors, Trectal 101.3.) Transfusion stopped. Tylenol given. Sxs resolved.   - Ferritin elevated: 1209  - Total iron low (15), TIBC low (79)  - 1/11 Hb 6.9, ordered 1U pRBC, will monitor for transfusion reactions    #Breast Pain, bilateral  Family hx of breast cancer in mother, sister.   - large R breast mass, palpable L breast mass, R breast has peau d'orange appearance with associated erythema, warmth on R  - US bilateral breasts: No sonographic evidence of breast abscess, cyst or focal mass to correspond with the clinical finding of bilateral breast masses.  - surgery: recc nonurgent outpatient mammogram when clinically stable  - breast bx: granulomatous mastitis    GYN  Uterine mass, 6.1cm mass in R adnexa seen incidentally on CTAP  - Gyn consulted, think unlikely ovarian mass vs cyst is related to systemic dz as it was seen on imaging in 1/2022  - Pt has hx of gyn surgery, unknown if total hysterectomy or partial  -  172 (high), CA-125 196 (high), CEA 6.1 (high)  - F/u TVUS  - F/u PET/CT      Preventative  F: None  E: None, HD pt  N: Renal Restricted Diet  DVT: heparin @19cc/hr   Code Status: Full Code  Dispo: ICU   68 yo F pt with PMH HFrEF (EF 40%), nonobstructive CAD, NICM, HTN, HLD, ESRD 2/2 PCKD on HD, PE (2018) s/p IVC filter, mild AS, mild MR, PAD, OA, h/o thrombus in vascular access x3 (R AVG, R AVF, L AVG), ventral hernia, 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     NEURO  #Pain Control  Pt in 10/10 pain of bilateral breasts, worst on R. Pain responsive to Dilaudid but effect wears off after ~2-3 hours.   - Pain management consulted, recs appreciated  - Changed pain regimen to Dilaudid 0.25mg IVP q4h for severe pain   - Tylenol 650mg po q6h PRN for mild pain  - HOLD ALL OPIOIDS FOR SEDATION, RR<10, O2SAT <93%, SBP <96    PULM  #Atelectasis  Noted on CT chest to have atelectasis at b/l lung bases.  - Incentive spirometry    CARDIOVASCULAR  #Shock  Pt presented meeting 2/4 SIRS. Hypotensive with MAPs to 60, requiring Levophed. Septic picture of unknown source. HR > 90, WBC >12. Lactate 1.9. Afebrile. Procal 12. S/p Vancomycin 1250mg x 1, Aztreonam x 1 (pt allergic to Cefazolin, PCN). . Ferritin 1209  Patient still requiring pressors, unknown source, does not appear to fit septic picture at this point (no fever, BCx negative, no focal area of infection identified thus far)  - On Phenylephrine 1.3mcg/kg/min  - c/w midodrine 30mg q8h   - c/w Florinef 0.1mg qd  - CT:  7.0 cm fluid collection is present near an AV fistula in the right upper arm. Possibly abscess. Drained by IR 1/5 with serosanguinous fluid: no organisms, few WBCs  - F/u vascular recs: wound care to RUE wound (BID dressing changes (once daily with Santyl DSD once daily with Bactroban DSD). Apply light ace wrap compression to right hand and forearm to help with soft tissue swelling)  - f/u surgery recs: for pain/erythema/warmth of R breast: biopsied 1/9; likely granulomatous mastitis, unlikely to cause hypotension; f/u final bx results   - f/u ID recs: c/w Vancomycin to tx presumed R breast cellulitis (1/2-1/15); check vanc level before HD and dose based on level   - BCx: NGTD  - corticotropin stim test wnl    #LVOT with LISETTE  likely i/s/o hyperdynamic LV function 2/2 shock and hypovolemia  - per cardiology, rec repeating TTE after shock resolves    #HFrEF  TTE 11/22 normal LV and systolic, EF 59%, grade II diastolic dysfuncton, dilated RV, reduced RV,   Home meds: Entresto 49/51mg  - hold Entresto i/s/o shock    #CAD  Hx of cardiac cath in 2018  Home meds: atorvastatin 40mg, plavix 75mg qd  - c/w home atorvastatin 40mg  - restart home Plavix 75mg qd iso stable Hb    GI  #Constipation  - c/w senna 2 tablets/qd and miralax    #Nutrition  Patient reports poor PO intake  -c/w nephro-deborah     #Abdominal Pain  C/o vague abdominal pain, a/w bilious emesis x 2, no nausea or further episodes of emesis   (Resolved)    RENAL  #ESRD 2/2 PCKD  Pt has been on HD for "years" 2/2 PCKD. AV Fistula of EDWIN clotted in 2014, has received HD thru HD cath since. New AV Fistula in 11/2022, not yet matured.   - f/u nephrology recs  - s/p HD 1/10; possible HD tomorrow 1/13.    #Hyperkalemia  Patient with Hx of ESRD with missed HD presents with K >8 on VBG with abdominal symptoms and missed HD x8 days. ICU consulted i/s/o hyperkalemia. Renal consulted.  - s/p HD 1/10  - monitor with BMP's qd    #Hyperphosphatemia  - c/w sevelamer to 1600mg TID    ID  Pt presented in septic shock with leukocytosis, tachycardia, hypotension requiring pressors. Unknown source.   - WBC 15 -> 7  - see #shock for plan  - f/u ID recs  - gallium scan: faintly increased activity surrounding the entire lower portion of the right breast consistent with the inflammatory reaction seen on physical examination, small area of activity approximately 10 cm to the right of midline at about the level of the lower border of the sternum, activity likely to be in chest wall   - c/w vancomycin, dosed by level, before HD, x2 weeks (last day 1/15)  - PET/CT performed, f/u read    ENDO  #Hyperparathyroidism  At risk for secondary HPTT d/t renal failure   - pt has vague abdominal pain with nausea  - multiple brown tumors on CT with multiple fractures of pubic rami, pubic bone, thoracolumbar spine  - intact , Vit D 38.5  - SPEP/immunofixation negative   - endocrinology recs: secondary hyperparathyroidism vs other process causing osteoclastic tumors, obtain PET/CT  - c/w Sensipar 30mg qd per endo recs (started 1/9)  - 1/11 AM intact  (baseline PTH on Sensipar )    #Multinodular Goiter  CT: enlarged multinodular thyroid projecting into superior mediastinum with 1.8 x 1.5 cm solid nodule slightly inferiorly in the anterior mediastinum.   - Thyroid ultrasound: Fine-needle aspiration recommended for 4.3 cm right thyroid and 2.6 cm left thyroid solid nodules per SCOTT guidelines.  - TSH 1.6 free T4 0.68.  - TSH could be inappropriately normal for low free T4 due to euthyroid sick syndrome.  - f/u TFTs after shock resolved    MSK  #Renal Osteodystrophy  - on CT: Increased density of the bone marrow is consistent with renal osteodystrophy  - Per endocrine, extent of bone destruction extreme given PTH level, suspect might not be Brown tumors    #RUE Edema  RUE became edematous 1/8 extending down through hand. AVF with drained seroma pocket on R arm.  - lymphedema vs bleeding into seroma pocket? vs DVT   - f/u RUE U/S (ordered)     HEME/ONC  #Hx of DVTs  Current DVT of R IJ, R subclavian and R axillary veins. Hx of L AVF clotting. Given hx of clots, current pruritis and gallium scan uptake in chest wall, reasonable to workup coagulopathy  - c/w heparin gtt @20cc/hr    - f/u antiphospholipid panel/rheumatologic workup (beta2 glycoprotein, anticardiolipin ordered)     #Anemia  Hgb has downtrended from 9.8 on admission to 6.9. Baseline from November appears to be ~13. 1/9 Hb 6.9, ordered 1U pRBC after which pt likely had febrile nonhemolytic transfusion reaction (3 hrs into transfusion, developed tachycardia to 150s, HTN to 140s/90s, rigors, Trectal 101.3.) Transfusion stopped. Tylenol given. Sxs resolved.   - Ferritin elevated: 1209  - Total iron low (15), TIBC low (79)  - s/p 1U pRBC on 1/11 with Hb 6.9 > 7.9 w/o transfusion rxn    #Breast Pain, bilateral  Family hx of breast cancer in mother, sister.   - large R breast mass, palpable L breast mass, R breast has peau d'orange appearance with associated erythema, warmth on R  - US bilateral breasts: No sonographic evidence of breast abscess, cyst or focal mass to correspond with the clinical finding of bilateral breast masses.  - surgery: recc nonurgent outpatient mammogram when clinically stable  - breast bx: granulomatous mastitis    GYN  Uterine mass, 6.1cm mass in R adnexa seen incidentally on CTAP  - Gyn consulted, think unlikely ovarian mass vs cyst is related to systemic dz as it was seen on imaging in 1/2022  - Pt has hx of gyn surgery, unknown if total hysterectomy or partial  -  172 (high), CA-125 196 (high), CEA 6.1 (high)  - TVUS with 6.2cm simple cystic lesion to R ovary, possibly serous cysteadenoma  - F/u PET/CT      Preventative  F: None  E: None, HD pt  N: Renal Restricted Diet  DVT: heparin @19cc/hr   Code Status: Full Code  Dispo: ICU   66 yo F pt with PMH HFrEF (EF 40%), nonobstructive CAD, NICM, HTN, HLD, ESRD 2/2 PCKD on HD, PE (2018) s/p IVC filter, mild AS, mild MR, PAD, OA, h/o thrombus in vascular access x3 (R AVG, R AVF, L AVG), ventral hernia, 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     NEURO  #Pain Control  Pt in 10/10 pain of bilateral breasts, worst on R. Pain responsive to Dilaudid but effect wears off after ~2-3 hours.   - Pain management consulted, recs appreciated  - Changed pain regimen to Dilaudid 0.25mg IVP q4h for severe pain   - Tylenol 650mg po q6h PRN for mild pain  - HOLD ALL OPIOIDS FOR SEDATION, RR<10, O2SAT <93%, SBP <96    PULM  #Atelectasis  Noted on CT chest to have atelectasis at b/l lung bases.  - Incentive spirometry    CARDIOVASCULAR  #Shock  Pt presented meeting 2/4 SIRS. Hypotensive with MAPs to 60, requiring Levophed. Septic picture of unknown source. HR > 90, WBC >12. Lactate 1.9. Afebrile. Procal 12. S/p Vancomycin 1250mg x 1, Aztreonam x 1 (pt allergic to Cefazolin, PCN). . Ferritin 1209  Patient still requiring pressors, unknown source, does not appear to fit septic picture at this point (no fever, BCx negative, no focal area of infection identified thus far)  - On Phenylephrine 1.3mcg/kg/min  - c/w midodrine 30mg q8h   - c/w Florinef 0.1mg qd  - CT:  7.0 cm fluid collection is present near an AV fistula in the right upper arm. Possibly abscess. Drained by IR 1/5 with serosanguinous fluid: no organisms, few WBCs  - F/u vascular recs: wound care to RUE wound (BID dressing changes (once daily with Santyl DSD once daily with Bactroban DSD). Apply light ace wrap compression to right hand and forearm to help with soft tissue swelling)  - f/u surgery recs: for pain/erythema/warmth of R breast: biopsied 1/9; likely granulomatous mastitis, unlikely to cause hypotension; f/u final bx results   - f/u ID recs: c/w Vancomycin to tx presumed R breast cellulitis (1/2-1/15); check vanc level before HD and dose based on level   - BCx: NGTD  - corticotropin stim test wnl    #LVOT with LISETTE  likely i/s/o hyperdynamic LV function 2/2 shock and hypovolemia  - per cardiology, rec repeating TTE after shock resolves    #HFrEF  TTE 11/22 normal LV and systolic, EF 59%, grade II diastolic dysfuncton, dilated RV, reduced RV,   Home meds: Entresto 49/51mg  - hold Entresto i/s/o shock    #CAD  Hx of cardiac cath in 2018  Home meds: atorvastatin 40mg, plavix 75mg qd  - c/w home atorvastatin 40mg  - restart home Plavix 75mg qd iso stable Hb    GI  #Constipation  - c/w senna 2 tablets/qd and miralax    #Nutrition  Patient reports poor PO intake  -c/w nephro-deborah     #Abdominal Pain  C/o vague abdominal pain, a/w bilious emesis x 2, no nausea or further episodes of emesis   (Resolved)    RENAL  #ESRD 2/2 PCKD  Pt has been on HD for "years" 2/2 PCKD. AV Fistula of EDWIN clotted in 2014, has received HD thru HD cath since. New AV Fistula in 11/2022, not yet matured.   - f/u nephrology recs  - s/p HD 1/10; possible HD tomorrow 1/13.    #Hyperkalemia  Patient with Hx of ESRD with missed HD presents with K >8 on VBG with abdominal symptoms and missed HD x8 days. ICU consulted i/s/o hyperkalemia. Renal consulted.  - s/p HD 1/10  - monitor with BMP's qd    #Hyperphosphatemia  - c/w sevelamer to 1600mg TID    ID  Pt presented in septic shock with leukocytosis, tachycardia, hypotension requiring pressors. Unknown source.   - WBC 15 -> 7  - see #shock for plan  - f/u ID recs  - gallium scan: faintly increased activity surrounding the entire lower portion of the right breast consistent with the inflammatory reaction seen on physical examination, small area of activity approximately 10 cm to the right of midline at about the level of the lower border of the sternum, activity likely to be in chest wall   - c/w vancomycin, dosed by level, before HD, x2 weeks (last day 1/15)  - PET/CT performed, f/u read    ENDO  #Hyperparathyroidism  At risk for secondary HPTT d/t renal failure   - pt has vague abdominal pain with nausea  - multiple brown tumors on CT with multiple fractures of pubic rami, pubic bone, thoracolumbar spine  - intact , Vit D 38.5  - SPEP/immunofixation negative   - endocrinology recs: secondary hyperparathyroidism vs other process causing osteoclastic tumors, obtain PET/CT  - c/w Sensipar 30mg qd per endo recs (started 1/9)  - 1/11 AM intact  (baseline PTH on Sensipar )    #Multinodular Goiter  CT: enlarged multinodular thyroid projecting into superior mediastinum with 1.8 x 1.5 cm solid nodule slightly inferiorly in the anterior mediastinum.   - Thyroid ultrasound: Fine-needle aspiration recommended for 4.3 cm right thyroid and 2.6 cm left thyroid solid nodules per SCOTT guidelines.  - TSH 1.6 free T4 0.68.  - TSH could be inappropriately normal for low free T4 due to euthyroid sick syndrome.  - f/u TFTs after shock resolved    MSK  #Renal Osteodystrophy  - on CT: Increased density of the bone marrow is consistent with renal osteodystrophy  - Per endocrine, extent of bone destruction extreme given PTH level, suspect might not be Brown tumors    #RUE Edema  RUE became edematous 1/8 extending down through hand. AVF with drained seroma pocket on R arm.  - lymphedema vs bleeding into seroma pocket? vs DVT   - f/u RUE U/S (ordered)     #R foot pain  Pt c/o R foot pain, possibly gout.   - Start colchicine 0.3mg qd    HEME/ONC  #Hx of DVTs  Current DVT of R IJ, R subclavian and R axillary veins. Hx of L AVF clotting. Given hx of clots, current pruritis and gallium scan uptake in chest wall, reasonable to workup coagulopathy  - c/w heparin gtt @20cc/hr    - f/u antiphospholipid panel/rheumatologic workup (beta2 glycoprotein, anticardiolipin ordered)     #Anemia  Hgb has downtrended from 9.8 on admission to 6.9. Baseline from November appears to be ~13. 1/9 Hb 6.9, ordered 1U pRBC after which pt likely had febrile nonhemolytic transfusion reaction (3 hrs into transfusion, developed tachycardia to 150s, HTN to 140s/90s, rigors, Trectal 101.3.) Transfusion stopped. Tylenol given. Sxs resolved.   - Ferritin elevated: 1209  - Total iron low (15), TIBC low (79)  - s/p 1U pRBC on 1/11 with Hb 6.9 > 7.9 w/o transfusion rxn    #Breast Pain, bilateral  Family hx of breast cancer in mother, sister.   - large R breast mass, palpable L breast mass, R breast has peau d'orange appearance with associated erythema, warmth on R  - US bilateral breasts: No sonographic evidence of breast abscess, cyst or focal mass to correspond with the clinical finding of bilateral breast masses.  - surgery: recc nonurgent outpatient mammogram when clinically stable  - breast bx: granulomatous mastitis    GYN  Uterine mass, 6.1cm mass in R adnexa seen incidentally on CTAP  - Gyn consulted, think unlikely ovarian mass vs cyst is related to systemic dz as it was seen on imaging in 1/2022  - Pt has hx of gyn surgery, unknown if total hysterectomy or partial  -  172 (high), CA-125 196 (high), CEA 6.1 (high)  - TVUS with 6.2cm simple cystic lesion to R ovary, possibly serous cysteadenoma  - F/u PET/CT      Preventative  F: None  E: None, HD pt  N: Renal Restricted Diet  DVT: heparin @20cc/hr   Code Status: Full Code  Dispo: ICU

## 2023-01-12 NOTE — PROGRESS NOTE ADULT - ASSESSMENT
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      - F/U final right breast biopsy pathology -- preliminary result shows granuloma, possibly consistent with granulomatous mastitis. This entity would not be expected to cause hypotension, so would consider alternate possibilities. Will discuss case with pathology today.   - For management of probable granulomatous mastitis, would follow-up rheumatology recommendations later today. Discussed case with rheumatology yesterday.   - F/U Cx results (NGTD so far). ABx as per primary team.  - Appreciate GYN recommendations for adnexal mass and elevated tumor markers.   - Wound care for access site ulceration as per vascular surgery.   - PET-CT -- F/u final read.  - Surgery 5C following.

## 2023-01-12 NOTE — PROGRESS NOTE ADULT - SUBJECTIVE AND OBJECTIVE BOX
SUBJECTIVE: Patient seen and examined bedside; no events overnight, feeling okay this morning, tolerated well dressing changes.    heparin  Infusion 2000 Unit(s)/Hr IV Continuous <Continuous>  midodrine 30 milliGRAM(s) Oral every 8 hours  phenylephrine    Infusion 0.1 MICROgram(s)/kG/Min IV Continuous <Continuous>      Vital Signs Last 24 Hrs  T(C): 36.7 (12 Jan 2023 06:03), Max: 37 (12 Jan 2023 01:05)  T(F): 98 (12 Jan 2023 06:03), Max: 98.6 (12 Jan 2023 01:05)  HR: 64 (12 Jan 2023 08:00) (64 - 106)  BP: --  BP(mean): --  RR: 22 (12 Jan 2023 08:00) (10 - 29)  SpO2: 91% (12 Jan 2023 08:00) (91% - 99%)    Parameters below as of 12 Jan 2023 08:00  Patient On (Oxygen Delivery Method): room air      I&O's Detail    11 Jan 2023 07:01  -  12 Jan 2023 07:00  --------------------------------------------------------  IN:    Heparin: 38 mL    Heparin: 285 mL    Heparin: 140 mL    Phenylephrine: 923.8 mL    PRBCs (Packed Red Blood Cells): 300 mL  Total IN: 1686.8 mL    OUT:  Total OUT: 0 mL    Total NET: 1686.8 mL      PE:    General: NAD, resting comfortably in bed  C/V: S1 s2, RRR  Pulm: Nonlabored breathing, no respiratory distress  Abd: Soft, NTND  Extrem: WWP; Right wound unchanged, continues to be clean and pink, no obvious signs of bleeding or infection, rewrapped with kerlix        LABS:                        7.9    6.79  )-----------( 133      ( 12 Jan 2023 05:18 )             25.0     01-12    132<L>  |  98  |  35<H>  ----------------------------<  74  4.6   |  22  |  6.04<H>    Ca    7.8<L>      12 Jan 2023 05:18  Phos  5.2     01-12  Mg     1.6     01-12    TPro  5.5<L>  /  Alb  2.3<L>  /  TBili  0.3  /  DBili  x   /  AST  18  /  ALT  9<L>  /  AlkPhos  65  01-12    PT/INR - ( 11 Jan 2023 16:27 )   PT: 13.6 sec;   INR: 1.14          PTT - ( 12 Jan 2023 05:18 )  PTT:59.6 sec      RADIOLOGY & ADDITIONAL STUDIES:

## 2023-01-12 NOTE — PROGRESS NOTE ADULT - SUBJECTIVE AND OBJECTIVE BOX
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      EVENTS:   - Biopsy taken uneventfully at bedside 1/9/23- preliminary pathology showed granulomas, pending final report.  - PET-CT (pending read) and Pelvic US done yesterday. CEA//CA-125 all elevated.  - Dialysis 1/10 uneventful.   - Phenylephrine gtt at lower rates this morning    SUBJECTIVE:   - Notes no new symptoms and feels fair this morning  - Denies CP, SOB  - Denies worsening right breast pain or new discharge.      MEDICATIONS  (STANDING):  atorvastatin 40 milliGRAM(s) Oral at bedtime  chlorhexidine 2% Cloths 1 Application(s) Topical <User Schedule>  cinacalcet 30 milliGRAM(s) Oral daily  collagenase Ointment 1 Application(s) Topical daily  fludroCORTISONE 0.1 milliGRAM(s) Oral daily  heparin  Infusion 2000 Unit(s)/Hr (20 mL/Hr) IV Continuous <Continuous>  midodrine 30 milliGRAM(s) Oral every 8 hours  Nephro-deborah 1 Tablet(s) Oral daily  phenylephrine    Infusion 0.1 MICROgram(s)/kG/Min (1.6 mL/Hr) IV Continuous <Continuous>  polyethylene glycol 3350 17 Gram(s) Oral every 12 hours  senna 2 Tablet(s) Oral at bedtime  sevelamer carbonate 1600 milliGRAM(s) Oral three times a day with meals    MEDICATIONS  (PRN):  acetaminophen     Tablet .. 650 milliGRAM(s) Oral every 6 hours PRN Temp greater or equal to 38C (100.4F), Moderate Pain (4 - 6)  calamine/zinc oxide Lotion 1 Application(s) Topical three times a day PRN Itching  HYDROmorphone  Injectable 0.25 milliGRAM(s) IV Push every 4 hours PRN Severe Pain (7 - 10)      Vital Signs Last 24 Hrs  T(C): 36.7 (12 Jan 2023 06:03), Max: 37 (12 Jan 2023 01:05)  T(F): 98 (12 Jan 2023 06:03), Max: 98.6 (12 Jan 2023 01:05)  HR: 71 (12 Jan 2023 07:00) (67 - 106)  BP: --  BP(mean): --  RR: 21 (12 Jan 2023 07:00) (10 - 29)  SpO2: 98% (12 Jan 2023 07:00) (91% - 99%)    Parameters below as of 12 Jan 2023 07:00  Patient On (Oxygen Delivery Method): room air      Neurologic: AAOx3; moving all extremities equally.   CV: Normal rate, regular rhythm  Pulm: Breathing comfortably  Chest: Non-soiled gauze overlying incisional biopsy site on right breast. mild induration surrounding biopsy site, stable.  Abd: Soft, non-distended; No TTP throughout.   : No Wu  Skin: Ulceration on medial aspect of right arm over access site. No edema/induration surrounding left chest HD Catheter.  Extremities: No edema.  Psychiatric: Interacting normally.     I&O's Detail    11 Jan 2023 07:01  -  12 Jan 2023 07:00  --------------------------------------------------------  IN:    Heparin: 38 mL    Heparin: 285 mL    Heparin: 140 mL    Phenylephrine: 923.8 mL    PRBCs (Packed Red Blood Cells): 300 mL  Total IN: 1686.8 mL    OUT:  Total OUT: 0 mL    Total NET: 1686.8 mL          LABS:                        7.9    6.79  )-----------( 133      ( 12 Jan 2023 05:18 )             25.0     01-12    132<L>  |  98  |  35<H>  ----------------------------<  74  4.6   |  22  |  6.04<H>    Ca    7.8<L>      12 Jan 2023 05:18  Phos  5.2     01-12  Mg     1.6     01-12    TPro  5.5<L>  /  Alb  2.3<L>  /  TBili  0.3  /  DBili  x   /  AST  18  /  ALT  9<L>  /  AlkPhos  65  01-12    PT/INR - ( 11 Jan 2023 16:27 )   PT: 13.6 sec;   INR: 1.14          PTT - ( 12 Jan 2023 05:18 )  PTT:59.6 sec      RADIOLOGY & ADDITIONAL STUDIES:

## 2023-01-12 NOTE — ADVANCED PRACTICE NURSE CONSULT - RECOMMEDATIONS
Re-consult [facility] if needed. 
REcommend hydrogel to bilat breasts over peeling areas as well as to dehiscence to right upper arm and right hand/forearm. Spoke with MIKE Rivera and house staff.

## 2023-01-12 NOTE — OCCUPATIONAL THERAPY INITIAL EVALUATION ADULT - PERTINENT HX OF CURRENT PROBLEM, REHAB EVAL
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 Complaining of crampy abdominal pain and watery diarrhea (2-3 BMs/day), now with nausea and vomiting prior to arrival.

## 2023-01-12 NOTE — CONSULT NOTE ADULT - TIME BILLING
as noted above
Evaluation of hyperparathyroidism
Exploration of GOC including advanced directives such as HCP designation and code status.

## 2023-01-12 NOTE — PROGRESS NOTE ADULT - ASSESSMENT
67 F with ESRD on HD presented for missed HD found to be hyperkalemic c/b septic shock of unknown etiology, pending w/u for granulomatous mastitis    Assessment/Plan:   #ESRD on HD TTS  Will consider Hd on 1/13 w/ pressor support  Electrolytes noted K 4.6, Sodium 132  UF with HD  Renal diet    #Hx of Hypertension  on midodrine and phenylephrine  - 0UF with HD    #access   [facility] TDC - does not appear to be infected or source of infxn  RUE AVF not being used    #anemia  Hgb 7.9  -Epo w/ HD  -Iron profile noted    #renal bone disease   Noted to have brown tumors on most recent CT, in addition to a lytic lesion of her skull. Likely due to hyperparathyroidism. PTH noted to be 479 on most recent check.   Ca ~8.9 corrected for albumin  Phos ~5.2  -c/w sevelamer to 1600mg tid w/ meals  -C/w sensipar 30mg Qday

## 2023-01-12 NOTE — OCCUPATIONAL THERAPY INITIAL EVALUATION ADULT - ADDITIONAL COMMENTS
Pt lives w/ her children in apt w/ 1 stair to enter. Pt states that she was independent prior to admission. Pt has HHA 5hrs/4 days/wk. Pt states that she has a cane, rollator, raised toilet seat and shower chair.

## 2023-01-12 NOTE — ADVANCED PRACTICE NURSE CONSULT - ASSESSMENT
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 Patient presented with bilateral breast pain with large R breast mass and palpable L breast mass. R breast has peau d'orange appearance with associated erythema, warmth. Patient with family hx of breast cancer in mother, sister. US bilateral breasts: No sonographic evidence of breast abscess, cyst or focal mass to correspond with the clinical finding of bilateral breast masses. Surgery was consulted and recommend nonurgent outpatient mammogram when clinically stable. 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 Patient still complaining of pain that is not well managed. Bilat breasts with scattering of peeling epidermis around nipple area and below both breasts, larger area of exposed dermis noted on left breast. No drainage, pt held each breast up while dressing change was performed. Xeroform applied to both breasts and covered with ABD pads, but patient soon stated that the dressing was causing her pain. Spoke with MIKE Rivera and different dressing was decided on. Pt also with 2 de-roofed blisters, one over dorsum of right hand and the other over right forearm. The de-roofed blisters are now turning into eschar. Hydrogel was applied and sites covered with dry dressings. Small area of dehiscence at inside of right upper arm, hydrogel also applied to this area.

## 2023-01-12 NOTE — CONSULT NOTE ADULT - PROBLEM SELECTOR RECOMMENDATION 3
Patient with longstanding history of ESRD 2/2 PCKD. AV Fistula of EDWIN clotted in 2014, has received HD thru HD cath since. New AV Fistula in 11/2022, not yet matured. Presented to hospital on this admission 2/2 >1 week of missed HD.   - Renal consulted and following, patient getting dialyzed in house.   - s/p HD 1/10. Goal net even

## 2023-01-12 NOTE — PHYSICAL THERAPY INITIAL EVALUATION ADULT - ADDITIONAL COMMENTS
pt lives on the first floor of an apartment building w/ 1 step to enter. States that she typically walks short distances w/ use of rollator, but also owns a SC and WC. she lives w/ her children who are able to assist if needed and has a home health aide 5hrs x5 days

## 2023-01-12 NOTE — PROGRESS NOTE ADULT - ASSESSMENT
67F h/o ESRD due to PCKD on HD via HD cath (failedvascular access due to thrombus x 3), CAD, HTN, HLD, PE s/p IVC filter, PAD, OA, osteoclastoma, ventral hernia p/w septic shock of unknown source.  Infectious work-up so far unremarkable - fluid collection near AVF likely seroma and culture ngtd, gallium scan negative, BCx ngtd.  Patient c/o breast pain but no erythema on exam, and unlikely breast ca per team. Never febrile and without leukocytosis, meropenem has been dced . Still on phenylephrine.     - cont vanco for now until 1/15/23 (2 week course).  Check vanc level before HD.   If <10, would give 1 g, 10-17.5 give 750 mg, 17.6-25, give 500 mg after HD, >25 Hold Vancomycin. Please give Vancomycin after HD is completed   - Possible that etiology of her hypotension is not infectious, unclear why she still need jarvis  - FU PET CT    ID Team 1 will follow with you. Recommendations not final until attending attestation present.

## 2023-01-12 NOTE — PROGRESS NOTE ADULT - SUBJECTIVE AND OBJECTIVE BOX
--------------------------------------------------------------------------------  Chief Complaint: ESRD/Ongoing hemodialysis requirement    24 hour events/subjective:    Seen this morning, no acute complaints voiced. SBP remains in the 80's despite on phenylephrine. Discussed with team based on labs does not have an overt indication for HD today. However requires increase in pressors to allow for HD    PAST HISTORY  --------------------------------------------------------------------------------  No significant changes to PMH, PSH, FHx, SHx, unless otherwise noted    ALLERGIES & MEDICATIONS  --------------------------------------------------------------------------------  Allergies    Ancef (Rash; Urticaria)  DDAVP (Hypotension)  iodine (Hives; Pruritus)  penicillin (Swelling)  sulfa drugs (Angioedema)    Intolerances      Standing Inpatient Medications  atorvastatin 40 milliGRAM(s) Oral at bedtime  chlorhexidine 2% Cloths 1 Application(s) Topical <User Schedule>  cinacalcet 30 milliGRAM(s) Oral daily  clopidogrel Tablet 75 milliGRAM(s) Oral daily  colchicine 0.3 milliGRAM(s) Oral daily  collagenase Ointment 1 Application(s) Topical daily  fludroCORTISONE 0.1 milliGRAM(s) Oral daily  heparin  Infusion 2000 Unit(s)/Hr IV Continuous <Continuous>  midodrine 30 milliGRAM(s) Oral every 8 hours  Nephro-deborah 1 Tablet(s) Oral daily  phenylephrine    Infusion 0.1 MICROgram(s)/kG/Min IV Continuous <Continuous>  polyethylene glycol 3350 17 Gram(s) Oral every 12 hours  senna 2 Tablet(s) Oral at bedtime  sevelamer carbonate 1600 milliGRAM(s) Oral three times a day with meals    PRN Inpatient Medications  acetaminophen     Tablet .. 650 milliGRAM(s) Oral every 6 hours PRN  calamine/zinc oxide Lotion 1 Application(s) Topical three times a day PRN  HYDROmorphone  Injectable 0.25 milliGRAM(s) IV Push every 4 hours PRN      REVIEW OF SYSTEMS  --------------------------------------------------------------------------------  All other systems were reviewed and are negative, except as noted.    VITALS/PHYSICAL EXAM  --------------------------------------------------------------------------------  T(C): 36.9 (01-12-23 @ 11:33), Max: 37 (01-12-23 @ 01:05)  HR: 78 (01-12-23 @ 11:00) (64 - 106)  BP: --  RR: 21 (01-12-23 @ 11:00) (11 - 29)  SpO2: 93% (01-12-23 @ 11:00) (91% - 98%)  Wt(kg): --  Drug Dosing Weight  Height (cm): 177.8 (01 Jan 2023 20:52)  Weight (kg): 85.3 (01 Jan 2023 20:52)  BMI (kg/m2): 27 (01 Jan 2023 20:52)  BSA (m2): 2.03 (01 Jan 2023 20:52)    01-11-23 @ 07:01  -  01-12-23 @ 07:00  --------------------------------------------------------  IN: 1686.8 mL / OUT: 0 mL / NET: 1686.8 mL    PHYSICAL EXAM:  GENERAL: NAD resting in bed   CHEST/LUNG: Clear to auscultation bilaterally; no accessory muscle use   HEART: normal S1S2, RRR  ABDOMEN: Soft, Nontender, +BS,   EXTREMITIES: No clubbing, cyanosis, or edema   ACCESS: R TD    LABS/STUDIES  --------------------------------------------------------------------------------              7.9    6.79  >-----------<  133      [01-12-23 @ 05:18]              25.0     132  |  98  |  35  ----------------------------<  74      [01-12-23 @ 05:18]  4.6   |  22  |  6.04        Ca     7.8     [01-12-23 @ 05:18]      Mg     1.6     [01-12-23 @ 05:18]      Phos  5.2     [01-12-23 @ 05:18]    TPro  5.5  /  Alb  2.3  /  TBili  0.3  /  DBili  x   /  AST  18  /  ALT  9   /  AlkPhos  65  [01-12-23 @ 05:18]    PT/INR: PT 13.6 , INR 1.14       [01-11-23 @ 16:27]  PTT: 59.6       [01-12-23 @ 05:18]    Uric acid 3.9      [01-11-23 @ 04:35]        [01-11-23 @ 04:35]    Iron 15, TIBC 79, %sat 19      [01-07-23 @ 06:01]  Ferritin 1209      [01-07-23 @ 06:01]  PTH -- (Ca 7.6)      [01-11-23 @ 04:35]   532  PTH -- (Ca 8.8)      [01-03-23 @ 14:24]   479  Vitamin D (25OH) 38.5      [01-05-23 @ 05:30]  TSH 1.610      [01-03-23 @ 05:30]    HBsAb Nonreact      [01-02-23 @ 02:26]  HBsAg Nonreact      [01-02-23 @ 02:26]  HCV 0.04, Nonreact      [01-02-23 @ 02:26]      RADIOLOGY:  --------------------------------------------------------------------------------------

## 2023-01-12 NOTE — PROGRESS NOTE ADULT - SUBJECTIVE AND OBJECTIVE BOX
INTERVAL HPI/OVERNIGHT EVENTS:  Pt received 1U pRBC with no transfusion reaction. PTT subtherapeutic, increased rate to 20cc/hr.     SUBJECTIVE: Patient seen and examined at bedside. Pt reports right leg pain, denies pain elsewhere currently. States she has intermittent breast pain and itchiness, but does not have it currently. No CP, dyspnea, abd pain, N/V/D.     VITAL SIGNS:  ICU Vital Signs Last 24 Hrs  T(C): 36.8 (12 Jan 2023 14:50), Max: 37 (12 Jan 2023 01:05)  T(F): 98.2 (12 Jan 2023 14:50), Max: 98.6 (12 Jan 2023 01:05)  HR: 66 (12 Jan 2023 15:00) (64 - 87)  BP: --  BP(mean): --  ABP: 93/40 (12 Jan 2023 15:00) (83/37 - 101/44)  ABP(mean): 61 (12 Jan 2023 15:00) (55 - 93)  RR: 14 (12 Jan 2023 15:00) (6 - 29)  SpO2: 94% (12 Jan 2023 15:00) (91% - 98%)    O2 Parameters below as of 12 Jan 2023 11:00  Patient On (Oxygen Delivery Method): room air              01-11 @ 07:01 - 01-12 @ 07:00  --------------------------------------------------------  IN: 1686.8 mL / OUT: 0 mL / NET: 1686.8 mL    01-12 @ 07:01 - 01-12 @ 15:44  --------------------------------------------------------  IN: 322.1 mL / OUT: 0 mL / NET: 322.1 mL      CAPILLARY BLOOD GLUCOSE      POCT Blood Glucose.: 87 mg/dL (12 Jan 2023 12:51)      PHYSICAL EXAM:  Constitutional: middle-aged female with obese body habitus resting in bed  HEENT: no conjunctivitis or scleral icterus, oral mucosa moist  NECK: Supple, no JVD, palpable multinodular goiter  CARDIAC: RRR, +S1/S2, no murmurs/rubs/gallops  PULM: Breathing comfortably on RA, clear to auscultation bilaterally, no wheezes/rales/rhonchi, +TDC to R chest wall  ABDOMEN: Soft, obese, nontender to palpation, +bs, no rebound tenderness or fluid wave, non-reducible ventral hernia  BREAST: palpable masses to b/l breasts; b/l breasts with peeling epidermis to lower aspect of breasts, no drainage  SKIN: b/l arms warm to touch; R hand slightly cold to touch; 3cm dehisced wound, nonpurulent, nonerythematous, to R medial upper arm near axilla  VASC:  no LE edema or tenderness; +triple lumen catheter at R groin, +L axillary arterial line  EXT: RUE edema, slightly tense, non erythematous, comparable to yesterday    MEDICATIONS:  MEDICATIONS  (STANDING):  atorvastatin 40 milliGRAM(s) Oral at bedtime  chlorhexidine 2% Cloths 1 Application(s) Topical <User Schedule>  cinacalcet 30 milliGRAM(s) Oral daily  clopidogrel Tablet 75 milliGRAM(s) Oral daily  colchicine 0.3 milliGRAM(s) Oral daily  collagenase Ointment 1 Application(s) Topical daily  fludroCORTISONE 0.1 milliGRAM(s) Oral daily  heparin  Infusion 2000 Unit(s)/Hr (20 mL/Hr) IV Continuous <Continuous>  midodrine 30 milliGRAM(s) Oral every 8 hours  Nephro-deborah 1 Tablet(s) Oral daily  phenylephrine    Infusion 0.1 MICROgram(s)/kG/Min (1.6 mL/Hr) IV Continuous <Continuous>  polyethylene glycol 3350 17 Gram(s) Oral every 12 hours  senna 2 Tablet(s) Oral at bedtime  sevelamer carbonate 1600 milliGRAM(s) Oral three times a day with meals    MEDICATIONS  (PRN):  acetaminophen     Tablet .. 650 milliGRAM(s) Oral every 6 hours PRN Temp greater or equal to 38C (100.4F), Moderate Pain (4 - 6)  calamine/zinc oxide Lotion 1 Application(s) Topical three times a day PRN Itching  HYDROmorphone  Injectable 0.25 milliGRAM(s) IV Push every 4 hours PRN Severe Pain (7 - 10)      ALLERGIES:  Allergies    Ancef (Rash; Urticaria)  DDAVP (Hypotension)  iodine (Hives; Pruritus)  penicillin (Swelling)  sulfa drugs (Angioedema)    Intolerances        LABS:                        7.9    6.79  )-----------( 133      ( 12 Jan 2023 05:18 )             25.0     01-12    132<L>  |  98  |  35<H>  ----------------------------<  74  4.6   |  22  |  6.04<H>    Ca    7.8<L>      12 Jan 2023 05:18  Phos  5.2     01-12  Mg     1.6     01-12    TPro  5.5<L>  /  Alb  2.3<L>  /  TBili  0.3  /  DBili  x   /  AST  18  /  ALT  9<L>  /  AlkPhos  65  01-12    PT/INR - ( 11 Jan 2023 16:27 )   PT: 13.6 sec;   INR: 1.14          PTT - ( 12 Jan 2023 12:53 )  PTT:72.1 sec      RADIOLOGY & ADDITIONAL TESTS: Reviewed.

## 2023-01-12 NOTE — OCCUPATIONAL THERAPY INITIAL EVALUATION ADULT - MD ORDER
Septic shock from possible fluid collection near RUE AVF now s/p drainage with IR on 1/5.  Renal Bone disease  Pending w/u for granulomatous mastitis Septic shock from possible fluid collection near RUE AVF now s/p drainage with IR on 1/5.  Renal Bone disease, ESRD (on HD)  Pending w/u for granulomatous mastitis

## 2023-01-12 NOTE — CONSULT NOTE ADULT - SUBJECTIVE AND OBJECTIVE BOX
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 Complaining of crampy abdominal pain and waterry diarrhea (2-3 BMs/day), now with nausea and vomiting prior to arrival. Has not gone to HD since 12/24 (8 days ago). Denies fever, chills, CP, SOB.       In the ED:  Initial vital signs: T: 97.5 F, HR: 108, BP: 109/86, R: 16, SpO2: 100% on RA  ED course:   Labs: significant for WBC 15.76, H/H 9.8/32.0, Plt 309, Na 131, K 7.8, Cl 84, AG 31, BUN/Cr 169/17.58  EKG: peaked T waves  Medications: calcium gluconate, dilaudid 0.5 x1, regular insulin 5, morphine 2mg IV, sodium bicarb, dextrose, albuterol 2.5x3  Consults: none    (02 Jan 2023 05:26)    PERTINENT PM/SXH:   HTN (hypertension)    HLD (hyperlipidemia)    CAD (coronary atherosclerotic disease)    ESRD on dialysis    H/O ventral hernia    Brown tumor    DVT, lower extremity      Stented coronary artery    History of surgery    AVF (arteriovenous fistula)    S/P AIDEN-BSO    History of surgery    History of atherectomy      FAMILY HISTORY:  No pertinent family history in first degree relatives    No history of  in first degree relatives according to chart  Unable to obtain due to patient's encephalopathy    ITEMS NOT CHECKED ARE NOT PRESENT  SOCIAL HISTORY:   Significant other/partner:  []  Children:  []   Substance hx:  []   Tobacco hx:  []   Alcohol hx: []   Home Opioid hx:  [] I-Stop Reference No:  - no active Rx's / see chart note  Living Situation: []Home  []Long term care  []Rehab []Other  Anglican/Spiritual practice: ; Role of organized Confucianist [ ] important [ ] some [ ] unable to assess  Coping: [] well [] with difficulty [] poor coping [] unable to assess  Support system: [] strong [] adequate [] inadequate    ADVANCE DIRECTIVES:    []MOLST  []Living Will  DECISION MAKER(s):  [] Health Care Proxy(s)  [] Surrogate(s)  [] Guardian           Name(s)/Phone Number(s):     BASELINE (I)ADLs (prior to admission):  [place]: []Total  [] Moderate []Dependent    ALLERGIES:  Ancef (Rash; Urticaria)  DDAVP (Hypotension)  iodine (Hives; Pruritus)  penicillin (Swelling)  sulfa drugs (Angioedema)    MEDICATIONS  (STANDING):  acetaminophen   IVPB .. 1000 milliGRAM(s) IV Intermittent once  atorvastatin 40 milliGRAM(s) Oral at bedtime  chlorhexidine 2% Cloths 1 Application(s) Topical <User Schedule>  cinacalcet 30 milliGRAM(s) Oral daily  collagenase Ointment 1 Application(s) Topical daily  fludroCORTISONE 0.1 milliGRAM(s) Oral daily  heparin  Infusion 2000 Unit(s)/Hr (20 mL/Hr) IV Continuous <Continuous>  midodrine 30 milliGRAM(s) Oral every 8 hours  Nephro-deborah 1 Tablet(s) Oral daily  phenylephrine    Infusion 0.1 MICROgram(s)/kG/Min (1.6 mL/Hr) IV Continuous <Continuous>  polyethylene glycol 3350 17 Gram(s) Oral every 12 hours  senna 2 Tablet(s) Oral at bedtime  sevelamer carbonate 1600 milliGRAM(s) Oral three times a day with meals    MEDICATIONS  (PRN):  acetaminophen     Tablet .. 650 milliGRAM(s) Oral every 6 hours PRN Temp greater or equal to 38C (100.4F), Moderate Pain (4 - 6)  calamine/zinc oxide Lotion 1 Application(s) Topical three times a day PRN Itching  HYDROmorphone  Injectable 0.25 milliGRAM(s) IV Push every 4 hours PRN Severe Pain (7 - 10)    Analgesic Use (Scheduled and PRNs) for past 24 hours:    sevelamer carbonate   1600 milliGRAM(s) Oral (01-12-23 @ 10:39)   1600 milliGRAM(s) Oral (01-11-23 @ 19:05)      PRESENT SYMPTOMS/REVIEW OF SYSTEMS: []Unable to obtain due to poor mentation/encephalopathy  Source if other than patient:  []Family   []Team     Pain: [] yes [] no  QOL Impact -   Location -                    Aggravating Factors -  Quality -  Radiation -  Timing -  Severity (0-10 scale) -   Minimal Acceptable Level (0-10 scale) -    CPOT Score:  (Pain Assessment Scale for Critically Ill)    PAIN AD Score:  (Nonverbal Pain Assessment Scale)    Dyspnea:                           []Mild  []Moderate []Severe  Anxiety:                             []Mild []Moderate []Severe  Fatigue:                             []Mild []Moderate []Severe  Nausea:                             []Mild []Moderate []Severe  Loss of Appetite:              []Mild []Moderate []Severe  Constipation:                    []Mild []Moderate []Severe    Other Symptoms:  [x]All Other Review Of Systems Negative     Palliative Performance Status Version 2:  %  (Functional Assessment Tool)    PHYSICAL EXAM:  GENERAL:  []Alert  []Oriented x   []Lethargic  []Cachexia  []Unarousable  []Verbal  []Non-Verbal  Behavioral:   []Anxiety  []Delirium []Agitation []Cooperative  HEENT:  []Normal   []Dry mouth   []ET Tube/Trach  []Oral lesions  PULMONARY:   []Clear []Tachypnea  []Audible excessive secretions  []Normal Work of Breathing []Labored Breathing  []Rhonchi []Crackles []Wheezing  CARDIOVASCULAR:    []Regular Rate & Rhythm []Irregular []Tachy  []Arjun  GASTROINTESTINAL:  []Soft  []Distended   []+BS  []Non tender []Tender  []PEG []OGT/ NGT  Last BM:  GENITOURINARY:  []Normal [] Incontinent   []Oliguria/Anuria   []Wu  MUSCULOSKELETAL:   []Normal   []Weakness  []Bed/Wheelchair bound []Edema  NEUROLOGIC:   []No focal deficits  []Cognitive impairment  []Dysphagia []Dysarthria []Paresis []Encephalopathic  SKIN:   []Normal   []Pressure ulcer(s)  []Rash    CRITICAL CARE:  []Shock Present  []Septic []Cardiogenic []Neurologic []Hypovolemic  []Vasopressors []Inotropes   []Respiratory failure present []Mechanical Ventilation []Non-invasive ventilatory support []High-Flow  []Acute  []Chronic []Hypoxic  []Hypercarbic  []Other organ failure    Vital Signs Last 24 Hrs  T(C): 36.7 (12 Jan 2023 06:03), Max: 37 (12 Jan 2023 01:05)  T(F): 98 (12 Jan 2023 06:03), Max: 98.6 (12 Jan 2023 01:05)  HR: 78 (12 Jan 2023 11:00) (64 - 106)  BP: --  BP(mean): --  RR: 21 (12 Jan 2023 11:00) (11 - 29)  SpO2: 93% (12 Jan 2023 11:00) (91% - 98%)    Parameters below as of 12 Jan 2023 11:00  Patient On (Oxygen Delivery Method): room air        LABS:                        7.9    6.79  )-----------( 133      ( 12 Jan 2023 05:18 )             25.0   01-12    132<L>  |  98  |  35<H>  ----------------------------<  74  4.6   |  22  |  6.04<H>    Ca    7.8<L>      12 Jan 2023 05:18  Phos  5.2     01-12  Mg     1.6     01-12    TPro  5.5<L>  /  Alb  2.3<L>  /  TBili  0.3  /  DBili  x   /  AST  18  /  ALT  9<L>  /  AlkPhos  65  01-12    RADIOLOGY & ADDITIONAL STUDIES:      REFERRALS:  [x]Social Work  []Case management []PT/OT []Chaplaincy  []Hospice  []Patient/Family Support []Massage Therapy []Music Therapy    DISCUSSION OF CASE: Family - to obtain additional history and to provide emotional support;  -  HPI:  HPI  67F with PMH HFrEF (EF 40% --> now 75%), nonobstructive CAD, NICM, HTN, HLD, ESRD 2/2 PCKD on HD, PE (2018) s/p IVC filter, mild AS, mild MR, PAD, OA, h/o thrombus in vascular access x3 (R AVG, R AVF, L AVG), ventral hernia, 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 In ED pt was found to be hyperkalemic with EKG changes and meeting 2/4 SIRS criteria and requiring pressors and admitted to MICU for emergent hemodialysis. Initially thought to be in septic shock however unable to locate source, s/p IR drainage of seroma at site of R AV fistula. Surgery consulted for severe b/l breast pain and skin changes concerning for peu d'orange as well as b/l palpable breast masses and R axillary LAD. Surgery believes changes likely 2/2 venous congestion rather than malignancy given no evidence of cyst or focal mass on ultrasound, biopsy performed. Endocrine consulted for multiple brown tumors on CT w/ multiple fractures of pubic rami, pubic bone, thoracolumbar spine, and increased density of bone marrow consistent with renal osteodystrophy. Gallium scan performed significant for faintly increased activity surrounding the entire lower portion of the right breast consistent with the inflammatory reaction seen on physical examination, small area of activity approximately 10 cm to the right of midline at about the level of the lower border of the sternum, activity likely to be in chest wall. Given abnormal findings on gallium scan, decision to proceed with PET/CT for malignancy evaluation. Hospital course thus far has been significant for extensive DVT from R IJ to R subclavia and R axilla as well as continued pressor requirements. CT on admission showed adnexal mass, gyn consulted for that, recommended u/s and labs and no acute intervention. Alert triggered for palliative care to see patient given extensive history and length of stay. GOC conversation initiated.     PERTINENT PM/SXH:   HTN (hypertension)    HLD (hyperlipidemia)    CAD (coronary atherosclerotic disease)    ESRD on dialysis    H/O ventral hernia    Brown tumor    DVT, lower extremity      Stented coronary artery    History of surgery    AVF (arteriovenous fistula)    S/P AIDEN-BSO    History of surgery    History of atherectomy      FAMILY HISTORY:  breast cancer inf amily    ITEMS NOT CHECKED ARE NOT PRESENT  SOCIAL HISTORY:   Significant other/partner:  []  Children:  [X]   Substance hx:  []   Tobacco hx:  []   Alcohol hx: []   Home Opioid hx:  [] I-Stop Reference No:  - no active Rx's / see chart note  Living Situation: [X]Home  []Long term care  []Rehab []Other  Tenriism/Spiritual practice: ; Role of organized Worship [ ] important [ ] some [ ] unable to assess  Coping: [] well [] with difficulty [] poor coping [X] unable to assess  Support system: [] strong [X] adequate [] inadequate    ADVANCE DIRECTIVES:    []MOLST  []Living Will  DECISION MAKER(s):  [] Health Care Proxy(s)  [] Surrogate(s)  [] Guardian           Name(s)/Phone Number(s):     BASELINE (I)ADLs (prior to admission):  [place]: []Total  [X] Moderate []Dependent    ALLERGIES:  Ancef (Rash; Urticaria)  DDAVP (Hypotension)  iodine (Hives; Pruritus)  penicillin (Swelling)  sulfa drugs (Angioedema)    MEDICATIONS  (STANDING):  acetaminophen   IVPB .. 1000 milliGRAM(s) IV Intermittent once  atorvastatin 40 milliGRAM(s) Oral at bedtime  chlorhexidine 2% Cloths 1 Application(s) Topical <User Schedule>  cinacalcet 30 milliGRAM(s) Oral daily  collagenase Ointment 1 Application(s) Topical daily  fludroCORTISONE 0.1 milliGRAM(s) Oral daily  heparin  Infusion 2000 Unit(s)/Hr (20 mL/Hr) IV Continuous <Continuous>  midodrine 30 milliGRAM(s) Oral every 8 hours  Nephro-deborah 1 Tablet(s) Oral daily  phenylephrine    Infusion 0.1 MICROgram(s)/kG/Min (1.6 mL/Hr) IV Continuous <Continuous>  polyethylene glycol 3350 17 Gram(s) Oral every 12 hours  senna 2 Tablet(s) Oral at bedtime  sevelamer carbonate 1600 milliGRAM(s) Oral three times a day with meals    MEDICATIONS  (PRN):  acetaminophen     Tablet .. 650 milliGRAM(s) Oral every 6 hours PRN Temp greater or equal to 38C (100.4F), Moderate Pain (4 - 6)  calamine/zinc oxide Lotion 1 Application(s) Topical three times a day PRN Itching  HYDROmorphone  Injectable 0.25 milliGRAM(s) IV Push every 4 hours PRN Severe Pain (7 - 10)    Analgesic Use (Scheduled and PRNs) for past 24 hours:    sevelamer carbonate   1600 milliGRAM(s) Oral (01-12-23 @ 10:39)   1600 milliGRAM(s) Oral (01-11-23 @ 19:05)      PRESENT SYMPTOMS/REVIEW OF SYSTEMS:     Pain: [X] yes [] no  QOL Impact -   Location - bilateral breasts                    Aggravating Factors - random, sudden pain  Quality - sharp stabbing  Radiation - throughout breast  Timing - random  Severity (0-10 scale) -  can get to "above 10"  Minimal Acceptable Level (0-10 scale) - 5/10    CPOT Score:  (Pain Assessment Scale for Critically Ill)    PAIN AD Score:  (Nonverbal Pain Assessment Scale)    Dyspnea:                           []Mild  []Moderate []Severe  Anxiety:                             []Mild []Moderate []Severe  Fatigue:                             []Mild []Moderate []Severe  Nausea:                             []Mild []Moderate []Severe  Loss of Appetite:              []Mild []Moderate []Severe  Constipation:                    []Mild []Moderate []Severe    Other Symptoms:  [x]All Other Review Of Systems Negative     Palliative Performance Status Version 2:  %  (Functional Assessment Tool)    PHYSICAL EXAM:  GENERAL:  [X]Alert  [X]Oriented x 3  []Lethargic  []Cachexia  []Unarousable  [X]Verbal  []Non-Verbal  Behavioral:   []Anxiety  []Delirium []Agitation [X]Cooperative  HEENT:  [X]Normal   []Dry mouth   []ET Tube/Trach  []Oral lesions  PULMONARY:   []Clear []Tachypnea  []Audible excessive secretions  [X]Normal Work of Breathing []Labored Breathing  []Rhonchi []Crackles []Wheezing  CARDIOVASCULAR:    [X]Regular Rate & Rhythm []Irregular []Tachy  []Arjun  GASTROINTESTINAL:  [X]Soft  []Distended   [X]+BS  [X]Non tender []Tender  []PEG []OGT/ NGT  Last BM:  GENITOURINARY:  [X]Normal [] Incontinent   []Oliguria/Anuria   []Wu  MUSCULOSKELETAL:   []Normal   [X]Weakness  []Bed/Wheelchair bound []Edema  NEUROLOGIC:   []No focal deficits  []Cognitive impairment  []Dysphagia []Dysarthria []Paresis []Encephalopathic  SKIN:   [X]Normal   []Pressure ulcer(s)  []Rash    CRITICAL CARE:  [X]Shock Present  []Septic []Cardiogenic []Neurologic []Hypovolemic  [X]Vasopressors []Inotropes   []Respiratory failure present []Mechanical Ventilation []Non-invasive ventilatory support []High-Flow  []Acute  []Chronic []Hypoxic  []Hypercarbic  []Other organ failure    Vital Signs Last 24 Hrs  T(C): 36.7 (12 Jan 2023 06:03), Max: 37 (12 Jan 2023 01:05)  T(F): 98 (12 Jan 2023 06:03), Max: 98.6 (12 Jan 2023 01:05)  HR: 78 (12 Jan 2023 11:00) (64 - 106)  BP: --  BP(mean): --  RR: 21 (12 Jan 2023 11:00) (11 - 29)  SpO2: 93% (12 Jan 2023 11:00) (91% - 98%)    Parameters below as of 12 Jan 2023 11:00  Patient On (Oxygen Delivery Method): room air        LABS:                        7.9    6.79  )-----------( 133      ( 12 Jan 2023 05:18 )             25.0   01-12    132<L>  |  98  |  35<H>  ----------------------------<  74  4.6   |  22  |  6.04<H>    Ca    7.8<L>      12 Jan 2023 05:18  Phos  5.2     01-12  Mg     1.6     01-12    TPro  5.5<L>  /  Alb  2.3<L>  /  TBili  0.3  /  DBili  x   /  AST  18  /  ALT  9<L>  /  AlkPhos  65  01-12    RADIOLOGY & ADDITIONAL STUDIES:      REFERRALS:  [x]Social Work  []Case management []PT/OT []Chaplaincy  []Hospice  []Patient/Family Support []Massage Therapy []Music Therapy    DISCUSSION OF CASE: Family - to obtain additional history and to provide emotional support;  -  HPI:  HPI  67F with PMH HFrEF (EF 40% --> now 75%), nonobstructive CAD, NICM, HTN, HLD, ESRD 2/2 PCKD on HD, PE (2018) s/p IVC filter, mild AS, mild MR, PAD, OA, h/o thrombus in vascular access x3 (R AVG, R AVF, L AVG), ventral hernia, 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 In ED pt was found to be hyperkalemic with EKG changes and meeting 2/4 SIRS criteria and requiring pressors and admitted to MICU for emergent hemodialysis. Initially thought to be in septic shock however unable to locate source, s/p IR drainage of seroma at site of R AV fistula. Surgery consulted for severe b/l breast pain and skin changes concerning for peu d'orange as well as b/l palpable breast masses and R axillary LAD. Surgery believes changes likely 2/2 venous congestion rather than malignancy given no evidence of cyst or focal mass on ultrasound, biopsy performed. Endocrine consulted for multiple brown tumors on CT w/ multiple fractures of pubic rami, pubic bone, thoracolumbar spine, and increased density of bone marrow consistent with renal osteodystrophy. Gallium scan performed significant for faintly increased activity surrounding the entire lower portion of the right breast consistent with the inflammatory reaction seen on physical examination, small area of activity approximately 10 cm to the right of midline at about the level of the lower border of the sternum, activity likely to be in chest wall. Given abnormal findings on gallium scan, decision to proceed with PET/CT for malignancy evaluation. Hospital course thus far has been significant for extensive DVT from R IJ to R subclavia and R axilla as well as continued pressor requirements. CT on admission showed adnexal mass, gyn consulted for that, recommended u/s and labs and no acute intervention. Alert triggered for palliative care to see patient given extensive history and length of stay. GOC conversation initiated.    PERTINENT PM/SXH:   HTN (hypertension)    HLD (hyperlipidemia)    CAD (coronary atherosclerotic disease)    ESRD on dialysis    H/O ventral hernia    Brown tumor    DVT, lower extremity      Stented coronary artery    History of surgery    AVF (arteriovenous fistula)    S/P AIDEN-BSO    History of surgery    History of atherectomy      FAMILY HISTORY:  breast cancer in family    ITEMS NOT CHECKED ARE NOT PRESENT  SOCIAL HISTORY:   Significant other/partner:  []  Children:  [X]   Substance hx:  []   Tobacco hx:  []   Alcohol hx: []   Home Opioid hx:  [] I-Stop Reference No:  - no active Rx's / see chart note  Living Situation: [X]Home  []Long term care  []Rehab []Other  Methodist/Spiritual practice: ; Role of organized Restorationist [ ] important [ ] some [ ] unable to assess  Coping: [] well [] with difficulty [] poor coping [X] unable to assess  Support system: [] strong [X] adequate [] inadequate    ADVANCE DIRECTIVES:    []MOLST  []Living Will  DECISION MAKER(s):  [] Health Care Proxy(s)  [] Surrogate(s)  [] Guardian           Name(s)/Phone Number(s):     BASELINE (I)ADLs (prior to admission):  [place]: []Total  [X] Moderate []Dependent    ALLERGIES:  Ancef (Rash; Urticaria)  DDAVP (Hypotension)  iodine (Hives; Pruritus)  penicillin (Swelling)  sulfa drugs (Angioedema)    MEDICATIONS  (STANDING):  acetaminophen   IVPB .. 1000 milliGRAM(s) IV Intermittent once  atorvastatin 40 milliGRAM(s) Oral at bedtime  chlorhexidine 2% Cloths 1 Application(s) Topical <User Schedule>  cinacalcet 30 milliGRAM(s) Oral daily  collagenase Ointment 1 Application(s) Topical daily  fludroCORTISONE 0.1 milliGRAM(s) Oral daily  heparin  Infusion 2000 Unit(s)/Hr (20 mL/Hr) IV Continuous <Continuous>  midodrine 30 milliGRAM(s) Oral every 8 hours  Nephro-deborah 1 Tablet(s) Oral daily  phenylephrine    Infusion 0.1 MICROgram(s)/kG/Min (1.6 mL/Hr) IV Continuous <Continuous>  polyethylene glycol 3350 17 Gram(s) Oral every 12 hours  senna 2 Tablet(s) Oral at bedtime  sevelamer carbonate 1600 milliGRAM(s) Oral three times a day with meals    MEDICATIONS  (PRN):  acetaminophen     Tablet .. 650 milliGRAM(s) Oral every 6 hours PRN Temp greater or equal to 38C (100.4F), Moderate Pain (4 - 6)  calamine/zinc oxide Lotion 1 Application(s) Topical three times a day PRN Itching  HYDROmorphone  Injectable 0.25 milliGRAM(s) IV Push every 4 hours PRN Severe Pain (7 - 10)      PRESENT SYMPTOMS/REVIEW OF SYSTEMS:     Pain: [X] yes [] no  QOL Impact -   Location - bilateral breasts                    Aggravating Factors - random, sudden pain  Quality - sharp stabbing  Radiation - throughout breast  Timing - intermittent  Severity (0-10 scale) -  can get to "above 10"  Minimal Acceptable Level (0-10 scale) - 5/10    CPOT Score:  (Pain Assessment Scale for Critically Ill)    PAIN AD Score:  (Nonverbal Pain Assessment Scale)    Dyspnea:                           []Mild  []Moderate []Severe  Anxiety:                             []Mild []Moderate []Severe  Fatigue:                             []Mild []Moderate []Severe  Nausea:                             []Mild []Moderate []Severe  Loss of Appetite:              []Mild []Moderate []Severe  Constipation:                    []Mild []Moderate []Severe    Other Symptoms:  [x]All Other Review Of Systems Negative     Palliative Performance Status Version 2:  40%  (Functional Assessment Tool)    PHYSICAL EXAM:  GENERAL:  [X]Alert  [X]Oriented x 3  []Lethargic  []Cachexia  []Unarousable  [X]Verbal  []Non-Verbal  Behavioral:   []Anxiety  []Delirium []Agitation [X]Cooperative  HEENT:  [X]Normal   []Dry mouth   []ET Tube/Trach  []Oral lesions  PULMONARY:   []Clear []Tachypnea  []Audible excessive secretions  [X]Normal Work of Breathing []Labored Breathing  []Rhonchi []Crackles []Wheezing  CARDIOVASCULAR:    [X]Regular Rate & Rhythm []Irregular []Tachy  []Arjun  GASTROINTESTINAL:  [X]Soft  []Distended   [X]+BS  [X]Non tender []Tender  []PEG []OGT/ NGT  Last BM:  GENITOURINARY:  [X]Normal [] Incontinent   []Oliguria/Anuria   []Wu  MUSCULOSKELETAL:   []Normal   [X]Weakness  []Bed/Wheelchair bound []Edema [X] ttp over anterior chest, sternum  NEUROLOGIC:   []No focal deficits  []Cognitive impairment  []Dysphagia []Dysarthria []Paresis []Encephalopathic  SKIN:   [X]Normal   []Pressure ulcer(s)  []Rash    CRITICAL CARE:  [X]Shock Present  []Septic []Cardiogenic []Neurologic []Hypovolemic  [X]Vasopressors []Inotropes   []Respiratory failure present []Mechanical Ventilation []Non-invasive ventilatory support []High-Flow  []Acute  []Chronic []Hypoxic  []Hypercarbic  []Other organ failure    Vital Signs Last 24 Hrs  T(C): 36.7 (12 Jan 2023 06:03), Max: 37 (12 Jan 2023 01:05)  T(F): 98 (12 Jan 2023 06:03), Max: 98.6 (12 Jan 2023 01:05)  HR: 78 (12 Jan 2023 11:00) (64 - 106)  BP: --  BP(mean): --  RR: 21 (12 Jan 2023 11:00) (11 - 29)  SpO2: 93% (12 Jan 2023 11:00) (91% - 98%)    Parameters below as of 12 Jan 2023 11:00  Patient On (Oxygen Delivery Method): room air        LABS:                        7.9    6.79  )-----------( 133      ( 12 Jan 2023 05:18 )             25.0   01-12    132<L>  |  98  |  35<H>  ----------------------------<  74  4.6   |  22  |  6.04<H>    Ca    7.8<L>      12 Jan 2023 05:18  Phos  5.2     01-12  Mg     1.6     01-12    TPro  5.5<L>  /  Alb  2.3<L>  /  TBili  0.3  /  DBili  x   /  AST  18  /  ALT  9<L>  /  AlkPhos  65  01-12    RADIOLOGY & ADDITIONAL STUDIES:  < from: CT Abdomen and Pelvis No Cont (01.03.23 @ 05:34) >  Impression: 1. Small amount of consolidation in both lower lobes, right   greater than left. This is felt to be more likely to represent   atelectasis than pneumonia.    2. A 7.0 cm fluid collection is present near an AV fistula in the right   upper arm. This could represent a postoperative seroma or hematoma, but   clinical correlation is recommended to rule out abscess given the history   ofsepsis.    3. There is again a large broad-based ventral hernia containing   nonobstructed loops of small and large bowel.    4. A 6.1 cm cystic mass is again present in the right adnexa. This is an   abnormal finding in a postmenopausal female. Recommend gynecology   consultation as neoplasm should be excluded.    < end of copied text >      REFERRALS:  [x]Social Work  []Case management []PT/OT []Chaplaincy  []Hospice  []Patient/Family Support []Massage Therapy []Music Therapy    DISCUSSION OF CASE: Primary team - to obtain additional information regarding plan of care, diagnosis and prognosis

## 2023-01-12 NOTE — PHYSICAL THERAPY INITIAL EVALUATION ADULT - PERTINENT HX OF CURRENT PROBLEM, REHAB EVAL
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

## 2023-01-12 NOTE — PROGRESS NOTE ADULT - SUBJECTIVE AND OBJECTIVE BOX
OVERNIGHT EVENTS: SHAISTA    SUBJECTIVE / INTERVAL HPI: Patient seen and examined at bedside. Pt  is feeling well. Denies chest pain, SOB, fevers, chills, abd pain.    12 point ROS negative other than stated above.     VITAL SIGNS:  Vital Signs Last 24 Hrs  T(C): 36.7 (12 Jan 2023 18:02), Max: 37 (12 Jan 2023 01:05)  T(F): 98.1 (12 Jan 2023 18:02), Max: 98.6 (12 Jan 2023 01:05)  HR: 69 (12 Jan 2023 20:00) (64 - 85)  BP: --  BP(mean): --  RR: 19 (12 Jan 2023 20:00) (6 - 29)  SpO2: 96% (12 Jan 2023 20:00) (91% - 98%)    Parameters below as of 12 Jan 2023 20:00  Patient On (Oxygen Delivery Method): room air        PHYSICAL EXAM:    General: NAD  HEENT: NC/AT, anicteric sclera; MMM  Neck: supple  Cardiovascular: +S1/S2; RRR  Respiratory: CTA B/L; no W/R/R  Gastrointestinal: soft, NT/ND; +BSx4  Extremities: WWP; +LE edema  Vascular: 2+ radial, DP/PT pulses B/L  Neurological: AAOx3    MEDICATIONS:  MEDICATIONS  (STANDING):  atorvastatin 40 milliGRAM(s) Oral at bedtime  chlorhexidine 2% Cloths 1 Application(s) Topical <User Schedule>  cinacalcet 30 milliGRAM(s) Oral daily  clopidogrel Tablet 75 milliGRAM(s) Oral daily  colchicine 0.3 milliGRAM(s) Oral daily  collagenase Ointment 1 Application(s) Topical daily  fludroCORTISONE 0.1 milliGRAM(s) Oral daily  heparin  Infusion 2000 Unit(s)/Hr (20 mL/Hr) IV Continuous <Continuous>  midodrine 30 milliGRAM(s) Oral every 8 hours  Nephro-deborah 1 Tablet(s) Oral daily  phenylephrine    Infusion 0.1 MICROgram(s)/kG/Min (1.6 mL/Hr) IV Continuous <Continuous>  polyethylene glycol 3350 17 Gram(s) Oral every 12 hours  senna 2 Tablet(s) Oral at bedtime  sevelamer carbonate 1600 milliGRAM(s) Oral three times a day with meals    MEDICATIONS  (PRN):  acetaminophen     Tablet .. 650 milliGRAM(s) Oral every 6 hours PRN Temp greater or equal to 38C (100.4F), Moderate Pain (4 - 6)  calamine/zinc oxide Lotion 1 Application(s) Topical three times a day PRN Itching  HYDROmorphone  Injectable 0.25 milliGRAM(s) IV Push every 4 hours PRN Severe Pain (7 - 10)      ALLERGIES:  Allergies    Ancef (Rash; Urticaria)  DDAVP (Hypotension)  iodine (Hives; Pruritus)  penicillin (Swelling)  sulfa drugs (Angioedema)    Intolerances        LABS:                        7.9    6.79  )-----------( 133      ( 12 Jan 2023 05:18 )             25.0     01-12    132<L>  |  98  |  35<H>  ----------------------------<  74  4.6   |  22  |  6.04<H>    Ca    7.8<L>      12 Jan 2023 05:18  Phos  5.2     01-12  Mg     1.6     01-12    TPro  5.5<L>  /  Alb  2.3<L>  /  TBili  0.3  /  DBili  x   /  AST  18  /  ALT  9<L>  /  AlkPhos  65  01-12    PT/INR - ( 11 Jan 2023 16:27 )   PT: 13.6 sec;   INR: 1.14          PTT - ( 12 Jan 2023 19:15 )  PTT:78.8 sec    CAPILLARY BLOOD GLUCOSE      POCT Blood Glucose.: 87 mg/dL (12 Jan 2023 12:51)      RADIOLOGY & ADDITIONAL TESTS: Reviewed.    ASSESSMENT:    PLAN:     Recommendations not final until attending attestation present

## 2023-01-12 NOTE — ADVANCED PRACTICE NURSE CONSULT - REASON FOR CONSULT
WOC nurse consult to assess bilateral upper extremity fistulas. Patient evaluated by vascular with wound care recommendations. WOCN consult not indicated at this time. WOCN discussed the same with Dr Fitch.  
denudement of both breasts

## 2023-01-12 NOTE — PROGRESS NOTE ADULT - ATTENDING COMMENTS
PCK with ESRD, HFrEF, CAD with persistent hypotension  physical as above  breast biopsy c/w vasculopathy with diffuse clotting and necrosis of tissue  check antiphospholipids, cryoglobulins, cryofibrinogen. ATIII, proteins C and S affected by AC  continue vancomycin empiric  midodrine/florinef with tapering of phenylephrine and aiming for SBP over 80  restart plavix  continue heparin  PCT of 72 and + CEA,  and CA-125 of uncertain significance, await PET-CT results  decision making of high complexity

## 2023-01-12 NOTE — CONSULT NOTE ADULT - PROBLEM SELECTOR RECOMMENDATION 2
CT: enlarged multinodular thyroid projecting into superior mediastinum with 1.8 x 1.5 cm solid nodule slightly inferiorly in the anterior mediastinum.   F/U thyroid US.    TSH 1.6 free T4 0.68.  TSH could be inappropriately normal for low free T4 due to euthyroid sick syndrome.  Repeat TFTs in a few days.    Discussed with Dr Villafuerte.
Patient presented after missed HD for emergent dialysis on 1/2. Since admission, patient has been persistently hypotensive. Has been on phenylephrine for >1 week. Started on fluorinef and midodrine as well, but still requiring pressors.   - Continue care as per primary team  - Continue to try to wean off pressors as tolerated.

## 2023-01-12 NOTE — CHART NOTE - NSCHARTNOTEFT_GEN_A_CORE
Admitting Diagnosis:   Patient is a 67y old  Female who presents with a chief complaint of shock, breast pain (10 Faustino 2023 06:18)      PAST MEDICAL & SURGICAL HISTORY:  HTN (hypertension)      HLD (hyperlipidemia)      CAD (coronary atherosclerotic disease)      ESRD on dialysis  last 11/15/22      H/O ventral hernia      Brown tumor  brain      DVT, lower extremity  left      History of surgery  IVC filter      AVF (arteriovenous fistula)  right left      S/P AIDEN-BSO  2003      History of surgery  RLE PTA stent / atherectomy  7/2021      History of atherectomy  stent    Current Nutrition Order:   Diet, Renal Restrictions:   For patients receiving Renal Replacement - No Protein Restr, No Conc K, No Conc Phos, Low Sodium  Supplement Feeding Modality:  Oral  Nepro Cans or Servings Per Day:  1       Frequency:  Three Times a day (01-12-23 @ 15:23)    PO Intake: Good (%) [   ]  Fair (50-75%) [   ] Poor (<25%) [ x ]    GI Issues: Abdomen ND/NT, +BS x4, LBM 1/11    Pain: 0 per chart review     Skin Integrity: Dehist AVF R arm, +1 gen. edema, non-pitting edema to BUE     Labs:   01-12    132<L>  |  98  |  35<H>  ----------------------------<  74  4.6   |  22  |  6.04<H>    Ca    7.8<L>      12 Jan 2023 05:18  Phos  5.2     01-12  Mg     1.6     01-12    TPro  5.5<L>  /  Alb  2.3<L>  /  TBili  0.3  /  DBili  x   /  AST  18  /  ALT  9<L>  /  AlkPhos  65  01-12    CAPILLARY BLOOD GLUCOSE      POCT Blood Glucose.: 87 mg/dL (12 Jan 2023 12:51)  POCT Blood Glucose.: 97 mg/dL (12 Jan 2023 07:18)  POCT Blood Glucose.: 73 mg/dL (12 Jan 2023 05:37)      Medications:  MEDICATIONS  (STANDING):  atorvastatin 40 milliGRAM(s) Oral at bedtime  chlorhexidine 2% Cloths 1 Application(s) Topical <User Schedule>  cinacalcet 30 milliGRAM(s) Oral daily  clopidogrel Tablet 75 milliGRAM(s) Oral daily  colchicine 0.3 milliGRAM(s) Oral daily  collagenase Ointment 1 Application(s) Topical daily  fludroCORTISONE 0.1 milliGRAM(s) Oral daily  heparin  Infusion 2000 Unit(s)/Hr (20 mL/Hr) IV Continuous <Continuous>  midodrine 30 milliGRAM(s) Oral every 8 hours  Nephro-deborah 1 Tablet(s) Oral daily  phenylephrine    Infusion 0.1 MICROgram(s)/kG/Min (1.6 mL/Hr) IV Continuous <Continuous>  polyethylene glycol 3350 17 Gram(s) Oral every 12 hours  senna 2 Tablet(s) Oral at bedtime  sevelamer carbonate 1600 milliGRAM(s) Oral three times a day with meals    MEDICATIONS  (PRN):  acetaminophen     Tablet .. 650 milliGRAM(s) Oral every 6 hours PRN Temp greater or equal to 38C (100.4F), Moderate Pain (4 - 6)  calamine/zinc oxide Lotion 1 Application(s) Topical three times a day PRN Itching  HYDROmorphone  Injectable 0.25 milliGRAM(s) IV Push every 4 hours PRN Severe Pain (7 - 10)    Height for BMI (CENTIMETERS)	177.8 Centimeter(s)  Weight for BMI (lbs)	188.1 lb  Weight for BMI (kg)	85.3 kg  Body Mass Index	26.9    Weight Change:   1/2 - 82.6 kg  1/3 - 84 kg  1/7 - 99.4 kg  1/10 - 106 kg   -Trend sequential wts. Pt with noted worsening BUE edema     Estimated energy needs:   Weight used for calculations	IBW  Estimated Energy Needs Weight (lbs)	150.3 lb  Estimated Energy Needs Weight (kg)	68.2 kg  Estimated Energy Needs From (anatoliy/kg)	30  Estimated Energy Needs To (anatoliy/kg)	35  Estimated Energy Needs Calculated From (anatoliy/kg)	2046  Estimated Energy Needs Calculated To (anatoliy/kg)	2387  Weight used for calculations	IBW  Estimated Protein Needs Weight (lbs)	150.3 lb  Estimated Protein Needs Weight (kg)	68.2 kg  Estimated Protein Needs From (g/kg)	1.2  Estimated Protein Needs To (g/kg)	1.3  Estimated Protein Needs Calculated From (g/kg)	81.84  Estimated Protein Needs Calculated To (g/kg)	88.66  Estimated Fluid Needs Weight (lbs)	150.3 lb  Estimated Fluid Needs Weight (kg)	68.2 kg  Other Calculations	Fluids per team. 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 Complaining of crampy abdominal pain and waterry diarrhea (2-3 BMs/day), now with nausea and vomiting prior to arrival. Has not gone to HD since 12/24 (8 days ago). 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 Complaining of crampy abdominal pain and waterry diarrhea (2-3 BMs/day), now with nausea and vomiting prior to arrival. Has not gone to HD since 12/24 (8 days ago). Admitted for emergent HD. 1/5: A line placed.     Pt care discussed in IDT rounds. Rx and labs reviewed. Pt continues on renal diet; spoke with pt at bedside who notes poor PO intake and general dislike for food provided by facility. Plan to make pt RSA; encouraged pt to order preferred foods to promote PO intake. Pt reports a UBW of 180 lbs; CBW of 233 lbs on 1/10 -continue to trend wts and ability to gather a dry wt. Pt agreeable to ONS regimen; add Nepro TID. No new c/o NVCD or difficult chew/swallow. NKA to food. RDN will continue to reassess, intervene, and monitor as appropriate.     Previous Nutrition Diagnosis: Increased Nutrient Needs... kcal/protein r/t HD AEB known increased nutrient demands for HD.     Active [ x ]  Resolved [   ]    If resolved, new PES:     Goal: Pt will meet 75% or more of protein/energy needs via most appropriate route for nutrition.    Recommendations:  -Continue current diet   -Add Nepro TID  -Encourage good PO intake and ONS complaince   -Honor food preferences as able; make pt Room Service Assist   -Monitor chemistry, GI fxn, and skin integrity     Risk Level: High [ x ] Moderate [   ] Low [   ].

## 2023-01-12 NOTE — CONSULT NOTE ADULT - PROBLEM SELECTOR RECOMMENDATION 5
Patient with multiple medical problems and comorbidities. Has been on pressors for >1 week and still persistently hypotensive. Initiated GOC discussion with patient. Per patient, she has not thought about her code status or what her GOC are. When asked about DNR/DNI status and background explained, patient said she would like to discuss with her daughterJasmina about what she would want to do. Has not officially signed a HCP form, but expressed that if she were to get into a state where she would not be able to make medical decisions, she would want her daughter to make those decisions. Given severe pain, patient was not able to continue conversation.  - Continue ongoing GOC discussion, treat patient's pain so that she would be able to have full conversation.  - Encouraged patient to discuss with daughter today, will re-visit topic tomorrow and keep daughter in the loop Patient with multiple medical problems and comorbidities. Has been on pressors for >1 week and still persistently hypotensive. Initiated GOC discussion with patient. Per patient, she has not thought about her code status or what her GOC are and would like to discuss with her daughter, Jasmina.  - Patient in too much pain to discuss HCP. Will f/u with her at a later date.  - Encouraged patient to discuss with daughter today, will re-visit topic tomorrow and keep daughter in the loop

## 2023-01-12 NOTE — PHYSICAL THERAPY INITIAL EVALUATION ADULT - GENERAL OBSERVATIONS, REHAB EVAL
pt received/returned semi-supine in bed +IV, +tele, +A line, +R breast dressing C/D/I, c/o general malaise (premedicated prior to tx)

## 2023-01-13 NOTE — PROGRESS NOTE ADULT - SUBJECTIVE AND OBJECTIVE BOX
SUBJECTIVE: Patient seen and examined bedside; no events overnight, watching tv and eating breakfast, feeling well, tolerated well dressing changes.  clopidogrel Tablet 75 milliGRAM(s) Oral daily  heparin  Infusion 2000 Unit(s)/Hr IV Continuous <Continuous>  midodrine 30 milliGRAM(s) Oral every 8 hours  phenylephrine    Infusion 0.1 MICROgram(s)/kG/Min IV Continuous <Continuous>      Vital Signs Last 24 Hrs  T(C): 36.9 (13 Jan 2023 08:11), Max: 37.3 (13 Jan 2023 06:04)  T(F): 98.4 (13 Jan 2023 08:11), Max: 99.2 (13 Jan 2023 06:04)  HR: 69 (13 Jan 2023 07:00) (65 - 85)  BP: --  BP(mean): --  RR: 11 (13 Jan 2023 07:00) (6 - 23)  SpO2: 99% (13 Jan 2023 07:00) (93% - 100%)    Parameters below as of 13 Jan 2023 07:00  Patient On (Oxygen Delivery Method): room air      I&O's Detail    12 Jan 2023 07:01  -  13 Jan 2023 07:00  --------------------------------------------------------  IN:    Heparin: 480 mL    IV PiggyBack: 100 mL    Phenylephrine: 466.7 mL  Total IN: 1046.7 mL    OUT:  Total OUT: 0 mL    Total NET: 1046.7 mL      PE:    General: NAD, resting comfortably in bed  C/V: S1 s2, RRR  Pulm: Nonlabored breathing, no respiratory distress  Abd: Soft, NTND  Extrem: WWP, R UE wound unchanged, clean base, granulation tissue noted, no obvious purulence expressed from wound, redressed with 4x4 with santyl and kerlix        LABS:                        7.5    5.51  )-----------( 135      ( 13 Jan 2023 05:46 )             24.4     01-13    130<L>  |  96  |  43<H>  ----------------------------<  83  4.4   |  23  |  7.14<H>    Ca    7.9<L>      13 Jan 2023 05:46  Phos  6.0     01-13  Mg     1.6     01-13    TPro  5.0<L>  /  Alb  2.1<L>  /  TBili  0.2  /  DBili  x   /  AST  19  /  ALT  9<L>  /  AlkPhos  62  01-13    PT/INR - ( 13 Jan 2023 05:46 )   PT: 15.0 sec;   INR: 1.26          PTT - ( 13 Jan 2023 05:46 )  PTT:79.4 sec      RADIOLOGY & ADDITIONAL STUDIES:

## 2023-01-13 NOTE — PROGRESS NOTE ADULT - SUBJECTIVE AND OBJECTIVE BOX
[facility]    Contact Info: Call [phone] (HEAL Line)     SUBJECTIVE AND OBJECTIVE:  INTERVAL HPI/OVERNIGHT EVENTS: Interval events noted. See patient's PRN use for the past 24hrs noted below. Comprehensive symptom assessment and GOC exploration as noted below. Extensive time spent discussing plan of care with patient/family. No unexpected adverse effects of opiates noted: no sedation/dizziness/nausea.    ALLERGIES:  Ancef (Rash; Urticaria)  DDAVP (Hypotension)  iodine (Hives; Pruritus)  penicillin (Swelling)  sulfa drugs (Angioedema)    MEDICATIONS  (STANDING):  acetaminophen    Suspension .. 1280 milliGRAM(s) Oral once  atorvastatin 40 milliGRAM(s) Oral at bedtime  chlorhexidine 2% Cloths 1 Application(s) Topical <User Schedule>  cinacalcet 30 milliGRAM(s) Oral daily  clopidogrel Tablet 75 milliGRAM(s) Oral daily  colchicine 0.3 milliGRAM(s) Oral daily  collagenase Ointment 1 Application(s) Topical daily  heparin  Infusion 2000 Unit(s)/Hr (20 mL/Hr) IV Continuous <Continuous>  midodrine 30 milliGRAM(s) Oral every 8 hours  Nephro-deborah 1 Tablet(s) Oral daily  phenylephrine    Infusion 0.1 MICROgram(s)/kG/Min (1.6 mL/Hr) IV Continuous <Continuous>  polyethylene glycol 3350 17 Gram(s) Oral every 12 hours  senna 2 Tablet(s) Oral at bedtime  sevelamer carbonate 1600 milliGRAM(s) Oral three times a day with meals    MEDICATIONS  (PRN):  acetaminophen     Tablet .. 650 milliGRAM(s) Oral every 6 hours PRN Temp greater or equal to 38C (100.4F), Moderate Pain (4 - 6)  calamine/zinc oxide Lotion 1 Application(s) Topical three times a day PRN Itching  HYDROmorphone  Injectable 0.25 milliGRAM(s) IV Push every 4 hours PRN Severe Pain (7 - 10)      Analgesic Use (Scheduled and PRNs) for past 24 hours:    HYDROmorphone  Injectable   0.25 milliGRAM(s) IV Push (01-13-23 @ 14:42)   0.25 milliGRAM(s) IV Push (01-13-23 @ 08:48)    sevelamer carbonate   1600 milliGRAM(s) Oral (01-13-23 @ 15:47)   1600 milliGRAM(s) Oral (01-13-23 @ 09:55)   1600 milliGRAM(s) Oral (01-12-23 @ 17:01)      ITEMS UNCHECKED ARE NOT PRESENT  PRESENT SYMPTOMS/REVIEW OF SYSTEMS: []Unable to obtain due to poor mentation   Source if other than patient:  []Family   []Team         Vital Signs Last 24 Hrs  T(C): 36.7 (13 Jan 2023 15:35), Max: 37.3 (13 Jan 2023 06:04)  T(F): 98.1 (13 Jan 2023 15:35), Max: 99.2 (13 Jan 2023 06:04)  HR: 71 (13 Jan 2023 15:35) (65 - 81)  BP: --  BP(mean): --  RR: 18 (13 Jan 2023 15:35) (11 - 27)  SpO2: 100% (13 Jan 2023 15:35) (89% - 100%)    Parameters below as of 13 Jan 2023 15:35  Patient On (Oxygen Delivery Method): room air        LABS:                         7.5    5.51  )-----------( 135      ( 13 Jan 2023 05:46 )             24.4   01-13    130<L>  |  96  |  43<H>  ----------------------------<  83  4.4   |  23  |  7.14<H>    Ca    7.9<L>      13 Jan 2023 05:46  Phos  6.0     01-13  Mg     1.6     01-13    TPro  5.0<L>  /  Alb  2.1<L>  /  TBili  0.2  /  DBili  x   /  AST  19  /  ALT  9<L>  /  AlkPhos  62  01-13      RADIOLOGY & ADDITIONAL STUDIES: None new    DISCUSSION OF CASE: Family - to provide updates and emotional support [facility]    Contact Info: Call [phone] (HEAL Line)     SUBJECTIVE AND OBJECTIVE:  INTERVAL HPI/OVERNIGHT EVENTS: Interval events noted. Continued GOC conversation. Patient shared that she has not spoken with her younger daughter Jasmina. I asked if she would like to make Jasmina her health care proxy which the patient agreed. HCP form filled and placed in chart.     ALLERGIES:  Ancef (Rash; Urticaria)  DDAVP (Hypotension)  iodine (Hives; Pruritus)  penicillin (Swelling)  sulfa drugs (Angioedema)    MEDICATIONS  (STANDING):  acetaminophen    Suspension .. 1280 milliGRAM(s) Oral once  atorvastatin 40 milliGRAM(s) Oral at bedtime  chlorhexidine 2% Cloths 1 Application(s) Topical <User Schedule>  cinacalcet 30 milliGRAM(s) Oral daily  clopidogrel Tablet 75 milliGRAM(s) Oral daily  colchicine 0.3 milliGRAM(s) Oral daily  collagenase Ointment 1 Application(s) Topical daily  heparin  Infusion 2000 Unit(s)/Hr (20 mL/Hr) IV Continuous <Continuous>  midodrine 30 milliGRAM(s) Oral every 8 hours  Nephro-deborah 1 Tablet(s) Oral daily  phenylephrine    Infusion 0.1 MICROgram(s)/kG/Min (1.6 mL/Hr) IV Continuous <Continuous>  polyethylene glycol 3350 17 Gram(s) Oral every 12 hours  senna 2 Tablet(s) Oral at bedtime  sevelamer carbonate 1600 milliGRAM(s) Oral three times a day with meals    MEDICATIONS  (PRN):  acetaminophen     Tablet .. 650 milliGRAM(s) Oral every 6 hours PRN Temp greater or equal to 38C (100.4F), Moderate Pain (4 - 6)  calamine/zinc oxide Lotion 1 Application(s) Topical three times a day PRN Itching  HYDROmorphone  Injectable 0.25 milliGRAM(s) IV Push every 4 hours PRN Severe Pain (7 - 10)      Analgesic Use (Scheduled and PRNs) for past 24 hours:    HYDROmorphone  Injectable   0.25 milliGRAM(s) IV Push (01-13-23 @ 14:42)   0.25 milliGRAM(s) IV Push (01-13-23 @ 08:48)    sevelamer carbonate   1600 milliGRAM(s) Oral (01-13-23 @ 15:47)   1600 milliGRAM(s) Oral (01-13-23 @ 09:55)   1600 milliGRAM(s) Oral (01-12-23 @ 17:01)      ITEMS UNCHECKED ARE NOT PRESENT  PRESENT SYMPTOMS/REVIEW OF SYSTEMS: []Unable to obtain due to poor mentation   Source if other than patient:  []Family   []Team     Pain: [] yes [x] no  QOL impact -   Location -                    Aggravating Factors -  Quality -  Radiation -  Timing -  Severity (0-10 scale) -   Minimal Acceptable Level (0-10 scale) -    Dyspnea:                           []Mild  []Moderate []Severe  Anxiety:                             []Mild []Moderate []Severe  Fatigue:                             []Mild []Moderate []Severe  Nausea:                             []Mild []Moderate []Severe  Loss of Appetite:              []Mild []Moderate []Severe  Constipation:                    []Mild []Moderate []Severe    Other Symptoms:  [x]All other review of systems negative       GENERAL:  [x] NAD [x]Alert []Lethargic  []Cachexia  []Unarousable  [x]Verbal  []Non-Verbal  Behavioral:   []Anxiety  []Delirium []Agitation []Cooperative [x]Oriented x 3  HEENT:  [x]Normal  [] Moist Mucous Membranes []Dry mouth []ET Tube/Trach  []Oral lesions  PULMONARY:   [x]Clear []Tachypnea  []Audible excessive secretions  [x]Normal Work of Breathing []Labored Breathing  []Rhonchi []Crackles []Wheezing  CARDIOVASCULAR:    [x]Regular Rate [x]Regular Rhythm []Irregular []Tachy  []Arjun  GASTROINTESTINAL:  []Soft  [x]Distended   []+BS  [x]Non tender []Tender  []PEG []OGT/ NGT  GENITOURINARY:  [x]Normal: ESRD, hemodialysis  [] Incontinent   []Oliguria/Anuria   []Wu  MUSCULOSKELETAL:   []Normal Extremities  [x]Weakness  []Bed/Wheelchair bound []Edema  NEUROLOGIC:   [x]No focal deficits  []Cognitive impairment  []Dysphagia []Dysarthria []Paresis []Encephalopathic  SKIN:   [x]Normal   []Pressure ulcer(s)  []Rash    CRITICAL CARE:  [x ]Shock Present  [x ]Septic [ ]Cardiogenic [ ]Neurologic [ ]Hypovolemic  [ ] Vasopressors [ ]Inotropes   [ ]Respiratory failure present [ ]Mechanical Ventilation [ ]Non-invasive ventilatory support [ ]High-Flow  [ ]Acute  [ ]Chronic [ ]Hypoxic  [ ]Hypercarbic   [ ]Other organ failure    Vital Signs Last 24 Hrs  T(C): 36.5 (14 Jan 2023 05:00), Max: 37.4 (13 Jan 2023 22:15)  T(F): 97.7 (14 Jan 2023 05:00), Max: 99.4 (13 Jan 2023 22:15)  HR: 71 (14 Jan 2023 07:00) (62 - 88)  BP: --  BP(mean): --  RR: 20 (14 Jan 2023 03:00) (12 - 27)  SpO2: 92% (14 Jan 2023 07:00) (89% - 100%)    Parameters below as of 14 Jan 2023 08:00  Patient On (Oxygen Delivery Method): room air         LABS:                        8.2    6.00  )-----------( 146      ( 14 Jan 2023 05:49 )             27.1   01-14    131<L>  |  95<L>  |  28<H>  ----------------------------<  89  3.9   |  24  |  5.39<H>    Ca    8.1<L>      14 Jan 2023 05:16  Phos  5.0     01-14  Mg     1.5     01-14    TPro  5.0<L>  /  Alb  2.1<L>  /  TBili  0.2  /  DBili  x   /  AST  19  /  ALT  9<L>  /  AlkPhos  62  01-13    RADIOLOGY & ADDITIONAL STUDIES:    INTERPRETATION:  CLINICAL INFORMATION: Severe swelling bilateral upper   extremities, right greater than left. Increased, worsening, hot to touch.   Fluid collection her AV graft cultures have been negative to date with   gallium scan negative blood cultures negative.    COMPARISON: Right upper extremity venous duplex dated 1/4/2023. Left   upper extremity venous duplex dated 11/22/2022.    TECHNIQUE: Duplex sonography of the BILATERAL upper extremity veins with   color and spectral Doppler, with and without compression.    FINDINGS:    Occlusive thrombus present in the proximal right internal jugular vein.   Partially recanalized thrombus of the mid right internal jugular vein.   Patent distal right internal jugular vein.  Right innominate vein not well visualized limiting evaluation.    Thrombosed vascular stent spanning the right axillary to right subclavian   vein. Occlusive thrombus present in the right mid/distal subclavian vein   and right axillary vein.    Occlusive thrombus of the proximal right brachial vein. The right mid   brachial vein is not visualized due to adjacent 9.1 x 3.0 x 4.5 cm   avascular hypoechoic collection with internal echoes, likely hematoma.   Patent right distal brachial vein    The basilic (superficial vein) is patent and without thrombus. Right   cephalic vein is not visualized.    Right upper extremity AV fistula is patent at the anastomosis and mid   outflow tract.    The left internal jugular, subclavian, axillary and brachial veins are   patent and compressible where applicable.  The basilic and cephalic veins   (superficial veins) are patent and without thrombus.    Doppler examination shows normal spontaneous and phasic flow.    Dialysis catheter noted in the left axillary vein.    Bilateral upper extremity subcutaneous edema.    IMPRESSION:  1.  Persistent deep venous thromboses involving RIGHT internal jugular   vein,subclavian and axillary stent, and proximal brachial vein, similar   extent compared to prior. Right cephalic vein not well visualized.  2.  No significant change large complex fluid collection in the proximal   right arm, could represent old seroma/hematoma.      REFERRALS:  [x]Social Work  []Case management []PT/OT []Chaplaincy  []Hospice  []Patient/Family Support []Massage Therapy []Music Therapy []Holistic RN    DISCUSSION OF CASE: Family - to provide updates and emotional support; -

## 2023-01-13 NOTE — PROGRESS NOTE ADULT - SUBJECTIVE AND OBJECTIVE BOX
** INCOMPLETE **     INTERVAL HPI/OVERNIGHT EVENTS:    SUBJECTIVE: Patient seen and examined at bedside.    VITAL SIGNS:  ICU Vital Signs Last 24 Hrs  T(C): 36.7 (13 Jan 2023 01:02), Max: 36.9 (12 Jan 2023 11:33)  T(F): 98 (13 Jan 2023 01:02), Max: 98.5 (12 Jan 2023 11:33)  HR: 66 (13 Jan 2023 05:00) (64 - 85)  BP: --  BP(mean): --  ABP: 79/40 (13 Jan 2023 05:00) (67/40 - 117/55)  ABP(mean): 55 (13 Jan 2023 05:00) (51 - 93)  RR: 18 (13 Jan 2023 05:00) (6 - 23)  SpO2: 100% (13 Jan 2023 05:00) (91% - 100%)    O2 Parameters below as of 13 Jan 2023 05:00  Patient On (Oxygen Delivery Method): room air              01-12 @ 07:01  -  01-13 @ 07:00  --------------------------------------------------------  IN: 951.8 mL / OUT: 0 mL / NET: 951.8 mL      CAPILLARY BLOOD GLUCOSE      POCT Blood Glucose.: 76 mg/dL (13 Jan 2023 06:31)      PHYSICAL EXAM:    Constitutional: NAD  HEENT: NC/AT; PERRL, anicteric sclera; MMM  Neck: supple, no JVD  Cardiovascular: +S1/S2, RRR  Respiratory: CTA B/L, no W/R/R  Gastrointestinal: abdomen soft, NT/ND; no rebound or guarding; +BSx4  Genitourinary: no suprapubic tenderness or fullness  Extremities: WWP; no LE edema; no clubbing or cyanosis  Vascular: 2+ radial, DP/PT and femoral pulses B/L  Dermatologic: normal color and turgor; no visible rashes  Neurological:     MEDICATIONS:  MEDICATIONS  (STANDING):  atorvastatin 40 milliGRAM(s) Oral at bedtime  chlorhexidine 2% Cloths 1 Application(s) Topical <User Schedule>  cinacalcet 30 milliGRAM(s) Oral daily  clopidogrel Tablet 75 milliGRAM(s) Oral daily  colchicine 0.3 milliGRAM(s) Oral daily  collagenase Ointment 1 Application(s) Topical daily  fludroCORTISONE 0.1 milliGRAM(s) Oral daily  heparin  Infusion 2000 Unit(s)/Hr (20 mL/Hr) IV Continuous <Continuous>  midodrine 30 milliGRAM(s) Oral every 8 hours  Nephro-deborah 1 Tablet(s) Oral daily  phenylephrine    Infusion 0.1 MICROgram(s)/kG/Min (1.6 mL/Hr) IV Continuous <Continuous>  polyethylene glycol 3350 17 Gram(s) Oral every 12 hours  senna 2 Tablet(s) Oral at bedtime  sevelamer carbonate 1600 milliGRAM(s) Oral three times a day with meals    MEDICATIONS  (PRN):  acetaminophen     Tablet .. 650 milliGRAM(s) Oral every 6 hours PRN Temp greater or equal to 38C (100.4F), Moderate Pain (4 - 6)  calamine/zinc oxide Lotion 1 Application(s) Topical three times a day PRN Itching      ALLERGIES:  Allergies    Ancef (Rash; Urticaria)  DDAVP (Hypotension)  iodine (Hives; Pruritus)  penicillin (Swelling)  sulfa drugs (Angioedema)    Intolerances        LABS:                        7.5    5.51  )-----------( 135      ( 13 Jan 2023 05:46 )             24.4     01-13    130<L>  |  96  |  43<H>  ----------------------------<  83  4.4   |  23  |  7.14<H>    Ca    7.9<L>      13 Jan 2023 05:46  Phos  6.0     01-13  Mg     1.6     01-13    TPro  5.0<L>  /  Alb  2.1<L>  /  TBili  0.2  /  DBili  x   /  AST  19  /  ALT  9<L>  /  AlkPhos  62  01-13    PT/INR - ( 13 Jan 2023 05:46 )   PT: 15.0 sec;   INR: 1.26          PTT - ( 13 Jan 2023 05:46 )  PTT:79.4 sec      RADIOLOGY & ADDITIONAL TESTS: Reviewed. INTERVAL HPI/OVERNIGHT EVENTS:  Overnight, PET/CT read demonstrated increased FDG avidity within R axillary vein, which contains an occlusive thrombus, which may represent an infectious source. There is also an additional FDG avid region of the AV fistula near the arterial anastomosis proximal to the area of the fluid collection; could be a potential source of infection vs inflammatory reaction to the graft. Also a photopenic R adnexal cystic mass, suggestive of a benign etiology.     SUBJECTIVE: Patient seen and examined at bedside. Pt reports R breast pain, no itching currently. Also continues to have left foot pain, which is unchanged. States RLE pain has resolved. R arm swelling has remained unchanged. Denies pain elsewhere. No fevers, chills, CP, dyspnea, abd pain, N/V/D.     VITAL SIGNS:  ICU Vital Signs Last 24 Hrs  T(C): 36.7 (13 Jan 2023 01:02), Max: 36.9 (12 Jan 2023 11:33)  T(F): 98 (13 Jan 2023 01:02), Max: 98.5 (12 Jan 2023 11:33)  HR: 66 (13 Jan 2023 05:00) (64 - 85)  BP: --  BP(mean): --  ABP: 79/40 (13 Jan 2023 05:00) (67/40 - 117/55)  ABP(mean): 55 (13 Jan 2023 05:00) (51 - 93)  RR: 18 (13 Jan 2023 05:00) (6 - 23)  SpO2: 100% (13 Jan 2023 05:00) (91% - 100%)    O2 Parameters below as of 13 Jan 2023 05:00  Patient On (Oxygen Delivery Method): room air              01-12 @ 07:01  -  01-13 @ 07:00  --------------------------------------------------------  IN: 951.8 mL / OUT: 0 mL / NET: 951.8 mL      CAPILLARY BLOOD GLUCOSE      POCT Blood Glucose.: 76 mg/dL (13 Jan 2023 06:31)      PHYSICAL EXAM:    Constitutional: middle-aged female with obese body habitus resting in bed  HEENT: no conjunctivitis or scleral icterus, oral mucosa moist  NECK: Supple, no JVD, palpable multinodular goiter  CARDIAC: RRR, +S1/S2, no murmurs/rubs/gallops  PULM: Breathing comfortably on RA, clear to auscultation bilaterally, no wheezes/rales/rhonchi, +TDC to R chest wall  BREAST: palpable masses to b/l breasts; b/l breasts with peeling epidermis to lower aspect of breasts, L >R, no drainage  ABDOMEN: Soft, obese, nontender to palpation, +bs, no rebound tenderness or fluid wave, non-reducible ventral hernia  SKIN: b/l arms warm to touch; 3cm dehisced wound, nonpurulent, nonerythematous, to R medial upper arm near axilla  VASC:  no LE edema or tenderness; +triple lumen catheter at R groin, +L axillary arterial line  EXT: RUE: edema to hand and forearm, slightly tense, non erythematous, comparable to yesterday    MEDICATIONS:  MEDICATIONS  (STANDING):  atorvastatin 40 milliGRAM(s) Oral at bedtime  chlorhexidine 2% Cloths 1 Application(s) Topical <User Schedule>  cinacalcet 30 milliGRAM(s) Oral daily  clopidogrel Tablet 75 milliGRAM(s) Oral daily  colchicine 0.3 milliGRAM(s) Oral daily  collagenase Ointment 1 Application(s) Topical daily  fludroCORTISONE 0.1 milliGRAM(s) Oral daily  heparin  Infusion 2000 Unit(s)/Hr (20 mL/Hr) IV Continuous <Continuous>  midodrine 30 milliGRAM(s) Oral every 8 hours  Nephro-deborah 1 Tablet(s) Oral daily  phenylephrine    Infusion 0.1 MICROgram(s)/kG/Min (1.6 mL/Hr) IV Continuous <Continuous>  polyethylene glycol 3350 17 Gram(s) Oral every 12 hours  senna 2 Tablet(s) Oral at bedtime  sevelamer carbonate 1600 milliGRAM(s) Oral three times a day with meals    MEDICATIONS  (PRN):  acetaminophen     Tablet .. 650 milliGRAM(s) Oral every 6 hours PRN Temp greater or equal to 38C (100.4F), Moderate Pain (4 - 6)  calamine/zinc oxide Lotion 1 Application(s) Topical three times a day PRN Itching      ALLERGIES:  Allergies    Ancef (Rash; Urticaria)  DDAVP (Hypotension)  iodine (Hives; Pruritus)  penicillin (Swelling)  sulfa drugs (Angioedema)    Intolerances        LABS:                        7.5    5.51  )-----------( 135      ( 13 Jan 2023 05:46 )             24.4     01-13    130<L>  |  96  |  43<H>  ----------------------------<  83  4.4   |  23  |  7.14<H>    Ca    7.9<L>      13 Jan 2023 05:46  Phos  6.0     01-13  Mg     1.6     01-13    TPro  5.0<L>  /  Alb  2.1<L>  /  TBili  0.2  /  DBili  x   /  AST  19  /  ALT  9<L>  /  AlkPhos  62  01-13    PT/INR - ( 13 Jan 2023 05:46 )   PT: 15.0 sec;   INR: 1.26          PTT - ( 13 Jan 2023 05:46 )  PTT:79.4 sec      RADIOLOGY & ADDITIONAL TESTS: Reviewed.

## 2023-01-13 NOTE — CONSULT NOTE ADULT - SUBJECTIVE AND OBJECTIVE BOX
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 Complaining of crampy abdominal pain and waterry diarrhea (2-3 BMs/day), now with nausea and vomiting prior to arrival. Has not gone to HD since 12/24 (8 days ago). Denies fever, chills, CP, SOB.       In the ED:  Initial vital signs: T: 97.5 F, HR: 108, BP: 109/86, R: 16, SpO2: 100% on RA  ED course:   Labs: significant for WBC 15.76, H/H 9.8/32.0, Plt 309, Na 131, K 7.8, Cl 84, AG 31, BUN/Cr 169/17.58  EKG: peaked T waves  Medications: calcium gluconate, dilaudid 0.5 x1, regular insulin 5, morphine 2mg IV, sodium bicarb, dextrose, albuterol 2.5x3  Consults: none    (02 Jan 2023 05:26)      Interval History:    SUBJECTIVE: Patient seen and examined at bedside.    OBJECTIVE:    VITAL SIGNS:  ICU Vital Signs Last 24 Hrs  T(C): 36.9 (13 Jan 2023 08:11), Max: 37.3 (13 Jan 2023 06:04)  T(F): 98.4 (13 Jan 2023 08:11), Max: 99.2 (13 Jan 2023 06:04)  HR: 81 (13 Jan 2023 09:00) (65 - 85)  BP: --  BP(mean): --  ABP: 86/53 (13 Jan 2023 09:00) (67/40 - 117/55)  ABP(mean): 66 (13 Jan 2023 09:00) (51 - 93)  RR: 27 (13 Jan 2023 09:00) (6 - 27)  SpO2: 99% (13 Jan 2023 09:00) (93% - 100%)    O2 Parameters below as of 13 Jan 2023 09:00  Patient On (Oxygen Delivery Method): room air              01-12 @ 07:01  -  01-13 @ 07:00  --------------------------------------------------------  IN: 1046.7 mL / OUT: 0 mL / NET: 1046.7 mL      CAPILLARY BLOOD GLUCOSE      POCT Blood Glucose.: 76 mg/dL (13 Jan 2023 06:31)      PHYSICAL EXAM:  General: NAD, answering questions, pleasantly conversant  HEENT: NC/AT; PERRL, clear conjunctiva  Neck: supple  Respiratory: CTA b/l  Cardiovascular: +S1/S2; RRR  Abdomen: soft, NT/ND; +BS x4  Extremities: WWP, 2+ peripheral pulses b/l; no LE edema  Skin: normal color and turgor; no rash  Neurological:     MEDICATIONS:  MEDICATIONS  (STANDING):  atorvastatin 40 milliGRAM(s) Oral at bedtime  chlorhexidine 2% Cloths 1 Application(s) Topical <User Schedule>  cinacalcet 30 milliGRAM(s) Oral daily  clopidogrel Tablet 75 milliGRAM(s) Oral daily  colchicine 0.3 milliGRAM(s) Oral daily  collagenase Ointment 1 Application(s) Topical daily  epoetin александр-epbx (RETACRIT) Injectable 8000 Unit(s) IV Push once  heparin  Infusion 2000 Unit(s)/Hr (20 mL/Hr) IV Continuous <Continuous>  midodrine 30 milliGRAM(s) Oral every 8 hours  Nephro-deborah 1 Tablet(s) Oral daily  phenylephrine    Infusion 0.1 MICROgram(s)/kG/Min (1.6 mL/Hr) IV Continuous <Continuous>  polyethylene glycol 3350 17 Gram(s) Oral every 12 hours  senna 2 Tablet(s) Oral at bedtime  sevelamer carbonate 1600 milliGRAM(s) Oral three times a day with meals    MEDICATIONS  (PRN):  acetaminophen     Tablet .. 650 milliGRAM(s) Oral every 6 hours PRN Temp greater or equal to 38C (100.4F), Moderate Pain (4 - 6)  calamine/zinc oxide Lotion 1 Application(s) Topical three times a day PRN Itching  HYDROmorphone  Injectable 0.25 milliGRAM(s) IV Push every 4 hours PRN Severe Pain (7 - 10)      ALLERGIES:  Allergies    Ancef (Rash; Urticaria)  DDAVP (Hypotension)  iodine (Hives; Pruritus)  penicillin (Swelling)  sulfa drugs (Angioedema)    Intolerances        LABS:                        7.5    5.51  )-----------( 135      ( 13 Jan 2023 05:46 )             24.4     01-13    130<L>  |  96  |  43<H>  ----------------------------<  83  4.4   |  23  |  7.14<H>    Ca    7.9<L>      13 Jan 2023 05:46  Phos  6.0     01-13  Mg     1.6     01-13    TPro  5.0<L>  /  Alb  2.1<L>  /  TBili  0.2  /  DBili  x   /  AST  19  /  ALT  9<L>  /  AlkPhos  62  01-13    PT/INR - ( 13 Jan 2023 05:46 )   PT: 15.0 sec;   INR: 1.26          PTT - ( 13 Jan 2023 05:46 )  PTT:79.4 sec                RADIOLOGY & ADDITIONAL TESTS: Reviewed.   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 Complaining of crampy abdominal pain and waterry diarrhea (2-3 BMs/day), now with nausea and vomiting prior to arrival. Has not gone to HD since 12/24 (8 days ago). Denies fever, chills, CP, SOB.     In the ED:  Initial vital signs: T: 97.5 F, HR: 108, BP: 109/86, R: 16, SpO2: 100% on RA  ED course:   Labs: significant for WBC 15.76, H/H 9.8/32.0, Plt 309, Na 131, K 7.8, Cl 84, AG 31, BUN/Cr 169/17.58  EKG: peaked T waves  Medications: calcium gluconate, dilaudid 0.5 x1, regular insulin 5, morphine 2mg IV, sodium bicarb, dextrose, albuterol 2.5x3  Consults: none    (02 Jan 2023 05:26)      SUBJECTIVE: Patient seen and examined at bedside. Receiving dialysis. Not complaining of SOB or CP. No N, V, Headache. Had diarrhea 2 weeks prior to coming in, which she had prolonged periods in bed. Does not remember much of her history of thrombosis, just that she has had multiple episodes. Has not followed with a hematologist before. Cannot remember being on anticoagulation.    OBJECTIVE:    VITAL SIGNS:  ICU Vital Signs Last 24 Hrs  T(C): 36.9 (13 Jan 2023 08:11), Max: 37.3 (13 Jan 2023 06:04)  T(F): 98.4 (13 Jan 2023 08:11), Max: 99.2 (13 Jan 2023 06:04)  HR: 81 (13 Jan 2023 09:00) (65 - 85)  BP: --  BP(mean): --  ABP: 86/53 (13 Jan 2023 09:00) (67/40 - 117/55)  ABP(mean): 66 (13 Jan 2023 09:00) (51 - 93)  RR: 27 (13 Jan 2023 09:00) (6 - 27)  SpO2: 99% (13 Jan 2023 09:00) (93% - 100%)    O2 Parameters below as of 13 Jan 2023 09:00  Patient On (Oxygen Delivery Method): room air              01-12 @ 07:01  -  01-13 @ 07:00  --------------------------------------------------------  IN: 1046.7 mL / OUT: 0 mL / NET: 1046.7 mL      CAPILLARY BLOOD GLUCOSE      POCT Blood Glucose.: 76 mg/dL (13 Jan 2023 06:31)      PHYSICAL EXAM:  General: NAD, answering questions, pleasantly conversant  HEENT: NC/AT; PERRL, clear conjunctiva  Neck: supple  Respiratory: CTA b/l  Cardiovascular: +S1/S2; RRR  Abdomen: soft, NT/ND; +BS x4  Extremities: WWP, 2+ peripheral pulses b/l; no LE edema  Skin: normal color and turgor; no rash  Neurological:     MEDICATIONS:  MEDICATIONS  (STANDING):  atorvastatin 40 milliGRAM(s) Oral at bedtime  chlorhexidine 2% Cloths 1 Application(s) Topical <User Schedule>  cinacalcet 30 milliGRAM(s) Oral daily  clopidogrel Tablet 75 milliGRAM(s) Oral daily  colchicine 0.3 milliGRAM(s) Oral daily  collagenase Ointment 1 Application(s) Topical daily  epoetin александр-epbx (RETACRIT) Injectable 8000 Unit(s) IV Push once  heparin  Infusion 2000 Unit(s)/Hr (20 mL/Hr) IV Continuous <Continuous>  midodrine 30 milliGRAM(s) Oral every 8 hours  Nephro-deborah 1 Tablet(s) Oral daily  phenylephrine    Infusion 0.1 MICROgram(s)/kG/Min (1.6 mL/Hr) IV Continuous <Continuous>  polyethylene glycol 3350 17 Gram(s) Oral every 12 hours  senna 2 Tablet(s) Oral at bedtime  sevelamer carbonate 1600 milliGRAM(s) Oral three times a day with meals    MEDICATIONS  (PRN):  acetaminophen     Tablet .. 650 milliGRAM(s) Oral every 6 hours PRN Temp greater or equal to 38C (100.4F), Moderate Pain (4 - 6)  calamine/zinc oxide Lotion 1 Application(s) Topical three times a day PRN Itching  HYDROmorphone  Injectable 0.25 milliGRAM(s) IV Push every 4 hours PRN Severe Pain (7 - 10)      ALLERGIES:  Allergies    Ancef (Rash; Urticaria)  DDAVP (Hypotension)  iodine (Hives; Pruritus)  penicillin (Swelling)  sulfa drugs (Angioedema)    Intolerances        LABS:                        7.5    5.51  )-----------( 135      ( 13 Jan 2023 05:46 )             24.4     01-13    130<L>  |  96  |  43<H>  ----------------------------<  83  4.4   |  23  |  7.14<H>    Ca    7.9<L>      13 Jan 2023 05:46  Phos  6.0     01-13  Mg     1.6     01-13    TPro  5.0<L>  /  Alb  2.1<L>  /  TBili  0.2  /  DBili  x   /  AST  19  /  ALT  9<L>  /  AlkPhos  62  01-13    PT/INR - ( 13 Jan 2023 05:46 )   PT: 15.0 sec;   INR: 1.26          PTT - ( 13 Jan 2023 05:46 )  PTT:79.4 sec                RADIOLOGY & ADDITIONAL TESTS: Reviewed.

## 2023-01-13 NOTE — PROGRESS NOTE ADULT - ASSESSMENT
68 yo F pt with PMHx HFrEF (EF 40%), nonobstructive CAD, NICM, HTN, HLD, ESRD 2/2 PCKD on HD, PE (2018) s/p IVC filter, mild AS, mild MR, PAD, OA, h/o thrombus in vascular access x3 (R AVG, R AVF, L AVG), ventral hernia, brown tumor in skull, multiple fractures (spine, pubic rami) presents with weakness and generalized malaise for 1 week for whom surgery was consulted for severe right breast pain now S/P incisional biopsy 1/9/2023.      - F/U final right breast biopsy pathology -- preliminary result shows vasculitis vs granuloma.  - F/u rheumatology recommendations for possible granulomatous mastitis  - F/U Cx results (NGTD so far). ABx as per primary team.  - Appreciate GYN recommendations for adnexal mass and elevated tumor markers.   - Wound care for access site ulceration as per vascular surgery.   - Surgery 5C following. Case discussed with attending.

## 2023-01-13 NOTE — PROGRESS NOTE ADULT - PROBLEM SELECTOR PLAN 2
Patient presented after missed HD for emergent dialysis on 1/2. Since admission, patient has been persistently hypotensive. Has been on phenylephrine for >1 week. Started on fluorinef and midodrine as well, but still requiring pressors.   - Continue care as per primary team  - Continue to try to wean off pressors as tolerated.

## 2023-01-13 NOTE — PROGRESS NOTE ADULT - ASSESSMENT
67 F with ESRD on HD presented for missed HD found to be hyperkalemic c/b septic shock of unknown etiology, pending w/u for granulomatous mastitis, continues to require increase dose of phenylephrine during HD     Assessment/Plan:   #ESRD on HD TTS  HD today as prescribed   Hemodialysis Treatment.:     Schedule: Once, Modality: Hemodialysis, Access: Internal Jugular Central Venous Catheter    Dialyzer: Optiflux [id number], Time: 180 Min    Blood Flow: 400 mL/Min , Dialysate Flow: 500 mL/Min, Dialysate Temp: 35.5, Tubinmm (Adult)    Target Fluid Removal: 0 Liters    Dialysate Electrolytes (mEq/L): Potassium 2, Calcium 2.5, Sodium 138, Bicarbonate 35    Additional Instructions: Can go up on pressors to aid with HD   Electrolytes noted    Renal diet    #Hx of Hypertension  on midodrine and phenylephrine  - 0UF with HD    #access   [facility] TDC - does not appear to be infected or source of infxn  RUE AVF not being used    #anemia  Hgb 7.9  -Epo w/ HD  -Iron profile noted    #renal bone disease   Noted to have brown tumors on most recent CT, in addition to a lytic lesion of her skull. Likely due to hyperparathyroidism. PTH noted to be 479 on most recent check.   Ca ~8.9 corrected for albumin  Phos ~6.0  -c/w sevelamer to 1600mg tid w/ meals  -C/w sensipar 30mg Qday

## 2023-01-13 NOTE — CONSULT NOTE ADULT - SUBJECTIVE AND OBJECTIVE BOX
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 In ED pt was found to be hyperkalemic with EKG changes and meeting 2/4 SIRS criteria and requiring pressors and admitted to MICU for emergent hemodialysis. Initially thought to be in septic shock however unable to locate source, s/p IR drainage of seroma at site of R AV fistula. Surgery consulted for severe b/l breast pain and skin changes concerning for peu d'orange as well as b/l palpable breast masses and R axillary LAD. Surgery believes changes likely 2/2 venous congestion rather than malignancy given no evidence of cyst or focal mass on ultrasound, biopsy performed. Endocrine consulted for multiple brown tumors on CT w/ multiple fractures of pubic rami, pubic bone, thoracolumbar spine, and increased density of bone marrow consistent with renal osteodystrophy. Gallium scan performed significant for faintly increased activity surrounding the entire lower portion of the right breast consistent with the inflammatory reaction seen on physical examination, small area of activity approximately 10 cm to the right of midline at about the level of the lower border of the sternum, activity likely to be in chest wall. Given abnormal findings on gallium scan, decision to proceed with PET/CT for malignancy evaluation. Hospital course thus far has been significant for extensive DVT from R IJ to R subclavia and R axilla as well as continued pressor requirements. On CT scan at time of hospital admission, pt found to have 6.1 cm cystic right adnexal mass and GYN consulted to determine if findings could represent malignancy.    Upon review of chart, noted that pt had MR 1/12/22 with findings of partially septated 7.9x5.5cm R ovarian cyst.     Pt seen and evaluated at bedside. States that she was unaware of MRI findings from 2022. Pt does not make urine.    ObHx: NSVDx3, (82/84/87)  GynHx: s/p abdominal hysterectomy for fibroid uterus (pt unsure if cervix is present), has not seen GYN for "a long time". Denies history of cysts/abnormal pap/STI  PMH: HFrEF, mild AS, mild MR< NICM, HTN, HLD, ESRD on HD, PE s/p IVC filter, PAD, OA, hyperparathyroidism  PSH: multiple AV fistula revisions/repairs, abdominal hysterectomy, ventral hernia repair, partial bowel resection for "narrowing of intestine"  Meds: ASA, plavix, entresto, folic acid        PHYSICAL EXAM:   Vital Signs Last 24 Hrs  T(C): 36.9 (13 Jan 2023 12:35), Max: 37.3 (13 Jan 2023 06:04)  T(F): 98.4 (13 Jan 2023 12:35), Max: 99.2 (13 Jan 2023 06:04)  HR: 71 (13 Jan 2023 12:35) (65 - 85)    RR: 18 (13 Jan 2023 12:35) (6 - 27)  SpO2: 100% (13 Jan 2023 12:35) (89% - 100%)    Parameters below as of 13 Jan 2023 12:35  Patient On (Oxygen Delivery Method): room air        **************************    LABS:                        7.5    5.51  )-----------( 135      ( 13 Jan 2023 05:46 )             24.4     01-13    130<L>  |  96  |  43<H>  ----------------------------<  83  4.4   |  23  |  7.14<H>    Ca    7.9<L>      13 Jan 2023 05:46  Phos  6.0     01-13  Mg     1.6     01-13    TPro  5.0<L>  /  Alb  2.1<L>  /  TBili  0.2  /  DBili  x   /  AST  19  /  ALT  9<L>  /  AlkPhos  62  01-13    PT/INR - ( 13 Jan 2023 05:46 )   PT: 15.0 sec;   INR: 1.26          PTT - ( 13 Jan 2023 05:46 )  PTT:79.4 sec    Carcinoembryonic Antigen: 6.1: Method: Roche Electrochemiluminescence Immuno Assay   Values obtained with different assay methods or kits cannot be   used interchangeably.   Results cannot be interpreted as absolute evidence of the presence   or absence of malignant disease.   CEA Normal Ranges   _________________   Non-smoker: less than 3.9 ng/mL   Smoker: less than 5.5 ng/mL ng/mL (01.11.23 @ 04:35)     Cancer Antigen, 125: 196: Method: Roche Electrochemiluminescence Immuno Assay   Values obtained with different assay methods or kits cannot be   used interchangeably.   Results cannot be interpreted as absolute evidence of the presence   or absence of malignant disease. U/mL (01.11.23 @ 04:35)     Cancer Antigen, GI Ca 19-9: 172: Test Repeated   Method: Roche Electrochemiluminescence Immuno Assay   Values obtained with different assay methods or kits cannot be   used interchangeably.   Results cannot be interpreted as absolute evidence of the presence   or absence of malignant disease. U/mL (01.11.23 @ 04:35)       RADIOLOGY & ADDITIONAL STUDIES:    < from: CT Abdomen and Pelvis No Cont (01.03.23 @ 05:34) >  ACC: [id number] EXAM:  CT ABDOMEN AND PELVIS                          PROCEDURE DATE:  01/03/2023          INTERPRETATION:  CT SCAN OF CHEST, ABDOMEN AND PELVIS    History: Sepsis. Pneumonia. Abdominal pain. Hyperkalemia.    Technique: CT scan of chest, abdomen and pelvis performed from lung   apices through pubic symphysis. Intravenous and oral contrast material   were not administered, as ordered. Axial, coronal, and sagittal   multiplanar reformatted images were produced. Thin section axial images   and axial MIPS of the chest were also produced.    Comparison: MRI of abdomen from 1/12/2022. CT scan of abdomen and pelvis   from 11/10/2021.    Findings:    Lungs and large airways: Small amount of consolidation/atelectasis in the   lower lobes, right greater than left. Linear atelectasis right middle   lobe and left upper lobe.    Pleura:  No pleural effusion.    Mediastinum and hilar regions: No thoracic lymphadenopathy.    Heart and pericardium:  Heart size is normal. No pericardial effusion.    Vessels:  Severe coronary artery calcification. Mild amount of calcified   plaque thoracic aorta. Moderate calcified plaque abdominal aorta. Larger   amount of calcified plaque splenic artery and bilateral pelvic arteries.   AV fistula is present in the right upper arm. A 7.0 x 5.1 cm fluid   collection is located near the fistula, possibly postoperative seroma or   hematoma. No gas within this collection. Vascular stents present in the   right axillary region. There is also a stent extending from the medial   aspect of the right subclavian vein through the right brachiocephalic   vein into the superior vena cava. There is a left-sided central line   extending through that stent into the right atrium. Multiple collateral   vessels right chest wall. IVC filter again in place, with some of its   prongs projecting beyond the lumen of the vessel. Mildly calcified aortic   valve. Tortuous thoracic aorta.    Chest wall and lower neck:  Enlarged, multinodular thyroid projects into   the superior mediastinum. There is a 1.8 x 1.5 cm solid nodule slightly   inferiorly in the anterior mediastinum, without definite communication   with the thyroid. However, this may still represent thyroid tissue, as   its morphology is similar in appearance to the remainder of the thyroid.    Liver:  Multiple hepatic cysts again present.    Gallbladder and biliary tree: No radiopaque stones gallbladder. No   intrahepatic biliary ductal dilatation. Common bile duct again mildly   dilated, measuring0.9 cm.    Spleen:  Normal.    Pancreas:  A few pancreatic calcifications are again noted.    Adrenal glands:  Normal.    Kidneys: Kidneys again enlarged bilaterally with parenchyma nearly   completely replaced by innumerable simple and complex cystic masses,   consistent with autosomal dominant polycystic kidney disease. It is not   possible to determine if there are any solid renal masses on this   noncontrast exam.    Abdominal and pelvic adenopathy:  No lymphadenopathy in abdomen or pelvis.    Ascites: None.    Gastrointestinal tract: Nonobstructed loops of small bowel and portion of   transverse colon again present within large, broad-based ventral hernia.   Small bowel anastomosis in the pelvis.    Pelvic organs: A 6.1 x 4.9 cm cystic mass is again present in the right   adnexa. Left ovary unremarkable. Again post hysterectomy. No urine in   bladder, limiting evaluation.    Soft tissues: Normal.    Bones: Scoliosis and degenerative changes of the spine. Severe   degenerative changesof the shoulders. Increased density of the bone   marrow is consistent with renal osteodystrophy. There are also multiple   lucent bone lesions, which could represent Brown tumors. There is again   nonunion of fractures of the right superior and inferior pubic rami and   both pubic bones. Multiple compression fractures in the thoracolumbar   spine.      Impression: 1. Small amount of consolidation in both lower lobes, right   greater than left. This is felt to be more likely to represent   atelectasis than pneumonia.    2. A 7.0 cm fluid collection is present near an AV fistula in the right   upper arm. This could represent a postoperative seroma or hematoma, but   clinical correlation is recommended to rule out abscess given the history   ofsepsis.    3. There is again a large broad-based ventral hernia containing   nonobstructed loops of small and large bowel.    4. A 6.1 cm cystic mass is again present in the right adnexa. This is an   abnormal finding in a postmenopausal female. Recommend gynecology   consultation as neoplasm should be excluded.    --- End of Report ---            TOREY COOPER MD; Attending Radiologist  This document has been electronically signed. Faustino  3 2023  9:23AM    < end of copied text >      < from: US Transvaginal (01.11.23 @ 19:02) >    ACC: [id number] EXAM:  US TRANSVAGINAL                        ACC: [id number] EXAM:  US PELVIC COMPLETE                          PROCEDURE DATE:  01/11/2023          INTERPRETATION:  CLINICAL INFORMATION: Adnexal mass. Rule out malignancy.    LMP: Postmenopausal    COMPARISON: PET/CT dated 1/11/2023. CT chest, abdomen and pelvis dated   1/3/2023 and CT scan of abdomen and pelvis from 11/10/2021.    TECHNIQUE:  Endovaginal and transabdominal pelvic sonogram. Color and Spectral   Doppler was performed. Study limited by patient body habitus.    FINDINGS:    Uterus: Supracervical hysterectomy. Cervix unremarkable.    Right ovary: 6.2 cm x 5.8 cm x 5.7 cm. This is enlarged (calculated   volume of 107 mL) secondary to large simple cyst appearing to occupy   nearly the entire ovary. Normal arterial and venous waveforms.  Left ovary: 2.3 cm x 1.2 cm x 1.5 cm. Within normal limits. Normal   arterial and venous waveforms.    Fluid: None.    IMPRESSION:  1. A 6.2 cm simple cystic lesion in the right ovary has not significantly   changed since 11/10/2021. It could be a nonaggressive neoplasm, such as a   serous cystadenoma. Gynecology consultation recommended.    2. Post supracervical hysterectomy.      --- End of Report ---          EJ MIRZA; Resident Radiologist  This document has been electronically signed.  TOREY COOPER MD; Attending Radiologist  This document has been electronically signed. Jan 11 2023  8:21PM    < end of copied text >  < from: NM PET/CT Onc FDG Skull to Thigh, Inital (01.11.23 @ 18:22) >    ACC: [id number] EXAM:  PETCT ESTEFANI-SHERRIE ONC FDG INIT                          PROCEDURE DATE:  01/11/2023          INTERPRETATION:  18F-FDG PET/CT    INDICATION: Recent CT findings of a cystic right adnexal mass on   1/3/2023; evaluate for malignancy. Baseline study to help determine   initial treatment strategy.    COMPARISON: CT of the chest, abdomen, and pelvis 1/3/2023 and ultrasound   of right upper extremity veins 1/4/2023.    TECHNIQUE: Following at least 4 hour fasting, the patient's blood glucose   was 77 mg/dl. Then, 12.21 mCi of F-18 FDG was administered intravenously.   After a 45 minute incubation time, PET images were obtained from skull   base to mid thigh. Axial CT images were obtained for localization and   attenuation correction only. Sagittal and coronal reformatted images were   obtained.    FINDINGS:  Head and Neck: Physiologic uptake is noted in the salivary glands,   oropharynx, larynx, and thyroid. Enlarged multinodular thyroid. No   hypermetabolic cervical or supraclavicular lymphadenopathy. Left internal   jugular central venous catheter.    Chest:  - FDG avid right axillary vein, which is heterogeneously attenuated with   focal areas of calcifications, SUV max 3.6. (Series 4; images is 27-36);   this is consistent with an occlusive thrombus that was noted on recent   right upper arm ultrasound. This may represent infectious source.  - There is an additional focal FDG avid region of the AV fistula near the   arterial anastomosis proximal to the area of the fluid collection   surrounding the fistula, SUV max 2.7 (series 4; images 55-62). This   collection is better seen on recent prior imaging. This may represent an   additional infectious source.  - No hypermetabolic pulmonary nodules.  - No hypermetabolic mediastinal or axillary lymphadenopathy.  - No abnormal foci of uptake in the breasts.  - There are synthetic AV fistula grafts in the right subclavian and   axillary regions. There is a stent extending from the superior vena cava   into the rightbrachiocephalic to the right subclavian vein. The left   internal jugular central venous catheter is seen coursing through the SVC   stent. Coronary artery calcifications thoracic plaque calcifications.  - Cardiomegaly with moderate pericardial effusion seen anteriorly. Small   bilateral pleural effusions. Right middle lobe and bibasilar atelectasis.    Abdomen and Pelvis: Physiologic activity is noted in the liver, spleen,   pancreas, bowel, and adrenal glands. Photopenic 6.1 x 5.0 cm right   adnexal cystic mass is noted. No hypermetabolic lymphadenopathy. Again   multiple hepatic cysts are noted. Again, bilaterally enlarged kidneys   with innumerable simple and complex cysts, consistent with autosomal   dominant polycystic disease. There isno physiologic FDG activity noted   within the kidneys or the urinary collecting system.    Musculoskeletal: No abnormal foci of uptake in the osseous structures.   Diffuse degenerative changes of the spine and shoulders. Again, right   superior and inferior pubic rami nonunion fractures and multiple   thoracolumbar spine compression fractures.    IMPRESSION:    1. There is increased FDG avidity (SUV max 3.6) within the right axillary   vein, which contains an occlusive thrombus, which may represent an   infectious source. There is also an additional FDG avid (SUV max 2.7)   region of the AV fistula near the arterial anastomosis proximal to the   area of the fluid collection; this could be an additional potential   source of infection versus inflammatory reaction to the graft; recommend   correlating with the age of the graft. Recommend further correlation with   contrast enhanced CT imaging.    2. Photopenic right adnexal cystic mass, suggestive of a benign etiology.    --- End of Report---          ROBBIE GARCIA MD; Resident Radiologist  This document has been electronically signed.  TOREY HOPKINS MD; Attending Nuclear Medicine  This document has been electronically signed. Jan 13 2023  1:02AM    < end of copied text >

## 2023-01-13 NOTE — PROGRESS NOTE ADULT - PROBLEM SELECTOR PLAN 3
Patient with longstanding history of ESRD 2/2 PCKD. AV Fistula of EDWIN clotted in 2014, has received HD thru HD cath since. New AV Fistula in 11/2022, not yet matured. Presented to hospital on this admission 2/2 >1 week of missed HD.   - Renal consulted and following, patient getting dialyzed in house.

## 2023-01-13 NOTE — PROGRESS NOTE ADULT - PROBLEM SELECTOR PLAN 1
Patient presented with bilateral breast pain with large R breast mass and palpable L breast mass. R breast has peau d'orange appearance with associated erythema, warmth. Patient with family hx of breast cancer in mother, sister. US bilateral breasts: No sonographic evidence of breast abscess, cyst or focal mass to correspond with the clinical finding of bilateral breast masses. Surgery was consulted and recommend nonurgent outpatient mammogram when clinically stable. 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 Patient still complaining of pain that is not well managed. States that PO does not work as well and as fast as IV medications.   - Can continue 0.25 mg IV hydromorphone every 4 hrs PRN for severe pain, hold for hypotension, sedation and RR <10.   - Can give 1g Ofirmev q8h or tylenol 1000mg PO q8h  - Would do a trial of topical lidocaine 4% cream TID PRN to b/l breasts. No

## 2023-01-13 NOTE — PROGRESS NOTE ADULT - ASSESSMENT
67F h/o ESRD due to PCKD on HD via HD cath (failedvascular access due to thrombus x 3), CAD, HTN, HLD, PE s/p IVC filter, PAD, OA, osteoclastoma, ventral hernia p/w septic shock of unknown source.  Infectious work-up so far unremarkable - fluid collection near AVF likely seroma and culture ngtd, gallium scan negative, BCx ngtd.  Patient c/o breast pain but no erythema on exam, and unlikely breast ca per team. Never febrile and without leukocytosis, meropenem has been dced . Still on phenylephrine. PET CT showed axillary vein and AV fistula uptake.     - cont vanco until 1/15/23 (2 week course) to treat breast cellulitis. Check vanc level before HD.   If <10, would give 1 g, 10-17.5 give 750 mg, 17.6-25, give 500 mg after HD, >25 Hold Vancomycin. Please give Vancomycin after HD is completed   - Possible that etiology of her hypotension is not infectious, unclear why she still need jarvis    ID will sign off. Please re-consult as needed. Recommendations not final until attending attestation present.

## 2023-01-13 NOTE — PROGRESS NOTE ADULT - ASSESSMENT
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 During imaging, CT showing 7.0 cm fluid collection is present near an AV fistula in the right upper arm. The differential for this collection included benign post-operative hematoma or seroma, but also abscess or vascular device infection, particularly in the setting of septic shock. Vascular consulted for RUE fistula evaluation to rule out this area as a source of sepsis. Reassuring physical exam at initial evaluation without clear indicia of infection related to the graph, small superficial surgical incision dehiscence notwithstanding. Now s/p IR image guided aspiration of perigraft fluid (negative) and gadolinium scan (negative). At this point, do not suspect a graft infection as the provoking factor leading to the patient's presentation. Patient remaining afebrile overnight with continued pressor requirements.    Recommendations:    -Continue to follow up BCx and IR Cx - both NGTD (given negative blood and wound drainage cultures, low likelihood of graft infection)  -Wound care recs: BID dressing changes (once daily with Santyl DSD once daily with Bactroban DSD). Apply light ace wrap compression to right hand and forearm to help with soft tissue swelling  -F/u breast punch and core biopsy  -Appreciate breast surgery recs  -Rest of care per primary team  -Vascular surgery Team 3C will continue to follow. Please call [phone] with any questions or concerns.

## 2023-01-13 NOTE — CONSULT NOTE ADULT - ASSESSMENT
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 Hematology consulted for multiple episodes of thrombosis.     # Acute DVT of R IJ, R subclvavian vein, R axillary vein (01/2023)  # History of RUE AVG revision w/ brachiobrachial graft, thrombectomy, and explantation of old graft (11/2022)  # History of pulmonary embolism s/p IVC filter (2018)  # History of thrombus in vascular access x3 (R AVG, R AVF, L AVG) - date unknown    Medically complex case and patient with multiple co-morbidities. Patient chronically on aspirin and plavix. Was not on anticoagulation outpatient, but has a very poor understanding her hematologic history. Discussed with patient and daughter regarding history, both remember long history of blood clots, no family history, does not know why IVC filter was placed, anticoagulation history, or complications from anticoagulation.     Currently on this admission found to have ultrasound showing acute DVT of R IJ, R subclvavian vein, R axillary vein, now on heparin infusion. Hypercoagulable work up negative for LA, anticardiolipin, and beta 2 glycoprotein. Although adnexal cyst and breast lesions appear benign, does raise possibility of underlying malignancy given elevated tumor markers (, CEA, and ), though PET/CT negative, would still follow up routine cancer screening outpatient with colonoscopy and mammogram. Other imagings studies available include MRCP 1/2022 negative and  Current venous thrombosis possibly represents a provoked event in setting of signifcantly decreased mobility after recent GI illness. Other factors include chronic inflammatory conditions as well as reported IVC filter (placed for unknown reason) may represent a pro-coagualable state.   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 Hematology consulted for multiple episodes of thrombosis.     # Acute DVT of R IJ, R subclvavian vein, R axillary vein (01/2023)  # History of RUE AVG revision w/ brachiobrachial graft, thrombectomy, and explantation of old graft (11/2022)  # History of pulmonary embolism s/p IVC filter (2018)  # History of thrombus in vascular access x3 (R AVG, R AVF, L AVG) - date unknown    Medically complex case and patient with multiple co-morbidities. Patient chronically on aspirin and plavix. Was not on anticoagulation outpatient, but has a very poor understanding her hematologic history. Discussed with patient and daughter regarding history, both remember long history of blood clots, no family history, does not know why IVC filter was placed, anticoagulation history, or complications from anticoagulation.     Currently on this admission found to have ultrasound showing acute DVT of R IJ, R subclvavian vein, R axillary vein, now on heparin infusion. Hypercoagulable work up negative for LA, anticardiolipin, and beta 2 glycoprotein. Although adnexal cyst and breast lesions appear benign, does raise possibility of underlying malignancy given elevated tumor markers (, CEA, and ), though PET/CT negative. Other imagings studies available include MRCP 1/2022 negative. Current venous thrombosis possibly represents a provoked event in setting of significantly decreased mobility after recent GI illness and frequent venous access for ESRD. Other factors include multiple chronic inflammatory conditions, possibly associated to PCKD, and reported IVC filter (placed for unknown reason) may represent a pro-coagualable state.     - Continue with IV heparin gtt  - History of polycystic kidney disease raises possibility of screening for intracranial aneurysms as patient may require prolonged anticoagulation, consider CT angiogram  - Obtain collateral from [facility] and patient's other providers (would like to know the indication for IVC filter at the time, anticoagulation history, and prior work up)  - Will likely need switch to warfarin when medically stabilized    Discussed with hematology oncology attending Dr. José Miguel Colbert. Recommendations are considered final after attending attestation.

## 2023-01-13 NOTE — PROGRESS NOTE ADULT - SUBJECTIVE AND OBJECTIVE BOX
Patient is a 67y Female seen and evaluated at bedside.       Meds:    acetaminophen     Tablet .. 650 every 6 hours PRN  atorvastatin 40 at bedtime  calamine/zinc oxide Lotion 1 three times a day PRN  chlorhexidine 2% Cloths 1 <User Schedule>  cinacalcet 30 daily  clopidogrel Tablet 75 daily  colchicine 0.3 daily  collagenase Ointment 1 daily  epoetin александр-epbx (RETACRIT) Injectable 8000 once  heparin  Infusion 2000 <Continuous>  HYDROmorphone  Injectable 0.25 every 4 hours PRN  midodrine 30 every 8 hours  Nephro-deborah 1 daily  phenylephrine    Infusion 0.1 <Continuous>  polyethylene glycol 3350 17 every 12 hours  senna 2 at bedtime  sevelamer carbonate 1600 three times a day with meals      T(C): , Max: 37.3 (01-13-23 @ 06:04)  T(F): , Max: 99.2 (01-13-23 @ 06:04)  HR: 70 (01-13-23 @ 10:30)  BP: --  BP(mean): --  RR: 19 (01-13-23 @ 10:30)  SpO2: 96% (01-13-23 @ 10:30)  Wt(kg): --    01-12 @ 07:01 - 01-13 @ 07:00  --------------------------------------------------------  IN: 1046.7 mL / OUT: 0 mL / NET: 1046.7 mL          Review of Systems:  ROS negative except as per HPI      PHYSICAL EXAM:  GENERAL: NAD  NECK: supple, No JVD  CHEST/LUNG: Clear to auscultation bilaterally  HEART: normal S1S2, RRR  ABDOMEN: Soft, Nontender, +BS, No flank tenderness bilateral  EXTREMITIES: No clubbing, cyanosis, or edema   NEUROLOGY: AAO x3, no focal neurological deficit  ACCESS: good thrill and bruit appreciated      LABS:                        7.5    5.51  )-----------( 135      ( 13 Jan 2023 05:46 )             24.4     01-13    130<L>  |  96  |  43<H>  ----------------------------<  83  4.4   |  23  |  7.14<H>    Ca    7.9<L>      13 Jan 2023 05:46  Phos  6.0     01-13  Mg     1.6     01-13    TPro  5.0<L>  /  Alb  2.1<L>  /  TBili  0.2  /  DBili  x   /  AST  19  /  ALT  9<L>  /  AlkPhos  62  01-13      PT/INR - ( 13 Jan 2023 05:46 )   PT: 15.0 sec;   INR: 1.26          PTT - ( 13 Jan 2023 05:46 )  PTT:79.4 sec          RADIOLOGY & ADDITIONAL STUDIES:           Patient is a 67y Female seen and evaluated at bedside. No acute distress, no complaints. Tolerated last HD session but required increase dose of phenylephrine with HD. Plan for HD today as per schedule.       Meds:    acetaminophen     Tablet .. 650 every 6 hours PRN  atorvastatin 40 at bedtime  calamine/zinc oxide Lotion 1 three times a day PRN  chlorhexidine 2% Cloths 1 <User Schedule>  cinacalcet 30 daily  clopidogrel Tablet 75 daily  colchicine 0.3 daily  collagenase Ointment 1 daily  epoetin александр-epbx (RETACRIT) Injectable 8000 once  heparin  Infusion 2000 <Continuous>  HYDROmorphone  Injectable 0.25 every 4 hours PRN  midodrine 30 every 8 hours  Nephro-deborah 1 daily  phenylephrine    Infusion 0.1 <Continuous>  polyethylene glycol 3350 17 every 12 hours  senna 2 at bedtime  sevelamer carbonate 1600 three times a day with meals      T(C): , Max: 37.3 (01-13-23 @ 06:04)  T(F): , Max: 99.2 (01-13-23 @ 06:04)  HR: 70 (01-13-23 @ 10:30)  BP: 85/38  RR: 19 (01-13-23 @ 10:30)  SpO2: 96% (01-13-23 @ 10:30)  Wt(kg): --    01-12 @ 07:01  -  01-13 @ 07:00  --------------------------------------------------------  IN: 1046.7 mL / OUT: 0 mL / NET: 1046.7 mL          Review of Systems:  ROS negative except as per HPI      PHYSICAL EXAM:  GENERAL: NAD  NECK: supple, No JVD  CHEST/LUNG: Clear to auscultation bilaterally  HEART: normal S1S2, RRR  ABDOMEN: Soft, Nontender, +BS, No flank tenderness bilateral  EXTREMITIES: No clubbing, cyanosis, or edema   NEUROLOGY: AAO x3, no focal neurological deficit  ACCESS: good thrill and bruit appreciated      LABS:                        7.5    5.51  )-----------( 135      ( 13 Jan 2023 05:46 )             24.4     01-13    130<L>  |  96  |  43<H>  ----------------------------<  83  4.4   |  23  |  7.14<H>    Ca    7.9<L>      13 Jan 2023 05:46  Phos  6.0     01-13  Mg     1.6     01-13    TPro  5.0<L>  /  Alb  2.1<L>  /  TBili  0.2  /  DBili  x   /  AST  19  /  ALT  9<L>  /  AlkPhos  62  01-13      PT/INR - ( 13 Jan 2023 05:46 )   PT: 15.0 sec;   INR: 1.26          PTT - ( 13 Jan 2023 05:46 )  PTT:79.4 sec          RADIOLOGY & ADDITIONAL STUDIES:

## 2023-01-13 NOTE — PROGRESS NOTE ADULT - PROBLEM SELECTOR PLAN 5
Patient with multiple medical problems and comorbidities. Has been on pressors for >1 week and still persistently hypotensive. Initiated GOC discussion with patient. Per patient, she has not thought about her code status or what her GOC are and would like to discuss with her daughter, Jasmina. As of today, has not discussed with her daughter Jasmina.   - Patient has 3 children and would like Jasmina, her youngest daughter, to make her medical decisions on her behalf if she cannot make them for herself. Healthcare proxy form completed and made available in the patient chart.

## 2023-01-13 NOTE — PROGRESS NOTE ADULT - SUBJECTIVE AND OBJECTIVE BOX
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      EVENTS:   - Biopsy taken uneventfully at bedside 1/9/23- preliminary pathology showed granulomas, pending final report.  - PET-CT (pending read) and Pelvic US done yesterday. CEA//CA-125 all elevated.  - Dialysis 1/10 uneventful.   - Phenylephrine gtt at lower rates this morning    SUBJECTIVE:   - Notes no new symptoms and feels fair this morning  - Denies CP, SOB  - Denies worsening right breast pain or new discharge.      EVENTS:   - Biopsy taken uneventfully at bedside 1/9/23- preliminary pathology showed granulomas vs vasculitis, pending final report.  - PET-CT with increased avidity in R axillary vein, which contains a thrombus, which may represent an infectious source. Additional FDG avid region in AV fistula proximal to the area of the fluid collection, which could be additional potential source of infection vs inflammatory reaction. Photopenic R adnexal cystic mass, suggestive of benign etiology.   - TVUS 1/11:  6.2 cm cystic lesion of R ovary with no significant change since 11/2021.  It could be a nonaggressive neoplasm, such as a   serous cystadenoma. Gynecology consultation recommended. Post supracervical hysterectomy. / CEA//CA-125 all elevated.   - Phenylephrine gtt at lower rates this morning  - Primary team started colchicine 1/13 for possible gout in R toe.     SUBJECTIVE  No new symptoms. Feels fair this morning. Breast exam unchanged vs slightly less tender.  Denies f/n/v/cp/sob.    Vital Signs Last 24 Hrs  T(C): 36.9 (13 Jan 2023 08:11), Max: 37.3 (13 Jan 2023 06:04)  T(F): 98.4 (13 Jan 2023 08:11), Max: 99.2 (13 Jan 2023 06:04)  HR: 69 (13 Jan 2023 07:00) (64 - 85)  BP: --  BP(mean): --  RR: 11 (13 Jan 2023 07:00) (6 - 23)  SpO2: 99% (13 Jan 2023 07:00) (93% - 100%)    Parameters below as of 13 Jan 2023 07:00  Patient On (Oxygen Delivery Method): room air        PHYSICAL EXAM:   Gen: Awake, alert, NAD, resting comfortably   Breast: non-soiled gauze overlying incisional bx site on R breast, mild induration surrounding bx site stable. B/l breasts soft with underlying firmness, mildly tender to palpation this morning.   CV: RRR  Pulm: no respiratory distress on RA  Abd: soft, ND, NTTP, no rebound or guarding   Ext: WWP, ulceration of medial aspect of R arm over access site. L chest HD catheter without edema/induration/purulence.       I&O's Detail    12 Jan 2023 07:01  -  13 Jan 2023 07:00  --------------------------------------------------------  IN:    Heparin: 480 mL    IV PiggyBack: 100 mL    Phenylephrine: 466.7 mL  Total IN: 1046.7 mL    OUT:  Total OUT: 0 mL    Total NET: 1046.7 mL          LABS:                        7.5    5.51  )-----------( 135      ( 13 Jan 2023 05:46 )             24.4     01-13    130<L>  |  96  |  43<H>  ----------------------------<  83  4.4   |  23  |  7.14<H>    Ca    7.9<L>      13 Jan 2023 05:46  Phos  6.0     01-13  Mg     1.6     01-13    TPro  5.0<L>  /  Alb  2.1<L>  /  TBili  0.2  /  DBili  x   /  AST  19  /  ALT  9<L>  /  AlkPhos  62  01-13    LIVER FUNCTIONS - ( 13 Jan 2023 05:46 )  Alb: 2.1 g/dL / Pro: 5.0 g/dL / ALK PHOS: 62 U/L / ALT: 9 U/L / AST: 19 U/L / GGT: x           PT/INR - ( 13 Jan 2023 05:46 )   PT: 15.0 sec;   INR: 1.26          PTT - ( 13 Jan 2023 05:46 )  PTT:79.4 sec  CAPILLARY BLOOD GLUCOSE      POCT Blood Glucose.: 76 mg/dL (13 Jan 2023 06:31)  POCT Blood Glucose.: 87 mg/dL (12 Jan 2023 12:51)      RADIOLOGY & ADDITIONAL STUDIES: SURGEON:  Otoniel Julian    PREOPERATIVE DIAGNOSIS:  Visually significant Nuclear Sclerotic Cataract, Right eye.  Previous LASIK, monovision right eye near    POSTOPERATIVE DIAGNOSIS:  Visually significant Nuclear Sclerotic Cataract,  Right  eye.  Previous LASIK, monovision right eye near    PROCEDURE PERFORMED:  Phacoemulsification of cataract with posterior chamber lens implantation, Right  eye.  ORA measurements  ANESTHESIA:  Local with monitored anesthesia care.     ASSISTANT:  HUSAM Peterson       DESCRIPTION OF PROCEDURE:  While in the preoperative holding area, the pupil of the operative eye was pharmacologically dilated.  Tetracaine 0.5% was placed on the ocular surface twice before surgery.  The patient was properly positioned and transported to the operating room.  The periorbital area was thoroughly prepped with Betadine and draped in the usual fashion.     A wire lid speculum was then placed to separate the lids.  The operating microscope was brought into position.  A stab incision was placed through the limbal cornea.  Viscoelastic was then instilled to deepen the anterior chamber.  A keratome blade was used to construct a phacoemulsification wound. A central capsulorrhexis was fashioned and balanced salt solution then placed to hydrodissect and hydrodelineate the lens material.  The lens nucleus was then rotated.  The phaco-emulsifier was introduced and used to sculpt a groove into the central lens nucleus, creating a deep cleft in the central nucleus.  The nucleus was cracked and rotated.  Each half was similarly sculpted and divided and removed. The remaining cortical material was then aspirated from the fornices of the capsular bag.  The posterior capsule was cleaned and polished and the bag filled with Viscoelastic. IOP was checked and then ORA measurements were taken and then confirmed.  An zcboo posterior chamber intraocular lens implant with a power of 21.5 diopters was folded and inserted through  the corneal wound and into the capsular bag.  This lens was placed within the capsular bag and rotated into good position.  The remaining Viscoelastic was then evacuated from the anterior chamber and from beneath the intraocular lens optic.  Additional balanced salt solution was placed to obtain good intraocular pressure.  The corneal wound was hydrated and found to be watertight. The lid speculum and drapes were removed.  Vigamox and Timoptic were placed on the eye.  A protective shield was placed, and the patient then returned to recovery in good condition.    The patient was instructed to take it easy tonight: no heavy lifting, bending or straining.  Pt also instructed to call with any concerns ie decreased vision, pain or changes in condition.

## 2023-01-13 NOTE — CONSULT NOTE ADULT - ASSESSMENT
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 GYN consulted for findings of R ovarian cyst on CT and concern for malignancy.     - Elevated , CA-125, CEA however not informative given pt is acutely ill  - CTA/P and TVUS consistent with likely simple cyst, PET/CT w/ no uptake in the R adnexa. Very low concern for malignancy at this time  - Recommend outpatient follow up when stable for repeat tumor markers. Pt may follow up at [facility]- [address] [place] [address], [phone]  - D/w Dr. Paredes, PGY4  - D/w Dr. 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 GYN consulted for findings of R ovarian cyst on CT and concern for malignancy.     - Elevated , CA-125, CEA however not informative given pt is acutely ill  - CTA/P and TVUS consistent with likely simple cyst, PET/CT w/ no uptake in the R adnexa. Very low concern for malignancy at this time  - Recommend outpatient follow up when stable for repeat tumor markers. Pt may follow up at [facility]- [address], [phone]  - No acute GYN intervention indicated at this time  - GYN Oncology to sign off, please reconsult as needed  - D/w Dr. Paredes, PGY4  - D/w Dr. Fletcher, GYN Oncology Attending    Bam Lowe MD PGY2

## 2023-01-13 NOTE — PROGRESS NOTE ADULT - SUBJECTIVE AND OBJECTIVE BOX
OVERNIGHT EVENTS: SHAISTA    SUBJECTIVE / INTERVAL HPI: Patient seen and examined at bedside. Pt  is feeling okay. Denies chest pain, SOB, fevers, chills, abd pain.    12 point ROS negative other than stated above.     VITAL SIGNS:  Vital Signs Last 24 Hrs  T(C): 36.7 (13 Jan 2023 15:35), Max: 37.3 (13 Jan 2023 06:04)  T(F): 98.1 (13 Jan 2023 15:35), Max: 99.2 (13 Jan 2023 06:04)  HR: 71 (13 Jan 2023 15:35) (65 - 81)  BP: --  BP(mean): --  RR: 18 (13 Jan 2023 15:35) (11 - 27)  SpO2: 100% (13 Jan 2023 15:35) (89% - 100%)    Parameters below as of 13 Jan 2023 15:35  Patient On (Oxygen Delivery Method): room air        PHYSICAL EXAM:    General: NAD  HEENT: NC/AT, anicteric sclera; MMM  Neck: supple  Cardiovascular: +S1/S2; RRR  Respiratory: CTA B/L; no W/R/R  Gastrointestinal: soft, NT/ND; +BSx4  Extremities: WWP; b/l UE edema; wounds on upper hand  Vascular: 2+ radial, DP/PT pulses B/L  Neurological: AAOx3    MEDICATIONS:  MEDICATIONS  (STANDING):  atorvastatin 40 milliGRAM(s) Oral at bedtime  chlorhexidine 2% Cloths 1 Application(s) Topical <User Schedule>  cinacalcet 30 milliGRAM(s) Oral daily  clopidogrel Tablet 75 milliGRAM(s) Oral daily  colchicine 0.3 milliGRAM(s) Oral daily  collagenase Ointment 1 Application(s) Topical daily  heparin  Infusion 2000 Unit(s)/Hr (20 mL/Hr) IV Continuous <Continuous>  midodrine 30 milliGRAM(s) Oral every 8 hours  Nephro-deborah 1 Tablet(s) Oral daily  phenylephrine    Infusion 0.1 MICROgram(s)/kG/Min (1.6 mL/Hr) IV Continuous <Continuous>  polyethylene glycol 3350 17 Gram(s) Oral every 12 hours  senna 2 Tablet(s) Oral at bedtime  sevelamer carbonate 1600 milliGRAM(s) Oral three times a day with meals    MEDICATIONS  (PRN):  acetaminophen     Tablet .. 650 milliGRAM(s) Oral every 6 hours PRN Temp greater or equal to 38C (100.4F), Moderate Pain (4 - 6)  calamine/zinc oxide Lotion 1 Application(s) Topical three times a day PRN Itching  HYDROmorphone  Injectable 0.25 milliGRAM(s) IV Push every 4 hours PRN Severe Pain (7 - 10)      ALLERGIES:  Allergies    Ancef (Rash; Urticaria)  DDAVP (Hypotension)  iodine (Hives; Pruritus)  penicillin (Swelling)  sulfa drugs (Angioedema)    Intolerances        LABS:                        7.5    5.51  )-----------( 135      ( 13 Jan 2023 05:46 )             24.4     01-13    130<L>  |  96  |  43<H>  ----------------------------<  83  4.4   |  23  |  7.14<H>    Ca    7.9<L>      13 Jan 2023 05:46  Phos  6.0     01-13  Mg     1.6     01-13    TPro  5.0<L>  /  Alb  2.1<L>  /  TBili  0.2  /  DBili  x   /  AST  19  /  ALT  9<L>  /  AlkPhos  62  01-13    PT/INR - ( 13 Jan 2023 05:46 )   PT: 15.0 sec;   INR: 1.26          PTT - ( 13 Jan 2023 05:46 )  PTT:79.4 sec    CAPILLARY BLOOD GLUCOSE      POCT Blood Glucose.: 76 mg/dL (13 Jan 2023 06:31)      RADIOLOGY & ADDITIONAL TESTS: Reviewed.    ASSESSMENT:    PLAN:     Recommendations not final until attending attestation present

## 2023-01-13 NOTE — PROGRESS NOTE ADULT - ATTENDING COMMENTS
Case d/w Dr. Reza.  PET CT showed uptake at R axillary vein where she has thrombus and AVF - Patient had multiple negative BCx and seroma culture neg, and vascular doesn't think any vessels are infected.  Still unclear why she has to be on jarvis - doesn't look like septic shock and so far infectious work-up unrevealing.  Complete total 2 weeks of vanco as empiric therapy for presumed breast cellulitis (until 1/15/23).  Team called heme/onc for malignancy work-up.    Thank you for your consult.  Please re-consult us or call us with questions.  Case d/w primary team.    Nora Naranjo MD, MS  Infectious Disease attending  work cell [phone]  For any questions during evening/weekend/holiday, please page ID on call

## 2023-01-13 NOTE — PROGRESS NOTE ADULT - ATTENDING COMMENTS
PCK with ESRD, refractory hypotension, hyperparathyroidism, thyroid nodules, IVC filter, vasculopathy  physical as above  continue midodrine, florinef increased to 0.2  continue vancomycin empirically  checking cryoglobulins, APLA, cryofibrinogen  encourage po  continue cinacalcet  AC with heparin  decision making of high complexity

## 2023-01-13 NOTE — PROGRESS NOTE ADULT - ASSESSMENT
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     NEURO  #Pain Control  Pt in 10/10 pain of bilateral breasts, worst on R. Pain responsive to Dilaudid but effect wears off after ~2-3 hours.   - Pain management consulted, recs appreciated  - Changed pain regimen to Dilaudid 0.25mg IVP q4h for severe pain   - Tylenol 650mg po q6h PRN for mild pain  - HOLD ALL OPIOIDS FOR SEDATION, RR<10, O2SAT <93%, SBP <96    PULM  #Atelectasis  Noted on CT chest to have atelectasis at b/l lung bases.  - Incentive spirometry    CARDIOVASCULAR  #Shock  Pt presented meeting 2/4 SIRS. Hypotensive with MAPs to 60, requiring Levophed. Septic picture of unknown source. HR > 90, WBC >12. Lactate 1.9. Afebrile. Procal 12. S/p Vancomycin 1250mg x 1, Aztreonam x 1 (pt allergic to Cefazolin, PCN). . Ferritin 1209  Patient still requiring pressors, unknown source, does not appear to fit septic picture at this point (no fever, BCx negative, no focal area of infection identified thus far)  - On Phenylephrine 1.3mcg/kg/min  - c/w midodrine 30mg q8h   - c/w Florinef 0.1mg qd  - CT:  7.0 cm fluid collection is present near an AV fistula in the right upper arm. Possibly abscess. Drained by IR 1/5 with serosanguinous fluid: no organisms, few WBCs  - F/u vascular recs: wound care to RUE wound (BID dressing changes (once daily with Santyl DSD once daily with Bactroban DSD). Apply light ace wrap compression to right hand and forearm to help with soft tissue swelling)  - f/u surgery recs: for pain/erythema/warmth of R breast: biopsied 1/9; likely granulomatous mastitis, unlikely to cause hypotension; f/u final bx results   - f/u ID recs: c/w Vancomycin to tx presumed R breast cellulitis (1/2-1/15); check vanc level before HD and dose based on level   - BCx: NGTD  - corticotropin stim test wnl    #LVOT with LISETTE  likely i/s/o hyperdynamic LV function 2/2 shock and hypovolemia  - per cardiology, rec repeating TTE after shock resolves    #HFrEF  TTE 11/22 normal LV and systolic, EF 59%, grade II diastolic dysfuncton, dilated RV, reduced RV,   Home meds: Entresto 49/51mg  - hold Entresto i/s/o shock    #CAD  Hx of cardiac cath in 2018  Home meds: atorvastatin 40mg, plavix 75mg qd  - c/w home atorvastatin 40mg  - restart home Plavix 75mg qd iso stable Hb    GI  #Constipation  - c/w senna 2 tablets/qd and miralax    #Nutrition  Patient reports poor PO intake  -c/w nephro-deborah     #Abdominal Pain  C/o vague abdominal pain, a/w bilious emesis x 2, no nausea or further episodes of emesis   (Resolved)    RENAL  #ESRD 2/2 PCKD  Pt has been on HD for "years" 2/2 PCKD. AV Fistula of EDWIN clotted in 2014, has received HD thru HD cath since. New AV Fistula in 11/2022, not yet matured.   - f/u nephrology recs  - s/p HD 1/10; possible HD tomorrow 1/13.    #Hyperkalemia  Patient with Hx of ESRD with missed HD presents with K >8 on VBG with abdominal symptoms and missed HD x8 days. ICU consulted i/s/o hyperkalemia. Renal consulted.  - s/p HD 1/10  - monitor with BMP's qd    #Hyperphosphatemia  - c/w sevelamer to 1600mg TID    ID  Pt presented in septic shock with leukocytosis, tachycardia, hypotension requiring pressors. Unknown source.   - WBC 15 -> 7  - see #shock for plan  - f/u ID recs  - gallium scan: faintly increased activity surrounding the entire lower portion of the right breast consistent with the inflammatory reaction seen on physical examination, small area of activity approximately 10 cm to the right of midline at about the level of the lower border of the sternum, activity likely to be in chest wall   - c/w vancomycin, dosed by level, before HD, x2 weeks (last day 1/15)  - PET/CT performed, f/u read    ENDO  #Hyperparathyroidism  At risk for secondary HPTT d/t renal failure   - pt has vague abdominal pain with nausea  - multiple brown tumors on CT with multiple fractures of pubic rami, pubic bone, thoracolumbar spine  - intact , Vit D 38.5  - SPEP/immunofixation negative   - endocrinology recs: secondary hyperparathyroidism vs other process causing osteoclastic tumors, obtain PET/CT  - c/w Sensipar 30mg qd per endo recs (started 1/9)  - 1/11 AM intact  (baseline PTH on Sensipar )    #Multinodular Goiter  CT: enlarged multinodular thyroid projecting into superior mediastinum with 1.8 x 1.5 cm solid nodule slightly inferiorly in the anterior mediastinum.   - Thyroid ultrasound: Fine-needle aspiration recommended for 4.3 cm right thyroid and 2.6 cm left thyroid solid nodules per SCOTT guidelines.  - TSH 1.6 free T4 0.68.  - TSH could be inappropriately normal for low free T4 due to euthyroid sick syndrome.  - f/u TFTs after shock resolved    MSK  #Renal Osteodystrophy  - on CT: Increased density of the bone marrow is consistent with renal osteodystrophy  - Per endocrine, extent of bone destruction extreme given PTH level, suspect might not be Brown tumors    #RUE Edema  RUE became edematous 1/8 extending down through hand. AVF with drained seroma pocket on R arm.  - lymphedema vs bleeding into seroma pocket? vs DVT   - f/u RUE U/S (ordered)     #R foot pain  Pt c/o R foot pain, possibly gout.   - Start colchicine 0.3mg qd    HEME/ONC  #Hx of DVTs  Current DVT of R IJ, R subclavian and R axillary veins. Hx of L AVF clotting. Given hx of clots, current pruritis and gallium scan uptake in chest wall, reasonable to workup coagulopathy  - c/w heparin gtt @20cc/hr    - f/u antiphospholipid panel/rheumatologic workup (beta2 glycoprotein, anticardiolipin ordered)     #Anemia  Hgb has downtrended from 9.8 on admission to 6.9. Baseline from November appears to be ~13. 1/9 Hb 6.9, ordered 1U pRBC after which pt likely had febrile nonhemolytic transfusion reaction (3 hrs into transfusion, developed tachycardia to 150s, HTN to 140s/90s, rigors, Trectal 101.3.) Transfusion stopped. Tylenol given. Sxs resolved.   - Ferritin elevated: 1209  - Total iron low (15), TIBC low (79)  - s/p 1U pRBC on 1/11 with Hb 6.9 > 7.9 w/o transfusion rxn    #Breast Pain, bilateral  Family hx of breast cancer in mother, sister.   - large R breast mass, palpable L breast mass, R breast has peau d'orange appearance with associated erythema, warmth on R  - US bilateral breasts: No sonographic evidence of breast abscess, cyst or focal mass to correspond with the clinical finding of bilateral breast masses.  - surgery: recc nonurgent outpatient mammogram when clinically stable  - breast bx: granulomatous mastitis    GYN  Uterine mass, 6.1cm mass in R adnexa seen incidentally on CTAP  - Gyn consulted, think unlikely ovarian mass vs cyst is related to systemic dz as it was seen on imaging in 1/2022  - Pt has hx of gyn surgery, unknown if total hysterectomy or partial  -  172 (high), CA-125 196 (high), CEA 6.1 (high)  - TVUS with 6.2cm simple cystic lesion to R ovary, possibly serous cysteadenoma  - F/u PET/CT      Preventative  F: None  E: None, HD pt  N: Renal Restricted Diet  DVT: heparin @20cc/hr   Code Status: Full Code  Dispo: ICU   66 yo F pt with PMH HFrEF (EF 40%), nonobstructive CAD, NICM, HTN, HLD, ESRD 2/2 PCKD on HD, PE (2018) s/p IVC filter, mild AS, mild MR, PAD, OA, h/o thrombus in vascular access x3 (R AVG, R AVF, L AVG), ventral hernia, 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     NEURO  #Pain Control  Pt in 10/10 pain of bilateral breasts, worst on R. Pain responsive to Dilaudid but effect wears off after ~2-3 hours.   - Pain management consulted, recs appreciated  - c/w Dilaudid 0.25mg IVP w4h for severe pain    - Tylenol 650mg po q6h PRN for mild pain  - HOLD ALL OPIOIDS FOR SEDATION, RR<10, O2SAT <93%, SBP <96    PULM  #Atelectasis  Noted on CT chest to have atelectasis at b/l lung bases.  - Incentive spirometry    CARDIOVASCULAR  #Shock  Pt presented meeting 2/4 SIRS. Hypotensive with MAPs to 60, requiring Levophed. Septic picture of unknown source. HR > 90, WBC >12. Lactate 1.9. Afebrile. Procal 12. S/p Vancomycin 1250mg x 1, Aztreonam x 1 (pt allergic to Cefazolin, PCN). . Ferritin 1209  Patient still requiring pressors, unknown source, does not appear to fit septic picture at this point (no fever, BCx negative, no focal area of infection identified thus far)  - On Phenylephrine 0.8 mcg/kg/min  - c/w midodrine 30mg q8h   - increase to Florinef 0.2mg qd  - CT:  7.0 cm fluid collection is present near an AV fistula in the right upper arm. Possibly abscess. Drained by IR 1/5 with serosanguinous fluid: no organisms, few WBCs  - Per vascular: does not think AV fistula is infected; would not remove it; has no additional recs   - per surg: prelim R breast bx shows vasculitis vs granuloma; unlikely to cause hypotension; f/u final bx results   - per ID: c/w Vancomycin to tx presumed R breast cellulitis (1/2-1/15); check vanc level before HD and dose based on level - today vanc level 15.3, plan to give 750mg after HD today  - BCx: NGTD  - corticotropin stim test wnl    #LVOT with LISETTE  likely i/s/o hyperdynamic LV function 2/2 shock and hypovolemia  - per cardiology, rec repeating TTE after shock resolves    #HFrEF  TTE 11/22 normal LV and systolic, EF 59%, grade II diastolic dysfuncton, dilated RV, reduced RV,   Home meds: Entresto 49/51mg  - hold Entresto i/s/o shock    #CAD  Hx of cardiac cath in 2018  Home meds: atorvastatin 40mg, plavix 75mg qd  - c/w home atorvastatin 40mg  - c/w home Plavix 75mg qd     GI  #Constipation  - c/w senna 2 tablets/qd and miralax    #Nutrition  Patient reports poor PO intake  -c/w nephro-deborah     #Abdominal Pain  C/o vague abdominal pain, a/w bilious emesis x 2, no nausea or further episodes of emesis   (Resolved)    RENAL  #ESRD 2/2 PCKD  Pt has been on HD for "years" 2/2 PCKD. AV Fistula of EDWIN clotted in 2014, has received HD thru HD cath since. New AV Fistula in 11/2022, not yet matured.   - f/u nephrology recs  - s/p HD 1/10; planned for HD today 1/13    #Hyperkalemia  Patient with Hx of ESRD with missed HD presents with K >8 on VBG with abdominal symptoms and missed HD x8 days. ICU consulted i/s/o hyperkalemia. Renal consulted.  - s/p HD 1/10  - monitor with BMP's qd    #Hyperphosphatemia  - c/w sevelamer to 1600mg TID    ID  Pt presented in septic shock with leukocytosis, tachycardia, hypotension requiring pressors. Unknown source.   - WBC 15 -> 7  - see #shock for plan  - f/u ID recs  - gallium scan: faintly increased activity surrounding the entire lower portion of the right breast consistent with the inflammatory reaction seen on physical examination, small area of activity approximately 10 cm to the right of midline at about the level of the lower border of the sternum, activity likely to be in chest wall   - PET/CT with increased FDG avidity within R axillary vein, which contains an occlusive thrombus, which may represent an infectious source. There is also an additional FDG avid region of the AV fistula near the arterial anastomosis proximal to the area of the fluid collection; could be a potential source of infection vs inflammatory reaction to the graft. Also a photopenic R adnexal cystic mass, suggestive of a benign etiology.       ENDO  #Hyperparathyroidism  At risk for secondary HPTT d/t renal failure   - pt has vague abdominal pain with nausea  - multiple brown tumors on CT with multiple fractures of pubic rami, pubic bone, thoracolumbar spine  - intact , Vit D 38.5  - SPEP/immunofixation negative   - endocrinology recs: secondary hyperparathyroidism vs other process causing osteoclastic tumors  - c/w Sensipar 30mg qd per endo recs (started 1/9)  - 1/11 AM intact  (baseline PTH on Sensipar )    #Multinodular Goiter  CT: enlarged multinodular thyroid projecting into superior mediastinum with 1.8 x 1.5 cm solid nodule slightly inferiorly in the anterior mediastinum.   - Thyroid ultrasound: Fine-needle aspiration recommended for 4.3 cm right thyroid and 2.6 cm left thyroid solid nodules per SCOTT guidelines.  - TSH 1.6 free T4 0.68.  - TSH could be inappropriately normal for low free T4 due to euthyroid sick syndrome.  - f/u TFTs after shock resolved    MSK  #Renal Osteodystrophy  - on CT: Increased density of the bone marrow is consistent with renal osteodystrophy  - Per endocrine, extent of bone destruction extreme given PTH level, suspect might not be Brown tumors    #RUE Edema  RUE became edematous 1/8 extending down through hand. AVF with drained seroma pocket on R arm.  - lymphedema vs bleeding into seroma pocket? vs DVT   - RUE U/S performed, f/u final      #L toe pain  Pt c/o L toe pain, possibly gout.   - c/w colchicine 0.3mg qd    HEME/ONC  #Vasculopathy   Current DVT of R IJ, R subclavian and R axillary veins. Hx of L AVF clotting. Given hx of clots, current pruritis and gallium scan uptake in chest wall, reasonable to workup coagulopathy  - c/w heparin gtt @20cc/hr    - f/u antiphospholipid panel/rheumatologic workup (beta2 glycoprotein, anticardiolipin ordered)     #Anemia  Hgb has downtrended from 9.8 on admission to 6.9. Baseline from November appears to be ~13. 1/9 Hb 6.9, ordered 1U pRBC after which pt likely had febrile nonhemolytic transfusion reaction (3 hrs into transfusion, developed tachycardia to 150s, HTN to 140s/90s, rigors, Trectal 101.3.) Transfusion stopped. Tylenol given. Sxs resolved.   - Ferritin elevated: 1209  - Total iron low (15), TIBC low (79)  - s/p 1U pRBC on 1/11 with Hb 6.9 > 7.9 w/o transfusion rxn    #Breast Pain, bilateral  Family hx of breast cancer in mother, sister.   - large R breast mass, palpable L breast mass, R breast has peau d'orange appearance with associated erythema, warmth on R  - US bilateral breasts: No sonographic evidence of breast abscess, cyst or focal mass to correspond with the clinical finding of bilateral breast masses.  - prelim breast bx: granuloma vs vasculitis     GYN  Uterine mass, 6.1cm mass in R adnexa seen incidentally on CTAP  - Gyn consulted, think unlikely ovarian mass vs cyst is related to systemic dz as it was seen on imaging in 1/2022  - Pt has hx of gyn surgery, unknown if total hysterectomy or partial  -  172 (high), CA-125 196 (high), CEA 6.1 (high)  - TVUS with 6.2cm simple cystic lesion to R ovary, possibly serous cysteadenoma  - per gyn/onc: elevated tumor markers not informative given pt is acutely ill. TVUS c/w likely simple cyst, PET/CT w/ no uptake in R adnexa, low concern for malignancy. Rec outpt f/u when stable for repeat tumor markers.      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     NEURO  #Pain Control  Pt in 10/10 pain of bilateral breasts, worst on R. Pain responsive to Dilaudid but effect wears off after ~2-3 hours.   - Pain management consulted, recs appreciated  - c/w Dilaudid 0.25mg IVP w4h for severe pain    - Tylenol 650mg po q6h PRN for mild pain  - HOLD ALL OPIOIDS FOR SEDATION, RR<10, O2SAT <93%, SBP <96    PULM  #Atelectasis  Noted on CT chest to have atelectasis at b/l lung bases.  - Incentive spirometry    CARDIOVASCULAR  #Shock  Pt presented meeting 2/4 SIRS. Hypotensive with MAPs to 60, requiring Levophed. Septic picture of unknown source. HR > 90, WBC >12. Lactate 1.9. Afebrile. Procal 12. S/p Vancomycin 1250mg x 1, Aztreonam x 1 (pt allergic to Cefazolin, PCN). . Ferritin 1209  Patient still requiring pressors, unknown source, does not appear to fit septic picture at this point (no fever, BCx negative, no focal area of infection identified thus far)  - On Phenylephrine 0.8 mcg/kg/min  - c/w midodrine 30mg q8h   - increase to Florinef 0.2mg qd  - CT:  7.0 cm fluid collection is present near an AV fistula in the right upper arm. Possibly abscess. Drained by IR 1/5 with serosanguinous fluid: no organisms, few WBCs  - Per vascular: does not think AV fistula is infected; would not remove it; has no additional recs   - per surg: prelim R breast bx shows vasculitis vs granuloma; unlikely to cause hypotension; f/u final bx results   - per ID: c/w Vancomycin to tx presumed R breast cellulitis (1/2-1/15); check vanc level before HD and dose based on level - today vanc level 15.3, plan to give 750mg after HD today  - BCx: NGTD  - corticotropin stim test wnl    #LVOT with LISETTE  likely i/s/o hyperdynamic LV function 2/2 shock and hypovolemia  - per cardiology, rec repeating TTE after shock resolves    #HFrEF  TTE 11/22 normal LV and systolic, EF 59%, grade II diastolic dysfuncton, dilated RV, reduced RV,   Home meds: Entresto 49/51mg  - hold Entresto i/s/o shock    #CAD  Hx of cardiac cath in 2018  Home meds: atorvastatin 40mg, plavix 75mg qd  - c/w home atorvastatin 40mg  - c/w home Plavix 75mg qd     GI  #Constipation  - c/w senna 2 tablets/qd and miralax    #Nutrition  Patient reports poor PO intake  -c/w nephro-deborah     #Abdominal Pain  C/o vague abdominal pain, a/w bilious emesis x 2, no nausea or further episodes of emesis   (Resolved)    RENAL  #ESRD 2/2 PCKD  Pt has been on HD for "years" 2/2 PCKD. AV Fistula of EDWIN clotted in 2014, has received HD thru HD cath since. New AV Fistula in 11/2022, not yet matured.   - f/u nephrology recs  - s/p HD 1/10; planned for HD today 1/13    #Hyperkalemia  Patient with Hx of ESRD with missed HD presents with K >8 on VBG with abdominal symptoms and missed HD x8 days. ICU consulted i/s/o hyperkalemia. Renal consulted.  - s/p HD 1/10  - monitor with BMP's qd    #Hyperphosphatemia  - c/w sevelamer to 1600mg TID    ID  Pt presented in septic shock with leukocytosis, tachycardia, hypotension requiring pressors. Unknown source.   - WBC 15 -> 7  - see #shock for plan  - f/u ID recs  - gallium scan: faintly increased activity surrounding the entire lower portion of the right breast consistent with the inflammatory reaction seen on physical examination, small area of activity approximately 10 cm to the right of midline at about the level of the lower border of the sternum, activity likely to be in chest wall   - PET/CT with increased FDG avidity within R axillary vein, which contains an occlusive thrombus, which may represent an infectious source. There is also an additional FDG avid region of the AV fistula near the arterial anastomosis proximal to the area of the fluid collection; could be a potential source of infection vs inflammatory reaction to the graft. Also a photopenic R adnexal cystic mass, suggestive of a benign etiology.       ENDO  #Hyperparathyroidism  At risk for secondary HPTT d/t renal failure   - pt has vague abdominal pain with nausea  - multiple brown tumors on CT with multiple fractures of pubic rami, pubic bone, thoracolumbar spine  - intact , Vit D 38.5  - SPEP/immunofixation negative   - endocrinology recs: secondary hyperparathyroidism vs other process causing osteoclastic tumors  - c/w Sensipar 30mg qd per endo recs (started 1/9)  - 1/11 AM intact  (baseline PTH on Sensipar )    #Multinodular Goiter  CT: enlarged multinodular thyroid projecting into superior mediastinum with 1.8 x 1.5 cm solid nodule slightly inferiorly in the anterior mediastinum.   - Thyroid ultrasound: Fine-needle aspiration recommended for 4.3 cm right thyroid and 2.6 cm left thyroid solid nodules per SCOTT guidelines.  - TSH 1.6 free T4 0.68.  - TSH could be inappropriately normal for low free T4 due to euthyroid sick syndrome.  - f/u TFTs after shock resolved    MSK  #Renal Osteodystrophy  - on CT: Increased density of the bone marrow is consistent with renal osteodystrophy  - Per endocrine, extent of bone destruction extreme given PTH level, suspect might not be Brown tumors    #RUE Edema  RUE became edematous 1/8 extending down through hand. AVF with drained seroma pocket on R arm.  - lymphedema vs bleeding into seroma pocket? vs DVT   - RUE U/S with persistent DVT to R IJ, subclavian, and axillary stent, and proximal brachial vein, similar extent to prior. No significant change large complex fluid collection in proximal R arm, could represent old seroma/hematoma    #L toe pain  Pt c/o L toe pain, possibly gout.   - c/w colchicine 0.3mg qd    HEME/ONC  #Vasculopathy   Current DVT of R IJ, R subclavian and R axillary veins. Hx of L AVF clotting. Given hx of clots, current pruritis and gallium scan uptake in chest wall, reasonable to workup coagulopathy  - c/w heparin gtt @20cc/hr    - f/u antiphospholipid panel/rheumatologic workup (beta2 glycoprotein, anticardiolipin ordered)     #Anemia  Hgb has downtrended from 9.8 on admission to 6.9. Baseline from November appears to be ~13. 1/9 Hb 6.9, ordered 1U pRBC after which pt likely had febrile nonhemolytic transfusion reaction (3 hrs into transfusion, developed tachycardia to 150s, HTN to 140s/90s, rigors, Trectal 101.3.) Transfusion stopped. Tylenol given. Sxs resolved.   - Ferritin elevated: 1209  - Total iron low (15), TIBC low (79)  - s/p 1U pRBC on 1/11 with Hb 6.9 > 7.9 w/o transfusion rxn    #Breast Pain, bilateral  Family hx of breast cancer in mother, sister.   - large R breast mass, palpable L breast mass, R breast has peau d'orange appearance with associated erythema, warmth on R  - US bilateral breasts: No sonographic evidence of breast abscess, cyst or focal mass to correspond with the clinical finding of bilateral breast masses.  - prelim breast bx: granuloma vs vasculitis     GYN  Uterine mass, 6.1cm mass in R adnexa seen incidentally on CTAP  - Gyn consulted, think unlikely ovarian mass vs cyst is related to systemic dz as it was seen on imaging in 1/2022  - Pt has hx of gyn surgery, unknown if total hysterectomy or partial  -  172 (high), CA-125 196 (high), CEA 6.1 (high)  - TVUS with 6.2cm simple cystic lesion to R ovary, possibly serous cysteadenoma  - per gyn/onc: elevated tumor markers not informative given pt is acutely ill. TVUS c/w likely simple cyst, PET/CT w/ no uptake in R adnexa, low concern for malignancy. Rec outpt f/u when stable for repeat tumor markers.      Preventative  F: None  E: None, HD pt  N: Renal Restricted Diet  DVT: heparin @20cc/hr   Code Status: Full Code  Dispo: ICU

## 2023-01-13 NOTE — PROGRESS NOTE ADULT - SUBJECTIVE AND OBJECTIVE BOX
Imaging and labs reviewed. TVUS shows 6.2cm R adnexal simple cyst. Tumor markers elevated, however PET scan reveals no uptake in the R adnexa. Low suspicion for ovarian malignancy, Given size of cyst and tumor markers, would recommend outpatient follow up when stable (Ambulatory [facility]- [address] [place] [address], [phone]). GYN to sign off. Please reconsult with any further concerns. Case reviewed with Dr. Lorenzo.

## 2023-01-14 NOTE — PROGRESS NOTE ADULT - SUBJECTIVE AND OBJECTIVE BOX
Patient is a 67y old  Female who presents with a chief complaint of emergent HD (13 Jan 2023 16:17)      INTERVAL HPI/OVERNIGHT EVENTS:   No overnight events   Afebrile, hemodynamically stable     Subjective: Patient seen and examined at bedside.    ICU Vital Signs Last 24 Hrs  T(C): 36.5 (14 Jan 2023 05:00), Max: 37.4 (13 Jan 2023 22:15)  T(F): 97.7 (14 Jan 2023 05:00), Max: 99.4 (13 Jan 2023 22:15)  HR: 71 (14 Jan 2023 06:00) (62 - 88)  BP: --  BP(mean): --  ABP: 103/47 (14 Jan 2023 06:00) (79/36 - 107/54)  ABP(mean): 69 (14 Jan 2023 06:00) (52 - 76)  RR: 20 (14 Jan 2023 03:00) (11 - 27)  SpO2: 90% (14 Jan 2023 06:00) (89% - 100%)    O2 Parameters below as of 14 Jan 2023 07:00  Patient On (Oxygen Delivery Method): room air          I&O's Summary    12 Jan 2023 07:01  -  13 Jan 2023 07:00  --------------------------------------------------------  IN: 1046.7 mL / OUT: 0 mL / NET: 1046.7 mL    13 Jan 2023 07:01  -  14 Jan 2023 06:57  --------------------------------------------------------  IN: 1234.1 mL / OUT: 900 mL / NET: 334.1 mL          PHYSICAL EXAM:  GENERAL: No acute distress   HEAD:  Atraumatic, Normocephalic  EYES: EOMI, PERRLA, conjunctiva and sclera clear  ENMT: No tonsillar erythema, exudates, or enlargement; Moist mucous membranes  NECK: Supple, No JVD, Normal thyroid  HEART: Regular rate and rhythm; No murmurs, rubs, or gallops  RESPIRATORY: CTA B/L, No W/R/R  ABDOMEN: Soft, Nontender, Nondistended; Bowel sounds present  NEUROLOGY: A&Ox3, nonfocal, moving all extremities  EXTREMITIES:  2+ Peripheral Pulses, No clubbing, cyanosis, or edema  SKIN: warm, dry, normal color, no rash or abnormal lesions    LABS:                        8.2    6.00  )-----------( 146      ( 14 Jan 2023 05:49 )             27.1     01-14    131<L>  |  95<L>  |  28<H>  ----------------------------<  89  3.9   |  24  |  5.39<H>    Ca    8.1<L>      14 Jan 2023 05:16  Phos  5.0     01-14  Mg     1.5     01-14    TPro  5.0<L>  /  Alb  2.1<L>  /  TBili  0.2  /  DBili  x   /  AST  19  /  ALT  9<L>  /  AlkPhos  62  01-13    PT/INR - ( 14 Jan 2023 05:16 )   PT: 15.4 sec;   INR: 1.29          PTT - ( 14 Jan 2023 05:16 )  PTT:99.9 sec    CAPILLARY BLOOD GLUCOSE            Consultant(s) Notes Reviewed:  [x ] YES  [ ] NO    MEDICATIONS  (STANDING):  atorvastatin 40 milliGRAM(s) Oral at bedtime  chlorhexidine 2% Cloths 1 Application(s) Topical <User Schedule>  cinacalcet 30 milliGRAM(s) Oral daily  clopidogrel Tablet 75 milliGRAM(s) Oral daily  colchicine 0.3 milliGRAM(s) Oral daily  collagenase Ointment 1 Application(s) Topical daily  fludroCORTISONE 0.2 milliGRAM(s) Oral every 24 hours  heparin  Infusion 2000 Unit(s)/Hr (20 mL/Hr) IV Continuous <Continuous>  midodrine 30 milliGRAM(s) Oral every 8 hours  Nephro-deborah 1 Tablet(s) Oral daily  phenylephrine    Infusion 0.1 MICROgram(s)/kG/Min (1.6 mL/Hr) IV Continuous <Continuous>  polyethylene glycol 3350 17 Gram(s) Oral every 12 hours  senna 2 Tablet(s) Oral at bedtime  sevelamer carbonate 1600 milliGRAM(s) Oral three times a day with meals    MEDICATIONS  (PRN):  acetaminophen     Tablet .. 650 milliGRAM(s) Oral every 6 hours PRN Temp greater or equal to 38C (100.4F), Moderate Pain (4 - 6)  calamine/zinc oxide Lotion 1 Application(s) Topical three times a day PRN Itching  HYDROmorphone  Injectable 0.25 milliGRAM(s) IV Push every 4 hours PRN Severe Pain (7 - 10)      Care Discussed with Consultants/Other Providers [ x] YES  [ ] NO    RADIOLOGY & ADDITIONAL TESTS: Patient is a 67y old  Female who presents with a chief complaint of emergent HD (13 Jan 2023 16:17)      INTERVAL HPI/OVERNIGHT EVENTS:   No overnight events   Afebrile, hemodynamically stable     Subjective: Patient seen and examined at bedside. Resting in bed comfortably, in NAD. C/o nausea this AM, found emesis. Denies any new breast pain, HA, SOB, urinary sxs. Had BM this AM.     ICU Vital Signs Last 24 Hrs  T(C): 36.5 (14 Jan 2023 05:00), Max: 37.4 (13 Jan 2023 22:15)  T(F): 97.7 (14 Jan 2023 05:00), Max: 99.4 (13 Jan 2023 22:15)  HR: 71 (14 Jan 2023 06:00) (62 - 88)  ABP: 103/47 (14 Jan 2023 06:00) (79/36 - 107/54)  ABP(mean): 69 (14 Jan 2023 06:00) (52 - 76)  RR: 20 (14 Jan 2023 03:00) (11 - 27)  SpO2: 90% (14 Jan 2023 06:00) (89% - 100%)    O2 Parameters below as of 14 Jan 2023 07:00  Patient On (Oxygen Delivery Method): room air          I&O's Summary    12 Jan 2023 07:01  -  13 Jan 2023 07:00  --------------------------------------------------------  IN: 1046.7 mL / OUT: 0 mL / NET: 1046.7 mL    13 Jan 2023 07:01  -  14 Jan 2023 06:57  --------------------------------------------------------  IN: 1234.1 mL / OUT: 900 mL / NET: 334.1 mL          PHYSICAL EXAM:  Constitutional: obese, NAD, awake and alert  HEENT: no conjunctivitis or scleral icterus, MMM  NECK: Supple, multinodular goiter  CARDIAC: RRR, +S1/S2, no murmurs/rubs/gallops  PULM: CTABL, no iWOB, no WRR, +TDC to R chest wall  BREAST: palpable masses to b/l breasts; b/l breasts with peeling epidermis to lower aspect of breasts, L >R, no drainage  ABDOMEN: Soft, obese, nontender to palpation, +BS  SKIN: WWP, 3cm dehisced wound, nonpurulent, nonerythematous, to R medial upper arm near axilla  VASC:  no LE edema or tenderness; +triple lumen catheter at R groin, +L axillary arterial line  EXT: RUE: edema to hand and forearm, tense, non erythematous    LABS:                        8.2    6.00  )-----------( 146      ( 14 Jan 2023 05:49 )             27.1     01-14    131<L>  |  95<L>  |  28<H>  ----------------------------<  89  3.9   |  24  |  5.39<H>    Ca    8.1<L>      14 Jan 2023 05:16  Phos  5.0     01-14  Mg     1.5     01-14    TPro  5.0<L>  /  Alb  2.1<L>  /  TBili  0.2  /  DBili  x   /  AST  19  /  ALT  9<L>  /  AlkPhos  62  01-13    PT/INR - ( 14 Jan 2023 05:16 )   PT: 15.4 sec;   INR: 1.29          PTT - ( 14 Jan 2023 05:16 )  PTT:99.9 sec    CAPILLARY BLOOD GLUCOSE            Consultant(s) Notes Reviewed:  [x ] YES  [ ] NO    MEDICATIONS  (STANDING):  atorvastatin 40 milliGRAM(s) Oral at bedtime  chlorhexidine 2% Cloths 1 Application(s) Topical <User Schedule>  cinacalcet 30 milliGRAM(s) Oral daily  clopidogrel Tablet 75 milliGRAM(s) Oral daily  colchicine 0.3 milliGRAM(s) Oral daily  collagenase Ointment 1 Application(s) Topical daily  fludroCORTISONE 0.2 milliGRAM(s) Oral every 24 hours  heparin  Infusion 2000 Unit(s)/Hr (20 mL/Hr) IV Continuous <Continuous>  midodrine 30 milliGRAM(s) Oral every 8 hours  Nephro-deborah 1 Tablet(s) Oral daily  phenylephrine    Infusion 0.1 MICROgram(s)/kG/Min (1.6 mL/Hr) IV Continuous <Continuous>  polyethylene glycol 3350 17 Gram(s) Oral every 12 hours  senna 2 Tablet(s) Oral at bedtime  sevelamer carbonate 1600 milliGRAM(s) Oral three times a day with meals    MEDICATIONS  (PRN):  acetaminophen     Tablet .. 650 milliGRAM(s) Oral every 6 hours PRN Temp greater or equal to 38C (100.4F), Moderate Pain (4 - 6)  calamine/zinc oxide Lotion 1 Application(s) Topical three times a day PRN Itching  HYDROmorphone  Injectable 0.25 milliGRAM(s) IV Push every 4 hours PRN Severe Pain (7 - 10)      Care Discussed with Consultants/Other Providers [ x] YES  [ ] NO    RADIOLOGY & ADDITIONAL TESTS:

## 2023-01-14 NOTE — PROGRESS NOTE ADULT - REASON FOR ADMISSION
Weakness and malaise after missing hemodialysis for one week. The patient's EGFR is 8 today. 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 She continues on sensipar 30 mg.

## 2023-01-14 NOTE — PROGRESS NOTE ADULT - ASSESSMENT
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     NEURO  #Pain Control  Pt in 10/10 pain of bilateral breasts, worst on R. Pain responsive to Dilaudid but effect wears off after ~2-3 hours.   - Pain management consulted, recs appreciated  - c/w Dilaudid 0.25mg IVP w4h for severe pain    - Tylenol 650mg po q6h PRN for mild pain  - HOLD ALL OPIOIDS IF RR<10, O2SAT <93%, SBP <96    PULM  #Atelectasis  Noted on CT chest to have atelectasis at b/l lung bases.  - Incentive spirometry    CARDIOVASCULAR  #Shock  Pt presented meeting 2/4 SIRS. Hypotensive with MAPs to 60, requiring Levophed. Septic picture of unknown source. HR > 90, WBC >12. Lactate 1.9. Afebrile. Procal 12. S/p Vancomycin 1250mg x 1, Aztreonam x 1 (pt allergic to Cefazolin, PCN). . Ferritin 1209  Patient still requiring pressors, unknown source, does not appear to fit septic picture at this point (no fever, BCx negative, no focal area of infection identified thus far)  - On Phenylephrine 0.1 mcg/kg/min; weaned from 0.8   - c/w midodrine 30mg q8h   - c/w Florinef 0.2mg qd  - CT:  7.0 cm fluid collection is present near an AV fistula in the right upper arm. Possibly abscess. Drained by IR 1/5 with serosanguinous fluid: no organisms, few WBCs  - Follow up with IR regarding reaccumulating seroma fluid  - Per vascular: does not think AV fistula is infected; would not remove it; has no additional recs   - per surg: prelim R breast bx shows vasculitis vs granuloma; unlikely to cause hypotension; f/u final bx results   - per ID: c/w Vancomycin to tx presumed R breast cellulitis (1/2-1/15); check vanc level before HD and dose based on level  - BCx: NGTD  - corticotropin stim test wnl    #LVOT with LISETTE  likely i/s/o hyperdynamic LV function 2/2 shock and hypovolemia  - per cardiology, rec repeating TTE after shock resolves    #HFrEF  TTE 11/22 normal LV and systolic, EF 59%, grade II diastolic dysfuncton, dilated RV, reduced RV,   Home meds: Entresto 49/51mg  - hold Entresto i/s/o shock    #CAD  Hx of cardiac cath in 2018  Home meds: atorvastatin 40mg, plavix 75mg qd  - c/w home atorvastatin 40mg  - c/w home Plavix 75mg qd     GI  #Constipation  - c/w senna 2 tablets/qd and miralax    #Nutrition  Patient reports poor PO intake  - NPO except meds    #Abdominal Pain  C/o vague abdominal pain, a/w bilious emesis x 2, no nausea or further episodes of emesis   (Resolved)    RENAL  #ESRD 2/2 PCKD  Pt has been on HD for "years" 2/2 PCKD. AV Fistula of EDWIN clotted in 2014, has received HD thru HD cath since. New AV Fistula in 11/2022, not yet matured.   - f/u nephrology recs  - s/p HD 1/10; planned for HD today 1/13    #Hyperkalemia  Patient with Hx of ESRD with missed HD presents with K >8 on VBG with abdominal symptoms and missed HD x8 days. ICU consulted i/s/o hyperkalemia. Renal consulted.  - s/p HD 1/10  - monitor with BMP's qd    #Hyperphosphatemia  - c/w sevelamer to 1600mg TID    ID  Pt presented in septic shock with leukocytosis, tachycardia, hypotension requiring pressors. Unknown source.   - WBC 15 -> 7  - see #shock for plan  - f/u ID recs  - gallium scan: faintly increased activity surrounding the entire lower portion of the right breast consistent with the inflammatory reaction seen on physical examination, small area of activity approximately 10 cm to the right of midline at about the level of the lower border of the sternum, activity likely to be in chest wall   - PET/CT with increased FDG avidity within R axillary vein, which contains an occlusive thrombus, which may represent an infectious source. There is also an additional FDG avid region of the AV fistula near the arterial anastomosis proximal to the area of the fluid collection; could be a potential source of infection vs inflammatory reaction to the graft. Also a photopenic R adnexal cystic mass, suggestive of a benign etiology.   - s/p vanco    ENDO  #Hyperparathyroidism  At risk for secondary HPTT d/t renal failure   - pt has vague abdominal pain with nausea  - multiple brown tumors on CT with multiple fractures of pubic rami, pubic bone, thoracolumbar spine  - intact , Vit D 38.5  - SPEP/immunofixation negative   - endocrinology recs: secondary hyperparathyroidism vs other process causing osteoclastic tumors  - c/w Sensipar 30mg qd per endo recs (started 1/9)  - 1/11 AM intact  (baseline PTH on Sensipar )    #Multinodular Goiter  CT: enlarged multinodular thyroid projecting into superior mediastinum with 1.8 x 1.5 cm solid nodule slightly inferiorly in the anterior mediastinum.   - Thyroid ultrasound: Fine-needle aspiration recommended for 4.3 cm right thyroid and 2.6 cm left thyroid solid nodules per SCOTT guidelines.  - TSH 1.6 free T4 0.68.  - TSH could be inappropriately normal for low free T4 due to euthyroid sick syndrome.  - f/u TFTs after shock resolved    MSK  #Renal Osteodystrophy  - on CT: Increased density of the bone marrow is consistent with renal osteodystrophy  - Per endocrine, extent of bone destruction extreme given PTH level, suspect might not be Brown tumors    #RUE Edema  RUE became edematous 1/8 extending down through hand. AVF with drained seroma pocket on R arm.  - lymphedema vs bleeding into seroma pocket? vs DVT   - RUE U/S with persistent DVT to R IJ, subclavian, and axillary stent, and proximal brachial vein, similar extent to prior. No significant change large complex fluid collection in proximal R arm, could represent old seroma/hematoma    #L toe pain  Pt c/o L toe pain, possibly gout.   - c/w colchicine 0.3mg qd    HEME/ONC  #Vasculopathy   Current DVT of R IJ, R subclavian and R axillary veins. Hx of L AVF clotting. Given hx of clots, current pruritis and gallium scan uptake in chest wall, reasonable to workup coagulopathy  - decreased heparin gtt to 1900u/hr    - f/u antiphospholipid panel/rheumatologic workup (beta2 glycoprotein, anticardiolipin ordered)     #Anemia  Hgb has downtrended from 9.8 on admission to 6.9. Baseline from November appears to be ~13. 1/9 Hb 6.9, ordered 1U pRBC after which pt likely had febrile nonhemolytic transfusion reaction (3 hrs into transfusion, developed tachycardia to 150s, HTN to 140s/90s, rigors, Trectal 101.3.) Transfusion stopped. Tylenol given. Sxs resolved.   - Ferritin elevated: 1209  - Total iron low (15), TIBC low (79)  - s/p 1U pRBC on 1/11 with Hb 6.9 > 7.9 w/o transfusion rxn    #Breast Pain, bilateral  Family hx of breast cancer in mother, sister.   - large R breast mass, palpable L breast mass, R breast has peau d'orange appearance with associated erythema, warmth on R  - US bilateral breasts: No sonographic evidence of breast abscess, cyst or focal mass to correspond with the clinical finding of bilateral breast masses.  - prelim breast bx: granuloma vs vasculitis     GYN  Uterine mass, 6.1cm mass in R adnexa seen incidentally on CTAP  - Gyn consulted, think unlikely ovarian mass vs cyst is related to systemic dz as it was seen on imaging in 1/2022  - Pt has hx of gyn surgery, unknown if total hysterectomy or partial  -  172 (high), CA-125 196 (high), CEA 6.1 (high)  - TVUS with 6.2cm simple cystic lesion to R ovary, possibly serous cysteadenoma  - per gyn/onc: elevated tumor markers not informative given pt is acutely ill. TVUS c/w likely simple cyst, PET/CT w/ no uptake in R adnexa, low concern for malignancy. Rec outpt f/u when stable for repeat tumor markers.      Preventative  F: None  E: None, HD pt  N: Renal Restricted Diet  DVT: heparin @1900u/hr  Code Status: Full Code  Dispo: ICU

## 2023-01-14 NOTE — PROGRESS NOTE ADULT - SUBJECTIVE AND OBJECTIVE BOX
CC: HYPERKALEMIA        INTERVAL HISTORY: no complaints      ROS: No chest pain, no sob, no abd pain. No n/v/d    PAST MEDICAL & SURGICAL HISTORY:  HTN (hypertension)    HLD (hyperlipidemia)    CAD (coronary atherosclerotic disease)    ESRD on dialysis  last 11/15/22    H/O ventral hernia    Brown tumor  brain    DVT, lower extremity  left    Stented coronary artery    History of surgery  IVC filter    AVF (arteriovenous fistula)  right left    S/P AIDEN-BSO  2003    History of surgery  RLE PTA stent / atherectomy  7/2021    History of atherectomy  stent        PHYSICAL EXAM:  T(C): 36.5 (01-14-23 @ 05:00), Max: 37.4 (01-13-23 @ 22:15)  HR: 72 (01-14-23 @ 09:30)  BP: --  RR: 20 (01-14-23 @ 09:30)  SpO2: 100% (01-14-23 @ 09:30)  Wt(kg): --  I&O's Summary    13 Jan 2023 07:01  -  14 Jan 2023 07:00  --------------------------------------------------------  IN: 1445.7 mL / OUT: 900 mL / NET: 545.7 mL    14 Jan 2023 07:01  -  14 Jan 2023 09:59  --------------------------------------------------------  IN: 69 mL / OUT: 0 mL / NET: 69 mL      Weight   General: AAO x 3,  NAD.  HEENT: moist mucous membranes, no pallor/cyanosis.  Neck: no JVD visible.  Cardiac: S1, S2. RRR. No murmurs   Respratory: CTA b/l, no access muscle use.   Abdomen: soft. nontender. nondistended  Skin: no rashes.  Extremities: no LE edema b/l  Access: + bruit in  L AVF      DATA:                        8.2<L>  6.00  )-----------( 146<L>    ( 14 Jan 2023 05:49 )             27.1<L>    Ferritin, Serum: 1209 ng/mL *H* (01-07 @ 06:01)      131<L>  |  95<L>  |  28<H>  ----------------------------<  89     Ca:8.1<L> (14 Jan 2023 05:16)  3.9     |  24     |  5.39<H>        TPro  5.0<L>  /  Alb  2.1<L>  /  TBili  0.2    /  DBili  x      /  AST  19     /  ALT  9<L>   /  AlkPhos  62     13 Jan 2023 05:46      Vitamin D, 1, 25-Dihydroxy: 33.9 pg/mL [19.9 - 79.3] (01-05 @ 05:30)  Vitamin D, 25-Hydroxy: 38.5 ng/mL [30.0 - 80.0] (01-05 @ 05:30)                MEDICATIONS  (STANDING):  atorvastatin 40 milliGRAM(s) Oral at bedtime  chlorhexidine 2% Cloths 1 Application(s) Topical <User Schedule>  cinacalcet 30 milliGRAM(s) Oral daily  clopidogrel Tablet 75 milliGRAM(s) Oral daily  colchicine 0.3 milliGRAM(s) Oral daily  collagenase Ointment 1 Application(s) Topical daily  fludroCORTISONE 0.2 milliGRAM(s) Oral every 24 hours  heparin  Infusion 2000 Unit(s)/Hr (19 mL/Hr) IV Continuous <Continuous>  midodrine 30 milliGRAM(s) Oral every 8 hours  Nephro-deborah 1 Tablet(s) Oral daily  phenylephrine    Infusion 0.1 MICROgram(s)/kG/Min (1.6 mL/Hr) IV Continuous <Continuous>  polyethylene glycol 3350 17 Gram(s) Oral every 12 hours  senna 2 Tablet(s) Oral at bedtime  sevelamer carbonate 1600 milliGRAM(s) Oral three times a day with meals    MEDICATIONS  (PRN):  acetaminophen     Tablet .. 650 milliGRAM(s) Oral every 6 hours PRN Temp greater or equal to 38C (100.4F), Moderate Pain (4 - 6)  calamine/zinc oxide Lotion 1 Application(s) Topical three times a day PRN Itching  HYDROmorphone  Injectable 0.25 milliGRAM(s) IV Push every 4 hours PRN Severe Pain (7 - 10)                 CC: HYPERKALEMIA        INTERVAL HISTORY: no complaints      ROS: No chest pain, no sob, no abd pain. No n/v/d    PAST MEDICAL & SURGICAL HISTORY:  HTN (hypertension)    HLD (hyperlipidemia)    CAD (coronary atherosclerotic disease)    ESRD on dialysis  last 11/15/22    H/O ventral hernia    Brown tumor  brain    DVT, lower extremity  left    Stented coronary artery    History of surgery  IVC filter    AVF (arteriovenous fistula)  right left    S/P AIDEN-BSO  2003    History of surgery  RLE PTA stent / atherectomy  7/2021    History of atherectomy  stent        PHYSICAL EXAM:  T(C): 36.5 (01-14-23 @ 05:00), Max: 37.4 (01-13-23 @ 22:15)  HR: 72 (01-14-23 @ 09:30)  BP: --  RR: 20 (01-14-23 @ 09:30)  SpO2: 100% (01-14-23 @ 09:30)  Wt(kg): --  I&O's Summary    13 Jan 2023 07:01  -  14 Jan 2023 07:00  --------------------------------------------------------  IN: 1445.7 mL / OUT: 900 mL / NET: 545.7 mL    14 Jan 2023 07:01  -  14 Jan 2023 09:59  --------------------------------------------------------  IN: 69 mL / OUT: 0 mL / NET: 69 mL      Weight   General: AAO x 3,  NAD.  HEENT: moist mucous membranes, no pallor/cyanosis.  Neck: no JVD visible.  Cardiac: S1, S2. RRR. No murmurs   Respratory: CTA b/l, no access muscle use.   Abdomen: soft. nontender. nondistended  Skin: no rashes.  Extremities: no LE edema b/l  Access: L PC    DATA:                        8.2<L>  6.00  )-----------( 146<L>    ( 14 Jan 2023 05:49 )             27.1<L>    Ferritin, Serum: 1209 ng/mL *H* (01-07 @ 06:01)      131<L>  |  95<L>  |  28<H>  ----------------------------<  89     Ca:8.1<L> (14 Jan 2023 05:16)  3.9     |  24     |  5.39<H>        TPro  5.0<L>  /  Alb  2.1<L>  /  TBili  0.2    /  DBili  x      /  AST  19     /  ALT  9<L>   /  AlkPhos  62     13 Jan 2023 05:46      Vitamin D, 1, 25-Dihydroxy: 33.9 pg/mL [19.9 - 79.3] (01-05 @ 05:30)  Vitamin D, 25-Hydroxy: 38.5 ng/mL [30.0 - 80.0] (01-05 @ 05:30)                MEDICATIONS  (STANDING):  atorvastatin 40 milliGRAM(s) Oral at bedtime  chlorhexidine 2% Cloths 1 Application(s) Topical <User Schedule>  cinacalcet 30 milliGRAM(s) Oral daily  clopidogrel Tablet 75 milliGRAM(s) Oral daily  colchicine 0.3 milliGRAM(s) Oral daily  collagenase Ointment 1 Application(s) Topical daily  fludroCORTISONE 0.2 milliGRAM(s) Oral every 24 hours  heparin  Infusion 2000 Unit(s)/Hr (19 mL/Hr) IV Continuous <Continuous>  midodrine 30 milliGRAM(s) Oral every 8 hours  Nephro-deborah 1 Tablet(s) Oral daily  phenylephrine    Infusion 0.1 MICROgram(s)/kG/Min (1.6 mL/Hr) IV Continuous <Continuous>  polyethylene glycol 3350 17 Gram(s) Oral every 12 hours  senna 2 Tablet(s) Oral at bedtime  sevelamer carbonate 1600 milliGRAM(s) Oral three times a day with meals    MEDICATIONS  (PRN):  acetaminophen     Tablet .. 650 milliGRAM(s) Oral every 6 hours PRN Temp greater or equal to 38C (100.4F), Moderate Pain (4 - 6)  calamine/zinc oxide Lotion 1 Application(s) Topical three times a day PRN Itching  HYDROmorphone  Injectable 0.25 milliGRAM(s) IV Push every 4 hours PRN Severe Pain (7 - 10)

## 2023-01-14 NOTE — PROGRESS NOTE ADULT - ATTENDING COMMENTS
Emesis this AM noted. Abd xray unremarkable gas pattern. Continue abx and reassess bowel regimen. Continue current care as outlined as discussed on rounds.

## 2023-01-14 NOTE — PROGRESS NOTE ADULT - ASSESSMENT
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    SUBJECTIVE:   CC: Sonny Armstrong is an 65 year old male who presents for preventative health visit.     Healthy Habits:     Getting at least 3 servings of Calcium per day:  Yes    Bi-annual eye exam:  Yes    Dental care twice a year:  Yes    Sleep apnea or symptoms of sleep apnea:  None    Diet:  Carbohydrate counting    Frequency of exercise:  2-3 days/week    Duration of exercise:  Greater than 60 minutes    Taking medications regularly:  Yes    Medication side effects:  Muscle aches    PHQ-2 Total Score: 0    Additional concerns today:  No    He mentions that he has been making good efforts with the diet/exercise over the last year and has come down with his weight. Appears about a 40 lbs loss in a year. Has been following a mediterranean diet and doing regular cardio including swimming.     Wt Readings from Last 5 Encounters:   01/27/20 84.8 kg (187 lb)   10/04/19 97.5 kg (215 lb)   01/30/19 103.4 kg (228 lb)   01/07/19 103.4 kg (228 lb)   06/13/18 (P) 105.7 kg (233 lb)     Had some findings with his last colonoscopy requiring sooner follow-up for colon polyps.     Has some small skin tags and has noted a slight decrease in size when applying a tea tree product.     Today's PHQ-2 Score:   PHQ-2 ( 1999 [employer]) 1/27/2020   Q1: Little interest or pleasure in doing things 0   Q2: Feeling down, depressed or hopeless 0   PHQ-2 Score 0   Q1: Little interest or pleasure in doing things Not at all   Q2: Feeling down, depressed or hopeless Not at all   PHQ-2 Score 0       Abuse: Current or Past(Physical, Sexual or Emotional)- No  Do you feel safe in your environment? Yes    Have you ever done Advance Care Planning? (For example, a Health Directive, POLST, or a discussion with a medical provider or your loved ones about your wishes): No, advance care planning information given to patient to review.  Patient plans to discuss their wishes with loved ones or provider.      Social History     Tobacco Use     Smoking status:  Never Smoker     Smokeless tobacco: Never Used   Substance Use Topics     Alcohol use: No       Alcohol Use 1/27/2020   Prescreen: >3 drinks/day or >7 drinks/week? No   Prescreen: >3 drinks/day or >7 drinks/week? -     Last PSA:   PSA   Date Value Ref Range Status   06/06/2012 0.88 0 - 4 ug/L Final       Reviewed orders with patient. Reviewed health maintenance and updated orders accordingly - Yes  BP Readings from Last 3 Encounters:   01/27/20 116/64   10/04/19 (!) 148/80   01/30/19 (!) 152/91    Wt Readings from Last 3 Encounters:   01/27/20 84.8 kg (187 lb)   10/04/19 97.5 kg (215 lb)   01/30/19 103.4 kg (228 lb)           Patient Active Problem List   Diagnosis     Hyperlipidemia LDL goal <130     Hypertension goal BP (blood pressure) < 140/90     BURDICK (nonalcoholic steatohepatitis)     Obesity     Elevated glucose     Chronic fatigue     Advanced directives, counseling/discussion     Hx of LASIK, ou     Anterior corneal dystrophy, ou     Acquired hypothyroidism     Past Surgical History:   Procedure Laterality Date     COLONOSCOPY WITH CO2 INSUFFLATION N/A 11/15/2018    Procedure: COLONOSCOPY WITH CO2 INSUFFLATION;  Surgeon: Timi Ballard MD;  Location: [facility] OR     TONSILLECTOMY  1971       Social History     Tobacco Use     Smoking status: Never Smoker     Smokeless tobacco: Never Used   Substance Use Topics     Alcohol use: No     Family History   Problem Relation Age of Onset     Heart Disease Father      Diabetes Maternal Grandfather      Thyroid Disease Daughter            Reviewed and updated as needed this visit by clinical staff  Tobacco  Allergies  Meds  Med Hx  Surg Hx  Fam Hx  Soc Hx        Reviewed and updated as needed this visit by Provider          Review of Systems   Constitutional: Negative for chills and fever.   HENT: Negative for congestion and ear pain.    Eyes: Negative for pain.   Respiratory: Negative for cough.    Cardiovascular: Negative for chest pain.  "  Gastrointestinal: Negative for abdominal pain, constipation, diarrhea and hematochezia.   Genitourinary: Negative for frequency and hematuria.   Neurological: Negative for dizziness.   Psychiatric/Behavioral: The patient is not nervous/anxious.      This document serves as a record of the services and decisions personally performed and made by Moreno Padilla MD. It was created on his behalf by Kali Ospina, a trained medical scribe. The creation of this document is based on the provider's statements to the medical scribe.  Kali Ospina 3:06 PM January 27, 2020    OBJECTIVE:   /64   Pulse 85   Temp 97.7  F (36.5  C) (Oral)   Resp 18   Ht 1.727 m (5' 8\")   Wt 84.8 kg (187 lb)   SpO2 94%   BMI 28.43 kg/m      Physical Exam  GENERAL: healthy, alert and no distress  EYES: Eyes grossly normal to inspection, PERRL and conjunctivae and sclerae normal  HENT: ear canals and TM's normal, nose and mouth without ulcers or lesions  NECK: no adenopathy, no asymmetry, masses, or scars and thyroid normal to palpation  RESP: lungs clear to auscultation - no rales, rhonchi or wheezes  CV: regular rate and rhythm, normal S1 S2, no S3 or S4, no murmur, click or rub, no peripheral edema and peripheral pulses strong  ABDOMEN: soft, nontender, no hepatosplenomegaly, no masses and bowel sounds normal  MS: no gross musculoskeletal defects noted, no edema  SKIN: no suspicious lesions or rashes to visible skin   NEURO: Normal strength and tone, mentation intact and speech normal  PSYCH: mentation appears normal, affect normal/bright    ASSESSMENT/PLAN:   (Z00.00) Routine history and physical examination of adult  (primary encounter diagnosis)  Comment: negative screening physical exam today    Plan:  Follow-up in 1 year      (Z12.5) Screening for prostate cancer  Comment: routine screening   Plan: PSA, screen            (E03.9) Acquired hypothyroidism  Comment: last TSH was within normal limits, recheck labs today   Plan: " "levothyroxine (SYNTHROID/LEVOTHROID) 75 MCG         tablet, TSH with free T4 reflex, Hemoglobin            (I10) Hypertension goal BP (blood pressure) < 140/90  Comment: controlled within acceptable limits   Plan: hydrochlorothiazide (MICROZIDE) 12.5 MG         capsule, metoprolol tartrate (LOPRESSOR) 100 MG        tablet, Basic metabolic panel, Hemoglobin        Continue current treatment     (E78.5) Hyperlipidemia LDL goal <130  Comment: last LDL slightly above goal.  Plan: pravastatin (PRAVACHOL) 20 MG tablet, Lipid         panel reflex to direct LDL Fasting        Continue current treatment with statin, after rechecking today's labs if too high then can consider increase in this medication.     COUNSELING:   Reviewed preventive health counseling, as reflected in patient instructions  Special attention given to:        Prostate cancer screening       Advance Care Planning      Estimated body mass index is 28.43 kg/m  as calculated from the following:    Height as of this encounter: 1.727 m (5' 8\").    Weight as of this encounter: 84.8 kg (187 lb).     Weight management plan: Discussed healthy diet and exercise guidelines patient has lost about 40 lbs in the last year with his diet and exercise efforts     reports that he has never smoked. He has never used smokeless tobacco.      Counseling Resources:  ATP IV Guidelines  Pooled Cohorts Equation Calculator  FRAX Risk Assessment  ICSI Preventive Guidelines  Dietary Guidelines for Americans, 2010  USDA's MyPlate  ASA Prophylaxis  Lung CA Screening    The information in this document, created by the medical scribe for me, accurately reflects the services I personally performed and the decisions made by me. I have reviewed and approved this document for accuracy prior to leaving the patient care area.  January 27, 2020 3:20 PM     Total time spent face to face with the patient today was 14 minutes.     [facility] Padilla MD  [facility]  "

## 2023-01-15 NOTE — PROGRESS NOTE ADULT - SUBJECTIVE AND OBJECTIVE BOX
CC: HYPERKALEMIA        INTERVAL HISTORY: lying in bed in NAD      ROS: No chest pain, no sob, no abd pain. No n/v/d    PAST MEDICAL & SURGICAL HISTORY:  HTN (hypertension)    HLD (hyperlipidemia)    CAD (coronary atherosclerotic disease)    ESRD on dialysis  last 11/15/22    H/O ventral hernia    Brown tumor  brain    DVT, lower extremity  left    Stented coronary artery    History of surgery  IVC filter    AVF (arteriovenous fistula)  right left    S/P AIDEN-BSO  2003    History of surgery  RLE PTA stent / atherectomy  7/2021    History of atherectomy  stent        PHYSICAL EXAM:  T(C): 37 (01-15-23 @ 06:04), Max: 37 (01-14-23 @ 21:23)  HR: 78 (01-15-23 @ 08:00)  BP: --  RR: 18 (01-15-23 @ 08:00)  SpO2: 87% (01-15-23 @ 08:00)  Wt(kg): --  I&O's Summary    14 Jan 2023 07:01  -  15 Faustino 2023 07:00  --------------------------------------------------------  IN: 943 mL / OUT: 0 mL / NET: 943 mL    15 Faustino 2023 07:01  -  15 Faustino 2023 09:04  --------------------------------------------------------  IN: 38 mL / OUT: 0 mL / NET: 38 mL      Weight   General: AAO x 3,  NAD.  HEENT: moist mucous membranes, no pallor/cyanosis.  Neck: no JVD visible.  Cardiac: S1, S2. RRR. No murmurs   Respratory: CTA b/l, no access muscle use.   Abdomen: soft. nontender. nondistended  Skin: no rashes.  Extremities: no LE edema b/l  Access: L Permacath      DATA:                        7.7<L>  5.69  )-----------( 149<L>    ( 15 Faustino 2023 05:30 )             25.2<L>    Ferritin, Serum: 1209 ng/mL *H* (01-07 @ 06:01)      134<L>  |  96     |  36<H>  ----------------------------<  87     Ca:8.3<L> (15 Faustino 2023 05:30)  4.2     |  24     |  6.59<H>        TPro  5.0<L>  /  Alb  2.1<L>  /  TBili  0.2    /  DBili  x      /  AST  19     /  ALT  9<L>   /  AlkPhos  62     13 Jan 2023 05:46      Vitamin D, 1, 25-Dihydroxy: 33.9 pg/mL [19.9 - 79.3] (01-05 @ 05:30)  Vitamin D, 25-Hydroxy: 38.5 ng/mL [30.0 - 80.0] (01-05 @ 05:30)                MEDICATIONS  (STANDING):  atorvastatin 40 milliGRAM(s) Oral at bedtime  chlorhexidine 2% Cloths 1 Application(s) Topical <User Schedule>  cinacalcet 30 milliGRAM(s) Oral daily  clopidogrel Tablet 75 milliGRAM(s) Oral daily  colchicine 0.3 milliGRAM(s) Oral daily  collagenase Ointment 1 Application(s) Topical daily  fludroCORTISONE 0.2 milliGRAM(s) Oral every 24 hours  heparin  Infusion 2000 Unit(s)/Hr (19 mL/Hr) IV Continuous <Continuous>  midodrine 30 milliGRAM(s) Oral every 8 hours  Nephro-deborah 1 Tablet(s) Oral daily  phenylephrine    Infusion 0.1 MICROgram(s)/kG/Min (1.6 mL/Hr) IV Continuous <Continuous>  polyethylene glycol 3350 17 Gram(s) Oral every 12 hours  senna 2 Tablet(s) Oral at bedtime  sevelamer carbonate 1600 milliGRAM(s) Oral three times a day with meals    MEDICATIONS  (PRN):  acetaminophen     Tablet .. 650 milliGRAM(s) Oral every 6 hours PRN Temp greater or equal to 38C (100.4F), Moderate Pain (4 - 6)  calamine/zinc oxide Lotion 1 Application(s) Topical three times a day PRN Itching  HYDROmorphone  Injectable 0.25 milliGRAM(s) IV Push every 4 hours PRN Severe Pain (7 - 10)

## 2023-01-15 NOTE — PROGRESS NOTE ADULT - SUBJECTIVE AND OBJECTIVE BOX
Vascular Surgery Progress Note    Subjective:     ROS:   Denies Headache, blurred vision, Chest Pain, SOB, Abdominal pain, nausea or vomiting     Social   clopidogrel Tablet 75  heparin  Infusion 2000  midodrine 30  phenylephrine    Infusion 0.1      Allergies    Ancef (Rash; Urticaria)  DDAVP (Hypotension)  iodine (Hives; Pruritus)  penicillin (Swelling)  sulfa drugs (Angioedema)    Intolerances        Vital Signs Last 24 Hrs  T(C): 37 (15 Faustino 2023 06:04), Max: 37 (14 Jan 2023 21:23)  T(F): 98.6 (15 Faustino 2023 06:04), Max: 98.6 (14 Jan 2023 21:23)  HR: 77 (15 Faustino 2023 09:00) (73 - 86)  BP: 85/42 (15 Faustino 2023 09:00) (85/42 - 85/42)  BP(mean): 60 (15 Faustino 2023 09:00) (60 - 60)  RR: 18 (15 Faustino 2023 09:00) (16 - 21)  SpO2: 90% (15 Faustino 2023 09:00) (75% - 100%)    Parameters below as of 15 Faustino 2023 09:00  Patient On (Oxygen Delivery Method): room air      I&O's Summary    14 Jan 2023 07:01  -  15 Faustino 2023 07:00  --------------------------------------------------------  IN: 943 mL / OUT: 0 mL / NET: 943 mL    15 Faustino 2023 07:01  -  15 Faustino 2023 09:33  --------------------------------------------------------  IN: 74.6 mL / OUT: 0 mL / NET: 74.6 mL        Physical Exam:  General:  Pulmonary:  Cardiovascular:  Abdominal:  Extremities:  Pulses:   Right:                                                                          Left:  FEM [ ]2+ [ ]1+ [ ]doppler                                             FEM [ ]2+ [ ]1+ [ ]doppler    POP [ ]2+ [ ]1+ [ ]doppler                                             POP [ ]2+ [ ]1+ [ ]doppler    DP [ ]2+ [ ]1+ [ ]doppler                                                DP [ ]2+ [ ]1+ [ ]doppler  PT[ ]2+ [ ]1+ [ ]doppler                                                  PT [ ]2+ [ ]1+ [ ]doppler      LABS:                        7.7    5.69  )-----------( 149      ( 15 Faustino 2023 05:30 )             25.2     01-15    134<L>  |  96  |  36<H>  ----------------------------<  87  4.2   |  24  |  6.59<H>    Ca    8.3<L>      15 Faustino 2023 05:30  Phos  5.2     01-15  Mg     1.6     01-15      PT/INR - ( 14 Jan 2023 05:16 )   PT: 15.4 sec;   INR: 1.29          PTT - ( 14 Jan 2023 15:41 )  PTT:91.6 sec      A/P: 67y YO Female      Vascular/PAD:      HTN/HLD:    CAD/CHF:     DM:    CKD:     Anemia:     ID:    Diet:     Activity:     DVTPPx:     Dispo:      Vascular Surgery Progress Note    Subjective:   Patient seen and examined at bedside. Vitals reviewed and patient noted to have MAPs between 55 and 60 overnight, still requiring levophed. Patient reports she has minimal pain this AM. States she is tolerating diet without nausea or emesis. States the swelling in her RUE has not changed significantly. Denies fevers or chills.    ROS:   Denies Headache, blurred vision, Chest Pain, SOB, Abdominal pain, nausea or vomiting     Social   clopidogrel Tablet 75  heparin  Infusion 2000  midodrine 30  phenylephrine    Infusion 0.1      Allergies    Ancef (Rash; Urticaria)  DDAVP (Hypotension)  iodine (Hives; Pruritus)  penicillin (Swelling)  sulfa drugs (Angioedema)    Intolerances        Vital Signs Last 24 Hrs  T(C): 37 (15 Faustino 2023 06:04), Max: 37 (14 Jan 2023 21:23)  T(F): 98.6 (15 Faustino 2023 06:04), Max: 98.6 (14 Jan 2023 21:23)  HR: 77 (15 Faustino 2023 09:00) (73 - 86)  BP: 85/42 (15 Faustino 2023 09:00) (85/42 - 85/42)  BP(mean): 60 (15 Faustino 2023 09:00) (60 - 60)  RR: 18 (15 Faustino 2023 09:00) (16 - 21)  SpO2: 90% (15 Faustino 2023 09:00) (75% - 100%)    Parameters below as of 15 Faustino 2023 09:00  Patient On (Oxygen Delivery Method): room air      I&O's Summary    14 Jan 2023 07:01  -  15 Faustino 2023 07:00  --------------------------------------------------------  IN: 943 mL / OUT: 0 mL / NET: 943 mL    15 Faustino 2023 07:01  -  15 Faustino 2023 09:33  --------------------------------------------------------  IN: 74.6 mL / OUT: 0 mL / NET: 74.6 mL        Physical Exam:  General: Resting in bed, NAD  Pulmonary: Equal chest wall expansion b/l, no respiratory distress  Cardiovascular: NSR  Abdominal: soft ND, NT  Extremities: WWP, No significant edema noted in LE bilaterally. RUE - Swelling in upper arm with compressive Kerlix placed on upper arm. Surgical wound noted without erythema or drainage and re-dressed      LABS:                        7.7    5.69  )-----------( 149      ( 15 Faustino 2023 05:30 )             25.2     01-15    134<L>  |  96  |  36<H>  ----------------------------<  87  4.2   |  24  |  6.59<H>    Ca    8.3<L>      15 Faustino 2023 05:30  Phos  5.2     01-15  Mg     1.6     01-15      PT/INR - ( 14 Jan 2023 05:16 )   PT: 15.4 sec;   INR: 1.29          PTT - ( 14 Jan 2023 15:41 )  PTT:91.6 sec

## 2023-01-15 NOTE — PROGRESS NOTE ADULT - ASSESSMENT
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 During imaging, CT showing 7.0 cm fluid collection is present near an AV fistula in the right upper arm. The differential for this collection included benign post-operative hematoma or seroma, but also abscess or vascular device infection, particularly in the setting of septic shock. Vascular consulted for RUE fistula evaluation to rule out this area as a source of sepsis. Reassuring physical exam at initial evaluation without clear indicia of infection related to the graph, small superficial surgical incision dehiscence notwithstanding. Now s/p IR image guided aspiration of perigraft fluid (negative) and gadolinium scan (negative). At this point, do not suspect a graft infection as the provoking factor leading to the patient's presentation. New onset swelling in area of RUE fistula is likely a sterile seroma with no intervention needed.    Recommendations:  -Continue to follow up BCx and IR Cx - both NGTD (given negative blood and wound drainage cultures, low likelihood of graft infection)  -Wound care recs: BID dressing changes (once daily with Santyl DSD once daily with Bactroban DSD). Apply light ace wrap compression to right hand and forearm to help with soft tissue swelling  -Fluid collection near RUE fistula is likely a sterile seroma and unlikely the cause of patient's sepsis. Would not recommend percutaneous drainage or IR procedure to drain fluid  -F/u breast punch and core biopsy  -Appreciate breast surgery recs  -Rest of care per primary team  -Vascular surgery Team 3C will continue to follow. Please call [phone] with any questions or concerns.

## 2023-01-15 NOTE — PROGRESS NOTE ADULT - SUBJECTIVE AND OBJECTIVE BOX
INTERVAL HPI/OVERNIGHT EVENTS:  No acute events overnight.     SUBJECTIVE: Patient seen and examined at bedside. Pt no longer feeling nauseous and able to tolerate PO. Had 2 BMs yesterday. Has no other complaints currently.       VITAL SIGNS:  ICU Vital Signs Last 24 Hrs  T(C): 36.7 (15 Faustino 2023 19:18), Max: 38.2 (15 Faustino 2023 12:00)  T(F): 98 (15 Faustino 2023 19:18), Max: 100.8 (15 Faustino 2023 12:00)  HR: 77 (15 Faustino 2023 20:00) (74 - 88)  BP: 85/42 (15 Faustino 2023 09:00) (85/42 - 85/42)  BP(mean): 60 (15 Faustino 2023 09:00) (60 - 60)  ABP: 85/40 (15 Faustino 2023 20:00) (75/34 - 96/44)  ABP(mean): 58 (15 Faustino 2023 20:00) (50 - 65)  RR: 17 (15 Faustino 2023 19:00) (16 - 20)  SpO2: 96% (15 Faustino 2023 20:00) (75% - 98%)    O2 Parameters below as of 15 Faustino 2023 20:00  Patient On (Oxygen Delivery Method): room air              01-14 @ 07:01  -  01-15 @ 07:00  --------------------------------------------------------  IN: 943 mL / OUT: 0 mL / NET: 943 mL    01-15 @ 07:01  -  01-15 @ 20:21  --------------------------------------------------------  IN: 459.6 mL / OUT: 0 mL / NET: 459.6 mL      CAPILLARY BLOOD GLUCOSE          PHYSICAL EXAM:    Constitutional: middle-aged female with obese body habitus resting in bed  HEENT: no conjunctivitis or scleral icterus, oral mucosa moist  NECK: Supple, no JVD, palpable multinodular goiter  CARDIAC: RRR, +S1/S2, no murmurs/rubs/gallops  PULM: Breathing comfortably on RA, clear to auscultation bilaterally, no wheezes/rales/rhonchi, +TDC to R chest wall  BREAST: palpable masses to b/l breasts; b/l breasts with peeling epidermis to lower aspect of breasts, L >R  ABDOMEN: Soft, obese, nontender to palpation, +bs, no rebound tenderness or fluid wave, non-reducible ventral hernia  SKIN: b/l arms warm to touch; 3cm dehisced wound, nonpurulent, nonerythematous, to R medial upper arm near axilla  VASC:  no LE edema or tenderness; +triple lumen catheter at R groin, +L axillary arterial line  EXT: RUE: edema to hand and forearm, slightly tense, non erythematous    MEDICATIONS:  MEDICATIONS  (STANDING):  atorvastatin 40 milliGRAM(s) Oral at bedtime  chlorhexidine 2% Cloths 1 Application(s) Topical <User Schedule>  cinacalcet 30 milliGRAM(s) Oral daily  clopidogrel Tablet 75 milliGRAM(s) Oral daily  colchicine 0.3 milliGRAM(s) Oral daily  collagenase Ointment 1 Application(s) Topical daily  fludroCORTISONE 0.2 milliGRAM(s) Oral every 24 hours  heparin  Infusion 2000 Unit(s)/Hr (19 mL/Hr) IV Continuous <Continuous>  midodrine 30 milliGRAM(s) Oral every 8 hours  Nephro-deborah 1 Tablet(s) Oral daily  phenylephrine    Infusion 0.1 MICROgram(s)/kG/Min (1.6 mL/Hr) IV Continuous <Continuous>  polyethylene glycol 3350 17 Gram(s) Oral every 12 hours  senna 2 Tablet(s) Oral at bedtime  sevelamer carbonate 1600 milliGRAM(s) Oral three times a day with meals    MEDICATIONS  (PRN):  acetaminophen     Tablet .. 650 milliGRAM(s) Oral every 6 hours PRN Temp greater or equal to 38C (100.4F), Moderate Pain (4 - 6)  calamine/zinc oxide Lotion 1 Application(s) Topical three times a day PRN Itching  HYDROmorphone  Injectable 0.25 milliGRAM(s) IV Push every 4 hours PRN Severe Pain (7 - 10)      ALLERGIES:  Allergies    Ancef (Rash; Urticaria)  DDAVP (Hypotension)  iodine (Hives; Pruritus)  penicillin (Swelling)  sulfa drugs (Angioedema)    Intolerances        LABS:                        7.7    5.69  )-----------( 149      ( 15 Faustino 2023 05:30 )             25.2     01-15    134<L>  |  96  |  36<H>  ----------------------------<  87  4.2   |  24  |  6.59<H>    Ca    8.3<L>      15 Faustino 2023 05:30  Phos  5.2     01-15  Mg     1.6     01-15      PT/INR - ( 14 Jan 2023 05:16 )   PT: 15.4 sec;   INR: 1.29          PTT - ( 14 Jan 2023 15:41 )  PTT:91.6 sec      RADIOLOGY & ADDITIONAL TESTS: Reviewed.

## 2023-01-15 NOTE — PROGRESS NOTE ADULT - PROBLEM SELECTOR PLAN 2
hemodialysis tomorrow for UF ~0.5L only given hypotension  EPO 8000U IV to be added to dialysis tomorrow

## 2023-01-15 NOTE — PROGRESS NOTE ADULT - ASSESSMENT
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     NEURO  #Pain Control  Pt in 10/10 pain of bilateral breasts, worst on R. Pain responsive to Dilaudid but effect wears off after ~2-3 hours.   - Pain management consulted, recs appreciated  - c/w Dilaudid 0.25mg IVP w4h for severe pain    - Tylenol 650mg po q6h PRN for mild pain  - HOLD ALL OPIOIDS IF RR<10, O2SAT <93%, SBP <96    PULM  #Atelectasis  Noted on CT chest to have atelectasis at b/l lung bases.  - Incentive spirometry    CARDIOVASCULAR  #Shock  Pt presented meeting 2/4 SIRS. Hypotensive with MAPs to 60, requiring Levophed. Septic picture of unknown source. HR > 90, WBC >12. Lactate 1.9. Afebrile. Procal 12. S/p Vancomycin 1250mg x 1, Aztreonam x 1 (pt allergic to Cefazolin, PCN). . Ferritin 1209  Patient still requiring pressors, unknown source, does not appear to fit septic picture at this point (no fever, BCx negative, no focal area of infection identified thus far)  - On Phenylephrine 0.1 mcg/kg/min; weaned from 0.8   - c/w midodrine 30mg q8h   - c/w Florinef 0.2mg qd  - CT:  7.0 cm fluid collection is present near an AV fistula in the right upper arm. Possibly abscess. Drained by IR 1/5 with serosanguinous fluid: no organisms, few WBCs  - Follow up with IR regarding reaccumulating seroma fluid  - Per vascular: does not think AV fistula is infected; would not remove it; has no additional recs   - per surg: prelim R breast bx shows vasculitis vs granuloma; unlikely to cause hypotension; f/u final bx results   - per ID: c/w Vancomycin to tx presumed R breast cellulitis (1/2-1/15); check vanc level before HD and dose based on level  - BCx: NGTD  - corticotropin stim test wnl    #LVOT with LISETTE  likely i/s/o hyperdynamic LV function 2/2 shock and hypovolemia  - per cardiology, rec repeating TTE after shock resolves    #HFrEF  TTE 11/22 normal LV and systolic, EF 59%, grade II diastolic dysfuncton, dilated RV, reduced RV,   Home meds: Entresto 49/51mg  - hold Entresto i/s/o shock    #CAD  Hx of cardiac cath in 2018  Home meds: atorvastatin 40mg, plavix 75mg qd  - c/w home atorvastatin 40mg  - c/w home Plavix 75mg qd     GI  #Constipation  - c/w senna 2 tablets/qd and miralax    #Nutrition  Patient reports poor PO intake  - NPO except meds    #Abdominal Pain  C/o vague abdominal pain, a/w bilious emesis x 2, no nausea or further episodes of emesis   (Resolved)    RENAL  #ESRD 2/2 PCKD  Pt has been on HD for "years" 2/2 PCKD. AV Fistula of EDWIN clotted in 2014, has received HD thru HD cath since. New AV Fistula in 11/2022, not yet matured.   - f/u nephrology recs  - s/p HD 1/10; planned for HD today 1/13    #Hyperkalemia  Patient with Hx of ESRD with missed HD presents with K >8 on VBG with abdominal symptoms and missed HD x8 days. ICU consulted i/s/o hyperkalemia. Renal consulted.  - s/p HD 1/10  - monitor with BMP's qd    #Hyperphosphatemia  - c/w sevelamer to 1600mg TID    ID  Pt presented in septic shock with leukocytosis, tachycardia, hypotension requiring pressors. Unknown source.   - WBC 15 -> 7  - see #shock for plan  - f/u ID recs  - gallium scan: faintly increased activity surrounding the entire lower portion of the right breast consistent with the inflammatory reaction seen on physical examination, small area of activity approximately 10 cm to the right of midline at about the level of the lower border of the sternum, activity likely to be in chest wall   - PET/CT with increased FDG avidity within R axillary vein, which contains an occlusive thrombus, which may represent an infectious source. There is also an additional FDG avid region of the AV fistula near the arterial anastomosis proximal to the area of the fluid collection; could be a potential source of infection vs inflammatory reaction to the graft. Also a photopenic R adnexal cystic mass, suggestive of a benign etiology.   - s/p vanco    ENDO  #Hyperparathyroidism  At risk for secondary HPTT d/t renal failure   - pt has vague abdominal pain with nausea  - multiple brown tumors on CT with multiple fractures of pubic rami, pubic bone, thoracolumbar spine  - intact , Vit D 38.5  - SPEP/immunofixation negative   - endocrinology recs: secondary hyperparathyroidism vs other process causing osteoclastic tumors  - c/w Sensipar 30mg qd per endo recs (started 1/9)  - 1/11 AM intact  (baseline PTH on Sensipar )    #Multinodular Goiter  CT: enlarged multinodular thyroid projecting into superior mediastinum with 1.8 x 1.5 cm solid nodule slightly inferiorly in the anterior mediastinum.   - Thyroid ultrasound: Fine-needle aspiration recommended for 4.3 cm right thyroid and 2.6 cm left thyroid solid nodules per SCOTT guidelines.  - TSH 1.6 free T4 0.68.  - TSH could be inappropriately normal for low free T4 due to euthyroid sick syndrome.  - f/u TFTs after shock resolved    MSK  #Renal Osteodystrophy  - on CT: Increased density of the bone marrow is consistent with renal osteodystrophy  - Per endocrine, extent of bone destruction extreme given PTH level, suspect might not be Brown tumors    #RUE Edema  RUE became edematous 1/8 extending down through hand. AVF with drained seroma pocket on R arm.  - lymphedema vs bleeding into seroma pocket? vs DVT   - RUE U/S with persistent DVT to R IJ, subclavian, and axillary stent, and proximal brachial vein, similar extent to prior. No significant change large complex fluid collection in proximal R arm, could represent old seroma/hematoma    #L toe pain  Pt c/o L toe pain, possibly gout.   - c/w colchicine 0.3mg qd    HEME/ONC  #Vasculopathy   Current DVT of R IJ, R subclavian and R axillary veins. Hx of L AVF clotting. Given hx of clots, current pruritis and gallium scan uptake in chest wall, reasonable to workup coagulopathy  - decreased heparin gtt to 1900u/hr    - f/u antiphospholipid panel/rheumatologic workup (beta2 glycoprotein, anticardiolipin ordered)     #Anemia  Hgb has downtrended from 9.8 on admission to 6.9. Baseline from November appears to be ~13. 1/9 Hb 6.9, ordered 1U pRBC after which pt likely had febrile nonhemolytic transfusion reaction (3 hrs into transfusion, developed tachycardia to 150s, HTN to 140s/90s, rigors, Trectal 101.3.) Transfusion stopped. Tylenol given. Sxs resolved.   - Ferritin elevated: 1209  - Total iron low (15), TIBC low (79)  - s/p 1U pRBC on 1/11 with Hb 6.9 > 7.9 w/o transfusion rxn    #Breast Pain, bilateral  Family hx of breast cancer in mother, sister.   - large R breast mass, palpable L breast mass, R breast has peau d'orange appearance with associated erythema, warmth on R  - US bilateral breasts: No sonographic evidence of breast abscess, cyst or focal mass to correspond with the clinical finding of bilateral breast masses.  - prelim breast bx: granuloma vs vasculitis     GYN  Uterine mass, 6.1cm mass in R adnexa seen incidentally on CTAP  - Gyn consulted, think unlikely ovarian mass vs cyst is related to systemic dz as it was seen on imaging in 1/2022  - Pt has hx of gyn surgery, unknown if total hysterectomy or partial  -  172 (high), CA-125 196 (high), CEA 6.1 (high)  - TVUS with 6.2cm simple cystic lesion to R ovary, possibly serous cysteadenoma  - per gyn/onc: elevated tumor markers not informative given pt is acutely ill. TVUS c/w likely simple cyst, PET/CT w/ no uptake in R adnexa, low concern for malignancy. Rec outpt f/u when stable for repeat tumor markers.      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     NEURO  #Pain Control  Pt in 10/10 pain of bilateral breasts, worst on R. Pain responsive to Dilaudid but effect wears off after ~2-3 hours.   - Pain management consulted, recs appreciated  - c/w Dilaudid 0.25mg IVP w4h for severe pain    - Tylenol 650mg po q6h PRN for mild pain  - HOLD ALL OPIOIDS IF RR<10, O2SAT <93%, SBP <96    PULM  #Atelectasis  Noted on CT chest to have atelectasis at b/l lung bases.  - Incentive spirometry    CARDIOVASCULAR  #Shock  Pt presented meeting 2/4 SIRS. Hypotensive with MAPs to 60, requiring Levophed. Septic picture of unknown source. HR > 90, WBC >12. Lactate 1.9. Afebrile. Procal 12. S/p Vancomycin 1250mg x 1, Aztreonam x 1 (pt allergic to Cefazolin, PCN). . Ferritin 1209  Patient still requiring pressors, unknown source, does not appear to fit septic picture at this point (no fever, BCx negative, no focal area of infection identified thus far)  - On Phenylephrine 0.1 mcg/kg/min; weaned from 0.8   - c/w midodrine 30mg q8h   - c/w Florinef 0.2mg qd  - CT:  7.0 cm fluid collection is present near an AV fistula in the right upper arm. Possibly abscess. Drained by IR 1/5 with serosanguinous fluid: no organisms, few WBCs  - RUE dopplers (1/13): No significant change large complex fluid collection in proximal R arm, could represent old seroma/hematoma (However, prior ultrasound was done prior to drainage)   - Per vascular: does not think AV fistula is infected; would not remove it; also does NOT recommend drainage of seroma.   - per surg: prelim R breast bx shows vasculitis vs granuloma; unlikely to cause hypotension; f/u final bx results   - per ID: c/w Vancomycin to tx presumed R breast cellulitis (1/2-1/15); check vanc level before HD and dose based on level  - BCx: NGTD  - corticotropin stim test wnl    #LVOT with LISETTE  likely i/s/o hyperdynamic LV function 2/2 shock and hypovolemia  - per cardiology, rec repeating TTE after shock resolves    #HFrEF  TTE 11/22 normal LV and systolic, EF 59%, grade II diastolic dysfuncton, dilated RV, reduced RV,   Home meds: Entresto 49/51mg  - hold Entresto i/s/o shock    #CAD  Hx of cardiac cath in 2018  Home meds: atorvastatin 40mg, plavix 75mg qd  - c/w home atorvastatin 40mg  - c/w home Plavix 75mg qd     GI  #Constipation  - c/w senna 2 tablets/qd and miralax    #Nutrition  Patient reports poor PO intake  - renal diet    #Abdominal Pain  C/o vague abdominal pain, a/w bilious emesis x 2, no nausea or further episodes of emesis   (Resolved)    RENAL  #ESRD 2/2 PCKD  Pt has been on HD for "years" 2/2 PCKD. AV Fistula of EDWIN clotted in 2014, has received HD thru HD cath since. New AV Fistula in 11/2022, not yet matured.   - f/u nephrology recs  - s/p HD 1/10, 1/13; next session tomorrow 1/16    #Hyperkalemia  Patient with Hx of ESRD with missed HD presents with K >8 on VBG with abdominal symptoms and missed HD x8 days. ICU consulted i/s/o hyperkalemia. Renal consulted.  - s/p HD 1/10  - monitor with BMP's qd    #Hyperphosphatemia  - c/w sevelamer to 1600mg TID    ID  Pt presented in septic shock with leukocytosis, tachycardia, hypotension requiring pressors. Unknown source.   - WBC 15 -> 7  - see #shock for plan  - f/u ID recs  - gallium scan: faintly increased activity surrounding the entire lower portion of the right breast consistent with the inflammatory reaction seen on physical examination, small area of activity approximately 10 cm to the right of midline at about the level of the lower border of the sternum, activity likely to be in chest wall   - PET/CT with increased FDG avidity within R axillary vein, which contains an occlusive thrombus, which may represent an infectious source. There is also an additional FDG avid region of the AV fistula near the arterial anastomosis proximal to the area of the fluid collection; could be a potential source of infection vs inflammatory reaction to the graft. Also a photopenic R adnexal cystic mass, suggestive of a benign etiology.   - s/p vanco    ENDO  #Hyperparathyroidism  At risk for secondary HPTT d/t renal failure   - pt has vague abdominal pain with nausea  - multiple brown tumors on CT with multiple fractures of pubic rami, pubic bone, thoracolumbar spine  - intact , Vit D 38.5  - SPEP/immunofixation negative   - endocrinology recs: secondary hyperparathyroidism vs other process causing osteoclastic tumors  - c/w Sensipar 30mg qd per endo recs (started 1/9)  - 1/11 AM intact  (baseline PTH on Sensipar )    #Multinodular Goiter  CT: enlarged multinodular thyroid projecting into superior mediastinum with 1.8 x 1.5 cm solid nodule slightly inferiorly in the anterior mediastinum.   - Thyroid ultrasound: Fine-needle aspiration recommended for 4.3 cm right thyroid and 2.6 cm left thyroid solid nodules per SCOTT guidelines.  - TSH 1.6 free T4 0.68.  - TSH could be inappropriately normal for low free T4 due to euthyroid sick syndrome.  - f/u TFTs after shock resolved    MSK  #Renal Osteodystrophy  - on CT: Increased density of the bone marrow is consistent with renal osteodystrophy  - Per endocrine, extent of bone destruction extreme given PTH level, suspect might not be Brown tumors    #RUE Edema  RUE became edematous 1/8 extending down through hand. AVF with drained seroma pocket on R arm.  - lymphedema vs bleeding into seroma pocket? vs DVT   - RUE U/S with persistent DVT to R IJ, subclavian, and axillary stent, and proximal brachial vein, similar extent to prior. No significant change large complex fluid collection in proximal R arm, could represent old seroma/hematoma    #L toe pain  Pt c/o L toe pain, possibly gout.   - c/w colchicine 0.3mg qd    HEME/ONC  #Vasculopathy   Current DVT of R IJ, R subclavian and R axillary veins. Hx of L AVF clotting. Given hx of clots, current pruritis and gallium scan uptake in chest wall, reasonable to workup coagulopathy  - decreased heparin gtt to 1900u/hr    - f/u antiphospholipid panel/rheumatologic workup (beta2 glycoprotein, anticardiolipin, OBDULIA/ANCA/complement, RF ordered)     #Anemia  Hgb has downtrended from 9.8 on admission to 6.9. Baseline from November appears to be ~13. 1/9 Hb 6.9, ordered 1U pRBC after which pt likely had febrile nonhemolytic transfusion reaction (3 hrs into transfusion, developed tachycardia to 150s, HTN to 140s/90s, rigors, Trectal 101.3.) Transfusion stopped. Tylenol given. Sxs resolved.   - Ferritin elevated: 1209  - Total iron low (15), TIBC low (79)  - s/p 1U pRBC on 1/11 with Hb 6.9 > 7.9 w/o transfusion rxn    #Breast Pain, bilateral  Family hx of breast cancer in mother, sister.   - large R breast mass, palpable L breast mass, R breast has peau d'orange appearance with associated erythema, warmth on R  - US bilateral breasts: No sonographic evidence of breast abscess, cyst or focal mass to correspond with the clinical finding of bilateral breast masses.  - prelim breast bx: granuloma vs vasculitis     GYN  Uterine mass, 6.1cm mass in R adnexa seen incidentally on CTAP  - Gyn consulted, think unlikely ovarian mass vs cyst is related to systemic dz as it was seen on imaging in 1/2022  - Pt has hx of gyn surgery, unknown if total hysterectomy or partial  -  172 (high), CA-125 196 (high), CEA 6.1 (high)  - TVUS with 6.2cm simple cystic lesion to R ovary, possibly serous cysteadenoma  - per gyn/onc: elevated tumor markers not informative given pt is acutely ill. TVUS c/w likely simple cyst, PET/CT w/ no uptake in R adnexa, low concern for malignancy. Rec outpt f/u when stable for repeat tumor markers.      Preventative  F: None  E: None, HD pt  N: Renal Restricted Diet  DVT: heparin @1900u/hr  Code Status: Full Code  Dispo: ICU

## 2023-01-15 NOTE — PROGRESS NOTE ADULT - ASSESSMENT
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

## 2023-01-16 NOTE — PROGRESS NOTE ADULT - ATTENDING COMMENTS
Continue to taper neosynephrine. Change TLC as it is >10 d old. Vascular access is very very difficult due to thrombosis and UE fistulas. Continue current care.

## 2023-01-16 NOTE — PROGRESS NOTE ADULT - SUBJECTIVE AND OBJECTIVE BOX
THe patient remains on cinecalcet for skull brown tumors secondary to very high PTH levels. Would continue current 30 mg Dose/

## 2023-01-16 NOTE — PROGRESS NOTE ADULT - SUBJECTIVE AND OBJECTIVE BOX
Subjective: Pt seen and evaluated this AM by vascular team. Pts RUE dressing C/D/I. Pt denies any pain to RUE at this time.      ROS:   Denies Headache, blurred vision, Chest Pain, SOB, Abdominal pain, nausea or vomiting     Social   clopidogrel Tablet 75  heparin  Infusion 2000  midodrine 30  phenylephrine    Infusion 0.1      Allergies    Ancef (Rash; Urticaria)  DDAVP (Hypotension)  iodine (Hives; Pruritus)  penicillin (Swelling)  sulfa drugs (Angioedema)    Intolerances        Vital Signs Last 24 Hrs  T(C): 36.3 (16 Jan 2023 06:05), Max: 38.2 (15 Faustino 2023 12:00)  T(F): 97.4 (16 Jan 2023 06:05), Max: 100.8 (15 Faustino 2023 12:00)  HR: 89 (16 Jan 2023 08:00) (59 - 89)  BP: 85/42 (15 Faustino 2023 09:00) (85/42 - 85/42)  BP(mean): 60 (15 Faustino 2023 09:00) (60 - 60)  RR: 20 (16 Jan 2023 03:00) (16 - 20)  SpO2: 83% (16 Jan 2023 08:00) (83% - 100%)    Parameters below as of 16 Jan 2023 08:00  Patient On (Oxygen Delivery Method): room air      I&O's Summary    15 Faustino 2023 07:01  -  16 Jan 2023 07:00  --------------------------------------------------------  IN: 834.6 mL / OUT: 0 mL / NET: 834.6 mL    16 Jan 2023 07:01  -  16 Jan 2023 08:33  --------------------------------------------------------  IN: 33 mL / OUT: 0 mL / NET: 33 mL        Physical Exam:  General: Resting in bed, NAD  Pulmonary: Equal chest wall expansion b/l, no respiratory distress  Cardiovascular: NSR  Abdominal: soft ND, NT  Extremities: WWP, No significant edema noted in LE bilaterally. RUE -edema throughout upper arm and forearm. Surgical wound noted without erythema or drainage and re-dressed    LABS:                        7.9    5.97  )-----------( 172      ( 16 Jan 2023 05:19 )             26.3     01-16    130<L>  |  94<L>  |  42<H>  ----------------------------<  79  3.9   |  24  |  7.75<H>    Ca    8.4      16 Jan 2023 05:19  Phos  5.8     01-16  Mg     1.7     01-16    TPro  5.4<L>  /  Alb  2.2<L>  /  TBili  0.2  /  DBili  x   /  AST  19  /  ALT  9<L>  /  AlkPhos  74  01-16    PT/INR - ( 16 Jan 2023 05:19 )   PT: 15.4 sec;   INR: 1.29          PTT - ( 16 Jan 2023 05:19 )  PTT:92.6 sec      A/P: 66 yo F with PMH HFrEF (EF 40%), nonobstructive CAD, HTN, HLD, ESRD 2/2 PCKD on HD, PE (2018) s/p IVC filter, PAD, OA, h/o thrombus in vascular access x3 ( R AVG, R AVF, L AVG), ventral hernia, 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 During imaging, CT showing 7.0 cm fluid collection is present near an AV fistula in the right upper arm. The differential for this collection included benign post-operative hematoma or seroma, but also abscess or vascular device infection, particularly in the setting of septic shock. Vascular consulted for RUE fistula evaluation to rule out this area as a source of sepsis. Reassuring physical exam at initial evaluation without clear indicia of infection related to the graph, small superficial surgical incision dehiscence notwithstanding. Now s/p IR image guided aspiration of perigraft fluid (negative) and gadolinium scan (negative). At this point, do not suspect a graft infection as the provoking factor leading to the patient's presentation. New onset swelling in area of RUE fistula is likely a sterile seroma with no intervention needed.    Recommendations:  -Continue to follow up BCx and IR Cx - both NGTD (given negative blood and wound drainage cultures, low likelihood of graft infection)  -Wound care recs: BID dressing changes (once daily with Santyl DSD once daily with Bactroban DSD). Apply light ace wrap compression to right hand and forearm to help with soft tissue swelling and elevate RUE with pillows  -Fluid collection near RUE fistula is likely a sterile seroma and unlikely the cause of patient's sepsis. Would not recommend percutaneous drainage or IR procedure to drain fluid  -F/u breast punch and core biopsy  -Appreciate breast surgery recs  -Rest of care per primary team  -Vascular surgery Team 3C will continue to follow. Please call [phone] with any questions or concerns.

## 2023-01-16 NOTE — PROGRESS NOTE ADULT - ASSESSMENT
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      - F/U final right breast biopsy pathology -- preliminary result shows vasculitis vs granulomatous mastitis.  - F/u rheumatology recommendations for possible granulomatous mastitis  - F/U Cx results (NGTD so far). ABx as per primary team.  - Appreciate GYN recommendations for adnexal mass and elevated tumor markers.   - Wound care for access site ulceration as per vascular surgery.   - Surgery 5C following.

## 2023-01-16 NOTE — PROGRESS NOTE ADULT - SUBJECTIVE AND OBJECTIVE BOX
CC: HYPERKALEMIA        INTERVAL HISTORY: lying in bed in NAD      ROS: No chest pain, no sob, no abd pain. No n/v/d    PAST MEDICAL & SURGICAL HISTORY:  HTN (hypertension)    HLD (hyperlipidemia)    CAD (coronary atherosclerotic disease)    ESRD on dialysis  last 11/15/22    H/O ventral hernia    Brown tumor  brain    DVT, lower extremity  left    Stented coronary artery    History of surgery  IVC filter    AVF (arteriovenous fistula)  right left    S/P AIDEN-BSO  2003    History of surgery  RLE PTA stent / atherectomy  7/2021    History of atherectomy  stent        PHYSICAL EXAM:  T(C): 36.3 (01-16-23 @ 06:05), Max: 38.2 (01-15-23 @ 12:00)  HR: 89 (01-16-23 @ 08:00)  BP: --  RR: 20 (01-16-23 @ 03:00)  SpO2: 83% (01-16-23 @ 08:00)  Wt(kg): --  I&O's Summary    15 Faustino 2023 07:01  -  16 Jan 2023 07:00  --------------------------------------------------------  IN: 834.6 mL / OUT: 0 mL / NET: 834.6 mL    16 Jan 2023 07:01  -  16 Jan 2023 09:03  --------------------------------------------------------  IN: 33 mL / OUT: 0 mL / NET: 33 mL      Weight   General: AAO x 3,  NAD.  HEENT: moist mucous membranes, no pallor/cyanosis.  Neck: no JVD visible.  Cardiac: S1, S2. RRR. No murmurs   Respratory: CTA b/l, no access muscle use.   Abdomen: soft. nontender. nondistended  Skin: no rashes.  Extremities: + 4 extremity swelling  Access: L Permacath      DATA:                        7.9<L>  5.97  )-----------( 172      ( 16 Jan 2023 05:19 )             26.3<L>    Ferritin, Serum: 1209 ng/mL *H* (01-07 @ 06:01)      130<L>  |  94<L>  |  42<H>  ----------------------------<  79     Ca:8.4   (16 Jan 2023 05:19)  3.9     |  24     |  7.75<H>        TPro  5.4<L>  /  Alb  2.2<L>  /  TBili  0.2    /  DBili  x      /  AST  19     /  ALT  9<L>   /  AlkPhos  74     16 Jan 2023 05:19      Vitamin D, 1, 25-Dihydroxy: 33.9 pg/mL [19.9 - 79.3] (01-05 @ 05:30)  Vitamin D, 25-Hydroxy: 38.5 ng/mL [30.0 - 80.0] (01-05 @ 05:30)                MEDICATIONS  (STANDING):  atorvastatin 40 milliGRAM(s) Oral at bedtime  chlorhexidine 2% Cloths 1 Application(s) Topical <User Schedule>  cinacalcet 30 milliGRAM(s) Oral daily  clopidogrel Tablet 75 milliGRAM(s) Oral daily  colchicine 0.3 milliGRAM(s) Oral daily  collagenase Ointment 1 Application(s) Topical daily  fludroCORTISONE 0.2 milliGRAM(s) Oral every 24 hours  heparin  Infusion 2000 Unit(s)/Hr (17 mL/Hr) IV Continuous <Continuous>  midodrine 30 milliGRAM(s) Oral every 8 hours  Nephro-deborah 1 Tablet(s) Oral daily  phenylephrine    Infusion 0.1 MICROgram(s)/kG/Min (1.6 mL/Hr) IV Continuous <Continuous>  polyethylene glycol 3350 17 Gram(s) Oral every 12 hours  senna 2 Tablet(s) Oral at bedtime  sevelamer carbonate 1600 milliGRAM(s) Oral three times a day with meals    MEDICATIONS  (PRN):  acetaminophen     Tablet .. 650 milliGRAM(s) Oral every 6 hours PRN Temp greater or equal to 38C (100.4F), Moderate Pain (4 - 6)  calamine/zinc oxide Lotion 1 Application(s) Topical three times a day PRN Itching  HYDROmorphone  Injectable 0.25 milliGRAM(s) IV Push every 4 hours PRN Severe Pain (7 - 10)

## 2023-01-16 NOTE — PROGRESS NOTE ADULT - SUBJECTIVE AND OBJECTIVE BOX
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      EVENTS:   - Biopsy taken uneventfully at bedside 1/9/23- preliminary pathology showed granulomas, pending final report.  - Phenylephrine gtt at 1 overnight  - Febrile yesterday 1/15 to 38.2 (no cultures sent).    SUBJECTIVE:   - Notes moderate right breast discomfort and feels fair this morning  - Denies CP, SOB  - Denies worsening right new discharge.    MEDICATIONS  (STANDING):  atorvastatin 40 milliGRAM(s) Oral at bedtime  chlorhexidine 2% Cloths 1 Application(s) Topical <User Schedule>  cinacalcet 30 milliGRAM(s) Oral daily  clopidogrel Tablet 75 milliGRAM(s) Oral daily  colchicine 0.3 milliGRAM(s) Oral daily  collagenase Ointment 1 Application(s) Topical daily  fludroCORTISONE 0.2 milliGRAM(s) Oral every 24 hours  heparin  Infusion 2000 Unit(s)/Hr (17 mL/Hr) IV Continuous <Continuous>  midodrine 30 milliGRAM(s) Oral every 8 hours  Nephro-deborah 1 Tablet(s) Oral daily  phenylephrine    Infusion 0.1 MICROgram(s)/kG/Min (1.6 mL/Hr) IV Continuous <Continuous>  polyethylene glycol 3350 17 Gram(s) Oral every 12 hours  senna 2 Tablet(s) Oral at bedtime  sevelamer carbonate 1600 milliGRAM(s) Oral three times a day with meals    MEDICATIONS  (PRN):  acetaminophen     Tablet .. 650 milliGRAM(s) Oral every 6 hours PRN Temp greater or equal to 38C (100.4F), Moderate Pain (4 - 6)  calamine/zinc oxide Lotion 1 Application(s) Topical three times a day PRN Itching  HYDROmorphone  Injectable 0.25 milliGRAM(s) IV Push every 4 hours PRN Severe Pain (7 - 10)      Vital Signs Last 24 Hrs  T(C): 36.3 (16 Jan 2023 06:05), Max: 38.2 (15 Faustino 2023 12:00)  T(F): 97.4 (16 Jan 2023 06:05), Max: 100.8 (15 Faustino 2023 12:00)  HR: 74 (16 Jan 2023 06:00) (59 - 88)  BP: 85/42 (15 Faustino 2023 09:00) (85/42 - 85/42)  BP(mean): 60 (15 Faustino 2023 09:00) (60 - 60)  RR: 20 (16 Jan 2023 03:00) (16 - 20)  SpO2: 88% (16 Jan 2023 06:00) (87% - 100%)    Parameters below as of 16 Jan 2023 07:00  Patient On (Oxygen Delivery Method): room air        PHYSICAL EXAM:   Gen: Awake, alert, NAD, resting comfortably   Breast: non-soiled gauze overlying incisional bx site on R breast, mild induration surrounding bx site stable. B/l breasts soft with underlying firmness, mildly tender to palpation this morning.   CV: RRR  Pulm: no respiratory distress on RA  Abd: soft, ND, NTTP, no rebound or guarding   Ext: WWP, ulceration of medial aspect of R arm over access site. L chest HD catheter without edema/induration/purulence.         I&O's Detail    15 Faustino 2023 07:01  -  16 Jan 2023 07:00  --------------------------------------------------------  IN:    Heparin: 429 mL    Phenylephrine: 256 mL    Phenylephrine: 116.6 mL  Total IN: 801.6 mL    OUT:  Total OUT: 0 mL    Total NET: 801.6 mL          LABS:                        7.9    5.97  )-----------( 172      ( 16 Jan 2023 05:19 )             26.3     01-16    130<L>  |  94<L>  |  42<H>  ----------------------------<  79  3.9   |  24  |  7.75<H>    Ca    8.4      16 Jan 2023 05:19  Phos  5.8     01-16  Mg     1.7     01-16    TPro  5.4<L>  /  Alb  2.2<L>  /  TBili  0.2  /  DBili  x   /  AST  19  /  ALT  9<L>  /  AlkPhos  74  01-16    PT/INR - ( 16 Jan 2023 05:19 )   PT: 15.4 sec;   INR: 1.29          PTT - ( 16 Jan 2023 05:19 )  PTT:92.6 sec

## 2023-01-16 NOTE — PROGRESS NOTE ADULT - PROBLEM SELECTOR PLAN 2
for hemodialysis today for UF ~0.5L as tolerated  on IV Phenylephrine  to receive EPO 8000U IV with HD

## 2023-01-16 NOTE — PROGRESS NOTE ADULT - ASSESSMENT
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     NEURO  #Pain Control  Pt in 10/10 pain of bilateral breasts, worst on R. Pain responsive to Dilaudid but effect wears off after ~2-3 hours.   - Pain management consulted, recs appreciated  - c/w Dilaudid 0.25mg IVP w4h for severe pain    - Tylenol 650mg po q6h PRN for mild pain  - HOLD ALL OPIOIDS IF RR<10, O2SAT <93%, SBP <96    PULM  #Atelectasis  Noted on CT chest to have atelectasis at b/l lung bases.  - Incentive spirometry    CARDIOVASCULAR  #Shock  Pt presented meeting 2/4 SIRS. Hypotensive with MAPs to 60, requiring Levophed. Septic picture of unknown source. HR > 90, WBC >12. Lactate 1.9. Afebrile. Procal 12. S/p Vancomycin 1250mg x 1, Aztreonam x 1 (pt allergic to Cefazolin, PCN). . Ferritin 1209  Patient still requiring pressors, unknown source, does not appear to fit septic picture at this point (no fever, BCx negative, no focal area of infection identified thus far)  - On Phenylephrine 0.1 mcg/kg/min; weaned from 0.8   - c/w midodrine 30mg q8h   - c/w Florinef 0.2mg qd  - CT:  7.0 cm fluid collection is present near an AV fistula in the right upper arm. Possibly abscess. Drained by IR 1/5 with serosanguinous fluid: no organisms, few WBCs  - RUE dopplers (1/13): No significant change large complex fluid collection in proximal R arm, could represent old seroma/hematoma (However, prior ultrasound was done prior to drainage)   - Per vascular: does not think AV fistula is infected; would not remove it; also does NOT recommend drainage of seroma.   - per surg: prelim R breast bx shows vasculitis vs granuloma; unlikely to cause hypotension; f/u final bx results   - per ID: c/w Vancomycin to tx presumed R breast cellulitis (1/2-1/15); check vanc level before HD and dose based on level  - BCx: NGTD  - corticotropin stim test wnl    #LVOT with LISETTE  likely i/s/o hyperdynamic LV function 2/2 shock and hypovolemia  - per cardiology, rec repeating TTE after shock resolves    #HFrEF  TTE 11/22 normal LV and systolic, EF 59%, grade II diastolic dysfuncton, dilated RV, reduced RV,   Home meds: Entresto 49/51mg  - hold Entresto i/s/o shock    #CAD  Hx of cardiac cath in 2018  Home meds: atorvastatin 40mg, plavix 75mg qd  - c/w home atorvastatin 40mg  - c/w home Plavix 75mg qd     GI  #Constipation  - c/w senna 2 tablets/qd and miralax    #Nutrition  Patient reports poor PO intake  - renal diet    #Abdominal Pain  C/o vague abdominal pain, a/w bilious emesis x 2, no nausea or further episodes of emesis   (Resolved)    RENAL  #ESRD 2/2 PCKD  Pt has been on HD for "years" 2/2 PCKD. AV Fistula of EDWIN clotted in 2014, has received HD thru HD cath since. New AV Fistula in 11/2022, not yet matured.   - f/u nephrology recs  - s/p HD 1/10, 1/13; next session tomorrow 1/16    #Hyperkalemia  Patient with Hx of ESRD with missed HD presents with K >8 on VBG with abdominal symptoms and missed HD x8 days. ICU consulted i/s/o hyperkalemia. Renal consulted.  - s/p HD 1/10  - monitor with BMP's qd    #Hyperphosphatemia  - c/w sevelamer to 1600mg TID    ID  Pt presented in septic shock with leukocytosis, tachycardia, hypotension requiring pressors. Unknown source.   - WBC 15 -> 7  - see #shock for plan  - f/u ID recs  - gallium scan: faintly increased activity surrounding the entire lower portion of the right breast consistent with the inflammatory reaction seen on physical examination, small area of activity approximately 10 cm to the right of midline at about the level of the lower border of the sternum, activity likely to be in chest wall   - PET/CT with increased FDG avidity within R axillary vein, which contains an occlusive thrombus, which may represent an infectious source. There is also an additional FDG avid region of the AV fistula near the arterial anastomosis proximal to the area of the fluid collection; could be a potential source of infection vs inflammatory reaction to the graft. Also a photopenic R adnexal cystic mass, suggestive of a benign etiology.   - s/p vanco    ENDO  #Hyperparathyroidism  At risk for secondary HPTT d/t renal failure   - pt has vague abdominal pain with nausea  - multiple brown tumors on CT with multiple fractures of pubic rami, pubic bone, thoracolumbar spine  - intact , Vit D 38.5  - SPEP/immunofixation negative   - endocrinology recs: secondary hyperparathyroidism vs other process causing osteoclastic tumors  - c/w Sensipar 30mg qd per endo recs (started 1/9)  - 1/11 AM intact  (baseline PTH on Sensipar )    #Multinodular Goiter  CT: enlarged multinodular thyroid projecting into superior mediastinum with 1.8 x 1.5 cm solid nodule slightly inferiorly in the anterior mediastinum.   - Thyroid ultrasound: Fine-needle aspiration recommended for 4.3 cm right thyroid and 2.6 cm left thyroid solid nodules per SCOTT guidelines.  - TSH 1.6 free T4 0.68.  - TSH could be inappropriately normal for low free T4 due to euthyroid sick syndrome.  - f/u TFTs after shock resolved    MSK  #Renal Osteodystrophy  - on CT: Increased density of the bone marrow is consistent with renal osteodystrophy  - Per endocrine, extent of bone destruction extreme given PTH level, suspect might not be Brown tumors    #RUE Edema  RUE became edematous 1/8 extending down through hand. AVF with drained seroma pocket on R arm.  - lymphedema vs bleeding into seroma pocket? vs DVT   - RUE U/S with persistent DVT to R IJ, subclavian, and axillary stent, and proximal brachial vein, similar extent to prior. No significant change large complex fluid collection in proximal R arm, could represent old seroma/hematoma    #L toe pain  Pt c/o L toe pain, possibly gout.   - c/w colchicine 0.3mg qd    HEME/ONC  #Vasculopathy   Current DVT of R IJ, R subclavian and R axillary veins. Hx of L AVF clotting. Given hx of clots, current pruritis and gallium scan uptake in chest wall, reasonable to workup coagulopathy  - decreased heparin gtt to 1900u/hr    - f/u antiphospholipid panel/rheumatologic workup (beta2 glycoprotein, anticardiolipin, OBDULIA/ANCA/complement, RF ordered)     #Anemia  Hgb has downtrended from 9.8 on admission to 6.9. Baseline from November appears to be ~13. 1/9 Hb 6.9, ordered 1U pRBC after which pt likely had febrile nonhemolytic transfusion reaction (3 hrs into transfusion, developed tachycardia to 150s, HTN to 140s/90s, rigors, Trectal 101.3.) Transfusion stopped. Tylenol given. Sxs resolved.   - Ferritin elevated: 1209  - Total iron low (15), TIBC low (79)  - s/p 1U pRBC on 1/11 with Hb 6.9 > 7.9 w/o transfusion rxn    #Breast Pain, bilateral  Family hx of breast cancer in mother, sister.   - large R breast mass, palpable L breast mass, R breast has peau d'orange appearance with associated erythema, warmth on R  - US bilateral breasts: No sonographic evidence of breast abscess, cyst or focal mass to correspond with the clinical finding of bilateral breast masses.  - prelim breast bx: granuloma vs vasculitis     GYN  Uterine mass, 6.1cm mass in R adnexa seen incidentally on CTAP  - Gyn consulted, think unlikely ovarian mass vs cyst is related to systemic dz as it was seen on imaging in 1/2022  - Pt has hx of gyn surgery, unknown if total hysterectomy or partial  -  172 (high), CA-125 196 (high), CEA 6.1 (high)  - TVUS with 6.2cm simple cystic lesion to R ovary, possibly serous cysteadenoma  - per gyn/onc: elevated tumor markers not informative given pt is acutely ill. TVUS c/w likely simple cyst, PET/CT w/ no uptake in R adnexa, low concern for malignancy. Rec outpt f/u when stable for repeat tumor markers.      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     NEURO  #Pain Control  Pt in 10/10 pain of bilateral breasts, worst on R. Pain responsive to Dilaudid but effect wears off after ~2-3 hours.   - Pain management consulted, recs appreciated  - Started PCA on 1/16 for inadequate pain control   - Tylenol 650mg po q6h PRN for mild pain  - HOLD ALL OPIOIDS IF RR<10, O2SAT <93%, SBP <96    PULM  #Atelectasis  Noted on CT chest to have atelectasis at b/l lung bases.  - Incentive spirometry    CARDIOVASCULAR  #Shock  Pt presented meeting 2/4 SIRS. Hypotensive with MAPs to 60, requiring Levophed. Septic picture of unknown source. HR > 90, WBC >12. Lactate 1.9. Afebrile. Procal 12. S/p Vancomycin 1250mg x 1, Aztreonam x 1 (pt allergic to Cefazolin, PCN). . Ferritin 1209  Patient still requiring pressors, unknown source, does not appear to fit septic picture at this point (no fever, BCx negative, no focal area of infection identified thus far)  - C/w Phenylephrine  - c/w midodrine 30mg q8h   - c/w Florinef 0.2mg qd  - CT:  7.0 cm fluid collection is present near an AV fistula in the right upper arm. Possibly abscess. Drained by IR 1/5 with serosanguinous fluid: no organisms, few WBCs  - RUE dopplers (1/13): No significant change large complex fluid collection in proximal R arm, could represent old seroma/hematoma (However, prior ultrasound was done prior to drainage)   - Per vascular: does not think AV fistula is infected; would not remove it; also does NOT recommend drainage of seroma.   - per surg: prelim R breast bx shows vasculitis vs granuloma; unlikely to cause hypotension; f/u final bx results   - per ID: c/w Vancomycin to tx presumed R breast cellulitis (1/2-1/15); check vanc level before HD and dose based on level  - BCx: NGTD  - corticotropin stim test wnl    #LVOT with LISETTE  likely i/s/o hyperdynamic LV function 2/2 shock and hypovolemia  - per cardiology, rec repeating TTE after shock resolves    #HFrEF  TTE 11/22 normal LV and systolic, EF 59%, grade II diastolic dysfuncton, dilated RV, reduced RV,   Home meds: Entresto 49/51mg  - hold Entresto i/s/o shock    #CAD  Hx of cardiac cath in 2018  Home meds: atorvastatin 40mg, plavix 75mg qd  - c/w home atorvastatin 40mg  - c/w home Plavix 75mg qd     GI  #Constipation  - c/w senna 2 tablets/qd and miralax    #Nutrition  Patient reports poor PO intake  - renal diet    #Abdominal Pain  C/o vague abdominal pain, a/w bilious emesis x 2, no nausea or further episodes of emesis   (Resolved)    RENAL  #ESRD 2/2 PCKD  Pt has been on HD for "years" 2/2 PCKD. AV Fistula of EDWIN clotted in 2014, has received HD thru HD cath since. New AV Fistula in 11/2022, not yet matured.   - f/u nephrology recs  - s/p HD 1/10, 1/13; next session today 1/16    #Hyperkalemia  Patient with Hx of ESRD with missed HD presents with K >8 on VBG with abdominal symptoms and missed HD x8 days. ICU consulted i/s/o hyperkalemia. Renal consulted.  - s/p HD 1/10  - monitor with BMP's qd    #Hyperphosphatemia  - c/w sevelamer to 1600mg TID    ID  Pt presented in septic shock with leukocytosis, tachycardia, hypotension requiring pressors. Unknown source.   - WBC 15 -> 7  - see #shock for plan  - f/u ID recs  - gallium scan: faintly increased activity surrounding the entire lower portion of the right breast consistent with the inflammatory reaction seen on physical examination, small area of activity approximately 10 cm to the right of midline at about the level of the lower border of the sternum, activity likely to be in chest wall   - PET/CT with increased FDG avidity within R axillary vein, which contains an occlusive thrombus, which may represent an infectious source. There is also an additional FDG avid region of the AV fistula near the arterial anastomosis proximal to the area of the fluid collection; could be a potential source of infection vs inflammatory reaction to the graft. Also a photopenic R adnexal cystic mass, suggestive of a benign etiology.   - s/p vanco    ENDO  #Hyperparathyroidism  At risk for secondary HPTT d/t renal failure   - pt has vague abdominal pain with nausea  - multiple brown tumors on CT with multiple fractures of pubic rami, pubic bone, thoracolumbar spine  - intact , Vit D 38.5  - SPEP/immunofixation negative   - endocrinology recs: secondary hyperparathyroidism vs other process causing osteoclastic tumors  - c/w Sensipar 30mg qd per endo recs (started 1/9)  - 1/11 AM intact  (baseline PTH on Sensipar )    #Multinodular Goiter  CT: enlarged multinodular thyroid projecting into superior mediastinum with 1.8 x 1.5 cm solid nodule slightly inferiorly in the anterior mediastinum.   - Thyroid ultrasound: Fine-needle aspiration recommended for 4.3 cm right thyroid and 2.6 cm left thyroid solid nodules per SCOTT guidelines.  - TSH 1.6 free T4 0.68.  - TSH could be inappropriately normal for low free T4 due to euthyroid sick syndrome.  - f/u TFTs after shock resolved    MSK  #Renal Osteodystrophy  - on CT: Increased density of the bone marrow is consistent with renal osteodystrophy  - Per endocrine, extent of bone destruction extreme given PTH level, suspect might not be Brown tumors    #RUE Edema  RUE became edematous 1/8 extending down through hand. AVF with drained seroma pocket on R arm.  - lymphedema vs bleeding into seroma pocket? vs DVT   - RUE U/S with persistent DVT to R IJ, subclavian, and axillary stent, and proximal brachial vein, similar extent to prior. No significant change large complex fluid collection in proximal R arm, could represent old seroma/hematoma    #L toe pain  Pt c/o L toe pain, possibly gout. Now resolved  - c/w colchicine 0.3mg qd    HEME/ONC  #Vasculopathy   Current DVT of R IJ, R subclavian and R axillary veins. Hx of L AVF clotting. Given hx of clots, current pruritis and gallium scan uptake in chest wall, reasonable to workup coagulopathy  - decreased heparin gtt to 1900u/hr    - f/u antiphospholipid panel/rheumatologic workup (beta2 glycoprotein, anticardiolipin, OBDULIA/ANCA/complement, RF ordered)     #Anemia  Hgb has downtrended from 9.8 on admission to 6.9. Baseline from November appears to be ~13. 1/9 Hb 6.9, ordered 1U pRBC after which pt likely had febrile nonhemolytic transfusion reaction (3 hrs into transfusion, developed tachycardia to 150s, HTN to 140s/90s, rigors, Trectal 101.3.) Transfusion stopped. Tylenol given. Sxs resolved.   - Ferritin elevated: 1209  - Total iron low (15), TIBC low (79)  - s/p 1U pRBC on 1/11 with Hb 6.9 > 7.9 w/o transfusion rxn    #Breast Pain, bilateral  Family hx of breast cancer in mother, sister.   - large R breast mass, palpable L breast mass, R breast has peau d'orange appearance with associated erythema, warmth on R  - US bilateral breasts: No sonographic evidence of breast abscess, cyst or focal mass to correspond with the clinical finding of bilateral breast masses.  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 TVUS c/w likely simple cyst, PET/CT w/ no uptake in R adnexa, low concern for malignancy. Rec outpt f/u when stable for repeat tumor markers.      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     NEURO  #Pain Control  Pt in 10/10 pain of bilateral breasts, worst on R. Pain responsive to Dilaudid but effect wears off after ~2-3 hours.   - Pain management consulted, recs appreciated  - Started PCA on 1/16 for inadequate pain control   - Tylenol 650mg po q6h PRN for mild pain  - HOLD ALL OPIOIDS IF RR<10, O2SAT <93%, SBP <96    PULM  #Atelectasis  Noted on CT chest to have atelectasis at b/l lung bases.  - Incentive spirometry    CARDIOVASCULAR  #Shock  Pt presented meeting 2/4 SIRS. Hypotensive with MAPs to 60, requiring Levophed. Septic picture of unknown source. HR > 90, WBC >12. Lactate 1.9. Afebrile. Procal 12. S/p Vancomycin 1250mg x 1, Aztreonam x 1 (pt allergic to Cefazolin, PCN). . Ferritin 1209  Patient still requiring pressors, unknown source, does not appear to fit septic picture at this point (no fever, BCx negative, no focal area of infection identified thus far)  - C/w Phenylephrine  - c/w midodrine 30mg q8h   - c/w Florinef 0.2mg qd  - CT:  7.0 cm fluid collection is present near an AV fistula in the right upper arm. Possibly abscess. Drained by IR 1/5 with serosanguinous fluid: no organisms, few WBCs  - RUE dopplers (1/13): No significant change large complex fluid collection in proximal R arm, could represent old seroma/hematoma (However, prior ultrasound was done prior to drainage)   - Per vascular: does not think AV fistula is infected; would not remove it; also does NOT recommend drainage of seroma.   - per surg: prelim R breast bx shows vasculitis vs granuloma; unlikely to cause hypotension; f/u final bx results   - s/p Vancomycin x2 weeks (1/2-1/15 ) to tx presumed R breast cellulitis (1/2-1/15)  - BCx: NGTD  - corticotropin stim test wnl    #LVOT with LISETTE  likely i/s/o hyperdynamic LV function 2/2 shock and hypovolemia  - per cardiology, rec repeating TTE after shock resolves    #HFrEF  TTE 11/22 normal LV and systolic, EF 59%, grade II diastolic dysfuncton, dilated RV, reduced RV,   Home meds: Entresto 49/51mg  - hold Entresto i/s/o shock    #CAD  Hx of cardiac cath in 2018  Home meds: atorvastatin 40mg, plavix 75mg qd  - c/w home atorvastatin 40mg  - c/w home Plavix 75mg qd     GI  #Constipation  - c/w senna 2 tablets/qd and miralax    #Nutrition  Patient reports poor PO intake  - renal diet    #Abdominal Pain  C/o vague abdominal pain, a/w bilious emesis x 2, no nausea or further episodes of emesis   (Resolved)    RENAL  #ESRD 2/2 PCKD  Pt has been on HD for "years" 2/2 PCKD. AV Fistula of EDWIN barnettted in 2014, has received HD thru HD cath since. New AV Fistula in 11/2022, not yet matured.   - f/u nephrology recs  - s/p HD 1/10, 1/13; next session today 1/16    #Hyperkalemia  Patient with Hx of ESRD with missed HD presents with K >8 on VBG with abdominal symptoms and missed HD x8 days. ICU consulted i/s/o hyperkalemia. Renal consulted.  - s/p HD 1/10  - monitor with BMP's qd    #Hyperphosphatemia  - c/w sevelamer to 1600mg TID    #Hyponatremia  Na 130 today, likely secondary to fluid overload. Last session of HD 1/13  - planned for HD today 1/16  - Continue to trend Na    ID  Pt presented in septic shock with leukocytosis, tachycardia, hypotension requiring pressors. Unknown source.   - WBC 15 -> 7  - see #shock for plan  - f/u ID recs  - gallium scan: faintly increased activity surrounding the entire lower portion of the right breast consistent with the inflammatory reaction seen on physical examination, small area of activity approximately 10 cm to the right of midline at about the level of the lower border of the sternum, activity likely to be in chest wall   - PET/CT with increased FDG avidity within R axillary vein, which contains an occlusive thrombus, which may represent an infectious source. There is also an additional FDG avid region of the AV fistula near the arterial anastomosis proximal to the area of the fluid collection; could be a potential source of infection vs inflammatory reaction to the graft. Also a photopenic R adnexal cystic mass, suggestive of a benign etiology.   - s/p vanco    ENDO  #Hyperparathyroidism  At risk for secondary HPTT d/t renal failure   - pt has vague abdominal pain with nausea  - multiple brown tumors on CT with multiple fractures of pubic rami, pubic bone, thoracolumbar spine  - intact , Vit D 38.5  - SPEP/immunofixation negative   - endocrinology recs: secondary hyperparathyroidism vs other process causing osteoclastic tumors  - c/w Sensipar 30mg qd per endo recs (started 1/9)  - 1/11 AM intact  (baseline PTH on Sensipar )    #Multinodular Goiter  CT: enlarged multinodular thyroid projecting into superior mediastinum with 1.8 x 1.5 cm solid nodule slightly inferiorly in the anterior mediastinum.   - Thyroid ultrasound: Fine-needle aspiration recommended for 4.3 cm right thyroid and 2.6 cm left thyroid solid nodules per SCOTT guidelines.  - TSH 1.6 free T4 0.68.  - TSH could be inappropriately normal for low free T4 due to euthyroid sick syndrome.  - f/u TFTs after shock resolved    MSK  #Renal Osteodystrophy  - on CT: Increased density of the bone marrow is consistent with renal osteodystrophy  - Per endocrine, extent of bone destruction extreme given PTH level, suspect might not be Brown tumors    #RUE Edema  RUE became edematous 1/8 extending down through hand. AVF with drained seroma pocket on R arm.  - lymphedema vs bleeding into seroma pocket? vs DVT   - RUE U/S with persistent DVT to R IJ, subclavian, and axillary stent, and proximal brachial vein, similar extent to prior. No significant change large complex fluid collection in proximal R arm, could represent old seroma/hematoma    #L toe pain  Pt c/o L toe pain, possibly gout. Now resolved  - c/w colchicine 0.3mg qd    HEME/ONC  #Vasculopathy   Current DVT of R IJ, R subclavian and R axillary veins. Hx of L AVF clotting. Given hx of clots, current pruritis and gallium scan uptake in chest wall, reasonable to workup coagulopathy  - decreased heparin gtt to 1900u/hr    - f/u antiphospholipid panel/rheumatologic workup (beta2 glycoprotein, anticardiolipin, OBDULIA/ANCA/complement, RF ordered)     #Anemia  Hgb has downtrended from 9.8 on admission to 6.9. Baseline from November appears to be ~13. 1/9 Hb 6.9, ordered 1U pRBC after which pt likely had febrile nonhemolytic transfusion reaction (3 hrs into transfusion, developed tachycardia to 150s, HTN to 140s/90s, rigors, Trectal 101.3.) Transfusion stopped. Tylenol given. Sxs resolved.   - Ferritin elevated: 1209  - Total iron low (15), TIBC low (79)  - s/p 1U pRBC on 1/11 with Hb 6.9 > 7.9 w/o transfusion rxn    #Breast Pain, bilateral  Family hx of breast cancer in mother, sister.   - large R breast mass, palpable L breast mass, R breast has peau d'orange appearance with associated erythema, warmth on R  - US bilateral breasts: No sonographic evidence of breast abscess, cyst or focal mass to correspond with the clinical finding of bilateral breast masses.  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 TVUS c/w likely simple cyst, PET/CT w/ no uptake in R adnexa, low concern for malignancy. Rec outpt f/u when stable for repeat tumor markers.      Preventative  F: None  E: None, HD pt  N: Renal Restricted Diet  DVT: heparin @1700u/hr  Code Status: Full Code  Dispo: ICU

## 2023-01-16 NOTE — PROGRESS NOTE ADULT - SUBJECTIVE AND OBJECTIVE BOX
INTERVAL HPI/OVERNIGHT EVENTS:  Overnight, PTT supratherapeutic (115). Heparin gtt decreased from 19 > 17.     SUBJECTIVE: Patient seen and examined at bedside. Pt complaining of pain to breasts, which is not well controlled with Dilaudid prns. Has no pain elsewhere.     VITAL SIGNS:  ICU Vital Signs Last 24 Hrs  T(C): 36.7 (16 Jan 2023 13:41), Max: 37.1 (16 Jan 2023 10:09)  T(F): 98.1 (16 Jan 2023 13:41), Max: 98.7 (16 Jan 2023 10:09)  HR: 87 (16 Jan 2023 14:00) (59 - 89)  BP: 157/66 (16 Jan 2023 14:00) (157/66 - 157/66)  BP(mean): 95 (16 Jan 2023 14:00) (95 - 95)  ABP: 84/84 (16 Jan 2023 14:00) (81/37 - 112/63)  ABP(mean): 84 (16 Jan 2023 14:00) (55 - 84)  RR: 17 (16 Jan 2023 12:25) (15 - 20)  SpO2: 98% (16 Jan 2023 14:00) (83% - 100%)    O2 Parameters below as of 16 Jan 2023 12:25  Patient On (Oxygen Delivery Method): room air              01-15 @ 07:01  -  01-16 @ 07:00  --------------------------------------------------------  IN: 834.6 mL / OUT: 0 mL / NET: 834.6 mL    01-16 @ 07:01  -  01-16 @ 16:13  --------------------------------------------------------  IN: 131 mL / OUT: 0 mL / NET: 131 mL      CAPILLARY BLOOD GLUCOSE          PHYSICAL EXAM:    Constitutional: middle-aged female with obese body habitus resting in bed  HEENT: no conjunctivitis or scleral icterus, oral mucosa moist  NECK: Supple, no JVD, palpable multinodular goiter  CARDIAC: RRR, +S1/S2, no murmurs/rubs/gallops  PULM: Breathing comfortably on RA, clear to auscultation bilaterally, no wheezes/rales/rhonchi, +TDC to chest wall   BREAST: b/l breasts with peeling epidermis to lower aspect of breasts, no drainage  ABDOMEN: Soft, obese, nontender to palpation, +bs, no rebound tenderness or fluid wave, non-reducible ventral hernia  SKIN: b/l arms warm to touch; 3cm dehisced wound, nonpurulent, nonerythematous, to R medial upper arm near axilla  VASC:  no LE edema or tenderness; +triple lumen catheter at R groin, +L axillary arterial line  EXT: RUE: edema to hand and forearm, slightly tense, non erythematous      MEDICATIONS:  MEDICATIONS  (STANDING):  atorvastatin 40 milliGRAM(s) Oral at bedtime  chlorhexidine 2% Cloths 1 Application(s) Topical <User Schedule>  cinacalcet 30 milliGRAM(s) Oral daily  clopidogrel Tablet 75 milliGRAM(s) Oral daily  colchicine 0.3 milliGRAM(s) Oral daily  collagenase Ointment 1 Application(s) Topical daily  fludroCORTISONE 0.2 milliGRAM(s) Oral every 24 hours  heparin  Infusion 2000 Unit(s)/Hr (17 mL/Hr) IV Continuous <Continuous>  HYDROmorphone PCA (1 mG/mL) 30 milliLiter(s) PCA Continuous PCA Continuous  midodrine 30 milliGRAM(s) Oral every 8 hours  Nephro-deborah 1 Tablet(s) Oral daily  phenylephrine    Infusion 0.1 MICROgram(s)/kG/Min (1.6 mL/Hr) IV Continuous <Continuous>  polyethylene glycol 3350 17 Gram(s) Oral every 12 hours  senna 2 Tablet(s) Oral at bedtime  sevelamer carbonate 1600 milliGRAM(s) Oral three times a day with meals    MEDICATIONS  (PRN):  acetaminophen     Tablet .. 650 milliGRAM(s) Oral every 6 hours PRN Temp greater or equal to 38C (100.4F), Moderate Pain (4 - 6)  calamine/zinc oxide Lotion 1 Application(s) Topical three times a day PRN Itching  naloxone Injectable 0.1 milliGRAM(s) IV Push every 3 minutes PRN For ANY of the following changes in patient status:  A. RR LESS THAN 10 breaths per minute, B. Oxygen saturation LESS THAN 90%, C. Sedation score of 6  ondansetron Injectable 4 milliGRAM(s) IV Push every 6 hours PRN Nausea      ALLERGIES:  Allergies    Ancef (Rash; Urticaria)  DDAVP (Hypotension)  iodine (Hives; Pruritus)  penicillin (Swelling)  sulfa drugs (Angioedema)    Intolerances        LABS:                        7.9    5.97  )-----------( 172      ( 16 Jan 2023 05:19 )             26.3     01-16    130<L>  |  94<L>  |  42<H>  ----------------------------<  79  3.9   |  24  |  7.75<H>    Ca    8.4      16 Jan 2023 05:19  Phos  5.8     01-16  Mg     1.7     01-16    TPro  5.4<L>  /  Alb  2.2<L>  /  TBili  0.2  /  DBili  x   /  AST  19  /  ALT  9<L>  /  AlkPhos  74  01-16    PT/INR - ( 16 Jan 2023 05:19 )   PT: 15.4 sec;   INR: 1.29          PTT - ( 16 Jan 2023 12:45 )  PTT:77.8 sec      RADIOLOGY & ADDITIONAL TESTS: Reviewed.

## 2023-01-17 NOTE — PROGRESS NOTE ADULT - ASSESSMENT
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     NEURO  #Pain Control  Pt in 10/10 pain of bilateral breasts, worst on R. Pain responsive to Dilaudid but effect wears off after ~2-3 hours.   - Pain management consulted, recs appreciated  - Started PCA on 1/16 for inadequate pain control   - Tylenol 650mg po q6h PRN for mild pain  - HOLD ALL OPIOIDS IF RR<10, O2SAT <93%, SBP <96    PULM  #Atelectasis  Noted on CT chest to have atelectasis at b/l lung bases.  - Incentive spirometry    CARDIOVASCULAR  #Shock  Pt presented meeting 2/4 SIRS. Hypotensive with MAPs to 60, requiring Levophed. Septic picture of unknown source. HR > 90, WBC >12. Lactate 1.9. Afebrile. Procal 12. S/p Vancomycin 1250mg x 1, Aztreonam x 1 (pt allergic to Cefazolin, PCN). . Ferritin 1209  Patient still requiring pressors, unknown source, does not appear to fit septic picture at this point (no fever, BCx negative, no focal area of infection identified thus far)  - C/w Phenylephrine  - c/w midodrine 30mg q8h   - c/w Florinef 0.2mg qd  - CT:  7.0 cm fluid collection is present near an AV fistula in the right upper arm. Possibly abscess. Drained by IR 1/5 with serosanguinous fluid: no organisms, few WBCs  - RUE dopplers (1/13): No significant change large complex fluid collection in proximal R arm, could represent old seroma/hematoma (However, prior ultrasound was done prior to drainage)   - Per vascular: does not think AV fistula is infected; would not remove it; also does NOT recommend drainage of seroma.   - per surg: prelim R breast bx shows vasculitis vs granuloma; unlikely to cause hypotension; f/u final bx results   - s/p Vancomycin x2 weeks (1/2-1/15 ) to tx presumed R breast cellulitis (1/2-1/15)  - BCx: NGTD  - corticotropin stim test wnl    #LVOT with LISETTE  likely i/s/o hyperdynamic LV function 2/2 shock and hypovolemia  - per cardiology, rec repeating TTE after shock resolves    #HFrEF  TTE 11/22 normal LV and systolic, EF 59%, grade II diastolic dysfuncton, dilated RV, reduced RV,   Home meds: Entresto 49/51mg  - hold Entresto i/s/o shock    #CAD  Hx of cardiac cath in 2018  Home meds: atorvastatin 40mg, plavix 75mg qd  - c/w home atorvastatin 40mg  - c/w home Plavix 75mg qd     GI  #Constipation  - c/w senna 2 tablets/qd and miralax    #Nutrition  Patient reports poor PO intake  - renal diet    #Abdominal Pain  C/o vague abdominal pain, a/w bilious emesis x 2, no nausea or further episodes of emesis   (Resolved)    RENAL  #ESRD 2/2 PCKD  Pt has been on HD for "years" 2/2 PCKD. AV Fistula of EDWIN barnettted in 2014, has received HD thru HD cath since. New AV Fistula in 11/2022, not yet matured.   - f/u nephrology recs  - s/p HD 1/10, 1/13; next session today 1/16    #Hyperkalemia  Patient with Hx of ESRD with missed HD presents with K >8 on VBG with abdominal symptoms and missed HD x8 days. ICU consulted i/s/o hyperkalemia. Renal consulted.  - s/p HD 1/10  - monitor with BMP's qd    #Hyperphosphatemia  - c/w sevelamer to 1600mg TID    #Hyponatremia  Na 130 today, likely secondary to fluid overload. Last session of HD 1/13  - planned for HD today 1/16  - Continue to trend Na    ID  Pt presented in septic shock with leukocytosis, tachycardia, hypotension requiring pressors. Unknown source.   - WBC 15 -> 7  - see #shock for plan  - f/u ID recs  - gallium scan: faintly increased activity surrounding the entire lower portion of the right breast consistent with the inflammatory reaction seen on physical examination, small area of activity approximately 10 cm to the right of midline at about the level of the lower border of the sternum, activity likely to be in chest wall   - PET/CT with increased FDG avidity within R axillary vein, which contains an occlusive thrombus, which may represent an infectious source. There is also an additional FDG avid region of the AV fistula near the arterial anastomosis proximal to the area of the fluid collection; could be a potential source of infection vs inflammatory reaction to the graft. Also a photopenic R adnexal cystic mass, suggestive of a benign etiology.   - s/p vanco    ENDO  #Hyperparathyroidism  At risk for secondary HPTT d/t renal failure   - pt has vague abdominal pain with nausea  - multiple brown tumors on CT with multiple fractures of pubic rami, pubic bone, thoracolumbar spine  - intact , Vit D 38.5  - SPEP/immunofixation negative   - endocrinology recs: secondary hyperparathyroidism vs other process causing osteoclastic tumors  - c/w Sensipar 30mg qd per endo recs (started 1/9)  - 1/11 AM intact  (baseline PTH on Sensipar )    #Multinodular Goiter  CT: enlarged multinodular thyroid projecting into superior mediastinum with 1.8 x 1.5 cm solid nodule slightly inferiorly in the anterior mediastinum.   - Thyroid ultrasound: Fine-needle aspiration recommended for 4.3 cm right thyroid and 2.6 cm left thyroid solid nodules per SCOTT guidelines.  - TSH 1.6 free T4 0.68.  - TSH could be inappropriately normal for low free T4 due to euthyroid sick syndrome.  - f/u TFTs after shock resolved    MSK  #Renal Osteodystrophy  - on CT: Increased density of the bone marrow is consistent with renal osteodystrophy  - Per endocrine, extent of bone destruction extreme given PTH level, suspect might not be Brown tumors    #RUE Edema  RUE became edematous 1/8 extending down through hand. AVF with drained seroma pocket on R arm.  - lymphedema vs bleeding into seroma pocket? vs DVT   - RUE U/S with persistent DVT to R IJ, subclavian, and axillary stent, and proximal brachial vein, similar extent to prior. No significant change large complex fluid collection in proximal R arm, could represent old seroma/hematoma    #L toe pain  Pt c/o L toe pain, possibly gout. Now resolved  - c/w colchicine 0.3mg qd    HEME/ONC  #Vasculopathy   Current DVT of R IJ, R subclavian and R axillary veins. Hx of L AVF clotting. Given hx of clots, current pruritis and gallium scan uptake in chest wall, reasonable to workup coagulopathy  - decreased heparin gtt to 1900u/hr    - f/u antiphospholipid panel/rheumatologic workup (beta2 glycoprotein, anticardiolipin, OBDULIA/ANCA/complement, RF ordered)     #Anemia  Hgb has downtrended from 9.8 on admission to 6.9. Baseline from November appears to be ~13. 1/9 Hb 6.9, ordered 1U pRBC after which pt likely had febrile nonhemolytic transfusion reaction (3 hrs into transfusion, developed tachycardia to 150s, HTN to 140s/90s, rigors, Trectal 101.3.) Transfusion stopped. Tylenol given. Sxs resolved.   - Ferritin elevated: 1209  - Total iron low (15), TIBC low (79)  - s/p 1U pRBC on 1/11 with Hb 6.9 > 7.9 w/o transfusion rxn    #Breast Pain, bilateral  Family hx of breast cancer in mother, sister.   - large R breast mass, palpable L breast mass, R breast has peau d'orange appearance with associated erythema, warmth on R  - US bilateral breasts: No sonographic evidence of breast abscess, cyst or focal mass to correspond with the clinical finding of bilateral breast masses.  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 TVUS c/w likely simple cyst, PET/CT w/ no uptake in R adnexa, low concern for malignancy. Rec outpt f/u when stable for repeat tumor markers.      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    NEURO  #Pain Control  Pt in 10/10 pain of bilateral breasts, worst on R. Pain responsive to Dilaudid but effect wears off after ~2-3 hours.   - Pain management consulted, recs appreciated  - Dc'd PCA (1/16-1/17)  - started Dilaudid 0.5mg q4h prn for severe pain    - Tylenol 650mg po q6h PRN for mild pain  - HOLD ALL OPIOIDS IF RR<10, O2SAT <93%, SBP <96    PULM  #Atelectasis  Noted on CT chest to have atelectasis at b/l lung bases.  - Incentive spirometry    CARDIOVASCULAR  #Shock  Pt presented meeting 2/4 SIRS. Hypotensive with MAPs to 60, requiring Levophed. Septic picture of unknown source. HR > 90, WBC >12. Lactate 1.9. Afebrile. Procal 12. S/p Vancomycin 1250mg x 1, Aztreonam x 1 (pt allergic to Cefazolin, PCN). . Ferritin 1209. Patient still requiring pressors, unknown source, does not appear to fit septic picture at this point (no fever, BCx negative, no focal area of infection identified thus far)  - off levophed since 1/16 PM   - Discontinue Florinef  - c/w midodrine 30mg q8h  - CT:  7.0 cm fluid collection is present near an AV fistula in the right upper arm. Possibly abscess. Drained by IR 1/5 with serosanguinous fluid: no organisms, few WBCs  - RUE dopplers (1/13): No significant change large complex fluid collection in proximal R arm, could represent old seroma/hematoma (However, prior ultrasound was done prior to drainage)   - Per vascular: does not think AV fistula is infected; would not remove it; also does NOT recommend drainage of seroma.   - per surg: prelim R breast bx shows vasculitis vs granuloma; unlikely to cause hypotension; f/u final bx results   - s/p Vancomycin x2 weeks (1/2-1/15 ) to tx presumed R breast cellulitis (1/2-1/15)  - BCx: NGTD  - corticotropin stim test wnl    #LVOT with LISETTE  likely i/s/o hyperdynamic LV function 2/2 shock and hypovolemia  - per cardiology, rec repeating TTE after shock resolves    #HFrEF  TTE 11/22 normal LV and systolic, EF 59%, grade II diastolic dysfuncton, dilated RV, reduced RV,   Home meds: Entresto 49/51mg  - hold Entresto i/s/o shock    #CAD  Hx of cardiac cath in 2018  Home meds: atorvastatin 40mg, plavix 75mg qd  - c/w home atorvastatin 40mg  - c/w home Plavix 75mg qd     GI  #Constipation  - c/w senna 2 tablets/qd and miralax    #Nutrition  Patient reports poor PO intake  - renal diet    RENAL  #ESRD 2/2 PCKD  Pt has been on HD for "years" 2/2 PCKD. AV Fistula of EDWIN clotted in 2014, has received HD thru HD cath since. New AV Fistula in 11/2022, not yet matured.   - f/u nephrology recs  - s/p HD 1/10, 1/13, 1/16    #Hyperkalemia  Patient with Hx of ESRD with missed HD presents with K >8 on VBG with abdominal symptoms and missed HD x8 days. ICU consulted i/s/o hyperkalemia. Renal consulted.  - s/p HD 1/10  - monitor with BMP's qd    #Hyperphosphatemia  - c/w sevelamer to 1600mg TID    #Hyponatremia  Na 130 today, likely secondary to fluid overload. Last session of HD 1/13  - planned for HD today 1/16  - Continue to trend Na    ID  #Fever  Pt with T100.9F 1/17 AM. Unclear etiology. 1/3, 1/9 BCx neg. s/p vanc x2 weeks  - obtain BCx, CXR    #Shock, presumed to be septic but no clear source/etiology   Pt presented in shock with leukocytosis, tachycardia, hypotension requiring pressors. Unknown source.   - see #shock for plan  - s/p vanc x2 weeks (last day 1/15) to tx presumed breast cellulitis   - gallium scan: faintly increased activity surrounding the entire lower portion of the right breast consistent with the inflammatory reaction seen on physical examination, small area of activity ~10cm to the R of midline at about the level of the lower border of the sternum, activity likely to be in chest wall   - PET/CT with increased FDG avidity within R axillary vein, which contains an occlusive thrombus, which may represent an infectious source. There is also an additional FDG avid region of the AV fistula near the arterial anastomosis proximal to the area of the fluid collection; could be a potential source of infection vs inflammatory reaction to the graft. Also a photopenic R adnexal cystic mass, suggestive of a benign etiology.     ENDO  #Hyperparathyroidism  At risk for secondary HPTT d/t renal failure   - pt has vague abdominal pain with nausea  - multiple brown tumors on CT with multiple fractures of pubic rami, pubic bone, thoracolumbar spine  - intact , Vit D 38.5  - SPEP/immunofixation negative   - endocrinology recs: secondary hyperparathyroidism vs other process causing osteoclastic tumors  - c/w Sensipar 30mg qd per endo recs (started 1/9)  - 1/11 AM intact  (baseline PTH on Sensipar )    #Multinodular Goiter  CT: enlarged multinodular thyroid projecting into superior mediastinum with 1.8 x 1.5 cm solid nodule slightly inferiorly in the anterior mediastinum.   - Thyroid ultrasound: Fine-needle aspiration recommended for 4.3 cm right thyroid and 2.6 cm left thyroid solid nodules per SCOTT guidelines.  - TSH 1.6 free T4 0.68.  - TSH could be inappropriately normal for low free T4 due to euthyroid sick syndrome.  - f/u TFTs after shock resolved    MSK  #Renal Osteodystrophy  - on CT: Increased density of the bone marrow is consistent with renal osteodystrophy  - Per endocrine, extent of bone destruction extreme given PTH level, suspect might not be Brown tumors    #RUE Edema  RUE became edematous 1/8 extending down through hand. AVF with drained seroma pocket on R arm.  - lymphedema vs bleeding into seroma pocket? vs DVT   - RUE U/S with persistent DVT to R IJ, subclavian, and axillary stent, and proximal brachial vein, similar extent to prior. No significant change large complex fluid collection in proximal R arm, could represent old seroma/hematoma  - per vascular: no plans to drain the seroma    #L toe pain  Pt c/o L toe pain, possibly gout. Now resolved  - c/w colchicine 0.3mg qd    HEME/ONC  #Vasculopathy   Current DVT of R IJ, R subclavian and R axillary veins. Hx of L AVF clotting. Given hx of clots, current pruritis and gallium scan uptake in chest wall, reasonable to workup coagulopathy  - transitioned from heparin gtt to Eliquis 2.5 BID  - OBDULIA, ANCA, complement, RF, beta2 glycoprotein, anticardiolipin nml    #Anemia  Hgb has downtrended from 9.8 on admission to 6.9. Baseline from November appears to be ~13. 1/9 Hb 6.9, ordered 1U pRBC after which pt likely had febrile nonhemolytic transfusion reaction (3 hrs into transfusion, developed tachycardia to 150s, HTN to 140s/90s, rigors, Trectal 101.3.) Transfusion stopped. Tylenol given. Sxs resolved.   - Ferritin elevated: 1209  - Total iron low (15), TIBC low (79)  - s/p 1U pRBC on 1/11 with Hb 6.9 > 7.9 w/o transfusion rxn    #Breast Pain, bilateral  Family hx of breast cancer in mother, sister.   - large R breast mass, palpable L breast mass, R breast has peau d'orange appearance with associated erythema, warmth on R  - US bilateral breasts: No sonographic evidence of breast abscess, cyst or focal mass to correspond with the clinical finding of bilateral breast masses.  - prelim breast bx by surg: granuloma vs vasculitis; f/u final     GYN  #Uterine mass  6.1cm mass in R adnexa seen incidentally on CTAP. Pt with hx of hysterectomy, unclear if partial or total.   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 TVUS c/w likely simple cyst, PET/CT w/ no uptake in R adnexa, low concern for malignancy. Rec outpt f/u when stable for repeat tumor markers.    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    NEURO  #Pain Control  Pt in 10/10 pain of bilateral breasts, worst on R. Pain responsive to Dilaudid but effect wears off after ~2-3 hours.   - Pain management consulted, recs appreciated  - Dc'd PCA (1/16-1/17)  - started Dilaudid 0.5mg q4h prn for severe pain    - Tylenol 650mg po q6h PRN for mild pain  - HOLD ALL OPIOIDS IF RR<10, O2SAT <93%, SBP <96    PULM  #Atelectasis  Noted on CT chest to have atelectasis at b/l lung bases.  - Incentive spirometry    #Suspected LIZ  - Start BiPAP 18/8 at night    CARDIOVASCULAR  #Shock  Pt presented meeting 2/4 SIRS. Hypotensive with MAPs to 60, requiring Levophed. Septic picture of unknown source. HR > 90, WBC >12. Lactate 1.9. Afebrile. Procal 12. S/p Vancomycin 1250mg x 1, Aztreonam x 1 (pt allergic to Cefazolin, PCN). . Ferritin 1209. Patient still requiring pressors, unknown source, does not appear to fit septic picture at this point (no fever, BCx negative, no focal area of infection identified thus far)  - off levophed since 1/16 PM   - Discontinue Florinef  - c/w midodrine 30mg q8h  - CT:  7.0 cm fluid collection is present near an AV fistula in the right upper arm. Possibly abscess. Drained by IR 1/5 with serosanguinous fluid: no organisms, few WBCs  - RUE dopplers (1/13): No significant change large complex fluid collection in proximal R arm, could represent old seroma/hematoma (However, prior ultrasound was done prior to drainage)   - Per vascular: does not think AV fistula is infected; would not remove it; also does NOT recommend drainage of seroma.   - per surg: prelim R breast bx shows vasculitis vs granuloma; unlikely to cause hypotension; f/u final bx results   - s/p Vancomycin x2 weeks (1/2-1/15 ) to tx presumed R breast cellulitis (1/2-1/15)  - BCx: NGTD  - corticotropin stim test wnl    #LVOT with LISETTE  likely i/s/o hyperdynamic LV function 2/2 shock and hypovolemia  - per cardiology, rec repeating TTE after shock resolves    #HFrEF  TTE 11/22 normal LV and systolic, EF 59%, grade II diastolic dysfuncton, dilated RV, reduced RV,   Home meds: Entresto 49/51mg  - hold Entresto i/s/o shock    #CAD  Hx of cardiac cath in 2018  Home meds: atorvastatin 40mg, plavix 75mg qd  - c/w home atorvastatin 40mg  - c/w home Plavix 75mg qd     GI  #Constipation  - c/w senna 2 tablets/qd and miralax    #Nutrition  Patient reports poor PO intake  - renal diet    RENAL  #ESRD 2/2 PCKD  Pt has been on HD for "years" 2/2 PCKD. AV Fistula of EDWIN clotted in 2014, has received HD thru HD cath since. New AV Fistula in 11/2022, not yet matured.   - f/u nephrology recs  - s/p HD 1/10, 1/13, 1/16    #Hyperkalemia  Patient with Hx of ESRD with missed HD presents with K >8 on VBG with abdominal symptoms and missed HD x8 days. ICU consulted i/s/o hyperkalemia. Renal consulted.  - s/p HD 1/10  - monitor with BMP's qd    #Hyperphosphatemia  - c/w sevelamer to 1600mg TID    #Hyponatremia  Na 130 today, likely secondary to fluid overload. Last session of HD 1/13  - planned for HD today 1/16  - Continue to trend Na    ID  #Fever  Pt with T100.9F 1/17 AM. Unclear etiology. 1/3, 1/9 BCx neg. s/p vanc x2 weeks  - obtain BCx, CXR    #Shock, presumed to be septic but no clear source/etiology   Pt presented in shock with leukocytosis, tachycardia, hypotension requiring pressors. Unknown source.   - see #shock for plan  - s/p vanc x2 weeks (last day 1/15) to tx presumed breast cellulitis   - gallium scan: faintly increased activity surrounding the entire lower portion of the right breast consistent with the inflammatory reaction seen on physical examination, small area of activity ~10cm to the R of midline at about the level of the lower border of the sternum, activity likely to be in chest wall   - PET/CT with increased FDG avidity within R axillary vein, which contains an occlusive thrombus, which may represent an infectious source. There is also an additional FDG avid region of the AV fistula near the arterial anastomosis proximal to the area of the fluid collection; could be a potential source of infection vs inflammatory reaction to the graft. Also a photopenic R adnexal cystic mass, suggestive of a benign etiology.     ENDO  #Hyperparathyroidism  At risk for secondary HPTT d/t renal failure   - pt has vague abdominal pain with nausea  - multiple brown tumors on CT with multiple fractures of pubic rami, pubic bone, thoracolumbar spine  - intact , Vit D 38.5  - SPEP/immunofixation negative   - endocrinology recs: secondary hyperparathyroidism vs other process causing osteoclastic tumors  - c/w Sensipar 30mg qd per endo recs (started 1/9)  - 1/11 AM intact  (baseline PTH on Sensipar )    #Multinodular Goiter  CT: enlarged multinodular thyroid projecting into superior mediastinum with 1.8 x 1.5 cm solid nodule slightly inferiorly in the anterior mediastinum.   - Thyroid ultrasound: Fine-needle aspiration recommended for 4.3 cm right thyroid and 2.6 cm left thyroid solid nodules per SCOTT guidelines.  - TSH 1.6 free T4 0.68.  - TSH could be inappropriately normal for low free T4 due to euthyroid sick syndrome.  - f/u TFTs after shock resolved    MSK  #Renal Osteodystrophy  - on CT: Increased density of the bone marrow is consistent with renal osteodystrophy  - Per endocrine, extent of bone destruction extreme given PTH level, suspect might not be Brown tumors    #RUE Edema  RUE became edematous 1/8 extending down through hand. AVF with drained seroma pocket on R arm.  - lymphedema vs bleeding into seroma pocket? vs DVT   - RUE U/S with persistent DVT to R IJ, subclavian, and axillary stent, and proximal brachial vein, similar extent to prior. No significant change large complex fluid collection in proximal R arm, could represent old seroma/hematoma  - per vascular: no plans to drain the seroma    #L toe pain  Pt c/o L toe pain, possibly gout. Now resolved  - c/w colchicine 0.3mg qd    HEME/ONC  #Vasculopathy   Current DVT of R IJ, R subclavian and R axillary veins. Hx of L AVF clotting. Given hx of clots, current pruritis and gallium scan uptake in chest wall, reasonable to workup coagulopathy  - transitioned from heparin gtt to Eliquis 2.5 BID  - OBDULIA, ANCA, complement, RF, beta2 glycoprotein, anticardiolipin nml    #Anemia  Hgb has downtrended from 9.8 on admission to 6.9. Baseline from November appears to be ~13. 1/9 Hb 6.9, ordered 1U pRBC after which pt likely had febrile nonhemolytic transfusion reaction (3 hrs into transfusion, developed tachycardia to 150s, HTN to 140s/90s, rigors, Trectal 101.3.) Transfusion stopped. Tylenol given. Sxs resolved.   - Ferritin elevated: 1209  - Total iron low (15), TIBC low (79)  - s/p 1U pRBC on 1/11 with Hb 6.9 > 7.9 w/o transfusion rxn    #Breast Pain, bilateral  Family hx of breast cancer in mother, sister.   - large R breast mass, palpable L breast mass, R breast has peau d'orange appearance with associated erythema, warmth on R  - US bilateral breasts: No sonographic evidence of breast abscess, cyst or focal mass to correspond with the clinical finding of bilateral breast masses.  - prelim breast bx by surg: granuloma vs vasculitis; f/u final     GYN  #Uterine mass  6.1cm mass in R adnexa seen incidentally on CTAP. Pt with hx of hysterectomy, unclear if partial or total.   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 TVUS c/w likely simple cyst, PET/CT w/ no uptake in R adnexa, low concern for malignancy. Rec outpt f/u when stable for repeat tumor markers.    DERM  #Peeling Epidermis to B/l Breasts  - Derm consult  - Wound care following, appreciate recs    Preventative  F: None  E: None, HD pt  N: Renal Restricted Diet  DVT: heparin @1700u/hr  Code Status: Full Code  Dispo: ICU

## 2023-01-17 NOTE — PROGRESS NOTE ADULT - SUBJECTIVE AND OBJECTIVE BOX
[facility]  Contact Info: Call [phone] (HEAL Line)     SUBJECTIVE AND OBJECTIVE:  INTERVAL HPI/OVERNIGHT EVENTS: Interval events noted. See patient's PRN use for the past 24hrs noted below. No unexpected adverse effects of opiates noted: no sedation/dizziness/nausea. Patient had well controlled pain on 0.25 mg IV hydromorphone q4h PRN for pain but had increased pain requirements beginning yesterday where she needed the PRN dose every 4 hours. She was started on a dilaudid PCA 0.5 initial bolus, 0.2 DD, no continuous rate, 6 min lock out, 4 mg 4 hr limit. She initially maximized the 4 mg 4 hr maximum at the beginning but subsequently only used 0.4 mg in 4 hr periods. The PCA was discontinued in the morning. Patient was unhappy with the decision to discontinue the PCA given that it had given her good pain relief. Explained that her pain can be addressed properly with the PRN medications and we will optimize the regimen based on her usage.     ALLERGIES:  Ancef (Rash; Urticaria)  DDAVP (Hypotension)  iodine (Hives; Pruritus)  penicillin (Swelling)  sulfa drugs (Angioedema)    MEDICATIONS  (STANDING):  apixaban 2.5 milliGRAM(s) Oral every 12 hours  atorvastatin 40 milliGRAM(s) Oral at bedtime  chlorhexidine 2% Cloths 1 Application(s) Topical <User Schedule>  cinacalcet 30 milliGRAM(s) Oral daily  clopidogrel Tablet 75 milliGRAM(s) Oral daily  colchicine 0.3 milliGRAM(s) Oral daily  collagenase Ointment 1 Application(s) Topical daily  midodrine 30 milliGRAM(s) Oral every 8 hours  Nephro-deborah 1 Tablet(s) Oral daily  polyethylene glycol 3350 17 Gram(s) Oral every 12 hours  senna 2 Tablet(s) Oral at bedtime  sevelamer carbonate 1600 milliGRAM(s) Oral three times a day with meals    MEDICATIONS  (PRN):  acetaminophen     Tablet .. 650 milliGRAM(s) Oral every 6 hours PRN Temp greater or equal to 38C (100.4F), Moderate Pain (4 - 6)  calamine/zinc oxide Lotion 1 Application(s) Topical three times a day PRN Itching  HYDROmorphone  Injectable 0.25 milliGRAM(s) IV Push every 4 hours PRN Pain      Analgesic Use (Scheduled and PRNs) for past 24 hours:  acetaminophen     Tablet ..   650 milliGRAM(s) Oral (01-17-23 @ 06:07)    HYDROmorphone  Injectable   0.25 milliGRAM(s) IV Push (01-17-23 @ 13:13)   0.25 milliGRAM(s) IV Push (01-17-23 @ 10:38)    sevelamer carbonate   1600 milliGRAM(s) Oral (01-17-23 @ 12:05)   1600 milliGRAM(s) Oral (01-17-23 @ 07:59)   1600 milliGRAM(s) Oral (01-16-23 @ 17:43)      ITEMS UNCHECKED ARE NOT PRESENT  PRESENT SYMPTOMS/REVIEW OF SYSTEMS: []Unable to obtain due to poor mentation   Source if other than patient:  []Family   []Team   Pain: [x] yes [] no  QOL impact - unable to rest  Location - bilateral breasts                   Aggravating Factors -none  Quality - sharp  Radiation - none  Timing -constant   Severity (0-10 scale) -  9/10  Minimal Acceptable Level (0-10 scale) - 5/10      Dyspnea:                           []Mild  []Moderate []Severe  Anxiety:                             []Mild []Moderate []Severe  Fatigue:                             []Mild []Moderate []Severe  Nausea:                             []Mild []Moderate []Severe  Loss of Appetite:              []Mild []Moderate []Severe  Constipation:                    []Mild []Moderate []Severe    Other Symptoms:  [x]All other review of systems negative     GENERAL:  [] NAD [x]Alert []Lethargic  []Cachexia  []Unarousable  [x]Verbal  []Non-Verbal  Behavioral:   []Anxiety  []Delirium []Agitation []Cooperative [x]Oriented x 3  HEENT:  [x]Normal  [] Moist Mucous Membranes []Dry mouth   []ET Tube/Trach  []Oral lesions  PULMONARY:   [x]Clear []Tachypnea  []Audible excessive secretions  [x]Normal Work of Breathing []Labored Breathing  []Rhonchi []Crackles []Wheezing  CARDIOVASCULAR:    [x]Regular Rate [x]Regular Rhythm []Irregular []Tachy  []Arjun  GASTROINTESTINAL:  [x]Soft  [x]Distended   []+BS  []Non tender []Tender  []PEG []OGT/ NGT  Last BM: 1/17  GENITOURINARY:  []Normal [] Incontinent   [x]Oliguria/Anuria ESRD on HD  []Wu  MUSCULOSKELETAL:   []Normal Extremities  []Weakness  [x]Bed/Wheelchair bound []Edema  NEUROLOGIC:   [x]No focal deficits  []Cognitive impairment  []Dysphagia []Dysarthria []Paresis []Encephalopathic  SKIN:   [x]Normal   []Pressure ulcer(s)  []Rash    CRITICAL CARE:  [ ]Shock Present  [ ]Septic [ ]Cardiogenic [ ]Neurologic [ ]Hypovolemic  [ ] Vasopressors [ ]Inotropes   [ ]Respiratory failure present [ ]Mechanical Ventilation [ ]Non-invasive ventilatory support [ ]High-Flow  [ ]Acute  [ ]Chronic [ ]Hypoxic  [ ]Hypercarbic   [ ]Other organ failure    Vital Signs Last 24 Hrs  T(C): 37.5 (17 Jan 2023 08:00), Max: 38.3 (17 Jan 2023 05:16)  T(F): 99.5 (17 Jan 2023 08:00), Max: 100.9 (17 Jan 2023 05:16)  HR: 83 (17 Jan 2023 14:00) (83 - 114)  BP: 115/44 (17 Jan 2023 14:00) (73/50 - 144/58)  BP(mean): 63 (17 Jan 2023 14:00) (52 - 91)  RR: 18 (17 Jan 2023 14:00) (14 - 22)  SpO2: 100% (17 Jan 2023 14:00) (87% - 100%)    Parameters below as of 17 Jan 2023 14:00  Patient On (Oxygen Delivery Method): nasal cannula  O2 Flow (L/min): 2       LABS:                        7.6    6.16  )-----------( 145      ( 17 Jan 2023 04:46 )             25.4   01-17    135  |  96  |  29<H>  ----------------------------<  93  3.9   |  27  |  6.03<H>    Ca    8.4      17 Jan 2023 04:46  Phos  4.3     01-17  Mg     1.7     01-17    TPro  5.3<L>  /  Alb  2.2<L>  /  TBili  0.2  /  DBili  x   /  AST  21  /  ALT  11  /  AlkPhos  65  01-17    RADIOLOGY & ADDITIONAL STUDIES:   ACC: [id number] EXAM:  XR CHEST PORTABLE URGENT 1V                          PROCEDURE DATE:  01/17/2023        INTERPRETATION:  Portable chest    HISTORY: Fever    IMPRESSION:    Lungs grossly clear with no lung infiltrate lung consolidation   significant pleural effusion or pneumothorax. Postop vascular stents and   left venous catheter tip at right atrium again noted.    --- End of Report ---      REFERRALS:  [x]Social Work  []Case management []PT/OT []Chaplaincy  []Hospice  []Patient/Family Support []Massage Therapy []Music Therapy []Holistic RN    DISCUSSION OF CASE: Family - to provide updates and emotional support. [facility]  Contact Info: Call [phone] (HEAL Line)     SUBJECTIVE AND OBJECTIVE:  INTERVAL HPI/OVERNIGHT EVENTS: Interval events noted. See patient's PRN use for the past 24hrs noted below. No unexpected adverse effects of opiates noted: no sedation/dizziness/nausea. Patient had well controlled pain on 0.25 mg IV hydromorphone q4h PRN for pain but had increased pain requirements beginning yesterday where she needed the PRN dose every 4 hours. She was started on a dilaudid PCA 0.5 initial bolus, 0.2 DD, no continuous rate, 6 min lock out, 4 mg 4 hr limit. She initially maximized the 4 mg 4 hr maximum at the beginning but subsequently only used 0.4 mg in 4 hr periods. The PCA was discontinued in the morning. Patient was unhappy with the decision to discontinue the PCA given that it had given her good pain relief. Explained that her pain can be addressed properly with the PRN medications and we will optimize the regimen based on her usage.     ALLERGIES:  Ancef (Rash; Urticaria)  DDAVP (Hypotension)  iodine (Hives; Pruritus)  penicillin (Swelling)  sulfa drugs (Angioedema)    MEDICATIONS  (STANDING):  apixaban 2.5 milliGRAM(s) Oral every 12 hours  atorvastatin 40 milliGRAM(s) Oral at bedtime  chlorhexidine 2% Cloths 1 Application(s) Topical <User Schedule>  cinacalcet 30 milliGRAM(s) Oral daily  clopidogrel Tablet 75 milliGRAM(s) Oral daily  colchicine 0.3 milliGRAM(s) Oral daily  collagenase Ointment 1 Application(s) Topical daily  midodrine 30 milliGRAM(s) Oral every 8 hours  Nephro-deborah 1 Tablet(s) Oral daily  polyethylene glycol 3350 17 Gram(s) Oral every 12 hours  senna 2 Tablet(s) Oral at bedtime  sevelamer carbonate 1600 milliGRAM(s) Oral three times a day with meals    MEDICATIONS  (PRN):  acetaminophen     Tablet .. 650 milliGRAM(s) Oral every 6 hours PRN Temp greater or equal to 38C (100.4F), Moderate Pain (4 - 6)  calamine/zinc oxide Lotion 1 Application(s) Topical three times a day PRN Itching  HYDROmorphone  Injectable 0.25 milliGRAM(s) IV Push every 4 hours PRN Pain      ITEMS UNCHECKED ARE NOT PRESENT  PRESENT SYMPTOMS/REVIEW OF SYSTEMS: []Unable to obtain due to poor mentation   Source if other than patient:  []Family   []Team   Pain: [x] yes [] no  QOL impact - unable to rest  Location - bilateral breasts                   Aggravating Factors -none  Quality - sharp  Radiation - none  Timing -constant   Severity (0-10 scale) -  9/10  Minimal Acceptable Level (0-10 scale) - 5/10      Dyspnea:                           []Mild  []Moderate []Severe  Anxiety:                             []Mild []Moderate []Severe  Fatigue:                             []Mild []Moderate []Severe  Nausea:                             []Mild []Moderate []Severe  Loss of Appetite:              []Mild []Moderate []Severe  Constipation:                    []Mild []Moderate []Severe    Other Symptoms:  [x]All other review of systems negative     GENERAL:  [] NAD [x]Alert []Lethargic  []Cachexia  []Unarousable  [x]Verbal  []Non-Verbal  Behavioral:   []Anxiety  []Delirium []Agitation []Cooperative [x]Oriented x 3  HEENT:  [x]Normal  [] Moist Mucous Membranes []Dry mouth   []ET Tube/Trach  []Oral lesions  PULMONARY:   [x]Clear []Tachypnea  []Audible excessive secretions  [x]Normal Work of Breathing []Labored Breathing  []Rhonchi []Crackles []Wheezing  CARDIOVASCULAR:    [x]Regular Rate [x]Regular Rhythm []Irregular []Tachy  []Arjun  GASTROINTESTINAL:  [x]Soft  [x]Distended   []+BS  []Non tender []Tender  []PEG []OGT/ NGT  Last BM: 1/17  GENITOURINARY:  []Normal [] Incontinent   [x]Oliguria/Anuria ESRD on HD  []Wu  MUSCULOSKELETAL:   []Normal Extremities  []Weakness  [x]Bed/Wheelchair bound []Edema  NEUROLOGIC:   [x]No focal deficits  []Cognitive impairment  []Dysphagia []Dysarthria []Paresis []Encephalopathic  SKIN:   [x]Normal   []Pressure ulcer(s)  []Rash    CRITICAL CARE:  [ ]Shock Present  [ ]Septic [ ]Cardiogenic [ ]Neurologic [ ]Hypovolemic  [ ] Vasopressors [ ]Inotropes   [ ]Respiratory failure present [ ]Mechanical Ventilation [ ]Non-invasive ventilatory support [ ]High-Flow  [ ]Acute  [ ]Chronic [ ]Hypoxic  [ ]Hypercarbic   [ ]Other organ failure    Vital Signs Last 24 Hrs  T(C): 37.5 (17 Jan 2023 08:00), Max: 38.3 (17 Jan 2023 05:16)  T(F): 99.5 (17 Jan 2023 08:00), Max: 100.9 (17 Jan 2023 05:16)  HR: 83 (17 Jan 2023 14:00) (83 - 114)  BP: 115/44 (17 Jan 2023 14:00) (73/50 - 144/58)  BP(mean): 63 (17 Jan 2023 14:00) (52 - 91)  RR: 18 (17 Jan 2023 14:00) (14 - 22)  SpO2: 100% (17 Jan 2023 14:00) (87% - 100%)    Parameters below as of 17 Jan 2023 14:00  Patient On (Oxygen Delivery Method): nasal cannula  O2 Flow (L/min): 2       LABS:                        7.6    6.16  )-----------( 145      ( 17 Jan 2023 04:46 )             25.4   01-17    135  |  96  |  29<H>  ----------------------------<  93  3.9   |  27  |  6.03<H>    Ca    8.4      17 Jan 2023 04:46  Phos  4.3     01-17  Mg     1.7     01-17    TPro  5.3<L>  /  Alb  2.2<L>  /  TBili  0.2  /  DBili  x   /  AST  21  /  ALT  11  /  AlkPhos  65  01-17    RADIOLOGY & ADDITIONAL STUDIES:   ACC: [id number] EXAM:  XR CHEST PORTABLE URGENT 1V                          PROCEDURE DATE:  01/17/2023        INTERPRETATION:  Portable chest    HISTORY: Fever    IMPRESSION:    Lungs grossly clear with no lung infiltrate lung consolidation   significant pleural effusion or pneumothorax. Postop vascular stents and   left venous catheter tip at right atrium again noted.    --- End of Report ---      REFERRALS:  [x]Social Work  []Case management []PT/OT []Chaplaincy  []Hospice  []Patient/Family Support []Massage Therapy []Music Therapy []Holistic RN    DISCUSSION OF CASE: Team - to discuss plan of care

## 2023-01-17 NOTE — PROGRESS NOTE ADULT - SUBJECTIVE AND OBJECTIVE BOX
--------------------------------------------------------------------------------  Chief Complaint: ESRD/Ongoing hemodialysis requirement    24 hour events/subjective:    Seen this morning at bedside, remains stable. SBP noted to be in the 80's off pressors. States has known hx of having low BP.    PAST HISTORY  --------------------------------------------------------------------------------  No significant changes to PMH, PSH, FHx, SHx, unless otherwise noted    ALLERGIES & MEDICATIONS  --------------------------------------------------------------------------------  Allergies    Ancef (Rash; Urticaria)  DDAVP (Hypotension)  iodine (Hives; Pruritus)  penicillin (Swelling)  sulfa drugs (Angioedema)    Intolerances      Standing Inpatient Medications  apixaban 2.5 milliGRAM(s) Oral every 12 hours  atorvastatin 40 milliGRAM(s) Oral at bedtime  chlorhexidine 2% Cloths 1 Application(s) Topical <User Schedule>  cinacalcet 30 milliGRAM(s) Oral daily  clopidogrel Tablet 75 milliGRAM(s) Oral daily  colchicine 0.3 milliGRAM(s) Oral daily  collagenase Ointment 1 Application(s) Topical daily  midodrine 30 milliGRAM(s) Oral every 8 hours  Nephro-deborah 1 Tablet(s) Oral daily  polyethylene glycol 3350 17 Gram(s) Oral every 12 hours  senna 2 Tablet(s) Oral at bedtime  sevelamer carbonate 1600 milliGRAM(s) Oral three times a day with meals    PRN Inpatient Medications  acetaminophen     Tablet .. 650 milliGRAM(s) Oral every 6 hours PRN  calamine/zinc oxide Lotion 1 Application(s) Topical three times a day PRN  HYDROmorphone  Injectable 0.25 milliGRAM(s) IV Push every 4 hours PRN      REVIEW OF SYSTEMS  --------------------------------------------------------------------------------  All other systems were reviewed and are negative, except as noted.    VITALS/PHYSICAL EXAM  --------------------------------------------------------------------------------  T(C): 37.5 (01-17-23 @ 08:00), Max: 38.3 (01-17-23 @ 05:16)  HR: 89 (01-17-23 @ 13:00) (86 - 114)  BP: 118/51 (01-17-23 @ 13:00) (73/50 - 157/66)  RR: 18 (01-17-23 @ 13:00) (14 - 24)  SpO2: 100% (01-17-23 @ 13:00) (87% - 100%)  Wt(kg): --  Drug Dosing Weight  Height (cm): 177.8 (01 Jan 2023 20:52)  Weight (kg): 85.3 (01 Jan 2023 20:52)  BMI (kg/m2): 27 (01 Jan 2023 20:52)  BSA (m2): 2.03 (01 Jan 2023 20:52)    01-16-23 @ 07:01  -  01-17-23 @ 07:00  --------------------------------------------------------  IN: 369 mL / OUT: 500 mL / NET: -131 mL    PHYSICAL EXAM:  GENERAL: NAD resting in bed   CHEST/LUNG: Clear to auscultation bilaterally; no accessory muscle use   HEART: normal S1S2, RRR  ABDOMEN: Soft, Nontender, +BS,   EXTREMITIES: No clubbing, cyanosis, or edema   ACCESS: R TDC    LABS/STUDIES  --------------------------------------------------------------------------------              7.6    6.16  >-----------<  145      [01-17-23 @ 04:46]              25.4     135  |  96  |  29  ----------------------------<  93      [01-17-23 @ 04:46]  3.9   |  27  |  6.03        Ca     8.4     [01-17-23 @ 04:46]      Mg     1.7     [01-17-23 @ 04:46]      Phos  4.3     [01-17-23 @ 04:46]    TPro  5.3  /  Alb  2.2  /  TBili  0.2  /  DBili  x   /  AST  21  /  ALT  11  /  AlkPhos  65  [01-17-23 @ 04:46]    PT/INR: PT 16.2 , INR 1.36       [01-17-23 @ 04:46]  PTT: 36.2       [01-17-23 @ 04:46]      Iron 15, TIBC 79, %sat 19      [01-07-23 @ 06:01]  Ferritin 1209      [01-07-23 @ 06:01]  PTH -- (Ca 7.6)      [01-11-23 @ 04:35]   532  PTH -- (Ca 8.8)      [01-03-23 @ 14:24]   479  Vitamin D (25OH) 38.5      [01-05-23 @ 05:30]  TSH 1.610      [01-03-23 @ 05:30]    HBsAb Nonreact      [01-02-23 @ 02:26]  HBsAg Nonreact      [01-02-23 @ 02:26]  HCV 0.04, Nonreact      [01-02-23 @ 02:26]      RADIOLOGY:  --------------------------------------------------------------------------------------

## 2023-01-17 NOTE — PROGRESS NOTE ADULT - SUBJECTIVE AND OBJECTIVE BOX
INTERVAL HPI/OVERNIGHT EVENTS:  Pt with temperature (100.9F) at 5am today.     SUBJECTIVE: Patient seen and examined at bedside. Pt states she feels well currently. Does not have pain currently. States pain is more well-controlled with the PCA pump. Unsure if she had a fever today.     VITAL SIGNS:  ICU Vital Signs Last 24 Hrs  T(C): 37.5 (17 Jan 2023 08:00), Max: 38.3 (17 Jan 2023 05:16)  T(F): 99.5 (17 Jan 2023 08:00), Max: 100.9 (17 Jan 2023 05:16)  HR: 83 (17 Jan 2023 14:00) (83 - 114)  BP: 115/44 (17 Jan 2023 14:00) (73/50 - 139/60)  BP(mean): 63 (17 Jan 2023 14:00) (52 - 86)  ABP: --  ABP(mean): --  RR: 18 (17 Jan 2023 14:00) (14 - 22)  SpO2: 100% (17 Jan 2023 14:00) (87% - 100%)    O2 Parameters below as of 17 Jan 2023 14:00  Patient On (Oxygen Delivery Method): nasal cannula  O2 Flow (L/min): 2            01-16 @ 07:01  -  01-17 @ 07:00  --------------------------------------------------------  IN: 369 mL / OUT: 500 mL / NET: -131 mL      CAPILLARY BLOOD GLUCOSE          PHYSICAL EXAM:    Constitutional: middle-aged female with obese body habitus resting in bed  HEENT: no conjunctivitis or scleral icterus, oral mucosa moist  NECK: Supple, no JVD  CARDIAC: RRR, +S1/S2, no murmurs/rubs/gallops  PULM: Breathing comfortably on RA, clear to auscultation bilaterally, no wheezes/rales/rhonchi, +TDC to L chest wall   BREAST: b/l breasts with peeling epidermis to lower aspect of breasts  ABDOMEN: Soft, obese, nontender to palpation, +bs, no rebound tenderness or fluid wave, non-reducible ventral hernia  SKIN: b/l arms warm to touch; 3cm dehisced wound, nonpurulent, nonerythematous, to R medial upper arm near axilla  VASC:  no LE edema or tenderness; +triple lumen catheter at R groin, +L axillary arterial line  EXT: RUE: mild improvement to edema to R proximal forearm; unchanged edema to R hand, slightly tense, non erythematous    MEDICATIONS:  MEDICATIONS  (STANDING):  apixaban 2.5 milliGRAM(s) Oral every 12 hours  atorvastatin 40 milliGRAM(s) Oral at bedtime  chlorhexidine 2% Cloths 1 Application(s) Topical <User Schedule>  cinacalcet 30 milliGRAM(s) Oral daily  clopidogrel Tablet 75 milliGRAM(s) Oral daily  colchicine 0.3 milliGRAM(s) Oral daily  collagenase Ointment 1 Application(s) Topical daily  midodrine 30 milliGRAM(s) Oral every 8 hours  Nephro-deborah 1 Tablet(s) Oral daily  polyethylene glycol 3350 17 Gram(s) Oral every 12 hours  senna 2 Tablet(s) Oral at bedtime  sevelamer carbonate 1600 milliGRAM(s) Oral three times a day with meals    MEDICATIONS  (PRN):  acetaminophen     Tablet .. 650 milliGRAM(s) Oral every 6 hours PRN Temp greater or equal to 38C (100.4F), Moderate Pain (4 - 6)  calamine/zinc oxide Lotion 1 Application(s) Topical three times a day PRN Itching  HYDROmorphone  Injectable 0.5 milliGRAM(s) IV Push every 4 hours PRN Severe Pain (7 - 10)      ALLERGIES:  Allergies    Ancef (Rash; Urticaria)  DDAVP (Hypotension)  iodine (Hives; Pruritus)  penicillin (Swelling)  sulfa drugs (Angioedema)    Intolerances        LABS:                        7.6    6.16  )-----------( 145      ( 17 Jan 2023 04:46 )             25.4     01-17    135  |  96  |  29<H>  ----------------------------<  93  3.9   |  27  |  6.03<H>    Ca    8.4      17 Jan 2023 04:46  Phos  4.3     01-17  Mg     1.7     01-17    TPro  5.3<L>  /  Alb  2.2<L>  /  TBili  0.2  /  DBili  x   /  AST  21  /  ALT  11  /  AlkPhos  65  01-17    PT/INR - ( 17 Jan 2023 04:46 )   PT: 16.2 sec;   INR: 1.36          PTT - ( 17 Jan 2023 04:46 )  PTT:36.2 sec      RADIOLOGY & ADDITIONAL TESTS: Reviewed.

## 2023-01-17 NOTE — PROGRESS NOTE ADULT - SUBJECTIVE AND OBJECTIVE BOX
SUBJECTIVE: Patient seen and examined bedside; no acute events overnight, pt feeling well this am, watching tv, tolerated dressing change.    apixaban 2.5 milliGRAM(s) Oral every 12 hours  clopidogrel Tablet 75 milliGRAM(s) Oral daily  midodrine 30 milliGRAM(s) Oral every 8 hours  phenylephrine    Infusion 0.1 MICROgram(s)/kG/Min IV Continuous <Continuous>      Vital Signs Last 24 Hrs  T(C): 37.5 (17 Jan 2023 08:00), Max: 38.3 (17 Jan 2023 05:16)  T(F): 99.5 (17 Jan 2023 08:00), Max: 100.9 (17 Jan 2023 05:16)  HR: 92 (17 Jan 2023 07:00) (73 - 114)  BP: 96/43 (17 Jan 2023 07:00) (73/50 - 157/66)  BP(mean): 62 (17 Jan 2023 07:00) (57 - 95)  RR: 19 (17 Jan 2023 07:00) (14 - 24)  SpO2: 96% (17 Jan 2023 07:00) (83% - 100%)    Parameters below as of 17 Jan 2023 08:00  Patient On (Oxygen Delivery Method): room air      I&O's Detail    16 Jan 2023 07:01  -  17 Jan 2023 07:00  --------------------------------------------------------  IN:    Heparin: 187 mL    IV PiggyBack: 50 mL    Phenylephrine: 132 mL  Total IN: 369 mL    OUT:    Other (mL): 500 mL  Total OUT: 500 mL    Total NET: -131 mL      PE:    General: NAD, resting comfortably in bed  C/V: S1 s2, RRR  Pulm: Nonlabored breathing, no respiratory distress  Abd: Soft, NTND  Extrem: WWP; right medial upper arm wound unchanged, clean base, pink, granulation tissue noted, wound covered with 4x4 and santyl, kerlix, and right arm wrapped with ace        LABS:                        7.6    6.16  )-----------( 145      ( 17 Jan 2023 04:46 )             25.4     01-17    135  |  96  |  29<H>  ----------------------------<  93  3.9   |  27  |  6.03<H>    Ca    8.4      17 Jan 2023 04:46  Phos  4.3     01-17  Mg     1.7     01-17    TPro  5.3<L>  /  Alb  2.2<L>  /  TBili  0.2  /  DBili  x   /  AST  21  /  ALT  11  /  AlkPhos  65  01-17    PT/INR - ( 17 Jan 2023 04:46 )   PT: 16.2 sec;   INR: 1.36          PTT - ( 17 Jan 2023 04:46 )  PTT:36.2 sec      RADIOLOGY & ADDITIONAL STUDIES:

## 2023-01-17 NOTE — PROGRESS NOTE ADULT - PROBLEM SELECTOR PLAN 2
Patient presented after missed HD for emergent dialysis on 1/2. Since admission, patient has been persistently hypotensive. Has been on phenylephrine for >1 week. Started on fluorinef and midodrine as well, but still requiring pressors.   - Continue care as per primary team  - Continue to try to wean off pressors as tolerated. Patient presented after missed HD for emergent dialysis on 1/2. Since admission, patient has been persistently hypotensive. Has been on phenylephrine for >1 week. Started on fluorinef and midodrine as well and now off IV pressors.  - Continue care as per primary team

## 2023-01-17 NOTE — PROGRESS NOTE ADULT - ASSESSMENT
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 During imaging, CT showing 7.0 cm fluid collection is present near an AV fistula in the right upper arm. The differential for this collection included benign post-operative hematoma or seroma, but also abscess or vascular device infection, particularly in the setting of septic shock. Vascular consulted for RUE fistula evaluation to rule out this area as a source of sepsis. Reassuring physical exam at initial evaluation without clear indicia of infection related to the graph, small superficial surgical incision dehiscence notwithstanding. Now s/p IR image guided aspiration of perigraft fluid (negative) and gadolinium scan (negative). At this point, do not suspect a graft infection as the provoking factor leading to the patient's presentation. New onset swelling in area of RUE fistula is likely a sterile seroma with no intervention needed.    Recommendations:    -Continue to follow up BCx and IR Cx - both NGTD (given negative blood and wound drainage cultures, low likelihood of graft infection)  -Wound care recs: BID dressing changes (once daily with Santyl DSD once daily with Bactroban DSD). Apply light ace wrap compression to right hand and forearm to help with soft tissue swelling and elevate RUE with pillows  -Fluid collection near RUE fistula is likely a sterile seroma and unlikely the cause of patient's sepsis. Would not recommend percutaneous drainage or IR procedure to drain fluid.  -F/u breast punch and core biopsy  -Appreciate breast surgery recs  -Rest of care per primary team  -Vascular surgery Team 3C will continue to follow. Please call [phone] with any questions or concerns.

## 2023-01-17 NOTE — PROGRESS NOTE ADULT - ATTENDING COMMENTS
Patient seen and evaluated with Dr. Calvillo, Palliative Medicine Fellow, during bedside rounds. Agree with above findings and recommendations, edited documentation as appropriate to reflect the plan of care discussed with patient and myself.

## 2023-01-17 NOTE — PROCEDURE NOTE - NSUSCPTCODES_ED_ALL
20844 US Chest (PTX, Pleural Effussion/CHF vs COPD)
91270 Abdominal Ultrasound (GB/FAST)
10157 Echocardiography Transthoracic with Image 2D (Echo/FAST)

## 2023-01-17 NOTE — PROGRESS NOTE ADULT - ASSESSMENT
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      - F/U final right breast biopsy pathology -- preliminary result shows vasculitis vs granulomatous mastitis.  - F/u rheumatology recommendations for possible granulomatous mastitis  - F/U Cx results (NGTD so far). ABx held after 1/13/23 as per primary team.  - Appreciate GYN recommendations for adnexal mass and elevated tumor markers.   - Wound care for access site ulceration as per vascular surgery.   - Surgery 5C following.

## 2023-01-17 NOTE — PROGRESS NOTE ADULT - ATTENDING COMMENTS
have reviewed course to date and exam today, and have discussed in depth with staff.  agree with findings and plans.  persistent etiology of hypotension still unclear.  will discuss possible w/u of visceral neuropathy with khan team.

## 2023-01-17 NOTE — PROGRESS NOTE ADULT - PROBLEM SELECTOR PLAN 1
Patient presented with bilateral breast pain with large R breast mass and palpable L breast mass. R breast has peau d'orange appearance with associated erythema, warmth. Patient with family hx of breast cancer in mother, sister. US bilateral breasts: No sonographic evidence of breast abscess, cyst or focal mass to correspond with the clinical finding of bilateral breast masses. Surgery was consulted and recommend nonurgent outpatient mammogram when clinically stable. 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 Patient still complaining of pain that is not well managed. States that PO does not work as well and as fast as IV medications.   - Can increase  0.25 mg to 0.5 mg IV hydromorphone every 4 hrs PRN for severe pain, hold for hypotension, sedation and RR <10.   - For severe breakthrough pain, can give 1 mg IV hydromorphone q4h PRN with same hold parameters as above  - Can give 1g Ofirmev q8h or tylenol 1000mg PO q8h  - Would do a trial of topical lidocaine 4% cream TID PRN to b/l breasts. Patient presented with bilateral breast pain with large R breast mass and palpable L breast mass. R breast has peau d'orange appearance with associated erythema, warmth. Patient with family hx of breast cancer in mother, sister. US bilateral breasts: No sonographic evidence of breast abscess, cyst or focal mass to correspond with the clinical finding of bilateral breast masses. Surgery was consulted and recommend nonurgent outpatient mammogram when clinically stable. 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 Patient still complaining of pain that is not well managed. States that PO does not work as well and as fast as IV medications.   - Can increase  0.25 mg to 0.5 mg IV hydromorphone every 4 hrs PRN for severe pain, hold for hypotension, sedation and RR <10  - Can give 1g Ofirmev q8h or tylenol 1000mg PO q8h  - Would do a trial of topical lidocaine 4% cream TID PRN to b/l breasts.

## 2023-01-17 NOTE — CHART NOTE - NSCHARTNOTEFT_GEN_A_CORE
Admitting Diagnosis:   Patient is a 67y old  Female who presents with a chief complaint of ESRD missed HD (17 Jan 2023 14:49)      PAST MEDICAL & SURGICAL HISTORY:  HTN (hypertension)      HLD (hyperlipidemia)      CAD (coronary atherosclerotic disease)      ESRD on dialysis  last 11/15/22      H/O ventral hernia      Brown tumor  brain      DVT, lower extremity  left      History of surgery  IVC filter      AVF (arteriovenous fistula)  right left      S/P AIDEN-BSO  2003      History of surgery  RLE PTA stent / atherectomy  7/2021      History of atherectomy  stent    Current Nutrition Order: Renal ; Nepro BID      PO Intake: Good (%) [   ]  Fair (50-75%) [ x ] Poor (<25%) [   ]    GI Issues: Abdomen ND/NT, +BS x4, LBM 1/17    Pain: 10/10, unspecified    Skin Integrity: Wound to R arm, +1 gen. edema     Labs:   01-17    135  |  96  |  29<H>  ----------------------------<  93  3.9   |  27  |  6.03<H>    Ca    8.4      17 Jan 2023 04:46  Phos  4.3     01-17  Mg     1.7     01-17    TPro  5.3<L>  /  Alb  2.2<L>  /  TBili  0.2  /  DBili  x   /  AST  21  /  ALT  11  /  AlkPhos  65  01-17    CAPILLARY BLOOD GLUCOSE    Medications:  MEDICATIONS  (STANDING):  apixaban 2.5 milliGRAM(s) Oral every 12 hours  atorvastatin 40 milliGRAM(s) Oral at bedtime  chlorhexidine 2% Cloths 1 Application(s) Topical <User Schedule>  cinacalcet 30 milliGRAM(s) Oral daily  clopidogrel Tablet 75 milliGRAM(s) Oral daily  colchicine 0.3 milliGRAM(s) Oral daily  collagenase Ointment 1 Application(s) Topical daily  midodrine 30 milliGRAM(s) Oral every 8 hours  Nephro-deborah 1 Tablet(s) Oral daily  polyethylene glycol 3350 17 Gram(s) Oral every 12 hours  senna 2 Tablet(s) Oral at bedtime  sevelamer carbonate 1600 milliGRAM(s) Oral three times a day with meals    MEDICATIONS  (PRN):  acetaminophen     Tablet .. 650 milliGRAM(s) Oral every 6 hours PRN Temp greater or equal to 38C (100.4F), Moderate Pain (4 - 6)  calamine/zinc oxide Lotion 1 Application(s) Topical three times a day PRN Itching  HYDROmorphone  Injectable 0.5 milliGRAM(s) IV Push every 4 hours PRN Severe Pain (7 - 10)    Height for BMI (CENTIMETERS)	177.8 Centimeter(s)  Weight for BMI (lbs)	188.1 lb  Weight for BMI (kg)	85.3 kg  Body Mass Index	26.9    Weight Change:   1/2 - 82.6 kg  1/3 - 84 kg  1/7 - 99.4 kg  1/10 - 106 kg   1/13 - 110 kg  1/16 - 115.3  -Trend sequential wts. Pt with noted worsening edema     Estimated energy needs:   Weight used for calculations	IBW  Estimated Energy Needs Weight (lbs)	150.3 lb  Estimated Energy Needs Weight (kg)	68.2 kg  Estimated Energy Needs From (anatoliy/kg)	30  Estimated Energy Needs To (anatoliy/kg)	35  Estimated Energy Needs Calculated From (anatoliy/kg)	2046  Estimated Energy Needs Calculated To (anatoliy/kg)	2387  Weight used for calculations	IBW  Estimated Protein Needs Weight (lbs)	150.3 lb  Estimated Protein Needs Weight (kg)	68.2 kg  Estimated Protein Needs From (g/kg)	1.2  Estimated Protein Needs To (g/kg)	1.3  Estimated Protein Needs Calculated From (g/kg)	81.84  Estimated Protein Needs Calculated To (g/kg)	88.66  Estimated Fluid Needs Weight (lbs)	150.3 lb  Estimated Fluid Needs Weight (kg)	68.2 kg  Other Calculations	Fluids per team. 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 Complaining of crampy abdominal pain and waterry diarrhea (2-3 BMs/day), now with nausea and vomiting prior to arrival. Has not gone to HD since 12/24 (8 days ago). 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 Complaining of crampy abdominal pain and waterry diarrhea (2-3 BMs/day), now with nausea and vomiting prior to arrival. Has not gone to HD since 12/24 (8 days ago). Admitted for emergent HD. 1/5: A line placed.     Pt care discussed in IDT rounds. Rx and labs reviewed. Pt reports improved intake and is tolerating foods provided by facility; food preferences relayed with kitchen. Pt reports she continues to drink the Nepro, but has developed diarrhea 2/2 ONS intake. Pt with increasing wt trend likely reflecting fluid fluctuations rather than legitimate wt changes; continue to trend and evaluate. Pt agreeable to banatrol TID to promote fecal bulk. No other reports GI distress or further nutritional concerns at this time. RDN will continue to reassess, intervene, and monitor as appropriate.     Previous Nutrition Diagnosis: Increased Nutrient Needs... kcal/protein r/t HD AEB known increased nutrient demands for HD.     Active [ x ]  Resolved [   ]    If resolved, new PES:     Goal: Pt will meet 75% or more of protein/energy needs via most appropriate route for nutrition.    Recommendations:  -Continue current diet with Nepro BID  -Add Banatrol TID  -Encourage good PO intake and ONS compliance   -Honor food preferences as able; continue to update desired foods list  -Monitor chemistry, GI fxn, and skin integrity     Risk Level: High [ x ] Moderate [   ] Low [   ]

## 2023-01-17 NOTE — PROCEDURE NOTE - NSUS ED ADDITIONAL DETAIL1 FT
bilateral basilar b line predominant pattern, otherwise anterior a line pattern  small consolidation bilaterally bilateral basilar b line predominant pattern, otherwise anterior a line pattern  small consolidations bilaterally

## 2023-01-17 NOTE — PROGRESS NOTE ADULT - ATTENDING COMMENTS
Admitted for emergent HD after missing a few sessions c/b shock state suspected to be secondary to tenuous hemodynamics with LVOT obstruction/LISETTE and significant vasculopathy. Shock state is improved; can c/w Midodrine for now. Treated for occult infection. Pending derm input for breast desquamation. Encourage ambulate, PT, OT and incentive spirometry.  Rest of plan as per above.

## 2023-01-17 NOTE — PROGRESS NOTE ADULT - ASSESSMENT
67 F with ESRD on HD presented for missed HD found to be hyperkalemic c/b septic shock of unknown etiology, pending w/u for granulomatous mastitis    Assessment/Plan:   #ESRD on HD TTS  HD per schedule on 1/18  K reviewed 3.9  Renal diet    #Hx of Hypertension  -Will require midodrine 1/2 hr before HD    #access   [facility] TDC - does not appear to be infected or source of infxn  RUE AVF not being used    #anemia  Hgb 7.6  -Epo w/ HD  -Iron profile noted    #renal bone disease   Noted to have brown tumors on most recent CT, in addition to a lytic lesion of her skull. Likely due to hyperparathyroidism. PTH noted to be 479 on most recent check.   Ca ~8.9 corrected for albumin  Phos ~5.2  -c/w sevelamer to 1600mg tid w/ meals  -C/w sensipar 30mg Qday  -Repeat PTH, will consider increasing sensipar

## 2023-01-17 NOTE — PROGRESS NOTE ADULT - SUBJECTIVE AND OBJECTIVE BOX
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      EVENTS:   - Biopsy taken uneventfully at bedside 1/9/23- preliminary pathology showed granulomas, pending final report.  - Phenylephrine gtt off overnight.   - Febrile this morning to 38.3 (no cultures sent).    SUBJECTIVE:   - Denies much breast pain this morning  - Notes significant external pelvic discomfort.  - Denies CP, SOB  - Denies worsening right new discharge.    MEDICATIONS  (STANDING):  apixaban 2.5 milliGRAM(s) Oral every 12 hours  atorvastatin 40 milliGRAM(s) Oral at bedtime  chlorhexidine 2% Cloths 1 Application(s) Topical <User Schedule>  cinacalcet 30 milliGRAM(s) Oral daily  clopidogrel Tablet 75 milliGRAM(s) Oral daily  colchicine 0.3 milliGRAM(s) Oral daily  collagenase Ointment 1 Application(s) Topical daily  fludroCORTISONE 0.2 milliGRAM(s) Oral every 24 hours  HYDROmorphone PCA (1 mG/mL) 30 milliLiter(s) PCA Continuous PCA Continuous  midodrine 30 milliGRAM(s) Oral every 8 hours  Nephro-deborah 1 Tablet(s) Oral daily  phenylephrine    Infusion 0.1 MICROgram(s)/kG/Min (1.6 mL/Hr) IV Continuous <Continuous>  polyethylene glycol 3350 17 Gram(s) Oral every 12 hours  senna 2 Tablet(s) Oral at bedtime  sevelamer carbonate 1600 milliGRAM(s) Oral three times a day with meals    MEDICATIONS  (PRN):  acetaminophen     Tablet .. 650 milliGRAM(s) Oral every 6 hours PRN Temp greater or equal to 38C (100.4F), Moderate Pain (4 - 6)  calamine/zinc oxide Lotion 1 Application(s) Topical three times a day PRN Itching  naloxone Injectable 0.1 milliGRAM(s) IV Push every 3 minutes PRN For ANY of the following changes in patient status:  A. RR LESS THAN 10 breaths per minute, B. Oxygen saturation LESS THAN 90%, C. Sedation score of 6  ondansetron Injectable 4 milliGRAM(s) IV Push every 6 hours PRN Nausea      Vital Signs Last 24 Hrs  T(C): 38.3 (17 Jan 2023 05:16), Max: 38.3 (17 Jan 2023 05:16)  T(F): 100.9 (17 Jan 2023 05:16), Max: 100.9 (17 Jan 2023 05:16)  HR: 92 (17 Jan 2023 07:00) (73 - 114)  BP: 96/43 (17 Jan 2023 07:00) (73/50 - 157/66)  BP(mean): 62 (17 Jan 2023 07:00) (57 - 95)  RR: 19 (17 Jan 2023 07:00) (14 - 24)  SpO2: 96% (17 Jan 2023 07:00) (83% - 100%)    Parameters below as of 17 Jan 2023 08:00  Patient On (Oxygen Delivery Method): room air      Gen: Awake, alert, NAD, resting comfortably   Breast: non-soiled gauze overlying incisional bx site on R breast, mild induration surrounding bx site stable. B/l breasts soft with underlying firmness, mildly tender to palpation this morning.   CV: RRR  Pulm: no respiratory distress on RA  Abd: soft, ND, NTTP, no rebound or guarding   Ext: WWP, ulceration of medial aspect of R arm over access site. L chest HD catheter without edema/induration/purulence.     I&O's Detail    16 Jan 2023 07:01  -  17 Jan 2023 07:00  --------------------------------------------------------  IN:    Heparin: 187 mL    IV PiggyBack: 50 mL    Phenylephrine: 132 mL  Total IN: 369 mL    OUT:    Other (mL): 500 mL  Total OUT: 500 mL    Total NET: -131 mL          LABS:                        7.6    6.16  )-----------( 145      ( 17 Jan 2023 04:46 )             25.4     01-17    135  |  96  |  29<H>  ----------------------------<  93  3.9   |  27  |  6.03<H>    Ca    8.4      17 Jan 2023 04:46  Phos  4.3     01-17  Mg     1.7     01-17    TPro  5.3<L>  /  Alb  2.2<L>  /  TBili  0.2  /  DBili  x   /  AST  21  /  ALT  11  /  AlkPhos  65  01-17    PT/INR - ( 17 Jan 2023 04:46 )   PT: 16.2 sec;   INR: 1.36          PTT - ( 17 Jan 2023 04:46 )  PTT:36.2 sec      RADIOLOGY & ADDITIONAL STUDIES:

## 2023-01-17 NOTE — PROGRESS NOTE ADULT - PROBLEM SELECTOR PLAN 5
Patient with multiple medical problems and comorbidities. Has been on pressors for >1 week and still persistently hypotensive. Initiated GOC discussion with patient. Per patient, she has not thought about her code status or what her GOC are and would like to discuss with her daughter, Jasmina. Still has not discussed with her daughter Jasmina.   - Patient has 3 children and would like Jasmina, her youngest daughter, to make her medical decisions on her behalf if she cannot make them for herself. Healthcare proxy form completed and made available in the patient chart.

## 2023-01-18 NOTE — PROCEDURE NOTE - NSINDICATIONS_GEN_A_CORE
critical patient/monitoring purposes
critical illness/emergency venous access
emergency venous access/venous access

## 2023-01-18 NOTE — PROCEDURE NOTE - NSICDXPROCEDURE_GEN_ALL_CORE_FT
PROCEDURES:  Imaging guided insertion of central venous cannula 18-Jan-2023 12:52:27  Julio Josue  
PROCEDURES:  Arterial puncture 05-Jan-2023 20:31:51  Marian Sanchez  
PROCEDURES:  Blood draw 09-Jan-2023 14:43:26  Anna Christensen  Ultrasound guidance, for vascular access 09-Jan-2023 14:43:36  Anna Christensen

## 2023-01-18 NOTE — PROCEDURE NOTE - NSPROCNAME_GEN_A_CORE
Point of Care Ultrasound Cardiac
Point of Care Ultrasound Renal
Point of Care Ultrasound Lung
Arterial Puncture/Cannulation
Central Line Insertion
General
Central Line Insertion

## 2023-01-18 NOTE — PROGRESS NOTE ADULT - SUBJECTIVE AND OBJECTIVE BOX
--------------------------------------------------------------------------------  Chief Complaint: ESRD/Ongoing hemodialysis requirement    24 hour events/subjective:    Seen this morning, off the PCA pump. States in some pain, due for HD today. On midodrine 30mg Q8h    PAST HISTORY  --------------------------------------------------------------------------------  No significant changes to PMH, PSH, FHx, SHx, unless otherwise noted    ALLERGIES & MEDICATIONS  --------------------------------------------------------------------------------  Allergies    Ancef (Rash; Urticaria)  DDAVP (Hypotension)  iodine (Hives; Pruritus)  penicillin (Swelling)  sulfa drugs (Angioedema)    Intolerances      Standing Inpatient Medications  apixaban 2.5 milliGRAM(s) Oral every 12 hours  atorvastatin 40 milliGRAM(s) Oral at bedtime  chlorhexidine 2% Cloths 1 Application(s) Topical <User Schedule>  cinacalcet 30 milliGRAM(s) Oral daily  clopidogrel Tablet 75 milliGRAM(s) Oral daily  colchicine 0.3 milliGRAM(s) Oral daily  collagenase Ointment 1 Application(s) Topical daily  epoetin александр-epbx (RETACRIT) Injectable 8000 Unit(s) IV Push once  midodrine 30 milliGRAM(s) Oral every 8 hours  Nephro-deborah 1 Tablet(s) Oral daily  polyethylene glycol 3350 17 Gram(s) Oral every 12 hours  senna 2 Tablet(s) Oral at bedtime  sevelamer carbonate 1600 milliGRAM(s) Oral three times a day with meals    PRN Inpatient Medications  acetaminophen     Tablet .. 650 milliGRAM(s) Oral every 6 hours PRN  calamine/zinc oxide Lotion 1 Application(s) Topical three times a day PRN  HYDROmorphone   Tablet 2 milliGRAM(s) Oral every 4 hours PRN  HYDROmorphone  Injectable 0.25 milliGRAM(s) IV Push every 4 hours PRN      REVIEW OF SYSTEMS  --------------------------------------------------------------------------------  All other systems were reviewed and are negative, except as noted.    VITALS/PHYSICAL EXAM  --------------------------------------------------------------------------------  T(C): 37.1 (23 @ 09:56), Max: 38.7 (23 @ 22:14)  HR: 84 (23 @ 10:05) (80 - 99)  BP: 96/52 (23 @ 09:00) (89/39 - 118/51)  RR: 18 (23 @ 09:00) (15 - 20)  SpO2: 87% (23 @ 10:05) (87% - 100%)  Wt(kg): --  Drug Dosing Weight  Height (cm): 177.8 (2023 20:52)  Weight (kg): 85.3 (2023 20:52)  BMI (kg/m2): 27 (2023 20:52)  BSA (m2): 2.03 (2023 20:52)    PHYSICAL EXAM:  GENERAL: NAD resting in bed   CHEST/LUNG: Clear to auscultation bilaterally; no accessory muscle use   HEART: normal S1S2, RRR  ABDOMEN: Soft, Nontender, +BS,   EXTREMITIES: No clubbing, cyanosis, or edema   ACCESS: R TDC    LABS/STUDIES  --------------------------------------------------------------------------------              7.1    5.22  >-----------<  164      [23 05:30]              23.9     135  |  96  |  39  ----------------------------<  84      [23 @ 05:30]  3.8   |  26  |  7.38        Ca     8.8     [23 05:30]      Mg     1.7     [23 05:30]      Phos  4.9     [23 05:30]    TPro  5.3  /  Alb  2.2  /  TBili  0.2  /  DBili  x   /  AST  21  /  ALT  11  /  AlkPhos  65  [23 @ 04:46]    PT/INR: PT 16.2 , INR 1.36       [23 @ 04:46]  PTT: 36.2       [23 @ 04:46]      Iron 15, TIBC 79, %sat 19      [23 @ 06:01]  Ferritin 1209      [23 @ 06:01]  PTH -- (Ca 7.6)      [23 @ 04:35]   532  PTH -- (Ca 8.8)      [23 @ 14:24]   479  Vitamin D (25OH) 38.5      [23 05:30]  TSH 1.610      [23 05:30]    HBsAb Nonreact      [23 @ 02:26]  HBsAg Nonreact      [23 02:26]  HCV 0.04, Nonreact      [01-02-23 @ 02:26]      RADIOLOGY:  --------------------------------------------------------------------------------------    Hemoglobin: 7.1 g/dL (23 @ 05:30)  Phosphorus Level, Serum: 4.9 mg/dL (23 @ 05:30)  Hemoglobin: 7.6 g/dL (23 @ 04:46)  Phosphorus Level, Serum: 4.3 mg/dL (23 @ 04:46)    Albumin, Serum: 2.2 g/dL (23 @ 04:46)  Albumin, Serum: 2.2 g/dL (23 @ 05:19)    T(C): 37.1 (23 @ 09:56), Max: 38.7 (23 @ 22:14)  HR: 84 (23 @ 10:05) (80 - 99)  BP: 96/52 (23 @ 09:00) (89/39 - 118/51)  RR: 18 (23 @ 09:00) (15 - 20)  SpO2: 87% (23 @ 10:05) (87% - 100%)  cinacalcet 30 milliGRAM(s) Oral daily, 23 @ 15:49, Routine  epoetin александр-epbx (RETACRIT) Injectable 8000 Unit(s) IV Push once, 23 @ 08:46, Routine, Stop order after: 1 Doses  epoetin александр-epbx (RETACRIT) Injectable 8000 Unit(s) IV Push once, 23 @ 09:08, Routine, Stop order after: 1 Doses  epoetin александр-epbx (RETACRIT) Injectable 8000 Unit(s) IV Push once, 23 @ 07:40, Routine, Stop order after: 1 Doses  epoetin александр-epbx (RETACRIT) Injectable 8000 Unit(s) IV Push once, 23 @ 06:45, Routine, Stop order after: 1 Doses  epoetin александр-epbx (RETACRIT) Injectable 8000 Unit(s) IV Push once, 01-10-23 @ 10:08, Routine, Stop order after: 1 Doses  epoetin александр-epbx (RETACRIT) Injectable 8000 Unit(s) IV Push once, 23 @ 07:45, Routine, Stop order after: 1 Doses  epoetin александр-epbx (RETACRIT) Injectable 8000 Unit(s) IV Push once, 23 @ 07:45, Routine, Stop order after: 1 Doses  sevelamer carbonate 1600 milliGRAM(s) Oral three times a day with meals, 23 @ 05:28, Routine      Hemodialysis Treatment.:     Schedule: Once, Modality: Hemodialysis, Access: Arteriovenous Fistula    Dialyzer: Optiflux [id number], Time: 180 Min    Blood Flow: 400 mL/Min , Dialysate Flow: 500 mL/Min, Dialysate Temp: 35, Tubinmm (Adult)    Target Fluid Removal: 0 Liters    Dialysate Electrolytes (mEq/L): Potassium 3, Calcium 2.5, Sodium 138, Bicarbonate 35    Additional Instructions: Midodrine 30 min before dialysis  Can use pressor to help with HD (23 @ 07:41) [Active]

## 2023-01-18 NOTE — PROGRESS NOTE ADULT - SUBJECTIVE AND OBJECTIVE BOX
[facility]  Contact Info: Call [phone] (including Nights/Weekend)    INTERVAL HPI/OVERNIGHT EVENTS  SUBJECTIVE AND OBJECTIVE:   Interval events noted. Patient endorses pain adequately controlled in the last 24hrs w/o AE(no sedation/dizziness/nausea). Approached topic of discussing GoC again. Patient says she needs time to consider and "will let us know when she is ready".     ALLERGIES:  Ancef (Rash; Urticaria)  DDAVP (Hypotension)  iodine (Hives; Pruritus)  penicillin (Swelling)  sulfa drugs (Angioedema)    MEDICATIONS  (STANDING):  apixaban 2.5 milliGRAM(s) Oral every 12 hours  atorvastatin 40 milliGRAM(s) Oral at bedtime  chlorhexidine 2% Cloths 1 Application(s) Topical <User Schedule>  cinacalcet 30 milliGRAM(s) Oral daily  clopidogrel Tablet 75 milliGRAM(s) Oral daily  colchicine 0.3 milliGRAM(s) Oral daily  collagenase Ointment 1 Application(s) Topical daily  epoetin александр-epbx (RETACRIT) Injectable 8000 Unit(s) IV Push once  midodrine 30 milliGRAM(s) Oral every 8 hours  Nephro-deborah 1 Tablet(s) Oral daily  polyethylene glycol 3350 17 Gram(s) Oral every 12 hours  senna 2 Tablet(s) Oral at bedtime  sevelamer carbonate 1600 milliGRAM(s) Oral three times a day with meals    MEDICATIONS  (PRN):  acetaminophen     Tablet .. 650 milliGRAM(s) Oral every 6 hours PRN Temp greater or equal to 38C (100.4F), Moderate Pain (4 - 6)  calamine/zinc oxide Lotion 1 Application(s) Topical three times a day PRN Itching  HYDROmorphone   Tablet 2 milliGRAM(s) Oral every 4 hours PRN Severe Pain (7 - 10)  HYDROmorphone  Injectable 0.25 milliGRAM(s) IV Push every 4 hours PRN Pain    ITEMS UNCHECKED ARE NOT PRESENT  PRESENT SYMPTOMS/REVIEW OF SYSTEMS: []Unable to obtain due to poor mentation   Source if other than patient:  []Family   []Team   Pain: [x] yes [] no  QOL impact - unable to rest  Location - bilateral breasts                   Aggravating Factors -none  Quality - sharp  Radiation - none  Timing -constant   Severity (0-10 scale) -  9/10  Minimal Acceptable Level (0-10 scale) - 5/10      Dyspnea:                           []Mild  []Moderate []Severe  Anxiety:                             []Mild []Moderate []Severe  Fatigue:                             []Mild []Moderate []Severe  Nausea:                             []Mild []Moderate []Severe  Loss of Appetite:              []Mild []Moderate []Severe  Constipation:                    []Mild []Moderate []Severe    Other Symptoms:  [x]All other review of systems negative     GENERAL:  [] NAD [x]Alert []Lethargic  []Cachexia  []Unarousable  [x]Verbal  []Non-Verbal  Behavioral:   []Anxiety  []Delirium []Agitation []Cooperative [x]Oriented x 3  HEENT:  [x]Normal  [] Moist Mucous Membranes []Dry mouth   []ET Tube/Trach  []Oral lesions  PULMONARY:   [x]Clear []Tachypnea  []Audible excessive secretions  [x]Normal Work of Breathing []Labored Breathing  []Rhonchi []Crackles []Wheezing  CARDIOVASCULAR:    [x]Regular Rate [x]Regular Rhythm []Irregular []Tachy  []Arjun  GASTROINTESTINAL:  [x]Soft  [x]Distended   []+BS  []Non tender []Tender  []PEG []OGT/ NGT  Last BM: 1/17  GENITOURINARY:  []Normal [] Incontinent   [x]Oliguria/Anuria ESRD on HD  []Wu  MUSCULOSKELETAL:   []Normal Extremities  []Weakness  [x]Bed/Wheelchair bound []Edema  NEUROLOGIC:   [x]No focal deficits  []Cognitive impairment  []Dysphagia []Dysarthria []Paresis []Encephalopathic  SKIN:   [x]Normal   []Pressure ulcer(s)  []Rash    CRITICAL CARE:  [ ]Shock Present  [ ]Septic [ ]Cardiogenic [ ]Neurologic [ ]Hypovolemic  [ ] Vasopressors [ ]Inotropes   [ ]Respiratory failure present [ ]Mechanical Ventilation [ ]Non-invasive ventilatory support [ ]High-Flow  [ ]Acute  [ ]Chronic [ ]Hypoxic  [ ]Hypercarbic   [ ]Other organ failure    LABS:                         7.1    5.22  )-----------( 164      ( 18 Jan 2023 05:30 )             23.9   01-18    135  |  96  |  39<H>  ----------------------------<  84  3.8   |  26  |  7.38<H>    Ca    8.8      18 Jan 2023 05:30  Phos  4.9     01-18  Mg     1.7     01-18    TPro  5.3<L>  /  Alb  2.2<L>  /  TBili  0.2  /  DBili  x   /  AST  21  /  ALT  11  /  AlkPhos  65  01-17      RADIOLOGY & ADDITIONAL STUDIES: None new    REFERRALS:  [x]Social Work  []Case management []PT/OT []Chaplaincy  []Hospice  []Patient/Family Support [facility]  Contact Info: Call [phone] (including Nights/Weekend)    INTERVAL HPI/OVERNIGHT EVENTS  SUBJECTIVE AND OBJECTIVE:   Interval events noted. Patient endorses pain adequately controlled in the last 24hrs w/o AE(no sedation/dizziness/nausea). Approached topic of discussing GoC & code status again. Patient says she needs time to consider discussion and "will let us know when she is ready".     ALLERGIES:  Ancef (Rash; Urticaria)  DDAVP (Hypotension)  iodine (Hives; Pruritus)  penicillin (Swelling)  sulfa drugs (Angioedema)    MEDICATIONS  (STANDING):  apixaban 2.5 milliGRAM(s) Oral every 12 hours  atorvastatin 40 milliGRAM(s) Oral at bedtime  chlorhexidine 2% Cloths 1 Application(s) Topical <User Schedule>  cinacalcet 30 milliGRAM(s) Oral daily  clopidogrel Tablet 75 milliGRAM(s) Oral daily  colchicine 0.3 milliGRAM(s) Oral daily  collagenase Ointment 1 Application(s) Topical daily  epoetin александр-epbx (RETACRIT) Injectable 8000 Unit(s) IV Push once  midodrine 30 milliGRAM(s) Oral every 8 hours  Nephro-deborah 1 Tablet(s) Oral daily  polyethylene glycol 3350 17 Gram(s) Oral every 12 hours  senna 2 Tablet(s) Oral at bedtime  sevelamer carbonate 1600 milliGRAM(s) Oral three times a day with meals    MEDICATIONS  (PRN):  acetaminophen     Tablet .. 650 milliGRAM(s) Oral every 6 hours PRN Temp greater or equal to 38C (100.4F), Moderate Pain (4 - 6)  calamine/zinc oxide Lotion 1 Application(s) Topical three times a day PRN Itching  HYDROmorphone   Tablet 2 milliGRAM(s) Oral every 4 hours PRN Severe Pain (7 - 10)  HYDROmorphone  Injectable 0.25 milliGRAM(s) IV Push every 4 hours PRN Pain    ITEMS UNCHECKED ARE NOT PRESENT  PRESENT SYMPTOMS/REVIEW OF SYSTEMS: []Unable to obtain due to poor mentation   Source if other than patient:  []Family   []Team   Pain: [x] yes [] no  QOL impact - unable to rest  Location - bilateral breasts                   Aggravating Factors -none  Quality - sharp  Radiation - none  Timing -constant   Severity (0-10 scale) -  9/10  Minimal Acceptable Level (0-10 scale) - 5/10      Dyspnea:                           []Mild  []Moderate []Severe  Anxiety:                             []Mild []Moderate []Severe  Fatigue:                             []Mild []Moderate []Severe  Nausea:                             []Mild []Moderate []Severe  Loss of Appetite:              []Mild []Moderate []Severe  Constipation:                    []Mild []Moderate []Severe    Other Symptoms:  [x]All other review of systems negative     GENERAL:  [] NAD [x]Alert []Lethargic  []Cachexia  []Unarousable  [x]Verbal  []Non-Verbal  Behavioral:   []Anxiety  []Delirium []Agitation []Cooperative [x]Oriented x 3  HEENT:  [x]Normal  [] Moist Mucous Membranes []Dry mouth   []ET Tube/Trach  []Oral lesions  PULMONARY:   [x]Clear []Tachypnea  []Audible excessive secretions  [x]Normal Work of Breathing []Labored Breathing  []Rhonchi []Crackles []Wheezing  CARDIOVASCULAR:    [x]Regular Rate [x]Regular Rhythm []Irregular []Tachy  []Arjun  GASTROINTESTINAL:  [x]Soft  [x]Distended   []+BS  []Non tender []Tender  []PEG []OGT/ NGT  Last BM: 1/17  GENITOURINARY:  []Normal [] Incontinent   [x]Oliguria/Anuria ESRD on HD  []Wu  MUSCULOSKELETAL:   []Normal Extremities  []Weakness  [x]Bed/Wheelchair bound []Edema  NEUROLOGIC:   [x]No focal deficits  []Cognitive impairment  []Dysphagia []Dysarthria []Paresis []Encephalopathic  SKIN:   [x]Normal   []Pressure ulcer(s)  []Rash    CRITICAL CARE:  [ ]Shock Present  [ ]Septic [ ]Cardiogenic [ ]Neurologic [ ]Hypovolemic  [ ] Vasopressors [ ]Inotropes   [ ]Respiratory failure present [ ]Mechanical Ventilation [ ]Non-invasive ventilatory support [ ]High-Flow  [ ]Acute  [ ]Chronic [ ]Hypoxic  [ ]Hypercarbic   [ ]Other organ failure    LABS:                         7.1    5.22  )-----------( 164      ( 18 Jan 2023 05:30 )             23.9   01-18    135  |  96  |  39<H>  ----------------------------<  84  3.8   |  26  |  7.38<H>    Ca    8.8      18 Jan 2023 05:30  Phos  4.9     01-18  Mg     1.7     01-18    TPro  5.3<L>  /  Alb  2.2<L>  /  TBili  0.2  /  DBili  x   /  AST  21  /  ALT  11  /  AlkPhos  65  01-17      RADIOLOGY & ADDITIONAL STUDIES: None new    REFERRALS:  [x]Social Work  []Case management []PT/OT []Chaplaincy  []Hospice  []Patient/Family Support

## 2023-01-18 NOTE — PROCEDURE NOTE - NSINFORMCONSENT_GEN_A_CORE
Benefits, risks, and possible complications of procedure explained to patient/caregiver who verbalized understanding and gave verbal consent.
This was an emergent procedure.
Benefits, risks, and possible complications of procedure explained to patient/caregiver who verbalized understanding and gave verbal consent.
Benefits, risks, and possible complications of procedure explained to patient/caregiver who verbalized understanding and gave written consent.

## 2023-01-18 NOTE — PROGRESS NOTE ADULT - SUBJECTIVE AND OBJECTIVE BOX
Subjective: Pt seen and examined bedside by vascular team this AM. Pt has no right arm pain at this time. Pts compressive dressing was confined to the upper arm.     ROS:   Denies Headache, blurred vision, Chest Pain, SOB, Abdominal pain, nausea or vomiting     Social   apixaban 2.5  clopidogrel Tablet 75  midodrine 30      Allergies    Ancef (Rash; Urticaria)  DDAVP (Hypotension)  iodine (Hives; Pruritus)  penicillin (Swelling)  sulfa drugs (Angioedema)    Intolerances        Vital Signs Last 24 Hrs  T(C): 37.1 (18 Jan 2023 09:56), Max: 38.7 (17 Jan 2023 22:14)  T(F): 98.8 (18 Jan 2023 09:56), Max: 101.7 (17 Jan 2023 22:14)  HR: 84 (18 Jan 2023 10:05) (80 - 99)  BP: 96/52 (18 Jan 2023 09:00) (89/39 - 118/51)  BP(mean): 69 (18 Jan 2023 09:00) (55 - 73)  RR: 18 (18 Jan 2023 09:00) (15 - 20)  SpO2: 87% (18 Jan 2023 10:05) (87% - 100%)    Parameters below as of 18 Jan 2023 10:05  Patient On (Oxygen Delivery Method): room air      I&O's Summary      Physical Exam:  General:  Pulmonary:  Cardiovascular:  Abdominal:  Extremities:  Pulses:   Right:                                                                          Left:  FEM [ ]2+ [ ]1+ [ ]doppler                                             FEM [ ]2+ [ ]1+ [ ]doppler    POP [ ]2+ [ ]1+ [ ]doppler                                             POP [ ]2+ [ ]1+ [ ]doppler    DP [ ]2+ [ ]1+ [ ]doppler                                                DP [ ]2+ [ ]1+ [ ]doppler  PT[ ]2+ [ ]1+ [ ]doppler                                                  PT [ ]2+ [ ]1+ [ ]doppler      LABS:                        7.1    5.22  )-----------( 164      ( 18 Jan 2023 05:30 )             23.9     01-18    135  |  96  |  39<H>  ----------------------------<  84  3.8   |  26  |  7.38<H>    Ca    8.8      18 Jan 2023 05:30  Phos  4.9     01-18  Mg     1.7     01-18    TPro  5.3<L>  /  Alb  2.2<L>  /  TBili  0.2  /  DBili  x   /  AST  21  /  ALT  11  /  AlkPhos  65  01-17    PT/INR - ( 17 Jan 2023 04:46 )   PT: 16.2 sec;   INR: 1.36          PTT - ( 17 Jan 2023 04:46 )  PTT:36.2 sec      A/P: 67y YO Female      Vascular/PAD:      HTN/HLD:    CAD/CHF:     DM:    CKD:     Anemia:     ID:    Diet:     Activity:     DVTPPx:     Dispo:      Subjective: Pt seen and examined bedside by vascular team this AM. Pt has no right arm pain at this time. Pts compressive dressing was confined to the upper arm.     ROS:   Denies Headache, blurred vision, Chest Pain, SOB, Abdominal pain, nausea or vomiting     Social   apixaban 2.5  clopidogrel Tablet 75  midodrine 30      Allergies    Ancef (Rash; Urticaria)  DDAVP (Hypotension)  iodine (Hives; Pruritus)  penicillin (Swelling)  sulfa drugs (Angioedema)    Intolerances        Vital Signs Last 24 Hrs  T(C): 37.1 (18 Jan 2023 09:56), Max: 38.7 (17 Jan 2023 22:14)  T(F): 98.8 (18 Jan 2023 09:56), Max: 101.7 (17 Jan 2023 22:14)  HR: 84 (18 Jan 2023 10:05) (80 - 99)  BP: 96/52 (18 Jan 2023 09:00) (89/39 - 118/51)  BP(mean): 69 (18 Jan 2023 09:00) (55 - 73)  RR: 18 (18 Jan 2023 09:00) (15 - 20)  SpO2: 87% (18 Jan 2023 10:05) (87% - 100%)    Parameters below as of 18 Jan 2023 10:05  Patient On (Oxygen Delivery Method): room air      I&O's Summary      Physical Exam:  General: NAD, resting comfortably in bed  C/V: S1 s2, RRR  Pulm: Nonlabored breathing, no respiratory distress  Abd: Soft, NTND  Extrem: WWP; right medial upper arm wound unchanged, clean base, pink, granulation tissue noted, wound covered with 4x4 and santyl, kerlix, and right arm wrapped with ace      LABS:                        7.1    5.22  )-----------( 164      ( 18 Jan 2023 05:30 )             23.9     01-18    135  |  96  |  39<H>  ----------------------------<  84  3.8   |  26  |  7.38<H>    Ca    8.8      18 Jan 2023 05:30  Phos  4.9     01-18  Mg     1.7     01-18    TPro  5.3<L>  /  Alb  2.2<L>  /  TBili  0.2  /  DBili  x   /  AST  21  /  ALT  11  /  AlkPhos  65  01-17    PT/INR - ( 17 Jan 2023 04:46 )   PT: 16.2 sec;   INR: 1.36          PTT - ( 17 Jan 2023 04:46 )  PTT:36.2 sec      A/P: 66 yo F with PMH HFrEF (EF 40%), nonobstructive CAD, HTN, HLD, ESRD 2/2 PCKD on HD, PE (2018) s/p IVC filter, PAD, OA, h/o thrombus in vascular access x3 ( R AVG, R AVF, L AVG), ventral hernia, 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 During imaging, CT showing 7.0 cm fluid collection is present near an AV fistula in the right upper arm. The differential for this collection included benign post-operative hematoma or seroma, but also abscess or vascular device infection, particularly in the setting of septic shock. Vascular consulted for RUE fistula evaluation to rule out this area as a source of sepsis. Reassuring physical exam at initial evaluation without clear indicia of infection related to the graph, small superficial surgical incision dehiscence notwithstanding. Now s/p IR image guided aspiration of perigraft fluid (negative) and gadolinium scan (negative). At this point, do not suspect a graft infection as the provoking factor leading to the patient's presentation. New onset swelling in area of RUE fistula is likely a sterile seroma with no intervention needed.    Recommendations:    -Continue to follow up BCx and IR Cx - both NGTD (given negative blood and wound drainage cultures, low likelihood of graft infection)  -Wound care recs: BID dressing changes (once daily with Santyl DSD once daily with Bactroban DSD). Apply light ace wrap compression to right hand and forearm to help with soft tissue swelling and elevate RUE with pillows  -Fluid collection near RUE fistula is likely a sterile seroma and unlikely the cause of patient's sepsis. Would not recommend percutaneous drainage or IR procedure to drain fluid.  -F/u breast punch and core biopsy  -Appreciate breast surgery recs  -Rest of care per primary team  -Vascular surgery Team 3C will continue to follow. Please call [phone] with any questions or concerns.

## 2023-01-18 NOTE — PROGRESS NOTE ADULT - ATTENDING COMMENTS
Admitted for emergent HD after missing a few sessions c/b shock state suspected to be secondary to tenuous hemodynamics with LVOT obstruction/LISETTE and significant vasculopathy. Shock state is improved; can c/w Midodrine for now. Treated for occult infection. Pending derm input for breast desquamation. Encourage ambulation, PT, OT and incentive spirometry. Eligible for transfer to [facility].

## 2023-01-18 NOTE — PROCEDURE NOTE - NSSITEPREP_SKIN_A_CORE
chlorhexidine/Adherence to aseptic technique: hand hygiene prior to donning barriers (gown, gloves), don cap and mask, sterile drape over patient
chlorhexidine/Adherence to aseptic technique: hand hygiene prior to donning barriers (gown, gloves), don cap and mask, sterile drape over patient
chlorhexidine
chlorhexidine

## 2023-01-18 NOTE — PROGRESS NOTE ADULT - SUBJECTIVE AND OBJECTIVE BOX
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      EVENTS:   - Biopsy taken uneventfully at bedside 1/9/23- preliminary pathology showed granulomas, pending final report.  - Phenylephrine gtt off x 36 hrs  - Febrile overnight (BCx sent)    SUBJECTIVE:   - Notes moderate right breast pain this morning  - Denies CP, SOB  - Denies worsening right new discharge.        MEDICATIONS  (STANDING):  apixaban 2.5 milliGRAM(s) Oral every 12 hours  atorvastatin 40 milliGRAM(s) Oral at bedtime  chlorhexidine 2% Cloths 1 Application(s) Topical <User Schedule>  cinacalcet 30 milliGRAM(s) Oral daily  clopidogrel Tablet 75 milliGRAM(s) Oral daily  colchicine 0.3 milliGRAM(s) Oral daily  collagenase Ointment 1 Application(s) Topical daily  epoetin александр-epbx (RETACRIT) Injectable 8000 Unit(s) IV Push once  midodrine 30 milliGRAM(s) Oral every 8 hours  Nephro-deborah 1 Tablet(s) Oral daily  polyethylene glycol 3350 17 Gram(s) Oral every 12 hours  senna 2 Tablet(s) Oral at bedtime  sevelamer carbonate 1600 milliGRAM(s) Oral three times a day with meals    MEDICATIONS  (PRN):  acetaminophen     Tablet .. 650 milliGRAM(s) Oral every 6 hours PRN Temp greater or equal to 38C (100.4F), Moderate Pain (4 - 6)  calamine/zinc oxide Lotion 1 Application(s) Topical three times a day PRN Itching  HYDROmorphone   Tablet 2 milliGRAM(s) Oral every 4 hours PRN Severe Pain (7 - 10)  HYDROmorphone  Injectable 0.25 milliGRAM(s) IV Push every 4 hours PRN Pain      Vital Signs Last 24 Hrs  T(C): 37.1 (18 Jan 2023 09:56), Max: 38.7 (17 Jan 2023 22:14)  T(F): 98.8 (18 Jan 2023 09:56), Max: 101.7 (17 Jan 2023 22:14)  HR: 84 (18 Jan 2023 10:05) (80 - 99)  BP: 96/52 (18 Jan 2023 09:00) (89/39 - 118/51)  BP(mean): 69 (18 Jan 2023 09:00) (55 - 73)  RR: 18 (18 Jan 2023 09:00) (15 - 20)  SpO2: 87% (18 Jan 2023 10:05) (87% - 100%)    Parameters below as of 18 Jan 2023 10:05  Patient On (Oxygen Delivery Method): room air      Gen: Awake, alert, NAD, resting comfortably   Breast: non-soiled gauze overlying incisional bx site on R breast, mild induration surrounding bx site stable. B/l breasts soft with underlying firmness, mildly tender to palpation this morning.   CV: RRR  Pulm: no respiratory distress on RA  Abd: soft, ND, NTTP, no rebound or guarding   Ext: WWP, ulceration of medial aspect of R arm over access site. L chest HD catheter without edema/induration/purulence.         I&O's Detail      LABS:                        7.1    5.22  )-----------( 164      ( 18 Jan 2023 05:30 )             23.9     01-18    135  |  96  |  39<H>  ----------------------------<  84  3.8   |  26  |  7.38<H>    Ca    8.8      18 Jan 2023 05:30  Phos  4.9     01-18  Mg     1.7     01-18    TPro  5.3<L>  /  Alb  2.2<L>  /  TBili  0.2  /  DBili  x   /  AST  21  /  ALT  11  /  AlkPhos  65  01-17    PT/INR - ( 17 Jan 2023 04:46 )   PT: 16.2 sec;   INR: 1.36          PTT - ( 17 Jan 2023 04:46 )  PTT:36.2 sec

## 2023-01-18 NOTE — PROGRESS NOTE ADULT - PROBLEM SELECTOR PLAN 1
Patient presented with bilateral breast pain with large R breast mass and palpable L breast mass. R breast has peau d'orange appearance with associated erythema, warmth. Patient with family hx of breast cancer in mother, sister. US bilateral breasts: No sonographic evidence of breast abscess, cyst or focal mass to correspond with the clinical finding of bilateral breast masses. Surgery was consulted and recommend nonurgent outpatient mammogram when clinically stable. 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 Patient still complaining of pain that is not well managed. States that PO does not work as well and as fast as IV medications. In the last 24hrs received a total of 2.25mg IV & 6mg PO Dilaudid  - Consider switching pain regimen to standing PO Dilaudid 4mg q6h w/ IV Dilaudid 0.5mg q6h for breakthrough pain. Hold for hypotension, sedation and RR <10  - Would do a trial of topical lidocaine 4% cream TID PRN to b/l breasts. Patient presented with bilateral breast pain with large R breast mass and palpable L breast mass. R breast has peau d'orange appearance with associated erythema, warmth. Patient with family hx of breast cancer in mother, sister. US bilateral breasts: No sonographic evidence of breast abscess, cyst or focal mass to correspond with the clinical finding of bilateral breast masses. Surgery was consulted and recommend nonurgent outpatient mammogram when clinically stable. 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 Patient still complaining of pain that is not well managed. States that PO does not work as well and as fast as IV medications. In the last 24hrs received a total of 2.25mg IV & 6mg PO Dilaudid  - Consider switching pain regimen to standing PO Dilaudid 4mg q6h w/ IV Dilaudid 0.5mg q4h for breakthrough pain. Hold for hypotension, sedation and RR <10  - Would do a trial of topical lidocaine 4% cream TID PRN to b/l breasts.

## 2023-01-18 NOTE — PROGRESS NOTE ADULT - ASSESSMENT
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    NEURO  #Pain Control  Pt in 10/10 pain of bilateral breasts, worst on R. Pain responsive to Dilaudid but effect wears off after ~2-3 hours.   - Pain management consulted, recs appreciated  - Dc'd PCA (1/16-1/17)  - started Dilaudid 0.5mg q4h prn for severe pain    - Tylenol 650mg po q6h PRN for mild pain  - HOLD ALL OPIOIDS IF RR<10, O2SAT <93%, SBP <96    PULM  #Atelectasis  Noted on CT chest to have atelectasis at b/l lung bases.  - Incentive spirometry    #Suspected LIZ  - Start BiPAP 18/8 at night    CARDIOVASCULAR  #Shock  Pt presented meeting 2/4 SIRS. Hypotensive with MAPs to 60, requiring Levophed. Septic picture of unknown source. HR > 90, WBC >12. Lactate 1.9. Afebrile. Procal 12. S/p Vancomycin 1250mg x 1, Aztreonam x 1 (pt allergic to Cefazolin, PCN). . Ferritin 1209. Patient still requiring pressors, unknown source, does not appear to fit septic picture at this point (no fever, BCx negative, no focal area of infection identified thus far)  - off levophed since 1/16 PM   - Discontinue Florinef  - c/w midodrine 30mg q8h  - CT:  7.0 cm fluid collection is present near an AV fistula in the right upper arm. Possibly abscess. Drained by IR 1/5 with serosanguinous fluid: no organisms, few WBCs  - RUE dopplers (1/13): No significant change large complex fluid collection in proximal R arm, could represent old seroma/hematoma (However, prior ultrasound was done prior to drainage)   - Per vascular: does not think AV fistula is infected; would not remove it; also does NOT recommend drainage of seroma.   - per surg: prelim R breast bx shows vasculitis vs granuloma; unlikely to cause hypotension; f/u final bx results   - s/p Vancomycin x2 weeks (1/2-1/15 ) to tx presumed R breast cellulitis (1/2-1/15)  - BCx: NGTD  - corticotropin stim test wnl    #LVOT with LISETTE  likely i/s/o hyperdynamic LV function 2/2 shock and hypovolemia  - per cardiology, rec repeating TTE after shock resolves    #HFrEF  TTE 11/22 normal LV and systolic, EF 59%, grade II diastolic dysfuncton, dilated RV, reduced RV,   Home meds: Entresto 49/51mg  - hold Entresto i/s/o shock    #CAD  Hx of cardiac cath in 2018  Home meds: atorvastatin 40mg, plavix 75mg qd  - c/w home atorvastatin 40mg  - c/w home Plavix 75mg qd     GI  #Constipation  - c/w senna 2 tablets/qd and miralax    #Nutrition  Patient reports poor PO intake  - renal diet    RENAL  #ESRD 2/2 PCKD  Pt has been on HD for "years" 2/2 PCKD. AV Fistula of EDWIN clotted in 2014, has received HD thru HD cath since. New AV Fistula in 11/2022, not yet matured.   - f/u nephrology recs  - s/p HD 1/10, 1/13, 1/16    #Hyperkalemia  Patient with Hx of ESRD with missed HD presents with K >8 on VBG with abdominal symptoms and missed HD x8 days. ICU consulted i/s/o hyperkalemia. Renal consulted.  - s/p HD 1/10  - monitor with BMP's qd    #Hyperphosphatemia  - c/w sevelamer to 1600mg TID    #Hyponatremia  Na 130 today, likely secondary to fluid overload. Last session of HD 1/13  - planned for HD today 1/16  - Continue to trend Na    ID  #Fever  Pt with T100.9F 1/17 AM. Unclear etiology. 1/3, 1/9 BCx neg. s/p vanc x2 weeks  - obtain BCx, CXR    #Shock, presumed to be septic but no clear source/etiology   Pt presented in shock with leukocytosis, tachycardia, hypotension requiring pressors. Unknown source.   - see #shock for plan  - s/p vanc x2 weeks (last day 1/15) to tx presumed breast cellulitis   - gallium scan: faintly increased activity surrounding the entire lower portion of the right breast consistent with the inflammatory reaction seen on physical examination, small area of activity ~10cm to the R of midline at about the level of the lower border of the sternum, activity likely to be in chest wall   - PET/CT with increased FDG avidity within R axillary vein, which contains an occlusive thrombus, which may represent an infectious source. There is also an additional FDG avid region of the AV fistula near the arterial anastomosis proximal to the area of the fluid collection; could be a potential source of infection vs inflammatory reaction to the graft. Also a photopenic R adnexal cystic mass, suggestive of a benign etiology.     ENDO  #Hyperparathyroidism  At risk for secondary HPTT d/t renal failure   - pt has vague abdominal pain with nausea  - multiple brown tumors on CT with multiple fractures of pubic rami, pubic bone, thoracolumbar spine  - intact , Vit D 38.5  - SPEP/immunofixation negative   - endocrinology recs: secondary hyperparathyroidism vs other process causing osteoclastic tumors  - c/w Sensipar 30mg qd per endo recs (started 1/9)  - 1/11 AM intact  (baseline PTH on Sensipar )    #Multinodular Goiter  CT: enlarged multinodular thyroid projecting into superior mediastinum with 1.8 x 1.5 cm solid nodule slightly inferiorly in the anterior mediastinum.   - Thyroid ultrasound: Fine-needle aspiration recommended for 4.3 cm right thyroid and 2.6 cm left thyroid solid nodules per SCOTT guidelines.  - TSH 1.6 free T4 0.68.  - TSH could be inappropriately normal for low free T4 due to euthyroid sick syndrome.  - f/u TFTs after shock resolved    MSK  #Renal Osteodystrophy  - on CT: Increased density of the bone marrow is consistent with renal osteodystrophy  - Per endocrine, extent of bone destruction extreme given PTH level, suspect might not be Brown tumors    #RUE Edema  RUE became edematous 1/8 extending down through hand. AVF with drained seroma pocket on R arm.  - lymphedema vs bleeding into seroma pocket? vs DVT   - RUE U/S with persistent DVT to R IJ, subclavian, and axillary stent, and proximal brachial vein, similar extent to prior. No significant change large complex fluid collection in proximal R arm, could represent old seroma/hematoma  - per vascular: no plans to drain the seroma    #L toe pain  Pt c/o L toe pain, possibly gout. Now resolved  - c/w colchicine 0.3mg qd    HEME/ONC  #Vasculopathy   Current DVT of R IJ, R subclavian and R axillary veins. Hx of L AVF clotting. Given hx of clots, current pruritis and gallium scan uptake in chest wall, reasonable to workup coagulopathy  - transitioned from heparin gtt to Eliquis 2.5 BID  - OBDULIA, ANCA, complement, RF, beta2 glycoprotein, anticardiolipin nml    #Anemia  Hgb has downtrended from 9.8 on admission to 6.9. Baseline from November appears to be ~13. 1/9 Hb 6.9, ordered 1U pRBC after which pt likely had febrile nonhemolytic transfusion reaction (3 hrs into transfusion, developed tachycardia to 150s, HTN to 140s/90s, rigors, Trectal 101.3.) Transfusion stopped. Tylenol given. Sxs resolved.   - Ferritin elevated: 1209  - Total iron low (15), TIBC low (79)  - s/p 1U pRBC on 1/11 with Hb 6.9 > 7.9 w/o transfusion rxn    #Breast Pain, bilateral  Family hx of breast cancer in mother, sister.   - large R breast mass, palpable L breast mass, R breast has peau d'orange appearance with associated erythema, warmth on R  - US bilateral breasts: No sonographic evidence of breast abscess, cyst or focal mass to correspond with the clinical finding of bilateral breast masses.  - prelim breast bx by surg: granuloma vs vasculitis; f/u final     GYN  #Uterine mass  6.1cm mass in R adnexa seen incidentally on CTAP. Pt with hx of hysterectomy, unclear if partial or total.   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 TVUS c/w likely simple cyst, PET/CT w/ no uptake in R adnexa, low concern for malignancy. Rec outpt f/u when stable for repeat tumor markers.    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    NEURO  #Pain Control  Pt in 10/10 pain of bilateral breasts, worst on R. Pain responsive to Dilaudid but effect wears off after ~2-3 hours.   - Pain management consulted, recs appreciated  - Dc'd PCA (1/16-1/17)  - c/w Dilaudid 2mg PO prn for severe pain, Dilaudid 0.25mg IV q4h prn for pain, Tylenol 650mg q6h for mod pain.   - HOLD ALL OPIOIDS IF RR<10, O2SAT <93%, SBP <96    PULM  #Atelectasis  Noted on CT chest to have atelectasis at b/l lung bases.  - Incentive spirometry    #Suspected LIZ  - c/w BiPAP 18/8 at night    CARDIOVASCULAR  #Shock  Pt presented meeting 2/4 SIRS. Hypotensive with MAPs to 60, requiring Levophed. Septic picture of unknown source. HR > 90, WBC >12. Lactate 1.9. Afebrile. Procal 12. S/p Vancomycin 1250mg x 1, Aztreonam x 1 (pt allergic to Cefazolin, PCN). . Ferritin 1209. Patient still requiring pressors, unknown source, does not appear to fit septic picture at this point (no fever, BCx negative, no focal area of infection identified thus far)  - off levophed since 1/16 PM   - Dc'd Florinef on 1/17  - c/w midodrine 30mg q8h (goal SBP >75)  - CT:  7.0 cm fluid collection is present near an AV fistula in the right upper arm. Possibly abscess. Drained by IR 1/5 with serosanguinous fluid: no organisms, few WBCs  - RUE dopplers (1/13): No significant change large complex fluid collection in proximal R arm, could represent old seroma/hematoma (However, prior ultrasound was done prior to drainage)   - Per vascular: does not think AV fistula is infected; would not remove it; also does NOT recommend drainage of seroma.   - per surg: prelim R breast bx shows vasculitis vs granuloma; unlikely to cause hypotension; f/u final bx results   - s/p Vancomycin x2 weeks (1/2-1/15 ) to tx presumed R breast cellulitis (1/2-1/15)  - BCx: NGTD  - corticotropin stim test wnl    #LVOT with LISETTE  likely i/s/o hyperdynamic LV function 2/2 shock and hypovolemia  - per cardiology, rec repeating TTE after shock resolves    #HFrEF  TTE 11/22 normal LV and systolic, EF 59%, grade II diastolic dysfuncton, dilated RV, reduced RV,   Home meds: Entresto 49/51mg  - hold Entresto i/s/o shock    #CAD  Hx of cardiac cath in 2018  Home meds: atorvastatin 40mg, plavix 75mg qd  - c/w home atorvastatin 40mg  - c/w home Plavix 75mg qd     GI  #Constipation  - c/w senna 2 tablets/qd and miralax    #Nutrition  Patient reports poor PO intake  - renal diet    RENAL  #ESRD 2/2 PCKD  Pt has been on HD for "years" 2/2 PCKD. AV Fistula of EDWIN clotted in 2014, has received HD thru HD cath since. New AV Fistula in 11/2022, not yet matured.   - f/u nephrology recs  - s/p HD 1/10, 1/13, 1/16, planned for HD today 1/18    #Hyperkalemia  Patient with Hx of ESRD with missed HD presents with K >8 on VBG with abdominal symptoms and missed HD x8 days. ICU consulted i/s/o hyperkalemia. Renal consulted.  - monitor with BMP's qd    #Hyperphosphatemia  - c/w sevelamer to 1600mg TID    ID  #Fever  Pt with T100.9F 1/17 AM. Unclear etiology. 1/3, 1/9 BCx neg. s/p vanc x2 weeks  - CXR no infiltrates, BCx NGTD     #Shock, presumed to be septic but no clear source/etiology   Pt presented in shock with leukocytosis, tachycardia, hypotension requiring pressors. Unknown source.   - see #shock for plan  - s/p vanc x2 weeks (last day 1/15) to tx presumed breast cellulitis   - gallium scan: faintly increased activity surrounding the entire lower portion of the right breast consistent with the inflammatory reaction seen on physical examination, small area of activity ~10cm to the R of midline at about the level of the lower border of the sternum, activity likely to be in chest wall   - PET/CT with increased FDG avidity within R axillary vein, which contains an occlusive thrombus, which may represent an infectious source. There is also an additional FDG avid region of the AV fistula near the arterial anastomosis proximal to the area of the fluid collection; could be a potential source of infection vs inflammatory reaction to the graft. Also a photopenic R adnexal cystic mass, suggestive of a benign etiology.     ENDO  #Hyperparathyroidism  At risk for secondary HPTT d/t renal failure   - pt has vague abdominal pain with nausea  - multiple brown tumors on CT with multiple fractures of pubic rami, pubic bone, thoracolumbar spine  - intact , Vit D 38.5  - SPEP/immunofixation negative   - endocrinology recs: secondary hyperparathyroidism vs other process causing osteoclastic tumors  - c/w Sensipar 30mg qd per endo recs (started 1/9)  - 1/11 AM intact  (baseline PTH on Sensipar )    #Multinodular Goiter  CT: enlarged multinodular thyroid projecting into superior mediastinum with 1.8 x 1.5 cm solid nodule slightly inferiorly in the anterior mediastinum.   - Thyroid ultrasound: Fine-needle aspiration recommended for 4.3 cm right thyroid and 2.6 cm left thyroid solid nodules per SCOTT guidelines.  - TSH 1.6 free T4 0.68.  - TSH could be inappropriately normal for low free T4 due to euthyroid sick syndrome.  - f/u TFTs after shock resolved    MSK  #Renal Osteodystrophy  - on CT: Increased density of the bone marrow is consistent with renal osteodystrophy  - Per endocrine, extent of bone destruction extreme given PTH level, suspect might not be Brown tumors    #RUE Edema  RUE became edematous 1/8 extending down through hand. AVF with drained seroma pocket on R arm.  - lymphedema vs bleeding into seroma pocket? vs DVT   - RUE U/S with persistent DVT to R IJ, subclavian, and axillary stent, and proximal brachial vein, similar extent to prior. No significant change large complex fluid collection in proximal R arm, could represent old seroma/hematoma  - per vascular: no plans to drain the seroma    #L toe pain  Pt c/o L toe pain, possibly gout. Now resolved  - c/w colchicine 0.3mg qd    HEME/ONC  #Vasculopathy   Current DVT of R IJ, R subclavian and R axillary veins. Hx of L AVF clotting. Given hx of clots, current pruritis and gallium scan uptake in chest wall, reasonable to workup coagulopathy  - transitioned from heparin gtt to Eliquis 2.5 BID  - OBDULIA, ANCA, complement, RF, beta2 glycoprotein, anticardiolipin nml    #Anemia  Hgb has downtrended from 9.8 on admission to 6.9. Baseline from November appears to be ~13. 1/9 Hb 6.9, ordered 1U pRBC after which pt likely had febrile nonhemolytic transfusion reaction (3 hrs into transfusion, developed tachycardia to 150s, HTN to 140s/90s, rigors, Trectal 101.3.) Transfusion stopped. Tylenol given. Sxs resolved.   - Ferritin elevated: 1209  - Total iron low (15), TIBC low (79)  - s/p 1U pRBC on 1/11 with Hb 6.9 > 7.9 w/o transfusion rxn    #Breast Pain, bilateral  Family hx of breast cancer in mother, sister.   - large R breast mass, palpable L breast mass, R breast has peau d'orange appearance with associated erythema, warmth on R  - US bilateral breasts: No sonographic evidence of breast abscess, cyst or focal mass to correspond with the clinical finding of bilateral breast masses.  - prelim breast bx by surg: granuloma vs vasculitis; f/u final     GYN  #Uterine mass  6.1cm mass in R adnexa seen incidentally on CTAP. Pt with hx of hysterectomy, unclear if partial or total.   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 TVUS c/w likely simple cyst, PET/CT w/ no uptake in R adnexa, low concern for malignancy. Rec outpt f/u when stable for repeat tumor markers.    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    NEURO  #Pain Control  Pt in 10/10 pain of bilateral breasts, worst on R. Pain responsive to Dilaudid but effect wears off after ~2-3 hours.   - Pain management consulted, recs appreciated  - Dc'd PCA (1/16-1/17)  - c/w Dilaudid 2mg PO prn for severe pain, Dilaudid 0.25mg IV q4h prn for pain, Tylenol 650mg q6h for mod pain.   - HOLD ALL OPIOIDS IF RR<10, O2SAT <93%, SBP <96    PULM  #Atelectasis  Noted on CT chest to have atelectasis at b/l lung bases.  - Incentive spirometry    #Suspected LIZ  - c/w BiPAP 18/8 at night    CARDIOVASCULAR  #Shock  Pt presented meeting 2/4 SIRS. Hypotensive with MAPs to 60, requiring Levophed. Septic picture of unknown source. HR > 90, WBC >12. Lactate 1.9. Afebrile. Procal 12. S/p Vancomycin 1250mg x 1, Aztreonam x 1 (pt allergic to Cefazolin, PCN). . Ferritin 1209. Patient still requiring pressors, unknown source, does not appear to fit septic picture at this point (no fever, BCx negative, no focal area of infection identified thus far)  - off levophed since 1/16 PM   - Dc'd Florinef on 1/17  - c/w midodrine 30mg q8h (goal SBP >75)  - CT:  7.0 cm fluid collection is present near an AV fistula in the right upper arm. Possibly abscess. Drained by IR 1/5 with serosanguinous fluid: no organisms, few WBCs  - RUE dopplers (1/13): No significant change large complex fluid collection in proximal R arm, could represent old seroma/hematoma (However, prior ultrasound was done prior to drainage)   - Per vascular: does not think AV fistula is infected; would not remove it; also does NOT recommend drainage of seroma.   - per surg: prelim R breast bx shows vasculitis vs granuloma; unlikely to cause hypotension; f/u final bx results   - s/p Vancomycin x2 weeks (1/2-1/15 ) to tx presumed R breast cellulitis (1/2-1/15)  - BCx: NGTD  - corticotropin stim test wnl    #LVOT with LISETTE  likely i/s/o hyperdynamic LV function 2/2 shock and hypovolemia  - per cardiology, rec repeating TTE after shock resolves    #HFrEF  TTE 11/22 normal LV and systolic, EF 59%, grade II diastolic dysfuncton, dilated RV, reduced RV,   Home meds: Entresto 49/51mg  - hold Entresto i/s/o shock    #CAD  Hx of cardiac cath in 2018  Home meds: atorvastatin 40mg, plavix 75mg qd  - c/w home atorvastatin 40mg  - c/w home Plavix 75mg qd     GI  #Constipation  - c/w senna 2 tablets/qd and miralax    #Nutrition  Patient reports poor PO intake  - renal diet    RENAL  #ESRD 2/2 PCKD  Pt has been on HD for "years" 2/2 PCKD. AV Fistula of EDWIN clotted in 2014, has received HD thru HD cath since. New AV Fistula in 11/2022, not yet matured.   - f/u nephrology recs  - s/p HD 1/10, 1/13, 1/16, planned for HD today 1/18    #Hyperkalemia  Patient with Hx of ESRD with missed HD presents with K >8 on VBG with abdominal symptoms and missed HD x8 days. ICU consulted i/s/o hyperkalemia. Renal consulted.  - monitor with BMP's qd    #Hyperphosphatemia  - c/w sevelamer to 1600mg TID    ID  #Fever  Pt with T100.9F 1/17 AM. Unclear etiology. 1/3, 1/9 BCx neg. s/p vanc x2 weeks  - CXR no infiltrates, BCx NGTD   - R femoral line removed on 1/18; L femoral line placed on 1/18  - Monitor off abx for now     #Shock, presumed to be septic but no clear source/etiology   Pt presented in shock with leukocytosis, tachycardia, hypotension requiring pressors. Unknown source.   - see #shock for plan  - s/p vanc x2 weeks (last day 1/15) to tx presumed breast cellulitis   - gallium scan: faintly increased activity surrounding the entire lower portion of the right breast consistent with the inflammatory reaction seen on physical examination, small area of activity ~10cm to the R of midline at about the level of the lower border of the sternum, activity likely to be in chest wall   - PET/CT with increased FDG avidity within R axillary vein, which contains an occlusive thrombus, which may represent an infectious source. There is also an additional FDG avid region of the AV fistula near the arterial anastomosis proximal to the area of the fluid collection; could be a potential source of infection vs inflammatory reaction to the graft. Also a photopenic R adnexal cystic mass, suggestive of a benign etiology.     ENDO  #Hyperparathyroidism  At risk for secondary HPTT d/t renal failure   - pt has vague abdominal pain with nausea  - multiple brown tumors on CT with multiple fractures of pubic rami, pubic bone, thoracolumbar spine  - intact , Vit D 38.5  - SPEP/immunofixation negative   - endocrinology recs: secondary hyperparathyroidism vs other process causing osteoclastic tumors  - c/w Sensipar 30mg qd per endo recs (started 1/9)  - 1/11 AM intact  (baseline PTH on Sensipar )    #Multinodular Goiter  CT: enlarged multinodular thyroid projecting into superior mediastinum with 1.8 x 1.5 cm solid nodule slightly inferiorly in the anterior mediastinum.   - Thyroid ultrasound: Fine-needle aspiration recommended for 4.3 cm right thyroid and 2.6 cm left thyroid solid nodules per SCOTT guidelines.  - TSH 1.6 free T4 0.68.  - TSH could be inappropriately normal for low free T4 due to euthyroid sick syndrome.  - f/u TFTs after shock resolved    MSK  #Renal Osteodystrophy  - on CT: Increased density of the bone marrow is consistent with renal osteodystrophy  - Per endocrine, extent of bone destruction extreme given PTH level, suspect might not be Brown tumors    #RUE Edema  RUE became edematous 1/8 extending down through hand. AVF with drained seroma pocket on R arm.  - lymphedema vs bleeding into seroma pocket? vs DVT   - RUE U/S with persistent DVT to R IJ, subclavian, and axillary stent, and proximal brachial vein, similar extent to prior. No significant change large complex fluid collection in proximal R arm, could represent old seroma/hematoma  - per vascular: no plans to drain the seroma    #L toe pain  Pt c/o L toe pain, possibly gout. Now resolved  - c/w colchicine 0.3mg qd    HEME/ONC  #Vasculopathy   Current DVT of R IJ, R subclavian and R axillary veins. Hx of L AVF clotting. Given hx of clots, current pruritis and gallium scan uptake in chest wall, reasonable to workup coagulopathy  - transitioned from heparin gtt to Eliquis 2.5 BID  - OBDULIA, ANCA, complement, RF, beta2 glycoprotein, anticardiolipin nml    #Anemia  Hgb has downtrended from 9.8 on admission to 6.9. Baseline from November appears to be ~13. 1/9 Hb 6.9, ordered 1U pRBC after which pt likely had febrile nonhemolytic transfusion reaction (3 hrs into transfusion, developed tachycardia to 150s, HTN to 140s/90s, rigors, Trectal 101.3.) Transfusion stopped. Tylenol given. Sxs resolved.   - Ferritin elevated: 1209  - Total iron low (15), TIBC low (79)  - s/p 1U pRBC on 1/11 with Hb 6.9 > 7.9 w/o transfusion rxn    #Breast Pain, bilateral  Family hx of breast cancer in mother, sister.   - large R breast mass, palpable L breast mass, R breast has peau d'orange appearance with associated erythema, warmth on R  - US bilateral breasts: No sonographic evidence of breast abscess, cyst or focal mass to correspond with the clinical finding of bilateral breast masses.  - prelim breast bx by surg: granuloma vs vasculitis; f/u final     GYN  #Uterine mass  6.1cm mass in R adnexa seen incidentally on CTAP. Pt with hx of hysterectomy, unclear if partial or total.   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 TVUS c/w likely simple cyst, PET/CT w/ no uptake in R adnexa, low concern for malignancy. Rec outpt f/u when stable for repeat tumor markers.    DERM  #Peeling Epidermis to B/l Breasts  - Derm consulted, recs appreciated  - Wound care following, appreciate recs    Preventative  F: None  E: None, HD pt  N: Renal Restricted Diet  DVT: heparin @1700u/hr  Code Status: Full Code  Dispo: ICU

## 2023-01-18 NOTE — PROCEDURE NOTE - NSPOSTCAREGUIDE_GEN_A_CORE
Verbal/written post procedure instructions were given to patient/caregiver/Instructed patient/caregiver to follow-up with primary care physician/Instructed patient/caregiver regarding signs and symptoms of infection/Keep the cast/splint/dressing clean and dry/Care for catheter as per unit/ICU protocols
Verbal/written post procedure instructions were given to patient/caregiver/Instructed patient/caregiver to follow-up with primary care physician/Instructed patient/caregiver regarding signs and symptoms of infection/Keep the cast/splint/dressing clean and dry/Care for catheter as per unit/ICU protocols
Verbal/written post procedure instructions were given to patient/caregiver/Care for catheter as per unit/ICU protocols

## 2023-01-18 NOTE — PROGRESS NOTE ADULT - SUBJECTIVE AND OBJECTIVE BOX
** INCOMPLETE **     INTERVAL HPI/OVERNIGHT EVENTS:    SUBJECTIVE: Patient seen and examined at bedside.    VITAL SIGNS:  ICU Vital Signs Last 24 Hrs  T(C): 37.4 (18 Jan 2023 01:04), Max: 38.7 (17 Jan 2023 22:14)  T(F): 99.4 (18 Jan 2023 01:04), Max: 101.7 (17 Jan 2023 22:14)  HR: 82 (18 Jan 2023 05:00) (80 - 114)  BP: 98/53 (18 Jan 2023 05:00) (76/39 - 118/51)  BP(mean): 67 (18 Jan 2023 05:00) (52 - 73)  ABP: --  ABP(mean): --  RR: 18 (18 Jan 2023 05:00) (15 - 20)  SpO2: 99% (18 Jan 2023 05:00) (87% - 100%)    O2 Parameters below as of 18 Jan 2023 01:00      O2 Concentration (%): 40          01-16 @ 07:01  -  01-17 @ 07:00  --------------------------------------------------------  IN: 369 mL / OUT: 500 mL / NET: -131 mL      CAPILLARY BLOOD GLUCOSE          PHYSICAL EXAM:    Constitutional: NAD  HEENT: NC/AT; PERRL, anicteric sclera; MMM  Neck: supple, no JVD  Cardiovascular: +S1/S2, RRR  Respiratory: CTA B/L, no W/R/R  Gastrointestinal: abdomen soft, NT/ND; no rebound or guarding; +BSx4  Genitourinary: no suprapubic tenderness or fullness  Extremities: WWP; no LE edema; no clubbing or cyanosis  Vascular: 2+ radial, DP/PT and femoral pulses B/L  Dermatologic: normal color and turgor; no visible rashes  Neurological:     MEDICATIONS:  MEDICATIONS  (STANDING):  apixaban 2.5 milliGRAM(s) Oral every 12 hours  atorvastatin 40 milliGRAM(s) Oral at bedtime  chlorhexidine 2% Cloths 1 Application(s) Topical <User Schedule>  cinacalcet 30 milliGRAM(s) Oral daily  clopidogrel Tablet 75 milliGRAM(s) Oral daily  colchicine 0.3 milliGRAM(s) Oral daily  collagenase Ointment 1 Application(s) Topical daily  midodrine 30 milliGRAM(s) Oral every 8 hours  Nephro-deborah 1 Tablet(s) Oral daily  polyethylene glycol 3350 17 Gram(s) Oral every 12 hours  senna 2 Tablet(s) Oral at bedtime  sevelamer carbonate 1600 milliGRAM(s) Oral three times a day with meals    MEDICATIONS  (PRN):  acetaminophen     Tablet .. 650 milliGRAM(s) Oral every 6 hours PRN Temp greater or equal to 38C (100.4F), Moderate Pain (4 - 6)  calamine/zinc oxide Lotion 1 Application(s) Topical three times a day PRN Itching  HYDROmorphone   Tablet 2 milliGRAM(s) Oral every 4 hours PRN Severe Pain (7 - 10)  HYDROmorphone  Injectable 0.25 milliGRAM(s) IV Push every 4 hours PRN Pain      ALLERGIES:  Allergies    Ancef (Rash; Urticaria)  DDAVP (Hypotension)  iodine (Hives; Pruritus)  penicillin (Swelling)  sulfa drugs (Angioedema)    Intolerances        LABS:                        7.6    6.16  )-----------( 145      ( 17 Jan 2023 04:46 )             25.4     01-17    135  |  96  |  29<H>  ----------------------------<  93  3.9   |  27  |  6.03<H>    Ca    8.4      17 Jan 2023 04:46  Phos  4.3     01-17  Mg     1.7     01-17    TPro  5.3<L>  /  Alb  2.2<L>  /  TBili  0.2  /  DBili  x   /  AST  21  /  ALT  11  /  AlkPhos  65  01-17    PT/INR - ( 17 Jan 2023 04:46 )   PT: 16.2 sec;   INR: 1.36          PTT - ( 17 Jan 2023 04:46 )  PTT:36.2 sec      RADIOLOGY & ADDITIONAL TESTS: Reviewed. 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 Multiple services following throughout hospital course. Surg had been consulted for breast masses/cellulitis; s/p R breast bx with prelim read vasculitis vs granuloma. Vascular had been consulted for initial concern for AV fistula infection, does not think it is infected. Derm had been consulted for b/l breast skin changes, no acute intervention, being managed by wound care. Endocrine following for management of hyperparathyroidism. Gyn/onc had been consulted for R ovarian cyst and elevated tumor markers, likely benign, rec outpt follow up. Pt s/p vancomycin x2weeks (last day 1/15) for presumed breast cellulitis. Course c/b fevers on 1/17, BCx NGTD, monitoring off abx. Pt is medically stable and transferred to [facility] for further management.     INTERVAL HPI/OVERNIGHT EVENTS:    SUBJECTIVE: Patient seen and examined at bedside.    VITAL SIGNS:  ICU Vital Signs Last 24 Hrs  T(C): 37.4 (18 Jan 2023 01:04), Max: 38.7 (17 Jan 2023 22:14)  T(F): 99.4 (18 Jan 2023 01:04), Max: 101.7 (17 Jan 2023 22:14)  HR: 82 (18 Jan 2023 05:00) (80 - 114)  BP: 98/53 (18 Jan 2023 05:00) (76/39 - 118/51)  BP(mean): 67 (18 Jan 2023 05:00) (52 - 73)  ABP: --  ABP(mean): --  RR: 18 (18 Jan 2023 05:00) (15 - 20)  SpO2: 99% (18 Jan 2023 05:00) (87% - 100%)    O2 Parameters below as of 18 Jan 2023 01:00      O2 Concentration (%): 40          01-16 @ 07:01  -  01-17 @ 07:00  --------------------------------------------------------  IN: 369 mL / OUT: 500 mL / NET: -131 mL      CAPILLARY BLOOD GLUCOSE          PHYSICAL EXAM:    Constitutional: NAD  HEENT: NC/AT; PERRL, anicteric sclera; MMM  Neck: supple, no JVD  Cardiovascular: +S1/S2, RRR  Respiratory: CTA B/L, no W/R/R  Gastrointestinal: abdomen soft, NT/ND; no rebound or guarding; +BSx4  Genitourinary: no suprapubic tenderness or fullness  Extremities: WWP; no LE edema; no clubbing or cyanosis  Vascular: 2+ radial, DP/PT and femoral pulses B/L  Dermatologic: normal color and turgor; no visible rashes  Neurological:     MEDICATIONS:  MEDICATIONS  (STANDING):  apixaban 2.5 milliGRAM(s) Oral every 12 hours  atorvastatin 40 milliGRAM(s) Oral at bedtime  chlorhexidine 2% Cloths 1 Application(s) Topical <User Schedule>  cinacalcet 30 milliGRAM(s) Oral daily  clopidogrel Tablet 75 milliGRAM(s) Oral daily  colchicine 0.3 milliGRAM(s) Oral daily  collagenase Ointment 1 Application(s) Topical daily  midodrine 30 milliGRAM(s) Oral every 8 hours  Nephro-deborah 1 Tablet(s) Oral daily  polyethylene glycol 3350 17 Gram(s) Oral every 12 hours  senna 2 Tablet(s) Oral at bedtime  sevelamer carbonate 1600 milliGRAM(s) Oral three times a day with meals    MEDICATIONS  (PRN):  acetaminophen     Tablet .. 650 milliGRAM(s) Oral every 6 hours PRN Temp greater or equal to 38C (100.4F), Moderate Pain (4 - 6)  calamine/zinc oxide Lotion 1 Application(s) Topical three times a day PRN Itching  HYDROmorphone   Tablet 2 milliGRAM(s) Oral every 4 hours PRN Severe Pain (7 - 10)  HYDROmorphone  Injectable 0.25 milliGRAM(s) IV Push every 4 hours PRN Pain      ALLERGIES:  Allergies    Ancef (Rash; Urticaria)  DDAVP (Hypotension)  iodine (Hives; Pruritus)  penicillin (Swelling)  sulfa drugs (Angioedema)    Intolerances        LABS:                        7.6    6.16  )-----------( 145      ( 17 Jan 2023 04:46 )             25.4     01-17    135  |  96  |  29<H>  ----------------------------<  93  3.9   |  27  |  6.03<H>    Ca    8.4      17 Jan 2023 04:46  Phos  4.3     01-17  Mg     1.7     01-17    TPro  5.3<L>  /  Alb  2.2<L>  /  TBili  0.2  /  DBili  x   /  AST  21  /  ALT  11  /  AlkPhos  65  01-17    PT/INR - ( 17 Jan 2023 04:46 )   PT: 16.2 sec;   INR: 1.36          PTT - ( 17 Jan 2023 04:46 )  PTT:36.2 sec      RADIOLOGY & ADDITIONAL TESTS: Reviewed. 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  Pt s/p vancomycin x2weeks (last day 1/15) for presumed breast cellulitis. Course c/b fevers on 1/17, BCx NGTD, monitoring off abx. Multiple services following throughout hospital course. Surg had been consulted for breast masses/cellulitis; s/p R breast bx with prelim read vasculitis vs granuloma. Vascular had been consulted for initial concern for AV fistula infection, does not think it is infected. Derm had been consulted for b/l breast skin changes, no acute intervention, being managed by wound care. Endocrine following for management of hyperparathyroidism. Gyn/onc had been consulted for R ovarian cyst and elevated tumor markers, likely benign, rec outpt follow up. Pt is medically stable and transferred to [facility] for further management.     INTERVAL HPI/OVERNIGHT EVENTS:  Overnight, pt refused medications due to severe pain. Pain regimen changed to Dilaudid 2mg PO prn for severe pain, Dilaudid 0.25mg IV q4h prn for pain, Tylenol 650mg q6h for mod pain.     SUBJECTIVE: Patient seen and examined at bedside. Pt denies breast pain or pain elsewhere currently.     VITAL SIGNS:  ICU Vital Signs Last 24 Hrs  T(C): 37.4 (18 Jan 2023 01:04), Max: 38.7 (17 Jan 2023 22:14)  T(F): 99.4 (18 Jan 2023 01:04), Max: 101.7 (17 Jan 2023 22:14)  HR: 82 (18 Jan 2023 05:00) (80 - 114)  BP: 98/53 (18 Jan 2023 05:00) (76/39 - 118/51)  BP(mean): 67 (18 Jan 2023 05:00) (52 - 73)  ABP: --  ABP(mean): --  RR: 18 (18 Jan 2023 05:00) (15 - 20)  SpO2: 99% (18 Jan 2023 05:00) (87% - 100%)    O2 Parameters below as of 18 Jan 2023 01:00      O2 Concentration (%): 40          01-16 @ 07:01  -  01-17 @ 07:00  --------------------------------------------------------  IN: 369 mL / OUT: 500 mL / NET: -131 mL      CAPILLARY BLOOD GLUCOSE          PHYSICAL EXAM:  Constitutional: middle-aged female with obese body habitus resting in bed  HEENT: no conjunctivitis or scleral icterus, oral mucosa moist  NECK: Supple, no JVD  CARDIAC: RRR, +S1/S2, no murmurs/rubs/gallops  PULM: Breathing comfortably on RA, clear to auscultation bilaterally, no wheezes/rales/rhonchi, +TDC to L chest wall   BREAST: b/l breasts with peeling epidermis to lower aspect of breasts  ABDOMEN: Soft, obese, nontender to palpation, +bs, no rebound tenderness or fluid wave, non-reducible ventral hernia  SKIN: b/l arms warm to touch; 3cm dehisced wound, nonpurulent, nonerythematous, to R medial upper arm near axilla  VASC:  no LE edema or tenderness; +L femoral line (placed 1/18)  EXT: RUE: mild improvement to edema to R proximal forearm; unchanged edema to R hand, slightly tense, non erythematous    MEDICATIONS:  MEDICATIONS  (STANDING):  apixaban 2.5 milliGRAM(s) Oral every 12 hours  atorvastatin 40 milliGRAM(s) Oral at bedtime  chlorhexidine 2% Cloths 1 Application(s) Topical <User Schedule>  cinacalcet 30 milliGRAM(s) Oral daily  clopidogrel Tablet 75 milliGRAM(s) Oral daily  colchicine 0.3 milliGRAM(s) Oral daily  collagenase Ointment 1 Application(s) Topical daily  midodrine 30 milliGRAM(s) Oral every 8 hours  Nephro-deborah 1 Tablet(s) Oral daily  polyethylene glycol 3350 17 Gram(s) Oral every 12 hours  senna 2 Tablet(s) Oral at bedtime  sevelamer carbonate 1600 milliGRAM(s) Oral three times a day with meals    MEDICATIONS  (PRN):  acetaminophen     Tablet .. 650 milliGRAM(s) Oral every 6 hours PRN Temp greater or equal to 38C (100.4F), Moderate Pain (4 - 6)  calamine/zinc oxide Lotion 1 Application(s) Topical three times a day PRN Itching  HYDROmorphone   Tablet 2 milliGRAM(s) Oral every 4 hours PRN Severe Pain (7 - 10)  HYDROmorphone  Injectable 0.25 milliGRAM(s) IV Push every 4 hours PRN Pain      ALLERGIES:  Allergies    Ancef (Rash; Urticaria)  DDAVP (Hypotension)  iodine (Hives; Pruritus)  penicillin (Swelling)  sulfa drugs (Angioedema)    Intolerances        LABS:                        7.6    6.16  )-----------( 145      ( 17 Jan 2023 04:46 )             25.4     01-17    135  |  96  |  29<H>  ----------------------------<  93  3.9   |  27  |  6.03<H>    Ca    8.4      17 Jan 2023 04:46  Phos  4.3     01-17  Mg     1.7     01-17    TPro  5.3<L>  /  Alb  2.2<L>  /  TBili  0.2  /  DBili  x   /  AST  21  /  ALT  11  /  AlkPhos  65  01-17    PT/INR - ( 17 Jan 2023 04:46 )   PT: 16.2 sec;   INR: 1.36          PTT - ( 17 Jan 2023 04:46 )  PTT:36.2 sec      RADIOLOGY & ADDITIONAL TESTS: Reviewed. 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  Pt s/p vancomycin x2weeks (last day 1/15) for presumed breast cellulitis. Course c/b fevers on 1/17, BCx NGTD, monitoring off abx. Multiple services following throughout hospital course. Surg had been consulted for breast masses/breast cellulitis; s/p R breast bx with prelim read vasculitis vs granuloma, pending final read. Derm had also been consulted for b/l breast skin changes, no acute intervention, currently being followed by wound care. Vascular had been consulted for initial concern for AV fistula infection, does not think it is infected. Endocrine following for management of hyperparathyroidism. Gyn/onc had been consulted for R ovarian cyst and elevated tumor markers, likely benign, rec outpt follow up. Pt is medically stable and transferred to [facility] for further management.     INTERVAL HPI/OVERNIGHT EVENTS:  Overnight, pt refused medications due to severe pain. Pain regimen changed to Dilaudid 2mg PO prn for severe pain, Dilaudid 0.25mg IV q4h prn for pain, Tylenol 650mg q6h for mod pain.     SUBJECTIVE: Patient seen and examined at bedside. Pt denies breast pain or pain elsewhere currently.     VITAL SIGNS:  ICU Vital Signs Last 24 Hrs  T(C): 37.4 (18 Jan 2023 01:04), Max: 38.7 (17 Jan 2023 22:14)  T(F): 99.4 (18 Jan 2023 01:04), Max: 101.7 (17 Jan 2023 22:14)  HR: 82 (18 Jan 2023 05:00) (80 - 114)  BP: 98/53 (18 Jan 2023 05:00) (76/39 - 118/51)  BP(mean): 67 (18 Jan 2023 05:00) (52 - 73)  ABP: --  ABP(mean): --  RR: 18 (18 Jan 2023 05:00) (15 - 20)  SpO2: 99% (18 Jan 2023 05:00) (87% - 100%)    O2 Parameters below as of 18 Jan 2023 01:00      O2 Concentration (%): 40          01-16 @ 07:01  -  01-17 @ 07:00  --------------------------------------------------------  IN: 369 mL / OUT: 500 mL / NET: -131 mL      CAPILLARY BLOOD GLUCOSE          PHYSICAL EXAM:  Constitutional: middle-aged female with obese body habitus resting in bed  HEENT: no conjunctivitis or scleral icterus, oral mucosa moist  NECK: Supple, no JVD  CARDIAC: RRR, +S1/S2, no murmurs/rubs/gallops  PULM: Breathing comfortably on RA, clear to auscultation bilaterally, no wheezes/rales/rhonchi, +TDC to L chest wall   BREAST: b/l breasts with peeling epidermis to lower aspect of breasts  ABDOMEN: Soft, obese, nontender to palpation, +bs, no rebound tenderness or fluid wave, non-reducible ventral hernia  SKIN: b/l arms warm to touch; 3cm dehisced wound, nonpurulent, nonerythematous, to R medial upper arm near axilla  VASC:  no LE edema or tenderness; +L femoral line (placed 1/18)  EXT: RUE: mild improvement to edema to R proximal forearm; unchanged edema to R hand, slightly tense, non erythematous    MEDICATIONS:  MEDICATIONS  (STANDING):  apixaban 2.5 milliGRAM(s) Oral every 12 hours  atorvastatin 40 milliGRAM(s) Oral at bedtime  chlorhexidine 2% Cloths 1 Application(s) Topical <User Schedule>  cinacalcet 30 milliGRAM(s) Oral daily  clopidogrel Tablet 75 milliGRAM(s) Oral daily  colchicine 0.3 milliGRAM(s) Oral daily  collagenase Ointment 1 Application(s) Topical daily  midodrine 30 milliGRAM(s) Oral every 8 hours  Nephro-deborah 1 Tablet(s) Oral daily  polyethylene glycol 3350 17 Gram(s) Oral every 12 hours  senna 2 Tablet(s) Oral at bedtime  sevelamer carbonate 1600 milliGRAM(s) Oral three times a day with meals    MEDICATIONS  (PRN):  acetaminophen     Tablet .. 650 milliGRAM(s) Oral every 6 hours PRN Temp greater or equal to 38C (100.4F), Moderate Pain (4 - 6)  calamine/zinc oxide Lotion 1 Application(s) Topical three times a day PRN Itching  HYDROmorphone   Tablet 2 milliGRAM(s) Oral every 4 hours PRN Severe Pain (7 - 10)  HYDROmorphone  Injectable 0.25 milliGRAM(s) IV Push every 4 hours PRN Pain      ALLERGIES:  Allergies    Ancef (Rash; Urticaria)  DDAVP (Hypotension)  iodine (Hives; Pruritus)  penicillin (Swelling)  sulfa drugs (Angioedema)    Intolerances        LABS:                        7.6    6.16  )-----------( 145      ( 17 Jan 2023 04:46 )             25.4     01-17    135  |  96  |  29<H>  ----------------------------<  93  3.9   |  27  |  6.03<H>    Ca    8.4      17 Jan 2023 04:46  Phos  4.3     01-17  Mg     1.7     01-17    TPro  5.3<L>  /  Alb  2.2<L>  /  TBili  0.2  /  DBili  x   /  AST  21  /  ALT  11  /  AlkPhos  65  01-17    PT/INR - ( 17 Jan 2023 04:46 )   PT: 16.2 sec;   INR: 1.36          PTT - ( 17 Jan 2023 04:46 )  PTT:36.2 sec      RADIOLOGY & ADDITIONAL TESTS: Reviewed.

## 2023-01-18 NOTE — PROGRESS NOTE ADULT - ASSESSMENT
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    NEURO  #Pain Control  Pt in 10/10 pain of bilateral breasts, worst on R. Pain responsive to Dilaudid but effect wears off after ~2-3 hours.   - Pain management consulted, recs appreciated  - Dc'd PCA (1/16-1/17)  - c/w Dilaudid 2mg PO prn for severe pain, Dilaudid 0.25mg IV q4h prn for pain, Tylenol 650mg q6h for mod pain.   - HOLD ALL OPIOIDS IF RR<10, O2SAT <93%, SBP <96    PULM  #Atelectasis  Noted on CT chest to have atelectasis at b/l lung bases.  - Incentive spirometry    #Suspected LIZ  - c/w BiPAP 18/8 at night    CARDIOVASCULAR  #Shock  Pt presented meeting 2/4 SIRS. Hypotensive with MAPs to 60, requiring Levophed. Septic picture of unknown source. HR > 90, WBC >12. Lactate 1.9. Afebrile. Procal 12. S/p Vancomycin 1250mg x 1, Aztreonam x 1 (pt allergic to Cefazolin, PCN). . Ferritin 1209. Patient still requiring pressors, unknown source, does not appear to fit septic picture at this point (no fever, BCx negative, no focal area of infection identified thus far)  - off levophed since 1/16 PM   - Dc'd Florinef on 1/17  - c/w midodrine 30mg q8h (goal SBP >75)  - CT:  7.0 cm fluid collection is present near an AV fistula in the right upper arm. Possibly abscess. Drained by IR 1/5 with serosanguinous fluid: no organisms, few WBCs  - RUE dopplers (1/13): No significant change large complex fluid collection in proximal R arm, could represent old seroma/hematoma (However, prior ultrasound was done prior to drainage)   - Per vascular: does not think AV fistula is infected; would not remove it; also does NOT recommend drainage of seroma.   - per surg: prelim R breast bx shows vasculitis vs granuloma; unlikely to cause hypotension; f/u final bx results   - s/p Vancomycin x2 weeks (1/2-1/15 ) to tx presumed R breast cellulitis (1/2-1/15)  - BCx: NGTD  - corticotropin stim test wnl    #LVOT with LISETTE  likely i/s/o hyperdynamic LV function 2/2 shock and hypovolemia  - per cardiology, rec repeating TTE after shock resolves    #HFrEF  TTE 11/22 normal LV and systolic, EF 59%, grade II diastolic dysfuncton, dilated RV, reduced RV,   Home meds: Entresto 49/51mg  - hold Entresto i/s/o shock    #CAD  Hx of cardiac cath in 2018  Home meds: atorvastatin 40mg, plavix 75mg qd  - c/w home atorvastatin 40mg  - c/w home Plavix 75mg qd     GI  #Constipation  - c/w senna 2 tablets/qd and miralax    #Nutrition  Patient reports poor PO intake  - renal diet    RENAL  #ESRD 2/2 PCKD  Pt has been on HD for "years" 2/2 PCKD. AV Fistula of EDWIN clotted in 2014, has received HD thru HD cath since. New AV Fistula in 11/2022, not yet matured.   - f/u nephrology recs  - s/p HD 1/10, 1/13, 1/16, planned for HD today 1/18    #Hyperkalemia  Patient with Hx of ESRD with missed HD presents with K >8 on VBG with abdominal symptoms and missed HD x8 days. ICU consulted i/s/o hyperkalemia. Renal consulted.  - monitor with BMP's qd    #Hyperphosphatemia  - c/w sevelamer to 1600mg TID    ID  #Fever  Pt with T100.9F 1/17 AM. Unclear etiology. 1/3, 1/9 BCx neg. s/p vanc x2 weeks  - CXR no infiltrates, BCx NGTD   - R femoral line removed on 1/18; L femoral line placed on 1/18  - Monitor off abx for now     #Shock, presumed to be septic but no clear source/etiology   Pt presented in shock with leukocytosis, tachycardia, hypotension requiring pressors. Unknown source.   - see #shock for plan  - s/p vanc x2 weeks (last day 1/15) to tx presumed breast cellulitis   - gallium scan: faintly increased activity surrounding the entire lower portion of the right breast consistent with the inflammatory reaction seen on physical examination, small area of activity ~10cm to the R of midline at about the level of the lower border of the sternum, activity likely to be in chest wall   - PET/CT with increased FDG avidity within R axillary vein, which contains an occlusive thrombus, which may represent an infectious source. There is also an additional FDG avid region of the AV fistula near the arterial anastomosis proximal to the area of the fluid collection; could be a potential source of infection vs inflammatory reaction to the graft. Also a photopenic R adnexal cystic mass, suggestive of a benign etiology.     ENDO  #Hyperparathyroidism  At risk for secondary HPTT d/t renal failure   - pt has vague abdominal pain with nausea  - multiple brown tumors on CT with multiple fractures of pubic rami, pubic bone, thoracolumbar spine  - intact , Vit D 38.5  - SPEP/immunofixation negative   - endocrinology recs: secondary hyperparathyroidism vs other process causing osteoclastic tumors  - c/w Sensipar 30mg qd per endo recs (started 1/9)  - 1/11 AM intact  (baseline PTH on Sensipar )    #Multinodular Goiter  CT: enlarged multinodular thyroid projecting into superior mediastinum with 1.8 x 1.5 cm solid nodule slightly inferiorly in the anterior mediastinum.   - Thyroid ultrasound: Fine-needle aspiration recommended for 4.3 cm right thyroid and 2.6 cm left thyroid solid nodules per SCOTT guidelines.  - TSH 1.6 free T4 0.68.  - TSH could be inappropriately normal for low free T4 due to euthyroid sick syndrome.  - f/u TFTs after shock resolved    MSK  #Renal Osteodystrophy  - on CT: Increased density of the bone marrow is consistent with renal osteodystrophy  - Per endocrine, extent of bone destruction extreme given PTH level, suspect might not be Brown tumors    #RUE Edema  RUE became edematous 1/8 extending down through hand. AVF with drained seroma pocket on R arm.  - lymphedema vs bleeding into seroma pocket? vs DVT   - RUE U/S with persistent DVT to R IJ, subclavian, and axillary stent, and proximal brachial vein, similar extent to prior. No significant change large complex fluid collection in proximal R arm, could represent old seroma/hematoma  - per vascular: no plans to drain the seroma    #L toe pain  Pt c/o L toe pain, possibly gout. Now resolved  - c/w colchicine 0.3mg qd    HEME/ONC  #Vasculopathy   Current DVT of R IJ, R subclavian and R axillary veins. Hx of L AVF clotting. Given hx of clots, current pruritis and gallium scan uptake in chest wall, reasonable to workup coagulopathy  - transitioned from heparin gtt to Eliquis 2.5 BID  - OBDULIA, ANCA, complement, RF, beta2 glycoprotein, anticardiolipin nml    #Anemia  Hgb has downtrended from 9.8 on admission to 6.9. Baseline from November appears to be ~13. 1/9 Hb 6.9, ordered 1U pRBC after which pt likely had febrile nonhemolytic transfusion reaction (3 hrs into transfusion, developed tachycardia to 150s, HTN to 140s/90s, rigors, Trectal 101.3.) Transfusion stopped. Tylenol given. Sxs resolved.   - Ferritin elevated: 1209  - Total iron low (15), TIBC low (79)  - s/p 1U pRBC on 1/11 with Hb 6.9 > 7.9 w/o transfusion rxn    #Breast Pain, bilateral  Family hx of breast cancer in mother, sister.   - large R breast mass, palpable L breast mass, R breast has peau d'orange appearance with associated erythema, warmth on R  - US bilateral breasts: No sonographic evidence of breast abscess, cyst or focal mass to correspond with the clinical finding of bilateral breast masses.  - prelim breast bx by surg: granuloma vs vasculitis; f/u final     GYN  #Uterine mass  6.1cm mass in R adnexa seen incidentally on CTAP. Pt with hx of hysterectomy, unclear if partial or total.   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 TVUS c/w likely simple cyst, PET/CT w/ no uptake in R adnexa, low concern for malignancy. Rec outpt f/u when stable for repeat tumor markers.    DERM  #Peeling Epidermis to B/l Breasts  - Derm consulted, recs appreciated  - Wound care following, appreciate recs    Preventative  F: None  E: None, HD pt  N: Renal Restricted Diet  DVT: heparin @1700u/hr  Code Status: Full Code  Dispo: [facility]

## 2023-01-18 NOTE — PROCEDURE NOTE - ADDITIONAL PROCEDURE DETAILS
L femoral CVC placed via ultrasound, +blood return in all 3 lumens and flushes w/o difficulty  Wire visualized in L femoral vein under ultrasound in long and short axis views prior to dilation. Placement confirmed via 4 chamber view w fast flush/bubble study.

## 2023-01-18 NOTE — PROGRESS NOTE ADULT - SUBJECTIVE AND OBJECTIVE BOX
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  Pt s/p vancomycin x2weeks (last day 1/15) for presumed breast cellulitis. Course c/b fevers on 1/17, BCx NGTD, monitoring off abx. Multiple services following throughout hospital course. Surg had been consulted for breast masses/breast cellulitis; s/p R breast bx with prelim read vasculitis vs granuloma, pending final read. Derm had also been consulted for b/l breast skin changes, no acute intervention, currently being followed by wound care. Vascular had been consulted for initial concern for AV fistula infection, does not think it is infected. Endocrine following for management of hyperparathyroidism. Gyn/onc had been consulted for R ovarian cyst and elevated tumor markers, likely benign, rec outpt follow up. Pt is medically stable and transferred to [facility] for further management.     INTERVAL HPI/OVERNIGHT EVENTS:  Patient seen and examined at bedside. At time of encounter, pt undergoing dialysis, in NAD, with no acute medical complaints. Denies fever, chills, CP, SOB, palpitations, abdominal pain, n/v, changes in bowel/urinary habits, numbness, or tingling. 12pt ROS otherwise negative.      FAMILY HISTORY:  No pertinent family history in first degree relatives        VITAL SIGNS:  T(C): 36.6 (01-18-23 @ 16:12), Max: 38.7 (01-17-23 @ 22:14)  HR: 88 (01-18-23 @ 14:55) (81 - 90)  BP: 120/53 (01-18-23 @ 14:55) (89/39 - 120/53)  RR: 18 (01-18-23 @ 14:55) (14 - 20)  SpO2: 98% (01-18-23 @ 14:55) (87% - 100%)  Wt(kg): --Vital Signs Last 24 Hrs  T(C): 36.6 (18 Jan 2023 16:12), Max: 38.7 (17 Jan 2023 22:14)  T(F): 97.8 (18 Jan 2023 16:12), Max: 101.7 (17 Jan 2023 22:14)  HR: 88 (18 Jan 2023 14:55) (81 - 90)  BP: 120/53 (18 Jan 2023 14:55) (89/39 - 120/53)  BP(mean): 75 (18 Jan 2023 13:10) (55 - 76)  RR: 18 (18 Jan 2023 14:55) (14 - 20)  SpO2: 98% (18 Jan 2023 14:55) (87% - 100%)    Parameters below as of 18 Jan 2023 14:55  Patient On (Oxygen Delivery Method): nasal cannula  O2 Flow (L/min): 2    Ancef (Rash; Urticaria)  DDAVP (Hypotension)  iodine (Hives; Pruritus)  penicillin (Swelling)  sulfa drugs (Angioedema)      PHYSICAL EXAM:  Constitutional: middle-aged female with obese body habitus resting in bed  HEENT: no conjunctivitis or scleral icterus, oral mucosa moist  NECK: Supple, no JVD  CARDIAC: RRR, +S1/S2, no murmurs/rubs/gallops  PULM: Breathing comfortably on RA, clear to auscultation bilaterally, no wheezes/rales/rhonchi, +TDC to L chest wall   BREAST: b/l breasts with peeling epidermis to lower aspect of breasts  ABDOMEN: Soft, obese, nontender to palpation, +bs, no rebound tenderness or fluid wave, non-reducible ventral hernia  SKIN: b/l arms warm to touch; 3cm dehisced wound, nonpurulent, nonerythematous, to R medial upper arm near axilla  VASC:  no LE edema or tenderness; +L femoral line (placed 1/18)  EXT: RUE: mild improvement to edema to R proximal forearm; unchanged edema to R hand, slightly tense, non erythematous      Consultant(s) Notes Reviewed:  [x ] YES  [ ] NO  Care Discussed with Consultants/Other Providers [ x] YES  [ ] NO    LABS:                        7.1    5.22  )-----------( 164      ( 18 Jan 2023 05:30 )             23.9     01-18    135  |  96  |  39<H>  ----------------------------<  84  3.8   |  26  |  7.38<H>    Ca    8.8      18 Jan 2023 05:30  Phos  4.9     01-18  Mg     1.7     01-18    TPro  5.3<L>  /  Alb  2.2<L>  /  TBili  0.2  /  DBili  x   /  AST  21  /  ALT  11  /  AlkPhos  65  01-17      PT/INR - ( 17 Jan 2023 04:46 )   PT: 16.2 sec;   INR: 1.36          PTT - ( 17 Jan 2023 04:46 )  PTT:36.2 sec  CAPILLARY BLOOD GLUCOSE                              I & O Summary:      Microbiology:    Culture - Blood (collected 01-17-23 @ 20:10)  Source: .Blood Blood  Preliminary Report (01-18-23 @ 09:01):    No growth at 12 hours    Culture - Blood (collected 01-17-23 @ 20:10)  Source: .Blood Blood  Preliminary Report (01-18-23 @ 09:01):    No growth at 12 hours        Culture - Blood (collected 17 Jan 2023 20:10)  Source: .Blood Blood  Preliminary Report (18 Jan 2023 09:01):    No growth at 12 hours    Culture - Blood (collected 17 Jan 2023 20:10)  Source: .Blood Blood  Preliminary Report (18 Jan 2023 09:01):    No growth at 12 hours        RADIOLOGY, EKG AND ADDITIONAL TESTS: Reviewed.      Culture - Blood (collected 01-17-23 @ 20:10)  Source: .Blood Blood  Preliminary Report (01-18-23 @ 09:01):    No growth at 12 hours    Culture - Blood (collected 01-17-23 @ 20:10)  Source: .Blood Blood  Preliminary Report (01-18-23 @ 09:01):    No growth at 12 hours      RADIOLOGY & ADDITIONAL TESTS:    Imaging Personally Reviewed:  [ ] YES  [ ] NO  acetaminophen     Tablet .. 650 milliGRAM(s) Oral every 6 hours PRN  apixaban 2.5 milliGRAM(s) Oral every 12 hours  atorvastatin 40 milliGRAM(s) Oral at bedtime  calamine/zinc oxide Lotion 1 Application(s) Topical three times a day PRN  chlorhexidine 2% Cloths 1 Application(s) Topical <User Schedule>  chlorhexidine 2% Cloths 1 Application(s) Topical <User Schedule>  cinacalcet 30 milliGRAM(s) Oral daily  clopidogrel Tablet 75 milliGRAM(s) Oral daily  colchicine 0.3 milliGRAM(s) Oral daily  collagenase Ointment 1 Application(s) Topical daily  HYDROmorphone   Tablet 2 milliGRAM(s) Oral every 4 hours PRN  HYDROmorphone  Injectable 0.25 milliGRAM(s) IV Push every 4 hours PRN  midodrine 30 milliGRAM(s) Oral every 8 hours  Nephro-deborah 1 Tablet(s) Oral daily  polyethylene glycol 3350 17 Gram(s) Oral every 12 hours  senna 2 Tablet(s) Oral at bedtime  sevelamer carbonate 1600 milliGRAM(s) Oral three times a day with meals  sodium chloride 0.9% lock flush 10 milliLiter(s) IV Push every 1 hour PRN      HEALTH ISSUES - PROBLEM Dx:  Pain in breast    Hypotension    ESRD on dialysis    Debility    Goals of care, counseling/discussion    Encounter for palliative care

## 2023-01-18 NOTE — PROGRESS NOTE ADULT - ASSESSMENT
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      - F/U final right breast biopsy pathology -- preliminary result shows vasculitis vs granulomatous mastitis.  - F/u rheumatology recommendations for possible granulomatous mastitis  - F/U Cx results (NGTD so far). ABx held after 1/13/23 as per primary team.  - Appreciate GYN recommendations for adnexal mass and elevated tumor markers.   - Dressed right breast with Xeroform and gauze with paper tape today. Will change daily.   - Wound care for access site ulceration as per vascular surgery.   - Patient to be stepped down today as per primary team.  - Surgery 5C following.

## 2023-01-18 NOTE — CHART NOTE - NSCHARTNOTEFT_GEN_A_CORE
Right femoral TLC catheter removed (new left femoral TLC placed).  Pressure held until hemostasis achieved (~15 min manual pressure applied).  Right lower extremity peripheral neurovascular assessment WDL.  RN notified of line removal and new line placement.  Dressing applied.

## 2023-01-18 NOTE — PROGRESS NOTE ADULT - ASSESSMENT
67 F with ESRD on HD presented for missed HD found to be hyperkalemic c/b septic shock of unknown etiology, pending w/u for granulomatous mastitis    Assessment/Plan:   #ESRD on HD TTS  HD today; schedule modified while in house  K reviewed 3.8  Renal diet    #Hx of Hypertension  -Will require midodrine 1/2 hr before HD    #access   [facility] TDC - does not appear to be infected or source of infxn  RUE AVF not being used    #anemia  Hgb 7.1  -Epo w/ HD  -Transfusion goals as per primary team  -Iron profile noted    #renal bone disease   Noted to have brown tumors on most recent CT, in addition to a lytic lesion of her skull. Likely due to hyperparathyroidism. PTH noted to be 479 on most recent check.   Ca ~10.4 corrected for albumin  Phos ~4.9  -c/w sevelamer to 1600mg tid w/ meals  -increase sensipar 60mg Qday  -Repeat PTH, will consider increasing sensipar

## 2023-01-19 NOTE — PROGRESS NOTE ADULT - PROBLEM SELECTOR PLAN 1
Patient presented with bilateral breast pain with large R breast mass and palpable L breast mass. Surgery was consulted and recommend nonurgent outpatient mammogram when clinically stable. 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 Patient still complaining of pain that is not well managed. States that PO dose helps with pain but only lasts 2.5 hours and the IV 0.25 mg dose does not help. In the past 24 hrs, received 6 mg of oral dilaudid and 1 mg of IV dilaudid.     - Suggest pain regimen of 0.25 mg IV dilaudid q4h PRN for moderate pain and 0.5 mg of IV dilaudid q4h PRN for severe pain  - Will need to find appropriate dose to manage pain and eventual transition to PO medication    Hold for hypotension, sedation and RR <10  - Would do a trial of topical lidocaine 4% cream TID PRN to b/l breasts.

## 2023-01-19 NOTE — PROGRESS NOTE ADULT - SUBJECTIVE AND OBJECTIVE BOX
--------------------------------------------------------------------------------  Chief Complaint: ESRD/Ongoing hemodialysis requirement    24 hour events/subjective:    Seen this morning, upgraded back to ICU due to hypotension. Unclear as to why patient with such profound hypotension, receiving 1U PRBC today. Discussed with team may warrant neurological w/u to determine if autonomic neuropathy    PAST HISTORY  --------------------------------------------------------------------------------  No significant changes to PMH, PSH, FHx, SHx, unless otherwise noted    ALLERGIES & MEDICATIONS  --------------------------------------------------------------------------------  Allergies    Ancef (Rash; Urticaria)  DDAVP (Hypotension)  iodine (Hives; Pruritus)  penicillin (Swelling)  sulfa drugs (Angioedema)    Intolerances      Standing Inpatient Medications  apixaban 2.5 milliGRAM(s) Oral every 12 hours  atorvastatin 40 milliGRAM(s) Oral at bedtime  chlorhexidine 2% Cloths 1 Application(s) Topical <User Schedule>  chlorhexidine 2% Cloths 1 Application(s) Topical <User Schedule>  cinacalcet 30 milliGRAM(s) Oral daily  clopidogrel Tablet 75 milliGRAM(s) Oral daily  colchicine 0.3 milliGRAM(s) Oral daily  collagenase Ointment 1 Application(s) Topical daily  midodrine 30 milliGRAM(s) Oral every 8 hours  Nephro-deborah 1 Tablet(s) Oral daily  polyethylene glycol 3350 17 Gram(s) Oral every 12 hours  senna 2 Tablet(s) Oral at bedtime  sevelamer carbonate 1600 milliGRAM(s) Oral three times a day with meals    PRN Inpatient Medications  acetaminophen     Tablet .. 650 milliGRAM(s) Oral every 6 hours PRN  calamine/zinc oxide Lotion 1 Application(s) Topical three times a day PRN  HYDROmorphone   Tablet 2 milliGRAM(s) Oral every 4 hours PRN  HYDROmorphone  Injectable 0.25 milliGRAM(s) IV Push every 4 hours PRN  sodium chloride 0.9% lock flush 10 milliLiter(s) IV Push every 1 hour PRN      REVIEW OF SYSTEMS  --------------------------------------------------------------------------------  All other systems were reviewed and are negative, except as noted.    VITALS/PHYSICAL EXAM  --------------------------------------------------------------------------------  T(C): 37.8 (01-19-23 @ 11:00), Max: 38.3 (01-19-23 @ 01:00)  HR: 89 (01-19-23 @ 11:00) (82 - 98)  BP: 104/44 (01-19-23 @ 11:00) (66/30 - 130/50)  RR: 13 (01-19-23 @ 11:00) (13 - 20)  SpO2: 100% (01-19-23 @ 11:00) (95% - 100%)  Wt(kg): --  Drug Dosing Weight  Height (cm): 177.8 (01 Jan 2023 20:52)  Weight (kg): 85.3 (01 Jan 2023 20:52)  BMI (kg/m2): 27 (01 Jan 2023 20:52)  BSA (m2): 2.03 (01 Jan 2023 20:52)        01-18-23 @ 07:01  -  01-19-23 @ 07:00  --------------------------------------------------------  IN: 400 mL / OUT: 400 mL / NET: 0 mL    PHYSICAL EXAM:  GENERAL: NAD resting in bed   CHEST/LUNG: Clear to auscultation bilaterally; no accessory muscle use   HEART: normal S1S2, RRR  ABDOMEN: Soft, Nontender, +BS,   EXTREMITIES: No clubbing, cyanosis, or edema   ACCESS: R TDC    LABS/STUDIES  --------------------------------------------------------------------------------              6.8    5.09  >-----------<  181      [01-19-23 @ 07:46]              23.2     133  |  95  |  30  ----------------------------<  93      [01-19-23 @ 07:46]  3.7   |  26  |  5.83        Ca     8.5     [01-19-23 @ 07:46]      Mg     1.6     [01-19-23 @ 07:46]      Phos  3.4     [01-19-23 @ 07:46]      PT/INR: PT 21.1 , INR 1.76       [01-19-23 @ 07:46]  PTT: 44.3       [01-19-23 @ 07:46]      Iron 15, TIBC 79, %sat 19      [01-07-23 @ 06:01]  Ferritin 1209      [01-07-23 @ 06:01]  PTH -- (Ca 8.5)      [01-18-23 @ 05:30]   351  PTH -- (Ca 7.6)      [01-11-23 @ 04:35]   532  PTH -- (Ca 8.8)      [01-03-23 @ 14:24]   479  Vitamin D (25OH) 38.5      [01-05-23 @ 05:30]  TSH 1.610      [01-03-23 @ 05:30]    HBsAb Nonreact      [01-02-23 @ 02:26]  HBsAg Nonreact      [01-02-23 @ 02:26]  HCV 0.04, Nonreact      [01-02-23 @ 02:26]      RADIOLOGY:  --------------------------------------------------------------------------------------

## 2023-01-19 NOTE — PROGRESS NOTE ADULT - PROBLEM SELECTOR PLAN 2
Patient presented after missed HD for emergent dialysis on 1/2. Since admission, patient has been persistently hypotensive. Weaned off pressors. On PO midodrone.   - Continue care as per primary team

## 2023-01-19 NOTE — PROGRESS NOTE ADULT - ASSESSMENT
67 F with ESRD on HD presented for missed HD found to be hyperkalemic c/b septic shock of unknown etiology, pending w/u for granulomatous mastitis    Assessment/Plan:   #ESRD on HD TTS  Will do HD on 1/20  K reviewed 3.7  Renal diet    #Hx of Hypertension  Has had profound hypotension during this admission requiring pressors  -Gets midodrine 1/2 hr before HD  -Consider neuro eval for possible autonomic neuropathy    #access   [facility] TDC - does not appear to be infected or source of infxn  RUE AVF not being used    #anemia  Hgb 6.8  -Epo w/ HD  -Transfusion goals as per primary team  -Iron profile noted    #renal bone disease   Noted to have brown tumors on most recent CT, in addition to a lytic lesion of her skull. Likely due to hyperparathyroidism. PTH noted to be 479 on most recent check.   Ca ~10.1 corrected for albumin  Phos ~3.4  -c/w sevelamer to 1600mg tid w/ meals  -increase sensipar 60mg Qday

## 2023-01-19 NOTE — PROGRESS NOTE ADULT - ASSESSMENT
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      - F/U final right breast biopsy pathology -- preliminary result shows vasculitis vs granulomatous mastitis. Final report to result today?  - F/u rheumatology recommendations for possible granulomatous mastitis  - F/U Cx results (NGTD so far). ABx held after 1/13/23 as per primary team.  - Appreciate GYN recommendations for adnexal mass and elevated tumor markers.   - Dress right breast with Xeroform and gauze with paper tape. To be changed daily.   - Wound care for access site ulceration as per vascular surgery.   - Patient to be stepped down today as per primary team.  - Surgery 5C following.

## 2023-01-19 NOTE — PROGRESS NOTE ADULT - SUBJECTIVE AND OBJECTIVE BOX
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      EVENTS:   - Biopsy taken uneventfully at bedside 1/9/23- preliminary pathology showed granulomas, pending final report.  - Phenylephrine gtt off x 54 hrs  - Febrile overnight to 38.3 tmax  - MOved back to MICU after step down last night for brief hypotensive episode that resolved spontaneously. Currently not requiring vasopressors.    SUBJECTIVE:   - Notes minimal right breast pain today  - Denies CP, SOB  - Denies worsening right new discharge.    MEDICATIONS  (STANDING):  apixaban 2.5 milliGRAM(s) Oral every 12 hours  atorvastatin 40 milliGRAM(s) Oral at bedtime  chlorhexidine 2% Cloths 1 Application(s) Topical <User Schedule>  chlorhexidine 2% Cloths 1 Application(s) Topical <User Schedule>  cinacalcet 30 milliGRAM(s) Oral once  clopidogrel Tablet 75 milliGRAM(s) Oral daily  colchicine 0.3 milliGRAM(s) Oral daily  collagenase Ointment 1 Application(s) Topical daily  midodrine 30 milliGRAM(s) Oral every 8 hours  Nephro-deborah 1 Tablet(s) Oral daily  polyethylene glycol 3350 17 Gram(s) Oral every 12 hours  senna 2 Tablet(s) Oral at bedtime  sevelamer carbonate 1600 milliGRAM(s) Oral three times a day with meals    MEDICATIONS  (PRN):  acetaminophen     Tablet .. 650 milliGRAM(s) Oral every 6 hours PRN Temp greater or equal to 38C (100.4F), Moderate Pain (4 - 6)  calamine/zinc oxide Lotion 1 Application(s) Topical three times a day PRN Itching  HYDROmorphone   Tablet 2 milliGRAM(s) Oral every 4 hours PRN Severe Pain (7 - 10)  HYDROmorphone  Injectable 0.25 milliGRAM(s) IV Push every 4 hours PRN Pain  sodium chloride 0.9% lock flush 10 milliLiter(s) IV Push every 1 hour PRN Pre/post blood products, medications, blood draw, and to maintain line patency      Vital Signs Last 24 Hrs  T(C): 37.8 (19 Jan 2023 11:00), Max: 38.3 (19 Jan 2023 01:00)  T(F): 100.1 (19 Jan 2023 11:00), Max: 100.9 (19 Jan 2023 01:00)  HR: 89 (19 Jan 2023 11:00) (82 - 98)  BP: 104/44 (19 Jan 2023 11:00) (66/30 - 130/50)  BP(mean): 63 (19 Jan 2023 11:00) (42 - 83)  RR: 13 (19 Jan 2023 11:00) (13 - 20)  SpO2: 100% (19 Jan 2023 11:00) (95% - 100%)    Parameters below as of 19 Jan 2023 11:00  Patient On (Oxygen Delivery Method): nasal cannula  O2 Flow (L/min): 2        Gen: Awake, alert, NAD, resting comfortably   Breast: non-soiled gauze overlying incisional bx site on R breast, mild induration surrounding bx site stable. B/l breasts soft with underlying firmness, mildly tender to palpation this morning.   CV: RRR  Pulm: no respiratory distress on RA  Abd: soft, ND, NTTP, no rebound or guarding   Ext: WWP, ulceration of medial aspect of R arm over access site. L chest HD catheter without edema/induration/purulence.           I&O's Detail    18 Jan 2023 07:01  -  19 Jan 2023 07:00  --------------------------------------------------------  IN:    Other (mL): 400 mL  Total IN: 400 mL    OUT:    Other (mL): 400 mL  Total OUT: 400 mL    Total NET: 0 mL          LABS:                        6.8    5.09  )-----------( 181      ( 19 Jan 2023 07:46 )             23.2     01-19    133<L>  |  95<L>  |  30<H>  ----------------------------<  93  3.7   |  26  |  5.83<H>    Ca    8.5      19 Jan 2023 07:46  Phos  3.4     01-19  Mg     1.6     01-19      PT/INR - ( 19 Jan 2023 07:46 )   PT: 21.1 sec;   INR: 1.76          PTT - ( 19 Jan 2023 07:46 )  PTT:44.3 sec      RADIOLOGY & ADDITIONAL STUDIES:

## 2023-01-19 NOTE — PROGRESS NOTE ADULT - SUBJECTIVE AND OBJECTIVE BOX
alert  no pain  off pressors  T 100.1  RT ue: + Pulse and Dopp signal over avg. Ulcer - increased gran less eschar. Edematous but soft  5>23<  BC and Fluid cx - negative  A/P: continue wound care, follow course

## 2023-01-19 NOTE — PROGRESS NOTE ADULT - ATTENDING COMMENTS
Shock unknown etiology s/p midodrine and florinef. She was found to be hypotensive on tele floor and transferred to ICU. Patient did not need pressor while in ICU. Labs, tele unit note and radiology were reviewed. Rest as above.

## 2023-01-19 NOTE — PROGRESS NOTE ADULT - ASSESSMENT
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    NEURO  #Pain Control  Pt in 10/10 pain of bilateral breasts, worst on R. Pain responsive to Dilaudid but effect wears off after ~2-3 hours.   - Pain management consulted, recs appreciated  - Dc'd PCA (1/16-1/17)  - c/w Dilaudid 2mg PO prn for severe pain, Dilaudid 0.25mg IV q4h prn for pain, Tylenol 650mg q6h for mod pain.   - HOLD ALL OPIOIDS IF RR<10, O2SAT <93%, SBP <96    PULM  #Atelectasis  Noted on CT chest to have atelectasis at b/l lung bases.  - Incentive spirometry    #Suspected LIZ  - c/w BiPAP 18/8 at night    CARDIOVASCULAR  #Shock  Pt presented meeting 2/4 SIRS. Hypotensive with MAPs to 60, requiring Levophed. Septic picture of unknown source. HR > 90, WBC >12. Lactate 1.9. Afebrile. Procal 12. S/p Vancomycin 1250mg x 1, Aztreonam x 1 (pt allergic to Cefazolin, PCN). . Ferritin 1209. Patient still requiring pressors, unknown source, does not appear to fit septic picture at this point (no fever, BCx negative, no focal area of infection identified thus far)  - off levophed since 1/16 PM   - Dc'd Florinef on 1/17  - c/w midodrine 30mg q8h (goal SBP >75)  - CT:  7.0 cm fluid collection is present near an AV fistula in the right upper arm. Possibly abscess. Drained by IR 1/5 with serosanguinous fluid: no organisms, few WBCs  - RUE dopplers (1/13): No significant change large complex fluid collection in proximal R arm, could represent old seroma/hematoma (However, prior ultrasound was done prior to drainage)   - Per vascular: does not think AV fistula is infected; would not remove it; also does NOT recommend drainage of seroma.   - per surg: prelim R breast bx shows vasculitis vs granuloma; unlikely to cause hypotension; f/u final bx results   - s/p Vancomycin x2 weeks (1/2-1/15 ) to tx presumed R breast cellulitis (1/2-1/15)  - BCx: NGTD  - corticotropin stim test wnl    #LVOT with LISETTE  likely i/s/o hyperdynamic LV function 2/2 shock and hypovolemia  - per cardiology, rec repeating TTE after shock resolves    #HFrEF  TTE 11/22 normal LV and systolic, EF 59%, grade II diastolic dysfuncton, dilated RV, reduced RV,   Home meds: Entresto 49/51mg  - hold Entresto i/s/o shock    #CAD  Hx of cardiac cath in 2018  Home meds: atorvastatin 40mg, plavix 75mg qd  - c/w home atorvastatin 40mg  - c/w home Plavix 75mg qd     GI  #Constipation  - c/w senna 2 tablets/qd and miralax    #Nutrition  Patient reports poor PO intake  - renal diet    RENAL  #ESRD 2/2 PCKD  Pt has been on HD for "years" 2/2 PCKD. AV Fistula of EDWIN clotted in 2014, has received HD thru HD cath since. New AV Fistula in 11/2022, not yet matured.   - f/u nephrology recs  - s/p HD 1/10, 1/13, 1/16, 1/18    #Hyperkalemia  Patient with Hx of ESRD with missed HD presents with K >8 on VBG with abdominal symptoms and missed HD x8 days. ICU consulted i/s/o hyperkalemia. Renal consulted.  - monitor with BMP's qd    #Hyperphosphatemia  - c/w sevelamer to 1600mg TID    ID  #Fever  Pt with T100.9F 1/17 AM. Unclear etiology. 1/3, 1/9 BCx neg. s/p vanc x2 weeks  - CXR no infiltrates, BCx NGTD   - R femoral line removed on 1/18; L femoral line placed on 1/18  - Monitor off abx for now     #Shock, presumed to be septic but no clear source/etiology   Pt presented in shock with leukocytosis, tachycardia, hypotension requiring pressors. Unknown source.   - see #shock for plan  - s/p vanc x2 weeks (last day 1/15) to tx presumed breast cellulitis   - gallium scan: faintly increased activity surrounding the entire lower portion of the right breast consistent with the inflammatory reaction seen on physical examination, small area of activity ~10cm to the R of midline at about the level of the lower border of the sternum, activity likely to be in chest wall   - PET/CT with increased FDG avidity within R axillary vein, which contains an occlusive thrombus, which may represent an infectious source. There is also an additional FDG avid region of the AV fistula near the arterial anastomosis proximal to the area of the fluid collection; could be a potential source of infection vs inflammatory reaction to the graft. Also a photopenic R adnexal cystic mass, suggestive of a benign etiology.     ENDO  #Hyperparathyroidism  At risk for secondary HPTT d/t renal failure   - pt has vague abdominal pain with nausea  - multiple brown tumors on CT with multiple fractures of pubic rami, pubic bone, thoracolumbar spine  - intact , Vit D 38.5  - SPEP/immunofixation negative   - endocrinology recs: secondary hyperparathyroidism vs other process causing osteoclastic tumors  - c/w Sensipar 30mg qd per endo recs (started 1/9)  - 1/11 AM intact  (baseline PTH on Sensipar )    #Multinodular Goiter  CT: enlarged multinodular thyroid projecting into superior mediastinum with 1.8 x 1.5 cm solid nodule slightly inferiorly in the anterior mediastinum.   - Thyroid ultrasound: Fine-needle aspiration recommended for 4.3 cm right thyroid and 2.6 cm left thyroid solid nodules per SCOTT guidelines.  - TSH 1.6 free T4 0.68.  - TSH could be inappropriately normal for low free T4 due to euthyroid sick syndrome.  - f/u TFTs after shock resolved    MSK  #Renal Osteodystrophy  - on CT: Increased density of the bone marrow is consistent with renal osteodystrophy  - Per endocrine, extent of bone destruction extreme given PTH level, suspect might not be Brown tumors    #RUE Edema  RUE became edematous 1/8 extending down through hand. AVF with drained seroma pocket on R arm.  - lymphedema vs bleeding into seroma pocket? vs DVT   - RUE U/S with persistent DVT to R IJ, subclavian, and axillary stent, and proximal brachial vein, similar extent to prior. No significant change large complex fluid collection in proximal R arm, could represent old seroma/hematoma  - per vascular: no plans to drain the seroma    #L toe pain  Pt c/o L toe pain, possibly gout. Now resolved  - c/w colchicine 0.3mg qd    HEME/ONC  #Vasculopathy   Current DVT of R IJ, R subclavian and R axillary veins. Hx of L AVF clotting. Given hx of clots, current pruritis and gallium scan uptake in chest wall, reasonable to workup coagulopathy  - transitioned from heparin gtt to Eliquis 2.5 BID  - OBDULIA, ANCA, complement, RF, beta2 glycoprotein, anticardiolipin nml    #Anemia  Hgb has downtrended from 9.8 on admission to 6.9. Baseline from November appears to be ~13. 1/9 Hb 6.9, ordered 1U pRBC after which pt likely had febrile nonhemolytic transfusion reaction (3 hrs into transfusion, developed tachycardia to 150s, HTN to 140s/90s, rigors, Trectal 101.3.) Transfusion stopped. Tylenol given. Sxs resolved.   - Ferritin elevated: 1209  - Total iron low (15), TIBC low (79)  - s/p 1U pRBC on 1/11 with Hb 6.9 > 7.9 w/o transfusion rxn    #Breast Pain, bilateral  Family hx of breast cancer in mother, sister.   - large R breast mass, palpable L breast mass, R breast has peau d'orange appearance with associated erythema, warmth on R  - US bilateral breasts: No sonographic evidence of breast abscess, cyst or focal mass to correspond with the clinical finding of bilateral breast masses.  - prelim breast bx by surg: granuloma vs vasculitis; f/u final     GYN  #Uterine mass  6.1cm mass in R adnexa seen incidentally on CTAP. Pt with hx of hysterectomy, unclear if partial or total.   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 TVUS c/w likely simple cyst, PET/CT w/ no uptake in R adnexa, low concern for malignancy. Rec outpt f/u when stable for repeat tumor markers.    DERM  #Peeling Epidermis to B/l Breasts  - Derm consulted, recs appreciated  - Wound care following, appreciate recs    Preventative  F: None  E: None, HD pt  N: Renal Restricted Diet  DVT: heparin @1700u/hr  Code Status: Full Code  [facility]

## 2023-01-19 NOTE — PROGRESS NOTE ADULT - ASSESSMENT
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 During imaging, CT showing 7.0 cm fluid collection is present near an AV fistula in the right upper arm. The differential for this collection included benign post-operative hematoma or seroma, but also abscess or vascular device infection, particularly in the setting of septic shock. Vascular consulted for RUE fistula evaluation to rule out this area as a source of sepsis. Reassuring physical exam at initial evaluation without clear indicia of infection related to the graph, small superficial surgical incision dehiscence notwithstanding. Now s/p IR image guided aspiration of perigraft fluid (negative) and gadolinium scan (negative). At this point, do not suspect a graft infection as the provoking factor leading to the patient's presentation. New onset swelling in area of RUE fistula is likely a sterile seroma with no intervention needed.    Recommendations:    -Continue to follow up BCx and IR Cx - both NGTD (given negative blood and wound drainage cultures, low likelihood of graft infection)  -Wound care recs: BID dressing changes (once daily with Santyl DSD once daily with Bactroban DSD). Apply light ace wrap compression to right hand and forearm to help with soft tissue swelling and elevate RUE with pillows  -Fluid collection near RUE fistula is likely a sterile seroma and unlikely the cause of patient's sepsis. Would not recommend percutaneous drainage or IR procedure to drain fluid.  -F/u breast punch and core biopsy  -Appreciate breast surgery recs  -Rest of care per primary team  -Vascular surgery Team 3C will continue to follow. Please call [phone] with any questions or concerns.

## 2023-01-19 NOTE — PROGRESS NOTE ADULT - SUBJECTIVE AND OBJECTIVE BOX
** INCOMPLETE **     INTERVAL HPI/OVERNIGHT EVENTS:    SUBJECTIVE: Patient seen and examined at bedside.    VITAL SIGNS:  ICU Vital Signs Last 24 Hrs  T(C): 37.8 (19 Jan 2023 11:00), Max: 38.3 (19 Jan 2023 01:00)  T(F): 100.1 (19 Jan 2023 11:00), Max: 100.9 (19 Jan 2023 01:00)  HR: 99 (19 Jan 2023 12:00) (82 - 99)  BP: 129/53 (19 Jan 2023 12:00) (66/30 - 130/50)  BP(mean): 76 (19 Jan 2023 12:00) (42 - 83)  ABP: --  ABP(mean): --  RR: 30 (19 Jan 2023 12:00) (13 - 30)  SpO2: 100% (19 Jan 2023 12:00) (95% - 100%)    O2 Parameters below as of 19 Jan 2023 12:00  Patient On (Oxygen Delivery Method): nasal cannula  O2 Flow (L/min): 2            01-18 @ 07:01  -  01-19 @ 07:00  --------------------------------------------------------  IN: 400 mL / OUT: 400 mL / NET: 0 mL      CAPILLARY BLOOD GLUCOSE          PHYSICAL EXAM:    Constitutional: NAD  HEENT: NC/AT; PERRL, anicteric sclera; MMM  Neck: supple, no JVD  Cardiovascular: +S1/S2, RRR  Respiratory: CTA B/L, no W/R/R  Gastrointestinal: abdomen soft, NT/ND; no rebound or guarding; +BSx4  Genitourinary: no suprapubic tenderness or fullness  Extremities: WWP; no LE edema; no clubbing or cyanosis  Vascular: 2+ radial, DP/PT and femoral pulses B/L  Dermatologic: normal color and turgor; no visible rashes  Neurological:     MEDICATIONS:  MEDICATIONS  (STANDING):  apixaban 2.5 milliGRAM(s) Oral every 12 hours  atorvastatin 40 milliGRAM(s) Oral at bedtime  chlorhexidine 2% Cloths 1 Application(s) Topical <User Schedule>  chlorhexidine 2% Cloths 1 Application(s) Topical <User Schedule>  cinacalcet 30 milliGRAM(s) Oral once  clopidogrel Tablet 75 milliGRAM(s) Oral daily  colchicine 0.3 milliGRAM(s) Oral daily  collagenase Ointment 1 Application(s) Topical daily  midodrine 30 milliGRAM(s) Oral every 8 hours  Nephro-deborah 1 Tablet(s) Oral daily  polyethylene glycol 3350 17 Gram(s) Oral every 12 hours  senna 2 Tablet(s) Oral at bedtime  sevelamer carbonate 1600 milliGRAM(s) Oral three times a day with meals    MEDICATIONS  (PRN):  acetaminophen     Tablet .. 650 milliGRAM(s) Oral every 6 hours PRN Temp greater or equal to 38C (100.4F), Moderate Pain (4 - 6)  calamine/zinc oxide Lotion 1 Application(s) Topical three times a day PRN Itching  HYDROmorphone   Tablet 2 milliGRAM(s) Oral every 4 hours PRN Severe Pain (7 - 10)  HYDROmorphone  Injectable 0.25 milliGRAM(s) IV Push every 4 hours PRN Pain  sodium chloride 0.9% lock flush 10 milliLiter(s) IV Push every 1 hour PRN Pre/post blood products, medications, blood draw, and to maintain line patency      ALLERGIES:  Allergies    Ancef (Rash; Urticaria)  DDAVP (Hypotension)  iodine (Hives; Pruritus)  penicillin (Swelling)  sulfa drugs (Angioedema)    Intolerances        LABS:                        6.8    5.09  )-----------( 181      ( 19 Jan 2023 07:46 )             23.2     01-19    133<L>  |  95<L>  |  30<H>  ----------------------------<  93  3.7   |  26  |  5.83<H>    Ca    8.5      19 Jan 2023 07:46  Phos  3.4     01-19  Mg     1.6     01-19      PT/INR - ( 19 Jan 2023 07:46 )   PT: 21.1 sec;   INR: 1.76          PTT - ( 19 Jan 2023 07:46 )  PTT:44.3 sec      RADIOLOGY & ADDITIONAL TESTS: Reviewed. 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  Pt s/p vancomycin x2weeks (last day 1/15) for presumed breast cellulitis. Course c/b fevers on 1/17, BCx NGTD, monitoring off abx. Multiple services following throughout hospital course. Surg had been consulted for breast masses/breast cellulitis; s/p R breast bx with prelim read vasculitis vs granuloma, pending final read. Derm had also been consulted for b/l breast skin changes, no acute intervention, currently being followed by wound care. Vascular had been consulted for initial concern for AV fistula infection, does not think it is infected. Endocrine following for management of hyperparathyroidism. Gyn/onc had been consulted for R ovarian cyst and elevated tumor markers, likely benign, rec outpt follow up. Pt was stepped down to telemetry on 1/18. On 1/19 AM, BP check on b/l legs demonstrated BPs 60s/30s, asymptomatic, AM Hb 6.8, 1U pRBC ordered, and pt was transferred back to MICU for further management.     SUBJECTIVE: Patient seen and examined at bedside. Pt resting in bed, has no complaints currently.     VITAL SIGNS:  ICU Vital Signs Last 24 Hrs  T(C): 37.8 (19 Jan 2023 11:00), Max: 38.3 (19 Jan 2023 01:00)  T(F): 100.1 (19 Jan 2023 11:00), Max: 100.9 (19 Jan 2023 01:00)  HR: 99 (19 Jan 2023 12:00) (82 - 99)  BP: 129/53 (19 Jan 2023 12:00) (66/30 - 130/50)  BP(mean): 76 (19 Jan 2023 12:00) (42 - 83)  ABP: --  ABP(mean): --  RR: 30 (19 Jan 2023 12:00) (13 - 30)  SpO2: 100% (19 Jan 2023 12:00) (95% - 100%)    O2 Parameters below as of 19 Jan 2023 12:00  Patient On (Oxygen Delivery Method): nasal cannula  O2 Flow (L/min): 2            01-18 @ 07:01  -  01-19 @ 07:00  --------------------------------------------------------  IN: 400 mL / OUT: 400 mL / NET: 0 mL      CAPILLARY BLOOD GLUCOSE          PHYSICAL EXAM:  Constitutional: middle-aged female with obese body habitus resting in bed  HEENT: no conjunctivitis or scleral icterus, oral mucosa moist  NECK: Supple, no JVD  CARDIAC: RRR, +S1/S2, no murmurs/rubs/gallops  PULM: Breathing comfortably on RA, clear to auscultation bilaterally, no wheezes/rales/rhonchi, +TDC to L chest wall   BREAST: b/l breasts with peeling epidermis to lower aspect of breasts  ABDOMEN: Soft, obese, nontender to palpation, +bs, no rebound tenderness or fluid wave, non-reducible ventral hernia  SKIN: b/l arms warm to touch; 3cm dehisced wound, nonpurulent, nonerythematous, to R medial upper arm near axilla  VASC:  no LE edema or tenderness; +L femoral line (placed 1/18)  EXT: RUE: mild improvement to edema to R proximal forearm; unchanged edema to R hand, slightly tense, non erythematous      MEDICATIONS:  MEDICATIONS  (STANDING):  apixaban 2.5 milliGRAM(s) Oral every 12 hours  atorvastatin 40 milliGRAM(s) Oral at bedtime  chlorhexidine 2% Cloths 1 Application(s) Topical <User Schedule>  chlorhexidine 2% Cloths 1 Application(s) Topical <User Schedule>  cinacalcet 30 milliGRAM(s) Oral once  clopidogrel Tablet 75 milliGRAM(s) Oral daily  colchicine 0.3 milliGRAM(s) Oral daily  collagenase Ointment 1 Application(s) Topical daily  midodrine 30 milliGRAM(s) Oral every 8 hours  Nephro-deborah 1 Tablet(s) Oral daily  polyethylene glycol 3350 17 Gram(s) Oral every 12 hours  senna 2 Tablet(s) Oral at bedtime  sevelamer carbonate 1600 milliGRAM(s) Oral three times a day with meals    MEDICATIONS  (PRN):  acetaminophen     Tablet .. 650 milliGRAM(s) Oral every 6 hours PRN Temp greater or equal to 38C (100.4F), Moderate Pain (4 - 6)  calamine/zinc oxide Lotion 1 Application(s) Topical three times a day PRN Itching  HYDROmorphone   Tablet 2 milliGRAM(s) Oral every 4 hours PRN Severe Pain (7 - 10)  HYDROmorphone  Injectable 0.25 milliGRAM(s) IV Push every 4 hours PRN Pain  sodium chloride 0.9% lock flush 10 milliLiter(s) IV Push every 1 hour PRN Pre/post blood products, medications, blood draw, and to maintain line patency      ALLERGIES:  Allergies    Ancef (Rash; Urticaria)  DDAVP (Hypotension)  iodine (Hives; Pruritus)  penicillin (Swelling)  sulfa drugs (Angioedema)    Intolerances        LABS:                        6.8    5.09  )-----------( 181      ( 19 Jan 2023 07:46 )             23.2     01-19    133<L>  |  95<L>  |  30<H>  ----------------------------<  93  3.7   |  26  |  5.83<H>    Ca    8.5      19 Jan 2023 07:46  Phos  3.4     01-19  Mg     1.6     01-19      PT/INR - ( 19 Jan 2023 07:46 )   PT: 21.1 sec;   INR: 1.76          PTT - ( 19 Jan 2023 07:46 )  PTT:44.3 sec      RADIOLOGY & ADDITIONAL TESTS: Reviewed.

## 2023-01-19 NOTE — PROGRESS NOTE ADULT - PROBLEM SELECTOR PLAN 6
Consulted for GOC. Will continue to follow. Consulted for GOC and following for pain management. Will continue to follow.

## 2023-01-19 NOTE — PROGRESS NOTE ADULT - SUBJECTIVE AND OBJECTIVE BOX
[facility]  Contact Info: Call [phone] (HEAL Line)    SUBJECTIVE AND OBJECTIVE:  INTERVAL HPI/OVERNIGHT EVENTS: Interval events noted. See patient's PRN use for the past 24hrs noted below. Patient complains that her pain is not well controlled. PO Dilaudid helps with the pain but only lasts 2.5 hours and the 0.25 IV dose does not help. No unexpected adverse effects of opiates noted: no sedation/dizziness/nausea. GOC remain full code.     ALLERGIES:  Ancef (Rash; Urticaria)  DDAVP (Hypotension)  iodine (Hives; Pruritus)  penicillin (Swelling)  sulfa drugs (Angioedema)    MEDICATIONS  (STANDING):  apixaban 2.5 milliGRAM(s) Oral every 12 hours  atorvastatin 40 milliGRAM(s) Oral at bedtime  chlorhexidine 2% Cloths 1 Application(s) Topical <User Schedule>  chlorhexidine 2% Cloths 1 Application(s) Topical <User Schedule>  clopidogrel Tablet 75 milliGRAM(s) Oral daily  colchicine 0.3 milliGRAM(s) Oral daily  collagenase Ointment 1 Application(s) Topical daily  midodrine 30 milliGRAM(s) Oral every 8 hours  Nephro-deborah 1 Tablet(s) Oral daily  polyethylene glycol 3350 17 Gram(s) Oral every 12 hours  senna 2 Tablet(s) Oral at bedtime  sevelamer carbonate 1600 milliGRAM(s) Oral three times a day with meals    MEDICATIONS  (PRN):  acetaminophen     Tablet .. 650 milliGRAM(s) Oral every 6 hours PRN Temp greater or equal to 38C (100.4F), Moderate Pain (4 - 6)  calamine/zinc oxide Lotion 1 Application(s) Topical three times a day PRN Itching  HYDROmorphone   Tablet 2 milliGRAM(s) Oral every 4 hours PRN Severe Pain (7 - 10)  HYDROmorphone  Injectable 0.25 milliGRAM(s) IV Push every 4 hours PRN Pain  sodium chloride 0.9% lock flush 10 milliLiter(s) IV Push every 1 hour PRN Pre/post blood products, medications, blood draw, and to maintain line patency      Analgesic Use (Scheduled and PRNs) for past 24 hours:  acetaminophen     Tablet ..   650 milliGRAM(s) Oral (01-19-23 @ 05:51)    HYDROmorphone   Tablet   2 milliGRAM(s) Oral (01-19-23 @ 09:46)   2 milliGRAM(s) Oral (01-18-23 @ 19:29)    HYDROmorphone  Injectable   0.25 milliGRAM(s) IV Push (01-19-23 @ 13:15)   0.25 milliGRAM(s) IV Push (01-19-23 @ 08:43)   0.25 milliGRAM(s) IV Push (01-19-23 @ 03:05)   0.25 milliGRAM(s) IV Push (01-18-23 @ 20:52)   0.25 milliGRAM(s) IV Push (01-18-23 @ 16:53)    sevelamer carbonate   1600 milliGRAM(s) Oral (01-19-23 @ 11:32)   1600 milliGRAM(s) Oral (01-19-23 @ 08:43)   1600 milliGRAM(s) Oral (01-18-23 @ 19:25)      ITEMS UNCHECKED ARE NOT PRESENT  PRESENT SYMPTOMS/REVIEW OF SYSTEMS: []Unable to obtain due to poor mentation   Source if other than patient:  []Family   []Team   Pain: [x] yes [] no  QOL impact - affects mood   Location - bilateral breast pain                     Aggravating Factors - none  Quality -  Radiation -none  Timing - constant   Severity (0-10 scale) - 9/10   Minimal Acceptable Level (0-10 scale) - less than 5       Dyspnea:                           []Mild  []Moderate []Severe  Anxiety:                             []Mild []Moderate []Severe  Fatigue:                             []Mild []Moderate []Severe  Nausea:                             []Mild []Moderate []Severe  Loss of Appetite:              []Mild []Moderate []Severe  Constipation:                    []Mild []Moderate []Severe    Other Symptoms:  [x]All other review of systems negative     GENERAL:  [] NAD []Alert []Lethargic  []Cachexia  []Unarousable  []Verbal  []Non-Verbal  Behavioral:   []Anxiety  []Delirium []Agitation []Cooperative []Oriented x  HEENT:  []Normal  [] Moist Mucous Membranes []Dry mouth   []ET Tube/Trach  []Oral lesions  PULMONARY:   []Clear []Tachypnea  []Audible excessive secretions  []Normal Work of Breathing []Labored Breathing  []Rhonchi []Crackles []Wheezing  CARDIOVASCULAR:    []Regular Rate []Regular Rhythm []Irregular []Tachy  []Arjun  GASTROINTESTINAL:  []Soft  []Distended   []+BS  []Non tender []Tender  []PEG []OGT/ NGT  Last BM:  GENITOURINARY:  []Normal [] Incontinent   []Oliguria/Anuria   []Wu  MUSCULOSKELETAL:   []Normal Extremities  []Weakness  []Bed/Wheelchair bound []Edema  NEUROLOGIC:   []No focal deficits  []Cognitive impairment  []Dysphagia []Dysarthria []Paresis []Encephalopathic  SKIN:   []Normal   []Pressure ulcer(s)  []Rash    CRITICAL CARE:  [ ]Shock Present  [ ]Septic [ ]Cardiogenic [ ]Neurologic [ ]Hypovolemic  [ ] Vasopressors [ ]Inotropes   [ ]Respiratory failure present [ ]Mechanical Ventilation [ ]Non-invasive ventilatory support [ ]High-Flow  [ ]Acute  [ ]Chronic [ ]Hypoxic  [ ]Hypercarbic   [ ]Other organ failure    Vital Signs Last 24 Hrs  T(C): 36.9 (19 Jan 2023 14:35), Max: 38.3 (19 Jan 2023 01:00)  T(F): 98.5 (19 Jan 2023 14:35), Max: 100.9 (19 Jan 2023 01:00)  HR: 76 (19 Jan 2023 15:00) (76 - 99)  BP: 102/46 (19 Jan 2023 15:00) (66/30 - 130/50)  BP(mean): 66 (19 Jan 2023 15:00) (42 - 83)  RR: 20 (19 Jan 2023 15:00) (12 - 30)  SpO2: 98% (19 Jan 2023 15:00) (95% - 100%)    Parameters below as of 19 Jan 2023 15:00  Patient On (Oxygen Delivery Method): nasal cannula  O2 Flow (L/min): 2       LABS:                        6.8    5.09  )-----------( 181      ( 19 Jan 2023 07:46 )             23.2   01-19    133<L>  |  95<L>  |  30<H>  ----------------------------<  93  3.7   |  26  |  5.83<H>    Ca    8.5      19 Jan 2023 07:46  Phos  3.4     01-19  Mg     1.6     01-19      RADIOLOGY & ADDITIONAL STUDIES:    REFERRALS:  [x]Social Work  []Case management []PT/OT []Chaplaincy  []Hospice  []Patient/Family Support []Massage Therapy []Music Therapy []Holistic RN    DISCUSSION OF CASE: Family - to provide updates and emotional support. [facility]  Contact Info: Call [phone] (HEAL Line)    SUBJECTIVE AND OBJECTIVE:  INTERVAL HPI/OVERNIGHT EVENTS: Interval events noted. See patient's PRN use for the past 24hrs noted below. Patient complains that her pain is not well controlled. PO Dilaudid helps with the pain but only lasts 2.5 hours and the 0.25 IV dose does not help. No unexpected adverse effects of opiates noted: no sedation/dizziness/nausea. GOC remain full code.     ALLERGIES:  Ancef (Rash; Urticaria)  DDAVP (Hypotension)  iodine (Hives; Pruritus)  penicillin (Swelling)  sulfa drugs (Angioedema)    MEDICATIONS  (STANDING):  apixaban 2.5 milliGRAM(s) Oral every 12 hours  atorvastatin 40 milliGRAM(s) Oral at bedtime  chlorhexidine 2% Cloths 1 Application(s) Topical <User Schedule>  chlorhexidine 2% Cloths 1 Application(s) Topical <User Schedule>  clopidogrel Tablet 75 milliGRAM(s) Oral daily  colchicine 0.3 milliGRAM(s) Oral daily  collagenase Ointment 1 Application(s) Topical daily  midodrine 30 milliGRAM(s) Oral every 8 hours  Nephro-deborah 1 Tablet(s) Oral daily  polyethylene glycol 3350 17 Gram(s) Oral every 12 hours  senna 2 Tablet(s) Oral at bedtime  sevelamer carbonate 1600 milliGRAM(s) Oral three times a day with meals    MEDICATIONS  (PRN):  acetaminophen     Tablet .. 650 milliGRAM(s) Oral every 6 hours PRN Temp greater or equal to 38C (100.4F), Moderate Pain (4 - 6)  calamine/zinc oxide Lotion 1 Application(s) Topical three times a day PRN Itching  HYDROmorphone   Tablet 2 milliGRAM(s) Oral every 4 hours PRN Severe Pain (7 - 10)  HYDROmorphone  Injectable 0.25 milliGRAM(s) IV Push every 4 hours PRN Pain  sodium chloride 0.9% lock flush 10 milliLiter(s) IV Push every 1 hour PRN Pre/post blood products, medications, blood draw, and to maintain line patency      Analgesic Use (Scheduled and PRNs) for past 24 hours:  acetaminophen     Tablet ..   650 milliGRAM(s) Oral (01-19-23 @ 05:51)    HYDROmorphone   Tablet   2 milliGRAM(s) Oral (01-19-23 @ 09:46)   2 milliGRAM(s) Oral (01-18-23 @ 19:29)    HYDROmorphone  Injectable   0.25 milliGRAM(s) IV Push (01-19-23 @ 13:15)   0.25 milliGRAM(s) IV Push (01-19-23 @ 08:43)   0.25 milliGRAM(s) IV Push (01-19-23 @ 03:05)   0.25 milliGRAM(s) IV Push (01-18-23 @ 20:52)   0.25 milliGRAM(s) IV Push (01-18-23 @ 16:53)    sevelamer carbonate   1600 milliGRAM(s) Oral (01-19-23 @ 11:32)   1600 milliGRAM(s) Oral (01-19-23 @ 08:43)   1600 milliGRAM(s) Oral (01-18-23 @ 19:25)      ITEMS UNCHECKED ARE NOT PRESENT  PRESENT SYMPTOMS/REVIEW OF SYSTEMS: []Unable to obtain due to poor mentation   Source if other than patient:  []Family   []Team   Pain: [x] yes [] no  QOL impact - affects mood   Location - bilateral breast pain                     Aggravating Factors - none  Quality -  Radiation -none  Timing - constant   Severity (0-10 scale) - 9/10   Minimal Acceptable Level (0-10 scale) - less than 5       Dyspnea:                           []Mild  []Moderate []Severe  Anxiety:                             []Mild []Moderate []Severe  Fatigue:                             []Mild []Moderate []Severe  Nausea:                             []Mild []Moderate []Severe  Loss of Appetite:              []Mild []Moderate []Severe  Constipation:                    []Mild []Moderate []Severe    Other Symptoms:  [x]All other review of systems negative     GENERAL:  [X] NAD []Alert []Lethargic  []Cachexia  []Unarousable  [X]Verbal  []Non-Verbal  Behavioral:   []Anxiety  []Delirium []Agitation []Cooperative [X]Oriented x3  HEENT:  [X]Normal  [] Moist Mucous Membranes []Dry mouth   []ET Tube/Trach  []Oral lesions  PULMONARY:   [X]Clear []Tachypnea  []Audible excessive secretions  []Normal Work of Breathing []Labored Breathing  []Rhonchi []Crackles []Wheezing  CARDIOVASCULAR:    [X]Regular Rate [X]Regular Rhythm []Irregular []Tachy  []Arjun  GASTROINTESTINAL:  [X]Soft  []Distended   []+BS  [X]Non tender []Tender  []PEG []OGT/ NGT  Last BM:  GENITOURINARY:  [X]Normal [] Incontinent   []Oliguria/Anuria   []Wu  MUSCULOSKELETAL:   []Normal Extremities  [X]Weakness  []Bed/Wheelchair bound []Edema  NEUROLOGIC:   [X]No focal deficits  []Cognitive impairment  []Dysphagia []Dysarthria []Paresis []Encephalopathic  SKIN:   [X]Normal   []Pressure ulcer(s)  []Rash    CRITICAL CARE:  [ ]Shock Present  [ ]Septic [ ]Cardiogenic [ ]Neurologic [ ]Hypovolemic  [ ] Vasopressors [ ]Inotropes   [ ]Respiratory failure present [ ]Mechanical Ventilation [ ]Non-invasive ventilatory support [ ]High-Flow  [ ]Acute  [ ]Chronic [ ]Hypoxic  [ ]Hypercarbic   [ ]Other organ failure    Vital Signs Last 24 Hrs  T(C): 36.9 (19 Jan 2023 14:35), Max: 38.3 (19 Jan 2023 01:00)  T(F): 98.5 (19 Jan 2023 14:35), Max: 100.9 (19 Jan 2023 01:00)  HR: 76 (19 Jan 2023 15:00) (76 - 99)  BP: 102/46 (19 Jan 2023 15:00) (66/30 - 130/50)  BP(mean): 66 (19 Jan 2023 15:00) (42 - 83)  RR: 20 (19 Jan 2023 15:00) (12 - 30)  SpO2: 98% (19 Jan 2023 15:00) (95% - 100%)    Parameters below as of 19 Jan 2023 15:00  Patient On (Oxygen Delivery Method): nasal cannula  O2 Flow (L/min): 2       LABS:                        6.8    5.09  )-----------( 181      ( 19 Jan 2023 07:46 )             23.2   01-19    133<L>  |  95<L>  |  30<H>  ----------------------------<  93  3.7   |  26  |  5.83<H>    Ca    8.5      19 Jan 2023 07:46  Phos  3.4     01-19  Mg     1.6     01-19      RADIOLOGY & ADDITIONAL STUDIES:    REFERRALS:  [x]Social Work  []Case management []PT/OT []Chaplaincy  []Hospice  []Patient/Family Support []Massage Therapy []Music Therapy []Holistic RN    DISCUSSION OF CASE: Team - to discuss plan of care

## 2023-01-19 NOTE — PROGRESS NOTE ADULT - SUBJECTIVE AND OBJECTIVE BOX
SUBJECTIVE: Patient seen and examined bedside; patient downgraded yesterday, no events overnight, feeling well this am, c/o mild breast pain.    apixaban 2.5 milliGRAM(s) Oral every 12 hours  clopidogrel Tablet 75 milliGRAM(s) Oral daily  midodrine 30 milliGRAM(s) Oral every 8 hours      Vital Signs Last 24 Hrs  T(C): 38.2 (19 Jan 2023 05:00), Max: 38.3 (19 Jan 2023 01:00)  T(F): 100.7 (19 Jan 2023 05:00), Max: 100.9 (19 Jan 2023 01:00)  HR: 88 (19 Jan 2023 09:08) (82 - 98)  BP: 130/50 (19 Jan 2023 03:54) (100/44 - 130/50)  BP(mean): 72 (19 Jan 2023 03:54) (58 - 83)  RR: 18 (19 Jan 2023 05:57) (14 - 18)  SpO2: 100% (19 Jan 2023 09:08) (87% - 100%)    Parameters below as of 19 Jan 2023 05:57  Patient On (Oxygen Delivery Method): nasal cannula  O2 Flow (L/min): 2    I&O's Detail    18 Jan 2023 07:01  -  19 Jan 2023 07:00  --------------------------------------------------------  IN:    Other (mL): 400 mL  Total IN: 400 mL    OUT:    Other (mL): 400 mL  Total OUT: 400 mL    Total NET: 0 mL      PE:    General: NAD, resting comfortably in bed  C/V: S1 s2, RRR  Pulm: Nonlabored breathing, no respiratory distress  Abd: Soft, NTND  Extrem: WWP, right medial upper arm wound clean, granulation tissue noted at the base, no obvious signs of bleeding/hematoma/infection, R UE edematous but soft to touch, unchanged from prior exam; wrapped with kerlix and ace        LABS:                        6.8    5.09  )-----------( 181      ( 19 Jan 2023 07:46 )             23.2     01-19    133<L>  |  95<L>  |  30<H>  ----------------------------<  93  3.7   |  26  |  5.83<H>    Ca    8.5      19 Jan 2023 07:46  Phos  3.4     01-19  Mg     1.6     01-19      PT/INR - ( 19 Jan 2023 07:46 )   PT: 21.1 sec;   INR: 1.76          PTT - ( 19 Jan 2023 07:46 )  PTT:44.3 sec      RADIOLOGY & ADDITIONAL STUDIES:

## 2023-01-19 NOTE — PROGRESS NOTE ADULT - ATTENDING COMMENTS
have reviewed course to date and discussed status with [facility] team.  etiology of hypotension unclear, and have discussed possible eval for autonomic neuropathy. agree with findings and plans.

## 2023-01-19 NOTE — PROGRESS NOTE ADULT - ASSESSMENT
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    NEURO  #Pain Control  Pt in 10/10 pain of bilateral breasts, worst on R. Pain responsive to Dilaudid but effect wears off after ~2-3 hours.   - Pain management consulted, recs appreciated  - Dc'd PCA (1/16-1/17)  - c/w Dilaudid 2mg PO prn for severe pain, Dilaudid 0.25mg IV q4h prn for pain, Tylenol 650mg q6h for mod pain.   - HOLD ALL OPIOIDS IF RR<10, O2SAT <93%, SBP <96    PULM  #Atelectasis  Noted on CT chest to have atelectasis at b/l lung bases.  - Incentive spirometry    #Suspected LIZ  - c/w BiPAP 18/8 at night    CARDIOVASCULAR  #Shock  Pt presented meeting 2/4 SIRS. Hypotensive with MAPs to 60, requiring Levophed. Septic picture of unknown source. HR > 90, WBC >12. Lactate 1.9. Afebrile. Procal 12. S/p Vancomycin 1250mg x 1, Aztreonam x 1 (pt allergic to Cefazolin, PCN). . Ferritin 1209. Patient still requiring pressors, unknown source, does not appear to fit septic picture at this point (no fever, BCx negative, no focal area of infection identified thus far)  - off levophed since 1/16 PM   - Dc'd Florinef on 1/17  - c/w midodrine 30mg q8h (goal SBP >75)  - CT:  7.0 cm fluid collection is present near an AV fistula in the right upper arm. Possibly abscess. Drained by IR 1/5 with serosanguinous fluid: no organisms, few WBCs  - RUE dopplers (1/13): No significant change large complex fluid collection in proximal R arm, could represent old seroma/hematoma (However, prior ultrasound was done prior to drainage)   - Per vascular: does not think AV fistula is infected; would not remove it; also does NOT recommend drainage of seroma.   - per surg: prelim R breast bx shows vasculitis vs granuloma; unlikely to cause hypotension; f/u final bx results   - s/p Vancomycin x2 weeks (1/2-1/15 ) to tx presumed R breast cellulitis (1/2-1/15)  - BCx: NGTD  - corticotropin stim test wnl    #LVOT with LISETTE  likely i/s/o hyperdynamic LV function 2/2 shock and hypovolemia  - per cardiology, rec repeating TTE after shock resolves    #HFrEF  TTE 11/22 normal LV and systolic, EF 59%, grade II diastolic dysfuncton, dilated RV, reduced RV,   Home meds: Entresto 49/51mg  - hold Entresto i/s/o shock    #CAD  Hx of cardiac cath in 2018  Home meds: atorvastatin 40mg, plavix 75mg qd  - c/w home atorvastatin 40mg  - c/w home Plavix 75mg qd     GI  #Constipation  - c/w senna 2 tablets/qd and miralax    #Nutrition  Patient reports poor PO intake  - renal diet    RENAL  #ESRD 2/2 PCKD  Pt has been on HD for "years" 2/2 PCKD. AV Fistula of EDWIN clotted in 2014, has received HD thru HD cath since. New AV Fistula in 11/2022, not yet matured.   - f/u nephrology recs  - s/p HD 1/10, 1/13, 1/16, planned for HD today 1/18    #Hyperkalemia  Patient with Hx of ESRD with missed HD presents with K >8 on VBG with abdominal symptoms and missed HD x8 days. ICU consulted i/s/o hyperkalemia. Renal consulted.  - monitor with BMP's qd    #Hyperphosphatemia  - c/w sevelamer to 1600mg TID    ID  #Fever  Pt with T100.9F 1/17 AM. Unclear etiology. 1/3, 1/9 BCx neg. s/p vanc x2 weeks  - CXR no infiltrates, BCx NGTD   - R femoral line removed on 1/18; L femoral line placed on 1/18  - Monitor off abx for now     #Shock, presumed to be septic but no clear source/etiology   Pt presented in shock with leukocytosis, tachycardia, hypotension requiring pressors. Unknown source.   - see #shock for plan  - s/p vanc x2 weeks (last day 1/15) to tx presumed breast cellulitis   - gallium scan: faintly increased activity surrounding the entire lower portion of the right breast consistent with the inflammatory reaction seen on physical examination, small area of activity ~10cm to the R of midline at about the level of the lower border of the sternum, activity likely to be in chest wall   - PET/CT with increased FDG avidity within R axillary vein, which contains an occlusive thrombus, which may represent an infectious source. There is also an additional FDG avid region of the AV fistula near the arterial anastomosis proximal to the area of the fluid collection; could be a potential source of infection vs inflammatory reaction to the graft. Also a photopenic R adnexal cystic mass, suggestive of a benign etiology.     ENDO  #Hyperparathyroidism  At risk for secondary HPTT d/t renal failure   - pt has vague abdominal pain with nausea  - multiple brown tumors on CT with multiple fractures of pubic rami, pubic bone, thoracolumbar spine  - intact , Vit D 38.5  - SPEP/immunofixation negative   - endocrinology recs: secondary hyperparathyroidism vs other process causing osteoclastic tumors  - c/w Sensipar 30mg qd per endo recs (started 1/9)  - 1/11 AM intact  (baseline PTH on Sensipar )    #Multinodular Goiter  CT: enlarged multinodular thyroid projecting into superior mediastinum with 1.8 x 1.5 cm solid nodule slightly inferiorly in the anterior mediastinum.   - Thyroid ultrasound: Fine-needle aspiration recommended for 4.3 cm right thyroid and 2.6 cm left thyroid solid nodules per SCOTT guidelines.  - TSH 1.6 free T4 0.68.  - TSH could be inappropriately normal for low free T4 due to euthyroid sick syndrome.  - f/u TFTs after shock resolved    MSK  #Renal Osteodystrophy  - on CT: Increased density of the bone marrow is consistent with renal osteodystrophy  - Per endocrine, extent of bone destruction extreme given PTH level, suspect might not be Brown tumors    #RUE Edema  RUE became edematous 1/8 extending down through hand. AVF with drained seroma pocket on R arm.  - lymphedema vs bleeding into seroma pocket? vs DVT   - RUE U/S with persistent DVT to R IJ, subclavian, and axillary stent, and proximal brachial vein, similar extent to prior. No significant change large complex fluid collection in proximal R arm, could represent old seroma/hematoma  - per vascular: no plans to drain the seroma    #L toe pain  Pt c/o L toe pain, possibly gout. Now resolved  - c/w colchicine 0.3mg qd    HEME/ONC  #Vasculopathy   Current DVT of R IJ, R subclavian and R axillary veins. Hx of L AVF clotting. Given hx of clots, current pruritis and gallium scan uptake in chest wall, reasonable to workup coagulopathy  - transitioned from heparin gtt to Eliquis 2.5 BID  - OBDULIA, ANCA, complement, RF, beta2 glycoprotein, anticardiolipin nml    #Anemia  Hgb has downtrended from 9.8 on admission to 6.9. Baseline from November appears to be ~13. 1/9 Hb 6.9, ordered 1U pRBC after which pt likely had febrile nonhemolytic transfusion reaction (3 hrs into transfusion, developed tachycardia to 150s, HTN to 140s/90s, rigors, Trectal 101.3.) Transfusion stopped. Tylenol given. Sxs resolved.   - Ferritin elevated: 1209  - Total iron low (15), TIBC low (79)  - s/p 1U pRBC on 1/11 with Hb 6.9 > 7.9 w/o transfusion rxn    #Breast Pain, bilateral  Family hx of breast cancer in mother, sister.   - large R breast mass, palpable L breast mass, R breast has peau d'orange appearance with associated erythema, warmth on R  - US bilateral breasts: No sonographic evidence of breast abscess, cyst or focal mass to correspond with the clinical finding of bilateral breast masses.  - prelim breast bx by surg: granuloma vs vasculitis; f/u final     GYN  #Uterine mass  6.1cm mass in R adnexa seen incidentally on CTAP. Pt with hx of hysterectomy, unclear if partial or total.   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 TVUS c/w likely simple cyst, PET/CT w/ no uptake in R adnexa, low concern for malignancy. Rec outpt f/u when stable for repeat tumor markers.    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    NEURO  #Pain Control  Pt in 10/10 pain of bilateral breasts, worst on R. Pain responsive to Dilaudid but effect wears off after ~2-3 hours.   - Pain management consulted, recs appreciated  - Dc'd PCA (1/16-1/17)  - c/w Dilaudid 2mg PO prn for severe pain, Dilaudid 0.25mg IV q4h prn for pain, Tylenol 650mg q6h for mod pain.   - HOLD ALL OPIOIDS IF RR<10, O2SAT <93%, SBP <96    PULM  #Atelectasis  Noted on CT chest to have atelectasis at b/l lung bases.  - Incentive spirometry    #Suspected LIZ  - c/w BiPAP 18/8 at night    CARDIOVASCULAR  #Shock  Pt presented meeting 2/4 SIRS. Hypotensive with MAPs to 60, requiring Levophed. Septic picture of unknown source. HR > 90, WBC >12. Lactate 1.9. Afebrile. Procal 12. S/p Vancomycin 1250mg x 1, Aztreonam x 1 (pt allergic to Cefazolin, PCN). . Ferritin 1209. Patient still requiring pressors, unknown source, does not appear to fit septic picture at this point (no fever, BCx negative, no focal area of infection identified thus far)  - off levophed since 1/16 PM   - Dc'd Florinef on 1/17  - c/w midodrine 30mg q8h (goal SBP >75)  - CT:  7.0 cm fluid collection is present near an AV fistula in the right upper arm. Possibly abscess. Drained by IR 1/5 with serosanguinous fluid: no organisms, few WBCs  - RUE dopplers (1/13): No significant change large complex fluid collection in proximal R arm, could represent old seroma/hematoma (However, prior ultrasound was done prior to drainage)   - Per vascular: does not think AV fistula is infected; would not remove it; also does NOT recommend drainage of seroma.   - per surg: prelim R breast bx shows vasculitis vs granuloma; unlikely to cause hypotension; f/u final bx results   - s/p Vancomycin x2 weeks (1/2-1/15 ) to tx presumed R breast cellulitis (1/2-1/15)  - BCx: NGTD  - corticotropin stim test wnl    #LVOT with LISETTE  likely i/s/o hyperdynamic LV function 2/2 shock and hypovolemia  - per cardiology, rec repeating TTE after shock resolves    #HFrEF  TTE 11/22 normal LV and systolic, EF 59%, grade II diastolic dysfuncton, dilated RV, reduced RV,   Home meds: Entresto 49/51mg  - hold Entresto i/s/o shock    #CAD  Hx of cardiac cath in 2018  Home meds: atorvastatin 40mg, plavix 75mg qd  - c/w home atorvastatin 40mg  - c/w home Plavix 75mg qd     GI  #Constipation  - c/w senna 2 tablets/qd and miralax    #Nutrition  Patient reports poor PO intake  - renal diet    RENAL  #ESRD 2/2 PCKD  Pt has been on HD for "years" 2/2 PCKD. AV Fistula of EDWIN clotted in 2014, has received HD thru HD cath since. New AV Fistula in 11/2022, not yet matured.   - f/u nephrology recs  - s/p HD 1/10, 1/13, 1/16, 1/18    #Hyperkalemia  Patient with Hx of ESRD with missed HD presents with K >8 on VBG with abdominal symptoms and missed HD x8 days. ICU consulted i/s/o hyperkalemia. Renal consulted.  - monitor with BMP's qd    #Hyperphosphatemia  - c/w sevelamer to 1600mg TID    ID  #Fever  Pt with T100.9F 1/17 AM. Unclear etiology. 1/3, 1/9 BCx neg. s/p vanc x2 weeks  - CXR no infiltrates, BCx NGTD   - R femoral line removed on 1/18; L femoral line placed on 1/18  - Monitor off abx for now     #Shock, presumed to be septic but no clear source/etiology   Pt presented in shock with leukocytosis, tachycardia, hypotension requiring pressors. Unknown source.   - see #shock for plan  - s/p vanc x2 weeks (last day 1/15) to tx presumed breast cellulitis   - gallium scan: faintly increased activity surrounding the entire lower portion of the right breast consistent with the inflammatory reaction seen on physical examination, small area of activity ~10cm to the R of midline at about the level of the lower border of the sternum, activity likely to be in chest wall   - PET/CT with increased FDG avidity within R axillary vein, which contains an occlusive thrombus, which may represent an infectious source. There is also an additional FDG avid region of the AV fistula near the arterial anastomosis proximal to the area of the fluid collection; could be a potential source of infection vs inflammatory reaction to the graft. Also a photopenic R adnexal cystic mass, suggestive of a benign etiology.     ENDO  #Hyperparathyroidism  At risk for secondary HPTT d/t renal failure   - pt has vague abdominal pain with nausea  - multiple brown tumors on CT with multiple fractures of pubic rami, pubic bone, thoracolumbar spine  - intact , Vit D 38.5  - SPEP/immunofixation negative   - endocrinology recs: secondary hyperparathyroidism vs other process causing osteoclastic tumors  - increase to Sensipar 60mg qd per endo recs   - 1/11 AM intact  (baseline PTH on Sensipar )    #Multinodular Goiter  CT: enlarged multinodular thyroid projecting into superior mediastinum with 1.8 x 1.5 cm solid nodule slightly inferiorly in the anterior mediastinum.   - Thyroid ultrasound: Fine-needle aspiration recommended for 4.3 cm right thyroid and 2.6 cm left thyroid solid nodules per SCOTT guidelines.  - TSH 1.6 free T4 0.68.  - TSH could be inappropriately normal for low free T4 due to euthyroid sick syndrome.  - f/u TFTs after shock resolved    MSK  #Renal Osteodystrophy  - on CT: Increased density of the bone marrow is consistent with renal osteodystrophy  - Per endocrine, extent of bone destruction extreme given PTH level, suspect might not be Brown tumors    #RUE Edema  RUE became edematous 1/8 extending down through hand. AVF with drained seroma pocket on R arm.  - lymphedema vs bleeding into seroma pocket? vs DVT   - RUE U/S with persistent DVT to R IJ, subclavian, and axillary stent, and proximal brachial vein, similar extent to prior. No significant change large complex fluid collection in proximal R arm, could represent old seroma/hematoma  - per vascular: no plans to drain the seroma    #L toe pain  Pt c/o L toe pain, possibly gout. Now resolved  - c/w colchicine 0.3mg qd    HEME/ONC  #Vasculopathy   Current DVT of R IJ, R subclavian and R axillary veins. Hx of L AVF clotting. Given hx of clots, current pruritis and gallium scan uptake in chest wall, reasonable to workup coagulopathy  - transitioned from heparin gtt to Eliquis 2.5 BID  - OBDULIA, ANCA, complement, RF, beta2 glycoprotein, anticardiolipin nml    #Anemia  Hgb has downtrended from 9.8 on admission to 6.9. Baseline from November appears to be ~13. 1/9 Hb 6.9, ordered 1U pRBC after which pt likely had febrile nonhemolytic transfusion reaction (3 hrs into transfusion, developed tachycardia to 150s, HTN to 140s/90s, rigors, Trectal 101.3.) Transfusion stopped. Tylenol given. Sxs resolved.   - Ferritin elevated: 1209  - Total iron low (15), TIBC low (79)  - s/p 1U pRBC on 1/11 with Hb 6.9 > 7.9 w/o transfusion rxn    #Breast Pain, bilateral  Family hx of breast cancer in mother, sister.   - large R breast mass, palpable L breast mass, R breast has peau d'orange appearance with associated erythema, warmth on R  - US bilateral breasts: No sonographic evidence of breast abscess, cyst or focal mass to correspond with the clinical finding of bilateral breast masses.  - prelim breast bx by surg: granuloma vs vasculitis; f/u final     GYN  #Uterine mass  6.1cm mass in R adnexa seen incidentally on CTAP. Pt with hx of hysterectomy, unclear if partial or total.   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 TVUS c/w likely simple cyst, PET/CT w/ no uptake in R adnexa, low concern for malignancy. Rec outpt f/u when stable for repeat tumor markers.    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    NEURO  #Pain Control  Pt in 10/10 pain of bilateral breasts, worst on R. Pain responsive to Dilaudid but effect wears off after ~2-3 hours.   - Pain management consulted, recs appreciated  - Dc'd PCA (1/16-1/17)  - c/w Dilaudid 2mg PO prn for severe pain, Dilaudid 0.25mg IV q4h prn for pain, Tylenol 650mg q6h for mod pain.   - HOLD ALL OPIOIDS IF RR<10, O2SAT <93%, SBP <96    PULM  #Atelectasis  Noted on CT chest to have atelectasis at b/l lung bases.  - Incentive spirometry    #Suspected LIZ  - c/w BiPAP 18/8 at night    CARDIOVASCULAR  #Shock  Pt presented meeting 2/4 SIRS. Hypotensive with MAPs to 60, requiring Levophed. Septic picture of unknown source. HR > 90, WBC >12. Lactate 1.9. Afebrile. Procal 12. S/p Vancomycin 1250mg x 1, Aztreonam x 1 (pt allergic to Cefazolin, PCN). . Ferritin 1209. Patient still requiring pressors, unknown source, does not appear to fit septic picture at this point (no fever, BCx negative, no focal area of infection identified thus far)  - off levophed since 1/16 PM   - Dc'd Florinef on 1/17  - c/w midodrine 30mg q8h (goal SBP >75)  - CT:  7.0 cm fluid collection is present near an AV fistula in the right upper arm. Possibly abscess. Drained by IR 1/5 with serosanguinous fluid: no organisms, few WBCs  - RUE dopplers (1/13): No significant change large complex fluid collection in proximal R arm, could represent old seroma/hematoma (However, prior ultrasound was done prior to drainage)   - Per vascular: does not think AV fistula is infected; would not remove it; also does NOT recommend drainage of seroma.   - per surg: prelim R breast bx shows vasculitis vs granuloma; unlikely to cause hypotension; f/u final bx results   - s/p Vancomycin x2 weeks (1/2-1/15 ) to tx presumed R breast cellulitis (1/2-1/15)  - BCx: NGTD  - corticotropin stim test wnl    #LVOT with LISETTE  likely i/s/o hyperdynamic LV function 2/2 shock and hypovolemia  - per cardiology, rec repeating TTE after shock resolves    #HFrEF  TTE 11/22 normal LV and systolic, EF 59%, grade II diastolic dysfuncton, dilated RV, reduced RV,   Home meds: Entresto 49/51mg  - hold Entresto i/s/o shock    #CAD  Hx of cardiac cath in 2018  Home meds: atorvastatin 40mg, plavix 75mg qd  - c/w home atorvastatin 40mg  - c/w home Plavix 75mg qd     GI  #Constipation  - c/w senna 2 tablets/qd and miralax    #Nutrition  Patient reports poor PO intake  - renal diet    RENAL  #ESRD 2/2 PCKD  Pt has been on HD for "years" 2/2 PCKD. AV Fistula of EDWIN clotted in 2014, has received HD thru HD cath since. New AV Fistula in 11/2022, not yet matured.   - f/u nephrology recs  - s/p HD 1/10, 1/13, 1/16, 1/18    #Hyperkalemia  Patient with Hx of ESRD with missed HD presents with K >8 on VBG with abdominal symptoms and missed HD x8 days. ICU consulted i/s/o hyperkalemia. Renal consulted.  - monitor with BMP's qd    #Hyperphosphatemia  - c/w sevelamer to 1600mg TID    ID  #Fever  Pt with T100.9F 1/17 AM. Unclear etiology. 1/3, 1/9 BCx neg. s/p vanc x2 weeks  - CXR no infiltrates, BCx NGTD   - R femoral line removed on 1/18; L femoral line placed on 1/18  - Monitor off abx for now     #Shock, presumed to be septic but no clear source/etiology   Pt presented in shock with leukocytosis, tachycardia, hypotension requiring pressors. Unknown source.   - see #shock for plan  - s/p vanc x2 weeks (last day 1/15) to tx presumed breast cellulitis   - gallium scan: faintly increased activity surrounding the entire lower portion of the right breast consistent with the inflammatory reaction seen on physical examination, small area of activity ~10cm to the R of midline at about the level of the lower border of the sternum, activity likely to be in chest wall   - PET/CT with increased FDG avidity within R axillary vein, which contains an occlusive thrombus, which may represent an infectious source. There is also an additional FDG avid region of the AV fistula near the arterial anastomosis proximal to the area of the fluid collection; could be a potential source of infection vs inflammatory reaction to the graft. Also a photopenic R adnexal cystic mass, suggestive of a benign etiology.     ENDO  #Hyperparathyroidism  At risk for secondary HPTT d/t renal failure   - pt has vague abdominal pain with nausea  - multiple brown tumors on CT with multiple fractures of pubic rami, pubic bone, thoracolumbar spine  - intact , Vit D 38.5  - SPEP/immunofixation negative   - endocrinology recs: secondary hyperparathyroidism vs other process causing osteoclastic tumors  - increase to Sensipar 60mg qd per endo recs   - 1/11 AM intact  (baseline PTH on Sensipar )    #Multinodular Goiter  CT: enlarged multinodular thyroid projecting into superior mediastinum with 1.8 x 1.5 cm solid nodule slightly inferiorly in the anterior mediastinum.   - Thyroid ultrasound: Fine-needle aspiration recommended for 4.3 cm right thyroid and 2.6 cm left thyroid solid nodules per SCOTT guidelines.  - TSH 1.6 free T4 0.68.  - TSH could be inappropriately normal for low free T4 due to euthyroid sick syndrome.  - f/u TFTs after shock resolved    MSK  #Renal Osteodystrophy  - on CT: Increased density of the bone marrow is consistent with renal osteodystrophy  - Per endocrine, extent of bone destruction extreme given PTH level, suspect might not be Brown tumors    #RUE Edema  RUE became edematous 1/8 extending down through hand. AVF with drained seroma pocket on R arm.  - lymphedema vs bleeding into seroma pocket? vs DVT   - RUE U/S with persistent DVT to R IJ, subclavian, and axillary stent, and proximal brachial vein, similar extent to prior. No significant change large complex fluid collection in proximal R arm, could represent old seroma/hematoma  - per vascular: no plans to drain the seroma    #L toe pain  Pt c/o L toe pain, possibly gout. Now resolved  - c/w colchicine 0.3mg qd    HEME/ONC  #Vasculopathy   Current DVT of R IJ, R subclavian and R axillary veins. Hx of L AVF clotting. Given hx of clots, current pruritis and gallium scan uptake in chest wall, reasonable to workup coagulopathy  - transitioned from heparin gtt to Eliquis 2.5 BID  - OBDULIA, ANCA, complement, RF, beta2 glycoprotein, anticardiolipin nml    #Anemia  Hgb has downtrended from 9.8 on admission to 6.9. Baseline from November appears to be ~13. 1/9 Hb 6.9, ordered 1U pRBC after which pt likely had febrile nonhemolytic transfusion reaction (3 hrs into transfusion, developed tachycardia to 150s, HTN to 140s/90s, rigors, Trectal 101.3.) Transfusion stopped. Tylenol given. Sxs resolved.   - Ferritin elevated: 1209  - Total iron low (15), TIBC low (79)  - s/p 1U pRBC on 1/11 with Hb 6.9 > 7.9 w/o transfusion rxn    #Breast Pain, bilateral  Family hx of breast cancer in mother, sister.   - large R breast mass, palpable L breast mass, R breast has peau d'orange appearance with associated erythema, warmth on R  - US bilateral breasts: No sonographic evidence of breast abscess, cyst or focal mass to correspond with the clinical finding of bilateral breast masses.  - prelim breast bx by surg: granuloma vs vasculitis; f/u final     GYN  #Uterine mass  6.1cm mass in R adnexa seen incidentally on CTAP. Pt with hx of hysterectomy, unclear if partial or total.   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 TVUS c/w likely simple cyst, PET/CT w/ no uptake in R adnexa, low concern for malignancy. Rec outpt f/u when stable for repeat tumor markers.    DERM  #Peeling Epidermis to B/l Breasts  - Derm consulted, recommend wedge bx of both normal and pathologic tissue  - Wound care following, appreciate recs    Preventative  F: None  E: None, HD pt  N: Renal Restricted Diet  DVT: heparin @1700u/hr  Code Status: Full Code  [facility]

## 2023-01-19 NOTE — PROGRESS NOTE ADULT - ASSESSMENT
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 Palliative care with triggered consult for GOC discussion. 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 Palliative care with triggered consult for GOC discussion. Following for symptom management.

## 2023-01-19 NOTE — PROGRESS NOTE ADULT - PROBLEM SELECTOR PLAN 5
Patient with multiple medical problems and comorbidities Followed up C discussion with patient. Per patient, she has been thinking about her wishes but does not have an answer about DNR/I status..  - Patient has 3 children and would like Jasmina, her youngest daughter, to make her medical decisions on her behalf if she cannot make them for herself. Healthcare proxy form completed and made available in the patient chart.

## 2023-01-19 NOTE — PROGRESS NOTE ADULT - SUBJECTIVE AND OBJECTIVE BOX
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  Pt s/p vancomycin x2weeks (last day 1/15) for presumed breast cellulitis. Course c/b fevers on 1/17, BCx NGTD, monitoring off abx. Multiple services following throughout hospital course. Surg had been consulted for breast masses/breast cellulitis; s/p R breast bx with prelim read vasculitis vs granuloma, pending final read. Derm had also been consulted for b/l breast skin changes, no acute intervention, currently being followed by wound care. Vascular had been consulted for initial concern for AV fistula infection, does not think it is infected. Endocrine following for management of hyperparathyroidism. Gyn/onc had been consulted for R ovarian cyst and elevated tumor markers, likely benign, rec outpt follow up. Pt was transferred to [facility] for further management.     On [facility], pt had fever to 100.9 overnight, however was otherwise hemodynamically stable. No blood cultures were sent and no antibiotics were started. In the morning, BP check showed BP 60s/30s despite being at 130s/50s overnight. BP check was consistent in both legs. Pt was mentating well and asymptomatic at the time. AM Hgb was 6.8 and 1 unit of PRBCs was ordered. Decision was made to transfer pt back to MICU due to concern for persistent hypotension with multiple possible etiologies and pt requiring an overall higher level of monitoring/care due to comorbidities.    INTERVAL HPI/OVERNIGHT EVENTS:  Patient seen and examined at bedside. This morning, pt sitting up in bed, in NAD. Reporting 9/10 pain in her breasts with modest response to Dilaudid 0.25 mg IV. Otherwise no acute medical complaints. Denies fever, chills, HA, dizziness, CP, SOB, palpitations, abdominal pain, n/v, changes in bowel/urinary habits, numbness, or tingling. 12pt ROS otherwise negative.      FAMILY HISTORY:  No pertinent family history in first degree relatives        VITAL SIGNS:  T(C): 38.2 (01-19-23 @ 05:00), Max: 38.3 (01-19-23 @ 01:00)  HR: 83 (01-19-23 @ 10:18) (82 - 98)  BP: 94/44 (01-19-23 @ 10:18) (66/30 - 130/50)  RR: 20 (01-19-23 @ 10:18) (14 - 20)  SpO2: 100% (01-19-23 @ 10:18) (94% - 100%)  Wt(kg): --Vital Signs Last 24 Hrs  T(C): 38.2 (19 Jan 2023 05:00), Max: 38.3 (19 Jan 2023 01:00)  T(F): 100.7 (19 Jan 2023 05:00), Max: 100.9 (19 Jan 2023 01:00)  HR: 83 (19 Jan 2023 10:18) (82 - 98)  BP: 94/44 (19 Jan 2023 10:18) (66/30 - 130/50)  BP(mean): 63 (19 Jan 2023 10:18) (42 - 83)  RR: 20 (19 Jan 2023 10:18) (14 - 20)  SpO2: 100% (19 Jan 2023 10:18) (94% - 100%)    Parameters below as of 19 Jan 2023 10:18  Patient On (Oxygen Delivery Method): nasal cannula  O2 Flow (L/min): 2    Ancef (Rash; Urticaria)  DDAVP (Hypotension)  iodine (Hives; Pruritus)  penicillin (Swelling)  sulfa drugs (Angioedema)      PHYSICAL EXAM:  Constitutional: middle-aged female with obese body habitus resting in bed  HEENT: no conjunctivitis or scleral icterus, oral mucosa moist  NECK: Supple, no JVD  CARDIAC: RRR, +S1/S2, no murmurs/rubs/gallops  PULM: Breathing comfortably on RA, clear to auscultation bilaterally, no wheezes/rales/rhonchi, +TDC to L chest wall   BREAST: b/l breasts with peeling epidermis to lower aspect of breasts  ABDOMEN: Soft, obese, nontender to palpation, +bs, no rebound tenderness or fluid wave, non-reducible ventral hernia  SKIN: b/l arms warm to touch; 3cm dehisced wound, nonpurulent, nonerythematous, to R medial upper arm near axilla  VASC:  no LE edema or tenderness; +L femoral line (placed 1/18)  EXT: RUE: mild improvement to edema to R proximal forearm; unchanged edema to R hand, slightly tense, non erythematous    Consultant(s) Notes Reviewed:  [x ] YES  [ ] NO  Care Discussed with Consultants/Other Providers [ x] YES  [ ] NO    LABS:                        6.8    5.09  )-----------( 181      ( 19 Jan 2023 07:46 )             23.2     01-19    133<L>  |  95<L>  |  30<H>  ----------------------------<  93  3.7   |  26  |  5.83<H>    Ca    8.5      19 Jan 2023 07:46  Phos  3.4     01-19  Mg     1.6     01-19        PT/INR - ( 19 Jan 2023 07:46 )   PT: 21.1 sec;   INR: 1.76          PTT - ( 19 Jan 2023 07:46 )  PTT:44.3 sec  CAPILLARY BLOOD GLUCOSE                              I & O Summary:    01-18-23 @ 07:01  -  01-19-23 @ 07:00  --------------------------------------------------------  IN: 400 mL / OUT: 400 mL / NET: 0 mL        Microbiology:      Culture - Blood (collected 17 Jan 2023 20:10)  Source: .Blood Blood  Preliminary Report (18 Jan 2023 21:00):    No growth at 1 day.    Culture - Blood (collected 17 Jan 2023 20:10)  Source: .Blood Blood  Preliminary Report (18 Jan 2023 21:00):    No growth at 1 day.        RADIOLOGY, EKG AND ADDITIONAL TESTS: Reviewed.      RADIOLOGY & ADDITIONAL TESTS:    Imaging Personally Reviewed:  [ ] YES  [ ] NO  acetaminophen     Tablet .. 650 milliGRAM(s) Oral every 6 hours PRN  apixaban 2.5 milliGRAM(s) Oral every 12 hours  atorvastatin 40 milliGRAM(s) Oral at bedtime  calamine/zinc oxide Lotion 1 Application(s) Topical three times a day PRN  chlorhexidine 2% Cloths 1 Application(s) Topical <User Schedule>  chlorhexidine 2% Cloths 1 Application(s) Topical <User Schedule>  cinacalcet 30 milliGRAM(s) Oral daily  clopidogrel Tablet 75 milliGRAM(s) Oral daily  colchicine 0.3 milliGRAM(s) Oral daily  collagenase Ointment 1 Application(s) Topical daily  HYDROmorphone   Tablet 2 milliGRAM(s) Oral every 4 hours PRN  HYDROmorphone  Injectable 0.25 milliGRAM(s) IV Push every 4 hours PRN  midodrine 30 milliGRAM(s) Oral every 8 hours  Nephro-deborah 1 Tablet(s) Oral daily  polyethylene glycol 3350 17 Gram(s) Oral every 12 hours  senna 2 Tablet(s) Oral at bedtime  sevelamer carbonate 1600 milliGRAM(s) Oral three times a day with meals  sodium chloride 0.9% lock flush 10 milliLiter(s) IV Push every 1 hour PRN      HEALTH ISSUES - PROBLEM Dx:  Pain in breast    Hypotension    ESRD on dialysis    Debility    Goals of care, counseling/discussion    Encounter for palliative care

## 2023-01-20 NOTE — PROGRESS NOTE ADULT - PROBLEM SELECTOR PLAN 1
Patient presented with bilateral breast pain with large R breast mass and palpable L breast mass. Surgery was consulted and recommend nonurgent outpatient mammogram when clinically stable. 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 Current IV Dilaudid is addressing the pain adequately.     - Continue pain regimen of 0.25 mg IV dilaudid q4h PRN for moderate pain and 0.5 mg of IV dilaudid q4h PRN for severe pain.  - If patient is to transition to PO pain medication, can consider 2 mg oral diluadid q4h PRN for moderate pain and 3 mg oral dilaudid q4h PRN for severe pain.    Hold for hypotension, sedation and RR <10  - Would do a trial of topical lidocaine 4% cream TID PRN to b/l breasts.

## 2023-01-20 NOTE — PROGRESS NOTE ADULT - ATTENDING COMMENTS
Admitted for emergent HD after missing a few sessions c/b shock state suspected to be secondary to tenuous hemodynamics with LVOT obstruction/LISETTE and significant vasculopathy and possible sepsis. Shock state improved but with persistent intermittent hypotension of still unclear etiology. Has significant and diffuse vasculopathy with a large BMI which precludes accurate BP assessment; BP should be correlated to mental status change. C/w Midodrine. High suspicion for OHS/LIZ; continue to encourage NIV at night. Eligible for transfer to [facility].

## 2023-01-20 NOTE — PROGRESS NOTE ADULT - SUBJECTIVE AND OBJECTIVE BOX
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  Pt s/p vancomycin x2weeks (last day 1/15) for presumed breast cellulitis. Course c/b fevers on 1/17, BCx NGTD, monitoring off abx. Multiple services following throughout hospital course. Surg had been consulted for breast masses/breast cellulitis; s/p R breast bx with prelim read vasculitis vs granuloma, pending final read which is supposed to result on 1/23. Derm had also been consulted for b/l breast skin changes, recommend wedge bx of normal and pathologic tissue; also currently being followed by wound care. Vascular had been consulted for initial concern for AV fistula infection, does not think it is infected. Endocrine following for management of hyperparathyroidism. Gyn/onc had been consulted for R ovarian cyst and elevated tumor markers, likely benign, rec outpt follow up. Pt was stepped down to telemetry on 1/18. On 1/19 AM, BP check on b/l legs demonstrated BPs 60s/30s, asymptomatic, AM Hb 6.8, 1U pRBC ordered, and pt was stepped up to MICU. In the MICU, MAPs >65 without pressor needs, and pt is medically stable for stepdown to telemetry.     INTERVAL HPI/OVERNIGHT EVENTS:  No acute events overnight.     SUBJECTIVE:   Patient seen and examined at bedside. States she feels okay currently, continues to have intermittent breast pain but does not have breast pain at this moment.     VITAL SIGNS:  ICU Vital Signs Last 24 Hrs  T(C): 36.8 (20 Jan 2023 11:45), Max: 37.6 (19 Jan 2023 18:37)  T(F): 98.3 (20 Jan 2023 11:45), Max: 99.6 (19 Jan 2023 18:37)  HR: 76 (20 Jan 2023 12:00) (69 - 92)  BP: 113/51 (20 Jan 2023 12:00) (100/46 - 143/64)  BP(mean): 73 (20 Jan 2023 12:00) (66 - 94)  ABP: --  ABP(mean): --  RR: 17 (20 Jan 2023 12:00) (13 - 26)  SpO2: 93% (20 Jan 2023 12:00) (91% - 100%)    O2 Parameters below as of 20 Jan 2023 12:00  Patient On (Oxygen Delivery Method): room air              01-19 @ 07:01  -  01-20 @ 07:00  --------------------------------------------------------  IN: 470 mL / OUT: 0 mL / NET: 470 mL      CAPILLARY BLOOD GLUCOSE          PHYSICAL EXAM:    Constitutional: middle-aged female with obese body habitus resting in bed  HEENT: no conjunctivitis or scleral icterus, oral mucosa moist  NECK: Supple, no JVD  CARDIAC: RRR, +S1/S2, no murmurs/rubs/gallops  PULM: Breathing comfortably on RA, clear to auscultation bilaterally, no wheezes/rales/rhonchi, +TDC to L chest wall   BREAST: b/l breasts with increasing peeling epidermis to lower aspect of breasts  ABDOMEN: Soft, obese, nontender to palpation, +bs, no rebound tenderness or fluid wave, non-reducible ventral hernia  SKIN: b/l arms warm to touch; 3cm dehisced wound, nonpurulent, nonerythematous, to R medial upper arm near axilla  VASC:  no LE edema or tenderness; +L femoral line (placed 1/18)  EXT: RUE: mild improvement to edema to R proximal forearm; unchanged edema to R hand, slightly tense, non erythematous    MEDICATIONS:  MEDICATIONS  (STANDING):  apixaban 2.5 milliGRAM(s) Oral every 12 hours  atorvastatin 40 milliGRAM(s) Oral at bedtime  chlorhexidine 2% Cloths 1 Application(s) Topical <User Schedule>  chlorhexidine 2% Cloths 1 Application(s) Topical <User Schedule>  cinacalcet 60 milliGRAM(s) Oral daily  clopidogrel Tablet 75 milliGRAM(s) Oral daily  colchicine 0.3 milliGRAM(s) Oral daily  collagenase Ointment 1 Application(s) Topical daily  epoetin александр-epbx (RETACRIT) Injectable 8000 Unit(s) IV Push once  midodrine 30 milliGRAM(s) Oral every 8 hours  Nephro-deborah 1 Tablet(s) Oral daily  polyethylene glycol 3350 17 Gram(s) Oral every 12 hours  senna 2 Tablet(s) Oral at bedtime  sevelamer carbonate 1600 milliGRAM(s) Oral three times a day with meals    MEDICATIONS  (PRN):  acetaminophen     Tablet .. 650 milliGRAM(s) Oral every 6 hours PRN Temp greater or equal to 38C (100.4F), Moderate Pain (4 - 6)  calamine/zinc oxide Lotion 1 Application(s) Topical three times a day PRN Itching  HYDROmorphone  Injectable 0.5 milliGRAM(s) IV Push every 4 hours PRN Severe Pain (7 - 10)  HYDROmorphone  Injectable 0.25 milliGRAM(s) IV Push every 4 hours PRN Moderate Pain (4 - 6)  sodium chloride 0.9% lock flush 10 milliLiter(s) IV Push every 1 hour PRN Pre/post blood products, medications, blood draw, and to maintain line patency      ALLERGIES:  Allergies    Ancef (Rash; Urticaria)  DDAVP (Hypotension)  iodine (Hives; Pruritus)  penicillin (Swelling)  sulfa drugs (Angioedema)    Intolerances        LABS:                        8.2    5.28  )-----------( 184      ( 20 Jan 2023 05:55 )             27.7     01-20    133<L>  |  93<L>  |  36<H>  ----------------------------<  85  3.9   |  28  |  6.35<H>    Ca    8.5      20 Jan 2023 05:55  Phos  4.2     01-20  Mg     1.8     01-20    TPro  5.4<L>  /  Alb  1.8<L>  /  TBili  0.3  /  DBili  x   /  AST  31  /  ALT  16  /  AlkPhos  61  01-20    PT/INR - ( 19 Jan 2023 07:46 )   PT: 21.1 sec;   INR: 1.76          PTT - ( 19 Jan 2023 07:46 )  PTT:44.3 sec      RADIOLOGY & ADDITIONAL TESTS: Reviewed.

## 2023-01-20 NOTE — PROGRESS NOTE ADULT - ASSESSMENT
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    NEURO  #Pain Control  Pt in 10/10 pain of bilateral breasts, worst on R. Pain responsive to Dilaudid but effect wears off after ~2-3 hours.   - Pain management consulted, recs appreciated  - Dc'd PCA (1/16-1/17)  - c/w Dilaudid 0.25mg q4h prn for moderate pain, Dilaudid 0.5mg q4h prn for severe pain  - HOLD ALL OPIOIDS IF RR<10, O2SAT <93%, SBP <96    PULM  #Atelectasis  Noted on CT chest to have atelectasis at b/l lung bases.  - Incentive spirometry    #Suspected LIZ  - c/w BiPAP 18/8 at night    CARDIOVASCULAR  #Shock  Pt presented meeting 2/4 SIRS. Hypotensive with MAPs to 60, requiring Levophed. Septic picture of unknown source. HR > 90, WBC >12. Lactate 1.9. Afebrile. Procal 12. S/p Vancomycin 1250mg x 1, Aztreonam x 1 (pt allergic to Cefazolin, PCN). . Ferritin 1209. Patient still requiring pressors, unknown source, does not appear to fit septic picture at this point (no fever, BCx negative, no focal area of infection identified thus far)  - off levophed since 1/16 PM   - Dc'd Florinef on 1/17  - c/w midodrine 30mg q8h (goal SBP >75)  - CT:  7.0 cm fluid collection is present near an AV fistula in the right upper arm. Possibly abscess. Drained by IR 1/5 with serosanguinous fluid: no organisms, few WBCs  - RUE dopplers (1/13): No significant change large complex fluid collection in proximal R arm, could represent old seroma/hematoma (However, prior ultrasound was done prior to drainage)   - Per vascular: does not think AV fistula is infected; would not remove it; also does NOT recommend drainage of seroma.   - per surg: prelim R breast bx shows vasculitis vs granuloma; unlikely to cause hypotension; f/u final bx results (supposed to result on 1/23)   - s/p Vancomycin x2 weeks (1/2-1/15 ) to tx presumed R breast cellulitis   - BCx: NGTD  - corticotropin stim test wnl    #LVOT with LISETTE  likely i/s/o hyperdynamic LV function 2/2 shock and hypovolemia  - per cardiology, rec repeating TTE after shock resolves    #HFrEF  TTE 11/22 normal LV and systolic, EF 59%, grade II diastolic dysfuncton, dilated RV, reduced RV,   Home meds: Entresto 49/51mg  - hold Entresto i/s/o shock    #CAD  Hx of cardiac cath in 2018  Home meds: atorvastatin 40mg, plavix 75mg qd  - c/w home atorvastatin 40mg  - c/w home Plavix 75mg qd     GI  #Constipation  - c/w senna 2 tablets/qd and miralax    #Nutrition  Patient reports poor PO intake  - renal diet    RENAL  #ESRD 2/2 PCKD  Pt has been on HD for "years" 2/2 PCKD. AV Fistula of EDWIN clotted in 2014, has received HD thru HD cath since. New AV Fistula in 11/2022, not yet matured.   - f/u nephrology recs  - s/p HD 1/10, 1/13, 1/16, 1/18, scheduled for HD today (goal 500ml fluid off)     #Hyperkalemia  Patient with Hx of ESRD with missed HD presents with K >8 on VBG with abdominal symptoms and missed HD x8 days. ICU consulted i/s/o hyperkalemia. Renal consulted.  - monitor with BMP's qd    #Hyperphosphatemia  - c/w sevelamer to 1600mg TID    ID  #Fever  Pt with T100.9F 1/17 AM. Unclear etiology. 1/3, 1/9 BCx neg. s/p vanc x2 weeks  - CXR no infiltrates, BCx NGTD   - R femoral line removed on 1/18; L femoral line placed on 1/18  - Monitor off abx for now     #Shock, presumed to be septic but no clear source/etiology   Pt presented in shock with leukocytosis, tachycardia, hypotension requiring pressors. Unknown source.   - see #shock for plan  - s/p vanc x2 weeks (last day 1/15) to tx presumed breast cellulitis   - gallium scan: faintly increased activity surrounding the entire lower portion of the right breast consistent with the inflammatory reaction seen on physical examination, small area of activity ~10cm to the R of midline at about the level of the lower border of the sternum, activity likely to be in chest wall   - PET/CT with increased FDG avidity within R axillary vein, which contains an occlusive thrombus, which may represent an infectious source. There is also an additional FDG avid region of the AV fistula near the arterial anastomosis proximal to the area of the fluid collection; could be a potential source of infection vs inflammatory reaction to the graft. Also a photopenic R adnexal cystic mass, suggestive of a benign etiology.     ENDO  #Hyperparathyroidism  At risk for secondary HPTT d/t renal failure   - pt has vague abdominal pain with nausea  - multiple brown tumors on CT with multiple fractures of pubic rami, pubic bone, thoracolumbar spine  - intact , Vit D 38.5  - SPEP/immunofixation negative   - endocrinology recs: secondary hyperparathyroidism vs other process causing osteoclastic tumors  - c/w Sensipar 60mg qd per endo recs   - 1/11 AM intact  (baseline PTH on Sensipar )    #Multinodular Goiter  CT: enlarged multinodular thyroid projecting into superior mediastinum with 1.8 x 1.5 cm solid nodule slightly inferiorly in the anterior mediastinum.   - Thyroid ultrasound: Fine-needle aspiration recommended for 4.3 cm right thyroid and 2.6 cm left thyroid solid nodules per SCOTT guidelines.  - TSH 1.6 free T4 0.68.  - TSH could be inappropriately normal for low free T4 due to euthyroid sick syndrome.  - f/u TFTs after shock resolved    MSK  #Renal Osteodystrophy  - on CT: Increased density of the bone marrow is consistent with renal osteodystrophy  - Per endocrine, extent of bone destruction extreme given PTH level, suspect might not be Brown tumors    #RUE Edema  RUE became edematous 1/8 extending down through hand. AVF with drained seroma pocket on R arm.  - lymphedema vs bleeding into seroma pocket? vs DVT   - RUE U/S with persistent DVT to R IJ, subclavian, and axillary stent, and proximal brachial vein, similar extent to prior. No significant change large complex fluid collection in proximal R arm, could represent old seroma/hematoma  - per vascular: no plans to drain the seroma    #L toe pain  Pt c/o L toe pain, possibly gout. Now resolved  - c/w colchicine 0.3mg qd    HEME/ONC  #Vasculopathy   Current DVT of R IJ, R subclavian and R axillary veins. Hx of L AVF clotting. Given hx of clots, current pruritis and gallium scan uptake in chest wall, reasonable to workup coagulopathy  - s/p heparin gtt; c/w Eliquis 2.5 BID  - OBDULIA, ANCA, complement, RF, beta2 glycoprotein, anticardiolipin nml    #Anemia  Hgb has downtrended from 9.8 on admission to 6.9. Baseline from November appears to be ~13. 1/9 Hb 6.9, ordered 1U pRBC after which pt likely had febrile nonhemolytic transfusion reaction (3 hrs into transfusion, developed tachycardia to 150s, HTN to 140s/90s, rigors, Trectal 101.3.) Transfusion stopped. Tylenol given. Sxs resolved.   - Ferritin elevated: 1209  - Total iron low (15), TIBC low (79)  - s/p 1U pRBC on 1/11 with Hb 6.9 > 7.9 w/o transfusion rxn and 1U pRBC on 1/19 with Hb 6.8 > 8.1 w/o transfusion rxn    #Breast Pain, bilateral  Family hx of breast cancer in mother, sister.   - large R breast mass, palpable L breast mass, R breast has peau d'orange appearance with associated erythema, warmth on R  - US bilateral breasts: No sonographic evidence of breast abscess, cyst or focal mass to correspond with the clinical finding of bilateral breast masses.  - prelim breast bx by surg: granuloma vs vasculitis; f/u final (supposed to result on 1/23)     GYN  #Uterine mass  6.1cm mass in R adnexa seen incidentally on CTAP. Pt with hx of hysterectomy, unclear if partial or total.   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 TVUS c/w likely simple cyst, PET/CT w/ no uptake in R adnexa, low concern for malignancy. Rec outpt f/u when stable for repeat tumor markers.    DERM  #Peeling Epidermis to B/l Breasts  - Derm consulted, recommend wedge bx of both normal and pathologic tissue  - f/u surg about possible wedge bx  - Wound care following, appreciate recs    Preventative  F: None  E: None, HD pt  N: Renal Restricted Diet  DVT: heparin @1700u/hr  Code Status: Full Code  Dispo: 7 [facility]

## 2023-01-20 NOTE — PROGRESS NOTE ADULT - ASSESSMENT
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    NEURO  #Pain Control  Pt in 10/10 pain of bilateral breasts, worst on R. Pain responsive to Dilaudid but effect wears off after ~2-3 hours.   - Pain management consulted, recs appreciated  - Dc'd PCA (1/16-1/17)  - c/w Dilaudid 2mg PO prn for severe pain, Dilaudid 0.25mg IV q4h prn for pain, Tylenol 650mg q6h for mod pain.   - HOLD ALL OPIOIDS IF RR<10, O2SAT <93%, SBP <96    PULM  #Atelectasis  Noted on CT chest to have atelectasis at b/l lung bases.  - Incentive spirometry    #Suspected LIZ  - c/w BiPAP 18/8 at night    CARDIOVASCULAR  #Shock  Pt presented meeting 2/4 SIRS. Hypotensive with MAPs to 60, requiring Levophed. Septic picture of unknown source. HR > 90, WBC >12. Lactate 1.9. Afebrile. Procal 12. S/p Vancomycin 1250mg x 1, Aztreonam x 1 (pt allergic to Cefazolin, PCN). . Ferritin 1209. Patient still requiring pressors, unknown source, does not appear to fit septic picture at this point (no fever, BCx negative, no focal area of infection identified thus far)  - off levophed since 1/16 PM   - Dc'd Florinef on 1/17  - c/w midodrine 30mg q8h (goal SBP >75)  - CT:  7.0 cm fluid collection is present near an AV fistula in the right upper arm. Possibly abscess. Drained by IR 1/5 with serosanguinous fluid: no organisms, few WBCs  - RUE dopplers (1/13): No significant change large complex fluid collection in proximal R arm, could represent old seroma/hematoma (However, prior ultrasound was done prior to drainage)   - Per vascular: does not think AV fistula is infected; would not remove it; also does NOT recommend drainage of seroma.   - per surg: prelim R breast bx shows vasculitis vs granuloma; unlikely to cause hypotension; f/u final bx results   - s/p Vancomycin x2 weeks (1/2-1/15 ) to tx presumed R breast cellulitis (1/2-1/15)  - BCx: NGTD  - corticotropin stim test wnl    #LVOT with LISETTE  likely i/s/o hyperdynamic LV function 2/2 shock and hypovolemia  - per cardiology, rec repeating TTE after shock resolves    #HFrEF  TTE 11/22 normal LV and systolic, EF 59%, grade II diastolic dysfuncton, dilated RV, reduced RV,   Home meds: Entresto 49/51mg  - hold Entresto i/s/o shock    #CAD  Hx of cardiac cath in 2018  Home meds: atorvastatin 40mg, plavix 75mg qd  - c/w home atorvastatin 40mg  - c/w home Plavix 75mg qd     GI  #Constipation  - c/w senna 2 tablets/qd and miralax    #Nutrition  Patient reports poor PO intake  - renal diet    RENAL  #ESRD 2/2 PCKD  Pt has been on HD for "years" 2/2 PCKD. AV Fistula of EDWIN clotted in 2014, has received HD thru HD cath since. New AV Fistula in 11/2022, not yet matured.   - f/u nephrology recs  - s/p HD 1/10, 1/13, 1/16, 1/18    #Hyperkalemia  Patient with Hx of ESRD with missed HD presents with K >8 on VBG with abdominal symptoms and missed HD x8 days. ICU consulted i/s/o hyperkalemia. Renal consulted.  - monitor with BMP's qd    #Hyperphosphatemia  - c/w sevelamer to 1600mg TID    ID  #Fever  Pt with T100.9F 1/17 AM. Unclear etiology. 1/3, 1/9 BCx neg. s/p vanc x2 weeks  - CXR no infiltrates, BCx NGTD   - R femoral line removed on 1/18; L femoral line placed on 1/18  - Monitor off abx for now     #Shock, presumed to be septic but no clear source/etiology   Pt presented in shock with leukocytosis, tachycardia, hypotension requiring pressors. Unknown source.   - see #shock for plan  - s/p vanc x2 weeks (last day 1/15) to tx presumed breast cellulitis   - gallium scan: faintly increased activity surrounding the entire lower portion of the right breast consistent with the inflammatory reaction seen on physical examination, small area of activity ~10cm to the R of midline at about the level of the lower border of the sternum, activity likely to be in chest wall   - PET/CT with increased FDG avidity within R axillary vein, which contains an occlusive thrombus, which may represent an infectious source. There is also an additional FDG avid region of the AV fistula near the arterial anastomosis proximal to the area of the fluid collection; could be a potential source of infection vs inflammatory reaction to the graft. Also a photopenic R adnexal cystic mass, suggestive of a benign etiology.     ENDO  #Hyperparathyroidism  At risk for secondary HPTT d/t renal failure   - pt has vague abdominal pain with nausea  - multiple brown tumors on CT with multiple fractures of pubic rami, pubic bone, thoracolumbar spine  - intact , Vit D 38.5  - SPEP/immunofixation negative   - endocrinology recs: secondary hyperparathyroidism vs other process causing osteoclastic tumors  - increase to Sensipar 60mg qd per endo recs   - 1/11 AM intact  (baseline PTH on Sensipar )    #Multinodular Goiter  CT: enlarged multinodular thyroid projecting into superior mediastinum with 1.8 x 1.5 cm solid nodule slightly inferiorly in the anterior mediastinum.   - Thyroid ultrasound: Fine-needle aspiration recommended for 4.3 cm right thyroid and 2.6 cm left thyroid solid nodules per SCOTT guidelines.  - TSH 1.6 free T4 0.68.  - TSH could be inappropriately normal for low free T4 due to euthyroid sick syndrome.  - f/u TFTs after shock resolved    MSK  #Renal Osteodystrophy  - on CT: Increased density of the bone marrow is consistent with renal osteodystrophy  - Per endocrine, extent of bone destruction extreme given PTH level, suspect might not be Brown tumors    #RUE Edema  RUE became edematous 1/8 extending down through hand. AVF with drained seroma pocket on R arm.  - lymphedema vs bleeding into seroma pocket? vs DVT   - RUE U/S with persistent DVT to R IJ, subclavian, and axillary stent, and proximal brachial vein, similar extent to prior. No significant change large complex fluid collection in proximal R arm, could represent old seroma/hematoma  - per vascular: no plans to drain the seroma    #L toe pain  Pt c/o L toe pain, possibly gout. Now resolved  - c/w colchicine 0.3mg qd    HEME/ONC  #Vasculopathy   Current DVT of R IJ, R subclavian and R axillary veins. Hx of L AVF clotting. Given hx of clots, current pruritis and gallium scan uptake in chest wall, reasonable to workup coagulopathy  - transitioned from heparin gtt to Eliquis 2.5 BID  - OBDULIA, ANCA, complement, RF, beta2 glycoprotein, anticardiolipin nml    #Anemia  Hgb has downtrended from 9.8 on admission to 6.9. Baseline from November appears to be ~13. 1/9 Hb 6.9, ordered 1U pRBC after which pt likely had febrile nonhemolytic transfusion reaction (3 hrs into transfusion, developed tachycardia to 150s, HTN to 140s/90s, rigors, Trectal 101.3.) Transfusion stopped. Tylenol given. Sxs resolved.   - Ferritin elevated: 1209  - Total iron low (15), TIBC low (79)  - s/p 1U pRBC on 1/11 with Hb 6.9 > 7.9 w/o transfusion rxn and 1U pRBC on 1/19 with Hb 6.8 > 8.1 w/o transfusion rxn    #Breast Pain, bilateral  Family hx of breast cancer in mother, sister.   - large R breast mass, palpable L breast mass, R breast has peau d'orange appearance with associated erythema, warmth on R  - US bilateral breasts: No sonographic evidence of breast abscess, cyst or focal mass to correspond with the clinical finding of bilateral breast masses.  - prelim breast bx by surg: granuloma vs vasculitis; f/u final     GYN  #Uterine mass  6.1cm mass in R adnexa seen incidentally on CTAP. Pt with hx of hysterectomy, unclear if partial or total.   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 TVUS c/w likely simple cyst, PET/CT w/ no uptake in R adnexa, low concern for malignancy. Rec outpt f/u when stable for repeat tumor markers.    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    NEURO  #Pain Control  Pt in 10/10 pain of bilateral breasts, worst on R. Pain responsive to Dilaudid but effect wears off after ~2-3 hours.   - Pain management consulted, recs appreciated  - Dc'd PCA (1/16-1/17)  - c/w Dilaudid 0.25mg q4h prn for moderate pain, Dilaudid 0.5mg q4h prn for severe pain  - HOLD ALL OPIOIDS IF RR<10, O2SAT <93%, SBP <96    PULM  #Atelectasis  Noted on CT chest to have atelectasis at b/l lung bases.  - Incentive spirometry    #Suspected LIZ  - c/w BiPAP 18/8 at night    CARDIOVASCULAR  #Shock  Pt presented meeting 2/4 SIRS. Hypotensive with MAPs to 60, requiring Levophed. Septic picture of unknown source. HR > 90, WBC >12. Lactate 1.9. Afebrile. Procal 12. S/p Vancomycin 1250mg x 1, Aztreonam x 1 (pt allergic to Cefazolin, PCN). . Ferritin 1209. Patient still requiring pressors, unknown source, does not appear to fit septic picture at this point (no fever, BCx negative, no focal area of infection identified thus far)  - off levophed since 1/16 PM   - Dc'd Florinef on 1/17  - c/w midodrine 30mg q8h (goal SBP >75)  - CT:  7.0 cm fluid collection is present near an AV fistula in the right upper arm. Possibly abscess. Drained by IR 1/5 with serosanguinous fluid: no organisms, few WBCs  - RUE dopplers (1/13): No significant change large complex fluid collection in proximal R arm, could represent old seroma/hematoma (However, prior ultrasound was done prior to drainage)   - Per vascular: does not think AV fistula is infected; would not remove it; also does NOT recommend drainage of seroma.   - per surg: prelim R breast bx shows vasculitis vs granuloma; unlikely to cause hypotension; f/u final bx results (supposed to result on 1/23)   - s/p Vancomycin x2 weeks (1/2-1/15 ) to tx presumed R breast cellulitis   - BCx: NGTD  - corticotropin stim test wnl    #LVOT with LISETTE  likely i/s/o hyperdynamic LV function 2/2 shock and hypovolemia  - per cardiology, rec repeating TTE after shock resolves    #HFrEF  TTE 11/22 normal LV and systolic, EF 59%, grade II diastolic dysfuncton, dilated RV, reduced RV,   Home meds: Entresto 49/51mg  - hold Entresto i/s/o shock    #CAD  Hx of cardiac cath in 2018  Home meds: atorvastatin 40mg, plavix 75mg qd  - c/w home atorvastatin 40mg  - c/w home Plavix 75mg qd     GI  #Constipation  - c/w senna 2 tablets/qd and miralax    #Nutrition  Patient reports poor PO intake  - renal diet    RENAL  #ESRD 2/2 PCKD  Pt has been on HD for "years" 2/2 PCKD. AV Fistula of EDWIN clotted in 2014, has received HD thru HD cath since. New AV Fistula in 11/2022, not yet matured.   - f/u nephrology recs  - s/p HD 1/10, 1/13, 1/16, 1/18, scheduled for HD today (goal 500ml fluid off)     #Hyperkalemia  Patient with Hx of ESRD with missed HD presents with K >8 on VBG with abdominal symptoms and missed HD x8 days. ICU consulted i/s/o hyperkalemia. Renal consulted.  - monitor with BMP's qd    #Hyperphosphatemia  - c/w sevelamer to 1600mg TID    ID  #Fever  Pt with T100.9F 1/17 AM. Unclear etiology. 1/3, 1/9 BCx neg. s/p vanc x2 weeks  - CXR no infiltrates, BCx NGTD   - R femoral line removed on 1/18; L femoral line placed on 1/18  - Monitor off abx for now     #Shock, presumed to be septic but no clear source/etiology   Pt presented in shock with leukocytosis, tachycardia, hypotension requiring pressors. Unknown source.   - see #shock for plan  - s/p vanc x2 weeks (last day 1/15) to tx presumed breast cellulitis   - gallium scan: faintly increased activity surrounding the entire lower portion of the right breast consistent with the inflammatory reaction seen on physical examination, small area of activity ~10cm to the R of midline at about the level of the lower border of the sternum, activity likely to be in chest wall   - PET/CT with increased FDG avidity within R axillary vein, which contains an occlusive thrombus, which may represent an infectious source. There is also an additional FDG avid region of the AV fistula near the arterial anastomosis proximal to the area of the fluid collection; could be a potential source of infection vs inflammatory reaction to the graft. Also a photopenic R adnexal cystic mass, suggestive of a benign etiology.     ENDO  #Hyperparathyroidism  At risk for secondary HPTT d/t renal failure   - pt has vague abdominal pain with nausea  - multiple brown tumors on CT with multiple fractures of pubic rami, pubic bone, thoracolumbar spine  - intact , Vit D 38.5  - SPEP/immunofixation negative   - endocrinology recs: secondary hyperparathyroidism vs other process causing osteoclastic tumors  - c/w Sensipar 60mg qd per endo recs   - 1/11 AM intact  (baseline PTH on Sensipar )    #Multinodular Goiter  CT: enlarged multinodular thyroid projecting into superior mediastinum with 1.8 x 1.5 cm solid nodule slightly inferiorly in the anterior mediastinum.   - Thyroid ultrasound: Fine-needle aspiration recommended for 4.3 cm right thyroid and 2.6 cm left thyroid solid nodules per SCOTT guidelines.  - TSH 1.6 free T4 0.68.  - TSH could be inappropriately normal for low free T4 due to euthyroid sick syndrome.  - f/u TFTs after shock resolved    MSK  #Renal Osteodystrophy  - on CT: Increased density of the bone marrow is consistent with renal osteodystrophy  - Per endocrine, extent of bone destruction extreme given PTH level, suspect might not be Brown tumors    #RUE Edema  RUE became edematous 1/8 extending down through hand. AVF with drained seroma pocket on R arm.  - lymphedema vs bleeding into seroma pocket? vs DVT   - RUE U/S with persistent DVT to R IJ, subclavian, and axillary stent, and proximal brachial vein, similar extent to prior. No significant change large complex fluid collection in proximal R arm, could represent old seroma/hematoma  - per vascular: no plans to drain the seroma    #L toe pain  Pt c/o L toe pain, possibly gout. Now resolved  - c/w colchicine 0.3mg qd    HEME/ONC  #Vasculopathy   Current DVT of R IJ, R subclavian and R axillary veins. Hx of L AVF clotting. Given hx of clots, current pruritis and gallium scan uptake in chest wall, reasonable to workup coagulopathy  - s/p heparin gtt; c/w Eliquis 2.5 BID  - OBDULIA, ANCA, complement, RF, beta2 glycoprotein, anticardiolipin nml    #Anemia  Hgb has downtrended from 9.8 on admission to 6.9. Baseline from November appears to be ~13. 1/9 Hb 6.9, ordered 1U pRBC after which pt likely had febrile nonhemolytic transfusion reaction (3 hrs into transfusion, developed tachycardia to 150s, HTN to 140s/90s, rigors, Trectal 101.3.) Transfusion stopped. Tylenol given. Sxs resolved.   - Ferritin elevated: 1209  - Total iron low (15), TIBC low (79)  - s/p 1U pRBC on 1/11 with Hb 6.9 > 7.9 w/o transfusion rxn and 1U pRBC on 1/19 with Hb 6.8 > 8.1 w/o transfusion rxn    #Breast Pain, bilateral  Family hx of breast cancer in mother, sister.   - large R breast mass, palpable L breast mass, R breast has peau d'orange appearance with associated erythema, warmth on R  - US bilateral breasts: No sonographic evidence of breast abscess, cyst or focal mass to correspond with the clinical finding of bilateral breast masses.  - prelim breast bx by surg: granuloma vs vasculitis; f/u final (supposed to result on 1/23)     GYN  #Uterine mass  6.1cm mass in R adnexa seen incidentally on CTAP. Pt with hx of hysterectomy, unclear if partial or total.   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 TVUS c/w likely simple cyst, PET/CT w/ no uptake in R adnexa, low concern for malignancy. Rec outpt f/u when stable for repeat tumor markers.    DERM  #Peeling Epidermis to B/l Breasts  - Derm consulted, recommend wedge bx of both normal and pathologic tissue  - f/u surg about possible wedge bx  - Wound care following, appreciate recs    Preventative  F: None  E: None, HD pt  N: Renal Restricted Diet  DVT: heparin @1700u/hr  Code Status: Full Code  [facility]

## 2023-01-20 NOTE — PROGRESS NOTE ADULT - SUBJECTIVE AND OBJECTIVE BOX
--------------------------------------------------------------------------------  Chief Complaint: ESRD/Ongoing hemodialysis requirement    24 hour events/subjective:    Seen this morning, SBP in the 110s, best its been in the last few days. Patient states feeling ok, will try for some UF with HD today. Discussed with ICU team who is in agreement    PAST HISTORY  --------------------------------------------------------------------------------  No significant changes to PMH, PSH, FHx, SHx, unless otherwise noted    ALLERGIES & MEDICATIONS  --------------------------------------------------------------------------------  Allergies    Ancef (Rash; Urticaria)  DDAVP (Hypotension)  iodine (Hives; Pruritus)  penicillin (Swelling)  sulfa drugs (Angioedema)    Intolerances      Standing Inpatient Medications  apixaban 2.5 milliGRAM(s) Oral every 12 hours  atorvastatin 40 milliGRAM(s) Oral at bedtime  chlorhexidine 2% Cloths 1 Application(s) Topical <User Schedule>  chlorhexidine 2% Cloths 1 Application(s) Topical <User Schedule>  cinacalcet 60 milliGRAM(s) Oral daily  clopidogrel Tablet 75 milliGRAM(s) Oral daily  colchicine 0.3 milliGRAM(s) Oral daily  collagenase Ointment 1 Application(s) Topical daily  epoetin александр-epbx (RETACRIT) Injectable 8000 Unit(s) IV Push once  midodrine 30 milliGRAM(s) Oral every 8 hours  Nephro-deborah 1 Tablet(s) Oral daily  polyethylene glycol 3350 17 Gram(s) Oral every 12 hours  senna 2 Tablet(s) Oral at bedtime  sevelamer carbonate 1600 milliGRAM(s) Oral three times a day with meals    PRN Inpatient Medications  acetaminophen     Tablet .. 650 milliGRAM(s) Oral every 6 hours PRN  calamine/zinc oxide Lotion 1 Application(s) Topical three times a day PRN  HYDROmorphone  Injectable 0.5 milliGRAM(s) IV Push every 4 hours PRN  HYDROmorphone  Injectable 0.25 milliGRAM(s) IV Push every 4 hours PRN  sodium chloride 0.9% lock flush 10 milliLiter(s) IV Push every 1 hour PRN      REVIEW OF SYSTEMS  --------------------------------------------------------------------------------  All other systems were reviewed and are negative, except as noted.    VITALS/PHYSICAL EXAM  --------------------------------------------------------------------------------  T(C): 36.8 (23 @ 11:45), Max: 37.6 (23 @ 18:37)  HR: 77 (23 @ 11:45) (69 - 92)  BP: 112/52 (23 @ 11:45) (100/46 - 143/64)  RR: 19 (23 @ 11:45) (12 - 26)  SpO2: 95% (23 @ 11:45) (91% - 100%)  Wt(kg): --  Drug Dosing Weight  Height (cm): 177.8 (2023 20:52)  Weight (kg): 85.3 (2023 20:52)  BMI (kg/m2): 27 (2023 20:52)  BSA (m2): 2.03 (2023 20:52)        23 @ 07:01  -  23 @ 07:00  --------------------------------------------------------  IN: 470 mL / OUT: 0 mL / NET: 470 mL    PHYSICAL EXAM:  GENERAL: NAD resting in bed   CHEST/LUNG: Clear to auscultation bilaterally; no accessory muscle use   HEART: normal S1S2, RRR  ABDOMEN: Soft, Nontender, +BS,   EXTREMITIES: No clubbing, cyanosis, or edema   ACCESS: R TDC    LABS/STUDIES  --------------------------------------------------------------------------------              8.2    5.28  >-----------<  184      [23 @ 05:55]              27.7     133  |  93  |  36  ----------------------------<  85      [23 @ 05:55]  3.9   |  28  |  6.35        Ca     8.5     [23 @ 05:55]      Mg     1.8     [23 @ 05:55]      Phos  4.2     [23 @ 05:55]    TPro  5.4  /  Alb  1.8  /  TBili  0.3  /  DBili  x   /  AST  31  /  ALT  16  /  AlkPhos  61  [23 @ 05:55]    PT/INR: PT 21.1 , INR 1.76       [23 @ 07:46]  PTT: 44.3       [23 @ 07:46]      Iron 15, TIBC 79, %sat 19      [23 @ 06:01]  Ferritin 1209      [23 06:01]  PTH -- (Ca 8.5)      [23 05:30]   351  PTH -- (Ca 7.6)      [23 @ 04:35]   532  PTH -- (Ca 8.8)      [23 @ 14:24]   479  Vitamin D (25OH) 38.5      [23 05:30]  TSH 1.610      [23 05:30]    HBsAb Nonreact      [23:26]  HBsAg Nonreact      [23:]  HCV 0.04, Nonreact      [23:]      RADIOLOGY:  --------------------------------------------------------------------------------------    Hemoglobin: 8.2 g/dL (23 @ 05:55)  Phosphorus Level, Serum: 4.2 mg/dL (23 05:55)  Hemoglobin: 8.1 g/dL (23 @ 16:52)  Hemoglobin: 6.8 g/dL (23 @ 07:46)    Albumin, Serum: 1.8 g/dL (23 05:55)  Albumin, Serum: 2.2 g/dL (23 @ 04:46)    T(C): 36.8 (23 @ 11:45), Max: 37.6 (23 @ 18:37)  HR: 77 (23 11:45) (69 - 92)  BP: 112/52 (23 @ 11:45) (100/46 - 143/64)  RR: 19 (23 11:45) (12 - 26)  SpO2: 95% (23 11:45) (91% - 100%)  cinacalcet 30 milliGRAM(s) Oral daily, 23 @ 15:49, Routine  cinacalcet 60 milliGRAM(s) Oral daily, 23 @ 11:00, 11:00  cinacalcet 30 milliGRAM(s) Oral once, 23 @ 12:12, STAT, Stop order after: 1 Doses  epoetin александр-epbx (RETACRIT) Injectable 8000 Unit(s) IV Push once, 23 @ 06:45, Routine, Stop order after: 1 Doses  epoetin александр-epbx (RETACRIT) Injectable 8000 Unit(s) IV Push once, 01-10-23 @ 10:08, Routine, Stop order after: 1 Doses  epoetin александр-epbx (RETACRIT) Injectable 8000 Unit(s) IV Push once, 23 @ 07:40, Routine, Stop order after: 1 Doses  epoetin александр-epbx (RETACRIT) Injectable 8000 Unit(s) IV Push once, 23 @ 07:40, Routine, Stop order after: 1 Doses  epoetin александр-epbx (RETACRIT) Injectable 8000 Unit(s) IV Push once, 23 @ 08:46, Routine, Stop order after: 1 Doses  epoetin александр-epbx (RETACRIT) Injectable 8000 Unit(s) IV Push once, 23 @ 09:08, Routine, Stop order after: 1 Doses  epoetin александр-epbx (RETACRIT) Injectable 8000 Unit(s) IV Push once, 23 @ 07:45, Routine, Stop order after: 1 Doses  epoetin александр-epbx (RETACRIT) Injectable 8000 Unit(s) IV Push once, 23 @ 07:45, Routine, Stop order after: 1 Doses  sevelamer carbonate 1600 milliGRAM(s) Oral three times a day with meals, 23 @ 05:28, Routine      Hemodialysis Treatment.:     Schedule: Once, Modality: Hemodialysis, Access: Arteriovenous Fistula    Dialyzer: Optiflux [id number], Time: 180 Min    Blood Flow: 400 mL/Min , Dialysate Flow: 500 mL/Min, Dialysate Temp: 35, Tubinmm (Adult)    Target Fluid Removal: 1 Liters    Dialysate Electrolytes (mEq/L): Potassium 3, Calcium 2.5, Sodium 138, Bicarbonate 35    Additional Instructions: Midodrine 30 min before dialysis (23 @ 10:10) [Completed]    Seen on HD, c/w tx outlined as above

## 2023-01-20 NOTE — PROGRESS NOTE ADULT - ASSESSMENT
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 During imaging, CT showing 7.0 cm fluid collection is present near an AV fistula in the right upper arm. The differential for this collection included benign post-operative hematoma or seroma, but also abscess or vascular device infection, particularly in the setting of septic shock. Vascular consulted for RUE fistula evaluation to rule out this area as a source of sepsis. Reassuring physical exam at initial evaluation without clear indicia of infection related to the graph, small superficial surgical incision dehiscence notwithstanding. Now s/p IR image guided aspiration of perigraft fluid (negative) and gadolinium scan (negative). At this point, do not suspect a graft infection as the provoking factor leading to the patient's presentation. New onset swelling in area of RUE fistula is likely a sterile seroma with no intervention needed.    Recommendations:    -Continue to follow up BCx and IR Cx - both NGTD (given negative blood and wound drainage cultures, low likelihood of graft infection)  -Wound care recs: BID dressing changes (once daily with Santyl DSD once daily with Bactroban DSD). Apply light ace wrap compression to right hand and forearm to help with soft tissue swelling and elevate RUE with pillows  -Fluid collection near RUE fistula is likely a sterile seroma and unlikely the cause of patient's sepsis. Would not recommend percutaneous drainage or IR procedure to drain fluid.  -F/u breast punch and core biopsy  -Appreciate breast surgery recs  -Rest of care per primary team  -Vascular surgery Team 3C will continue to follow. Please call [phone] with any questions or concerns.

## 2023-01-20 NOTE — PROGRESS NOTE ADULT - ASSESSMENT
67 F with ESRD on HD presented for missed HD found to be hyperkalemic c/b septic shock of unknown etiology, pending w/u for granulomatous mastitis    Assessment/Plan:   #ESRD on HD TTS  HD today with goal 1L UF  K reviewed 3.9  Renal diet    #Hx of Hypertension  Has had profound hypotension during this admission requiring pressors  -Gets midodrine 1/2 hr before HD    #access   [facility] TDC - does not appear to be infected or source of infxn  RUE AVF not being used    #anemia  Hgb 8.2  -Epo w/ HD  -Transfusion goals as per primary team  -Iron profile noted    #renal bone disease   Noted to have brown tumors on most recent CT, in addition to a lytic lesion of her skull. Likely due to hyperparathyroidism. PTH noted to be 479 on most recent check.   Ca ~10.1 corrected for albumin  Phos 4.2  -c/w sevelamer to 1600mg tid w/ meals  -c/w sensipar 60mg Qday

## 2023-01-20 NOTE — PROGRESS NOTE ADULT - PROBLEM SELECTOR PLAN 5
Patient with multiple medical problems and comorbidities Followed up C discussion with patient. Per patient, she has been thinking about her wishes and today has decided to continue all therapy and remain full code.   - Patient has 3 children and would like Jasmina, her youngest daughter, to make her medical decisions on her behalf if she cannot make them for herself. Healthcare proxy form completed and made available in the patient chart.

## 2023-01-20 NOTE — PROGRESS NOTE ADULT - ASSESSMENT
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      - F/U final right breast biopsy pathology -- preliminary result shows vasculitis vs granulomatous mastitis. Will discuss with pathology today.  - F/u rheumatology recommendations for possible granulomatous mastitis  - F/U Cx results (NGTD so far). ABx held after 1/13/23 as per primary team.  - Appreciate GYN recommendations for adnexal mass and elevated tumor markers.   - Dress right breast with Xeroform and gauze with paper tape. To be changed daily.   - Wound care for access site ulceration as per vascular surgery.   - Patient to be stepped down today as per primary team.  - Surgery 5C following.

## 2023-01-20 NOTE — PROGRESS NOTE ADULT - SUBJECTIVE AND OBJECTIVE BOX
[facility]  Contact Info: Call [phone] (HEAL Line)     SUBJECTIVE AND OBJECTIVE:  INTERVAL HPI/OVERNIGHT EVENTS: Interval events noted. See patient's PRN use for the past 24hrs noted below. Comprehensive symptom assessment and GOC exploration as noted below. Extensive time spent discussing plan of care with patient/family. No unexpected adverse effects of opiates noted: no sedation/dizziness/nausea.    ALLERGIES:  Ancef (Rash; Urticaria)  DDAVP (Hypotension)  iodine (Hives; Pruritus)  penicillin (Swelling)  sulfa drugs (Angioedema)    MEDICATIONS  (STANDING):  apixaban 2.5 milliGRAM(s) Oral every 12 hours  atorvastatin 40 milliGRAM(s) Oral at bedtime  chlorhexidine 2% Cloths 1 Application(s) Topical <User Schedule>  chlorhexidine 2% Cloths 1 Application(s) Topical <User Schedule>  cinacalcet 60 milliGRAM(s) Oral daily  clopidogrel Tablet 75 milliGRAM(s) Oral daily  colchicine 0.3 milliGRAM(s) Oral daily  collagenase Ointment 1 Application(s) Topical daily  epoetin александр-epbx (RETACRIT) Injectable 8000 Unit(s) IV Push once  midodrine 30 milliGRAM(s) Oral every 8 hours  Nephro-deborah 1 Tablet(s) Oral daily  polyethylene glycol 3350 17 Gram(s) Oral every 12 hours  senna 2 Tablet(s) Oral at bedtime  sevelamer carbonate 1600 milliGRAM(s) Oral three times a day with meals    MEDICATIONS  (PRN):  acetaminophen     Tablet .. 650 milliGRAM(s) Oral every 6 hours PRN Temp greater or equal to 38C (100.4F), Moderate Pain (4 - 6)  calamine/zinc oxide Lotion 1 Application(s) Topical three times a day PRN Itching  HYDROmorphone  Injectable 0.5 milliGRAM(s) IV Push every 4 hours PRN Severe Pain (7 - 10)  HYDROmorphone  Injectable 0.25 milliGRAM(s) IV Push every 4 hours PRN Moderate Pain (4 - 6)  sodium chloride 0.9% lock flush 10 milliLiter(s) IV Push every 1 hour PRN Pre/post blood products, medications, blood draw, and to maintain line patency      Analgesic Use (Scheduled and PRNs) for past 24 hours:    HYDROmorphone  Injectable   0.25 milliGRAM(s) IV Push (01-19-23 @ 13:15)    HYDROmorphone  Injectable   0.5 milliGRAM(s) IV Push (01-20-23 @ 09:46)   0.5 milliGRAM(s) IV Push (01-20-23 @ 05:42)   0.5 milliGRAM(s) IV Push (01-20-23 @ 00:47)   0.5 milliGRAM(s) IV Push (01-19-23 @ 18:46)    HYDROmorphone  Injectable   0.25 milliGRAM(s) IV Push (01-20-23 @ 12:11)   0.25 milliGRAM(s) IV Push (01-20-23 @ 02:50)   0.25 milliGRAM(s) IV Push (01-19-23 @ 20:45)    sevelamer carbonate   1600 milliGRAM(s) Oral (01-20-23 @ 07:27)   1600 milliGRAM(s) Oral (01-19-23 @ 17:14)      ITEMS UNCHECKED ARE NOT PRESENT  PRESENT SYMPTOMS/REVIEW OF SYSTEMS: []Unable to obtain due to poor mentation   Source if other than patient:  []Family   []Team   Pain: [] yes [] no  QOL impact -   Location -                    Aggravating Factors -  Quality -  Radiation -  Timing -  Severity (0-10 scale) -   Minimal Acceptable Level (0-10 scale) -    CPOT Score:  (Critical Care Pain Assessment)    PAIN AD Score:  (Nonverbal Pain Assessment)    Dyspnea:                           []Mild  []Moderate []Severe  Anxiety:                             []Mild []Moderate []Severe  Fatigue:                             []Mild []Moderate []Severe  Nausea:                             []Mild []Moderate []Severe  Loss of Appetite:              []Mild []Moderate []Severe  Constipation:                    []Mild []Moderate []Severe    Other Symptoms:  [x]All other review of systems negative     Palliative Performance Status Version 2:  %  (Functional Assessment Tool)    GENERAL:  [] NAD []Alert []Lethargic  []Cachexia  []Unarousable  []Verbal  []Non-Verbal  Behavioral:   []Anxiety  []Delirium []Agitation []Cooperative []Oriented x  HEENT:  []Normal  [] Moist Mucous Membranes []Dry mouth   []ET Tube/Trach  []Oral lesions  PULMONARY:   []Clear []Tachypnea  []Audible excessive secretions  []Normal Work of Breathing []Labored Breathing  []Rhonchi []Crackles []Wheezing  CARDIOVASCULAR:    []Regular Rate []Regular Rhythm []Irregular []Tachy  []Arjun  GASTROINTESTINAL:  []Soft  []Distended   []+BS  []Non tender []Tender  []PEG []OGT/ NGT  Last BM:  GENITOURINARY:  []Normal [] Incontinent   []Oliguria/Anuria   []Wu  MUSCULOSKELETAL:   []Normal Extremities  []Weakness  []Bed/Wheelchair bound []Edema  NEUROLOGIC:   []No focal deficits  []Cognitive impairment  []Dysphagia []Dysarthria []Paresis []Encephalopathic  SKIN:   []Normal   []Pressure ulcer(s)  []Rash    CRITICAL CARE:  [ ]Shock Present  [ ]Septic [ ]Cardiogenic [ ]Neurologic [ ]Hypovolemic  [ ] Vasopressors [ ]Inotropes   [ ]Respiratory failure present [ ]Mechanical Ventilation [ ]Non-invasive ventilatory support [ ]High-Flow  [ ]Acute  [ ]Chronic [ ]Hypoxic  [ ]Hypercarbic   [ ]Other organ failure    Vital Signs Last 24 Hrs  T(C): 36.8 (20 Jan 2023 11:45), Max: 37.6 (19 Jan 2023 18:37)  T(F): 98.3 (20 Jan 2023 11:45), Max: 99.6 (19 Jan 2023 18:37)  HR: 77 (20 Jan 2023 11:45) (69 - 92)  BP: 112/52 (20 Jan 2023 11:45) (100/46 - 143/64)  BP(mean): 75 (20 Jan 2023 11:45) (66 - 94)  RR: 19 (20 Jan 2023 11:45) (12 - 26)  SpO2: 95% (20 Jan 2023 11:45) (91% - 100%)    Parameters below as of 20 Jan 2023 11:45  Patient On (Oxygen Delivery Method): room air         LABS:                        8.2    5.28  )-----------( 184      ( 20 Jan 2023 05:55 )             27.7   01-20    133<L>  |  93<L>  |  36<H>  ----------------------------<  85  3.9   |  28  |  6.35<H>    Ca    8.5      20 Jan 2023 05:55  Phos  4.2     01-20  Mg     1.8     01-20    TPro  5.4<L>  /  Alb  1.8<L>  /  TBili  0.3  /  DBili  x   /  AST  31  /  ALT  16  /  AlkPhos  61  01-20    RADIOLOGY & ADDITIONAL STUDIES:    REFERRALS:  [x]Social Work  []Case management []PT/OT []Chaplaincy  []Hospice  []Patient/Family Support []Massage Therapy []Music Therapy []Holistic RN    DISCUSSION OF CASE: Family - to provide updates and emotional support. [facility]  Contact Info: Call [phone] (HEAL Line)     SUBJECTIVE AND OBJECTIVE:  INTERVAL HPI/OVERNIGHT EVENTS: Interval events noted. See patient's PRN use for the past 24hrs noted below. No unexpected adverse effects of opiates noted: no sedation/dizziness/nausea.     ALLERGIES:  Ancef (Rash; Urticaria)  DDAVP (Hypotension)  iodine (Hives; Pruritus)  penicillin (Swelling)  sulfa drugs (Angioedema)    MEDICATIONS  (STANDING):  apixaban 2.5 milliGRAM(s) Oral every 12 hours  atorvastatin 40 milliGRAM(s) Oral at bedtime  chlorhexidine 2% Cloths 1 Application(s) Topical <User Schedule>  chlorhexidine 2% Cloths 1 Application(s) Topical <User Schedule>  cinacalcet 60 milliGRAM(s) Oral daily  clopidogrel Tablet 75 milliGRAM(s) Oral daily  colchicine 0.3 milliGRAM(s) Oral daily  collagenase Ointment 1 Application(s) Topical daily  epoetin александр-epbx (RETACRIT) Injectable 8000 Unit(s) IV Push once  midodrine 30 milliGRAM(s) Oral every 8 hours  Nephro-deborah 1 Tablet(s) Oral daily  polyethylene glycol 3350 17 Gram(s) Oral every 12 hours  senna 2 Tablet(s) Oral at bedtime  sevelamer carbonate 1600 milliGRAM(s) Oral three times a day with meals    MEDICATIONS  (PRN):  acetaminophen     Tablet .. 650 milliGRAM(s) Oral every 6 hours PRN Temp greater or equal to 38C (100.4F), Moderate Pain (4 - 6)  calamine/zinc oxide Lotion 1 Application(s) Topical three times a day PRN Itching  HYDROmorphone  Injectable 0.5 milliGRAM(s) IV Push every 4 hours PRN Severe Pain (7 - 10)  HYDROmorphone  Injectable 0.25 milliGRAM(s) IV Push every 4 hours PRN Moderate Pain (4 - 6)  sodium chloride 0.9% lock flush 10 milliLiter(s) IV Push every 1 hour PRN Pre/post blood products, medications, blood draw, and to maintain line patency      Analgesic Use (Scheduled and PRNs) for past 24 hours:    HYDROmorphone  Injectable   0.25 milliGRAM(s) IV Push (01-19-23 @ 13:15)    HYDROmorphone  Injectable   0.5 milliGRAM(s) IV Push (01-20-23 @ 09:46)   0.5 milliGRAM(s) IV Push (01-20-23 @ 05:42)   0.5 milliGRAM(s) IV Push (01-20-23 @ 00:47)   0.5 milliGRAM(s) IV Push (01-19-23 @ 18:46)    HYDROmorphone  Injectable   0.25 milliGRAM(s) IV Push (01-20-23 @ 12:11)   0.25 milliGRAM(s) IV Push (01-20-23 @ 02:50)   0.25 milliGRAM(s) IV Push (01-19-23 @ 20:45)    sevelamer carbonate   1600 milliGRAM(s) Oral (01-20-23 @ 07:27)   1600 milliGRAM(s) Oral (01-19-23 @ 17:14)      ITEMS UNCHECKED ARE NOT PRESENT  PRESENT SYMPTOMS/REVIEW OF SYSTEMS: []Unable to obtain due to poor mentation   Source if other than patient:  []Family   []Team   Pain: [] yes [] no  QOL impact -   Location -                    Aggravating Factors -  Quality -  Radiation -  Timing -  Severity (0-10 scale) -   Minimal Acceptable Level (0-10 scale) -    CPOT Score:  (Critical Care Pain Assessment)    PAIN AD Score:  (Nonverbal Pain Assessment)    Dyspnea:                           []Mild  []Moderate []Severe  Anxiety:                             []Mild []Moderate []Severe  Fatigue:                             []Mild []Moderate []Severe  Nausea:                             []Mild []Moderate []Severe  Loss of Appetite:              []Mild []Moderate []Severe  Constipation:                    []Mild []Moderate []Severe    Other Symptoms:  [x]All other review of systems negative     Palliative Performance Status Version 2:  %  (Functional Assessment Tool)    GENERAL:  [] NAD []Alert []Lethargic  []Cachexia  []Unarousable  []Verbal  []Non-Verbal  Behavioral:   []Anxiety  []Delirium []Agitation []Cooperative []Oriented x  HEENT:  []Normal  [] Moist Mucous Membranes []Dry mouth   []ET Tube/Trach  []Oral lesions  PULMONARY:   []Clear []Tachypnea  []Audible excessive secretions  []Normal Work of Breathing []Labored Breathing  []Rhonchi []Crackles []Wheezing  CARDIOVASCULAR:    []Regular Rate []Regular Rhythm []Irregular []Tachy  []Arjun  GASTROINTESTINAL:  []Soft  []Distended   []+BS  []Non tender []Tender  []PEG []OGT/ NGT  Last BM:  GENITOURINARY:  []Normal [] Incontinent   []Oliguria/Anuria   []Wu  MUSCULOSKELETAL:   []Normal Extremities  []Weakness  []Bed/Wheelchair bound []Edema  NEUROLOGIC:   []No focal deficits  []Cognitive impairment  []Dysphagia []Dysarthria []Paresis []Encephalopathic  SKIN:   []Normal   []Pressure ulcer(s)  []Rash    CRITICAL CARE:  [ ]Shock Present  [ ]Septic [ ]Cardiogenic [ ]Neurologic [ ]Hypovolemic  [ ] Vasopressors [ ]Inotropes   [ ]Respiratory failure present [ ]Mechanical Ventilation [ ]Non-invasive ventilatory support [ ]High-Flow  [ ]Acute  [ ]Chronic [ ]Hypoxic  [ ]Hypercarbic   [ ]Other organ failure    Vital Signs Last 24 Hrs  T(C): 36.8 (20 Jan 2023 11:45), Max: 37.6 (19 Jan 2023 18:37)  T(F): 98.3 (20 Jan 2023 11:45), Max: 99.6 (19 Jan 2023 18:37)  HR: 77 (20 Jan 2023 11:45) (69 - 92)  BP: 112/52 (20 Jan 2023 11:45) (100/46 - 143/64)  BP(mean): 75 (20 Jan 2023 11:45) (66 - 94)  RR: 19 (20 Jan 2023 11:45) (12 - 26)  SpO2: 95% (20 Jan 2023 11:45) (91% - 100%)    Parameters below as of 20 Jan 2023 11:45  Patient On (Oxygen Delivery Method): room air         LABS:                        8.2    5.28  )-----------( 184      ( 20 Jan 2023 05:55 )             27.7   01-20    133<L>  |  93<L>  |  36<H>  ----------------------------<  85  3.9   |  28  |  6.35<H>    Ca    8.5      20 Jan 2023 05:55  Phos  4.2     01-20  Mg     1.8     01-20    TPro  5.4<L>  /  Alb  1.8<L>  /  TBili  0.3  /  DBili  x   /  AST  31  /  ALT  16  /  AlkPhos  61  01-20    RADIOLOGY & ADDITIONAL STUDIES:    REFERRALS:  [x]Social Work  []Case management []PT/OT []Chaplaincy  []Hospice  []Patient/Family Support []Massage Therapy []Music Therapy []Holistic RN    DISCUSSION OF CASE: Family - to provide updates and emotional support. [facility]  Contact Info: Call [phone] (HEAL Line)     SUBJECTIVE AND OBJECTIVE:  INTERVAL HPI/OVERNIGHT EVENTS: Interval events noted. See patient's PRN use for the past 24hrs noted below. No unexpected adverse effects of opiates noted: no sedation/dizziness/nausea. Patient reports better pain control with 0.5 mg IV Dilaudid dosage. Clarified goals of care which the patient had been unsure about at the time of palliative care consult. Decided she would like to continue all therapy. [place] discussed and patient wishes to be full code.     ALLERGIES:  Ancef (Rash; Urticaria)  DDAVP (Hypotension)  iodine (Hives; Pruritus)  penicillin (Swelling)  sulfa drugs (Angioedema)    MEDICATIONS  (STANDING):  apixaban 2.5 milliGRAM(s) Oral every 12 hours  atorvastatin 40 milliGRAM(s) Oral at bedtime  chlorhexidine 2% Cloths 1 Application(s) Topical <User Schedule>  chlorhexidine 2% Cloths 1 Application(s) Topical <User Schedule>  cinacalcet 60 milliGRAM(s) Oral daily  clopidogrel Tablet 75 milliGRAM(s) Oral daily  colchicine 0.3 milliGRAM(s) Oral daily  collagenase Ointment 1 Application(s) Topical daily  epoetin александр-epbx (RETACRIT) Injectable 8000 Unit(s) IV Push once  midodrine 30 milliGRAM(s) Oral every 8 hours  Nephro-deborah 1 Tablet(s) Oral daily  polyethylene glycol 3350 17 Gram(s) Oral every 12 hours  senna 2 Tablet(s) Oral at bedtime  sevelamer carbonate 1600 milliGRAM(s) Oral three times a day with meals    MEDICATIONS  (PRN):  acetaminophen     Tablet .. 650 milliGRAM(s) Oral every 6 hours PRN Temp greater or equal to 38C (100.4F), Moderate Pain (4 - 6)  calamine/zinc oxide Lotion 1 Application(s) Topical three times a day PRN Itching  HYDROmorphone  Injectable 0.5 milliGRAM(s) IV Push every 4 hours PRN Severe Pain (7 - 10)  HYDROmorphone  Injectable 0.25 milliGRAM(s) IV Push every 4 hours PRN Moderate Pain (4 - 6)  sodium chloride 0.9% lock flush 10 milliLiter(s) IV Push every 1 hour PRN Pre/post blood products, medications, blood draw, and to maintain line patency      Analgesic Use (Scheduled and PRNs) for past 24 hours:    HYDROmorphone  Injectable   0.25 milliGRAM(s) IV Push (01-19-23 @ 13:15)    HYDROmorphone  Injectable   0.5 milliGRAM(s) IV Push (01-20-23 @ 09:46)   0.5 milliGRAM(s) IV Push (01-20-23 @ 05:42)   0.5 milliGRAM(s) IV Push (01-20-23 @ 00:47)   0.5 milliGRAM(s) IV Push (01-19-23 @ 18:46)    HYDROmorphone  Injectable   0.25 milliGRAM(s) IV Push (01-20-23 @ 12:11)   0.25 milliGRAM(s) IV Push (01-20-23 @ 02:50)   0.25 milliGRAM(s) IV Push (01-19-23 @ 20:45)    sevelamer carbonate   1600 milliGRAM(s) Oral (01-20-23 @ 07:27)   1600 milliGRAM(s) Oral (01-19-23 @ 17:14)      ITEMS UNCHECKED ARE NOT PRESENT  PRESENT SYMPTOMS/REVIEW OF SYSTEMS: []Unable to obtain due to poor mentation   Source if other than patient:  []Family   []Team   Pain: [x] yes [] no  QOL impact - unable to rest   Location -   bilateral breasts                 Aggravating Factors - none  Quality -  Radiation -none  Timing - constant  Severity (0-10 scale) - 8/10  Minimal Acceptable Level (0-10 scale) - <5/10    Dyspnea:                           []Mild  []Moderate []Severe  Anxiety:                             []Mild []Moderate []Severe  Fatigue:                             []Mild []Moderate []Severe  Nausea:                             []Mild []Moderate []Severe  Loss of Appetite:              []Mild []Moderate []Severe  Constipation:                    []Mild []Moderate []Severe    Other Symptoms:  [x]All other review of systems negative     Palliative Performance Status Version 2: 40  %  (Functional Assessment Tool)    GENERAL:  [x] NAD []Alert []Lethargic  []Cachexia  []Unarousable  [x]Verbal  []Non-Verbal  Behavioral:   []Anxiety  []Delirium []Agitation []Cooperative [x]Oriented x 3  HEENT:  [x]Normal  [] Moist Mucous Membranes []Dry mouth   []ET Tube/Trach  []Oral lesions  PULMONARY:   [x]Clear []Tachypnea  []Audible excessive secretions  [x]Normal Work of Breathing []Labored Breathing  []Rhonchi []Crackles []Wheezing  CARDIOVASCULAR:    [x]Regular Rate [x]Regular Rhythm []Irregular []Tachy  []Arjun  GASTROINTESTINAL:  []Soft  [x]Distended   []+BS  []Non tender []Tender  []PEG []OGT/ NGT   GENITOURINARY:  []Normal [] Incontinent   [x]Oliguria/Anuria   []Wu  MUSCULOSKELETAL:   []Normal Extremities  []Weakness  [x]Bed/Wheelchair bound []Edema  NEUROLOGIC:   [x]No focal deficits  []Cognitive impairment  []Dysphagia []Dysarthria []Paresis []Encephalopathic  SKIN:   [x]Normal   []Pressure ulcer(s)  []Rash    CRITICAL CARE:  [ ]Shock Present  [ ]Septic [ ]Cardiogenic [ ]Neurologic [ ]Hypovolemic  [ ] Vasopressors [ ]Inotropes   [ ]Respiratory failure present [ ]Mechanical Ventilation [ ]Non-invasive ventilatory support [ ]High-Flow  [ ]Acute  [ ]Chronic [ ]Hypoxic  [ ]Hypercarbic   [ ]Other organ failure    Vital Signs Last 24 Hrs  T(C): 36.8 (20 Jan 2023 11:45), Max: 37.6 (19 Jan 2023 18:37)  T(F): 98.3 (20 Jan 2023 11:45), Max: 99.6 (19 Jan 2023 18:37)  HR: 77 (20 Jan 2023 11:45) (69 - 92)  BP: 112/52 (20 Jan 2023 11:45) (100/46 - 143/64)  BP(mean): 75 (20 Jan 2023 11:45) (66 - 94)  RR: 19 (20 Jan 2023 11:45) (12 - 26)  SpO2: 95% (20 Jan 2023 11:45) (91% - 100%)    Parameters below as of 20 Jan 2023 11:45  Patient On (Oxygen Delivery Method): room air         LABS:                        8.2    5.28  )-----------( 184      ( 20 Jan 2023 05:55 )             27.7   01-20    133<L>  |  93<L>  |  36<H>  ----------------------------<  85  3.9   |  28  |  6.35<H>    Ca    8.5      20 Jan 2023 05:55  Phos  4.2     01-20  Mg     1.8     01-20    TPro  5.4<L>  /  Alb  1.8<L>  /  TBili  0.3  /  DBili  x   /  AST  31  /  ALT  16  /  AlkPhos  61  01-20    RADIOLOGY & ADDITIONAL STUDIES:  none new    REFERRALS:  [x]Social Work  []Case management []PT/OT []Chaplaincy  []Hospice  []Patient/Family Support []Massage Therapy []Music Therapy []Holistic RN    DISCUSSION OF CASE: Family - to provide updates and emotional support.

## 2023-01-20 NOTE — CHART NOTE - NSCHARTNOTEFT_GEN_A_CORE
Admitting Diagnosis:   Patient is a 67y old  Female who presents with a chief complaint of emergent HD (20 Jan 2023 12:19)      PAST MEDICAL & SURGICAL HISTORY:  HTN (hypertension)      HLD (hyperlipidemia)      CAD (coronary atherosclerotic disease)      ESRD on dialysis  last 11/15/22      H/O ventral hernia      Brown tumor  brain      DVT, lower extremity  left      History of surgery  IVC filter      AVF (arteriovenous fistula)  right left      S/P AIDEN-BSO  2003      History of surgery  RLE PTA stent / atherectomy  7/2021      History of atherectomy  stent    Current Nutrition Order: Renal ; Nepro BID      PO Intake: Good (%) [   ]  Fair (50-75%) [   ] Poor (<25%) [ x ]    GI Issues: Abdomen ND/NT, +BS x4, LBM 1/19 liquid    Pain: 7/10, unspecified    Skin Integrity: Wound to R arm, +1 gen. edema     Labs:   01-20    133<L>  |  93<L>  |  36<H>  ----------------------------<  85  3.9   |  28  |  6.35<H>    Ca    8.5      20 Jan 2023 05:55  Phos  4.2     01-20  Mg     1.8     01-20    TPro  5.4<L>  /  Alb  1.8<L>  /  TBili  0.3  /  DBili  x   /  AST  31  /  ALT  16  /  AlkPhos  61  01-20    CAPILLARY BLOOD GLUCOSE    Medications:  MEDICATIONS  (STANDING):  apixaban 2.5 milliGRAM(s) Oral every 12 hours  atorvastatin 40 milliGRAM(s) Oral at bedtime  chlorhexidine 2% Cloths 1 Application(s) Topical <User Schedule>  chlorhexidine 2% Cloths 1 Application(s) Topical <User Schedule>  cinacalcet 60 milliGRAM(s) Oral daily  clopidogrel Tablet 75 milliGRAM(s) Oral daily  colchicine 0.3 milliGRAM(s) Oral daily  collagenase Ointment 1 Application(s) Topical daily  epoetin александр-epbx (RETACRIT) Injectable 8000 Unit(s) IV Push once  midodrine 30 milliGRAM(s) Oral every 8 hours  Nephro-deborah 1 Tablet(s) Oral daily  polyethylene glycol 3350 17 Gram(s) Oral every 12 hours  senna 2 Tablet(s) Oral at bedtime  sevelamer carbonate 1600 milliGRAM(s) Oral three times a day with meals    MEDICATIONS  (PRN):  acetaminophen     Tablet .. 650 milliGRAM(s) Oral every 6 hours PRN Temp greater or equal to 38C (100.4F), Moderate Pain (4 - 6)  calamine/zinc oxide Lotion 1 Application(s) Topical three times a day PRN Itching  HYDROmorphone  Injectable 0.5 milliGRAM(s) IV Push every 4 hours PRN Severe Pain (7 - 10)  HYDROmorphone  Injectable 0.25 milliGRAM(s) IV Push every 4 hours PRN Moderate Pain (4 - 6)  sodium chloride 0.9% lock flush 10 milliLiter(s) IV Push every 1 hour PRN Pre/post blood products, medications, blood draw, and to maintain line patency    Height for BMI (CENTIMETERS)	177.8 Centimeter(s)  Weight for BMI (lbs)	188.1 lb  Weight for BMI (kg)	85.3 kg  Body Mass Index	26.9      Weight Change:   1/2 - 82.6 kg  1/3 - 84 kg  1/7 - 99.4 kg  1/10 - 106 kg   1/13 - 110 kg  1/16 - 115.3  1/20 - 116.2  -Trend sequential wts. Pt with noted worsening edema     Estimated energy needs:   Weight used for calculations	IBW  Estimated Energy Needs Weight (lbs)	150.3 lb  Estimated Energy Needs Weight (kg)	68.2 kg  Estimated Energy Needs From (anatoliy/kg)	30  Estimated Energy Needs To (anatoliy/kg)	35  Estimated Energy Needs Calculated From (anatoliy/kg)	2046  Estimated Energy Needs Calculated To (anatoliy/kg)	2387  Weight used for calculations	IBW  Estimated Protein Needs Weight (lbs)	150.3 lb  Estimated Protein Needs Weight (kg)	68.2 kg  Estimated Protein Needs From (g/kg)	1.2  Estimated Protein Needs To (g/kg)	1.3  Estimated Protein Needs Calculated From (g/kg)	81.84  Estimated Protein Needs Calculated To (g/kg)	88.66  Estimated Fluid Needs Weight (lbs)	150.3 lb  Estimated Fluid Needs Weight (kg)	68.2 kg  Other Calculations	Fluids per team. 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 Complaining of crampy abdominal pain and waterry diarrhea (2-3 BMs/day), now with nausea and vomiting prior to arrival. Has not gone to HD since 12/24 (8 days ago). Admitted for emergent HD. 1/19: Stepped up to 7EA.     Pt care discussed in IDT rounds. Rx and labs reviewed. Pt stepped down to 7LA but stepped up 1/19 iso hypotension. Pt reports a poor appetite today and continued distaste for meals provided by facility; encouraged good intake of ONS if unable to tolerate meals. C/o dislike for lack of salt in meals; pt hyponatremic today with Na 131 -see recs below. Pt with continued diarrhea with ONS intake; recommend Banatrol TID. Plan for HD today and, if tolerated, plan to step down. No new c/o NVCD or difficult chew/swallow. NKA to food. RDN will continue to reassess, intervene, and monitor as appropriate.     Previous Nutrition Diagnosis: Increased Nutrient Needs... kcal/protein r/t HD AEB known increased nutrient demands for HD.     Active [ x ]  Resolved [   ]    If resolved, new PES:     Goal: Pt will meet 75% or more of protein/energy needs via most appropriate route for nutrition.    Recommendations:  -Adjust diet order to regular with no concentrated Phos and no concentrated K  -Continue Nepro BID  -Add Banatrol TID  -Encourage good PO intake and ONS compliance   -Honor food preferences as able; continue to update desired foods list  -Monitor chemistry, GI fxn, and skin integrity     **Communicated with MD, order pended**    Risk Level: High [ x ] Moderate [   ] Low [   ].

## 2023-01-20 NOTE — PROGRESS NOTE ADULT - SUBJECTIVE AND OBJECTIVE BOX
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  Pt s/p vancomycin x2weeks (last day 1/15) for presumed breast cellulitis. Course c/b fevers on 1/17, BCx NGTD, monitoring off abx. Multiple services following throughout hospital course. Surg had been consulted for breast masses/breast cellulitis; s/p R breast bx with prelim read vasculitis vs granuloma, pending final read which is supposed to result on 1/23. Derm had also been consulted for b/l breast skin changes, recommend wedge bx of normal and pathologic tissue; also currently being followed by wound care. Vascular had been consulted for initial concern for AV fistula infection, does not think it is infected. Endocrine following for management of hyperparathyroidism. Gyn/onc had been consulted for R ovarian cyst and elevated tumor markers, likely benign, rec outpt follow up. Pt was stepped down to telemetry on 1/18. On 1/19 AM, BP check on b/l legs demonstrated BPs 60s/30s, asymptomatic, AM Hb 6.8, 1U pRBC ordered, and pt was stepped up to MICU. In the MICU, MAPs >65 without pressor needs, and pt is medically stable for stepdown to telemetry.     Subjective/ROS: Patient seen and examined at bedside.     Denies Fever/Chills, HA, CP, SOB, n/v, changes in bowel/urinary habits.  12pt ROS otherwise negative.    VITALS  Vital Signs Last 24 Hrs  T(C): 37.4 (20 Jan 2023 22:00), Max: 37.4 (20 Jan 2023 22:00)  T(F): 99.4 (20 Jan 2023 22:00), Max: 99.4 (20 Jan 2023 22:00)  HR: 93 (20 Jan 2023 21:00) (64 - 97)  BP: 113/59 (20 Jan 2023 21:00) (100/46 - 160/68)  BP(mean): 89 (20 Jan 2023 19:40) (67 - 98)  RR: 16 (20 Jan 2023 21:00) (13 - 22)  SpO2: 93% (20 Jan 2023 21:00) (90% - 100%)    Parameters below as of 20 Jan 2023 21:00  Patient On (Oxygen Delivery Method): nasal cannula  O2 Flow (L/min): 2      CAPILLARY BLOOD GLUCOSE      PHYSICAL EXAM  Constitutional: NAD  HEENT: no conjunctivitis or scleral icterus, MMM  NECK: Supple, no JVD  CARDIAC: RRR, +S1/S2, no murmurs/rubs/gallops  PULM: Breathing comfortably on RA, clear to auscultation bilaterally, no wheezes/rales/rhonchi  BREAST: b/l breasts with increasing peeling epidermis to lower aspect of breasts  ABDOMEN: Soft, obese, nontender to palpation, +BS, no rebound tenderness or fluid wave, non-reducible ventral hernia  SKIN: b/l arms warm to touch; 3cm dehisced wound, nonpurulent, nonerythematous, to R medial upper arm near axilla  VASC:  no LE edema or tenderness; +L femoral line (placed 1/18)  EXT: RUE: mild improvement to edema to R proximal forearm; unchanged edema to R hand, slightly tense, non erythematous    MEDICATIONS  (STANDING):  apixaban 2.5 milliGRAM(s) Oral every 12 hours  atorvastatin 40 milliGRAM(s) Oral at bedtime  chlorhexidine 2% Cloths 1 Application(s) Topical <User Schedule>  chlorhexidine 2% Cloths 1 Application(s) Topical <User Schedule>  cinacalcet 60 milliGRAM(s) Oral daily  clopidogrel Tablet 75 milliGRAM(s) Oral daily  colchicine 0.3 milliGRAM(s) Oral daily  collagenase Ointment 1 Application(s) Topical daily  midodrine 30 milliGRAM(s) Oral every 8 hours  Nephro-deborah 1 Tablet(s) Oral daily  polyethylene glycol 3350 17 Gram(s) Oral every 12 hours  senna 2 Tablet(s) Oral at bedtime  sevelamer carbonate 1600 milliGRAM(s) Oral three times a day with meals    MEDICATIONS  (PRN):  acetaminophen     Tablet .. 650 milliGRAM(s) Oral every 6 hours PRN Temp greater or equal to 38C (100.4F), Moderate Pain (4 - 6)  calamine/zinc oxide Lotion 1 Application(s) Topical three times a day PRN Itching  HYDROmorphone  Injectable 0.5 milliGRAM(s) IV Push every 4 hours PRN Severe Pain (7 - 10)  HYDROmorphone  Injectable 0.25 milliGRAM(s) IV Push every 4 hours PRN Moderate Pain (4 - 6)  sodium chloride 0.9% lock flush 10 milliLiter(s) IV Push every 1 hour PRN Pre/post blood products, medications, blood draw, and to maintain line patency      Ancef (Rash; Urticaria)  DDAVP (Hypotension)  iodine (Hives; Pruritus)  penicillin (Swelling)  sulfa drugs (Angioedema)      LABS                        8.2    5.28  )-----------( 184      ( 20 Jan 2023 05:55 )             27.7     01-20    133<L>  |  93<L>  |  36<H>  ----------------------------<  85  3.9   |  28  |  6.35<H>    Ca    8.5      20 Jan 2023 05:55  Phos  4.2     01-20  Mg     1.8     01-20    TPro  5.4<L>  /  Alb  1.8<L>  /  TBili  0.3  /  DBili  x   /  AST  31  /  ALT  16  /  AlkPhos  61  01-20    PT/INR - ( 19 Jan 2023 07:46 )   PT: 21.1 sec;   INR: 1.76          PTT - ( 19 Jan 2023 07:46 )  PTT:44.3 sec    IMAGING/EKG/ETC

## 2023-01-20 NOTE — PROGRESS NOTE ADULT - SUBJECTIVE AND OBJECTIVE BOX
SUBJECTIVE: Patient seen and examined bedside; patient upgraded to MICU after an episode of transient hypotension; this morning she feels well, no complaints, tolerated dressing change.    apixaban 2.5 milliGRAM(s) Oral every 12 hours  clopidogrel Tablet 75 milliGRAM(s) Oral daily  midodrine 30 milliGRAM(s) Oral every 8 hours      Vital Signs Last 24 Hrs  T(C): 36.7 (20 Jan 2023 06:02), Max: 37.8 (19 Jan 2023 11:00)  T(F): 98 (20 Jan 2023 06:02), Max: 100.1 (19 Jan 2023 11:00)  HR: 69 (20 Jan 2023 07:00) (69 - 99)  BP: 100/46 (20 Jan 2023 07:00) (66/30 - 143/64)  BP(mean): 67 (20 Jan 2023 07:00) (42 - 92)  RR: 15 (20 Jan 2023 07:00) (12 - 30)  SpO2: 97% (20 Jan 2023 07:00) (91% - 100%)    Parameters below as of 20 Jan 2023 08:00  Patient On (Oxygen Delivery Method): nasal cannula  O2 Flow (L/min): 2    I&O's Detail    19 Jan 2023 07:01  -  20 Jan 2023 07:00  --------------------------------------------------------  IN:    Oral Fluid: 120 mL    PRBCs (Packed Red Blood Cells): 350 mL  Total IN: 470 mL    OUT:  Total OUT: 0 mL    Total NET: 470 mL      PE:    General: NAD, resting comfortably in bed  C/V: S1 s2, RRR  Pulm: Nonlabored breathing, no respiratory distress  Abd: Soft, NTND  Extrem: WWP, edematous right UE, wound around AVF c/d/i, clean base; arm wrapped with kerlix and ace        LABS:                        8.2    5.28  )-----------( 184      ( 20 Jan 2023 05:55 )             27.7     01-20    133<L>  |  93<L>  |  36<H>  ----------------------------<  85  3.9   |  28  |  6.35<H>    Ca    8.5      20 Jan 2023 05:55  Phos  4.2     01-20  Mg     1.8     01-20    TPro  5.4<L>  /  Alb  1.8<L>  /  TBili  0.3  /  DBili  x   /  AST  31  /  ALT  16  /  AlkPhos  61  01-20    PT/INR - ( 19 Jan 2023 07:46 )   PT: 21.1 sec;   INR: 1.76          PTT - ( 19 Jan 2023 07:46 )  PTT:44.3 sec      RADIOLOGY & ADDITIONAL STUDIES:

## 2023-01-20 NOTE — PROGRESS NOTE ADULT - SUBJECTIVE AND OBJECTIVE BOX
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      EVENTS:   - Biopsy taken uneventfully at bedside 1/9/23- preliminary pathology showed granulomas, pending final report.  - Hemodynamically stable.  - Moved back to MICU yesterday for brief hypotensive episode that resolved spontaneously. Currently not requiring vasopressors.    SUBJECTIVE:   - Notes she feels "so-so" this morning  - Denies CP, SOB  - Denies worsening right new discharge.    MEDICATIONS  (STANDING):  apixaban 2.5 milliGRAM(s) Oral every 12 hours  atorvastatin 40 milliGRAM(s) Oral at bedtime  chlorhexidine 2% Cloths 1 Application(s) Topical <User Schedule>  chlorhexidine 2% Cloths 1 Application(s) Topical <User Schedule>  cinacalcet 60 milliGRAM(s) Oral daily  clopidogrel Tablet 75 milliGRAM(s) Oral daily  colchicine 0.3 milliGRAM(s) Oral daily  collagenase Ointment 1 Application(s) Topical daily  epoetin александр-epbx (RETACRIT) Injectable 8000 Unit(s) IV Push once  midodrine 30 milliGRAM(s) Oral every 8 hours  Nephro-deborah 1 Tablet(s) Oral daily  polyethylene glycol 3350 17 Gram(s) Oral every 12 hours  senna 2 Tablet(s) Oral at bedtime  sevelamer carbonate 1600 milliGRAM(s) Oral three times a day with meals    MEDICATIONS  (PRN):  acetaminophen     Tablet .. 650 milliGRAM(s) Oral every 6 hours PRN Temp greater or equal to 38C (100.4F), Moderate Pain (4 - 6)  calamine/zinc oxide Lotion 1 Application(s) Topical three times a day PRN Itching  HYDROmorphone  Injectable 0.5 milliGRAM(s) IV Push every 4 hours PRN Severe Pain (7 - 10)  HYDROmorphone  Injectable 0.25 milliGRAM(s) IV Push every 4 hours PRN Moderate Pain (4 - 6)  sodium chloride 0.9% lock flush 10 milliLiter(s) IV Push every 1 hour PRN Pre/post blood products, medications, blood draw, and to maintain line patency      Vital Signs Last 24 Hrs  T(C): 36.7 (20 Jan 2023 06:02), Max: 37.8 (19 Jan 2023 11:00)  T(F): 98 (20 Jan 2023 06:02), Max: 100.1 (19 Jan 2023 11:00)  HR: 72 (20 Jan 2023 08:00) (69 - 99)  BP: 114/49 (20 Jan 2023 08:00) (94/44 - 143/64)  BP(mean): 71 (20 Jan 2023 08:00) (63 - 92)  RR: 15 (20 Jan 2023 08:00) (12 - 30)  SpO2: 92% (20 Jan 2023 08:00) (91% - 100%)    Parameters below as of 20 Jan 2023 08:00  Patient On (Oxygen Delivery Method): nasal cannula  O2 Flow (L/min): 2      Gen: Awake, alert, NAD, resting comfortably   Breast: non-soiled gauze overlying incisional bx site on R breast, mild induration surrounding bx site stable. B/l breasts soft with underlying firmness, mildly tender to palpation this morning.   CV: RRR  Pulm: no respiratory distress on RA  Abd: soft, ND, NTTP, no rebound or guarding   Ext: WWP, ulceration of medial aspect of R arm over access site. L chest HD catheter without edema/induration/purulence.       I&O's Detail    19 Jan 2023 07:01  -  20 Jan 2023 07:00  --------------------------------------------------------  IN:    Oral Fluid: 120 mL    PRBCs (Packed Red Blood Cells): 350 mL  Total IN: 470 mL    OUT:  Total OUT: 0 mL    Total NET: 470 mL          LABS:                        8.2    5.28  )-----------( 184      ( 20 Jan 2023 05:55 )             27.7     01-20    133<L>  |  93<L>  |  36<H>  ----------------------------<  85  3.9   |  28  |  6.35<H>    Ca    8.5      20 Jan 2023 05:55  Phos  4.2     01-20  Mg     1.8     01-20    TPro  5.4<L>  /  Alb  1.8<L>  /  TBili  0.3  /  DBili  x   /  AST  31  /  ALT  16  /  AlkPhos  61  01-20    PT/INR - ( 19 Jan 2023 07:46 )   PT: 21.1 sec;   INR: 1.76          PTT - ( 19 Jan 2023 07:46 )  PTT:44.3 sec      RADIOLOGY & ADDITIONAL STUDIES:

## 2023-01-21 NOTE — PROGRESS NOTE ADULT - SUBJECTIVE AND OBJECTIVE BOX
Patient is a 67y old  Female who presents with a chief complaint of Sepsis (21 Jan 2023 14:47)      INTERVAL HPI/OVERNIGHT EVENTS:  Patient seen and examined at bedside. No acute events overnight. This morning, pt lying in bed, in NAD. Reporting significant pain in her R breast, moderately controlled w/ current pain regimen. On exam, R breast underside with sloughing skin but no purulence or drainage. Otherwise no acute medical complaints. Denies fever, chills, HA, dizziness, CP, SOB, palpitations, abdominal pain, n/v, changes in bowel/urinary habits, numbness, or tingling. 12pt ROS otherwise negative.      FAMILY HISTORY:  No pertinent family history in first degree relatives        VITAL SIGNS:  T(C): 37.2 (01-21-23 @ 14:00), Max: 37.6 (01-21-23 @ 10:00)  HR: 82 (01-21-23 @ 15:45) (82 - 93)  BP: 89/49 (01-21-23 @ 15:45) (85/47 - 136/62)  RR: 18 (01-21-23 @ 15:45) (15 - 18)  SpO2: 91% (01-21-23 @ 15:45) (90% - 99%)  Wt(kg): --Vital Signs Last 24 Hrs  T(C): 37.2 (21 Jan 2023 14:00), Max: 37.6 (21 Jan 2023 10:00)  T(F): 98.9 (21 Jan 2023 14:00), Max: 99.7 (21 Jan 2023 10:00)  HR: 82 (21 Jan 2023 15:45) (82 - 93)  BP: 89/49 (21 Jan 2023 15:45) (85/47 - 136/62)  BP(mean): 62 (21 Jan 2023 15:45) (59 - 89)  RR: 18 (21 Jan 2023 15:45) (15 - 18)  SpO2: 91% (21 Jan 2023 15:45) (90% - 99%)    Parameters below as of 21 Jan 2023 15:45  Patient On (Oxygen Delivery Method): nasal cannula  O2 Flow (L/min): 2    Ancef (Rash; Urticaria)  DDAVP (Hypotension)  iodine (Hives; Pruritus)  penicillin (Swelling)  sulfa drugs (Angioedema)      PHYSICAL EXAM:  Constitutional: NAD  HEENT: no conjunctivitis or scleral icterus, MMM  NECK: Supple, no JVD  CARDIAC: RRR, +S1/S2, no murmurs/rubs/gallops  PULM: Breathing comfortably on RA, clear to auscultation bilaterally, no wheezes/rales/rhonchi  BREAST: b/l breasts with increasing peeling epidermis to lower aspect of breasts  ABDOMEN: Soft, obese, nontender to palpation, +BS, no rebound tenderness or fluid wave, non-reducible ventral hernia  SKIN: b/l arms warm to touch; 3cm dehisced wound, nonpurulent, nonerythematous, to R medial upper arm near axilla  VASC:  no LE edema or tenderness; +L femoral line (placed 1/18)  EXT: RUE: mild improvement to edema to R proximal forearm; unchanged edema to R hand, slightly tense, non erythematous    Consultant(s) Notes Reviewed:  [x ] YES  [ ] NO  Care Discussed with Consultants/Other Providers [ x] YES  [ ] NO    LABS:                        7.9    6.35  )-----------( 201      ( 21 Jan 2023 12:37 )             26.6     01-21    134<L>  |  96  |  28<H>  ----------------------------<  102<H>  3.9   |  29  |  5.62<H>    Ca    8.2<L>      21 Jan 2023 12:37  Phos  4.2     01-20  Mg     1.8     01-20    TPro  5.4<L>  /  Alb  1.8<L>  /  TBili  0.3  /  DBili  x   /  AST  31  /  ALT  16  /  AlkPhos  61  01-20        CAPILLARY BLOOD GLUCOSE      POCT Blood Glucose.: 79 mg/dL (21 Jan 2023 06:08)                          I & O Summary:    01-20-23 @ 07:01  -  01-21-23 @ 07:00  --------------------------------------------------------  IN: 500 mL / OUT: 1600 mL / NET: -1100 mL        Microbiology:        RADIOLOGY, EKG AND ADDITIONAL TESTS: Reviewed.      RADIOLOGY & ADDITIONAL TESTS:    Imaging Personally Reviewed:  [ ] YES  [ ] NO  acetaminophen     Tablet .. 1000 milliGRAM(s) Oral every 6 hours PRN  apixaban 2.5 milliGRAM(s) Oral every 12 hours  atorvastatin 40 milliGRAM(s) Oral at bedtime  calamine/zinc oxide Lotion 1 Application(s) Topical three times a day PRN  chlorhexidine 2% Cloths 1 Application(s) Topical <User Schedule>  chlorhexidine 2% Cloths 1 Application(s) Topical <User Schedule>  cinacalcet 60 milliGRAM(s) Oral daily  clopidogrel Tablet 75 milliGRAM(s) Oral daily  colchicine 0.3 milliGRAM(s) Oral daily  collagenase Ointment 1 Application(s) Topical daily  HYDROmorphone   Tablet 2 milliGRAM(s) Oral every 4 hours PRN  HYDROmorphone   Tablet 3 milliGRAM(s) Oral every 4 hours PRN  midodrine 30 milliGRAM(s) Oral every 8 hours  Nephro-deborah 1 Tablet(s) Oral daily  polyethylene glycol 3350 17 Gram(s) Oral every 12 hours  senna 2 Tablet(s) Oral at bedtime  sevelamer carbonate 1600 milliGRAM(s) Oral three times a day with meals  sodium chloride 0.9% lock flush 10 milliLiter(s) IV Push every 1 hour PRN      HEALTH ISSUES - PROBLEM Dx:  Pain in breast    Hypotension    ESRD on dialysis    Debility    Goals of care, counseling/discussion    Encounter for palliative care

## 2023-01-21 NOTE — PROGRESS NOTE ADULT - PROBLEM SELECTOR PLAN 8
- c/w BiPAP 18/8 at night    #Atelectasis  Noted on CT chest to have atelectasis at b/l lung bases.  - Incentive spirometry

## 2023-01-21 NOTE — PROGRESS NOTE ADULT - SUBJECTIVE AND OBJECTIVE BOX
S: Patient is complaining primarily of pain of the right breast. Relative hypotension this morning.     O:    ICU Vital Signs Last 24 Hrs  T(C): 37.2 (21 Jan 2023 14:00), Max: 37.6 (21 Jan 2023 10:00)  T(F): 98.9 (21 Jan 2023 14:00), Max: 99.7 (21 Jan 2023 10:00)  HR: 86 (21 Jan 2023 12:25) (82 - 93)  BP: 86/41 (21 Jan 2023 12:25) (85/47 - 136/62)  BP(mean): 59 (21 Jan 2023 12:25) (59 - 89)  ABP: --  ABP(mean): --  RR: 18 (21 Jan 2023 12:25) (15 - 18)  SpO2: 99% (21 Jan 2023 12:25) (90% - 99%)    O2 Parameters below as of 21 Jan 2023 12:25  Patient On (Oxygen Delivery Method): nasal cannula  O2 Flow (L/min): 2    PE:    General: NAD, resting comfortably in bed  C/V: S1 s2, RRR  Pulm: Nonlabored breathing, no respiratory distress. Left breast with several indurated areas with weeping that are particularly tender. No clear indicia of acute infection.   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 During imaging, CT showing 7.0 cm fluid collection is present near an AV fistula in the right upper arm. The differential for this collection included benign post-operative hematoma or seroma, but also abscess or vascular device infection, particularly in the setting of septic shock. Vascular consulted for RUE fistula evaluation to rule out this area as a source of sepsis. Reassuring physical exam at initial evaluation without clear indicia of infection related to the graph, small superficial surgical incision dehiscence notwithstanding. Now s/p IR image guided aspiration of perigraft fluid (negative) and gadolinium scan (negative). At this point, do not suspect a graft infection as the provoking factor leading to the patient's presentation. New onset swelling in area of RUE fistula is likely a sterile seroma with no intervention needed.    1/21: Patient complaining primarily of pain in her right breast. Right arm swelling appears stable.     Recommendations:    -Continue to follow up breast biopsy  -Wound care recs: BID dressing changes (once daily with Santyl DSD once daily with Bactroban DSD). Apply light ace wrap compression to right hand and forearm to help with soft tissue swelling and elevate RUE with pillows  - Appreciate palliative care recommendations. Agree with trialing oral pain medications for better durability of pain control. Given that the patient is already on dialysis, may be reasonable to trial NSAIDs at appropriate dosing. Agree with topical lidocaine for breast pain.   -Appreciate breast surgery recs  -Rest of care per primary team  -Vascular surgery Team 3C will continue to follow. Please call [phone] with any questions or concerns.    Discussed with Dr. Buckley.

## 2023-01-21 NOTE — PROGRESS NOTE ADULT - PROBLEM SELECTOR PLAN 2
Pt presented meeting 2/4 SIRS. Hypotensive with MAPs to 60, requiring Levophed. Septic picture of unknown source (no fever, BCx negative, no focal area of infection identified thus far). HR > 90, WBC >12. Lactate 1.9. Afebrile. Procal 12. S/p Vancomycin 1250mg x 1, Aztreonam x 1 (pt allergic to Cefazolin, PCN). . Ferritin 1209.  - off levophed since 1/16 PM  - dc'd Florine[facility] on 1/17  - c/w midodrine 30mg q8h (goal SBP >75)  - CT:  7.0 cm fluid collection is present near an AV fistula in the right upper arm. Possibly abscess. Drained by IR 1/5 with serosanguinous fluid: no organisms, few WBCs  - RUE dopplers (1/13): No significant change large complex fluid collection in proximal R arm, could represent old seroma/hematoma (However, prior ultrasound was done prior to drainage)   - Per vascular: does not think AV fistula is infected; would not remove it; also does NOT recommend drainage of seroma.   - per surg: prelim R breast bx shows vasculitis vs granuloma; unlikely to cause hypotension; f/u final bx results (supposed to result on 1/23)   - s/p Vancomycin x2 weeks (1/2-1/15) to tx presumed R breast cellulitis   - BCx: NGTD  - corticotropin stim test wnl  - gallium scan: faintly increased activity surrounding the entire lower portion of the right breast consistent with the inflammatory reaction seen on physical examination, small area of activity ~10cm to the R of midline at about the level of the lower border of the sternum, activity likely to be in chest wall   - PET/CT with increased FDG avidity within R axillary vein, which contains an occlusive thrombus, which may represent an infectious source. There is also an additional FDG avid region of the AV fistula near the arterial anastomosis proximal to the area of the fluid collection; could be a potential source of infection vs inflammatory reaction to the graft. Also a photopenic R adnexal cystic mass, suggestive of a benign etiology.

## 2023-01-21 NOTE — PROGRESS NOTE ADULT - PROBLEM SELECTOR PLAN 1
Family hx of breast cancer in mother, sister.   - large R breast mass, palpable L breast mass, R breast has peau d'orange appearance with associated erythema, warmth on R  - US bilateral breasts: No sonographic evidence of breast abscess, cyst or focal mass to correspond with the clinical finding of bilateral breast masses.  - prelim breast bx by surg: granuloma vs vasculitis; f/u final (supposed to result on 1/23)     Pt in 10/10 pain of bilateral breasts, worst on R. Pain responsive to Dilaudid but effect wears off after ~2-3 hours.   - Pain management consulted, recs appreciated  - Dc'd PCA (1/16-1/17)  - Started Dilaudid 2 mg PO q4h PRN for moderate pain, Dilaudid 3 mg PO q4h PRN for severe pain   - HOLD ALL OPIOIDS IF RR<10, O2SAT <93%, SBP <96    #Peeling Epidermis to B/l Breasts  - Derm consulted, recommend wedge bx of both normal and pathologic tissue  - f/u surg about possible wedge bx  - Wound care following, appreciate recs

## 2023-01-21 NOTE — PROGRESS NOTE ADULT - PROBLEM SELECTOR PLAN 6
Hgb has downtrended from 9.8 on admission to 6.9. Baseline from November appears to be ~13. 1/9 Hb 6.9, ordered 1U pRBC after which pt likely had febrile nonhemolytic transfusion reaction (3 hrs into transfusion, developed tachycardia to 150s, HTN to 140s/90s, rigors, Trectal 101.3.) Transfusion stopped. Tylenol given. Sxs resolved.   - Ferritin elevated: 1209  - Total iron low (15), TIBC low (79)  - s/p 1U pRBC on 1/11 with Hb 6.9 > 7.9 w/o transfusion rxn and 1U pRBC on 1/19 with Hb 6.8 > 8.1 w/o transfusion rxn    #Vasculopathy   Current DVT of R IJ, R subclavian and R axillary veins. Hx of L AVF clotting. Given hx of clots, current pruritis and gallium scan uptake in chest wall, reasonable to workup coagulopathy  - s/p heparin gtt; c/w Eliquis 2.5 BID  - OBDULIA, ANCA, complement, RF, beta2 glycoprotein, anticardiolipin nml

## 2023-01-21 NOTE — PROGRESS NOTE ADULT - PROBLEM SELECTOR PLAN 10
F: None  E: None, HD pt  N: Renal Restricted Diet  DVT: heparin @1700u/hr  Code Status: Full Code  Dispo: 7 [facility]

## 2023-01-21 NOTE — PROGRESS NOTE ADULT - PROBLEM SELECTOR PLAN 4
TTE 11/22 normal LV and systolic, EF 59%, grade II diastolic dysfuncton, dilated RV, reduced RV,   Home meds: Entresto 49/51mg  - hold Entresto i/s/o shock    #LVOT with LISETTE  likely i/s/o hyperdynamic LV function 2/2 shock and hypovolemia  - per cardiology, rec repeating TTE after shock resolves    #CAD  Hx of cardiac cath in 2018  Home meds: atorvastatin 40mg, plavix 75mg qd  - c/w home atorvastatin 40mg  - c/w home Plavix 75mg qd

## 2023-01-21 NOTE — PROGRESS NOTE ADULT - PROBLEM SELECTOR PLAN 3
Pt has been on HD for "years" 2/2 PCKD. AV Fistula of EDWIN clotted in 2014, has received HD thru HD cath since. New AV Fistula in 11/2022, not yet matured.   - f/u nephrology recs  - s/p HD 1/10, 1/13, 1/16, 1/18, 1/20    #Hyperkalemia  Patient with Hx of ESRD with missed HD presents with K >8 on VBG with abdominal symptoms andmissed HD x8 days. ICU consulted i/s/o hyperkalemia. Renal consulted.  - monitor with BMP's qd    #Hyperphosphatemia  - c/w sevelamer to 1600mg TID    #Renal Osteodystrophy  - on CT: Increased density of the bone marrow is consistent with renal osteodystrophy  - Per endocrine, extent of bone destruction extreme given PTH level, suspect might not be Brown tumors

## 2023-01-21 NOTE — PROGRESS NOTE ADULT - PROBLEM SELECTOR PLAN 7
6.1cm mass in R adnexa seen incidentally on CTAP. Pt with hx of hysterectomy, unclear if partial or total.   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 TVUS c/w likely simple cyst, PET/CT w/ no uptake in R adnexa, low concern for malignancy. Rec outpt f/u when stable for repeat tumor markers.

## 2023-01-21 NOTE — PROGRESS NOTE ADULT - PROBLEM SELECTOR PLAN 5
At risk for secondary HPTT d/t renal failure   - pt has vague abdominal pain with nausea  - multiple brown tumors on CT with multiple fractures of pubic rami, pubic bone, thoracolumbar spine  - intact , Vit D 38.5  - SPEP/immunofixation negative   - endocrinology recs: secondary hyperparathyroidism vs other process causing osteoclastic tumors  - c/w Sensipar 60mg qd per endo recs   - 1/11 AM intact  (baseline PTH on Sensipar)    #Multinodular Goiter  CT: enlarged multinodular thyroid projecting into superior mediastinum with 1.8 x 1.5 cm solid nodule slightly inferiorly in the anterior mediastinum.   - Thyroid ultrasound: Fine-needle aspiration recommended for 4.3 cm right thyroid and 2.6 cm left thyroid solid nodules per SCOTT guidelines.  - TSH 1.6 free T4 0.68.  - TSH could be inappropriately normal for low free T4 due to euthyroid sick syndrome.  - f/u TFTs after shock resolved

## 2023-01-21 NOTE — PROGRESS NOTE ADULT - ATTENDING COMMENTS
Admitted for emergent HD after missing a few sessions c/b shock state suspected to be secondary to tenuous hemodynamics with LVOT obstruction/LISETTE and significant vasculopathy and possible sepsis. Shock state improved but with persistent intermittent hypotension of still unclear etiology. Has significant and diffuse vasculopathy with a large BMI which precludes accurate BP assessment; BP should be correlated to mental status change. C/w Midodrine. High suspicion for OHS/LIZ; continue to encourage NIV at night.

## 2023-01-21 NOTE — PROGRESS NOTE ADULT - PROBLEM SELECTOR PLAN 9
Pt with T100.9F 1/17 AM. Unclear etiology. 1/3, 1/9 BCx neg. s/p vanc x2 weeks  - CXR no infiltrates, BCx NGTD   - R femoral line removed on 1/18; L femoral line placed on 1/18  - Monitor off abx for now     #RUE Edema  RUE became edematous 1/8 extending down through hand. AVF with drained seroma pocket on R arm.  - lymphedema vs bleeding into seroma pocket? vs DVT   - RUE U/S with persistent DVT to R IJ, subclavian, and axillary stent, and proximal brachial vein, similar extent to prior. No significant change large complex fluid collection in proximal R arm, could represent old seroma/hematoma  - per vascular: no plans to drain the seroma    #L toe pain  Pt c/o L toe pain, possibly gout. Now resolved  - c/w colchicine 0.3mg qd

## 2023-01-22 NOTE — PROGRESS NOTE ADULT - SUBJECTIVE AND OBJECTIVE BOX
Subjective: Patient feeling well this morning. No arm discomfort or changes to strength/sensation. Dressing changed and arm RUE re-wrapped.    ROS:   Denies Headache, blurred vision, Chest Pain, SOB, Abdominal pain, nausea or vomiting     Social   cefepime   IVPB 1000  apixaban 2.5  cefepime   IVPB 1000  clopidogrel Tablet 75  midodrine. 40      Allergies    Ancef (Rash; Urticaria)  DDAVP (Hypotension)  iodine (Hives; Pruritus)  penicillin (Swelling)  sulfa drugs (Angioedema)    Intolerances        Vital Signs Last 24 Hrs  T(C): 36.8 (22 Jan 2023 10:00), Max: 38.7 (21 Jan 2023 18:14)  T(F): 98.3 (22 Jan 2023 10:00), Max: 101.6 (21 Jan 2023 18:14)  HR: 76 (22 Jan 2023 08:32) (62 - 104)  BP: 89/51 (22 Jan 2023 08:20) (86/41 - 122/44)  BP(mean): 63 (22 Jan 2023 08:20) (59 - 74)  RR: 16 (22 Jan 2023 08:32) (16 - 18)  SpO2: 100% (22 Jan 2023 08:32) (91% - 100%)    Parameters below as of 22 Jan 2023 08:32  Patient On (Oxygen Delivery Method): nasal cannula  O2 Flow (L/min): 2    I&O's Summary        General: NAD, resting comfortably in bed  C/V: S1 s2, RRR  Pulm: Nonlabored breathing, no respiratory distress. Left breast with several indurated areas with weeping that are particularly tender. No clear indicia of acute infection.   Abd: Soft, NTND  Extrem: WWP, edematous right UE, wound around AVF c/d/i, clean base; arm wrapped with kerlix and ace       LABS:                        8.9    6.21  )-----------( 219      ( 22 Jan 2023 05:30 )             30.8     01-22    130<L>  |  92<L>  |  33<H>  ----------------------------<  72  4.2   |  25  |  6.31<H>    Ca    8.6      22 Jan 2023 05:30  Phos  3.7     01-22  Mg     1.8     01-22          Radiology and Additional Studies:

## 2023-01-22 NOTE — PROGRESS NOTE ADULT - ATTENDING COMMENTS
Admitted for emergent HD after missing a few sessions c/b shock state suspected to be secondary to tenuous hemodynamics with LVOT obstruction/LISETTE and significant vasculopathy and possible sepsis with initial resolution; febrile again overnight. Cultures pending; c/w broad spectrum abx for now. Has significant and diffuse vasculopathy with a large BMI which precludes accurate BP assessment; BP should be correlated to mental status change. C/w Midodrine. High suspicion for OHS/LIZ; continue to encourage NIV at night.

## 2023-01-22 NOTE — PROGRESS NOTE ADULT - SUBJECTIVE AND OBJECTIVE BOX
OVERNIGHT EVENTS: Tmax 101.6 rectal, pt MAPs in the 60s however mentating at baseline. BCx collected, Vanc/Zosyn started.     SUBJECTIVE:  Patient seen and examined at bedside.  ROS: Patient denies h/n/v/d, fever, chills, cp, palpitations, sob, abd pain, leg swelling, rashes, dysuria, and changes in BM.     Vital Signs Last 12 Hrs  T(F): 98.3 (01-22-23 @ 10:00), Max: 98.6 (01-22-23 @ 01:00)  HR: 76 (01-22-23 @ 08:32) (62 - 76)  BP: 89/51 (01-22-23 @ 08:20) (89/51 - 121/50)  BP(mean): 63 (01-22-23 @ 08:20) (63 - 74)  RR: 16 (01-22-23 @ 08:32) (16 - 18)  SpO2: 100% (01-22-23 @ 08:32) (98% - 100%)  I&O's Summary      PHYSICAL EXAM:  Constitutional: NAD, comfortable in bed.  HEENT: NC/AT, PERRLA, EOMI, no conjunctival pallor or scleral icterus, MMM  Neck: Supple, no JVD  Respiratory: CTA B/L. No w/r/r.   Cardiovascular: RRR, normal S1 and S2, no m/r/g.   Gastrointestinal: +BS, soft NT moderately distended, no guarding or rebound tenderness, no palpable masses   Extremities: wwp; nonpitting edema note b/l on LE.   Vascular: Pulses equal and strong throughout.   Neurological: AAOx3, no CN deficits, strength and sensation intact throughout.   Skin: No gross skin abnormalities or rashes        LABS:                        8.9    6.21  )-----------( 219      ( 22 Jan 2023 05:30 )             30.8     01-22    130<L>  |  92<L>  |  33<H>  ----------------------------<  72  4.2   |  25  |  6.31<H>    Ca    8.6      22 Jan 2023 05:30  Phos  3.7     01-22  Mg     1.8     01-22              RADIOLOGY & ADDITIONAL TESTS:    MEDICATIONS  (STANDING):  apixaban 2.5 milliGRAM(s) Oral every 12 hours  atorvastatin 40 milliGRAM(s) Oral at bedtime  cefepime   IVPB 1000 milliGRAM(s) IV Intermittent every 24 hours  chlorhexidine 2% Cloths 1 Application(s) Topical <User Schedule>  chlorhexidine 2% Cloths 1 Application(s) Topical <User Schedule>  cinacalcet 60 milliGRAM(s) Oral daily  clopidogrel Tablet 75 milliGRAM(s) Oral daily  colchicine 0.3 milliGRAM(s) Oral daily  collagenase Ointment 1 Application(s) Topical daily  midodrine 30 milliGRAM(s) Oral every 8 hours  Nephro-deborah 1 Tablet(s) Oral daily  polyethylene glycol 3350 17 Gram(s) Oral every 12 hours  senna 2 Tablet(s) Oral at bedtime  sevelamer carbonate 1600 milliGRAM(s) Oral three times a day with meals    MEDICATIONS  (PRN):  acetaminophen     Tablet .. 1000 milliGRAM(s) Oral every 6 hours PRN Mild Pain (1 - 3)  calamine/zinc oxide Lotion 1 Application(s) Topical three times a day PRN Itching  HYDROmorphone   Tablet 2 milliGRAM(s) Oral every 4 hours PRN Moderate Pain (4 - 6)  HYDROmorphone   Tablet 3 milliGRAM(s) Oral every 4 hours PRN Severe Pain (7 - 10)  sodium chloride 0.9% lock flush 10 milliLiter(s) IV Push every 1 hour PRN Pre/post blood products, medications, blood draw, and to maintain line patency   OVERNIGHT EVENTS: Tmax 101.6 rectal, pt MAPs in the 60s however mentating at baseline. BCx collected, Vanc/Cefepime started.     SUBJECTIVE:  Patient seen and examined at bedside.  ROS: Patient denies h/n/v/d, fever, chills, cp, palpitations, sob, abd pain, leg swelling, rashes, dysuria, and changes in BM.     Vital Signs Last 12 Hrs  T(F): 98.3 (01-22-23 @ 10:00), Max: 98.6 (01-22-23 @ 01:00)  HR: 76 (01-22-23 @ 08:32) (62 - 76)  BP: 89/51 (01-22-23 @ 08:20) (89/51 - 121/50)  BP(mean): 63 (01-22-23 @ 08:20) (63 - 74)  RR: 16 (01-22-23 @ 08:32) (16 - 18)  SpO2: 100% (01-22-23 @ 08:32) (98% - 100%)  I&O's Summary      PHYSICAL EXAM:  Constitutional: NAD, comfortable in bed.  HEENT: NC/AT, PERRLA, EOMI, no conjunctival pallor or scleral icterus, MMM  Neck: Supple, no JVD  Respiratory: CTA B/L. No w/r/r.   Cardiovascular: RRR, normal S1 and S2, no m/r/g.   Gastrointestinal: +BS, soft NT moderately distended, no guarding or rebound tenderness, no palpable masses   Extremities: wwp; nonpitting edema note b/l on LE.   Vascular: Pulses equal and strong throughout.   Neurological: AAOx3, no CN deficits, strength and sensation intact throughout.   Skin: No gross skin abnormalities or rashes        LABS:                        8.9    6.21  )-----------( 219      ( 22 Jan 2023 05:30 )             30.8     01-22    130<L>  |  92<L>  |  33<H>  ----------------------------<  72  4.2   |  25  |  6.31<H>    Ca    8.6      22 Jan 2023 05:30  Phos  3.7     01-22  Mg     1.8     01-22              RADIOLOGY & ADDITIONAL TESTS:    MEDICATIONS  (STANDING):  apixaban 2.5 milliGRAM(s) Oral every 12 hours  atorvastatin 40 milliGRAM(s) Oral at bedtime  cefepime   IVPB 1000 milliGRAM(s) IV Intermittent every 24 hours  chlorhexidine 2% Cloths 1 Application(s) Topical <User Schedule>  chlorhexidine 2% Cloths 1 Application(s) Topical <User Schedule>  cinacalcet 60 milliGRAM(s) Oral daily  clopidogrel Tablet 75 milliGRAM(s) Oral daily  colchicine 0.3 milliGRAM(s) Oral daily  collagenase Ointment 1 Application(s) Topical daily  midodrine 30 milliGRAM(s) Oral every 8 hours  Nephro-deborah 1 Tablet(s) Oral daily  polyethylene glycol 3350 17 Gram(s) Oral every 12 hours  senna 2 Tablet(s) Oral at bedtime  sevelamer carbonate 1600 milliGRAM(s) Oral three times a day with meals    MEDICATIONS  (PRN):  acetaminophen     Tablet .. 1000 milliGRAM(s) Oral every 6 hours PRN Mild Pain (1 - 3)  calamine/zinc oxide Lotion 1 Application(s) Topical three times a day PRN Itching  HYDROmorphone   Tablet 2 milliGRAM(s) Oral every 4 hours PRN Moderate Pain (4 - 6)  HYDROmorphone   Tablet 3 milliGRAM(s) Oral every 4 hours PRN Severe Pain (7 - 10)  sodium chloride 0.9% lock flush 10 milliLiter(s) IV Push every 1 hour PRN Pre/post blood products, medications, blood draw, and to maintain line patency

## 2023-01-22 NOTE — PROGRESS NOTE ADULT - ASSESSMENT
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 During imaging, CT showing 7.0 cm fluid collection is present near an AV fistula in the right upper arm. The differential for this collection included benign post-operative hematoma or seroma, but also abscess or vascular device infection, particularly in the setting of septic shock. Vascular consulted for RUE fistula evaluation to rule out this area as a source of sepsis. Reassuring physical exam at initial evaluation without clear indicia of infection related to the graph, small superficial surgical incision dehiscence notwithstanding. Now s/p IR image guided aspiration of perigraft fluid (negative) and gadolinium scan (negative). At this point, do not suspect a graft infection as the provoking factor leading to the patient's presentation. New onset swelling in area of RUE fistula is likely a sterile seroma with no intervention needed.    Recommendations:    -Continue to follow up BCx and IR Cx - both NGTD (given negative blood and wound drainage cultures, low likelihood of graft infection)  -Wound care recs: BID dressing changes (once daily with Santyl DSD once daily with Bactroban DSD). Apply light ace wrap compression to right hand and forearm to help with soft tissue swelling and elevate RUE with pillows  -Fluid collection near RUE fistula is likely a sterile seroma and unlikely the cause of patient's sepsis. Would not recommend percutaneous drainage or IR procedure to drain fluid.  -F/u breast punch and core biopsy  -Appreciate breast surgery recs  -Rest of care per primary team  -Vascular surgery Team 3C will continue to follow. Please call [phone] with any questions or concerns.

## 2023-01-22 NOTE — PROGRESS NOTE ADULT - PROBLEM SELECTOR PLAN 2
Pt presented meeting 2/4 SIRS. Hypotensive with MAPs to 60, requiring Levophed. Septic picture of unknown source (no fever, BCx negative, no focal area of infection identified thus far). HR > 90, WBC >12. Lactate 1.9. Afebrile. Procal 12. S/p Vancomycin 1250mg x 1, Aztreonam x 1 (pt allergic to Cefazolin, PCN). . Ferritin 1209. 1/21 o/n Tmax 101.6 rectal, pt MAPs in the 60s however mentating at baseline. BCx collected, Vanc/Zosyn started.     - c/w midodrine 30mg q8h (goal SBP >75)  - CT:  7.0 cm fluid collection is present near an AV fistula in the right upper arm. Possibly abscess. Drained by IR 1/5 with serosanguinous fluid: no organisms, few WBCs  - RUE dopplers (1/13): No significant change large complex fluid collection in proximal R arm, could represent old seroma/hematoma (However, prior ultrasound was done prior to drainage)   - Per vascular: does not think AV fistula is infected; would not remove it; also does NOT recommend drainage of seroma.   - per surg: prelim R breast bx shows vasculitis vs granuloma; unlikely to cause hypotension; f/u final bx results (supposed to result on 1/23)   - s/p Vancomycin x2 weeks (1/2-1/15) to tx presumed R breast cellulitis   - BCx: NGTD  - corticotropin stim test wnl  - gallium scan: faintly increased activity surrounding the entire lower portion of the right breast consistent with the inflammatory reaction seen on physical examination, small area of activity ~10cm to the R of midline at about the level of the lower border of the sternum, activity likely to be in chest wall   - PET/CT with increased FDG avidity within R axillary vein, which contains an occlusive thrombus, which may represent an infectious source. There is also an additional FDG avid region of the AV fistula near the arterial anastomosis proximal to the area of the fluid collection; could be a potential source of infection vs inflammatory reaction to the graft. Also a photopenic R adnexal cystic mass, suggestive of a benign etiology. Pt presented meeting 2/4 SIRS. Hypotensive with MAPs to 60, requiring Levophed. Septic picture of unknown source (no fever, BCx negative, no focal area of infection identified thus far). HR > 90, WBC >12. Lactate 1.9. Afebrile. Procal 12. S/p Vancomycin 1250mg x 1, Aztreonam x 1 (pt allergic to Cefazolin, PCN). . Ferritin 1209. 1/21 o/n Tmax 101.6 rectal, pt MAPs in the 60s however mentating at baseline. BCx collected, Vanc/Cefepime started.     - c/w midodrine 30mg q8h (goal SBP >75)  - CT:  7.0 cm fluid collection is present near an AV fistula in the right upper arm. Possibly abscess. Drained by IR 1/5 with serosanguinous fluid: no organisms, few WBCs  - RUE dopplers (1/13): No significant change large complex fluid collection in proximal R arm, could represent old seroma/hematoma (However, prior ultrasound was done prior to drainage)   - Per vascular: does not think AV fistula is infected; would not remove it; also does NOT recommend drainage of seroma.   - per surg: prelim R breast bx shows vasculitis vs granuloma; unlikely to cause hypotension; f/u final bx results (supposed to result on 1/23)   - s/p Vancomycin x2 weeks (1/2-1/15) to tx presumed R breast cellulitis   - BCx: NGTD  - corticotropin stim test wnl  - gallium scan: faintly increased activity surrounding the entire lower portion of the right breast consistent with the inflammatory reaction seen on physical examination, small area of activity ~10cm to the R of midline at about the level of the lower border of the sternum, activity likely to be in chest wall   - PET/CT with increased FDG avidity within R axillary vein, which contains an occlusive thrombus, which may represent an infectious source. There is also an additional FDG avid region of the AV fistula near the arterial anastomosis proximal to the area of the fluid collection; could be a potential source of infection vs inflammatory reaction to the graft. Also a photopenic R adnexal cystic mass, suggestive of a benign etiology.

## 2023-01-23 NOTE — PROGRESS NOTE ADULT - ASSESSMENT
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     Assessment/Plan:   #ESRD on HD TTS  HD today with goal 1L UF  K reviewed 4.7  Renal diet    #Hx of Hypertension  Has had profound hypotension during this admission requiring pressors  -Gets midodrine 1/2 hr before HD  -Ordered for colder dialysate and albumin with HD    #access   [facility] TDC - does not appear to be infected or source of infxn  RUE AVF not being used    #anemia  Hgb 8.8  -Epo w/ HD  -Transfusion goals as per primary team  -Iron profile noted    #renal bone disease   Noted to have brown tumors on most recent CT, in addition to a lytic lesion of her skull. Likely due to hyperparathyroidism. PTH noted to be 479 on most recent check.   Ca ~9.8 corrected for albumin  Phos 4.2  -c/w sevelamer to 1600mg tid w/ meals  -c/w sensipar 60mg Qday

## 2023-01-23 NOTE — PROGRESS NOTE ADULT - PROBLEM SELECTOR PLAN 9
Pt with T100.9F 1/17 AM. Unclear etiology. 1/3, 1/9 BCx neg. s/p vanc x2 weeks  - CXR no infiltrates, BCx NGTD   - R femoral line removed on 1/18; L femoral line placed on 1/18  - Monitor off abx for now     #RUE Edema  RUE became edematous 1/8 extending down through hand. AVF with drained seroma pocket on R arm.  - lymphedema vs bleeding into seroma pocket? vs DVT   - RUE U/S with persistent DVT to R IJ, subclavian, and axillary stent, and proximal brachial vein, similar extent to prior. No significant change large complex fluid collection in proximal R arm, could represent old seroma/hematoma  - per vascular: no plans to drain the seroma    #L toe pain  Pt c/o L toe pain, possibly gout. Now resolved  - c/w colchicine 0.3mg qd - c/w BiPAP 18/8 at night    #Atelectasis  Noted on CT chest (1/3/23) to have atelectasis at b/l lung bases.  - Incentive spirometry    #L toe pain  Pt c/o L toe pain, possibly gout. Now resolved  - c/w colchicine 0.3mg qd 6.1cm mass in R adnexa seen incidentally on CT A/P (1/3/23). Pt with hx of hysterectomy, unclear if partial or total.   - Gyn consulted, suspect ovarian mass vs cyst unlikely to be related to systemic disease as it was seen on imaging in 1/2022  -  172 (high), CA-125 196 (high), CEA 6.1 (high)  - TVUS (1/11/23) with 6.2cm simple cystic lesion to R ovary, possibly serous cystadenoma  - per Gyn/Onc: elevated tumor markers not informative given pt is acutely ill. TVUS c/w likely simple cyst, PET/CT w/ no uptake in R adnexa, low concern for malignancy. Recommend outpatient follow-up when stable for repeat tumor markers.

## 2023-01-23 NOTE — PROGRESS NOTE ADULT - PROBLEM SELECTOR PLAN 5
At risk for secondary HPTT d/t renal failure   - pt has vague abdominal pain with nausea  - multiple brown tumors on CT with multiple fractures of pubic rami, pubic bone, thoracolumbar spine  - intact , Vit D 38.5  - SPEP/immunofixation negative   - endocrinology recs: secondary hyperparathyroidism vs other process causing osteoclastic tumors  - c/w Sensipar 60mg qd per endo recs   - 1/11 AM intact  (baseline PTH on Sensipar)    #Multinodular Goiter  CT: enlarged multinodular thyroid projecting into superior mediastinum with 1.8 x 1.5 cm solid nodule slightly inferiorly in the anterior mediastinum.   - Thyroid ultrasound: Fine-needle aspiration recommended for 4.3 cm right thyroid and 2.6 cm left thyroid solid nodules per SCOTT guidelines.  - TSH 1.6 free T4 0.68.  - TSH could be inappropriately normal for low free T4 due to euthyroid sick syndrome.  - f/u TFTs after shock resolved Pt has been on HD for "years" 2/2 PCKD. AV Fistula of EDWIN clotted in 2014, has received HD thru HD cath since. New AV fistula placed 11/2022, not yet matured.   - f/u nephrology recs  - s/p HD 1/10, 1/13, 1/16, 1/18, 1/20, 1/23     #Renal osteodystrophy   CT head (1/2/23): Increased density of the bone marrow is consistent with renal osteodystrophy. Per endocrine, extent of bone destruction extreme given PTH level, suspect might not be Brown tumors  - c/w sevelamer 1600 mg PO TID  - c/w Sensipar 60 mg PO daily TTE 11/22 normal LV and systolic function, EF 59%, grade II diastolic dysfunction, dilated RV, reduced RV systolic function.  Home meds: Entresto 49/51mg  - hold Entresto i/s/o shock state     #LVOT with LISETTE  likely i/s/o hyperdynamic LV function 2/2 shock and hypovolemia  - per cardiology, rec repeating TTE after shock resolves    #CAD  Hx of cardiac cath in 2018  Home meds: atorvastatin 40mg, plavix 75mg qd  - c/w home atorvastatin 40mg  - c/w home Plavix 75mg qd

## 2023-01-23 NOTE — PROGRESS NOTE ADULT - PROBLEM SELECTOR PLAN 4
TTE 11/22 normal LV and systolic, EF 59%, grade II diastolic dysfuncton, dilated RV, reduced RV,   Home meds: Entresto 49/51mg  - hold Entresto i/s/o shock    #LVOT with LISETTE  likely i/s/o hyperdynamic LV function 2/2 shock and hypovolemia  - per cardiology, rec repeating TTE after shock resolves    #CAD  Hx of cardiac cath in 2018  Home meds: atorvastatin 40mg, plavix 75mg qd  - c/w home atorvastatin 40mg  - c/w home Plavix 75mg qd Pt has been on HD for "years" 2/2 PCKD. AV Fistula of EDWIN clotted in 2014, has received HD thru HD cath since. New AV fistula placed 11/2022, not yet matured.   - f/u nephrology recs  - s/p HD 1/10, 1/13, 1/16, 1/18, 1/20, 1/23     #Renal osteodystrophy   CT head (1/2/23): Increased density of the bone marrow is consistent with renal osteodystrophy. Per endocrine, extent of bone destruction extreme given PTH level, suspect might not be Brown tumors  - c/w sevelamer 1600 mg PO TID  - c/w Sensipar 60 mg PO daily TTE 11/22 normal LV and systolic function, EF 59%, grade II diastolic dysfunction, dilated RV, reduced RV systolic function.  Home meds: Entresto 49/51mg  - hold Entresto i/s/o shock state     #LVOT with LISETTE  likely i/s/o hyperdynamic LV function 2/2 shock and hypovolemia  - per cardiology, rec repeating TTE after shock resolves    #CAD  Hx of cardiac cath in 2018  Home meds: atorvastatin 40mg, plavix 75mg qd  - c/w home atorvastatin 40mg  - c/w home Plavix 75mg qd Current DVT of RIJ, R subclavian and R axillary veins. Hx of L AVF clotting. Given hx of clots, current pruritis and gallium scan uptake in chest wall, reasonable to workup coagulopathy  - s/p heparin gtt; c/w Eliquis 2.5 mg PO BID  - OBDULIA, ANCA, complement, RF, beta2 glycoprotein, anticardiolipin wnl

## 2023-01-23 NOTE — PROGRESS NOTE ADULT - SUBJECTIVE AND OBJECTIVE BOX
--------------------------------------------------------------------------------  Chief Complaint: ESRD/Ongoing hemodialysis requirement    24 hour events/subjective:    Seen this morning, stepped down yesterday. Will do HD today, patient would like to have some more volume removed today with tx    PAST HISTORY  --------------------------------------------------------------------------------  No significant changes to PMH, PSH, FHx, SHx, unless otherwise noted    ALLERGIES & MEDICATIONS  --------------------------------------------------------------------------------  Allergies    Ancef (Rash; Urticaria)  DDAVP (Hypotension)  iodine (Hives; Pruritus)  penicillin (Swelling)  sulfa drugs (Angioedema)    Intolerances      Standing Inpatient Medications  albumin human 25% IVPB 50 milliLiter(s) IV Intermittent every 1 hour  apixaban 2.5 milliGRAM(s) Oral every 12 hours  atorvastatin 40 milliGRAM(s) Oral at bedtime  chlorhexidine 2% Cloths 1 Application(s) Topical <User Schedule>  chlorhexidine 2% Cloths 1 Application(s) Topical <User Schedule>  cinacalcet 60 milliGRAM(s) Oral daily  clopidogrel Tablet 75 milliGRAM(s) Oral daily  colchicine 0.3 milliGRAM(s) Oral daily  collagenase Ointment 1 Application(s) Topical daily  epoetin александр-epbx (RETACRIT) Injectable 8000 Unit(s) IV Push once  midodrine. 40 milliGRAM(s) Oral every 8 hours  Nephro-deborah 1 Tablet(s) Oral daily  polyethylene glycol 3350 17 Gram(s) Oral every 12 hours  senna 2 Tablet(s) Oral at bedtime  sevelamer carbonate 1600 milliGRAM(s) Oral three times a day with meals    PRN Inpatient Medications  acetaminophen     Tablet .. 1000 milliGRAM(s) Oral every 6 hours PRN  calamine/zinc oxide Lotion 1 Application(s) Topical three times a day PRN  HYDROmorphone   Tablet 2 milliGRAM(s) Oral every 4 hours PRN  HYDROmorphone   Tablet 3 milliGRAM(s) Oral every 4 hours PRN  sodium chloride 0.9% lock flush 10 milliLiter(s) IV Push every 1 hour PRN      REVIEW OF SYSTEMS  --------------------------------------------------------------------------------  All other systems were reviewed and are negative, except as noted.    VITALS/PHYSICAL EXAM  --------------------------------------------------------------------------------  T(C): 36.9 (23 @ 06:00), Max: 36.9 (23 @ 17:37)  HR: 68 (23 @ 09:40) (68 - 92)  BP: 90/40 (23 @ 08:25) (80/38 - 91/53)  RR: 16 (23 @ 09:40) (16 - 18)  SpO2: 100% (23 @ 09:40) (52% - 100%)  Wt(kg): --  Drug Dosing Weight  Height (cm): 177.8 (2023 20:52)  Weight (kg): 85.3 (2023 20:52)  BMI (kg/m2): 27 (2023 20:52)  BSA (m2): 2.03 (2023 20:52)      PHYSICAL EXAM:  GENERAL: NAD resting in bed   CHEST/LUNG: Clear to auscultation bilaterally; no accessory muscle use   HEART: normal S1S2, RRR  ABDOMEN: Soft, Nontender, +BS,   EXTREMITIES: No clubbing, cyanosis, or edema   ACCESS: R TDC    LABS/STUDIES  --------------------------------------------------------------------------------              8.8    6.42  >-----------<  222      [23 05:30]              30.1     133  |  94  |  39  ----------------------------<  97      [23 05:30]  4.7   |  25  |  7.49        Ca     8.1     [23 05:30]      Mg     1.8     [23 05:30]      Phos  4.2     [23 05:30]    TPro  5.8  /  Alb  1.8  /  TBili  0.3  /  DBili  x   /  AST  30  /  ALT  13  /  AlkPhos  75  [23 05:30]          Iron 15, TIBC 79, %sat 19      [23 @ 06:01]  Ferritin 1209      [23 @ 06:01]  PTH -- (Ca 8.5)      [23 05:30]   351  PTH -- (Ca 7.6)      [23 @ 04:35]   532  PTH -- (Ca 8.8)      [23 @ 14:24]   479  Vitamin D (25OH) 38.5      [23 05:30]  TSH 1.610      [23 05:30]    HBsAb Nonreact      [01-02-23 @ 02:26]  HBsAg Nonreact      [23]  HCV 0.04, Nonreact      [23]      RADIOLOGY:  --------------------------------------------------------------------------------------    Hemoglobin: 8.8 g/dL (23 @ 05:30)  Phosphorus Level, Serum: 4.2 mg/dL (23 @ 05:30)  Hemoglobin: 8.9 g/dL (23 @ 05:30)  Phosphorus Level, Serum: 3.7 mg/dL (23 @ 05:30)    Albumin, Serum: 1.8 g/dL (23 @ 05:30)  Albumin, Serum: 1.8 g/dL (23 @ 05:55)    T(C): 36.9 (23 @ 06:00), Max: 36.9 (23 @ 17:37)  HR: 68 (23 @ 09:40) (68 - 92)  BP: 90/40 (23 @ 08:25) (80/38 - 91/53)  RR: 16 (23 @ 09:40) (16 - 18)  SpO2: 100% (23 @ 09:40) (52% - 100%)  cinacalcet 30 milliGRAM(s) Oral daily, 23 @ 15:49, Routine  cinacalcet 60 milliGRAM(s) Oral daily, 23 @ 11:00, 11:00  cinacalcet 30 milliGRAM(s) Oral once, 23 @ 12:12, STAT, Stop order after: 1 Doses  epoetin александр-epbx (RETACRIT) Injectable 8000 Unit(s) IV Push once, 23 @ 08:46, Routine, Stop order after: 1 Doses  epoetin александр-epbx (RETACRIT) Injectable 8000 Unit(s) IV Push once, 23 @ 07:41, Routine, Stop order after: 1 Doses  epoetin александр-epbx (RETACRIT) Injectable 8000 Unit(s) IV Push once, 01-10-23 @ 10:08, Routine, Stop order after: 1 Doses  epoetin александр-epbx (RETACRIT) Injectable 8000 Unit(s) IV Push once, 23 @ 07:40, Routine, Stop order after: 1 Doses  epoetin александр-epbx (RETACRIT) Injectable 8000 Unit(s) IV Push once, 23 @ 07:40, Routine, Stop order after: 1 Doses  epoetin алескандр-epbx (RETACRIT) Injectable 8000 Unit(s) IV Push once, 23 @ 09:08, Routine, Stop order after: 1 Doses  epoetin александр-epbx (RETACRIT) Injectable 8000 Unit(s) IV Push once, 23 @ 07:45, Routine, Stop order after: 1 Doses  epoetin александр-epbx (RETACRIT) Injectable 8000 Unit(s) IV Push once, 23 @ 06:45, Routine, Stop order after: 1 Doses  epoetin александр-epbx (RETACRIT) Injectable 8000 Unit(s) IV Push once, 23 @ 07:45, Routine, Stop order after: 1 Doses  sevelamer carbonate 1600 milliGRAM(s) Oral once, 23 @ 09:59, STAT, Stop order after: 1 Doses  sevelamer carbonate 1600 milliGRAM(s) Oral three times a day with meals, 23 @ 05:28, Routine      Hemodialysis Treatment.:     Schedule: Once, Modality: Hemodialysis, Access: Arteriovenous Fistula    Dialyzer: Optiflux [id number], Time: 180 Min    Blood Flow: 400 mL/Min , Dialysate Flow: 500 mL/Min, Dialysate Temp: 35, Tubinmm (Adult)    Target Fluid Removal: 1 Liters    Dialysate Electrolytes (mEq/L): Potassium 2, Calcium 2.5, Sodium 138, Bicarbonate 35    Additional Instructions: Midodrine 30 min before dialysis (23 @ 09:26) [Active]   --------------------------------------------------------------------------------  Chief Complaint: ESRD/Ongoing hemodialysis requirement    24 hour events/subjective:    Seen this morning, stepped down yesterday. Will do HD today, patient would like to have some more volume removed today with tx    PAST HISTORY  --------------------------------------------------------------------------------  No significant changes to PMH, PSH, FHx, SHx, unless otherwise noted    ALLERGIES & MEDICATIONS  --------------------------------------------------------------------------------  Allergies    Ancef (Rash; Urticaria)  DDAVP (Hypotension)  iodine (Hives; Pruritus)  penicillin (Swelling)  sulfa drugs (Angioedema)    Intolerances      Standing Inpatient Medications  albumin human 25% IVPB 50 milliLiter(s) IV Intermittent every 1 hour  apixaban 2.5 milliGRAM(s) Oral every 12 hours  atorvastatin 40 milliGRAM(s) Oral at bedtime  chlorhexidine 2% Cloths 1 Application(s) Topical <User Schedule>  chlorhexidine 2% Cloths 1 Application(s) Topical <User Schedule>  cinacalcet 60 milliGRAM(s) Oral daily  clopidogrel Tablet 75 milliGRAM(s) Oral daily  colchicine 0.3 milliGRAM(s) Oral daily  collagenase Ointment 1 Application(s) Topical daily  epoetin александр-epbx (RETACRIT) Injectable 8000 Unit(s) IV Push once  midodrine. 40 milliGRAM(s) Oral every 8 hours  Nephro-deborah 1 Tablet(s) Oral daily  polyethylene glycol 3350 17 Gram(s) Oral every 12 hours  senna 2 Tablet(s) Oral at bedtime  sevelamer carbonate 1600 milliGRAM(s) Oral three times a day with meals    PRN Inpatient Medications  acetaminophen     Tablet .. 1000 milliGRAM(s) Oral every 6 hours PRN  calamine/zinc oxide Lotion 1 Application(s) Topical three times a day PRN  HYDROmorphone   Tablet 2 milliGRAM(s) Oral every 4 hours PRN  HYDROmorphone   Tablet 3 milliGRAM(s) Oral every 4 hours PRN  sodium chloride 0.9% lock flush 10 milliLiter(s) IV Push every 1 hour PRN      REVIEW OF SYSTEMS  --------------------------------------------------------------------------------  All other systems were reviewed and are negative, except as noted.    VITALS/PHYSICAL EXAM  --------------------------------------------------------------------------------  T(C): 36.9 (23 @ 06:00), Max: 36.9 (23 @ 17:37)  HR: 68 (23 @ 09:40) (68 - 92)  BP: 90/40 (23 @ 08:25) (80/38 - 91/53)  RR: 16 (23 @ 09:40) (16 - 18)  SpO2: 100% (23 @ 09:40) (52% - 100%)  Wt(kg): --  Drug Dosing Weight  Height (cm): 177.8 (2023 20:52)  Weight (kg): 85.3 (2023 20:52)  BMI (kg/m2): 27 (2023 20:52)  BSA (m2): 2.03 (2023 20:52)      PHYSICAL EXAM:  GENERAL: NAD resting in bed   CHEST/LUNG: Clear to auscultation bilaterally; no accessory muscle use   HEART: normal S1S2, RRR  ABDOMEN: Soft, Nontender, +BS,   EXTREMITIES: No clubbing, cyanosis, or edema   ACCESS: R TDC    LABS/STUDIES  --------------------------------------------------------------------------------              8.8    6.42  >-----------<  222      [23 05:30]              30.1     133  |  94  |  39  ----------------------------<  97      [23 05:30]  4.7   |  25  |  7.49        Ca     8.1     [23 05:30]      Mg     1.8     [23 05:30]      Phos  4.2     [23 05:30]    TPro  5.8  /  Alb  1.8  /  TBili  0.3  /  DBili  x   /  AST  30  /  ALT  13  /  AlkPhos  75  [23 05:30]          Iron 15, TIBC 79, %sat 19      [23 @ 06:01]  Ferritin 1209      [23 @ 06:01]  PTH -- (Ca 8.5)      [23 05:30]   351  PTH -- (Ca 7.6)      [23 @ 04:35]   532  PTH -- (Ca 8.8)      [23 @ 14:24]   479  Vitamin D (25OH) 38.5      [23 05:30]  TSH 1.610      [23 05:30]    HBsAb Nonreact      [01-02-23 @ 02:26]  HBsAg Nonreact      [23]  HCV 0.04, Nonreact      [23]      RADIOLOGY:  --------------------------------------------------------------------------------------    Hemoglobin: 8.8 g/dL (23 @ 05:30)  Phosphorus Level, Serum: 4.2 mg/dL (23 @ 05:30)  Hemoglobin: 8.9 g/dL (23 @ 05:30)  Phosphorus Level, Serum: 3.7 mg/dL (23 @ 05:30)    Albumin, Serum: 1.8 g/dL (23 @ 05:30)  Albumin, Serum: 1.8 g/dL (23 @ 05:55)    T(C): 36.9 (23 @ 06:00), Max: 36.9 (23 @ 17:37)  HR: 68 (23 @ 09:40) (68 - 92)  BP: 90/40 (23 @ 08:25) (80/38 - 91/53)  RR: 16 (23 @ 09:40) (16 - 18)  SpO2: 100% (23 @ 09:40) (52% - 100%)  cinacalcet 30 milliGRAM(s) Oral daily, 23 @ 15:49, Routine  cinacalcet 60 milliGRAM(s) Oral daily, 23 @ 11:00, 11:00  cinacalcet 30 milliGRAM(s) Oral once, 23 @ 12:12, STAT, Stop order after: 1 Doses  epoetin александр-epbx (RETACRIT) Injectable 8000 Unit(s) IV Push once, 23 @ 08:46, Routine, Stop order after: 1 Doses  epoetin александр-epbx (RETACRIT) Injectable 8000 Unit(s) IV Push once, 23 @ 07:41, Routine, Stop order after: 1 Doses  epoetin александр-epbx (RETACRIT) Injectable 8000 Unit(s) IV Push once, 01-10-23 @ 10:08, Routine, Stop order after: 1 Doses  epoetin александр-epbx (RETACRIT) Injectable 8000 Unit(s) IV Push once, 23 @ 07:40, Routine, Stop order after: 1 Doses  epoetin александр-epbx (RETACRIT) Injectable 8000 Unit(s) IV Push once, 23 @ 07:40, Routine, Stop order after: 1 Doses  epoetin александр-epbx (RETACRIT) Injectable 8000 Unit(s) IV Push once, 23 @ 09:08, Routine, Stop order after: 1 Doses  epoetin александр-epbx (RETACRIT) Injectable 8000 Unit(s) IV Push once, 23 @ 07:45, Routine, Stop order after: 1 Doses  epoetin александр-epbx (RETACRIT) Injectable 8000 Unit(s) IV Push once, 23 @ 06:45, Routine, Stop order after: 1 Doses  epoetin александр-epbx (RETACRIT) Injectable 8000 Unit(s) IV Push once, 23 @ 07:45, Routine, Stop order after: 1 Doses  sevelamer carbonate 1600 milliGRAM(s) Oral once, 23 @ 09:59, STAT, Stop order after: 1 Doses  sevelamer carbonate 1600 milliGRAM(s) Oral three times a day with meals, 23 @ 05:28, Routine      Hemodialysis Treatment.:     Schedule: Once, Modality: Hemodialysis, Access: Arteriovenous Fistula    Dialyzer: Optiflux [id number], Time: 180 Min    Blood Flow: 400 mL/Min , Dialysate Flow: 500 mL/Min, Dialysate Temp: 35, Tubinmm (Adult)    Target Fluid Removal: 1 Liters    Dialysate Electrolytes (mEq/L): Potassium 2, Calcium 2.5, Sodium 138, Bicarbonate 35    Additional Instructions: Midodrine 30 min before dialysis (23 @ 09:26) [Active]    Seen on HD, c/w 1L UF, will adjust to see if patient can tolerate more as tx continues

## 2023-01-23 NOTE — PROGRESS NOTE ADULT - PROBLEM SELECTOR PLAN 6
Hgb has downtrended from 9.8 on admission to 6.9. Baseline from November appears to be ~13. 1/9 Hb 6.9, ordered 1U pRBC after which pt likely had febrile nonhemolytic transfusion reaction (3 hrs into transfusion, developed tachycardia to 150s, HTN to 140s/90s, rigors, Trectal 101.3.) Transfusion stopped. Tylenol given. Sxs resolved.   - Ferritin elevated: 1209  - Total iron low (15), TIBC low (79)  - s/p 1U pRBC on 1/11 with Hb 6.9 > 7.9 w/o transfusion rxn and 1U pRBC on 1/19 with Hb 6.8 > 8.1 w/o transfusion rxn    #Vasculopathy   Current DVT of R IJ, R subclavian and R axillary veins. Hx of L AVF clotting. Given hx of clots, current pruritis and gallium scan uptake in chest wall, reasonable to workup coagulopathy  - s/p heparin gtt; c/w Eliquis 2.5 BID  - OBDULIA, ANCA, complement, RF, beta2 glycoprotein, anticardiolipin nml At risk for secondary HPTT d/t renal failure. Pt has vague abdominal pain w/ nausea. Concern for possible brown tumors on CT with multiple fractures of pubic rami, pubic bone, thoracolumbar spine. Intact , Vit D 38.5. SPEP/immunofixation negative. Per Endo, likely secondary hyperparathyroidism vs other process causing osteoclastic tumors.  - c/w Sensipar 60 mg PO daily   - 1/11 AM  intact (baseline PTH on Sensipar)    #Multinodular Goiter  CT (1/3/23): Enlarged multinodular thyroid projecting into superior mediastinum w/ 1.8 x 1.5 cm solid nodule slightly inferiorly in the anterior mediastinum.   Thyroid US (1/4/23): Fine-needle aspiration recommended for 4.3 cm right thyroid and 2.6 cm left thyroid solid nodules per SCOTT guidelines.  - TSH 1.6 free T4 0.68. TSH could be inappropriately normal for low free T4 due to euthyroid sick syndrome.  - f/u TFTs after shock resolved  - Pt will eventually need FNA of thyroid nodules Pt has been on HD for "years" 2/2 PCKD. AV Fistula of EDWIN clotted in 2014, has received HD thru HD cath since. New AV fistula placed 11/2022, not yet matured.   - f/u nephrology recs  - s/p HD 1/10, 1/13, 1/16, 1/18, 1/20, 1/23     #Renal osteodystrophy   CT head (1/2/23): Increased density of the bone marrow is consistent with renal osteodystrophy. Per endocrine, extent of bone destruction extreme given PTH level, suspect might not be Brown tumors  - c/w sevelamer 1600 mg PO TID  - c/w Sensipar 60 mg PO daily

## 2023-01-23 NOTE — PROGRESS NOTE ADULT - PROBLEM SELECTOR PLAN 7
6.1cm mass in R adnexa seen incidentally on CTAP. Pt with hx of hysterectomy, unclear if partial or total.   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 TVUS c/w likely simple cyst, PET/CT w/ no uptake in R adnexa, low concern for malignancy. Rec outpt f/u when stable for repeat tumor markers. Hgb has downtrended from 9.8 on admission to 6.9 on 1/9/23 requiring transfusion. Baseline from Nov 2022 appears to be around 13. During 1U pRBC tranfusion on 1/9/23, pt had concern for febrile nonhemolytic transfusion reaction (3 hrs into transfusion, developed tachycardia to 150s, HTN to 140s/90s, rigors, T 101.3 rectal). Symptoms resolved after stopping transfusion and giving tylenol. Pt received additional 1U pRBC transfusions on 1/11 (Hgb 6.9 -> 7.9) and 1/19 (Hgb 6.8 -> 8.1) without transfusion reaction.  - Iron studies: Low total iron (15), low TIBC (79), elevated ferritin (1209)  - Monitor CBC   - Transfuse for Hgb < 7    #Vasculopathy   Current DVT of RIJ, R subclavian and R axillary veins. Hx of L AVF clotting. Given hx of clots, current pruritis and gallium scan uptake in chest wall, reasonable to workup coagulopathy  - s/p heparin gtt; c/w Eliquis 2.5 mg PO BID  - OBDULIA, ANCA, complement, RF, beta2 glycoprotein, anticardiolipin wnl At risk for secondary HPTT d/t renal failure. Pt has vague abdominal pain w/ nausea. Concern for possible brown tumors on CT with multiple fractures of pubic rami, pubic bone, thoracolumbar spine. Intact , Vit D 38.5. SPEP/immunofixation negative. Per Endo, likely secondary hyperparathyroidism vs other process causing osteoclastic tumors.  - c/w Sensipar 60 mg PO daily   - 1/11 AM  intact (baseline PTH on Sensipar)    #Multinodular Goiter  CT (1/3/23): Enlarged multinodular thyroid projecting into superior mediastinum w/ 1.8 x 1.5 cm solid nodule slightly inferiorly in the anterior mediastinum.   Thyroid US (1/4/23): Fine-needle aspiration recommended for 4.3 cm right thyroid and 2.6 cm left thyroid solid nodules per SCOTT guidelines.  - TSH 1.6 free T4 0.68. TSH could be inappropriately normal for low free T4 due to euthyroid sick syndrome.  - f/u TFTs after shock resolved  - Pt will eventually need FNA of thyroid nodules

## 2023-01-23 NOTE — PROGRESS NOTE ADULT - ASSESSMENT
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

## 2023-01-23 NOTE — PROGRESS NOTE ADULT - PROBLEM SELECTOR PLAN 8
- c/w BiPAP 18/8 at night    #Atelectasis  Noted on CT chest to have atelectasis at b/l lung bases.  - Incentive spirometry 6.1cm mass in R adnexa seen incidentally on CT A/P (1/3/23). Pt with hx of hysterectomy, unclear if partial or total.   - Gyn consulted, suspect ovarian mass vs cyst unlikely to be related to systemic disease as it was seen on imaging in 1/2022  -  172 (high), CA-125 196 (high), CEA 6.1 (high)  - TVUS (1/11/23) with 6.2cm simple cystic lesion to R ovary, possibly serous cystadenoma  - per Gyn/Onc: elevated tumor markers not informative given pt is acutely ill. TVUS c/w likely simple cyst, PET/CT w/ no uptake in R adnexa, low concern for malignancy. Recommend outpatient follow-up when stable for repeat tumor markers. Hgb has downtrended from 9.8 on admission to 6.9 on 1/9/23 requiring transfusion. Baseline from Nov 2022 appears to be around 13. During 1U pRBC tranfusion on 1/9/23, pt had concern for febrile nonhemolytic transfusion reaction (3 hrs into transfusion, developed tachycardia to 150s, HTN to 140s/90s, rigors, T 101.3 rectal). Symptoms resolved after stopping transfusion and giving tylenol. Pt received additional 1U pRBC transfusions on 1/11 (Hgb 6.9 -> 7.9) and 1/19 (Hgb 6.8 -> 8.1) without transfusion reaction.  - Iron studies: Low total iron (15), low TIBC (79), elevated ferritin (1209)  - Monitor CBC   - Transfuse for Hgb < 7

## 2023-01-23 NOTE — PROGRESS NOTE ADULT - PROBLEM SELECTOR PLAN 6
Consulted for GOC. Pain management recommendations c/o chronic pain team.     Julisa Calvillo MD, PGY 5  Palliative Care Fellow

## 2023-01-23 NOTE — PROGRESS NOTE ADULT - CRITICAL CARE ATTENDING COMMENT
Patient seen and examined;  Continue Vancomycin  HD as per renal0 Patient seen and examined;  Continue Vancomycin  HD as per renal

## 2023-01-23 NOTE — PROGRESS NOTE ADULT - PROBLEM SELECTOR PROBLEM 5
HPTH (hyperparathyroidism) ESRD on dialysis Chronic HFrEF (heart failure with reduced ejection fraction)

## 2023-01-23 NOTE — PROGRESS NOTE ADULT - PROBLEM SELECTOR PLAN 1
Patient presented with bilateral breast pain with large R breast mass and palpable L breast mass. Surgery was consulted and recommend nonurgent outpatient mammogram when clinically stable. 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 Pain not adequately controlled with PO dilaudid. Granulomatous mastitis can be exquisitely tender and painful. Daughter shares patient always in 8-9/10 pain. Explains that patient does not like to ask for the medication. Was happy with PCA prior.    - Continue PO pain medication of 2 mg oral diluadid q4h PRN for moderate pain and 3 mg oral dilaudid q4h PRN for severe pain for now. Please consult chronic pain team for further pain recommendations.    Hold for hypotension, sedation and RR <10  - Would do a trial of topical lidocaine 4% cream TID PRN to b/l breasts.

## 2023-01-23 NOTE — PROGRESS NOTE ADULT - PROBLEM SELECTOR PLAN 10
F: None  E: None, HD pt  N: Renal Restricted Diet  DVT: heparin @1700u/hr  Code Status: Full Code  Dispo: [facility] F: None  E: None, HD pt  N: Renal Restricted Diet  DVT ppx: Eliquis 2.5 mg PO BID   GI ppx: None  Code Status: Full Code  Dispo: [facility] - c/w BiPAP 18/8 at night    #Atelectasis  Noted on CT chest (1/3/23) to have atelectasis at b/l lung bases.  - Incentive spirometry    #L toe pain  Pt c/o L toe pain, possibly gout. Now resolved  - c/w colchicine 0.3mg qd - c/w BiPAP 18/8 at night    #Atelectasis  Noted on CT chest (1/3/23) to have atelectasis at b/l lung bases.  - Incentive spirometry

## 2023-01-23 NOTE — PROGRESS NOTE ADULT - ASSESSMENT
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 During imaging, CT showing 7.0 cm fluid collection is present near an AV fistula in the right upper arm. The differential for this collection included benign post-operative hematoma or seroma, but also abscess or vascular device infection, particularly in the setting of septic shock. Vascular consulted for RUE fistula evaluation to rule out this area as a source of sepsis. Reassuring physical exam at initial evaluation without clear indicia of infection related to the graph, small superficial surgical incision dehiscence notwithstanding. Now s/p IR image guided aspiration of perigraft fluid (negative) and gadolinium scan (negative). At this point, do not suspect a graft infection as the provoking factor leading to the patient's presentation. Swelling in area of RUE fistula is likely a sterile seroma with no intervention needed.    Recommendations:    -Continue to follow up BCx and IR Cx - both NGTD (given negative blood and wound drainage cultures, low likelihood of graft infection)  -Wound care recs: BID dressing changes (once daily with Santyl DSD once daily with Bactroban DSD). Apply light ace wrap compression to right hand and forearm to help with soft tissue swelling and elevate RUE with pillows  -Fluid collection near RUE fistula is likely a sterile seroma and unlikely the cause of patient's sepsis. Would not recommend percutaneous drainage or IR procedure to drain fluid.  -F/u breast punch and core biopsy: preliminary result shows vasculitis vs granulomatous mastitis.  -Appreciate breast surgery recs  -Rest of care per primary team  -Vascular surgery Team 3C will continue to follow. Please call [phone] with any questions or concerns.

## 2023-01-23 NOTE — PROGRESS NOTE ADULT - ASSESSMENT
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 Palliative care with triggered consult for GOC discussion. GOC established- full code, continuing all treatment. Chronic pain primarily coming from bilateral breasts, consistent with granulomatous lobular mastitis. Pathology from breast biopsy to be finalized. Rheumatology to be consulted. Chronic pain team appropriate for further pain management.

## 2023-01-23 NOTE — PROGRESS NOTE ADULT - PROBLEM SELECTOR PLAN 11
F: None  E: None, HD pt  N: Renal Restricted Diet  DVT ppx: Eliquis 2.5 mg PO BID   GI ppx: None  Code Status: Full Code  Dispo: [facility]

## 2023-01-23 NOTE — PROGRESS NOTE ADULT - SUBJECTIVE AND OBJECTIVE BOX
Subjective: ON: patient sustained hypotension with MAP's as low as 42, mentating at baseline throughout. Pt seen and examined by vascular team this AM. Pt sitting up in bed brushing her teeth does not complain of any pain to RUE. Allevyn pad over pts RUE wound this AM.     ROS:   Denies Headache, blurred vision, Chest Pain, SOB, Abdominal pain, nausea or vomiting     Social   cefepime   IVPB 1000  apixaban 2.5  cefepime   IVPB 1000  clopidogrel Tablet 75  midodrine. 40      Allergies    Ancef (Rash; Urticaria)  DDAVP (Hypotension)  iodine (Hives; Pruritus)  penicillin (Swelling)  sulfa drugs (Angioedema)    Intolerances        Vital Signs Last 24 Hrs  T(C): 36.9 (23 Jan 2023 06:00), Max: 36.9 (22 Jan 2023 17:37)  T(F): 98.4 (23 Jan 2023 06:00), Max: 98.4 (22 Jan 2023 17:37)  HR: 74 (23 Jan 2023 06:43) (72 - 92)  BP: 91/53 (23 Jan 2023 06:43) (80/38 - 91/53)  BP(mean): 66 (23 Jan 2023 06:43) (51 - 66)  RR: 17 (23 Jan 2023 06:43) (16 - 18)  SpO2: 100% (23 Jan 2023 06:43) (52% - 100%)    Parameters below as of 23 Jan 2023 06:43  Patient On (Oxygen Delivery Method): nasal cannula  O2 Flow (L/min): 2    I&O's Summary      Physical Exam:  NAD, sitting up comfortably in bed  C/V: S1 S2, RRR  Pulm: Nonlabored breathing, no respiratory distress. Left breast with several indurated areas that are particularly tender. No clear indicia of acute infection.   Abd: Soft, NTND  Extrem: WWP, edematous right UE, wound around AVF c/d/i, clean base; arm wrapped with kerlix and ace this AM        LABS:                        8.8    6.42  )-----------( 222      ( 23 Jan 2023 05:30 )             30.1     01-23    133<L>  |  94<L>  |  39<H>  ----------------------------<  97  4.7   |  25  |  7.49<H>    Ca    8.1<L>      23 Jan 2023 05:30  Phos  4.2     01-23  Mg     1.8     01-23    TPro  5.8<L>  /  Alb  1.8<L>  /  TBili  0.3  /  DBili  x   /  AST  30  /  ALT  13  /  AlkPhos  75  01-23

## 2023-01-23 NOTE — PROGRESS NOTE ADULT - SUBJECTIVE AND OBJECTIVE BOX
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      EVENTS:   - Biopsy taken uneventfully at bedside 1/9/23- preliminary pathology showed granulomas, pending final report.  - Stepped down over weekend.   - Afebrile x 36 hrs but was restarted on ABx over weekend for fevers (now on CPM -- also received Vanco)    SUBJECTIVE:   - Notes she feels sleepy this morning  - Denies CP, SOB    MEDICATIONS  (STANDING):  apixaban 2.5 milliGRAM(s) Oral every 12 hours  atorvastatin 40 milliGRAM(s) Oral at bedtime  cefepime   IVPB 1000 milliGRAM(s) IV Intermittent every 24 hours  chlorhexidine 2% Cloths 1 Application(s) Topical <User Schedule>  chlorhexidine 2% Cloths 1 Application(s) Topical <User Schedule>  cinacalcet 60 milliGRAM(s) Oral daily  clopidogrel Tablet 75 milliGRAM(s) Oral daily  colchicine 0.3 milliGRAM(s) Oral daily  collagenase Ointment 1 Application(s) Topical daily  diphenhydrAMINE Injectable 50 milliGRAM(s) IV Push once  midodrine. 40 milliGRAM(s) Oral every 8 hours  Nephro-deborah 1 Tablet(s) Oral daily  polyethylene glycol 3350 17 Gram(s) Oral every 12 hours  senna 2 Tablet(s) Oral at bedtime  sevelamer carbonate 1600 milliGRAM(s) Oral three times a day with meals    MEDICATIONS  (PRN):  acetaminophen     Tablet .. 1000 milliGRAM(s) Oral every 6 hours PRN Mild Pain (1 - 3)  calamine/zinc oxide Lotion 1 Application(s) Topical three times a day PRN Itching  HYDROmorphone   Tablet 2 milliGRAM(s) Oral every 4 hours PRN Moderate Pain (4 - 6)  HYDROmorphone   Tablet 3 milliGRAM(s) Oral every 4 hours PRN Severe Pain (7 - 10)  sodium chloride 0.9% lock flush 10 milliLiter(s) IV Push every 1 hour PRN Pre/post blood products, medications, blood draw, and to maintain line patency      Vital Signs Last 24 Hrs  T(C): 36.9 (23 Jan 2023 06:00), Max: 36.9 (22 Jan 2023 17:37)  T(F): 98.4 (23 Jan 2023 06:00), Max: 98.4 (22 Jan 2023 17:37)  HR: 74 (23 Jan 2023 06:43) (72 - 92)  BP: 91/53 (23 Jan 2023 06:43) (80/38 - 91/53)  BP(mean): 66 (23 Jan 2023 06:43) (51 - 66)  RR: 17 (23 Jan 2023 06:43) (16 - 18)  SpO2: 100% (23 Jan 2023 06:43) (52% - 100%)    Parameters below as of 23 Jan 2023 06:43  Patient On (Oxygen Delivery Method): nasal cannula  O2 Flow (L/min): 2      Gen: Awake, alert, NAD, resting comfortably   Breast: non-soiled gauze overlying incisional bx site on R breast, mild induration surrounding bx site stable. B/l breasts soft with underlying firmness, mildly tender to palpation this morning.   CV: RRR  Pulm: no respiratory distress on RA  Abd: soft, ND, NTTP, no rebound or guarding   Ext: WWP, ulceration of medial aspect of R arm over access site. L chest HD catheter without edema/induration/purulence.     * Declined detailed examination this tonio 1/23/2023      I&O's Detail      LABS:                        8.8    6.42  )-----------( 222      ( 23 Jan 2023 05:30 )             30.1     01-22    130<L>  |  92<L>  |  33<H>  ----------------------------<  72  4.2   |  25  |  6.31<H>    Ca    8.6      22 Jan 2023 05:30  Phos  3.7     01-22  Mg     1.8     01-22

## 2023-01-23 NOTE — PROGRESS NOTE ADULT - PROBLEM SELECTOR PLAN 3
Pt has been on HD for "years" 2/2 PCKD. AV Fistula of EDWIN clotted in 2014, has received HD thru HD cath since. New AV Fistula in 11/2022, not yet matured.   - f/u nephrology recs  - s/p HD 1/10, 1/13, 1/16, 1/18, 1/20    #Hyperkalemia  Patient with Hx of ESRD with missed HD presents with K >8 on VBG with abdominal symptoms andmissed HD x8 days. ICU consulted i/s/o hyperkalemia. Renal consulted.  - monitor with BMP's qd    #Hyperphosphatemia  - c/w sevelamer to 1600mg TID    #Renal Osteodystrophy  - on CT: Increased density of the bone marrow is consistent with renal osteodystrophy  - Per endocrine, extent of bone destruction extreme given PTH level, suspect might not be Brown tumors Pt has been on HD for "years" 2/2 PCKD. AV Fistula of EDWIN clotted in 2014, has received HD thru HD cath since. New AV fistula placed 11/2022, not yet matured.   - f/u nephrology recs  - s/p HD 1/10, 1/13, 1/16, 1/18, 1/20, 1/23     #Renal osteodystrophy   CT head (1/2/23): Increased density of the bone marrow is consistent with renal osteodystrophy. Per endocrine, extent of bone destruction extreme given PTH level, suspect might not be Brown tumors  - c/w sevelamer 1600 mg PO TID  - c/w Sensipar 60 mg PO daily TTE 11/22 normal LV and systolic, EF 59%, grade II diastolic dysfuncton, dilated RV, reduced RV,   Home meds: Entresto 49/51mg  - hold Entresto i/s/o shock    #LVOT with LISETTE  likely i/s/o hyperdynamic LV function 2/2 shock and hypovolemia  - per cardiology, rec repeating TTE after shock resolves    #CAD  Hx of cardiac cath in 2018  Home meds: atorvastatin 40mg, plavix 75mg qd  - c/w home atorvastatin 40mg  - c/w home Plavix 75mg qd Pt with recurrent fevers, most recently T 101.6 1/21. Unclear etiology. BCx 1/3, 1/9, 1/17 negative. s/p vancomycin x2 weeks (1/2-1/15). Per Surgery, R breast wound unlikely to be source of infection. Per Vascular Surgery, AV fistula fluid collection unlikely to be source of infection.   - R femoral line removed on 1/18; L femoral line placed on 1/18 (CURRENTLY ONLY SITE OF ACCESS)   - Restarted vancomycin renally dosed by trough   - Monitor BCx from 1/21-- NGTD   - Monitor CBC and vitals Pt with recurrent fevers, most recently T 101.6 1/21. Unclear etiology. BCx 1/3, 1/9, 1/17 negative. s/p vancomycin x2 weeks (1/2-1/15). Per Surgery, R breast wound unlikely to be source of infection. Per Vascular Surgery, AV fistula fluid collection unlikely to be source of infection.   - R femoral line removed on 1/18; L femoral line placed on 1/18 (CURRENTLY ONLY SITE OF ACCESS)   - Restarted vancomycin renally dosed by trough   - Monitor BCx from 1/21-- NGTD   - Monitor CBC and vitals      #RUE fluid collection:  - CT (1/3/23): 7.0 cm fluid collection is present near AV fistula in the RUE. Possibly abscess. Drained by IR 1/5/23 with serosanguinous fluid: no organisms, few WBCs  - RUE dopplers (1/13/23): No significant change large complex fluid collection in proximal R arm, could represent old seroma/hematoma (however, prior ultrasound was done prior to drainage)     Plan:   - Per Vascular: AV fistula unlikely to be infected; would not remove it; also would NOT recommend drainage of seroma.  - Continue daily dressing changes of RUE Pt with recurrent fevers, most recently T 101.6 1/21. Unclear etiology. BCx 1/3, 1/9, 1/17 negative. s/p vancomycin x2 weeks (1/2-1/15). Per Surgery, R breast wound unlikely to be source of infection. Per Vascular Surgery, AV fistula fluid collection unlikely to be source of infection.   - R femoral line removed on 1/18; L femoral line placed on 1/18 (CURRENTLY ONLY SITE OF ACCESS)   - Restarted vancomycin renally dosed by trough   - Monitor BCx from 1/21-- NGTD   - Monitor CBC and vitals    #RUE fluid collection/RUE edema:  - CT (1/3/23): 7.0 cm fluid collection is present near AV fistula in the RUE. Possibly abscess. Drained by IR 1/5/23 with serosanguinous fluid: no organisms, few WBCs  - RUE dopplers (1/13/23): No significant change large complex fluid collection in proximal R arm, could represent old seroma/hematoma (however, prior ultrasound was done prior to drainage)     Plan:   - Per Vascular: AV fistula unlikely to be infected; would not remove it; also would NOT recommend drainage of seroma.  - Per Wound Care, c/w BID dressing changes (once daily with Santyl DSD, once daily with Bactroban DSD). Apply light ace wrap compression to right hand and forearm to help with soft tissue swelling and elevate RUE with pillows

## 2023-01-23 NOTE — PROGRESS NOTE ADULT - ATTENDING COMMENTS
pt appears s/w more comfortable and communicative.  have reviewed status to date, and I agree with findings and recommendations.

## 2023-01-23 NOTE — PROGRESS NOTE ADULT - SUBJECTIVE AND OBJECTIVE BOX
Patient is a 67y old  Female who presents with a chief complaint of Sepsis (21 Jan 2023 14:47)      INTERVAL HPI/OVERNIGHT EVENTS:  Patient seen and examined at bedside. No acute events overnight. This morning, pt lying in bed, in NAD, continues to report breast pain. Otherwise no acute complaints. Denies fever, chills, HA, dizziness, CP, SOB, palpitations, abdominal pain, n/v, changes in bowel/urinary habits, numbness, or tingling. 12pt ROS otherwise negative.      FAMILY HISTORY:  No pertinent family history in first degree relatives        VITAL SIGNS:  T(C): 36.3 (01-23-23 @ 11:30), Max: 36.9 (01-22-23 @ 17:37)  HR: 72 (01-23-23 @ 11:30) (68 - 92)  BP: 120/52 (01-23-23 @ 11:30) (80/38 - 120/52)  RR: 18 (01-23-23 @ 11:30) (16 - 18)  SpO2: 100% (01-23-23 @ 11:30) (52% - 100%)  Wt(kg): --Vital Signs Last 24 Hrs  T(C): 36.3 (23 Jan 2023 11:30), Max: 36.9 (22 Jan 2023 17:37)  T(F): 97.4 (23 Jan 2023 11:30), Max: 98.4 (22 Jan 2023 17:37)  HR: 72 (23 Jan 2023 11:30) (68 - 92)  BP: 120/52 (23 Jan 2023 11:30) (80/38 - 120/52)  BP(mean): 58 (23 Jan 2023 08:25) (51 - 66)  RR: 18 (23 Jan 2023 11:30) (16 - 18)  SpO2: 100% (23 Jan 2023 11:30) (52% - 100%)    Parameters below as of 23 Jan 2023 11:30  Patient On (Oxygen Delivery Method): nasal cannula  O2 Flow (L/min): 2    Ancef (Rash; Urticaria)  DDAVP (Hypotension)  iodine (Hives; Pruritus)  penicillin (Swelling)  sulfa drugs (Angioedema)      PHYSICAL EXAM:  Constitutional: NAD, comfortable in bed.  HEENT: NC/AT, PERRLA, EOMI, no conjunctival pallor or scleral icterus, MMM  Neck: Supple, no JVD  Respiratory: CTA B/L. No w/r/r.   Cardiovascular: RRR, normal S1 and S2, no m/r/g.   Gastrointestinal: +BS, soft NT moderately distended, no guarding or rebound tenderness, no palpable masses   Extremities: wwp; nonpitting edema note b/l on LE.   Vascular: Pulses equal and strong throughout.   Neurological: AAOx3, no CN deficits, strength and sensation intact throughout.   Skin: No gross skin abnormalities or rashes    Consultant(s) Notes Reviewed:  [x ] YES  [ ] NO  Care Discussed with Consultants/Other Providers [ x] YES  [ ] NO    LABS:                        8.8    6.42  )-----------( 222      ( 23 Jan 2023 05:30 )             30.1     01-23    133<L>  |  94<L>  |  39<H>  ----------------------------<  97  4.7   |  25  |  7.49<H>    Ca    8.1<L>      23 Jan 2023 05:30  Phos  4.2     01-23  Mg     1.8     01-23    TPro  5.8<L>  /  Alb  1.8<L>  /  TBili  0.3  /  DBili  x   /  AST  30  /  ALT  13  /  AlkPhos  75  01-23        CAPILLARY BLOOD GLUCOSE                              I & O Summary:      Microbiology:      Culture - Blood (collected 21 Jan 2023 19:30)  Source: .Blood Blood  Preliminary Report (22 Jan 2023 20:00):    No growth at 1 day.        RADIOLOGY, EKG AND ADDITIONAL TESTS: Reviewed.      RADIOLOGY & ADDITIONAL TESTS:    Imaging Personally Reviewed:  [ ] YES  [ ] NO  acetaminophen     Tablet .. 1000 milliGRAM(s) Oral every 6 hours PRN  albumin human 25% IVPB 50 milliLiter(s) IV Intermittent every 1 hour  apixaban 2.5 milliGRAM(s) Oral every 12 hours  atorvastatin 40 milliGRAM(s) Oral at bedtime  calamine/zinc oxide Lotion 1 Application(s) Topical three times a day PRN  chlorhexidine 2% Cloths 1 Application(s) Topical <User Schedule>  chlorhexidine 2% Cloths 1 Application(s) Topical <User Schedule>  cinacalcet 60 milliGRAM(s) Oral daily  clopidogrel Tablet 75 milliGRAM(s) Oral daily  colchicine 0.3 milliGRAM(s) Oral daily  collagenase Ointment 1 Application(s) Topical daily  epoetin александр-epbx (RETACRIT) Injectable 8000 Unit(s) IV Push once  HYDROmorphone   Tablet 2 milliGRAM(s) Oral every 4 hours PRN  HYDROmorphone   Tablet 3 milliGRAM(s) Oral every 4 hours PRN  midodrine. 40 milliGRAM(s) Oral every 8 hours  Nephro-deborah 1 Tablet(s) Oral daily  polyethylene glycol 3350 17 Gram(s) Oral every 12 hours  senna 2 Tablet(s) Oral at bedtime  sevelamer carbonate 1600 milliGRAM(s) Oral three times a day with meals  sodium chloride 0.9% lock flush 10 milliLiter(s) IV Push every 1 hour PRN      HEALTH ISSUES - PROBLEM Dx:  Pain in breast    Septic shock    ESRD on dialysis    Chronic HFrEF (heart failure with reduced ejection fraction)    HPTH (hyperparathyroidism)    Anemia    Uterine mass    LIZ (obstructive sleep apnea)    Fever    Prophylactic measure    Hypotension    Debility    Goals of care, counseling/discussion    Encounter for palliative care

## 2023-01-23 NOTE — PROGRESS NOTE ADULT - ASSESSMENT
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      - F/U final right breast biopsy pathology -- preliminary result shows vasculitis vs granulomatous mastitis. Will follow-up [facility] today.  - F/u rheumatology recommendations for possible granulomatous mastitis  - F/U Cx results (NGTD so far). ABx resumed as per primary team.  - Dress right breast with Xeroform and gauze with paper tape. To be changed daily.   - Wound care for access site ulceration as per vascular surgery.   - Surgery 5C following.

## 2023-01-23 NOTE — PROGRESS NOTE ADULT - PROBLEM SELECTOR PLAN 5
Spoke with the patient and daughter Jasmina on the phone. Jasmina said her mother did not elaborate on her wishes but agrees with her mother on remaining full code and continue all treatment.   - Patient has 3 children and would like Jasmina, her youngest daughter, to make her medical decisions on her behalf if she cannot make them for herself. Healthcare proxy form completed and made available in the patient chart.

## 2023-01-23 NOTE — PROGRESS NOTE ADULT - PROBLEM SELECTOR PLAN 1
Family hx of breast cancer in mother, sister.   - Physical exam: large R breast mass, palpable L breast mass, R breast has peau d'orange appearance with associated erythema, warmth on R  - US bilateral breasts: No sonographic evidence of breast abscess, cyst or focal mass to correspond with the clinical finding of bilateral breast masses.  - prelim breast bx by surg: granuloma vs vasculitis; f/u final (supposed to result on 1/23)     Pt continues to report 8-10/10 pain of bilateral breasts, worst on R. Pain responsive to Dilaudid but effect wears off after ~2-3 hours.   - Pain management consulted, recs appreciated  - Dc'd PCA (1/16-1/17)  - Started Dilaudid 2 mg PO q4h PRN for moderate pain, Dilaudid 3 mg PO q4h PRN for severe pain   - HOLD ALL OPIOIDS IF RR<10, O2SAT <93%, SBP <96    #Peeling epidermis to B/l breasts  - Derm consulted, recommend wedge bx of both normal and pathologic tissue  - f/u surg about possible wedge bx  - Wound care following, appreciate recs Family hx of breast cancer in mother, sister.   - Physical exam: large R breast mass, palpable L breast mass, R breast has peau d'orange appearance with associated erythema, warmth on R  - US bilateral breasts: No sonographic evidence of breast abscess, cyst or focal mass to correspond with the clinical finding of bilateral breast masses.  - prelim breast bx by surg: granuloma vs vasculitis; f/u final (supposed to result on 1/23)     Pt continues to report 8-10/10 pain of bilateral breasts, worst on R. Pain responsive to Dilaudid but effect wears off after ~2-3 hours. Was briefly on PCA (1/16-1/17), however was discontinued.  - Pain management consulted, recs appreciated  - Started Dilaudid 2 mg PO q4h PRN for moderate pain, Dilaudid 3 mg PO q4h PRN for severe pain   - Started lidocaine 4% topical cream TID for breast pain   - HOLD ALL OPIOIDS IF RR<10, O2SAT <93%, SBP <96    #Peeling epidermis to B/l breasts  - Derm consulted, recommend wedge biopsy of both normal and pathologic tissue  - f/u surgery about possible wedge biopsy  - c/w daily dressing changes   - f/u Wound Care recs

## 2023-01-23 NOTE — PROGRESS NOTE ADULT - SUBJECTIVE AND OBJECTIVE BOX
[facility] Geriatrics and Palliative Care  Contact Info: Call [phone] (HEAL Line)    SUBJECTIVE AND OBJECTIVE:  INTERVAL HPI/OVERNIGHT EVENTS: Interval events noted. See patient's PRN use for the past 24hrs noted below. No unexpected adverse effects of opiates noted: no sedation/dizziness/nausea. Patient was uninterested in having a conversation today. Expressed that team keeps talking to her about pain but nothing ever gets done about it. Called the patient's daughter who explained mother has been having breast pain for over a year and doctor's in the past didn't find anything and did not manage the associated pain. Shares that her mom does not like to ask for pain medication but always tells her that the pain is an 8-9/10.     ALLERGIES:  Ancef (Rash; Urticaria)  DDAVP (Hypotension)  iodine (Hives; Pruritus)  penicillin (Swelling)  sulfa drugs (Angioedema)    MEDICATIONS  (STANDING):  albumin human 25% IVPB 50 milliLiter(s) IV Intermittent every 1 hour  apixaban 2.5 milliGRAM(s) Oral every 12 hours  atorvastatin 40 milliGRAM(s) Oral at bedtime  chlorhexidine 2% Cloths 1 Application(s) Topical <User Schedule>  chlorhexidine 2% Cloths 1 Application(s) Topical <User Schedule>  cinacalcet 60 milliGRAM(s) Oral daily  clopidogrel Tablet 75 milliGRAM(s) Oral daily  colchicine 0.3 milliGRAM(s) Oral daily  collagenase Ointment 1 Application(s) Topical daily  midodrine. 40 milliGRAM(s) Oral every 8 hours  Nephro-deborah 1 Tablet(s) Oral daily  petrolatum white Ointment 1 Application(s) Topical daily  polyethylene glycol 3350 17 Gram(s) Oral every 12 hours  senna 2 Tablet(s) Oral at bedtime  sevelamer carbonate 1600 milliGRAM(s) Oral three times a day with meals    MEDICATIONS  (PRN):  acetaminophen     Tablet .. 1000 milliGRAM(s) Oral every 6 hours PRN Mild Pain (1 - 3)  calamine/zinc oxide Lotion 1 Application(s) Topical three times a day PRN Itching  HYDROmorphone   Tablet 2 milliGRAM(s) Oral every 4 hours PRN Moderate Pain (4 - 6)  HYDROmorphone   Tablet 3 milliGRAM(s) Oral every 4 hours PRN Severe Pain (7 - 10)  sodium chloride 0.9% lock flush 10 milliLiter(s) IV Push every 1 hour PRN Pre/post blood products, medications, blood draw, and to maintain line patency      Analgesic Use (Scheduled and PRNs) for past 24 hours:    acetaminophen     Tablet ..   1000 milliGRAM(s) Oral (01-22-23 @ 21:35)    acetaminophen   IVPB ..   400 mL/Hr IV Intermittent (01-23-23 @ 03:12)    HYDROmorphone   Tablet   3 milliGRAM(s) Oral (01-23-23 @ 10:03)   3 milliGRAM(s) Oral (01-22-23 @ 16:53)    sevelamer carbonate   1600 milliGRAM(s) Oral (01-23-23 @ 10:04)   1600 milliGRAM(s) Oral (01-22-23 @ 20:08)      ITEMS UNCHECKED ARE NOT PRESENT  PRESENT SYMPTOMS/REVIEW OF SYSTEMS: []Unable to obtain due to poor mentation   Source if other than patient:  [x]Family   []Team   Pain: [x] yes [] no  QOL impact - mood  Location -  breasts                  Aggravating Factors -  Quality -  Radiation -  Timing -constant   Severity (0-10 scale) - 8-9/10  Minimal Acceptable Level (0-10 scale) - <5      Dyspnea:                           []Mild  []Moderate []Severe  Anxiety:                             []Mild []Moderate []Severe  Fatigue:                             []Mild []Moderate []Severe  Nausea:                             []Mild []Moderate []Severe  Loss of Appetite:              []Mild []Moderate []Severe  Constipation:                    []Mild []Moderate []Severe    Other Symptoms:  [x]All other review of systems negative     Palliative Performance Status Version 2: 50 %  (Functional Assessment Tool)    GENERAL:  [x] NAD []Alert [x]Lethargic  []Cachexia  []Unarousable  [x]Verbal  []Non-Verbal  Behavioral:   []Anxiety  []Delirium []Agitation []Cooperative [x]Oriented x 3  HEENT:  []Normal  [] Moist Mucous Membranes []Dry mouth   []ET Tube/Trach  []Oral lesions  PULMONARY:   [x]Clear []Tachypnea  []Audible excessive secretions  [x]Normal Work of Breathing []Labored Breathing  []Rhonchi []Crackles []Wheezing  CARDIOVASCULAR:    [x]Regular Rate [x]Regular Rhythm []Irregular []Tachy  []Arjun  GASTROINTESTINAL:  [x]Soft  []Distended   []+BS  []Non tender []Tender  []PEG []OGT/ NGT  Last BM:  GENITOURINARY:  []Normal [] Incontinent   [x]Oliguria/Anuria   []Wu  MUSCULOSKELETAL:   []Normal Extremities  []Weakness  [x]Bed/Wheelchair bound []Edema  NEUROLOGIC:   [x]No focal deficits  []Cognitive impairment  []Dysphagia []Dysarthria []Paresis []Encephalopathic  SKIN:   [x]Normal   []Pressure ulcer(s)  []Rash     CRITICAL CARE:  [ ]Shock Present  [ ]Septic [ ]Cardiogenic [ ]Neurologic [ ]Hypovolemic  [ ] Vasopressors [ ]Inotropes   [ ]Respiratory failure present [ ]Mechanical Ventilation [ ]Non-invasive ventilatory support [ ]High-Flow  [ ]Acute  [ ]Chronic [ ]Hypoxic  [ ]Hypercarbic   [ ]Other organ failure    Vital Signs Last 24 Hrs  T(C): 36.3 (23 Jan 2023 11:30), Max: 36.9 (22 Jan 2023 17:37)  T(F): 97.4 (23 Jan 2023 11:30), Max: 98.4 (22 Jan 2023 17:37)  HR: 72 (23 Jan 2023 11:30) (68 - 92)  BP: 120/52 (23 Jan 2023 11:30) (80/38 - 120/52)  BP(mean): 58 (23 Jan 2023 08:25) (51 - 66)  RR: 18 (23 Jan 2023 11:30) (16 - 18)  SpO2: 100% (23 Jan 2023 11:30) (92% - 100%)    Parameters below as of 23 Jan 2023 11:30  Patient On (Oxygen Delivery Method): nasal cannula  O2 Flow (L/min): 2       LABS:                        8.8    6.42  )-----------( 222      ( 23 Jan 2023 05:30 )             30.1   01-23    133<L>  |  94<L>  |  39<H>  ----------------------------<  97  4.7   |  25  |  7.49<H>    Ca    8.1<L>      23 Jan 2023 05:30  Phos  4.2     01-23  Mg     1.8     01-23    TPro  5.8<L>  /  Alb  1.8<L>  /  TBili  0.3  /  DBili  x   /  AST  30  /  ALT  13  /  AlkPhos  75  01-23    RADIOLOGY & ADDITIONAL STUDIES: none new      REFERRALS:  [x]Social Work  []Case management []PT/OT [x]Chaplaincy  []Hospice  []Patient/Family Support []Massage Therapy []Music Therapy []Holistic RN    DISCUSSION OF CASE: Family - to provide updates and emotional support.

## 2023-01-24 NOTE — PROGRESS NOTE ADULT - SUBJECTIVE AND OBJECTIVE BOX
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      EVENTS:   - Biopsy taken uneventfully at bedside 1/9/23- preliminary pathology showed granulomas, pending final report.  - Afebrile x 54 hrs  - CPM D/C'd yesterday  - CTA Head 1/23 with segmental Left P1 stenosis and no aneurysm    SUBJECTIVE:   - Notes she feels ok this morning  - Dressing displaced overnight  - NO worsening drainage but does have some weeping from right breast  - Denies CP, SOB    MEDICATIONS  (STANDING):  albumin human 25% IVPB 50 milliLiter(s) IV Intermittent every 1 hour  apixaban 2.5 milliGRAM(s) Oral every 12 hours  atorvastatin 40 milliGRAM(s) Oral at bedtime  chlorhexidine 2% Cloths 1 Application(s) Topical <User Schedule>  chlorhexidine 2% Cloths 1 Application(s) Topical <User Schedule>  cinacalcet 60 milliGRAM(s) Oral daily  clopidogrel Tablet 75 milliGRAM(s) Oral daily  colchicine 0.3 milliGRAM(s) Oral daily  collagenase Ointment 1 Application(s) Topical daily  midodrine. 40 milliGRAM(s) Oral every 8 hours  Nephro-deborah 1 Tablet(s) Oral daily  petrolatum white Ointment 1 Application(s) Topical daily  polyethylene glycol 3350 17 Gram(s) Oral every 12 hours  senna 2 Tablet(s) Oral at bedtime  sevelamer carbonate 1600 milliGRAM(s) Oral three times a day with meals    MEDICATIONS  (PRN):  calamine/zinc oxide Lotion 1 Application(s) Topical three times a day PRN Itching  HYDROmorphone   Tablet 2 milliGRAM(s) Oral every 4 hours PRN Moderate Pain (4 - 6)  HYDROmorphone   Tablet 3 milliGRAM(s) Oral every 4 hours PRN Severe Pain (7 - 10)  lidocaine 4% Cream 1 Application(s) Topical three times a day PRN Breast pain  sodium chloride 0.9% lock flush 10 milliLiter(s) IV Push every 1 hour PRN Pre/post blood products, medications, blood draw, and to maintain line patency      Vital Signs Last 24 Hrs  T(C): 36.5 (24 Jan 2023 06:30), Max: 36.9 (23 Jan 2023 22:13)  T(F): 97.7 (24 Jan 2023 06:30), Max: 98.5 (23 Jan 2023 22:13)  HR: 70 (24 Jan 2023 06:12) (58 - 94)  BP: 99/80 (24 Jan 2023 03:56) (90/40 - 132/77)  BP(mean): 85 (24 Jan 2023 03:56) (58 - 85)  RR: 18 (24 Jan 2023 06:12) (16 - 20)  SpO2: 92% (24 Jan 2023 06:12) (90% - 100%)    Parameters below as of 24 Jan 2023 06:12  Patient On (Oxygen Delivery Method): nasal cannula  O2 Flow (L/min): 4        Gen: Awake, alert, NAD, resting comfortably   Breast: non-soiled gauze overlying incisional bx site on R breast, mild induration surrounding bx site stable. B/l breasts soft with underlying firmness, mildly tender to palpation this morning.   CV: RRR  Pulm: no respiratory distress on RA  Abd: soft, ND, NTTP, no rebound or guarding   Ext: WWP, ulceration of medial aspect of R arm over access site. L chest HD catheter without edema/induration/purulence.       I&O's Detail    23 Jan 2023 07:01  -  24 Jan 2023 06:57  --------------------------------------------------------  IN:    IV PiggyBack: 100 mL    Other (mL): 400 mL  Total IN: 500 mL    OUT:    Other (mL): 1400 mL  Total OUT: 1400 mL    Total NET: -900 mL          LABS:                        8.8    6.42  )-----------( 222      ( 23 Jan 2023 05:30 )             30.1     01-23    133<L>  |  94<L>  |  39<H>  ----------------------------<  97  4.7   |  25  |  7.49<H>    Ca    8.1<L>      23 Jan 2023 05:30  Phos  4.2     01-23  Mg     1.8     01-23    TPro  5.8<L>  /  Alb  1.8<L>  /  TBili  0.3  /  DBili  x   /  AST  30  /  ALT  13  /  AlkPhos  75  01-23          RADIOLOGY & ADDITIONAL STUDIES:

## 2023-01-24 NOTE — PROGRESS NOTE ADULT - PROBLEM SELECTOR PLAN 10
- c/w BiPAP 18/8 at night    #Atelectasis  Noted on CT chest (1/3/23) to have atelectasis at b/l lung bases.  - Incentive spirometry

## 2023-01-24 NOTE — PROGRESS NOTE ADULT - ASSESSMENT
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 During imaging, CT showing 7.0 cm fluid collection is present near an AV fistula in the right upper arm. The differential for this collection included benign post-operative hematoma or seroma, but also abscess or vascular device infection, particularly in the setting of septic shock. Vascular consulted for RUE fistula evaluation to rule out this area as a source of sepsis. Reassuring physical exam at initial evaluation without clear indicia of infection related to the graph, small superficial surgical incision dehiscence notwithstanding. Now s/p IR image guided aspiration of perigraft fluid (negative) and gadolinium scan (negative). At this point, do not suspect a graft infection as the provoking factor leading to the patient's presentation. Swelling in area of RUE fistula is likely a sterile seroma with no intervention needed.    Recommendations:    -Continue to follow up BCx and IR Cx - both NGTD (given negative blood and wound drainage cultures, low likelihood of graft infection)  -Wound care recs: BID dressing changes (once daily with Santyl DSD once daily with Bactroban DSD). Apply light ace wrap compression to right hand and forearm to help with soft tissue swelling and elevate RUE with pillows  -Fluid collection near RUE fistula is likely a sterile seroma and unlikely the cause of patient's sepsis. Would not recommend percutaneous drainage or IR procedure to drain fluid.  -F/u breast punch and core biopsy: preliminary result shows vasculitis vs granulomatous mastitis.  -Appreciate breast surgery recs  -Rest of care per primary team  -Vascular surgery Team 3C will continue to follow. Please call [phone] with any questions or concerns.

## 2023-01-24 NOTE — PROGRESS NOTE ADULT - PROBLEM SELECTOR PLAN 5
TTE 11/22 normal LV and systolic function, EF 59%, grade II diastolic dysfunction, dilated RV, reduced RV systolic function.  Home meds: Entresto 49/51mg  - hold Entresto i/s/o shock state     #LVOT with LISETTE  likely i/s/o hyperdynamic LV function 2/2 shock and hypovolemia  - per cardiology, rec repeating TTE after shock resolves    #CAD  Hx of cardiac cath in 2018  Home meds: atorvastatin 40mg, plavix 75mg qd  - c/w home atorvastatin 40mg  - c/w home Plavix 75mg qd

## 2023-01-24 NOTE — PROGRESS NOTE ADULT - SUBJECTIVE AND OBJECTIVE BOX
JEIMY SALOMON  67y  Female      Patient is a 67y old  Female who presents with a chief complaint of emergent hemodialysis (23 Jan 2023 13:28)      INTERVAL HPI/OVERNIGHT EVENTS:      REVIEW OF SYSTEMS:  CONSTITUTIONAL: No fever, weight loss, or fatigue  EYES: No eye pain, visual disturbances, or discharge  ENMT:  No difficulty hearing, tinnitus, vertigo; No sinus or throat pain  NECK: No pain or stiffness  BREASTS: No pain, masses, or nipple discharge  RESPIRATORY: No cough, wheezing, chills or hemoptysis; No shortness of breath  CARDIOVASCULAR: No chest pain, palpitations, dizziness, or leg swelling  GASTROINTESTINAL: No abdominal or epigastric pain. No nausea, vomiting, or hematemesis; No diarrhea or constipation. No melena or hematochezia.  GENITOURINARY: No dysuria, frequency, hematuria, or incontinence  NEUROLOGICAL: No headaches, memory loss, loss of strength, numbness, or tremors  SKIN: No itching, burning, rashes, or lesions   LYMPH NODES: No enlarged glands  ENDOCRINE: No heat or cold intolerance; No hair loss  MUSCULOSKELETAL: No joint pain or swelling; No muscle, back, or extremity pain  PSYCHIATRIC: No depression, anxiety, mood swings, or difficulty sleeping  HEME/LYMPH: No easy bruising, or bleeding gums  ALLERY AND IMMUNOLOGIC: No hives or eczema  FAMILY HISTORY:  No pertinent family history in first degree relatives        VITAL SIGNS:  T(C): 36.7 (01-24-23 @ 10:00), Max: 36.9 (01-23-23 @ 22:13)  HR: 74 (01-24-23 @ 08:45) (58 - 94)  BP: 100/45 (01-24-23 @ 08:45) (99/43 - 132/77)  RR: 20 (01-24-23 @ 08:45) (17 - 20)  SpO2: 100% (01-24-23 @ 08:45) (90% - 100%)  Wt(kg): --Vital Signs Last 24 Hrs  T(C): 36.7 (24 Jan 2023 10:00), Max: 36.9 (23 Jan 2023 22:13)  T(F): 98 (24 Jan 2023 10:00), Max: 98.5 (23 Jan 2023 22:13)  HR: 74 (24 Jan 2023 08:45) (58 - 94)  BP: 100/45 (24 Jan 2023 08:45) (99/43 - 132/77)  BP(mean): 61 (24 Jan 2023 08:45) (61 - 85)  RR: 20 (24 Jan 2023 08:45) (17 - 20)  SpO2: 100% (24 Jan 2023 08:45) (90% - 100%)    Parameters below as of 24 Jan 2023 08:45  Patient On (Oxygen Delivery Method): nasal cannula  O2 Flow (L/min): 4    Ancef (Rash; Urticaria)  DDAVP (Hypotension)  iodine (Hives; Pruritus)  penicillin (Swelling)  sulfa drugs (Angioedema)      PHYSICAL EXAM:  GENERAL: NAD  HEAD:  Atraumatic, Normocephalic  EYES: EOMI, PERRLA, conjunctiva and sclera clear  ENMT: No tonsillar erythema, exudates, or enlargement; Moist mucous membranes, Good dentition, No lesions  NECK: Supple, No JVD, Normal thyroid  NERVOUS SYSTEM:  Alert & Oriented X3, Good concentration; Motor Strength 5/5 B/L upper and lower extremities; DTRs 2+ intact and symmetric  CHEST/LUNG: Clear to auscultation bilaterally; No rales, rhonchi, wheezing, or rubs  HEART: Regular rate and rhythm; No murmurs, rubs, or gallops  ABDOMEN: Soft, Nontender, Nondistended; Bowel sounds present  EXTREMITIES:  2+ Peripheral Pulses, No clubbing, cyanosis, or edema  LYMPH: No lymphadenopathy noted  SKIN: No rashes or lesions    Consultant(s) Notes Reviewed:  [x ] YES  [ ] NO  Care Discussed with Consultants/Other Providers [ x] YES  [ ] NO    LABS:      RADIOLOGY & ADDITIONAL TESTS:    Imaging Personally Reviewed:  [ ] YES  [ ] NO  apixaban 2.5 milliGRAM(s) Oral every 12 hours  atorvastatin 40 milliGRAM(s) Oral at bedtime  calamine/zinc oxide Lotion 1 Application(s) Topical three times a day PRN  chlorhexidine 2% Cloths 1 Application(s) Topical <User Schedule>  chlorhexidine 2% Cloths 1 Application(s) Topical <User Schedule>  cinacalcet 60 milliGRAM(s) Oral daily  clopidogrel Tablet 75 milliGRAM(s) Oral daily  colchicine 0.3 milliGRAM(s) Oral daily  collagenase Ointment 1 Application(s) Topical daily  HYDROmorphone   Tablet 2 milliGRAM(s) Oral every 4 hours PRN  HYDROmorphone   Tablet 3 milliGRAM(s) Oral every 4 hours PRN  lidocaine 4% Cream 1 Application(s) Topical three times a day PRN  midodrine. 40 milliGRAM(s) Oral every 8 hours  Nephro-deborah 1 Tablet(s) Oral daily  petrolatum white Ointment 1 Application(s) Topical daily  polyethylene glycol 3350 17 Gram(s) Oral every 12 hours  senna 2 Tablet(s) Oral at bedtime  sevelamer carbonate 1600 milliGRAM(s) Oral three times a day with meals  sodium chloride 0.9% lock flush 10 milliLiter(s) IV Push every 1 hour PRN      HEALTH ISSUES - PROBLEM Dx:  Pain in breast    Septic shock    Fever    Vasculopathy    Chronic HFrEF (heart failure with reduced ejection fraction)    ESRD on dialysis    HPTH (hyperparathyroidism)    Anemia    Uterine mass    LIZ (obstructive sleep apnea)    Prophylactic measure    Hypotension    Debility    Goals of care, counseling/discussion    Encounter for palliative care           Patient is a 67y old  Female who presents with a chief complaint of emergent hemodialysis (23 Jan 2023 13:28)      INTERVAL HPI/OVERNIGHT EVENTS:  Patient seen and examined at bedside. No acute events overnight. This morning, pt lying in bed, in NAD, continues to report breast pain which improved w/ lidocaine cream yesterday. Otherwise no acute complaints. Denies fever, chills, HA, dizziness, CP, SOB, palpitations, abdominal pain, n/v, changes in bowel/urinary habits, numbness, or tingling. 12pt ROS otherwise negative.    FAMILY HISTORY:  No pertinent family history in first degree relatives        VITAL SIGNS:  T(C): 36.7 (01-24-23 @ 10:00), Max: 36.9 (01-23-23 @ 22:13)  HR: 74 (01-24-23 @ 08:45) (58 - 94)  BP: 100/45 (01-24-23 @ 08:45) (99/43 - 132/77)  RR: 20 (01-24-23 @ 08:45) (17 - 20)  SpO2: 100% (01-24-23 @ 08:45) (90% - 100%)  Wt(kg): --Vital Signs Last 24 Hrs  T(C): 36.7 (24 Jan 2023 10:00), Max: 36.9 (23 Jan 2023 22:13)  T(F): 98 (24 Jan 2023 10:00), Max: 98.5 (23 Jan 2023 22:13)  HR: 74 (24 Jan 2023 08:45) (58 - 94)  BP: 100/45 (24 Jan 2023 08:45) (99/43 - 132/77)  BP(mean): 61 (24 Jan 2023 08:45) (61 - 85)  RR: 20 (24 Jan 2023 08:45) (17 - 20)  SpO2: 100% (24 Jan 2023 08:45) (90% - 100%)    Parameters below as of 24 Jan 2023 08:45  Patient On (Oxygen Delivery Method): nasal cannula  O2 Flow (L/min): 4    Ancef (Rash; Urticaria)  DDAVP (Hypotension)  iodine (Hives; Pruritus)  penicillin (Swelling)  sulfa drugs (Angioedema)      PHYSICAL EXAM:  GENERAL: NAD  HEAD:  Atraumatic, Normocephalic  EYES: EOMI, PERRLA, conjunctiva and sclera clear  ENMT: No tonsillar erythema, exudates, or enlargement; Moist mucous membranes, Good dentition, No lesions  NECK: Supple, No JVD, Normal thyroid  NERVOUS SYSTEM:  Alert & Oriented X3, Good concentration; Motor Strength 5/5 B/L upper and lower extremities; DTRs 2+ intact and symmetric  CHEST/LUNG: Clear to auscultation bilaterally; No rales, rhonchi, wheezing, or rubs  HEART: Regular rate and rhythm; No murmurs, rubs, or gallops  ABDOMEN: Soft, Nontender, Nondistended; Bowel sounds present  EXTREMITIES:  2+ Peripheral Pulses, No clubbing, cyanosis, or edema  LYMPH: No lymphadenopathy noted  SKIN: No rashes or lesions    Consultant(s) Notes Reviewed:  [x ] YES  [ ] NO  Care Discussed with Consultants/Other Providers [ x] YES  [ ] NO    LABS:      RADIOLOGY & ADDITIONAL TESTS:    Imaging Personally Reviewed:  [ ] YES  [ ] NO  apixaban 2.5 milliGRAM(s) Oral every 12 hours  atorvastatin 40 milliGRAM(s) Oral at bedtime  calamine/zinc oxide Lotion 1 Application(s) Topical three times a day PRN  chlorhexidine 2% Cloths 1 Application(s) Topical <User Schedule>  chlorhexidine 2% Cloths 1 Application(s) Topical <User Schedule>  cinacalcet 60 milliGRAM(s) Oral daily  clopidogrel Tablet 75 milliGRAM(s) Oral daily  colchicine 0.3 milliGRAM(s) Oral daily  collagenase Ointment 1 Application(s) Topical daily  HYDROmorphone   Tablet 2 milliGRAM(s) Oral every 4 hours PRN  HYDROmorphone   Tablet 3 milliGRAM(s) Oral every 4 hours PRN  lidocaine 4% Cream 1 Application(s) Topical three times a day PRN  midodrine. 40 milliGRAM(s) Oral every 8 hours  Nephro-deborah 1 Tablet(s) Oral daily  petrolatum white Ointment 1 Application(s) Topical daily  polyethylene glycol 3350 17 Gram(s) Oral every 12 hours  senna 2 Tablet(s) Oral at bedtime  sevelamer carbonate 1600 milliGRAM(s) Oral three times a day with meals  sodium chloride 0.9% lock flush 10 milliLiter(s) IV Push every 1 hour PRN      HEALTH ISSUES - PROBLEM Dx:  Pain in breast    Septic shock    Fever    Vasculopathy    Chronic HFrEF (heart failure with reduced ejection fraction)    ESRD on dialysis    HPTH (hyperparathyroidism)    Anemia    Uterine mass    LIZ (obstructive sleep apnea)    Prophylactic measure    Hypotension    Debility    Goals of care, counseling/discussion    Encounter for palliative care           Patient is a 67y old  Female who presents with a chief complaint of emergent hemodialysis (23 Jan 2023 13:28)      INTERVAL HPI/OVERNIGHT EVENTS:  Patient seen and examined at bedside. No acute events overnight. This morning, pt lying in bed, in NAD, continues to report breast pain which improved w/ lidocaine cream yesterday. Otherwise no acute complaints. Denies fever, chills, HA, dizziness, CP, SOB, palpitations, abdominal pain, n/v, changes in bowel/urinary habits, numbness, or tingling. 12pt ROS otherwise negative.    FAMILY HISTORY:  No pertinent family history in first degree relatives        VITAL SIGNS:  T(C): 36.7 (01-24-23 @ 10:00), Max: 36.9 (01-23-23 @ 22:13)  HR: 74 (01-24-23 @ 08:45) (58 - 94)  BP: 100/45 (01-24-23 @ 08:45) (99/43 - 132/77)  RR: 20 (01-24-23 @ 08:45) (17 - 20)  SpO2: 100% (01-24-23 @ 08:45) (90% - 100%)  Wt(kg): --Vital Signs Last 24 Hrs  T(C): 36.7 (24 Jan 2023 10:00), Max: 36.9 (23 Jan 2023 22:13)  T(F): 98 (24 Jan 2023 10:00), Max: 98.5 (23 Jan 2023 22:13)  HR: 74 (24 Jan 2023 08:45) (58 - 94)  BP: 100/45 (24 Jan 2023 08:45) (99/43 - 132/77)  BP(mean): 61 (24 Jan 2023 08:45) (61 - 85)  RR: 20 (24 Jan 2023 08:45) (17 - 20)  SpO2: 100% (24 Jan 2023 08:45) (90% - 100%)    Parameters below as of 24 Jan 2023 08:45  Patient On (Oxygen Delivery Method): nasal cannula  O2 Flow (L/min): 4    Ancef (Rash; Urticaria)  DDAVP (Hypotension)  iodine (Hives; Pruritus)  penicillin (Swelling)  sulfa drugs (Angioedema)      PHYSICAL EXAM:  Constitutional: NAD  HEENT: no conjunctivitis or scleral icterus, MMM  NECK: Supple, no JVD  CARDIAC: RRR, +S1/S2, no murmurs/rubs/gallops  PULM: Breathing comfortably on 4LNC, clear to auscultation bilaterally, no wheezes/rales/rhonchi  BREAST: b/l breasts with increasing peeling epidermis to lower aspect of breasts, R>L   ABDOMEN: Soft, obese, nontender to palpation, +BS, no rebound tenderness or fluid wave, non-reducible ventral hernia  SKIN: b/l arms warm to touch; 3cm dehisced wound, nonpurulent, nonerythematous, to R medial upper arm near axilla  VASC:  2+ pitting edema to bilateral lower extremities. No clubbing, cyanosis, or tenderness. +L femoral line (placed 1/18)  EXT: Mild improvement to edema to R proximal forearm; unchanged edema to R hand, slightly tense, non erythematous  NEURO: A&Ox3, no focal neurologic deficits   PSYCH: Normal mood & affect      Consultant(s) Notes Reviewed:  [x ] YES  [ ] NO  Care Discussed with Consultants/Other Providers [ x] YES  [ ] NO    LABS:                        9.4    8.67  )-----------( 264      ( 24 Jan 2023 06:47 )             32.9     01-24    135  |  95<L>  |  36<H>  ----------------------------<  115<H>  4.5   |  27  |  6.44<H>    Ca    8.1<L>      24 Jan 2023 06:47  Phos  4.2     01-24  Mg     1.8     01-24    TPro  5.8<L>  /  Alb  1.8<L>  /  TBili  0.3  /  DBili  x   /  AST  30  /  ALT  13  /  AlkPhos  75  01-23        CAPILLARY BLOOD GLUCOSE                              I & O Summary:    01-23-23 @ 07:01  -  01-24-23 @ 07:00  --------------------------------------------------------  IN: 500 mL / OUT: 1400 mL / NET: -900 mL        Microbiology:      Culture - Blood (collected 21 Jan 2023 19:30)  Source: .Blood Blood  Preliminary Report (23 Jan 2023 20:00):    No growth at 2 days.        RADIOLOGY, EKG AND ADDITIONAL TESTS: Reviewed.      RADIOLOGY & ADDITIONAL TESTS:    Imaging Personally Reviewed:  [ ] YES  [ ] NO  apixaban 2.5 milliGRAM(s) Oral every 12 hours  atorvastatin 40 milliGRAM(s) Oral at bedtime  calamine/zinc oxide Lotion 1 Application(s) Topical three times a day PRN  chlorhexidine 2% Cloths 1 Application(s) Topical <User Schedule>  chlorhexidine 2% Cloths 1 Application(s) Topical <User Schedule>  cinacalcet 60 milliGRAM(s) Oral daily  clopidogrel Tablet 75 milliGRAM(s) Oral daily  colchicine 0.3 milliGRAM(s) Oral daily  collagenase Ointment 1 Application(s) Topical daily  HYDROmorphone   Tablet 2 milliGRAM(s) Oral every 4 hours PRN  HYDROmorphone   Tablet 3 milliGRAM(s) Oral every 4 hours PRN  lidocaine 4% Cream 1 Application(s) Topical three times a day PRN  midodrine. 40 milliGRAM(s) Oral every 8 hours  Nephro-deborah 1 Tablet(s) Oral daily  petrolatum white Ointment 1 Application(s) Topical daily  polyethylene glycol 3350 17 Gram(s) Oral every 12 hours  senna 2 Tablet(s) Oral at bedtime  sevelamer carbonate 1600 milliGRAM(s) Oral three times a day with meals  sodium chloride 0.9% lock flush 10 milliLiter(s) IV Push every 1 hour PRN      HEALTH ISSUES - PROBLEM Dx:  Pain in breast    Septic shock    Fever    Vasculopathy    Chronic HFrEF (heart failure with reduced ejection fraction)    ESRD on dialysis    HPTH (hyperparathyroidism)    Anemia    Uterine mass    LIZ (obstructive sleep apnea)    Prophylactic measure    Hypotension    Debility    Goals of care, counseling/discussion    Encounter for palliative care           Patient is a 67y old  Female who presents with a chief complaint of emergent hemodialysis (23 Jan 2023 13:28)    HOSPITAL COURSE:  67 F w/ PMH HFrEF (EF 40%), nonobstructive CAD, non-ischemic cardiomyopathy, HTN, HLD, ESRD 2/2 polycystic kidney disease on HD, PE (2018) s/p IVC filter, mild AS, mild MR, PAD, OA, h/o thrombus in vascular access x3 (R AVG, R AVF, L AVG), ventral hernia, 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 Pt s/p vancomycin x2 wks (last day 1/15) for presumed breast cellulitis. Course has been c/b recurrent fevers, most recently on 1/17 & 1/21 (BCx NGTD, restarted vancomycin 1/21). Multiple services following throughout hospital course. Surgery had been consulted for breast masses/breast cellulitis; s/p R breast bx with prelim read vasculitis vs granuloma, pending final read. Derm had also been consulted for b/l breast skin changes, recommend wedge bx of normal and pathologic tissue; also currently being followed by wound care. Vascular had been consulted for initial concern for RUE AV fistula infection, does not think it is infected. Endocrine following for management of hyperparathyroidism. Gyn/onc had been consulted for R ovarian cyst and elevated tumor markers, likely benign, recommended outpatient follow up. Pt was stepped down to 7LA on 1/18. On 1/19 AM, BP check on b/l legs demonstrated BPs 60s/30s, asymptomatic, Hb 6.8, 1U pRBC ordered, and pt was stepped up to MICU. In the MICU, MAPs > 65 without pressors so pt was stepped back down to 7LA. On 7LA, pt was transitioned to PO pain medications, midodrine dose was increased, and pt underwent CTA head to r/o aneurysms i/s/o polycystic kidney disease which was negative. Of note, pt had R femoral line removed on 1/18 i/s/o fever and had L femoral line placed 1/18 which is currently only site of access.    INTERVAL HPI/OVERNIGHT EVENTS:  Patient seen and examined at bedside. No acute events overnight. This morning, pt lying in bed, in NAD, continues to report breast pain which improved w/ lidocaine cream yesterday. Otherwise no acute complaints. Denies fever, chills, HA, dizziness, CP, SOB, palpitations, abdominal pain, n/v, changes in bowel/urinary habits, numbness, or tingling. 12pt ROS otherwise negative.    FAMILY HISTORY:  No pertinent family history in first degree relatives        VITAL SIGNS:  T(C): 36.7 (01-24-23 @ 10:00), Max: 36.9 (01-23-23 @ 22:13)  HR: 74 (01-24-23 @ 08:45) (58 - 94)  BP: 100/45 (01-24-23 @ 08:45) (99/43 - 132/77)  RR: 20 (01-24-23 @ 08:45) (17 - 20)  SpO2: 100% (01-24-23 @ 08:45) (90% - 100%)  Wt(kg): --Vital Signs Last 24 Hrs  T(C): 36.7 (24 Jan 2023 10:00), Max: 36.9 (23 Jan 2023 22:13)  T(F): 98 (24 Jan 2023 10:00), Max: 98.5 (23 Jan 2023 22:13)  HR: 74 (24 Jan 2023 08:45) (58 - 94)  BP: 100/45 (24 Jan 2023 08:45) (99/43 - 132/77)  BP(mean): 61 (24 Jan 2023 08:45) (61 - 85)  RR: 20 (24 Jan 2023 08:45) (17 - 20)  SpO2: 100% (24 Jan 2023 08:45) (90% - 100%)    Parameters below as of 24 Jan 2023 08:45  Patient On (Oxygen Delivery Method): nasal cannula  O2 Flow (L/min): 4    Ancef (Rash; Urticaria)  DDAVP (Hypotension)  iodine (Hives; Pruritus)  penicillin (Swelling)  sulfa drugs (Angioedema)      PHYSICAL EXAM:  Constitutional: NAD  HEENT: no conjunctivitis or scleral icterus, MMM  NECK: Supple, no JVD  CARDIAC: RRR, +S1/S2, no murmurs/rubs/gallops  PULM: Breathing comfortably on 4LNC, clear to auscultation bilaterally, no wheezes/rales/rhonchi  BREAST: b/l breasts with increasing peeling epidermis to lower aspect of breasts, R>L   ABDOMEN: Soft, obese, nontender to palpation, +BS, no rebound tenderness or fluid wave, non-reducible ventral hernia  SKIN: b/l arms warm to touch; 3cm dehisced wound, nonpurulent, nonerythematous, to R medial upper arm near axilla  VASC:  2+ pitting edema to bilateral lower extremities. No clubbing, cyanosis, or tenderness. +L femoral line (placed 1/18)  EXT: Mild improvement to edema to R proximal forearm; unchanged edema to R hand, slightly tense, non erythematous  NEURO: A&Ox3, no focal neurologic deficits   PSYCH: Normal mood & affect    LINES: L tunneled HD catheter, L femoral central line     Consultant(s) Notes Reviewed:  [x ] YES  [ ] NO  Care Discussed with Consultants/Other Providers [ x] YES  [ ] NO    LABS:                        9.4    8.67  )-----------( 264      ( 24 Jan 2023 06:47 )             32.9     01-24    135  |  95<L>  |  36<H>  ----------------------------<  115<H>  4.5   |  27  |  6.44<H>    Ca    8.1<L>      24 Jan 2023 06:47  Phos  4.2     01-24  Mg     1.8     01-24    TPro  5.8<L>  /  Alb  1.8<L>  /  TBili  0.3  /  DBili  x   /  AST  30  /  ALT  13  /  AlkPhos  75  01-23        CAPILLARY BLOOD GLUCOSE                              I & O Summary:    01-23-23 @ 07:01  -  01-24-23 @ 07:00  --------------------------------------------------------  IN: 500 mL / OUT: 1400 mL / NET: -900 mL        Microbiology:      Culture - Blood (collected 21 Jan 2023 19:30)  Source: .Blood Blood  Preliminary Report (23 Jan 2023 20:00):    No growth at 2 days.        RADIOLOGY, EKG AND ADDITIONAL TESTS: Reviewed.      RADIOLOGY & ADDITIONAL TESTS:    Imaging Personally Reviewed:  [ ] YES  [ ] NO  apixaban 2.5 milliGRAM(s) Oral every 12 hours  atorvastatin 40 milliGRAM(s) Oral at bedtime  calamine/zinc oxide Lotion 1 Application(s) Topical three times a day PRN  chlorhexidine 2% Cloths 1 Application(s) Topical <User Schedule>  chlorhexidine 2% Cloths 1 Application(s) Topical <User Schedule>  cinacalcet 60 milliGRAM(s) Oral daily  clopidogrel Tablet 75 milliGRAM(s) Oral daily  colchicine 0.3 milliGRAM(s) Oral daily  collagenase Ointment 1 Application(s) Topical daily  HYDROmorphone   Tablet 2 milliGRAM(s) Oral every 4 hours PRN  HYDROmorphone   Tablet 3 milliGRAM(s) Oral every 4 hours PRN  lidocaine 4% Cream 1 Application(s) Topical three times a day PRN  midodrine. 40 milliGRAM(s) Oral every 8 hours  Nephro-deborah 1 Tablet(s) Oral daily  petrolatum white Ointment 1 Application(s) Topical daily  polyethylene glycol 3350 17 Gram(s) Oral every 12 hours  senna 2 Tablet(s) Oral at bedtime  sevelamer carbonate 1600 milliGRAM(s) Oral three times a day with meals  sodium chloride 0.9% lock flush 10 milliLiter(s) IV Push every 1 hour PRN      HEALTH ISSUES - PROBLEM Dx:  Pain in breast    Septic shock    Fever    Vasculopathy    Chronic HFrEF (heart failure with reduced ejection fraction)    ESRD on dialysis    HPTH (hyperparathyroidism)    Anemia    Uterine mass    LIZ (obstructive sleep apnea)    Prophylactic measure    Hypotension    Debility    Goals of care, counseling/discussion    Encounter for palliative care

## 2023-01-24 NOTE — PROGRESS NOTE ADULT - PROBLEM SELECTOR PLAN 9
6.1cm mass in R adnexa seen incidentally on CT A/P (1/3/23). Pt with hx of hysterectomy, unclear if partial or total.   - Gyn consulted, suspect ovarian mass vs cyst unlikely to be related to systemic disease as it was seen on imaging in 1/2022  -  172 (high), CA-125 196 (high), CEA 6.1 (high)  - TVUS (1/11/23) with 6.2cm simple cystic lesion to R ovary, possibly serous cystadenoma  - per Gyn/Onc: elevated tumor markers not informative given pt is acutely ill. TVUS c/w likely simple cyst, PET/CT w/ no uptake in R adnexa, low concern for malignancy. Recommend outpatient follow-up when stable for repeat tumor markers.

## 2023-01-24 NOTE — PROGRESS NOTE ADULT - PROBLEM SELECTOR PLAN 6
Pt has been on HD for "years" 2/2 PCKD. AV Fistula of EDWIN clotted in 2014, has received HD thru HD cath since. New AV fistula placed 11/2022, not yet matured.   - f/u nephrology recs  - s/p HD 1/10, 1/13, 1/16, 1/18, 1/20, 1/23     #Renal osteodystrophy   CT head (1/2/23): Increased density of the bone marrow is consistent with renal osteodystrophy. Per endocrine, extent of bone destruction extreme given PTH level, suspect might not be Brown tumors  - c/w sevelamer 1600 mg PO TID  - c/w Sensipar 60 mg PO daily

## 2023-01-24 NOTE — PROGRESS NOTE ADULT - PROBLEM SELECTOR PLAN 3
Pt with recurrent fevers, most recently T 101.6 1/21. Unclear etiology. BCx 1/3, 1/9, 1/17 negative. s/p vancomycin x2 weeks (1/2-1/15). Per Surgery, R breast wound unlikely to be source of infection. Per Vascular Surgery, AV fistula fluid collection unlikely to be source of infection.   - R femoral line removed on 1/18; L femoral line placed on 1/18 (CURRENTLY ONLY SITE OF ACCESS)   - Restarted vancomycin renally dosed by trough   - Monitor BCx from 1/21-- NGTD   - Monitor CBC and vitals    #RUE fluid collection/RUE edema:  - CT (1/3/23): 7.0 cm fluid collection is present near AV fistula in the RUE. Possibly abscess. Drained by IR 1/5/23 with serosanguinous fluid: no organisms, few WBCs  - RUE dopplers (1/13/23): No significant change large complex fluid collection in proximal R arm, could represent old seroma/hematoma (however, prior ultrasound was done prior to drainage)     Plan:   - Per Vascular: AV fistula unlikely to be infected; would not remove it; also would NOT recommend drainage of seroma.  - Per Wound Care, c/w BID dressing changes (once daily with Santyl DSD, once daily with Bactroban DSD). Apply light ace wrap compression to right hand and forearm to help with soft tissue swelling and elevate RUE with pillows

## 2023-01-24 NOTE — PROGRESS NOTE ADULT - ASSESSMENT
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      - F/U final right breast biopsy pathology -- preliminary result shows vasculitis vs granulomatous mastitis. Follow-up DermPath -- pending additional stains.  - F/u rheumatology recommendations for possible granulomatous mastitis  - F/U Cx results (NGTD so far). ABx resumed as per primary team.  - Dress right breast with Xeroform and gauze with paper tape. To be changed daily.   - Wound care for access site ulceration as per vascular surgery.   - Surgery 5C following.        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      - F/U final right breast biopsy pathology -- preliminary result shows vasculitis vs granulomatous mastitis. Follow-up [facility]ermPath -- pending additional stains.  - F/u rheumatology recommendations for possible granulomatous mastitis  - F/U Cx results (NGTD so far). ABx per primary team (vancomycin by trough).   - Dress right breast with Xeroform and gauze with paper tape. To be changed daily.   - Wound care for access site ulceration as per vascular surgery.   - Surgery 5C following.

## 2023-01-24 NOTE — PROGRESS NOTE ADULT - PROBLEM SELECTOR PLAN 4
Current DVT of RIJ, R subclavian and R axillary veins. Hx of L AVF clotting. Given hx of clots, current pruritis and gallium scan uptake in chest wall, reasonable to workup coagulopathy  - s/p heparin gtt; c/w Eliquis 2.5 mg PO BID  - OBDULIA, ANCA, complement, RF, beta2 glycoprotein, anticardiolipin wnl

## 2023-01-24 NOTE — PROGRESS NOTE ADULT - PROBLEM SELECTOR PLAN 7
At risk for secondary HPTT d/t renal failure. Pt has vague abdominal pain w/ nausea. Concern for possible brown tumors on CT with multiple fractures of pubic rami, pubic bone, thoracolumbar spine. Intact , Vit D 38.5. SPEP/immunofixation negative. Per Endo, likely secondary hyperparathyroidism vs other process causing osteoclastic tumors.  - c/w Sensipar 60 mg PO daily   - 1/11 AM  intact (baseline PTH on Sensipar)    #Multinodular Goiter  CT (1/3/23): Enlarged multinodular thyroid projecting into superior mediastinum w/ 1.8 x 1.5 cm solid nodule slightly inferiorly in the anterior mediastinum.   Thyroid US (1/4/23): Fine-needle aspiration recommended for 4.3 cm right thyroid and 2.6 cm left thyroid solid nodules per SCOTT guidelines.  - TSH 1.6 free T4 0.68. TSH could be inappropriately normal for low free T4 due to euthyroid sick syndrome.  - f/u TFTs after shock resolved  - Pt will eventually need FNA of thyroid nodules

## 2023-01-24 NOTE — PROGRESS NOTE ADULT - PROBLEM SELECTOR PLAN 1
Family hx of breast cancer in mother, sister.   - Physical exam: large R breast mass, palpable L breast mass, R breast has peau d'orange appearance with associated erythema, warmth on R  - US bilateral breasts: No sonographic evidence of breast abscess, cyst or focal mass to correspond with the clinical finding of bilateral breast masses.  - prelim breast bx by surg: granuloma vs vasculitis; f/u final (supposed to result on 1/23)     Plan:  Pt continues to report 8-10/10 pain of bilateral breasts, worst on R. Pain responsive to Dilaudid but effect wears off after ~2-3 hours. Was briefly on PCA (1/16-1/17), however was discontinued.  - Pain management consulted, recs appreciated  - Chronic Pain consulted, f/u recs   - Started Dilaudid 2 mg PO q4h PRN for moderate pain, Dilaudid 3 mg PO q4h PRN for severe pain   - Started lidocaine 4% topical cream TID for breast pain   - HOLD ALL OPIOIDS IF RR<10, O2SAT <93%, SBP <96    #Peeling epidermis to B/l breasts  - Derm consulted, recommend wedge biopsy of both normal and pathologic tissue  - f/u surgery about possible wedge biopsy  - c/w daily dressing changes   - f/u Wound Care recs Family hx of breast cancer in mother, sister.   - Physical exam: large R breast mass, palpable L breast mass, R breast has peau d'orange appearance with associated erythema, warmth on R  - US bilateral breasts: No sonographic evidence of breast abscess, cyst or focal mass to correspond with the clinical finding of bilateral breast masses.  - prelim breast bx by surg: granuloma vs vasculitis; f/u final (supposed to result on 1/23)     Plan:  Pt continues to report 8-10/10 pain of bilateral breasts, worst on R. Pain responsive to Dilaudid but effect wears off after ~2-3 hours. Was briefly on PCA (1/16-1/17), however was discontinued.  - Pain management consulted, f/u recs  - c/w Dilaudid 2 mg PO q4h PRN for moderate pain, Dilaudid 3 mg PO q4h PRN for severe pain   - c/w lidocaine 4% topical cream TID for breast pain   - HOLD ALL OPIOIDS IF RR<10, SpO2 <93%, SBP <96    #Peeling epidermis to B/l breasts  - Derm consulted, recommend wedge biopsy of both normal and pathologic tissue  - f/u surgery about possible wedge biopsy  - c/w daily dressing changes   - f/u Wound Care recs

## 2023-01-24 NOTE — PROGRESS NOTE ADULT - ATTENDING COMMENTS
BP labile at times but asymptomatic at rest but felt to dizzy to work with PT. Continue pain control. HD to control. BP labile at times but asymptomatic at rest but felt to dizzy to work with PT. Continue pain control. HD to continue.

## 2023-01-24 NOTE — PROGRESS NOTE ADULT - PROBLEM SELECTOR PLAN 2
Shock of unclear etiology, suspected to be 2/2 tenuous hemodynamics w/ LVOT obstruction/LISETTE and significant vasculopathy in addition to possible sepsis, however unknown source (no fever, BCx negative, no focal area of infection identified thus far). On initial presentation, pt met 2/4 SIRS criteria. Hypotensive with MAPs to 60 requiring levophed. Admission HR > 90, WBC > 12, Lactate 1.9, Procal 12. , Ferritin 1209. BCx 1/3, 1/9, 1/17 no growth. s/p Vancomycin x2 wks (1/2-1/15) to treat presumed R breast cellulitis. Per Surgery, prelim R breast biopsy shows vasculitis vs granuloma, unlikely to cause hypotension. Corticotropin stimulation test wnl.  - Gallium scan (1/6/23): Faintly increased activity surrounding entire lower portion of R breast c/w inflammatory reaction seen on physical exam, small area of activity ~10cm to R of midline at level of the lower border of the sternum, activity likely to be in chest wall   - PET/CT (1/11/23): Increased FDG activity within R axillary vein, which contains an occlusive thrombus, which may represent infectious source. Also an additional FDG avid region of AV fistula near arterial anastomosis proximal to area of fluid collection; could be potential source of infection vs inflammatory reaction to the graft. Also a photopenic R adnexal cystic mass, suggestive of benign etiology.  - 1/21 o/n Tmax 101.6 rectal, pt MAPs in the 60s, however mentating at baseline. BCx collected, Vanc restarted     Plan:   - c/w midodrine 40 mg PO q8h (goal SBP > 75)   - f/u R breast biopsy final results   - BCx 1/21 NGTD   - c/w vancomycin renally dosed by trough

## 2023-01-24 NOTE — PROGRESS NOTE ADULT - SUBJECTIVE AND OBJECTIVE BOX
SUBJECTIVE: Patient seen and examined bedside; no events overnight, feeling well this am, tolerated well dressing change.    apixaban 2.5 milliGRAM(s) Oral every 12 hours  clopidogrel Tablet 75 milliGRAM(s) Oral daily  midodrine. 40 milliGRAM(s) Oral every 8 hours      Vital Signs Last 24 Hrs  T(C): 36.5 (24 Jan 2023 06:30), Max: 36.9 (23 Jan 2023 22:13)  T(F): 97.7 (24 Jan 2023 06:30), Max: 98.5 (23 Jan 2023 22:13)  HR: 70 (24 Jan 2023 06:12) (58 - 94)  BP: 99/80 (24 Jan 2023 03:56) (90/40 - 132/77)  BP(mean): 85 (24 Jan 2023 03:56) (58 - 85)  RR: 18 (24 Jan 2023 06:12) (16 - 20)  SpO2: 92% (24 Jan 2023 06:12) (90% - 100%)    Parameters below as of 24 Jan 2023 06:12  Patient On (Oxygen Delivery Method): nasal cannula  O2 Flow (L/min): 4    I&O's Detail    23 Jan 2023 07:01  -  24 Jan 2023 07:00  --------------------------------------------------------  IN:    IV PiggyBack: 100 mL    Other (mL): 400 mL  Total IN: 500 mL    OUT:    Other (mL): 1400 mL  Total OUT: 1400 mL    Total NET: -900 mL      PE:    General: NAD, resting comfortably in bed  C/V: S1 s2, RRR  Pulm: Nonlabored breathing, no respiratory distress  Abd: Soft, NTND  Extrem: WWP; right UE wound clean, pink base, granulation tissue noted, no obvious signs of infection, 4x4 with santyl applied, covered with kerlix and ace wrap        LABS:                        9.4    8.67  )-----------( 264      ( 24 Jan 2023 06:47 )             32.9     01-24    135  |  95<L>  |  36<H>  ----------------------------<  115<H>  4.5   |  27  |  6.44<H>    Ca    8.1<L>      24 Jan 2023 06:47  Phos  4.2     01-24  Mg     1.8     01-24    TPro  5.8<L>  /  Alb  1.8<L>  /  TBili  0.3  /  DBili  x   /  AST  30  /  ALT  13  /  AlkPhos  75  01-23          RADIOLOGY & ADDITIONAL STUDIES:

## 2023-01-24 NOTE — PROGRESS NOTE ADULT - PROBLEM SELECTOR PLAN 8
Hgb has downtrended from 9.8 on admission to 6.9 on 1/9/23 requiring transfusion. Baseline from Nov 2022 appears to be around 13. During 1U pRBC tranfusion on 1/9/23, pt had concern for febrile nonhemolytic transfusion reaction (3 hrs into transfusion, developed tachycardia to 150s, HTN to 140s/90s, rigors, T 101.3 rectal). Symptoms resolved after stopping transfusion and giving tylenol. Pt received additional 1U pRBC transfusions on 1/11 (Hgb 6.9 -> 7.9) and 1/19 (Hgb 6.8 -> 8.1) without transfusion reaction.  - Iron studies: Low total iron (15), low TIBC (79), elevated ferritin (1209)  - Monitor CBC   - Transfuse for Hgb < 7

## 2023-01-24 NOTE — PROGRESS NOTE ADULT - SUBJECTIVE AND OBJECTIVE BOX
Pain [facility] Progress Note - [facility] [phone]    HPI: Patient seen and examined today. 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 Patient reports endorisng no breast pain at this time. Patient reports overall pain regimen has been effective at managing her pain, patient reports topical lidocaine 4% cream has been hugely helpful. Patient denies side effects from current pain regimen.     Pertinent PMH: Pain at: ___Back ___Neck___Knee ___Hip ___Shoulder ___ Opioid tolerance    Pain is _X__ sharp ____dull ___burning ___achy ___ Intensity: __X__ mild __X__mod ____severe     Location _____surgical site _____cervical _____lumbar ____abd _____upper ext____lower ext    Worse with _X___activity _X___movement _____physical therapy___ Rest    Improved with _X___medication __X__rest ____physical therapy    PAST MEDICAL & SURGICAL HISTORY:  HTN (hypertension)  HLD (hyperlipidemia)  CAD (coronary atherosclerotic disease)  ESRD on dialysis  last 11/15/22  H/O ventral hernia  Brown tumor  brain  DVT, lower extremity  left=  History of surgery  IVC filter  AVF (arteriovenous fistula)  right left  S/P AIDEN-BSO  2003  History of surgery  RLE PTA stent / atherectomy  7/2021  History of atherectomy  stent    MEDICATIONS:  acetaminophen   IVPB ..  diphenhydrAMINE Injectable  diphenhydrAMINE IVPB  methylPREDNISolone sodium succinate Injectable  lidocaine 4% Cream  petrolatum white Ointment  albumin human 25% IVPB  epoetin александр-epbx (RETACRIT) Injectable  acetaminophen   IVPB ..  methylPREDNISolone sodium succinate Injectable  diphenhydrAMINE Injectable  methylPREDNISolone sodium succinate IVPB  midodrine.  cefepime   IVPB  cefepime   IVPB  cefepime   IVPB  vancomycin  IVPB  piperacillin/tazobactam IVPB..  piperacillin/tazobactam IVPB.  vancomycin  IVPB  piperacillin/tazobactam IVPB.-  midodrine.  acetaminophen   IVPB ..  HYDROmorphone   Tablet  HYDROmorphone   Tablet  acetaminophen     Tablet ..  sevelamer carbonate  apixaban  midodrine.  epoetin александр-epbx (RETACRIT) Injectable  HYDROmorphone  Injectable  HYDROmorphone  Injectable  cinacalcet  cinacalcet  phenylephrine    Infusion  chlorhexidine 2% Cloths  sodium chloride 0.9% lock flush  magnesium sulfate  IVPB  epoetin александр-epbx (RETACRIT) Injectable  HYDROmorphone  Injectable  HYDROmorphone   Tablet  HYDROmorphone  Injectable  HYDROmorphone  Injectable  HYDROmorphone   Tablet  apixaban  ondansetron Injectable  naloxone Injectable  HYDROmorphone PCA (1 mG/mL)  epoetin александр-epbx (RETACRIT) Injectable  phenylephrine    Infusion  acetaminophen    Suspension ..  ondansetron    Tablet  ondansetron Injectable  acetaminophen    Suspension ..  vancomycin  IVPB  acetaminophen    Suspension ..  acetaminophen    Suspension ..  acetaminophen    Suspension ..  epoetin александр-epbx (RETACRIT) Injectable  HYDROmorphone  Injectable  fludroCORTISONE  fludroCORTISONE  saline laxative (FLEET) Rectal Enema  colchicine  clopidogrel Tablet  acetaminophen   IVPB ..  dextrose 50% Injectable  dextrose 10% Bolus  heparin  Infusion  heparin  Infusion  heparin  Infusion  fludroCORTISONE  heparin  Infusion  magnesium sulfate  IVPB  vancomycin  IVPB  epoetin александр-epbx (RETACRIT) Injectable  heparin  Infusion  ondansetron    Tablet  HYDROmorphone  Injectable  cinacalcet  HYDROmorphone  Injectable  HYDROmorphone   Tablet  lidocaine 1%/epinephrine 1:100,000 Inj  acetaminophen   IVPB ..  midodrine  senna  polyethylene glycol 3350  cosyntropin Injectable  phenylephrine    Infusion  Nephro-deborah  midodrine  vancomycin  IVPB  calamine/zinc oxide Lotion  vancomycin  IVPB  phenylephrine    Infusion  heparin  Infusion.  midodrine  epoetin александр-epbx (RETACRIT) Injectable  cosyntropin Injectable  magnesium sulfate  IVPB  chlorhexidine 2% Cloths  phenylephrine    Infusion  sodium chloride 0.9% Bolus  HYDROmorphone  Injectable  HYDROmorphone  Injectable  HYDROmorphone   Tablet  vancomycin  IVPB  vancomycin  IVPB  HYDROmorphone  Injectable  HYDROmorphone  Injectable  epoetin александр-epbx (RETACRIT) Injectable  HYDROmorphone  Injectable  phenylephrine    Infusion  HYDROmorphone  Injectable  HYDROmorphone  Injectable  phenylephrine    Infusion  phenylephrine    Infusion  atorvastatin  HYDROmorphone  Injectable  sevelamer carbonate  meropenem  IVPB  sodium chloride 0.9% Bolus  dextrose 50% Injectable  dextrose 50% Injectable  mupirocin 2% Ointment  collagenase Ointment  vancomycin  IVPB  HYDROmorphone  Injectable  vancomycin  IVPB  HYDROmorphone  Injectable  oxycodone    5 mG/acetaminophen 325 mG  acetaminophen   IVPB ..  epoetin александр-epbx (RETACRIT) Injectable  meropenem  IVPB  meropenem  IVPB  HYDROmorphone  Injectable  acetaminophen   IVPB ..  acetaminophen     Tablet ..  aztreonam  IVPB  ondansetron Injectable  norepinephrine Infusion  HYDROmorphone  Injectable  acetaminophen   IVPB ..  norepinephrine Infusion  midodrine.  midodrine.  aztreonam  IVPB  heparin   Injectable  aztreonam  IVPB  dextrose 10%.  vancomycin  IVPB  chlorhexidine 4% Liquid  dextrose 40% Gel  HYDROmorphone  Injectable  calcium gluconate IVPB  dextrose 50% Injectable  insulin regular  human recombinant  albuterol    0.083%.  albuterol    0.083%.  morphine  - Injectable  albuterol    0.083%.  calcium gluconate IVPB  sodium bicarbonate  Injectable      ROS: Const:  N___febrile   Eyes:___ENT:___CV: ___chest pain  Resp: ___N_sob  GI:___nausea N___vomiting __N__abd pain ___npo ___clears ___full diet __bm  :___ Musk: __Y_pain ___spasm  Skin:___ Neuro:  _N__sedation___confusion___N_ numbness ___weakness N___paresthesia  Psych:_N__anxiety  Endo:___ Heme:___Allergy:__ANCEF_      01-24 @ 06:476.44 mg/dL<H  Hemoglobin: 9.4 g/dL (01-24 @ 06:47)  Hemoglobin: 8.8 g/dL (01-23 @ 05:30)  T(C): 36.7 (01-24-23 @ 10:00), Max: 36.9 (01-23-23 @ 22:13)  HR: 60 (01-24-23 @ 13:15) (58 - 94)  BP: 80/35 (01-24-23 @ 13:15) (78/41 - 132/77)  RR: 20 (01-24-23 @ 13:15) (17 - 20)  SpO2: 100% (01-24-23 @ 13:15) (90% - 100%)  Wt(kg): --     PHYSICAL EXAM:  Gen Appearance: _X__no acute distress __X_appropriate       Neuro: ___SILT feet____ EOM Intact Psych: AAOX3__, __X_mood/affect appropriate        Eyes: _X__conjunctiva WNL  X_____ Pupils equal and round        ENT: _X__ears and nose atraumatic__X_ Hearing grossly intact        Neck: __X_trachea midline, no visible masses ___thyroid without palpable mass    Resp: __X_Nml WOB____No tactile fremitus ___clear to auscultation    Cardio: _X__extremities free from edema ____pedal pulses palpable    GI/Abdomen: ___soft _____ Nontender____X__Nondistended_____HSM    Lymphatic: ___no palpable nodes in neck  ___no palpable nodes calves and feet    Skin/Wound: ___Incision, ___Dressing c/d/i,   ____surrounding tissues soft,  ___drain/chest tube present____    Muscular: EHL ___/5  Gastrocnemius___/5    X___absent clubbing/cyanosis         ASSESSMENT:  This is a 67y old Female with a history of HFrEF, CAD, NICM, HTN, HLD, ESRD on HD, PE, PAD, AS. 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     Recommended Treatment PLAN:  1. Consider continuing Dilaudid 2-3 mg PO q4h PRN moderate-severe pain. Wean as tolerated  2. Consider Flexeril 5 mg PO q8h PRN muscle spasm  3. Consider Tylenol 975 975 mg PO TID  4. Consider continuing topical lidocaine 4% cream q8h PRN breast pain  5. Upon discharge, recommend follow-up with an outpatient pain management provider within patient's insurance network  HOLD ALL OPIOIDS FOR SEDATION, RR<10, O2SAT <93%, SBP <96  Plan discussed with Dr. Camejo     [facility] [phone]    HPI: Patient seen and examined today. 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 Patient reports endorisng no breast pain at this time. Patient reports overall pain regimen has been effective at managing her pain, patient reports topical lidocaine 4% cream has been hugely helpful. Patient denies side effects from current pain regimen.     Pertinent PMH: Pain at: ___Back ___Neck___Knee ___Hip ___Shoulder ___ Opioid tolerance    Pain is _X__ sharp ____dull ___burning ___achy ___ Intensity: __X__ mild __X__mod ____severe     Location _____surgical site _____cervical _____lumbar ____abd _____upper ext____lower ext    Worse with _X___activity _X___movement _____physical therapy___ Rest    Improved with _X___medication __X__rest ____physical therapy    PAST MEDICAL & SURGICAL HISTORY:  HTN (hypertension)  HLD (hyperlipidemia)  CAD (coronary atherosclerotic disease)  ESRD on dialysis  last 11/15/22  H/O ventral hernia  Brown tumor  brain  DVT, lower extremity  left=  History of surgery  IVC filter  AVF (arteriovenous fistula)  right left  S/P AIDEN-BSO  2003  History of surgery  RLE PTA stent / atherectomy  7/2021  History of atherectomy  stent    MEDICATIONS:  acetaminophen   IVPB ..  diphenhydrAMINE Injectable  diphenhydrAMINE IVPB  methylPREDNISolone sodium succinate Injectable  lidocaine 4% Cream  petrolatum white Ointment  albumin human 25% IVPB  epoetin александр-epbx (RETACRIT) Injectable  acetaminophen   IVPB ..  methylPREDNISolone sodium succinate Injectable  diphenhydrAMINE Injectable  methylPREDNISolone sodium succinate IVPB  midodrine.  cefepime   IVPB  cefepime   IVPB  cefepime   IVPB  vancomycin  IVPB  piperacillin/tazobactam IVPB..  piperacillin/tazobactam IVPB.  vancomycin  IVPB  piperacillin/tazobactam IVPB.-  midodrine.  acetaminophen   IVPB ..  HYDROmorphone   Tablet  HYDROmorphone   Tablet  acetaminophen     Tablet ..  sevelamer carbonate  apixaban  midodrine.  epoetin александр-epbx (RETACRIT) Injectable  HYDROmorphone  Injectable  HYDROmorphone  Injectable  cinacalcet  cinacalcet  phenylephrine    Infusion  chlorhexidine 2% Cloths  sodium chloride 0.9% lock flush  magnesium sulfate  IVPB  epoetin александр-epbx (RETACRIT) Injectable  HYDROmorphone  Injectable  HYDROmorphone   Tablet  HYDROmorphone  Injectable  HYDROmorphone  Injectable  HYDROmorphone   Tablet  apixaban  ondansetron Injectable  naloxone Injectable  HYDROmorphone PCA (1 mG/mL)  epoetin александр-epbx (RETACRIT) Injectable  phenylephrine    Infusion  acetaminophen    Suspension ..  ondansetron    Tablet  ondansetron Injectable  acetaminophen    Suspension ..  vancomycin  IVPB  acetaminophen    Suspension ..  acetaminophen    Suspension ..  acetaminophen    Suspension ..  epoetin александр-epbx (RETACRIT) Injectable  HYDROmorphone  Injectable  fludroCORTISONE  fludroCORTISONE  saline laxative (FLEET) Rectal Enema  colchicine  clopidogrel Tablet  acetaminophen   IVPB ..  dextrose 50% Injectable  dextrose 10% Bolus  heparin  Infusion  heparin  Infusion  heparin  Infusion  fludroCORTISONE  heparin  Infusion  magnesium sulfate  IVPB  vancomycin  IVPB  epoetin александр-epbx (RETACRIT) Injectable  heparin  Infusion  ondansetron    Tablet  HYDROmorphone  Injectable  cinacalcet  HYDROmorphone  Injectable  HYDROmorphone   Tablet  lidocaine 1%/epinephrine 1:100,000 Inj  acetaminophen   IVPB ..  midodrine  senna  polyethylene glycol 3350  cosyntropin Injectable  phenylephrine    Infusion  Nephro-deborah  midodrine  vancomycin  IVPB  calamine/zinc oxide Lotion  vancomycin  IVPB  phenylephrine    Infusion  heparin  Infusion.  midodrine  epoetin александр-epbx (RETACRIT) Injectable  cosyntropin Injectable  magnesium sulfate  IVPB  chlorhexidine 2% Cloths  phenylephrine    Infusion  sodium chloride 0.9% Bolus  HYDROmorphone  Injectable  HYDROmorphone  Injectable  HYDROmorphone   Tablet  vancomycin  IVPB  vancomycin  IVPB  HYDROmorphone  Injectable  HYDROmorphone  Injectable  epoetin александр-epbx (RETACRIT) Injectable  HYDROmorphone  Injectable  phenylephrine    Infusion  HYDROmorphone  Injectable  HYDROmorphone  Injectable  phenylephrine    Infusion  phenylephrine    Infusion  atorvastatin  HYDROmorphone  Injectable  sevelamer carbonate  meropenem  IVPB  sodium chloride 0.9% Bolus  dextrose 50% Injectable  dextrose 50% Injectable  mupirocin 2% Ointment  collagenase Ointment  vancomycin  IVPB  HYDROmorphone  Injectable  vancomycin  IVPB  HYDROmorphone  Injectable  oxycodone    5 mG/acetaminophen 325 mG  acetaminophen   IVPB ..  epoetin александр-epbx (RETACRIT) Injectable  meropenem  IVPB  meropenem  IVPB  HYDROmorphone  Injectable  acetaminophen   IVPB ..  acetaminophen     Tablet ..  aztreonam  IVPB  ondansetron Injectable  norepinephrine Infusion  HYDROmorphone  Injectable  acetaminophen   IVPB ..  norepinephrine Infusion  midodrine.  midodrine.  aztreonam  IVPB  heparin   Injectable  aztreonam  IVPB  dextrose 10%.  vancomycin  IVPB  chlorhexidine 4% Liquid  dextrose 40% Gel  HYDROmorphone  Injectable  calcium gluconate IVPB  dextrose 50% Injectable  insulin regular  human recombinant  albuterol    0.083%.  albuterol    0.083%.  morphine  - Injectable  albuterol    0.083%.  calcium gluconate IVPB  sodium bicarbonate  Injectable      ROS: Const:  N___febrile   Eyes:___ENT:___CV: ___chest pain  Resp: ___N_sob  GI:___nausea N___vomiting __N__abd pain ___npo ___clears ___full diet __bm  :___ Musk: __Y_pain ___spasm  Skin:___ Neuro:  _N__sedation___confusion___N_ numbness ___weakness N___paresthesia  Psych:_N__anxiety  Endo:___ Heme:___Allergy:__ANCEF_      01-24 @ 06:476.44 mg/dL<H  Hemoglobin: 9.4 g/dL (01-24 @ 06:47)  Hemoglobin: 8.8 g/dL (01-23 @ 05:30)  T(C): 36.7 (01-24-23 @ 10:00), Max: 36.9 (01-23-23 @ 22:13)  HR: 60 (01-24-23 @ 13:15) (58 - 94)  BP: 80/35 (01-24-23 @ 13:15) (78/41 - 132/77)  RR: 20 (01-24-23 @ 13:15) (17 - 20)  SpO2: 100% (01-24-23 @ 13:15) (90% - 100%)  Wt(kg): --     PHYSICAL EXAM:  Gen Appearance: _X__no acute distress __X_appropriate       Neuro: ___SILT feet____ EOM Intact Psych: AAOX3__, __X_mood/affect appropriate        Eyes: _X__conjunctiva WNL  X_____ Pupils equal and round        ENT: _X__ears and nose atraumatic__X_ Hearing grossly intact        Neck: __X_trachea midline, no visible masses ___thyroid without palpable mass    Resp: __X_Nml WOB____No tactile fremitus ___clear to auscultation    Cardio: _X__extremities free from edema ____pedal pulses palpable    GI/Abdomen: ___soft _____ Nontender____X__Nondistended_____HSM    Lymphatic: ___no palpable nodes in neck  ___no palpable nodes calves and feet    Skin/Wound: ___Incision, ___Dressing c/d/i,   ____surrounding tissues soft,  ___drain/chest tube present____    Muscular: EHL ___/5  Gastrocnemius___/5    X___absent clubbing/cyanosis         ASSESSMENT:  This is a 67y old Female with a history of HFrEF, CAD, NICM, HTN, HLD, ESRD on HD, PE, PAD, AS. 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     Recommended Treatment PLAN:  1. Consider continuing Dilaudid 2-3 mg PO q4h PRN moderate-severe pain. Wean as tolerated  2. Consider Flexeril 5 mg PO q8h PRN muscle spasm  3. Consider Tylenol 975 975 mg PO TID  4. Consider continuing topical lidocaine 4% cream q8h PRN breast pain  5. Upon discharge, recommend follow-up with an outpatient pain management provider within patient's insurance network or patient's PCP  HOLD ALL OPIOIDS FOR SEDATION, RR<10, O2SAT <93%, SBP <96  Plan discussed with Dr. Camejo

## 2023-01-25 NOTE — PROGRESS NOTE ADULT - PROBLEM SELECTOR PLAN 3
Pt with recurrent fevers, most recently T 101.6 1/21. Unclear etiology. BCx 1/3, 1/9, 1/17 negative. s/p vancomycin x2 weeks (1/2-1/15). Per Surgery, R breast wound unlikely to be source of infection. Per Vascular Surgery, AV fistula fluid collection unlikely to be source of infection.   - R femoral line removed on 1/18; L femoral line placed on 1/18 (CURRENTLY ONLY SITE OF ACCESS)   - Restarted vancomycin renally dosed by trough   - Monitor BCx from 1/21-- NGTD   - Monitor CBC and vitals    #RUE fluid collection/RUE edema:  - CT (1/3/23): 7.0 cm fluid collection is present near AV fistula in the RUE. Possibly abscess. Drained by IR 1/5/23 with serosanguinous fluid: no organisms, few WBCs  - RUE dopplers (1/13/23): No significant change large complex fluid collection in proximal R arm, could represent old seroma/hematoma (however, prior ultrasound was done prior to drainage)     Plan:   - Per Vascular: AV fistula unlikely to be infected; would not remove it; also would NOT recommend drainage of seroma.  - Per Wound Care, c/w BID dressing changes (once daily with Santyl DSD, once daily with Bactroban DSD). Apply light ace wrap compression to right hand and forearm to help with soft tissue swelling and elevate RUE with pillows Pt with recurrent fevers, most recently T 101.6 1/21. Unclear etiology. BCx 1/3, 1/9, 1/17 negative. s/p vancomycin x2 weeks (1/2-1/15). Per Surgery, R breast wound unlikely to be source of infection. Per Vascular Surgery, AV fistula fluid collection unlikely to be source of infection.   - R femoral line removed on 1/18; L femoral line placed on 1/18 (CURRENTLY ONLY SITE OF ACCESS)   - Restarted vancomycin renally dosed by trough   - Monitor BCx from 1/21 -- NGTD   - Monitor CBC and vitals    #RUE fluid collection/RUE edema:  - CT (1/3/23): 7.0 cm fluid collection is present near AV fistula in the RUE. Possibly abscess. Drained by IR 1/5/23 with serosanguinous fluid: no organisms, few WBCs  - RUE dopplers (1/13/23): No significant change large complex fluid collection in proximal R arm, could represent old seroma/hematoma (however, prior ultrasound was done prior to drainage)     Plan:   - Per Vascular: AV fistula unlikely to be infected; would not remove it; also would NOT recommend drainage of seroma.  - Per Wound Care, c/w BID dressing changes (once daily with Santyl DSD, once daily with Bactroban DSD). Apply light ace wrap compression to right hand and forearm to help with soft tissue swelling and elevate RUE with pillows Pt with recurrent fevers, most recently T 101.6 1/21. Unclear etiology. BCx 1/3, 1/9, 1/17 negative. s/p vancomycin x2 weeks (1/2-1/15). Per Surgery, R breast wound unlikely to be source of infection. Per Vascular Surgery, AV fistula fluid collection unlikely to be source of infection.   - R femoral line removed on 1/18; L femoral line placed on 1/18 (CURRENTLY ONLY SITE OF ACCESS)   - plan for PICC tomorrow  - restarted vancomycin renally dosed by trough   - Monitor BCx from 1/21 -- NGTD   - Monitor CBC and vitals    #RUE fluid collection/RUE edema:  - CT (1/3/23): 7.0 cm fluid collection is present near AV fistula in the RUE. Possibly abscess. Drained by IR 1/5/23 with serosanguinous fluid: no organisms, few WBCs  - RUE dopplers (1/13/23): No significant change large complex fluid collection in proximal R arm, could represent old seroma/hematoma (however, prior ultrasound was done prior to drainage)     Plan:   - Per Vascular: AV fistula unlikely to be infected; would not remove it; also would NOT recommend drainage of seroma.  - Per Wound Care, c/w BID dressing changes (once daily with Santyl DSD, once daily with Bactroban DSD). Apply light ace wrap compression to right hand and forearm to help with soft tissue swelling and elevate RUE with pillows

## 2023-01-25 NOTE — PROGRESS NOTE ADULT - ATTENDING COMMENTS
have reviewed status and discussed with pt and [facility] staff.  agree with findings and recommendations.  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   agree with findings and recommendations.

## 2023-01-25 NOTE — CHART NOTE - NSCHARTNOTEFT_GEN_A_CORE
Surgery Pathology Follow-up Note    - Pathology from breat biopsy resulted showing:     Final Diagnosis  1. Right breast incisional biopsy skin:  - Epidermal and dermal necrosis with fibrinoid occlusion and necrosis  of blood vessels, focal necrotizing suppurative inflammation involving  the fibroadipose tissue and blood vessels in the subcutis,    see note.      Note: These coagulopathic changes may be secondary to an infectious  etiology, however other causes of hypercoagulability should be explored.  Preliminary findings were discussed with Dr Hatch and Dr Reza on  1/12/2023.  Multiple deeper sections have been examined.   PAS stain  is negative for hyphae. PAS stain highlights fibrin deposits within  blood vessels. While Von Kossa special stain does not highlight any  intravascular calcium, there is a focus of eccrine glands with positive  fine stippling. 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 Representative sections were reviewed  by one or more pathologists, at the intradepartmental consensus  conference, who concur with this interpretation.    Clinical correlation  is necessary.    Recommendations  - Follow-up rheumatology regarding pathology results.   - Surgery 5C to follow and available PRN for breast wound care -- continue Xeroform with overlying gauze and paper tape, changed daily.

## 2023-01-25 NOTE — PROGRESS NOTE ADULT - PROBLEM SELECTOR PLAN 5
TTE 11/22 normal LV and systolic function, EF 59%, grade II diastolic dysfunction, dilated RV, reduced RV systolic function.  Home meds: Entresto 49/51mg  - hold Entresto i/s/o shock state     #LVOT with LISETTE  likely i/s/o hyperdynamic LV function 2/2 shock and hypovolemia  - per cardiology, rec repeating TTE after shock resolves    #CAD  Hx of cardiac cath in 2018  Home meds: atorvastatin 40mg, plavix 75mg qd  - c/w home atorvastatin 40mg  - c/w home Plavix 75mg qd TTE 11/22 normal LV and systolic function, EF 59%, grade II diastolic dysfunction, dilated RV, reduced RV systolic function.  Home meds: Entresto 49/51mg  - hold Entresto i/s/o shock state     #LVOT with LISETTE  Likely i/s/o hyperdynamic LV function 2/2 shock and hypovolemia  - per cardiology, rec repeating TTE after shock resolves    #CAD  Hx of cardiac cath in 2018  Home meds: atorvastatin 40mg, plavix 75mg qd  - c/w home atorvastatin 40mg  - c/w home Plavix 75mg qd

## 2023-01-25 NOTE — CHART NOTE - NSCHARTNOTEFT_GEN_A_CORE
Admitting Diagnosis:   Patient is a 67y old  Female who presents with a chief complaint of emergent hemodialysis (23 Jan 2023 13:28)      PAST MEDICAL & SURGICAL HISTORY:  HTN (hypertension)      HLD (hyperlipidemia)      CAD (coronary atherosclerotic disease)      ESRD on dialysis  last 11/15/22      H/O ventral hernia      Brown tumor  brain      DVT, lower extremity  left      History of surgery  IVC filter      AVF (arteriovenous fistula)  right left      S/P AIDEN-BSO  2003      History of surgery  RLE PTA stent / atherectomy  7/2021      History of atherectomy  stent    Current Nutrition Order: Renal ; Nepro BID      PO Intake: Good (%) [   ]  Fair (50-75%) [ x ] Poor (<25%) [   ]    GI Issues: Abdomen ND/NT, +BS x4, LBM 1/24    Pain: 0 per chart review     Skin Integrity: Wound to R arm, +2 R arm    Labs:   01-25    132<L>  |  93<L>  |  45<H>  ----------------------------<  91  4.6   |  27  |  7.02<H>    Ca    8.1<L>      25 Jan 2023 11:54  Phos  3.8     01-25  Mg     1.8     01-25      CAPILLARY BLOOD GLUCOSE      POCT Blood Glucose.: 95 mg/dL (25 Jan 2023 11:46)      Medications:  MEDICATIONS  (STANDING):  acetaminophen     Tablet .. 975 milliGRAM(s) Oral every 8 hours  albumin human 25% IVPB 50 milliLiter(s) IV Intermittent every 1 hour  apixaban 2.5 milliGRAM(s) Oral every 12 hours  atorvastatin 40 milliGRAM(s) Oral at bedtime  chlorhexidine 2% Cloths 1 Application(s) Topical <User Schedule>  chlorhexidine 2% Cloths 1 Application(s) Topical <User Schedule>  cinacalcet 60 milliGRAM(s) Oral daily  clopidogrel Tablet 75 milliGRAM(s) Oral daily  colchicine 0.3 milliGRAM(s) Oral daily  collagenase Ointment 1 Application(s) Topical daily  fludroCORTISONE 0.2 milliGRAM(s) Oral every 24 hours  midodrine. 40 milliGRAM(s) Oral every 8 hours  Nephro-deborah 1 Tablet(s) Oral daily  petrolatum white Ointment 1 Application(s) Topical daily  polyethylene glycol 3350 17 Gram(s) Oral every 12 hours  senna 2 Tablet(s) Oral at bedtime  sevelamer carbonate 1600 milliGRAM(s) Oral three times a day with meals    MEDICATIONS  (PRN):  calamine/zinc oxide Lotion 1 Application(s) Topical three times a day PRN Itching  cyclobenzaprine 5 milliGRAM(s) Oral three times a day PRN Muscle Spasm  HYDROmorphone  Injectable 0.5 milliGRAM(s) IV Push every 3 hours PRN Moderate Pain (4 - 6)  HYDROmorphone  Injectable 1 milliGRAM(s) IV Push every 3 hours PRN Severe Pain (7 - 10)  lidocaine 4% Cream 1 Application(s) Topical three times a day PRN Breast pain  sodium chloride 0.9% lock flush 10 milliLiter(s) IV Push every 1 hour PRN Pre/post blood products, medications, blood draw, and to maintain line patency    Height for BMI (CENTIMETERS)	177.8 Centimeter(s)  Weight for BMI (lbs)	188.1 lb  Weight for BMI (kg)	85.3 kg  Body Mass Index	26.9      Weight Change:   1/2 - 82.6 kg  1/3 - 84 kg  1/7 - 99.4 kg  1/10 - 106 kg   1/13 - 110 kg  1/16 - 115.3  1/20 - 116.2  1/25 - 119.2 kg   -Trend sequential wts. Pt with noted worsening edema    Estimated energy needs:   Weight used for calculations	IBW  Estimated Energy Needs Weight (lbs)	150.3 lb  Estimated Energy Needs Weight (kg)	68.2 kg  Estimated Energy Needs From (anatoliy/kg)	30  Estimated Energy Needs To (anatoliy/kg)	35  Estimated Energy Needs Calculated From (anatoliy/kg)	2046  Estimated Energy Needs Calculated To (anatoliy/kg)	2387  Weight used for calculations	IBW  Estimated Protein Needs Weight (lbs)	150.3 lb  Estimated Protein Needs Weight (kg)	68.2 kg  Estimated Protein Needs From (g/kg)	1.2  Estimated Protein Needs To (g/kg)	1.3  Estimated Protein Needs Calculated From (g/kg)	81.84  Estimated Protein Needs Calculated To (g/kg)	88.66  Estimated Fluid Needs Weight (lbs)	150.3 lb  Estimated Fluid Needs Weight (kg)	68.2 kg  Other Calculations	Fluids per team. 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 Complaining of crampy abdominal pain and waterry diarrhea (2-3 BMs/day), now with nausea and vomiting prior to arrival. Has not gone to HD since 12/24 (8 days ago). Admitted for emergent HD. 1/19: Stepped up to [facility]. 1/20: Transferred to [facility].     Pt assessed at bedside. Rx and labs reviewed. Pt continues to endorse limited oral intake and general dislike for diet and foods provided by facility. Pt sNa levels appear WNL; Spoke with MD who is agreeable to liberalizing diet to regular with no concentrated phosphorus and potassium. Pt continues to enjoy the Nepro. Encouraged pt to make meal selections of preferred food items with host when taking orders. Pt mentioned c/o constipation today; encouraged adequate hydration. No new c/o N/V or difficult chew/swallow. RDN will continue to reassess, intervene, and monitor as appropriate.     Previous Nutrition Diagnosis: Increased Nutrient Needs... kcal/protein r/t HD AEB known increased nutrient demands for HD.     Active [ x ]  Resolved [   ]    If resolved, new PES:     Goal: Pt will meet 75% or more of protein/energy needs via most appropriate route for nutrition.    Recommendations:  -Adjust diet order to regular with no concentrated Phos and no concentrated K  -Continue Nepro BID  -Encourage good PO intake and ONS compliance   -Honor food preferences as able; continue to update desired foods list  -Monitor chemistry, GI fxn, and skin integrity     **Communicated with MD**    Risk Level: High [ x ] Moderate [   ] Low [   ].

## 2023-01-25 NOTE — PROGRESS NOTE ADULT - ASSESSMENT
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 During imaging, CT showing 7.0 cm fluid collection is present near an AV fistula in the right upper arm. The differential for this collection included benign post-operative hematoma or seroma, but also abscess or vascular device infection, particularly in the setting of septic shock. Vascular consulted for RUE fistula evaluation to rule out this area as a source of sepsis. Reassuring physical exam at initial evaluation without clear indicia of infection related to the graph, small superficial surgical incision dehiscence notwithstanding. Now s/p IR image guided aspiration of perigraft fluid (negative) and gadolinium scan (negative). At this point, do not suspect a graft infection as the provoking factor leading to the patient's presentation. Swelling in area of RUE fistula is likely a sterile seroma with no intervention needed.    Recommendations:  -Continue to follow up BCx and IR Cx - both NGTD (given negative blood and wound drainage cultures, low likelihood of graft infection)  -Wound care recs: BID dressing changes (once daily with Santyl DSD once daily with Bactroban DSD). Apply light ace wrap compression to right hand and forearm to help with soft tissue swelling and elevate RUE with pillows  -Fluid collection near RUE fistula is likely a sterile seroma and unlikely the cause of patient's sepsis. Would not recommend percutaneous drainage or IR procedure to drain fluid  -Rest of care per primary team  -Vascular surgery Team 3C will continue to follow. Please call [phone] with any questions or concerns.

## 2023-01-25 NOTE — PROGRESS NOTE ADULT - SUBJECTIVE AND OBJECTIVE BOX
--------------------------------------------------------------------------------  Chief Complaint: ESRD/Ongoing hemodialysis requirement    24 hour events/subjective:    Seen this AM, will do HD today. SBP in the 80s. To get midodrine prior to HD, will run colder dialysate and have albumin on stand by if required.     PAST HISTORY  --------------------------------------------------------------------------------  No significant changes to PMH, PSH, FHx, SHx, unless otherwise noted    ALLERGIES & MEDICATIONS  --------------------------------------------------------------------------------  Allergies    Ancef (Rash; Urticaria)  DDAVP (Hypotension)  iodine (Hives; Pruritus)  penicillin (Swelling)  sulfa drugs (Angioedema)    Intolerances      Standing Inpatient Medications  acetaminophen     Tablet .. 975 milliGRAM(s) Oral every 8 hours  albumin human 25% IVPB 50 milliLiter(s) IV Intermittent every 1 hour  apixaban 2.5 milliGRAM(s) Oral every 12 hours  atorvastatin 40 milliGRAM(s) Oral at bedtime  chlorhexidine 2% Cloths 1 Application(s) Topical <User Schedule>  chlorhexidine 2% Cloths 1 Application(s) Topical <User Schedule>  cinacalcet 60 milliGRAM(s) Oral daily  clopidogrel Tablet 75 milliGRAM(s) Oral daily  colchicine 0.3 milliGRAM(s) Oral daily  collagenase Ointment 1 Application(s) Topical daily  epoetin александр-epbx (RETACRIT) Injectable 8000 Unit(s) IV Push once  fludroCORTISONE 0.2 milliGRAM(s) Oral every 24 hours  midodrine. 40 milliGRAM(s) Oral every 8 hours  Nephro-deborah 1 Tablet(s) Oral daily  petrolatum white Ointment 1 Application(s) Topical daily  polyethylene glycol 3350 17 Gram(s) Oral every 12 hours  senna 2 Tablet(s) Oral at bedtime  sevelamer carbonate 1600 milliGRAM(s) Oral three times a day with meals    PRN Inpatient Medications  calamine/zinc oxide Lotion 1 Application(s) Topical three times a day PRN  cyclobenzaprine 5 milliGRAM(s) Oral three times a day PRN  HYDROmorphone  Injectable 0.5 milliGRAM(s) IV Push every 3 hours PRN  HYDROmorphone  Injectable 1 milliGRAM(s) IV Push every 3 hours PRN  lidocaine 4% Cream 1 Application(s) Topical three times a day PRN  sodium chloride 0.9% lock flush 10 milliLiter(s) IV Push every 1 hour PRN      REVIEW OF SYSTEMS  --------------------------------------------------------------------------------  All other systems were reviewed and are negative, except as noted.    VITALS/PHYSICAL EXAM  --------------------------------------------------------------------------------  T(C): 36.3 (23 @ 06:00), Max: 36.7 (23 @ 14:00)  HR: 64 (23 @ 10:39) (56 - 74)  BP: 87/39 (23 @ 10:39) (78/41 - 127/84)  RR: 18 (23 @ 10:39) (15 - 20)  SpO2: 97% (23 @ 10:39) (95% - 100%)  Wt(kg): --  Drug Dosing Weight  Height (cm): 177.8 (2023 20:52)  Weight (kg): 85.3 (2023 20:52)  BMI (kg/m2): 27 (2023 20:52)  BSA (m2): 2.03 (2023 20:52)        23 @ 07:01  -  23 @ 07:00  --------------------------------------------------------  IN: 200 mL / OUT: 0 mL / NET: 200 mL      PHYSICAL EXAM:  GENERAL: NAD resting in bed   CHEST/LUNG: Clear to auscultation bilaterally; no accessory muscle use   HEART: normal S1S2, RRR  ABDOMEN: Soft, Nontender, +BS,   EXTREMITIES: No clubbing, cyanosis, or edema   ACCESS: R TDC    LABS/STUDIES  --------------------------------------------------------------------------------              9.4    8.67  >-----------<  264      [23 @ 06:47]              32.9     135  |  95  |  36  ----------------------------<  115      [23 @ 06:47]  4.5   |  27  |  6.44        Ca     8.1     [23 @ 06:47]      Mg     1.8     [23 @ 06:47]      Phos  4.2     [23 @ 06:47]            Iron 15, TIBC 79, %sat 19      [01-07-23 @ 06:01]  Ferritin 1209      [23 06:01]  PTH -- (Ca 8.3)      [23:47]   492  PTH -- (Ca 8.5)      [23 05:30]   351  PTH -- (Ca 7.6)      [23 04:35]   532  PTH -- (Ca 8.8)      [23 14:24]   479  Vitamin D (25OH) 38.5      [23 05:30]  TSH 1.520      [23:47]    HBsAb Nonreact      [23:]  HBsAg Nonreact      [23]  HCV 0.04, Nonreact      [23:]      RADIOLOGY:  --------------------------------------------------------------------------------------  Hemoglobin: 9.4 g/dL (23:47)  Phosphorus Level, Serum: 4.2 mg/dL (23:47)  Hemoglobin: 8.8 g/dL (23 05:30)  Phosphorus Level, Serum: 4.2 mg/dL (23 05:30)    Albumin, Serum: 1.8 g/dL (23 05:30)  Albumin, Serum: 1.8 g/dL (23 @ 05:55)    T(C): 36.3 (23 @ 06:00), Max: 36.7 (23 14:00)  HR: 64 (23 10:39) (56 - 74)  BP: 87/39 (23 @ 10:39) (78/41 - 127/84)  RR: 18 (23 10:39) (15 - 20)  SpO2: 97% (23 @ 10:39) (95% - 100%)  cinacalcet 30 milliGRAM(s) Oral daily, 23 @ 15:49, Routine  cinacalcet 60 milliGRAM(s) Oral daily, 23 @ 11:00, 11:00  cinacalcet 30 milliGRAM(s) Oral once, 23 @ 12:12, STAT, Stop order after: 1 Doses  epoetin александр-epbx (RETACRIT) Injectable 8000 Unit(s) IV Push once, 23 @ 07:40, Routine, Stop order after: 1 Doses  epoetin александр-epbx (RETACRIT) Injectable 8000 Unit(s) IV Push once, 23 @ 07:41, Routine, Stop order after: 1 Doses  epoetin александр-epbx (RETACRIT) Injectable 8000 Unit(s) IV Push once, 01-10-23 @ 10:08, Routine, Stop order after: 1 Doses  epoetin александр-epbx (RETACRIT) Injectable 8000 Unit(s) IV Push once, 23 @ 07:40, Routine, Stop order after: 1 Doses  epoetin александр-epbx (RETACRIT) Injectable 8000 Unit(s) IV Push once, 23 @ 07:40, Routine, Stop order after: 1 Doses  epoetin александр-epbx (RETACRIT) Injectable 8000 Unit(s) IV Push once, 23 @ 08:46, Routine, Stop order after: 1 Doses  epoetin александр-epbx (RETACRIT) Injectable 8000 Unit(s) IV Push once, 23 @ 09:08, Routine, Stop order after: 1 Doses  epoetin александр-epbx (RETACRIT) Injectable 8000 Unit(s) IV Push once, 23 @ 07:45, Routine, Stop order after: 1 Doses  epoetin александр-epbx (RETACRIT) Injectable 8000 Unit(s) IV Push once, 23 @ 06:45, Routine, Stop order after: 1 Doses  epoetin александр-epbx (RETACRIT) Injectable 8000 Unit(s) IV Push once, 23 @ 07:45, Routine, Stop order after: 1 Doses  sevelamer carbonate 1600 milliGRAM(s) Oral three times a day with meals, 23 @ 05:28, Routine  sevelamer carbonate 1600 milliGRAM(s) Oral once, 23 @ 09:59, STAT, Stop order after: 1 Doses      Hemodialysis Treatment.:     Schedule: Once, Modality: Hemodialysis, Access: Internal Jugular Central Venous Catheter    Dialyzer: Optiflux [id number], Time: 180 Min    Blood Flow: 400 mL/Min , Dialysate Flow: 500 mL/Min, Dialysate Temp: 35, Tubinmm (Adult)    Target Fluid Removal: 0 Liters    Dialysate Electrolytes (mEq/L): Potassium 2, Calcium 2.5, Sodium 138, Bicarbonate 35    Additional Instructions: Midodrine 30 min before dialysis (23 @ 10:46) [Active]     --------------------------------------------------------------------------------  Chief Complaint: ESRD/Ongoing hemodialysis requirement    24 hour events/subjective:    Seen this AM, will do HD today. SBP in the 80s. To get midodrine prior to HD, will run colder dialysate and have albumin on stand by if required.     PAST HISTORY  --------------------------------------------------------------------------------  No significant changes to PMH, PSH, FHx, SHx, unless otherwise noted    ALLERGIES & MEDICATIONS  --------------------------------------------------------------------------------  Allergies    Ancef (Rash; Urticaria)  DDAVP (Hypotension)  iodine (Hives; Pruritus)  penicillin (Swelling)  sulfa drugs (Angioedema)    Intolerances      Standing Inpatient Medications  acetaminophen     Tablet .. 975 milliGRAM(s) Oral every 8 hours  albumin human 25% IVPB 50 milliLiter(s) IV Intermittent every 1 hour  apixaban 2.5 milliGRAM(s) Oral every 12 hours  atorvastatin 40 milliGRAM(s) Oral at bedtime  chlorhexidine 2% Cloths 1 Application(s) Topical <User Schedule>  chlorhexidine 2% Cloths 1 Application(s) Topical <User Schedule>  cinacalcet 60 milliGRAM(s) Oral daily  clopidogrel Tablet 75 milliGRAM(s) Oral daily  colchicine 0.3 milliGRAM(s) Oral daily  collagenase Ointment 1 Application(s) Topical daily  epoetin александр-epbx (RETACRIT) Injectable 8000 Unit(s) IV Push once  fludroCORTISONE 0.2 milliGRAM(s) Oral every 24 hours  midodrine. 40 milliGRAM(s) Oral every 8 hours  Nephro-deborah 1 Tablet(s) Oral daily  petrolatum white Ointment 1 Application(s) Topical daily  polyethylene glycol 3350 17 Gram(s) Oral every 12 hours  senna 2 Tablet(s) Oral at bedtime  sevelamer carbonate 1600 milliGRAM(s) Oral three times a day with meals    PRN Inpatient Medications  calamine/zinc oxide Lotion 1 Application(s) Topical three times a day PRN  cyclobenzaprine 5 milliGRAM(s) Oral three times a day PRN  HYDROmorphone  Injectable 0.5 milliGRAM(s) IV Push every 3 hours PRN  HYDROmorphone  Injectable 1 milliGRAM(s) IV Push every 3 hours PRN  lidocaine 4% Cream 1 Application(s) Topical three times a day PRN  sodium chloride 0.9% lock flush 10 milliLiter(s) IV Push every 1 hour PRN      REVIEW OF SYSTEMS  --------------------------------------------------------------------------------  All other systems were reviewed and are negative, except as noted.    VITALS/PHYSICAL EXAM  --------------------------------------------------------------------------------  T(C): 36.3 (23 @ 06:00), Max: 36.7 (23 @ 14:00)  HR: 64 (23 @ 10:39) (56 - 74)  BP: 87/39 (23 @ 10:39) (78/41 - 127/84)  RR: 18 (23 @ 10:39) (15 - 20)  SpO2: 97% (23 @ 10:39) (95% - 100%)  Wt(kg): --  Drug Dosing Weight  Height (cm): 177.8 (2023 20:52)  Weight (kg): 85.3 (2023 20:52)  BMI (kg/m2): 27 (2023 20:52)  BSA (m2): 2.03 (2023 20:52)        23 @ 07:01  -  23 @ 07:00  --------------------------------------------------------  IN: 200 mL / OUT: 0 mL / NET: 200 mL      PHYSICAL EXAM:  GENERAL: NAD resting in bed   CHEST/LUNG: Clear to auscultation bilaterally; no accessory muscle use   HEART: normal S1S2, RRR  ABDOMEN: Soft, Nontender, +BS,   EXTREMITIES: No clubbing, cyanosis, or edema   ACCESS: R TDC    LABS/STUDIES  --------------------------------------------------------------------------------              9.4    8.67  >-----------<  264      [23 @ 06:47]              32.9     135  |  95  |  36  ----------------------------<  115      [23 @ 06:47]  4.5   |  27  |  6.44        Ca     8.1     [23 @ 06:47]      Mg     1.8     [23 @ 06:47]      Phos  4.2     [23 @ 06:47]            Iron 15, TIBC 79, %sat 19      [01-07-23 @ 06:01]  Ferritin 1209      [23 06:01]  PTH -- (Ca 8.3)      [23:47]   492  PTH -- (Ca 8.5)      [23 05:30]   351  PTH -- (Ca 7.6)      [23 04:35]   532  PTH -- (Ca 8.8)      [23 14:24]   479  Vitamin D (25OH) 38.5      [23 05:30]  TSH 1.520      [23:47]    HBsAb Nonreact      [23:]  HBsAg Nonreact      [23]  HCV 0.04, Nonreact      [23:]      RADIOLOGY:  --------------------------------------------------------------------------------------  Hemoglobin: 9.4 g/dL (23:47)  Phosphorus Level, Serum: 4.2 mg/dL (23:47)  Hemoglobin: 8.8 g/dL (23 05:30)  Phosphorus Level, Serum: 4.2 mg/dL (23 05:30)    Albumin, Serum: 1.8 g/dL (23 05:30)  Albumin, Serum: 1.8 g/dL (23 @ 05:55)    T(C): 36.3 (23 @ 06:00), Max: 36.7 (23 14:00)  HR: 64 (23 10:39) (56 - 74)  BP: 87/39 (23 @ 10:39) (78/41 - 127/84)  RR: 18 (23 10:39) (15 - 20)  SpO2: 97% (23 @ 10:39) (95% - 100%)  cinacalcet 30 milliGRAM(s) Oral daily, 23 @ 15:49, Routine  cinacalcet 60 milliGRAM(s) Oral daily, 23 @ 11:00, 11:00  cinacalcet 30 milliGRAM(s) Oral once, 23 @ 12:12, STAT, Stop order after: 1 Doses  epoetin александр-epbx (RETACRIT) Injectable 8000 Unit(s) IV Push once, 23 @ 07:40, Routine, Stop order after: 1 Doses  epoetin александр-epbx (RETACRIT) Injectable 8000 Unit(s) IV Push once, 23 @ 07:41, Routine, Stop order after: 1 Doses  epoetin александр-epbx (RETACRIT) Injectable 8000 Unit(s) IV Push once, 01-10-23 @ 10:08, Routine, Stop order after: 1 Doses  epoetin александр-epbx (RETACRIT) Injectable 8000 Unit(s) IV Push once, 23 @ 07:40, Routine, Stop order after: 1 Doses  epoetin александр-epbx (RETACRIT) Injectable 8000 Unit(s) IV Push once, 23 @ 07:40, Routine, Stop order after: 1 Doses  epoetin александр-epbx (RETACRIT) Injectable 8000 Unit(s) IV Push once, 23 @ 08:46, Routine, Stop order after: 1 Doses  epoetin александр-epbx (RETACRIT) Injectable 8000 Unit(s) IV Push once, 23 @ 09:08, Routine, Stop order after: 1 Doses  epoetin александр-epbx (RETACRIT) Injectable 8000 Unit(s) IV Push once, 23 @ 07:45, Routine, Stop order after: 1 Doses  epoetin александр-epbx (RETACRIT) Injectable 8000 Unit(s) IV Push once, 23 @ 06:45, Routine, Stop order after: 1 Doses  epoetin александр-epbx (RETACRIT) Injectable 8000 Unit(s) IV Push once, 23 @ 07:45, Routine, Stop order after: 1 Doses  sevelamer carbonate 1600 milliGRAM(s) Oral three times a day with meals, 23 @ 05:28, Routine  sevelamer carbonate 1600 milliGRAM(s) Oral once, 23 @ 09:59, STAT, Stop order after: 1 Doses      Hemodialysis Treatment.:     Schedule: Once, Modality: Hemodialysis, Access: Internal Jugular Central Venous Catheter    Dialyzer: Optiflux [id number], Time: 180 Min    Blood Flow: 400 mL/Min , Dialysate Flow: 500 mL/Min, Dialysate Temp: 35, Tubinmm (Adult)    Target Fluid Removal: 0 Liters    Dialysate Electrolytes (mEq/L): Potassium 2, Calcium 2.5, Sodium 138, Bicarbonate 35    Additional Instructions: Midodrine 30 min before dialysis (23 @ 10:46) [Active]    Seen on hd c/w tx as prescribed

## 2023-01-25 NOTE — PROGRESS NOTE ADULT - ASSESSMENT
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     Assessment/Plan:   #ESRD on HD TTS  HD today, if SBP improves will aim for fluid removal otherwise clearance  K reviewed 4.5  Renal diet    #Hx of Hypertension  Has had profound hypotension during this admission requiring pressors  -Gets midodrine 1/2 hr before HD  -Also started on florinef 0.2mg  -Ordered for colder dialysate and albumin with HD    #access   [facility] TDC c/d/i  RUE AVF not being used    #anemia  Hgb 9.4  -Epo w/ HD  -Transfusion goals as per primary team  -Iron profile noted    #renal bone disease   Noted to have brown tumors on most recent CT, in addition to a lytic lesion of her skull. Likely due to hyperparathyroidism. BX results per note with epidermal and dermal necrosis w/ fibrinoid occulusion and necrosis of blood vessels, focla necrotizing suppurative inflammation. 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   Ca ~9.8 corrected for albumin  Phos 4.2  -c/w sevelamer to 1600mg tid w/ meals  -increase sensipar to 90mg Qday   67 F with ESRD on HD presented for missed HD found to be hyperkalemic c/b septic shock of unknown etiology found to have granulomatous mastitis.     Assessment/Plan:   #ESRD on HD TTS  HD today, if SBP improves will aim for fluid removal otherwise clearance  K reviewed 4.5  Renal diet    #Hx of Hypertension  Has had profound hypotension during this admission requiring pressors  -Gets midodrine 1/2 hr before HD  -Also started on florinef 0.2mg  -Ordered for colder dialysate and albumin with HD    #access   [facility] TDC c/d/i  RUE AVF not being used    #anemia  Hgb 9.4  -Epo w/ HD  -Transfusion goals as per primary team  -Iron profile noted    #renal bone disease   Noted to have brown tumors on most recent CT, in addition to a lytic lesion of her skull. Likely due to hyperparathyroidism. BX results per note with epidermal and dermal necrosis w/ fibrinoid occulusion and necrosis of blood vessels, focla necrotizing suppurative inflammation. 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   Ca ~9.8 corrected for albumin  Phos 4.2  -c/w sevelamer to 1600mg tid w/ meals  -increase sensipar to 90mg Qday  -Please speak with endocrinology in regards to imaging of the parathyroid; may need to consider a parathyroidectomy in the future..

## 2023-01-25 NOTE — PROGRESS NOTE ADULT - ASSESSMENT
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

## 2023-01-25 NOTE — PROGRESS NOTE ADULT - SUBJECTIVE AND OBJECTIVE BOX
Subjective: Pt seen and evaluated bedside this AM by vascular. Pts RUE dressing is C/D/I today and ACE wrap it secured in place. She denies any pain to RUE.     ROS:   Denies Headache, blurred vision, Chest Pain, SOB, Abdominal pain, nausea or vomiting     Social   apixaban 2.5  clopidogrel Tablet 75  midodrine. 40      Allergies    Ancef (Rash; Urticaria)  DDAVP (Hypotension)  iodine (Hives; Pruritus)  penicillin (Swelling)  sulfa drugs (Angioedema)    Intolerances        Vital Signs Last 24 Hrs  T(C): 36.3 (25 Jan 2023 06:00), Max: 36.7 (24 Jan 2023 10:00)  T(F): 97.3 (25 Jan 2023 06:00), Max: 98.1 (24 Jan 2023 14:00)  HR: 62 (25 Jan 2023 07:06) (56 - 74)  BP: 88/49 (25 Jan 2023 04:08) (78/41 - 127/84)  BP(mean): 63 (25 Jan 2023 04:08) (50 - 97)  RR: 15 (25 Jan 2023 06:57) (15 - 20)  SpO2: 100% (25 Jan 2023 07:06) (95% - 100%)    Parameters below as of 25 Jan 2023 06:57  Patient On (Oxygen Delivery Method): nasal cannula w/ humidification  O2 Flow (L/min): 3    I&O's Summary    24 Jan 2023 07:01  -  25 Jan 2023 07:00  --------------------------------------------------------  IN: 100 mL / OUT: 0 mL / NET: 100 mL        Physical Exam:  General: NAD, resting comfortably in bed  C/V: S1 s2, RRR  Pulm: Nonlabored breathing, no respiratory distress  Abd: Soft, NTND  Extrem: WWP; right UE wound clean, pink base, granulation tissue noted, no obvious signs of infection, 4x4 with santyl applied, covered with kerlix and ace wrap      LABS:                        9.4    8.67  )-----------( 264      ( 24 Jan 2023 06:47 )             32.9     01-24    135  |  95<L>  |  36<H>  ----------------------------<  115<H>  4.5   |  27  |  6.44<H>    Ca    8.1<L>      24 Jan 2023 06:47  Phos  4.2     01-24  Mg     1.8     01-24

## 2023-01-25 NOTE — PROGRESS NOTE ADULT - PROBLEM SELECTOR PLAN 1
Family hx of breast cancer in mother, sister.   - Physical exam: large R breast mass, palpable L breast mass, R breast has peau d'orange appearance with associated erythema, warmth on R  - US bilateral breasts: No sonographic evidence of breast abscess, cyst or focal mass to correspond with the clinical finding of bilateral breast masses.  - prelim breast bx by surg: granuloma vs vasculitis; f/u final (supposed to result on 1/23)     Plan:  Pt continues to report 8-10/10 pain of bilateral breasts, worst on R. Pain responsive to Dilaudid but effect wears off after ~2-3 hours. Was briefly on PCA (1/16-1/17), however was discontinued.  - Pain management consulted, f/u recs  - c/w Dilaudid 2 mg PO q4h PRN for moderate pain, Dilaudid 3 mg PO q4h PRN for severe pain   - c/w lidocaine 4% topical cream TID for breast pain   - HOLD ALL OPIOIDS IF RR<10, SpO2 <93%, SBP <96    #Peeling epidermis to B/l breasts  - Derm consulted, recommend wedge biopsy of both normal and pathologic tissue  - f/u surgery about possible wedge biopsy  - c/w daily dressing changes   - f/u Wound Care recs Family hx of breast cancer in mother, sister.   - Physical exam on admission: large R breast mass, palpable L breast mass, R breast has peau d'orange appearance with associated erythema, warmth on R  - US bilateral breasts: No sonographic evidence of breast abscess, cyst or focal mass to correspond with the clinical finding of bilateral breast masses.  - Breast bx: Epidermal and dermal necrosis with fibrinoid occlusion and necrosis of blood vessels, focal necrotizing suppurative inflammation involving the fibroadipose tissue and blood vessels in the subcutis    Plan:  Pt continues to report 8-10/10 pain of bilateral breasts, worst on R. Pain responsive to Dilaudid but effect wears off after ~2-3 hours. Was briefly on PCA (1/16-1/17), however was discontinued.  - Pain management consulted, f/u recs  - c/w Dilaudid 2 mg PO q4h PRN for moderate pain, Dilaudid 3 mg PO q4h PRN for severe pain   - c/w lidocaine 4% topical cream TID for breast pain   - HOLD ALL OPIOIDS IF RR<10, SpO2 <93%, SBP <96    #Peeling epidermis to B/l breasts  - Derm consulted, recommend wedge biopsy of both normal and pathologic tissue  - f/u surgery about possible wedge biopsy  - c/w daily dressing changes   - f/u Wound Care recs Family hx of breast cancer in mother, sister.   - Physical exam on admission: large R breast mass, palpable L breast mass, R breast has peau d'orange appearance with associated erythema, warmth on R  - US bilateral breasts: No sonographic evidence of breast abscess, cyst or focal mass to correspond with the clinical finding of bilateral breast masses.  - Breast bx: Epidermal and dermal necrosis with fibrinoid occlusion and necrosis of blood vessels, focal necrotizing suppurative inflammation involving the fibroadipose tissue and blood vessels in the subcutis    Plan:  Pt continues to report pain of bilateral breasts, worst on R. Pain responsive to Dilaudid but effect wears off after ~2-3 hours. Was briefly on PCA (1/16-1/17), however was discontinued.  - Pain management consulted, f/u recs  - start Dilaudid 0.5 mg IV q3h PRN for moderate pain  - start Dilaudid 1 mg IV q3h PRN for severe pain   - c/w lidocaine 4% topical cream TID for breast pain   - HOLD ALL OPIOIDS IF RR<10, SpO2 <93%, SBP <96    #Peeling epidermis to B/l breasts  - Derm consulted, recommend wedge biopsy of both normal and pathologic tissue  - f/u surgery about possible wedge biopsy  - c/w daily dressing changes   - f/u Wound Care recs Family hx of breast cancer in mother, sister.   - Physical exam on admission: large R breast mass, palpable L breast mass, R breast has peau d'orange appearance with associated erythema, warmth on R  - US bilateral breasts: No sonographic evidence of breast abscess, cyst or focal mass to correspond with the clinical finding of bilateral breast masses.  - Breast bx: Epidermal and dermal necrosis with fibrinoid occlusion and necrosis of blood vessels, focal necrotizing suppurative inflammation involving the fibroadipose tissue and blood vessels in the subcutis    Plan:  Pt continues to report pain in bilateral breasts, worst on R. Pain responsive to Dilaudid but effect wears off after ~2-3 hours. Will transition from PO to IV given patient unable to tolerate PO. Was briefly on PCA (1/16-1/17), now dced  - Pain management consulted, f/u recs  - start Dilaudid 0.5 mg IV q3h PRN for moderate pain  - start Dilaudid 1 mg IV q3h PRN for severe pain   - c/w lidocaine 4% topical cream TID  - HOLD ALL OPIOIDS IF RR<10, SpO2 <93%, SBP <96    #Peeling epidermis to B/l breasts  Derm consulted, recommend wedge biopsy of both normal and pathologic tissue  - f/u surgery about possible wedge biopsy  - c/w daily dressing changes   - f/u Wound Care recs Family hx of breast cancer in mother, sister.   - Physical exam on admission: large R breast mass, palpable L breast mass, R breast has peau d'orange appearance with associated erythema, warmth on R  - US bilateral breasts: No sonographic evidence of breast abscess, cyst or focal mass to correspond with the clinical finding of bilateral breast masses.  - Breast bx: Epidermal and dermal necrosis with fibrinoid occlusion and necrosis of blood vessels, focal necrotizing suppurative inflammation involving the fibroadipose tissue and blood vessels in the subcutis    Plan:  Pt continues to report pain in bilateral breasts, worst on R. Pain responsive to Dilaudid but effect wears off after ~2-3 hours. Will transition from PO to IV given patient unable to tolerate PO. Was briefly on PCA (1/16-1/17), now dced  - Pain management consulted, f/u recs  - start Dilaudid 0.5 mg IV q3h PRN for moderate pain  - start Dilaudid 1 mg IV q3h PRN for severe pain   - c/w lidocaine 4% topical cream TID  - HOLD ALL OPIOIDS IF RR<10, SpO2 <93%, SBP <96    #Peeling epidermis to B/l breasts  Derm consulted, recommend wedge biopsy of both normal and pathologic tissue. Coagulopathic changes may be secondary to an infectious etiology, however other causes of hypercoagulability should be explored  - per surgery, no surgical interventions  - c/w daily dressing changes   - f/u Wound Care recs

## 2023-01-25 NOTE — PROGRESS NOTE ADULT - PROBLEM SELECTOR PLAN 2
Shock of unclear etiology, suspected to be 2/2 tenuous hemodynamics w/ LVOT obstruction/LISETTE and significant vasculopathy in addition to possible sepsis, however unknown source (no fever, BCx negative, no focal area of infection identified thus far). On initial presentation, pt met 2/4 SIRS criteria. Hypotensive with MAPs to 60 requiring levophed. Admission HR > 90, WBC > 12, Lactate 1.9, Procal 12. , Ferritin 1209. BCx 1/3, 1/9, 1/17 no growth. s/p Vancomycin x2 wks (1/2-1/15) to treat presumed R breast cellulitis. Per Surgery, prelim R breast biopsy shows vasculitis vs granuloma, unlikely to cause hypotension. Corticotropin stimulation test wnl.  - Gallium scan (1/6/23): Faintly increased activity surrounding entire lower portion of R breast c/w inflammatory reaction seen on physical exam, small area of activity ~10cm to R of midline at level of the lower border of the sternum, activity likely to be in chest wall   - PET/CT (1/11/23): Increased FDG activity within R axillary vein, which contains an occlusive thrombus, which may represent infectious source. Also an additional FDG avid region of AV fistula near arterial anastomosis proximal to area of fluid collection; could be potential source of infection vs inflammatory reaction to the graft. Also a photopenic R adnexal cystic mass, suggestive of benign etiology.  - 1/21 o/n Tmax 101.6 rectal, pt MAPs in the 60s, however mentating at baseline. BCx collected, Vanc restarted     Plan:   - c/w midodrine 40 mg PO q8h (goal SBP > 75)   - f/u R breast biopsy final results   - BCx 1/21 NGTD   - c/w vancomycin renally dosed by trough Shock of unclear etiology, suspect 2/2 tenuous hemodynamics w/ LVOT obstruction/LISETTE and significant vasculopathy in addition to possible sepsis, however unknown source (no fever, BCx NGTD, no focal area of infection IDed thus far). On initial presentation, pt met 2/4 SIRS criteria. Hypotensive with MAPs to 60 requiring levophed. Admission HR > 90, WBC > 12, Lactate 1.9, Procal 12. , Ferritin 1209. BCx 1/3, 1/9, 1/17 no growth. s/p Vancomycin x2 wks (1/2-1/15) to treat presumed R breast cellulitis. Per Surgery, prelim R breast biopsy shows vasculitis vs granuloma, unlikely to cause hypotension. Corticotropin stimulation test wnl.  - Gallium scan (1/6/23): Faintly increased activity surrounding entire lower portion of R breast c/w inflammatory reaction seen on physical exam, small area of activity ~10cm to R of midline at level of the lower border of the sternum, activity likely to be in chest wall   - PET/CT (1/11/23): Increased FDG activity within R axillary vein, which contains an occlusive thrombus, which may represent infectious source. Also an additional FDG avid region of AV fistula near arterial anastomosis proximal to area of fluid collection; could be potential source of infection vs inflammatory reaction to the graft. Also a photopenic R adnexal cystic mass, suggestive of benign etiology.  - 1/21 o/n Tmax 101.6 rectal, pt MAPs in the 60s, however mentating at baseline. BCx collected, Vanc restarted     Plan:   - c/w midodrine 40 mg PO q8h (goal SBP > 75)   - f/u R breast biopsy final results   - BCx 1/21 NGTD   - c/w vancomycin renally dosed by trough Shock of unclear etiology, suspect 2/2 tenuous hemodynamics w/ LVOT obstruction/LISETTE and significant vasculopathy in addition to possible sepsis, however unknown source (no fever, BCx NGTD, no focal area of infection IDed thus far). On initial presentation, pt met 2/4 SIRS criteria. Hypotensive with MAPs to 60 requiring levophed. Admission HR > 90, WBC > 12, Lactate 1.9, Procal 12. , Ferritin 1209. BCx 1/3, 1/9, 1/17 no growth. s/p Vancomycin x2 wks (1/2-1/15) to treat presumed R breast cellulitis. Per Surgery, prelim R breast biopsy shows vasculitis vs granuloma, unlikely to cause hypotension. Corticotropin stimulation test wnl.  - Gallium scan (1/6/23): Faintly increased activity surrounding entire lower portion of R breast c/w inflammatory reaction seen on physical exam, small area of activity ~10cm to R of midline at level of the lower border of the sternum, activity likely to be in chest wall   - PET/CT (1/11/23): Increased FDG activity within R axillary vein, which contains an occlusive thrombus, which may represent infectious source. Also an additional FDG avid region of AV fistula near arterial anastomosis proximal to area of fluid collection; could be potential source of infection vs inflammatory reaction to the graft. Also a photopenic R adnexal cystic mass, suggestive of benign etiology.  - 1/21 o/n Tmax 101.6 rectal, pt MAPs in the 60s, however mentating at baseline. BCx collected, Vanc restarted     Plan:   - c/w midodrine 40 mg PO q8h (goal SBP >75)   - BCx 1/21 NGTD   - c/w vancomycin renally dosed by trough

## 2023-01-25 NOTE — PROGRESS NOTE ADULT - SUBJECTIVE AND OBJECTIVE BOX
**Incomplete Note**   CC: Patient is a 67y old  Female who presents with a chief complaint of emergent hemodialysis (23 Jan 2023 13:28)      INTERVAL EVENTS: O/N patient received Tylenol 1g IV x2 for breast pain    SUBJECTIVE / INTERVAL HPI: Patient seen and examined at bedside. Reports breast pain, prefers taking Tylenol. States Tylenol has controlled the pain well. Reports "rancid smell" of dilaudid. Denies nausea, vomiting, abdominal pain, fever, and chills. Denies lightheadedness or dizziness at rest.    ROS: negative unless otherwise stated above.    VITAL SIGNS:  Vital Signs Last 24 Hrs  T(C): 36.4 (25 Jan 2023 11:45), Max: 36.7 (24 Jan 2023 14:00)  T(F): 97.5 (25 Jan 2023 11:45), Max: 98.1 (24 Jan 2023 14:00)  HR: 61 (25 Jan 2023 11:45) (56 - 74)  BP: 102/44 (25 Jan 2023 11:45) (78/41 - 127/84)  BP(mean): 57 (25 Jan 2023 10:39) (50 - 97)  RR: 19 (25 Jan 2023 11:45) (15 - 20)  SpO2: 93% (25 Jan 2023 11:45) (93% - 100%)    Parameters below as of 25 Jan 2023 11:45  Patient On (Oxygen Delivery Method): nasal cannula w/ humidification  O2 Flow (L/min): 3        01-24-23 @ 07:01  -  01-25-23 @ 07:00  --------------------------------------------------------  IN: 200 mL / OUT: 0 mL / NET: 200 mL        PHYSICAL EXAM:  General: NAD, obese female  HEENT: MMM  Neck: supple  Cardiovascular: +S1/S2; RRR  Breast: erythematous edematous L breast; R breast wrapped in guaze  Respiratory: CTA B/L; no W/R/R; on NC  Gastrointestinal: soft, NT/ND  Extremities: WWP; R arm wrapped in guaze with edematous hand; pitting edema of the bilateral legs   Vascular: 2+ radial, DP/PT pulses B/L  Neurological: AAOx3; no focal deficits    MEDICATIONS:  MEDICATIONS  (STANDING):  acetaminophen     Tablet .. 975 milliGRAM(s) Oral every 8 hours  albumin human 25% IVPB 50 milliLiter(s) IV Intermittent every 1 hour  apixaban 2.5 milliGRAM(s) Oral every 12 hours  atorvastatin 40 milliGRAM(s) Oral at bedtime  chlorhexidine 2% Cloths 1 Application(s) Topical <User Schedule>  chlorhexidine 2% Cloths 1 Application(s) Topical <User Schedule>  cinacalcet 60 milliGRAM(s) Oral daily  clopidogrel Tablet 75 milliGRAM(s) Oral daily  colchicine 0.3 milliGRAM(s) Oral daily  collagenase Ointment 1 Application(s) Topical daily  epoetin александр-epbx (RETACRIT) Injectable 8000 Unit(s) IV Push once  fludroCORTISONE 0.2 milliGRAM(s) Oral every 24 hours  midodrine. 40 milliGRAM(s) Oral every 8 hours  Nephro-deborah 1 Tablet(s) Oral daily  petrolatum white Ointment 1 Application(s) Topical daily  polyethylene glycol 3350 17 Gram(s) Oral every 12 hours  senna 2 Tablet(s) Oral at bedtime  sevelamer carbonate 1600 milliGRAM(s) Oral three times a day with meals    MEDICATIONS  (PRN):  calamine/zinc oxide Lotion 1 Application(s) Topical three times a day PRN Itching  cyclobenzaprine 5 milliGRAM(s) Oral three times a day PRN Muscle Spasm  HYDROmorphone  Injectable 0.5 milliGRAM(s) IV Push every 3 hours PRN Moderate Pain (4 - 6)  HYDROmorphone  Injectable 1 milliGRAM(s) IV Push every 3 hours PRN Severe Pain (7 - 10)  lidocaine 4% Cream 1 Application(s) Topical three times a day PRN Breast pain  sodium chloride 0.9% lock flush 10 milliLiter(s) IV Push every 1 hour PRN Pre/post blood products, medications, blood draw, and to maintain line patency      ALLERGIES:  Allergies    Ancef (Rash; Urticaria)  DDAVP (Hypotension)  iodine (Hives; Pruritus)  penicillin (Swelling)  sulfa drugs (Angioedema)    Intolerances        LABS:                        9.4    8.67  )-----------( 264      ( 24 Jan 2023 06:47 )             32.9     01-25    132<L>  |  93<L>  |  45<H>  ----------------------------<  91  4.6   |  27  |  7.02<H>    Ca    8.1<L>      25 Jan 2023 11:54  Phos  3.8     01-25  Mg     1.8     01-25          CAPILLARY BLOOD GLUCOSE      POCT Blood Glucose.: 95 mg/dL (25 Jan 2023 11:46)      RADIOLOGY & ADDITIONAL TESTS: Reviewed.   CC: Patient is a 67y old  Female who presents with a chief complaint of emergent hemodialysis (23 Jan 2023 13:28)      INTERVAL EVENTS: O/N patient received Tylenol 1g IV x2 for breast pain.    SUBJECTIVE / INTERVAL HPI: Patient seen and examined at bedside. Reports breast pain, prefers taking Tylenol. States Tylenol has controlled the pain well. Reports "rancid smell" of dilaudid. Denies nausea, vomiting, abdominal pain, fever, and chills. Denies lightheadedness or dizziness at rest.    ROS: negative unless otherwise stated above.    VITAL SIGNS:  Vital Signs Last 24 Hrs  T(C): 36.4 (25 Jan 2023 11:45), Max: 36.7 (24 Jan 2023 14:00)  T(F): 97.5 (25 Jan 2023 11:45), Max: 98.1 (24 Jan 2023 14:00)  HR: 61 (25 Jan 2023 11:45) (56 - 74)  BP: 102/44 (25 Jan 2023 11:45) (78/41 - 127/84)  BP(mean): 57 (25 Jan 2023 10:39) (50 - 97)  RR: 19 (25 Jan 2023 11:45) (15 - 20)  SpO2: 93% (25 Jan 2023 11:45) (93% - 100%)    Parameters below as of 25 Jan 2023 11:45  Patient On (Oxygen Delivery Method): nasal cannula w/ humidification  O2 Flow (L/min): 3        01-24-23 @ 07:01  -  01-25-23 @ 07:00  --------------------------------------------------------  IN: 200 mL / OUT: 0 mL / NET: 200 mL        PHYSICAL EXAM:  General: NAD, obese female  HEENT: MMM  Neck: supple  Cardiovascular: +S1/S2; RRR  Breast: erythematous edematous L breast; R breast wrapped in guaze  Respiratory: CTA B/L; no W/R/R; on NC  Gastrointestinal: soft, NT/ND  Extremities: WWP; R arm wrapped in guaze with edematous hand; pitting edema of the bilateral legs   Vascular: 2+ radial, DP/PT pulses B/L  Neurological: AAOx3; no focal deficits    MEDICATIONS:  MEDICATIONS  (STANDING):  acetaminophen     Tablet .. 975 milliGRAM(s) Oral every 8 hours  albumin human 25% IVPB 50 milliLiter(s) IV Intermittent every 1 hour  apixaban 2.5 milliGRAM(s) Oral every 12 hours  atorvastatin 40 milliGRAM(s) Oral at bedtime  chlorhexidine 2% Cloths 1 Application(s) Topical <User Schedule>  chlorhexidine 2% Cloths 1 Application(s) Topical <User Schedule>  cinacalcet 60 milliGRAM(s) Oral daily  clopidogrel Tablet 75 milliGRAM(s) Oral daily  colchicine 0.3 milliGRAM(s) Oral daily  collagenase Ointment 1 Application(s) Topical daily  epoetin александр-epbx (RETACRIT) Injectable 8000 Unit(s) IV Push once  fludroCORTISONE 0.2 milliGRAM(s) Oral every 24 hours  midodrine. 40 milliGRAM(s) Oral every 8 hours  Nephro-deborah 1 Tablet(s) Oral daily  petrolatum white Ointment 1 Application(s) Topical daily  polyethylene glycol 3350 17 Gram(s) Oral every 12 hours  senna 2 Tablet(s) Oral at bedtime  sevelamer carbonate 1600 milliGRAM(s) Oral three times a day with meals    MEDICATIONS  (PRN):  calamine/zinc oxide Lotion 1 Application(s) Topical three times a day PRN Itching  cyclobenzaprine 5 milliGRAM(s) Oral three times a day PRN Muscle Spasm  HYDROmorphone  Injectable 0.5 milliGRAM(s) IV Push every 3 hours PRN Moderate Pain (4 - 6)  HYDROmorphone  Injectable 1 milliGRAM(s) IV Push every 3 hours PRN Severe Pain (7 - 10)  lidocaine 4% Cream 1 Application(s) Topical three times a day PRN Breast pain  sodium chloride 0.9% lock flush 10 milliLiter(s) IV Push every 1 hour PRN Pre/post blood products, medications, blood draw, and to maintain line patency      ALLERGIES:  Allergies    Ancef (Rash; Urticaria)  DDAVP (Hypotension)  iodine (Hives; Pruritus)  penicillin (Swelling)  sulfa drugs (Angioedema)    Intolerances        LABS:                        9.4    8.67  )-----------( 264      ( 24 Jan 2023 06:47 )             32.9     01-25    132<L>  |  93<L>  |  45<H>  ----------------------------<  91  4.6   |  27  |  7.02<H>    Ca    8.1<L>      25 Jan 2023 11:54  Phos  3.8     01-25  Mg     1.8     01-25          CAPILLARY BLOOD GLUCOSE      POCT Blood Glucose.: 95 mg/dL (25 Jan 2023 11:46)      RADIOLOGY & ADDITIONAL TESTS: Reviewed.   CC: Patient is a 67y old  Female who presents with a chief complaint of emergent hemodialysis (23 Jan 2023 13:28)      INTERVAL EVENTS: O/N patient received Tylenol 1g IV x2 for breast pain.    SUBJECTIVE / INTERVAL HPI: Patient seen and examined at bedside. Reports breast pain, prefers taking Tylenol. States Tylenol has controlled the pain moderately. Prescribed for PO dilaudid however patient reports not taking due to rancid smell. Denies nausea, vomiting, abdominal pain, fever, and chills. Denies lightheadedness or dizziness at rest.    ROS: negative unless otherwise stated above.    VITAL SIGNS:  Vital Signs Last 24 Hrs  T(C): 36.4 (25 Jan 2023 11:45), Max: 36.7 (24 Jan 2023 14:00)  T(F): 97.5 (25 Jan 2023 11:45), Max: 98.1 (24 Jan 2023 14:00)  HR: 61 (25 Jan 2023 11:45) (56 - 74)  BP: 102/44 (25 Jan 2023 11:45) (78/41 - 127/84)  BP(mean): 57 (25 Jan 2023 10:39) (50 - 97)  RR: 19 (25 Jan 2023 11:45) (15 - 20)  SpO2: 93% (25 Jan 2023 11:45) (93% - 100%)    Parameters below as of 25 Jan 2023 11:45  Patient On (Oxygen Delivery Method): nasal cannula w/ humidification  O2 Flow (L/min): 3        01-24-23 @ 07:01  -  01-25-23 @ 07:00  --------------------------------------------------------  IN: 200 mL / OUT: 0 mL / NET: 200 mL        PHYSICAL EXAM:  General: NAD, obese female  HEENT: MMM  Neck: supple  Cardiovascular: +S1/S2; RRR  Breast: erythematous edematous L breast; R breast wrapped in guaze  Respiratory: CTA B/L; no W/R/R; on NC  Gastrointestinal: soft, NT/ND  Extremities: WWP; R arm wrapped in guaze with edematous hand; pitting edema of the bilateral legs   Vascular: 2+ radial, DP/PT pulses B/L  Neurological: AAOx3; no focal deficits    MEDICATIONS:  MEDICATIONS  (STANDING):  acetaminophen     Tablet .. 975 milliGRAM(s) Oral every 8 hours  albumin human 25% IVPB 50 milliLiter(s) IV Intermittent every 1 hour  apixaban 2.5 milliGRAM(s) Oral every 12 hours  atorvastatin 40 milliGRAM(s) Oral at bedtime  chlorhexidine 2% Cloths 1 Application(s) Topical <User Schedule>  chlorhexidine 2% Cloths 1 Application(s) Topical <User Schedule>  cinacalcet 60 milliGRAM(s) Oral daily  clopidogrel Tablet 75 milliGRAM(s) Oral daily  colchicine 0.3 milliGRAM(s) Oral daily  collagenase Ointment 1 Application(s) Topical daily  epoetin александр-epbx (RETACRIT) Injectable 8000 Unit(s) IV Push once  fludroCORTISONE 0.2 milliGRAM(s) Oral every 24 hours  midodrine. 40 milliGRAM(s) Oral every 8 hours  Nephro-deborah 1 Tablet(s) Oral daily  petrolatum white Ointment 1 Application(s) Topical daily  polyethylene glycol 3350 17 Gram(s) Oral every 12 hours  senna 2 Tablet(s) Oral at bedtime  sevelamer carbonate 1600 milliGRAM(s) Oral three times a day with meals    MEDICATIONS  (PRN):  calamine/zinc oxide Lotion 1 Application(s) Topical three times a day PRN Itching  cyclobenzaprine 5 milliGRAM(s) Oral three times a day PRN Muscle Spasm  HYDROmorphone  Injectable 0.5 milliGRAM(s) IV Push every 3 hours PRN Moderate Pain (4 - 6)  HYDROmorphone  Injectable 1 milliGRAM(s) IV Push every 3 hours PRN Severe Pain (7 - 10)  lidocaine 4% Cream 1 Application(s) Topical three times a day PRN Breast pain  sodium chloride 0.9% lock flush 10 milliLiter(s) IV Push every 1 hour PRN Pre/post blood products, medications, blood draw, and to maintain line patency      ALLERGIES:  Allergies    Ancef (Rash; Urticaria)  DDAVP (Hypotension)  iodine (Hives; Pruritus)  penicillin (Swelling)  sulfa drugs (Angioedema)    Intolerances        LABS:                        9.4    8.67  )-----------( 264      ( 24 Jan 2023 06:47 )             32.9     01-25    132<L>  |  93<L>  |  45<H>  ----------------------------<  91  4.6   |  27  |  7.02<H>    Ca    8.1<L>      25 Jan 2023 11:54  Phos  3.8     01-25  Mg     1.8     01-25          CAPILLARY BLOOD GLUCOSE      POCT Blood Glucose.: 95 mg/dL (25 Jan 2023 11:46)      RADIOLOGY & ADDITIONAL TESTS: Reviewed.   CC: Patient is a 67y old  Female who presents with a chief complaint of emergent hemodialysis (23 Jan 2023 13:28)      INTERVAL EVENTS: O/N patient received Tylenol 1g IV x2 for breast pain.    SUBJECTIVE / INTERVAL HPI: Patient seen and examined at bedside. Reports breast pain, prefers taking Tylenol. States Tylenol has controlled the pain moderately. Prescribed for PO dilaudid however patient reports not taking due to rancid smell. Denies nausea, vomiting, abdominal pain, fever, and chills. Denies lightheadedness or dizziness at rest.    ROS: negative unless otherwise stated above.    VITAL SIGNS:  Vital Signs Last 24 Hrs  T(C): 36.4 (25 Jan 2023 11:45), Max: 36.7 (24 Jan 2023 14:00)  T(F): 97.5 (25 Jan 2023 11:45), Max: 98.1 (24 Jan 2023 14:00)  HR: 61 (25 Jan 2023 11:45) (56 - 74)  BP: 102/44 (25 Jan 2023 11:45) (78/41 - 127/84)  BP(mean): 57 (25 Jan 2023 10:39) (50 - 97)  RR: 19 (25 Jan 2023 11:45) (15 - 20)  SpO2: 93% (25 Jan 2023 11:45) (93% - 100%)    Parameters below as of 25 Jan 2023 11:45  Patient On (Oxygen Delivery Method): nasal cannula w/ humidification  O2 Flow (L/min): 3        01-24-23 @ 07:01  -  01-25-23 @ 07:00  --------------------------------------------------------  IN: 200 mL / OUT: 0 mL / NET: 200 mL        PHYSICAL EXAM:  General: NAD, obese female  HEENT: MMM  Neck: supple  Cardiovascular: +S1/S2; RRR  Breast: erythematous edematous L breast; R breast wrapped in guaze  Respiratory: CTA B/L; no W/R/R; on NC  Gastrointestinal: soft, NT/ND  Extremities: WWP; R arm wrapped in guaze with edematous hand; pitting edema of the bilateral legs up to midshin  Vascular: 2+ radial, DP/PT pulses B/L  Neurological: AAOx3; no focal deficits    MEDICATIONS:  MEDICATIONS  (STANDING):  acetaminophen     Tablet .. 975 milliGRAM(s) Oral every 8 hours  albumin human 25% IVPB 50 milliLiter(s) IV Intermittent every 1 hour  apixaban 2.5 milliGRAM(s) Oral every 12 hours  atorvastatin 40 milliGRAM(s) Oral at bedtime  chlorhexidine 2% Cloths 1 Application(s) Topical <User Schedule>  chlorhexidine 2% Cloths 1 Application(s) Topical <User Schedule>  cinacalcet 60 milliGRAM(s) Oral daily  clopidogrel Tablet 75 milliGRAM(s) Oral daily  colchicine 0.3 milliGRAM(s) Oral daily  collagenase Ointment 1 Application(s) Topical daily  epoetin александр-epbx (RETACRIT) Injectable 8000 Unit(s) IV Push once  fludroCORTISONE 0.2 milliGRAM(s) Oral every 24 hours  midodrine. 40 milliGRAM(s) Oral every 8 hours  Nephro-deborah 1 Tablet(s) Oral daily  petrolatum white Ointment 1 Application(s) Topical daily  polyethylene glycol 3350 17 Gram(s) Oral every 12 hours  senna 2 Tablet(s) Oral at bedtime  sevelamer carbonate 1600 milliGRAM(s) Oral three times a day with meals    MEDICATIONS  (PRN):  calamine/zinc oxide Lotion 1 Application(s) Topical three times a day PRN Itching  cyclobenzaprine 5 milliGRAM(s) Oral three times a day PRN Muscle Spasm  HYDROmorphone  Injectable 0.5 milliGRAM(s) IV Push every 3 hours PRN Moderate Pain (4 - 6)  HYDROmorphone  Injectable 1 milliGRAM(s) IV Push every 3 hours PRN Severe Pain (7 - 10)  lidocaine 4% Cream 1 Application(s) Topical three times a day PRN Breast pain  sodium chloride 0.9% lock flush 10 milliLiter(s) IV Push every 1 hour PRN Pre/post blood products, medications, blood draw, and to maintain line patency      ALLERGIES:  Allergies    Ancef (Rash; Urticaria)  DDAVP (Hypotension)  iodine (Hives; Pruritus)  penicillin (Swelling)  sulfa drugs (Angioedema)    Intolerances        LABS:                        9.4    8.67  )-----------( 264      ( 24 Jan 2023 06:47 )             32.9     01-25    132<L>  |  93<L>  |  45<H>  ----------------------------<  91  4.6   |  27  |  7.02<H>    Ca    8.1<L>      25 Jan 2023 11:54  Phos  3.8     01-25  Mg     1.8     01-25          CAPILLARY BLOOD GLUCOSE      POCT Blood Glucose.: 95 mg/dL (25 Jan 2023 11:46)      RADIOLOGY & ADDITIONAL TESTS: Reviewed.

## 2023-01-26 NOTE — PROGRESS NOTE ADULT - SUBJECTIVE AND OBJECTIVE BOX
--------------------------------------------------------------------------------  Chief Complaint: ESRD/Ongoing hemodialysis requirement    24 hour events/subjective:    Seen this AM, states doing well. Team to remove femoral access and spoke with IR for left arm line placement. Tolerated Hd yesterday 1L of UF    PAST HISTORY  --------------------------------------------------------------------------------  No significant changes to PMH, PSH, FHx, SHx, unless otherwise noted    ALLERGIES & MEDICATIONS  --------------------------------------------------------------------------------  Allergies    Ancef (Rash; Urticaria)  DDAVP (Hypotension)  iodine (Hives; Pruritus)  penicillin (Swelling)  sulfa drugs (Angioedema)    Intolerances      Standing Inpatient Medications  acetaminophen     Tablet .. 975 milliGRAM(s) Oral every 8 hours  albumin human 25% IVPB 50 milliLiter(s) IV Intermittent every 1 hour  apixaban 2.5 milliGRAM(s) Oral every 12 hours  atorvastatin 40 milliGRAM(s) Oral at bedtime  chlorhexidine 2% Cloths 1 Application(s) Topical <User Schedule>  chlorhexidine 2% Cloths 1 Application(s) Topical <User Schedule>  cinacalcet 90 milliGRAM(s) Oral daily  clopidogrel Tablet 75 milliGRAM(s) Oral daily  colchicine 0.3 milliGRAM(s) Oral daily  collagenase Ointment 1 Application(s) Topical daily  fludroCORTISONE 0.2 milliGRAM(s) Oral every 24 hours  midodrine. 40 milliGRAM(s) Oral every 8 hours  Nephro-deborah 1 Tablet(s) Oral daily  petrolatum white Ointment 1 Application(s) Topical daily  polyethylene glycol 3350 17 Gram(s) Oral every 12 hours  senna 2 Tablet(s) Oral at bedtime  sevelamer carbonate 1600 milliGRAM(s) Oral three times a day with meals    PRN Inpatient Medications  calamine/zinc oxide Lotion 1 Application(s) Topical three times a day PRN  cyclobenzaprine 5 milliGRAM(s) Oral three times a day PRN  HYDROmorphone  Injectable 0.5 milliGRAM(s) IV Push every 3 hours PRN  HYDROmorphone  Injectable 1 milliGRAM(s) IV Push every 3 hours PRN  lidocaine 4% Cream 1 Application(s) Topical three times a day PRN  sodium chloride 0.9% lock flush 10 milliLiter(s) IV Push every 1 hour PRN      REVIEW OF SYSTEMS  --------------------------------------------------------------------------------  All other systems were reviewed and are negative, except as noted.    VITALS/PHYSICAL EXAM  --------------------------------------------------------------------------------  T(C): 36.6 (01-26-23 @ 10:00), Max: 36.9 (01-25-23 @ 21:43)  HR: 86 (01-26-23 @ 08:40) (66 - 98)  BP: 92/41 (01-26-23 @ 08:40) (79/42 - 140/84)  RR: 18 (01-26-23 @ 10:13) (18 - 20)  SpO2: 99% (01-26-23 @ 10:13) (74% - 100%)  Wt(kg): --  Drug Dosing Weight  Height (cm): 177.8 (01 Jan 2023 20:52)  Weight (kg): 85.3 (01 Jan 2023 20:52)  BMI (kg/m2): 27 (01 Jan 2023 20:52)  BSA (m2): 2.03 (01 Jan 2023 20:52)        01-25-23 @ 07:01  -  01-26-23 @ 07:00  --------------------------------------------------------  IN: 400 mL / OUT: 1401 mL / NET: -1001 mL    PHYSICAL EXAM:  GENERAL: NAD resting in bed   CHEST/LUNG: Clear to auscultation bilaterally; no accessory muscle use   HEART: normal S1S2, RRR  ABDOMEN: Soft, Nontender, +BS,   EXTREMITIES: No clubbing, cyanosis, or edema   ACCESS: [facility]    LABS/STUDIES  --------------------------------------------------------------------------------    132  |  93  |  45  ----------------------------<  91      [01-25-23 @ 11:54]  4.6   |  27  |  7.02        Ca     8.1     [01-25-23 @ 11:54]      Mg     1.8     [01-25-23 @ 11:54]      Phos  3.8     [01-25-23 @ 11:54]            Iron 15, TIBC 79, %sat 19      [01-07-23 @ 06:01]  Ferritin 1209      [01-07-23 @ 06:01]  PTH -- (Ca 8.3)      [01-24-23 @ 06:47]   492  PTH -- (Ca 8.5)      [01-18-23 @ 05:30]   351  PTH -- (Ca 7.6)      [01-11-23 @ 04:35]   532  PTH -- (Ca 8.8)      [01-03-23 @ 14:24]   479  Vitamin D (25OH) 38.5      [01-05-23 @ 05:30]  TSH 1.520      [01-24-23 @ 06:47]    HBsAb Nonreact      [01-02-23 @ 02:26]  HBsAg Nonreact      [01-02-23 @ 02:26]  HCV 0.04, Nonreact      [01-02-23 @ 02:26]      RADIOLOGY:  --------------------------------------------------------------------------------------

## 2023-01-26 NOTE — PROGRESS NOTE ADULT - PROBLEM SELECTOR PLAN 4
Current DVT of RIJ, R subclavian and R axillary veins. Hx of L AVF clotting. Given hx of clots, current pruritis and gallium scan uptake in chest wall, reasonable to workup coagulopathy  - s/p heparin gtt; c/w Eliquis 2.5 mg PO BID  - OBDULIA, ANCA, complement, RF, beta2 glycoprotein, anticardiolipin wnl Current DVT of RIJ, R subclavian and R axillary veins. Hx of L AVF clotting. Given hx of clots, current pruritis and gallium scan uptake in chest wall, reasonable to workup coagulopathy. OBDULIA, ANCA, complement, RF, beta2 glycoprotein, anticardiolipin wnl.  - s/p heparin gtt  - c/w Eliquis 2.5 mg PO BID

## 2023-01-26 NOTE — PROGRESS NOTE ADULT - ASSESSMENT
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      - Final breast pathology showing epidermal/dermal necrosis with fibrinoid occlusion and necrosis of blood vessels, focal necrotizing suppurative inflammation involving the fibroadipose tissue and blood vessels in the subcutis.     - F/u rheumatology recommendations for possible granulomatous mastitis or alternative cause of chronic inflammatory/hypcoagulable cause of skin findings.  - F/U Cx results (NGTD so far). ABx per primary team (vancomycin by trough).   - Dress right breast with Xeroform and gauze with paper tape. To be changed daily. Gave Xeroform to nurses today as it was not in place this morning.  - Wound care for access site ulceration as per vascular surgery.   - Surgery 5C following.

## 2023-01-26 NOTE — PROGRESS NOTE ADULT - ATTENDING COMMENTS
Pt seen on rounds this afternoon and interim history reviewed.  Sensipar dose has been increased by Nephrology, first to 60 mg/day, now to 90 mg/day.  She is tolerating the current dose without any nausea.  Although she has not been on the higher dose for a long enough period to see the full effect, the PTH level is showing no consistent signs of decreasing.  It is still well below the range where ESRD pts are candidates for parathyroidectomy, but the pts bone lesions do appear to be due to secondary hyperparathyroidism, and the pathology report on the breast biopsy raises the question of calciphylaxis.  Will therefore need to more strongly consider parathyroidectomy.  In addition, now that the pt is more clinically stable, the need for biopsy of the dominant thyroid nodules (both being candidates for biopsy based on size, and one also with an abnormal shape) should be addressed.    Discussed the situation with the pt, including the likely need for parathyroidectomy, the need for thyroid biopsy, and also for a 4D CT scan to image the parathyroids (as well as the mediastinal component of the goiter).  She understands and is willing to proceed with all of these.  Since the CT will involve contrast, it should be scheduled in a way that the pt can be dialyzed directly after the procedure.

## 2023-01-26 NOTE — PROGRESS NOTE ADULT - PROBLEM SELECTOR PLAN 6
Pt has been on HD for "years" 2/2 PCKD. AV Fistula of EDWIN clotted in 2014, has received HD thru HD cath since. New AV fistula placed 11/2022, not yet matured.   - f/u nephrology recs  - s/p HD 1/10, 1/13, 1/16, 1/18, 1/20, 1/23     #Renal osteodystrophy   CT head (1/2/23): Increased density of the bone marrow is consistent with renal osteodystrophy. Per endocrine, extent of bone destruction extreme given PTH level, suspect might not be Brown tumors  - c/w sevelamer 1600 mg PO TID  - c/w Sensipar 60 mg PO daily Pt has been on HD for "years" 2/2 PCKD. AV Fistula of EDWIN clotted in 2014, has received HD thru HD cath since. New AV fistula placed 11/2022, not yet matured.   - f/u nephrology recs  - s/p HD 1/10, 1/13, 1/16, 1/18, 1/20, 1/23     #Renal osteodystrophy   CT head (1/2/23): Increased density of the bone marrow is consistent with renal osteodystrophy. Per endocrine, extent of bone destruction extreme given PTH level, suspect might not be Brown tumors  - c/w sevelamer 1600 mg PO TID  - increased to Sensipar 90 mg PO daily

## 2023-01-26 NOTE — PROGRESS NOTE ADULT - PROBLEM SELECTOR PLAN 2
Shock of unclear etiology, suspect 2/2 tenuous hemodynamics w/ LVOT obstruction/LISETTE and significant vasculopathy in addition to possible sepsis, however unknown source (no fever, BCx NGTD, no focal area of infection IDed thus far). On initial presentation, pt met 2/4 SIRS criteria. Hypotensive with MAPs to 60 requiring levophed. Admission HR > 90, WBC > 12, Lactate 1.9, Procal 12. , Ferritin 1209. BCx 1/3, 1/9, 1/17 no growth. s/p Vancomycin x2 wks (1/2-1/15) to treat presumed R breast cellulitis. Per Surgery, prelim R breast biopsy shows vasculitis vs granuloma, unlikely to cause hypotension. Corticotropin stimulation test wnl.  - Gallium scan (1/6/23): Faintly increased activity surrounding entire lower portion of R breast c/w inflammatory reaction seen on physical exam, small area of activity ~10cm to R of midline at level of the lower border of the sternum, activity likely to be in chest wall   - PET/CT (1/11/23): Increased FDG activity within R axillary vein, which contains an occlusive thrombus, which may represent infectious source. Also an additional FDG avid region of AV fistula near arterial anastomosis proximal to area of fluid collection; could be potential source of infection vs inflammatory reaction to the graft. Also a photopenic R adnexal cystic mass, suggestive of benign etiology.  - 1/21 o/n Tmax 101.6 rectal, pt MAPs in the 60s, however mentating at baseline. BCx collected, Vanc restarted     Plan:   - c/w midodrine 40 mg PO q8h (goal SBP >75)   - BCx 1/21 NGTD   - c/w vancomycin renally dosed by trough Shock of unclear etiology, suspect 2/2 tenuous hemodynamics w/ LVOT obstruction/LISETTE and significant vasculopathy in addition to possible sepsis, however unknown source (no fever, BCx NGTD, no focal area of infection IDed thus far). On initial presentation, pt met 2/4 SIRS criteria. Hypotensive with MAPs to 60 requiring levophed. Admission HR > 90, WBC > 12, Lactate 1.9, Procal 12. , Ferritin 1209. BCx 1/3, 1/9, 1/17 no growth. s/p Vancomycin x2 wks (1/2-1/15) to treat presumed R breast cellulitis. Per Surgery, prelim R breast biopsy shows vasculitis vs granuloma, unlikely to cause hypotension. Corticotropin stimulation test wnl.  - Gallium scan (1/6/23): Faintly increased activity surrounding entire lower portion of R breast c/w inflammatory reaction seen on physical exam, small area of activity ~10cm to R of midline at level of the lower border of the sternum, activity likely to be in chest wall   - PET/CT (1/11/23): Increased FDG activity within R axillary vein, which contains an occlusive thrombus, which may represent infectious source. Also an additional FDG avid region of AV fistula near arterial anastomosis proximal to area of fluid collection; could be potential source of infection vs inflammatory reaction to the graft. Also a photopenic R adnexal cystic mass, suggestive of benign etiology.  - 1/21 o/n Tmax 101.6 rectal, pt MAPs in the 60s, however mentating at baseline. BCx collected, Vanc restarted     Plan:   - c/w midodrine 40 mg PO q8h (goal MAP >55)   - BCx 1/21 NGTD

## 2023-01-26 NOTE — PROGRESS NOTE ADULT - PROBLEM SELECTOR PLAN 1
Family hx of breast cancer in mother, sister.   - Physical exam on admission: large R breast mass, palpable L breast mass, R breast has peau d'orange appearance with associated erythema, warmth on R  - US bilateral breasts: No sonographic evidence of breast abscess, cyst or focal mass to correspond with the clinical finding of bilateral breast masses.  - Breast bx: Epidermal and dermal necrosis with fibrinoid occlusion and necrosis of blood vessels, focal necrotizing suppurative inflammation involving the fibroadipose tissue and blood vessels in the subcutis    Plan:  Pt continues to report pain in bilateral breasts, worst on R. Pain responsive to Dilaudid but effect wears off after ~2-3 hours. Will transition from PO to IV given patient unable to tolerate PO. Was briefly on PCA (1/16-1/17), now dced  - Pain management consulted, f/u recs  - start Dilaudid 0.5 mg IV q3h PRN for moderate pain  - start Dilaudid 1 mg IV q3h PRN for severe pain   - c/w lidocaine 4% topical cream TID  - HOLD ALL OPIOIDS IF RR<10, SpO2 <93%, SBP <96    #Peeling epidermis to B/l breasts  Derm consulted, recommend wedge biopsy of both normal and pathologic tissue. Coagulopathic changes may be secondary to an infectious etiology, however other causes of hypercoagulability should be explored  - per surgery, no surgical interventions  - c/w daily dressing changes   - f/u Wound Care recs Family hx of breast cancer in mother, sister.   - Physical exam on admission: large R breast mass, palpable L breast mass, R breast has peau d'orange appearance with associated erythema, warmth on R  - US bilateral breasts: No sonographic evidence of breast abscess, cyst or focal mass to correspond with the clinical finding of bilateral breast masses.  - Breast bx: Epidermal and dermal necrosis with fibrinoid occlusion and necrosis of blood vessels, focal necrotizing suppurative inflammation involving the fibroadipose tissue and blood vessels in the subcutis    Plan:  Pt continues to report pain in bilateral breasts, worst on R. Pain responsive to Dilaudid but effect wears off after ~2-3 hours. Will transition from PO to IV given patient unable to tolerate PO. Was briefly on PCA (1/16-1/17), now dced  - Pain management consulted, f/u recs  - c/w Dilaudid 0.5 mg IV q3h PRN for moderate pain  - c/w Dilaudid 1 mg IV q3h PRN for severe pain   - c/w lidocaine 4% topical cream TID  - HOLD ALL OPIOIDS IF RR<10, SpO2 <93%, SBP <96    #Peeling epidermis to B/l breasts  Derm consulted, recommend wedge biopsy of both normal and pathologic tissue. Coagulopathic changes may be secondary to an infectious etiology, however other causes of hypercoagulability should be explored  - per surgery, no surgical interventions  - c/w daily dressing changes   - f/u Wound Care recs

## 2023-01-26 NOTE — PROGRESS NOTE ADULT - SUBJECTIVE AND OBJECTIVE BOX
Subjective: Pt seen and evaluated bedside this AM. Pt denies any pain to right arm this AM. Pts compressive arm dressing is C/D/I.     ROS:   Denies Headache, blurred vision, Chest Pain, SOB, Abdominal pain, nausea or vomiting     Social   apixaban 2.5  clopidogrel Tablet 75  midodrine. 40      Allergies    Ancef (Rash; Urticaria)  DDAVP (Hypotension)  iodine (Hives; Pruritus)  penicillin (Swelling)  sulfa drugs (Angioedema)    Intolerances        Vital Signs Last 24 Hrs  T(C): 36.9 (26 Jan 2023 00:58), Max: 36.9 (25 Jan 2023 21:43)  T(F): 98.4 (26 Jan 2023 00:58), Max: 98.4 (25 Jan 2023 21:43)  HR: 71 (26 Jan 2023 06:03) (61 - 98)  BP: 79/42 (26 Jan 2023 04:34) (79/42 - 140/84)  BP(mean): 57 (26 Jan 2023 04:34) (55 - 108)  RR: 20 (26 Jan 2023 06:03) (17 - 20)  SpO2: 100% (26 Jan 2023 06:03) (74% - 100%)    Parameters below as of 26 Jan 2023 06:03  Patient On (Oxygen Delivery Method): BiPAP/CPAP    O2 Concentration (%): 40  I&O's Summary    25 Jan 2023 07:01  -  26 Jan 2023 07:00  --------------------------------------------------------  IN: 400 mL / OUT: 1401 mL / NET: -1001 mL        Physical Exam:  General: NAD, resting comfortably in bed  C/V: S1 s2, RRR  Pulm: Nonlabored breathing, no respiratory distress  Abd: Soft, NTND  Extrem: WWP; right UE wound clean, pink base, granulation tissue noted, no obvious signs of infection, 4x4 with santyl applied, covered with kerlix and ace wrap    LABS:    01-25    132<L>  |  93<L>  |  45<H>  ----------------------------<  91  4.6   |  27  |  7.02<H>    Ca    8.1<L>      25 Jan 2023 11:54  Phos  3.8     01-25  Mg     1.8     01-25

## 2023-01-26 NOTE — CONSULT NOTE ADULT - SUBJECTIVE AND OBJECTIVE BOX
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 Left tunneled HD catheter (placed ~2 years ago, not by IR at [facility]). IR consulted for PICC vs. tunneled CVC catheter placement.     Clinical History: HYPERKALEMIA    No pertinent family history in first degree relatives    Handoff    MEWS Score    HTN (hypertension)    HLD (hyperlipidemia)    CAD (coronary atherosclerotic disease)    ESRD on dialysis    H/O ventral hernia    Brown tumor    DVT, lower extremity    Hyperkalemia    Hyperparathyroidism    Multinodular thyroid    Pain in breast    Hypotension    ESRD on dialysis    Debility    Goals of care, counseling/discussion    Encounter for palliative care    Septic shock    Chronic HFrEF (heart failure with reduced ejection fraction)    HPTH (hyperparathyroidism)    Prophylactic measure    Uterine mass    Anemia    Fever    LIZ (obstructive sleep apnea)    Vasculopathy    Arterial puncture    Blood draw    Ultrasound guidance, for vascular access    Imaging guided insertion of central venous cannula    Stented coronary artery    History of surgery    AVF (arteriovenous fistula)    S/P AIDEN-BSO    History of surgery    History of atherectomy    DIARRHEA    38    Room Service Assist    [email or web address]        Meds:acetaminophen     Tablet .. 975 milliGRAM(s) Oral every 8 hours  albumin human 25% IVPB 50 milliLiter(s) IV Intermittent every 1 hour  apixaban 2.5 milliGRAM(s) Oral every 12 hours  atorvastatin 40 milliGRAM(s) Oral at bedtime  calamine/zinc oxide Lotion 1 Application(s) Topical three times a day PRN  chlorhexidine 2% Cloths 1 Application(s) Topical <User Schedule>  chlorhexidine 2% Cloths 1 Application(s) Topical <User Schedule>  cinacalcet 90 milliGRAM(s) Oral daily  clopidogrel Tablet 75 milliGRAM(s) Oral daily  colchicine 0.3 milliGRAM(s) Oral daily  collagenase Ointment 1 Application(s) Topical daily  cyclobenzaprine 5 milliGRAM(s) Oral three times a day PRN  fludroCORTISONE 0.2 milliGRAM(s) Oral every 24 hours  HYDROmorphone  Injectable 0.5 milliGRAM(s) IV Push every 3 hours PRN  HYDROmorphone  Injectable 1 milliGRAM(s) IV Push every 3 hours PRN  lidocaine 4% Cream 1 Application(s) Topical three times a day PRN  midodrine. 40 milliGRAM(s) Oral every 8 hours  Nephro-deborah 1 Tablet(s) Oral daily  petrolatum white Ointment 1 Application(s) Topical daily  polyethylene glycol 3350 17 Gram(s) Oral every 12 hours  senna 2 Tablet(s) Oral at bedtime  sevelamer carbonate 1600 milliGRAM(s) Oral three times a day with meals  sodium chloride 0.9% lock flush 10 milliLiter(s) IV Push every 1 hour PRN      Allergies:Ancef (Rash; Urticaria)  DDAVP (Hypotension)  iodine (Hives; Pruritus)  penicillin (Swelling)  sulfa drugs (Angioedema)        Labs:         01-25    132<L>  |  93<L>  |  45<H>  ----------------------------<  91  4.6   |  27  |  7.02<H>    Ca    8.1<L>      25 Jan 2023 11:54  Phos  3.8     01-25  Mg     1.8     01-25

## 2023-01-26 NOTE — PROGRESS NOTE ADULT - ASSESSMENT
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

## 2023-01-26 NOTE — CONSULT NOTE ADULT - ASSESSMENT
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 Left tunneled HD catheter (placed ~2 years ago, not by IR at [facility]). IR consulted for PICC vs. tunneled CVC catheter placement. Case reviewed with Dr. Shetty, plan for procedure with ultrasound guidance.     Communicated with: Dr. Stover primary team

## 2023-01-26 NOTE — PROGRESS NOTE ADULT - ASSESSMENT
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 During imaging, CT showing 7.0 cm fluid collection is present near an AV fistula in the right upper arm. The differential for this collection included benign post-operative hematoma or seroma, but also abscess or vascular device infection, particularly in the setting of septic shock. Vascular consulted for RUE fistula evaluation to rule out this area as a source of sepsis. Reassuring physical exam at initial evaluation without clear indicia of infection related to the graph, small superficial surgical incision dehiscence notwithstanding. Now s/p IR image guided aspiration of perigraft fluid (negative) and gadolinium scan (negative). At this point, do not suspect a graft infection as the provoking factor leading to the patient's presentation. Swelling in area of RUE fistula is likely a sterile seroma with no intervention needed.    Recommendations:  -Continue to follow up BCx and IR Cx - both NGTD (given negative blood and wound drainage cultures, low likelihood of graft infection)  -Wound care recs: BID dressing changes (once daily with Santyl DSD once daily with Bactroban DSD). Apply light ace wrap compression to right hand and forearm to help with soft tissue swelling and elevate RUE with pillows  -Fluid collection near RUE fistula is likely a sterile seroma and unlikely the cause of patient's sepsis. Would not recommend percutaneous drainage or IR procedure to drain fluid  -Rest of care per primary team  -Vascular surgery Team 3C will continue to follow. Please call [phone] with any questions or concerns.

## 2023-01-26 NOTE — PROGRESS NOTE ADULT - SUBJECTIVE AND OBJECTIVE BOX
SUBJECTIVE / INTERVAL HPI: Patient was seen and examined this morning.     CAPILLARY BLOOD GLUCOSE & INSULIN RECEIVED  95 mg/dL (01-25 @ 11:46)  91 mg/dL (01-25 @ 11:54)  95 mg/dL (01-26 @ 12:40)      REVIEW OF SYSTEMS  Constitutional:  Negative fever, chills or loss of appetite.  Eyes:  Negative blurry vision or double vision.  Cardiovascular:  Negative for chest pain or palpitations.  Respiratory:  Negative for cough, wheezing, or shortness of breath.    Gastrointestinal:  Negative for nausea, vomiting, diarrhea, constipation, or abdominal pain.  Genitourinary:  Negative frequency, urgency or dysuria.  Neurologic:  No headache, confusion, dizziness, lightheadedness.    PHYSICAL EXAM  Vital Signs Last 24 Hrs  T(C): 36.8 (26 Jan 2023 16:00), Max: 36.9 (25 Jan 2023 21:43)  T(F): 98.3 (26 Jan 2023 16:00), Max: 98.4 (25 Jan 2023 21:43)  HR: 86 (26 Jan 2023 13:00) (66 - 98)  BP: 103/49 (26 Jan 2023 13:00) (45/20 - 140/84)  BP(mean): 70 (26 Jan 2023 13:00) (28 - 108)  RR: 18 (26 Jan 2023 13:00) (18 - 20)  SpO2: 100% (26 Jan 2023 13:00) (95% - 100%)    Parameters below as of 26 Jan 2023 13:00  Patient On (Oxygen Delivery Method): nasal cannula  O2 Flow (L/min): 2      Constitutional: Awake, alert, in no acute distress.   HEENT: Normocephalic, atraumatic, ZOHAIB.  Respiratory: Lungs clear to ausculation bilaterally.   Cardiovascular: regular rhythm, normal S1 and S2, no audible murmurs.   GI: soft, non-tender, non-distended, bowel sounds present.  Extremities: No lower extremity edema.  Psychiatric: AAO x 3. Normal affect/mood.     LABS  CBC - WBC/HGB/HTC/PLT: 8.67/9.4/32.9/264 (01-24-23)  BMP - Na/K/Cl/Bicarb/BUN/Cr/Gluc/AG/eGFR: 132/4.6/93/27/45/7.02/91/12/6 (01-25-23)  Ca - 8.1 (01-25-23)  Phos - 3.8 (01-25-23)  Mg - 1.8 (01-25-23)  LFT - Alb/Tprot/Tbili/Dbili/AlkPhos/ALT/AST: 1.8/--/0.3/--/75/13/30 (01-23-23)    Thyroid Stimulating Hormone, Serum: 1.520 (01-24-23)  Total T4/Free T4: --/0.965 (01-24-23)    MEDICATIONS  MEDICATIONS  (STANDING):  acetaminophen     Tablet .. 975 milliGRAM(s) Oral every 8 hours  albumin human 25% IVPB 50 milliLiter(s) IV Intermittent every 1 hour  apixaban 2.5 milliGRAM(s) Oral every 12 hours  atorvastatin 40 milliGRAM(s) Oral at bedtime  chlorhexidine 2% Cloths 1 Application(s) Topical <User Schedule>  chlorhexidine 2% Cloths 1 Application(s) Topical <User Schedule>  cinacalcet 90 milliGRAM(s) Oral daily  clopidogrel Tablet 75 milliGRAM(s) Oral daily  colchicine 0.3 milliGRAM(s) Oral daily  collagenase Ointment 1 Application(s) Topical daily  fludroCORTISONE 0.2 milliGRAM(s) Oral every 24 hours  midodrine. 40 milliGRAM(s) Oral every 8 hours  Nephro-deborah 1 Tablet(s) Oral daily  petrolatum white Ointment 1 Application(s) Topical daily  polyethylene glycol 3350 17 Gram(s) Oral every 12 hours  senna 2 Tablet(s) Oral at bedtime  sevelamer carbonate 1600 milliGRAM(s) Oral three times a day with meals    MEDICATIONS  (PRN):  calamine/zinc oxide Lotion 1 Application(s) Topical three times a day PRN Itching  cyclobenzaprine 5 milliGRAM(s) Oral three times a day PRN Muscle Spasm  HYDROmorphone  Injectable 0.5 milliGRAM(s) IV Push every 3 hours PRN Moderate Pain (4 - 6)  HYDROmorphone  Injectable 1 milliGRAM(s) IV Push every 3 hours PRN Severe Pain (7 - 10)  lidocaine 4% Cream 1 Application(s) Topical three times a day PRN Breast pain  sodium chloride 0.9% lock flush 10 milliLiter(s) IV Push every 1 hour PRN Pre/post blood products, medications, blood draw, and to maintain line patency    ASSESSMENT / RECOMMENDATIONS    A1C: 5.3 %  BUN: 45  Creatinine: 7.02  GFR: 6  Weight: 85.3  BMI: 27  EF:     # Type 2 diabetes mellitus  - Please continue lantus *** units at bedtime.   - Continue lispro *** units before each meal.  - Continue lispro moderate / low dose sliding scale before meals and at bedtime.  - Patient's fingerstick glucose goal is 100-180 mg/dL.    - For discharge, patient can ***.    - Patient can follow up at discharge with [facility] by calling [phone] to make an appointment.      Case discussed with Dr. Villafuerte. Primary team updated.       Yang Jarrell    Endocrinology Fellow    Service Pager: [phone]    SUBJECTIVE / INTERVAL HPI: Patient was seen and examined this morning. She continues to experience breast pain, but says that is improved from earlier during admission. Corrected calcium is 9.9 mg/dL. PTH has remained elevated despite increasing doses of Sensipar. Initial PTH was 479 (1/3/23). She was started on Sensipar 30 mg daily (1/9/23) with repeat PTH of 532 (1/11/23) and 351 (1/18/23). The Sensipar dose was increased to 60 mg daily (1/19/23) and repeat PTH level was 492 (1/24/23). The Sensipar dose has been recently increased by Nephrology to 90 mg daily. She has been tolerating therapy without any apparent side effects (eg, nausea, vomiting). Breast biopsy pathology resulted as epidermal and dermal necrosis with fibrinoid occlusion and necrosis of blood vessels, focal necrotizing suppurative inflammation involving the fibroadipose tissue and blood vessels in the subcutis. These coagulopathic changes where thought to be secondary to an infectious etiology; however, evolving calciphylaxis couldn't be excluded.     CAPILLARY BLOOD GLUCOSE & INSULIN RECEIVED  95 mg/dL (01-25 @ 11:46)  91 mg/dL (01-25 @ 11:54)  95 mg/dL (01-26 @ 12:40)    PHYSICAL EXAM  Vital Signs Last 24 Hrs  T(C): 36.8 (26 Jan 2023 16:00), Max: 36.9 (25 Jan 2023 21:43)  T(F): 98.3 (26 Jan 2023 16:00), Max: 98.4 (25 Jan 2023 21:43)  HR: 86 (26 Jan 2023 13:00) (66 - 98)  BP: 103/49 (26 Jan 2023 13:00) (45/20 - 140/84)  BP(mean): 70 (26 Jan 2023 13:00) (28 - 108)  RR: 18 (26 Jan 2023 13:00) (18 - 20)  SpO2: 100% (26 Jan 2023 13:00) (95% - 100%)    Parameters below as of 26 Jan 2023 13:00  Patient On (Oxygen Delivery Method): nasal cannula  O2 Flow (L/min): 2    Constitutional: Awake, alert, obese female, in no acute distress.   HEENT: Normocephalic, atraumatic, ZOHAIB.  Respiratory: Lungs clear to ausculation bilaterally.   Cardiovascular: regular rhythm, normal S1 and S2, no audible murmurs.   GI: soft, non-tender, non-distended, bowel sounds present.  Extremities: No lower extremity edema. Right edematous arm wrapped around ACE bandage.   Psychiatric: AAO x 3.     LABS  CBC - WBC/HGB/HTC/PLT: 8.67/9.4/32.9/264 (01-24-23)  BMP - Na/K/Cl/Bicarb/BUN/Cr/Gluc/AG/eGFR: 132/4.6/93/27/45/7.02/91/12/6 (01-25-23)  Ca - 8.1 (01-25-23)  Phos - 3.8 (01-25-23)  Mg - 1.8 (01-25-23)  LFT - Alb/Tprot/Tbili/Dbili/AlkPhos/ALT/AST: 1.8/--/0.3/--/75/13/30 (01-23-23)  Thyroid Stimulating Hormone, Serum: 1.520 (01-24-23)  Total T4/Free T4: --/0.965 (01-24-23)    MEDICATIONS  MEDICATIONS  (STANDING):  acetaminophen     Tablet .. 975 milliGRAM(s) Oral every 8 hours  albumin human 25% IVPB 50 milliLiter(s) IV Intermittent every 1 hour  apixaban 2.5 milliGRAM(s) Oral every 12 hours  atorvastatin 40 milliGRAM(s) Oral at bedtime  chlorhexidine 2% Cloths 1 Application(s) Topical <User Schedule>  chlorhexidine 2% Cloths 1 Application(s) Topical <User Schedule>  cinacalcet 90 milliGRAM(s) Oral daily  clopidogrel Tablet 75 milliGRAM(s) Oral daily  colchicine 0.3 milliGRAM(s) Oral daily  collagenase Ointment 1 Application(s) Topical daily  fludroCORTISONE 0.2 milliGRAM(s) Oral every 24 hours  midodrine. 40 milliGRAM(s) Oral every 8 hours  Nephro-deborah 1 Tablet(s) Oral daily  petrolatum white Ointment 1 Application(s) Topical daily  polyethylene glycol 3350 17 Gram(s) Oral every 12 hours  senna 2 Tablet(s) Oral at bedtime  sevelamer carbonate 1600 milliGRAM(s) Oral three times a day with meals    MEDICATIONS  (PRN):  calamine/zinc oxide Lotion 1 Application(s) Topical three times a day PRN Itching  cyclobenzaprine 5 milliGRAM(s) Oral three times a day PRN Muscle Spasm  HYDROmorphone  Injectable 0.5 milliGRAM(s) IV Push every 3 hours PRN Moderate Pain (4 - 6)  HYDROmorphone  Injectable 1 milliGRAM(s) IV Push every 3 hours PRN Severe Pain (7 - 10)  lidocaine 4% Cream 1 Application(s) Topical three times a day PRN Breast pain  sodium chloride 0.9% lock flush 10 milliLiter(s) IV Push every 1 hour PRN Pre/post blood products, medications, blood draw, and to maintain line patency    ASSESSMENT / RECOMMENDATIONS    A1C: 5.3 %  BUN: 45  Creatinine: 7.02  GFR: 6  Weight: 85.3  BMI: 27  EF:     # Type 2 diabetes mellitus  - Please continue lantus *** units at bedtime.   - Continue lispro *** units before each meal.  - Continue lispro moderate / low dose sliding scale before meals and at bedtime.  - Patient's fingerstick glucose goal is 100-180 mg/dL.    - For discharge, patient can ***.    - Patient can follow up at discharge with [facility] by calling [phone] to make an appointment.      Case discussed with Dr. Villafuerte. Primary team updated.       Yang Jarrell    Endocrinology Fellow    Service Pager: [phone]    SUBJECTIVE / INTERVAL HPI: Patient was seen and examined this morning. She continues to experience breast pain, but says that is improved from earlier during admission. Corrected calcium is 9.9 mg/dL. PTH has remained elevated despite increasing doses of Sensipar. Initial PTH was 479 (1/3/23). She was started on Sensipar 30 mg daily (1/9/23) with repeat PTH of 532 (1/11/23) and 351 (1/18/23). The Sensipar dose was increased to 60 mg daily (1/19/23) and repeat PTH level was 492 (1/24/23). The Sensipar dose has been recently increased by Nephrology to 90 mg daily. She has been tolerating therapy without any apparent side effects (eg, nausea, vomiting). Breast biopsy pathology resulted as epidermal and dermal necrosis with fibrinoid occlusion and necrosis of blood vessels, focal necrotizing suppurative inflammation involving the fibroadipose tissue and blood vessels in the subcutis. 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     CAPILLARY BLOOD GLUCOSE & INSULIN RECEIVED  95 mg/dL (01-25 @ 11:46)  91 mg/dL (01-25 @ 11:54)  95 mg/dL (01-26 @ 12:40)    PHYSICAL EXAM  Vital Signs Last 24 Hrs  T(C): 36.8 (26 Jan 2023 16:00), Max: 36.9 (25 Jan 2023 21:43)  T(F): 98.3 (26 Jan 2023 16:00), Max: 98.4 (25 Jan 2023 21:43)  HR: 86 (26 Jan 2023 13:00) (66 - 98)  BP: 103/49 (26 Jan 2023 13:00) (45/20 - 140/84)  BP(mean): 70 (26 Jan 2023 13:00) (28 - 108)  RR: 18 (26 Jan 2023 13:00) (18 - 20)  SpO2: 100% (26 Jan 2023 13:00) (95% - 100%)    Parameters below as of 26 Jan 2023 13:00  Patient On (Oxygen Delivery Method): nasal cannula  O2 Flow (L/min): 2    Constitutional: Awake, alert, obese female, in no acute distress.   HEENT: Normocephalic, atraumatic, ZOHAIB.  Respiratory: Lungs clear to ausculation bilaterally.   Cardiovascular: regular rhythm, normal S1 and S2, no audible murmurs.   GI: soft, non-tender, non-distended, bowel sounds present.  Extremities: No lower extremity edema. Right edematous arm wrapped around ACE bandage.   Psychiatric: AAO x 3.     LABS  CBC - WBC/HGB/HTC/PLT: 8.67/9.4/32.9/264 (01-24-23)  BMP - Na/K/Cl/Bicarb/BUN/Cr/Gluc/AG/eGFR: 132/4.6/93/27/45/7.02/91/12/6 (01-25-23)  Ca - 8.1 (01-25-23)  Phos - 3.8 (01-25-23)  Mg - 1.8 (01-25-23)  LFT - Alb/Tprot/Tbili/Dbili/AlkPhos/ALT/AST: 1.8/--/0.3/--/75/13/30 (01-23-23)  Thyroid Stimulating Hormone, Serum: 1.520 (01-24-23)  Total T4/Free T4: --/0.965 (01-24-23)    MEDICATIONS  MEDICATIONS  (STANDING):  acetaminophen     Tablet .. 975 milliGRAM(s) Oral every 8 hours  albumin human 25% IVPB 50 milliLiter(s) IV Intermittent every 1 hour  apixaban 2.5 milliGRAM(s) Oral every 12 hours  atorvastatin 40 milliGRAM(s) Oral at bedtime  chlorhexidine 2% Cloths 1 Application(s) Topical <User Schedule>  chlorhexidine 2% Cloths 1 Application(s) Topical <User Schedule>  cinacalcet 90 milliGRAM(s) Oral daily  clopidogrel Tablet 75 milliGRAM(s) Oral daily  colchicine 0.3 milliGRAM(s) Oral daily  collagenase Ointment 1 Application(s) Topical daily  fludroCORTISONE 0.2 milliGRAM(s) Oral every 24 hours  midodrine. 40 milliGRAM(s) Oral every 8 hours  Nephro-deborah 1 Tablet(s) Oral daily  petrolatum white Ointment 1 Application(s) Topical daily  polyethylene glycol 3350 17 Gram(s) Oral every 12 hours  senna 2 Tablet(s) Oral at bedtime  sevelamer carbonate 1600 milliGRAM(s) Oral three times a day with meals    MEDICATIONS  (PRN):  calamine/zinc oxide Lotion 1 Application(s) Topical three times a day PRN Itching  cyclobenzaprine 5 milliGRAM(s) Oral three times a day PRN Muscle Spasm  HYDROmorphone  Injectable 0.5 milliGRAM(s) IV Push every 3 hours PRN Moderate Pain (4 - 6)  HYDROmorphone  Injectable 1 milliGRAM(s) IV Push every 3 hours PRN Severe Pain (7 - 10)  lidocaine 4% Cream 1 Application(s) Topical three times a day PRN Breast pain  sodium chloride 0.9% lock flush 10 milliLiter(s) IV Push every 1 hour PRN Pre/post blood products, medications, blood draw, and to maintain line patency    ASSESSMENT / RECOMMENDATIONS  Ms. 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 In addition, she was complaining of bilateral breast pain (R>L). She was  admitted for emergent hemodialysis. Her hospitalization was complicated by shock (unclear etiology), off pressors (since 1/16/23). Endocrinology was initially consulted for elevated PTH and multinodular thyroid. Work-up was suggestive of secondary hyperparathyroidism and she was started on Sensipar. However, despite increasing doses of the medication, she has continued to have elevated levels of PTH. 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 A thyroid ultrasound also was remarkable for multiple nodules meeting criteria for fine-needle aspiration.     Discussed above with patient. 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 Furthermore,     A1C: 5.3 %  BUN: 45  Creatinine: 7.02  GFR: 6  Weight: 85.3  BMI: 27      # Type 2 diabetes mellitus  - Please continue lantus *** units at bedtime.   - Continue lispro *** units before each meal.  - Continue lispro moderate / low dose sliding scale before meals and at bedtime.  - Patient's fingerstick glucose goal is 100-180 mg/dL.    - For discharge, patient can ***.    - Patient can follow up at discharge with [facility] by calling [phone] to make an appointment.      Case discussed with Dr. Villafuerte. Primary team updated.       Yang Jrarell    Endocrinology Fellow    Service Pager: [phone]    SUBJECTIVE / INTERVAL HPI: Patient was seen and examined this morning. She continues to experience breast pain, but says that is improved from earlier during admission. Corrected calcium is 9.9 mg/dL. PTH has remained elevated despite increasing doses of Sensipar. Initial PTH was 479 (1/3/23). She was started on Sensipar 30 mg daily (1/9/23) with repeat PTH of 532 (1/11/23) and 351 (1/18/23). The Sensipar dose was increased to 60 mg daily (1/19/23) and repeat PTH level was 492 (1/24/23). The Sensipar dose has been recently increased by Nephrology to 90 mg daily. She has been tolerating therapy without any apparent side effects (eg, nausea, vomiting). Breast biopsy pathology resulted as epidermal and dermal necrosis with fibrinoid occlusion and necrosis of blood vessels, focal necrotizing suppurative inflammation involving the fibroadipose tissue and blood vessels in the subcutis. 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     CAPILLARY BLOOD GLUCOSE & INSULIN RECEIVED  95 mg/dL (01-25 @ 11:46)  91 mg/dL (01-25 @ 11:54)  95 mg/dL (01-26 @ 12:40)    PHYSICAL EXAM  Vital Signs Last 24 Hrs  T(C): 36.8 (26 Jan 2023 16:00), Max: 36.9 (25 Jan 2023 21:43)  T(F): 98.3 (26 Jan 2023 16:00), Max: 98.4 (25 Jan 2023 21:43)  HR: 86 (26 Jan 2023 13:00) (66 - 98)  BP: 103/49 (26 Jan 2023 13:00) (45/20 - 140/84)  BP(mean): 70 (26 Jan 2023 13:00) (28 - 108)  RR: 18 (26 Jan 2023 13:00) (18 - 20)  SpO2: 100% (26 Jan 2023 13:00) (95% - 100%)    Parameters below as of 26 Jan 2023 13:00  Patient On (Oxygen Delivery Method): nasal cannula  O2 Flow (L/min): 2    Constitutional: Awake, alert, obese female, in no acute distress.   HEENT: Normocephalic, atraumatic, ZOHAIB.  Respiratory: Lungs clear to ausculation bilaterally.   Cardiovascular: regular rhythm, normal S1 and S2, no audible murmurs.   GI: soft, non-tender, non-distended, bowel sounds present.  Extremities: No lower extremity edema. Right edematous arm wrapped around ACE bandage.   Psychiatric: AAO x 3.     LABS  CBC - WBC/HGB/HTC/PLT: 8.67/9.4/32.9/264 (01-24-23)  BMP - Na/K/Cl/Bicarb/BUN/Cr/Gluc/AG/eGFR: 132/4.6/93/27/45/7.02/91/12/6 (01-25-23)  Ca - 8.1 (01-25-23)  Phos - 3.8 (01-25-23)  Mg - 1.8 (01-25-23)  LFT - Alb/Tprot/Tbili/Dbili/AlkPhos/ALT/AST: 1.8/--/0.3/--/75/13/30 (01-23-23)  Thyroid Stimulating Hormone, Serum: 1.520 (01-24-23)  Total T4/Free T4: --/0.965 (01-24-23)    MEDICATIONS  MEDICATIONS  (STANDING):  acetaminophen     Tablet .. 975 milliGRAM(s) Oral every 8 hours  albumin human 25% IVPB 50 milliLiter(s) IV Intermittent every 1 hour  apixaban 2.5 milliGRAM(s) Oral every 12 hours  atorvastatin 40 milliGRAM(s) Oral at bedtime  chlorhexidine 2% Cloths 1 Application(s) Topical <User Schedule>  chlorhexidine 2% Cloths 1 Application(s) Topical <User Schedule>  cinacalcet 90 milliGRAM(s) Oral daily  clopidogrel Tablet 75 milliGRAM(s) Oral daily  colchicine 0.3 milliGRAM(s) Oral daily  collagenase Ointment 1 Application(s) Topical daily  fludroCORTISONE 0.2 milliGRAM(s) Oral every 24 hours  midodrine. 40 milliGRAM(s) Oral every 8 hours  Nephro-deborah 1 Tablet(s) Oral daily  petrolatum white Ointment 1 Application(s) Topical daily  polyethylene glycol 3350 17 Gram(s) Oral every 12 hours  senna 2 Tablet(s) Oral at bedtime  sevelamer carbonate 1600 milliGRAM(s) Oral three times a day with meals    MEDICATIONS  (PRN):  calamine/zinc oxide Lotion 1 Application(s) Topical three times a day PRN Itching  cyclobenzaprine 5 milliGRAM(s) Oral three times a day PRN Muscle Spasm  HYDROmorphone  Injectable 0.5 milliGRAM(s) IV Push every 3 hours PRN Moderate Pain (4 - 6)  HYDROmorphone  Injectable 1 milliGRAM(s) IV Push every 3 hours PRN Severe Pain (7 - 10)  lidocaine 4% Cream 1 Application(s) Topical three times a day PRN Breast pain  sodium chloride 0.9% lock flush 10 milliLiter(s) IV Push every 1 hour PRN Pre/post blood products, medications, blood draw, and to maintain line patency    ASSESSMENT / RECOMMENDATIONS  Ms. 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 In addition, she was complaining of bilateral breast pain (R>L). She was  admitted for emergent hemodialysis. Her hospitalization was complicated by shock (unclear etiology), off pressors (since 1/16/23). Endocrinology was initially consulted for elevated PTH and multinodular thyroid. Work-up was suggestive of secondary hyperparathyroidism and she was started on Sensipar. However, despite increasing doses of the medication, she has continued to have elevated levels of PTH. 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 A thyroid ultrasound also was remarkable for multiple nodules meeting criteria for fine-needle aspiration.     A1C: 5.3 %  BUN: 45  Creatinine: 7.02  GFR: 6  Weight: 85.3  BMI: 27    # Secondary hyperparathyroidism   -  (1/24) <- 351 (1/18) <- 532 (1/11) <- 479 (1/3) [Started Sensipar on 1/9/23, increased to 60 mg on 1/19/23, then to 90 mg on 1/25/23]   - Corrected calcium is 9.9 mg/dL. Phosphorus 3.8.   - Immunofixation: No monoclonal band identified. Vitamin D 1,25 = 33.9. Vitamin D-25 = 38.5.  - Continue with Sensipar 90 mg daily.   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 Please obtain a 4DCT for further evaluation.     #Multinodular goiter  - TSH 1.52. Free T4 0.96 (1/24/23).   - CT showed enlarged multinodular thyroid projecting into superior mediastinum with 1.8 x 1.5 cm solid nodule slightly inferiorly in the anterior mediastinum.   - Thyroid ultrasound showed a moderately enlarged gland with heterogeneous echotexture. There were 3 nodules in the R lobe (largest 3.3 cm), one in the left lobe (2.3 cm). Both of these nodules warrant biopsy--the right lobe lesion because of size and its shape (taller > wide), and the left lobe nodule based on size. Fine-needle aspiration recommended for 4.3 cm right thyroid and 2.6 cm left thyroid solid nodules per SCOTT guidelines.  - Please obtain FNA while inpatient to evaluate these nodules. If they are found to be malignant, these would also be a factor to take into account when deciding about proceeding with a surgical approach.     Case discussed with Dr. Villafuerte. Primary team updated.       Yang Jarrell    Endocrinology Fellow    Service Pager: [phone]    SUBJECTIVE / INTERVAL HPI: Patient was seen and examined this morning. She continues to experience breast pain, but says that it has improved compared to previous days. Corrected calcium is 9.9 mg/dL. PTH has remained elevated despite increasing doses of Sensipar. Initial PTH was 479 (1/3/23). She was started on Sensipar 30 mg daily (1/9/23) with repeat PTH of 532 (1/11/23) and 351 (1/18/23). The Sensipar dose was increased to 60 mg daily (1/19/23) and repeat PTH level was 492 (1/24/23). The Sensipar dose has been recently increased by Nephrology to 90 mg daily. She has been tolerating therapy without any apparent side effects (eg, nausea, vomiting). Breast biopsy pathology resulted as epidermal and dermal necrosis with fibrinoid occlusion and necrosis of blood vessels, focal necrotizing suppurative inflammation involving the fibroadipose tissue and blood vessels in the subcutis. 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     CAPILLARY BLOOD GLUCOSE & INSULIN RECEIVED  95 mg/dL (01-25 @ 11:46)  91 mg/dL (01-25 @ 11:54)  95 mg/dL (01-26 @ 12:40)    PHYSICAL EXAM  Vital Signs Last 24 Hrs  T(C): 36.8 (26 Jan 2023 16:00), Max: 36.9 (25 Jan 2023 21:43)  T(F): 98.3 (26 Jan 2023 16:00), Max: 98.4 (25 Jan 2023 21:43)  HR: 86 (26 Jan 2023 13:00) (66 - 98)  BP: 103/49 (26 Jan 2023 13:00) (45/20 - 140/84)  BP(mean): 70 (26 Jan 2023 13:00) (28 - 108)  RR: 18 (26 Jan 2023 13:00) (18 - 20)  SpO2: 100% (26 Jan 2023 13:00) (95% - 100%)    Parameters below as of 26 Jan 2023 13:00  Patient On (Oxygen Delivery Method): nasal cannula  O2 Flow (L/min): 2    Constitutional: Awake, alert, obese female, in no acute distress.   HEENT: Normocephalic, atraumatic, ZOHAIB.  Respiratory: Lungs clear to ausculation bilaterally.   Cardiovascular: regular rhythm, normal S1 and S2, no audible murmurs.   GI: soft, non-tender, non-distended, bowel sounds present.  Extremities: No lower extremity edema. Right edematous arm wrapped around ACE bandage.   Psychiatric: AAO x 3.     LABS  CBC - WBC/HGB/HTC/PLT: 8.67/9.4/32.9/264 (01-24-23)  BMP - Na/K/Cl/Bicarb/BUN/Cr/Gluc/AG/eGFR: 132/4.6/93/27/45/7.02/91/12/6 (01-25-23)  Ca - 8.1 (01-25-23)  Phos - 3.8 (01-25-23)  Mg - 1.8 (01-25-23)  LFT - Alb/Tprot/Tbili/Dbili/AlkPhos/ALT/AST: 1.8/--/0.3/--/75/13/30 (01-23-23)  Thyroid Stimulating Hormone, Serum: 1.520 (01-24-23)  Total T4/Free T4: --/0.965 (01-24-23)    MEDICATIONS  MEDICATIONS  (STANDING):  acetaminophen     Tablet .. 975 milliGRAM(s) Oral every 8 hours  albumin human 25% IVPB 50 milliLiter(s) IV Intermittent every 1 hour  apixaban 2.5 milliGRAM(s) Oral every 12 hours  atorvastatin 40 milliGRAM(s) Oral at bedtime  chlorhexidine 2% Cloths 1 Application(s) Topical <User Schedule>  chlorhexidine 2% Cloths 1 Application(s) Topical <User Schedule>  cinacalcet 90 milliGRAM(s) Oral daily  clopidogrel Tablet 75 milliGRAM(s) Oral daily  colchicine 0.3 milliGRAM(s) Oral daily  collagenase Ointment 1 Application(s) Topical daily  fludroCORTISONE 0.2 milliGRAM(s) Oral every 24 hours  midodrine. 40 milliGRAM(s) Oral every 8 hours  Nephro-deborah 1 Tablet(s) Oral daily  petrolatum white Ointment 1 Application(s) Topical daily  polyethylene glycol 3350 17 Gram(s) Oral every 12 hours  senna 2 Tablet(s) Oral at bedtime  sevelamer carbonate 1600 milliGRAM(s) Oral three times a day with meals    MEDICATIONS  (PRN):  calamine/zinc oxide Lotion 1 Application(s) Topical three times a day PRN Itching  cyclobenzaprine 5 milliGRAM(s) Oral three times a day PRN Muscle Spasm  HYDROmorphone  Injectable 0.5 milliGRAM(s) IV Push every 3 hours PRN Moderate Pain (4 - 6)  HYDROmorphone  Injectable 1 milliGRAM(s) IV Push every 3 hours PRN Severe Pain (7 - 10)  lidocaine 4% Cream 1 Application(s) Topical three times a day PRN Breast pain  sodium chloride 0.9% lock flush 10 milliLiter(s) IV Push every 1 hour PRN Pre/post blood products, medications, blood draw, and to maintain line patency    ASSESSMENT / RECOMMENDATIONS  Ms. 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 In addition, she was complaining of bilateral breast pain (R>L). She was  admitted for emergent hemodialysis. Her hospitalization was complicated by shock (unclear etiology), off pressors (since 1/16/23). Endocrinology was initially consulted for elevated PTH and multinodular thyroid. Work-up was suggestive of secondary hyperparathyroidism and she was started on Sensipar. However, despite increasing doses of the medication, she has continued to have elevated levels of PTH. 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 A thyroid ultrasound also was remarkable for multiple nodules meeting criteria for fine-needle aspiration.     A1C: 5.3 %  BUN: 45  Creatinine: 7.02  GFR: 6  Weight: 85.3  BMI: 27    # Secondary hyperparathyroidism   -  (1/24) <- 351 (1/18) <- 532 (1/11) <- 479 (1/3) [Started Sensipar on 1/9/23, increased to 60 mg on 1/19/23, then to 90 mg on 1/25/23]   - Corrected calcium is 9.9 mg/dL. Phosphorus 3.8.   - Immunofixation: No monoclonal band identified. Vitamin D 1,25 = 33.9. Vitamin D-25 = 38.5.  - Continue with Sensipar 90 mg daily.   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 Please obtain a 4DCT for further evaluation.     #Multinodular goiter  - TSH 1.52. Free T4 0.96 (1/24/23).   - CT showed enlarged multinodular thyroid projecting into superior mediastinum with 1.8 x 1.5 cm solid nodule slightly inferiorly in the anterior mediastinum.   - Thyroid ultrasound showed a moderately enlarged gland with heterogeneous echotexture. There were 3 nodules in the R lobe (largest 3.3 cm), one in the left lobe (2.3 cm). Both of these nodules warrant biopsy--the right lobe lesion because of size and its shape (taller > wide), and the left lobe nodule based on size. Fine-needle aspiration recommended for 4.3 cm right thyroid and 2.6 cm left thyroid solid nodules per SCOTT guidelines.  - Please obtain FNA while inpatient to evaluate these nodules. If they are found to be malignant, these would also be a factor to take into account when deciding about proceeding with a surgical approach.     Case discussed with Dr. Villafuerte. Primary team updated.       Yang Jarrell    Endocrinology Fellow    Service Pager: [phone]    SUBJECTIVE / INTERVAL HPI: Patient was seen and examined this morning. She continues to experience breast pain, but says that it has improved compared to previous days. Corrected calcium is 9.9 mg/dL. PTH has remained elevated despite increasing doses of Sensipar. Initial PTH was 479 (1/3/23). She was started on Sensipar 30 mg daily (1/9/23) with repeat PTH of 532 (1/11/23) and 351 (1/18/23). The Sensipar dose was increased to 60 mg daily (1/19/23) and repeat PTH level was 492 (1/24/23). The Sensipar dose has been recently increased by Nephrology to 90 mg daily. She has been tolerating therapy without any apparent side effects (eg, nausea, vomiting). Breast biopsy pathology resulted as epidermal and dermal necrosis with fibrinoid occlusion and necrosis of blood vessels, focal necrotizing suppurative inflammation involving the fibroadipose tissue and blood vessels in the subcutis. 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     CAPILLARY BLOOD GLUCOSE & INSULIN RECEIVED  95 mg/dL (01-25 @ 11:46)  91 mg/dL (01-25 @ 11:54)  95 mg/dL (01-26 @ 12:40)    PHYSICAL EXAM  Vital Signs Last 24 Hrs  T(C): 36.8 (26 Jan 2023 16:00), Max: 36.9 (25 Jan 2023 21:43)  T(F): 98.3 (26 Jan 2023 16:00), Max: 98.4 (25 Jan 2023 21:43)  HR: 86 (26 Jan 2023 13:00) (66 - 98)  BP: 103/49 (26 Jan 2023 13:00) (45/20 - 140/84)  BP(mean): 70 (26 Jan 2023 13:00) (28 - 108)  RR: 18 (26 Jan 2023 13:00) (18 - 20)  SpO2: 100% (26 Jan 2023 13:00) (95% - 100%)    Parameters below as of 26 Jan 2023 13:00  Patient On (Oxygen Delivery Method): nasal cannula  O2 Flow (L/min): 2    Constitutional: Awake, alert, obese female, in no acute distress.   HEENT: Normocephalic, atraumatic, ZOHAIB.  Respiratory: Lungs clear to ausculation bilaterally.   Cardiovascular: regular rhythm, normal S1 and S2, no audible murmurs.   GI: soft, non-tender, non-distended, bowel sounds present.  Extremities: No lower extremity edema. Right edematous arm wrapped around ACE bandage.   Psychiatric: AAO x 3.     LABS  CBC - WBC/HGB/HTC/PLT: 8.67/9.4/32.9/264 (01-24-23)  BMP - Na/K/Cl/Bicarb/BUN/Cr/Gluc/AG/eGFR: 132/4.6/93/27/45/7.02/91/12/6 (01-25-23)  Ca - 8.1 (01-25-23)  Phos - 3.8 (01-25-23)  Mg - 1.8 (01-25-23)  LFT - Alb/Tprot/Tbili/Dbili/AlkPhos/ALT/AST: 1.8/--/0.3/--/75/13/30 (01-23-23)  Thyroid Stimulating Hormone, Serum: 1.520 (01-24-23)  Total T4/Free T4: --/0.965 (01-24-23)    MEDICATIONS  MEDICATIONS  (STANDING):  acetaminophen     Tablet .. 975 milliGRAM(s) Oral every 8 hours  albumin human 25% IVPB 50 milliLiter(s) IV Intermittent every 1 hour  apixaban 2.5 milliGRAM(s) Oral every 12 hours  atorvastatin 40 milliGRAM(s) Oral at bedtime  chlorhexidine 2% Cloths 1 Application(s) Topical <User Schedule>  chlorhexidine 2% Cloths 1 Application(s) Topical <User Schedule>  cinacalcet 90 milliGRAM(s) Oral daily  clopidogrel Tablet 75 milliGRAM(s) Oral daily  colchicine 0.3 milliGRAM(s) Oral daily  collagenase Ointment 1 Application(s) Topical daily  fludroCORTISONE 0.2 milliGRAM(s) Oral every 24 hours  midodrine. 40 milliGRAM(s) Oral every 8 hours  Nephro-deborah 1 Tablet(s) Oral daily  petrolatum white Ointment 1 Application(s) Topical daily  polyethylene glycol 3350 17 Gram(s) Oral every 12 hours  senna 2 Tablet(s) Oral at bedtime  sevelamer carbonate 1600 milliGRAM(s) Oral three times a day with meals    MEDICATIONS  (PRN):  calamine/zinc oxide Lotion 1 Application(s) Topical three times a day PRN Itching  cyclobenzaprine 5 milliGRAM(s) Oral three times a day PRN Muscle Spasm  HYDROmorphone  Injectable 0.5 milliGRAM(s) IV Push every 3 hours PRN Moderate Pain (4 - 6)  HYDROmorphone  Injectable 1 milliGRAM(s) IV Push every 3 hours PRN Severe Pain (7 - 10)  lidocaine 4% Cream 1 Application(s) Topical three times a day PRN Breast pain  sodium chloride 0.9% lock flush 10 milliLiter(s) IV Push every 1 hour PRN Pre/post blood products, medications, blood draw, and to maintain line patency    ASSESSMENT / RECOMMENDATIONS  Ms. 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 In addition, she was complaining of bilateral breast pain (R>L). She was  admitted for emergent hemodialysis. Her hospitalization was complicated by shock (unclear etiology), off pressors (since 1/16/23). Endocrinology was initially consulted for elevated PTH and multinodular thyroid. Work-up was suggestive of secondary hyperparathyroidism and she was started on Sensipar. However, despite increasing doses of the medication, she has continued to have elevated levels of PTH. 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 A thyroid ultrasound also was remarkable for multiple nodules meeting criteria for fine-needle aspiration.     A1C: 5.3 %  BUN: 45  Creatinine: 7.02  GFR: 6  Weight: 85.3  BMI: 27    # Secondary hyperparathyroidism   -  (1/24) <- 351 (1/18) <- 532 (1/11) <- 479 (1/3) [Started Sensipar on 1/9/23, increased to 60 mg on 1/19/23, then to 90 mg on 1/25/23]   - Corrected calcium is 9.9 mg/dL. Phosphorus 3.8.   - Immunofixation: No monoclonal band identified. Vitamin D 1,25 = 33.9. Vitamin D-25 = 38.5.  - Continue with Sensipar 90 mg daily.   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 Please obtain a 4DCT for further evaluation.     #Multinodular goiter  - TSH 1.52. Free T4 0.96 (1/24/23).   - CT showed enlarged multinodular thyroid projecting into superior mediastinum with 1.8 x 1.5 cm solid nodule slightly inferiorly in the anterior mediastinum.   - Thyroid ultrasound showed a moderately enlarged gland with heterogeneous echotexture. There were 3 nodules in the R lobe (largest 3.3 cm), one in the left lobe (2.3 cm). Both of these nodules warrant biopsy--the right lobe lesion because of size and its shape (taller > wide), and the left lobe nodule based on size. Fine-needle aspiration recommended for 4.3 cm right thyroid and 2.6 cm left thyroid solid nodules per SCOTT guidelines.  - Please obtain FNA while inpatient to evaluate these nodules. If they are found to be malignant, these would also be a factor to take into account when deciding about proceeding with a surgical approach.     Case discussed with Dr. Villafuerte. Primary team updated.       Yang Jarrell    Endocrinology Fellow    Service Pager: [phone]    SUBJECTIVE / INTERVAL HPI: Patient was seen and examined this morning. She continues to experience breast pain, but says that it has improved compared to previous days. Corrected calcium is 9.9 mg/dL. PTH has remained elevated despite increasing doses of Sensipar. Initial PTH was 479 (1/3/23). She was started on Sensipar 30 mg daily (1/9/23) with repeat PTH of 532 (1/11/23) and 351 (1/18/23). The Sensipar dose was increased to 60 mg daily (1/19/23) and repeat PTH level was 492 (1/24/23). The Sensipar dose has been recently increased by Nephrology to 90 mg daily. She has been tolerating therapy without any apparent side effects (eg, nausea, vomiting). Breast biopsy pathology resulted as epidermal and dermal necrosis with fibrinoid occlusion and necrosis of blood vessels, focal necrotizing suppurative inflammation involving the fibroadipose tissue and blood vessels in the subcutis. 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     CAPILLARY BLOOD GLUCOSE & INSULIN RECEIVED  95 mg/dL (01-25 @ 11:46)  91 mg/dL (01-25 @ 11:54)  95 mg/dL (01-26 @ 12:40)    PHYSICAL EXAM  Vital Signs Last 24 Hrs  T(C): 36.8 (26 Jan 2023 16:00), Max: 36.9 (25 Jan 2023 21:43)  T(F): 98.3 (26 Jan 2023 16:00), Max: 98.4 (25 Jan 2023 21:43)  HR: 86 (26 Jan 2023 13:00) (66 - 98)  BP: 103/49 (26 Jan 2023 13:00) (45/20 - 140/84)  BP(mean): 70 (26 Jan 2023 13:00) (28 - 108)  RR: 18 (26 Jan 2023 13:00) (18 - 20)  SpO2: 100% (26 Jan 2023 13:00) (95% - 100%)    Parameters below as of 26 Jan 2023 13:00  Patient On (Oxygen Delivery Method): nasal cannula  O2 Flow (L/min): 2    Constitutional: Awake, alert, obese female, in no acute distress.   HEENT: Normocephalic, atraumatic, ZOHAIB.  Respiratory: Lungs clear to ausculation bilaterally.   Cardiovascular: regular rhythm, normal S1 and S2, no audible murmurs.   GI: soft, non-tender, non-distended, bowel sounds present.  Extremities: No lower extremity edema. Right edematous arm wrapped around ACE bandage.   Psychiatric: AAO x 3.     LABS  CBC - WBC/HGB/HTC/PLT: 8.67/9.4/32.9/264 (01-24-23)  BMP - Na/K/Cl/Bicarb/BUN/Cr/Gluc/AG/eGFR: 132/4.6/93/27/45/7.02/91/12/6 (01-25-23)  Ca - 8.1 (01-25-23)  Phos - 3.8 (01-25-23)  Mg - 1.8 (01-25-23)  LFT - Alb/Tprot/Tbili/Dbili/AlkPhos/ALT/AST: 1.8/--/0.3/--/75/13/30 (01-23-23)  Thyroid Stimulating Hormone, Serum: 1.520 (01-24-23)  Total T4/Free T4: --/0.965 (01-24-23)    MEDICATIONS  MEDICATIONS  (STANDING):  acetaminophen     Tablet .. 975 milliGRAM(s) Oral every 8 hours  albumin human 25% IVPB 50 milliLiter(s) IV Intermittent every 1 hour  apixaban 2.5 milliGRAM(s) Oral every 12 hours  atorvastatin 40 milliGRAM(s) Oral at bedtime  chlorhexidine 2% Cloths 1 Application(s) Topical <User Schedule>  chlorhexidine 2% Cloths 1 Application(s) Topical <User Schedule>  cinacalcet 90 milliGRAM(s) Oral daily  clopidogrel Tablet 75 milliGRAM(s) Oral daily  colchicine 0.3 milliGRAM(s) Oral daily  collagenase Ointment 1 Application(s) Topical daily  fludroCORTISONE 0.2 milliGRAM(s) Oral every 24 hours  midodrine. 40 milliGRAM(s) Oral every 8 hours  Nephro-deborah 1 Tablet(s) Oral daily  petrolatum white Ointment 1 Application(s) Topical daily  polyethylene glycol 3350 17 Gram(s) Oral every 12 hours  senna 2 Tablet(s) Oral at bedtime  sevelamer carbonate 1600 milliGRAM(s) Oral three times a day with meals    MEDICATIONS  (PRN):  calamine/zinc oxide Lotion 1 Application(s) Topical three times a day PRN Itching  cyclobenzaprine 5 milliGRAM(s) Oral three times a day PRN Muscle Spasm  HYDROmorphone  Injectable 0.5 milliGRAM(s) IV Push every 3 hours PRN Moderate Pain (4 - 6)  HYDROmorphone  Injectable 1 milliGRAM(s) IV Push every 3 hours PRN Severe Pain (7 - 10)  lidocaine 4% Cream 1 Application(s) Topical three times a day PRN Breast pain  sodium chloride 0.9% lock flush 10 milliLiter(s) IV Push every 1 hour PRN Pre/post blood products, medications, blood draw, and to maintain line patency    ASSESSMENT / RECOMMENDATIONS  Ms. 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 In addition, she was complaining of bilateral breast pain (R>L). She was admitted for emergent hemodialysis. Her hospitalization was complicated by shock (unclear etiology), off pressors (since 1/16/23). Endocrinology was initially consulted for elevated PTH and multinodular thyroid. Work-up was suggestive of secondary hyperparathyroidism and she was started on Sensipar. However, despite increasing doses of the medication, she has continued to have elevated levels of PTH. 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 A thyroid ultrasound also was remarkable for multiple nodules meeting criteria for fine-needle aspiration.     A1C: 5.3 %  BUN: 45  Creatinine: 7.02  GFR: 6  Weight: 85.3  BMI: 27    # Secondary hyperparathyroidism   -  (1/24) <- 351 (1/18) <- 532 (1/11) <- 479 (1/3) [Started Sensipar on 1/9/23, increased to 60 mg on 1/19/23, then to 90 mg on 1/25/23]   - Corrected calcium is 9.9 mg/dL. Phosphorus 3.8.   - Immunofixation: No monoclonal band identified. Vitamin D 1,25 = 33.9. Vitamin D-25 = 38.5.  - Continue with Sensipar 90 mg daily.   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 Please obtain a 4DCT for further evaluation.     #Multinodular goiter  - TSH 1.52. Free T4 0.96 (1/24/23).   - CT showed enlarged multinodular thyroid projecting into superior mediastinum with 1.8 x 1.5 cm solid nodule slightly inferiorly in the anterior mediastinum.   - Thyroid ultrasound showed a moderately enlarged gland with heterogeneous echotexture. There were 3 nodules in the R lobe (largest 3.3 cm), one in the left lobe (2.3 cm). Both of these nodules warrant biopsy--the right lobe lesion because of size and its shape (taller > wide), and the left lobe nodule based on size. Fine-needle aspiration recommended for 4.3 cm right thyroid and 2.6 cm left thyroid solid nodules per [facility]OTT guidelines.  - Please obtain FNA while inpatient to evaluate these nodules. If they are found to be malignant, these would also be a factor to take into account when deciding about proceeding with a surgical approach.     Case discussed with Dr. Villafuerte. Primary team updated.       Yang Jarrell    Endocrinology Fellow    Service Pager: [phone]    SUBJECTIVE / INTERVAL HPI: Patient was seen and examined this morning. She continues to experience breast pain, but says that it has improved compared to previous days. Corrected calcium is 9.9 mg/dL. PTH has remained elevated despite increasing doses of Sensipar. Initial PTH was 479 (1/3/23). She was started on Sensipar 30 mg daily (1/9/23) with repeat PTH of 532 (1/11/23) and 351 (1/18/23). The Sensipar dose was increased to 60 mg daily (1/19/23) and repeat PTH level was 492 (1/24/23). The Sensipar dose has been recently increased by Nephrology to 90 mg daily. She has been tolerating therapy without any apparent side effects (eg, nausea, vomiting). Breast biopsy pathology resulted as epidermal and dermal necrosis with fibrinoid occlusion and necrosis of blood vessels, focal necrotizing suppurative inflammation involving the fibroadipose tissue and blood vessels in the subcutis. 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     CAPILLARY BLOOD GLUCOSE & INSULIN RECEIVED  95 mg/dL (01-25 @ 11:46)  91 mg/dL (01-25 @ 11:54)  95 mg/dL (01-26 @ 12:40)    PHYSICAL EXAM  Vital Signs Last 24 Hrs  T(C): 36.8 (26 Jan 2023 16:00), Max: 36.9 (25 Jan 2023 21:43)  T(F): 98.3 (26 Jan 2023 16:00), Max: 98.4 (25 Jan 2023 21:43)  HR: 86 (26 Jan 2023 13:00) (66 - 98)  BP: 103/49 (26 Jan 2023 13:00) (45/20 - 140/84)  BP(mean): 70 (26 Jan 2023 13:00) (28 - 108)  RR: 18 (26 Jan 2023 13:00) (18 - 20)  SpO2: 100% (26 Jan 2023 13:00) (95% - 100%)    Parameters below as of 26 Jan 2023 13:00  Patient On (Oxygen Delivery Method): nasal cannula  O2 Flow (L/min): 2    Constitutional: Awake, alert, obese female, in no acute distress.   HEENT: Normocephalic, atraumatic, ZOHAIB.  Respiratory: Lungs clear to ausculation bilaterally.   Cardiovascular: regular rhythm, normal S1 and S2, no audible murmurs.   GI: soft, non-tender, non-distended, bowel sounds present.  Extremities: No lower extremity edema. Right edematous arm wrapped around ACE bandage.   Psychiatric: AAO x 3.     LABS  CBC - WBC/HGB/HTC/PLT: 8.67/9.4/32.9/264 (01-24-23)  BMP - Na/K/Cl/Bicarb/BUN/Cr/Gluc/AG/eGFR: 132/4.6/93/27/45/7.02/91/12/6 (01-25-23)  Ca - 8.1 (01-25-23)  Phos - 3.8 (01-25-23)  Mg - 1.8 (01-25-23)  LFT - Alb/Tprot/Tbili/Dbili/AlkPhos/ALT/AST: 1.8/--/0.3/--/75/13/30 (01-23-23)  Thyroid Stimulating Hormone, Serum: 1.520 (01-24-23)  Total T4/Free T4: --/0.965 (01-24-23)    MEDICATIONS  MEDICATIONS  (STANDING):  acetaminophen     Tablet .. 975 milliGRAM(s) Oral every 8 hours  albumin human 25% IVPB 50 milliLiter(s) IV Intermittent every 1 hour  apixaban 2.5 milliGRAM(s) Oral every 12 hours  atorvastatin 40 milliGRAM(s) Oral at bedtime  chlorhexidine 2% Cloths 1 Application(s) Topical <User Schedule>  chlorhexidine 2% Cloths 1 Application(s) Topical <User Schedule>  cinacalcet 90 milliGRAM(s) Oral daily  clopidogrel Tablet 75 milliGRAM(s) Oral daily  colchicine 0.3 milliGRAM(s) Oral daily  collagenase Ointment 1 Application(s) Topical daily  fludroCORTISONE 0.2 milliGRAM(s) Oral every 24 hours  midodrine. 40 milliGRAM(s) Oral every 8 hours  Nephro-deborah 1 Tablet(s) Oral daily  petrolatum white Ointment 1 Application(s) Topical daily  polyethylene glycol 3350 17 Gram(s) Oral every 12 hours  senna 2 Tablet(s) Oral at bedtime  sevelamer carbonate 1600 milliGRAM(s) Oral three times a day with meals    MEDICATIONS  (PRN):  calamine/zinc oxide Lotion 1 Application(s) Topical three times a day PRN Itching  cyclobenzaprine 5 milliGRAM(s) Oral three times a day PRN Muscle Spasm  HYDROmorphone  Injectable 0.5 milliGRAM(s) IV Push every 3 hours PRN Moderate Pain (4 - 6)  HYDROmorphone  Injectable 1 milliGRAM(s) IV Push every 3 hours PRN Severe Pain (7 - 10)  lidocaine 4% Cream 1 Application(s) Topical three times a day PRN Breast pain  sodium chloride 0.9% lock flush 10 milliLiter(s) IV Push every 1 hour PRN Pre/post blood products, medications, blood draw, and to maintain line patency    ASSESSMENT / RECOMMENDATIONS  Ms. 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 In addition, she was complaining of bilateral breast pain (R>L). She was admitted for emergent hemodialysis. Her hospitalization was complicated by shock (unclear etiology), off pressors (since 1/16/23). Endocrinology was initially consulted for elevated PTH and multinodular thyroid. Work-up was suggestive of secondary hyperparathyroidism and she was started on Sensipar. However, despite increasing doses of the medication, she has continued to have elevated levels of PTH. 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 A thyroid ultrasound also was remarkable for multiple nodules meeting criteria for fine-needle aspiration.     A1C: 5.3 %  BUN: 45  Creatinine: 7.02  GFR: 6  Weight: 85.3  BMI: 27    # Secondary hyperparathyroidism   -  (1/24) <- 351 (1/18) <- 532 (1/11) <- 479 (1/3) [Started Sensipar on 1/9/23, increased to 60 mg on 1/19/23, then to 90 mg on 1/25/23]   - Corrected calcium is 9.9 mg/dL. Phosphorus 3.8.   - Immunofixation: No monoclonal band identified. Vitamin D 1,25 = 33.9. Vitamin D-25 = 38.5.  - Continue with Sensipar 90 mg daily.   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 Please obtain a 4DCT scan for further evaluation.     #Multinodular goiter  - TSH 1.52. Free T4 0.96 (1/24/23).   - CT showed enlarged multinodular thyroid projecting into superior mediastinum with 1.8 x 1.5 cm solid nodule slightly inferiorly in the anterior mediastinum.   - Thyroid ultrasound showed a moderately enlarged gland with heterogeneous echotexture. There were 3 nodules in the R lobe (largest 3.3 cm), one in the left lobe (2.3 cm). Both of these nodules warrant biopsy--the right lobe lesion because of size and its shape (taller > wide), and the left lobe nodule based on size. Fine-needle aspiration recommended for 4.3 cm right thyroid and 2.6 cm left thyroid solid nodules per SCOTT guidelines.  - Please obtain FNA while inpatient to evaluate these nodules. If they are found to be malignant, these would also be a factor to take into account when deciding about proceeding with a surgical approach.     Case discussed with Dr. Villafuerte. Primary team updated.       Yang Jarrell    Endocrinology Fellow    Service Pager: [phone]

## 2023-01-26 NOTE — PROGRESS NOTE ADULT - ASSESSMENT
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    Assessment/Plan:   #ESRD on HD TTS  Unless HD needed on 1/27, will arrange for 1/28 so patient can be back on her schedule  K reviewed 4.6  Renal diet    #Hx of Hypertension  Has had profound hypotension during this admission requiring pressors and now on florinef 0.2mg as well as midodrine 40mg q8h  -Gets midodrine 1/2 hr before HD  -Also started on florinef 0.2mg  -HD with colder dialysate and albumin PRN    #access   [facility] TDC c/d/i  RUE AVF not being used    #anemia  Hgb 9.4  -Epo w/ HD  -Transfusion goals as per primary team  -Iron profile noted    #renal bone disease   Noted to have brown tumors on most recent CT, in addition to a lytic lesion of her skull. Likely due to hyperparathyroidism. BX results per note with epidermal and dermal necrosis w/ fibrinoid occulusion and necrosis of blood vessels, focla necrotizing suppurative inflammation. 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   Ca ~9.7 corrected for albumin  Phos 3.3  -c/w sevelamer to 1600mg tid w/ meals  -c/w sensipar 90mg Qday  -Please speak with endocrinology in regards to imaging of the parathyroid; may need to consider a parathyroidectomy in the future..

## 2023-01-26 NOTE — PROGRESS NOTE ADULT - PROBLEM SELECTOR PLAN 7
At risk for secondary HPTT d/t renal failure. Pt has vague abdominal pain w/ nausea. Concern for possible brown tumors on CT with multiple fractures of pubic rami, pubic bone, thoracolumbar spine. Intact , Vit D 38.5. SPEP/immunofixation negative. Per Endo, likely secondary hyperparathyroidism vs other process causing osteoclastic tumors.  - c/w Sensipar 60 mg PO daily   - 1/11 AM  intact (baseline PTH on Sensipar)    #Multinodular Goiter  CT (1/3/23): Enlarged multinodular thyroid projecting into superior mediastinum w/ 1.8 x 1.5 cm solid nodule slightly inferiorly in the anterior mediastinum.   Thyroid US (1/4/23): Fine-needle aspiration recommended for 4.3 cm right thyroid and 2.6 cm left thyroid solid nodules per SCOTT guidelines.  - TSH 1.6 free T4 0.68. TSH could be inappropriately normal for low free T4 due to euthyroid sick syndrome.  - f/u TFTs after shock resolved  - Pt will eventually need FNA of thyroid nodules At risk for secondary HPTT d/t renal failure. Pt has vague abdominal pain w/ nausea. Concern for possible brown tumors on CT with multiple fractures of pubic rami, pubic bone, thoracolumbar spine. Intact , Vit D 38.5. SPEP/immunofixation negative. Per Endo, likely secondary hyperparathyroidism vs other process causing osteoclastic tumors.  - c/w Sensipar 90 mg PO daily   - 1/11 AM  intact (baseline PTH on Sensipar)    #Multinodular Goiter  CT (1/3/23): Enlarged multinodular thyroid projecting into superior mediastinum w/ 1.8 x 1.5 cm solid nodule slightly inferiorly in the anterior mediastinum.   Thyroid US (1/4/23): Fine-needle aspiration recommended for 4.3 cm right thyroid and 2.6 cm left thyroid solid nodules per SCOTT guidelines.  - TSH 1.6 free T4 0.68. TSH could be inappropriately normal for low free T4 due to euthyroid sick syndrome.  - f/u TFTs after shock resolved  - Pt will eventually need FNA of thyroid nodules

## 2023-01-26 NOTE — PROGRESS NOTE ADULT - SUBJECTIVE AND OBJECTIVE BOX
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      EVENTS:   - Biopsy taken uneventfully at bedside 1/9/23- final pathology report showing epidermal/dermal necrosis with fibrinoid occlusion and necrosis of blood vessels, focal necrotizing suppurative inflammation involving the fibroadipose tissue and blood vessels in the subcutis.   - Afebrile x 54 hrs  - Last dose of vancomycin 1/21    SUBJECTIVE:   - Notes she feels ok this morning but having mild bilateral breast discomfort  - NO worsening drainage   - Denies CP, SOB      MEDICATIONS  (STANDING):  acetaminophen     Tablet .. 975 milliGRAM(s) Oral every 8 hours  albumin human 25% IVPB 50 milliLiter(s) IV Intermittent every 1 hour  apixaban 2.5 milliGRAM(s) Oral every 12 hours  atorvastatin 40 milliGRAM(s) Oral at bedtime  chlorhexidine 2% Cloths 1 Application(s) Topical <User Schedule>  chlorhexidine 2% Cloths 1 Application(s) Topical <User Schedule>  cinacalcet 90 milliGRAM(s) Oral daily  clopidogrel Tablet 75 milliGRAM(s) Oral daily  colchicine 0.3 milliGRAM(s) Oral daily  collagenase Ointment 1 Application(s) Topical daily  fludroCORTISONE 0.2 milliGRAM(s) Oral every 24 hours  midodrine. 40 milliGRAM(s) Oral every 8 hours  Nephro-deborah 1 Tablet(s) Oral daily  petrolatum white Ointment 1 Application(s) Topical daily  polyethylene glycol 3350 17 Gram(s) Oral every 12 hours  senna 2 Tablet(s) Oral at bedtime  sevelamer carbonate 1600 milliGRAM(s) Oral three times a day with meals    MEDICATIONS  (PRN):  calamine/zinc oxide Lotion 1 Application(s) Topical three times a day PRN Itching  cyclobenzaprine 5 milliGRAM(s) Oral three times a day PRN Muscle Spasm  HYDROmorphone  Injectable 0.5 milliGRAM(s) IV Push every 3 hours PRN Moderate Pain (4 - 6)  HYDROmorphone  Injectable 1 milliGRAM(s) IV Push every 3 hours PRN Severe Pain (7 - 10)  lidocaine 4% Cream 1 Application(s) Topical three times a day PRN Breast pain  sodium chloride 0.9% lock flush 10 milliLiter(s) IV Push every 1 hour PRN Pre/post blood products, medications, blood draw, and to maintain line patency      Vital Signs Last 24 Hrs  T(C): 36.9 (26 Jan 2023 00:58), Max: 36.9 (25 Jan 2023 21:43)  T(F): 98.4 (26 Jan 2023 00:58), Max: 98.4 (25 Jan 2023 21:43)  HR: 71 (26 Jan 2023 06:03) (61 - 98)  BP: 79/42 (26 Jan 2023 04:34) (79/42 - 140/84)  BP(mean): 57 (26 Jan 2023 04:34) (55 - 108)  RR: 20 (26 Jan 2023 06:03) (17 - 20)  SpO2: 100% (26 Jan 2023 06:03) (74% - 100%)    Parameters below as of 26 Jan 2023 06:03  Patient On (Oxygen Delivery Method): BiPAP/CPAP    O2 Concentration (%): 40      Gen: Awake, alert, NAD, resting comfortably   Breast: non-soiled gauze overlying incisional bx site on R breast, mild induration surrounding bx site stable. B/l breasts soft with underlying firmness, mildly tender to palpation this morning. Fissures and maceration of skin stable.  CV: RRR  Pulm: no respiratory distress on RA  Abd: soft, ND, NTTP, no rebound or guarding   Ext: WWP, ulceration of medial aspect of R arm over access site. L chest HD catheter without edema/induration/purulence.     I&O's Detail    25 Jan 2023 07:01  -  26 Jan 2023 07:00  --------------------------------------------------------  IN:    Other (mL): 400 mL  Total IN: 400 mL    OUT:    Other (mL): 1400 mL    Stool (mL): 1 mL  Total OUT: 1401 mL    Total NET: -1001 mL          LABS:    01-25    132<L>  |  93<L>  |  45<H>  ----------------------------<  91  4.6   |  27  |  7.02<H>    Ca    8.1<L>      25 Jan 2023 11:54  Phos  3.8     01-25  Mg     1.8     01-25

## 2023-01-26 NOTE — PROGRESS NOTE ADULT - ATTENDING COMMENTS
MAP remains greater than 55. tolerated HD. to get peripheral IV or PICC by IR today. D/C femoral line. Awaiting placement.

## 2023-01-26 NOTE — PROGRESS NOTE ADULT - SUBJECTIVE AND OBJECTIVE BOX
**Incomplete Note**   CC: Patient is a 67y old  Female who presents with a chief complaint of emergent hemodialysis (23 Jan 2023 13:28)      INTERVAL EVENTS: Benadryl for itching. MAPs remained in 55s no change in mentation, arousable but asymptomatic.    SUBJECTIVE / INTERVAL HPI: Patient seen and examined at bedside. Reports R breast pain. Denies fever, chest pain, shortness of breath, leg pain,     ROS: negative unless otherwise stated above.    VITAL SIGNS:  Vital Signs Last 24 Hrs  T(C): 36.2 (26 Jan 2023 14:00), Max: 36.9 (25 Jan 2023 21:43)  T(F): 97.2 (26 Jan 2023 14:00), Max: 98.4 (25 Jan 2023 21:43)  HR: 86 (26 Jan 2023 13:00) (66 - 98)  BP: 103/49 (26 Jan 2023 13:00) (45/20 - 140/84)  BP(mean): 70 (26 Jan 2023 13:00) (28 - 108)  RR: 18 (26 Jan 2023 13:00) (18 - 20)  SpO2: 100% (26 Jan 2023 13:00) (95% - 100%)    Parameters below as of 26 Jan 2023 13:00  Patient On (Oxygen Delivery Method): nasal cannula  O2 Flow (L/min): 2        01-25-23 @ 07:01  -  01-26-23 @ 07:00  --------------------------------------------------------  IN: 400 mL / OUT: 1401 mL / NET: -1001 mL        PHYSICAL EXAM:  General: NAD, obese female  HEENT: MMM  Neck: supple  Cardiovascular: +S1/S2; RRR  Breast: erythematous edematous L breast; R breast wrapped in guaze  Respiratory: CTA B/L; no W/R/R; on NC  Gastrointestinal: soft, NT/ND  Extremities: WWP; R arm wrapped in guaze with edematous hand; pitting edema of the bilateral legs up to midshin  Vascular: 2+ radial, DP/PT pulses B/L  Neurological: AAOx3; no focal deficits      MEDICATIONS:  MEDICATIONS  (STANDING):  acetaminophen     Tablet .. 975 milliGRAM(s) Oral every 8 hours  albumin human 25% IVPB 50 milliLiter(s) IV Intermittent every 1 hour  apixaban 2.5 milliGRAM(s) Oral every 12 hours  atorvastatin 40 milliGRAM(s) Oral at bedtime  chlorhexidine 2% Cloths 1 Application(s) Topical <User Schedule>  chlorhexidine 2% Cloths 1 Application(s) Topical <User Schedule>  cinacalcet 90 milliGRAM(s) Oral daily  clopidogrel Tablet 75 milliGRAM(s) Oral daily  colchicine 0.3 milliGRAM(s) Oral daily  collagenase Ointment 1 Application(s) Topical daily  fludroCORTISONE 0.2 milliGRAM(s) Oral every 24 hours  midodrine. 40 milliGRAM(s) Oral every 8 hours  Nephro-deborah 1 Tablet(s) Oral daily  petrolatum white Ointment 1 Application(s) Topical daily  polyethylene glycol 3350 17 Gram(s) Oral every 12 hours  senna 2 Tablet(s) Oral at bedtime  sevelamer carbonate 1600 milliGRAM(s) Oral three times a day with meals    MEDICATIONS  (PRN):  calamine/zinc oxide Lotion 1 Application(s) Topical three times a day PRN Itching  cyclobenzaprine 5 milliGRAM(s) Oral three times a day PRN Muscle Spasm  HYDROmorphone  Injectable 0.5 milliGRAM(s) IV Push every 3 hours PRN Moderate Pain (4 - 6)  HYDROmorphone  Injectable 1 milliGRAM(s) IV Push every 3 hours PRN Severe Pain (7 - 10)  lidocaine 4% Cream 1 Application(s) Topical three times a day PRN Breast pain  sodium chloride 0.9% lock flush 10 milliLiter(s) IV Push every 1 hour PRN Pre/post blood products, medications, blood draw, and to maintain line patency      ALLERGIES:  Allergies    Ancef (Rash; Urticaria)  DDAVP (Hypotension)  iodine (Hives; Pruritus)  penicillin (Swelling)  sulfa drugs (Angioedema)    Intolerances        LABS:    01-25    132<L>  |  93<L>  |  45<H>  ----------------------------<  91  4.6   |  27  |  7.02<H>    Ca    8.1<L>      25 Jan 2023 11:54  Phos  3.8     01-25  Mg     1.8     01-25          CAPILLARY BLOOD GLUCOSE      POCT Blood Glucose.: 95 mg/dL (26 Jan 2023 12:40)      RADIOLOGY & ADDITIONAL TESTS: Reviewed.

## 2023-01-27 NOTE — PROGRESS NOTE ADULT - ATTENDING COMMENTS
have reviewed status and discussed input from endocrine.   agree with findings and recommendations.   will await and hope to review pathology, and discuss progress to imaging as discussed.

## 2023-01-27 NOTE — PROGRESS NOTE ADULT - SUBJECTIVE AND OBJECTIVE BOX
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 Pt s/p vancomycin x2 wks (last day 1/15) for presumed breast cellulitis. Course has been c/b recurrent fevers, most recently on 1/17 & 1/21 (BCx NGTD, restarted vancomycin 1/21-). Multiple services following throughout hospital course. Surgery had been consulted for breast masses/breast cellulitis. 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 Per surgery, no surgical intervention. Derm consulted for b/l breast skin changes, recommend wedge bx of normal and pathologic tissue; also currently being followed by wound care. Vascular consulted for initial concern for RUE AV fistula infection, does not think it is infected. Endocrine following for management of hyperparathyroidism. Gyn/onc consulted for R ovarian cyst and elevated tumor markers, likely benign, recommended outpatient follow up. Pt was stepped down to 7LA on 1/18. On 1/19 AM, BP check on b/l legs demonstrated BPs 60s/30s, asymptomatic, Hb 6.8, 1U pRBC ordered, and pt was stepped up to MICU. In the MICU, MAPs > 65 without pressors so pt was stepped back down to 7LA. On 7LA, pt was transitioned to PO pain medications, midodrine dose was increased, and pt underwent CTA head to r/o aneurysms i/s/o polycystic kidney disease which was negative. Of note, pt had R femoral line removed on 1/18 i/s/o fever and had L femoral line placed 1/18, removed on 1/26. PICC placed by IR on 1/26, currently the only access site.      INTERVAL EVENTS: SHAISTA    SUBJECTIVE / INTERVAL HPI: Patient seen and examined at bedside. States some pain in R breast and chronic SOB. Denies headache, fever, chills, SOB, changes in bowel function.      ROS: negative unless otherwise stated above.    VITAL SIGNS:  Vital Signs Last 24 Hrs  T(C): 36.5 (27 Jan 2023 10:00), Max: 37 (27 Jan 2023 01:00)  T(F): 97.7 (27 Jan 2023 10:00), Max: 98.6 (27 Jan 2023 01:00)  HR: 93 (27 Jan 2023 11:48) (66 - 128)  BP: 118/64 (27 Jan 2023 11:25) (103/49 - 135/56)  BP(mean): 83 (27 Jan 2023 11:25) (63 - 84)  RR: 15 (27 Jan 2023 09:51) (15 - 22)  SpO2: 96% (27 Jan 2023 11:25) (90% - 100%)    Parameters below as of 27 Jan 2023 11:25  Patient On (Oxygen Delivery Method): nasal cannula  O2 Flow (L/min): 4          PHYSICAL EXAM:  General: NAD, obese female  HEENT: MMM  Neck: supple  Cardiovascular: +S1/S2; RRR  Breast: erythematous edematous L breast; R breast wrapped in guaze  Respiratory: CTA B/L; no W/R/R; on NC  Gastrointestinal: soft, NT/ND  Extremities: WWP; R arm wrapped in guaze with edematous hand; pitting edema of the bilateral ankles  Vascular: 2+ radial, DP/PT pulses B/L  Neurological: AAOx3; no focal deficits      MEDICATIONS:  MEDICATIONS  (STANDING):  acetaminophen     Tablet .. 975 milliGRAM(s) Oral every 8 hours  albumin human 25% IVPB 50 milliLiter(s) IV Intermittent every 1 hour  apixaban 2.5 milliGRAM(s) Oral every 12 hours  atorvastatin 40 milliGRAM(s) Oral at bedtime  chlorhexidine 2% Cloths 1 Application(s) Topical <User Schedule>  chlorhexidine 2% Cloths 1 Application(s) Topical <User Schedule>  cinacalcet 90 milliGRAM(s) Oral daily  clopidogrel Tablet 75 milliGRAM(s) Oral daily  colchicine 0.3 milliGRAM(s) Oral daily  collagenase Ointment 1 Application(s) Topical daily  epoetin александр-epbx (RETACRIT) Injectable 8000 Unit(s) IV Push once  fludroCORTISONE 0.2 milliGRAM(s) Oral every 24 hours  midodrine. 40 milliGRAM(s) Oral every 8 hours  Nephro-deborah 1 Tablet(s) Oral daily  petrolatum white Ointment 1 Application(s) Topical daily  polyethylene glycol 3350 17 Gram(s) Oral every 12 hours  predniSONE   Tablet 50 milliGRAM(s) Oral once  senna 2 Tablet(s) Oral at bedtime  sevelamer carbonate 1600 milliGRAM(s) Oral three times a day with meals    MEDICATIONS  (PRN):  calamine/zinc oxide Lotion 1 Application(s) Topical three times a day PRN Itching  cyclobenzaprine 5 milliGRAM(s) Oral three times a day PRN Muscle Spasm  HYDROmorphone  Injectable 0.5 milliGRAM(s) IV Push every 3 hours PRN Moderate Pain (4 - 6)  HYDROmorphone  Injectable 1 milliGRAM(s) IV Push every 3 hours PRN Severe Pain (7 - 10)  lidocaine 4% Cream 1 Application(s) Topical three times a day PRN Breast pain  sodium chloride 0.9% lock flush 10 milliLiter(s) IV Push every 1 hour PRN Pre/post blood products, medications, blood draw, and to maintain line patency      ALLERGIES:  Allergies    Ancef (Rash; Urticaria)  DDAVP (Hypotension)  iodine (Hives; Pruritus)  penicillin (Swelling)  sulfa drugs (Angioedema)    Intolerances        LABS:              CAPILLARY BLOOD GLUCOSE      POCT Blood Glucose.: 131 mg/dL (27 Jan 2023 11:39)      RADIOLOGY & ADDITIONAL TESTS: Reviewed.

## 2023-01-27 NOTE — PROGRESS NOTE ADULT - NS ATTEND AMEND GEN_ALL_CORE FT
67y old Female with a history of HFrEF, CAD, NICM, HTN, HLD, ESRD on HD, PE, PAD, AS. 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 Pt evaluated for pain control. Chart reviewed, medications/allergies reviewed. Pain management options discussed, q's addressed satisfactorily. We will continue the comprehensive medical regimen and titrate to pain control and side effects. We will follow up. Dr. Camejo

## 2023-01-27 NOTE — PROGRESS NOTE ADULT - PROBLEM SELECTOR PLAN 4
Current DVT of RIJ, R subclavian and R axillary veins. Hx of L AVF clotting. Given hx of clots, current pruritis and gallium scan uptake in chest wall, reasonable to workup coagulopathy. OBDULIA, ANCA, complement, RF, beta2 glycoprotein, anticardiolipin wnl.  - s/p heparin gtt  - c/w Eliquis 2.5 mg PO BID

## 2023-01-27 NOTE — DISCHARGE NOTE PROVIDER - NSDCFUSCHEDAPPT_GEN_ALL_CORE_FT
Joseph Priest  [facility]  ENDOCRIN [address]  Scheduled Appointment: 04/07/2023     Chidi Choe  [facility] Physician [facility]  OTOLARYNG [address]  Scheduled Appointment: 02/15/2023    Joseph Priest  [facility] Physician [facility] [address]  Scheduled Appointment: 04/07/2023     Chidi Choe  [facility]  OTOLARYNG [address]  Scheduled Appointment: 02/15/2023    Amilcar Hatch  [facility]  BREAST [address]  Scheduled Appointment: 04/05/2023    Joseph Priest  [facility]  ENDOCRIN [address]  Scheduled Appointment: 04/07/2023     Chidi Choe  [facility] Physician [facility]  OTOLARYNG [address]  Scheduled Appointment: 02/15/2023    Jena Gillette  [facility]  HEARTVASC [address]  Scheduled Appointment: 03/07/2023    Amilcar Hatch  [facility]  BREAST [address]  Scheduled Appointment: 04/05/2023    Joseph Priest  [facility] Physician [facility]  ENDOCRIN [address]  Scheduled Appointment: 04/07/2023

## 2023-01-27 NOTE — DISCHARGE NOTE PROVIDER - CARE PROVIDER_API CALL
Chidi Sweet)  [facility] Surgery; Otolaryngology  [address] [place] [address]  Phone: [phone]  Fax: [phone]  Follow Up Time:     Jena Gillette)  Cardiovascular Disease; Internal Medicine  [address], [address]  [place] [address]  Phone: [phone]  Fax: [phone]  Follow Up Time:     Joey Buckley)  Surgery; Vascular Surgery  [address] [facility], [address]nd [place] [address]  Phone: [phone]  Fax: [phone]  Follow Up Time:    Chidi Sweet)  [facility] Surgery; Otolaryngology  [address] [place] [address]  Phone: [phone]  Fax: [phone]  Follow Up Time:     Jena Gillette)  Cardiovascular Disease; Internal Medicine  [address], [address]  [place] [address]  Phone: [phone]  Fax: [phone]  Follow Up Time:     Joey Buckley)  Surgery; Vascular Surgery  [address] [facility], [address] [place] [address]  Phone: [phone]  Fax: [phone]  Follow Up Time:     Ravinder Villafuerte)  EndocrinologyMetabDiabetes; Internal Medicine  [address] [address]  Phone: [phone]  Fax: [phone]  Follow Up Time:    Chidi Sweet)  [facility] Surgery; Otolaryngology  [address] [place] [address]  Phone: [phone]  Fax: [phone]  Follow Up Time:     Jena Gillette)  Cardiovascular Disease; Internal Medicine  [address]  [place] [address]  Phone: [phone]  Fax: [phone]  Follow Up Time:     Joey Buckley)  Surgery; Vascular Surgery  [address] [facility], [address] [place] [address]  Phone: [phone]  Fax: [phone]  Follow Up Time:     Ravinder Villafuerte)  EndocrinologyMetabDiabetes; Internal Medicine  [address] [address]  Phone: [phone]  Fax: [phone]  Follow Up Time:     Amilcar Hatch)  Surgery  [address] [address]  Phone: [phone]  Fax: [phone]  Follow Up Time:    Chidi Sweet)  [facility] Surgery; Otolaryngology  [address] [place] [address]  Phone: [phone]  Fax: [phone]  Scheduled Appointment: 02/15/2023    Jena Gillette)  Cardiovascular Disease; Internal Medicine  [address]  [place] [address]  Phone: [phone]  Fax: [phone]  Scheduled Appointment: 03/07/2023 04:00 PM    Joey Buclkey)  Surgery; Vascular Surgery  [address] [place] [address], [address] [place] [address]  Phone: [phone]  Fax: [phone]  Follow Up Time:     Ravinder Villafuerte)  EndocrinologyMetabDiabetes; Internal Medicine  [address] [address]  Phone: [phone]  Fax: [phone]  Follow Up Time:     Amilcar Hatch)  Surgery  [address] [address]  Phone: [phone]  Fax: [phone]  Scheduled Appointment: 04/05/2023 10:00 AM

## 2023-01-27 NOTE — PROGRESS NOTE ADULT - PROBLEM SELECTOR PLAN 3
Pt with recurrent fevers, most recently T 101.6 1/21. Unclear etiology. BCx 1/3, 1/9, 1/17 negative. s/p vancomycin x2 weeks (1/2-1/15). Per Surgery, R breast wound unlikely to be source of infection. Per Vascular Surgery, AV fistula fluid collection unlikely to be source of infection.   - R femoral line removed on 1/18; L femoral line placed on 1/18 (CURRENTLY ONLY SITE OF ACCESS)   - PICC placed on 1/26 by IR  - monitor BCx from 1/21 -- NGTD   - monitor CBC and vitals    #RUE fluid collection/RUE edema:  - CT (1/3/23): 7.0 cm fluid collection is present near AV fistula in the RUE. Possibly abscess. Drained by IR 1/5/23 with serosanguinous fluid: no organisms, few WBCs  - RUE dopplers (1/13/23): No significant change large complex fluid collection in proximal R arm, could represent old seroma/hematoma (however, prior ultrasound was done prior to drainage)     Plan:   - Per Vascular: AV fistula unlikely to be infected; would not remove it; also would NOT recommend drainage of seroma.  - Per Wound Care, c/w BID dressing changes (once daily with Santyl DSD, once daily with Bactroban DSD). Apply light ace wrap compression to right hand and forearm to help with soft tissue swelling and elevate RUE with pillows Pt with recurrent fevers, most recently T 101.6 1/21. Unclear etiology. BCx 1/3, 1/9, 1/17 negative. s/p vancomycin x2 weeks (1/2-1/15). Per Surgery, R breast wound unlikely to be source of infection. Per Vascular Surgery, AV fistula fluid collection unlikely to be source of infection.   - R femoral line removed on 1/18; L femoral line removed on 1/26  - PICC placed on 1/26 by IR  - monitor BCx from 1/21 -- NGTD     #RUE fluid collection/RUE edema:  - CT (1/3/23): 7.0 cm fluid collection is present near AV fistula in the RUE. Possibly abscess. Drained by IR 1/5/23 with serosanguinous fluid: no organisms, few WBCs  - RUE dopplers (1/13/23): No significant change large complex fluid collection in proximal R arm, could represent old seroma/hematoma (however, prior ultrasound was done prior to drainage)     Plan:   - Per Vascular: AV fistula unlikely to be infected; would not remove it; also would NOT recommend seroma drainage   - Per Wound Care, c/w BID dressing changes (once daily with Santyl DSD, once daily with Bactroban DSD). Apply light ace wrap compression to right hand and forearm to help with soft tissue swelling and elevate RUE with pillows

## 2023-01-27 NOTE — PROGRESS NOTE ADULT - SUBJECTIVE AND OBJECTIVE BOX
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 Pt s/p vancomycin x2 wks (last day 1/15) for presumed breast cellulitis. Course has been c/b recurrent fevers, most recently on 1/17 & 1/21 (BCx NGTD, restarted vancomycin 1/21). Multiple services following throughout hospital course. Surgery had been consulted for breast masses/breast cellulitis; s/p R breast bx with prelim read vasculitis vs granuloma, pending final read. Derm had also been consulted for b/l breast skin changes, recommend wedge bx of normal and pathologic tissue; also currently being followed by wound care. Vascular had been consulted for initial concern for RUE AV fistula infection, does not think it is infected. Endocrine following for management of hyperparathyroidism. Gyn/onc had been consulted for R ovarian cyst and elevated tumor markers, likely benign, recommended outpatient follow up. Pt was stepped down to 7LA on 1/18. On 1/19 AM, BP check on b/l legs demonstrated BPs 60s/30s, asymptomatic, Hb 6.8, 1U pRBC ordered, and pt was stepped up to MICU. In the MICU, MAPs > 65 without pressors so pt was stepped back down to 7LA. On 7LA, pt was transitioned to PO pain medications, midodrine dose was increased, and pt underwent CTA head to r/o aneurysms i/s/o polycystic kidney disease which was negative. Of note, pt had R femoral line removed on 1/18 i/s/o fever and had L femoral line placed 1/18 which is currently only site of access.    O/N Events:    Subjective/ROS: Patient seen and examined at bedside.     Denies Fever/Chills, HA, CP, SOB, n/v, changes in bowel/urinary habits.  12pt ROS otherwise negative.    VITALS  Vital Signs Last 24 Hrs  T(C): 36.5 (27 Jan 2023 10:00), Max: 37 (27 Jan 2023 01:00)  T(F): 97.7 (27 Jan 2023 10:00), Max: 98.6 (27 Jan 2023 01:00)  HR: 72 (27 Jan 2023 09:51) (66 - 96)  BP: 109/54 (27 Jan 2023 09:51) (45/20 - 135/56)  BP(mean): 63 (27 Jan 2023 09:51) (28 - 84)  RR: 15 (27 Jan 2023 09:51) (15 - 22)  SpO2: 100% (27 Jan 2023 09:51) (90% - 100%)    Parameters below as of 27 Jan 2023 09:51  Patient On (Oxygen Delivery Method): nasal cannula  O2 Flow (L/min): 4      CAPILLARY BLOOD GLUCOSE      POCT Blood Glucose.: 95 mg/dL (26 Jan 2023 12:40)      PHYSICAL EXAM  General: NAD  Head: NC/AT; MMM; PERRL; EOMI;  Neck: Supple; no JVD  Respiratory: CTAB; no wheezes/rales/rhonchi  Cardiovascular: Regular rhythm/rate; S1/S2+, no murmurs, rubs gallops   Gastrointestinal: Soft; NTND; bowel sounds normal and present  Extremities: WWP; no edema/cyanosis  Neurological: A&Ox3, CNII-XII grossly intact; no obvious focal deficits    MEDICATIONS  (STANDING):  acetaminophen     Tablet .. 975 milliGRAM(s) Oral every 8 hours  albumin human 25% IVPB 50 milliLiter(s) IV Intermittent every 1 hour  apixaban 2.5 milliGRAM(s) Oral every 12 hours  atorvastatin 40 milliGRAM(s) Oral at bedtime  chlorhexidine 2% Cloths 1 Application(s) Topical <User Schedule>  chlorhexidine 2% Cloths 1 Application(s) Topical <User Schedule>  cinacalcet 90 milliGRAM(s) Oral daily  clopidogrel Tablet 75 milliGRAM(s) Oral daily  colchicine 0.3 milliGRAM(s) Oral daily  collagenase Ointment 1 Application(s) Topical daily  epoetin александр-epbx (RETACRIT) Injectable 8000 Unit(s) IV Push once  fludroCORTISONE 0.2 milliGRAM(s) Oral every 24 hours  midodrine. 40 milliGRAM(s) Oral every 8 hours  Nephro-deborah 1 Tablet(s) Oral daily  petrolatum white Ointment 1 Application(s) Topical daily  polyethylene glycol 3350 17 Gram(s) Oral every 12 hours  senna 2 Tablet(s) Oral at bedtime  sevelamer carbonate 1600 milliGRAM(s) Oral three times a day with meals    MEDICATIONS  (PRN):  calamine/zinc oxide Lotion 1 Application(s) Topical three times a day PRN Itching  cyclobenzaprine 5 milliGRAM(s) Oral three times a day PRN Muscle Spasm  HYDROmorphone  Injectable 1 milliGRAM(s) IV Push every 3 hours PRN Severe Pain (7 - 10)  HYDROmorphone  Injectable 0.5 milliGRAM(s) IV Push every 3 hours PRN Moderate Pain (4 - 6)  lidocaine 4% Cream 1 Application(s) Topical three times a day PRN Breast pain  sodium chloride 0.9% lock flush 10 milliLiter(s) IV Push every 1 hour PRN Pre/post blood products, medications, blood draw, and to maintain line patency      Ancef (Rash; Urticaria)  DDAVP (Hypotension)  iodine (Hives; Pruritus)  penicillin (Swelling)  sulfa drugs (Angioedema)      LABS    01-25    132<L>  |  93<L>  |  45<H>  ----------------------------<  91  4.6   |  27  |  7.02<H>    Ca    8.1<L>      25 Jan 2023 11:54  Phos  3.8     01-25  Mg     1.8     01-25                  IMAGING/EKG/ETC   TRANSFER NOTE STEPDOWN FROM TELEMETRY TO [facility]    Hospital course: 67 F w/ PMH HFrEF (EF 40%), nonobstructive CAD, non-ischemic cardiomyopathy, HTN, HLD, ESRD 2/2 polycystic kidney disease on HD, PE (2018) s/p IVC filter, mild AS, mild MR, PAD, OA, h/o thrombus in vascular access x3 (R AVG, R AVF, L AVG), ventral hernia, 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 Pt s/p vancomycin x2 wks (last day 1/15) for presumed breast cellulitis. Course has been c/b recurrent fevers, most recently on 1/17 & 1/21 (BCx NGTD, restarted vancomycin 1/21). Multiple services following throughout hospital course. Surgery had been consulted for breast masses/breast cellulitis; s/p R breast bx with prelim read vasculitis vs granuloma, pending final read. Derm had also been consulted for b/l breast skin changes, recommend wedge bx of normal and pathologic tissue; also currently being followed by wound care. Vascular had been consulted for initial concern for RUE AV fistula infection, does not think it is infected. Endocrine following for management of hyperparathyroidism. Gyn/onc had been consulted for R ovarian cyst and elevated tumor markers, likely benign, recommended outpatient follow up. Pt was stepped down to 7LA on 1/18. On 1/19 AM, BP check on b/l legs demonstrated BPs 60s/30s, asymptomatic, Hb 6.8, 1U pRBC ordered, and pt was stepped up to MICU. In the MICU, MAPs > 65 without pressors so pt was stepped back down to 7LA. On 7LA, pt was transitioned to PO pain medications, midodrine dose was increased, and pt underwent CTA head to r/o aneurysms i/s/o polycystic kidney disease which was negative. Of note, pt had R femoral line removed on 1/18 i/s/o fever and had L femoral line placed 1/18. Pain management consulted for appropriate pain regimen. IR placed PICC line in LUE on 1/26. Stepped down to RMF on 1/27.     O/N Events: SHAISTA. Given benadryl for generalized itching this morning.     Subjective/ROS: Patient seen and examined at bedside.     Denies Fever/Chills, HA, CP, SOB, n/v, changes in bowel/urinary habits.  12pt ROS otherwise negative.    VITALS  Vital Signs Last 24 Hrs  T(C): 36.5 (27 Jan 2023 10:00), Max: 37 (27 Jan 2023 01:00)  T(F): 97.7 (27 Jan 2023 10:00), Max: 98.6 (27 Jan 2023 01:00)  HR: 72 (27 Jan 2023 09:51) (66 - 96)  BP: 109/54 (27 Jan 2023 09:51) (45/20 - 135/56)  BP(mean): 63 (27 Jan 2023 09:51) (28 - 84)  RR: 15 (27 Jan 2023 09:51) (15 - 22)  SpO2: 100% (27 Jan 2023 09:51) (90% - 100%)    Parameters below as of 27 Jan 2023 09:51  Patient On (Oxygen Delivery Method): nasal cannula  O2 Flow (L/min): 4      CAPILLARY BLOOD GLUCOSE      POCT Blood Glucose.: 95 mg/dL (26 Jan 2023 12:40)      PHYSICAL EXAM  General: NAD  Head: NC/AT; MMM; PERRL; EOMI;  Neck: Supple; no JVD  Respiratory: CTAB; no wheezes/rales/rhonchi  Cardiovascular: Regular rhythm/rate; S1/S2+, no murmurs, rubs gallops   Gastrointestinal: Soft; NTND; bowel sounds normal and present  Extremities: WWP; no edema/cyanosis  Neurological: A&Ox3, CNII-XII grossly intact; no obvious focal deficits    MEDICATIONS  (STANDING):  acetaminophen     Tablet .. 975 milliGRAM(s) Oral every 8 hours  albumin human 25% IVPB 50 milliLiter(s) IV Intermittent every 1 hour  apixaban 2.5 milliGRAM(s) Oral every 12 hours  atorvastatin 40 milliGRAM(s) Oral at bedtime  chlorhexidine 2% Cloths 1 Application(s) Topical <User Schedule>  chlorhexidine 2% Cloths 1 Application(s) Topical <User Schedule>  cinacalcet 90 milliGRAM(s) Oral daily  clopidogrel Tablet 75 milliGRAM(s) Oral daily  colchicine 0.3 milliGRAM(s) Oral daily  collagenase Ointment 1 Application(s) Topical daily  epoetin александр-epbx (RETACRIT) Injectable 8000 Unit(s) IV Push once  fludroCORTISONE 0.2 milliGRAM(s) Oral every 24 hours  midodrine. 40 milliGRAM(s) Oral every 8 hours  Nephro-deborah 1 Tablet(s) Oral daily  petrolatum white Ointment 1 Application(s) Topical daily  polyethylene glycol 3350 17 Gram(s) Oral every 12 hours  senna 2 Tablet(s) Oral at bedtime  sevelamer carbonate 1600 milliGRAM(s) Oral three times a day with meals    MEDICATIONS  (PRN):  calamine/zinc oxide Lotion 1 Application(s) Topical three times a day PRN Itching  cyclobenzaprine 5 milliGRAM(s) Oral three times a day PRN Muscle Spasm  HYDROmorphone  Injectable 1 milliGRAM(s) IV Push every 3 hours PRN Severe Pain (7 - 10)  HYDROmorphone  Injectable 0.5 milliGRAM(s) IV Push every 3 hours PRN Moderate Pain (4 - 6)  lidocaine 4% Cream 1 Application(s) Topical three times a day PRN Breast pain  sodium chloride 0.9% lock flush 10 milliLiter(s) IV Push every 1 hour PRN Pre/post blood products, medications, blood draw, and to maintain line patency      Ancef (Rash; Urticaria)  DDAVP (Hypotension)  iodine (Hives; Pruritus)  penicillin (Swelling)  sulfa drugs (Angioedema)      LABS    01-25    132<L>  |  93<L>  |  45<H>  ----------------------------<  91  4.6   |  27  |  7.02<H>    Ca    8.1<L>      25 Jan 2023 11:54  Phos  3.8     01-25  Mg     1.8     01-25          Radiology: reviewed   TRANSFER NOTE STEPDOWN FROM TELEMETRY TO [facility]    HOSPITAL COURSE:   67 F w/ PMH HFrEF (EF 40%), nonobstructive CAD, non-ischemic cardiomyopathy, HTN, HLD, ESRD 2/2 polycystic kidney disease on HD, PE (2018) s/p IVC filter, mild AS, mild MR, PAD, OA, h/o thrombus in vascular access x3 (R AVG, R AVF, L AVG), ventral hernia, 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 Pt s/p vancomycin x2 wks (last day 1/15) for presumed breast cellulitis. Course has been c/b recurrent fevers, most recently on 1/17 & 1/21 (BCx NGTD, restarted vancomycin 1/21-). Multiple services following throughout hospital course. Surgery had been consulted for breast masses/breast cellulitis. 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 Per surgery, no surgical intervention. Derm consulted for b/l breast skin changes, recommend wedge bx of normal and pathologic tissue; also currently being followed by wound care. Vascular consulted for initial concern for RUE AV fistula infection, does not think it is infected. Endocrine following for management of hyperparathyroidism. Gyn/onc consulted for R ovarian cyst and elevated tumor markers, likely benign, recommended outpatient follow up. Pt was stepped down to 7LA on 1/18. On 1/19 AM, BP check on b/l legs demonstrated BPs 60s/30s, asymptomatic, Hb 6.8, 1U pRBC ordered, and pt was stepped up to MICU. In the MICU, MAPs > 65 without pressors so pt was stepped back down to 7LA. On 7LA, pt was transitioned to PO pain medications, midodrine dose was increased, and pt underwent CTA head to r/o aneurysms i/s/o polycystic kidney disease which was negative. Of note, pt had R femoral line removed on 1/18 i/s/o fever and had L femoral line placed 1/18, removed on 1/26. PICC placed by IR on 1/26, currently the only access site.    O/N Events: SHAISTA. Given benadryl for generalized itching this morning.     Subjective/ROS: Patient seen and examined at bedside. Reports improvement in right breast pain after taking pain medication and improvement in pruritis after bendaryl. Currently not complaining of pain, shortness of breath.     Denies Fever/Chills, HA, CP, SOB, n/v, changes in bowel/urinary habits.  12pt ROS otherwise negative.    VITALS  Vital Signs Last 24 Hrs  T(C): 36.5 (27 Jan 2023 10:00), Max: 37 (27 Jan 2023 01:00)  T(F): 97.7 (27 Jan 2023 10:00), Max: 98.6 (27 Jan 2023 01:00)  HR: 72 (27 Jan 2023 09:51) (66 - 96)  BP: 109/54 (27 Jan 2023 09:51) (45/20 - 135/56)  BP(mean): 63 (27 Jan 2023 09:51) (28 - 84)  RR: 15 (27 Jan 2023 09:51) (15 - 22)  SpO2: 100% (27 Jan 2023 09:51) (90% - 100%)    Parameters below as of 27 Jan 2023 09:51  Patient On (Oxygen Delivery Method): nasal cannula  O2 Flow (L/min): 4      CAPILLARY BLOOD GLUCOSE      POCT Blood Glucose.: 95 mg/dL (26 Jan 2023 12:40)      PHYSICAL EXAM  General: NAD  Head: NC/AT; MMM; PERRL; EOMI;  Neck: Supple; no JVD  Respiratory: CTAB; no wheezes/rales/rhonchi  Cardiovascular: Regular rhythm/rate; S1/S2+, no murmurs, rubs gallops   Gastrointestinal: Soft; NTND; bowel sounds normal and present  Extremities: WWP; no edema/cyanosis  Neurological: A&Ox3, CNII-XII grossly intact; no obvious focal deficits    MEDICATIONS  (STANDING):  acetaminophen     Tablet .. 975 milliGRAM(s) Oral every 8 hours  albumin human 25% IVPB 50 milliLiter(s) IV Intermittent every 1 hour  apixaban 2.5 milliGRAM(s) Oral every 12 hours  atorvastatin 40 milliGRAM(s) Oral at bedtime  chlorhexidine 2% Cloths 1 Application(s) Topical <User Schedule>  chlorhexidine 2% Cloths 1 Application(s) Topical <User Schedule>  cinacalcet 90 milliGRAM(s) Oral daily  clopidogrel Tablet 75 milliGRAM(s) Oral daily  colchicine 0.3 milliGRAM(s) Oral daily  collagenase Ointment 1 Application(s) Topical daily  epoetin алекасндр-epbx (RETACRIT) Injectable 8000 Unit(s) IV Push once  fludroCORTISONE 0.2 milliGRAM(s) Oral every 24 hours  midodrine. 40 milliGRAM(s) Oral every 8 hours  Nephro-deborah 1 Tablet(s) Oral daily  petrolatum white Ointment 1 Application(s) Topical daily  polyethylene glycol 3350 17 Gram(s) Oral every 12 hours  senna 2 Tablet(s) Oral at bedtime  sevelamer carbonate 1600 milliGRAM(s) Oral three times a day with meals    MEDICATIONS  (PRN):  calamine/zinc oxide Lotion 1 Application(s) Topical three times a day PRN Itching  cyclobenzaprine 5 milliGRAM(s) Oral three times a day PRN Muscle Spasm  HYDROmorphone  Injectable 1 milliGRAM(s) IV Push every 3 hours PRN Severe Pain (7 - 10)  HYDROmorphone  Injectable 0.5 milliGRAM(s) IV Push every 3 hours PRN Moderate Pain (4 - 6)  lidocaine 4% Cream 1 Application(s) Topical three times a day PRN Breast pain  sodium chloride 0.9% lock flush 10 milliLiter(s) IV Push every 1 hour PRN Pre/post blood products, medications, blood draw, and to maintain line patency      Ancef (Rash; Urticaria)  DDAVP (Hypotension)  iodine (Hives; Pruritus)  penicillin (Swelling)  sulfa drugs (Angioedema)      LABS    01-25    132<L>  |  93<L>  |  45<H>  ----------------------------<  91  4.6   |  27  |  7.02<H>    Ca    8.1<L>      25 Jan 2023 11:54  Phos  3.8     01-25  Mg     1.8     01-25          Radiology: reviewed   TRANSFER NOTE STEPDOWN FROM TELEMETRY TO 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 Pt s/p vancomycin x2 wks (last day 1/15) for presumed breast cellulitis. Course has been c/b recurrent fevers, most recently on 1/17 & 1/21 (BCx NGTD, restarted vancomycin 1/21-). Multiple services following throughout hospital course. Surgery had been consulted for breast masses/breast cellulitis. 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 Per surgery, no surgical intervention. Derm consulted for b/l breast skin changes, recommend wedge bx of normal and pathologic tissue; also currently being followed by wound care. Vascular consulted for initial concern for RUE AV fistula infection, does not think it is infected. Endocrine following for management of hyperparathyroidism. Gyn/onc consulted for R ovarian cyst and elevated tumor markers, likely benign, recommended outpatient follow up. Pt was stepped down to 7LA on 1/18. On 1/19 AM, BP check on b/l legs demonstrated BPs 60s/30s, asymptomatic, Hb 6.8, 1U pRBC ordered, and pt was stepped up to MICU. In the MICU, MAPs > 65 without pressors so pt was stepped back down to 7LA. On 7LA, pt was transitioned to PO pain medications, midodrine dose was increased, and pt underwent CTA head to r/o aneurysms i/s/o polycystic kidney disease which was negative. Of note, pt had R femoral line removed on 1/18 i/s/o fever and had L femoral line placed 1/18, removed on 1/26. PICC placed by IR on 1/26, currently the only access site.    O/N Events: SHAISTA. Given benadryl for generalized itching this morning.     Subjective/ROS: Patient seen and examined at bedside. Reports improvement in right breast pain after taking pain medication and improvement in pruritis after bendaryl. Currently not complaining of pain, shortness of breath.     Denies Fever/Chills, HA, CP, SOB, n/v, changes in bowel/urinary habits.  12pt ROS otherwise negative.    VITALS  Vital Signs Last 24 Hrs  T(C): 36.5 (27 Jan 2023 10:00), Max: 37 (27 Jan 2023 01:00)  T(F): 97.7 (27 Jan 2023 10:00), Max: 98.6 (27 Jan 2023 01:00)  HR: 72 (27 Jan 2023 09:51) (66 - 96)  BP: 109/54 (27 Jan 2023 09:51) (45/20 - 135/56)  BP(mean): 63 (27 Jan 2023 09:51) (28 - 84)  RR: 15 (27 Jan 2023 09:51) (15 - 22)  SpO2: 100% (27 Jan 2023 09:51) (90% - 100%)    Parameters below as of 27 Jan 2023 09:51  Patient On (Oxygen Delivery Method): nasal cannula  O2 Flow (L/min): 4      CAPILLARY BLOOD GLUCOSE      POCT Blood Glucose.: 95 mg/dL (26 Jan 2023 12:40)      PHYSICAL EXAM  General: obese female, NAD  Head: NC/AT; MMM; PERRL; EOMI  Neck: Supple  Respiratory: CTAB; no wheezes/rales/rhonchi  Cardiovascular: Regular rhythm/rate; S1/S2+, no murmurs, rubs gallops   Gastrointestinal: Soft; NTND; bowel sounds normal and present  Extremities: WWP; trace pitting edema of B/L LE; cyanosis v discoloration of distal toes on b/l feet. 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 975 milliGRAM(s) Oral every 8 hours  albumin human 25% IVPB 50 milliLiter(s) IV Intermittent every 1 hour  apixaban 2.5 milliGRAM(s) Oral every 12 hours  atorvastatin 40 milliGRAM(s) Oral at bedtime  chlorhexidine 2% Cloths 1 Application(s) Topical <User Schedule>  chlorhexidine 2% Cloths 1 Application(s) Topical <User Schedule>  cinacalcet 90 milliGRAM(s) Oral daily  clopidogrel Tablet 75 milliGRAM(s) Oral daily  colchicine 0.3 milliGRAM(s) Oral daily  collagenase Ointment 1 Application(s) Topical daily  epoetin александр-epbx (RETACRIT) Injectable 8000 Unit(s) IV Push once  fludroCORTISONE 0.2 milliGRAM(s) Oral every 24 hours  midodrine. 40 milliGRAM(s) Oral every 8 hours  Nephro-deborah 1 Tablet(s) Oral daily  petrolatum white Ointment 1 Application(s) Topical daily  polyethylene glycol 3350 17 Gram(s) Oral every 12 hours  senna 2 Tablet(s) Oral at bedtime  sevelamer carbonate 1600 milliGRAM(s) Oral three times a day with meals    MEDICATIONS  (PRN):  calamine/zinc oxide Lotion 1 Application(s) Topical three times a day PRN Itching  cyclobenzaprine 5 milliGRAM(s) Oral three times a day PRN Muscle Spasm  HYDROmorphone  Injectable 1 milliGRAM(s) IV Push every 3 hours PRN Severe Pain (7 - 10)  HYDROmorphone  Injectable 0.5 milliGRAM(s) IV Push every 3 hours PRN Moderate Pain (4 - 6)  lidocaine 4% Cream 1 Application(s) Topical three times a day PRN Breast pain  sodium chloride 0.9% lock flush 10 milliLiter(s) IV Push every 1 hour PRN Pre/post blood products, medications, blood draw, and to maintain line patency      Ancef (Rash; Urticaria)  DDAVP (Hypotension)  iodine (Hives; Pruritus)  penicillin (Swelling)  sulfa drugs (Angioedema)      LABS    01-25    132<L>  |  93<L>  |  45<H>  ----------------------------<  91  4.6   |  27  |  7.02<H>    Ca    8.1<L>      25 Jan 2023 11:54  Phos  3.8     01-25  Mg     1.8     01-25          Radiology: reviewed

## 2023-01-27 NOTE — PROGRESS NOTE ADULT - PROBLEM SELECTOR PLAN 5
TTE 11/22 normal LV and systolic function, EF 59%, grade II diastolic dysfunction, dilated RV, reduced RV systolic function.  Home meds: Entresto 49/51mg  - hold Entresto i/s/o shock state     #LVOT with LISETTE  Likely i/s/o hyperdynamic LV function 2/2 shock and hypovolemia  - per cardiology, rec repeating TTE after shock resolves    #CAD  Hx of cardiac cath in 2018  Home meds: atorvastatin 40mg, plavix 75mg qd  - c/w home atorvastatin 40mg  - c/w home Plavix 75mg qd TTE 11/22 normal LV and systolic function, EF 59%, grade II diastolic dysfunction, dilated RV, reduced RV systolic function.  Home meds: Entresto 49/51mg  - hold Entresto i/s/o hypotension    #LVOT with LISETTE  Likely i/s/o hyperdynamic LV function 2/2 shock and hypovolemia  - per cardiology, rec repeating TTE    #CAD  Hx of cardiac cath in 2018  Home meds: atorvastatin 40mg, plavix 75mg qd  - c/w home atorvastatin 40mg  - c/w home Plavix 75mg qd

## 2023-01-27 NOTE — PROGRESS NOTE ADULT - ATTENDING COMMENTS
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 Pt s/p vancomycin x2 wks (last day 1/15) for presumed breast cellulitis. fistula as source ruled out. gallium scan was positive only for uptake on right ant chest wall. US of that area was negative for abscess. Endocrinology was consulted for elevated PTH and multinodular thyroid. Work-up was suggestive of secondary hyperparathyroidism but despite increasing doses of the medication, she has continued to have elevated levels of PTH. 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 thyroid ultrasound showed multiple nodules meeting criteria for fine-needle aspiration. Pt was transferred to [facility] when BP improved with midodrine for FNA and placement     #Multinodular goiter  TSH 1.52. Free T4 0.96   CT showed enlarged multinodular thyroid projecting into superior mediastinum with 1.8 x 1.5 cm solid nodule slightly inferiorly in the anterior mediastinum. Thyroid ultrasound showed a moderately enlarged gland with heterogeneous echotexture. There were 3 nodules in the R lobe (largest 3.3 cm), one in the left lobe (2.3 cm).   per Endo: Both of these nodules warrant biopsy--the right lobe lesion because of size and its shape (taller > wide), and the left lobe nodule based on size. Fine-needle aspiration recommended for 4.3 cm right thyroid and 2.6 cm left thyroid solid nodules per SCOTT guidelines    #renal bone disease   #calciphylaxis   #2ndary hyperparathyroidism   brown tumors on recent CT  c/w sevelamer to 1600mg tid w/ meals and sensipar 90mg Qday  planned for CT IV neck to evaluate parathyroids, may need parathyroidectomy  renal and endo following     #ESRD on HD MWF here, outpt T/TH/S  [facility] T[facility], RUE AVF not being used    #hypotension  cortisol levels   profound hypotension during this admission requiring pressors and now on florinef 0.2mg as well as midodrine 40mg tid.   Gets midodrine 1/2 hr before HD    #dvt  Current DVT of RIJ, R subclavian and R axillary veins. Hx of L AVF clotting.  on eliquis-> will cw elquis, IR is ok with bx on monday with eliquis. will adjust eliquis dose after     #Chronic HFrEF (heart failure with reduced ejection fraction).   TTE 11/22 normal LV and systolic function, EF 59%, grade II diastolic dysfunction, dilated RV, reduced RV systolic function  recent Echo 1/3 with severe LVOT-> will repeat echo   not able to be started on GDMT 2/2 hypotension     #CAD  Hx of cardiac cath in 2018. Home meds: atorvastatin 40mg, plavix 75mg qd.  c/w home atorvastatin 40mg  will hold Plavix 75mg qd for IR procedure     plan for weekend:   CT 4d scan 1/27 after pre-medicating with steroids for contrast allergy   Pt will need HD after CT scan: nephro aware, team can reach out to discuss time   Pt will need IR to do FNA of thyroid nodules on monday   PICC line placed in lue 1/26 for access/meds   dispo placement to [facility] when medically ready

## 2023-01-27 NOTE — PROGRESS NOTE ADULT - PROBLEM SELECTOR PLAN 6
Pt has been on HD for "years" 2/2 PCKD. AV Fistula of EDWIN clotted in 2014, has received HD thru HD cath since. New AV fistula placed 11/2022, not yet matured.   - f/u nephrology recs  - s/p HD 1/10, 1/13, 1/16, 1/18, 1/20, 1/23     #Renal osteodystrophy   CT head (1/2/23): Increased density of the bone marrow is consistent with renal osteodystrophy. Per endocrine, extent of bone destruction extreme given PTH level, suspect might not be Brown tumors  - c/w sevelamer 1600 mg PO TID  - increased to Sensipar 90 mg PO daily Pt has been on HD for "years" 2/2 PCKD. AV Fistula of EDWIN clotted in 2014, has received HD thru HD cath since. New AV fistula placed 11/2022, not yet matured. Currently receiving HD MWF  - f/u nephrology recs      #Renal osteodystrophy   CT head (1/2/23): Increased density of the bone marrow is consistent with renal osteodystrophy. Per endocrine, extent of bone destruction extreme given PTH level, suspect might not be Brown tumors  - c/w sevelamer 1600 mg PO TID  - c/w sensipar 90 mg PO daily

## 2023-01-27 NOTE — PROGRESS NOTE ADULT - PROBLEM SELECTOR PLAN 3
Pt with recurrent fevers, most recently T 101.6 1/21. Unclear etiology. BCx 1/3, 1/9, 1/17 negative. s/p vancomycin x2 weeks (1/2-1/15). Per Surgery, R breast wound unlikely to be source of infection. Per Vascular Surgery, AV fistula fluid collection unlikely to be source of infection.   - R femoral line removed on 1/18; L femoral line placed on 1/18 (CURRENTLY ONLY SITE OF ACCESS)   - PICC placed on 1/26 by IR  - monitor BCx from 1/21 -- NGTD   - monitor CBC and vitals    #RUE fluid collection/RUE edema:  - CT (1/3/23): 7.0 cm fluid collection is present near AV fistula in the RUE. Possibly abscess. Drained by IR 1/5/23 with serosanguinous fluid: no organisms, few WBCs  - RUE dopplers (1/13/23): No significant change large complex fluid collection in proximal R arm, could represent old seroma/hematoma (however, prior ultrasound was done prior to drainage)     Plan:   - Per Vascular: AV fistula unlikely to be infected; would not remove it; also would NOT recommend drainage of seroma.  - Per Wound Care, c/w BID dressing changes (once daily with Santyl DSD, once daily with Bactroban DSD). Apply light ace wrap compression to right hand and forearm to help with soft tissue swelling and elevate RUE with pillows Pt with recurrent fevers, most recently T 101.6 1/21. Unclear etiology. BCx 1/3, 1/9, 1/17 negative. s/p vancomycin x2 weeks (1/2-1/15). Per Surgery, R breast wound unlikely to be source of infection. Per Vascular Surgery, AV fistula fluid collection unlikely to be source of infection.   - PICC placed on 1/26 by IR (pt has poor veins with diffus clots and does not have other access sites)  - monitor BCx from 1/21 -- NGTD   - monitor CBC and vitals    #RUE fluid collection/RUE edema:  - CT (1/3/23): 7.0 cm fluid collection is present near AV fistula in the RUE. Possibly abscess. Drained by IR 1/5/23 with serosanguinous fluid: no organisms, few WBCs  - RUE dopplers (1/13/23): No significant change large complex fluid collection in proximal R arm, could represent old seroma/hematoma (however, prior ultrasound was done prior to drainage)     Plan:   - Per Vascular: AV fistula unlikely to be infected; would not remove it; also would NOT recommend drainage of seroma.  - Per Wound Care, c/w BID dressing changes (once daily with Santyl DSD, once daily with Bactroban DSD). Apply light ace wrap compression to right hand and forearm to help with soft tissue swelling and elevate RUE with pillows

## 2023-01-27 NOTE — DISCHARGE NOTE PROVIDER - PROVIDER TOKENS
PROVIDER:[TOKEN:[[id number]]],PROVIDER:[TOKEN:[[id number]]],PROVIDER:[TOKEN:[[id number]]] PROVIDER:[TOKEN:[[id number]]],PROVIDER:[TOKEN:[[id number]]],PROVIDER:[TOKEN:[[id number]]],PROVIDER:[TOKEN:[[id number]]] PROVIDER:[TOKEN:[[id number]]],PROVIDER:[TOKEN:[[id number]]],PROVIDER:[TOKEN:[[id number]]],PROVIDER:[TOKEN:[[id number]]],PROVIDER:[TOKEN:[[id number]]] PROVIDER:[TOKEN:[[id number]],SCHEDULEDAPPT:[02/15/2023]],PROVIDER:[[id number]],SCHEDULEDAPPT:[03/07/2023],SCHEDULEDAPPTTIME:[04:00 PM]],PROVIDER:[TOKEN:[[id number]]],PROVIDER:[TOKEN:[[id number]]],PROVIDER:[TOKEN:[[id number]],SCHEDULEDAPPT:[04/05/2023],SCHEDULEDAPPTTIME:[10:00 AM]]

## 2023-01-27 NOTE — PROGRESS NOTE ADULT - PROBLEM SELECTOR PLAN 4
Current DVT of RIJ, R subclavian and R axillary veins. Hx of L AVF clotting. Given hx of clots, current pruritis and gallium scan uptake in chest wall, reasonable to workup coagulopathy. OBDULIA, ANCA, complement, RF, beta2 glycoprotein, anticardiolipin wnl.  - s/p heparin gtt  - c/w Eliquis 2.5 mg PO BID Current DVT of RIJ, R subclavian and R axillary veins. Hx of L AVF clotting. Given hx of clots, current pruritis and gallium scan uptake in chest wall, reasonable to workup coagulopathy. OBDULIA, ANCA, complement, RF, beta2 glycoprotein, anticardiolipin wnl.  - s/p heparin gtt  - c/w Eliquis 2.5 mg PO BID for post-6 months anticoagulation

## 2023-01-27 NOTE — PROGRESS NOTE ADULT - SUBJECTIVE AND OBJECTIVE BOX
Subjective: Pt seen and evaluated bedside this AM by vascular team. Pt has no pain to RUE this AM. Pts RUE dressing is clean dry and intact.     ROS:   Denies Headache, blurred vision, Chest Pain, SOB, Abdominal pain, nausea or vomiting     Social   apixaban 2.5  clopidogrel Tablet 75  midodrine. 40      Allergies    Ancef (Rash; Urticaria)  DDAVP (Hypotension)  iodine (Hives; Pruritus)  penicillin (Swelling)  sulfa drugs (Angioedema)    Intolerances        Vital Signs Last 24 Hrs  T(C): 36.5 (27 Jan 2023 10:00), Max: 37 (27 Jan 2023 01:00)  T(F): 97.7 (27 Jan 2023 10:00), Max: 98.6 (27 Jan 2023 01:00)  HR: 72 (27 Jan 2023 09:51) (66 - 96)  BP: 109/54 (27 Jan 2023 09:51) (45/20 - 135/56)  BP(mean): 63 (27 Jan 2023 09:51) (28 - 84)  RR: 15 (27 Jan 2023 09:51) (15 - 22)  SpO2: 100% (27 Jan 2023 09:51) (90% - 100%)    Parameters below as of 27 Jan 2023 09:51  Patient On (Oxygen Delivery Method): nasal cannula  O2 Flow (L/min): 4    I&O's Summary      Physical Exam:  General: NAD, resting comfortably in bed  C/V: S1 s2, RRR  Pulm: Nonlabored breathing, no respiratory distress  Abd: Soft, NTND  Extrem: WWP; right UE wound clean, pink base, granulation tissue noted, no obvious signs of infection, 4x4 with santyl applied, covered with kerlix and ace wrap    LABS:    01-25    132<L>  |  93<L>  |  45<H>  ----------------------------<  91  4.6   |  27  |  7.02<H>    Ca    8.1<L>      25 Jan 2023 11:54  Phos  3.8     01-25  Mg     1.8     01-25

## 2023-01-27 NOTE — DISCHARGE NOTE PROVIDER - NSDCMRMEDTOKEN_GEN_ALL_CORE_FT
3 in 1 commode: 1 unit(s) implant prn   Aspir 81 oral delayed release tablet: 1 tab(s) orally once a day  atorvastatin 40 mg oral tablet: 1 tab(s) orally once a day  clopidogrel 75 mg oral tablet: 1 tab(s) orally once a day  folic acid 1 mg oral tablet: 1 tab(s) orally once a day  meclizine 25 mg oral tablet: 1 tab(s) orally 3 times a day, As Needed  midodrine 10 mg oral tablet: 1 tab(s) orally every 8 hours  thiamine 100 mg oral tablet: 1 tab(s) orally once a day   3 in 1 commode: 1 unit(s) implant prn   Aspir 81 oral delayed release tablet: 1 tab(s) orally once a day  atorvastatin 40 mg oral tablet: 1 tab(s) orally once a day  clopidogrel 75 mg oral tablet: 1 tab(s) orally once a day  Entresto 49 mg-51 mg oral tablet: 1 tab(s) orally 2 times a day  fluconazole 100 mg oral tablet: 1 tab(s) orally once a day  folic acid 1 mg oral tablet: 1 tab(s) orally once a day  midodrine 10 mg oral tablet: 1 tab(s) orally every 8 hours  Nephro-Natasha oral tablet: 1 tab(s) orally once a day  Renvela 800 mg oral tablet: 3 tab(s) orally 3 times a day (with meals)  Vitamin C 500 mg oral tablet: 1 tab(s) orally once a day   apixaban 5 mg oral tablet: 1 tab(s) orally every 12 hours  Aspir 81 oral delayed release tablet: 1 tab(s) orally once a day  atorvastatin 40 mg oral tablet: 1 tab(s) orally once a day  calamine topical lotion: 1 application topically 3 times a day, As needed, Itching  cinacalcet 90 mg oral tablet: 1 tab(s) orally once a day  clopidogrel 75 mg oral tablet: 1 tab(s) orally once a day  collagenase 250 units/g topical ointment: 1 application topically once a day  cyclobenzaprine 5 mg oral tablet: 1 tab(s) orally 3 times a day, As needed, Muscle Spasm  Entresto 49 mg-51 mg oral tablet: 1 tab(s) orally 2 times a day  fludrocortisone 0.1 mg oral tablet: 2 tab(s) orally every 24 hours  folic acid 1 mg oral tablet: 1 tab(s) orally once a day  HYDROmorphone 2 mg oral tablet: 1 tab(s) orally every 3 hours, As needed, Moderate Pain (4 - 6)  HYDROmorphone 4 mg oral tablet: 1 tab(s) orally every 3 hours, As needed, Severe Pain (7 - 10)  lidocaine 4% topical cream: 1 application topically 3 times a day, As needed, Breast pain  lidocaine 4% topical film: Apply topically to affected area once a day  midodrine 10 mg oral tablet: 4 tab(s) orally every 8 hours  Nephro-Natasha oral tablet: 1 tab(s) orally once a day  ocular lubricant ophthalmic solution: 1 drop(s) to each affected eye 4 times a day  polyethylene glycol 3350 oral powder for reconstitution: 17 gram(s) orally every 12 hours  senna leaf extract oral tablet: 2 tab(s) orally once a day (at bedtime)  sevelamer carbonate 800 mg oral tablet: 2 tab(s) orally 3 times a day (with meals)  Vitamin C 500 mg oral tablet: 1 tab(s) orally once a day   albumin human: 50 milliliter(s) intravenous every hour intra dilaysis, infuse over 60 minutes, stop after 3 doses  apixaban 5 mg oral tablet: 1 tab(s) orally every 12 hours  Aspir 81 oral delayed release tablet: 1 tab(s) orally once a day  atorvastatin 40 mg oral tablet: 1 tab(s) orally once a day  calamine topical lotion: 1 application topically 3 times a day, As needed, Itching  cinacalcet 90 mg oral tablet: 1 tab(s) orally once a day  clopidogrel 75 mg oral tablet: 1 tab(s) orally once a day  collagenase 250 units/g topical ointment: 1 application topically once a day  cyclobenzaprine 5 mg oral tablet: 1 tab(s) orally 3 times a day, As needed, Muscle Spasm  Entresto 49 mg-51 mg oral tablet: 1 tab(s) orally 2 times a day  epoetin александр: 8000 unit(s) intravenous once to be given with hemodialysis.   fludrocortisone 0.1 mg oral tablet: 2 tab(s) orally every 24 hours  folic acid 1 mg oral tablet: 1 tab(s) orally once a day  HYDROmorphone 2 mg oral tablet: 1 tab(s) orally every 3 hours, As needed, Moderate Pain (4 - 6)  HYDROmorphone 4 mg oral tablet: 1 tab(s) orally every 3 hours, As needed, Severe Pain (7 - 10)  lidocaine 4% topical cream: 1 application topically 3 times a day, As needed, Breast pain  lidocaine 4% topical film: Apply topically to affected area once a day  midodrine 10 mg oral tablet: 4 tab(s) orally every 8 hours  Nephro-Natasha oral tablet: 1 tab(s) orally once a day  ocular lubricant ophthalmic solution: 1 drop(s) to each affected eye 4 times a day  polyethylene glycol 3350 oral powder for reconstitution: 17 gram(s) orally every 12 hours  senna leaf extract oral tablet: 2 tab(s) orally once a day (at bedtime)  sevelamer carbonate 800 mg oral tablet: 2 tab(s) orally 3 times a day (with meals)  Vitamin C 500 mg oral tablet: 1 tab(s) orally once a day   albumin human: 50 milliliter(s) intravenous every hour intra dilaysis, infuse over 60 minutes, stop after 3 doses  apixaban 5 mg oral tablet: 1 tab(s) orally every 12 hours  atorvastatin 40 mg oral tablet: 1 tab(s) orally once a day  calamine topical lotion: 1 application topically 3 times a day, As needed, Itching  cinacalcet 90 mg oral tablet: 1 tab(s) orally once a day  clopidogrel 75 mg oral tablet: 1 tab(s) orally once a day  collagenase 250 units/g topical ointment: 1 application topically once a day  cyclobenzaprine 5 mg oral tablet: 1 tab(s) orally 3 times a day, As needed, Muscle Spasm  Entresto 49 mg-51 mg oral tablet: 1 tab(s) orally 2 times a day  epoetin александр: 8000 unit(s) intravenous once to be given with hemodialysis.   fludrocortisone 0.1 mg oral tablet: 2 tab(s) orally every 24 hours  folic acid 1 mg oral tablet: 1 tab(s) orally once a day  HYDROmorphone 2 mg oral tablet: 1 tab(s) orally every 3 hours, As needed, Moderate Pain (4 - 6)  HYDROmorphone 4 mg oral tablet: 1 tab(s) orally every 3 hours, As needed, Severe Pain (7 - 10)  lidocaine 4% topical cream: 1 application topically 3 times a day, As needed, Breast pain  lidocaine 4% topical film: Apply topically to affected area once a day  midodrine 10 mg oral tablet: 4 tab(s) orally every 8 hours  Nephro-Natasha oral tablet: 1 tab(s) orally once a day  ocular lubricant ophthalmic solution: 1 drop(s) to each affected eye 4 times a day  polyethylene glycol 3350 oral powder for reconstitution: 17 gram(s) orally every 12 hours  senna leaf extract oral tablet: 2 tab(s) orally once a day (at bedtime)  sevelamer carbonate 800 mg oral tablet: 2 tab(s) orally 3 times a day (with meals)  Vitamin C 500 mg oral tablet: 1 tab(s) orally once a day

## 2023-01-27 NOTE — PROGRESS NOTE ADULT - ASSESSMENT
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 During imaging, CT showing 7.0 cm fluid collection is present near an AV fistula in the right upper arm. The differential for this collection included benign post-operative hematoma or seroma, but also abscess or vascular device infection, particularly in the setting of septic shock. Vascular consulted for RUE fistula evaluation to rule out this area as a source of sepsis. Reassuring physical exam at initial evaluation without clear indicia of infection related to the graph, small superficial surgical incision dehiscence notwithstanding. Now s/p IR image guided aspiration of perigraft fluid (negative) and gadolinium scan (negative). At this point, do not suspect a graft infection as the provoking factor leading to the patient's presentation. Swelling in area of RUE fistula is likely a sterile seroma with no intervention needed.    Recommendations:  -Continue to follow up BCx and IR Cx - both NGTD (given negative blood and wound drainage cultures, low likelihood of graft infection)  -Wound care recs: BID dressing changes (once daily with Santyl DSD once daily with Bactroban DSD). Apply light ace wrap compression to right hand and forearm to help with soft tissue swelling and elevate RUE with pillows  -Fluid collection near RUE fistula is likely a sterile seroma and unlikely the cause of patient's sepsis. Would not recommend percutaneous drainage or IR procedure to drain fluid  -Rest of care per primary team  -Vascular surgery Team 3C will continue to follow. Please call [phone] with any questions or concerns.

## 2023-01-27 NOTE — PROGRESS NOTE ADULT - ASSESSMENT
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    Assessment/Plan:   #ESRD on HD   Schedule modified while in the hospital, will continue with MWF for now  No repeat BMP From this AM  Renal diet    #Hx of Hypertension  Has had profound hypotension during this admission requiring pressors and now on florinef 0.2mg as well as midodrine 40mg q8h  -Gets midodrine 1/2 hr before HD  -Also started on florinef 0.2mg  -HD with colder dialysate and albumin PRN    #access   [facility] c/d/i  RUE AVF not being used    #anemia  Last Hgb 9.4 on 1/24  -Epo w/ HD  -Transfusion goals as per primary team  -Iron profile noted    #renal bone disease   Noted to have brown tumors on most recent CT, in addition to a lytic lesion of her skull. Likely due to hyperparathyroidism. BX results per note with epidermal and dermal necrosis w/ fibrinoid occulusion and necrosis of blood vessels, focla necrotizing suppurative inflammation. 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   -Please repeat BMP and phos  -c/w sevelamer to 1600mg tid w/ meals  -c/w sensipar 90mg Qday  -Endo following agree with CT IV neck to evaluate parathyroids, may need parathyroidectomy in the future

## 2023-01-27 NOTE — PROGRESS NOTE ADULT - PROBLEM SELECTOR PLAN 11
F: None  E: None, HD pt  N: Renal Restricted Diet  DVT ppx: Eliquis 2.5 mg PO BID   GI ppx: None  Code Status: Full Code  Dispo: 7[facility] F: None  E: None, HD pt  N: Renal Restricted Diet  DVT ppx: Eliquis 2.5 mg PO BID   GI ppx: None  Code Status: Full Code  Dispo: 7[facility] --> [facility]

## 2023-01-27 NOTE — DISCHARGE NOTE PROVIDER - HOSPITAL COURSE
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 Course also c/b persistent breast pain R>L with hypotension and fevers, briefly on pressor support now afebrile and maintaining MAPs on midodrine and fludrocortisone. Hospital course was c/b new thrombus in R IJ, subclavian and axillary veins and acute DVT in L CFV for which patient was started on therapeutic heparin gtt.     Hospital course (by problem):     #Pain in breast.   Family hx of breast cancer in mother, sister. Physical exam on admission: large R breast mass, palpable L breast mass, R breast has peau d'orange appearance with associated erythema, warmth on R. US bilateral breasts: No sonographic evidence of breast abscess, cyst or focal mass to correspond with the clinical finding of bilateral breast masses. Breast bx: Epidermal and dermal necrosis with fibrinoid occlusion and necrosis of blood vessels, focal necrotizing suppurative inflammation involving the fibroadipose tissue and blood vessels in the subcutis. Pt continues to report pain in bilateral breasts, worst on R. Pain responsive to Dilaudid but effect wears off after ~2-3 hours. Was briefly on PCA (1/16-1/17), now dc'ed. C/w Flexeril 5mg PO TID prn for spasms, Dilaudid 2mg PO q3h prn for moderate pain, and Dilaudid 4mg PO q3h prn for severe pain.  - c/w daily dressing changes with Xeroform per surgery.    #      Patient was discharged to: (home/[facility]/acute rehab/hospice, etc, and with what services – home health PT/RN? 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 Course also c/b persistent breast pain R>L with hypotension and fevers, briefly on pressor support now afebrile and maintaining MAPs on midodrine and fludrocortisone. Hospital course was c/b new thrombus in R IJ, subclavian and axillary veins and acute DVT in L CFV for which patient was started on therapeutic heparin gtt.     Hospital course (by problem):     #Pain in breast.   Family hx of breast cancer in mother, sister. Physical exam on admission: large R breast mass, palpable L breast mass, R breast has peau d'orange appearance with associated erythema, warmth on R. US bilateral breasts: No sonographic evidence of breast abscess, cyst or focal mass to correspond with the clinical finding of bilateral breast masses. Breast bx: Epidermal and dermal necrosis with fibrinoid occlusion and necrosis of blood vessels, focal necrotizing suppurative inflammation involving the fibroadipose tissue and blood vessels in the subcutis. Pt continues to report pain in bilateral breasts, worst on R. Pain responsive to Dilaudid but effect wears off after ~2-3 hours. Was briefly on PCA (1/16-1/17), now dc'ed. C/w Flexeril 5mg PO TID prn for spasms, Dilaudid 2mg PO q3h prn for moderate pain, and Dilaudid 4mg PO q3h prn for severe pain.  - c/w daily dressing changes with Xeroform per surgery.    #      Wound care recs: BID dressing changes (once daily with Santyl DSD once daily with Bactroban DSD). Apply light ace wrap compression to right hand and forearm to help with soft tissue swelling and elevate RUE with pillows.       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 Course also c/b persistent breast pain R>L with hypotension and fevers, briefly on pressor support now afebrile and maintaining MAPs on midodrine and fludrocortisone. Hospital course was c/b new thrombus in R IJ, subclavian and axillary veins and acute DVT in L CFV for which patient was started on therapeutic heparin gtt.     Hospital course (by problem):     #Pain in breast.   Family hx of breast cancer in mother, sister. Physical exam on admission: large R breast mass, palpable L breast mass, R breast has peau d'orange appearance with associated erythema, warmth on R. US bilateral breasts: No sonographic evidence of breast abscess, cyst or focal mass to correspond with the clinical finding of bilateral breast masses. Breast bx: Epidermal and dermal necrosis with fibrinoid occlusion and necrosis of blood vessels, focal necrotizing suppurative inflammation involving the fibroadipose tissue and blood vessels in the subcutis. Pt continues to report pain in bilateral breasts, worst on R. Pain responsive to Dilaudid but effect wears off after ~2-3 hours. Was briefly on PCA (1/16-1/17), now dc'ed. C/w Flexeril 5mg PO TID prn for spasms, Dilaudid 2mg PO q3h prn for moderate pain, and Dilaudid 4mg PO q3h prn for severe pain.  - c/w PO dilaudid and flexeril for pain  - Wound care recs: Dress right breast with Xeroform and gauze with paper tape. To be changed daily.     #Shock of unclear etiology  Suspect 2/2 tenuous hemodynamics w/ LVOT obstruction/LISETTE and significant vasculopathy in addition to possible sepsis, however unknown source (no fever, BCx NGTD, no focal area of infection IDed thus far). On initial presentation, pt met 2/4 SIRS criteria. Hypotensive with MAPs to 60 requiring levophed. Admission HR > 90, WBC > 12, Lactate 1.9, Procal 12. , Ferritin 1209. BCx 1/3, 1/9, 1/17 no growth. s/p Vancomycin x2 wks (1/2-1/15) to treat presumed R breast cellulitis. Gallium scan (1/6/23): Faintly increased activity surrounding entire lower portion of R breast c/w inflammatory reaction seen on physical exam, small area of activity ~10cm to R of midline at level of the lower border of the sternum, activity likely to be in chest wall. PET/CT (1/11/23): Increased FDG activity within R axillary vein, which contains an occlusive thrombus, which may represent infectious source. Also an additional FDG avid region of AV fistula near arterial anastomosis proximal to area of fluid collection; could be potential source of infection vs inflammatory reaction to the graft. Also a photopenic R adnexal cystic mass, suggestive of benign etiology. Pt has been off pressor support   - c/w midodrine 40 mg PO q8h (goal MAP >55)   - BCx 1/21 NGTD.      Wound care recs: BID dressing changes (once daily with Santyl DSD once daily with Bactroban DSD). Apply light ace wrap compression to right hand and forearm to help with soft tissue swelling and elevate RUE with pillows.       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 Course also c/b persistent breast pain R>L with hypotension and fevers, briefly on pressor support now afebrile and maintaining MAPs on midodrine and fludrocortisone. Hospital course was c/b new thrombus in R IJ, subclavian and axillary veins and acute DVT in L CFV for which patient was started on therapeutic heparin gtt.     Hospital course (by problem):  #Hypotension  Suspect shock 2/2 tenuous hemodynamics w/ LVOT obstruction/LISETTE and significant vasculopathy in addition to possible sepsis, however unknown source (no fever, BCx NGTD, no focal area of infection IDed thus far). On initial presentation, pt met 2/4 SIRS criteria. Hypotensive with MAPs to 60 requiring levophed. Admission HR > 90, WBC > 12, Lactate 1.9, Procal 12. , Ferritin 1209. BCx 1/3, 1/9, 1/17 no growth. s/p Vancomycin x2 wks (1/2-1/15) to treat presumed R breast cellulitis. Gallium scan (1/6/23): Faintly increased activity surrounding entire lower portion of R breast c/w inflammatory reaction seen on physical exam, small area of activity ~10cm to R of midline at level of the lower border of the sternum, activity likely to be in chest wall. PET/CT (1/11/23): Increased FDG activity within R axillary vein, which contains an occlusive thrombus, which may represent infectious source. Also an additional FDG avid region of AV fistula near arterial anastomosis proximal to area of fluid collection; could be potential source of infection vs inflammatory reaction to the graft. Also a photopenic R adnexal cystic mass, suggestive of benign etiology. Pt has been off pressor support   - USE R LEG or L WRIST TO OBTAIN BLOOD PRESSURE  - c/w midodrine 40mg TID  - monitor patient mentation, A&Ox4 at baseline and tolerates lower BP    #HPTH (hyperparathyroidism)  At risk for secondary HPTT d/t renal failure. Pt has vague abdominal pain w/ nausea. Concern for possible brown tumors on CT with multiple fractures of pubic rami, pubic bone, thoracolumbar spine. Intact , Vit D 38.5. 1/11 AM  intact (baseline PTH on Sensipar). SPEP/immunofixation negative. Per Endo, likely secondary hyperparathyroidism vs other process causing osteoclastic tumors. CT did not visualize parathyroids well, will likely need outpatient parathyroidectomy v total thyroidectomy pending FNA results.   - c/w Sensipar 90 mg PO daily   - f/u outpatient with ENT and Endocrinology    #Multinodular Goiter  CT (1/3/23): Enlarged multinodular thyroid projecting into superior mediastinum w/ 1.8 x 1.5 cm solid nodule slightly inferiorly in the anterior mediastinum. Thyroid US (1/4/23): Fine-needle aspiration recommended for 4.3 cm right thyroid and 2.6 cm left thyroid solid nodules per SCOTT guidelines. TSH 1.6 free T4 0.68. TSH could be inappropriately normal for low free T4 due to euthyroid sick syndrome. Completed FNA biopsy of two nodules on 2/1/23.   - pending FNA results, will likely need thyroidectomy v. parathyroidectomy  - f/u outpatient with ENT and Endocrinology    #ESRD on dialysis.   Pt has been on HD for "years" 2/2 PCKD. AV Fistula of EDWIN barnettted in 2014, has received HD thru HD cath since. New AV fistula placed 11/2022, not yet matured.   - c/w dialysis Tue/Thu/Sat    #Renal osteodystrophy   CT head (1/2/23): Increased density of the bone marrow is consistent with renal osteodystrophy. Per endocrine, extent of bone destruction extreme given PTH level, suspect might not be Brown tumors  - c/w sevelamer 1600 mg PO TID  - increased to Sensipar 90 mg PO daily.    #Pain in breast.   Family hx of breast cancer in mother, sister. Physical exam on admission: large R breast mass, palpable L breast mass, R breast has peau d'orange appearance with associated erythema, warmth on R. US bilateral breasts: No sonographic evidence of breast abscess, cyst or focal mass to correspond with the clinical finding of bilateral breast masses. Breast bx: Epidermal and dermal necrosis with fibrinoid occlusion and necrosis of blood vessels, focal necrotizing suppurative inflammation involving the fibroadipose tissue and blood vessels in the subcutis. Pt continues to report pain in bilateral breasts, worst on R. Pain responsive to Dilaudid but effect wears off after ~2-3 hours. Was briefly on PCA (1/16-1/17), now dc'ed. C/w Flexeril 5mg PO TID prn for spasms, Dilaudid 2mg PO q3h prn for moderate pain, and Dilaudid 4mg PO q3h prn for severe pain.  - c/w PO dilaudid and flexeril for pain  - Wound care recs: Dress right breast with Xeroform and gauze with paper tape. To be changed daily.     #RUE fluid collection/RUE edema:  CT (1/3/23): 7.0 cm fluid collection is present near AV fistula in the RUE. Possibly abscess. Drained by IR 1/5/23 with serosanguinous fluid: no organisms, few WBCs. RUE dopplers (1/13/23): No significant change large complex fluid collection in proximal R arm, could represent old seroma/hematoma (however, prior ultrasound was done prior to drainage). Per Vascular: AV fistula unlikely to be infected; would not remove it; also would NOT recommend drainage of seroma.   - Wound care: c/w BID dressing changes (once daily with Santyl DSD, once daily with Bactroban DSD). Apply light ace wrap compression to right hand and forearm to help with soft tissue swelling and elevate RUE with pillows.  - f/u outpatient with Dr. Buckley, vascular surgery    #DVT of left common femoral   *Acute DVT of left common femoral found on 1.28.23. Started full dose AC for acute DVT (heparin drip). Stopped heparin gtt on 2/2/23 around 4pm, will transition to eliquis 5mg BID.  - c/w eliquis 5mg BID    #Uterine mass.   6.1cm mass in R adnexa seen incidentally on CT A/P (1/3/23). Pt with hx of hysterectomy, unclear if partial or total. Gyn consulted, suspect ovarian mass vs cyst unlikely to be related to systemic disease as it was seen on imaging in 1/2022.  172 (high), CA-125 196 (high), CEA 6.1 (high). TVUS (1/11/23) with 6.2cm simple cystic lesion to R ovary, possibly serous cystadenoma. Per Gyn/Onc: elevated tumor markers not informative given pt is acutely ill. TVUS c/w likely simple cyst, PET/CT w/ no uptake in R adnexa, low concern for malignancy.   - Recommend outpatient follow-up when stable for repeat tumor markers.    #LIZ (obstructive sleep apnea).   - c/w BiPAP 18/8 at night    Patient was discharged to: [facility]    New medications: Fludrocortisone 0.2mg qd, Sensipar 90mg qd, Collagenase ointment qd, Lidocaine patch qd and Lidocaine 4% topical cream qd, Dilaudid 2mg q3h prn for moderate pain, Dilaudid 4mg q3h prn for severe pain, Cyclobenzaprine 5mg TID prn, Miralax 17mg BID, Senna 2 tablets qhs.  Changes to old medications: increased Midodrine to 40mg TID, increased Eliquis 5mg BID  Medications that were stopped: none    Items to follow up as outpatient: ENT and endocrinology f/u    Physical exam at the time of discharge:  General: obese female, NAD  Respiratory: CTAB; no wheezes/rales/rhonchi  CV: RRR, no M/R/G  Gastrointestinal: Soft; NTND; bowel sounds normal and present  Extremities: WWP; pitting edema of right ankle up to thigh, trace pitting edema left leg; cyanosis v discoloration of distal toes on b/l feet. 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 Course also c/b persistent breast pain R>L with hypotension and fevers, briefly on pressor support now afebrile and maintaining MAPs on midodrine and fludrocortisone. Hospital course was c/b new thrombus in R IJ, subclavian and axillary veins and acute DVT in L CFV for which patient was started on therapeutic heparin gtt.     Hospital course (by problem):  #Hypotension  Suspect shock 2/2 tenuous hemodynamics w/ LVOT obstruction/LISETTE and significant vasculopathy in addition to possible sepsis, however unknown source (no fever, BCx NGTD, no focal area of infection IDed thus far). On initial presentation, pt met 2/4 SIRS criteria. Hypotensive with MAPs to 60 requiring levophed. Admission HR > 90, WBC > 12, Lactate 1.9, Procal 12. , Ferritin 1209. BCx 1/3, 1/9, 1/17 no growth. s/p Vancomycin x2 wks (1/2-1/15) to treat presumed R breast cellulitis. Gallium scan (1/6/23): Faintly increased activity surrounding entire lower portion of R breast c/w inflammatory reaction seen on physical exam, small area of activity ~10cm to R of midline at level of the lower border of the sternum, activity likely to be in chest wall. PET/CT (1/11/23): Increased FDG activity within R axillary vein, which contains an occlusive thrombus, which may represent infectious source. Also an additional FDG avid region of AV fistula near arterial anastomosis proximal to area of fluid collection; could be potential source of infection vs inflammatory reaction to the graft. Also a photopenic R adnexal cystic mass, suggestive of benign etiology. Pt has been off pressor support   - USE R LEG or L WRIST TO OBTAIN BLOOD PRESSURE  - c/w midodrine 40mg TID  - monitor patient mentation, A&Ox4 at baseline and tolerates lower BP    #HPTH (hyperparathyroidism)  At risk for secondary HPTT d/t renal failure. Pt has vague abdominal pain w/ nausea. Concern for possible brown tumors on CT with multiple fractures of pubic rami, pubic bone, thoracolumbar spine. Intact , Vit D 38.5. 1/11 AM  intact (baseline PTH on Sensipar). SPEP/immunofixation negative. Per Endo, likely secondary hyperparathyroidism vs other process causing osteoclastic tumors. CT did not visualize parathyroids well, will likely need outpatient parathyroidectomy v total thyroidectomy pending FNA results.   - c/w Sensipar 90 mg PO daily   - f/u outpatient with ENT and Endocrinology    #Multinodular Goiter  CT (1/3/23): Enlarged multinodular thyroid projecting into superior mediastinum w/ 1.8 x 1.5 cm solid nodule slightly inferiorly in the anterior mediastinum. Thyroid US (1/4/23): Fine-needle aspiration recommended for 4.3 cm right thyroid and 2.6 cm left thyroid solid nodules per SCOTT guidelines. TSH 1.6 free T4 0.68. TSH could be inappropriately normal for low free T4 due to euthyroid sick syndrome. Completed FNA biopsy of two nodules on 2/1/23.   - pending FNA results, will likely need thyroidectomy v. parathyroidectomy  - f/u outpatient with ENT and Endocrinology    #ESRD on dialysis.   Pt has been on HD for "years" 2/2 PCKD. AV Fistula of EDWIN barnettted in 2014, has received HD thru HD cath since. New AV fistula placed 11/2022, not yet matured.   - c/w dialysis Tue/Thu/Sat    #Renal osteodystrophy   CT head (1/2/23): Increased density of the bone marrow is consistent with renal osteodystrophy. Per endocrine, extent of bone destruction extreme given PTH level, suspect might not be Brown tumors  - c/w sevelamer 1600 mg PO TID  - increased to Sensipar 90 mg PO daily.    #Pain in breast.   Family hx of breast cancer in mother, sister. Physical exam on admission: large R breast mass, palpable L breast mass, R breast has peau d'orange appearance with associated erythema, warmth on R. US bilateral breasts: No sonographic evidence of breast abscess, cyst or focal mass to correspond with the clinical finding of bilateral breast masses. Breast bx: Epidermal and dermal necrosis with fibrinoid occlusion and necrosis of blood vessels, focal necrotizing suppurative inflammation involving the fibroadipose tissue and blood vessels in the subcutis. Pt continues to report pain in bilateral breasts, worst on R. Pain responsive to Dilaudid but effect wears off after ~2-3 hours. Was briefly on PCA (1/16-1/17), now dc'ed. C/w Flexeril 5mg PO TID prn for spasms, Dilaudid 2mg PO q3h prn for moderate pain, and Dilaudid 4mg PO q3h prn for severe pain.  - c/w PO dilaudid and flexeril for pain  - Wound care recs: Dress right breast with Xeroform and gauze with paper tape. To be changed daily.     #RUE fluid collection/RUE edema:  CT (1/3/23): 7.0 cm fluid collection is present near AV fistula in the RUE. Possibly abscess. Drained by IR 1/5/23 with serosanguinous fluid: no organisms, few WBCs. RUE dopplers (1/13/23): No significant change large complex fluid collection in proximal R arm, could represent old seroma/hematoma (however, prior ultrasound was done prior to drainage). Per Vascular: AV fistula unlikely to be infected; would not remove it; also would NOT recommend drainage of seroma.   - Wound care: c/w BID dressing changes (once daily with Santyl DSD, once daily with Bactroban DSD). Apply light ace wrap compression to right hand and forearm to help with soft tissue swelling and elevate RUE with pillows.  - f/u outpatient with Dr. Buckley, vascular surgery    #DVT of left common femoral   *Acute DVT of left common femoral found on 1.28.23. Started full dose AC for acute DVT (heparin drip). Stopped heparin gtt on 2/2/23 around 4pm, will transition to eliquis 5mg BID.  - c/w eliquis 5mg BID    #Uterine mass.   6.1cm mass in R adnexa seen incidentally on CT A/P (1/3/23). Pt with hx of hysterectomy, unclear if partial or total. Gyn consulted, suspect ovarian mass vs cyst unlikely to be related to systemic disease as it was seen on imaging in 1/2022.  172 (high), CA-125 196 (high), CEA 6.1 (high). TVUS (1/11/23) with 6.2cm simple cystic lesion to R ovary, possibly serous cystadenoma. Per Gyn/Onc: elevated tumor markers not informative given pt is acutely ill. TVUS c/w likely simple cyst, PET/CT w/ no uptake in R adnexa, low concern for malignancy.   - Recommend outpatient follow-up when stable for repeat tumor markers.    #LIZ (obstructive sleep apnea).   - c/w BiPAP 18/8 at night    Patient was discharged to: [facility]    New medications: Fludrocortisone 0.2mg qd, Sensipar 90mg qd, Collagenase ointment qd, Lidocaine patch qd and Lidocaine 4% topical cream qd, Dilaudid 2mg q3h prn for moderate pain, Dilaudid 4mg q3h prn for severe pain, Cyclobenzaprine 5mg TID prn, Miralax 17mg BID, Senna 2 tablets qhs.  Changes to old medications: increased Midodrine to 40mg TID, increased Eliquis 5mg BID  Medications that were stopped: ASA 81    Items to follow up as outpatient: ENT and endocrinology f/u    Physical exam at the time of discharge:  General: obese female, NAD  Respiratory: CTAB; no wheezes/rales/rhonchi  CV: RRR, no M/R/G  Gastrointestinal: Soft; NTND; bowel sounds normal and present  Extremities: WWP; pitting edema of right ankle up to thigh, trace pitting edema left leg; cyanosis v discoloration of distal toes on b/l feet. 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

## 2023-01-27 NOTE — PROGRESS NOTE ADULT - ASSESSMENT
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

## 2023-01-27 NOTE — PROGRESS NOTE ADULT - ASSESSMENT
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

## 2023-01-27 NOTE — PROGRESS NOTE ADULT - PROBLEM SELECTOR PLAN 7
At risk for secondary HPTT d/t renal failure. Pt has vague abdominal pain w/ nausea. Concern for possible brown tumors on CT with multiple fractures of pubic rami, pubic bone, thoracolumbar spine. Intact , Vit D 38.5. SPEP/immunofixation negative. Per Endo, likely secondary hyperparathyroidism vs other process causing osteoclastic tumors.  - c/w Sensipar 90 mg PO daily   - 1/11 AM  intact (baseline PTH on Sensipar)    #Multinodular Goiter  CT (1/3/23): Enlarged multinodular thyroid projecting into superior mediastinum w/ 1.8 x 1.5 cm solid nodule slightly inferiorly in the anterior mediastinum.   Thyroid US (1/4/23): Fine-needle aspiration recommended for 4.3 cm right thyroid and 2.6 cm left thyroid solid nodules per SCOTT guidelines.  - TSH 1.6 free T4 0.68. TSH could be inappropriately normal for low free T4 due to euthyroid sick syndrome.  - f/u TFTs after shock resolved  - Pt will eventually need FNA of thyroid nodules At risk for secondary HPTT d/t renal failure. Pt has vague abdominal pain w/ nausea. Concern for possible brown tumors on CT with multiple fractures of pubic rami, pubic bone, thoracolumbar spine. Intact , Vit D 38.5. SPEP/immunofixation negative. Per Endo, likely secondary hyperparathyroidism vs other process causing osteoclastic tumors.  - c/w Sensipar 90 mg PO daily   - 1/11 AM  intact (baseline PTH on Sensipar)    #Multinodular Goiter  CT (1/3/23): Enlarged multinodular thyroid projecting into superior mediastinum w/ 1.8 x 1.5 cm solid nodule slightly inferiorly in the anterior mediastinum.   Thyroid US (1/4/23): Fine-needle aspiration recommended for 4.3 cm right thyroid and 2.6 cm left thyroid solid nodules per SCOTT guidelines.  - TSH 1.6 free T4 0.68. TSH could be inappropriately normal for low free T4 due to euthyroid sick syndrome.  - f/u TFTs after shock resolved  - plan for FNA biopsy of two nodules per endo recs; IR consulted  - endocrinology following

## 2023-01-27 NOTE — PROGRESS NOTE ADULT - PROBLEM SELECTOR PLAN 2
Shock of unclear etiology, suspect 2/2 tenuous hemodynamics w/ LVOT obstruction/LISETTE and significant vasculopathy in addition to possible sepsis, however unknown source (no fever, BCx NGTD, no focal area of infection IDed thus far). On initial presentation, pt met 2/4 SIRS criteria. Hypotensive with MAPs to 60 requiring levophed. Admission HR > 90, WBC > 12, Lactate 1.9, Procal 12. , Ferritin 1209. BCx 1/3, 1/9, 1/17 no growth. s/p Vancomycin x2 wks (1/2-1/15) to treat presumed R breast cellulitis. Per Surgery, prelim R breast biopsy shows vasculitis vs granuloma, unlikely to cause hypotension. Corticotropin stimulation test wnl.  - Gallium scan (1/6/23): Faintly increased activity surrounding entire lower portion of R breast c/w inflammatory reaction seen on physical exam, small area of activity ~10cm to R of midline at level of the lower border of the sternum, activity likely to be in chest wall   - PET/CT (1/11/23): Increased FDG activity within R axillary vein, which contains an occlusive thrombus, which may represent infectious source. Also an additional FDG avid region of AV fistula near arterial anastomosis proximal to area of fluid collection; could be potential source of infection vs inflammatory reaction to the graft. Also a photopenic R adnexal cystic mass, suggestive of benign etiology.  - 1/21 o/n Tmax 101.6 rectal, pt MAPs in the 60s, however mentating at baseline. BCx collected, Vanc restarted     Plan:   - c/w midodrine 40 mg PO q8h (goal MAP >55)   - BCx 1/21 NGTD Shock of unclear etiology, suspect 2/2 tenuous hemodynamics w/ LVOT obstruction/LISETTE and significant vasculopathy in addition to possible sepsis, however unknown source (no fever, BCx NGTD, no focal area of infection IDed thus far). On initial presentation, pt met 2/4 SIRS criteria.  hypotensive with MAPs to 60 requiring levophed. Admission HR > 90, WBC > 12, Lactate 1.9, Procal 12. , Ferritin 1209. BCx 1/3, 1/9, 1/17 no growth. s/p Vancomycin x2 wks (1/2-1/15) to treat presumed R breast cellulitis. Per Surgery, prelim R breast biopsy shows vasculitis vs granuloma, unlikely to cause hypotension. Corticotropin stimulation test wnl.  - Gallium scan (1/6/23): Faintly increased activity surrounding entire lower portion of R breast c/w inflammatory reaction seen on physical exam, small area of activity ~10cm to R of midline at level of the lower border of the sternum, activity likely to be in chest wall   - PET/CT (1/11/23): Increased FDG activity within R axillary vein, which contains an occlusive thrombus, which may represent infectious source. Also an additional FDG avid region of AV fistula near arterial anastomosis proximal to area of fluid collection; could be potential source of infection vs inflammatory reaction to the graft. Also a photopenic R adnexal cystic mass, suggestive of benign etiology.  - 1/21 o/n Tmax 101.6 rectal, pt MAPs in the 60s, however mentating at baseline. BCx collected, Vanc restarted     Plan:   - c/w midodrine 40 mg PO q8h (goal MAP >55)   - given BCx repeated negative, dced vancomycin

## 2023-01-27 NOTE — PROGRESS NOTE ADULT - SUBJECTIVE AND OBJECTIVE BOX
Pain Management Progress Note - [facility] [phone]    HPI: Patient seen and examined today. Patient reports endorsing bilateral breast pain. Patient remains on Dilaudid 0.5 - 1 mg IVP q3-4 h prn, patient reports still endorsing pain. Patient denies side effects from current pain regimen.     Pertinent PMH: Pain at: ___Back ___Neck___Knee ___Hip ___Shoulder ___ Opioid tolerance    Pain is _X__ sharp ____dull ___burning ___achy ___ Intensity: ____ mild __X__mod _X___severe     Location _____surgical site _____cervical _____lumbar ____abd _____upper ext____lower ext    Worse with ___X_activity __X__movement _____physical therapy___ Rest    Improved with X____medication __X__rest ____physical therapy\    PAST MEDICAL & SURGICAL HISTORY:  HTN (hypertension)  HLD (hyperlipidemia)  CAD (coronary atherosclerotic disease)  ESRD on dialysis  last 11/15/22  H/O ventral hernia  Brown tumor  brain  DVT, lower extremity  left=  History of surgery  IVC filter  AVF (arteriovenous fistula)  right left  S/P AIDEN-BSO  2003  History of surgery  RLE PTA stent / atherectomy  7/2021  History of atherectomy  stent    MEDICATIONS:  predniSONE   Tablet  predniSONE   Tablet  diphenhydrAMINE Injectable  epoetin александр-epbx (RETACRIT) Injectable  diphenhydrAMINE Injectable  acetaminophen   IVPB ..  cinacalcet  saline laxative (FLEET) Rectal Enema  acetaminophen   IVPB ..  magnesium sulfate  IVPB  ondansetron Injectable  albumin human 25% IVPB  HYDROmorphone  Injectable  HYDROmorphone  Injectable  acetaminophen     Tablet ..  albumin human 25% IVPB  epoetin александр-epbx (RETACRIT) Injectable  acetaminophen   IVPB ..  acetaminophen   IVPB ..  cyclobenzaprine  acetaminophen     Tablet ..  fludroCORTISONE  acetaminophen   IVPB ..  acetaminophen   IVPB ..  acetaminophen   IVPB ..  diphenhydrAMINE Injectable  diphenhydrAMINE IVPB  methylPREDNISolone sodium succinate Injectable  lidocaine 4% Cream  petrolatum white Ointment  albumin human 25% IVPB  epoetin александр-epbx (RETACRIT) Injectable  acetaminophen   IVPB ..  methylPREDNISolone sodium succinate Injectable  diphenhydrAMINE Injectable  methylPREDNISolone sodium succinate IVPB  midodrine.  cefepime   IVPB  cefepime   IVPB  cefepime   IVPB  vancomycin  IVPB  piperacillin/tazobactam IVPB..  piperacillin/tazobactam IVPB.  vancomycin  IVPB  piperacillin/tazobactam IVPB.-  midodrine.  acetaminophen   IVPB ..  HYDROmorphone   Tablet  HYDROmorphone   Tablet  acetaminophen     Tablet ..  sevelamer carbonate  apixaban  midodrine.  epoetin александр-epbx (RETACRIT) Injectable  HYDROmorphone  Injectable  HYDROmorphone  Injectable  cinacalcet  cinacalcet  phenylephrine    Infusion  chlorhexidine 2% Cloths  sodium chloride 0.9% lock flush  magnesium sulfate  IVPB  epoetin александр-epbx (RETACRIT) Injectable  HYDROmorphone  Injectable  HYDROmorphone   Tablet  HYDROmorphone  Injectable  HYDROmorphone  Injectable  HYDROmorphone   Tablet  apixaban  ondansetron Injectable  naloxone Injectable  HYDROmorphone PCA (1 mG/mL)  epoetin александр-epbx (RETACRIT) Injectable  phenylephrine    Infusion  acetaminophen    Suspension ..  ondansetron    Tablet  ondansetron Injectable  acetaminophen    Suspension ..  vancomycin  IVPB  acetaminophen    Suspension ..  acetaminophen    Suspension ..  acetaminophen    Suspension ..  epoetin александр-epbx (RETACRIT) Injectable  HYDROmorphone  Injectable  fludroCORTISONE  fludroCORTISONE  saline laxative (FLEET) Rectal Enema  colchicine  clopidogrel Tablet  acetaminophen   IVPB ..  dextrose 50% Injectable  dextrose 10% Bolus  heparin  Infusion  heparin  Infusion  heparin  Infusion  fludroCORTISONE  heparin  Infusion  magnesium sulfate  IVPB  vancomycin  IVPB  epoetin александр-epbx (RETACRIT) Injectable  heparin  Infusion  ondansetron    Tablet  HYDROmorphone  Injectable  cinacalcet  HYDROmorphone  Injectable  HYDROmorphone   Tablet  lidocaine 1%/epinephrine 1:100,000 Inj  acetaminophen   IVPB ..  midodrine  senna  polyethylene glycol 3350  cosyntropin Injectable  phenylephrine    Infusion  Nephro-deborah  midodrine  vancomycin  IVPB  calamine/zinc oxide Lotion  vancomycin  IVPB  phenylephrine    Infusion  heparin  Infusion.  midodrine  epoetin александр-epbx (RETACRIT) Injectable  cosyntropin Injectable  magnesium sulfate  IVPB  chlorhexidine 2% Cloths  phenylephrine    Infusion  sodium chloride 0.9% Bolus  HYDROmorphone  Injectable  HYDROmorphone  Injectable  HYDROmorphone   Tablet  vancomycin  IVPB  vancomycin  IVPB  HYDROmorphone  Injectable  HYDROmorphone  Injectable  epoetin александр-epbx (RETACRIT) Injectable  HYDROmorphone  Injectable  phenylephrine    Infusion  HYDROmorphone  Injectable  HYDROmorphone  Injectable  phenylephrine    Infusion  phenylephrine    Infusion  atorvastatin  HYDROmorphone  Injectable  sevelamer carbonate  meropenem  IVPB  sodium chloride 0.9% Bolus  dextrose 50% Injectable  dextrose 50% Injectable  mupirocin 2% Ointment  collagenase Ointment  vancomycin  IVPB  HYDROmorphone  Injectable  vancomycin  IVPB      ROS: Const:  N___febrile   Eyes:___ENT:___CV: __N_chest pain  Resp: ___N_sob  GI:_N__nausea __N_vomiting _N___abd pain ___npo ___clears ___full diet __bm  :___ Musk: _Y__pain ___spasm  Skin:___ Neuro:  _N__sedation__N_confusion__N__ numbness ___weakness N___paresthesia  Psych:__N_anxiety  Endo:___ Heme:___Allergy:ANCEF, DDAVP, IODINE, PCN, SULFA DRUGS___      T(C): 36.5 (01-27-23 @ 10:00), Max: 37 (01-27-23 @ 01:00)  HR: 93 (01-27-23 @ 11:48) (66 - 128)  BP: 118/64 (01-27-23 @ 11:25) (103/49 - 135/56)  RR: 15 (01-27-23 @ 09:51) (15 - 22)  SpO2: 96% (01-27-23 @ 11:25) (90% - 100%)  Wt(kg): --     PHYSICAL EXAM:  Gen Appearance: _X__no acute distress __X_appropriate       Neuro: ___SILT feet____ EOM Intact Psych: AAOX_3_, _X__mood/affect appropriate        Eyes: __X_conjunctiva WNL  ___X__ Pupils equal and round        ENT: _X__ears and nose atraumatic_X__ Hearing grossly intact        Neck: X___trachea midline, no visible masses ___thyroid without palpable mass    Resp: __X_Nml WOB____No tactile fremitus ___clear to auscultation    Cardio: _X__extremities free from edema ____pedal pulses palpable    GI/Abdomen: ___soft _____ Nontender____X__Nondistended_____HSM    Lymphatic: ___no palpable nodes in neck  ___no palpable nodes calves and feet    Skin/Wound: ___Incision, ___Dressing c/d/i,   ____surrounding tissues soft,  ___drain/chest tube present____    Muscular: EHL ___/5  Gastrocnemius___/5    X___absent clubbing/cyanosis         ASSESSMENT:  This is a 67y old Female with a history of HFrEF, CAD, NICM, HTN, HLD, ESRD on HD, PE, PAD, AS. 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    Recommended Treatment PLAN:  1. Consider continuing Dilaudid 0.5 mg IVP q3h PRN moderate pain  2. Consider increasing to Dilaudid 1 mg IVP q2h PRN severe pain  3. Consider Tylenol 975 975 mg PO TID  4. Consider starting Lyrica 25 mg PO daily  5. Consider continuing Flexeril 5 mg PO q8h PRN muscle spasm  HOLD ALL OPIOIDS FOR SEDATION, RR<10, O2SAT <93%, SBP <96  Plan discussed with Dr. Camejo

## 2023-01-27 NOTE — DISCHARGE NOTE PROVIDER - NSDCCPCAREPLAN_GEN_ALL_CORE_FT
PRINCIPAL DISCHARGE DIAGNOSIS  Diagnosis: HPTH (hyperparathyroidism)  Assessment and Plan of Treatment:       SECONDARY DISCHARGE DIAGNOSES  Diagnosis: Pain in breast  Assessment and Plan of Treatment:     Diagnosis: Hypotension  Assessment and Plan of Treatment:     Diagnosis: ESRD on dialysis  Assessment and Plan of Treatment:     Diagnosis: Acute deep vein thrombosis  Assessment and Plan of Treatment:      PRINCIPAL DISCHARGE DIAGNOSIS  Diagnosis: HPTH (hyperparathyroidism)  Assessment and Plan of Treatment: Hyperparathyroidism is a disorder of the parathyroid glands in your neck. These glands make a hormone that helps control the amount of calcium in the blood. This can be because of a dysfunction of the glands themselves or as a consequency of kidney disease. Please continue to take your medications as prescribed and follow up with the endocrinologist and ENT surgeon outpatient for further evaluation and treatment.      SECONDARY DISCHARGE DIAGNOSES  Diagnosis: Pain in breast  Assessment and Plan of Treatment: You were found to have breast masses on this admission that caused significant pain. We completed studies including imaging and biopsies of the masses, which showed inflammation. You will likely benefit from plastic surgery evaluation outpatient to consider surgical options. Please continue with current regimen for pain control and wound care as follows: dress right breast with Xeroform and gauze with paper tape, this will need to be changed daily.    Diagnosis: ESRD on dialysis  Assessment and Plan of Treatment: Chronic kidney disease is the gradual and permanent loss of kidney function. Normally, the kidneys remove fluids, chemicals, and waste from your blood and turn these waste chemicals into urine. As your kidney disease worsens, you lose your ability to remove waste from your body. Call 911 or seek care immediately if your heart is beating faster than normal, you are confused or very drowsy, have a seizure, have sudden chest pain or shortness of breath. Please take your medications as prescribed and continue with dialysis Tue/Thu/Sat. Please follow with your nephrologist on discharge.      Diagnosis: Hypotension  Assessment and Plan of Treatment: You were found to have low blood pressure during this admission. We started you on a steroid medication and increased the dose of your midodrine. Please continue to take these medications as prescribed to maintain your blood pressure.    Diagnosis: Acute deep vein thrombosis  Assessment and Plan of Treatment: You were found to have a DVT during this hospital admission. Deep vein thrombosis (DVT) is a serious condition that occurs when a blood clot forms in a vein located deep inside your body. You were prescribed Eliquis which is a blood thinner. Please continue to take your blood thinner and follow up with your vascular surgeon to further manage this condition.

## 2023-01-27 NOTE — PROGRESS NOTE ADULT - PROBLEM SELECTOR PLAN 1
Family hx of breast cancer in mother, sister.   - Physical exam on admission: large R breast mass, palpable L breast mass, R breast has peau d'orange appearance with associated erythema, warmth on R  - US bilateral breasts: No sonographic evidence of breast abscess, cyst or focal mass to correspond with the clinical finding of bilateral breast masses.  - Breast bx: Epidermal and dermal necrosis with fibrinoid occlusion and necrosis of blood vessels, focal necrotizing suppurative inflammation involving the fibroadipose tissue and blood vessels in the subcutis    Plan:  Pt continues to report pain in bilateral breasts, worst on R. Pain responsive to Dilaudid but effect wears off after ~2-3 hours. Will transition from PO to IV given patient unable to tolerate PO. Was briefly on PCA (1/16-1/17), now dced  - Pain management consulted, f/u recs  - c/w Dilaudid 0.5 mg IV q3h PRN for moderate pain  - c/w Dilaudid 1 mg IV q3h PRN for severe pain   - c/w lidocaine 4% topical cream TID  - HOLD ALL OPIOIDS IF RR<10, SpO2 <93%, SBP <96    #Peeling epidermis to B/l breasts  Derm consulted, recommend wedge biopsy of both normal and pathologic tissue. Coagulopathic changes may be secondary to an infectious etiology, however other causes of hypercoagulability should be explored  - per surgery, no surgical interventions  - c/w daily dressing changes   - f/u Wound Care recs Family hx of breast cancer in mother, sister.   - Physical exam on admission: large R breast mass, palpable L breast mass, R breast has peau d'orange appearance with associated erythema, warmth on R  - US bilateral breasts: No sonographic evidence of breast abscess, cyst or focal mass to correspond with the clinical finding of bilateral breast masses.  - Breast bx: Epidermal and dermal necrosis with fibrinoid occlusion and necrosis of blood vessels, focal necrotizing suppurative inflammation involving the fibroadipose tissue and blood vessels in the subcutis    Plan:  Pt continues to report pain in bilateral breasts, worst on R. Transitioned from PO to IV given patient unable to tolerate PO due to smell of medication. Was briefly on PCA (1/16-1/17), now dced.  - Pain management consulted, f/u recs  - c/w Dilaudid 0.5 mg IV q3h PRN for moderate pain  - c/w Dilaudid 1 mg IV q3h PRN for severe pain   - c/w lidocaine 4% topical cream TID  - HOLD ALL OPIOIDS IF RR<10, SpO2 <93%, SBP <96    #Peeling epidermis to B/l breasts  Derm consulted, recommend wedge biopsy of both normal and pathologic tissue. Coagulopathic changes may be secondary to an infectious etiology, however other causes of hypercoagulability should be explored  - per surgery, no surgical interventions  - c/w daily dressing changes   - f/u Wound Care recs

## 2023-01-27 NOTE — PROGRESS NOTE ADULT - PROBLEM SELECTOR PLAN 7
At risk for secondary HPTT d/t renal failure. Pt has vague abdominal pain w/ nausea. Concern for possible brown tumors on CT with multiple fractures of pubic rami, pubic bone, thoracolumbar spine. Intact , Vit D 38.5. SPEP/immunofixation negative. Per Endo, likely secondary hyperparathyroidism vs other process causing osteoclastic tumors.  - c/w Sensipar 90 mg PO daily   - 1/11 AM  intact (baseline PTH on Sensipar)    #Multinodular Goiter  CT (1/3/23): Enlarged multinodular thyroid projecting into superior mediastinum w/ 1.8 x 1.5 cm solid nodule slightly inferiorly in the anterior mediastinum.   Thyroid US (1/4/23): Fine-needle aspiration recommended for 4.3 cm right thyroid and 2.6 cm left thyroid solid nodules per SCOTT guidelines.  - TSH 1.6 free T4 0.68. TSH could be inappropriately normal for low free T4 due to euthyroid sick syndrome.  - f/u TFTs after shock resolved  - Pt will eventually need FNA of thyroid nodules At risk for secondary HPTT d/t renal failure. Pt has vague abdominal pain w/ nausea. Concern for possible brown tumors on CT with multiple fractures of pubic rami, pubic bone, thoracolumbar spine. Intact , Vit D 38.5. SPEP/immunofixation negative. Per Endo, likely secondary hyperparathyroidism vs other process causing osteoclastic tumors.  - c/w Sensipar 90 mg PO daily   - 1/11 AM  intact (baseline PTH on Sensipar)    #Multinodular Goiter  CT (1/3/23): Enlarged multinodular thyroid projecting into superior mediastinum w/ 1.8 x 1.5 cm solid nodule slightly inferiorly in the anterior mediastinum.   Thyroid US (1/4/23): Fine-needle aspiration recommended for 4.3 cm right thyroid and 2.6 cm left thyroid solid nodules per SCOTT guidelines.  - TSH 1.6 free T4 0.68. TSH could be inappropriately normal for low free T4 due to euthyroid sick syndrome.  - f/u TFTs after shock resolved

## 2023-01-27 NOTE — PROGRESS NOTE ADULT - PROBLEM SELECTOR PLAN 5
TTE 11/22 normal LV and systolic function, EF 59%, grade II diastolic dysfunction, dilated RV, reduced RV systolic function.  Home meds: Entresto 49/51mg  - hold Entresto i/s/o shock state     #LVOT with LISETTE  Likely i/s/o hyperdynamic LV function 2/2 shock and hypovolemia  - per cardiology, rec repeating TTE after shock resolves    #CAD  Hx of cardiac cath in 2018  Home meds: atorvastatin 40mg, plavix 75mg qd  - c/w home atorvastatin 40mg  - c/w home Plavix 75mg qd TTE 11/22 normal LV and systolic function, EF 59%, grade II diastolic dysfunction, dilated RV, reduced RV systolic function. Home meds: Entresto 49/51mg  - hold Entresto i/s/o normotensive      #LVOT with LISETTE  Likely i/s/o hyperdynamic LV function 2/2 shock and hypovolemia  - per cardiology, rec repeating TTE after shock resolves    #CAD  Hx of cardiac cath in 2018. Home meds: atorvastatin 40mg, plavix 75mg qd.  - c/w home atorvastatin 40mg  - c/w home Plavix 75mg qd

## 2023-01-27 NOTE — PROGRESS NOTE ADULT - PROBLEM SELECTOR PLAN 2
Shock of unclear etiology, suspect 2/2 tenuous hemodynamics w/ LVOT obstruction/LISETTE and significant vasculopathy in addition to possible sepsis, however unknown source (no fever, BCx NGTD, no focal area of infection IDed thus far). On initial presentation, pt met 2/4 SIRS criteria. Hypotensive with MAPs to 60 requiring levophed. Admission HR > 90, WBC > 12, Lactate 1.9, Procal 12. , Ferritin 1209. BCx 1/3, 1/9, 1/17 no growth. s/p Vancomycin x2 wks (1/2-1/15) to treat presumed R breast cellulitis. Per Surgery, prelim R breast biopsy shows vasculitis vs granuloma, unlikely to cause hypotension. Corticotropin stimulation test wnl.  - Gallium scan (1/6/23): Faintly increased activity surrounding entire lower portion of R breast c/w inflammatory reaction seen on physical exam, small area of activity ~10cm to R of midline at level of the lower border of the sternum, activity likely to be in chest wall   - PET/CT (1/11/23): Increased FDG activity within R axillary vein, which contains an occlusive thrombus, which may represent infectious source. Also an additional FDG avid region of AV fistula near arterial anastomosis proximal to area of fluid collection; could be potential source of infection vs inflammatory reaction to the graft. Also a photopenic R adnexal cystic mass, suggestive of benign etiology.  - 1/21 o/n Tmax 101.6 rectal, pt MAPs in the 60s, however mentating at baseline. BCx collected, Vanc restarted     Plan:   - c/w midodrine 40 mg PO q8h (goal MAP >55)   - BCx 1/21 NGTD Shock of unclear etiology, suspect 2/2 tenuous hemodynamics w/ LVOT obstruction/LISETTE and significant vasculopathy in addition to possible sepsis, however unknown source (no fever, BCx NGTD, no focal area of infection IDed thus far). On initial presentation, pt met 2/4 SIRS criteria. Hypotensive with MAPs to 60 requiring levophed. Admission HR > 90, WBC > 12, Lactate 1.9, Procal 12. , Ferritin 1209. BCx 1/3, 1/9, 1/17 no growth. s/p Vancomycin x2 wks (1/2-1/15) to treat presumed R breast cellulitis. Per Surgery, prelim R breast biopsy shows vasculitis vs granuloma, unlikely to cause hypotension. Corticotropin stimulation test wnl. Gallium scan (1/6/23): Faintly increased activity surrounding entire lower portion of R breast c/w inflammatory reaction seen on physical exam, small area of activity ~10cm to R of midline at level of the lower border of the sternum, activity likely to be in chest wall. PET/CT (1/11/23): Increased FDG activity within R axillary vein, which contains an occlusive thrombus, which may represent infectious source. Also an additional FDG avid region of AV fistula near arterial anastomosis proximal to area of fluid collection; could be potential source of infection vs inflammatory reaction to the graft. Also a photopenic R adnexal cystic mass, suggestive of benign etiology.  - 1/21 o/n Tmax 101.6 rectal, pt MAPs in the 60s, however mentating at baseline. BCx collected, Vanc restarted     Plan:   - c/w midodrine 40 mg PO q8h (goal MAP >55)   - BCx 1/21 NGTD

## 2023-01-27 NOTE — PROGRESS NOTE ADULT - PROBLEM SELECTOR PLAN 1
Family hx of breast cancer in mother, sister.   - Physical exam on admission: large R breast mass, palpable L breast mass, R breast has peau d'orange appearance with associated erythema, warmth on R  - US bilateral breasts: No sonographic evidence of breast abscess, cyst or focal mass to correspond with the clinical finding of bilateral breast masses.  - Breast bx: Epidermal and dermal necrosis with fibrinoid occlusion and necrosis of blood vessels, focal necrotizing suppurative inflammation involving the fibroadipose tissue and blood vessels in the subcutis    Plan:  Pt continues to report pain in bilateral breasts, worst on R. Pain responsive to Dilaudid but effect wears off after ~2-3 hours. Will transition from PO to IV given patient unable to tolerate PO. Was briefly on PCA (1/16-1/17), now dced  - Pain management consulted, f/u recs  - c/w Dilaudid 0.5 mg IV q3h PRN for moderate pain  - c/w Dilaudid 1 mg IV q3h PRN for severe pain   - c/w lidocaine 4% topical cream TID  - HOLD ALL OPIOIDS IF RR<10, SpO2 <93%, SBP <96    #Peeling epidermis to B/l breasts  Derm consulted, recommend wedge biopsy of both normal and pathologic tissue. Coagulopathic changes may be secondary to an infectious etiology, however other causes of hypercoagulability should be explored  - per surgery, no surgical interventions  - c/w daily dressing changes   - f/u Wound Care recs Family hx of breast cancer in mother, sister. Physical exam on admission: large R breast mass, palpable L breast mass, R breast has peau d'orange appearance with associated erythema, warmth on R. US bilateral breasts: No sonographic evidence of breast abscess, cyst or focal mass to correspond with the clinical finding of bilateral breast masses. Breast bx: Epidermal and dermal necrosis with fibrinoid occlusion and necrosis of blood vessels, focal necrotizing suppurative inflammation involving the fibroadipose tissue and blood vessels in the subcutis    Plan:  Pt continues to report pain in bilateral breasts, worst on R. Pain responsive to Dilaudid but effect wears off after ~2-3 hours. Will transition from PO to IV given patient unable to tolerate PO. Was briefly on PCA (1/16-1/17), now MS'ed  - Pain management consulted, f/u recs  - c/w Dilaudid 0.5 mg IV q3h PRN for moderate pain  - c/w Dilaudid 1 mg IV q3h PRN for severe pain   - c/w lidocaine 4% topical cream TID  - HOLD ALL OPIOIDS IF RR<10, SpO2 <93%, SBP <96    #Peeling epidermis to B/l breasts  Derm consulted, recommend wedge biopsy of both normal and pathologic tissue. Coagulopathic changes may be secondary to an infectious etiology, however other causes of hypercoagulability should be explored  - per surgery, no surgical interventions  - c/w daily dressing changes   - f/u Wound Care recs

## 2023-01-27 NOTE — DISCHARGE NOTE PROVIDER - NSDCFUADDAPPT_GEN_ALL_CORE_FT
The ENT and vascular specialist offices will give you a call to schedule an appointment. If you do not hear back from them, please call back at the numbers listed above.

## 2023-01-27 NOTE — PROGRESS NOTE ADULT - SUBJECTIVE AND OBJECTIVE BOX
--------------------------------------------------------------------------------  Chief Complaint: ESRD/Ongoing hemodialysis requirement    24 hour events/subjective:    Went for FNA this AM, Endo following patient they agree with further imaging of parathyroids, will undergo HD later today.     PAST HISTORY  --------------------------------------------------------------------------------  No significant changes to PMH, PSH, FHx, SHx, unless otherwise noted    ALLERGIES & MEDICATIONS  --------------------------------------------------------------------------------  Allergies    Ancef (Rash; Urticaria)  DDAVP (Hypotension)  iodine (Hives; Pruritus)  penicillin (Swelling)  sulfa drugs (Angioedema)    Intolerances      Standing Inpatient Medications  acetaminophen     Tablet .. 975 milliGRAM(s) Oral every 8 hours  albumin human 25% IVPB 50 milliLiter(s) IV Intermittent every 1 hour  apixaban 2.5 milliGRAM(s) Oral every 12 hours  atorvastatin 40 milliGRAM(s) Oral at bedtime  chlorhexidine 2% Cloths 1 Application(s) Topical <User Schedule>  chlorhexidine 2% Cloths 1 Application(s) Topical <User Schedule>  cinacalcet 90 milliGRAM(s) Oral daily  clopidogrel Tablet 75 milliGRAM(s) Oral daily  colchicine 0.3 milliGRAM(s) Oral daily  collagenase Ointment 1 Application(s) Topical daily  epoetin александр-epbx (RETACRIT) Injectable 8000 Unit(s) IV Push once  fludroCORTISONE 0.2 milliGRAM(s) Oral every 24 hours  midodrine. 40 milliGRAM(s) Oral every 8 hours  Nephro-deborah 1 Tablet(s) Oral daily  petrolatum white Ointment 1 Application(s) Topical daily  polyethylene glycol 3350 17 Gram(s) Oral every 12 hours  senna 2 Tablet(s) Oral at bedtime  sevelamer carbonate 1600 milliGRAM(s) Oral three times a day with meals    PRN Inpatient Medications  calamine/zinc oxide Lotion 1 Application(s) Topical three times a day PRN  cyclobenzaprine 5 milliGRAM(s) Oral three times a day PRN  HYDROmorphone  Injectable 1 milliGRAM(s) IV Push every 3 hours PRN  HYDROmorphone  Injectable 0.5 milliGRAM(s) IV Push every 3 hours PRN  lidocaine 4% Cream 1 Application(s) Topical three times a day PRN  sodium chloride 0.9% lock flush 10 milliLiter(s) IV Push every 1 hour PRN      REVIEW OF SYSTEMS  --------------------------------------------------------------------------------  All other systems were reviewed and are negative, except as noted.    VITALS/PHYSICAL EXAM  --------------------------------------------------------------------------------  T(C): 36.5 (23 @ 10:00), Max: 37 (23 @ 01:00)  HR: 72 (23 @ 09:51) (66 - 96)  BP: 109/54 (23 @ 09:51) (45/20 - 135/56)  RR: 15 (23 @ 09:51) (15 - 22)  SpO2: 100% (23 @ 09:51) (90% - 100%)  Wt(kg): --  Drug Dosing Weight  Height (cm): 177.8 (2023 20:52)  Weight (kg): 85.3 (2023 20:52)  BMI (kg/m2): 27 (2023 20:52)  BSA (m2): 2.03 (2023 20:52)    PHYSICAL EXAM:  GENERAL: NAD resting in bed   CHEST/LUNG: Clear to auscultation bilaterally; no accessory muscle use   HEART: normal S1S2, RRR  ABDOMEN: Soft, Nontender, +BS,   EXTREMITIES: No clubbing, cyanosis, or edema   ACCESS: R TDC    LABS/STUDIES  --------------------------------------------------------------------------------    132  |  93  |  45  ----------------------------<  91      [23 @ 11:54]  4.6   |  27  |  7.02        Ca     8.1     [23 @ 11:54]      Mg     1.8     [23 @ 11:54]      Phos  3.8     [23 @ 11:54]            Iron 15, TIBC 79, %sat 19      [23 @ 06:01]  Ferritin 1209      [23 @ 06:01]  PTH -- (Ca 8.3)      [23 @ 06:47]   492  PTH -- (Ca 8.5)      [23 @ 05:30]   351  PTH -- (Ca 7.6)      [23 @ 04:35]   532  PTH -- (Ca 8.8)      [23 @ 14:24]   479  Vitamin D (25OH) 38.5      [23 @ 05:30]  TSH 1.520      [23 @ 06:47]    HBsAb Nonreact      [23 @ 02:26]  HBsAg Nonreact      [23 @ 02:26]  HCV 0.04, Nonreact      [23 @ 02:26]      RADIOLOGY:  --------------------------------------------------------------------------------------    Phosphorus Level, Serum: 3.8 mg/dL (23 @ 11:54)  Hemoglobin: 9.4 g/dL (23 @ 06:47)  Phosphorus Level, Serum: 4.2 mg/dL (23 @ 06:47)  Hemoglobin: 8.8 g/dL (23 @ 05:30)    Albumin, Serum: 1.8 g/dL (23 @ 05:30)  Albumin, Serum: 1.8 g/dL (23 @ 05:55)    epoetin александр-epbx (RETACRIT) Injectable 8000 Unit(s) IV Push once, 23 @ 07:42, Routine, Stop order after: 1 Doses  epoetin александр-epbx (RETACRIT) Injectable 8000 Unit(s) IV Push once, 23 @ 07:41, Routine, Stop order after: 1 Doses  epoetin александр-epbx (RETACRIT) Injectable 8000 Unit(s) IV Push once, 23 @ 07:40, Routine, Stop order after: 1 Doses  sevelamer carbonate 1600 milliGRAM(s) Oral once, 23 @ 09:59, STAT, Stop order after: 1 Doses  sevelamer carbonate 1600 milliGRAM(s) Oral three times a day with meals, 23 @ 05:28, Routine      Hemodialysis Treatment.:     Schedule: Once, Modality: Hemodialysis, Access: Arteriovenous Fistula    Dialyzer: Optiflux [id number], Time: 180 Min    Blood Flow: 400 mL/Min , Dialysate Flow: 500 mL/Min, Dialysate Temp: 35, Tubinmm (Adult)    Target Fluid Removal: 1 Liters    Dialysate Electrolytes (mEq/L): Potassium 2, Calcium 2.5, Sodium 138, Bicarbonate 35    Additional Instructions: Midodrine 30 min before dialysis (23 @ 07:42) [Active]   --------------------------------------------------------------------------------  Chief Complaint: ESRD/Ongoing hemodialysis requirement    24 hour events/subjective:    Went for FNA this AM, Endo following patient they agree with further imaging of parathyroids, will undergo HD later today.     PAST HISTORY  --------------------------------------------------------------------------------  No significant changes to PMH, PSH, FHx, SHx, unless otherwise noted    ALLERGIES & MEDICATIONS  --------------------------------------------------------------------------------  Allergies    Ancef (Rash; Urticaria)  DDAVP (Hypotension)  iodine (Hives; Pruritus)  penicillin (Swelling)  sulfa drugs (Angioedema)    Intolerances      Standing Inpatient Medications  acetaminophen     Tablet .. 975 milliGRAM(s) Oral every 8 hours  albumin human 25% IVPB 50 milliLiter(s) IV Intermittent every 1 hour  apixaban 2.5 milliGRAM(s) Oral every 12 hours  atorvastatin 40 milliGRAM(s) Oral at bedtime  chlorhexidine 2% Cloths 1 Application(s) Topical <User Schedule>  chlorhexidine 2% Cloths 1 Application(s) Topical <User Schedule>  cinacalcet 90 milliGRAM(s) Oral daily  clopidogrel Tablet 75 milliGRAM(s) Oral daily  colchicine 0.3 milliGRAM(s) Oral daily  collagenase Ointment 1 Application(s) Topical daily  epoetin александр-epbx (RETACRIT) Injectable 8000 Unit(s) IV Push once  fludroCORTISONE 0.2 milliGRAM(s) Oral every 24 hours  midodrine. 40 milliGRAM(s) Oral every 8 hours  Nephro-deborah 1 Tablet(s) Oral daily  petrolatum white Ointment 1 Application(s) Topical daily  polyethylene glycol 3350 17 Gram(s) Oral every 12 hours  senna 2 Tablet(s) Oral at bedtime  sevelamer carbonate 1600 milliGRAM(s) Oral three times a day with meals    PRN Inpatient Medications  calamine/zinc oxide Lotion 1 Application(s) Topical three times a day PRN  cyclobenzaprine 5 milliGRAM(s) Oral three times a day PRN  HYDROmorphone  Injectable 1 milliGRAM(s) IV Push every 3 hours PRN  HYDROmorphone  Injectable 0.5 milliGRAM(s) IV Push every 3 hours PRN  lidocaine 4% Cream 1 Application(s) Topical three times a day PRN  sodium chloride 0.9% lock flush 10 milliLiter(s) IV Push every 1 hour PRN      REVIEW OF SYSTEMS  --------------------------------------------------------------------------------  All other systems were reviewed and are negative, except as noted.    VITALS/PHYSICAL EXAM  --------------------------------------------------------------------------------  T(C): 36.5 (23 @ 10:00), Max: 37 (23 @ 01:00)  HR: 72 (23 @ 09:51) (66 - 96)  BP: 109/54 (23 @ 09:51) (45/20 - 135/56)  RR: 15 (23 @ 09:51) (15 - 22)  SpO2: 100% (23 @ 09:51) (90% - 100%)  Wt(kg): --  Drug Dosing Weight  Height (cm): 177.8 (2023 20:52)  Weight (kg): 85.3 (2023 20:52)  BMI (kg/m2): 27 (2023 20:52)  BSA (m2): 2.03 (2023 20:52)    PHYSICAL EXAM:  GENERAL: NAD resting in bed   CHEST/LUNG: Clear to auscultation bilaterally; no accessory muscle use   HEART: normal S1S2, RRR  ABDOMEN: Soft, Nontender, +BS,   EXTREMITIES: No clubbing, cyanosis, or edema   ACCESS: R TDC    LABS/STUDIES  --------------------------------------------------------------------------------    132  |  93  |  45  ----------------------------<  91      [23 @ 11:54]  4.6   |  27  |  7.02        Ca     8.1     [23 @ 11:54]      Mg     1.8     [23 @ 11:54]      Phos  3.8     [23 @ 11:54]            Iron 15, TIBC 79, %sat 19      [23 @ 06:01]  Ferritin 1209      [23 @ 06:01]  PTH -- (Ca 8.3)      [23 @ 06:47]   492  PTH -- (Ca 8.5)      [23 @ 05:30]   351  PTH -- (Ca 7.6)      [23 @ 04:35]   532  PTH -- (Ca 8.8)      [23 @ 14:24]   479  Vitamin D (25OH) 38.5      [23 @ 05:30]  TSH 1.520      [23 @ 06:47]    HBsAb Nonreact      [23 @ 02:26]  HBsAg Nonreact      [23 @ 02:26]  HCV 0.04, Nonreact      [23 @ 02:26]      RADIOLOGY:  --------------------------------------------------------------------------------------    Phosphorus Level, Serum: 3.8 mg/dL (23 @ 11:54)  Hemoglobin: 9.4 g/dL (23 @ 06:47)  Phosphorus Level, Serum: 4.2 mg/dL (23 @ 06:47)  Hemoglobin: 8.8 g/dL (23 @ 05:30)    Albumin, Serum: 1.8 g/dL (23 @ 05:30)  Albumin, Serum: 1.8 g/dL (23 @ 05:55)    epoetin александр-epbx (RETACRIT) Injectable 8000 Unit(s) IV Push once, 23 @ 07:42, Routine, Stop order after: 1 Doses  epoetin александр-epbx (RETACRIT) Injectable 8000 Unit(s) IV Push once, 23 @ 07:41, Routine, Stop order after: 1 Doses  epoetin александр-epbx (RETACRIT) Injectable 8000 Unit(s) IV Push once, 23 @ 07:40, Routine, Stop order after: 1 Doses  sevelamer carbonate 1600 milliGRAM(s) Oral once, 23 @ 09:59, STAT, Stop order after: 1 Doses  sevelamer carbonate 1600 milliGRAM(s) Oral three times a day with meals, 23 @ 05:28, Routine      Hemodialysis Treatment.:     Schedule: Once, Modality: Hemodialysis, Access: Arteriovenous Fistula    Dialyzer: Optiflux [id number], Time: 180 Min    Blood Flow: 400 mL/Min , Dialysate Flow: 500 mL/Min, Dialysate Temp: 35, Tubinmm (Adult)    Target Fluid Removal: 1 Liters    Dialysate Electrolytes (mEq/L): Potassium 2, Calcium 2.5, Sodium 138, Bicarbonate 35    Additional Instructions: Midodrine 30 min before dialysis (23 @ 07:42) [Active]    Seen on HD, c/w prescription outlined as above

## 2023-01-27 NOTE — DISCHARGE NOTE PROVIDER - CARE PROVIDERS DIRECT ADDRESSES
,[email or web address],[email or web address],[email or web address],[email or web address]

## 2023-01-27 NOTE — DISCHARGE NOTE PROVIDER - ATTENDING DISCHARGE PHYSICAL EXAMINATION:
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 Pt s/p vancomycin x2 wks (last day 1/15) for presumed breast cellulitis. fistula as source ruled out. gallium scan was positive only for uptake on right ant chest wall. US of that area was negative for abscess. Endocrinology was consulted for elevated PTH and multinodular thyroid. Work-up was suggestive of secondary hyperparathyroidism but despite increasing doses of the medication, she has continued to have elevated levels of PTH. 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 thyroid ultrasound showed multiple nodules meeting criteria for fine-needle aspiration. Pt was transferred to [facility] when BP improved with midodrine for FNA and placement. Acute DVT in left common femoral vein found on Dopplers on 1.28.2023, trialing full dose AC with heparin drip, watching CBC closely (q8) for signs of bleed.   s/p FNA biopsy 2/2-> pending path results- per endocrine can have the surgery scheduled outpt   s/p CT IV contrast  - s.p CT venogram (to evaluate IVC filter) and 4dCT of parathyroid 1/30.    # Leukocytosis - received high dose methylpred for premedication on Saturday. Likely demargination-> now trending down. Check rectal temp if oral or axillary temp >99F.      # DVT  Known DVT of RIJ, R subclavian and R axillary veins. Hx of L AVF clotting.  Acute DVT of left common femoral vein seen on dopplers on 1.28.23, started on heparin drip; housestaff discussed risks of full dose AC, indication for AC for acute clot; patient understands risk and wishes to proceed with AC  CBC stable now (Has required 3 prBC transfusions this admission). hgb 9.1 2/2    # Right leg edema >Left   # IVC filter present (placed after 2018 PE)   No DVT in right leg on 1.28.23 Dopplers  Starting full AC for left common femoral vein acute DVT ; was on heparin drip will for IR procedure->will transition to eliquis on discharge    RLE swelling improving now       #Multinodular goiter  TSH 1.52. Free T4 0.96   CT showed enlarged multinodular thyroid projecting into superior mediastinum with 1.8 x 1.5 cm solid nodule slightly inferiorly in the anterior mediastinum. Thyroid ultrasound showed a moderately enlarged gland with heterogeneous echotexture. There were 3 nodules in the R lobe (largest 3.3 cm), one in the left lobe (2.3 cm).   per Endo: Both of these nodules warrant biopsy--the right lobe lesion because of size and its shape (taller > wide), and the left lobe nodule based on size. Fine-needle aspiration recommended for 4.3 cm right thyroid and 2.6 cm left thyroid solid nodules per SCOTT guidelines  s/p  thyroid FNA 2/1/23 by IR   4D CT scan unclear- recommended nuclear scan   further plan will be assessed based on FNA -> parathyroidectomy w/wo thyroidectomy outpt    #renal bone disease   #calciphylaxis   #2ndary hyperparathyroidism   brown tumors on recent CT  c/w sevelamer to 1600mg tid w/ meals and sensipar 90mg Qday  PTH remains elevated-> pending FNA thyroid results   repeat PTH testing    renal and endo following along with ENT as pt will likely need parathyroidectomy outpt       #ESRD on HD MWF here, outpt T/TH/S  [facility] TDC, RUE AVF not being used    #hypotension  profound hypotension during this admission requiring pressors and now on florinef 0.2mg as well as midodrine 40mg tid.   Gets midodrine 1/2 hr before HD    #Chronic HFrEF (heart failure with reduced ejection fraction).   TTE 11/22 normal LV and systolic function, EF 59%, grade II diastolic dysfunction, dilated RV, reduced RV systolic function  not able to be started on GDMT 2/2 hypotension   recent Echo 1/3 with severe LVOT->fu  repeat echo - showed improvement     #CAD  Hx of cardiac cath in 2018. Home meds: atorvastatin 40mg, plavix 75mg qd.  c/w home atorvastatin 40mg  On plavix per vascular for graft patency--Plavix 75mg qd was held for IR procedure, resumed 2/2/23    plan  vascular ok with BP measurements from LUE as long as distal to the PICC line  sBP has been 70-80's, pt has clinically been tolerating HD well with these pressures.  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 Pt s/p vancomycin x2 wks (last day 1/15) for presumed breast cellulitis. fistula as source ruled out. gallium scan was positive only for uptake on right ant chest wall. US of that area was negative for abscess. Endocrinology was consulted for elevated PTH and multinodular thyroid. Work-up was suggestive of secondary hyperparathyroidism but despite increasing doses of the medication, she has continued to have elevated levels of PTH. 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 thyroid ultrasound showed multiple nodules meeting criteria for fine-needle aspiration. Pt was transferred to [facility] when BP improved with midodrine for FNA and placement. Acute DVT in left common femoral vein found on Dopplers on 1.28.2023, trialing full dose AC with heparin drip, watching CBC closely (q8) for signs of bleed.   s/p FNA biopsy 2/2-> pending path results- per endocrine can have the surgery scheduled outpt     # DVT  Known DVT of RIJ, R subclavian and R axillary veins. Hx of L AVF clotting.  Acute DVT of left common femoral vein seen on dopplers on 1.28.23, started on heparin drip; housestaff discussed risks of full dose AC, indication for AC for acute clot; patient understands risk and wishes to proceed with AC  CBC stable now (Has required 3 prBC transfusions this admission). hgb 9.1 2/2    # Right leg edema >Left   # IVC filter present (placed after 2018 PE)   No DVT in right leg on 1.28.23 Dopplers  Starting full AC for left common femoral vein acute DVT ; was on heparin drip will for IR procedure->will transition to eliquis on discharge    RLE swelling improving now       #Multinodular goiter  TSH 1.52. Free T4 0.96   CT showed enlarged multinodular thyroid projecting into superior mediastinum with 1.8 x 1.5 cm solid nodule slightly inferiorly in the anterior mediastinum. Thyroid ultrasound showed a moderately enlarged gland with heterogeneous echotexture. There were 3 nodules in the R lobe (largest 3.3 cm), one in the left lobe (2.3 cm).   per Endo: Both of these nodules warrant biopsy--the right lobe lesion because of size and its shape (taller > wide), and the left lobe nodule based on size. Fine-needle aspiration recommended for 4.3 cm right thyroid and 2.6 cm left thyroid solid nodules per SCOTT guidelines  s/p  thyroid FNA 2/1/23 by IR   4D CT scan unclear- recommended nuclear scan   further plan will be assessed based on FNA -> parathyroidectomy w/wo thyroidectomy outpt    #renal bone disease   #calciphylaxis   #2ndary hyperparathyroidism   brown tumors on recent CT  c/w sevelamer to 1600mg tid w/ meals and sensipar 90mg Qday  PTH remains elevated-> pending FNA thyroid results   repeat PTH testing    renal and endo following along with ENT as pt will likely need parathyroidectomy outpt     #ESRD on HD MWF here, outpt T/TH/S  [facility] TDC, RUE AVF not being used    #hypotension  profound hypotension during this admission requiring pressors and now on florinef 0.2mg as well as midodrine 40mg tid.   Gets midodrine 1/2 hr before HD    #Chronic HFrEF (heart failure with reduced ejection fraction).   TTE 11/22 normal LV and systolic function, EF 59%, grade II diastolic dysfunction, dilated RV, reduced RV systolic function  not able to be started on GDMT 2/2 hypotension   recent Echo 1/3 with severe LVOT->fu  repeat echo - showed improvement     #CAD  Hx of cardiac cath in 2018. Home meds: atorvastatin 40mg, plavix 75mg qd.  c/w home atorvastatin 40mg  On plavix per vascular for graft patency--Plavix 75mg qd was held for IR procedure, resumed 2/2/23  Physical exam:  GENERAL: NAD, lying in bed comfortably  HEAD:  Atraumatic, Normocephalic  EYES: EOMI, PERRLA, conjunctiva and sclera clear  ENT: Moist mucous membranes  NECK: Supple, No JVD  CHEST/LUNG: mild crackles in LL   HEART: Regular rate and rhythm; S1+ S2+   ABDOMEN: Bowel sounds present; Soft, Nontender   EXTREMITIES:  2+ Peripheral Pulses, brisk capillary refill. No clubbing, cyanosis, 1-2+ bl LE edema, RUE edema in wrap  NERVOUS SYSTEM:  Alert & Oriented , speech clear   MSK: FROM all 4 extremities, full and equal strength  SKIN: multiple skin lesions cw calciphylaxis

## 2023-01-27 NOTE — DISCHARGE NOTE PROVIDER - NPI NUMBER (FOR SYSADMIN USE ONLY) :
[[id number] [[id number] [[id number]]

## 2023-01-27 NOTE — PROGRESS NOTE ADULT - PROBLEM SELECTOR PLAN 6
Pt has been on HD for "years" 2/2 PCKD. AV Fistula of EDWIN clotted in 2014, has received HD thru HD cath since. New AV fistula placed 11/2022, not yet matured.   - f/u nephrology recs  - s/p HD 1/10, 1/13, 1/16, 1/18, 1/20, 1/23     #Renal osteodystrophy   CT head (1/2/23): Increased density of the bone marrow is consistent with renal osteodystrophy. Per endocrine, extent of bone destruction extreme given PTH level, suspect might not be Brown tumors  - c/w sevelamer 1600 mg PO TID  - increased to Sensipar 90 mg PO daily Pt has been on HD for "years" 2/2 PCKD. AV Fistula of EDWIN clotted in 2014, has received HD thru HD cath since. New AV fistula placed 11/2022, not yet matured.   - f/u nephrology recs  - s/p HD 1/10, 1/13, 1/16, 1/18, 1/20, 1/23, 1/25, 1/27    #Renal osteodystrophy   CT head (1/2/23): Increased density of the bone marrow is consistent with renal osteodystrophy. Per endocrine, extent of bone destruction extreme given PTH level, suspect might not be Brown tumors  - c/w sevelamer 1600 mg PO TID  - increased to Sensipar 90 mg PO daily

## 2023-01-27 NOTE — PROGRESS NOTE ADULT - ATTENDING COMMENTS
MAP stable. IR will aspirate thyroid nodules on Apixiban. CT Neck today in work up of hyperparathyroidism.

## 2023-01-28 NOTE — CONSULT NOTE ADULT - SUBJECTIVE AND OBJECTIVE BOX
Briefly this is a 67 F w/ PMH HFrEF (EF 40%), nonobstructive CAD, non-ischemic cardiomyopathy, HTN, HLD, ESRD 2/2 polycystic kidney disease on HD, PE (2018) s/p IVC filter, mild AS, mild MR, PAD, OA, h/o thrombus in vascular access x3 (R AVG, R AVF, L AVG), ventral hernia, 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 Pt s/p vancomycin x2 wks (last day 1/15) for presumed breast cellulitis. Course has been c/b recurrent fevers, most recently on 1/17 & 1/21 (BCx NGTD, restarted vancomycin 1/21-). Multiple services following throughout hospital course. Surgery had been consulted for breast masses/breast cellulitis. 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 Per surgery, no surgical intervention. Derm consulted for b/l breast skin changes, recommend wedge bx of normal and pathologic tissue; also currently being followed by wound care. Vascular consulted for initial concern for RUE AV fistula infection, does not think it is infected. Endocrine following for management of hyperparathyroidism. Gyn/onc consulted for R ovarian cyst and elevated tumor markers, likely benign, recommended outpatient follow up. Pt was stepped down to 7LA on 1/18. On 1/19 AM, BP check on b/l legs demonstrated BPs 60s/30s, asymptomatic, Hb 6.8, 1U pRBC ordered, and pt was stepped up to MICU. In the MICU, MAPs > 65 without pressors so pt was stepped back down to 7LA. On 7LA, pt was transitioned to PO pain medications, midodrine dose was increased, and pt underwent CTA head to r/o aneurysms i/s/o polycystic kidney disease which was negative. Of note, pt had R femoral line removed on 1/18 i/s/o fever and had L femoral line placed 1/18, removed on 1/26. PICC placed by IR on 1/26. Patient has limited access to due to multiple DVTS in the extremities upper and lower ICU was consulted for Blood pressure monitoring as the LLE has a DVT and possible A-line placement  and for future risk of bleeding while considering to start Full AC      In the ED:  Initial vital signs: T: 97.5 F, HR: 108, BP: 109/86, R: 16, SpO2: 100% on RA  ED course:   Labs: significant for WBC 15.76, H/H 9.8/32.0, Plt 309, Na 131, K 7.8, Cl 84, AG 31, BUN/Cr 169/17.58  EKG: peaked T waves  Medications: calcium gluconate, dilaudid 0.5 x1, regular insulin 5, morphine 2mg IV, sodium bicarb, dextrose, albuterol 2.5x3  Consults: none    (02 Jan 2023 05:26)      Allergies    Ancef (Rash; Urticaria)  DDAVP (Hypotension)  iodine (Hives; Pruritus)  penicillin (Swelling)  sulfa drugs (Angioedema)    Intolerances      Home Medications:  Aspir 81 oral delayed release tablet: 1 tab(s) orally once a day (17 Nov 2022 10:31)  atorvastatin 40 mg oral tablet: 1 tab(s) orally once a day (17 Nov 2022 10:32)  clopidogrel 75 mg oral tablet: 1 tab(s) orally once a day (17 Nov 2022 10:42)  folic acid 1 mg oral tablet: 1 tab(s) orally once a day (17 Nov 2022 10:39)  meclizine 25 mg oral tablet: 1 tab(s) orally 3 times a day, As Needed (17 Nov 2022 10:32)  midodrine 10 mg oral tablet: 1 tab(s) orally every 8 hours (26 Nov 2022 14:25)  thiamine 100 mg oral tablet: 1 tab(s) orally once a day (16 Nov 2022 10:44)      PAST MEDICAL & SURGICAL HISTORY:  HTN (hypertension)      HLD (hyperlipidemia)      CAD (coronary atherosclerotic disease)      ESRD on dialysis  last 11/15/22      H/O ventral hernia      Brown tumor  brain      DVT, lower extremity  left      History of surgery  IVC filter      AVF (arteriovenous fistula)  right left      S/P AIDEN-BSO  2003      History of surgery  RLE PTA stent / atherectomy  7/2021      History of atherectomy  stent          FAMILY HISTORY:  No pertinent family history in first degree relatives    :    No known cardiovascular or pulmonary family history       PHYSICAL EXAM  General: obese female, NAD  Head: NC/AT; MMM; PERRL; EOMI  Neck: Supple  Respiratory: CTAB; no wheezes/rales/rhonchi  Cardiovascular: Regular rhythm/rate; S1/S2+, no murmurs, rubs gallops   Gastrointestinal: Soft; NTND; bowel sounds normal and present  Extremities: WWP; trace pitting edema of B/L LE; cyanosis v discoloration of distal toes on b/l feet. right UE edematous, wrapped in kerlix  Neurological: A&Ox3, strength intact, no obvious focal deficits      Vital Signs Last 24 Hrs  T(C): 36.4 (28 Jan 2023 16:15), Max: 36.8 (27 Jan 2023 22:50)  T(F): 97.5 (28 Jan 2023 16:15), Max: 98.2 (27 Jan 2023 22:50)  HR: 71 (28 Jan 2023 16:15) (66 - 96)  BP: 77/44 (28 Jan 2023 16:15) (77/44 - 128/57)  BP(mean): 61 (28 Jan 2023 05:22) (61 - 83)  ABP: --  ABP(mean): --  RR: 17 (28 Jan 2023 16:15) (15 - 19)  SpO2: 94% (28 Jan 2023 16:15) (93% - 100%)    O2 Parameters below as of 28 Jan 2023 16:15  Patient On (Oxygen Delivery Method): room air            General: Not in distress  HEENT:  ZOHAIB              Lymphatic system: No LN  Lungs: Bilateral BS  Cardiovascular: Regular  Gastrointestinal: Soft, Positive BS  Musculoskeletal: No clubbing.  Moves all extremities.    Skin: Warm.  Intact  Neurological: No motor or sensory deficit       01-27-23 @ 07:01  -  01-28-23 @ 07:00  --------------------------------------------------------  IN:    Oral Fluid: 240 mL    Other (mL): 400 mL  Total IN: 640 mL    OUT:    Other (mL): 1400 mL    Stool (mL): 1 mL    Voided (mL): 0 mL  Total OUT: 1401 mL    Total NET: -761 mL          LABS:                          9.5    11.06 )-----------( 254      ( 28 Jan 2023 06:10 )             33.0                                               01-28    131<L>  |  93<L>  |  34<H>  ----------------------------<  147<H>  4.5   |  25  |  5.55<H>    Ca    8.0<L>      28 Jan 2023 06:10  Phos  3.7     01-28  Mg     1.9     01-28    TPro  5.4<L>  /  Alb  2.1<L>  /  TBili  0.2  /  DBili  x   /  AST  33  /  ALT  21  /  AlkPhos  95  01-27      PT/INR - ( 27 Jan 2023 12:45 )   PT: 18.4 sec;   INR: 1.54          PTT - ( 27 Jan 2023 12:45 )  PTT:39.4 sec                                                                                     LIVER FUNCTIONS - ( 27 Jan 2023 12:45 )  Alb: 2.1 g/dL / Pro: 5.4 g/dL / ALK PHOS: 95 U/L / ALT: 21 U/L / AST: 33 U/L / GGT: x                                                                                                                                           CXR:    ECHO:    MEDICATIONS  (STANDING):  acetaminophen    Suspension .. 975 milliGRAM(s) Oral every 8 hours  albumin human 25% IVPB 50 milliLiter(s) IV Intermittent every 1 hour  apixaban 2.5 milliGRAM(s) Oral every 12 hours  atorvastatin 40 milliGRAM(s) Oral at bedtime  chlorhexidine 2% Cloths 1 Application(s) Topical daily  chlorhexidine 2% Cloths 1 Application(s) Topical <User Schedule>  cinacalcet 90 milliGRAM(s) Oral daily  colchicine 0.3 milliGRAM(s) Oral daily  collagenase Ointment 1 Application(s) Topical daily  fludroCORTISONE 0.2 milliGRAM(s) Oral every 24 hours  midodrine. 40 milliGRAM(s) Oral every 8 hours  Nephro-deborah 1 Tablet(s) Oral daily  petrolatum white Ointment 1 Application(s) Topical daily  polyethylene glycol 3350 17 Gram(s) Oral every 12 hours  senna 2 Tablet(s) Oral at bedtime  sevelamer carbonate 1600 milliGRAM(s) Oral three times a day with meals    MEDICATIONS  (PRN):  calamine/zinc oxide Lotion 1 Application(s) Topical three times a day PRN Itching  cyclobenzaprine 5 milliGRAM(s) Oral three times a day PRN Muscle Spasm  HYDROmorphone  Injectable 0.5 milliGRAM(s) IV Push every 3 hours PRN Moderate Pain (4 - 6)  HYDROmorphone  Injectable 1 milliGRAM(s) IV Push every 3 hours PRN Severe Pain (7 - 10)  lidocaine 4% Cream 1 Application(s) Topical three times a day PRN Breast pain  sodium chloride 0.9% lock flush 10 milliLiter(s) IV Push every 1 hour PRN Pre/post blood products, medications, blood draw, and to maintain line patency

## 2023-01-28 NOTE — CONSULT NOTE ADULT - ATTENDING COMMENTS
Exam shows the breast tissue to be soft with underlying firm tissue.  Not suspicious for breast cancer.  Will review Chest CT to see if collaterals present.  Breast ultrasound negative for masses.  Suspect breast changes due to either edema from breast collaterals, heart failure or renal failure.  No reason to explain sepsis other than possible perm cath infection.
I:   K 7s. Missed dialysis for 1 week.   A: ESRD. Critical hyperkalemia.   P: Emergent dialysis. Counseling on compliance with dialysis.
Patient seen and examined with house-staff during bedside rounds.  Resident note read, including vitals, physical findings, laboratory data, and radiological reports.   Revisions included below.  Direct personal management at bed side and extensive interpretation of the data.  Plan was outlined and discussed in details with the housestaff.  Decision making of high complexity  Action taken for acute disease activity to reflect the level of care provided:  - medication reconciliation  - review laboratory data  I had a long discussion with the attending.  The patient is clinically stable.  Patient did not fail anticoagulation but the present in the filter is impeding the venous return.  There is baseline blood pressure.  Monitor blood pressure of the left arm.  Continue anticoagulation as such.  No evidence of GI bleeding at this point.  The patient was seen and examined at bedside and not a candidate for the ICU
Angelica Johansen is a 67F with ESRD 2/2 PCKD admitted to ICU for hypotension requiring pressor support.  Patient has had multiple graft related thrombotic events in past and during this admission was found to have RUE DVT.  She is currently on Heparin gtt and has had a hypercoaguable work-up to assess for intrinsic provoking factors.  Patient will require at least 3 months A/c to treat and given recurrent events, may warrant indefinite A/C.  Oral anticoagulation would need to be warfarin.  Given association with CNS aneurysm and PCKD, CNS imaging would be helpful if no prior records are available.
Initial attending contact date 1/4/23     . See fellow note written above for details. I reviewed the fellow documentation. I have personally seen and examined this patient. I reviewed vitals, labs, medications, cardiac studies, and additional imaging. I agree with the above fellow's findings and plans as written above with the following additions/statements.    67 F with HFrEF? 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 She is currently admitted for septic shock 2/2 possible RUE fistula infection, pending gallium scan. Cardiology consulted for new finding of LVOT obstruction on recent TTE.     -LVOT obstruction   Patient follows up with Dr. Rock Simmons as outpatient. Does not have a history of HOCM. Denies family history of HOCM or sudden cardiac arrest.   Her last TTE from November showed normal wall thickness and without any gradients.  TTE 1/3/22 showed hyperdynamic LV function, LISETTE with LVOT gradient 100mmHg consistent with severe gradient  - LVOT gradient likely in the setting of hyperdynamic LV function 2/2 septic shock and hypovolemia and not true HOCM  - Recommend giving fluids  - If pressors are needed, phenylephrine would be choice of pressor    -HFrecEF?   Per documentation, reportedly had a low EF in the past. Recent LV function is normal   Per pt, she was on Entresto at some point  - Please obtain further collateral     -Non obstructive CAD   -c/w ASA and lipitor 40   - On plavix per vascular for graft patency??
Patient seen and evaluated with Dr. Perkins, Internal Medicine Resident on Palliative Care rotation, during bedside rounds. Agree with above findings and recommendations, edited documentation as appropriate to reflect the plan of care discussed with patient and myself. My assessment, in brief:  68yo F with extensive medical history including ESRD on HD, CHF, HTN, CAD admitted for emergent HD with hospital course c/b hypotension of unclear etiology requiring IV pressors. Patient also with b/l breast pain poorly controlled. GOC initiated, will continue to follow for further goals. Patient wishes to speak with her daughter for discussion regarding code status. Patient's pain was previously treated with dilaudid but given hypotension, unable to take opioids at this time. Recommend topical lidocaine and tylenol 1000mg PO or IV q8h PRN.

## 2023-01-28 NOTE — PROGRESS NOTE ADULT - PROBLEM SELECTOR PLAN 7
At risk for secondary HPTT d/t renal failure. Pt has vague abdominal pain w/ nausea. Concern for possible brown tumors on CT with multiple fractures of pubic rami, pubic bone, thoracolumbar spine. Intact , Vit D 38.5. SPEP/immunofixation negative. Per Endo, likely secondary hyperparathyroidism vs other process causing osteoclastic tumors.  - c/w Sensipar 90 mg PO daily   - 1/11 AM  intact (baseline PTH on Sensipar)    #Multinodular Goiter  CT (1/3/23): Enlarged multinodular thyroid projecting into superior mediastinum w/ 1.8 x 1.5 cm solid nodule slightly inferiorly in the anterior mediastinum.   Thyroid US (1/4/23): Fine-needle aspiration recommended for 4.3 cm right thyroid and 2.6 cm left thyroid solid nodules per SCOTT guidelines.  - TSH 1.6 free T4 0.68. TSH could be inappropriately normal for low free T4 due to euthyroid sick syndrome.  - f/u TFTs after shock resolved  - plan for FNA biopsy of two nodules per endo recs; IR consulted  - endocrinology following

## 2023-01-28 NOTE — PROGRESS NOTE ADULT - PROBLEM SELECTOR PLAN 2
Shock of unclear etiology, suspect 2/2 tenuous hemodynamics w/ LVOT obstruction/LISETTE and significant vasculopathy in addition to possible sepsis, however unknown source (no fever, BCx NGTD, no focal area of infection IDed thus far). On initial presentation, pt met 2/4 SIRS criteria. Hypotensive with MAPs to 60 requiring levophed. Admission HR > 90, WBC > 12, Lactate 1.9, Procal 12. , Ferritin 1209. BCx 1/3, 1/9, 1/17 no growth. s/p Vancomycin x2 wks (1/2-1/15) to treat presumed R breast cellulitis. Per Surgery, prelim R breast biopsy shows vasculitis vs granuloma, unlikely to cause hypotension. Corticotropin stimulation test wnl. Gallium scan (1/6/23): Faintly increased activity surrounding entire lower portion of R breast c/w inflammatory reaction seen on physical exam, small area of activity ~10cm to R of midline at level of the lower border of the sternum, activity likely to be in chest wall. PET/CT (1/11/23): Increased FDG activity within R axillary vein, which contains an occlusive thrombus, which may represent infectious source. Also an additional FDG avid region of AV fistula near arterial anastomosis proximal to area of fluid collection; could be potential source of infection vs inflammatory reaction to the graft. Also a photopenic R adnexal cystic mass, suggestive of benign etiology.  - 1/21 o/n Tmax 101.6 rectal, pt MAPs in the 60s, however mentating at baseline. BCx collected, Vanc restarted     Plan:   - c/w midodrine 40 mg PO q8h (goal MAP >55)   - BCx 1/21 NGTD

## 2023-01-28 NOTE — PROGRESS NOTE ADULT - ATTENDING COMMENTS
I:   BP controlled.   A: ESRD. Receiving contrast. On high dose midodrine.   P: Midodrine is at a very high dose, but we are covering this weekend and cannot alter this as it may precipitate hemodynamic instability. Consider reevaluation and lowering dose, if possible. Dialysis after contrast.

## 2023-01-28 NOTE — PROGRESS NOTE ADULT - PROBLEM SELECTOR PLAN 3
Pt with recurrent fevers, most recently T 101.6 1/21. Unclear etiology. BCx 1/3, 1/9, 1/17 negative. s/p vancomycin x2 weeks (1/2-1/15). Per Surgery, R breast wound unlikely to be source of infection. Per Vascular Surgery, AV fistula fluid collection unlikely to be source of infection.   - PICC placed on 1/26 by IR (pt has poor veins with diffus clots and does not have other access sites)  - monitor BCx from 1/21 -- NGTD   - monitor CBC and vitals    #RUE fluid collection/RUE edema:  - CT (1/3/23): 7.0 cm fluid collection is present near AV fistula in the RUE. Possibly abscess. Drained by IR 1/5/23 with serosanguinous fluid: no organisms, few WBCs  - RUE dopplers (1/13/23): No significant change large complex fluid collection in proximal R arm, could represent old seroma/hematoma (however, prior ultrasound was done prior to drainage)     Plan:   - Per Vascular: AV fistula unlikely to be infected; would not remove it; also would NOT recommend drainage of seroma.  - Per Wound Care, c/w BID dressing changes (once daily with Santyl DSD, once daily with Bactroban DSD). Apply light ace wrap compression to right hand and forearm to help with soft tissue swelling and elevate RUE with pillows    [  ] Check rectal temp if oral or axillary temp >99F

## 2023-01-28 NOTE — PROGRESS NOTE ADULT - PROBLEM SELECTOR PLAN 6
Pt has been on HD for "years" 2/2 PCKD. AV Fistula of EDWIN clotted in 2014, has received HD thru HD cath since. New AV fistula placed 11/2022, not yet matured.   - f/u nephrology recs  - s/p HD 1/10, 1/13, 1/16, 1/18, 1/20, 1/23, 1/25, 1/27    #Renal osteodystrophy   CT head (1/2/23): Increased density of the bone marrow is consistent with renal osteodystrophy. Per endocrine, extent of bone destruction extreme given PTH level, suspect might not be Brown tumors  - c/w sevelamer 1600 mg PO TID  - increased to Sensipar 90 mg PO daily

## 2023-01-28 NOTE — PROGRESS NOTE ADULT - TIME BILLING
case complexity
case complexity
Evaluation of hyperparathyroidism
Management of secondary hyperparathyroidism
Review of interim events, studies and extensive discussion with the pt regarding plans
as noted above
Review of hospital course, labs, vitals, medical records.   Bedside exam and interview   Discussed plan of care with primary team   Discussed case with vascular consult, critical care consult.   Documenting the encounter

## 2023-01-28 NOTE — PROGRESS NOTE ADULT - PROBLEM SELECTOR PLAN 5
TTE 11/22 normal LV and systolic function, EF 59%, grade II diastolic dysfunction, dilated RV, reduced RV systolic function.  Home meds: Entresto 49/51mg  - hold Entresto i/s/o hypotension    #LVOT with LISETTE  Likely i/s/o hyperdynamic LV function 2/2 shock and hypovolemia  recent Echo 1/3 with severe LVOT-> will repeat echo   [ ]  per cardiology, rec repeating TTE    #CAD  Hx of cardiac cath in 2018  Home meds: atorvastatin 40mg, plavix 75mg qd  - c/w home atorvastatin 40mg    [   ] Clopidogrel 75mg qd held starting on 1/27/23 for possible FNA thyroid nodule biopsy on 1.29.23

## 2023-01-28 NOTE — PROGRESS NOTE ADULT - PROBLEM SELECTOR PLAN 1
Family hx of breast cancer in mother, sister. Physical exam on admission: large R breast mass, palpable L breast mass, R breast has peau d'orange appearance with associated erythema, warmth on R. US bilateral breasts: No sonographic evidence of breast abscess, cyst or focal mass to correspond with the clinical finding of bilateral breast masses. Breast bx: Epidermal and dermal necrosis with fibrinoid occlusion and necrosis of blood vessels, focal necrotizing suppurative inflammation involving the fibroadipose tissue and blood vessels in the subcutis    Plan:  Pt continues to report pain in bilateral breasts, worst on R. Pain responsive to Dilaudid but effect wears off after ~2-3 hours. Was briefly on PCA (1/16-1/17), now dc'ed  - Dosing per pain management recs   - c/w Dilaudid 0.5 mg IV q3h PRN for moderate pain  - c/w Dilaudid 1 mg IV q3h PRN for severe pain   Hold parameters placed for Dilaudid and Cyclobenzaprine -- HOLD IF RR<10, SpO2 <93%, SBP <96  [ ] Transition to PO dilaudid when able to consistently tolerate PO    #Peeling epidermis to B/l breasts  Derm consulted, recommend wedge biopsy of both normal and pathologic tissue. Coagulopathic changes may be secondary to infection. however other causes of hypercoagulability should be explored  - per surgery, no surgical interventions  - c/w daily dressing changes, f/u Wound Care recs

## 2023-01-28 NOTE — PROGRESS NOTE ADULT - ASSESSMENT
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   off pressors since 1/16/23 PM, with course c/b persistent b/l breast pain R>L with hypotension and persistent fevers, now afebrile maintaining MAPs on midodrine and fludrocortisone. Most recent complication of her long hospital course is: Acute DVT in left common femoral seen on LE Dopplers on 1.28.23   I personally performed the service described in the documentation recorded by the scribe in my presence, and it accurately and completely records my words and actions.

## 2023-01-28 NOTE — PROGRESS NOTE ADULT - ASSESSMENT
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    Assessment/Plan:   #ESRD on HD   Schedule modified while in the hospital, will continue with MWF for now  HD today after CT Neck with contrast     Hemodialysis Treatment.:     Schedule: Once, Modality: Hemodialysis, Access: Arteriovenous Fistula    Dialyzer: Optiflux [id number], Time: 180 Min    Blood Flow: 400 mL/Min , Dialysate Flow: 500 mL/Min, Dialysate Temp: 35, Tubinmm (Adult)    Target Fluid Removal: 0.5 Liters    Dialysate Electrolytes (mEq/L): Potassium 2, Calcium 2.5, Sodium 138, Bicarbonate 35    Additional Instructions: Midodrine 30 min before dialysis   Renal diet    #Hx of Hypertension  Has had profound hypotension during this admission requiring pressors and now on florinef 0.2mg as well as midodrine 40mg q8h  -Gets midodrine 1/2 hr before HD  -Also started on florinef 0.2mg  -HD with colder dialysate and albumin PRN    #access   [facility] TDC c/d/i  RUE AVF not being used    #anemia  Last Hgb 9.4 on   -Epo w/ HD  -Transfusion goals as per primary team  -Iron profile noted    #renal bone disease   Noted to have brown tumors on most recent CT, in addition to a lytic lesion of her skull. Likely due to hyperparathyroidism. BX results per note with epidermal and dermal necrosis w/ fibrinoid occulusion and necrosis of blood vessels, focla necrotizing suppurative inflammation. 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   -Please repeat BMP and phos  -c/w sevelamer to 1600mg tid w/ meals  -c/w sensipar 90mg Qday  -Endo following agree with CT IV neck to evaluate parathyroids, may need parathyroidectomy in the future

## 2023-01-28 NOTE — PROGRESS NOTE ADULT - SUBJECTIVE AND OBJECTIVE BOX
S: Patient feels well, although tired. She states her pain is well controlled. She has not been out of bed yesterday, but yesterday she attempted and became lightheaded. Seen with bedside nurses; difficulty obtaining accurate blood pressure measurements despite trialing many different sites, including wrist, calf. Measurements are varied and inconsistent. Patient appears to be mentating well and is oriented. No specific complaints at this time.     O:     T(C): 36.4 (01-28-23 @ 16:15), Max: 36.8 (01-27-23 @ 22:50)  HR: 71 (01-28-23 @ 16:15) (66 - 90)  BP: 77/44 (01-28-23 @ 16:15) (77/44 - 90/47)  RR: 17 (01-28-23 @ 16:15) (15 - 19)  SpO2: 94% (01-28-23 @ 16:15) (93% - 100%)    Physical Exam:     General: Resting in bed.   CV: Mentaing well. RRR. Appears warm and adequately perfused.   Pulm: Breathing comfortably on room air.   Abd: S/NT/ND  Extremity: RUE edematous. Surgical wound well appearing. LLE 2+ edema to ankle, RLE 2+ edema to calf. DP/PT 2+ bilaterally.     Labs:                           9.5    11.06 )-----------( 254      ( 28 Jan 2023 06:10 )             33.0     01-28    131<L>  |  93<L>  |  34<H>  ----------------------------<  147<H>  4.5   |  25  |  5.55<H>    Ca    8.0<L>      28 Jan 2023 06:10  Phos  3.7     01-28  Mg     1.9     01-28    TPro  5.4<L>  /  Alb  2.1<L>  /  TBili  0.2  /  DBili  x   /  AST  33  /  ALT  21  /  AlkPhos  95  01-27    Rads:     DVT US; IMPRESSION: Nonocclusive left common femoral vein deep vein thrombosis and left peroneal vein thrombosis. Acute deep venous thrombosis: above and below the knee. No right sided DVT.     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 During imaging, CT showing 7.0 cm fluid collection is present near an AV fistula in the right upper arm. The differential for this collection included benign post-operative hematoma or seroma, but also abscess or vascular device infection, particularly in the setting of septic shock. Vascular consulted for RUE fistula evaluation to rule out this area as a source of sepsis. Reassuring physical exam at initial evaluation without clear indicia of infection related to the graph, small superficial surgical incision dehiscence notwithstanding. Now s/p IR image guided aspiration of perigraft fluid (negative) and gadolinium scan (negative). At this point, do not suspect a graft infection as the provoking factor leading to the patient's presentation. Swelling in area of RUE fistula is likely a sterile seroma with no intervention needed.    Interval update: Primary team requested a comment on finding of acute left femoral nonocclusive DVT. While the patient does have some edema in the feet and calf (interestingly, R>L despite the presence of VTE on left, not on right), overall she appears asymptomatic from her thrombosis. We do not see an indication for thrombectomy at this point, and therefore feel comfortable deferring additional imaging.     Recommendations:  -Wound care recs: BID dressing changes (once daily with Santyl DSD once daily with Bactroban DSD). Apply light ace wrap compression to right hand and forearm to help with soft tissue swelling and elevate RUE with pillows  -Rest of care per primary team  -Vascular surgery Team 3C will continue to follow. Please call [phone] with any questions or concerns.

## 2023-01-28 NOTE — PROGRESS NOTE ADULT - SUBJECTIVE AND OBJECTIVE BOX
Patient is a 67y old  Female who presents with a chief complaint of c/f sepsis (28 Jan 2023 20:21)    INTERVAL EVENTS:  - right leg swelling > left noted.   - LE dopplers showing acute left common femoral DVT   - Started on full AC with heparin drip.   - Checking BPs in left wrist   - Hold parameters for hydromorphone and cyclobenzaprine     SUBJECTIVE:  Patient was seen and examined at bedside.  Review of systems: No CP, dyspnea,   anuric     Diet, Regular:   No Concentrated Potassium  No Concentrated Phosphorus  Banatrol TF     Qty per Day:  3  Supplement Feeding Modality:  Oral  Nepro Cans or Servings Per Day:  1       Frequency:  Two Times a day (01-20-23 @ 12:53) [Pending Verification By Attending]  Diet, Renal Restrictions:   For patients receiving Renal Replacement - No Protein Restr, No Conc K, No Conc Phos, Low Sodium  Supplement Feeding Modality:  Oral  Nepro Cans or Servings Per Day:  2       Frequency:  Daily (01-14-23 @ 21:35) [Active]    MEDICATIONS:  MEDICATIONS  (STANDING):  acetaminophen    Suspension .. 975 milliGRAM(s) Oral every 8 hours  albumin human 25% IVPB 50 milliLiter(s) IV Intermittent every 1 hour  atorvastatin 40 milliGRAM(s) Oral at bedtime  chlorhexidine 2% Cloths 1 Application(s) Topical daily  chlorhexidine 2% Cloths 1 Application(s) Topical <User Schedule>  cinacalcet 90 milliGRAM(s) Oral daily  collagenase Ointment 1 Application(s) Topical daily  fludroCORTISONE 0.2 milliGRAM(s) Oral every 24 hours  heparin  Infusion. 1000 Unit(s)/Hr (10 mL/Hr) IV Continuous <Continuous>  midodrine. 40 milliGRAM(s) Oral every 8 hours  Nephro-deborah 1 Tablet(s) Oral daily  petrolatum white Ointment 1 Application(s) Topical daily  polyethylene glycol 3350 17 Gram(s) Oral every 12 hours  senna 2 Tablet(s) Oral at bedtime  sevelamer carbonate 1600 milliGRAM(s) Oral three times a day with meals    MEDICATIONS  (PRN):  calamine/zinc oxide Lotion 1 Application(s) Topical three times a day PRN Itching  cyclobenzaprine 5 milliGRAM(s) Oral three times a day PRN Muscle Spasm  HYDROmorphone  Injectable 0.5 milliGRAM(s) IV Push every 3 hours PRN Moderate Pain (4 - 6)  HYDROmorphone  Injectable 1 milliGRAM(s) IV Push every 3 hours PRN Severe Pain (7 - 10)  lidocaine 4% Cream 1 Application(s) Topical three times a day PRN Breast pain  sodium chloride 0.9% lock flush 10 milliLiter(s) IV Push every 1 hour PRN Pre/post blood products, medications, blood draw, and to maintain line patency    Allergies    Ancef (Rash; Urticaria)  DDAVP (Hypotension)  iodine (Hives; Pruritus)  penicillin (Swelling)  sulfa drugs (Angioedema)    Intolerances    OBJECTIVE:  Vital Signs Last 24 Hrs  T(C): 36.4 (28 Jan 2023 16:15), Max: 36.8 (27 Jan 2023 22:50)  T(F): 97.5 (28 Jan 2023 16:15), Max: 98.2 (27 Jan 2023 22:50)  HR: 72 (28 Jan 2023 19:01) (66 - 90)  BP: 77/44 (28 Jan 2023 16:15) (77/44 - 90/47)  BP(mean): 61 (28 Jan 2023 05:22) (61 - 61)  RR: 16 (28 Jan 2023 19:01) (15 - 19)  SpO2: 95% (28 Jan 2023 19:01) (93% - 100%)    Parameters below as of 28 Jan 2023 19:01  Patient On (Oxygen Delivery Method): room air    I&O's Summary    27 Jan 2023 07:01  -  28 Jan 2023 07:00  --------------------------------------------------------  IN: 640 mL / OUT: 1401 mL / NET: -761 mL    PHYSICAL EXAM:  Gen: Reclining in bed at time of exam, appears  HEENT: MMM, clear OP  Neck: supple, trachea at midline  CV: RRR, +S1/S2  Pulm: adequate respiratory effort, no increase in work of breathing  Abd: soft, obese.   Extremities: Right arm edematous, some dried lesions present;   Bilateral leg pitting edema, with Right leg markedly more edematous than left.   Feet not cold.   Neuro: AOx3, oriented to situation, speaking in full sentences  Psych: affect and behavior appropriate    LABS:                        9.5    11.06 )-----------( 254      ( 28 Jan 2023 06:10 )             33.0     01-28    131<L>  |  93<L>  |  34<H>  ----------------------------<  147<H>  4.5   |  25  |  5.55<H>    Ca    8.0<L>      28 Jan 2023 06:10  Phos  3.7     01-28  Mg     1.9     01-28    TPro  5.4<L>  /  Alb  2.1<L>  /  TBili  0.2  /  DBili  x   /  AST  33  /  ALT  21  /  AlkPhos  95  01-27    LIVER FUNCTIONS - ( 27 Jan 2023 12:45 )  Alb: 2.1 g/dL / Pro: 5.4 g/dL / ALK PHOS: 95 U/L / ALT: 21 U/L / AST: 33 U/L / GGT: x           PT/INR - ( 27 Jan 2023 12:45 )   PT: 18.4 sec;   INR: 1.54          PTT - ( 27 Jan 2023 12:45 )  PTT:39.4 sec  CAPILLARY BLOOD GLUCOSE      POCT Blood Glucose.: 196 mg/dL (28 Jan 2023 11:54)  POCT Blood Glucose.: 164 mg/dL (28 Jan 2023 08:31)        MICRODATA:      RADIOLOGY/OTHER STUDIES:

## 2023-01-28 NOTE — PROGRESS NOTE ADULT - PROBLEM SELECTOR PLAN 4
DVT of RIJ, R subclavian and R axillary veins found on admission.   Hx of L AVF clotting. Given hx of clots, current pruritis and gallium scan uptake in chest wall, reasonable to workup coagulopathy. OBDULIA, ANCA, complement, RF, beta2 glycoprotein, anticardiolipin wnl.    # DVT of left common femoral   *Acute DVT of left common femoral found on 1.28.23  Starting full dose AC for acute DVT (heparin drip); Has required 3 transfusions this admission. Monitor CBC closely q8hrs; Low threshold for escalating level of care should patient show signs of bleed, or hypotension    # IVC filter present   # Right leg edema   Right leg markedly more edematous than left   No DVT seen on LE dopplers on 1.28.23  Unable to fully explain asymmetric LE edema R>L.   [ ] Discuss utility of CT venogram or alternative imaging modality with vascular consult

## 2023-01-28 NOTE — PROGRESS NOTE ADULT - ATTENDING COMMENTS
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 Pt s/p vancomycin x2 wks (last day 1/15) for presumed breast cellulitis. fistula as source ruled out. gallium scan was positive only for uptake on right ant chest wall. US of that area was negative for abscess. Endocrinology was consulted for elevated PTH and multinodular thyroid. Work-up was suggestive of secondary hyperparathyroidism but despite increasing doses of the medication, she has continued to have elevated levels of PTH. 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 thyroid ultrasound showed multiple nodules meeting criteria for fine-needle aspiration. Pt was transferred to [facility] when BP improved with midodrine for FNA and placement. Acute DVT in left common femoral vein found on Dopplers on 1.28.2023, trialing full dose AC with heparin drip, watching CBC closely (q8) for signs of bleed.     # DVT  Known DVT of RIJ, R subclavian and R axillary veins. Hx of L AVF clotting.  Acute DVT of left common femoral vein seen on dopplers on 1.28.23, started on heparin drip; housestaff discussed risks of full dose AC, indication for AC for acute clot; patient understands risk and wishes to proceed with AC  [ ] Watch CBC closely,  q8 hrs (Has required 3 prBC transfusions this admission)    # Right leg edema >Left   # IVC filter present (placed after 2018 PE)   No DVT in right leg on 1.28.23 Dopplers  Starting full AC for left common femoral vein acute DVT ; Unable to fully explain RLE swelling;   [ ] Discuss utility of further imaging    #Multinodular goiter  TSH 1.52. Free T4 0.96   CT showed enlarged multinodular thyroid projecting into superior mediastinum with 1.8 x 1.5 cm solid nodule slightly inferiorly in the anterior mediastinum. Thyroid ultrasound showed a moderately enlarged gland with heterogeneous echotexture. There were 3 nodules in the R lobe (largest 3.3 cm), one in the left lobe (2.3 cm).   per Endo: Both of these nodules warrant biopsy--the right lobe lesion because of size and its shape (taller > wide), and the left lobe nodule based on size. Fine-needle aspiration recommended for 4.3 cm right thyroid and 2.6 cm left thyroid solid nodules per SCOTT guidelines    #renal bone disease   #calciphylaxis   #2ndary hyperparathyroidism   brown tumors on recent CT  c/w sevelamer to 1600mg tid w/ meals and sensipar 90mg Qday  planned for CT IV neck to evaluate parathyroids, may need parathyroidectomy  renal and endo following   [  ] Attempt Pre-medication again on Sunday if BP stable  [ ] Reasonable to get HD after CT with IV contrast given hx of contrast allergy (i.e. not for renal protection, but to reduce risk of reaction to contrast)     #ESRD on HD MWF here, outpt T/TH/S  [facility] TDC, RUE AVF not being used    #hypotension  profound hypotension during this admission requiring pressors and now on florinef 0.2mg as well as midodrine 40mg tid.   Gets midodrine 1/2 hr before HD    #Chronic HFrEF (heart failure with reduced ejection fraction).   TTE 11/22 normal LV and systolic function, EF 59%, grade II diastolic dysfunction, dilated RV, reduced RV systolic function  not able to be started on GDMT 2/2 hypotension   [  ]recent Echo 1/3 with severe LVOT-> will repeat echo     #CAD  Hx of cardiac cath in 2018. Home meds: atorvastatin 40mg, plavix 75mg qd.  c/w home atorvastatin 40mg  Plavix 75mg qd being held for IR procedure     plan for weekend:   [ ]CT 4d scan 1/28 after pre-medicating with steroids for contrast allergy   [ ]Attempt Pre-medication again on Sunday if BP stable  [ ] Reasonable to get HD after CT with IV contrast given hx of contrast allergy (i.e. not for renal protection, but to reduce risk of reaction to contrast)   [ ] Plan for IR FNA of thyroid nodules on monday   [ ] Discuss RLE edema with vascular

## 2023-01-28 NOTE — PROGRESS NOTE ADULT - PROBLEM SELECTOR PLAN 11
F: None  E: None, HD pt  N: Renal Restricted Diet  DVT ppx: Eliquis 2.5 mg PO BID   GI ppx: None  Code Status: Full Code  Dispo: [id number] --> RMF

## 2023-01-28 NOTE — PROGRESS NOTE ADULT - SUBJECTIVE AND OBJECTIVE BOX
Patient is a 67y Female seen and evaluated at bedside. No acute distress, does not offer any complaints. Tolerated HD yesterday with 1L UF, plan for HD today after CT neck with contrast.       Meds:    acetaminophen    Suspension .. 975 every 8 hours  albumin human 25% IVPB 50 every 1 hour  apixaban 2.5 every 12 hours  atorvastatin 40 at bedtime  calamine/zinc oxide Lotion 1 three times a day PRN  chlorhexidine 2% Cloths 1 daily  cinacalcet 90 daily  colchicine 0.3 daily  collagenase Ointment 1 daily  cyclobenzaprine 5 three times a day PRN  diphenhydrAMINE Injectable 25 once  fludroCORTISONE 0.2 every 24 hours  HYDROmorphone  Injectable 0.5 every 3 hours PRN  HYDROmorphone  Injectable 1 every 3 hours PRN  lidocaine 4% Cream 1 three times a day PRN  methylPREDNISolone sodium succinate Injectable 40 once  midodrine. 40 every 8 hours  Nephro-deborah 1 daily  petrolatum white Ointment 1 daily  polyethylene glycol 3350 17 every 12 hours  senna 2 at bedtime  sevelamer carbonate 1600 three times a day with meals  sodium chloride 0.9% lock flush 10 every 1 hour PRN      T(C): , Max: 37 (01-27-23 @ 15:15)  T(F): , Max: 98.6 (01-27-23 @ 15:15)  HR: 77 (01-28-23 @ 09:38)  BP: 86/59 (01-28-23 @ 09:38)  BP(mean): 61 (01-28-23 @ 05:22)  RR: 17 (01-28-23 @ 09:38)  SpO2: 93% (01-28-23 @ 09:38)  Wt(kg): --    01-27 @ 07:01  -  01-28 @ 07:00  --------------------------------------------------------  IN: 640 mL / OUT: 1401 mL / NET: -761 mL          Review of Systems:  ROS negative except as per HPI      PHYSICAL EXAM:  GENERAL: NAD resting in bed   CHEST/LUNG: Clear to auscultation bilaterally; no accessory muscle use   HEART: normal S1S2, RRR  ABDOMEN: Soft, Nontender, +BS,   EXTREMITIES: No clubbing, cyanosis, or edema   ACCESS: R TDC      LABS:                        9.5    11.06 )-----------( 254      ( 28 Jan 2023 06:10 )             33.0     01-28    131<L>  |  93<L>  |  34<H>  ----------------------------<  147<H>  4.5   |  25  |  5.55<H>    Ca    8.0<L>      28 Jan 2023 06:10  Phos  3.7     01-28  Mg     1.9     01-28    TPro  5.4<L>  /  Alb  2.1<L>  /  TBili  0.2  /  DBili  x   /  AST  33  /  ALT  21  /  AlkPhos  95  01-27      PT/INR - ( 27 Jan 2023 12:45 )   PT: 18.4 sec;   INR: 1.54          PTT - ( 27 Jan 2023 12:45 )  PTT:39.4 sec          RADIOLOGY & ADDITIONAL STUDIES:

## 2023-01-28 NOTE — CONSULT NOTE ADULT - ASSESSMENT
68 yo F pt with PMH HFrEF (EF 40%), nonobstructive CAD, NICM, HTN, HLD, ESRD 2/2 PCKD on HD, PE (2018) s/p IVC filter, mild AS, mild MR, PAD, OA, h/o thrombus in vascular access x3 (R AVG, R AVF, L AVG), ventral hernia, brown tumor in the skull (osteoclastoma process from 2/2 hyperparathyroidism), multiple fractures (spine, pubic rami) who presented with weakness and generalized malaise for 1 week a/w cramping abdominal pain, nausea, diarrhea causing her to miss dialysis since 12/24, also c/o R breast pain, admitted for emergent HD, off pressors since 1/16 PM, with course c/b persistent b/l breast pain R>L with hypotension and persistent fevers, now afebrile maintaining MAPs and stable. Patient was initially in the MICU and was stepdown to 7L and eventually to the RMF. Patient has new DVT on the LLE where her blood pressure was being monitored and due to concern for blood pressure monitoring due to blood clots in her extremities and fistulas in the upper b/l extremities which limits blood pressure assesment, ICU was consulted for possible A-line placement and future risk of bleeding while considering to start full AC as per the primary team.     #Blood pressure monitoring   #Concern for Bleeding   Patient mentating well AAO x 3 and has baseline low blood pressure readings noted in the charts before.   - Old fistula noted in the LUE, would consult Vascular if that is not being used can use the Left forearm   - Due to body habitus and limited access for blood pressure assesment would advise to monitor patient based on mentation and perfusion of tissues.   - Can continue to monitor on RMF for now for concern for bleeding, because the patient is as of now stable   - AC as per the primary team and would discuss with vascular   - Rest of the plan as per the primary team   - As of now patient is stable for RMF     Patient assessed by the ICU team and Dr. Dinero  Plan discussed with primary team in detail at the bedside.

## 2023-01-29 NOTE — PROGRESS NOTE ADULT - PROBLEM SELECTOR PLAN 3
Pt with recurrent fevers, most recently T 101.6 1/21. Unclear etiology. BCx 1/3, 1/9, 1/17 negative. s/p vancomycin x2 weeks (1/2-1/15). Per Surgery, R breast wound unlikely to be source of infection. Per Vascular Surgery, AV fistula fluid collection unlikely to be source of infection.   - PICC placed on 1/26 by IR (pt has poor veins with diffus clots and does not have other access sites)  - monitor BCx from 1/21 -- NGTD   - monitor CBC and vitals    #RUE fluid collection/RUE edema:  - CT (1/3/23): 7.0 cm fluid collection is present near AV fistula in the RUE. Possibly abscess. Drained by IR 1/5/23 with serosanguinous fluid: no organisms, few WBCs  - RUE dopplers (1/13/23): No significant change large complex fluid collection in proximal R arm, could represent old seroma/hematoma (however, prior ultrasound was done prior to drainage)     Plan:   - Per Vascular: AV fistula unlikely to be infected; would not remove it; also would NOT recommend drainage of seroma.  - Per Wound Care, c/w BID dressing changes (once daily with Santyl DSD, once daily with Bactroban DSD). Apply light ace wrap compression to right hand and forearm to help with soft tissue swelling and elevate RUE with pillows    [  ] Check rectal temp if oral or axillary temp >99F Pt with recurrent fevers, most recently T 101.6 1/21. Unclear etiology. BCx 1/3, 1/9, 1/17 negative. s/p vancomycin x2 weeks (1/2-1/15). Per Surgery, R breast wound unlikely to be source of infection. Per Vascular Surgery, AV fistula fluid collection unlikely to be source of infection.   - PICC placed on 1/26 by IR (pt has poor veins with diffuse clots and does not have other access sites)  - monitor BCx from 1/21 -- NGTD   - monitor CBC and vitals  - check rectal temp if oral/axillary temp > 99     #RUE fluid collection/RUE edema:  - CT (1/3/23): 7.0 cm fluid collection is present near AV fistula in the RUE. Possibly abscess. Drained by IR 1/5/23 with serosanguinous fluid: no organisms, few WBCs  - RUE dopplers (1/13/23): No significant change large complex fluid collection in proximal R arm, could represent old seroma/hematoma (however, prior ultrasound was done prior to drainage)     Plan:   - Per Vascular: AV fistula unlikely to be infected; would not remove it; also would NOT recommend drainage of seroma.  - Per Wound Care, c/w BID dressing changes (once daily with Santyl DSD, once daily with Bactroban DSD). Apply light ace wrap compression to right hand and forearm to help with soft tissue swelling and elevate RUE with pillows

## 2023-01-29 NOTE — PROGRESS NOTE ADULT - PROBLEM SELECTOR PLAN 11
F: None  E: None, HD pt  N: Renal Restricted Diet  DVT ppx: Eliquis 2.5 mg PO BID   GI ppx: None  Code Status: Full Code  Dispo: [facility] F: None  E: None, HD pt  N: Renal Restricted Diet  DVT ppx: heparin full AC  GI ppx: None  Code Status: Full Code  Dispo: [facility]

## 2023-01-29 NOTE — PROGRESS NOTE ADULT - NS ATTEST RISK PROBLEM GEN_ALL_CORE FT
Renal failure/Hypotension/Hyperparathyroidism(secondary)
Secondary hyperparathyroidism with bone lesions
Secondary hyperparathyroidism and multinodular goiter
Secondary hyperparathyroidism with bone lesions, thyroid nodules warranting biopsy
Prescription Of Parenteral Controlled Substances
Acute Illness That Poses A Threat To Life  Prescription Of Parenteral Controlled Substances
intravenous hydromorphone

## 2023-01-29 NOTE — PROGRESS NOTE ADULT - PROBLEM SELECTOR PLAN 1
Family hx of breast cancer in mother, sister. Physical exam on admission: large R breast mass, palpable L breast mass, R breast has peau d'orange appearance with associated erythema, warmth on R. US bilateral breasts: No sonographic evidence of breast abscess, cyst or focal mass to correspond with the clinical finding of bilateral breast masses. Breast bx: Epidermal and dermal necrosis with fibrinoid occlusion and necrosis of blood vessels, focal necrotizing suppurative inflammation involving the fibroadipose tissue and blood vessels in the subcutis    Plan:  Pt continues to report pain in bilateral breasts, worst on R. Pain responsive to Dilaudid but effect wears off after ~2-3 hours. Was briefly on PCA (1/16-1/17), now dc'ed  - Dosing per pain management recs   - c/w Dilaudid 0.5 mg IV q3h PRN for moderate pain  - c/w Dilaudid 1 mg IV q3h PRN for severe pain   Hold parameters placed for Dilaudid and Cyclobenzaprine -- HOLD IF RR<10, SpO2 <93%, SBP <96  [ ] Transition to PO dilaudid when able to consistently tolerate PO    #Peeling epidermis to B/l breasts  Derm consulted, recommend wedge biopsy of both normal and pathologic tissue. Coagulopathic changes may be secondary to infection. however other causes of hypercoagulability should be explored  - per surgery, no surgical interventions  - c/w daily dressing changes, f/u Wound Care recs Family hx of breast cancer in mother, sister. Physical exam on admission: large R breast mass, palpable L breast mass, R breast has peau d'orange appearance with associated erythema, warmth on R. US bilateral breasts: No sonographic evidence of breast abscess, cyst or focal mass to correspond with the clinical finding of bilateral breast masses. Breast bx: Epidermal and dermal necrosis with fibrinoid occlusion and necrosis of blood vessels, focal necrotizing suppurative inflammation involving the fibroadipose tissue and blood vessels in the subcutis    Plan:  Pt continues to report pain in bilateral breasts, worst on R. Pain responsive to Dilaudid but effect wears off after ~2-3 hours. Was briefly on PCA (1/16-1/17), now dc'ed  - Dosing per pain management recs   - c/w Flexeril 5mg PO TID prn for spasms (hold parameters added)  - c/w Dilaudid 0.5 mg IV q3h PRN for moderate pain -- HOLD IF RR<10, SpO2 <93%, SBP <96  - c/w Dilaudid 1 mg IV q3h PRN for severe pain -- HOLD IF RR<10, SpO2 <93%, SBP <96  - plan to transition to PO dilaudid when able to consistently tolerate PO    #Peeling epidermis to B/l breasts  Derm consulted, recommend wedge biopsy of both normal and pathologic tissue. Coagulopathic changes may be secondary to infection. however other causes of hypercoagulability should be explored  - per surgery, no surgical interventions  - c/w daily dressing changes, f/u Wound Care recs

## 2023-01-29 NOTE — PROGRESS NOTE ADULT - PROBLEM SELECTOR PLAN 5
TTE 11/22 normal LV and systolic function, EF 59%, grade II diastolic dysfunction, dilated RV, reduced RV systolic function.  Home meds: Entresto 49/51mg  - hold Entresto i/s/o hypotension    #LVOT with LISETTE  Likely i/s/o hyperdynamic LV function 2/2 shock and hypovolemia  recent Echo 1/3 with severe LVOT-> will repeat echo   [ ]  per cardiology, rec repeating TTE    #CAD  Hx of cardiac cath in 2018  Home meds: atorvastatin 40mg, plavix 75mg qd  - c/w home atorvastatin 40mg    [   ] Clopidogrel 75mg qd held starting on 1/27/23 for possible FNA thyroid nodule biopsy on 1.29.23 TTE 11/22 normal LV and systolic function, EF 59%, grade II diastolic dysfunction, dilated RV, reduced RV systolic function.  Home meds: Entresto 49/51mg  - hold Entresto i/s/o hypotension    #LVOT with LISETTE  Likely i/s/o hyperdynamic LV function 2/2 shock and hypovolemia. Recent Echo 1/3 with severe LVOT-> will repeat echo   - per cardiology, rec repeating TTE    #CAD  Hx of cardiac cath in 2018  Home meds: atorvastatin 40mg, plavix 75mg qd  - c/w home atorvastatin 40mg  - Clopidogrel 75mg qd held starting on 1/27/23 for possible FNA thyroid nodule biopsy on 1.29.23

## 2023-01-29 NOTE — PROGRESS NOTE ADULT - ASSESSMENT
68 yo F pt with HFrEF (EF 40%), nonobstructive CAD, NICM, HTN, HLD, ESRD 2/2 PCKD on HD, PE (2018) s/p IVC filter, mild AS, mild MR, PAD, OA, h/o thrombus in vascular access x3 (R AVG, R AVF, L AVG), ventral hernia, brown tumor in the skull (osteoclastoma process 2/2 hyperparathyroidism), multiple fractures (spine, pubic rami) who presented with weakness and generalized malaise for 1 week a/w cramping abdominal pain, nausea, diarrhea causing her to miss dialysis since 12/24, also c/o R breast pain, admitted on 1/2/23 for emergent HD  Hospital course complicated by finding of new thrombus in right IJ, subclavian, and axillary veins.   off pressors since 1/16/23 PM, with course c/b persistent b/l breast pain R>L with hypotension and persistent fevers, now afebrile maintaining MAPs on midodrine and fludrocortisone. Most recent complication of her long hospital course is: Acute DVT in left common femoral seen on LE Dopplers on 1.28.23   68 yo F pt with HFrEF (EF 40%), nonobstructive CAD, NICM, HTN, HLD, ESRD 2/2 PCKD on HD, PE (2018) s/p IVC filter, mild AS, mild MR, PAD, OA, h/o thrombus in vascular access x3 (R AVG, R AVF, L AVG), ventral hernia, brown tumor in the skull (osteoclastoma process 2/2 hyperparathyroidism), multiple fractures (spine, pubic rami) who presented with weakness and generalized malaise for 1 week a/w cramping abdominal pain, nausea, diarrhea causing her to miss dialysis since 12/24, also c/o R breast pain, admitted on 1/2/23 for emergent HD  Hospital course complicated by finding of new thrombus in right IJ, subclavian, and axillary veins. Off pressors since 1/16/23 PM, with course c/b persistent b/l breast pain R>L with hypotension and persistent fevers, now afebrile maintaining MAPs on midodrine and fludrocortisone. Most recent complication of her long hospital course is: Acute DVT in left common femoral seen on LE Dopplers on 1.28.23.

## 2023-01-29 NOTE — PROGRESS NOTE ADULT - PROBLEM SELECTOR PLAN 7
At risk for secondary HPTT d/t renal failure. Pt has vague abdominal pain w/ nausea. Concern for possible brown tumors on CT with multiple fractures of pubic rami, pubic bone, thoracolumbar spine. Intact , Vit D 38.5. SPEP/immunofixation negative. Per Endo, likely secondary hyperparathyroidism vs other process causing osteoclastic tumors.  - c/w Sensipar 90 mg PO daily   - 1/11 AM  intact (baseline PTH on Sensipar)    #Multinodular Goiter  CT (1/3/23): Enlarged multinodular thyroid projecting into superior mediastinum w/ 1.8 x 1.5 cm solid nodule slightly inferiorly in the anterior mediastinum.   Thyroid US (1/4/23): Fine-needle aspiration recommended for 4.3 cm right thyroid and 2.6 cm left thyroid solid nodules per SCOTT guidelines.  - TSH 1.6 free T4 0.68. TSH could be inappropriately normal for low free T4 due to euthyroid sick syndrome.  - f/u TFTs after shock resolved  - plan for FNA biopsy of two nodules per endo recs; IR consulted  - endocrinology following At risk for secondary HPTT d/t renal failure. Pt has vague abdominal pain w/ nausea. Concern for possible brown tumors on CT with multiple fractures of pubic rami, pubic bone, thoracolumbar spine. Intact , Vit D 38.5. SPEP/immunofixation negative. Per Endo, likely secondary hyperparathyroidism vs other process causing osteoclastic tumors.  - c/w Sensipar 90 mg PO daily   - 1/11 AM  intact (baseline PTH on Sensipar)    #Multinodular Goiter  CT (1/3/23): Enlarged multinodular thyroid projecting into superior mediastinum w/ 1.8 x 1.5 cm solid nodule slightly inferiorly in the anterior mediastinum.   Thyroid US (1/4/23): Fine-needle aspiration recommended for 4.3 cm right thyroid and 2.6 cm left thyroid solid nodules per SCOTT guidelines.  - TSH 1.6 free T4 0.68. TSH could be inappropriately normal for low free T4 due to euthyroid sick syndrome.  - f/u TFTs after shock resolved  - plan for FNA biopsy of two nodules per endo recs; IR consulted; scheduled for 1/30  - endocrinology following

## 2023-01-29 NOTE — PROGRESS NOTE ADULT - PROBLEM SELECTOR PLAN 4
DVT of RIJ, R subclavian and R axillary veins found on admission.   Hx of L AVF clotting. Given hx of clots, current pruritis and gallium scan uptake in chest wall, reasonable to workup coagulopathy. OBDULIA, ANCA, complement, RF, beta2 glycoprotein, anticardiolipin wnl.    # DVT of left common femoral   *Acute DVT of left common femoral found on 1.28.23  Starting full dose AC for acute DVT (heparin drip); Has required 3 transfusions this admission. Monitor CBC closely q8hrs; Low threshold for escalating level of care should patient show signs of bleed, or hypotension    # IVC filter present   # Right leg edema   Right leg markedly more edematous than left   No DVT seen on LE dopplers on 1.28.23  Unable to fully explain asymmetric LE edema R>L.   [ ] Discuss utility of CT venogram or alternative imaging modality with vascular consult #RUE fluid collection/RUE edema:   DVT of RIJ, R subclavian and R axillary veins found on admission. Hx of L AVF clotting. Given hx of clots, current pruritis and gallium scan uptake in chest wall, reasonable to workup coagulopathy. OBDULIA, ANCA, complement, RF, beta2 glycoprotein, anticardiolipin wnl.  - CT (1/3/23): 7.0 cm fluid collection is present near AV fistula in the RUE. Possibly abscess. Drained by IR 1/5/23 with serosanguinous fluid: no organisms, few WBCs  - RUE dopplers (1/13/23): No significant change large complex fluid collection in proximal R arm, could represent old seroma/hematoma (however, prior ultrasound was done prior to drainage)     Plan:   - Per Vascular: AV fistula unlikely to be infected; would not remove it; also would NOT recommend drainage of seroma.  - Per Wound Care, c/w BID dressing changes (once daily with Santyl DSD, once daily with Bactroban DSD). Apply light ace wrap compression to right hand and forearm to help with soft tissue swelling and elevate RUE with pillows      # DVT of left common femoral   *Acute DVT of left common femoral found on 1.28.23  - started full dose AC for acute DVT (heparin drip); Has required 3 transfusions this admission.   - monitor CBC closely q8hrs; Low threshold for escalating level of care should patient show signs of bleed, or hypotension    # IVC filter present   # Right leg edema   Right leg markedly more edematous than left   No DVT seen on LE dopplers on 1.28.23  - f/u vascular rec on utility of CT venogram or alternative imaging modality --> can plan to complete tomorrow 1/30 as pt will be premedicated for CT neck

## 2023-01-29 NOTE — PROGRESS NOTE ADULT - SUBJECTIVE AND OBJECTIVE BOX
LACY MUNSONSMIN  67y, Female    Patient is a 67y old  Female who presents with a chief complaint of Missed dialysis, right breast pain (28 Jan 2023 22:11)      INTERVAL HPI/OVERNIGHT EVENTS:    ROS: otherwise negative      T(C): 36.4 (01-29-23 @ 05:00), Max: 36.5 (01-29-23 @ 02:58)  HR: 54 (01-29-23 @ 05:00) (51 - 77)  BP: 80/48 (01-29-23 @ 05:00) (77/44 - 104/76)  RR: 16 (01-29-23 @ 05:00) (15 - 18)  SpO2: 98% (01-29-23 @ 05:00) (93% - 100%)  Wt(kg): --Vital Signs Last 24 Hrs  T(C): 36.4 (29 Jan 2023 05:00), Max: 36.5 (29 Jan 2023 02:58)  T(F): 97.5 (29 Jan 2023 05:00), Max: 97.7 (29 Jan 2023 02:58)  HR: 54 (29 Jan 2023 05:00) (51 - 77)  BP: 80/48 (29 Jan 2023 05:00) (77/44 - 104/76)  BP(mean): --  RR: 16 (29 Jan 2023 05:00) (15 - 18)  SpO2: 98% (29 Jan 2023 05:00) (93% - 100%)    Parameters below as of 29 Jan 2023 05:00  Patient On (Oxygen Delivery Method): BiPAP/CPAP        PHYSICAL EXAM:  Constitutional: resting comfortably in bed; NAD  Head: NC/AT  Eyes: PERRL, EOMI, anicteric sclera  ENT: no nasal discharge; MMM  Neck: supple; no JVD or thyromegaly  Respiratory: CTA B/L; no W/R/R, no retractions  Cardiac: +S1/S2; RRR; no M/R/G; PMI non-displaced  Gastrointestinal: soft, NT/ND; no rebound or guarding; +BSx4  Back: spine midline, no bony tenderness or step-offs; no CVAT B/L  Extremities: WWP, no clubbing or cyanosis; no peripheral edema. Capillary refill <2 sec  Musculoskeletal: NROM x4; no joint swelling, tenderness or erythema  Vascular: 2+ radial, DP/PT pulses B/L  Dermatologic: skin warm, dry and intact; no rashes, wounds, or scars  Lymphatic: no submandibular or cervical LAD  Neurologic: AAOx3; CNII-XII grossly intact; no focal deficits  Psychiatric: affect and characteristics of appearance, verbalizations, behaviors are appropriate    LABS:                        9.2    13.77 )-----------( 299      ( 29 Jan 2023 03:47 )             31.8     01-29    134<L>  |  94<L>  |  46<H>  ----------------------------<  130<H>  4.7   |  27  |  6.14<H>    Ca    8.0<L>      29 Jan 2023 03:47  Phos  4.4     01-29  Mg     1.9     01-29    TPro  5.6<L>  /  Alb  2.5<L>  /  TBili  0.2  /  DBili  x   /  AST  23  /  ALT  19  /  AlkPhos  101  01-29      PT/INR - ( 27 Jan 2023 12:45 )   PT: 18.4 sec;   INR: 1.54          PTT - ( 29 Jan 2023 03:47 )  PTT:44.4 sec    CAPILLARY BLOOD GLUCOSE      POCT Blood Glucose.: 119 mg/dL (29 Jan 2023 08:44)  POCT Blood Glucose.: 122 mg/dL (29 Jan 2023 06:16)  POCT Blood Glucose.: 136 mg/dL (29 Jan 2023 00:11)  POCT Blood Glucose.: 196 mg/dL (28 Jan 2023 11:54)            MEDICATIONS  (STANDING):  albumin human 25% IVPB 50 milliLiter(s) IV Intermittent every 1 hour  atorvastatin 40 milliGRAM(s) Oral at bedtime  chlorhexidine 2% Cloths 1 Application(s) Topical daily  chlorhexidine 2% Cloths 1 Application(s) Topical <User Schedule>  cinacalcet 90 milliGRAM(s) Oral daily  collagenase Ointment 1 Application(s) Topical daily  fludroCORTISONE 0.2 milliGRAM(s) Oral every 24 hours  heparin  Infusion 1200 Unit(s)/Hr (12 mL/Hr) IV Continuous <Continuous>  midodrine. 40 milliGRAM(s) Oral every 8 hours  Nephro-deborah 1 Tablet(s) Oral daily  petrolatum white Ointment 1 Application(s) Topical daily  polyethylene glycol 3350 17 Gram(s) Oral every 12 hours  senna 2 Tablet(s) Oral at bedtime  sevelamer carbonate 1600 milliGRAM(s) Oral three times a day with meals    MEDICATIONS  (PRN):  calamine/zinc oxide Lotion 1 Application(s) Topical three times a day PRN Itching  cyclobenzaprine 5 milliGRAM(s) Oral three times a day PRN Muscle Spasm  HYDROmorphone  Injectable 0.5 milliGRAM(s) IV Push every 3 hours PRN Moderate Pain (4 - 6)  HYDROmorphone  Injectable 1 milliGRAM(s) IV Push every 3 hours PRN Severe Pain (7 - 10)  ibuprofen  Tablet. 400 milliGRAM(s) Oral every 6 hours PRN Mild Pain (1 - 3)  lidocaine 4% Cream 1 Application(s) Topical three times a day PRN Breast pain  sodium chloride 0.9% lock flush 10 milliLiter(s) IV Push every 1 hour PRN Pre/post blood products, medications, blood draw, and to maintain line patency      RADIOLOGY & ADDITIONAL TESTS: Reviewed   JEIMYSALOMON  67y, Female    Patient is a 67y old  Female who presents with a chief complaint of Missed dialysis, right breast pain (28 Jan 2023 22:11)      INTERVAL HPI/OVERNIGHT EVENTS: Patient reports she is feeling well today aside from fatigue as she did not sleep well. Denies shortness of breath, chest pain, fevers/chills, n/v. Reports some RLE pain.     ROS: otherwise negative      T(C): 36.4 (01-29-23 @ 05:00), Max: 36.5 (01-29-23 @ 02:58)  HR: 54 (01-29-23 @ 05:00) (51 - 77)  BP: 80/48 (01-29-23 @ 05:00) (77/44 - 104/76)  RR: 16 (01-29-23 @ 05:00) (15 - 18)  SpO2: 98% (01-29-23 @ 05:00) (93% - 100%)  Wt(kg): --Vital Signs Last 24 Hrs  T(C): 36.4 (29 Jan 2023 05:00), Max: 36.5 (29 Jan 2023 02:58)  T(F): 97.5 (29 Jan 2023 05:00), Max: 97.7 (29 Jan 2023 02:58)  HR: 54 (29 Jan 2023 05:00) (51 - 77)  BP: 80/48 (29 Jan 2023 05:00) (77/44 - 104/76)  BP(mean): --  RR: 16 (29 Jan 2023 05:00) (15 - 18)  SpO2: 98% (29 Jan 2023 05:00) (93% - 100%)    Parameters below as of 29 Jan 2023 05:00  Patient On (Oxygen Delivery Method): BiPAP/CPAP        PHYSICAL EXAM:  General: obese female, NAD  Head: NC/AT; MMM; PERRL; EOMI  Neck: Supple  Respiratory: CTAB; no wheezes/rales/rhonchi  Cardiovascular: Regular rhythm/rate; S1/S2+, no murmurs, rubs gallops   Gastrointestinal: Soft; NTND; bowel sounds normal and present  Extremities: WWP; pitting edema of right ankle up to thigh, trace pitting edema left leg; cyanosis v discoloration of distal toes on b/l feet. 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, b/l breast; wounds present on b/l breast  Neurological: A&Ox3, strength intact, no obvious focal deficits    LABS:                        9.2    13.77 )-----------( 299      ( 29 Jan 2023 03:47 )             31.8     01-29    134<L>  |  94<L>  |  46<H>  ----------------------------<  130<H>  4.7   |  27  |  6.14<H>    Ca    8.0<L>      29 Jan 2023 03:47  Phos  4.4     01-29  Mg     1.9     01-29    TPro  5.6<L>  /  Alb  2.5<L>  /  TBili  0.2  /  DBili  x   /  AST  23  /  ALT  19  /  AlkPhos  101  01-29      PT/INR - ( 27 Jan 2023 12:45 )   PT: 18.4 sec;   INR: 1.54          PTT - ( 29 Jan 2023 03:47 )  PTT:44.4 sec    CAPILLARY BLOOD GLUCOSE      POCT Blood Glucose.: 119 mg/dL (29 Jan 2023 08:44)  POCT Blood Glucose.: 122 mg/dL (29 Jan 2023 06:16)  POCT Blood Glucose.: 136 mg/dL (29 Jan 2023 00:11)  POCT Blood Glucose.: 196 mg/dL (28 Jan 2023 11:54)            MEDICATIONS  (STANDING):  albumin human 25% IVPB 50 milliLiter(s) IV Intermittent every 1 hour  atorvastatin 40 milliGRAM(s) Oral at bedtime  chlorhexidine 2% Cloths 1 Application(s) Topical daily  chlorhexidine 2% Cloths 1 Application(s) Topical <User Schedule>  cinacalcet 90 milliGRAM(s) Oral daily  collagenase Ointment 1 Application(s) Topical daily  fludroCORTISONE 0.2 milliGRAM(s) Oral every 24 hours  heparin  Infusion 1200 Unit(s)/Hr (12 mL/Hr) IV Continuous <Continuous>  midodrine. 40 milliGRAM(s) Oral every 8 hours  Nephro-deborah 1 Tablet(s) Oral daily  petrolatum white Ointment 1 Application(s) Topical daily  polyethylene glycol 3350 17 Gram(s) Oral every 12 hours  senna 2 Tablet(s) Oral at bedtime  sevelamer carbonate 1600 milliGRAM(s) Oral three times a day with meals    MEDICATIONS  (PRN):  calamine/zinc oxide Lotion 1 Application(s) Topical three times a day PRN Itching  cyclobenzaprine 5 milliGRAM(s) Oral three times a day PRN Muscle Spasm  HYDROmorphone  Injectable 0.5 milliGRAM(s) IV Push every 3 hours PRN Moderate Pain (4 - 6)  HYDROmorphone  Injectable 1 milliGRAM(s) IV Push every 3 hours PRN Severe Pain (7 - 10)  ibuprofen  Tablet. 400 milliGRAM(s) Oral every 6 hours PRN Mild Pain (1 - 3)  lidocaine 4% Cream 1 Application(s) Topical three times a day PRN Breast pain  sodium chloride 0.9% lock flush 10 milliLiter(s) IV Push every 1 hour PRN Pre/post blood products, medications, blood draw, and to maintain line patency      RADIOLOGY & ADDITIONAL TESTS: Reviewed

## 2023-01-29 NOTE — PROGRESS NOTE ADULT - ATTENDING COMMENTS
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 Pt s/p vancomycin x2 wks (last day 1/15) for presumed breast cellulitis. fistula as source ruled out. gallium scan was positive only for uptake on right ant chest wall. US of that area was negative for abscess. Endocrinology was consulted for elevated PTH and multinodular thyroid. Work-up was suggestive of secondary hyperparathyroidism but despite increasing doses of the medication, she has continued to have elevated levels of PTH. 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 thyroid ultrasound showed multiple nodules meeting criteria for fine-needle aspiration. Pt was transferred to [facility] when BP improved with midodrine for FNA and placement. Acute DVT in left common femoral vein found on Dopplers on 1.28.2023, trialing full dose AC with heparin drip, watching CBC closely (q8) for signs of bleed.   Planned for FNA biopsy on Monday   Planned for premedication for CT IV contrast  - get CT venogram (to evaluate IVC filter) and 4dCT of parathyroid after pre-medication.    # Lekocytosis - received high dose methylpred for premedication on Saturday. Likely demargination. [ ] Hold standing tylenol for now, watch for fevers. Check rectal temp if oral or axillary temp >99F. Can resume standing tylenol when White count downtrends.     # DVT  Known DVT of RIJ, R subclavian and R axillary veins. Hx of L AVF clotting.  Acute DVT of left common femoral vein seen on dopplers on 1.28.23, started on heparin drip; housestaff discussed risks of full dose AC, indication for AC for acute clot; patient understands risk and wishes to proceed with AC  [ ] Watch CBC closely,  q8 hrs (Has required 3 prBC transfusions this admission)    # Right leg edema >Left   # IVC filter present (placed after 2018 PE)   No DVT in right leg on 1.28.23 Dopplers  Starting full AC for left common femoral vein acute DVT ;   Unable to fully explain RLE swelling; Complained of pain in entire length of right leg this morning (1.29.23), improved by afternoon. [ ] Get CT Venogram to check for thrombosis around IVC filter.     #Multinodular goiter  TSH 1.52. Free T4 0.96   CT showed enlarged multinodular thyroid projecting into superior mediastinum with 1.8 x 1.5 cm solid nodule slightly inferiorly in the anterior mediastinum. Thyroid ultrasound showed a moderately enlarged gland with heterogeneous echotexture. There were 3 nodules in the R lobe (largest 3.3 cm), one in the left lobe (2.3 cm).   per Endo: Both of these nodules warrant biopsy--the right lobe lesion because of size and its shape (taller > wide), and the left lobe nodule based on size. Fine-needle aspiration recommended for 4.3 cm right thyroid and 2.6 cm left thyroid solid nodules per SCOTT guidelines  [ ] Planned for thyroid FNA on Monday (currently on heparin drip)     #renal bone disease   #calciphylaxis   #2ndary hyperparathyroidism   brown tumors on recent CT  c/w sevelamer to 1600mg tid w/ meals and sensipar 90mg Qday  planned for CT IV neck to evaluate parathyroids, may need parathyroidectomy  renal and endo following   [ ] Attempt Pre-medication again starting Sunday night if BP stable and patient amenable; goal is to get imaging on Monday morning, and HD after.   [ ] Reasonable to get HD after CT with IV contrast given hx of contrast allergy (i.e. not for renal protection, but to reduce risk of reaction to contrast)     #ESRD on HD MWF here, outpt T/TH/S  [facility] TDC, RUE AVF not being used    #hypotension  profound hypotension during this admission requiring pressors and now on florinef 0.2mg as well as midodrine 40mg tid.   Gets midodrine 1/2 hr before HD    #Chronic HFrEF (heart failure with reduced ejection fraction).   TTE 11/22 normal LV and systolic function, EF 59%, grade II diastolic dysfunction, dilated RV, reduced RV systolic function  not able to be started on GDMT 2/2 hypotension   [  ]recent Echo 1/3 with severe LVOT-> will repeat echo     #CAD  Hx of cardiac cath in 2018. Home meds: atorvastatin 40mg, plavix 75mg qd.  c/w home atorvastatin 40mg  Plavix 75mg qd being held for IR procedure     Plan for Monday   [ ]CT 4d scan and CT venogram after pre-medicating for contrast allergy   [ ] Reasonable to get HD after CT with IV contrast given hx of contrast allergy (i.e. 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 Pt s/p vancomycin x2 wks (last day 1/15) for presumed breast cellulitis. fistula as source ruled out. gallium scan was positive only for uptake on right ant chest wall. US of that area was negative for abscess. Endocrinology was consulted for elevated PTH and multinodular thyroid. Work-up was suggestive of secondary hyperparathyroidism but despite increasing doses of the medication, she has continued to have elevated levels of PTH. 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 thyroid ultrasound showed multiple nodules meeting criteria for fine-needle aspiration. Pt was transferred to [facility] when BP improved with midodrine for FNA and placement. Acute DVT in left common femoral vein found on Dopplers on 1.28.2023, trialing full dose AC with heparin drip, watching CBC closely (q8) for signs of bleed.   Planned for FNA biopsy on Monday   Planned for premedication for CT IV contrast  - get CT venogram (to evaluate IVC filter) and 4dCT of parathyroid after pre-medication.    # Lekocytosis - received high dose methylpred for premedication on Saturday. Likely demargination. [ ] Hold standing tylenol for now, watch for fevers. Check rectal temp if oral or axillary temp >99F. Can resume standing tylenol when White count downtrends.     # DVT  Known DVT of RIJ, R subclavian and R axillary veins. Hx of L AVF clotting.  Acute DVT of left common femoral vein seen on dopplers on 1.28.23, started on heparin drip; housestaff discussed risks of full dose AC, indication for AC for acute clot; patient understands risk and wishes to proceed with AC  [ ] Watch CBC closely,  q8 hrs (Has required 3 prBC transfusions this admission)    # Right leg edema >Left   # IVC filter present (placed after 2018 PE)   No DVT in right leg on 1.28.23 Dopplers  Starting full AC for left common femoral vein acute DVT ;   Unable to fully explain RLE swelling; Complained of pain in entire length of right leg this morning (1.29.23), improved by afternoon. [ ] Get CT Venogram to check for thrombosis around IVC filter.     #Multinodular goiter  TSH 1.52. Free T4 0.96   CT showed enlarged multinodular thyroid projecting into superior mediastinum with 1.8 x 1.5 cm solid nodule slightly inferiorly in the anterior mediastinum. Thyroid ultrasound showed a moderately enlarged gland with heterogeneous echotexture. There were 3 nodules in the R lobe (largest 3.3 cm), one in the left lobe (2.3 cm).   per Endo: Both of these nodules warrant biopsy--the right lobe lesion because of size and its shape (taller > wide), and the left lobe nodule based on size. Fine-needle aspiration recommended for 4.3 cm right thyroid and 2.6 cm left thyroid solid nodules per SCOTT guidelines  [ ] Planned for thyroid FNA on Monday (currently on heparin drip)     #renal bone disease   #calciphylaxis   #2ndary hyperparathyroidism   brown tumors on recent CT  c/w sevelamer to 1600mg tid w/ meals and sensipar 90mg Qday  planned for CT IV neck to evaluate parathyroids, may need parathyroidectomy  renal and endo following   [ ] Attempt Pre-medication again starting Sunday night if BP stable and patient amenable; goal is to get imaging on Monday morning, and HD after.   [ ] Reasonable to get HD after CT with IV contrast given hx of contrast allergy (i.e. not for renal protection, but to reduce risk of reaction to contrast)     #ESRD on HD MWF here, outpt T/TH/S  [facility] TDC, RUE AVF not being used    #hypotension  profound hypotension during this admission requiring pressors and now on florinef 0.2mg as well as midodrine 40mg tid.   Gets midodrine 1/2 hr before HD    #Chronic HFrEF (heart failure with reduced ejection fraction).   TTE 11/22 normal LV and systolic function, EF 59%, grade II diastolic dysfunction, dilated RV, reduced RV systolic function  not able to be started on GDMT 2/2 hypotension   [  ]recent Echo 1/3 with severe LVOT-> will repeat echo     #CAD  Hx of cardiac cath in 2018. Home meds: atorvastatin 40mg, plavix 75mg qd.  c/w home atorvastatin 40mg  Plavix 75mg qd being held for IR procedure     Plan for Monday   [ ]CT 4d scan and CT venogram after pre-medicating for contrast allergy   [ ] Reasonable to get HD after CT with IV contrast given hx of contrast allergy (i.e. not for renal protection, but to reduce risk of reaction to contrast)   [ ] Plan for IR FNA of thyroid nodules on monday  *[ ] Have nursing tag left leg, right leg and right arm as "no blood pressure cuff" limbs

## 2023-01-30 NOTE — PROGRESS NOTE ADULT - PROBLEM SELECTOR PLAN 4
#RUE fluid collection/RUE edema:   DVT of RIJ, R subclavian and R axillary veins found on admission. Hx of L AVF clotting. Given hx of clots, current pruritis and gallium scan uptake in chest wall, reasonable to workup coagulopathy. OBDULIA, ANCA, complement, RF, beta2 glycoprotein, anticardiolipin wnl.  - CT (1/3/23): 7.0 cm fluid collection is present near AV fistula in the RUE. Possibly abscess. Drained by IR 1/5/23 with serosanguinous fluid: no organisms, few WBCs  - RUE dopplers (1/13/23): No significant change large complex fluid collection in proximal R arm, could represent old seroma/hematoma (however, prior ultrasound was done prior to drainage)     Plan:   - Per Vascular: AV fistula unlikely to be infected; would not remove it; also would NOT recommend drainage of seroma.  - Per Wound Care, c/w BID dressing changes (once daily with Santyl DSD, once daily with Bactroban DSD). Apply light ace wrap compression to right hand and forearm to help with soft tissue swelling and elevate RUE with pillows      # DVT of left common femoral   *Acute DVT of left common femoral found on 1.28.23  - started full dose AC for acute DVT (heparin drip); Has required 3 transfusions this admission.   - monitor CBC closely q8hrs; Low threshold for escalating level of care should patient show signs of bleed, or hypotension    # IVC filter present   # Right leg edema   Right leg markedly more edematous than left   No DVT seen on LE dopplers on 1.28.23  - f/u vascular rec on utility of CT venogram or alternative imaging modality --> can plan to complete tomorrow 1/30 as pt will be premedicated for CT neck #RUE fluid collection/RUE edema:   DVT of RIJ, R subclavian and R axillary veins found on admission. Hx of L AVF clotting. Given hx of clots, current pruritis and gallium scan uptake in chest wall, reasonable to workup coagulopathy. OBDULIA, ANCA, complement, RF, beta2 glycoprotein, anticardiolipin wnl.  - CT (1/3/23): 7.0 cm fluid collection is present near AV fistula in the RUE. Possibly abscess. Drained by IR 1/5/23 with serosanguinous fluid: no organisms, few WBCs  - RUE dopplers (1/13/23): No significant change large complex fluid collection in proximal R arm, could represent old seroma/hematoma (however, prior ultrasound was done prior to drainage)   - Per Vascular: AV fistula unlikely to be infected; would not remove it; also would NOT recommend drainage of seroma.  - Per Wound Care, c/w BID dressing changes (once daily with Santyl DSD, once daily with Bactroban DSD). Apply light ace wrap compression to right hand and forearm to help with soft tissue swelling and elevate RUE with pillows      # DVT of left common femoral   *Acute DVT of left common femoral found on 1.28.23  - started full dose AC for acute DVT (heparin drip); Has required 3 transfusions this admission.   - monitor CBC closely q8hrs; Low threshold for escalating level of care should patient show signs of bleed, or hypotension    # IVC filter present   # Right leg edema   Right leg markedly more edematous than left   No DVT seen on LE dopplers on 1.28.23  - underwent CT venogram today, f/u results  - c/w heparin gtt

## 2023-01-30 NOTE — PROGRESS NOTE ADULT - SUBJECTIVE AND OBJECTIVE BOX
LACY MUNSONSMIN  67y, Female    Patient is a 67y old  Female who presents with a chief complaint of Missed dialysis, right breast pain (28 Jan 2023 22:11)      INTERVAL HPI/OVERNIGHT EVENTS:    ROS: otherwise negative      T(C): 36.9 (01-30-23 @ 09:45), Max: 36.9 (01-30-23 @ 09:45)  HR: 83 (01-30-23 @ 09:45) (53 - 95)  BP: 91/50 (01-30-23 @ 09:45) (72/46 - 91/50)  RR: 19 (01-30-23 @ 09:45) (15 - 20)  SpO2: 94% (01-30-23 @ 09:45) (94% - 100%)  Wt(kg): --Vital Signs Last 24 Hrs  T(C): 36.9 (30 Jan 2023 09:45), Max: 36.9 (30 Jan 2023 09:45)  T(F): 98.4 (30 Jan 2023 09:45), Max: 98.4 (30 Jan 2023 09:45)  HR: 83 (30 Jan 2023 09:45) (53 - 95)  BP: 91/50 (30 Jan 2023 09:45) (72/46 - 91/50)  BP(mean): --  RR: 19 (30 Jan 2023 09:45) (15 - 20)  SpO2: 94% (30 Jan 2023 09:45) (94% - 100%)    Parameters below as of 30 Jan 2023 09:45  Patient On (Oxygen Delivery Method): room air        PHYSICAL EXAM:  Constitutional: resting comfortably in bed; NAD  Head: NC/AT  Eyes: PERRL, EOMI, anicteric sclera  ENT: no nasal discharge; MMM  Neck: supple; no JVD or thyromegaly  Respiratory: CTA B/L; no W/R/R, no retractions  Cardiac: +S1/S2; RRR; no M/R/G; PMI non-displaced  Gastrointestinal: soft, NT/ND; no rebound or guarding; +BSx4  Back: spine midline, no bony tenderness or step-offs; no CVAT B/L  Extremities: WWP, no clubbing or cyanosis; no peripheral edema. Capillary refill <2 sec  Musculoskeletal: NROM x4; no joint swelling, tenderness or erythema  Vascular: 2+ radial, DP/PT pulses B/L  Dermatologic: skin warm, dry and intact; no rashes, wounds, or scars  Lymphatic: no submandibular or cervical LAD  Neurologic: AAOx3; CNII-XII grossly intact; no focal deficits  Psychiatric: affect and characteristics of appearance, verbalizations, behaviors are appropriate    LABS:                        8.8    10.75 )-----------( 269      ( 30 Jan 2023 05:33 )             30.6     01-30    130<L>  |  91<L>  |  53<H>  ----------------------------<  91  4.9   |  27  |  6.90<H>    Ca    7.9<L>      30 Jan 2023 05:33  Phos  4.4     01-29  Mg     1.9     01-29    TPro  5.3<L>  /  Alb  2.4<L>  /  TBili  0.2  /  DBili  x   /  AST  22  /  ALT  20  /  AlkPhos  108  01-30      PT/INR - ( 30 Jan 2023 05:33 )   PT: 14.2 sec;   INR: 1.19          PTT - ( 30 Jan 2023 05:33 )  PTT:48.1 sec    CAPILLARY BLOOD GLUCOSE      POCT Blood Glucose.: 140 mg/dL (30 Jan 2023 09:08)  POCT Blood Glucose.: 112 mg/dL (29 Jan 2023 18:09)  POCT Blood Glucose.: 132 mg/dL (29 Jan 2023 12:32)            MEDICATIONS  (STANDING):  albumin human 25% IVPB 50 milliLiter(s) IV Intermittent every 1 hour  artificial tears (preservative free) Ophthalmic Solution 1 Drop(s) Both EYES four times a day  atorvastatin 40 milliGRAM(s) Oral at bedtime  chlorhexidine 2% Cloths 1 Application(s) Topical <User Schedule>  chlorhexidine 2% Cloths 1 Application(s) Topical daily  cinacalcet 90 milliGRAM(s) Oral daily  collagenase Ointment 1 Application(s) Topical daily  diphenhydrAMINE 50 milliGRAM(s) Oral once  epoetin александр-epbx (RETACRIT) Injectable 8000 Unit(s) IV Push once  fludroCORTISONE 0.2 milliGRAM(s) Oral every 24 hours  heparin  Infusion 1600 Unit(s)/Hr (16 mL/Hr) IV Continuous <Continuous>  midodrine. 40 milliGRAM(s) Oral every 8 hours  Nephro-deborah 1 Tablet(s) Oral daily  petrolatum white Ointment 1 Application(s) Topical daily  polyethylene glycol 3350 17 Gram(s) Oral every 12 hours  predniSONE   Tablet 50 milliGRAM(s) Oral once  senna 2 Tablet(s) Oral at bedtime  sevelamer carbonate 1600 milliGRAM(s) Oral three times a day with meals    MEDICATIONS  (PRN):  calamine/zinc oxide Lotion 1 Application(s) Topical three times a day PRN Itching  cyclobenzaprine 5 milliGRAM(s) Oral three times a day PRN Muscle Spasm  HYDROmorphone  Injectable 0.5 milliGRAM(s) IV Push every 3 hours PRN Moderate Pain (4 - 6)  HYDROmorphone  Injectable 1 milliGRAM(s) IV Push every 3 hours PRN Severe Pain (7 - 10)  ibuprofen  Tablet. 400 milliGRAM(s) Oral every 6 hours PRN Mild Pain (1 - 3)  lidocaine 4% Cream 1 Application(s) Topical three times a day PRN Breast pain  sodium chloride 0.9% lock flush 10 milliLiter(s) IV Push every 1 hour PRN Pre/post blood products, medications, blood draw, and to maintain line patency      RADIOLOGY & ADDITIONAL TESTS: Reviewed   SALOMON MUNSON  67y, Female    Patient is a 67y old  Female who presents with a chief complaint of Missed dialysis, right breast pain (28 Jan 2023 22:11)      INTERVAL HPI/OVERNIGHT EVENTS: O/N 1x ofirmev for pain. 10pm ptt 50.4 -> increase rate by 2, prednisone delayed due to patient refusal- given at midnight. Pt on CPAP overnight. 2nd dose of prednisone at 6am. PTT 48->increase by 2 to 16ml/hr. Patient seen and examined at bedside. Patient reports she has b/l breast pain and is due for pain medication. No other complaints at this time.       ROS: otherwise negative      T(C): 36.9 (01-30-23 @ 09:45), Max: 36.9 (01-30-23 @ 09:45)  HR: 83 (01-30-23 @ 09:45) (53 - 95)  BP: 91/50 (01-30-23 @ 09:45) (72/46 - 91/50)  RR: 19 (01-30-23 @ 09:45) (15 - 20)  SpO2: 94% (01-30-23 @ 09:45) (94% - 100%)  Wt(kg): --Vital Signs Last 24 Hrs  T(C): 36.9 (30 Jan 2023 09:45), Max: 36.9 (30 Jan 2023 09:45)  T(F): 98.4 (30 Jan 2023 09:45), Max: 98.4 (30 Jan 2023 09:45)  HR: 83 (30 Jan 2023 09:45) (53 - 95)  BP: 91/50 (30 Jan 2023 09:45) (72/46 - 91/50)  BP(mean): --  RR: 19 (30 Jan 2023 09:45) (15 - 20)  SpO2: 94% (30 Jan 2023 09:45) (94% - 100%)    Parameters below as of 30 Jan 2023 09:45  Patient On (Oxygen Delivery Method): room air        PHYSICAL EXAM:  General: obese female, NAD  Head: NC/AT; MMM; PERRL; EOMI  Neck: Supple  Respiratory: CTAB; no wheezes/rales/rhonchi  Cardiovascular: Regular rhythm/rate; S1/S2+, no murmurs, rubs gallops   Gastrointestinal: Soft; NTND; bowel sounds normal and present  Extremities: WWP; pitting edema of right ankle up to thigh, trace pitting edema left leg; cyanosis v discoloration of distal toes on b/l feet. 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, b/l breast; wounds present on b/l breast  Neurological: A&Ox3, strength intact, no obvious focal deficits    LABS:                        8.8    10.75 )-----------( 269      ( 30 Jan 2023 05:33 )             30.6     01-30    130<L>  |  91<L>  |  53<H>  ----------------------------<  91  4.9   |  27  |  6.90<H>    Ca    7.9<L>      30 Jan 2023 05:33  Phos  4.4     01-29  Mg     1.9     01-29    TPro  5.3<L>  /  Alb  2.4<L>  /  TBili  0.2  /  DBili  x   /  AST  22  /  ALT  20  /  AlkPhos  108  01-30      PT/INR - ( 30 Jan 2023 05:33 )   PT: 14.2 sec;   INR: 1.19          PTT - ( 30 Jan 2023 05:33 )  PTT:48.1 sec    CAPILLARY BLOOD GLUCOSE      POCT Blood Glucose.: 140 mg/dL (30 Jan 2023 09:08)  POCT Blood Glucose.: 112 mg/dL (29 Jan 2023 18:09)  POCT Blood Glucose.: 132 mg/dL (29 Jan 2023 12:32)            MEDICATIONS  (STANDING):  albumin human 25% IVPB 50 milliLiter(s) IV Intermittent every 1 hour  artificial tears (preservative free) Ophthalmic Solution 1 Drop(s) Both EYES four times a day  atorvastatin 40 milliGRAM(s) Oral at bedtime  chlorhexidine 2% Cloths 1 Application(s) Topical <User Schedule>  chlorhexidine 2% Cloths 1 Application(s) Topical daily  cinacalcet 90 milliGRAM(s) Oral daily  collagenase Ointment 1 Application(s) Topical daily  diphenhydrAMINE 50 milliGRAM(s) Oral once  epoetin александр-epbx (RETACRIT) Injectable 8000 Unit(s) IV Push once  fludroCORTISONE 0.2 milliGRAM(s) Oral every 24 hours  heparin  Infusion 1600 Unit(s)/Hr (16 mL/Hr) IV Continuous <Continuous>  midodrine. 40 milliGRAM(s) Oral every 8 hours  Nephro-deborah 1 Tablet(s) Oral daily  petrolatum white Ointment 1 Application(s) Topical daily  polyethylene glycol 3350 17 Gram(s) Oral every 12 hours  predniSONE   Tablet 50 milliGRAM(s) Oral once  senna 2 Tablet(s) Oral at bedtime  sevelamer carbonate 1600 milliGRAM(s) Oral three times a day with meals    MEDICATIONS  (PRN):  calamine/zinc oxide Lotion 1 Application(s) Topical three times a day PRN Itching  cyclobenzaprine 5 milliGRAM(s) Oral three times a day PRN Muscle Spasm  HYDROmorphone  Injectable 0.5 milliGRAM(s) IV Push every 3 hours PRN Moderate Pain (4 - 6)  HYDROmorphone  Injectable 1 milliGRAM(s) IV Push every 3 hours PRN Severe Pain (7 - 10)  ibuprofen  Tablet. 400 milliGRAM(s) Oral every 6 hours PRN Mild Pain (1 - 3)  lidocaine 4% Cream 1 Application(s) Topical three times a day PRN Breast pain  sodium chloride 0.9% lock flush 10 milliLiter(s) IV Push every 1 hour PRN Pre/post blood products, medications, blood draw, and to maintain line patency      RADIOLOGY & ADDITIONAL TESTS: Reviewed

## 2023-01-30 NOTE — PROGRESS NOTE ADULT - ATTENDING COMMENTS
above reviewed and discussed .  pt is comfortable, and will hopefully proceed to HD after imaging.  we hope to review the breast pathology tomorrow to review degree of overlap with calciphylaxis.    agree with findings and recommendations.

## 2023-01-30 NOTE — PROGRESS NOTE ADULT - PROBLEM SELECTOR PLAN 7
At risk for secondary HPTT d/t renal failure. Pt has vague abdominal pain w/ nausea. Concern for possible brown tumors on CT with multiple fractures of pubic rami, pubic bone, thoracolumbar spine. Intact , Vit D 38.5. SPEP/immunofixation negative. Per Endo, likely secondary hyperparathyroidism vs other process causing osteoclastic tumors.  - c/w Sensipar 90 mg PO daily   - 1/11 AM  intact (baseline PTH on Sensipar)    #Multinodular Goiter  CT (1/3/23): Enlarged multinodular thyroid projecting into superior mediastinum w/ 1.8 x 1.5 cm solid nodule slightly inferiorly in the anterior mediastinum.   Thyroid US (1/4/23): Fine-needle aspiration recommended for 4.3 cm right thyroid and 2.6 cm left thyroid solid nodules per SCOTT guidelines.  - TSH 1.6 free T4 0.68. TSH could be inappropriately normal for low free T4 due to euthyroid sick syndrome.  - f/u TFTs after shock resolved  - plan for FNA biopsy of two nodules per endo recs; IR consulted; scheduled for 1/30  - endocrinology following At risk for secondary HPTT d/t renal failure. Pt has vague abdominal pain w/ nausea. Concern for possible brown tumors on CT with multiple fractures of pubic rami, pubic bone, thoracolumbar spine. Intact , Vit D 38.5. SPEP/immunofixation negative. Per Endo, likely secondary hyperparathyroidism vs other process causing osteoclastic tumors.  - c/w Sensipar 90 mg PO daily   - 1/11 AM  intact (baseline PTH on Sensipar)    #Multinodular Goiter  CT (1/3/23): Enlarged multinodular thyroid projecting into superior mediastinum w/ 1.8 x 1.5 cm solid nodule slightly inferiorly in the anterior mediastinum.   Thyroid US (1/4/23): Fine-needle aspiration recommended for 4.3 cm right thyroid and 2.6 cm left thyroid solid nodules per SCOTT guidelines.  - TSH 1.6 free T4 0.68. TSH could be inappropriately normal for low free T4 due to euthyroid sick syndrome.  - f/u TFTs after shock resolved  - plan for FNA biopsy of two nodules per endo recs; IR consulted; now scheduled for 1/31  - endocrinology following

## 2023-01-30 NOTE — PROGRESS NOTE ADULT - PROBLEM SELECTOR PLAN 6
Pt has been on HD for "years" 2/2 PCKD. AV Fistula of EDWIN clotted in 2014, has received HD thru HD cath since. New AV fistula placed 11/2022, not yet matured.   - f/u nephrology recs  - s/p HD 1/10, 1/13, 1/16, 1/18, 1/20, 1/23, 1/25, 1/27    #Renal osteodystrophy   CT head (1/2/23): Increased density of the bone marrow is consistent with renal osteodystrophy. Per endocrine, extent of bone destruction extreme given PTH level, suspect might not be Brown tumors  - c/w sevelamer 1600 mg PO TID  - increased to Sensipar 90 mg PO daily Pt has been on HD for "years" 2/2 PCKD. AV Fistula of EDWIN clotted in 2014, has received HD thru HD cath since. New AV fistula placed 11/2022, not yet matured.   - f/u nephrology recs  - s/p HD 1/10, 1/13, 1/16, 1/18, 1/20, 1/23, 1/25, 1/27, 1/30    #Renal osteodystrophy   CT head (1/2/23): Increased density of the bone marrow is consistent with renal osteodystrophy. Per endocrine, extent of bone destruction extreme given PTH level, suspect might not be Brown tumors  - c/w sevelamer 1600 mg PO TID  - increased to Sensipar 90 mg PO daily

## 2023-01-30 NOTE — PROGRESS NOTE ADULT - SUBJECTIVE AND OBJECTIVE BOX
SUBJECTIVE: Patient seen and examined bedside; no events overnight, HD stable, tolerated well dressing change.    heparin  Infusion 1600 Unit(s)/Hr IV Continuous <Continuous>  midodrine. 40 milliGRAM(s) Oral every 8 hours      Vital Signs Last 24 Hrs  T(C): 36.3 (29 Jan 2023 20:40), Max: 36.6 (29 Jan 2023 15:30)  T(F): 97.4 (29 Jan 2023 20:40), Max: 97.8 (29 Jan 2023 15:30)  HR: 72 (30 Jan 2023 05:55) (53 - 84)  BP: 82/49 (29 Jan 2023 20:40) (75/54 - 82/49)  BP(mean): --  RR: 20 (30 Jan 2023 05:55) (15 - 20)  SpO2: 99% (30 Jan 2023 05:55) (94% - 100%)    Parameters below as of 30 Jan 2023 05:55  Patient On (Oxygen Delivery Method): room air      I&O's Detail    PE:    General: NAD, resting comfortably in bed  C/V: S1 s2, RRR  Pulm: Nonlabored breathing, no respiratory distress  Abd: Soft, NTND  Extrem: WWP, right medial upper arm wound with clean/pink base, no obvious signs of infection, rewrapped with kerlix and ace        LABS:                        8.8    10.75 )-----------( 269      ( 30 Jan 2023 05:33 )             30.6     01-30    130<L>  |  91<L>  |  53<H>  ----------------------------<  91  4.9   |  27  |  6.90<H>    Ca    7.9<L>      30 Jan 2023 05:33  Phos  4.4     01-29  Mg     1.9     01-29    TPro  5.3<L>  /  Alb  2.4<L>  /  TBili  0.2  /  DBili  x   /  AST  22  /  ALT  20  /  AlkPhos  108  01-30    PT/INR - ( 30 Jan 2023 05:33 )   PT: 14.2 sec;   INR: 1.19          PTT - ( 30 Jan 2023 05:33 )  PTT:48.1 sec      RADIOLOGY & ADDITIONAL STUDIES:

## 2023-01-30 NOTE — PROGRESS NOTE ADULT - ASSESSMENT
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 Off pressors since 1/16/23 PM, with course c/b persistent b/l breast pain R>L with hypotension and persistent fevers, now afebrile maintaining MAPs on midodrine and fludrocortisone. Most recent complication of her long hospital course is: Acute DVT in left common femoral seen on LE Dopplers on 1.28.23.

## 2023-01-30 NOTE — PROGRESS NOTE ADULT - PROBLEM SELECTOR PLAN 5
TTE 11/22 normal LV and systolic function, EF 59%, grade II diastolic dysfunction, dilated RV, reduced RV systolic function.  Home meds: Entresto 49/51mg  - hold Entresto i/s/o hypotension    #LVOT with LISETTE  Likely i/s/o hyperdynamic LV function 2/2 shock and hypovolemia. Recent Echo 1/3 with severe LVOT-> will repeat echo   - per cardiology, rec repeating TTE    #CAD  Hx of cardiac cath in 2018  Home meds: atorvastatin 40mg, plavix 75mg qd  - c/w home atorvastatin 40mg  - Clopidogrel 75mg qd held starting on 1/27/23 for possible FNA thyroid nodule biopsy on 1.29.23

## 2023-01-30 NOTE — PROGRESS NOTE ADULT - PROBLEM SELECTOR PLAN 1
Family hx of breast cancer in mother, sister. Physical exam on admission: large R breast mass, palpable L breast mass, R breast has peau d'orange appearance with associated erythema, warmth on R. US bilateral breasts: No sonographic evidence of breast abscess, cyst or focal mass to correspond with the clinical finding of bilateral breast masses. Breast bx: Epidermal and dermal necrosis with fibrinoid occlusion and necrosis of blood vessels, focal necrotizing suppurative inflammation involving the fibroadipose tissue and blood vessels in the subcutis    Plan:  Pt continues to report pain in bilateral breasts, worst on R. Pain responsive to Dilaudid but effect wears off after ~2-3 hours. Was briefly on PCA (1/16-1/17), now dc'ed  - Dosing per pain management recs   - c/w Flexeril 5mg PO TID prn for spasms (hold parameters added)  - c/w Dilaudid 0.5 mg IV q3h PRN for moderate pain -- HOLD IF RR<10, SpO2 <93%, SBP <96  - c/w Dilaudid 1 mg IV q3h PRN for severe pain -- HOLD IF RR<10, SpO2 <93%, SBP <96  - plan to transition to PO dilaudid when able to consistently tolerate PO    #Peeling epidermis to B/l breasts  Derm consulted, recommend wedge biopsy of both normal and pathologic tissue. Coagulopathic changes may be secondary to infection. however other causes of hypercoagulability should be explored  - per surgery, no surgical interventions  - c/w daily dressing changes, f/u Wound Care recs

## 2023-01-30 NOTE — CHART NOTE - NSCHARTNOTEFT_GEN_A_CORE
Admitting Diagnosis:   Patient is a 67y old  Female who presents with a chief complaint of Missed dialysis, right breast pain (28 Jan 2023 22:11)      PAST MEDICAL & SURGICAL HISTORY:  HTN (hypertension)      HLD (hyperlipidemia)      CAD (coronary atherosclerotic disease)      ESRD on dialysis  last 11/15/22      H/O ventral hernia      Brown tumor  brain      DVT, lower extremity  left      History of surgery  IVC filter      AVF (arteriovenous fistula)  right left      S/P AIDEN-BSO  2003      History of surgery  RLE PTA stent / atherectomy  7/2021      History of atherectomy  stent        Current Nutrition Order: Renal, Nepro BID       PO Intake: Good (%) [   ]  Fair (50-75%) [ x  ] Poor (<25%) [   ]    GI Issues: last BM 1/29 per pt    Pain: None reported     Skin Integrity: wound to R arm, +3 edema to BL legs and BL arms    Labs:   01-30    130<L>  |  91<L>  |  53<H>  ----------------------------<  91  4.9   |  27  |  6.90<H>    Ca    7.9<L>      30 Jan 2023 05:33  Phos  4.4     01-29  Mg     1.9     01-29    TPro  5.3<L>  /  Alb  2.4<L>  /  TBili  0.2  /  DBili  x   /  AST  22  /  ALT  20  /  AlkPhos  108  01-30    CAPILLARY BLOOD GLUCOSE      POCT Blood Glucose.: 160 mg/dL (30 Jan 2023 12:04)  POCT Blood Glucose.: 140 mg/dL (30 Jan 2023 09:08)  POCT Blood Glucose.: 112 mg/dL (29 Jan 2023 18:09)  POCT Blood Glucose.: 132 mg/dL (29 Jan 2023 12:32)      Medications:  MEDICATIONS  (STANDING):  albumin human 25% IVPB 50 milliLiter(s) IV Intermittent every 1 hour  artificial tears (preservative free) Ophthalmic Solution 1 Drop(s) Both EYES four times a day  atorvastatin 40 milliGRAM(s) Oral at bedtime  chlorhexidine 2% Cloths 1 Application(s) Topical <User Schedule>  chlorhexidine 2% Cloths 1 Application(s) Topical daily  cinacalcet 90 milliGRAM(s) Oral daily  collagenase Ointment 1 Application(s) Topical daily  diphenhydrAMINE 50 milliGRAM(s) Oral once  epoetin александр-epbx (RETACRIT) Injectable 8000 Unit(s) IV Push once  fludroCORTISONE 0.2 milliGRAM(s) Oral every 24 hours  heparin  Infusion 1600 Unit(s)/Hr (16 mL/Hr) IV Continuous <Continuous>  midodrine. 40 milliGRAM(s) Oral every 8 hours  Nephro-deborah 1 Tablet(s) Oral daily  petrolatum white Ointment 1 Application(s) Topical daily  polyethylene glycol 3350 17 Gram(s) Oral every 12 hours  senna 2 Tablet(s) Oral at bedtime  sevelamer carbonate 1600 milliGRAM(s) Oral three times a day with meals    MEDICATIONS  (PRN):  calamine/zinc oxide Lotion 1 Application(s) Topical three times a day PRN Itching  cyclobenzaprine 5 milliGRAM(s) Oral three times a day PRN Muscle Spasm  HYDROmorphone  Injectable 0.5 milliGRAM(s) IV Push every 3 hours PRN Moderate Pain (4 - 6)  HYDROmorphone  Injectable 1 milliGRAM(s) IV Push every 3 hours PRN Severe Pain (7 - 10)  ibuprofen  Tablet. 400 milliGRAM(s) Oral every 6 hours PRN Mild Pain (1 - 3)  lidocaine 4% Cream 1 Application(s) Topical three times a day PRN Breast pain  sodium chloride 0.9% lock flush 10 milliLiter(s) IV Push every 1 hour PRN Pre/post blood products, medications, blood draw, and to maintain line patency      Weight:  Height for BMI (CENTIMETERS)	177.8 Centimeter(s)  Weight for BMI (lbs)	188.1 lb  Weight for BMI (kg)	85.3 kg  Body Mass Index	26.9      Weight Change:   1/2 - 82.6 kg  1/3 - 84 kg  1/7 - 99.4 kg  1/10 - 106 kg   1/13 - 110 kg  1/16 - 115.3  1/20 - 116.2  1/25 - 119.2 kg   1/27 - 119.2 kg  -Trend sequential wts. Pt with noted worsening edema      Estimated energy needs:   Estimated energy needs:   Weight used for calculations	IBW  Estimated Energy Needs Weight (lbs)	150.3 lb  Estimated Energy Needs Weight (kg)	68.2 kg  Estimated Energy Needs From (anatoliy/kg)	30  Estimated Energy Needs To (anatoliy/kg)	35  Estimated Energy Needs Calculated From (anatoliy/kg)	2046  Estimated Energy Needs Calculated To (anatoliy/kg)	2387    Estimated Protein Needs Weight (lbs)	150.3 lb  Estimated Protein Needs Weight (kg)	68.2 kg  Estimated Protein Needs From (g/kg)	1.2  Estimated Protein Needs To (g/kg)	1.3  Estimated Protein Needs Calculated From (g/kg)	81.84  Estimated Protein Needs Calculated To (g/kg)	88.66  Estimated Fluid Needs Weight (lbs)	150.3 lb  Estimated Fluid Needs Weight (kg)	68.2 kg  Other Calculations	Fluids per team. 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 Complaining of crampy abdominal pain and waterry diarrhea (2-3 BMs/day), now with nausea and vomiting prior to arrival. Has not gone to HD since 12/24 (8 days ago). Admitted for emergent HD. 1/19: Stepped up to [facility]EA. 1/20: Transferred to [facility]. 1/21: Rectal temp 101.6 at 6 PM. hypotensive with MAP's in the 50's. 1/22; o/n: patient sustained hypotension with MAP's as low as 42, mentating at baseline throughout. 1/25: patient underwent HD w/o complications and mantainin good BP awith map. 1/27: started prednisone 50 mg po for premedication. 1/28-o/n: heparin drip started at 930pm, started at lower dose rate due to risk of bleeding. o/n: Pt on CPAP overnight. 2nd dose of prednisone at 6am. AM labs collected.     Pt assessed at bedside. Current Nutrition Order: Renal, Nepro BID. Pt continues to endorse limited oral intake and general dislike for diet and foods provided by facility. Pt reports that she is adding the Nepro to her cream of wheat at breakfast. Reports that she is eating the fish, vegetables, juice and tea most often. Last BM 1/29 per pt. No new c/o N/V or difficult chew/swallow. RDN will continue to reassess, intervene, and monitor as appropriate.       Previous Nutrition Diagnosis: Increased nutrient needs...kcal/protein r/t HD AEB known increased nutrient demands for HD.    Active [ x  ]  Resolved [   ]    If resolved, new PES:     Goal: Pt to meet at least 75% of nutritional needs consistently     Recommendations:  -Consider adjusting diet order to regular with no concentrated phos and K  -Continue Nepro BID  -Encourage good PO intake and ONS compliance   -Honor food preferences as able; continue to update desired foods list  -Monitor chemistry, GI fxn, and skin integrity     Education: deferred at this time    Risk Level: High [   ] Moderate [ x  ] Low [   ]

## 2023-01-30 NOTE — PROGRESS NOTE ADULT - PROBLEM SELECTOR PLAN 11
F: None  E: None, HD pt  N: Renal Restricted Diet  DVT ppx: heparin full AC  GI ppx: None  Code Status: Full Code  Dispo: [facility]

## 2023-01-30 NOTE — CHART NOTE - NSCHARTNOTESELECT_GEN_ALL_CORE
Event Note
Nutrition Services
Stroke Code Cancellation/Event Note
Anti-infective approval/Event Note
Event Note
General surgery - procedure note/Off Service Note
Nutrition Services
anti-infective approval/Event Note
central line removal/Event Note

## 2023-01-30 NOTE — PROGRESS NOTE ADULT - PROBLEM SELECTOR PLAN 3
Pt with recurrent fevers, most recently T 101.6 1/21. Unclear etiology. BCx 1/3, 1/9, 1/17 negative. s/p vancomycin x2 weeks (1/2-1/15). Per Surgery, R breast wound unlikely to be source of infection. Per Vascular Surgery, AV fistula fluid collection unlikely to be source of infection.   - PICC placed on 1/26 by IR (pt has poor veins with diffuse clots and does not have other access sites)  - monitor BCx from 1/21 -- NGTD   - monitor CBC and vitals  - check rectal temp if oral/axillary temp > 99     #RUE fluid collection/RUE edema:  - CT (1/3/23): 7.0 cm fluid collection is present near AV fistula in the RUE. Possibly abscess. Drained by IR 1/5/23 with serosanguinous fluid: no organisms, few WBCs  - RUE dopplers (1/13/23): No significant change large complex fluid collection in proximal R arm, could represent old seroma/hematoma (however, prior ultrasound was done prior to drainage)     Plan:   - Per Vascular: AV fistula unlikely to be infected; would not remove it; also would NOT recommend drainage of seroma.  - Per Wound Care, c/w BID dressing changes (once daily with Santyl DSD, once daily with Bactroban DSD). Apply light ace wrap compression to right hand and forearm to help with soft tissue swelling and elevate RUE with pillows

## 2023-01-30 NOTE — PROGRESS NOTE ADULT - ATTENDING COMMENTS
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 Pt s/p vancomycin x2 wks (last day 1/15) for presumed breast cellulitis. fistula as source ruled out. gallium scan was positive only for uptake on right ant chest wall. US of that area was negative for abscess. Endocrinology was consulted for elevated PTH and multinodular thyroid. Work-up was suggestive of secondary hyperparathyroidism but despite increasing doses of the medication, she has continued to have elevated levels of PTH. 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 thyroid ultrasound showed multiple nodules meeting criteria for fine-needle aspiration. Pt was transferred to [facility] when BP improved with midodrine for FNA and placement. Acute DVT in left common femoral vein found on Dopplers on 1.28.2023, trialing full dose AC with heparin drip, watching CBC closely (q8) for signs of bleed.   Planned for FNA biopsy on tuesday   Planned for premedication for CT IV contrast  - get CT venogram (to evaluate IVC filter) and 4dCT of parathyroid after pre-medication 1/30.    # Leukocytosis - received high dose methylpred for premedication on Saturday. Likely demargination-> now trending down. , watch for fevers. Check rectal temp if oral or axillary temp >99F.      # DVT  Known DVT of RIJ, R subclavian and R axillary veins. Hx of L AVF clotting.  Acute DVT of left common femoral vein seen on dopplers on 1.28.23, started on heparin drip; housestaff discussed risks of full dose AC, indication for AC for acute clot; patient understands risk and wishes to proceed with AC  CBC closely,  q8 hrs (Has required 3 prBC transfusions this admission)    # Right leg edema >Left   # IVC filter present (placed after 2018 PE)   No DVT in right leg on 1.28.23 Dopplers  Starting full AC for left common femoral vein acute DVT ;   RLE swelling improving now  CT Venogram to check for thrombosis around IVC filter 1/30.     #Multinodular goiter  TSH 1.52. Free T4 0.96   CT showed enlarged multinodular thyroid projecting into superior mediastinum with 1.8 x 1.5 cm solid nodule slightly inferiorly in the anterior mediastinum. Thyroid ultrasound showed a moderately enlarged gland with heterogeneous echotexture. There were 3 nodules in the R lobe (largest 3.3 cm), one in the left lobe (2.3 cm).   per Endo: Both of these nodules warrant biopsy--the right lobe lesion because of size and its shape (taller > wide), and the left lobe nodule based on size. Fine-needle aspiration recommended for 4.3 cm right thyroid and 2.6 cm left thyroid solid nodules per SCOTT guidelines  [ ] Planned for thyroid FNA on tuesday by IR(currently on heparin drip)     #renal bone disease   #calciphylaxis   #2ndary hyperparathyroidism   brown tumors on recent CT  c/w sevelamer to 1600mg tid w/ meals and sensipar 90mg Qday  planned for CT IV neck to evaluate parathyroids, may need parathyroidectomy  renal and endo following   Pre-medication was delayed 2/2 patient now pending imaging and HD after.   Reasonable to get HD after CT with IV contrast given hx of contrast allergy (i.e. not for renal protection, but to reduce risk of reaction to contrast)     #ESRD on HD MWF here, outpt T/TH/S  [facility] TDC, RUE AVF not being used    #hypotension  profound hypotension during this admission requiring pressors and now on florinef 0.2mg as well as midodrine 40mg tid.   Gets midodrine 1/2 hr before HD    #Chronic HFrEF (heart failure with reduced ejection fraction).   TTE 11/22 normal LV and systolic function, EF 59%, grade II diastolic dysfunction, dilated RV, reduced RV systolic function  not able to be started on GDMT 2/2 hypotension   recent Echo 1/3 with severe LVOT-> will repeat echo     #CAD  Hx of cardiac cath in 2018. Home meds: atorvastatin 40mg, plavix 75mg qd.  c/w home atorvastatin 40mg  Plavix 75mg qd being held for IR procedure     plan  vascular ok with BP measurements from LUE as long as distal to the PICC line  Have nursing tag left leg, right leg and right arm as "no blood pressure cuff" limbs.

## 2023-01-30 NOTE — PROGRESS NOTE ADULT - SUBJECTIVE AND OBJECTIVE BOX
--------------------------------------------------------------------------------  Chief Complaint: ESRD/Ongoing hemodialysis requirement    24 hour events/subjective:    Seen this morning, SBP 91/50. To get CT scan today, will time it so she can get scan then HD afterwards to try and minimize exposure time w/ contrast.     PAST HISTORY  --------------------------------------------------------------------------------  No significant changes to PMH, PSH, FHx, SHx, unless otherwise noted    ALLERGIES & MEDICATIONS  --------------------------------------------------------------------------------  Allergies    Ancef (Rash; Urticaria)  DDAVP (Hypotension)  iodine (Hives; Pruritus)  penicillin (Swelling)  sulfa drugs (Angioedema)    Intolerances      Standing Inpatient Medications  albumin human 25% IVPB 50 milliLiter(s) IV Intermittent every 1 hour  artificial tears (preservative free) Ophthalmic Solution 1 Drop(s) Both EYES four times a day  atorvastatin 40 milliGRAM(s) Oral at bedtime  chlorhexidine 2% Cloths 1 Application(s) Topical <User Schedule>  chlorhexidine 2% Cloths 1 Application(s) Topical daily  cinacalcet 90 milliGRAM(s) Oral daily  collagenase Ointment 1 Application(s) Topical daily  diphenhydrAMINE 50 milliGRAM(s) Oral once  epoetin александр-epbx (RETACRIT) Injectable 8000 Unit(s) IV Push once  fludroCORTISONE 0.2 milliGRAM(s) Oral every 24 hours  heparin  Infusion 1600 Unit(s)/Hr IV Continuous <Continuous>  midodrine. 40 milliGRAM(s) Oral every 8 hours  Nephro-deborah 1 Tablet(s) Oral daily  petrolatum white Ointment 1 Application(s) Topical daily  polyethylene glycol 3350 17 Gram(s) Oral every 12 hours  predniSONE   Tablet 50 milliGRAM(s) Oral once  senna 2 Tablet(s) Oral at bedtime  sevelamer carbonate 1600 milliGRAM(s) Oral three times a day with meals    PRN Inpatient Medications  calamine/zinc oxide Lotion 1 Application(s) Topical three times a day PRN  cyclobenzaprine 5 milliGRAM(s) Oral three times a day PRN  HYDROmorphone  Injectable 0.5 milliGRAM(s) IV Push every 3 hours PRN  HYDROmorphone  Injectable 1 milliGRAM(s) IV Push every 3 hours PRN  ibuprofen  Tablet. 400 milliGRAM(s) Oral every 6 hours PRN  lidocaine 4% Cream 1 Application(s) Topical three times a day PRN  sodium chloride 0.9% lock flush 10 milliLiter(s) IV Push every 1 hour PRN      REVIEW OF SYSTEMS  --------------------------------------------------------------------------------  All other systems were reviewed and are negative, except as noted.    VITALS/PHYSICAL EXAM  --------------------------------------------------------------------------------  T(C): 36.4 (23 @ 06:24), Max: 36.6 (23 @ 15:30)  HR: 77 (23 @ 06:24) (53 - 84)  BP: 72/46 (23 @ 06:24) (72/46 - 82/49)  RR: 18 (23 @ 06:24) (15 - 20)  SpO2: 99% (23 @ 05:55) (94% - 100%)  Wt(kg): --  Drug Dosing Weight  Height (cm): 177.8 (2023 20:52)  Weight (kg): 85.3 (2023 20:52)  BMI (kg/m2): 27 (2023 20:52)  BSA (m2): 2.03 (2023 20:52)    PHYSICAL EXAM:  GENERAL: NAD resting in bed   CHEST/LUNG: Clear to auscultation bilaterally; no accessory muscle use   HEART: normal S1S2, RRR  ABDOMEN: Soft, Nontender, +BS,   EXTREMITIES: No clubbing, cyanosis, or edema   ACCESS: R TDC    LABS/STUDIES  --------------------------------------------------------------------------------              8.8    10.75 >-----------<  269      [23 @ 05:33]              30.6     130  |  91  |  53  ----------------------------<  91      [23 @ 05:33]  4.9   |  27  |  6.90        Ca     7.9     [23 @ 05:33]      Mg     1.9     [23 @ 03:47]      Phos  4.4     [23 @ 03:47]    TPro  5.3  /  Alb  2.4  /  TBili  0.2  /  DBili  x   /  AST  22  /  ALT  20  /  AlkPhos  108  [23 @ 05:33]    PT/INR: PT 14.2 , INR 1.19       [23 @ 05:33]  PTT: 48.1       [23 @ 05:33]      Iron 15, TIBC 79, %sat 19      [23 06:01]  Ferritin 1209      [23 06:01]  PTH -- (Ca 8.3)      [23 06:47]   492  PTH -- (Ca 8.5)      [23 05:30]   351  PTH -- (Ca 7.6)      [23 04:35]   532  PTH -- (Ca 8.8)      [23 14:24]   479  Vitamin D (25OH) 38.5      [23 05:30]  TSH 1.520      [23 06:47]    HBsAb Nonreact      [23:]  HBsAg Nonreact      [23]  HCV 0.04, Nonreact      [23:]      RADIOLOGY:  --------------------------------------------------------------------------------------    Hemoglobin: 8.8 g/dL (23 05:33)  Hemoglobin: 9.0 g/dL (23 @ 16:51)  Hemoglobin: 9.2 g/dL (23 03:47)  Phosphorus Level, Serum: 4.4 mg/dL (23 03:47)    Albumin, Serum: 2.4 g/dL (23 05:33)  Albumin, Serum: 2.5 g/dL (23 03:47)    T(C): 36.4 (23 06:24), Max: 36.6 (23 @ 15:30)  HR: 77 (23:24) (53 - 84)  BP: 72/46 (23 06:24) (72/46 - 82/49)  RR: 18 (23:24) (15 - 20)  SpO2: 99% (23 @ 05:55) (94% - 100%)  cinacalcet 60 milliGRAM(s) Oral daily, 23 @ 11:00, 11:00  cinacalcet 30 milliGRAM(s) Oral once, 23 @ 12:12, STAT, Stop order after: 1 Doses  cinacalcet 30 milliGRAM(s) Oral daily, 23 @ 15:49, Routine  cinacalcet 90 milliGRAM(s) Oral daily, 23 @ 00:00, Routine  epoetin александр-epbx (RETACRIT) Injectable 8000 Unit(s) IV Push once, 23 @ 07:42, Routine, Stop order after: 1 Doses  epoetin александр-epbx (RETACRIT) Injectable 8000 Unit(s) IV Push once, 23 @ 07:40, Routine, Stop order after: 1 Doses  epoetin александр-epbx (RETACRIT) Injectable 8000 Unit(s) IV Push once, 23 @ 07:41, Routine, Stop order after: 1 Doses  epoetin александр-epbx (RETACRIT) Injectable 8000 Unit(s) IV Push once, 23 @ 09:42, Routine, Stop order after: 1 Doses  epoetin александр-epbx (RETACRIT) Injectable 8000 Unit(s) IV Push once, 01-10-23 @ 10:08, Routine, Stop order after: 1 Doses  epoetin александр-epbx (RETACRIT) Injectable 8000 Unit(s) IV Push once, 23 @ 09:08, Routine, Stop order after: 1 Doses  epoetin александр-epbx (RETACRIT) Injectable 8000 Unit(s) IV Push once, 23 @ 07:40, Routine, Stop order after: 1 Doses  epoetin александр-epbx (RETACRIT) Injectable 8000 Unit(s) IV Push once, 23 @ 08:46, Routine, Stop order after: 1 Doses  epoetin александр-epbx (RETACRIT) Injectable 8000 Unit(s) IV Push once, 23 @ 07:40, Routine, Stop order after: 1 Doses  epoetin александр-epbx (RETACRIT) Injectable 8000 Unit(s) IV Push once, 23 @ 07:45, Routine, Stop order after: 1 Doses  epoetin александр-epbx (RETACRIT) Injectable 8000 Unit(s) IV Push once, 23 @ 06:45, Routine, Stop order after: 1 Doses  epoetin александр-epbx (RETACRIT) Injectable 8000 Unit(s) IV Push once, 23 @ 07:45, Routine, Stop order after: 1 Doses  sevelamer carbonate 1600 milliGRAM(s) Oral once, 23 @ 09:59, STAT, Stop order after: 1 Doses  sevelamer carbonate 1600 milliGRAM(s) Oral three times a day with meals, 23 @ 05:28, Routine      Hemodialysis Treatment.:     Schedule: Once, Modality: Hemodialysis, Access: Arteriovenous Fistula    Dialyzer: Optiflux [id number], Time: 180 Min    Blood Flow: 400 mL/Min , Dialysate Flow: 600 mL/Min, Dialysate Temp: 35, Tubinmm (Adult)    Target Fluid Removal: 0 Liters    Dialysate Electrolytes (mEq/L): Potassium 2, Calcium 2.5, Sodium 138, Bicarbonate 35    Additional Instructions: Midodrine 30 min before dialysis (23 @ 09:43) [Active]

## 2023-01-30 NOTE — PROGRESS NOTE ADULT - ASSESSMENT
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 During imaging, CT showing 7.0 cm fluid collection is present near an AV fistula in the right upper arm. The differential for this collection included benign post-operative hematoma or seroma, but also abscess or vascular device infection, particularly in the setting of septic shock. Vascular consulted for RUE fistula evaluation to rule out this area as a source of sepsis. Reassuring physical exam at initial evaluation without clear indicia of infection related to the graph, small superficial surgical incision dehiscence notwithstanding. Now s/p IR image guided aspiration of perigraft fluid (negative) and gadolinium scan (negative). At this point, do not suspect a graft infection as the provoking factor leading to the patient's presentation. Swelling in area of RUE fistula is likely a sterile seroma with no intervention needed.    Recommendations:    -Wound care recs: BID dressing changes (once daily with Santyl DSD once daily with Bactroban DSD). Apply light ace wrap compression to right hand and forearm to help with soft tissue swelling and elevate RUE with pillows  -Rest of care per primary team  -Vascular surgery Team 3C will continue to follow. Please call [phone] with any questions or concerns.

## 2023-01-30 NOTE — PROGRESS NOTE ADULT - ASSESSMENT
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    Assessment/Plan:   #ESRD on HD   Schedule modified while in the hospital, will continue with MWF for now  HD today after CT Neck with contrast at 12pm  Renal diet    #Hx of Hypertension  Has had profound hypotension during this admission requiring pressors and now on florinef 0.2mg as well as midodrine 40mg q8h  -Gets midodrine 1/2 hr before HD  -Also started on florinef 0.2mg  -HD with colder dialysate and albumin PRN    #access   [facility] TDC c/d/i  RUE AVF not being used    #anemia  Hgb 8.8  -Epo w/ HD  -Transfusion goals as per primary team  -Iron profile noted    #renal bone disease   Noted to have brown tumors on most recent CT, in addition to a lytic lesion of her skull. Likely due to hyperparathyroidism. BX results per note with epidermal and dermal necrosis w/ fibrinoid occulusion and necrosis of blood vessels, focla necrotizing suppurative inflammation. 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   -Calcium wnl, trend phos daily  -c/w sevelamer to 1600mg tid w/ meals  -c/w sensipar 90mg Qday  -Endo following agree with CT IV neck to evaluate parathyroids, may need parathyroidectomy in the future

## 2023-01-31 NOTE — PROGRESS NOTE ADULT - ATTENDING COMMENTS
have reviewed above and discussed with pt.  will attempt to locate pth tissue with nuclear study.   agree with findings and plans.

## 2023-01-31 NOTE — PROGRESS NOTE ADULT - ASSESSMENT
66 yo F pt with HFrEF (EF 40%), nonobstructive CAD, NICM, HTN, HLD, ESRD 2/2 PCKD on HD, PE (2018) s/p IVC filter, mild AS, mild MR, PAD, OA, h/o thrombus in vascular access x3 (R AVG, R AVF, L AVG), ventral hernia, brown tumor in the skull (osteoclastoma process 2/2 hyperparathyroidism), multiple fractures (spine, pubic rami) who presented with weakness and generalized malaise for 1 week a/w cramping abdominal pain, nausea, diarrhea causing her to miss dialysis since 12/24, also c/o R breast pain, admitted on 1/2/23 for emergent HD. Hospital course complicated by finding of new thrombus in right IJ, subclavian, and axillary veins. Course c/b persistent b/l breast pain R>L with hypotension and persistent fevers, off pressors since 1/16/23 PM, now afebrile maintaining MAPs on midodrine and fludrocortisone. Most recent complication of her long hospital course is: Acute DVT in left common femoral seen on LE Dopplers on 1.28.23.

## 2023-01-31 NOTE — PROGRESS NOTE ADULT - PROBLEM SELECTOR PLAN 7
At risk for secondary HPTT d/t renal failure. Pt has vague abdominal pain w/ nausea. Concern for possible brown tumors on CT with multiple fractures of pubic rami, pubic bone, thoracolumbar spine. Intact , Vit D 38.5. SPEP/immunofixation negative. Per Endo, likely secondary hyperparathyroidism vs other process causing osteoclastic tumors.  - c/w Sensipar 90 mg PO daily   - 1/11 AM  intact (baseline PTH on Sensipar)    #Multinodular Goiter  CT (1/3/23): Enlarged multinodular thyroid projecting into superior mediastinum w/ 1.8 x 1.5 cm solid nodule slightly inferiorly in the anterior mediastinum.   Thyroid US (1/4/23): Fine-needle aspiration recommended for 4.3 cm right thyroid and 2.6 cm left thyroid solid nodules per SCOTT guidelines.  - TSH 1.6 free T4 0.68. TSH could be inappropriately normal for low free T4 due to euthyroid sick syndrome.  - f/u TFTs after shock resolved  - plan for FNA biopsy of two nodules per endo recs; IR consulted; now scheduled for 1/31  - endocrinology following At risk for secondary HPTT d/t renal failure. Pt has vague abdominal pain w/ nausea. Concern for possible brown tumors on CT with multiple fractures of pubic rami, pubic bone, thoracolumbar spine. Intact , Vit D 38.5. SPEP/immunofixation negative. Per Endo, likely secondary hyperparathyroidism vs other process causing osteoclastic tumors.  - c/w Sensipar 90 mg PO daily   - 1/11 AM  intact (baseline PTH on Sensipar)  - CT did not visualize parathyroids well, will consider parathyroidectomy pending endo recs    #Multinodular Goiter  CT (1/3/23): Enlarged multinodular thyroid projecting into superior mediastinum w/ 1.8 x 1.5 cm solid nodule slightly inferiorly in the anterior mediastinum.   Thyroid US (1/4/23): Fine-needle aspiration recommended for 4.3 cm right thyroid and 2.6 cm left thyroid solid nodules per SCOTT guidelines.  - TSH 1.6 free T4 0.68. TSH could be inappropriately normal for low free T4 due to euthyroid sick syndrome.  - f/u TFTs after shock resolved  - plan for FNA biopsy of two nodules per endo recs; IR consulted; now scheduled for 2/1  - endocrinology following

## 2023-01-31 NOTE — PROGRESS NOTE ADULT - PROBLEM SELECTOR PLAN 6
Pt has been on HD for "years" 2/2 PCKD. AV Fistula of EDWIN clotted in 2014, has received HD thru HD cath since. New AV fistula placed 11/2022, not yet matured.   - f/u nephrology recs  - s/p HD 1/10, 1/13, 1/16, 1/18, 1/20, 1/23, 1/25, 1/27, 1/30    #Renal osteodystrophy   CT head (1/2/23): Increased density of the bone marrow is consistent with renal osteodystrophy. Per endocrine, extent of bone destruction extreme given PTH level, suspect might not be Brown tumors  - c/w sevelamer 1600 mg PO TID  - increased to Sensipar 90 mg PO daily

## 2023-01-31 NOTE — PROGRESS NOTE ADULT - ASSESSMENT
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      - Final breast pathology showing epidermal/dermal necrosis with fibrinoid occlusion and necrosis of blood vessels, focal necrotizing suppurative inflammation involving the fibroadipose tissue and blood vessels in the subcutis.     - F/u rheumatology recommendations for possible granulomatous mastitis or alternative cause of chronic inflammatory/hypcoagulable cause of skin findings.  - F/U Cx results (NGTD so far). ABx per primary team (vancomycin by trough).   - Dress right breast with Xeroform and gauze with paper tape. To be changed daily. Xeroform was not in place this morning.  - Wound care for access site ulceration as per vascular surgery.   - Surgery 5C following.

## 2023-01-31 NOTE — PROGRESS NOTE ADULT - PROBLEM SELECTOR PLAN 4
#RUE fluid collection/RUE edema:   DVT of RIJ, R subclavian and R axillary veins found on admission. Hx of L AVF clotting. Given hx of clots, current pruritis and gallium scan uptake in chest wall, reasonable to workup coagulopathy. OBDULIA, ANCA, complement, RF, beta2 glycoprotein, anticardiolipin wnl.  - CT (1/3/23): 7.0 cm fluid collection is present near AV fistula in the RUE. Possibly abscess. Drained by IR 1/5/23 with serosanguinous fluid: no organisms, few WBCs  - RUE dopplers (1/13/23): No significant change large complex fluid collection in proximal R arm, could represent old seroma/hematoma (however, prior ultrasound was done prior to drainage)   - Per Vascular: AV fistula unlikely to be infected; would not remove it; also would NOT recommend drainage of seroma.  - Per Wound Care, c/w BID dressing changes (once daily with Santyl DSD, once daily with Bactroban DSD). Apply light ace wrap compression to right hand and forearm to help with soft tissue swelling and elevate RUE with pillows      # DVT of left common femoral   *Acute DVT of left common femoral found on 1.28.23  - started full dose AC for acute DVT (heparin drip); Has required 3 transfusions this admission.   - monitor CBC closely q8hrs; Low threshold for escalating level of care should patient show signs of bleed, or hypotension    # IVC filter present   # Right leg edema   Right leg markedly more edematous than left   No DVT seen on LE dopplers on 1.28.23  - underwent CT venogram today, f/u results  - c/w heparin gtt

## 2023-01-31 NOTE — PROGRESS NOTE ADULT - SUBJECTIVE AND OBJECTIVE BOX
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      EVENTS:   - Biopsy taken uneventfully at bedside 1/9/23- final pathology report showing epidermal/dermal necrosis with fibrinoid occlusion and necrosis of blood vessels, focal necrotizing suppurative inflammation involving the fibroadipose tissue and blood vessels in the subcutis.   - Afebrile; stable.  - Last dose of vancomycin 1/21  - CT scan done yesterday to look for UE DVT (has LE DVT that is non-occlusive)    SUBJECTIVE:   - Notes she feels ok this morning but having mild bilateral breast discomfort  - NO worsening drainage   - Denies CP, SOB      MEDICATIONS  (STANDING):  albumin human 25% IVPB 50 milliLiter(s) IV Intermittent every 1 hour  artificial tears (preservative free) Ophthalmic Solution 1 Drop(s) Both EYES four times a day  atorvastatin 40 milliGRAM(s) Oral at bedtime  chlorhexidine 2% Cloths 1 Application(s) Topical <User Schedule>  chlorhexidine 2% Cloths 1 Application(s) Topical daily  cinacalcet 90 milliGRAM(s) Oral daily  collagenase Ointment 1 Application(s) Topical daily  fludroCORTISONE 0.2 milliGRAM(s) Oral every 24 hours  midodrine. 40 milliGRAM(s) Oral every 8 hours  Nephro-deborah 1 Tablet(s) Oral daily  petrolatum white Ointment 1 Application(s) Topical daily  polyethylene glycol 3350 17 Gram(s) Oral every 12 hours  senna 2 Tablet(s) Oral at bedtime  sevelamer carbonate 1600 milliGRAM(s) Oral three times a day with meals    MEDICATIONS  (PRN):  calamine/zinc oxide Lotion 1 Application(s) Topical three times a day PRN Itching  cyclobenzaprine 5 milliGRAM(s) Oral three times a day PRN Muscle Spasm  HYDROmorphone  Injectable 0.5 milliGRAM(s) IV Push every 3 hours PRN Moderate Pain (4 - 6)  HYDROmorphone  Injectable 1 milliGRAM(s) IV Push every 3 hours PRN Severe Pain (7 - 10)  ibuprofen  Tablet. 400 milliGRAM(s) Oral every 6 hours PRN Mild Pain (1 - 3)  lidocaine 4% Cream 1 Application(s) Topical three times a day PRN Breast pain  sodium chloride 0.9% lock flush 10 milliLiter(s) IV Push every 1 hour PRN Pre/post blood products, medications, blood draw, and to maintain line patency      Drug Dosing Weight  Height (cm): 177.8 (01 Jan 2023 20:52)  Weight (kg): 85.3 (01 Jan 2023 20:52)  BMI (kg/m2): 27 (01 Jan 2023 20:52)  BSA (m2): 2.03 (01 Jan 2023 20:52)    PAST MEDICAL & SURGICAL HISTORY:  HTN (hypertension)      HLD (hyperlipidemia)      CAD (coronary atherosclerotic disease)      ESRD on dialysis  last 11/15/22      H/O ventral hernia      Brown tumor  brain      DVT, lower extremity  left      History of surgery  IVC filter      AVF (arteriovenous fistula)  right left      S/P AIDEN-BSO  2003      History of surgery  RLE PTA stent / atherectomy  7/2021      History of atherectomy  stent          ICU Vital Signs Last 24 Hrs  T(C): 36.3 (31 Jan 2023 06:42), Max: 36.9 (30 Jan 2023 09:45)  T(F): 97.3 (31 Jan 2023 06:42), Max: 98.4 (30 Jan 2023 09:45)  HR: 62 (31 Jan 2023 06:42) (62 - 95)  BP: 109/62 (31 Jan 2023 06:42) (78/44 - 109/62)  BP(mean): --  ABP: --  ABP(mean): --  RR: 18 (31 Jan 2023 06:24) (17 - 22)  SpO2: 99% (31 Jan 2023 06:42) (94% - 100%)    O2 Parameters below as of 31 Jan 2023 06:24  Patient On (Oxygen Delivery Method): nasal cannula    O2 Concentration (%): 2              30 Jan 2023 07:01  -  31 Jan 2023 07:00  --------------------------------------------------------  IN:    Heparin: 64 mL    Other (mL): 400 mL  Total IN: 464 mL    OUT:    Other (mL): 1400 mL  Total OUT: 1400 mL    Total NET: -936 mL            Gen: Awake, alert, NAD, resting comfortably   Breast: non-soiled gauze overlying incisional bx site on R breast, mild induration surrounding bx site stable. B/l breasts soft with underlying firmness, mildly tender to palpation this morning. Fissures and maceration of skin stable.  CV: RRR  Pulm: no respiratory distress on RA  Abd: soft, ND, NTTP, no rebound or guarding   Ext: WWP, ulceration of medial aspect of R arm over access site. L chest HD catheter without edema/induration/purulence.     I&O's Detail  CBC Full  -  ( 30 Jan 2023 05:33 )  WBC Count : 10.75 K/uL  RBC Count : 3.26 M/uL  Hemoglobin : 8.8 g/dL  Hematocrit : 30.6 %  Platelet Count - Automated : 269 K/uL  Mean Cell Volume : 93.9 fl  Mean Cell Hemoglobin : 27.0 pg  Mean Cell Hemoglobin Concentration : 28.8 gm/dL  Auto Neutrophil # : x  Auto Lymphocyte # : x  Auto Monocyte # : x  Auto Eosinophil # : x  Auto Basophil # : x  Auto Neutrophil % : x  Auto Lymphocyte % : x  Auto Monocyte % : x  Auto Eosinophil % : x  Auto Basophil % : x    01-30    130<L>  |  91<L>  |  53<H>  ----------------------------<  91  4.9   |  27  |  6.90<H>    Ca    7.9<L>      30 Jan 2023 05:33    TPro  5.3<L>  /  Alb  2.4<L>  /  TBili  0.2  /  DBili  x   /  AST  22  /  ALT  20  /  AlkPhos  108  01-30    PT/INR - ( 30 Jan 2023 05:33 )   PT: 14.2 sec;   INR: 1.19          PTT - ( 30 Jan 2023 19:17 )  PTT:132.5 sec      RADIOLOGY & ADDITIONAL STUDIES:

## 2023-01-31 NOTE — PROGRESS NOTE ADULT - ASSESSMENT
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    Assessment/Plan:   #ESRD on HD   Schedule modified while in the hospital, will continue with MWF for now  Can consider Hd on 2/1 or to put back on schedule do 2/2  Renal diet    #Hx of Hypertension  Has had profound hypotension during this admission requiring pressors and now on florinef 0.2mg as well as midodrine 40mg q8h  -Gets midodrine 1/2 hr before HD  -Also started on florinef 0.2mg  -HD with colder dialysate and albumin PRN    #access   [facility] TDC c/d/i  RUE AVF not being used    #anemia  Hgb 8.8  -Epo w/ HD  -Transfusion goals as per primary team  -Iron profile noted    #renal bone disease   Noted to have brown tumors on most recent CT, in addition to a lytic lesion of her skull. Likely due to hyperparathyroidism. BX results per note with epidermal and dermal necrosis w/ fibrinoid occulusion and necrosis of blood vessels, focla necrotizing suppurative inflammation. 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   -Calcium wnl, trend phos daily  -c/w sevelamer to 1600mg tid w/ meals  -c/w sensipar 90mg Qday  -CT parathyroid performed does not give us best evaluation, may require nuclear scan for better characterization. Will likely benefit from parathyroidectomy

## 2023-01-31 NOTE — PROGRESS NOTE ADULT - SUBJECTIVE AND OBJECTIVE BOX
JEIMYSALOMON  67y, Female    Patient is a 67y old  Female who presents with a chief complaint of Missed dialysis, right breast pain (28 Jan 2023 22:11)      INTERVAL HPI/OVERNIGHT EVENTS:    ROS: otherwise negative      T(C): 36.3 (01-30-23 @ 20:13), Max: 36.9 (01-30-23 @ 09:45)  HR: 64 (01-31-23 @ 06:24) (64 - 95)  BP: 94/64 (01-31-23 @ 01:18) (78/44 - 94/64)  RR: 18 (01-31-23 @ 06:24) (17 - 22)  SpO2: 99% (01-31-23 @ 06:24) (94% - 100%)  Wt(kg): --Vital Signs Last 24 Hrs  T(C): 36.3 (30 Jan 2023 20:13), Max: 36.9 (30 Jan 2023 09:45)  T(F): 97.4 (30 Jan 2023 20:13), Max: 98.4 (30 Jan 2023 09:45)  HR: 64 (31 Jan 2023 06:24) (64 - 95)  BP: 94/64 (31 Jan 2023 01:18) (78/44 - 94/64)  BP(mean): --  RR: 18 (31 Jan 2023 06:24) (17 - 22)  SpO2: 99% (31 Jan 2023 06:24) (94% - 100%)    Parameters below as of 31 Jan 2023 06:24  Patient On (Oxygen Delivery Method): nasal cannula    O2 Concentration (%): 2    PHYSICAL EXAM:  General: obese female, NAD  Head: NC/AT; MMM; PERRL; EOMI  Neck: Supple  Respiratory: CTAB; no wheezes/rales/rhonchi  Cardiovascular: Regular rhythm/rate; S1/S2+, no murmurs, rubs gallops   Gastrointestinal: Soft; NTND; bowel sounds normal and present  Extremities: WWP; pitting edema of right ankle up to thigh, trace pitting edema left leg; cyanosis v discoloration of distal toes on b/l feet. 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, b/l breast; wounds present on b/l breast  Neurological: A&Ox3, strength intact, no obvious focal deficits    LABS:                        8.8    10.75 )-----------( 269      ( 30 Jan 2023 05:33 )             30.6     01-30    130<L>  |  91<L>  |  53<H>  ----------------------------<  91  4.9   |  27  |  6.90<H>    Ca    7.9<L>      30 Jan 2023 05:33    TPro  5.3<L>  /  Alb  2.4<L>  /  TBili  0.2  /  DBili  x   /  AST  22  /  ALT  20  /  AlkPhos  108  01-30      PT/INR - ( 30 Jan 2023 05:33 )   PT: 14.2 sec;   INR: 1.19          PTT - ( 30 Jan 2023 19:17 )  PTT:132.5 sec    CAPILLARY BLOOD GLUCOSE      POCT Blood Glucose.: 101 mg/dL (31 Jan 2023 06:29)  POCT Blood Glucose.: 127 mg/dL (30 Jan 2023 20:55)  POCT Blood Glucose.: 160 mg/dL (30 Jan 2023 12:04)  POCT Blood Glucose.: 140 mg/dL (30 Jan 2023 09:08)            MEDICATIONS  (STANDING):  albumin human 25% IVPB 50 milliLiter(s) IV Intermittent every 1 hour  artificial tears (preservative free) Ophthalmic Solution 1 Drop(s) Both EYES four times a day  atorvastatin 40 milliGRAM(s) Oral at bedtime  chlorhexidine 2% Cloths 1 Application(s) Topical daily  chlorhexidine 2% Cloths 1 Application(s) Topical <User Schedule>  cinacalcet 90 milliGRAM(s) Oral daily  collagenase Ointment 1 Application(s) Topical daily  fludroCORTISONE 0.2 milliGRAM(s) Oral every 24 hours  midodrine. 40 milliGRAM(s) Oral every 8 hours  Nephro-deborah 1 Tablet(s) Oral daily  petrolatum white Ointment 1 Application(s) Topical daily  polyethylene glycol 3350 17 Gram(s) Oral every 12 hours  senna 2 Tablet(s) Oral at bedtime  sevelamer carbonate 1600 milliGRAM(s) Oral three times a day with meals    MEDICATIONS  (PRN):  calamine/zinc oxide Lotion 1 Application(s) Topical three times a day PRN Itching  cyclobenzaprine 5 milliGRAM(s) Oral three times a day PRN Muscle Spasm  HYDROmorphone  Injectable 0.5 milliGRAM(s) IV Push every 3 hours PRN Moderate Pain (4 - 6)  HYDROmorphone  Injectable 1 milliGRAM(s) IV Push every 3 hours PRN Severe Pain (7 - 10)  ibuprofen  Tablet. 400 milliGRAM(s) Oral every 6 hours PRN Mild Pain (1 - 3)  lidocaine 4% Cream 1 Application(s) Topical three times a day PRN Breast pain  sodium chloride 0.9% lock flush 10 milliLiter(s) IV Push every 1 hour PRN Pre/post blood products, medications, blood draw, and to maintain line patency      RADIOLOGY & ADDITIONAL TESTS: Reviewed   SALOMON MUNSON  67y, Female    Patient is a 67y old  Female who presents with a chief complaint of Missed dialysis, right breast pain (28 Jan 2023 22:11)      INTERVAL HPI/OVERNIGHT EVENTS: O/N, patient received ofirmev and Dilaudid .25mg for pain. Heparin was discontinued at 5am ahead fo IR FNA. Patient seen and examined at bedside this morning.       ROS: otherwise negative      T(C): 36.3 (01-30-23 @ 20:13), Max: 36.9 (01-30-23 @ 09:45)  HR: 64 (01-31-23 @ 06:24) (64 - 95)  BP: 94/64 (01-31-23 @ 01:18) (78/44 - 94/64)  RR: 18 (01-31-23 @ 06:24) (17 - 22)  SpO2: 99% (01-31-23 @ 06:24) (94% - 100%)  Wt(kg): --Vital Signs Last 24 Hrs  T(C): 36.3 (30 Jan 2023 20:13), Max: 36.9 (30 Jan 2023 09:45)  T(F): 97.4 (30 Jan 2023 20:13), Max: 98.4 (30 Jan 2023 09:45)  HR: 64 (31 Jan 2023 06:24) (64 - 95)  BP: 94/64 (31 Jan 2023 01:18) (78/44 - 94/64)  BP(mean): --  RR: 18 (31 Jan 2023 06:24) (17 - 22)  SpO2: 99% (31 Jan 2023 06:24) (94% - 100%)    Parameters below as of 31 Jan 2023 06:24  Patient On (Oxygen Delivery Method): nasal cannula    O2 Concentration (%): 2    PHYSICAL EXAM:  General: obese female, NAD  Head: NC/AT; MMM; PERRL; EOMI  Neck: Supple  Respiratory: CTAB; no wheezes/rales/rhonchi  Cardiovascular: Regular rhythm/rate; S1/S2+, no murmurs, rubs gallops   Gastrointestinal: Soft; NTND; bowel sounds normal and present  Extremities: WWP; pitting edema of right ankle up to thigh, trace pitting edema left leg; cyanosis v discoloration of distal toes on b/l feet. 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, b/l breast; wounds present on b/l breast  Neurological: A&Ox3, strength intact, no obvious focal deficits    LABS:                        8.8    10.75 )-----------( 269      ( 30 Jan 2023 05:33 )             30.6     01-30    130<L>  |  91<L>  |  53<H>  ----------------------------<  91  4.9   |  27  |  6.90<H>    Ca    7.9<L>      30 Jan 2023 05:33    TPro  5.3<L>  /  Alb  2.4<L>  /  TBili  0.2  /  DBili  x   /  AST  22  /  ALT  20  /  AlkPhos  108  01-30      PT/INR - ( 30 Jan 2023 05:33 )   PT: 14.2 sec;   INR: 1.19          PTT - ( 30 Jan 2023 19:17 )  PTT:132.5 sec    CAPILLARY BLOOD GLUCOSE      POCT Blood Glucose.: 101 mg/dL (31 Jan 2023 06:29)  POCT Blood Glucose.: 127 mg/dL (30 Jan 2023 20:55)  POCT Blood Glucose.: 160 mg/dL (30 Jan 2023 12:04)  POCT Blood Glucose.: 140 mg/dL (30 Jan 2023 09:08)            MEDICATIONS  (STANDING):  albumin human 25% IVPB 50 milliLiter(s) IV Intermittent every 1 hour  artificial tears (preservative free) Ophthalmic Solution 1 Drop(s) Both EYES four times a day  atorvastatin 40 milliGRAM(s) Oral at bedtime  chlorhexidine 2% Cloths 1 Application(s) Topical daily  chlorhexidine 2% Cloths 1 Application(s) Topical <User Schedule>  cinacalcet 90 milliGRAM(s) Oral daily  collagenase Ointment 1 Application(s) Topical daily  fludroCORTISONE 0.2 milliGRAM(s) Oral every 24 hours  midodrine. 40 milliGRAM(s) Oral every 8 hours  Nephro-deborah 1 Tablet(s) Oral daily  petrolatum white Ointment 1 Application(s) Topical daily  polyethylene glycol 3350 17 Gram(s) Oral every 12 hours  senna 2 Tablet(s) Oral at bedtime  sevelamer carbonate 1600 milliGRAM(s) Oral three times a day with meals    MEDICATIONS  (PRN):  calamine/zinc oxide Lotion 1 Application(s) Topical three times a day PRN Itching  cyclobenzaprine 5 milliGRAM(s) Oral three times a day PRN Muscle Spasm  HYDROmorphone  Injectable 0.5 milliGRAM(s) IV Push every 3 hours PRN Moderate Pain (4 - 6)  HYDROmorphone  Injectable 1 milliGRAM(s) IV Push every 3 hours PRN Severe Pain (7 - 10)  ibuprofen  Tablet. 400 milliGRAM(s) Oral every 6 hours PRN Mild Pain (1 - 3)  lidocaine 4% Cream 1 Application(s) Topical three times a day PRN Breast pain  sodium chloride 0.9% lock flush 10 milliLiter(s) IV Push every 1 hour PRN Pre/post blood products, medications, blood draw, and to maintain line patency      RADIOLOGY & ADDITIONAL TESTS: Reviewed   JEIMYSALOMON  67y, Female    Patient is a 67y old  Female who presents with a chief complaint of Missed dialysis, right breast pain (28 Jan 2023 22:11)      INTERVAL HPI/OVERNIGHT EVENTS: O/N, patient received ofirmev and Dilaudid .25mg for pain. Heparin was discontinued at 5am ahead fo IR FNA. Patient seen and examined at bedside this morning. Denies pain this AM. Reports she is hungry.       ROS: otherwise negative      T(C): 36.3 (01-30-23 @ 20:13), Max: 36.9 (01-30-23 @ 09:45)  HR: 64 (01-31-23 @ 06:24) (64 - 95)  BP: 94/64 (01-31-23 @ 01:18) (78/44 - 94/64)  RR: 18 (01-31-23 @ 06:24) (17 - 22)  SpO2: 99% (01-31-23 @ 06:24) (94% - 100%)  Wt(kg): --Vital Signs Last 24 Hrs  T(C): 36.3 (30 Jan 2023 20:13), Max: 36.9 (30 Jan 2023 09:45)  T(F): 97.4 (30 Jan 2023 20:13), Max: 98.4 (30 Jan 2023 09:45)  HR: 64 (31 Jan 2023 06:24) (64 - 95)  BP: 94/64 (31 Jan 2023 01:18) (78/44 - 94/64)  BP(mean): --  RR: 18 (31 Jan 2023 06:24) (17 - 22)  SpO2: 99% (31 Jan 2023 06:24) (94% - 100%)    Parameters below as of 31 Jan 2023 06:24  Patient On (Oxygen Delivery Method): nasal cannula    O2 Concentration (%): 2    PHYSICAL EXAM:  General: obese female, NAD  Head: NC/AT; MMM; PERRL; EOMI  Neck: Supple  Respiratory: CTAB; no wheezes/rales/rhonchi  Cardiovascular: Regular rhythm/rate; S1/S2+, no murmurs, rubs gallops   Gastrointestinal: Soft; NTND; bowel sounds normal and present  Extremities: WWP; pitting edema of right ankle up to thigh, trace pitting edema left leg; cyanosis v discoloration of distal toes on b/l feet. 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, b/l breast; wounds present on b/l breast  Neurological: A&Ox3, strength intact, no obvious focal deficits    LABS:                        8.8    10.75 )-----------( 269      ( 30 Jan 2023 05:33 )             30.6     01-30    130<L>  |  91<L>  |  53<H>  ----------------------------<  91  4.9   |  27  |  6.90<H>    Ca    7.9<L>      30 Jan 2023 05:33    TPro  5.3<L>  /  Alb  2.4<L>  /  TBili  0.2  /  DBili  x   /  AST  22  /  ALT  20  /  AlkPhos  108  01-30      PT/INR - ( 30 Jan 2023 05:33 )   PT: 14.2 sec;   INR: 1.19          PTT - ( 30 Jan 2023 19:17 )  PTT:132.5 sec    CAPILLARY BLOOD GLUCOSE      POCT Blood Glucose.: 101 mg/dL (31 Jan 2023 06:29)  POCT Blood Glucose.: 127 mg/dL (30 Jan 2023 20:55)  POCT Blood Glucose.: 160 mg/dL (30 Jan 2023 12:04)  POCT Blood Glucose.: 140 mg/dL (30 Jan 2023 09:08)            MEDICATIONS  (STANDING):  albumin human 25% IVPB 50 milliLiter(s) IV Intermittent every 1 hour  artificial tears (preservative free) Ophthalmic Solution 1 Drop(s) Both EYES four times a day  atorvastatin 40 milliGRAM(s) Oral at bedtime  chlorhexidine 2% Cloths 1 Application(s) Topical daily  chlorhexidine 2% Cloths 1 Application(s) Topical <User Schedule>  cinacalcet 90 milliGRAM(s) Oral daily  collagenase Ointment 1 Application(s) Topical daily  fludroCORTISONE 0.2 milliGRAM(s) Oral every 24 hours  midodrine. 40 milliGRAM(s) Oral every 8 hours  Nephro-deborah 1 Tablet(s) Oral daily  petrolatum white Ointment 1 Application(s) Topical daily  polyethylene glycol 3350 17 Gram(s) Oral every 12 hours  senna 2 Tablet(s) Oral at bedtime  sevelamer carbonate 1600 milliGRAM(s) Oral three times a day with meals    MEDICATIONS  (PRN):  calamine/zinc oxide Lotion 1 Application(s) Topical three times a day PRN Itching  cyclobenzaprine 5 milliGRAM(s) Oral three times a day PRN Muscle Spasm  HYDROmorphone  Injectable 0.5 milliGRAM(s) IV Push every 3 hours PRN Moderate Pain (4 - 6)  HYDROmorphone  Injectable 1 milliGRAM(s) IV Push every 3 hours PRN Severe Pain (7 - 10)  ibuprofen  Tablet. 400 milliGRAM(s) Oral every 6 hours PRN Mild Pain (1 - 3)  lidocaine 4% Cream 1 Application(s) Topical three times a day PRN Breast pain  sodium chloride 0.9% lock flush 10 milliLiter(s) IV Push every 1 hour PRN Pre/post blood products, medications, blood draw, and to maintain line patency      RADIOLOGY & ADDITIONAL TESTS: Reviewed

## 2023-01-31 NOTE — PROGRESS NOTE ADULT - PROBLEM SELECTOR PLAN 3
Pt with recurrent fevers, most recently T 101.6 1/21. Unclear etiology. BCx 1/3, 1/9, 1/17 negative. s/p vancomycin x2 weeks (1/2-1/15). Per Surgery, R breast wound unlikely to be source of infection. Per Vascular Surgery, AV fistula fluid collection unlikely to be source of infection.   - PICC placed on 1/26 by IR (pt has poor veins with diffuse clots and does not have other access sites)  - monitor BCx from 1/21 -- NGTD   - monitor CBC and vitals  - check rectal temp if oral/axillary temp > 99     #RUE fluid collection/RUE edema:  - CT (1/3/23): 7.0 cm fluid collection is present near AV fistula in the RUE. Possibly abscess. Drained by IR 1/5/23 with serosanguinous fluid: no organisms, few WBCs  - RUE dopplers (1/13/23): No significant change large complex fluid collection in proximal R arm, could represent old seroma/hematoma (however, prior ultrasound was done prior to drainage)     Plan:   - Per Vascular: AV fistula unlikely to be infected; would not remove it; also would NOT recommend drainage of seroma.  - Per Wound Care, c/w BID dressing changes (once daily with Santyl DSD, once daily with Bactroban DSD). Apply light ace wrap compression to right hand and forearm to help with soft tissue swelling and elevate RUE with pillows Pt with recurrent fevers, most recently T 101.6 1/21. Unclear etiology. BCx 1/3, 1/9, 1/17 negative. s/p vancomycin x2 weeks (1/2-1/15). Per Surgery, R breast wound unlikely to be source of infection. Per Vascular Surgery, AV fistula fluid collection unlikely to be source of infection.   - PICC placed on 1/26 by IR (pt has poor veins with diffuse clots and does not have other access sites)  - monitor BCx from 1/21 -- NGTD   - monitor CBC and vitals  - check rectal temp if oral/axillary temp > 99     #RUE fluid collection/RUE edema:  - CT (1/3/23): 7.0 cm fluid collection is present near AV fistula in the RUE. Possibly abscess. Drained by IR 1/5/23 with serosanguinous fluid: no organisms, few WBCs  - RUE dopplers (1/13/23): No significant change large complex fluid collection in proximal R arm, could represent old seroma/hematoma (however, prior ultrasound was done prior to drainage)   - Per Vascular: AV fistula unlikely to be infected; would not remove it; also would NOT recommend drainage of seroma.  - Per Wound Care, c/w BID dressing changes (once daily with Santyl DSD, once daily with Bactroban DSD). Apply light ace wrap compression to right hand and forearm to help with soft tissue swelling and elevate RUE with pillows

## 2023-01-31 NOTE — PROGRESS NOTE ADULT - SUBJECTIVE AND OBJECTIVE BOX
--------------------------------------------------------------------------------  Chief Complaint: ESRD/Ongoing hemodialysis requirement    24 hour events/subjective:    Seen this AM, tolerated Hd on 1/31. CT scan performed however unable to assess parathyroid glands well. May require nuclear scan to further characterize. Spoke with pathology will try to arrange to look at slides with microscope.     PAST HISTORY  --------------------------------------------------------------------------------  No significant changes to PMH, PSH, FHx, SHx, unless otherwise noted    ALLERGIES & MEDICATIONS  --------------------------------------------------------------------------------  Allergies    Ancef (Rash; Urticaria)  DDAVP (Hypotension)  iodine (Hives; Pruritus)  penicillin (Swelling)  sulfa drugs (Angioedema)    Intolerances      Standing Inpatient Medications  albumin human 25% IVPB 50 milliLiter(s) IV Intermittent every 1 hour  artificial tears (preservative free) Ophthalmic Solution 1 Drop(s) Both EYES four times a day  atorvastatin 40 milliGRAM(s) Oral at bedtime  chlorhexidine 2% Cloths 1 Application(s) Topical daily  chlorhexidine 2% Cloths 1 Application(s) Topical <User Schedule>  cinacalcet 90 milliGRAM(s) Oral daily  collagenase Ointment 1 Application(s) Topical daily  fludroCORTISONE 0.2 milliGRAM(s) Oral every 24 hours  heparin  Infusion 1300 Unit(s)/Hr IV Continuous <Continuous>  lidocaine   4% Patch 2 Patch Transdermal daily  midodrine. 40 milliGRAM(s) Oral every 8 hours  Nephro-deborah 1 Tablet(s) Oral daily  petrolatum white Ointment 1 Application(s) Topical daily  polyethylene glycol 3350 17 Gram(s) Oral every 12 hours  senna 2 Tablet(s) Oral at bedtime  sevelamer carbonate 1600 milliGRAM(s) Oral three times a day with meals    PRN Inpatient Medications  calamine/zinc oxide Lotion 1 Application(s) Topical three times a day PRN  cyclobenzaprine 5 milliGRAM(s) Oral three times a day PRN  HYDROmorphone  Injectable 0.5 milliGRAM(s) IV Push every 3 hours PRN  HYDROmorphone  Injectable 1 milliGRAM(s) IV Push every 3 hours PRN  ibuprofen  Tablet. 400 milliGRAM(s) Oral every 6 hours PRN  lidocaine 4% Cream 1 Application(s) Topical three times a day PRN  sodium chloride 0.9% lock flush 10 milliLiter(s) IV Push every 1 hour PRN      REVIEW OF SYSTEMS  --------------------------------------------------------------------------------  All other systems were reviewed and are negative, except as noted.    VITALS/PHYSICAL EXAM  --------------------------------------------------------------------------------  T(C): 36.4 (01-31-23 @ 08:51), Max: 36.9 (01-30-23 @ 16:10)  HR: 61 (01-31-23 @ 08:51) (61 - 79)  BP: 85/50 (01-31-23 @ 08:51) (78/44 - 109/62)  RR: 16 (01-31-23 @ 08:51) (16 - 22)  SpO2: 99% (01-31-23 @ 08:51) (95% - 100%)  Wt(kg): --  Drug Dosing Weight  Height (cm): 177.8 (01 Jan 2023 20:52)  Weight (kg): 85.3 (01 Jan 2023 20:52)  BMI (kg/m2): 27 (01 Jan 2023 20:52)  BSA (m2): 2.03 (01 Jan 2023 20:52)        01-30-23 @ 07:01  -  01-31-23 @ 07:00  --------------------------------------------------------  IN: 464 mL / OUT: 1400 mL / NET: -936 mL    PHYSICAL EXAM:  GENERAL: NAD resting in bed   CHEST/LUNG: Clear to auscultation bilaterally; no accessory muscle use   HEART: normal S1S2, RRR  ABDOMEN: Soft, Nontender, +BS,   EXTREMITIES: No clubbing, cyanosis, or edema   ACCESS: R [facility]    LABS/STUDIES  --------------------------------------------------------------------------------              8.8    10.75 >-----------<  269      [01-30-23 @ 05:33]              30.6     130  |  91  |  53  ----------------------------<  91      [01-30-23 @ 05:33]  4.9   |  27  |  6.90        Ca     7.9     [01-30-23 @ 05:33]    TPro  5.3  /  Alb  2.4  /  TBili  0.2  /  DBili  x   /  AST  22  /  ALT  20  /  AlkPhos  108  [01-30-23 @ 05:33]    PT/INR: PT 14.2 , INR 1.19       [01-30-23 @ 05:33]  PTT: 132.5      [01-30-23 @ 19:17]      Iron 15, TIBC 79, %sat 19      [01-07-23 @ 06:01]  Ferritin 1209      [01-07-23 @ 06:01]  PTH -- (Ca 8.3)      [01-24-23 @ 06:47]   492  PTH -- (Ca 8.5)      [01-18-23 @ 05:30]   351  PTH -- (Ca 7.6)      [01-11-23 @ 04:35]   532  PTH -- (Ca 8.8)      [01-03-23 @ 14:24]   479  Vitamin D (25OH) 38.5      [01-05-23 @ 05:30]  TSH 1.520      [01-24-23 @ 06:47]    HBsAb Nonreact      [01-02-23 @ 02:26]  HBsAg Nonreact      [01-30-23 @ 18:15]  HCV 0.04, Nonreact      [01-30-23 @ 18:15]      RADIOLOGY:  --------------------------------------------------------------------------------------

## 2023-01-31 NOTE — PROGRESS NOTE ADULT - SUBJECTIVE AND OBJECTIVE BOX
Subjective: Pt seen and examined bedside by vascular team this AM. Pt has no complaints this AM. Pt denies pain to RUE or RLE this AM. Pts RUE dressing is C/D/I.     ROS:   Denies Headache, blurred vision, Chest Pain, SOB, Abdominal pain, nausea or vomiting     Social   midodrine. 40      Allergies    Ancef (Rash; Urticaria)  DDAVP (Hypotension)  iodine (Hives; Pruritus)  penicillin (Swelling)  sulfa drugs (Angioedema)    Intolerances        Vital Signs Last 24 Hrs  T(C): 36.3 (31 Jan 2023 06:42), Max: 36.9 (30 Jan 2023 09:45)  T(F): 97.3 (31 Jan 2023 06:42), Max: 98.4 (30 Jan 2023 09:45)  HR: 62 (31 Jan 2023 06:42) (62 - 95)  BP: 109/62 (31 Jan 2023 06:42) (78/44 - 109/62)  BP(mean): --  RR: 18 (31 Jan 2023 06:24) (17 - 22)  SpO2: 99% (31 Jan 2023 06:42) (94% - 100%)    Parameters below as of 31 Jan 2023 06:24  Patient On (Oxygen Delivery Method): nasal cannula    O2 Concentration (%): 2  I&O's Summary    30 Jan 2023 07:01  -  31 Jan 2023 07:00  --------------------------------------------------------  IN: 464 mL / OUT: 1400 mL / NET: -936 mL        Physical Exam:  General: NAD, resting comfortably in bed  C/V: S1 s2, RRR  Pulm: Nonlabored breathing, no respiratory distress  Abd: Soft, NTND  Extrem: WWP, right medial upper arm wound with clean/pink base, no obvious signs of infection, rewrapped with kerlix and ace         LABS:                        8.8    10.75 )-----------( 269      ( 30 Jan 2023 05:33 )             30.6     01-30    130<L>  |  91<L>  |  53<H>  ----------------------------<  91  4.9   |  27  |  6.90<H>    Ca    7.9<L>      30 Jan 2023 05:33    TPro  5.3<L>  /  Alb  2.4<L>  /  TBili  0.2  /  DBili  x   /  AST  22  /  ALT  20  /  AlkPhos  108  01-30    PT/INR - ( 30 Jan 2023 05:33 )   PT: 14.2 sec;   INR: 1.19          PTT - ( 30 Jan 2023 19:17 )  PTT:132.5 sec

## 2023-01-31 NOTE — PROGRESS NOTE ADULT - ASSESSMENT
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 During imaging, CT showing 7.0 cm fluid collection is present near an AV fistula in the right upper arm. The differential for this collection included benign post-operative hematoma or seroma, but also abscess or vascular device infection, particularly in the setting of septic shock. Vascular consulted for RUE fistula evaluation to rule out this area as a source of sepsis. Reassuring physical exam at initial evaluation without clear indicia of infection related to the graph, small superficial surgical incision dehiscence notwithstanding. Now s/p IR image guided aspiration of perigraft fluid (negative) and gadolinium scan (negative). At this point, do not suspect a graft infection as the provoking factor leading to the patient's presentation. Swelling in area of RUE fistula is likely a sterile seroma with no intervention needed.    Recommendations:    -Wound care recs: BID dressing changes (once daily with Santyl DSD once daily with Bactroban DSD). Apply light ace wrap compression to right hand and forearm to help with soft tissue swelling and elevate RUE with pillows  -Rest of care per primary team  -Vascular surgery Team 3C will continue to follow. Please call [phone] with any questions or concerns.

## 2023-01-31 NOTE — PROGRESS NOTE ADULT - ATTENDING COMMENTS
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 Pt s/p vancomycin x2 wks (last day 1/15) for presumed breast cellulitis. fistula as source ruled out. gallium scan was positive only for uptake on right ant chest wall. US of that area was negative for abscess. Endocrinology was consulted for elevated PTH and multinodular thyroid. Work-up was suggestive of secondary hyperparathyroidism but despite increasing doses of the medication, she has continued to have elevated levels of PTH. 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 thyroid ultrasound showed multiple nodules meeting criteria for fine-needle aspiration. Pt was transferred to [facility] when BP improved with midodrine for FNA and placement. Acute DVT in left common femoral vein found on Dopplers on 1.28.2023, trialing full dose AC with heparin drip, watching CBC closely (q8) for signs of bleed.   Planned for FNA biopsy on tuesday   Planned for premedication for CT IV contrast  - get CT venogram (to evaluate IVC filter) and 4dCT of parathyroid after pre-medication 1/30.    # Leukocytosis - received high dose methylpred for premedication on Saturday. Likely demargination-> now trending down. , watch for fevers. Check rectal temp if oral or axillary temp >99F.      # DVT  Known DVT of RIJ, R subclavian and R axillary veins. Hx of L AVF clotting.  Acute DVT of left common femoral vein seen on dopplers on 1.28.23, started on heparin drip; housestaff discussed risks of full dose AC, indication for AC for acute clot; patient understands risk and wishes to proceed with AC  CBC closely,  q8 hrs (Has required 3 prBC transfusions this admission)    # Right leg edema >Left   # IVC filter present (placed after 2018 PE)   No DVT in right leg on 1.28.23 Dopplers  Starting full AC for left common femoral vein acute DVT ; on heparin drip   RLE swelling improving now       #Multinodular goiter  TSH 1.52. Free T4 0.96   CT showed enlarged multinodular thyroid projecting into superior mediastinum with 1.8 x 1.5 cm solid nodule slightly inferiorly in the anterior mediastinum. Thyroid ultrasound showed a moderately enlarged gland with heterogeneous echotexture. There were 3 nodules in the R lobe (largest 3.3 cm), one in the left lobe (2.3 cm).   per Endo: Both of these nodules warrant biopsy--the right lobe lesion because of size and its shape (taller > wide), and the left lobe nodule based on size. Fine-needle aspiration recommended for 4.3 cm right thyroid and 2.6 cm left thyroid solid nodules per SCOTT guidelines  [ ] Planned for thyroid FNA on tuesday by IR(currently on heparin drip)   4D CT scan unclear- recommended nuclear scan   further plan will be assessed based on FNA -> parathyroidectomy w/wo thyroidectomy     #renal bone disease   #calciphylaxis   #2ndary hyperparathyroidism   brown tumors on recent CT  c/w sevelamer to 1600mg tid w/ meals and sensipar 90mg Qday  PTH remains elevated-> pending FNA thyroid  renal and endo following       #ESRD on HD MWF here, outpt T/TH/S  [facility], RUE AVF not being used    #hypotension  profound hypotension during this admission requiring pressors and now on florinef 0.2mg as well as midodrine 40mg tid.   Gets midodrine 1/2 hr before HD    #Chronic HFrEF (heart failure with reduced ejection fraction).   TTE 11/22 normal LV and systolic function, EF 59%, grade II diastolic dysfunction, dilated RV, reduced RV systolic function  not able to be started on GDMT 2/2 hypotension   recent Echo 1/3 with severe LVOT->fu  repeat echo     #CAD  Hx of cardiac cath in 2018. Home meds: atorvastatin 40mg, plavix 75mg qd.  c/w home atorvastatin 40mg  Plavix 75mg qd being held for IR procedure     plan  vascular ok with BP measurements from LUE as long as distal to the PICC line  Have nursing tag left leg, right leg and right arm as "no blood pressure cuff" limbs.

## 2023-01-31 NOTE — PROGRESS NOTE ADULT - PROBLEM SELECTOR PLAN 8
Hgb has downtrended from 9.8 on admission to 6.9 on 1/9/23 requiring transfusion. Baseline from Nov 2022 appears to be around 13. During 1U pRBC tranfusion on 1/9/23, pt had concern for febrile nonhemolytic transfusion reaction (3 hrs into transfusion, developed tachycardia to 150s, HTN to 140s/90s, rigors, T 101.3 rectal). Symptoms resolved after stopping transfusion and giving tylenol. Pt received additional 1U pRBC transfusions on 1/11 (Hgb 6.9 -> 7.9) and 1/19 (Hgb 6.8 -> 8.1) without transfusion reaction.  - Iron studies: Low total iron (15), low TIBC (79), elevated ferritin (1209)  - Monitor CBC   - Transfuse for Hgb < 7 negative...

## 2023-02-01 NOTE — PROGRESS NOTE ADULT - ASSESSMENT
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 During imaging, CT showing 7.0 cm fluid collection is present near an AV fistula in the right upper arm. The differential for this collection included benign post-operative hematoma or seroma, but also abscess or vascular device infection, particularly in the setting of septic shock. Vascular consulted for RUE fistula evaluation to rule out this area as a source of sepsis. Reassuring physical exam at initial evaluation without clear indicia of infection related to the graph, small superficial surgical incision dehiscence notwithstanding. Now s/p IR image guided aspiration of perigraft fluid (negative) and gadolinium scan (negative). At this point, do not suspect a graft infection as the provoking factor leading to the patient's presentation. Swelling in area of RUE fistula is likely a sterile seroma with no intervention needed. CT venogram showed no evidence of central venous thrombosis.    -Wound care recs: BID dressing changes (once daily with Santyl DSD once daily with Bactroban DSD). Apply light ace wrap compression to right hand and forearm to help with soft tissue swelling and elevate RUE with pillows  -Rest of care per primary team  -Vascular surgery Team 3C will continue to follow. Please call [phone] with any questions or concerns.

## 2023-02-01 NOTE — CONSULT NOTE ADULT - CONSULT REQUESTED BY NAME
Dr. Reza
Dr. Reza
MICU
Jose
Dr Reza
Dr. Diggs
ICU
MICU
Dr. Reza
Medicine
[place]
Dr. Reza
MICU
Dr. Elizondo

## 2023-02-01 NOTE — PROGRESS NOTE ADULT - PROBLEM SELECTOR PLAN 4
#RUE fluid collection/RUE edema:   DVT of RIJ, R subclavian and R axillary veins found on admission. Hx of L AVF clotting. Given hx of clots, current pruritis and gallium scan uptake in chest wall, reasonable to workup coagulopathy. OBDULIA, ANCA, complement, RF, beta2 glycoprotein, anticardiolipin wnl.  - CT (1/3/23): 7.0 cm fluid collection is present near AV fistula in the RUE. Possibly abscess. Drained by IR 1/5/23 with serosanguinous fluid: no organisms, few WBCs  - RUE dopplers (1/13/23): No significant change large complex fluid collection in proximal R arm, could represent old seroma/hematoma (however, prior ultrasound was done prior to drainage)   - Per Vascular: AV fistula unlikely to be infected; would not remove it; also would NOT recommend drainage of seroma.  - Per Wound Care, c/w BID dressing changes (once daily with Santyl DSD, once daily with Bactroban DSD). Apply light ace wrap compression to right hand and forearm to help with soft tissue swelling and elevate RUE with pillows      # DVT of left common femoral   *Acute DVT of left common femoral found on 1.28.23  - started full dose AC for acute DVT (heparin drip); Has required 3 transfusions this admission.   - monitor CBC closely q8hrs; Low threshold for escalating level of care should patient show signs of bleed, or hypotension    # IVC filter present   # Right leg edema   Right leg markedly more edematous than left   No DVT seen on LE dopplers on 1.28.23  - underwent CT venogram today, f/u results  - c/w heparin gtt #RUE fluid collection/RUE edema:   DVT of RIJ, R subclavian and R axillary veins found on admission. Hx of L AVF clotting. Given hx of clots, current pruritis and gallium scan uptake in chest wall, reasonable to workup coagulopathy. OBDULIA, ANCA, complement, RF, beta2 glycoprotein, anticardiolipin wnl.  - CT (1/3/23): 7.0 cm fluid collection is present near AV fistula in the RUE. Possibly abscess. Drained by IR 1/5/23 with serosanguinous fluid: no organisms, few WBCs  - RUE dopplers (1/13/23): No significant change large complex fluid collection in proximal R arm, could represent old seroma/hematoma (however, prior ultrasound was done prior to drainage)   - Per Vascular: AV fistula unlikely to be infected; would not remove it; also would NOT recommend drainage of seroma.  - Per Wound Care, c/w BID dressing changes (once daily with Santyl DSD, once daily with Bactroban DSD). Apply light ace wrap compression to right hand and forearm to help with soft tissue swelling and elevate RUE with pillows      # DVT of left common femoral   *Acute DVT of left common femoral found on 1.28.23  - started full dose AC for acute DVT (heparin drip); Has required 3 transfusions this admission.   - monitor CBC closely q8hrs; Low threshold for escalating level of care should patient show signs of bleed, or hypotension    # IVC filter present   # Right leg edema   Right leg markedly more edematous than left   No DVT seen on LE dopplers on 1.28.23  - underwent CT venogram: no filling defects  - c/w heparin gtt

## 2023-02-01 NOTE — PROGRESS NOTE ADULT - SUBJECTIVE AND OBJECTIVE BOX
SUBJECTIVE:  Doing well this AM. NAEON. Denies n/t/w. Denies arm or hand pain or worsening swelling. FROM.    MEDICATIONS  (STANDING):  albumin human 25% IVPB 50 milliLiter(s) IV Intermittent every 1 hour  artificial tears (preservative free) Ophthalmic Solution 1 Drop(s) Both EYES four times a day  atorvastatin 40 milliGRAM(s) Oral at bedtime  chlorhexidine 2% Cloths 1 Application(s) Topical <User Schedule>  chlorhexidine 2% Cloths 1 Application(s) Topical daily  cinacalcet 90 milliGRAM(s) Oral daily  collagenase Ointment 1 Application(s) Topical daily  fludroCORTISONE 0.2 milliGRAM(s) Oral every 24 hours  heparin  Infusion. 1100 Unit(s)/Hr (11 mL/Hr) IV Continuous <Continuous>  lidocaine   4% Patch 2 Patch Transdermal daily  midodrine. 40 milliGRAM(s) Oral every 8 hours  Nephro-deborah 1 Tablet(s) Oral daily  petrolatum white Ointment 1 Application(s) Topical daily  polyethylene glycol 3350 17 Gram(s) Oral every 12 hours  senna 2 Tablet(s) Oral at bedtime  sevelamer carbonate 1600 milliGRAM(s) Oral three times a day with meals    MEDICATIONS  (PRN):  calamine/zinc oxide Lotion 1 Application(s) Topical three times a day PRN Itching  cyclobenzaprine 5 milliGRAM(s) Oral three times a day PRN Muscle Spasm  HYDROmorphone  Injectable 0.5 milliGRAM(s) IV Push every 3 hours PRN Moderate Pain (4 - 6)  HYDROmorphone  Injectable 1 milliGRAM(s) IV Push every 3 hours PRN Severe Pain (7 - 10)  lidocaine 4% Cream 1 Application(s) Topical three times a day PRN Breast pain  sodium chloride 0.9% lock flush 10 milliLiter(s) IV Push every 1 hour PRN Pre/post blood products, medications, blood draw, and to maintain line patency      Vital Signs Last 24 Hrs  T(C): 36.4 (01 Feb 2023 09:00), Max: 36.7 (31 Jan 2023 20:44)  T(F): 97.5 (01 Feb 2023 09:00), Max: 98 (31 Jan 2023 20:44)  HR: 68 (01 Feb 2023 09:00) (68 - 89)  BP: 65/50 (01 Feb 2023 09:00) (65/50 - 82/54)  BP(mean): --  RR: 18 (01 Feb 2023 09:00) (18 - 18)  SpO2: 100% (01 Feb 2023 09:00) (96% - 100%)    Parameters below as of 01 Feb 2023 09:00  Patient On (Oxygen Delivery Method): room air        Physical Exam:  General: NAD, resting comfortably in bed  Pulmonary: Nonlabored breathing, no respiratory distress  Cardiovascular: NSR  Abdominal: soft, NT/ND  Extremities: WWP, normal strength, R upper arm w/ moderate soft tissue swelling and clean based ulcer w/o signs of infection, improved R forearm swelling, Kerlix and ACE wrap  Neuro: A/O x 3, CNs II-XII grossly intact, no focal deficits    I&O's Summary      LABS:    01-31    133<L>  |  92<L>  |  49<H>  ----------------------------<  90  4.3   |  29  |  5.83<H>    Ca    8.1<L>      31 Jan 2023 21:23      PTT - ( 31 Jan 2023 21:23 )  PTT:102.3 sec    CAPILLARY BLOOD GLUCOSE      POCT Blood Glucose.: 105 mg/dL (31 Jan 2023 17:53)  POCT Blood Glucose.: 89 mg/dL (31 Jan 2023 12:15)        RADIOLOGY & ADDITIONAL STUDIES:

## 2023-02-01 NOTE — PROGRESS NOTE ADULT - PROBLEM SELECTOR PLAN 3
Pt with recurrent fevers, most recently T 101.6 1/21. Unclear etiology. BCx 1/3, 1/9, 1/17 negative. s/p vancomycin x2 weeks (1/2-1/15). Per Surgery, R breast wound unlikely to be source of infection. Per Vascular Surgery, AV fistula fluid collection unlikely to be source of infection.   - PICC placed on 1/26 by IR (pt has poor veins with diffuse clots and does not have other access sites)  - monitor BCx from 1/21 -- NGTD   - monitor CBC and vitals  - check rectal temp if oral/axillary temp > 99     #RUE fluid collection/RUE edema:  - CT (1/3/23): 7.0 cm fluid collection is present near AV fistula in the RUE. Possibly abscess. Drained by IR 1/5/23 with serosanguinous fluid: no organisms, few WBCs  - RUE dopplers (1/13/23): No significant change large complex fluid collection in proximal R arm, could represent old seroma/hematoma (however, prior ultrasound was done prior to drainage)   - Per Vascular: AV fistula unlikely to be infected; would not remove it; also would NOT recommend drainage of seroma.  - Per Wound Care, c/w BID dressing changes (once daily with Santyl DSD, once daily with Bactroban DSD). Apply light ace wrap compression to right hand and forearm to help with soft tissue swelling and elevate RUE with pillows Pt with recurrent fevers, most recently T 101.6 1/21. Unclear etiology. BCx 1/3, 1/9, 1/17 negative. s/p vancomycin x2 weeks (1/2-1/15). Per Surgery, R breast wound unlikely to be source of infection. Per Vascular Surgery, AV fistula fluid collection unlikely to be source of infection.   - PICC placed on 1/26 by IR (pt has poor veins with diffuse clots and does not have other access sites)  - BCx from 1/21 -- NG  - monitor CBC and vitals  - check rectal temp if oral/axillary temp > 99     #RUE fluid collection/RUE edema:  - CT (1/3/23): 7.0 cm fluid collection is present near AV fistula in the RUE. Possibly abscess. Drained by IR 1/5/23 with serosanguinous fluid: no organisms, few WBCs  - RUE dopplers (1/13/23): No significant change large complex fluid collection in proximal R arm, could represent old seroma/hematoma (however, prior ultrasound was done prior to drainage)   - Per Vascular: AV fistula unlikely to be infected; would not remove it; also would NOT recommend drainage of seroma.  - Per Wound Care, c/w BID dressing changes (once daily with Santyl DSD, once daily with Bactroban DSD). Apply light ace wrap compression to right hand and forearm to help with soft tissue swelling and elevate RUE with pillows

## 2023-02-01 NOTE — PROGRESS NOTE ADULT - PROBLEM SELECTOR PLAN 7
At risk for secondary HPTT d/t renal failure. Pt has vague abdominal pain w/ nausea. Concern for possible brown tumors on CT with multiple fractures of pubic rami, pubic bone, thoracolumbar spine. Intact , Vit D 38.5. SPEP/immunofixation negative. Per Endo, likely secondary hyperparathyroidism vs other process causing osteoclastic tumors.  - c/w Sensipar 90 mg PO daily   - 1/11 AM  intact (baseline PTH on Sensipar)  - CT did not visualize parathyroids well, will consider parathyroidectomy pending endo recs    #Multinodular Goiter  CT (1/3/23): Enlarged multinodular thyroid projecting into superior mediastinum w/ 1.8 x 1.5 cm solid nodule slightly inferiorly in the anterior mediastinum.   Thyroid US (1/4/23): Fine-needle aspiration recommended for 4.3 cm right thyroid and 2.6 cm left thyroid solid nodules per SCOTT guidelines.  - TSH 1.6 free T4 0.68. TSH could be inappropriately normal for low free T4 due to euthyroid sick syndrome.  - f/u TFTs after shock resolved  - plan for FNA biopsy of two nodules per endo recs; IR consulted; now scheduled for 2/1  - endocrinology following At risk for secondary HPTT d/t renal failure. Pt has vague abdominal pain w/ nausea. Concern for possible brown tumors on CT with multiple fractures of pubic rami, pubic bone, thoracolumbar spine. Intact , Vit D 38.5. SPEP/immunofixation negative. Per Endo, likely secondary hyperparathyroidism vs other process causing osteoclastic tumors.  - c/w Sensipar 90 mg PO daily   - 1/11 AM  intact (baseline PTH on Sensipar)  - CT did not visualize parathyroids well, will consider parathyroidectomy pending endo recs    #Multinodular Goiter  CT (1/3/23): Enlarged multinodular thyroid projecting into superior mediastinum w/ 1.8 x 1.5 cm solid nodule slightly inferiorly in the anterior mediastinum.   Thyroid US (1/4/23): Fine-needle aspiration recommended for 4.3 cm right thyroid and 2.6 cm left thyroid solid nodules per SCOTT guidelines.  - TSH 1.6 free T4 0.68. TSH could be inappropriately normal for low free T4 due to euthyroid sick syndrome.  - f/u TFTs after shock resolved  - plan for FNA biopsy of two nodules per endo recs; IR consulted; completed today  - endocrinology following

## 2023-02-01 NOTE — CONSULT NOTE ADULT - PROVIDER SPECIALTY LIST ADULT
Gyn Onc
Heme/Onc
Intervent Radiology
Palliative Care
Critical Care
Nephrology
GYN
Endocrinology
Pain Medicine
Cardiology
Surgery
Critical Care
ENT
Infectious Disease
Vascular Surgery
Intervent Radiology

## 2023-02-01 NOTE — PROGRESS NOTE ADULT - SUBJECTIVE AND OBJECTIVE BOX
SUBJECTIVE: Patient had FNA today of her thyroid nodules. Procedure went well without any complications. Patient denies pain.         REVIEW OF SYSTEMS  Constitutional:  Negative fever, chills or loss of appetite.  Eyes:  Negative blurry vision or double vision.  Cardiovascular:  Negative for chest pain or palpitations.  Respiratory:  Negative for cough, wheezing, or shortness of breath.    Gastrointestinal:  Negative for nausea, vomiting, diarrhea, constipation, or abdominal pain.  Genitourinary:  Negative frequency, urgency or dysuria.  Neurologic:  No headache, confusion, dizziness, lightheadedness.    PHYSICAL EXAM  Vital Signs Last 24 Hrs  T(C): 36.6 (01 Feb 2023 15:56), Max: 36.7 (31 Jan 2023 20:44)  T(F): 97.8 (01 Feb 2023 15:56), Max: 98 (31 Jan 2023 20:44)  HR: 81 (01 Feb 2023 15:56) (68 - 89)  BP: 78/50 (01 Feb 2023 15:56) (65/50 - 82/54)  BP(mean): --  RR: 16 (01 Feb 2023 15:56) (16 - 18)  SpO2: 96% (01 Feb 2023 15:56) (96% - 100%)    Parameters below as of 01 Feb 2023 15:56  Patient On (Oxygen Delivery Method): room air        Constitutional: Awake, alert, in no acute distress.   HEENT: Normocephalic, atraumatic, ZOHAIB, no proptosis or lid retraction.   Neck: supple, no acanthosis, no thyromegaly or palpable thyroid nodules.  Respiratory: Lungs clear to ausculation bilaterally.   Cardiovascular: regular rhythm, normal S1 and S2, no audible murmurs.   GI: soft, non-tender, non-distended, bowel sounds present, no masses appreciated.  Extremities: No lower extremity edema, peripheral pulses present.   Skin: no rashes.   Psychiatric: AAO x 3. Normal affect/mood.     LABS  CBC - WBC/HGB/HTC/PLT: 10.75/8.8/30.6/269 (01-30-23)  BMP - Na/K/Cl/Bicarb/BUN/Cr/Gluc/AG/eGFR: 133/4.3/92/29/49/5.83/90/12/8 (01-31-23)  Ca - 8.1 (01-31-23)  Phos - -- (01-31-23)  Mg - -- (01-31-23)  LFT - Alb/Tprot/Tbili/Dbili/AlkPhos/ALT/AST: 2.4/--/0.2/--/108/20/22 (01-30-23)  PT/aPTT/INR: --/102.3/-- (01-31-23)   Thyroid Stimulating Hormone, Serum: 1.520 (01-24-23)  Total T4/Free T4: --/0.965 (01-24-23)    MEDICATIONS  MEDICATIONS  (STANDING):  albumin human 25% IVPB 50 milliLiter(s) IV Intermittent every 1 hour  artificial tears (preservative free) Ophthalmic Solution 1 Drop(s) Both EYES four times a day  atorvastatin 40 milliGRAM(s) Oral at bedtime  chlorhexidine 2% Cloths 1 Application(s) Topical <User Schedule>  chlorhexidine 2% Cloths 1 Application(s) Topical daily  cinacalcet 90 milliGRAM(s) Oral daily  collagenase Ointment 1 Application(s) Topical daily  fludroCORTISONE 0.2 milliGRAM(s) Oral every 24 hours  heparin  Infusion. 1100 Unit(s)/Hr (11 mL/Hr) IV Continuous <Continuous>  lidocaine   4% Patch 2 Patch Transdermal daily  midodrine. 40 milliGRAM(s) Oral every 8 hours  Nephro-deborah 1 Tablet(s) Oral daily  petrolatum white Ointment 1 Application(s) Topical daily  polyethylene glycol 3350 17 Gram(s) Oral every 12 hours  senna 2 Tablet(s) Oral at bedtime  sevelamer carbonate 1600 milliGRAM(s) Oral three times a day with meals    MEDICATIONS  (PRN):  calamine/zinc oxide Lotion 1 Application(s) Topical three times a day PRN Itching  cyclobenzaprine 5 milliGRAM(s) Oral three times a day PRN Muscle Spasm  HYDROmorphone   Tablet 2 milliGRAM(s) Oral every 3 hours PRN Moderate Pain (4 - 6)  HYDROmorphone   Tablet 4 milliGRAM(s) Oral every 3 hours PRN Severe Pain (7 - 10)  lidocaine 4% Cream 1 Application(s) Topical three times a day PRN Breast pain  sodium chloride 0.9% lock flush 10 milliLiter(s) IV Push every 1 hour PRN Pre/post blood products, medications, blood draw, and to maintain line patency             SUBJECTIVE: Patient had FNA today of her thyroid nodules. Procedure went well without any complications. Patient denies pain.         REVIEW OF SYSTEMS  Constitutional:  Negative fever, chills or loss of appetite.  Eyes:  Negative blurry vision or double vision.  Cardiovascular:  Negative for chest pain or palpitations.  Respiratory:  Negative for cough, wheezing, or shortness of breath.    Gastrointestinal:  Negative for nausea, vomiting, diarrhea, constipation, or abdominal pain.  Genitourinary:  Negative frequency, urgency or dysuria.  Neurologic:  No headache, confusion, dizziness, lightheadedness.    PHYSICAL EXAM  Vital Signs Last 24 Hrs  T(C): 36.6 (01 Feb 2023 15:56), Max: 36.7 (31 Jan 2023 20:44)  T(F): 97.8 (01 Feb 2023 15:56), Max: 98 (31 Jan 2023 20:44)  HR: 81 (01 Feb 2023 15:56) (68 - 89)  BP: 78/50 (01 Feb 2023 15:56) (65/50 - 82/54)  BP(mean): --  RR: 16 (01 Feb 2023 15:56) (16 - 18)  SpO2: 96% (01 Feb 2023 15:56) (96% - 100%)    Parameters below as of 01 Feb 2023 15:56  Patient On (Oxygen Delivery Method): room air        Constitutional: Awake, alert, obese female, in no acute distress.   HEENT: Bandages noted s/p FNA  Respiratory: Lungs clear to ausculation bilaterally.   Cardiovascular: regular rhythm, normal S1 and S2, no audible murmurs.   GI: soft, non-tender, non-distended, bowel sounds present.  Extremities: No lower extremity edema. Right edematous arm wrapped around ACE bandage.   Psychiatric: AAO x 3.      LABS  CBC - WBC/HGB/HTC/PLT: 10.75/8.8/30.6/269 (01-30-23)  BMP - Na/K/Cl/Bicarb/BUN/Cr/Gluc/AG/eGFR: 133/4.3/92/29/49/5.83/90/12/8 (01-31-23)  Ca - 8.1 (01-31-23)  Phos - -- (01-31-23)  Mg - -- (01-31-23)  LFT - Alb/Tprot/Tbili/Dbili/AlkPhos/ALT/AST: 2.4/--/0.2/--/108/20/22 (01-30-23)  PT/aPTT/INR: --/102.3/-- (01-31-23)   Thyroid Stimulating Hormone, Serum: 1.520 (01-24-23)  Total T4/Free T4: --/0.965 (01-24-23)    MEDICATIONS  MEDICATIONS  (STANDING):  albumin human 25% IVPB 50 milliLiter(s) IV Intermittent every 1 hour  artificial tears (preservative free) Ophthalmic Solution 1 Drop(s) Both EYES four times a day  atorvastatin 40 milliGRAM(s) Oral at bedtime  chlorhexidine 2% Cloths 1 Application(s) Topical <User Schedule>  chlorhexidine 2% Cloths 1 Application(s) Topical daily  cinacalcet 90 milliGRAM(s) Oral daily  collagenase Ointment 1 Application(s) Topical daily  fludroCORTISONE 0.2 milliGRAM(s) Oral every 24 hours  heparin  Infusion. 1100 Unit(s)/Hr (11 mL/Hr) IV Continuous <Continuous>  lidocaine   4% Patch 2 Patch Transdermal daily  midodrine. 40 milliGRAM(s) Oral every 8 hours  Nephro-deborah 1 Tablet(s) Oral daily  petrolatum white Ointment 1 Application(s) Topical daily  polyethylene glycol 3350 17 Gram(s) Oral every 12 hours  senna 2 Tablet(s) Oral at bedtime  sevelamer carbonate 1600 milliGRAM(s) Oral three times a day with meals    MEDICATIONS  (PRN):  calamine/zinc oxide Lotion 1 Application(s) Topical three times a day PRN Itching  cyclobenzaprine 5 milliGRAM(s) Oral three times a day PRN Muscle Spasm  HYDROmorphone   Tablet 2 milliGRAM(s) Oral every 3 hours PRN Moderate Pain (4 - 6)  HYDROmorphone   Tablet 4 milliGRAM(s) Oral every 3 hours PRN Severe Pain (7 - 10)  lidocaine 4% Cream 1 Application(s) Topical three times a day PRN Breast pain  sodium chloride 0.9% lock flush 10 milliLiter(s) IV Push every 1 hour PRN Pre/post blood products, medications, blood draw, and to maintain line patency

## 2023-02-01 NOTE — CONSULT NOTE ADULT - ASSESSMENT
67yoF with complicated medical history including hyperparathyroidism in setting of prolonged renal failure.  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  She also has a multinodular goiter for which the largest nodule was biopsied on 2/1.  - Please expedite pathology results for thyroid nodule biopsy as results will dictate surgical planning  - Please obtain a sestamibi scan  - Rest of medical management per primary team  - Please page ENT with any questions or concerns  - Case discussed and imaging reviewed with Dr. Choe

## 2023-02-01 NOTE — PROGRESS NOTE ADULT - SUBJECTIVE AND OBJECTIVE BOX
LACY MUNSONSMIN  67y, Female    Patient is a 67y old  Female who presents with a chief complaint of emergent HD (31 Jan 2023 06:34)      INTERVAL HPI/OVERNIGHT EVENTS:    ROS: otherwise negative      T(C): 36.3 (02-01-23 @ 05:27), Max: 36.7 (01-31-23 @ 20:44)  HR: 86 (02-01-23 @ 05:27) (69 - 89)  BP: 82/54 (02-01-23 @ 05:27) (69/52 - 82/54)  RR: 18 (02-01-23 @ 05:27) (18 - 18)  SpO2: 99% (02-01-23 @ 05:27) (96% - 99%)  Wt(kg): --Vital Signs Last 24 Hrs  T(C): 36.3 (01 Feb 2023 05:27), Max: 36.7 (31 Jan 2023 20:44)  T(F): 97.4 (01 Feb 2023 05:27), Max: 98 (31 Jan 2023 20:44)  HR: 86 (01 Feb 2023 05:27) (69 - 89)  BP: 82/54 (01 Feb 2023 05:27) (69/52 - 82/54)  BP(mean): --  RR: 18 (01 Feb 2023 05:27) (18 - 18)  SpO2: 99% (01 Feb 2023 05:27) (96% - 99%)    Parameters below as of 01 Feb 2023 05:27  Patient On (Oxygen Delivery Method): nasal cannula        PHYSICAL EXAM:  Constitutional: resting comfortably in bed; NAD  Head: NC/AT  Eyes: PERRL, EOMI, anicteric sclera  ENT: no nasal discharge; MMM  Neck: supple; no JVD or thyromegaly  Respiratory: CTA B/L; no W/R/R, no retractions  Cardiac: +S1/S2; RRR; no M/R/G; PMI non-displaced  Gastrointestinal: soft, NT/ND; no rebound or guarding; +BSx4  Back: spine midline, no bony tenderness or step-offs; no CVAT B/L  Extremities: WWP, no clubbing or cyanosis; no peripheral edema. Capillary refill <2 sec  Musculoskeletal: NROM x4; no joint swelling, tenderness or erythema  Vascular: 2+ radial, DP/PT pulses B/L  Dermatologic: skin warm, dry and intact; no rashes, wounds, or scars  Lymphatic: no submandibular or cervical LAD  Neurologic: AAOx3; CNII-XII grossly intact; no focal deficits  Psychiatric: affect and characteristics of appearance, verbalizations, behaviors are appropriate    LABS:    01-31    133<L>  |  92<L>  |  49<H>  ----------------------------<  90  4.3   |  29  |  5.83<H>    Ca    8.1<L>      31 Jan 2023 21:23        PTT - ( 31 Jan 2023 21:23 )  PTT:102.3 sec    CAPILLARY BLOOD GLUCOSE      POCT Blood Glucose.: 105 mg/dL (31 Jan 2023 17:53)  POCT Blood Glucose.: 89 mg/dL (31 Jan 2023 12:15)            MEDICATIONS  (STANDING):  albumin human 25% IVPB 50 milliLiter(s) IV Intermittent every 1 hour  artificial tears (preservative free) Ophthalmic Solution 1 Drop(s) Both EYES four times a day  atorvastatin 40 milliGRAM(s) Oral at bedtime  chlorhexidine 2% Cloths 1 Application(s) Topical <User Schedule>  chlorhexidine 2% Cloths 1 Application(s) Topical daily  cinacalcet 90 milliGRAM(s) Oral daily  collagenase Ointment 1 Application(s) Topical daily  fludroCORTISONE 0.2 milliGRAM(s) Oral every 24 hours  heparin  Infusion. 1100 Unit(s)/Hr (11 mL/Hr) IV Continuous <Continuous>  lidocaine   4% Patch 2 Patch Transdermal daily  midodrine. 40 milliGRAM(s) Oral every 8 hours  Nephro-deborah 1 Tablet(s) Oral daily  petrolatum white Ointment 1 Application(s) Topical daily  polyethylene glycol 3350 17 Gram(s) Oral every 12 hours  senna 2 Tablet(s) Oral at bedtime  sevelamer carbonate 1600 milliGRAM(s) Oral three times a day with meals    MEDICATIONS  (PRN):  calamine/zinc oxide Lotion 1 Application(s) Topical three times a day PRN Itching  cyclobenzaprine 5 milliGRAM(s) Oral three times a day PRN Muscle Spasm  HYDROmorphone  Injectable 0.5 milliGRAM(s) IV Push every 3 hours PRN Moderate Pain (4 - 6)  HYDROmorphone  Injectable 1 milliGRAM(s) IV Push every 3 hours PRN Severe Pain (7 - 10)  ibuprofen  Tablet. 400 milliGRAM(s) Oral every 6 hours PRN Mild Pain (1 - 3)  lidocaine 4% Cream 1 Application(s) Topical three times a day PRN Breast pain  sodium chloride 0.9% lock flush 10 milliLiter(s) IV Push every 1 hour PRN Pre/post blood products, medications, blood draw, and to maintain line patency      RADIOLOGY & ADDITIONAL TESTS: Reviewed   JEIMYSALOMON  67y, Female    Patient is a 67y old  Female who presents with a chief complaint of emergent HD (31 Jan 2023 06:34)      INTERVAL HPI/OVERNIGHT EVENTS:    ROS: otherwise negative      T(C): 36.3 (02-01-23 @ 05:27), Max: 36.7 (01-31-23 @ 20:44)  HR: 86 (02-01-23 @ 05:27) (69 - 89)  BP: 82/54 (02-01-23 @ 05:27) (69/52 - 82/54)  RR: 18 (02-01-23 @ 05:27) (18 - 18)  SpO2: 99% (02-01-23 @ 05:27) (96% - 99%)  Wt(kg): --Vital Signs Last 24 Hrs  T(C): 36.3 (01 Feb 2023 05:27), Max: 36.7 (31 Jan 2023 20:44)  T(F): 97.4 (01 Feb 2023 05:27), Max: 98 (31 Jan 2023 20:44)  HR: 86 (01 Feb 2023 05:27) (69 - 89)  BP: 82/54 (01 Feb 2023 05:27) (69/52 - 82/54)  BP(mean): --  RR: 18 (01 Feb 2023 05:27) (18 - 18)  SpO2: 99% (01 Feb 2023 05:27) (96% - 99%)    Parameters below as of 01 Feb 2023 05:27  Patient On (Oxygen Delivery Method): nasal cannula        PHYSICAL EXAM:  General: obese female, NAD  Head: NC/AT; MMM; PERRL; EOMI  Neck: Supple  Respiratory: CTAB; no wheezes/rales/rhonchi  Cardiovascular: Regular rhythm/rate; S1/S2+, no murmurs, rubs gallops   Gastrointestinal: Soft; NTND; bowel sounds normal and present  Extremities: WWP; pitting edema of right ankle up to thigh, trace pitting edema left leg; cyanosis v discoloration of distal toes on b/l feet. 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, b/l breast; wounds present on b/l breast  Neurological: A&Ox3, strength intact, no obvious focal deficits    LABS:    01-31    133<L>  |  92<L>  |  49<H>  ----------------------------<  90  4.3   |  29  |  5.83<H>    Ca    8.1<L>      31 Jan 2023 21:23        PTT - ( 31 Jan 2023 21:23 )  PTT:102.3 sec    CAPILLARY BLOOD GLUCOSE      POCT Blood Glucose.: 105 mg/dL (31 Jan 2023 17:53)  POCT Blood Glucose.: 89 mg/dL (31 Jan 2023 12:15)            MEDICATIONS  (STANDING):  albumin human 25% IVPB 50 milliLiter(s) IV Intermittent every 1 hour  artificial tears (preservative free) Ophthalmic Solution 1 Drop(s) Both EYES four times a day  atorvastatin 40 milliGRAM(s) Oral at bedtime  chlorhexidine 2% Cloths 1 Application(s) Topical <User Schedule>  chlorhexidine 2% Cloths 1 Application(s) Topical daily  cinacalcet 90 milliGRAM(s) Oral daily  collagenase Ointment 1 Application(s) Topical daily  fludroCORTISONE 0.2 milliGRAM(s) Oral every 24 hours  heparin  Infusion. 1100 Unit(s)/Hr (11 mL/Hr) IV Continuous <Continuous>  lidocaine   4% Patch 2 Patch Transdermal daily  midodrine. 40 milliGRAM(s) Oral every 8 hours  Nephro-deborah 1 Tablet(s) Oral daily  petrolatum white Ointment 1 Application(s) Topical daily  polyethylene glycol 3350 17 Gram(s) Oral every 12 hours  senna 2 Tablet(s) Oral at bedtime  sevelamer carbonate 1600 milliGRAM(s) Oral three times a day with meals    MEDICATIONS  (PRN):  calamine/zinc oxide Lotion 1 Application(s) Topical three times a day PRN Itching  cyclobenzaprine 5 milliGRAM(s) Oral three times a day PRN Muscle Spasm  HYDROmorphone  Injectable 0.5 milliGRAM(s) IV Push every 3 hours PRN Moderate Pain (4 - 6)  HYDROmorphone  Injectable 1 milliGRAM(s) IV Push every 3 hours PRN Severe Pain (7 - 10)  ibuprofen  Tablet. 400 milliGRAM(s) Oral every 6 hours PRN Mild Pain (1 - 3)  lidocaine 4% Cream 1 Application(s) Topical three times a day PRN Breast pain  sodium chloride 0.9% lock flush 10 milliLiter(s) IV Push every 1 hour PRN Pre/post blood products, medications, blood draw, and to maintain line patency      RADIOLOGY & ADDITIONAL TESTS: Reviewed   JEIMYSALOMON  67y, Female    Patient is a 67y old  Female who presents with a chief complaint of emergent HD (31 Jan 2023 06:34)      INTERVAL HPI/OVERNIGHT EVENTS: SHAISTA overnight. Patient seen and examined at bedside. Reports continued breast pain. Improves slightly with lidocaine cream but requesting dilaudid for further pain relief.     ROS: otherwise negative      T(C): 36.3 (02-01-23 @ 05:27), Max: 36.7 (01-31-23 @ 20:44)  HR: 86 (02-01-23 @ 05:27) (69 - 89)  BP: 82/54 (02-01-23 @ 05:27) (69/52 - 82/54)  RR: 18 (02-01-23 @ 05:27) (18 - 18)  SpO2: 99% (02-01-23 @ 05:27) (96% - 99%)  Wt(kg): --Vital Signs Last 24 Hrs  T(C): 36.3 (01 Feb 2023 05:27), Max: 36.7 (31 Jan 2023 20:44)  T(F): 97.4 (01 Feb 2023 05:27), Max: 98 (31 Jan 2023 20:44)  HR: 86 (01 Feb 2023 05:27) (69 - 89)  BP: 82/54 (01 Feb 2023 05:27) (69/52 - 82/54)  BP(mean): --  RR: 18 (01 Feb 2023 05:27) (18 - 18)  SpO2: 99% (01 Feb 2023 05:27) (96% - 99%)    Parameters below as of 01 Feb 2023 05:27  Patient On (Oxygen Delivery Method): nasal cannula        PHYSICAL EXAM:  General: obese female, NAD  Head: NC/AT; MMM; PERRL; EOMI  Neck: Supple  Respiratory: CTAB; no wheezes/rales/rhonchi  Cardiovascular: Regular rhythm/rate; S1/S2+, no murmurs, rubs gallops   Gastrointestinal: Soft; NTND; bowel sounds normal and present  Extremities: WWP; pitting edema of right ankle up to thigh, trace pitting edema left leg; cyanosis v discoloration of distal toes on b/l feet. 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, b/l breast; wounds present on b/l breast  Neurological: A&Ox3, strength intact, no obvious focal deficits    LABS:    01-31    133<L>  |  92<L>  |  49<H>  ----------------------------<  90  4.3   |  29  |  5.83<H>    Ca    8.1<L>      31 Jan 2023 21:23        PTT - ( 31 Jan 2023 21:23 )  PTT:102.3 sec    CAPILLARY BLOOD GLUCOSE      POCT Blood Glucose.: 105 mg/dL (31 Jan 2023 17:53)  POCT Blood Glucose.: 89 mg/dL (31 Jan 2023 12:15)            MEDICATIONS  (STANDING):  albumin human 25% IVPB 50 milliLiter(s) IV Intermittent every 1 hour  artificial tears (preservative free) Ophthalmic Solution 1 Drop(s) Both EYES four times a day  atorvastatin 40 milliGRAM(s) Oral at bedtime  chlorhexidine 2% Cloths 1 Application(s) Topical <User Schedule>  chlorhexidine 2% Cloths 1 Application(s) Topical daily  cinacalcet 90 milliGRAM(s) Oral daily  collagenase Ointment 1 Application(s) Topical daily  fludroCORTISONE 0.2 milliGRAM(s) Oral every 24 hours  heparin  Infusion. 1100 Unit(s)/Hr (11 mL/Hr) IV Continuous <Continuous>  lidocaine   4% Patch 2 Patch Transdermal daily  midodrine. 40 milliGRAM(s) Oral every 8 hours  Nephro-deborah 1 Tablet(s) Oral daily  petrolatum white Ointment 1 Application(s) Topical daily  polyethylene glycol 3350 17 Gram(s) Oral every 12 hours  senna 2 Tablet(s) Oral at bedtime  sevelamer carbonate 1600 milliGRAM(s) Oral three times a day with meals    MEDICATIONS  (PRN):  calamine/zinc oxide Lotion 1 Application(s) Topical three times a day PRN Itching  cyclobenzaprine 5 milliGRAM(s) Oral three times a day PRN Muscle Spasm  HYDROmorphone  Injectable 0.5 milliGRAM(s) IV Push every 3 hours PRN Moderate Pain (4 - 6)  HYDROmorphone  Injectable 1 milliGRAM(s) IV Push every 3 hours PRN Severe Pain (7 - 10)  ibuprofen  Tablet. 400 milliGRAM(s) Oral every 6 hours PRN Mild Pain (1 - 3)  lidocaine 4% Cream 1 Application(s) Topical three times a day PRN Breast pain  sodium chloride 0.9% lock flush 10 milliLiter(s) IV Push every 1 hour PRN Pre/post blood products, medications, blood draw, and to maintain line patency      RADIOLOGY & ADDITIONAL TESTS: Reviewed

## 2023-02-01 NOTE — PROGRESS NOTE ADULT - PROBLEM SELECTOR PLAN 1
-  (1/24) <- 351 (1/18) <- 532 (1/11) <- 479 (1/3) [Started Sensipar on 1/9/23, increased to 60 mg on 1/19/23, then to 90 mg on 1/25/23]   - Corrected calcium is 9.7 mg/dL today  - Continue with Sensipar 90 mg daily.   - Need to repeat PTH level   - 4DCT showing that An 8 mm right upper parathyroid gland may be visible but not conclusively.    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

## 2023-02-01 NOTE — PROGRESS NOTE ADULT - SUBJECTIVE AND OBJECTIVE BOX
STATUS POST:  incisional biopsy 1/9     - Biopsy taken uneventfully at bedside 1/9/23- final pathology report showing epidermal/dermal necrosis with fibrinoid occlusion and necrosis of blood vessels, focal necrotizing suppurative inflammation involving the fibroadipose tissue and blood vessels in the subcutis.   - Afebrile; stable.  - Last dose of vancomycin 1/21    SUBJECTIVE: Patient seen and examined bedside. Patient resting comfortably with minimal b/l breast discomfort currently well controlled. Drainage controlled but continued. Tolerating PO diet. No acute complaints.    heparin  Infusion. 1100 Unit(s)/Hr IV Continuous <Continuous>  midodrine. 40 milliGRAM(s) Oral every 8 hours      Vital Signs Last 24 Hrs  T(C): 36.4 (01 Feb 2023 09:00), Max: 36.7 (31 Jan 2023 20:44)  T(F): 97.5 (01 Feb 2023 09:00), Max: 98 (31 Jan 2023 20:44)  HR: 68 (01 Feb 2023 09:00) (68 - 89)  BP: 65/50 (01 Feb 2023 09:00) (65/50 - 82/54)  BP(mean): --  RR: 18 (01 Feb 2023 09:00) (18 - 18)  SpO2: 100% (01 Feb 2023 09:00) (96% - 100%)    Parameters below as of 01 Feb 2023 09:00  Patient On (Oxygen Delivery Method): room air      I&O's Detail    Gen: Awake, alert, NAD, resting comfortably   Breast: non-soiled gauze overlying incisional bx site on R breast, mild induration surrounding bx site stable. B/l breasts soft with underlying firmness, mildly tender to palpation this morning. Fissures and maceration of skin stable.  CV: RRR  Pulm: no respiratory distress on RA  Abd: soft, ND, NTTP, no rebound or guarding   Ext: WWP, ulceration of medial aspect of R arm over access site. L chest HD catheter without edema/induration/purulence.       LABS:    01-31    133<L>  |  92<L>  |  49<H>  ----------------------------<  90  4.3   |  29  |  5.83<H>    Ca    8.1<L>      31 Jan 2023 21:23      PTT - ( 31 Jan 2023 21:23 )  PTT:102.3 sec      RADIOLOGY & ADDITIONAL STUDIES:

## 2023-02-01 NOTE — CONSULT NOTE ADULT - CONSULT REASON
Blood pressure monitoring and assessment for risk of bleeding
hyperkalemia
cystic ovarian mass
hyperparathyroidism
ovarian mass
Persistent shock - assistance with w/u and management
breast pain
hyperparathyoidism, evaluation for parathyroidectomy
r/o inflammatory breast cancer
request for RUE collection drainage
GOC
LVOT obstruction
Multiple DVT
RUE fistula
Urgent HD
request for PICC vs tunneled CVC catheter placement

## 2023-02-01 NOTE — PROGRESS NOTE ADULT - PROBLEM SELECTOR PLAN 1
Family hx of breast cancer in mother, sister. Physical exam on admission: large R breast mass, palpable L breast mass, R breast has peau d'orange appearance with associated erythema, warmth on R. US bilateral breasts: No sonographic evidence of breast abscess, cyst or focal mass to correspond with the clinical finding of bilateral breast masses. Breast bx: Epidermal and dermal necrosis with fibrinoid occlusion and necrosis of blood vessels, focal necrotizing suppurative inflammation involving the fibroadipose tissue and blood vessels in the subcutis    Plan:  Pt continues to report pain in bilateral breasts, worst on R. Pain responsive to Dilaudid but effect wears off after ~2-3 hours. Was briefly on PCA (1/16-1/17), now dc'ed  - Dosing per pain management recs   - c/w Flexeril 5mg PO TID prn for spasms (hold parameters added)  - c/w Dilaudid 0.5 mg IV q3h PRN for moderate pain -- HOLD IF RR<10, SpO2 <93%, SBP <96  - c/w Dilaudid 1 mg IV q3h PRN for severe pain -- HOLD IF RR<10, SpO2 <93%, SBP <96  - plan to transition to PO dilaudid when able to consistently tolerate PO    #Peeling epidermis to B/l breasts  Derm consulted, recommend wedge biopsy of both normal and pathologic tissue. Coagulopathic changes may be secondary to infection. however other causes of hypercoagulability should be explored  - per surgery, no surgical interventions  - c/w daily dressing changes, f/u Wound Care recs Family hx of breast cancer in mother, sister. Physical exam on admission: large R breast mass, palpable L breast mass, R breast has peau d'orange appearance with associated erythema, warmth on R. US bilateral breasts: No sonographic evidence of breast abscess, cyst or focal mass to correspond with the clinical finding of bilateral breast masses. Breast bx: Epidermal and dermal necrosis with fibrinoid occlusion and necrosis of blood vessels, focal necrotizing suppurative inflammation involving the fibroadipose tissue and blood vessels in the subcutis    Plan:  Pt continues to report pain in bilateral breasts, worst on R. Pain responsive to Dilaudid but effect wears off after ~2-3 hours. Was briefly on PCA (1/16-1/17), now dc'ed  - c/w Flexeril 5mg PO TID prn for spasms (hold parameters added)  - start Dilaudid 2mg PO q3h prn for moderate pain -- HOLD IF RR<10, SpO2 <93%, SBP <96  - start Dilaudid 4mg PO q3h prn for severe pain -- HOLD IF RR<10, SpO2 <93%, SBP <96    #Peeling epidermis to B/l breasts  Derm consulted, recommend wedge biopsy of both normal and pathologic tissue. Coagulopathic changes may be secondary to infection. however other causes of hypercoagulability should be explored  - per surgery, no surgical interventions  - c/w daily dressing changes with Xeroform per surgery

## 2023-02-01 NOTE — CONSULT NOTE ADULT - CONSULT REQUESTED DATE/TIME
01-Feb-2023 20:16
04-Jan-2023 14:53
04-Jan-2023
05-Jan-2023 15:30
02-Jan-2023 23:00
04-Jan-2023 13:26
12-Jan-2023 11:20
13-Jan-2023 10:00
02-Jan-2023
03-Jan-2023 14:20
10-Faustino-2023 17:23
04-Jan-2023
05-Jan-2023 08:06
13-Jan-2023 13:32
26-Jan-2023 11:20
28-Jan-2023 19:16

## 2023-02-01 NOTE — PROGRESS NOTE ADULT - PROBLEM SELECTOR PLAN 2
TSH 1.52. Free T4 0.96 (1/24/23).   - CT showed enlarged multinodular thyroid projecting into superior mediastinum with 1.8 x 1.5 cm solid nodule slightly inferiorly in the anterior mediastinum.   - Thyroid ultrasound showed a moderately enlarged gland with heterogeneous echotexture. There were 3 nodules in the R lobe (largest 3.3 cm), one in the left lobe (2.3 cm). Both of these nodules warrant biopsy--the right lobe lesion because of size and its shape (taller > wide), and the left lobe nodule based on size. Fine-needle aspiration recommended for 4.3 cm right thyroid and 2.6 cm left thyroid solid nodules per SCOTT guidelines.  - FNA done today, await cytology report  - Medical clearance for likely surgical intervention should be obtained       case discussed with Dr. Noy Draper    Endocrinology Fellow    Service Pager: [phone]

## 2023-02-01 NOTE — PROGRESS NOTE ADULT - ATTENDING COMMENTS
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 Pt s/p vancomycin x2 wks (last day 1/15) for presumed breast cellulitis. fistula as source ruled out. gallium scan was positive only for uptake on right ant chest wall. US of that area was negative for abscess. Endocrinology was consulted for elevated PTH and multinodular thyroid. Work-up was suggestive of secondary hyperparathyroidism but despite increasing doses of the medication, she has continued to have elevated levels of PTH. 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 thyroid ultrasound showed multiple nodules meeting criteria for fine-needle aspiration. Pt was transferred to [facility] when BP improved with midodrine for FNA and placement. Acute DVT in left common femoral vein found on Dopplers on 1.28.2023, trialing full dose AC with heparin drip, watching CBC closely (q8) for signs of bleed.   Planned for FNA biopsy on tuesday   Planned for premedication for CT IV contrast  - get CT venogram (to evaluate IVC filter) and 4dCT of parathyroid after pre-medication 1/30.    # Leukocytosis - received high dose methylpred for premedication on Saturday. Likely demargination-> now trending down. , watch for fevers. Check rectal temp if oral or axillary temp >99F.      # DVT  Known DVT of RIJ, R subclavian and R axillary veins. Hx of L AVF clotting.  Acute DVT of left common femoral vein seen on dopplers on 1.28.23, started on heparin drip; housestaff discussed risks of full dose AC, indication for AC for acute clot; patient understands risk and wishes to proceed with AC  CBC closely,  q8 hrs (Has required 3 prBC transfusions this admission)    # Right leg edema >Left   # IVC filter present (placed after 2018 PE)   No DVT in right leg on 1.28.23 Dopplers  Starting full AC for left common femoral vein acute DVT ; on heparin drip will resume after IR procedure   RLE swelling improving now       #Multinodular goiter  TSH 1.52. Free T4 0.96   CT showed enlarged multinodular thyroid projecting into superior mediastinum with 1.8 x 1.5 cm solid nodule slightly inferiorly in the anterior mediastinum. Thyroid ultrasound showed a moderately enlarged gland with heterogeneous echotexture. There were 3 nodules in the R lobe (largest 3.3 cm), one in the left lobe (2.3 cm).   per Endo: Both of these nodules warrant biopsy--the right lobe lesion because of size and its shape (taller > wide), and the left lobe nodule based on size. Fine-needle aspiration recommended for 4.3 cm right thyroid and 2.6 cm left thyroid solid nodules per SCOTT guidelines  [ ] Planned for thyroid FNA today by IR(currently on heparin drip)   4D CT scan unclear- recommended nuclear scan   further plan will be assessed based on FNA -> parathyroidectomy w/wo thyroidectomy     #renal bone disease   #calciphylaxis   #2ndary hyperparathyroidism   brown tumors on recent CT  c/w sevelamer to 1600mg tid w/ meals and sensipar 90mg Qday  PTH remains elevated-> pending FNA thyroid  renal and endo following       #ESRD on HD MWF here, outpt T/TH/S  [facility] TDC, RUE AVF not being used    #hypotension  profound hypotension during this admission requiring pressors and now on florinef 0.2mg as well as midodrine 40mg tid.   Gets midodrine 1/2 hr before HD    #Chronic HFrEF (heart failure with reduced ejection fraction).   TTE 11/22 normal LV and systolic function, EF 59%, grade II diastolic dysfunction, dilated RV, reduced RV systolic function  not able to be started on GDMT 2/2 hypotension   recent Echo 1/3 with severe LVOT->fu  repeat echo     #CAD  Hx of cardiac cath in 2018. Home meds: atorvastatin 40mg, plavix 75mg qd.  c/w home atorvastatin 40mg  Plavix 75mg qd being held for IR procedure     plan  vascular ok with BP measurements from LUE as long as distal to the PICC line  Have nursing tag left leg, right leg and right arm as "no blood pressure cuff" limbs.

## 2023-02-01 NOTE — PROGRESS NOTE ADULT - ASSESSMENT
Patient is a 68 yo F pt with PMH HFrEF (EF 40%), nonobstructive CAD, NICM, HTN, HLD, ESRD 2/2 PCKD on HD, PE (2018) s/p IVC filter, mild AS, mild MR, PAD, OA, h/o thrombus in vascular access x3 (R AVG, R AVF, L AVG), ventral hernia, brown tumor in skull, multiple fractures (spine, pubic rami) presents with weakness and generalized malaise for 1 week for whom surgery was consulted for severe right breast pain now S/P incisional biopsy 1/9/2023.      - Final breast pathology showing epidermal/dermal necrosis with fibrinoid occlusion and necrosis of blood vessels, focal necrotizing suppurative inflammation involving the fibroadipose tissue and blood vessels in the subcutis.     INCOMPLETE PENDING DISCUSSION WITH ATTENDING PHYSICIAN    - F/u rheumatology recommendations for possible granulomatous mastitis or alternative cause of chronic inflammatory/hypcoagulable cause of skin findings.  - F/U Cx results (NGTD so far). ABx per primary team (vancomycin by trough).   - Dress right breast with Xeroform and gauze with paper tape. To be changed daily. Xeroform was not in place this morning.  - Wound care for access site ulceration as per vascular surgery.   - Surgery 5C following.    Patient is a 68 yo F pt with PMH HFrEF (EF 40%), nonobstructive CAD, NICM, HTN, HLD, ESRD 2/2 PCKD on HD, PE (2018) s/p IVC filter, mild AS, mild MR, PAD, OA, h/o thrombus in vascular access x3 (R AVG, R AVF, L AVG), ventral hernia, brown tumor in skull, multiple fractures (spine, pubic rami) presents with weakness and generalized malaise for 1 week for whom surgery was consulted for severe right breast pain now S/P incisional biopsy 1/9/2023.      - Final breast pathology showing epidermal/dermal necrosis with fibrinoid occlusion and necrosis of blood vessels, focal necrotizing suppurative inflammation involving the fibroadipose tissue and blood vessels in the subcutis.     INCOMPLETE PENDING DISCUSSION WITH ATTENDING PHYSICIAN    - Dress right breast with Xeroform and gauze with paper tape. To be changed daily. Xeroform was not in place this morning.  - Wound care for access site ulceration as per vascular surgery.   - Surgery 5C following.    Patient is a 68 yo F pt with PMH HFrEF (EF 40%), nonobstructive CAD, NICM, HTN, HLD, ESRD 2/2 PCKD on HD, PE (2018) s/p IVC filter, mild AS, mild MR, PAD, OA, h/o thrombus in vascular access x3 (R AVG, R AVF, L AVG), ventral hernia, brown tumor in skull, multiple fractures (spine, pubic rami) presents with weakness and generalized malaise for 1 week for whom surgery was consulted for severe right breast pain now S/P incisional biopsy 1/9/2023.      - Final breast pathology showing epidermal/dermal necrosis with fibrinoid occlusion and necrosis of blood vessels, focal necrotizing suppurative inflammation involving the fibroadipose tissue and blood vessels in the subcutis.     - Consider Plastic Surgery consult for b/l breast debridement  - Dress right breast with Xeroform and gauze with paper tape. To be changed daily. Xeroform was not in place this morning.  - Wound care for access site ulceration as per vascular surgery.   - Surgery 5C following.

## 2023-02-01 NOTE — CONSULT NOTE ADULT - SUBJECTIVE AND OBJECTIVE BOX
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  Patient had breast pain and there was concern for breast mass which was not found on imaging.  However the breast was biopsied and the tissue was consistent with necrotic material and possibly calciphylaxis prompting further workup.  Her PTH was found to be elevated.  Imaging via 4D CT inconclusive but demonstrates a possible right upper parathyroid adenoma.  She also has a goiter of the thyroid with a large right sided nodule which was biopsied today.  Patient reports never seeing an ENT before to discuss parathyroidectomy.  She reports that she sees a primary care physician at [facility] who put her on calcium TID and vitamin D supplements since last November due to her osteopenia and multiple fractures.  She reports no difficulty with breathing, swallowing, or voicing though she notes that her voice has become a little hoarser over the course of the last year.  She reports no compressive symptoms.    Physical Exam:  Vital Signs Last 24 Hrs  T(C): 36.6 (01 Feb 2023 15:56), Max: 36.7 (31 Jan 2023 20:44)  T(F): 97.8 (01 Feb 2023 15:56), Max: 98 (31 Jan 2023 20:44)  HR: 81 (01 Feb 2023 15:56) (68 - 89)  BP: 78/50 (01 Feb 2023 15:56) (65/50 - 82/54)  BP(mean): --  RR: 16 (01 Feb 2023 15:56) (16 - 18)  SpO2: 96% (01 Feb 2023 15:56) (96% - 100%)  Parameters below as of 01 Feb 2023 15:56  Patient On (Oxygen Delivery Method): room air    General: NAD, pleasant, conversant  Head: NCAT, facial movement grossly symmetric  Eyes: EOMI  Nose: clear anteriorly  OC/OP: MMM, tongue midline  Neck: soft, flat, some fullness of lower neck with no discrete nodules, no tenderness to palpation, palpable laryngeal landmarks  Respiratory: NLB on RA, no stridor or stertor, voice at appropriate volume

## 2023-02-01 NOTE — PROGRESS NOTE ADULT - ASSESSMENT
Ms. 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 In addition, she was complaining of bilateral breast pain (R>L). She was admitted for emergent hemodialysis. Her hospitalization was complicated by shock (unclear etiology), off pressors (since 1/16/23). Endocrinology was initially consulted for elevated PTH and multinodular thyroid. Work-up was suggestive of secondary hyperparathyroidism and she was started on Sensipar. However, despite increasing doses of the medication, she has continued to have elevated levels of PTH. 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 A thyroid ultrasound also was remarkable for multiple nodules meeting criteria for fine-needle aspiration.    A1C: 5.3 %  BUN: 49 mg/dL  Creatinine: 5.83 mg/dL  eGFR: 8 mL/min/1.73m2  Weight (kg): 85.3  BMI (kg/m2): 27

## 2023-02-02 NOTE — PROGRESS NOTE ADULT - ASSESSMENT
Patient is a 68 yo F pt with PMH HFrEF (EF 40%), nonobstructive CAD, NICM, HTN, HLD, ESRD 2/2 PCKD on HD, PE (2018) s/p IVC filter, mild AS, mild MR, PAD, OA, h/o thrombus in vascular access x3 (R AVG, R AVF, L AVG), ventral hernia, brown tumor in skull, multiple fractures (spine, pubic rami) presents with weakness and generalized malaise for 1 week for whom surgery was consulted for severe right breast pain now S/P incisional biopsy 1/9/2023.      - Final breast pathology showing epidermal/dermal necrosis with fibrinoid occlusion and necrosis of blood vessels, focal necrotizing suppurative inflammation involving the fibroadipose tissue and blood vessels in the subcutis.     - Consider Plastic Surgery consult for b/l breast debridement  - Dress right breast with Xeroform and gauze with paper tape. To be changed daily. Please cover with Xeroform.  - Wound care for access site ulceration as per vascular surgery.   - Surgery 5C following.     NOTE INCOMPLETE PENDING ATTENDING DISCUSSION Patient is a 66 yo F pt with PMH HFrEF (EF 40%), nonobstructive CAD, NICM, HTN, HLD, ESRD 2/2 PCKD on HD, PE (2018) s/p IVC filter, mild AS, mild MR, PAD, OA, h/o thrombus in vascular access x3 (R AVG, R AVF, L AVG), ventral hernia, brown tumor in skull, multiple fractures (spine, pubic rami) presents with weakness and generalized malaise for 1 week for whom surgery was consulted for severe right breast pain now S/P incisional biopsy 1/9/2023.      - Final breast pathology showing epidermal/dermal necrosis with fibrinoid occlusion and necrosis of blood vessels, focal necrotizing suppurative inflammation involving the fibroadipose tissue and blood vessels in the subcutis.     - Consider Plastic Surgery consult for b/l breast debridement  - Dress right breast with Xeroform and gauze with paper tape. To be changed daily. Please cover with Xeroform.  - Wound care for access site ulceration as per vascular surgery.   - Surgery 5C following.

## 2023-02-02 NOTE — PROGRESS NOTE ADULT - PROBLEM SELECTOR PROBLEM 2
ESRD on dialysis
ESRD on dialysis
Septic shock
ESRD on dialysis
Hypotension
Septic shock
Hypotension
Multinodular goiter
Septic shock
Hypotension
Septic shock
Hypotension
Hypotension
Septic shock
Hypotension
Septic shock

## 2023-02-02 NOTE — PROGRESS NOTE ADULT - SUBJECTIVE AND OBJECTIVE BOX
STATUS POST:  incisional biopsy 1/9  - Biopsy taken uneventfully at bedside 1/9/23- final pathology report showing epidermal/dermal necrosis with fibrinoid occlusion and necrosis of blood vessels, focal necrotizing suppurative inflammation involving the fibroadipose tissue and blood vessels in the subcutis.   - Afebrile; stable.  - Last dose of vancomycin 1/21     SUBJECTIVE: Patient seen and examined bedside during HD. Patient doing well, noting continued discomfort in the breast and occasional pruritis. She is tolerating a diet and awaiting rehab transport.    apixaban 5 milliGRAM(s) Oral every 12 hours  clopidogrel Tablet 75 milliGRAM(s) Oral every 24 hours  midodrine. 40 milliGRAM(s) Oral every 8 hours      Vital Signs Last 24 Hrs  T(C): 36.9 (02 Feb 2023 14:50), Max: 36.9 (02 Feb 2023 14:50)  T(F): 98.4 (02 Feb 2023 14:50), Max: 98.4 (02 Feb 2023 14:50)  HR: 75 (02 Feb 2023 14:50) (74 - 83)  BP: 104/71 (02 Feb 2023 14:50) (78/50 - 129/84)  BP(mean): --  RR: 18 (02 Feb 2023 14:50) (16 - 20)  SpO2: 96% (02 Feb 2023 14:50) (93% - 100%)    Parameters below as of 02 Feb 2023 14:50  Patient On (Oxygen Delivery Method): room air      I&O's Detail      Gen: Awake, alert, NAD, resting comfortably   HEENT: [facility] Permcath in place  Breast: B/l breasts soft with underlying firmness, nontender to palpation. Large eschars overlaying breasts b/l R>L.  CV: RRR  Pulm: no respiratory distress on RA  Abd: soft, ND, NTTP, no rebound or guarding   Ext: WWP, ulceration of medial aspect of R arm over access site. L chest HD catheter without edema/induration/purulence.         LABS:                        9.1    9.73  )-----------( 176      ( 02 Feb 2023 11:24 )             31.1     02-02    133<L>  |  90<L>  |  57<H>  ----------------------------<  108<H>  4.4   |  23  |  6.94<H>    Ca    7.7<L>      02 Feb 2023 11:24  Phos  4.0     02-02  Mg     2.1     02-02      PTT - ( 02 Feb 2023 11:24 )  PTT:79.6 sec      RADIOLOGY & ADDITIONAL STUDIES:

## 2023-02-02 NOTE — PROGRESS NOTE ADULT - PROBLEM SELECTOR PROBLEM 1
Pain in breast
Pain in breast
Hypotension
Hypotension
Pain in breast
Pain in breast
HPTH (hyperparathyroidism)
Hypotension
Pain in breast

## 2023-02-02 NOTE — PROGRESS NOTE ADULT - PROVIDER SPECIALTY LIST ADULT
Endocrinology
GYN
Infectious Disease
Internal Medicine
MICU
Nephrology
Pain Medicine
Palliative Care
Surgery
Vascular Surgery
Cardiology
Endocrinology
Infectious Disease
Infectious Disease
MICU
Nephrology
Surgery
Vascular Surgery
Infectious Disease
Internal Medicine
MICU
Nephrology
Palliative Care
Palliative Care
Surgery
Vascular Surgery
Endocrinology
Endocrinology
Internal Medicine
Nephrology
Endocrinology
Internal Medicine
Internal Medicine
Nephrology
Palliative Care
Palliative Care
Internal Medicine
Palliative Care
Internal Medicine

## 2023-02-02 NOTE — PROGRESS NOTE ADULT - ATTENDING COMMENTS
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 Pt s/p vancomycin x2 wks (last day 1/15) for presumed breast cellulitis. fistula as source ruled out. gallium scan was positive only for uptake on right ant chest wall. US of that area was negative for abscess. Endocrinology was consulted for elevated PTH and multinodular thyroid. Work-up was suggestive of secondary hyperparathyroidism but despite increasing doses of the medication, she has continued to have elevated levels of PTH. 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 thyroid ultrasound showed multiple nodules meeting criteria for fine-needle aspiration. Pt was transferred to [facility] when BP improved with midodrine for FNA and placement. Acute DVT in left common femoral vein found on Dopplers on 1.28.2023, trialing full dose AC with heparin drip, watching CBC closely (q8) for signs of bleed.   s/p FNA biopsy 2/2-> pending path results- per endocrine can have the surgery scheduled outpt   s/p CT IV contrast  - s.p CT venogram (to evaluate IVC filter) and 4dCT of parathyroid 1/30.    # Leukocytosis - received high dose methylpred for premedication on Saturday. Likely demargination-> now trending down. Check rectal temp if oral or axillary temp >99F.      # DVT  Known DVT of RIJ, R subclavian and R axillary veins. Hx of L AVF clotting.  Acute DVT of left common femoral vein seen on dopplers on 1.28.23, started on heparin drip; housestaff discussed risks of full dose AC, indication for AC for acute clot; patient understands risk and wishes to proceed with AC  CBC stable now (Has required 3 prBC transfusions this admission). hgb 9.1 2/2    # Right leg edema >Left   # IVC filter present (placed after 2018 PE)   No DVT in right leg on 1.28.23 Dopplers  Starting full AC for left common femoral vein acute DVT ; on heparin drip will resume after IR procedure   RLE swelling improving now       #Multinodular goiter  TSH 1.52. Free T4 0.96   CT showed enlarged multinodular thyroid projecting into superior mediastinum with 1.8 x 1.5 cm solid nodule slightly inferiorly in the anterior mediastinum. Thyroid ultrasound showed a moderately enlarged gland with heterogeneous echotexture. There were 3 nodules in the R lobe (largest 3.3 cm), one in the left lobe (2.3 cm).   per Endo: Both of these nodules warrant biopsy--the right lobe lesion because of size and its shape (taller > wide), and the left lobe nodule based on size. Fine-needle aspiration recommended for 4.3 cm right thyroid and 2.6 cm left thyroid solid nodules per SCOTT guidelines  [ ] Planned for thyroid FNA today by IR(currently on heparin drip)   4D CT scan unclear- recommended nuclear scan   further plan will be assessed based on FNA -> parathyroidectomy w/wo thyroidectomy outpt    #renal bone disease   #calciphylaxis   #2ndary hyperparathyroidism   brown tumors on recent CT  c/w sevelamer to 1600mg tid w/ meals and sensipar 90mg Qday  PTH remains elevated-> pending FNA thyroid results   will repeat PTH   renal and endo following       #ESRD on HD MWF here, outpt T/TH/S  [facility] TDC, RUE AVF not being used    #hypotension  profound hypotension during this admission requiring pressors and now on florinef 0.2mg as well as midodrine 40mg tid.   Gets midodrine 1/2 hr before HD    #Chronic HFrEF (heart failure with reduced ejection fraction).   TTE 11/22 normal LV and systolic function, EF 59%, grade II diastolic dysfunction, dilated RV, reduced RV systolic function  not able to be started on GDMT 2/2 hypotension   recent Echo 1/3 with severe LVOT->fu  repeat echo - showed improvement     #CAD  Hx of cardiac cath in 2018. Home meds: atorvastatin 40mg, plavix 75mg qd.  c/w home atorvastatin 40mg  On plavix per vascular for graft patency--Plavix 75mg qd was held for IR procedure    plan  vascular ok with BP measurements from LUE as long as distal to the PICC line  Have nursing tag left leg, right leg and right arm as "no blood pressure cuff" limbs.

## 2023-02-02 NOTE — PROGRESS NOTE ADULT - ASSESSMENT
68 yo F pt with HFrEF (EF 40%), nonobstructive CAD, NICM, HTN, HLD, ESRD 2/2 PCKD on HD, PE (2018) s/p IVC filter, mild AS, mild MR, PAD, OA, h/o thrombus in vascular access x3 (R AVG, R AVF, L AVG), ventral hernia, brown tumor in the skull (osteoclastoma process 2/2 hyperparathyroidism), multiple fractures (spine, pubic rami) who presented with weakness and generalized malaise for 1 week a/w cramping abdominal pain, nausea, diarrhea causing her to miss dialysis since 12/24, also c/o R breast pain, admitted on 1/2/23 for emergent HD. Hospital course complicated by finding of new thrombus in right IJ, subclavian, and axillary veins. Course c/b persistent b/l breast pain R>L with hypotension and persistent fevers, off pressors since 1/16/23 PM, now afebrile maintaining MAPs on midodrine and fludrocortisone. Most recent complication of her long hospital course is: Acute DVT in left common femoral seen on LE Dopplers on 1.28.23.

## 2023-02-02 NOTE — PROGRESS NOTE ADULT - SUBJECTIVE AND OBJECTIVE BOX
Patient is a 67y Female seen and evaluated at bedside. No acute distress, states that she has a good appetite. Does endorse that she has terrible pain around her breast. Last HD on 1/30, tolerated 1L UF.        Meds:    albumin human 25% IVPB 50 every 1 hour  artificial tears (preservative free) Ophthalmic Solution 1 four times a day  atorvastatin 40 at bedtime  calamine/zinc oxide Lotion 1 three times a day PRN  chlorhexidine 2% Cloths 1 <User Schedule>  chlorhexidine 2% Cloths 1 daily  cinacalcet 90 daily  collagenase Ointment 1 daily  cyclobenzaprine 5 three times a day PRN  epoetin александр-epbx (RETACRIT) Injectable 8000 once  fludroCORTISONE 0.2 every 24 hours  heparin  Infusion. 1100 <Continuous>  HYDROmorphone   Tablet 2 every 3 hours PRN  HYDROmorphone   Tablet 4 every 3 hours PRN  lidocaine   4% Patch 2 daily  lidocaine 4% Cream 1 three times a day PRN  midodrine. 40 every 8 hours  Nephro-deborah 1 daily  petrolatum white Ointment 1 daily  polyethylene glycol 3350 17 every 12 hours  senna 2 at bedtime  sevelamer carbonate 1600 three times a day with meals  sodium chloride 0.9% lock flush 10 every 1 hour PRN      T(C): , Max: 36.7 (02-01-23 @ 21:05)  T(F): , Max: 98 (02-01-23 @ 21:05)  HR: 74 (02-02-23 @ 11:31)  BP: 129/84 (02-02-23 @ 11:31)  BP(mean): --  RR: 18 (02-02-23 @ 11:31)  SpO2: 93% (02-02-23 @ 11:31)  Wt(kg): --        Review of Systems:  ROS negative except as per HPI      PHYSICAL EXAM:  GENERAL: NAD sitting up in bed having lunch   CHEST/LUNG: Clear to auscultation bilaterally; no accessory muscle use   HEART: normal S1S2, RRR  EXTREMITIES: No clubbing, cyanosis, or edema , RUE wrapped in ACE bandage   ACCESS: R TDC c/d/i       LABS:                        9.1    9.73  )-----------( 176      ( 02 Feb 2023 11:24 )             31.1     02-02    133<L>  |  90<L>  |  57<H>  ----------------------------<  108<H>  4.4   |  23  |  6.94<H>    Ca    7.7<L>      02 Feb 2023 11:24  Phos  4.0     02-02  Mg     2.1     02-02        PTT - ( 02 Feb 2023 11:24 )  PTT:79.6 sec          RADIOLOGY & ADDITIONAL STUDIES:

## 2023-02-02 NOTE — PROGRESS NOTE ADULT - ATTENDING SUPERVISION STATEMENT
Resident
Fellow
Resident
Fellow
Resident
Fellow
Resident
Fellow
Resident
Fellow
Resident

## 2023-02-02 NOTE — PROGRESS NOTE ADULT - ASSESSMENT
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 During imaging, CT showing 7.0 cm fluid collection is present near an AV fistula in the right upper arm. The differential for this collection included benign post-operative hematoma or seroma, but also abscess or vascular device infection, particularly in the setting of septic shock. Vascular consulted for RUE fistula evaluation to rule out this area as a source of sepsis. Reassuring physical exam at initial evaluation without clear indicia of infection related to the graph, small superficial surgical incision dehiscence notwithstanding. Now s/p IR image guided aspiration of perigraft fluid (negative) and gadolinium scan (negative). At this point, do not suspect a graft infection as the provoking factor leading to the patient's presentation. Swelling in area of RUE fistula is likely a sterile seroma with no intervention needed. CT venogram showed no evidence of central venous thrombosis.    -Wound care recs: BID dressing changes (once daily with Santyl DSD once daily with Bactroban DSD). Apply light ace wrap compression to right hand and forearm to help with soft tissue swelling and elevate RUE with pillows  -Rest of care per primary team  -Vascular surgery Team 3C will continue to follow. Please call [phone] with any questions or concerns.  -Pt should follow up with Dr. Joey Buckley for continuing management of her surgical site ulcer and recent AVG.

## 2023-02-02 NOTE — PROGRESS NOTE ADULT - SUBJECTIVE AND OBJECTIVE BOX
JEIMYSALOMON  67y, Female    Patient is a 67y old  Female who presents with a chief complaint of emergent HD (31 Jan 2023 06:34)      INTERVAL HPI/OVERNIGHT EVENTS:    ROS: otherwise negative      T(C): 36.4 (02-02-23 @ 05:22), Max: 36.7 (02-01-23 @ 21:05)  HR: 78 (02-02-23 @ 06:28) (68 - 83)  BP: 86/40 (02-01-23 @ 21:05) (65/50 - 86/40)  RR: 18 (02-02-23 @ 06:28) (16 - 20)  SpO2: 97% (02-02-23 @ 06:28) (95% - 100%)  Wt(kg): --Vital Signs Last 24 Hrs  T(C): 36.4 (02 Feb 2023 05:22), Max: 36.7 (01 Feb 2023 21:05)  T(F): 97.5 (02 Feb 2023 05:22), Max: 98 (01 Feb 2023 21:05)  HR: 78 (02 Feb 2023 06:28) (68 - 83)  BP: 86/40 (01 Feb 2023 21:05) (65/50 - 86/40)  BP(mean): --  RR: 18 (02 Feb 2023 06:28) (16 - 20)  SpO2: 97% (02 Feb 2023 06:28) (95% - 100%)    Parameters below as of 02 Feb 2023 06:28  Patient On (Oxygen Delivery Method): room air        PHYSICAL EXAM:  General: obese female, NAD  Head: NC/AT; MMM; PERRL; EOMI  Neck: Supple  Respiratory: CTAB; no wheezes/rales/rhonchi  Cardiovascular: Regular rhythm/rate; S1/S2+, no murmurs, rubs gallops   Gastrointestinal: Soft; NTND; bowel sounds normal and present  Extremities: WWP; pitting edema of right ankle up to thigh, trace pitting edema left leg; cyanosis v discoloration of distal toes on b/l feet. 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, b/l breast; wounds present on b/l breast  Neurological: A&Ox3, strength intact, no obvious focal deficits      LABS:    01-31    133<L>  |  92<L>  |  49<H>  ----------------------------<  90  4.3   |  29  |  5.83<H>    Ca    8.1<L>      31 Jan 2023 21:23        PTT - ( 31 Jan 2023 21:23 )  PTT:102.3 sec    CAPILLARY BLOOD GLUCOSE                MEDICATIONS  (STANDING):  albumin human 25% IVPB 50 milliLiter(s) IV Intermittent every 1 hour  artificial tears (preservative free) Ophthalmic Solution 1 Drop(s) Both EYES four times a day  atorvastatin 40 milliGRAM(s) Oral at bedtime  chlorhexidine 2% Cloths 1 Application(s) Topical <User Schedule>  chlorhexidine 2% Cloths 1 Application(s) Topical daily  cinacalcet 90 milliGRAM(s) Oral daily  collagenase Ointment 1 Application(s) Topical daily  fludroCORTISONE 0.2 milliGRAM(s) Oral every 24 hours  heparin  Infusion. 1100 Unit(s)/Hr (11 mL/Hr) IV Continuous <Continuous>  lidocaine   4% Patch 2 Patch Transdermal daily  midodrine. 40 milliGRAM(s) Oral every 8 hours  Nephro-deborah 1 Tablet(s) Oral daily  petrolatum white Ointment 1 Application(s) Topical daily  polyethylene glycol 3350 17 Gram(s) Oral every 12 hours  senna 2 Tablet(s) Oral at bedtime  sevelamer carbonate 1600 milliGRAM(s) Oral three times a day with meals    MEDICATIONS  (PRN):  calamine/zinc oxide Lotion 1 Application(s) Topical three times a day PRN Itching  cyclobenzaprine 5 milliGRAM(s) Oral three times a day PRN Muscle Spasm  HYDROmorphone   Tablet 2 milliGRAM(s) Oral every 3 hours PRN Moderate Pain (4 - 6)  HYDROmorphone   Tablet 4 milliGRAM(s) Oral every 3 hours PRN Severe Pain (7 - 10)  lidocaine 4% Cream 1 Application(s) Topical three times a day PRN Breast pain  sodium chloride 0.9% lock flush 10 milliLiter(s) IV Push every 1 hour PRN Pre/post blood products, medications, blood draw, and to maintain line patency      RADIOLOGY & ADDITIONAL TESTS: Reviewed

## 2023-02-02 NOTE — PROGRESS NOTE ADULT - SUBJECTIVE AND OBJECTIVE BOX
SUBJECTIVE:  Doing well this AM. NAEON. Denies n/v. Denies arm pain, redness, purulence, or worsening swelling. Denies cp/sob. Pain is well controlled. Tolerating diet.    MEDICATIONS  (STANDING):  albumin human 25% IVPB 50 milliLiter(s) IV Intermittent every 1 hour  artificial tears (preservative free) Ophthalmic Solution 1 Drop(s) Both EYES four times a day  atorvastatin 40 milliGRAM(s) Oral at bedtime  chlorhexidine 2% Cloths 1 Application(s) Topical <User Schedule>  chlorhexidine 2% Cloths 1 Application(s) Topical daily  cinacalcet 90 milliGRAM(s) Oral daily  collagenase Ointment 1 Application(s) Topical daily  fludroCORTISONE 0.2 milliGRAM(s) Oral every 24 hours  heparin  Infusion. 1100 Unit(s)/Hr (11 mL/Hr) IV Continuous <Continuous>  lidocaine   4% Patch 2 Patch Transdermal daily  midodrine. 40 milliGRAM(s) Oral every 8 hours  Nephro-deborah 1 Tablet(s) Oral daily  petrolatum white Ointment 1 Application(s) Topical daily  polyethylene glycol 3350 17 Gram(s) Oral every 12 hours  senna 2 Tablet(s) Oral at bedtime  sevelamer carbonate 1600 milliGRAM(s) Oral three times a day with meals    MEDICATIONS  (PRN):  calamine/zinc oxide Lotion 1 Application(s) Topical three times a day PRN Itching  cyclobenzaprine 5 milliGRAM(s) Oral three times a day PRN Muscle Spasm  HYDROmorphone   Tablet 2 milliGRAM(s) Oral every 3 hours PRN Moderate Pain (4 - 6)  HYDROmorphone   Tablet 4 milliGRAM(s) Oral every 3 hours PRN Severe Pain (7 - 10)  lidocaine 4% Cream 1 Application(s) Topical three times a day PRN Breast pain  sodium chloride 0.9% lock flush 10 milliLiter(s) IV Push every 1 hour PRN Pre/post blood products, medications, blood draw, and to maintain line patency      Vital Signs Last 24 Hrs  T(C): 36.4 (02 Feb 2023 05:22), Max: 36.7 (01 Feb 2023 21:05)  T(F): 97.5 (02 Feb 2023 05:22), Max: 98 (01 Feb 2023 21:05)  HR: 78 (02 Feb 2023 06:28) (75 - 83)  BP: 86/40 (01 Feb 2023 21:05) (78/50 - 86/40)  BP(mean): --  RR: 18 (02 Feb 2023 06:28) (16 - 20)  SpO2: 97% (02 Feb 2023 06:28) (95% - 100%)    Parameters below as of 02 Feb 2023 06:28  Patient On (Oxygen Delivery Method): room air        Physical Exam:  General: NAD, resting comfortably in bed  Pulmonary: Nonlabored breathing, no respiratory distress  Cardiovascular: NSR  Abdominal: soft, NT/ND  Extremities: WWP, normal strength, R upper arm w/ moderate soft tissue swelling and clean based ulcer w/o signs of infection, improved R forearm swelling, Kerlix and ACE wrap  Neuro: A/O x 3, CNs II-XII grossly intact, no focal deficits    I&O's Summary      LABS:    01-31    133<L>  |  92<L>  |  49<H>  ----------------------------<  90  4.3   |  29  |  5.83<H>    Ca    8.1<L>      31 Jan 2023 21:23      PTT - ( 31 Jan 2023 21:23 )  PTT:102.3 sec    CAPILLARY BLOOD GLUCOSE            RADIOLOGY & ADDITIONAL STUDIES:

## 2023-02-02 NOTE — PROGRESS NOTE ADULT - ATTENDING COMMENTS
I agree with the fellow's findings and plans as written above with the following additions/amendments:    Seen and examined at on HD, tolerating well, continue HD as above

## 2023-02-02 NOTE — PROGRESS NOTE ADULT - PROBLEM SELECTOR PLAN 7
At risk for secondary HPTT d/t renal failure. Pt has vague abdominal pain w/ nausea. Concern for possible brown tumors on CT with multiple fractures of pubic rami, pubic bone, thoracolumbar spine. Intact , Vit D 38.5. SPEP/immunofixation negative. Per Endo, likely secondary hyperparathyroidism vs other process causing osteoclastic tumors.  - c/w Sensipar 90 mg PO daily   - 1/11 AM  intact (baseline PTH on Sensipar)  - CT did not visualize parathyroids well, will consider parathyroidectomy pending endo recs    #Multinodular Goiter  CT (1/3/23): Enlarged multinodular thyroid projecting into superior mediastinum w/ 1.8 x 1.5 cm solid nodule slightly inferiorly in the anterior mediastinum.   Thyroid US (1/4/23): Fine-needle aspiration recommended for 4.3 cm right thyroid and 2.6 cm left thyroid solid nodules per SCOTT guidelines.  - TSH 1.6 free T4 0.68. TSH could be inappropriately normal for low free T4 due to euthyroid sick syndrome.  - f/u TFTs after shock resolved  - plan for FNA biopsy of two nodules per endo recs; IR consulted; completed today  - endocrinology following

## 2023-02-02 NOTE — PROGRESS NOTE ADULT - PROBLEM SELECTOR PLAN 1
Family hx of breast cancer in mother, sister. Physical exam on admission: large R breast mass, palpable L breast mass, R breast has peau d'orange appearance with associated erythema, warmth on R. US bilateral breasts: No sonographic evidence of breast abscess, cyst or focal mass to correspond with the clinical finding of bilateral breast masses. Breast bx: Epidermal and dermal necrosis with fibrinoid occlusion and necrosis of blood vessels, focal necrotizing suppurative inflammation involving the fibroadipose tissue and blood vessels in the subcutis    Plan:  Pt continues to report pain in bilateral breasts, worst on R. Pain responsive to Dilaudid but effect wears off after ~2-3 hours. Was briefly on PCA (1/16-1/17), now dc'ed  - c/w Flexeril 5mg PO TID prn for spasms (hold parameters added)  - start Dilaudid 2mg PO q3h prn for moderate pain -- HOLD IF RR<10, SpO2 <93%, SBP <96  - start Dilaudid 4mg PO q3h prn for severe pain -- HOLD IF RR<10, SpO2 <93%, SBP <96    #Peeling epidermis to B/l breasts  Derm consulted, recommend wedge biopsy of both normal and pathologic tissue. Coagulopathic changes may be secondary to infection. however other causes of hypercoagulability should be explored  - per surgery, no surgical interventions  - c/w daily dressing changes with Xeroform per surgery

## 2023-02-02 NOTE — PROGRESS NOTE ADULT - PROBLEM SELECTOR PLAN 3
Pt with recurrent fevers, most recently T 101.6 1/21. Unclear etiology. BCx 1/3, 1/9, 1/17 negative. s/p vancomycin x2 weeks (1/2-1/15). Per Surgery, R breast wound unlikely to be source of infection. Per Vascular Surgery, AV fistula fluid collection unlikely to be source of infection.   - PICC placed on 1/26 by IR (pt has poor veins with diffuse clots and does not have other access sites)  - BCx from 1/21 -- NG  - monitor CBC and vitals  - check rectal temp if oral/axillary temp > 99     #RUE fluid collection/RUE edema:  - CT (1/3/23): 7.0 cm fluid collection is present near AV fistula in the RUE. Possibly abscess. Drained by IR 1/5/23 with serosanguinous fluid: no organisms, few WBCs  - RUE dopplers (1/13/23): No significant change large complex fluid collection in proximal R arm, could represent old seroma/hematoma (however, prior ultrasound was done prior to drainage)   - Per Vascular: AV fistula unlikely to be infected; would not remove it; also would NOT recommend drainage of seroma.  - Per Wound Care, c/w BID dressing changes (once daily with Santyl DSD, once daily with Bactroban DSD). Apply light ace wrap compression to right hand and forearm to help with soft tissue swelling and elevate RUE with pillows

## 2023-02-02 NOTE — PROGRESS NOTE ADULT - SUBJECTIVE AND OBJECTIVE BOX
Patient seen on HD tolerating procedure well. Patient does not offer any complaints and is hemodynamically stable. UF lowered to 1L, no additional complaints, continue HD as prescribed         HR: 74  BP: 129/84           Patient seen on HD tolerating procedure well. Patient does not offer any complaints and is hemodynamically stable.  Continue HD as prescribed.         Hemodialysis Treatment.:     Schedule: Once, Modality: Hemodialysis, Access: Arteriovenous Fistula    Dialyzer: Optiflux B140RPl, Time: 180 Min    Blood Flow: 400 mL/Min , Dialysate Flow: 600 mL/Min, Dialysate Temp: 35, Tubinmm (Adult)    Target Fluid Removal: 1 Liters    Dialysate Electrolytes (mEq/L): Potassium 2, Calcium 2.5, Sodium 138, Bicarbonate 35    Additional Instructions: Midodrine 30 min before dialysis     Vitals   T(F): 98.4  HR: 75  BP: 104/71  RR: 18  SpO2: 96%              Physical Exam:   GENERAL: NAD sitting up in bed tolerating HD   CHEST/LUNG: Clear to auscultation bilaterally; no accessory muscle use   HEART: normal S1S2, RRR  EXTREMITIES: No clubbing, cyanosis, or edema , RUE wrapped in ACE bandage   ACCESS: R TDC accessed

## 2023-02-02 NOTE — DISCHARGE NOTE NURSING/CASE MANAGEMENT/SOCIAL WORK - PATIENT PORTAL LINK FT
You can access the FollowMyHealth Patient Portal offered by [facility] by registering at the following website: [email or web address]. By joining [facility]’s FollowMyHealth portal, you will also be able to view your health information using other applications (apps) compatible with our system.

## 2023-02-02 NOTE — PROGRESS NOTE ADULT - PROBLEM SELECTOR PLAN 4
#RUE fluid collection/RUE edema:   DVT of RIJ, R subclavian and R axillary veins found on admission. Hx of L AVF clotting. Given hx of clots, current pruritis and gallium scan uptake in chest wall, reasonable to workup coagulopathy. OBDULIA, ANCA, complement, RF, beta2 glycoprotein, anticardiolipin wnl.  - CT (1/3/23): 7.0 cm fluid collection is present near AV fistula in the RUE. Possibly abscess. Drained by IR 1/5/23 with serosanguinous fluid: no organisms, few WBCs  - RUE dopplers (1/13/23): No significant change large complex fluid collection in proximal R arm, could represent old seroma/hematoma (however, prior ultrasound was done prior to drainage)   - Per Vascular: AV fistula unlikely to be infected; would not remove it; also would NOT recommend drainage of seroma.  - Per Wound Care, c/w BID dressing changes (once daily with Santyl DSD, once daily with Bactroban DSD). Apply light ace wrap compression to right hand and forearm to help with soft tissue swelling and elevate RUE with pillows      # DVT of left common femoral   *Acute DVT of left common femoral found on 1.28.23  - started full dose AC for acute DVT (heparin drip); Has required 3 transfusions this admission.   - monitor CBC closely q8hrs; Low threshold for escalating level of care should patient show signs of bleed, or hypotension    # IVC filter present   # Right leg edema   Right leg markedly more edematous than left   No DVT seen on LE dopplers on 1.28.23  - underwent CT venogram: no filling defects  - c/w heparin gtt

## 2023-02-22 NOTE — CONSULT NOTE ADULT - TIME-BASED
55
45
75
[place] Counseling: I discussed with the patient the risks of tildrakizumab including but not limited to immunosuppression, malignancy, posterior leukoencephalopathy syndrome, and serious infections.  The patient understands that monitoring is required including a PPD at baseline and must alert us or the primary physician if symptoms of infection or other concerning signs are noted.

## 2023-03-31 NOTE — PHYSICAL EXAM
[Supple] : supple [No Supraclavicular Adenopathy] : no supraclavicular adenopathy [Examined in the supine and seated position] : examined in the supine and seated position [No dominant masses] : no dominant masses left breast [No Nipple Retraction] : no left nipple retraction [No Nipple Discharge] : no left nipple discharge [No Axillary Lymphadenopathy] : no left axillary lymphadenopathy

## 2023-03-31 NOTE — HISTORY OF PRESENT ILLNESS
[FreeTextEntry1] : 67 year old female was referred by  ____ who presents for initial evaluation regarding mass on right breast \par \par From chart prep notes: Pt stated no imaging in the last 3 years, does anyone knows anything about this, biopsy was under his name0?\par \par Denies prior breast surgeries or biopsies.??\par Patient denies palpable masses, nipple discharge, skin changes.??\par Patient denies family history of breast cancer?? Denies famhx of ovarian cancer.??\par Patient has not had genetic testing performed??\par \par Yaneth Lifetime Risk %??\par \par Imaging/CD uploaded??\par

## 2023-05-03 NOTE — H&P ADULT - PROBLEM SELECTOR PLAN 8
At risk for secondary HPTT d/t renal failure. Pt has vague abdominal pain w/ nausea. Concern for possible brown tumors on CT with multiple fractures of pubic rami, pubic bone, thoracolumbar spine during last visit. Intact , Vit D 38.5. SPEP/immunofixation negative. Per Endo, likely secondary hyperparathyroidism vs other process causing osteoclastic tumors.  - monitor for now

## 2023-05-03 NOTE — ED PROVIDER NOTE - CLINICAL SUMMARY MEDICAL DECISION MAKING FREE TEXT BOX
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 On exam, blood pressure 73/45, then 77/43, then 105/52, then 90/52, vital signs otherwise within normal limits, rectal temp is afebrile, patient has maroon stool and necrotic, chronic appearing ulcerations of her bilateral breast.  Patient is slightly altered from her baseline per her daughter.    DDx: Concern for GI bleed versus anemia of chronic disease versus cellulitis or abscess of the breasts.  Hypotension may be in setting of chronic hypotension that she has had noted in her prior charts versus hypotension from GI bleed versus infection from her breast.    Plan:  –EKG: Normal sinus rhythm, no ST elevation, ST depressions, T wave inversions, or peaked T waves  –Hemoglobin 6.2, hematocrit 23.2, occult blood positive  –Potassium 3.4, creatinine 4.5  –Chest x-ray is clear  –Nephrology is at bedside and state they will prep patient for hemodialysis however hemodialysis is not necessary overnight  –Spoke with GI who state they will see patient, recommend Protonix 40 mg every 12 hours  –Patient consented for PRBCs via her daughter at bedside, PRBCs ordered  –Discussed with [facility] who accept patient for

## 2023-05-03 NOTE — CONSULT NOTE ADULT - SUBJECTIVE AND OBJECTIVE BOX
HPI:  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, Calciphylaxis of bilateral breasts, HTN, HLD presenting to the ER in setting of anemia. Patient at time of evaluation in a lot of pain from her breasts. Per daughter mom has been cutting her treatments short. She would be written for 3:30 hours and would stop 1 hour in.  Complaining of severe pain of b/l breasts. Nephrology consulted for dialysis.     PAST MEDICAL & SURGICAL HISTORY:  HTN (hypertension)      HLD (hyperlipidemia)      CAD (coronary atherosclerotic disease)      ESRD on dialysis  last 11/15/22      H/O ventral hernia      Brown tumor  brain      DVT, lower extremity  left      History of surgery  IVC filter      AVF (arteriovenous fistula)  right left      S/P AIDEN-BSO  2003      History of surgery  RLE PTA stent / atherectomy  7/2021      History of atherectomy  stent      Allergies:  Ancef (Rash; Urticaria)  sulfa drugs (Angioedema)  penicillin (Swelling)  DDAVP (Hypotension)  iodine (Hives; Pruritus)      Home Medications:   albumin human: 50 milliliter(s) intravenous every hour intra dilaysis, infuse over 60 minutes, stop after 3 doses  apixaban 5 mg oral tablet: 1 tab(s) orally every 12 hours  atorvastatin 40 mg oral tablet: 1 tab(s) orally once a day  calamine topical lotion: 1 application topically 3 times a day, As needed, Itching  cinacalcet 90 mg oral tablet: 1 tab(s) orally once a day  clopidogrel 75 mg oral tablet: 1 tab(s) orally once a day  collagenase 250 units/g topical ointment: 1 application topically once a day  cyclobenzaprine 5 mg oral tablet: 1 tab(s) orally 3 times a day, As needed, Muscle Spasm  Entresto 49 mg-51 mg oral tablet: 1 tab(s) orally 2 times a day  epoetin александр: 8000 unit(s) intravenous once to be given with hemodialysis.   fludrocortisone 0.1 mg oral tablet: 2 tab(s) orally every 24 hours  folic acid 1 mg oral tablet: 1 tab(s) orally once a day  HYDROmorphone 2 mg oral tablet: 1 tab(s) orally every 3 hours, As needed, Moderate Pain (4 - 6)  HYDROmorphone 4 mg oral tablet: 1 tab(s) orally every 3 hours, As needed, Severe Pain (7 - 10)  lidocaine 4% topical cream: 1 application topically 3 times a day, As needed, Breast pain  lidocaine 4% topical film: Apply topically to affected area once a day  midodrine 10 mg oral tablet: 4 tab(s) orally every 8 hours  Nephro-Natasha oral tablet: 1 tab(s) orally once a day  ocular lubricant ophthalmic solution: 1 drop(s) to each affected eye 4 times a day  polyethylene glycol 3350 oral powder for reconstitution: 17 gram(s) orally every 12 hours  senna leaf extract oral tablet: 2 tab(s) orally once a day (at bedtime)  sevelamer carbonate 800 mg oral tablet: 2 tab(s) orally 3 times a day (with meals)  Vitamin C 500 mg oral tablet: 1 tab(s) orally once a day      Hospital Medications:   MEDICATIONS  (STANDING):      SOCIAL HISTORY:  Denies ETOh, Smoking,     Family History:  FAMILY HISTORY:  No pertinent family history in first degree relatives    VITALS:  T(F): 99.6 (05-03-23 @ 16:11), Max: 99.6 (05-03-23 @ 16:11)  HR: 91 (05-03-23 @ 17:27)  BP: 90/52 (05-03-23 @ 17:27)  RR: 19 (05-03-23 @ 17:27)  SpO2: 99% (05-03-23 @ 17:27)  Wt(kg): --      CAPILLARY BLOOD GLUCOSE      Review of Systems:  ROS negative except as per HPI    PHYSICAL EXAM:  GENERAL: Alert, awake, oriented x2 on NC  HEENT: ZOHAIB, EOMI, neck supple  CHEST/LUNG: Bilateral clear breath sounds appreciated, b/l bandages noted over wounds  HEART: Regular rate and rhythm, no murmur, no gallops, no rub   ABDOMEN: Soft, nontender, non distended  EXTREMITIES: trace  Neurology: AAOx3, no asterixis   SKIN: Wounds noted as above  ACCESS: L TDC    LABS:  05-03    135  |  98  |  32<H>  ----------------------------<  81  3.4<L>   |  26  |  4.57<H>    Ca    11.1<H>      03 May 2023 16:50      Creatinine Trend: 4.57 <--                        6.2    10.28 )-----------( 296      ( 03 May 2023 16:50 )             23.2     Urine Studies:

## 2023-05-03 NOTE — H&P ADULT - NSHPPHYSICALEXAM_GEN_ALL_CORE
.  VITAL SIGNS:  T(C): 37.6 (05-03-23 @ 16:11), Max: 37.6 (05-03-23 @ 16:11)  T(F): 99.6 (05-03-23 @ 16:11), Max: 99.6 (05-03-23 @ 16:11)  HR: 91 (05-03-23 @ 17:27) (91 - 100)  BP: 90/52 (05-03-23 @ 17:27) (73/45 - 105/52)  BP(mean): --  RR: 19 (05-03-23 @ 17:27) (17 - 19)  SpO2: 99% (05-03-23 @ 17:27) (97% - 100%)  Wt(kg): --    PHYSICAL EXAM:    Constitutional: resting comfortably in bed; NAD  Head: NC/AT  Eyes: PERRL, EOMI, anicteric sclera  ENT: no nasal discharge; uvula midline, no oropharyngeal erythema or exudates; MMM  Neck: supple; no JVD or thyromegaly  Respiratory: CTA B/L; no W/R/R, no retractions  Cardiac: +S1/S2; RRR; no M/R/G; PMI non-displaced  Gastrointestinal: abdomen soft, NT/ND; no rebound or guarding; +BSx4  Back: spine midline, no bony tenderness or step-offs; no CVAT B/L  Extremities: WWP, no clubbing or cyanosis; no peripheral edema  Musculoskeletal: NROM x4; no joint swelling, tenderness or erythema  Vascular: 2+ radial, DP/PT pulses B/L  Dermatologic: skin warm, dry and intact; no rashes, wounds, or scars  Lymphatic: no submandibular or cervical LAD  Neurologic: AAOx3; CNII-XII grossly intact; no focal deficits  Psychiatric: affect and characteristics of appearance, verbalizations, behaviors are appropriate .  VITAL SIGNS:  T(C): 37.6 (05-03-23 @ 16:11), Max: 37.6 (05-03-23 @ 16:11)  T(F): 99.6 (05-03-23 @ 16:11), Max: 99.6 (05-03-23 @ 16:11)  HR: 91 (05-03-23 @ 17:27) (91 - 100)  BP: 90/52 (05-03-23 @ 17:27) (73/45 - 105/52)  BP(mean): --  RR: 19 (05-03-23 @ 17:27) (17 - 19)  SpO2: 99% (05-03-23 @ 17:27) (97% - 100%)  Wt(kg): --    PHYSICAL EXAM:    Constitutional: resting in bed; in some distress 2/2 pain  Head: NC/AT  Eyes: PERRL, EOMI, anicteric sclera  ENT: no nasal discharge; MMM  Neck: supple  Respiratory: CTA B/L; no W/R/R, no retractions  Cardiac: +S1/S2; RRR; no M/R/G  Gastrointestinal: abdomen soft, NT/ND; no rebound or guarding; +BSx4; large reducible midline mass at level of umbilicus  Extremities: WWP, no clubbing or cyanosis; no peripheral edema  Musculoskeletal: spontaneously moving all extremities; no joint swelling, tenderness or erythema  Vascular: 2+ radial, DP/PT pulses B/L  Dermatologic: b/l breast wounds, TTP  Neurologic: AAOx1 (aware of person and situation, but not answering further questions, perseverating on pain); CNII-XII grossly intact; no focal deficits .  VITAL SIGNS:  T(C): 37.6 (05-03-23 @ 16:11), Max: 37.6 (05-03-23 @ 16:11)  T(F): 99.6 (05-03-23 @ 16:11), Max: 99.6 (05-03-23 @ 16:11)  HR: 91 (05-03-23 @ 17:27) (91 - 100)  BP: 90/52 (05-03-23 @ 17:27) (73/45 - 105/52)  BP(mean): --  RR: 19 (05-03-23 @ 17:27) (17 - 19)  SpO2: 99% (05-03-23 @ 17:27) (97% - 100%)  Wt(kg): --    PHYSICAL EXAM:    Constitutional: resting in bed; in some distress 2/2 pain  Head: NC/AT  Eyes: PERRL, EOMI, anicteric sclera  ENT: no nasal discharge; MMM  Neck: supple  Respiratory: CTA B/L; no W/R/R, no retractions  Cardiac: +S1/S2; RRR; no M/R/G  Gastrointestinal: abdomen soft, NT/ND; no rebound or guarding; +BSx4; large reducible midline mass at level of umbilicus  Extremities: WWP, no clubbing or cyanosis; no peripheral edema of lower extremities; RUE swollen compared to LUE similar to prior admission (no erythema or tenderness)  Musculoskeletal: spontaneously moving all extremities; no joint swelling, tenderness or erythema  Vascular: 2+ radial, DP/PT pulses B/L  Dermatologic: b/l breast wounds, TTP  Neurologic: AAOx1-2 (aware of person and situation, but not answering further questions, perseverating on pain); CNII-XII grossly intact; no focal deficits .  VITAL SIGNS:  T(C): 37.6 (05-03-23 @ 16:11), Max: 37.6 (05-03-23 @ 16:11)  T(F): 99.6 (05-03-23 @ 16:11), Max: 99.6 (05-03-23 @ 16:11)  HR: 91 (05-03-23 @ 17:27) (91 - 100)  BP: 90/52 (05-03-23 @ 17:27) (73/45 - 105/52)  BP(mean): --  RR: 19 (05-03-23 @ 17:27) (17 - 19)  SpO2: 99% (05-03-23 @ 17:27) (97% - 100%)  Wt(kg): --    PHYSICAL EXAM:    Constitutional: resting in bed; in some distress 2/2 pain  Head: NC/AT  Eyes: PERRL, EOMI, anicteric sclera  ENT: no nasal discharge; MMM  Neck: supple  Respiratory: CTA B/L; no W/R/R, no retractions  Cardiac: +S1/S2; RRR; no M/R/G  Gastrointestinal: abdomen soft, NT/ND; no rebound or guarding; +BSx4; large reducible midline mass at level of umbilicus  Extremities: WWP, no clubbing or cyanosis; no peripheral edema of lower extremities; RUE swollen compared to LUE similar to prior admission (no erythema or tenderness)  Musculoskeletal: spontaneously moving all extremities; no joint swelling, tenderness or erythema  Vascular: 2+ radial, DP/PT pulses B/L  Dermatologic: b/l breast wounds, TTP with calciphylaxis  Neurologic: AAOx1-2 (aware of person and situation, but not answering further questions, perseverating on pain); CNII-XII grossly intact; no focal deficits

## 2023-05-03 NOTE — H&P ADULT - PROBLEM SELECTOR PLAN 1
Patient presenting from rehab with hemoglobin of 6.9. Per chart review, patient has been more confused lately. JUAN showing maroon stool.   - patient receiving 1u pRBC in the ED  - start IV protonix 40mg BID  - maintain active T&S  - transfuse for hemoglobin < 7  - ordered iron studies, f/u results  - GI consulted, f/u recs Patient presenting from rehab with hemoglobin of 6.9. Per chart review, patient has been more confused lately. JUAN showing maroon stool. Hgb 6.3 on admission.   - patient receiving 1u pRBC in the ED  - start IV protonix 40mg BID  - maintain active T&S  - transfuse for hemoglobin < 7  - ordered iron studies, f/u results  - NPO at midnight for possible scope  - CTA ordered for concern for mesenteric ischemia, f/u results and consult surgery if showing signs of mesenteric ischemia  - GI consulted, f/u recs

## 2023-05-03 NOTE — H&P ADULT - PROBLEM SELECTOR PLAN 2
Patient hypotensive 73/45 on admission, improved to sBP 110s on recheck. On midodrine 40 mg PO q8h (goal MAP >55) during previous admission. Patient with anemia requiring transfusion of 1u pRBC.   - given volume resuscitation with blood products and ESRD/HFrEF, will hold of on further fluids at this time  - monitor BP after receiving blood  - consider midodrine if pressure support needed Patient hypotensive 73/45 on admission, improved to sBP 110s on recheck. On midodrine 40 mg PO q8h (goal MAP >55) during previous admission. Patient with anemia requiring transfusion of 1u pRBC.   - given volume resuscitation with blood products and ESRD/HFrEF, will hold of on further fluids at this time  - monitor BP after receiving blood  - c/w midodrine 40mg q8h

## 2023-05-03 NOTE — H&P ADULT - PROBLEM SELECTOR PLAN 10
F: None  E: replete with caution given ESRD  N: Renal Diet  DVT ppx: holding chemoprophylaxis iso bleed, SCDs  GI ppx: protonix IV 40mg BID  Code Status: Full Code  Dispo: [facility].

## 2023-05-03 NOTE — ED ADULT NURSE REASSESSMENT NOTE - NS ED NURSE REASSESS COMMENT FT1
Yazmin from [facility] notified that blood was unable to be transfused in allotted time due to difficulties maintaining patent peripheral IV. Resident aware and OK for patient to go to unit.

## 2023-05-03 NOTE — ED PROVIDER NOTE - PHYSICAL EXAMINATION
GENERAL:  Awake, alert and in NAD, non-toxic appearing  ENMT: Airway patent  EYES: conjunctiva clear  CARDIAC: Regular rate, regular rhythm.  Heart sounds S1, S2, no S3, S4. No murmurs, rubs or gallops.  RESPIRATORY: Breath sounds clear and equal in bilateral anterior lung fields, no wheezes/ronchi/stridor; pt breathing and speaking comfortably with no increased WOB, no accessory mm. use, no intercostal retractions, no nasal flaring  GI: abdomen soft, non-distended, non-tender, no rebound or guarding.  RECTAL: (chaperone RN): dark maroon stool  SKIN: (chaperone RN): bilateral breasts with dressing that are c/d/i at 4 oclock (L breast) and 6 oclock (R breast), skin underlying the dressings have skin ulcerations with overlying necrosis with exudate, no gangrene/warmth/erythema/crepitus  PSYCH: awake, alert, calm and cooperative   no ble edema

## 2023-05-03 NOTE — ED ADULT NURSE REASSESSMENT NOTE - NS ED NURSE REASSESS COMMENT FT1
Covering for Coy RN: At this time 2 RN with 2 attempts each for iv insertion to no success. MD Davies made aware of no iv access and was told to see pt at bedside for ultrasound iv insertion. Clinical impact RN called however not available, in a procedure at this time. PRBC not ongoing at this time. Blood bank made aware and was told that if blood not transfused within 4 hours after blood was released, blood to be returned to blood bank.

## 2023-05-03 NOTE — H&P ADULT - PROBLEM SELECTOR PLAN 4
Patient with ESRD 2/2 PKD, normally on dialysis outpatient. Access L TDC.   - plan for HD on 5/4  - nephrology consulted, f/u recs  - renal diet  - replete electrolytes with caution given ESRD    #Renal bone disease  Pt with hx of hypercalcemia. Calcium 11 on admission.  - start home phosphate binders Patient with ESRD 2/2 PKD, normally on dialysis outpatient. Access L TDC.   - plan for HD on 5/4  - nephrology consulted, f/u recs  - renal diet  - replete electrolytes with caution given ESRD    #Renal bone disease  Pt with hx of hypercalcemia. Calcium 11.1 on admission.  - start home phosphate binders, cinacalcet

## 2023-05-03 NOTE — H&P ADULT - PROBLEM SELECTOR PLAN 6
Found to have acute DVT of left common femoral vein found on 1.28.23. History of multiple DVTs requiring IVC filter placement.   - holding AC iso possible UGIB  - SCDs for ppx Found to have acute DVT of left common femoral vein found on 1.28.23. History of multiple DVTs requiring IVC filter placement.   - holding AC iso possible UGIB  - SCDs for ppx if patient tolerates

## 2023-05-03 NOTE — H&P ADULT - HISTORY OF PRESENT ILLNESS
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     In the ED:  Initial vital signs: T: 98.2 F, HR: 100, BP: 73/45, R: 18, SpO2: 97% on RA  Labs: significant for Hgb 6.2 (MCV 88), PT 30, INR 2.5, aPTT 43.2, K 3.4, BUN 32, Cr 4.57, FOBT positive  CXR: No acute abnormality visualized.  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 Unable to obtain much from patient as she is complaining about pain and unable to provide more history. States she has been having black/maroon stools but unable to verbalize when it started. Complaining of 101/10 pain "in the butt".    Per chart review:  Per daughter at bedside, patient has had bilateral breast wounds for the past several months and the dressings are supposed to be changed every day at the rehab center however she thinks they are not being changed daily.  States patient is a little bit more confused than her baseline today. Daughter states that patient has been ending her hemodialysis early for the past few sessions and that her last hemodialysis session is unknown although patient states he thinks it may have been on Tuesday. Pt and daughter do not know home med list, state "it is in the computer." Daughter also states pt has had intermittent low blood pressure over the past 2 years.      In the ED:  Initial vital signs: T: 98.2 F, HR: 100, BP: 73/45, R: 18, SpO2: 97% on RA  Labs: significant for Hgb 6.2 (MCV 88), PT 30, INR 2.5, aPTT 43.2, K 3.4, BUN 32, Cr 4.57, FOBT positive  CXR: No acute abnormality visualized.  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 Unable to obtain much from patient as she is complaining about pain and unable to provide more history. States she has been having black/maroon stools but unable to verbalize when it started. Complaining of 10/10 pain "in the butt".    Unable to get further history from patient, and daughter no longer at bedside. Further information obtained from ED chart review, noted below:  "Per daughter at bedside, patient has had bilateral breast wounds for the past several months and the dressings are supposed to be changed every day at the rehab center however she thinks they are not being changed daily.  States patient is a little bit more confused than her baseline today. Daughter states that patient has been ending her hemodialysis early for the past few sessions and that her last hemodialysis session is unknown although patient states he thinks it may have been on Tuesday. Pt and daughter do not know home med list, state "it is in the computer." Daughter also states pt has had intermittent low blood pressure over the past 2 years."      In the ED:  Initial vital signs: T: 98.2 F, HR: 100, BP: 73/45, R: 18, SpO2: 97% on RA  Labs: significant for Hgb 6.2 (MCV 88), PT 30, INR 2.5, aPTT 43.2, K 3.4, BUN 32, Cr 4.57, FOBT positive  CXR: No acute abnormality visualized.  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 Unable to obtain much from patient as she is complaining about pain and unable to provide more history. States she has been having black/maroon stools but unable to verbalize when it started. Complaining of 10/10 pain "in the butt".    Unable to get further history from patient, and daughter no longer at bedside. Further information obtained from ED chart review, noted below:  "Per daughter at bedside, patient has had bilateral breast wounds for the past several months and the dressings are supposed to be changed every day at the rehab center however she thinks they are not being changed daily.  States patient is a little bit more confused than her baseline today. Daughter states that patient has been ending her hemodialysis early for the past few sessions and that her last hemodialysis session is unknown although patient states he thinks it may have been on Tuesday. Pt and daughter do not know home med list, state "it is in the computer." Daughter also states pt has had intermittent low blood pressure over the past 2 years."      In the ED:  Initial vital signs: T: 98.2 F, HR: 100, BP: 73/45, R: 18, SpO2: 97% on RA  Labs: significant for Hgb 6.2 (MCV 88), PT 30, INR 2.5, aPTT 43.2, K 3.4, BUN 32, Cr 4.57, FOBT positive  CXR: No acute abnormality visualized.  EKG: Normal sinus rhythm  Medications: 1u pRBC  Consults: GI, nephrology

## 2023-05-03 NOTE — ED ADULT NURSE NOTE - OBJECTIVE STATEMENT
67F PMH ESRD on dialysis (tues, th, sat) BIBA with daughter who states patient had "low blood counts at the facility." pt poor historian, lethargic and unable to respond to assessment questions. pt seen as [facility] with Dr. Figueroa bedside due to hypotension. pt with persistent hypotension when placed on continuous cardiac monitor. writer and 2nd RN unable to place peripheral IV. Dr. Figueroa notified to place ultrasound IV. per daughter, pt has been bedbound and has generalized pain. pt noted to have b/l breast wounds that are unstageable. per daughter, wounds have been there and gotten progressively worse since December when patient was previously hospitalized for same complaint. RR easy, even, un-labored on 2L NC. no active bleeding notable.

## 2023-05-03 NOTE — H&P ADULT - NSHPLABSRESULTS_GEN_ALL_CORE
6.2    10.28 )-----------( 296      ( 03 May 2023 16:50 )             23.2       05-03    135  |  98  |  32<H>  ----------------------------<  81  3.4<L>   |  26  |  4.57<H>    Ca    11.1<H>      03 May 2023 16:50                    PT/INR - ( 03 May 2023 16:50 )   PT: 30.0 sec;   INR: 2.50          PTT - ( 03 May 2023 16:50 )  PTT:43.2 sec    Lactate Trend            CAPILLARY BLOOD GLUCOSE

## 2023-05-03 NOTE — H&P ADULT - PROBLEM SELECTOR PLAN 5
TTE 11/22 normal LV and systolic function, EF 59%, grade II diastolic dysfunction, dilated RV, reduced RV systolic function. Home meds: Entresto 49/51mg.  - hold iso hypotension    #CAD  Hx of cardiac cath in 2018. Home meds: atorvastatin 40mg TTE 11/22 normal LV and systolic function, EF 59%, grade II diastolic dysfunction, dilated RV, reduced RV systolic function. Home meds: Entresto 49/51mg.  - hold iso hypotension    #CAD  Hx of cardiac cath in 2018. Home meds: atorvastatin 40mg, plavix 75mg  - hold for now, formal med rec needed TTE 11/22 normal LV and systolic function, EF 59%, grade II diastolic dysfunction, dilated RV, reduced RV systolic function. However, 02/2023 echo showing ejection fraction of 65-70%. Home meds: Entresto 49/51mg.  - hold iso hypotension    #Nonishcemic cardiomyopathy  #Peripheral vascular disease  Hx of cardiac cath in 2018, no known stents. Home meds: atorvastatin 40mg, plavix 75mg  - hold for now, formal med rec needed TTE 11/22 normal LV and systolic function, EF 59%, grade II diastolic dysfunction, dilated RV, reduced RV systolic function. However, 02/2023 echo showing ejection fraction of 65-70%. Home meds: Entresto 49/51mg.  - hold iso hypotension    #Nonishcemic cardiomyopathy  #Peripheral vascular disease  Hx of cardiac cath in 2018, no known stents. Home meds: atorvastatin 40mg, ?plavix 75mg  - hold for now, formal med rec needed

## 2023-05-03 NOTE — PATIENT PROFILE ADULT - FALL HARM RISK - HARM RISK INTERVENTIONS

## 2023-05-03 NOTE — H&P ADULT - PROBLEM SELECTOR PLAN 3
Patient reportedly with altered mentation per daughter. Appears more groggy and confused when compared to baseline. DDx includes hypoperfusion iso hypotension v. uremia iso multiple incomplete HD sessions v. less likely infection v. seizures.  - monitor mental status for now Patient reportedly with altered mentation per daughter. Appears more groggy and confused when compared to baseline. DDx includes hypoperfusion iso hypotension v. pain v. uremia iso multiple incomplete HD sessions v. less likely infection v. seizures.  - monitor mental status for now Pt complaining of 10/10 pain between gluteal folds. Patient reportedly with altered mental status, however appears aware of situation but focusing primarily on severity of pain.  - pain control as follows: Acetaminophen 650mg q6h for mild pain, Dilaudid 2mg PO q3h prn for moderate pain, Dilaudid 4mg PO q3h prn for severe pain  - reassess mental status after pain better controlled

## 2023-05-03 NOTE — ED PROVIDER NOTE - OBJECTIVE STATEMENT
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 Per daughter at bedside, patient has had bilateral breast wounds for the past several months and the dressings are supposed to be changed every day at the rehab center however she thinks they are not being changed daily.  States patient is a little bit more confused than her baseline today. Daughter states that patient has been ending her hemodialysis early for the past few sessions and that her last hemodialysis session is unknown although patient states he thinks it may have been on Tuesday. Pt and daughter do not know home med list, state "it is in the computer." Daughter also states pt has had intermittent low blood pressure over the past 2 years.    Per triage note: "Pt BIB EMS for hemoglobin of 6.9 from rehab. Per daughter, pt brought in for wounds to bilateral breasts which per daughter, it is "related to a gland in her neck." Pt has hx of hyperthyroidism. Pt denies lightheadedness at this time. Pt poor historian not able to verbalized symptoms."

## 2023-05-03 NOTE — H&P ADULT - PROBLEM SELECTOR PLAN 9
On BiPAP at night.  - c/w BiPAP On BiPAP at night. Per pt, not using at rehab.  - obtain collateral F: None  E: replete with caution given ESRD  N: Renal Diet  DVT ppx: holding chemoprophylaxis iso bleed, SCDs  GI ppx: protonix IV 40mg BID  Code Status: Full Code  Dispo: [facility].

## 2023-05-03 NOTE — H&P ADULT - ATTENDING COMMENTS
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 Patient is a poor historian due to acute pain and limited history is available from her.   Per daughter patient is a little bit more confused than her baseline today  In the ED patients vital signs are T: 98.2 F, tachycardic with pulse of 100, hypotensive with BP of 73/45, RR: 18, SpO2: 97% on RA. Her ED labs are significant for Hgb 6.2 (MCV 88), PT 30, INR 2.5, aPTT 43.2, K 3.4, BUN 32, Cr 4.57, FOBT positive  Patient is admitted for treatment and workup of symptomatic anemia.    1. Symptomatic Anemia   likely secondary to GI bleed given occult blood and history of melena  GI consult   1U PRBC transfusion   post transfusion CBC    2. GI Bleed  GI consult   protonix IV 40 mg BID   NPO after midnight for possible scope     3. Hypercalcemia   per chart review baseline   start home phosphate binders, cinacalcet  patient of HD  consult renal     4. ESRD  renal consult   HD tomorrow    5. Hypotension   Midodrine 40 mg TID    6. Elevated INR  unclear etiology  no recent antibiotic use  no active coumadin use  AST ALT not available add on to r/o hepatic pathology  consider abdominal CT to evaluate liver and physical exam finding of abdominal

## 2023-05-03 NOTE — H&P ADULT - ASSESSMENT
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

## 2023-05-03 NOTE — ED ADULT TRIAGE NOTE - CHIEF COMPLAINT QUOTE
Pt BIB EMS for hemoglobin of 6.9 from rehab. Per daughter, pt brought in for wounds to bilateral breasts which per daughter, it is "related to a gland in her neck." Pt has hx of hyperthyroidism. Pt denies lightheadedness at this time. Pt poor historian not able to verbalized symptoms.

## 2023-05-03 NOTE — H&P ADULT - PROBLEM SELECTOR PLAN 7
Family hx of breast cancer in mother, sister. Imaging from previous admission - US bilateral breasts: No sonographic evidence of breast abscess, cyst or focal mass to correspond with the clinical finding of bilateral breast masses. Breast bx: Epidermal and dermal necrosis with fibrinoid occlusion and necrosis of blood vessels, focal necrotizing suppurative inflammation involving the fibroadipose tissue and blood vessels in the subcutis. Pain responsive to Dilaudid during prior admission.  - start Acetaminophen 650mg q6h for mild pain  - start Dilaudid 2mg PO q3h prn for moderate pain  - start Dilaudid 4mg PO q3h prn for severe pain

## 2023-05-03 NOTE — CONSULT NOTE ADULT - ASSESSMENT
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    Assessment/Plan:   #ESRD on HD  #Calciphylaxis  Usual RX time 3:30 hrs, EDW TBD  Will plan for HD on 5/4  K noted 3.4  Renal Diet  Give sodium thiosulfate with HD     #Hypotension  Pt on previous admission noted to have hypotension, was on midodrine 40mg Q8h at one point w/ florinef  -Monitor BP; can consider small fluid challenge to see if any improvement  -May need to resume midodrine if was not taking it outpatient    #access   L TDC    #anemia  Hgb not at goal  -Transfuse to Hgb > 7  Obtain iron studies and ferritin  Epo w/ HD    #renal bone disease   Ca ~11.1  Trend phos daily  -Resume home phos binders    Thank you for the opportunity to participate in the care of your patient. The nephrology service remains available to assist with any questions or concerns. Please feel free to reach us by paging the on-call nephrology fellow for urgent issues or as below.     John Lisa D.O.  PGY-5, Nephrology Fellow    P: [phone]

## 2023-05-04 NOTE — PROGRESS NOTE ADULT - PROBLEM SELECTOR PLAN 3
Pt complaining of 10/10 pain between gluteal folds. Patient reportedly with altered mental status, however appears aware of situation but focusing primarily on severity of pain.  - pain control as follows: Acetaminophen 650mg q6h for mild pain, Dilaudid 2mg PO q3h prn for moderate pain, Dilaudid 4mg PO q3h prn for severe pain  - reassess mental status after pain better controlled Pt complaining of 10/10 pain between gluteal folds. Per daughter, patient has had altered mental status, not at baseline, however patient appears aware of situation but focusing primarily on severity of pain. Patient protecting airway, opens eyes easily with light sternal rub, however not participating in some discussions.  - pain control as follows: Acetaminophen 650mg q6h for mild pain, Dilaudid 2mg PO q3h prn for moderate pain, Dilaudid 4mg PO q3h prn for severe pain  - continue to monitor mental status  - palliative consult for recommendations regarding pain management and also to discuss code status (patient not participating in discussions, previously full code on last admission in 2/2023)

## 2023-05-04 NOTE — CONSULT NOTE ADULT - SUBJECTIVE AND OBJECTIVE BOX
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 Unable to obtain much from patient as she is complaining about pain and unable to provide more history. States she has been having black/maroon stools but unable to verbalize when it started. Complaining of 10/10 pain "in the butt".    Unable to get further history from patient, and daughter no longer at bedside. Further information obtained from ED chart review, noted below:  "Per daughter at bedside, patient has had bilateral breast wounds for the past several months and the dressings are supposed to be changed every day at the rehab center however she thinks they are not being changed daily.  States patient is a little bit more confused than her baseline today. Daughter states that patient has been ending her hemodialysis early for the past few sessions and that her last hemodialysis session is unknown although patient states he thinks it may have been on Tuesday. Pt and daughter do not know home med list, state "it is in the computer." Daughter also states pt has had intermittent low blood pressure over the past 2 years."      In the ED:  Initial vital signs: T: 98.2 F, HR: 100, BP: 73/45, R: 18, SpO2: 97% on RA  Labs: significant for Hgb 6.2 (MCV 88), PT 30, INR 2.5, aPTT 43.2, K 3.4, BUN 32, Cr 4.57, FOBT positive  CXR: No acute abnormality visualized.  EKG: Normal sinus rhythm  Medications: 1u pRBC  Consults: GI, nephrology (03 May 2023 18:44)    PAST MEDICAL & SURGICAL HISTORY:  HTN (hypertension)      HLD (hyperlipidemia)      CAD (coronary atherosclerotic disease)      ESRD on dialysis  last 11/15/22      H/O ventral hernia      Brown tumor  brain      DVT, lower extremity  left      History of surgery  IVC filter      AVF (arteriovenous fistula)  right left      S/P AIDEN-BSO  2003      History of surgery  RLE PTA stent / atherectomy  7/2021      History of atherectomy  stent        MEDICATIONS  (STANDING):  acetaminophen   IVPB .. 1000 milliGRAM(s) IV Intermittent once  atorvastatin 40 milliGRAM(s) Oral at bedtime  cinacalcet 90 milliGRAM(s) Oral daily  collagenase Ointment 1 Application(s) Topical daily  HYDROmorphone  Injectable 1 milliGRAM(s) IntraMuscular once  midodrine. 40 milliGRAM(s) Oral every 8 hours  Nephro-deborah 1 Tablet(s) Oral daily  pantoprazole  Injectable 40 milliGRAM(s) IV Push every 12 hours  senna 2 Tablet(s) Oral at bedtime  sevelamer carbonate 800 milliGRAM(s) Oral three times a day with meals  sodium chloride 0.9%. 1000 milliLiter(s) (5 mL/Hr) IV Continuous <Continuous>  sodium thiosulfate IVPB 25 Gram(s) IV Intermittent once    MEDICATIONS  (PRN):  acetaminophen     Tablet .. 650 milliGRAM(s) Oral every 6 hours PRN Mild Pain (1 - 3)  calamine/zinc oxide Lotion 1 Application(s) Topical three times a day PRN Itching  HYDROmorphone   Tablet 2 milliGRAM(s) Oral every 3 hours PRN Moderate Pain (4 - 6)  HYDROmorphone   Tablet 4 milliGRAM(s) Oral every 3 hours PRN Severe Pain (7 - 10)  lidocaine 4% Cream 1 Application(s) Topical three times a day PRN Breast pain    FAMILY HISTORY:  No pertinent family history in first degree relatives        CBC Full  -  ( 03 May 2023 16:50 )  WBC Count : 10.28 K/uL  RBC Count : 2.63 M/uL  Hemoglobin : 6.2 g/dL  Hematocrit : 23.2 %  Platelet Count - Automated : 296 K/uL  Mean Cell Volume : 88.2 fl  Mean Cell Hemoglobin : 23.6 pg  Mean Cell Hemoglobin Concentration : 26.7 gm/dL  Auto Neutrophil # : 9.03 K/uL  Auto Lymphocyte # : 0.71 K/uL  Auto Monocyte # : 0.27 K/uL  Auto Eosinophil # : 0.09 K/uL  Auto Basophil # : 0.09 K/uL  Auto Neutrophil % : 87.8 %  Auto Lymphocyte % : 6.9 %  Auto Monocyte % : 2.6 %  Auto Eosinophil % : 0.9 %  Auto Basophil % : 0.9 %      05-03    135  |  98  |  32<H>  ----------------------------<  81  3.4<L>   |  26  |  4.57<H>    Ca    11.1<H>      03 May 2023 16:50  Phos  4.3     05-03  Mg     2.1     05-03    TPro  6.0  /  Alb  2.7<L>  /  TBili  0.4  /  DBili  0.2  /  AST  23  /  ALT  6<L>  /  AlkPhos  128<H>  05-03          Radiology :     < from: Xray Chest 1 View- PORTABLE-Urgent (Xray Chest 1 View- PORTABLE-Urgent .) (05.03.23 @ 17:13) >  ACC: [id number] EXAM:  XR CHEST PORTABLE URGENT 1V   ORDERED BY: AMPARO MONTOYA     PROCEDURE DATE:  05/03/2023          INTERPRETATION:  History: Low blood pressure .    Technique: Single view of the chest was performed.    Comparisons: Comparison is made with prior study performed on 1/17/2023 .    Findings: Right subclavian and right superior vena cava stent noted.   Distal tip of left IJ catheter at the level of the right atrium.    The cardiac silhouette is not enlarged.    The mediastinum does not appear widened.    No pneumothorax is visualized.    No pneumomediastinum is visualized.    No pleural effusions visualized.    No evidence of pneumonia visualized.    No acute fracture is visualized.    Impression: No acute abnormality visualized.                  REVIEW OF SYSTEMS:  per hpi         Vital Signs Last 24 Hrs  T(C): 36.4 (04 May 2023 04:25), Max: 37.6 (03 May 2023 16:11)  T(F): 97.6 (04 May 2023 04:25), Max: 99.6 (03 May 2023 16:11)  HR: 97 (04 May 2023 04:25) (79 - 100)  BP: 106/63 (04 May 2023 04:25) (73/45 - 109/69)  BP(mean): --  RR: 17 (04 May 2023 04:25) (17 - 19)  SpO2: 100% (04 May 2023 04:25) (97% - 100%)    Parameters below as of 04 May 2023 04:25  Patient On (Oxygen Delivery Method): nasal cannula  O2 Flow (L/min): 2          Physical Exam :   67 y o woman lying comfortably in semi Kelley's position , awake , NAD, oriented to self      Neurologic Exam :    Alert and oriented to person  follows commands      Motor Exam:          Right UE:               no focal weakness ,  > 3+/5 throughout      Left UE:                 no focal weakness ,  > 3+/5 throughout          Right LE:    no focal weakness ,  > 3+/5 throughout        Left LE:    no focal weakness ,  > 3+/5 throughout         Sensation :         intact to light touch x 4 extremities       DTR :     biceps/brachioradialis : equal                      patella/ankle : equal     neg Babinski     Gait :  not tested          PM&R Impression :    admitted for  anemia/ r/o GIB          Recommendations / Plan :           1) Physical / Occupational therapy focusing on therapeutic exercises , equipment evaluation , bed mobility/transfer out of bed evaluation , progressive ambulation with assistive devices prn .    2) Current disposition plan recommendation  :   return to [facility]

## 2023-05-04 NOTE — CONSULT NOTE ADULT - SUBJECTIVE AND OBJECTIVE BOX
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 Per patient has been having black/maroon stools but unable to verbalize when it started. Per daughter patient has bilateral breast wounds for the past several months.     GI consulted for anemia due to acute blood loss w/ melena. JUAN showing maroon stool. Labs with hgb of 6.3 on admission (6.9 in rehab). S/p 1pRBC and Protonix 40mg IV BID.     PAST MEDICAL & SURGICAL HISTORY:  HTN (hypertension)  HLD (hyperlipidemia)  CAD (coronary atherosclerotic disease)  ESRD on dialysis last 11/15/22  H/O ventral hernia  Brown tumor brain  DVT, lower extremity left  History of surgery IVC filter  AVF (arteriovenous fistula) right left    History of surgery  RLE PTA stent / atherectomy  7/2021  History of atherectomy stent  S/P [facility]-BSO 2003    REVIEW OF SYSTEMS:  CONSTITUTIONAL: No fever, weight loss, or fatigue  EYES: No eye pain, visual disturbances, or discharge  ENMT:  No difficulty hearing, tinnitus, vertigo; No sinus or throat pain  NECK: No pain or stiffness  BREASTS: No pain, masses, or nipple discharge  RESPIRATORY: No cough, wheezing, chills or hemoptysis; No shortness of breath  CARDIOVASCULAR: No chest pain, palpitations, dizziness, or leg swelling  GASTROINTESTINAL: No abdominal or epigastric pain. No nausea, vomiting, or hematemesis; No diarrhea or constipation. No melena or hematochezia.  GENITOURINARY: No dysuria, frequency, hematuria, or incontinence  NEUROLOGICAL: No headaches, memory loss, loss of strength, numbness, or tremors  SKIN: No itching, burning, rashes, or lesions   LYMPH NODES: No enlarged glands  ENDOCRINE: No heat or cold intolerance; No hair loss  MUSCULOSKELETAL: No joint pain or swelling; No muscle, back, or extremity pain  PSYCHIATRIC: No depression, anxiety, mood swings, or difficulty sleeping  HEME/LYMPH: No easy bruising, or bleeding gums  ALLERY AND IMMUNOLOGIC: No hives or eczema    T(C): 36.4 (05-04-23 @ 04:25), Max: 37.6 (05-03-23 @ 16:11)  HR: 97 (05-04-23 @ 04:25) (79 - 100)  BP: 106/63 (05-04-23 @ 04:25) (73/45 - 109/69)  RR: 17 (05-04-23 @ 04:25) (17 - 19)  SpO2: 100% (05-04-23 @ 04:25) (97% - 100%)  Wt(kg): --Vital Signs Last 24 Hrs  T(C): 36.4 (04 May 2023 04:25), Max: 37.6 (03 May 2023 16:11)  T(F): 97.6 (04 May 2023 04:25), Max: 99.6 (03 May 2023 16:11)  HR: 97 (04 May 2023 04:25) (79 - 100)  BP: 106/63 (04 May 2023 04:25) (73/45 - 109/69)  BP(mean): --  RR: 17 (04 May 2023 04:25) (17 - 19)  SpO2: 100% (04 May 2023 04:25) (97% - 100%)    Parameters below as of 04 May 2023 04:25  Patient On (Oxygen Delivery Method): nasal cannula  O2 Flow (L/min): 2    PHYSICAL EXAM:  GENERAL: NAD, well-groomed, well-developed  HEAD:  Atraumatic, Normocephalic  EYES: EOMI, PERRLA, conjunctiva and sclera clear  ENMT: No tonsillar erythema, exudates, or enlargement; Moist mucous membranes, Good dentition, No lesions  NECK: Supple, No JVD, Normal thyroid  NERVOUS SYSTEM:  Alert & Oriented X3, Good concentration; Motor Strength 5/5 B/L upper and lower extremities; DTRs 2+ intact and symmetric  CHEST/LUNG: Clear to percussion bilaterally; No rales, rhonchi, wheezing, or rubs  HEART: Regular rate and rhythm; No murmurs, rubs, or gallops  ABDOMEN: Soft, Nontender, Nondistended; Bowel sounds present  EXTREMITIES:  2+ Peripheral Pulses, No clubbing, cyanosis, or edema  LYMPH: No lymphadenopathy noted  SKIN: No rashes or lesions    Consultant(s) Notes Reviewed:  [x ] YES  [ ] NO  Care Discussed with Consultants/Other Providers [ x] YES  [ ] NO    LABS:                        6.2    10.28 )-----------( 296      ( 03 May 2023 16:50 )             23.2     05-03    135  |  98  |  32<H>  ----------------------------<  81  3.4<L>   |  26  |  4.57<H>    Ca    11.1<H>      03 May 2023 16:50  Phos  4.3     05-03  Mg     2.1     05-03    TPro  6.0  /  Alb  2.7<L>  /  TBili  0.4  /  DBili  0.2  /  AST  23  /  ALT  6<L>  /  AlkPhos  128<H>  05-03    PT/INR - ( 03 May 2023 16:50 )   PT: 30.0 sec;   INR: 2.50          PTT - ( 03 May 2023 16:50 )  PTT:43.2 sec    LIVER FUNCTIONS - ( 03 May 2023 16:50 )  Alb: 2.7 g/dL / Pro: 6.0 g/dL / ALK PHOS: 128 U/L / ALT: 6 U/L / AST: 23 U/L / GGT: x                 RADIOLOGY & ADDITIONAL TESTS:    Imaging Personally Reviewed:  [ ] YES  [ ] NO CONSULT NOTE - Gastroenterology     HPI:  67F pt with PMH 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 Per patient has been having black/maroon stools but unable to verbalize when it started. Per daughter patient has bilateral breast wounds for the past several months.     GI consulted for anemia due to acute blood loss w/ melena. JUAN showing maroon stool. Labs with hgb of 6.3 on admission (6.9 in rehab). S/p 1pRBC and Protonix 40mg IV BID.     PAST MEDICAL & SURGICAL HISTORY:  HTN (hypertension)  HLD (hyperlipidemia)  CAD (coronary atherosclerotic disease)  ESRD on dialysis last 11/15/22  H/O ventral hernia  Brown tumor brain  DVT, lower extremity left  History of surgery IVC filter  AVF (arteriovenous fistula) right left    History of surgery  RLE PTA stent / atherectomy  7/2021  History of atherectomy stent  S/P AIDEN-BSO 2003    REVIEW OF SYSTEMS:  AS above    T(C): 36.4 (05-04-23 @ 04:25), Max: 37.6 (05-03-23 @ 16:11)  HR: 97 (05-04-23 @ 04:25) (79 - 100)  BP: 106/63 (05-04-23 @ 04:25) (73/45 - 109/69)  RR: 17 (05-04-23 @ 04:25) (17 - 19)  SpO2: 100% (05-04-23 @ 04:25) (97% - 100%)  Wt(kg): --Vital Signs Last 24 Hrs  T(C): 36.4 (04 May 2023 04:25), Max: 37.6 (03 May 2023 16:11)  T(F): 97.6 (04 May 2023 04:25), Max: 99.6 (03 May 2023 16:11)  HR: 97 (04 May 2023 04:25) (79 - 100)  BP: 106/63 (04 May 2023 04:25) (73/45 - 109/69)  BP(mean): --  RR: 17 (04 May 2023 04:25) (17 - 19)  SpO2: 100% (04 May 2023 04:25) (97% - 100%)    Parameters below as of 04 May 2023 04:25  Patient On (Oxygen Delivery Method): nasal cannula  O2 Flow (L/min): 2    PHYSICAL EXAM:  GENERAL: Morbid obese woman, NAD, well-groomed, well-developed  HEAD:  Atraumatic, Normocephalic  EYES: EOMI, PERRLA, conjunctiva and sclera clear  ENMT: No tonsillar erythema, exudates, or enlargement; Moist mucous membranes, Good dentition, No lesions  NECK: Supple, No JVD, Normal thyroid  NERVOUS SYSTEM:  Alert & Oriented X3, Good concentration; Motor Strength 5/5 B/L upper and lower extremities; DTRs 2+ intact and symmetric  CHEST/LUNG: Clear to percussion bilaterally; No rales, rhonchi, wheezing, or rubs  HEART: Regular rate and rhythm; No murmurs, rubs, or gallops  ABDOMEN: Soft with mild tenders, Nondistended; Bowel sounds present, ventral hernia   EXTREMITIES:  2+ Peripheral Pulses, No clubbing, cyanosis, or edema  LYMPH: No lymphadenopathy noted  SKIN: Left chest HD cath, bl lower breast wounds    Consultant(s) Notes Reviewed:  [x ] YES  [ ] NO  Care Discussed with Consultants/Other Providers [ x] YES  [ ] NO    LABS:                        6.2    10.28 )-----------( 296      ( 03 May 2023 16:50 )             23.2     05-03    135  |  98  |  32<H>  ----------------------------<  81  3.4<L>   |  26  |  4.57<H>    Ca    11.1<H>      03 May 2023 16:50  Phos  4.3     05-03  Mg     2.1     05-03    TPro  6.0  /  Alb  2.7<L>  /  TBili  0.4  /  DBili  0.2  /  AST  23  /  ALT  6<L>  /  AlkPhos  128<H>  05-03    PT/INR - ( 03 May 2023 16:50 )   PT: 30.0 sec;   INR: 2.50          PTT - ( 03 May 2023 16:50 )  PTT:43.2 sec    LIVER FUNCTIONS - ( 03 May 2023 16:50 )  Alb: 2.7 g/dL / Pro: 6.0 g/dL / ALK PHOS: 128 U/L / ALT: 6 U/L / AST: 23 U/L / GGT: x                 RADIOLOGY & ADDITIONAL TESTS:    Imaging Personally Reviewed:  [ ] YES  [ ] NO CONSULT NOTE - Gastroenterology     **pending**    HPI:  HPI: Patient is a 68 yo F pt with PMH ESRD  2/2 PCKD (on HD T/Th/Sat, last HD unknown, via chest port), HFrEF (EF 65-70% most recently), nonischemic cardiomyopathy, HTN, HLD, PE (2018) s/p IVC filter, mild AS, mild MR, PAD, OA, h/o thrombus in vascular access x3 (R AVG, R AVF, L AVG), ventral hernia, 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 Unable to obtain much from patient as she is complaining about pain and unable to provide more history. States she has been having black/maroon stools but unable to verbalize when it started. Complaining of 10/10 pain "in the butt".    Unable to get further history from patient, and daughter no longer at bedside. Further information obtained from ED chart review, noted below:  "Per daughter at bedside, patient has had bilateral breast wounds for the past several months and the dressings are supposed to be changed every day at the rehab center however she thinks they are not being changed daily.  States patient is a little bit more confused than her baseline today. Daughter states that patient has been ending her hemodialysis early for the past few sessions and that her last hemodialysis session is unknown although patient states he thinks it may have been on Tuesday. Pt and daughter do not know home med list, state "it is in the computer." Daughter also states pt has had intermittent low blood pressure over the past 2 years."      GI consulted for anemia due to acute blood loss w/ melena. JUAN showing maroon stool. Labs with hgb of 6.3 on admission (6.9 in rehab). S/p 1pRBC and Protonix 40mg IV BID.     PAST MEDICAL & SURGICAL HISTORY:  HTN (hypertension)  HLD (hyperlipidemia)  CAD (coronary atherosclerotic disease)  ESRD on dialysis last 11/15/22  H/O ventral hernia  Brown tumor brain  DVT, lower extremity left  History of surgery IVC filter  AVF (arteriovenous fistula) right left    History of surgery  RLE PTA stent / atherectomy  7/2021  History of atherectomy stent  S/P AIDEN-BSO 2003    REVIEW OF SYSTEMS:  AS above    T(C): 36.4 (05-04-23 @ 04:25), Max: 37.6 (05-03-23 @ 16:11)  HR: 97 (05-04-23 @ 04:25) (79 - 100)  BP: 106/63 (05-04-23 @ 04:25) (73/45 - 109/69)  RR: 17 (05-04-23 @ 04:25) (17 - 19)  SpO2: 100% (05-04-23 @ 04:25) (97% - 100%)  Wt(kg): --Vital Signs Last 24 Hrs  T(C): 36.4 (04 May 2023 04:25), Max: 37.6 (03 May 2023 16:11)  T(F): 97.6 (04 May 2023 04:25), Max: 99.6 (03 May 2023 16:11)  HR: 97 (04 May 2023 04:25) (79 - 100)  BP: 106/63 (04 May 2023 04:25) (73/45 - 109/69)  BP(mean): --  RR: 17 (04 May 2023 04:25) (17 - 19)  SpO2: 100% (04 May 2023 04:25) (97% - 100%)    Parameters below as of 04 May 2023 04:25  Patient On (Oxygen Delivery Method): nasal cannula  O2 Flow (L/min): 2    PHYSICAL EXAM:  GENERAL: Morbid obese woman, NAD, well-groomed, well-developed  HEAD:  Atraumatic, Normocephalic  EYES: EOMI, PERRLA, conjunctiva and sclera clear  ENMT: No tonsillar erythema, exudates, or enlargement; Moist mucous membranes, Good dentition, No lesions  NECK: Supple, No JVD, Normal thyroid  NERVOUS SYSTEM:  Alert & Oriented X3, Good concentration; Motor Strength 5/5 B/L upper and lower extremities; DTRs 2+ intact and symmetric  CHEST/LUNG: Clear to percussion bilaterally; No rales, rhonchi, wheezing, or rubs  HEART: Regular rate and rhythm; No murmurs, rubs, or gallops  ABDOMEN: Soft with mild tenders, Nondistended; Bowel sounds present, ventral hernia   EXTREMITIES:  2+ Peripheral Pulses, No clubbing, cyanosis, or edema  LYMPH: No lymphadenopathy noted  SKIN: Left chest HD cath, bl lower breast wounds    Consultant(s) Notes Reviewed:  [x ] YES  [ ] NO  Care Discussed with Consultants/Other Providers [ x] YES  [ ] NO    LABS:                        6.2    10.28 )-----------( 296      ( 03 May 2023 16:50 )             23.2     05-03    135  |  98  |  32<H>  ----------------------------<  81  3.4<L>   |  26  |  4.57<H>    Ca    11.1<H>      03 May 2023 16:50  Phos  4.3     05-03  Mg     2.1     05-03    TPro  6.0  /  Alb  2.7<L>  /  TBili  0.4  /  DBili  0.2  /  AST  23  /  ALT  6<L>  /  AlkPhos  128<H>  05-03    PT/INR - ( 03 May 2023 16:50 )   PT: 30.0 sec;   INR: 2.50          PTT - ( 03 May 2023 16:50 )  PTT:43.2 sec    LIVER FUNCTIONS - ( 03 May 2023 16:50 )  Alb: 2.7 g/dL / Pro: 6.0 g/dL / ALK PHOS: 128 U/L / ALT: 6 U/L / AST: 23 U/L / GGT: x                 RADIOLOGY & ADDITIONAL TESTS:    Imaging Personally Reviewed:  [ ] YES  [ ] NO CONSULT NOTE - Gastroenterology     HPI:  HPI: Patient is a 68 yo F pt with PMH ESRD  2/2 PCKD (on HD T/Th/Sat, last HD unknown, via chest port), HFrEF (EF 65-70% most recently), nonischemic cardiomyopathy, HTN, HLD, PE (2018) s/p IVC filter, mild AS, mild MR, PAD, OA, h/o thrombus in vascular access x3 (R AVG, R AVF, L AVG), ventral hernia, 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 Pt non participatory in exam at bedside. Remainder of HPI obtained through chart review    Per daughter at bedside on admission as documented, patient has had bilateral breast wounds for the past several months and the dressings are supposed to be changed every day at the rehab center however she thinks they are not being changed daily.  States patient is a little bit more confused than her baseline today. Daughter states that patient has been ending her hemodialysis early for the past few sessions and that her last hemodialysis session is unknown although patient states he thinks it may have been on Tuesday. Pt and daughter do not know home med list, state "it is in the computer." Daughter also states pt has had intermittent low blood pressure over the past 2 years."    GI consulted for anemia due to acute blood loss w/ melena. JUAN showing maroon stool. Labs with hgb of 6.3 on admission (6.9 in rehab). S/p 1pRBC and Protonix 40mg IV BID.     PAST MEDICAL & SURGICAL HISTORY:  HTN (hypertension)  HLD (hyperlipidemia)  CAD (coronary atherosclerotic disease)  ESRD on dialysis last 11/15/22  H/O ventral hernia  Brown tumor brain  DVT, lower extremity left  History of surgery IVC filter  AVF (arteriovenous fistula) right left    History of surgery  RLE PTA stent / atherectomy  7/2021  History of atherectomy stent  S/P [facility] 2003    REVIEW OF SYSTEMS:  AS above    T(C): 36.4 (05-04-23 @ 04:25), Max: 37.6 (05-03-23 @ 16:11)  HR: 97 (05-04-23 @ 04:25) (79 - 100)  BP: 106/63 (05-04-23 @ 04:25) (73/45 - 109/69)  RR: 17 (05-04-23 @ 04:25) (17 - 19)  SpO2: 100% (05-04-23 @ 04:25) (97% - 100%)  Wt(kg): --Vital Signs Last 24 Hrs  T(C): 36.4 (04 May 2023 04:25), Max: 37.6 (03 May 2023 16:11)  T(F): 97.6 (04 May 2023 04:25), Max: 99.6 (03 May 2023 16:11)  HR: 97 (04 May 2023 04:25) (79 - 100)  BP: 106/63 (04 May 2023 04:25) (73/45 - 109/69)  BP(mean): --  RR: 17 (04 May 2023 04:25) (17 - 19)  SpO2: 100% (04 May 2023 04:25) (97% - 100%)    Parameters below as of 04 May 2023 04:25  Patient On (Oxygen Delivery Method): nasal cannula  O2 Flow (L/min): 2    PHYSICAL EXAM:  GENERAL: obese woman, NAD  ABDOMEN: Soft with mild diffuse TTP, Nondistended; Bowel sounds present, ventral hernia, reducible  SKIN: Left chest HD cath, bl lower breast wounds    Consultant(s) Notes Reviewed:  [x ] YES  [ ] NO  Care Discussed with Consultants/Other Providers [ x] YES  [ ] NO    LABS:                        6.2    10.28 )-----------( 296      ( 03 May 2023 16:50 )             23.2     05-03    135  |  98  |  32<H>  ----------------------------<  81  3.4<L>   |  26  |  4.57<H>    Ca    11.1<H>      03 May 2023 16:50  Phos  4.3     05-03  Mg     2.1     05-03    TPro  6.0  /  Alb  2.7<L>  /  TBili  0.4  /  DBili  0.2  /  AST  23  /  ALT  6<L>  /  AlkPhos  128<H>  05-03    PT/INR - ( 03 May 2023 16:50 )   PT: 30.0 sec;   INR: 2.50          PTT - ( 03 May 2023 16:50 )  PTT:43.2 sec    LIVER FUNCTIONS - ( 03 May 2023 16:50 )  Alb: 2.7 g/dL / Pro: 6.0 g/dL / ALK PHOS: 128 U/L / ALT: 6 U/L / AST: 23 U/L / GGT: x                 RADIOLOGY & ADDITIONAL TESTS:    Imaging Personally Reviewed:  [ ] YES  [ ] NO

## 2023-05-04 NOTE — DIETITIAN INITIAL EVALUATION ADULT - PROBLEM SELECTOR PLAN 4
Patient with ESRD 2/2 PKD, normally on dialysis outpatient. Access L TDC.   - plan for HD on 5/4  - nephrology consulted, f/u recs  - renal diet  - replete electrolytes with caution given ESRD    #Renal bone disease  Pt with hx of hypercalcemia. Calcium 11.1 on admission.  - start home phosphate binders, cinacalcet

## 2023-05-04 NOTE — PROCEDURE NOTE - NSICDXPROCEDURE_GEN_ALL_CORE_FT
PROCEDURES:  Insertion of intravenous catheter with ultrasound guidance 04-May-2023 00:14:58  Julio Josue

## 2023-05-04 NOTE — DIETITIAN INITIAL EVALUATION ADULT - PERTINENT MEDS FT
MEDICATIONS  (STANDING):  atorvastatin 40 milliGRAM(s) Oral at bedtime  cinacalcet 90 milliGRAM(s) Oral daily  collagenase Ointment 1 Application(s) Topical daily  HYDROmorphone  Injectable 1 milliGRAM(s) IntraMuscular once  midodrine. 40 milliGRAM(s) Oral every 8 hours  Nephro-deborah 1 Tablet(s) Oral daily  pantoprazole  Injectable 40 milliGRAM(s) IV Push every 12 hours  senna 2 Tablet(s) Oral at bedtime  sevelamer carbonate 800 milliGRAM(s) Oral three times a day with meals  sodium chloride 0.9%. 1000 milliLiter(s) (5 mL/Hr) IV Continuous <Continuous>    MEDICATIONS  (PRN):  acetaminophen     Tablet .. 650 milliGRAM(s) Oral every 6 hours PRN Mild Pain (1 - 3)  calamine/zinc oxide Lotion 1 Application(s) Topical three times a day PRN Itching  HYDROmorphone   Tablet 2 milliGRAM(s) Oral every 3 hours PRN Moderate Pain (4 - 6)  HYDROmorphone   Tablet 4 milliGRAM(s) Oral every 3 hours PRN Severe Pain (7 - 10)  lidocaine 4% Cream 1 Application(s) Topical three times a day PRN Breast pain

## 2023-05-04 NOTE — PROGRESS NOTE ADULT - PROBLEM SELECTOR PLAN 4
Patient with ESRD 2/2 PKD, normally on dialysis outpatient. Access L TDC.   - plan for HD on 5/4  - nephrology consulted, f/u recs  - renal diet  - replete electrolytes with caution given ESRD    #Renal bone disease  Pt with hx of hypercalcemia. Calcium 11.1 on admission.  - start home phosphate binders, cinacalcet Patient with ESRD 2/2 PKD, normally on dialysis outpatient. Access L TDC.   - plan for HD on 5/4  - nephrology consulted, f/u recs  - renal diet  - replete electrolytes with caution given ESRD    #Renal bone disease  Pt with hx of hypercalcemia. Calcium 11.1 on admission.  - resumed home phosphate binders, cinacalcet

## 2023-05-04 NOTE — PROCEDURE NOTE - ADDITIONAL PROCEDURE DETAILS
USPIV inserted 20g 8cm extended dwell catheter to left upper arm, x 1 attempt, patient tolerated well and flushes w/o difficulty and has brisk blood return.     **Please ensure KVO is running thru PIV when fluids or medications are not running NS @ 5-10ml/hr if not contraindicated.

## 2023-05-04 NOTE — PROGRESS NOTE ADULT - PROBLEM SELECTOR PLAN 1
Patient presenting from rehab with hemoglobin of 6.9. Per chart review, patient has been more confused lately. JUAN showing maroon stool. Hgb 6.3 on admission.   - patient receiving 1u pRBC in the ED  - start IV protonix 40mg BID  - maintain active T&S  - transfuse for hemoglobin < 7  - ordered iron studies, f/u results  - NPO at midnight for possible scope  - CTA ordered for concern for mesenteric ischemia, f/u results and consult surgery if showing signs of mesenteric ischemia  - GI consulted, f/u recs Patient presenting from rehab with hemoglobin of 6.9. Per chart review, patient has been more confused lately. JUAN on admission, showing maroon stool. FOBT positive. Hgb 6.3 on admission., s/p 1u pRBC in the ED.  - GI consulted for UGIB, possible EGD  - c/w IV protonix 40mg BID  - maintain active T&S  - transfuse for hemoglobin < 7  - ordered iron studies, f/u results  - NPO at midnight for possible scope  - CT A/P with IV contrast ordered give initial concern for mesenteric ischemia

## 2023-05-04 NOTE — CONSULT NOTE ADULT - ASSESSMENT
{\rtf1\nnnhqs69146\ansi\piuyumk6296\ftnbj\uc1\deff0  {\fonttbl{\f0 \fnil Segoe UI;}{\f1 \fnil \fcharset0 Segoe UI;}{\f2 \fnil Times New Daniel;}}  {\colortbl ;\dke794\rmvkd825\xdij948 ;\red0\green0\blue0 ;\red0\green0\cpqd794 ;\red0\green0\blue0 ;}  {\stylesheet{\f0\fs20 Normal;}{\cs1 Default Paragraph Font;}{\cs2\f0\fs16 Line Number;}{\cs3\f2\fs24\ul\cf3 Hyperlink;}}  {\*\revtbl{Unknown;}}  \rvrqwk49493\zwiwqx12323\smwrh3716\wzgae1072\djhuo4224\mrevj5714\rwhqvlz727\tipefwq675\nogrowautofit\befsem813\formshade\nofeaturethrottle1\dntblnsbdb\fet4\aendnotes\aftnnrlc\pgbrdrhead\pgbrdrfoot  \sectd\yupqsp19773\ordxlr88883\guttersxn0\mvboricz5336\nbynvwzx9750\mgaddzrm3732\yxccbjpa4870\oxdfkuq953\vdzvrqf292\sbkpage\pgncont\pgndec  \plain\plain\f0\fs24\ql\plain\f0\fs24\plain\f0\fs20\clsi4336\hich\f0\dbch\f0\loch\f0\fs20 IM\par  \par  67 y o F pt with PMH ESRD  2/2 PCKD (on HD T/Th/Sat, via chest port), HFrEF (EF %), nonischemic cardiomyopathy, HTN, HLD, PE (2018) s/p IVC filter, mild AS, mild MR, PAD, OA, h/o thrombus in vascular access x3 (R AVG, R AVF, L AVG), ventral hernia, 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 \par  \par  \plain\f1\fs20\rsfz7462\hich\f1\dbch\f1\loch\f1\cf2\fs20\ul{\field{\*\fldinst HYPERLINK [id number] }{\fldrslt Problem/Plan - 1:}}\plain\f0\fs20\pzpq5972\hich\f0\dbch\f0\loch\f0\fs20\ql\par  \'b7  {\*\bkmkstart [id number]}{\*\bkmkend cb76948454676}Problem: {\*\bkmkstart kb18216062030}{\*\bkmkend ss88783652155}Anemia due to acute blood loss. \par  \'b7  {\*\bkmkstart gq59147453233}{\*\bkmkend fo11664098241}Plan: {\*\bkmkstart tr44751074788}{\*\bkmkend ze02797894301}Patient presenting from rehab with hemoglobin of 6.9. Per chart review, patient has been more confused lately. JUAN showing maroon stool.   Hgb 6.3 on admission. \par  - patient receiving 1u pRBC in the ED\par  - start IV protonix 40mg BID\par  - maintain active T&S\par  - transfuse for hemoglobin < 7\par  - ordered iron studies, f/u results\par  - NPO at midnight for possible scope\par  - CTA ordered for concern for mesenteric ischemia, f/u results and consult surgery if showing signs of mesenteric ischemia\par  - GI consulted, f/u recs.\par  \par  \plain\f1\fs20\ugmu5352\hich\f1\dbch\f1\loch\f1\cf2\fs20\ul{\field{\*\fldinst HYPERLINK [id number] }{\fldrslt Problem/Plan - 2:}}\plain\f0\fs20\aisn6050\hich\f0\dbch\f0\loch\f0\fs20\ql\par  \'b7  {\*\bkmkstart eh47430084849}{\*\bkmkend je75567803988}Problem: {\*\bkmkstart iv19534561823}{\*\bkmkend ky16365674787}Hypotension. \par  \'b7  {\*\bkmkstart ud98148677076}{\*\bkmkend tz98741491132}Plan: {\*\bkmkstart qc50556220003}{\*\bkmkend zt94479094418}Patient hypotensive 73/45 on admission, improved to sBP 110s on recheck. On midodrine 40 mg PO q8h (goal MAP >55) during previous admission.   Patient with anemia requiring transfusion of 1u pRBC. \par  - given volume resuscitation with blood products and ESRD/HFrEF, will hold of on further fluids at this time\par  - monitor BP after receiving blood\par  - c/w midodrine 40mg q8h.\par  \par  \plain\f1\fs20\uaqm1255\hich\f1\dbch\f1\loch\f1\cf2\fs20\ul{\field{\*\fldinst HYPERLINK [id number] }{\fldrslt Problem/Plan - 3:}}\plain\f0\fs20\lsfd5560\hich\f0\dbch\f0\loch\f0\fs20\ql\par  \'b7  {\*\bkmkstart vq39991466526}{\*\bkmkend fr37610397858}Problem: {\*\bkmkstart wd13946364398}{\*\bkmkend ds13242376280}Anal or rectal pain. \par  \'b7  {\*\bkmkstart xb36011573586}{\*\bkmkend fl97622994333}Plan: {\*\bkmkstart og46897262933}{\*\bkmkend ae55548507125}Pt complaining of 10/10 pain between gluteal folds. Patient reportedly with altered mental status, however appears aware of situation   but focusing primarily on severity of pain.\par  - pain control as follows: Acetaminophen 650mg q6h for mild pain, Dilaudid 2mg PO q3h prn for moderate pain, Dilaudid 4mg PO q3h prn for severe pain\par  - reassess mental status after pain better controlled.\par  \par  \plain\f1\fs20\cajn6189\hich\f1\dbch\f1\loch\f1\cf2\fs20\ul{\field{\*\fldinst HYPERLINK [id number] }{\fldrslt Problem/Plan - 4:}}\plain\f0\fs20\ysif6784\hich\f0\dbch\f0\loch\f0\fs20\ql\par  \'b7  {\*\bkmkstart ng74478669219}{\*\bkmkend vi40880224084}Problem: {\*\bkmkstart hm74318124790}{\*\bkmkend eu80580635196}ESRD on dialysis. \par  \'b7  {\*\bkmkstart dz14227904366}{\*\bkmkend ji78562375494}Plan: {\*\bkmkstart gh82556946176}{\*\bkmkend gm06530713433}Patient with ESRD 2/2 PKD, normally on dialysis outpatient. Access L TDC. \par  - plan for HD on 5/4\par  - nephrology consulted, f/u recs\par  - renal diet\par  - replete electrolytes with caution given ESRD\par  \par  #Renal bone disease\par  Pt with hx of hypercalcemia. Calcium 11.1 on admission.\par  - start home phosphate binders, cinacalcet.\par  \par  \plain\f1\fs20\kaqd8857\hich\f1\dbch\f1\loch\f1\cf2\fs20\ul{\field{\*\fldinst HYPERLINK [id number] }{\fldrslt Problem/Plan - 5:}}\plain\f0\fs20\ehps1027\hich\f0\dbch\f0\loch\f0\fs20\ql\par  \'b7  {\*\bkmkstart xj30327449907}{\*\bkmkend rl13259594880}Problem: {\*\bkmkstart fm57098454843}{\*\bkmkend ba97377983364}Chronic HFrEF (heart failure with reduced ejection fraction). \par  \'b7  {\*\bkmkstart hz24788414240}{\*\bkmkend rq52037411043}Plan: {\*\bkmkstart ov60105576200}{\*\bkmkend rv79919345476}TTE 11/22 normal LV and systolic function, EF 59%, grade II diastolic dysfunction, dilated RV, reduced RV systolic function. However,   02/2023 echo showing ejection fraction of 65-70%. Home meds: Entresto 49/51mg.\par  - hold iso hypotension\par  \par  #Nonishcemic cardiomyopathy\par  #Peripheral vascular disease\par  Hx of cardiac cath in 2018, no known stents. Home meds: atorvastatin 40mg, ?plavix 75mg\par  - hold for now, formal med rec needed.\plain\f1\fs20\bott9072\hich\f1\dbch\f1\loch\f1\cf2\fs20\strike\plain\f0\fs20\clqj1789\hich\f0\dbch\f0\loch\f0\fs20\par  \par  \plain\f1\fs20\isxa8763\hich\f1\dbch\f1\loch\f1\cf2\fs20\ul{\field{\*\fldinst HYPERLINK [id number] }{\fldrslt Problem/Plan - 6:}}\plain\f0\fs20\uazi5390\hich\f0\dbch\f0\loch\f0\fs20\ql\par  \'b7  {\*\bkmkstart fl90396760226}{\*\bkmkend zg90965677615}Problem: {\*\bkmkstart wr12923053580}{\*\bkmkend vq78223878829}History of DVT (deep vein thrombosis). \par  \'b7  {\*\bkmkstart gm50740129520}{\*\bkmkend cp02953260242}Plan: {\*\bkmkstart hj36766904384}{\*\bkmkend fh44356151217}Found to have acute DVT of left common femoral vein found on 1.28.23. History of multiple DVTs requiring IVC filter placement. \par  - holding AC iso possible UGIB\par  - SCDs for ppx if patient tolerates.\par  \par  \plain\f1\fs20\qxpq2917\hich\f1\dbch\f1\loch\f1\cf2\fs20\ul{\field{\*\fldinst HYPERLINK [id number] }{\fldrslt Problem/Plan - 7:}}\plain\f0\fs20\iobs4480\hich\f0\dbch\f0\loch\f0\fs20\ql\par  \'b7  {\*\bkmkstart ao07463433243}{\*\bkmkend xq36451037260}Problem: {\*\bkmkstart eu43406819922}{\*\bkmkend ch18579169719}Pain in breast. \par  \'b7  {\*\bkmkstart dt59039396537}{\*\bkmkend zn33345774920}Plan: {\*\bkmkstart lr55201774622}{\*\bkmkend lb31354042504}Family hx of breast cancer in mother, sister. Imaging from previous admission - US bilateral breasts: No sonographic evidence of breast   abscess, cyst or focal mass to correspond with the clinical finding of bilateral breast masses. Breast bx: Epidermal and dermal necrosis with fibrinoid occlusion and necrosis of blood vessels, focal necrotizing suppurative inflammation involving the fibroadipose   tissue and blood vessels in the subcutis. Pain responsive to Dilaudid during prior admission.\par  - start Acetaminophen 650mg q6h for mild pain\par  - start Dilaudid 2mg PO q3h prn for moderate pain\par  - start Dilaudid 4mg PO q3h prn for severe pain.\par  \par  \plain\f1\fs20\kuaf4166\hich\f1\dbch\f1\loch\f1\cf2\fs20\ul{\field{\*\fldinst HYPERLINK [id number] }{\fldrslt Problem/Plan - 8:}}\plain\f0\fs20\lrnz7294\hich\f0\dbch\f0\loch\f0\fs20\ql\par  \'b7  {\*\bkmkstart uu29471044872}{\*\bkmkend lt26414414560}Problem: {\*\bkmkstart pc77316456530}{\*\bkmkend to96971123535}Hyperparathyroidism. \par  \'b7  {\*\bkmkstart jg37173111892}{\*\bkmkend rx91233373803}Plan: {\*\bkmkstart ua65491458756}{\*\bkmkend mz70124954092}At risk for secondary HPTT d/t renal failure. Pt has vague abdominal pain w/ nausea. Concern for possible brown tumors on CT with multiple   fractures of pubic rami, pubic bone, thoracolumbar spine during last visit. Intact , Vit D 38.5. SPEP/immunofixation negative. Per Endo, likely secondary hyperparathyroidism vs other process causing osteoclastic tumors.\par  - monitor for now.\par  \par  \plain\f1\fs20\lzhh1815\hich\f1\dbch\f1\loch\f1\cf2\fs20\ul{\field{\*\fldinst HYPERLINK [id number] }{\fldrslt Problem/Plan - 9:}}\plain\f0\fs20\kzap8273\hich\f0\dbch\f0\loch\f0\fs20\ql\par  \'b7  {\*\bkmkstart no78793431996}{\*\bkmkend wj73632939857}Problem: {\*\bkmkstart cf39117847500}{\*\bkmkend vu62921184260}Need for prophylactic measure. \par  \'b7  {\*\bkmkstart la04343246128}{\*\bkmkend iw10759993606}Plan: {\*\bkmkstart vl82989887733}{\*\bkmkend wx49962479147}F: None\par  E: replete with caution given ESRD\par  N: Renal Diet\par  DVT ppx: holding chemoprophylaxis iso bleed, SCDs\par  GI ppx: protonix IV 40mg BID\par  Code Status: Full Code\par  Dispo: RMF.\par  \par  \par  }

## 2023-05-04 NOTE — PROGRESS NOTE ADULT - ASSESSMENT
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

## 2023-05-04 NOTE — PROGRESS NOTE ADULT - PROBLEM SELECTOR PLAN 6
Found to have acute DVT of left common femoral vein found on 1.28.23. History of multiple DVTs requiring IVC filter placement.   - holding AC iso possible UGIB  - SCDs for ppx if patient tolerates

## 2023-05-04 NOTE — DIETITIAN INITIAL EVALUATION ADULT - OTHER CALCULATIONS
Adjust for CA, PUs, ESRD on HD, age, malnutrition.  Energy needs calculated based on IBW using estimated ht. Adjust for CA, PUs, ESRD on HD, age, malnutrition. Fluids per team  **RD TO UPDATE ONCE ABLE TO OBTAIN CURRENT BODY WT/HT**

## 2023-05-04 NOTE — DIETITIAN INITIAL EVALUATION ADULT - PROBLEM SELECTOR PLAN 3
Pt complaining of 10/10 pain between gluteal folds. Patient reportedly with altered mental status, however appears aware of situation but focusing primarily on severity of pain.  - pain control as follows: Acetaminophen 650mg q6h for mild pain, Dilaudid 2mg PO q3h prn for moderate pain, Dilaudid 4mg PO q3h prn for severe pain  - reassess mental status after pain better controlled

## 2023-05-04 NOTE — DIETITIAN INITIAL EVALUATION ADULT - NS FNS DIET ORDER
Yes Diet, NPO after Midnight:      NPO Start Date: 03-May-2023,   NPO Start Time: 23:59  Except Medications (05-03-23 @ 20:50)  Diet, Renal Restrictions:   For patients receiving Renal Replacement - No Protein Restr, No Conc K, No Conc Phos, Low Sodium (05-03-23 @ 20:50)

## 2023-05-04 NOTE — PROGRESS NOTE ADULT - PROBLEM SELECTOR PLAN 2
Patient hypotensive 73/45 on admission, improved to sBP 110s on recheck. On midodrine 40 mg PO q8h (goal MAP >55) during previous admission. Patient with anemia requiring transfusion of 1u pRBC.   - given volume resuscitation with blood products and ESRD/HFrEF, will hold of on further fluids at this time  - monitor BP after receiving blood  - c/w midodrine 40mg q8h

## 2023-05-04 NOTE — CONSULT NOTE ADULT - ATTENDING COMMENTS
Recommendations  - Continue Protonix 40mg IV BID  - Clear liquids  - f/u post transfusion CBC  - Transfuse as indicated; maintain active T&S  - Resusc per primary  - Less likely mesenteric ischemia as lactate negative, abdomen is soft, mild tenderness, CTA per primary team   - Agree with f/u TTE   - Timing of endoscopy pending medical optimization, TTE, eval as above    GI ATT: I have seen and examined the patient and agree with the subjective and objective information as above, as well as the assessment and plan which I have edited as needed.

## 2023-05-04 NOTE — DIETITIAN INITIAL EVALUATION ADULT - PERTINENT LABORATORY DATA
05-03    135  |  98  |  32<H>  ----------------------------<  81  3.4<L>   |  26  |  4.57<H>    Ca    11.1<H>      03 May 2023 16:50  Phos  4.3     05-03  Mg     2.1     05-03    TPro  6.0  /  Alb  2.7<L>  /  TBili  0.4  /  DBili  0.2  /  AST  23  /  ALT  6<L>  /  AlkPhos  128<H>  05-03  A1C with Estimated Average Glucose Result: 5.3 % (11-18-22 @ 05:23)

## 2023-05-04 NOTE — PROGRESS NOTE ADULT - PROBLEM SELECTOR PLAN 5
TTE 11/22 normal LV and systolic function, EF 59%, grade II diastolic dysfunction, dilated RV, reduced RV systolic function. However, 02/2023 echo showing ejection fraction of 65-70%. Home meds: Entresto 49/51mg.  - hold iso hypotension    #Nonishcemic cardiomyopathy  #Peripheral vascular disease  Hx of cardiac cath in 2018, no known stents. Home meds: atorvastatin 40mg, ?plavix 75mg  - hold for now, formal med rec needed

## 2023-05-04 NOTE — PROGRESS NOTE ADULT - SUBJECTIVE AND OBJECTIVE BOX
Patient is a 67y Female seen and evaluated at bedside. patient s/p       Meds:    acetaminophen     Tablet .. 650 every 6 hours PRN  acetaminophen   IVPB .. 1000 once  atorvastatin 40 at bedtime  calamine/zinc oxide Lotion 1 three times a day PRN  cinacalcet 90 daily  collagenase Ointment 1 daily  HYDROmorphone   Tablet 2 every 3 hours PRN  HYDROmorphone   Tablet 4 every 3 hours PRN  HYDROmorphone  Injectable 1 once  lidocaine 4% Cream 1 three times a day PRN  midodrine. 40 every 8 hours  Nephro-deborah 1 daily  pantoprazole  Injectable 40 every 12 hours  senna 2 at bedtime  sevelamer carbonate 800 three times a day with meals  sodium chloride 0.9%. 1000 <Continuous>  sodium thiosulfate IVPB 25 once      T(C): , Max: 37.6 (05-03-23 @ 16:11)  T(F): , Max: 99.6 (05-03-23 @ 16:11)  HR: 97 (05-04-23 @ 04:25)  BP: 106/63 (05-04-23 @ 04:25)  BP(mean): --  RR: 17 (05-04-23 @ 04:25)  SpO2: 100% (05-04-23 @ 04:25)  Wt(kg): --        Review of Systems:  CONSTITUTIONAL: No fever or chills, No fatigue or tiredness  RESPIRATORY: No shortness of breath, cough, hemoptysis  CARDIOVASCULAR: No chest pain or shortness of breath  GASTROINTESTINAL: No abdominal or flank pain, No nausea or vomiting, No diarrhea  GENITOURINARY: No dysuria or urinary burning, No difficulty passing urine, No hematuria  NEUROLOGICAL: No headaches or blurred vision  SKIN: No skin rashes   MUSCULOSKELETAL: No arthralgia, No leg edema, No muscle pain      PHYSICAL EXAM:  GENERAL: well-developed, well nourished, alert, no acute distress at present  NECK: supple, No JVD  CHEST/LUNG: Clear to auscultation bilaterally  HEART: normal S1S2, RRR  ABDOMEN: Soft, Nontender, +BS, No flank tenderness bilateral  EXTREMITIES: No clubbing, cyanosis, or edema   NEUROLOGY: AAO x3, no focal neurological deficit  ACCESS: good thrill and bruit appreciated      LABS:                        6.2    10.28 )-----------( 296      ( 03 May 2023 16:50 )             23.2     05-03    135  |  98  |  32<H>  ----------------------------<  81  3.4<L>   |  26  |  4.57<H>    Ca    11.1<H>      03 May 2023 16:50  Phos  4.3     05-03  Mg     2.1     05-03    TPro  6.0  /  Alb  2.7<L>  /  TBili  0.4  /  DBili  0.2  /  AST  23  /  ALT  6<L>  /  AlkPhos  128<H>  05-03      PT/INR - ( 03 May 2023 16:50 )   PT: 30.0 sec;   INR: 2.50          PTT - ( 03 May 2023 16:50 )  PTT:43.2 sec          RADIOLOGY & ADDITIONAL STUDIES:           Patient is a 67y Female seen and evaluated at bedside. patient s/p PRBCS pending GI consult AOx1 this AM endorsing buttock pain /63 K 3.4 bicarb 26       Meds:    acetaminophen     Tablet .. 650 every 6 hours PRN  acetaminophen   IVPB .. 1000 once  atorvastatin 40 at bedtime  calamine/zinc oxide Lotion 1 three times a day PRN  cinacalcet 90 daily  collagenase Ointment 1 daily  HYDROmorphone   Tablet 2 every 3 hours PRN  HYDROmorphone   Tablet 4 every 3 hours PRN  HYDROmorphone  Injectable 1 once  lidocaine 4% Cream 1 three times a day PRN  midodrine. 40 every 8 hours  Nephro-deborah 1 daily  pantoprazole  Injectable 40 every 12 hours  senna 2 at bedtime  sevelamer carbonate 800 three times a day with meals  sodium chloride 0.9%. 1000 <Continuous>  sodium thiosulfate IVPB 25 once      T(C): , Max: 37.6 (23 @ 16:11)  T(F): , Max: 99.6 (23 @ 16:11)  HR: 97 (23 @ 04:25)  BP: 106/63 (23 @ 04:25)  BP(mean): --  RR: 17 (23 @ 04:25)  SpO2: 100% (23 @ 04:25)  Wt(kg): --        Review of Systems:  all other ROS negative       PHYSICAL EXAM:  GENERAL: well-developed, well nourished, alert, no acute distress at present  CHEST/LUNG: decreased breath sounds at the bases   HEART: normal S1S2, RRR  ABDOMEN: Soft, Nontender, +BS,   EXTREMITIES: No clubbing, cyanosis, or edema   ACCESS: R TDC      LABS:                        6.2    10.28 )-----------( 296      ( 03 May 2023 16:50 )             23.2     05-03    135  |  98  |  32<H>  ----------------------------<  81  3.4<L>   |  26  |  4.57<H>    Ca    11.1<H>      03 May 2023 16:50  Phos  4.3     05-03  Mg     2.1     05-03    TPro  6.0  /  Alb  2.7<L>  /  TBili  0.4  /  DBili  0.2  /  AST  23  /  ALT  6<L>  /  AlkPhos  128<H>        PT/INR - ( 03 May 2023 16:50 )   PT: 30.0 sec;   INR: 2.50          PTT - ( 03 May 2023 16:50 )  PTT:43.2 sec          RADIOLOGY & ADDITIONAL STUDIES:          Hemoglobin: 6.2 g/dL (23 @ 16:50)  Phosphorus Level, Serum: 4.3 mg/dL (23 @ 16:50)    Albumin, Serum: 2.7 g/dL (23 @ 16:50)    cinacalcet 90 milliGRAM(s) Oral daily, 23 @ 19:40, Routine  sevelamer carbonate 800 milliGRAM(s) Oral three times a day with meals, 23 @ 19:44, Routine    Hemodialysis Treatment.:     Schedule: Once, Modality: Hemodialysis, Access: Internal Jugular Central Venous Catheter    Dialyzer: Optiflux [id number], Time: 210 Min    Blood Flow: 400 mL/Min , Dialysate Flow: 500 mL/Min, Dialysate Temp: 36.5, Tubinmm (Adult)    Target Fluid Removal: 1 Liters    Dialysate Electrolytes (mEq/L): Potassium 2, Calcium 2.5, Sodium 138, Bicarbonate 35 (23 @ 06:37) [Active]

## 2023-05-04 NOTE — DIETITIAN INITIAL EVALUATION ADULT - ADD RECOMMEND
1. Continue current diet order   2. Add Nepro Carbsteady ONS TID (420Kcal, 19g protein each)  3. Monitor PO intake; honor pt food preferences as able  4. PLEASE OBTAIN HT e WT   5. Continue daily nephrovite   6. Monitor skin integrity, GI fxn, chemistry, daily wts   7. RD to re-attempt diet/wt hx at f/u prn   8. RD to remain available for recs adjustment prn or will follow up pt per organizational policy   *Signed malnutrition note*  *Talked with team*

## 2023-05-04 NOTE — DIETITIAN INITIAL EVALUATION ADULT - OTHER INFO
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     Visited pt at bedside this AM on 7UR; RN in the room. On assessment, pt is confused and lethargic. Unable to obtain diet/wt hx given AMS. RD to cayla-attempt at f/u prn. Per RN, really poor PO intake in-house; tolerates fluids better. Given better tolerance for fluids, will recommend adding ONS regimen. See recs below. Noted moderate wasting at orbital region. Given NFPE and fluid retention, pt meets ASPEN criteria for moderate malnutrition at this time. Noted multiple PUs - nephrovite ordered. Edema 4+ to R arm; artemio scale 13. Nutrition related labs; low K 3.4, Mg, phosph are wnl. Pt also receives phosph binder (sevelamer carbonate). Per chart review, endorses abdominal pain. RD to remain available. View full recs below. Clinical nutrition services will continue to follow up pt per organizational policy. 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     Visited pt at bedside this AM on 7UR; RN in the room. On assessment, pt is confused and lethargic. Unable to obtain diet/wt hx given AMS. RD to re-attempt at f/u prn. Per RN, really poor PO intake in-house; tolerates fluids better. Given better tolerance for fluids, will recommend adding ONS regimen. See recs below. Noted moderate wasting at orbital region. Noted multiple PUs - nephrovite ordered. Edema 4+ to R arm; artemio scale 13. Given NFPE and fluid retention, pt meets ASPEN criteria for moderate malnutrition at this time. Nutrition related labs; low K 3.4, Mg, phosph are wnl. Pt also receives phosph binder (sevelamer carbonate). Per chart review, endorses abdominal pain. Per RN, no chewing/swallowing difficulty. RD to remain available. Of note, HT AND WT MISSING - please update chart. View full recs below. Clinical nutrition services will continue to follow up pt per organizational policy. Please place new consult for any acute nutrition-related issues that may arise prior to follow up

## 2023-05-04 NOTE — DIETITIAN INITIAL EVALUATION ADULT - NSFNSPHYEXAMSKINFT_GEN_A_CORE
Pressure Injury 1: Bilateral:,breast, Unstageable  Pressure Injury 2: sacrum, Stage II  Pressure Injury 3: Right:,wrist, Stage III  Pressure Injury 4: none, none  Pressure Injury 5: none, none  Pressure Injury 6: none, none  Pressure Injury 7: none, none  Pressure Injury 8: none, none  Pressure Injury 9: none, none  Pressure Injury 10: none, none  Pressure Injury 11: none, none

## 2023-05-04 NOTE — DIETITIAN INITIAL EVALUATION ADULT - PROBLEM SELECTOR PLAN 1
Patient presenting from rehab with hemoglobin of 6.9. Per chart review, patient has been more confused lately. JUAN showing maroon stool. Hgb 6.3 on admission.   - patient receiving 1u pRBC in the ED  - start IV protonix 40mg BID  - maintain active T&S  - transfuse for hemoglobin < 7  - ordered iron studies, f/u results  - NPO at midnight for possible scope  - CTA ordered for concern for mesenteric ischemia, f/u results and consult surgery if showing signs of mesenteric ischemia  - GI consulted, f/u recs

## 2023-05-04 NOTE — CONSULT NOTE ADULT - ASSESSMENT
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 GI was consulted for anemia iso acute blood loss 2/2 melena     #Melena  Patient presented with anemia due to acute blood loss w/ melena. JUAN showing maroon stool. Labs with hgb of 6.2 on admission (6.9 in rehab). Guaiac positive. Patient is on Eliquis due to multiple DVTs and hx of PE.   - S/p 1pRBC and Protonix 40mg IV BID.   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 GI was consulted for anemia iso acute blood loss 2/2 melena     #Melena  Patient presented with anemia due to acute blood loss w/ melena. JUAN showing maroon stool. Labs with hgb of 6.2 on admission (6.9 in rehab). Guaiac positive. Patient is on Eliquis due to multiple DVTs and hx of PE.   - S/p 1pRBC and Protonix 40mg IV BID. 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 GI was consulted for anemia iso acute blood loss 2/2 melena     #Melena  Patient presented with anemia due to acute blood loss w/ melena. JUAN showing maroon stool. Labs with hgb of 6.2 on admission (6.9 in rehab). Guaiac positive. Patient is on Eliquis due to multiple DVTs and hx of PE.   - S/p 1pRBC and Protonix 40mg IV BID. 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 GI was consulted for anemia iso acute blood loss 2/2 melena     #Melena  Patient presented with anemia due to acute blood loss w/ melena. JUAN showing maroon stool. Labs with hgb of 6.2 on admission (6.9 in rehab). Guaiac positive. Patient is on Eliquis due to multiple DVTs and hx of PE.   - S/p 1pRBC and Protonix 40mg IV BID. 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 GI was consulted for GIB and associated anemia     #Melena    Hemodynamically stable  Patient presented with anemia due to acute blood loss w/ melena. JUAN showing maroon stool. Labs with hgb of 6.2 on admission (6.9 in rehab).  Patient is on Eliquis due to multiple DVTs and hx of PE.     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 GI was consulted for GIB and associated anemia     #Melena    Hemodynamically stable  Patient presented with anemia due to acute blood loss w/ melena. JUAN showing maroon stool. Labs with hgb of 6.2 on admission (6.9 in rehab).  Patient is on Eliquis due to multiple DVTs and hx of PE.     Recommendations  - Continue Protonix 40mg IV BID  - Clear liquids  - f/u post transfusion CBC  - Transfuse as indicated; maintain active T&S  - Resusc per primary  - Less likely mesenteric ischemia as lactate negative, abdomen is soft, mild tenderness, CTA per primary team   - Agree with f/u TTE   - Timing of endoscopy pending medical optimization, TTE, eval as above    GI Fellow Addendum: I have seen and examined the patient and agree with the subjective and objective information as above, as well as the assessment and plan which I have edited as needed.    Clive Rizzo DO  Gastroenterology Fellow  Pager: [phone]  After 5PM or on weekends, please contact [place]  for fellow on call

## 2023-05-04 NOTE — PROGRESS NOTE ADULT - ASSESSMENT
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    Assessment/Plan:   #ESRD on HD  #Calciphylaxis  Usual RX time 3:30 hrs, EDW TBD  Will plan for HD today  K noted 3.4  Renal Diet  Give sodium thiosulfate with HD     #Hypotension  Pt on previous admission noted to have hypotension, was on midodrine 40mg Q8h at one point w/ florinef  -would resume midodrine 10 TID    #access   L TDC    #anemia  Hgb not at goal  -Transfuse to Hgb > 7  Obtain iron studies and ferritin  Epo w/ HD    #renal bone disease   Ca ~11.1  Trend phos daily  -Resume home phos binders  -resume sensipar    Thank you for the opportunity to participate in the care of your patient. The nephrology service remains available to assist with any questions or concerns. Please feel free to reach us by paging the on-call nephrology fellow for urgent issues or as below.     Saumya Jo D.O  PGY-5, Nephrology Fellow    P: [phone]

## 2023-05-04 NOTE — PROGRESS NOTE ADULT - SUBJECTIVE AND OBJECTIVE BOX
**Incomplete Note**   CC: Patient is a 67y old  Female who presents with a chief complaint of anemia (04 May 2023 09:30)      INTERVAL EVENTS: SHAISTA    SUBJECTIVE / INTERVAL HPI: Patient seen and examined at bedside. Patient reports pain most severe in R breast. Initially resting comfortably, but moaning when awakened. Does not participate in answering some questions limiting ROS. Denies fever, chills, SOB, nausea, vomiting.    ROS: negative unless otherwise stated above.    VITAL SIGNS:  Vital Signs Last 24 Hrs  T(C): 36.4 (04 May 2023 04:25), Max: 37.6 (03 May 2023 16:11)  T(F): 97.6 (04 May 2023 04:25), Max: 99.6 (03 May 2023 16:11)  HR: 97 (04 May 2023 04:25) (79 - 100)  BP: 106/63 (04 May 2023 04:25) (73/45 - 109/69)  BP(mean): --  RR: 17 (04 May 2023 04:25) (17 - 19)  SpO2: 100% (04 May 2023 04:25) (97% - 100%)    Parameters below as of 04 May 2023 04:25  Patient On (Oxygen Delivery Method): nasal cannula  O2 Flow (L/min): 2          PHYSICAL EXAM:    General: NAD, moaning in pain  HEENT: MMM  Derm: rash in R and L breast, dressed in guaze c/d/i, sacral ulcers noted by staff, ulcerated lesion with hemorrhagic crust of R forearm (calciphylaxis)  Neck: supple  Cardiovascular: +S1/S2; RRR  Respiratory: CTA B/L; no W/R/R  Gastrointestinal: soft, mild tenderness to palpation (appears uncomfortable), ventral hernia soft and reducible, +BS, no guarding, rebound tenderness, rigidity, +BS  Extremities: WWP; no edema, clubbing or cyanosis of lower extremities, prominent RUE pitting edema nontender  Vascular: 2+ radial, DP/PT pulses B/L  Neurological: unable to assess mental status, not participating in orientation questions, moving all limbs spontaneously, opens eyes easily to very light sternal rub    MEDICATIONS:  MEDICATIONS  (STANDING):  atorvastatin 40 milliGRAM(s) Oral at bedtime  cinacalcet 90 milliGRAM(s) Oral daily  collagenase Ointment 1 Application(s) Topical daily  HYDROmorphone  Injectable 1 milliGRAM(s) IntraMuscular once  midodrine. 40 milliGRAM(s) Oral every 8 hours  Nephro-deborah 1 Tablet(s) Oral daily  pantoprazole  Injectable 40 milliGRAM(s) IV Push every 12 hours  senna 2 Tablet(s) Oral at bedtime  sevelamer carbonate 800 milliGRAM(s) Oral three times a day with meals  sodium chloride 0.9%. 1000 milliLiter(s) (5 mL/Hr) IV Continuous <Continuous>  sodium thiosulfate IVPB 25 Gram(s) IV Intermittent once    MEDICATIONS  (PRN):  acetaminophen     Tablet .. 650 milliGRAM(s) Oral every 6 hours PRN Mild Pain (1 - 3)  calamine/zinc oxide Lotion 1 Application(s) Topical three times a day PRN Itching  HYDROmorphone   Tablet 2 milliGRAM(s) Oral every 3 hours PRN Moderate Pain (4 - 6)  HYDROmorphone   Tablet 4 milliGRAM(s) Oral every 3 hours PRN Severe Pain (7 - 10)  lidocaine 4% Cream 1 Application(s) Topical three times a day PRN Breast pain      ALLERGIES:  Allergies    Ancef (Rash; Urticaria)  sulfa drugs (Angioedema)  penicillin (Swelling)  DDAVP (Hypotension)  iodine (Hives; Pruritus)    Intolerances        LABS:                        6.2    10.28 )-----------( 296      ( 03 May 2023 16:50 )             23.2     05-03    135  |  98  |  32<H>  ----------------------------<  81  3.4<L>   |  26  |  4.57<H>    Ca    11.1<H>      03 May 2023 16:50  Phos  4.3     05-03  Mg     2.1     05-03    TPro  6.0  /  Alb  2.7<L>  /  TBili  0.4  /  DBili  0.2  /  AST  23  /  ALT  6<L>  /  AlkPhos  128<H>  05-03    PT/INR - ( 03 May 2023 16:50 )   PT: 30.0 sec;   INR: 2.50          PTT - ( 03 May 2023 16:50 )  PTT:43.2 sec    CAPILLARY BLOOD GLUCOSE          RADIOLOGY & ADDITIONAL TESTS: Reviewed.

## 2023-05-04 NOTE — CHART NOTE - NSCHARTNOTEFT_GEN_A_CORE
PALLIATIVE MEDICINE COORDINATION OF CARE DOCUMENTATION: [x] Inpatient Consult  Non-Face-to-Face prolonged service provided that relates to (face-to-face) care that has or will occur and ongoing patient management, including one or more of the following: -Documentation review from other physicians and health care professional services -Review of medical records and diagnostic/radiology study results -Coordination with patient's support system  ************************************************************************  Consult request for: GOC and symptom management    Discussed with: primary team    Recommendations/Events: Unable to physically see patient as off the floor but known to palliative from previous admission. Will f/u tomorrow for full consult. For pain management, would continue current pain regimen for now: Dilaudid 2mg PO q4h PRN moderate pain and 4mg PO q4h severe pain.    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 Unable to obtain much from patient as she is complaining about pain and unable to provide more history. States she has been having black/maroon stools but unable to verbalize when it started. Complaining of 10/10 pain "in the butt".    Unable to get further history from patient, and daughter no longer at bedside. Further information obtained from ED chart review, noted below:  "Per daughter at bedside, patient has had bilateral breast wounds for the past several months and the dressings are supposed to be changed every day at the rehab center however she thinks they are not being changed daily.  States patient is a little bit more confused than her baseline today. Daughter states that patient has been ending her hemodialysis early for the past few sessions and that her last hemodialysis session is unknown although patient states he thinks it may have been on Tuesday. Pt and daughter do not know home med list, state "it is in the computer." Daughter also states pt has had intermittent low blood pressure over the past 2 years."      In the ED:  Initial vital signs: T: 98.2 F, HR: 100, BP: 73/45, R: 18, SpO2: 97% on RA  Labs: significant for Hgb 6.2 (MCV 88), PT 30, INR 2.5, aPTT 43.2, K 3.4, BUN 32, Cr 4.57, FOBT positive  CXR: No acute abnormality visualized.  EKG: Normal sinus rhythm  Medications: 1u pRBC  Consults: GI, nephrology (03 May 2023 18:44)    ------------------------------------------------------------------------  MEDICATION REVIEW:  MEDICATIONS  (STANDING):  atorvastatin 40 milliGRAM(s) Oral at bedtime  cinacalcet 90 milliGRAM(s) Oral daily  collagenase Ointment 1 Application(s) Topical daily  midodrine. 40 milliGRAM(s) Oral every 8 hours  Nephro-deborah 1 Tablet(s) Oral daily  pantoprazole  Injectable 40 milliGRAM(s) IV Push every 12 hours  senna 2 Tablet(s) Oral at bedtime  sevelamer carbonate 800 milliGRAM(s) Oral three times a day with meals  sodium chloride 0.9%. 1000 milliLiter(s) (5 mL/Hr) IV Continuous <Continuous>    MEDICATIONS  (PRN):  acetaminophen     Tablet .. 650 milliGRAM(s) Oral every 6 hours PRN Mild Pain (1 - 3)  calamine/zinc oxide Lotion 1 Application(s) Topical three times a day PRN Itching  HYDROmorphone   Tablet 2 milliGRAM(s) Oral every 3 hours PRN Moderate Pain (4 - 6)  HYDROmorphone   Tablet 4 milliGRAM(s) Oral every 3 hours PRN Severe Pain (7 - 10)  lidocaine 4% Cream 1 Application(s) Topical three times a day PRN Breast pain    ISTOP REFERENCE: [id number]  - Percocet 5-325 14 day supply on 4/14    ANALGESIC USE (Scheduled & PRNs) PAST 24 HOURS:    acetaminophen   IVPB ..   400 mL/Hr IV Intermittent (05-04-23 @ 09:29)    HYDROmorphone   Tablet   4 milliGRAM(s) Oral (05-04-23 @ 14:14)    HYDROmorphone  Injectable   1 milliGRAM(s) IntraMuscular (05-03-23 @ 20:58)      ------------------------------------------------------------------------  COORDINATION OF CARE:  - Palliative Care consulted for: GOC and Symptom Management  - Patient (to be) assessed: 5/5/23  - Patient previously seen by Palliative Care service: Yes    ADVANCE CARE PLANNING  - Code status: Full code  - MOLST reviewed in chart: NONE  - GOC documents: None  - HCP/Living will/Other Advanced Directives in [facility]: HCP Jasmina Zimmer  ------------------------------------------------------------------------  CARE PROVIDER DOCUMENTATION:  - DOM/SHARON notes: Remains medically active  - Primary Team and Consultant Notes reviewed    PLAN OF CARE  - Current Admit Date: 5/3/23  - LOS: 1 day  - Current Dispo Plan: TO BE DETERMINED      Full Consult to follow within 24hrs or after the weekend  ------------------------------------------------------------------------  - Time Spent/Chart reviewed: 31 Minutes [including time used to gather, review and transfer data]  - Start: 3:30pm  - End: 4:01pm    Prolonged services rendered, as part of this patient's care provided by Palliative Medicine, include: i. chart review for provider and ancillary service documentation, ii. pertinent diagnostics including laboratory and imaging studies, iii. medication review including PRN use, iv. admission history including previous palliative care encounters and GOC notes, v. advance care planning documents including HCP and MOLST forms in [facility]. Part of Palliative Medicine extended evaluation and management also involves coordination of care with our IDT, the primary and consulting teams, and unit CM/SW and Hospice if eligible. Recommendations based on the information gathered and discussed are outlined in the A/P of Palliative notes.

## 2023-05-04 NOTE — DIETITIAN NUTRITION RISK NOTIFICATION - TREATMENT: THE FOLLOWING DIET HAS BEEN RECOMMENDED
Diet, NPO after Midnight:      NPO Start Date: 03-May-2023,   NPO Start Time: 23:59  Except Medications (05-03-23 @ 20:50) [Active]  Diet, Renal Restrictions:   For patients receiving Renal Replacement - No Protein Restr, No Conc K, No Conc Phos, Low Sodium (05-03-23 @ 20:50) [Active]

## 2023-05-05 NOTE — PROGRESS NOTE ADULT - PROBLEM SELECTOR PLAN 1
Patient presenting from rehab with hemoglobin of 6.9. Per chart review, patient has been more confused lately. JUAN on admission, showing maroon stool. FOBT positive. Hgb 6.3 on admission., s/p 1u pRBC in the ED.  - GI consulted for UGIB, possible EGD  - c/w IV protonix 40mg BID  - maintain active T&S  - transfuse for hemoglobin < 7  - ordered iron studies, f/u results  - NPO at midnight for possible scope  - CT A/P with IV contrast ordered give initial concern for mesenteric ischemia Patient presenting from rehab with hemoglobin of 6.9. Per chart review, patient has been more confused lately. JUAN on admission, showing maroon stool. FOBT positive. Hgb 6.3 on admission.  - s/p 1u pRBC on 5/4. with Hgb 6.7  - s/p 1u pRBC on 5/4, unable to obtain post-transfusion CBC, attempted by residents with ultrasound and phlebotomy; will contact [facility] team for assistance  - GI consulted for UGIB, possible EGD, no further episodes of melena since admission  - c/w IV protonix 40mg BID; transition to PO since no IV access  - maintain active T&S  - transfuse for hemoglobin <7  - ordered iron studies, f/u results

## 2023-05-05 NOTE — PROGRESS NOTE ADULT - PROBLEM SELECTOR PLAN 5
TTE 11/22 normal LV and systolic function, EF 59%, grade II diastolic dysfunction, dilated RV, reduced RV systolic function. However, 02/2023 echo showing ejection fraction of 65-70%. Home meds: Entresto 49/51mg.  - hold iso hypotension    #Nonishcemic cardiomyopathy  #Peripheral vascular disease  Hx of cardiac cath in 2018, no known stents. Home meds: atorvastatin 40mg, ?plavix 75mg  - hold for now, formal med rec needed TTE 11/22 normal LV and systolic function, EF 59%, grade II diastolic dysfunction, dilated RV, reduced RV systolic function. However, 02/2023 echo showing ejection fraction of 65-70%. Home meds: Entresto 49/51mg.  - TTE (5/5) with g1dd, moderately dilated left atrium, moderate aortic stenosis  - consider resuming entresto tomorrow given BP has improved    #Nonishcemic cardiomyopathy  #Peripheral vascular disease  Hx of cardiac cath in 2018, no known stents. Home meds: atorvastatin 40mg, ?plavix 75mg  - hold for now, formal med rec needed

## 2023-05-05 NOTE — PROGRESS NOTE ADULT - ASSESSMENT
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

## 2023-05-05 NOTE — PROGRESS NOTE ADULT - PROBLEM SELECTOR PLAN 4
Patient with ESRD 2/2 PKD, normally on dialysis outpatient. Access L TDC.   - plan for HD on 5/4  - nephrology consulted, f/u recs  - renal diet  - replete electrolytes with caution given ESRD    #Renal bone disease  Pt with hx of hypercalcemia. Calcium 11.1 on admission.  - resumed home phosphate binders, cinacalcet Patient with ESRD 2/2 PKD, normally on dialysis outpatient. Access L TDC.   - received HD on 5/4  - nephrology consulted, f/u recs  - renal diet  - replete electrolytes with caution given ESRD    #Renal bone disease  Pt with hx of hypercalcemia. Calcium 11.1 on admission.  - resumed home phosphate binders, cinacalcet

## 2023-05-05 NOTE — PROGRESS NOTE ADULT - PROBLEM SELECTOR PLAN 3
Pt complaining of 10/10 pain between gluteal folds. Per daughter, patient has had altered mental status, not at baseline, however patient appears aware of situation but focusing primarily on severity of pain. Patient protecting airway, opens eyes easily with light sternal rub, however not participating in some discussions.  - pain control as follows: Acetaminophen 650mg q6h for mild pain, Dilaudid 2mg PO q3h prn for moderate pain, Dilaudid 4mg PO q3h prn for severe pain  - continue to monitor mental status  - palliative consult for recommendations regarding pain management and also to discuss code status (patient not participating in discussions, previously full code on last admission in 2/2023)

## 2023-05-05 NOTE — PROGRESS NOTE ADULT - SUBJECTIVE AND OBJECTIVE BOX
Pt seen and examined at bedside.  Complaining of rectal pain.  Unable to pass stool but hasnt seen blood.  Per RN, hard brown stool ball this AM      Allergies    Ancef (Rash; Urticaria)  sulfa drugs (Angioedema)  penicillin (Swelling)  DDAVP (Hypotension)  iodine (Hives; Pruritus)    Intolerances        MEDICATIONS:  MEDICATIONS  (STANDING):  atorvastatin 40 milliGRAM(s) Oral at bedtime  cinacalcet 90 milliGRAM(s) Oral daily  collagenase Ointment 1 Application(s) Topical daily  midodrine. 40 milliGRAM(s) Oral every 8 hours  Nephro-deborah 1 Tablet(s) Oral daily  pantoprazole    Tablet 40 milliGRAM(s) Oral every 12 hours  polyethylene glycol 3350 17 Gram(s) Oral two times a day  senna 2 Tablet(s) Oral at bedtime  sevelamer carbonate 800 milliGRAM(s) Oral three times a day with meals  sodium chloride 0.9%. 1000 milliLiter(s) (5 mL/Hr) IV Continuous <Continuous>    MEDICATIONS  (PRN):  acetaminophen     Tablet .. 650 milliGRAM(s) Oral every 6 hours PRN Mild Pain (1 - 3)  calamine/zinc oxide Lotion 1 Application(s) Topical three times a day PRN Itching  HYDROmorphone   Tablet 4 milliGRAM(s) Oral every 3 hours PRN Severe Pain (7 - 10)  HYDROmorphone   Tablet 2 milliGRAM(s) Oral every 3 hours PRN Moderate Pain (4 - 6)  lidocaine 4% Cream 1 Application(s) Topical three times a day PRN Breast pain      Vital Signs Last 24 Hrs  T(C): 36.4 (05 May 2023 05:48), Max: 36.6 (04 May 2023 19:30)  T(F): 97.5 (05 May 2023 05:48), Max: 97.8 (04 May 2023 19:30)  HR: 64 (05 May 2023 05:48) (64 - 95)  BP: 113/64 (05 May 2023 11:18) (80/56 - 147/70)  BP(mean): --  RR: 16 (05 May 2023 05:48) (15 - 17)  SpO2: 99% (05 May 2023 05:48) (92% - 99%)    Parameters below as of 05 May 2023 05:48  Patient On (Oxygen Delivery Method): nasal cannula  O2 Flow (L/min): 2      05-04 @ 07:01  -  05-05 @ 07:00  --------------------------------------------------------  IN: 0 mL / OUT: 700 mL / NET: -700 mL        PHYSICAL EXAM:    General: No acute distress  Pulm: Normal resp effort  Gastrointestinal: Soft non-tender non-distended. No rebound or guarding  Skin: Warm and dry. No obvious rash  Rectal exam: Passing hard brown stool ball. Manual disimpaction performed. No gross blood.    LABS:  CBC Full  -  ( 04 May 2023 16:19 )  WBC Count : 12.33 K/uL  RBC Count : 2.75 M/uL  Hemoglobin : 6.7 g/dL  Hematocrit : 24.1 %  Platelet Count - Automated : 303 K/uL  Mean Cell Volume : 87.6 fl  Mean Cell Hemoglobin : 24.4 pg  Mean Cell Hemoglobin Concentration : 27.8 gm/dL  Auto Neutrophil # : 11.58 K/uL  Auto Lymphocyte # : 0.45 K/uL  Auto Monocyte # : 0.17 K/uL  Auto Eosinophil # : 0.01 K/uL  Auto Basophil # : 0.03 K/uL  Auto Neutrophil % : 94.0 %  Auto Lymphocyte % : 3.6 %  Auto Monocyte % : 1.4 %  Auto Eosinophil % : 0.1 %  Auto Basophil % : 0.2 %    05-04    140  |  100  |  28<H>  ----------------------------<  87  3.3<L>   |  27  |  4.08<H>    Ca    11.0<H>      04 May 2023 16:19  Phos  4.1     05-04  Mg     2.1     05-04    TPro  5.8<L>  /  Alb  2.4<L>  /  TBili  0.4  /  DBili  x   /  AST  12  /  ALT  <5<L>  /  AlkPhos  122<H>  05-04    PT/INR - ( 03 May 2023 16:50 )   PT: 30.0 sec;   INR: 2.50          PTT - ( 03 May 2023 16:50 )  PTT:43.2 sec                  RADIOLOGY & ADDITIONAL STUDIES:

## 2023-05-05 NOTE — PROGRESS NOTE ADULT - PROBLEM SELECTOR PLAN 2
Patient hypotensive 73/45 on admission, improved to sBP 110s on recheck. On midodrine 40 mg PO q8h (goal MAP >55) during previous admission. Patient with anemia requiring transfusion of 1u pRBC.   - given volume resuscitation with blood products and ESRD/HFrEF, will hold of on further fluids at this time  - monitor BP after receiving blood  - c/w midodrine 40mg q8h RESOLVED. Patient hypotensive 73/45 on admission, improved to sBP 110s on recheck. On midodrine 40 mg PO q8h (goal MAP >55) during previous admission. Patient with anemia requiring transfusion of 1u pRBC.   - given volume resuscitation with blood products and ESRD/HFrEF, will hold of on further fluids at this time  - must check BP on legs with cuff aligned with popiteal artery  - c/w midodrine 40mg q8h

## 2023-05-05 NOTE — PROGRESS NOTE ADULT - ASSESSMENT
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 GI was consulted with concern for GIB and associated anemia     Hemodynamically stable.  Chart extensively reviewed. Hgb has varied from 7 to 10 between now and November 2022.  On my exam at bedside, pt was straining to pass large, hard brown stool ball. A manual disimpaction was performed along with pt's RN at bedside and there was no gross blood seen    Suspicion for anemia of chronic kidney disease. Noted to be iron deficient in January which is likely contributing  Positive FOBT noted and ED reported maroon colored stool though no e/o gross blood as noted.  Suspicion for active GIB is quite low based on exam at bedside.    Rectal pain most likely due to constipation as pt endorsed severe pain when straining to pass hard stool.  She agrees with this suspicion    Recommendations  - Continue empiric PPI BID  - Advance diet  - Trend CBC. Monitor for clinical bleeding  - Please give miralax daily as well as enema. Repeat manual disimpaction may be needed  - Consider KUB to assess stool burden pending response to laxative  - Check iron studies; replete as indicated  - Transfuse as indicated; maintain active T&S  - Will discuss timing of non urgent diagnostic EGD/Colonoscopy (inpt vs outpt) for NIKHIL with pt and daughter in light of need for anticoag therapy.      Clive Rizzo DO  Gastroenterology Fellow  Pager: [phone]  After 5PM or on weekends, please contact [place]  for fellow on call

## 2023-05-05 NOTE — PROGRESS NOTE ADULT - PROBLEM SELECTOR PLAN 9
F: None  E: replete with caution given ESRD  N: Renal Diet  DVT ppx: holding chemoprophylaxis iso bleed, SCDs  GI ppx: protonix IV 40mg BID  Code Status: Full Code  Dispo: [facility]. F: None  E: replete with caution given ESRD  N: Renal Diet  DVT ppx: holding chemoprophylaxis iso bleed, SCDs  GI ppx: protonix IV 40mg BID -- > PO BID  Code Status: Full Code  Dispo: [facility].

## 2023-05-05 NOTE — PROGRESS NOTE ADULT - SUBJECTIVE AND OBJECTIVE BOX
**Incomplete Note**   CC: Patient is a 67y old  Female who presents with a chief complaint of anemia (05 May 2023 12:46)      INTERVAL EVENTS: SHAISTA    SUBJECTIVE / INTERVAL HPI: Patient seen and examined at bedside. Patient more alert on exam     ROS: negative unless otherwise stated above.    VITAL SIGNS:  Vital Signs Last 24 Hrs  T(C): 36.4 (05 May 2023 05:48), Max: 36.6 (04 May 2023 19:30)  T(F): 97.5 (05 May 2023 05:48), Max: 97.8 (04 May 2023 19:30)  HR: 64 (05 May 2023 05:48) (64 - 95)  BP: 113/64 (05 May 2023 11:18) (91/54 - 147/70)  BP(mean): --  RR: 16 (05 May 2023 05:48) (15 - 16)  SpO2: 99% (05 May 2023 05:48) (92% - 99%)    Parameters below as of 05 May 2023 05:48  Patient On (Oxygen Delivery Method): nasal cannula  O2 Flow (L/min): 2        05-04-23 @ 07:01  -  05-05-23 @ 07:00  --------------------------------------------------------  IN: 0 mL / OUT: 700 mL / NET: -700 mL        PHYSICAL EXAM:    General: NAD  HEENT: MMM  Neck: supple  Cardiovascular: +S1/S2; RRR  Respiratory: CTA B/L; no W/R/R  Gastrointestinal: soft, NT/ND  Extremities: WWP; no edema, clubbing or cyanosis  Vascular: 2+ radial, DP/PT pulses B/L  Neurological: AAOx3; no focal deficits    MEDICATIONS:  MEDICATIONS  (STANDING):  atorvastatin 40 milliGRAM(s) Oral at bedtime  cinacalcet 90 milliGRAM(s) Oral daily  collagenase Ointment 1 Application(s) Topical daily  midodrine. 40 milliGRAM(s) Oral every 8 hours  Nephro-deborah 1 Tablet(s) Oral daily  pantoprazole    Tablet 40 milliGRAM(s) Oral every 12 hours  polyethylene glycol 3350 17 Gram(s) Oral two times a day  senna 2 Tablet(s) Oral at bedtime  sevelamer carbonate 800 milliGRAM(s) Oral three times a day with meals  sodium chloride 0.9%. 1000 milliLiter(s) (5 mL/Hr) IV Continuous <Continuous>    MEDICATIONS  (PRN):  acetaminophen     Tablet .. 650 milliGRAM(s) Oral every 6 hours PRN Mild Pain (1 - 3)  calamine/zinc oxide Lotion 1 Application(s) Topical three times a day PRN Itching  HYDROmorphone   Tablet 2 milliGRAM(s) Oral every 3 hours PRN Moderate Pain (4 - 6)  HYDROmorphone   Tablet 4 milliGRAM(s) Oral every 3 hours PRN Severe Pain (7 - 10)  lidocaine 4% Cream 1 Application(s) Topical three times a day PRN Breast pain      ALLERGIES:  Allergies    Ancef (Rash; Urticaria)  sulfa drugs (Angioedema)  penicillin (Swelling)  DDAVP (Hypotension)  iodine (Hives; Pruritus)    Intolerances        LABS:                        6.7    12.33 )-----------( 303      ( 04 May 2023 16:19 )             24.1     05-04    140  |  100  |  28<H>  ----------------------------<  87  3.3<L>   |  27  |  4.08<H>    Ca    11.0<H>      04 May 2023 16:19  Phos  4.1     05-04  Mg     2.1     05-04    TPro  5.8<L>  /  Alb  2.4<L>  /  TBili  0.4  /  DBili  x   /  AST  12  /  ALT  <5<L>  /  AlkPhos  122<H>  05-04    PT/INR - ( 03 May 2023 16:50 )   PT: 30.0 sec;   INR: 2.50          PTT - ( 03 May 2023 16:50 )  PTT:43.2 sec    CAPILLARY BLOOD GLUCOSE          RADIOLOGY & ADDITIONAL TESTS: Reviewed.   CC: Patient is a 67y old  Female who presents with a chief complaint of anemia (05 May 2023 12:46)      INTERVAL EVENTS: SHAISTA    SUBJECTIVE / INTERVAL HPI: Patient seen and examined at bedside. Patient more alert on exam today compared to yesterday. Denies nausea, vomiting, fatigue, fever, chills. Reports moderate to severe pain specifically in rectum.     ROS: negative unless otherwise stated above.    VITAL SIGNS:  Vital Signs Last 24 Hrs  T(C): 36.4 (05 May 2023 05:48), Max: 36.6 (04 May 2023 19:30)  T(F): 97.5 (05 May 2023 05:48), Max: 97.8 (04 May 2023 19:30)  HR: 64 (05 May 2023 05:48) (64 - 95)  BP: 113/64 (05 May 2023 11:18) (91/54 - 147/70)  BP(mean): --  RR: 16 (05 May 2023 05:48) (15 - 16)  SpO2: 99% (05 May 2023 05:48) (92% - 99%)    Parameters below as of 05 May 2023 05:48  Patient On (Oxygen Delivery Method): nasal cannula  O2 Flow (L/min): 2        05-04-23 @ 07:01  -  05-05-23 @ 07:00  --------------------------------------------------------  IN: 0 mL / OUT: 700 mL / NET: -700 mL        PHYSICAL EXAM:    General: NAD, resting in bed, appears uncomfortable  HEENT: MMM  Neck: supple  Cardiovascular: +S1/S2; RRR  Respiratory: CTA B/L; no W/R/R  Gastrointestinal: soft, NT/ND; ventral hernia, soft and reducible on L mid abdomen  Extremities: WWP; no edema, clubbing or cyanosis of lower extremities; 2+ pitting edema in forearm extending to entire R arm; some mild swelling of L arm increased compared to yesterday  Derm: rash in R and L breast, dressed in guaze c/d/i, sacral ulcers noted by staff, 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 40 milliGRAM(s) Oral every 8 hours  Nephro-deborah 1 Tablet(s) Oral daily  pantoprazole    Tablet 40 milliGRAM(s) Oral every 12 hours  polyethylene glycol 3350 17 Gram(s) Oral two times a day  senna 2 Tablet(s) Oral at bedtime  sevelamer carbonate 800 milliGRAM(s) Oral three times a day with meals  sodium chloride 0.9%. 1000 milliLiter(s) (5 mL/Hr) IV Continuous <Continuous>    MEDICATIONS  (PRN):  acetaminophen     Tablet .. 650 milliGRAM(s) Oral every 6 hours PRN Mild Pain (1 - 3)  calamine/zinc oxide Lotion 1 Application(s) Topical three times a day PRN Itching  HYDROmorphone   Tablet 2 milliGRAM(s) Oral every 3 hours PRN Moderate Pain (4 - 6)  HYDROmorphone   Tablet 4 milliGRAM(s) Oral every 3 hours PRN Severe Pain (7 - 10)  lidocaine 4% Cream 1 Application(s) Topical three times a day PRN Breast pain      ALLERGIES:  Allergies    Ancef (Rash; Urticaria)  sulfa drugs (Angioedema)  penicillin (Swelling)  DDAVP (Hypotension)  iodine (Hives; Pruritus)    Intolerances        LABS:                        6.7    12.33 )-----------( 303      ( 04 May 2023 16:19 )             24.1     05-04    140  |  100  |  28<H>  ----------------------------<  87  3.3<L>   |  27  |  4.08<H>    Ca    11.0<H>      04 May 2023 16:19  Phos  4.1     05-04  Mg     2.1     05-04    TPro  5.8<L>  /  Alb  2.4<L>  /  TBili  0.4  /  DBili  x   /  AST  12  /  ALT  <5<L>  /  AlkPhos  122<H>  05-04    PT/INR - ( 03 May 2023 16:50 )   PT: 30.0 sec;   INR: 2.50          PTT - ( 03 May 2023 16:50 )  PTT:43.2 sec    CAPILLARY BLOOD GLUCOSE          RADIOLOGY & ADDITIONAL TESTS: Reviewed.

## 2023-05-05 NOTE — ADVANCED PRACTICE NURSE CONSULT - RECOMMEDATIONS
Bilateral gluteals: Triad daily and prn cleansing. Okay to leave open to air as foam does not stay on well  Bilateral breasts: cleanse with Vashe, pat dry.  Apply Vashe moistened gauze to wounds, cover with ABD pads, secure with paper tape. Change daily.    Continue routine repositioning with air fluidized repositioning pillow, heel offloading for pressure injury prevention.    Continue use of [facility] VersaCare for pressure redistribution and moisture management.   Support nutrition and hydration for wound healing.   Recommend systemic treatment for calciphylaxis    Bilateral gluteals: Triad daily and prn cleansing. Okay to leave open to air as foam does not stay on well  Bilateral breasts: cleanse with Vashe, pat dry.  Apply Vashe moistened gauze to wounds, cover with ABD pads, secure with paper tape. Change daily.    Continue routine repositioning with air fluidized repositioning pillow, heel offloading for pressure injury prevention.    Continue use of [facility] VersaCare for pressure redistribution and moisture management.   Support nutrition and hydration for wound healing.   Recommend nephrology input for systemic treatment of calciphylaxis

## 2023-05-05 NOTE — ADVANCED PRACTICE NURSE CONSULT - ASSESSMENT
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     Pt lethargic, c/o pain to breasts.  Able to help turn side to side. Incontinent of bowel and bladder.     Left gluteal: scattered areas of new pink epithelium with 1 open area, approx 0.3x0.3cm with clean pink partial thickness wound. Not on a pressure point. Likely combination of friction and moisture.   Right gluteal: 1 open wound, approx 1x1cm with mix of clean pink and yellow slough. Not on a pressure point. Likely combination of friction and moisture.   Right breast: 7x10cm wound, known calciphylaxis per MD team. Wound bed 80% soft black eschar, 20% thin yellow slough.  Area with slough is 1cm deep.  Periwound intact without irritation. No signs of infection.   Right breast: 7x16cm wound, known calciphylaxis per MD team. Wound bed 95% soft black eschar, 5% thin yellow slough. No depth. Periwound intact without irritation. No signs of infection.     Sacrococcygeal, back, elbows, heels intact.     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     Pt lethargic, c/o pain to breasts.  Able to help turn side to side. Incontinent of small soft brown stool during care; anuric.     Left gluteal: scattered areas of new pink epithelium with 1 open area, approx 0.3x0.3cm with clean pink partial thickness wound. Not on a pressure point. Likely combination of friction and moisture.   Right gluteal: 1 open wound, approx 1x1cm with mix of clean pink and yellow slough. Not on a pressure point. Likely combination of friction and moisture.   Left breast: 7x16cm wound, known calciphylaxis per MD team. Wound bed 80% soft black eschar, 20% thin yellow slough.  Area with slough is 1cm deep.  Periwound intact without irritation. No signs of infection.   Right breast: 7x10cm wound, known calciphylaxis per MD team. Wound bed 95% soft black eschar, 5% thin yellow slough. No depth. Periwound intact without irritation. No signs of infection.     Sacrococcygeal, back, elbows, heels intact.     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     Pt lethargic, c/o pain to breasts.  Able to help turn side to side. Incontinent of small soft brown stool during care; anuric.     Left upper gluteal: scattered areas of new pink epithelium with 1 open area, approx 0.3x0.3cm with clean pink partial thickness wound. Not on a pressure point. Likely friction and shear.   Right upper gluteal: 1 open wound, approx 1x1cm with mix of clean pink and yellow slough. Not on a pressure point. Likely friction and shear.  Left breast: 7x16cm wound, known calciphylaxis per MD team. Wound bed 80% soft black eschar, 20% thin yellow slough.  Area with slough is 1cm deep.  Periwound intact without irritation. No signs of infection.   Right breast: 7x10cm wound, known calciphylaxis per MD team. Wound bed 95% soft black eschar, 5% thin yellow slough. No depth. Periwound intact without irritation. No signs of infection.     Sacrococcygeal, back, elbows, heels intact.     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     Pt lethargic, c/o pain to breasts.  Able to help turn side to side. Incontinent of small soft brown stool during care; anuric.     Left upper gluteal: scattered areas of new pink epithelium with 1 open area, approx 0.3x0.3cm with clean pink partial thickness wound. Not on a pressure point. Likely friction and shear.   Right upper gluteal: 1 open wound, approx 1x1cm with mix of clean pink and yellow slough. Not on a pressure point. Likely friction and shear.  Left breast: 7x16cm wound, known calciphylaxis per MD team. Wound bed 80% soft black eschar, 20% thin yellow slough.  Area with slough is 1cm deep.  Periwound intact without irritation. No signs of infection.   Right breast: 7x10cm wound, known calciphylaxis per MD team. Wound bed 95% soft black eschar, 5% thin yellow slough. No depth. Periwound intact without irritation. No signs of infection.     Sacrococcygeal, back, elbows, heels intact.     Wounds and plan of care discussed with primary MIKE Mccord and MD Sandy

## 2023-05-06 NOTE — PHYSICAL THERAPY INITIAL EVALUATION ADULT - GENERAL OBSERVATIONS, REHAB EVAL
PT IE Completed. Pt received semi-supine in bed, A&Ox2, +RA,+heplock, in NAD and agreeable to work with PT, MIKE Ramos notified.Pt presents to [facility] with Hgb f 6.9. PT exam shows decreased sitting balance and functional strength; mobility also limited by pain. Pt completed bed mob and deferred OOB mobility 2/2 pain. Pt left as found, +bed alarm, +call garcia within reach, MIKE Ramos and MD Sandy notified notified. Pt can benefit from PT in order to improve quality of life by increasing strength/endurance/balance with functional mobility and ADLS. PT IE Completed. Pt received semi-supine in bed, A&Ox2, +RA,+heplock, in NAD and agreeable to work with PT, MIKE Ramos notified.Pt presents to [facility] with Hgb f 6.9. PT exam shows decreased sitting balance and functional strength; mobility also limited by pain and dizziness. Pt completed bed mob and deferred OOB mobility 2/2 bilateral breast pain and dizziness. Pt left as found, +bed alarm, +call garcia within reach, MIKE Ramos and MD Sandy notified notified. Pt can benefit from PT in order to improve quality of life by increasing strength/endurance/balance with functional mobility and ADLS.

## 2023-05-06 NOTE — PROGRESS NOTE ADULT - ASSESSMENT
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    Assessment/Plan:   #ESRD on HD  #Calciphylaxis  Usual RX time 3:30 hrs, EDW TBD  Will plan for HD today for clearance with minimla UF   K noted 3.6  Renal Diet  Give sodium thiosulfate with HD     #Hypotension  Pt on previous admission noted to have hypotension, was on midodrine 40mg Q8h at one point w/ florinef  -midodrine     #access   L TDC    #anemia  Hgb not at goal  -Transfuse to Hgb > 7  Obtain iron studies and ferritin  Epo w/ HD    #renal bone disease   Ca ~11.9  would increase sensipar to 120mg daily  increase renvela to 1600 TID w/ meals   Trend phos daily  will dialyze on lower calcium bath       Thank you for the opportunity to participate in the care of your patient. The nephrology service remains available to assist with any questions or concerns. Please feel free to reach us by paging the on-call nephrology fellow for urgent issues or as below.     Saumya Jo D.O  PGY-5, Nephrology Fellow    P: [phone]

## 2023-05-06 NOTE — PHYSICAL THERAPY INITIAL EVALUATION ADULT - BED MOBILITY LIMITATIONS, REHAB EVAL
Significant R lateral lean; pt reporting pain and dizziness. Unable to acquire BP/impaired ability to control trunk for mobility

## 2023-05-06 NOTE — PHYSICAL THERAPY INITIAL EVALUATION ADULT - ADDITIONAL COMMENTS
Pt poor historian however reports the last time she attempted to stand was "Sunday with two people." Pt reports she was in rehab prior to this admission.

## 2023-05-06 NOTE — PROGRESS NOTE ADULT - PROBLEM SELECTOR PLAN 8
F: None  E: replete with caution given ESRD  N: Renal Diet  DVT ppx: holding chemoprophylaxis iso bleed, SCDs  GI ppx: protonix IV 40mg BID -- > PO BID  Code Status: Full Code  Dispo: [facility].

## 2023-05-06 NOTE — PROGRESS NOTE ADULT - SUBJECTIVE AND OBJECTIVE BOX
Patient is a 67y Female seen and evaluated [facility] no acute events overnight patient no longer having melena patient having constipation /66 hgb 8.0 K 3.6 bicarb 26 patient complaining of buttock pain       Meds:    acetaminophen     Tablet .. 650 every 6 hours PRN  atorvastatin 40 at bedtime  calamine/zinc oxide Lotion 1 three times a day PRN  cinacalcet 90 daily  collagenase Ointment 1 daily  HYDROmorphone   Tablet 4 every 3 hours PRN  HYDROmorphone   Tablet 2 every 3 hours PRN  lidocaine 4% Cream 1 three times a day PRN  midodrine. 40 every 8 hours  Nephro-deborah 1 daily  pantoprazole    Tablet 40 every 12 hours  polyethylene glycol 3350 17 two times a day  polyethylene glycol/electrolyte Solution. 4000 once  senna 2 at bedtime  sevelamer carbonate 800 three times a day with meals  sodium chloride 0.9%. 1000 <Continuous>      T(C): , Max: 36.8 (23 @ 21:25)  T(F): , Max: 98.3 (23 @ 21:25)  HR: 74 (23 @ 13:08)  BP: 118/66 (23 @ 13:08)  BP(mean): 94 (23 @ 21:25)  RR: 16 (23 @ 13:08)  SpO2: 95% (23 @ 13:08)  Wt(kg): --     07:01  -   @ 07:00  --------------------------------------------------------  IN: 55 mL / OUT: 0 mL / NET: 55 mL     @ 07:01  -   @ 14:53  --------------------------------------------------------  IN: 0 mL / OUT: 1500 mL / NET: -1500 mL          Review of Systems:  all other ROS negative       PHYSICAL EXAM:  GENERAL: NAD resting in bed   CHEST/LUNG: decreased breath sounds at the bases   HEART: normal S1S2, RRR  ABDOMEN: Soft, Nontender, +BS, No flank tenderness bilateral  EXTREMITIES: + edema   ACCESS: + TDC       LABS:                        8.0    11.95 )-----------( 342      ( 06 May 2023 09:27 )             27.7     05-06    139  |  98  |  31<H>  ----------------------------<  86  3.6   |  26  |  3.90<H>    Ca    11.9<H>      06 May 2023 09:27  Phos  4.2     05-  Mg     2.0     05-06    TPro  5.6<L>  /  Alb  2.6<L>  /  TBili  0.3  /  DBili  x   /  AST  15  /  ALT  <5<L>  /  AlkPhos  131<H>  -          Iron Total, Serum: 32 ug/dL (23 @ 09:27)  Iron - Total Binding Capacity.: 86 ug/dL (23 @ 09:27)  % Saturation, Iron: 37 % (23 @ 09:27)  Phosphorus Level, Serum: 4.2 mg/dL (23 @ 09:27)    % Saturation, Iron: 37 % (23 @ 09:27)  Albumin, Serum: 2.6 g/dL (23 @ 09:27)    cinacalcet 90 milliGRAM(s) Oral daily, 23 @ 19:40, Routine  epoetin александр-epbx (RETACRIT) Injectable 54884 Unit(s) IV Push once, 23 @ 06:42, Routine, Stop order after: 1 Doses  sevelamer carbonate 800 milliGRAM(s) Oral three times a day with meals, 23 @ 19:44, Routine    Hemodialysis Treatment.:     Schedule: Once, Modality: Hemodialysis, Access: Internal Jugular Central Venous Catheter    Dialyzer: Optiflux [id number], Time: 180 Min    Blood Flow: 400 mL/Min , Dialysate Flow: 500 mL/Min, Dialysate Temp: 36.5, Tubinmm (Adult)    Target Fluid Removal: 2 Liters    Dialysate Electrolytes (mEq/L): Potassium 3, Calcium 2.5, Sodium 138, Bicarbonate 35 (23 @ 06:41) [Completed]        RADIOLOGY & ADDITIONAL STUDIES:

## 2023-05-06 NOTE — PHYSICAL THERAPY INITIAL EVALUATION ADULT - PERTINENT HX OF CURRENT PROBLEM, REHAB EVAL
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 Hgb currently stable GI following, considering endoscopy.

## 2023-05-06 NOTE — PHYSICAL THERAPY INITIAL EVALUATION ADULT - FOLLOWS COMMANDS/ANSWERS QUESTIONS, REHAB EVAL
increased time to answer questions/respond to cues/75% of the time/able to follow single-step instructions

## 2023-05-06 NOTE — PROGRESS NOTE ADULT - PROBLEM SELECTOR PLAN 9
- TTE (5/5) with g1dd, moderately dilated left atrium, moderate aortic stenosis. Although has a h/o high intracavitary gradient  - caution on excess volume removal, benefits from lower HR    #Peripheral vascular disease  Hx of cardiac cath in 2018, no known stents. Home meds: atorvastatin 40mg, ?plavix 75mg  - hold for now, formal med rec needed

## 2023-05-06 NOTE — PROGRESS NOTE ADULT - PROBLEM SELECTOR PLAN 2
RESOLVED. Patient hypotensive 73/45 on admission, improved to sBP 110s on recheck. On midodrine 40 mg PO q8h (goal MAP >55) during previous admission. Patient with anemia requiring transfusion of 1u pRBC.   - given volume resuscitation with blood products and ESRD/HFrEF, will hold of on further fluids at this time  - must check BP on legs with cuff aligned with popiteal artery  - c/w midodrine 40mg q8h

## 2023-05-06 NOTE — PROGRESS NOTE ADULT - ASSESSMENT
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 Hgb currently stable GI following, considering endoscopy.

## 2023-05-06 NOTE — PROGRESS NOTE ADULT - PROBLEM SELECTOR PLAN 4
Patient with ESRD 2/2 PKD, normally on dialysis outpatient. Access L TDC.   - received HD on 5/4  - nephrology consulted, f/u recs  - renal diet  - replete electrolytes with caution given ESRD    #Renal bone disease  Pt with hx of hypercalcemia. Calcium 11.1 on admission.  - resumed home phosphate binders, cinacalcet

## 2023-05-06 NOTE — PROGRESS NOTE ADULT - SUBJECTIVE AND OBJECTIVE BOX
INTERVAL EVENTS:  Seen during dialysis. No complaints. Resting.    PAST MEDICAL & SURGICAL HISTORY:  HTN (hypertension)  HLD (hyperlipidemia)  CAD (coronary atherosclerotic disease)  ESRD on dialysis  last 11/15/22  H/O ventral hernia  Brown tumor  brain  DVT, lower extremity  left  Stented coronary artery  History of surgery  IVC filter  AVF (arteriovenous fistula)  right left  S/P AIDEN-BSO  2003  History of surgery  RLE PTA stent / atherectomy  7/2021  History of atherectomy  stent    MEDICATIONS  (STANDING):  atorvastatin 40 milliGRAM(s) Oral at bedtime  cinacalcet 90 milliGRAM(s) Oral daily  collagenase Ointment 1 Application(s) Topical daily  midodrine. 40 milliGRAM(s) Oral every 8 hours  Nephro-deborah 1 Tablet(s) Oral daily  pantoprazole    Tablet 40 milliGRAM(s) Oral every 12 hours  polyethylene glycol 3350 17 Gram(s) Oral two times a day  polyethylene glycol/electrolyte Solution. 4000 milliLiter(s) Oral once  senna 2 Tablet(s) Oral at bedtime  sevelamer carbonate 800 milliGRAM(s) Oral three times a day with meals  sodium chloride 0.9%. 1000 milliLiter(s) (5 mL/Hr) IV Continuous <Continuous>    MEDICATIONS  (PRN):  acetaminophen     Tablet .. 650 milliGRAM(s) Oral every 6 hours PRN Mild Pain (1 - 3)  calamine/zinc oxide Lotion 1 Application(s) Topical three times a day PRN Itching  HYDROmorphone   Tablet 2 milliGRAM(s) Oral every 3 hours PRN Moderate Pain (4 - 6)  HYDROmorphone   Tablet 4 milliGRAM(s) Oral every 3 hours PRN Severe Pain (7 - 10)  lidocaine 4% Cream 1 Application(s) Topical three times a day PRN Breast pain      Vital Signs Last 24 Hrs  T(C): 36.8 (06 May 2023 12:20), Max: 36.8 (05 May 2023 21:25)  T(F): 98.3 (06 May 2023 12:20), Max: 98.3 (05 May 2023 21:25)  HR: 81 (06 May 2023 12:20) (73 - 108)  BP: 115/56 (06 May 2023 12:20) (110/70 - 175/87)  BP(mean): 94 (05 May 2023 21:25) (94 - 94)  RR: 16 (06 May 2023 12:20) (16 - 18)  SpO2: 94% (06 May 2023 12:20) (93% - 100%)    Parameters below as of 06 May 2023 12:20  Patient On (Oxygen Delivery Method): room air         PHYSICAL EXAM:  GEN: Awake, alert. NAD.   HEENT: NCAT, Mucosa moist. No JVD.  RESP: CTA b/l  CV: RRR. Normal S1/S2. No m/r/g.  ABD: Soft. NT/ND. BS+  EXT: Warm. RUE with significant pitting edema  NEURO: AAOx3. No focal deficits.     LABS:                        8.0    11.95 )-----------( 342      ( 06 May 2023 09:27 )             27.7     05-06    139  |  98  |  31<H>  ----------------------------<  86  3.6   |  26  |  3.90<H>    Ca    11.9<H>      06 May 2023 09:27  Phos  4.2     05-06  Mg     2.0     05-06    TPro  5.6<L>  /  Alb  2.6<L>  /  TBili  0.3  /  DBili  x   /  AST  15  /  ALT  <5<L>  /  AlkPhos  131<H>  05-06      I&O's Summary    05 May 2023 07:01  -  06 May 2023 07:00  --------------------------------------------------------  IN: 55 mL / OUT: 0 mL / NET: 55 mL    06 May 2023 07:01  -  06 May 2023 14:01  --------------------------------------------------------  IN: 0 mL / OUT: 1500 mL / NET: -1500 mL

## 2023-05-06 NOTE — PROGRESS NOTE ADULT - PROBLEM SELECTOR PLAN 1
Patient presenting from rehab with hemoglobin of 6.9. Per chart review, patient has been more confused lately. JUAN on admission, showing maroon stool. FOBT positive. Hgb 6.3 on admission.  - s/p 1u pRBC on 5/4. with Hgb 6.7  - repeat hgb on 5/6 after initial transfusion 8.0  - GI consulted for UGIB, possible EGD, no further episodes of melena since admission  - c/w IV protonix 40mg BID; transition to PO since no IV access  - maintain active T&S  - transfuse for hemoglobin <7  - ordered iron studies, f/u results

## 2023-05-07 NOTE — PROGRESS NOTE ADULT - ASSESSMENT
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 Hgb currently stable GI following,

## 2023-05-07 NOTE — PROGRESS NOTE ADULT - PROBLEM SELECTOR PLAN 8
F: None  E: replete with caution given ESRD  N: Renal Diet  DVT ppx: holding chemoprophylaxis iso bleed, SCDs  GI ppx: protonix IV 40mg BID -- > PO BID  Code Status: Full Code  Dispo: [facility].

## 2023-05-07 NOTE — PROGRESS NOTE ADULT - SUBJECTIVE AND OBJECTIVE BOX
OVERNIGHT EVENTS: SHAISTA    SUBJECTIVE:  Patient seen and examined at bedside, NAD, complained of some low back pain.     Vital Signs Last 12 Hrs  T(F): 98.7 (05-07-23 @ 05:05), Max: 98.7 (05-07-23 @ 05:05)  HR: 100 (05-07-23 @ 05:05) (100 - 112)  BP: 110/86 (05-07-23 @ 05:05) (110/86 - 110/86)  BP(mean): --  RR: 16 (05-07-23 @ 05:05) (16 - 17)  SpO2: 93% (05-07-23 @ 05:05) (93% - 95%)  I&O's Summary    06 May 2023 07:01  -  07 May 2023 07:00  --------------------------------------------------------  IN: 0 mL / OUT: 1500 mL / NET: -1500 mL        PHYSICAL EXAM:  General: NAD, resting in bed, appears uncomfortable  HEENT: MMM  Neck: supple  Cardiovascular: +S1/S2; RRR  Respiratory: CTA B/L; no W/R/R  Gastrointestinal: soft, NT/ND; ventral hernia, soft and reducible on L mid abdomen  Extremities: WWP; no edema, clubbing or cyanosis of lower extremities; 2+ pitting edema in forearm extending to entire R arm; some mild swelling of L arm increased compared to yesterday  Derm: rash in R and L breast, dressed in guaze c/d/i, sacral ulcers noted by staff, 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  Vascular: 2+ radial, DP/PT pulses B/L  Neurological: AAOx3; no focal deficits        LABS:                        8.0    11.95 )-----------( 342      ( 06 May 2023 09:27 )             27.7     05-06    139  |  98  |  31<H>  ----------------------------<  86  3.6   |  26  |  3.90<H>    Ca    11.9<H>      06 May 2023 09:27  Phos  4.2     05-06  Mg     2.0     05-06    TPro  5.6<L>  /  Alb  2.6<L>  /  TBili  0.3  /  DBili  x   /  AST  15  /  ALT  <5<L>  /  AlkPhos  131<H>  05-06            RADIOLOGY & ADDITIONAL TESTS:    MEDICATIONS  (STANDING):  atorvastatin 40 milliGRAM(s) Oral at bedtime  bisacodyl 5 milliGRAM(s) Oral every 12 hours  cinacalcet 120 milliGRAM(s) Oral every 24 hours  midodrine. 40 milliGRAM(s) Oral every 8 hours  Nephro-deborah 1 Tablet(s) Oral daily  pantoprazole    Tablet 40 milliGRAM(s) Oral every 12 hours  polyethylene glycol 3350 17 Gram(s) Oral two times a day  polyethylene glycol/electrolyte Solution. 4000 milliLiter(s) Oral once  senna 2 Tablet(s) Oral at bedtime  sevelamer carbonate 1600 milliGRAM(s) Oral three times a day with meals  sodium chloride 0.9%. 1000 milliLiter(s) (5 mL/Hr) IV Continuous <Continuous>    MEDICATIONS  (PRN):  acetaminophen     Tablet .. 650 milliGRAM(s) Oral every 6 hours PRN Mild Pain (1 - 3)  calamine/zinc oxide Lotion 1 Application(s) Topical three times a day PRN Itching  HYDROmorphone   Tablet 4 milliGRAM(s) Oral every 3 hours PRN Severe Pain (7 - 10)  HYDROmorphone   Tablet 2 milliGRAM(s) Oral every 3 hours PRN Moderate Pain (4 - 6)  lidocaine 4% Cream 1 Application(s) Topical three times a day PRN Breast pain

## 2023-05-08 NOTE — PROGRESS NOTE ADULT - PROBLEM SELECTOR PLAN 2
RESOLVED. Patient hypotensive 73/45 on admission, improved to sBP 110s on recheck. On midodrine 40 mg PO q8h (goal MAP >55) during previous admission. Patient with anemia requiring transfusion of 1u pRBC.   - given volume resuscitation with blood products and ESRD/HFrEF, will hold of on further fluids at this time  - must check BP on legs with cuff aligned with popiteal artery  - c/w midodrine 40mg q8h Patient with constipation since admission. ABX (5/3) with nonacute, nonobstructive bowel gas pattern, no dilated loops of small bowel.  - s/p manual disimpaction  - using opioids cautiously given constipation  - miralax 17g BID  - senna 2 tabs qHS  - rectal enemas PRN, will advance to lactulose enema today  - Golytely prep for constipation

## 2023-05-08 NOTE — PROGRESS NOTE ADULT - PROBLEM SELECTOR PLAN 10
F: None  E: replete with caution given ESRD  N: Renal Diet  DVT ppx: holding chemoprophylaxis iso bleed, SCDs  GI ppx: protonix IV 40mg BID -- > PO BID  Code Status: Full Code  Dispo: [facility].

## 2023-05-08 NOTE — PROGRESS NOTE ADULT - ASSESSMENT
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     GI was consulted with concern for GIB with maroon colored stool and associated anemia, though has been constipated with brown stool during inpt course    Hgb has varied from 7 to 10 between now and November 2022.   Pt has had witnessed large, hard brown stool and I performed a manual disimpaction at bedside on 5/5.  RN again noted brown stool which is reassuring. Maroon colored stool on presentation may be 2/2 mucosal tear i/s/o severe fecal impaction but difficult to determine.  Severe constipation likely also explains rectal pain    Suspicion for anemia of chronic kidney disease however pt is iron deficient and EGD/Colonoscopy is indicated.  We have been unable to get a sense of whether pt is agreeable to this or establish capacity as she has been fairly non participatory in conversations to date.    Recommendations  - Continue empiric PPI BID  - Inpatient EGD/colonoscopy is recommended due to comorbid status and indication for anticoag to help provide clarity  - We will discuss case with pt's HCP and whether endoscopic eval is in line with [facility]  - Please check KUB to assess stool burden.  If non obstructed, please give golytely and rectal enemas. Manual disimpaction may be necessary  - Replete iron deficiency with IV iron  - Avoid opioids  - Trend CBC. 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     GI was consulted with concern for GIB with maroon colored stool and associated anemia, though has been constipated with brown stool during inpt course    Hgb has varied from 7 to 10 between now and November 2022.   Pt has had witnessed large, hard brown stool and I performed a manual disimpaction at bedside on 5/5.  RN again noted brown stool which is reassuring. Maroon colored stool on presentation may be 2/2 mucosal tear i/s/o severe fecal impaction but difficult to determine.  Severe constipation likely also explains rectal pain    Suspicion for anemia of chronic kidney disease however pt is iron deficient and EGD/Colonoscopy is indicated.  We have been unable to get a sense of whether pt is agreeable to this or establish capacity as she has been fairly non participatory in conversations to date.    Recommendations  - Continue empiric PPI BID  - Inpatient EGD/colonoscopy is recommended due to comorbid status and indication for anticoag to help provide clarity  - We will discuss case with pt's HCP and whether endoscopic eval is in line with [facility]  - Please check KUB to assess stool burden.  If non obstructed, please give golytely and rectal enemas. Manual disimpaction may be necessary  - Replete iron deficiency with IV iron  - Avoid opioids  - Trend CBC. Monitor for clinical bleeding- Transfuse as indicated; maintain active T&S    Clive Rizzo, DO  Gastroenterology Fellow  Pager: [phone]  After 5PM or on weekends, please contact [place]  for fellow on call

## 2023-05-08 NOTE — PROGRESS NOTE ADULT - PROBLEM SELECTOR PLAN 4
Patient with ESRD 2/2 PKD, normally on dialysis outpatient. Access L TDC.   - received HD on 5/4  - nephrology consulted, f/u recs  - renal diet  - replete electrolytes with caution given ESRD    #Renal bone disease  Pt with hx of hypercalcemia. Calcium 11.1 on admission.  - resumed home phosphate binders, cinacalcet Pt complaining of 10/10 pain between gluteal folds. Per daughter, patient has had altered mental status, not at baseline, however patient appears aware of situation but focusing primarily on severity of pain. Patient protecting airway, opens eyes easily with light sternal rub, however not participating in some discussions.  - pain control as follows: Acetaminophen 650mg q6h for mild pain, Dilaudid 2mg PO q3h prn for moderate pain, Dilaudid 4mg PO q3h prn for severe pain  - continue to monitor mental status  - palliative consult for recommendations regarding pain management and also to discuss code status  - continue to treat constipation as above

## 2023-05-08 NOTE — CHART NOTE - NSCHARTNOTEFT_GEN_A_CORE
Attempted to reach pt's emergency contact, Jasmina, listed in record. No answer.      Will continue to try    Clive Rizzo DO  Gastroenterology Fellow  Pager: [phone]  After 5PM or on weekends, please contact [place]  for fellow on call Attempted to reach pt's emergency contact, Jasmina, listed in record. No answer.    Additional attempt without success.  Pt primary team has also been unable to reach [facility].    Absence of clinical bleeding since admission is reassuring.  EGD/Colonoscopy is indicated, however can be pursued on outpt basis given stability of Hgb without bleeding, if in line with [facility]    Gastroenterology service will sign off  Case discussed with attending physician  Please recall as needed with any GI related questions or with clinical bleeding.    Please request that the patient schedule GI follow-up at the clinic located on the fourth floor of [address] [place] [address] by calling the office at [phone]     Thank you for this consult.     Update discussed with [facility] attending, Dr. Clover Rizzo, DO  Gastroenterology Fellow  Pager: [phone]  After 5PM or on weekends, please contact [place]  for fellow on call Attempted to reach pt's emergency contact, Jasmina, listed in record. No answer.    Additional attempt without success.  Pt primary team has also been unable to reach [facility].    Absence of clinical bleeding since admission is reassuring.  EGD/Colonoscopy is indicated, however can be pursued on outpt basis given stability of Hgb without bleeding, if in line with [facility]    Gastroenterology service will sign off  Case discussed with attending physician  Please recall as needed with any GI related questions or with clinical bleeding.    Please request that the patient schedule GI follow-up at the clinic located on the fourth floor of [facility] [address] [place] [address] by calling the office at [phone]     Thank you for this consult.     Update discussed with c attending, Dr. Galo and IM team resident Dr. Du Rizzo,   Gastroenterology Fellow  Pager: [phone]  After 5PM or on weekends, please contact [place]  for fellow on call

## 2023-05-08 NOTE — PROGRESS NOTE ADULT - PROBLEM SELECTOR PLAN 8
F: None  E: replete with caution given ESRD  N: Renal Diet  DVT ppx: holding chemoprophylaxis iso bleed, SCDs  GI ppx: protonix IV 40mg BID -- > PO BID  Code Status: Full Code  Dispo: [facility]. At risk for secondary HPTT d/t renal failure. Pt has vague abdominal pain w/ nausea. Concern for possible brown tumors on CT with multiple fractures of pubic rami, pubic bone, thoracolumbar spine during last visit. Intact , Vit D 38.5. SPEP/immunofixation negative. Per Endo, likely secondary hyperparathyroidism vs other process causing osteoclastic tumors.  - monitor for now  - treating with sensipar as above

## 2023-05-08 NOTE — PROGRESS NOTE ADULT - SUBJECTIVE AND OBJECTIVE BOX
Pt seen and examined at bedside.  Pt minimally participatory in bedside exam. Declines to answer questions.  Unable to obtain ROS, though RN noted brown stool last night      Allergies    Ancef (Rash; Urticaria)  sulfa drugs (Angioedema)  penicillin (Swelling)  DDAVP (Hypotension)  iodine (Hives; Pruritus)    Intolerances        MEDICATIONS:  MEDICATIONS  (STANDING):  atorvastatin 40 milliGRAM(s) Oral at bedtime  bisacodyl 5 milliGRAM(s) Oral every 12 hours  cinacalcet 120 milliGRAM(s) Oral every 24 hours  midodrine. 40 milliGRAM(s) Oral every 8 hours  Nephro-deborah 1 Tablet(s) Oral daily  pantoprazole    Tablet 40 milliGRAM(s) Oral every 12 hours  polyethylene glycol 3350 17 Gram(s) Oral two times a day  senna 2 Tablet(s) Oral at bedtime  sevelamer carbonate 1600 milliGRAM(s) Oral three times a day with meals  sodium chloride 0.9%. 1000 milliLiter(s) (5 mL/Hr) IV Continuous <Continuous>    MEDICATIONS  (PRN):  acetaminophen     Tablet .. 650 milliGRAM(s) Oral every 6 hours PRN Temp greater or equal to 38C (100.4F), Mild Pain (1 - 3)  calamine/zinc oxide Lotion 1 Application(s) Topical three times a day PRN Itching  dextrose Oral Gel 15 Gram(s) Oral once PRN for not eating  HYDROmorphone   Tablet 4 milliGRAM(s) Oral every 3 hours PRN Severe Pain (7 - 10)  HYDROmorphone   Tablet 2 milliGRAM(s) Oral every 3 hours PRN Moderate Pain (4 - 6)  lidocaine 4% Cream 1 Application(s) Topical three times a day PRN Breast pain      Vital Signs Last 24 Hrs  T(C): 36.4 (08 May 2023 07:00), Max: 38.1 (08 May 2023 05:15)  T(F): 97.5 (08 May 2023 07:00), Max: 100.5 (08 May 2023 05:15)  HR: 84 (08 May 2023 05:00) (84 - 99)  BP: 129/76 (08 May 2023 05:00) (94/85 - 129/76)  BP(mean): 74 (07 May 2023 21:50) (74 - 74)  RR: 16 (08 May 2023 05:00) (16 - 17)  SpO2: 98% (08 May 2023 05:00) (91% - 100%)    Parameters below as of 08 May 2023 05:00  Patient On (Oxygen Delivery Method): room air        05-07 @ 07:01  -  05-08 @ 07:00  --------------------------------------------------------  IN: 60 mL / OUT: 0 mL / NET: 60 mL        PHYSICAL EXAM:    General: No acute distress  Pulm: Normal resp effort  Gastrointestinal: Obese abd, ventral hernia, winces with exam. No rebound or guarding    LABS:  CBC Full  -  ( 07 May 2023 11:07 )  WBC Count : 11.16 K/uL  RBC Count : 3.29 M/uL  Hemoglobin : 8.3 g/dL  Hematocrit : 28.5 %  Platelet Count - Automated : 269 K/uL  Mean Cell Volume : 86.6 fl  Mean Cell Hemoglobin : 25.2 pg  Mean Cell Hemoglobin Concentration : 29.1 gm/dL  Auto Neutrophil # : 9.47 K/uL  Auto Lymphocyte # : 0.97 K/uL  Auto Monocyte # : 0.61 K/uL  Auto Eosinophil # : 0.02 K/uL  Auto Basophil # : 0.01 K/uL  Auto Neutrophil % : 84.8 %  Auto Lymphocyte % : 8.7 %  Auto Monocyte % : 5.5 %  Auto Eosinophil % : 0.2 %  Auto Basophil % : 0.1 %    05-07    143  |  99  |  21  ----------------------------<  66<L>  3.2<L>   |  25  |  3.14<H>    Ca    11.0<H>      07 May 2023 11:07  Phos  2.2     05-07  Mg     2.1     05-07    TPro  5.5<L>  /  Alb  2.4<L>  /  TBili  0.4  /  DBili  x   /  AST  12  /  ALT  5<L>  /  AlkPhos  128<H>  05-07                      RADIOLOGY & ADDITIONAL STUDIES:

## 2023-05-08 NOTE — PROGRESS NOTE ADULT - PROBLEM SELECTOR PLAN 5
Found to have acute DVT of left common femoral vein found on 1.28.23. History of multiple DVTs requiring IVC filter placement.   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 Access L TDC.   - received HD on 5/4, 5/6  - nephrology consulted, f/u recs  - renal diet  - replete electrolytes with caution given ESRD    #Renal bone disease  Pt with hx of hypercalcemia. Calcium 11.1 on admission.  - resumed home phosphate binders, cinacalcet  - increased renvela to 1600mg TID with meals  - increased sensipar to 120mg qd

## 2023-05-08 NOTE — PROGRESS NOTE ADULT - PROBLEM SELECTOR PLAN 9
- TTE (5/5) with g1dd, moderately dilated left atrium, moderate aortic stenosis. Although has a h/o high intracavitary gradient  - caution on excess volume removal, benefits from lower HR    #Peripheral vascular disease  Hx of cardiac cath in 2018, no known stents. Home meds: atorvastatin 40mg, ?plavix 75mg  - hold for now, formal med rec needed - TTE (5/5) with g1dd, moderately dilated left atrium, moderate aortic stenosis. Although has a h/o high intracavitary gradient  - caution on excess volume removal, benefits from lower HR    #Peripheral vascular disease  Hx of cardiac cath in 2018, no known stents. Home meds: atorvastatin 40mg, plavix 75mg  - c/w atorvastatin  - holding plavik 75mg given UGIB

## 2023-05-08 NOTE — PROGRESS NOTE ADULT - PROBLEM SELECTOR PLAN 1
Patient presenting from rehab with hemoglobin of 6.9. Per chart review, patient has been more confused lately. JUAN on admission, showing maroon stool. FOBT positive. Hgb 6.3 on admission.  - s/p 1u pRBC on 5/4. with Hgb 6.7  - repeat hgb on 5/6 after initial transfusion 8.0  - GI consulted for UGIB, possible EGD, no further episodes of melena since admission  - c/w IV protonix 40mg BID; transition to PO since no IV access  - maintain active T&S  - transfuse for hemoglobin <7  - ordered iron studies, f/u results Patient presenting from rehab with hemoglobin of 6.9. Per chart review, patient has been more confused lately. JUAN on admission, showing maroon stool. FOBT positive. Hgb 6.3 on admission.  - s/p 1u pRBC on 5/4. with Hgb 6.7  - repeat hgb on 5/6 after initial transfusion 8.0  - GI consulted for UGIB, possible EGD/colonoscopy, no further episodes of melena since admission  - c/w IV protonix 40mg BID; transition to PO since no IV access  - iron studies appear to be AoCD  - maintain active T&S  - transfuse for hemoglobin <7

## 2023-05-08 NOTE — PROGRESS NOTE ADULT - PROBLEM SELECTOR PLAN 7
At risk for secondary HPTT d/t renal failure. Pt has vague abdominal pain w/ nausea. Concern for possible brown tumors on CT with multiple fractures of pubic rami, pubic bone, thoracolumbar spine during last visit. Intact , Vit D 38.5. SPEP/immunofixation negative. Per Endo, likely secondary hyperparathyroidism vs other process causing osteoclastic tumors.  - monitor for now Family hx of breast cancer in mother, sister. Imaging from previous admission - US bilateral breasts: No sonographic evidence of breast abscess, cyst or focal mass to correspond with the clinical finding of bilateral breast masses. Breast bx: Epidermal and dermal necrosis with fibrinoid occlusion and necrosis of blood vessels, focal necrotizing suppurative inflammation involving the fibroadipose tissue and blood vessels in the subcutis. Pain responsive to Dilaudid during prior admission.  - c/w Acetaminophen 650mg q6h for mild pain  - c/w Dilaudid 2mg PO q3h prn for moderate pain  - c/w Dilaudid 4mg PO q3h prn for severe pain  - caution with opioids with constipation

## 2023-05-08 NOTE — PROGRESS NOTE ADULT - PROBLEM SELECTOR PLAN 3
Pt complaining of 10/10 pain between gluteal folds. Per daughter, patient has had altered mental status, not at baseline, however patient appears aware of situation but focusing primarily on severity of pain. Patient protecting airway, opens eyes easily with light sternal rub, however not participating in some discussions.  - pain control as follows: Acetaminophen 650mg q6h for mild pain, Dilaudid 2mg PO q3h prn for moderate pain, Dilaudid 4mg PO q3h prn for severe pain  - continue to monitor mental status  - palliative consult for recommendations regarding pain management and also to discuss code status (patient not participating in discussions, previously full code on last admission in 2/2023) RESOLVED. Patient hypotensive 73/45 on admission, improved to sBP 110s on recheck. On midodrine 40 mg PO q8h (goal MAP >55) during previous admission. Patient with anemia requiring transfusion of 1u pRBC.   - given volume resuscitation with blood products and ESRD/HFrEF, will hold of on further fluids at this time  - must check BP on legs with cuff aligned with popiteal artery  - c/w midodrine 40mg q8h

## 2023-05-08 NOTE — PROGRESS NOTE ADULT - SUBJECTIVE AND OBJECTIVE BOX
**Incomplete Note**   CC: Patient is a 67y old  Female who presents with a chief complaint of anemia (08 May 2023 08:30)      INTERVAL EVENTS: SHAISTA    SUBJECTIVE / INTERVAL HPI: Patient seen and examined at bedside.     ROS: negative unless otherwise stated above.    VITAL SIGNS:  Vital Signs Last 24 Hrs  T(C): 36.4 (08 May 2023 07:00), Max: 38.1 (08 May 2023 05:15)  T(F): 97.5 (08 May 2023 07:00), Max: 100.5 (08 May 2023 05:15)  HR: 84 (08 May 2023 05:00) (84 - 99)  BP: 129/76 (08 May 2023 05:00) (94/85 - 129/76)  BP(mean): 74 (07 May 2023 21:50) (74 - 74)  RR: 16 (08 May 2023 05:00) (16 - 17)  SpO2: 98% (08 May 2023 05:00) (91% - 100%)    Parameters below as of 08 May 2023 05:00  Patient On (Oxygen Delivery Method): room air          05-07-23 @ 07:01  -  05-08-23 @ 07:00  --------------------------------------------------------  IN: 60 mL / OUT: 0 mL / NET: 60 mL        PHYSICAL EXAM:    General: NAD  HEENT: MMM  Neck: supple  Cardiovascular: +S1/S2; RRR  Respiratory: CTA B/L; no W/R/R  Gastrointestinal: soft, NT/ND  Extremities: WWP; no edema, clubbing or cyanosis  Vascular: 2+ radial, DP/PT pulses B/L  Neurological: AAOx3; no focal deficits    MEDICATIONS:  MEDICATIONS  (STANDING):  atorvastatin 40 milliGRAM(s) Oral at bedtime  bisacodyl 5 milliGRAM(s) Oral every 12 hours  cinacalcet 120 milliGRAM(s) Oral every 24 hours  lactulose Retention Enema 200 Gram(s) Rectal once  midodrine. 40 milliGRAM(s) Oral every 8 hours  Nephro-deborah 1 Tablet(s) Oral daily  pantoprazole    Tablet 40 milliGRAM(s) Oral every 12 hours  polyethylene glycol 3350 17 Gram(s) Oral two times a day  senna 2 Tablet(s) Oral at bedtime  sevelamer carbonate 1600 milliGRAM(s) Oral three times a day with meals  sodium chloride 0.9%. 1000 milliLiter(s) (5 mL/Hr) IV Continuous <Continuous>    MEDICATIONS  (PRN):  acetaminophen     Tablet .. 650 milliGRAM(s) Oral every 6 hours PRN Temp greater or equal to 38C (100.4F), Mild Pain (1 - 3)  calamine/zinc oxide Lotion 1 Application(s) Topical three times a day PRN Itching  dextrose Oral Gel 15 Gram(s) Oral once PRN for not eating  HYDROmorphone   Tablet 4 milliGRAM(s) Oral every 3 hours PRN Severe Pain (7 - 10)  HYDROmorphone   Tablet 2 milliGRAM(s) Oral every 3 hours PRN Moderate Pain (4 - 6)  lidocaine 4% Cream 1 Application(s) Topical three times a day PRN Breast pain      ALLERGIES:  Allergies    Ancef (Rash; Urticaria)  sulfa drugs (Angioedema)  penicillin (Swelling)  DDAVP (Hypotension)  iodine (Hives; Pruritus)    Intolerances        LABS:                        8.3    11.16 )-----------( 269      ( 07 May 2023 11:07 )             28.5     05-07    143  |  99  |  21  ----------------------------<  66<L>  3.2<L>   |  25  |  3.14<H>    Ca    11.0<H>      07 May 2023 11:07  Phos  2.2     05-07  Mg     2.1     05-07    TPro  5.5<L>  /  Alb  2.4<L>  /  TBili  0.4  /  DBili  x   /  AST  12  /  ALT  5<L>  /  AlkPhos  128<H>  05-07        CAPILLARY BLOOD GLUCOSE          RADIOLOGY & ADDITIONAL TESTS: Reviewed.   CC: Patient is a 67y old  Female who presents with a chief complaint of anemia (08 May 2023 08:30)      INTERVAL EVENTS: SHAISTA    SUBJECTIVE / INTERVAL HPI: Patient seen and examined at bedside. Reports rectal pain, nausea, and feeling lethargic. Denies abdominal pain, chest pain, fever, chills.     ROS: negative unless otherwise stated above.    VITAL SIGNS:  Vital Signs Last 24 Hrs  T(C): 36.4 (08 May 2023 07:00), Max: 38.1 (08 May 2023 05:15)  T(F): 97.5 (08 May 2023 07:00), Max: 100.5 (08 May 2023 05:15)  HR: 84 (08 May 2023 05:00) (84 - 99)  BP: 129/76 (08 May 2023 05:00) (94/85 - 129/76)  BP(mean): 74 (07 May 2023 21:50) (74 - 74)  RR: 16 (08 May 2023 05:00) (16 - 17)  SpO2: 98% (08 May 2023 05:00) (91% - 100%)    Parameters below as of 08 May 2023 05:00  Patient On (Oxygen Delivery Method): room air          05-07-23 @ 07:01  -  05-08-23 @ 07:00  --------------------------------------------------------  IN: 60 mL / OUT: 0 mL / NET: 60 mL        PHYSICAL EXAM:    General: NAD, resting in bed, appears uncomfortable  HEENT: MMM  Neck: supple  Cardiovascular: +S1/S2; RRR  Respiratory: CTA B/L; no W/R/R  Gastrointestinal: soft, NT/ND; ventral hernia, soft and reducible on L mid abdomen  Extremities: WWP; no edema, clubbing or cyanosis of lower extremities; 2+ pitting edema in forearm extending to entire R arm; some mild swelling of L arm increased compared to yesterday  Derm: rash in R and L breast, dressed in guaze c/d/i, sacral ulcers noted by staff, 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 40 milliGRAM(s) Oral every 8 hours  Nephro-deborah 1 Tablet(s) Oral daily  pantoprazole    Tablet 40 milliGRAM(s) Oral every 12 hours  polyethylene glycol 3350 17 Gram(s) Oral two times a day  senna 2 Tablet(s) Oral at bedtime  sevelamer carbonate 1600 milliGRAM(s) Oral three times a day with meals  sodium chloride 0.9%. 1000 milliLiter(s) (5 mL/Hr) IV Continuous <Continuous>    MEDICATIONS  (PRN):  acetaminophen     Tablet .. 650 milliGRAM(s) Oral every 6 hours PRN Temp greater or equal to 38C (100.4F), Mild Pain (1 - 3)  calamine/zinc oxide Lotion 1 Application(s) Topical three times a day PRN Itching  dextrose Oral Gel 15 Gram(s) Oral once PRN for not eating  HYDROmorphone   Tablet 4 milliGRAM(s) Oral every 3 hours PRN Severe Pain (7 - 10)  HYDROmorphone   Tablet 2 milliGRAM(s) Oral every 3 hours PRN Moderate Pain (4 - 6)  lidocaine 4% Cream 1 Application(s) Topical three times a day PRN Breast pain      ALLERGIES:  Allergies    Ancef (Rash; Urticaria)  sulfa drugs (Angioedema)  penicillin (Swelling)  DDAVP (Hypotension)  iodine (Hives; Pruritus)    Intolerances        LABS:                        8.3    11.16 )-----------( 269      ( 07 May 2023 11:07 )             28.5     05-07    143  |  99  |  21  ----------------------------<  66<L>  3.2<L>   |  25  |  3.14<H>    Ca    11.0<H>      07 May 2023 11:07  Phos  2.2     05-07  Mg     2.1     05-07    TPro  5.5<L>  /  Alb  2.4<L>  /  TBili  0.4  /  DBili  x   /  AST  12  /  ALT  5<L>  /  AlkPhos  128<H>  05-07        CAPILLARY BLOOD GLUCOSE          RADIOLOGY & ADDITIONAL TESTS: Reviewed.

## 2023-05-08 NOTE — PROGRESS NOTE ADULT - PROBLEM SELECTOR PLAN 6
Family hx of breast cancer in mother, sister. Imaging from previous admission - US bilateral breasts: No sonographic evidence of breast abscess, cyst or focal mass to correspond with the clinical finding of bilateral breast masses. Breast bx: Epidermal and dermal necrosis with fibrinoid occlusion and necrosis of blood vessels, focal necrotizing suppurative inflammation involving the fibroadipose tissue and blood vessels in the subcutis. Pain responsive to Dilaudid during prior admission.  - start Acetaminophen 650mg q6h for mild pain  - start Dilaudid 2mg PO q3h prn for moderate pain  - start Dilaudid 4mg PO q3h prn for severe pain Found to have acute DVT of left common femoral vein found on 1.28.23. History of multiple DVTs requiring IVC filter placement.   - holding AC iso possible UGIB  - SCDs for ppx

## 2023-05-09 NOTE — PROGRESS NOTE ADULT - SUBJECTIVE AND OBJECTIVE BOX
SUBJECTIVE AND OBJECTIVE:  Indication for Geriatrics and Palliative Care Services: Complex medical decision making    OVERNIGHT EVENTS: Went for HD earlier today. Lethargic on interview as is typical post-HD per daughter at bedside.    Allergies    Ancef (Rash; Urticaria)  sulfa drugs (Angioedema)  penicillin (Swelling)  DDAVP (Hypotension)  iodine (Hives; Pruritus)    Intolerances    MEDICATIONS  (STANDING):  apixaban 5 milliGRAM(s) Oral every 12 hours  atorvastatin 40 milliGRAM(s) Oral at bedtime  bisacodyl 5 milliGRAM(s) Oral every 12 hours  cinacalcet 120 milliGRAM(s) Oral every 24 hours  midodrine. 40 milliGRAM(s) Oral every 8 hours  Nephro-deborah 1 Tablet(s) Oral daily  pantoprazole    Tablet 40 milliGRAM(s) Oral every 12 hours  polyethylene glycol 3350 17 Gram(s) Oral two times a day  senna 2 Tablet(s) Oral at bedtime  sevelamer carbonate 1600 milliGRAM(s) Oral three times a day with meals  sodium chloride 0.9%. 1000 milliLiter(s) (5 mL/Hr) IV Continuous <Continuous>    MEDICATIONS  (PRN):  acetaminophen     Tablet .. 650 milliGRAM(s) Oral every 6 hours PRN Temp greater or equal to 38C (100.4F), Mild Pain (1 - 3)  calamine/zinc oxide Lotion 1 Application(s) Topical three times a day PRN Itching  dextrose Oral Gel 15 Gram(s) Oral once PRN for not eating  HYDROmorphone   Tablet 4 milliGRAM(s) Oral every 3 hours PRN Severe Pain (7 - 10)  HYDROmorphone   Tablet 2 milliGRAM(s) Oral every 3 hours PRN Moderate Pain (4 - 6)  lidocaine 4% Cream 1 Application(s) Topical three times a day PRN Breast pain      ITEMS UNCHECKED ARE NOT PRESENT    PRESENT SYMPTOMS: [X]Unable to self-report  Source if other than patient:  [X]Family   [ ]Team     Pain:  [X]yes [ ]no  QOL impact -   Location -                    Aggravating factors -  Quality -  Radiation -  Timing-  Severity (0-10 scale):  Minimal acceptable level (0-10 scale):     Dyspnea:                           [ ]Mild [ ]Moderate [ ]Severe  Anxiety:                             [ ]Mild [ ]Moderate [ ]Severe  Fatigue:                             [ ]Mild [ ]Moderate [X]Severe  Nausea:                             [ ]Mild [ ]Moderate [ ]Severe  Loss of appetite:              [ ]Mild [ ]Moderate [ ]Severe  Constipation:                    [ ]Mild [ ]Moderate [X]Severe    Other Symptoms:  [X]All other review of systems negative     Palliative Performance Status Version 2:         30%      http://[email or web address]/files/[email or web address]_performance_scale_[email or web address]    PHYSICAL EXAM:  Vital Signs Last 24 Hrs  T(C): 36.7 (09 May 2023 13:45), Max: 36.7 (09 May 2023 13:45)  T(F): 98 (09 May 2023 13:45), Max: 98 (09 May 2023 13:45)  HR: 88 (09 May 2023 13:45) (65 - 97)  BP: 103/57 (09 May 2023 13:45) (72/36 - 116/65)  BP(mean): --  RR: 17 (09 May 2023 13:45) (17 - 18)  SpO2: 100% (09 May 2023 13:45) (93% - 100%)    Parameters below as of 09 May 2023 13:45  Patient On (Oxygen Delivery Method): room air     I&O's Summary    09 May 2023 07:01  -  09 May 2023 16:48  --------------------------------------------------------  IN: 0 mL / OUT: 1500 mL / NET: -1500 mL       GENERAL: [ ]Cachexia    [ ]Alert  [ ]Oriented x   [X]Lethargic  [X]Arousable  [ ]Verbal  [ ]Non-Verbal  Behavioral:   [ ]Anxiety  [ ]Delirium [ ]Agitation [X]Calm  HEENT:  [X]Normal   [ ]Dry mouth   [ ]ET Tube/Trach  [ ]Oral lesions  PULMONARY:   [ X]Clear [ ]Tachypnea  [ ]Audible excessive secretions   [ ]Rhonchi        [ ]Right [ ]Left [ ]Bilateral  [ ]Crackles        [ ]Right [ ]Left [ ]Bilateral  [ ]Wheezing     [ ]Right [ ]Left [ ]Bilateral  [ ]Diminished BS [ ] Right [ ]Left [ ]Bilateral  CARDIOVASCULAR:    [X]Regular [ ]Irregular [ ]Tachy  [ ]Arjun [ ]Murmur [ ]Other  GASTROINTESTINAL:  [X]Soft  [ ]Distended   [ ]+BS  [X]Non tender [ ]Tender  [ ]Other [ ]PEG [ ]OGT/ NGT   Last BM:   GENITOURINARY:  [X]Normal [ ]Incontinent   [ ]Oliguria/Anuria   [ ]Wu  MUSCULOSKELETAL:   [ ]Normal   [ ]Weakness  [X]Bed/Wheelchair bound [ ]Edema  NEUROLOGIC:   [X]No focal deficits  [ ] Cognitive impairment  [ ] Dysphagia [ ]Dysarthria [ ] Paresis [ ]Other   SKIN:   [ ]Normal  [ ]Rash  [X]Wound over R hand/arm scabbed over  [ ]Pressure ulcer(s) [ ]y [ ]n present on admission    CRITICAL CARE:  [ ]Shock Present  [ ]Septic [ ]Cardiogenic [ ]Neurologic [ ]Hypovolemic  [ ]Vasopressors [ ]Inotropes  [ ]Respiratory failure present [ ]Mechanical Ventilation [ ]Non-invasive ventilatory support [ ]High-Flow   [ ]Acute  [ ]Chronic [ ]Hypoxic  [ ]Hypercarbic [ ]Other  [ ]Other organ failure     LABS:                        8.0    10.85 )-----------( 236      ( 09 May 2023 11:08 )             27.0   05-09    141  |  97  |  31<H>  ----------------------------<  55<L>  3.7   |  24  |  4.63<H>    Ca    10.7<H>      09 May 2023 11:08  Phos  2.9     05-09  Mg     2.0     05-09    TPro  5.5<L>  /  Alb  2.5<L>  /  TBili  0.5  /  DBili  x   /  AST  19  /  ALT  6<L>  /  AlkPhos  151<H>  05-09        RADIOLOGY & ADDITIONAL STUDIES:  < from: CT Angio Abdomen and Pelvis w/ IV Cont (05.04.23 @ 15:37) >  IMPRESSION:  Scan truncated following extravasation of contrast. Patient discussed   with primary team and noncontrast scan waived    Dr. Butler evaluated the patient and discussed with Dr. Yates on 5/4/2023   at 3:20 PM.    < end of copied text >      Protein Calorie Malnutrition Present: [ ]mild [ ]moderate [ ]severe [ ]underweight [ ]morbid obesity  https://www.andeal.org/vault/2440/web/files/ONC/Table_Clinical%20Characteristics%20to%20Document%20Malnutrition-White%20JV%20et%20al%202012.pdf    Height (cm): 177.8 (01-01-23 @ 20:52), 177.8 (11-20-22 @ 08:23)  Weight (kg): 94.5 (05-04-23 @ 16:00), 85.3 (01-01-23 @ 20:52), 88 (11-20-22 @ 08:23)  BMI (kg/m2): 29.9 (05-04-23 @ 16:00), 27 (01-01-23 @ 20:52), 27.8 (11-20-22 @ 08:23)    [ ]PPSV2 < or = 30%  [ ]significant weight loss [ ]poor nutritional intake [ ]anasarca[ ]Artificial Nutrition    REFERRALS:   [ ]Chaplaincy  [ ]Hospice  [ ]Child Life  [ ]Social Work [ ]Patient and Family Support [X]Case management [ ]Holistic Therapy [ ] Music therapy  [ ] Massage Therapy    DISCUSSION OF CASE: Family - obtained additional history and to provide emotional support;  Primary team - discussed plan of care

## 2023-05-09 NOTE — DISCHARGE NOTE PROVIDER - CARE PROVIDER_API CALL
DAINA,   Phone: (   )    -  Fax: (   )    -  Follow Up Time:    DAINA,   Phone: (   )    -  Fax: (   )    -  Follow Up Time:     Raffi Galo)  Internal Medicine  [address] [place] [address]  Phone: [phone]  Fax: [phone]  Follow Up Time: Routine

## 2023-05-09 NOTE — DISCHARGE NOTE PROVIDER - PROVIDER TOKENS
FREE:[LAST:[DAINA],PHONE:[(   )    -],FAX:[(   )    -]] FREE:[LAST:[DAINA],PHONE:[(   )    -],FAX:[(   )    -]],PROVIDER:[TOKEN:[[id number]],FOLLOWUP:[Routine]]

## 2023-05-09 NOTE — PROGRESS NOTE ADULT - PROBLEM SELECTOR PLAN 9
[facility] PAIN MANAGEMENT FOLLOW UP VISIT      PCP: CODY Camp    Chief Complaint   Patient presents with   • Pain   • Office Visit     Neck and lower back pain.        INTERVAL:  Patient presents for 3-month follow-up for medication management and interventional follow-up.  His worst pain today is located at his neck and lower back described as aching, sharp and shooting in nature, with severity of 5/10 VNAS on average, 10/10 VNAS at its worst and 1/10 VNAS as tolerable. The pain is aggravated by activity, bending, reaching, lifting, and prolonged sitting and alleviated by icyhot. Patient currently being prescribed Cyclobenzaprine from our office with moderate  benefit and no associated side effects.    SOCIAL HISTORY:  - Alcohol Abuse: No  - Substance Abuse: No    PE on 10/15/2021   • Previous:6, on 7/15/2021      ORT: 0, on 10/15/2021   • Previous: 0, on 7/15/2020    Oswestry Disability: 54%, on 1/15/2021   • Previous: 24%, on 10/15/2021     HPI INITIAL (Portions of this note are brought forward from Lionel Meléndez MD initial note on 2020; reviewed and edited as appropriate):  Vicente Coombs is a 77 year old male with chronic lumbar back pain as described below.  Referred by Pushpa Rankin NP for consultation and management.     Pain Score (0-10):  Current 8/10; Worst 8/10;  Least 5/10;  Average 8/10;  Acceptable 0/10  Duration:   - on and off lumbar back, 2020 - more severe and consistent  Context of pain:  Pain started without inciting event or trauma.  He reports the pain was on and off since  however about 1 month ago pain worsened significantly and has been consistent in nature.  He reports his worst pain is after prolonged sitting or 1st getting up in the morning requiring him to move around for approximately 1 hour prior to the pain starting to decrease in severity.  He reports medication management has not been helping him significantly.  He does have some aching sensation  that radiates into the bilateral thighs however there is no numbness or tingling in the lower extremities in addition to no radicular symptoms below his knees.  Location:  Lumbar back (right>left)  Radiation:  Into the bilateral hips/thighs  Quality:  Sharp, aching  Improves:  Moving around, icy hot, leaning forward  Exacerbates:?  Prolonged sitting, leaning back     Numbness/Tingling:  none  Weakness:  none  Sleep:  8 hours  Mood:  content, happy   ?  Bowel and bladder - Denies new onset incontinence, or saddle anesthesia.     INTERVENTIONS (from last visit with updates):  1.  Injections:    § 11/5/19 - Knee, Bilat, IA Steroid Inj  ? 11/5/19 - Left 1st Carpometacarpal Joint Steroid Inj; 80% relief   ? 06/01/20 - SIJ, Right; 50% relief  ? 06/30/20 - MBB/ DRB, Bilateral, Lumbar, L3, L4, L5; 100% relief  ? 07/07/20 - MBB/ DRB, Bilateral, Lumbar, L3, L4, L5; 100% relief  ? 08/04/20 - RFA Medial Branch/Dorsal Ramus, Left, Lumbar L3, L4, L5; 100% relief   ? 08/18/20 - RFA Medial Branch/Dorsal Ramus, Right, Lumbar L3, L4, L5; 100% relief   ? 10/14/2020 Right lumbar paraspinal, lumborum quadratus and multifidus TPI, 100% relief for 1-2 month  ? 2/18/2021 Bilateral trapezius, rhomboid, paracervicals TPI without steroid 30 % pain relief   ? 5/6/2021Bilateral trapezius, rhomboid, paracervicals TPI with steroid 90% pain relief   2.   Physical Therapy: Not in a structured PT program.  3.   Surgeries (Spine, Joint, related to pain):   · 2005 - Bilateral Shoulder Reconstruction  4.   Medications (pain/mood/depression): (with doses, duration of use, side effects, etc...)  Current  · Aspirin 81 mg daily  · Norco 5/325 mg daily prn (Malson)   · Flexeril 10 mg QHS prn (Meléndez)   · Plavix 75 mg daily   · Hydroxychloroquine 200 mg BID   Previous  ? CBD lotion, not effective  ? Prednisone, not effective   ? Gabapentin, not effective  ? Aleve, mild benefit  ? Diclofenac 75 mg BID PRN  ? Norco 5-325 mg BID PRN  ? meloxicam- not effective.    5.   Other:   Icy Hot, daily    PHYSICAL EXAMINATION:  Constitutional: NAD  Psychiatric: Alert, awake, and oriented     Neck Exam:   Visual Exam: No gross atrophy appreciated bilaterally, no rash appreciated  Tender to palpation in area of posterior neck, bilateral cervical paraspinal, trapezius, rhomboid, subscapularis   Painful and limited cervical flexion and extension.  Bilateral rotation painful and limited  Non-tender cervical facets bilaterally    Low Back Exam:  Visual Exam: No gross atrophy or rash appreciated  Tender to palpation in lumbar paraspinal region.  Limited and painful lumbar extension, normal and painless flexion  Facet Loading - positive right  SLR - Negative B/L?????? ?    Hip  SIJ - Nontender B/L  GTB - Nontender to palpation B/L  Gait - Normal  ?  Neurologic Exam:  Sensory Exam: Grossly intact throughout bilateral upper extremities C5-T1 and Grossly intact throughout bilateral lower extremities L1-S1  Motor: Upper extremity 5/5 strength with shoulder abduction (C5), elbow flexion/wrist extension (C6), elbow extension/wrist flexion (C7), finger flexors (C8), interossei (T1). and Lower extremity 5/5 strength with hip flexion (L1/L2), knee extension (L3/L4), great toe dorsiflexion (L5), foot plantarflexion (S1).        DIAGNOSTIC STUDIES:  EXAM: XR LUMBAR SPINE 2 OR 3 VIEWS     INDICATION: Low back pain.     COMPARISON: None.     FINDINGS: The 5 lumbar vertebral bodies uniform in height. There is minimal  retrolisthesis at L2-3. Disc space narrowing is moderate at L4-5 and L5-S1,  and mild at L2-3 and L3-4. Facet arthrosis is contrast by joint space  narrowing osteophyte formation, L5-S1. Small vertebral body osteophytes are  present throughout the lumbar spine.     IMPRESSION:      1.  Multilevel lumbar spondylosis, most pronounced L4-5 and L5-S1.  2.  L5-S1 spondyloarthropathy.  __________________________________________________________  6 VIEWS OF THE CERVICAL SPINE  RADIOGRAPHS     INDICATION: Neck pain.     COMPARISON: None.     FINDINGS:  Mild levoscoliosis of the cervical spine. Normal alignment.  No vertebral body compression fracture.  No facet dislocations.  Prevertebral soft tissues are unremarkable.  Craniocervical junction is unremarkable.  Disc space narrowing and vertebral body osteophytes noted at C6-7.  The C1-2 junction is unremarkable on the open-mouth view.  Oblique views demonstrate neural foraminal narrowing on the left at C2-3,  C3-4, and C4-5 and on the right at C3-4 and C4-5 from uncovertebral joint  hypertrophy.  Ovoid well-corticated osseous structure noted in the posterior paraspinous  soft tissues at C5. This may be related to old injury or myositis  ossificans.  No osseous masses.     IMPRESSION:  1. Degenerative changes of the cervical spine, as described above.     2. No fractures or dislocations.    1/18/18 RIGHT HIP RADIOGRAPHS: 2 views     CLINICAL STATEMENT: Right hip pain.     FINDINGS :  2 views of the right hip were obtained on 01/18/2018.   No prior studies  are available for comparison at the present moment.  The current  examination reveals normal bony alignment of the right hip and there is no  evidence for an acute fracture or dislocation. Possible trace  osteoarthritic changes of the hip with suggested trace superior marginal  spur formation. The femoral head is preserved and the hip joint space  appears maintained  .   No destructive osseous lesions are evident. No  radiopaque foreign body is noted.     IMPRESSION:  Questionable minimal osteoarthritic changes of the hip.    ASSESSMENT AND PLAN:   Vicente Coombs is a 78 year old male with history of right buttocks pain and lumbar back pain for the past 55 years.  Patient reports that his pain started without inciting event or trauma and worsened in March 2020. He is status post right sacroiliac joint injection and bilateral lumbar RFA yielding significant relief in his lumbar back  pain.  He then had right lumbar trigger point injections and bilateral cervical trigger point injections reporting significant relief in his myofascial pain.  However, the relief has now worn off and he sees office today with worsening pain.  Today upon assessment, patient has cervical and lumbar myofascial tenderness for which I recommend repeating trigger point injections.  If trigger point injections do not help this pain, I would consider repeating right lumbar radiofrequency ablation, and considering cervical medial branch block x2 and proceeding with RFA if indicated.  Patient is unsure if he gets benefit with cyclobenzaprine, so we will discontinue cyclobenzaprine and start methocarbamol for his pain.  Patient verbalizes understanding of side effect profile and was given educational handout to review at home.Today he also complains of bilateral hand pain of which she is working on with Rheumatology.  He does not currently use any topical medications, so I recommend Voltaren gel.  Patient verbalizes understanding that he should wash his hands well before eating or touching his face after application of this gel.  Of note patient also has previously complained of right-sided groin pain.  He has had x-ray of his right hip in the past noting minimal arthritis.  Patient was not previously interested in interventional therapy for this pain as it is only occasional a at this time.  He will call our office with an update if his pain worsens.      Based on today's evaluation, I informed patient that the likely generators of his pain are chronic myofascial pain, lumbar facet arthropathy, bilateral hand pain, and primary osteoarthritis of right hip.  We discussed the risks and benefits of medication management and interventional treatment.  Our multimodal treatment options are outlined below.    DIAGNOSIS:   1. Chronic myofascial pain    2. Lumbar facet arthropathy    3. Bilateral hand pain    4. Primary osteoarthritis  of right hip      PLAN:   1. Medications:   · Try diclofenac gel over-the-counter for bilateral hand pain   · Discontinue cyclobenzaprine  · Start methocarbamol 500 mg twice daily as needed #60, R2  · Taking Aspirin 81 mg daily  · Taking Hydroxychloroquine 200 mg BID   · Taking Norco 5/325 mg daily prn (Malson)   · No NSAIDs due to patient Taking Plavix 75 mg daily   · No SNRI due to hx of glaucoma  2. Diagnostics:   · None indicated at this time.  In future, consider cervical MRI   3. Interventions:   · Schedule TPI bilateral lumbar paraspinal, multifidus and quadratus lumborum in office.  · 1-2 weeks later schedule TPI bilateral cervical paraspinal, trapezius and rhomboid.   · In future, consider repeat left and right radiofrequency ablation L3-5 as needed   · In future, consider right intra-articular hip injection for osteoarthritis of right hip  4. Advanced/Devices:   · None indicated at this time.  5. Consults: None indicated at this time.  6. Physical Therapy: Recommend ongoing activity as tolerated.   Follow-up in 3 months for office follow up with CODY Carreno DNP.    CODY Carreno DNP.  Interventional Pain Medicine  [facility]    Total time I spent today on this appointment is 40 minutes.    Justification for interventional therapy:  · Patient with average pain > 5/10.  · Patient has exhausted conservative therapy.  ·      Patient continuing home exercise program.      The risks, consequences, alternatives, and benefits of various treatment options were discussed with the patient in great detail including conservative management, injections and procedures.    - TTE (5/5) with g1dd, moderately dilated left atrium, moderate aortic stenosis. Although has a h/o high intracavitary gradient  - caution on excess volume removal, benefits from lower HR    #Peripheral vascular disease  Hx of cardiac cath in 2018, no known stents. Home meds: atorvastatin 40mg, plavix 75mg  - c/w atorvastatin  - holding plavik 75mg given UGIB - TTE (5/5) with g1dd, moderately dilated left atrium, moderate aortic stenosis. Although has a h/o high intracavitary gradient  - caution on excess volume removal, benefits from lower HR    #Peripheral vascular disease  Hx of cardiac cath in 2018, no known stents. Home meds: atorvastatin 40mg.  - c/w atorvastatin

## 2023-05-09 NOTE — PROGRESS NOTE ADULT - PROBLEM SELECTOR PROBLEM 9
Need for prophylactic measure
Need for prophylactic measure
Chronic heart failure with preserved ejection fraction (HFpEF)

## 2023-05-09 NOTE — PROGRESS NOTE ADULT - PROBLEM SELECTOR PROBLEM 1
Anemia due to acute blood loss
Constipation
Anemia due to acute blood loss
Anemia due to acute blood loss

## 2023-05-09 NOTE — PROGRESS NOTE ADULT - PROBLEM SELECTOR PLAN 8
At risk for secondary HPTT d/t renal failure. Pt has vague abdominal pain w/ nausea. Concern for possible brown tumors on CT with multiple fractures of pubic rami, pubic bone, thoracolumbar spine during last visit. Intact , Vit D 38.5. SPEP/immunofixation negative. Per Endo, likely secondary hyperparathyroidism vs other process causing osteoclastic tumors.  - monitor for now  - treating with sensipar as above

## 2023-05-09 NOTE — PROGRESS NOTE ADULT - ASSESSMENT
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    Assessment/Plan:   #ESRD on HD  #Calciphylaxis  Usual RX time 3:30 hrs, EDW TBD  HD today as per schedule  K noted 3.2  Renal Diet  Verify if receiving STS at OP HD unit.     #Hypotension  Pt on previous admission noted to have hypotension, was on midodrine 40mg Q8h at one point w/ florinef  -C/w midodrine prior to HD    #access   L TDC    #AOCD  Hgb not at goal  -Transfuse to Hgb > 7  Epo w/ HD    #renal bone disease   Ca ~12.3  c/w sensipar 120mg daily  c/w renvela  1600 TID w/ meals   Trend phos daily  will dialyze on lower calcium bath       Thank you for the opportunity to participate in the care of your patient. The nephrology service remains available to assist with any questions or concerns. Please feel free to reach us by paging the on-call nephrology fellow for urgent issues or as below.

## 2023-05-09 NOTE — PROGRESS NOTE ADULT - SUBJECTIVE AND OBJECTIVE BOX
**Incomplete Note**   CC: Patient is a 67y old  Female who presents with a chief complaint of anemia (09 May 2023 13:10)      INTERVAL EVENTS: SHAISTA    SUBJECTIVE / INTERVAL HPI: Patient seen and examined at bedside. Reports abdominal pain has improved. Denies nausea, vomiting, chest pain, fever, chills.     ROS: negative unless otherwise stated above.    VITAL SIGNS:  Vital Signs Last 24 Hrs  T(C): 36.7 (09 May 2023 13:45), Max: 36.7 (09 May 2023 13:45)  T(F): 98 (09 May 2023 13:45), Max: 98 (09 May 2023 13:45)  HR: 88 (09 May 2023 13:45) (65 - 97)  BP: 103/57 (09 May 2023 13:45) (72/36 - 116/65)  BP(mean): --  RR: 17 (09 May 2023 13:45) (17 - 18)  SpO2: 100% (09 May 2023 13:45) (93% - 100%)    Parameters below as of 09 May 2023 13:45  Patient On (Oxygen Delivery Method): room air          05-09-23 @ 07:01  -  05-09-23 @ 14:51  --------------------------------------------------------  IN: 0 mL / OUT: 1500 mL / NET: -1500 mL        PHYSICAL EXAM:    General: NAD, resting in bed, appears uncomfortable  HEENT: MMM  Neck: supple  Cardiovascular: +S1/S2; RRR  Respiratory: CTA B/L; no W/R/R  Gastrointestinal: soft, NT/ND; ventral hernia, soft and reducible on L mid abdomen  Extremities: WWP; no edema, clubbing or cyanosis of lower extremities; 2+ pitting edema in forearm extending to entire R arm; some mild swelling of L arm increased compared to yesterday  Derm: rash in R and L breast, dressed in guaze c/d/i, sacral ulcers noted by staff, 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  Vascular: 2+ radial, DP/PT pulses B/L  Neurological: AAOx3; no focal deficits    MEDICATIONS:  MEDICATIONS  (STANDING):  apixaban 5 milliGRAM(s) Oral every 12 hours  atorvastatin 40 milliGRAM(s) Oral at bedtime  bisacodyl 5 milliGRAM(s) Oral every 12 hours  cinacalcet 120 milliGRAM(s) Oral every 24 hours  midodrine. 40 milliGRAM(s) Oral every 8 hours  Nephro-deborah 1 Tablet(s) Oral daily  pantoprazole    Tablet 40 milliGRAM(s) Oral every 12 hours  polyethylene glycol 3350 17 Gram(s) Oral two times a day  senna 2 Tablet(s) Oral at bedtime  sevelamer carbonate 1600 milliGRAM(s) Oral three times a day with meals  sodium chloride 0.9%. 1000 milliLiter(s) (5 mL/Hr) IV Continuous <Continuous>    MEDICATIONS  (PRN):  acetaminophen     Tablet .. 650 milliGRAM(s) Oral every 6 hours PRN Temp greater or equal to 38C (100.4F), Mild Pain (1 - 3)  calamine/zinc oxide Lotion 1 Application(s) Topical three times a day PRN Itching  dextrose Oral Gel 15 Gram(s) Oral once PRN for not eating  HYDROmorphone   Tablet 2 milliGRAM(s) Oral every 3 hours PRN Moderate Pain (4 - 6)  HYDROmorphone   Tablet 4 milliGRAM(s) Oral every 3 hours PRN Severe Pain (7 - 10)  lidocaine 4% Cream 1 Application(s) Topical three times a day PRN Breast pain      ALLERGIES:  Allergies    Ancef (Rash; Urticaria)  sulfa drugs (Angioedema)  penicillin (Swelling)  DDAVP (Hypotension)  iodine (Hives; Pruritus)    Intolerances        LABS:                        8.0    10.85 )-----------( 236      ( 09 May 2023 11:08 )             27.0     05-09    141  |  97  |  31<H>  ----------------------------<  55<L>  3.7   |  24  |  4.63<H>    Ca    10.7<H>      09 May 2023 11:08  Phos  2.9     05-09  Mg     2.0     05-09    TPro  5.5<L>  /  Alb  2.5<L>  /  TBili  0.5  /  DBili  x   /  AST  19  /  ALT  6<L>  /  AlkPhos  151<H>  05-09        CAPILLARY BLOOD GLUCOSE          RADIOLOGY & ADDITIONAL TESTS: Reviewed.

## 2023-05-09 NOTE — PROGRESS NOTE ADULT - PROBLEM SELECTOR PLAN 10
F: None  E: replete with caution given ESRD  N: Renal Diet  DVT ppx: holding chemoprophylaxis iso bleed, SCDs  GI ppx: protonix IV 40mg BID -- > PO BID  Code Status: Full Code  Dispo: [facility]. F: None  E: replete with caution given ESRD  N: Renal Diet  DVT ppx: eliquis 5mg BID  GI ppx: protonix IV 40mg BID -- > PO BID  Code Status: Full Code  [facility]

## 2023-05-09 NOTE — PROGRESS NOTE ADULT - ASSESSMENT
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 Hgb stabilized and melena resolved. Palliative consulted for GOC.

## 2023-05-09 NOTE — DISCHARGE NOTE PROVIDER - NSDCCPCAREPLAN_GEN_ALL_CORE_FT
PRINCIPAL DISCHARGE DIAGNOSIS  Diagnosis: GIB (gastrointestinal bleeding)  Assessment and Plan of Treatment: You were admitted after being found to have a low hemoglobin at your rehab center. Your stool was analyzed and showed signs of bleeding. You were evaluated by the gastroenterology team for a colonoscopy or endoscopy however this was not aligned with your goal of care at this time. You were started on protonix which is a medication to help stablize any bleeding from your gastrointestinal tract.   - Continue to take protonix 40mg every 12 hours through 5/17  - On 5/18, start taking protonix 40mg once a day   - We encourage you to follow up with a gastroenterologist when you leave the hospital for routine colonoscopy screenings if this is aligned with your goals of care      SECONDARY DISCHARGE DIAGNOSES  Diagnosis: Anemia due to acute blood loss  Assessment and Plan of Treatment: Anemia is the medical term for when a person has too few red blood cells. Red blood cells are the cells in your blood that carry oxygen. If you have too few red blood cells, your body does not get all the oxygen it needs. Most people with anemia have no symptoms. They find out they have it after their doctor does blood tests for another reason. People who do have symptoms might feel tired or weak, especially if they try to exercise or have headaches. If you experience these symptoms you should see your primary care provider for further evaluation.  We ordered blood tests which showed that your anemia is due to anemia from chronic disease from your end-stage kidney disease. To prevent bleeding, continue to take protonix as above.      Diagnosis: Constipation  Assessment and Plan of Treatment: You were found to be very constipated during this admission. Constipation happens when fecal material (stool) moves through the large bowel (colon) too slowly. The fluid portion of the stool is absorbed back into the body, so the stool becomes hard and dry. This makes it difficult to pass the stool. You received many oral medications to help soften your stool and required a manual disimpaction. Taking opoids is a common cause of constipation and we recommend that you take the minimum dosage of opioids for this reason.   You should continue to take the following medications:  - Continue to take dulcolax 5mg every 12 hours (twice daily)  - Continue to take miralax 1 packet every 12 hours (twice daily)  - Continue to take senna 2 tablets at bedtime.  Here are some nutrition recommendations for people experiencing constipation:   Eat three meals each day. Do not skip meals.  Gradually increase the amount of high-fiber foods in your diet.  Choose more whole grain breads, cereals and rice.  Select more raw fruits and vegetables -- eat the peel, if appropriate.  Read food labels and look for the "dietary fiber" content of foods. Good sources have 2 grams of fiber or more.  Drink six to eight glasses of water each day.  Limit highly refined and processed foods.    Diagnosis: ESRD on dialysis  Assessment and Plan of Treatment: Chronic kidney disease is the gradual and permanent loss of kidney function. Normally, the kidneys remove fluids, chemicals, and waste from your blood and turn these waste chemicals into urine. As your kidney disease worse, you lose your ability to remove waste from your body. Call 911 or seek care immediately if your heart is beating faster than normal for you, are confused or very drowsy, have a seizure, have sudden chest pain or shortness of breath. Please follow regularly with your PCP to be properly managed. We increased your dosage of cinacalcet and sevelamer based on recommendations from the nephrology team who evaluated you.  - start taking cinacalcet 120mg once daily  - start taking sevelamer 1600mg three times a day with meals

## 2023-05-09 NOTE — DISCHARGE NOTE NURSING/CASE MANAGEMENT/SOCIAL WORK - PATIENT PORTAL LINK FT
You can access the FollowMyHealth Patient Portal offered by [facility] by registering at the following website: [email or web address]. By joining [employer]’s FollowMyHealth portal, you will also be able to view your health information using other applications (apps) compatible with our system.

## 2023-05-09 NOTE — PROGRESS NOTE ADULT - PROVIDER SPECIALTY LIST ADULT
Internal Medicine
Nephrology
Gastroenterology
Gastroenterology
Palliative Care
Internal Medicine

## 2023-05-09 NOTE — PROGRESS NOTE ADULT - TIME BILLING
Excludes time spent on ACP discussions.  Emotional Support/Supportive Care and Clarification of Potential Disease Trajectory related to ESRD, calciphylaxis.  Education and Monitoring of Opiates including titration and management of high risk medications.

## 2023-05-09 NOTE — DISCHARGE NOTE PROVIDER - HOSPITAL COURSE
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     # Anemia due to acute blood loss.   ·  Plan: Patient presented from rehab with hemoglobin of 6.9. Per chart review, patient has been more confused lately. JUAN on admission, showing maroon stool. FOBT positive. Hgb 6.3 on admission. s/p 1u pRBC on 5/4, with Hgb 6.7. Received a 2nd unit with repeat Hgb 8.0. GI consulted for UGIB and possible EGD/colonoscopy. Patient had no further episodes of melena since admission and Hgb remained stable over 3 days. Patient was started on IV protonix 40mg BID; transition to PO since no IV access. Obtained iron studies appears to have AoCD likely 2/2 ESRD.  - c/w protonix 40mg PO BID x2 weeks (through 5/17), decrease to 40mg PO daily thereafter    # Constipation.   ·  Plan: Patient with constipation since admission. ABX (5/3) with nonacute, nonobstructive bowel gas pattern, no dilated loops of small bowel. Patient received manual disimpaction and was started on bowel regimen with Miralax 17g BID, senna 2 tabs qHS, and dulcolax 5mg BID. Received rectal enemas PRN and was given Golytely throughout the day to help soften stool.   - c/w miralax, senna, and dulcolax    # Hypotension.   ·  Plan: RESOLVED. Patient hypotensive 73/45 on admission, improved to sBP 110s on recheck. On midodrine 40 mg PO q8h (goal MAP >55) during previous admission. Patient with anemia requiring 2u pRBC transfused. Patient was continued on midodrine 40mg q8h.    # Anal or rectal pain likely 2/2 constipation  ·  Plan: Pt complaining of 10/10 pain between gluteal folds. Per daughter, patient has had altered mental status, not at baseline, however patient appears aware of situation but focusing primarily on severity of pain. Patient protecting airway, opens eyes easily with light sternal rub, however not participating in some discussions. Received pain control as follows: Acetaminophen 650mg q6h for mild pain, Dilaudid 2mg PO q3h prn for moderate pain, Dilaudid 4mg PO q3h prn for severe pain. Palliative consulted for recommendations regarding pain management and also to discuss code status    # ESRD on dialysis.   #Renal bone disease  ·  Plan: Patient with ESRD 2/2 PKD, normally on dialysis outpatient. Access L TDC. Received HD on 5/4, 5/6, and 5/9. Nephrology consulted. Pt with hx of hypercalcemia 2/2 CKD. Calcium 11.1 on admission. Resumed home phosphate binders, cinacalcet. Per nephro, increased renvela to 1600mg TID with meals and increased sensipar to 120mg qd.  - c/w renvela to 1600mg TID with meals  - c/w sensipar to 120mg qd    # History of DVT (deep vein thrombosis).   ·  Plan: Found to have acute DVT of left common femoral vein found on 1.28.23. History of multiple DVTs requiring IVC filter placement. Patient was resumed on eliquis 5mg BID which she tolerated.    # Pain in breast.   ·  Plan: Family hx of breast cancer in mother, sister. Imaging from previous admission - US bilateral breasts: No sonographic evidence of breast abscess, cyst or focal mass to correspond with the clinical finding of bilateral breast masses. Breast bx: Epidermal and dermal necrosis with fibrinoid occlusion and necrosis of blood vessels, focal necrotizing suppurative inflammation involving the fibroadipose tissue and blood vessels in the subcutis. Pain responsive to Dilaudid during prior admission.  - c/w Acetaminophen 650mg q6h for mild pain  - c/w Dilaudid 2mg PO q3h prn for moderate pain  - c/w Dilaudid 4mg PO q3h prn for severe pain  - caution with opioids with constipation.    # Hyperparathyroidism.   ·  Plan: At risk for secondary HPTT d/t renal failure. Pt has vague abdominal pain w/ nausea. Concern for possible brown tumors on CT with multiple fractures of pubic rami, pubic bone, thoracolumbar spine during last visit. Intact , Vit D 38.5. SPEP/immunofixation negative. Per Endo, likely secondary hyperparathyroidism vs other process causing osteoclastic tumors.  - treating with sensipar as above.    # Chronic heart failure with preserved ejection fraction (HFpEF).   ·  Plan: - TTE (5/5) with g1dd, moderately dilated left atrium, moderate aortic stenosis. Although has a h/o high intracavitary gradient    #Peripheral vascular disease  Hx of cardiac cath in 2018, no known stents. Home meds: atorvastatin 40mg. Continued on statin.       Patient was discharged to: [facility]    Changes to old medications:   - increase renvela 1600mg TID with meals  - increase sensipar 120mg qd    New medications:   - start protonix 40mg PO BID x2 weeks (through 5/17), decrease to 40mg PO daily on 5/18  - dulcolax 5mg BID

## 2023-05-09 NOTE — PROGRESS NOTE ADULT - NUTRITIONAL ASSESSMENT
This patient has been assessed with a concern for Malnutrition and has been determined to have a diagnosis/diagnoses of Moderate protein-calorie malnutrition.    This patient is being managed with:   Diet Renal Restrictions-  For patients receiving Renal Replacement - No Protein Restr No Conc K No Conc Phos Low Sodium  Supplement Feeding Modality:  Oral  Nepro Cans or Servings Per Day:  1       Frequency:  Three Times a day  Entered: May  4 2023  4:14PM    Diet NPO after Midnight-     NPO Start Date: 04-May-2023   NPO Start Time: 23:59  Except Medications  Entered: May  4 2023  4:12PM  
This patient has been assessed with a concern for Malnutrition and has been determined to have a diagnosis/diagnoses of Moderate protein-calorie malnutrition.    This patient is being managed with:   Diet Renal Restrictions-  For patients receiving Renal Replacement - No Protein Restr No Conc K No Conc Phos Low Sodium  Supplement Feeding Modality:  Oral  Nepro Cans or Servings Per Day:  1       Frequency:  Three Times a day  Entered: May  4 2023  4:14PM    Diet NPO after Midnight-     NPO Start Date: 04-May-2023   NPO Start Time: 23:59  Except Medications  Entered: May  4 2023  4:12PM  
This patient has been assessed with a concern for Malnutrition and has been determined to have a diagnosis/diagnoses of Moderate protein-calorie malnutrition.    This patient is being managed with:   Diet Renal Restrictions-  For patients receiving Renal Replacement - No Protein Restr No Conc K No Conc Phos Low Sodium  Supplement Feeding Modality:  Oral  Nepro Cans or Servings Per Day:  1       Frequency:  Three Times a day  Entered: May  4 2023  4:14PM  
This patient has been assessed with a concern for Malnutrition and has been determined to have a diagnosis/diagnoses of Moderate protein-calorie malnutrition.    This patient is being managed with:   Diet Renal Restrictions-  For patients receiving Renal Replacement - No Protein Restr No Conc K No Conc Phos Low Sodium  Supplement Feeding Modality:  Oral  Nepro Cans or Servings Per Day:  1       Frequency:  Three Times a day  Entered: May  4 2023  4:14PM

## 2023-05-09 NOTE — PROGRESS NOTE ADULT - PROBLEM SELECTOR PLAN 5
GOC as above.  - Full code  - HCP: Jasminajase Zimmer (daughter)    In addition to the E/M visit, an advance care planning meeting was performed. Start time: 3:30pm; End time: 3:46pm; Total time: 16min. A face to face meeting to discuss advance care planning was held today regarding: SALOMON MUNSON. Primary decision maker: Patient is unable to participate in decision making; Alternate/surrogate: Jasminajase Zimmer. Discussed advance directives including, but not limited to, healthcare proxy and code status. Decision regarding code status: FULL CODE; Documentation completed today: GOC note

## 2023-05-09 NOTE — PROGRESS NOTE ADULT - PROBLEM SELECTOR PLAN 6
Following for GOC. Will continue to follow.    Alyse Zhao MD  Palliative Care Attending  Geriatrics and Palliative Consult Service

## 2023-05-09 NOTE — DISCHARGE NOTE NURSING/CASE MANAGEMENT/SOCIAL WORK - NSDCPEFALRISK_GEN_ALL_CORE
For information on Fall & Injury Prevention, visit: [email or web address] OR  [email or web address] OR  https://www.cdc.gov/steadi/patient.html

## 2023-05-09 NOTE — PROGRESS NOTE ADULT - SUBJECTIVE AND OBJECTIVE BOX
--------------------------------------------------------------------------------  Chief Complaint: ESRD/Ongoing hemodialysis requirement    24 hour events/subjective:    Seen this morning doing well, due for HD today. Hgb stable 8.3, K noted 3.2  Bicarb 25    PAST HISTORY  --------------------------------------------------------------------------------  No significant changes to PMH, PSH, FHx, SHx, unless otherwise noted    ALLERGIES & MEDICATIONS  --------------------------------------------------------------------------------  Allergies    Ancef (Rash; Urticaria)  sulfa drugs (Angioedema)  penicillin (Swelling)  DDAVP (Hypotension)  iodine (Hives; Pruritus)    Intolerances      Standing Inpatient Medications  apixaban 5 milliGRAM(s) Oral every 12 hours  atorvastatin 40 milliGRAM(s) Oral at bedtime  bisacodyl 5 milliGRAM(s) Oral every 12 hours  cinacalcet 120 milliGRAM(s) Oral every 24 hours  epoetin александр-epbx (RETACRIT) Injectable 69551 Unit(s) IV Push once  midodrine. 40 milliGRAM(s) Oral every 8 hours  Nephro-deborah 1 Tablet(s) Oral daily  pantoprazole    Tablet 40 milliGRAM(s) Oral every 12 hours  polyethylene glycol 3350 17 Gram(s) Oral two times a day  senna 2 Tablet(s) Oral at bedtime  sevelamer carbonate 1600 milliGRAM(s) Oral three times a day with meals  sodium chloride 0.9%. 1000 milliLiter(s) IV Continuous <Continuous>    PRN Inpatient Medications  acetaminophen     Tablet .. 650 milliGRAM(s) Oral every 6 hours PRN  calamine/zinc oxide Lotion 1 Application(s) Topical three times a day PRN  dextrose Oral Gel 15 Gram(s) Oral once PRN  HYDROmorphone   Tablet 4 milliGRAM(s) Oral every 3 hours PRN  HYDROmorphone   Tablet 2 milliGRAM(s) Oral every 3 hours PRN  lidocaine 4% Cream 1 Application(s) Topical three times a day PRN      REVIEW OF SYSTEMS  --------------------------------------------------------------------------------  All other systems were reviewed and are negative, except as noted.    VITALS/PHYSICAL EXAM  --------------------------------------------------------------------------------  T(C): 36.5 (23 @ 06:19), Max: 36.5 (23 @ 06:19)  HR: 97 (23 @ 06:31) (75 - 97)  BP: 96/60 (23 @ 06:31) (72/36 - 119/54)  RR: 17 (23 @ 06:19) (17 - 18)  SpO2: 93% (23 @ 06:19) (93% - 94%)  Wt(kg): --  Drug Dosing Weight  Height (cm): 177.8 (2023 20:52)  Weight (kg): 94.5 (04 May 2023 16:00)  BMI (kg/m2): 29.9 (04 May 2023 16:00)  BSA (m2): 2.12 (04 May 2023 16:00)      PHYSICAL EXAM:  GENERAL: NAD resting in bed   CHEST/LUNG: CTA b/l no rales ronchi or wheezing  HEART: normal S1S2, RRR  ABDOMEN: Soft, Nontender, +BS, No flank tenderness bilateral  EXTREMITIES: + edema   ACCESS: + TDC     LABS/STUDIES  --------------------------------------------------------------------------------              8.3    11.16 >-----------<  269      [23 11:07]              28.5     143  |  99  |  21  ----------------------------<  66      [23 @ 11:07]  3.2   |  25  |  3.14        Ca     11.0     [23 11:07]      Mg     2.1     [23 11:07]      Phos  2.2     [23 11:07]    TPro  5.5  /  Alb  2.4  /  TBili  0.4  /  DBili  x   /  AST  12  /  ALT  5   /  AlkPhos  128  [23 @ 11:07]          Iron 32, TIBC 86, %sat 37      [23 @ 09:27]  Ferritin 1899      [23 09:27]  PTH -- (Ca --)      [23 @ 12:00]   799  PTH -- (Ca 8.3)      [23 @ 06:47]   492  PTH -- (Ca 8.5)      [23 @ 05:30]   351  PTH -- (Ca 7.6)      [23 @ 04:35]   532  PTH -- (Ca 8.8)      [23 @ 14:24]   479  Vitamin D (25OH) 38.5      [23 @ 05:30]  TSH 2.860      [23 @ 16:50]    HBsAb Nonreact      [23 @ 16:19]  HBsAg Nonreact      [23 @ 16:19]  HBcAb Nonreact      [23 @ 16:19]  HCV 0.10, Nonreact      [23 16:19]      RADIOLOGY:  --------------------------------------------------------------------------------------    Hemoglobin: 8.3 g/dL (23 @ 11:07)  Phosphorus Level, Serum: 2.2 mg/dL (23 11:07)  Iron Total, Serum: 32 ug/dL (23 09:27)  Iron - Total Binding Capacity.: 86 ug/dL (23 09:27)    Albumin, Serum: 2.4 g/dL (23 @ 11:07)  % Saturation, Iron: 37 % (23 09:27)    T(C): 36.6 (23 @ 10:45), Max: 36.6 (23 @ 10:45)  HR: 65 (23 @ 10:45) (65 - 97)  BP: 110/50 (23 @ 10:45) (72/36 - 119/54)  RR: 17 (23 @ 10:45) (17 - 18)  SpO2: 100% (23 @ 10:45) (93% - 100%)  cinacalcet 90 milliGRAM(s) Oral daily, 23 @ 19:40, Routine  cinacalcet 120 milliGRAM(s) Oral every 24 hours, 23 @ 14:00, 14:00  epoetin александр-epbx (RETACRIT) Injectable 01403 Unit(s) IV Push once, 23 @ 07:38, Routine, Stop order after: 1 Doses  epoetin александр-epbx (RETACRIT) Injectable 38527 Unit(s) IV Push once, 23 @ 06:42, Routine, Stop order after: 1 Doses  sevelamer carbonate 1600 milliGRAM(s) Oral three times a day with meals, 23 @ 15:31,   sevelamer carbonate 800 milliGRAM(s) Oral three times a day with meals, 23 @ 19:44, Routine      Hemodialysis Treatment.:     Schedule: Once, Modality: Hemodialysis, Access: Internal Jugular Central Venous Catheter    Dialyzer: Optiflux P601KMm, Time: 180 Min    Blood Flow: 400 mL/Min , Dialysate Flow: 500 mL/Min, Dialysate Temp: 35, Tubinmm (Adult)    Target Fluid Removal: 2 Liters    Dialysate Electrolytes (mEq/L): Potassium 3, Calcium 2.5, Sodium 138, Bicarbonate 35 (23 @ 07:39) [Active]    Seen on HD c/w tx outlined as above

## 2023-05-09 NOTE — PROGRESS NOTE ADULT - PROBLEM SELECTOR PLAN 2
Patient with constipation since admission. ABX (5/3) with nonacute, nonobstructive bowel gas pattern, no dilated loops of small bowel.  - s/p manual disimpaction  - using opioids cautiously given constipation  - miralax 17g BID  - senna 2 tabs qHS  - rectal enemas PRN, will advance to lactulose enema today  - Golytely prep for constipation Patient with constipation since admission. ABX (5/3) with nonacute, nonobstructive bowel gas pattern, no dilated loops of small bowel.  - s/p manual disimpaction  - using opioids cautiously given constipation  - miralax 17g BID  - senna 2 tabs qHS  - dulcolax 5mg BID PO  - rectal enemas PRN, will advance to lactulose enema today  - Golytely prep for constipation

## 2023-05-09 NOTE — PROGRESS NOTE ADULT - ATTENDING COMMENTS
Recommendations  - Continue empiric PPI BID  - Advance diet  - Trend CBC. Monitor for clinical bleeding  - Please give miralax daily as well as enema. Repeat manual disimpaction may be needed
I agree with the fellow's findings and plans as written above with the following additions/amendments:    Seen and examined on HD, tolerating well, no issues. Continue HD as above
Patient seen and examined with Dr. Rizzo. Agree with plan above.
Resting comfortably, although still has significant rectal pain. Hgb stable after transfusion. GI believes most of pain 2/2 severe constipation. Offered enema yesterday but she refused. Continue with aggressive bowel regimen and limit opioids as much as possible. Continue palliative care discussions regarding GOC tomorrow.
Patient awake, alert, interactive, answering questions appropriately. Reports pain all over, denies SOB, no chest pain. Denies other complaints.  General: AOX3, in mild distress, lying flat in bed, speaking in full sentences, no labored breathing  HEENT: AT/NC, no facial asymmetry  Lungs: poor inspiration, no crackles, no wheezes  Abdomen: Soft. non-tender  Extremities: RUE edema, non-tender, + distal pulses, left arm with + pulses, LE warm, no edema, no tenderness, moving all 4    Difficulty getting labs, get repeat Hb. Transfuse to target Hb>7, continue IV PPI  Wound care  Continue on Midodrine  Patient on chronic opioids for chronic pain. Palliative follow-up  Rest per above
Patient awake, alert, oriented, answering some questions. Refused blood cultures yesterday, afebrile over the last 24 hours, RVP negative. AC resumed with stable Hb, no GIB. GOC and advanced directives were discussed with patient's HCP, she was updated about the  medical management, she understands that patient has multiple comorbidities at risk of decompensation. She wants to discuss with patient and family prior. No other complaints or events reported    General: AO, NAD, lying in bed, no labored breathing on RA  HEENT: AT/NC, no facial asymmetry  Lungs: no crackles, no wheezes  Heart: RRR  Abdomen: soft, non-tender  Extremities: UE edema unchanged, + distal pulses, warm digits. LE: warm, no edema, no tenderness, no focal deficit    Labs reviewed    Patient afebrile, refused bcx. RVP negative. Leucocytosis improving. Hb stable on AC, no plan for inpatient endoscopy per GI  Patient with stable BP on home dose of Midodrine  Mental status stable on chronic opioids. Continue Bowel regimen, monitor BM  Appreciate Nephrology, wound care for calciphylaxis management   Family and patient to discuss further about GOC and advanced directives in setting of multiple comorbidities
Patient with above PH MH presents for low Hb in facility. Patient not able to provide history, not answering most questions, Awake, protecting airway, no labored breathing on RA.   Labs, imaging reviewed    acute blood loss anemia  GIB  Right arm edema  Hypotension on Midodrine  ESRD on HD  Chronic opioid use    Patient with Hb dropin facility, reported with dark stools. Continue on PPI IV q12h, Transfuse to target Hb >7. GI evaluation  Patient with right arm edema and history of multiple VTE on AC. Follow-up Doppler US   Chronic breast pain with wounds, no leucocytosis. Wound care evaluation. Patient evaluated in the past by dermatology   Palliative evaluation for pain management and GOC. Monitor for signs of oversedation, aspiration precautions, head of bed elevation   Continue on Midodrine  CCM evaluation if decompensation
Patient awake, answering some questions. Denies SOB, no abdominal pain. No other complaints reported. Tmax 100.5  General: initially sleeping, awakes to voice, NAD, lying in bed, no labored breathing on RA  HEENT: AT/NC, no facial asymmetry  Lungs: limited by body habitus and poor inspiration, no crackles, no wheezes  Heart: RRR  Abdomen: obese, soft, non-tender, no guarding, +  BS  Extremities: UE edema stable, no tenderness to palpation, + distals pulses. LE warm, no edema, no tenderness, moving all 4    Patient with new Tmax 100.5, get CBC/CHEM/LFTs, patient refused blood drawing, including Bcx. Get RVP, monitor BM. Trend fever curve.  No report of recurrent GIB, GI following for possible endoscopy  Appreciate wound care  Patient with multiple comorbidities at high risk of decompensation. Get Palliative follow-up  AC on hold in setting of possible bleed

## 2023-05-09 NOTE — DISCHARGE NOTE PROVIDER - NSDCMRMEDTOKEN_GEN_ALL_CORE_FT
apixaban 5 mg oral tablet: 1 tab(s) orally every 12 hours  atorvastatin 40 mg oral tablet: 1 tab(s) orally once a day  bisacodyl 5 mg oral delayed release tablet: 1 tab(s) orally every 12 hours  calamine topical lotion: 1 application topically 3 times a day, As needed, Itching  cinacalcet: 120 milligram(s) orally every 24 hours  folic acid 1 mg oral tablet: 1 tab(s) orally once a day  HYDROmorphone 2 mg oral tablet: 1 tab(s) orally every 3 hours, As needed, Moderate Pain (4 - 6)  HYDROmorphone 4 mg oral tablet: 1 tab(s) orally every 3 hours, As needed, Severe Pain (7 - 10)  lidocaine 4% topical cream: 1 application topically 3 times a day, As needed, Breast pain  midodrine 10 mg oral tablet: 4 tab(s) orally every 8 hours  Nephro-Natasha oral tablet: 1 tab(s) orally once a day  ocular lubricant ophthalmic solution: 1 drop(s) to each affected eye 4 times a day  pantoprazole 40 mg oral delayed release tablet: 1 tab(s) orally every 12 hours  polyethylene glycol 3350 oral powder for reconstitution: 17 gram(s) orally every 12 hours  senna leaf extract oral tablet: 2 tab(s) orally once a day (at bedtime)  sevelamer: 1,600 milligram(s) orally 3 times a day (with meals)  Vitamin C 500 mg oral tablet: 1 tab(s) orally once a day

## 2023-05-09 NOTE — PROGRESS NOTE ADULT - PROBLEM/PLAN-3
DISPLAY PLAN FREE TEXT
Will demonstrate the ability to maintain reality orientation and communicate clearly with others during 2 conversations with staff daily

## 2023-05-09 NOTE — PROGRESS NOTE ADULT - PROBLEM SELECTOR PLAN 1
Patient presenting from rehab with hemoglobin of 6.9. Per chart review, patient has been more confused lately. JUAN on admission, showing maroon stool. FOBT positive. Hgb 6.3 on admission.  - s/p 1u pRBC on 5/4. with Hgb 6.7  - repeat hgb on 5/6 after initial transfusion 8.0  - GI consulted for UGIB, possible EGD/colonoscopy, no further episodes of melena since admission  - c/w IV protonix 40mg BID; transition to PO since no IV access  - iron studies appear to be AoCD  - maintain active T&S  - transfuse for hemoglobin <7 Patient presenting from rehab with hemoglobin of 6.9. Per chart review, patient has been more confused lately. JUAN on admission, showing maroon stool. FOBT positive. Hgb 6.3 on admission.  - s/p 1u pRBC on 5/4. with Hgb 6.7  - repeat hgb on 5/6 after initial transfusion 8.0  - GI consulted for UGIB, possible EGD/colonoscopy, no further episodes of melena since admission  - c/w IV protonix 40mg BID; transition to PO 40mg BID x2 weeks, then 40mg qd thereafter  - iron studies appear to be AoCD  - maintain active T&S  - transfuse for hemoglobin <7

## 2023-05-09 NOTE — PROGRESS NOTE ADULT - PROBLEM SELECTOR PLAN 1
Severe constipation.  - Senna 2 tabs BID  - Miralax 17g BID  - Dulcolax 5mg PO BID  - On discharge, should continue on above regimen

## 2023-05-09 NOTE — PROGRESS NOTE ADULT - PROBLEM SELECTOR PLAN 3
Hgb of 6.9 with melena on admission. Melena resolved and Hgb stable post transfusion.  - GI following, recs appreciated  - Plan for outpatient colonoscopy

## 2023-05-09 NOTE — PROGRESS NOTE ADULT - PROBLEM SELECTOR PLAN 7
Family hx of breast cancer in mother, sister. Imaging from previous admission - US bilateral breasts: No sonographic evidence of breast abscess, cyst or focal mass to correspond with the clinical finding of bilateral breast masses. Breast bx: Epidermal and dermal necrosis with fibrinoid occlusion and necrosis of blood vessels, focal necrotizing suppurative inflammation involving the fibroadipose tissue and blood vessels in the subcutis. Pain responsive to Dilaudid during prior admission.  - c/w Acetaminophen 650mg q6h for mild pain  - c/w Dilaudid 2mg PO q3h prn for moderate pain  - c/w Dilaudid 4mg PO q3h prn for severe pain  - caution with opioids with constipation

## 2023-05-09 NOTE — PROGRESS NOTE ADULT - PROBLEM SELECTOR PLAN 2
H/o pain in breasts 2/2 mastitis. 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 x2 PRNs dilaudid 4mg PO used in 24 hours (8am-8am)  - C/w Dilaudid 4mg PO q4h PRN severe pain  - C/w Dilaudid 2mg PO q4h PRN moderate pain  - Bowel regimen as above  - On discharge should continue on above regimen

## 2023-05-09 NOTE — PROGRESS NOTE ADULT - CONVERSATION DETAILS
Unable to speak to patient given lethargy post-HD. Spoke to patient's HCP, Jasmina, at bedside about advanced directives. Patient has not engaged in discussion with providers on this admission and per Jasmina, she has been more and more withdrawn with the multiple medical setbacks she has experienced. Jasmina spoke about how sick her mother has been and how much she has deteriorated over her time at rehab instead of getting better and she endorsed being worried about where things are going. Given her mother's decline and her inability to engage with us in advanced care planning, I asked Jasmina about what she felt would be best if her mother's heart were to stop. I explained that the risk of further debility and loss of independence was very high for her mother if she was to be resuscitated given her many medical comorbidities. Jasmina responded that she would still want her resuscitated but that she wanted to speak to her mother regarding that further once she was more awake. Similarly, she would want her mother intubated if medically indicated.

## 2023-05-09 NOTE — DISCHARGE NOTE PROVIDER - DETAILS OF MALNUTRITION DIAGNOSIS/DIAGNOSES
This patient has been assessed with a concern for Malnutrition and was treated during this hospitalization for the following Nutrition diagnosis/diagnoses:     -  05/04/2023: Moderate protein-calorie malnutrition

## 2023-05-09 NOTE — PROGRESS NOTE ADULT - ASSESSMENT
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 Hgb currently stable GI following,

## 2023-05-09 NOTE — PROGRESS NOTE ADULT - PROBLEM SELECTOR PLAN 6
Found to have acute DVT of left common femoral vein found on 1.28.23. History of multiple DVTs requiring IVC filter placement.   - holding AC iso possible UGIB  - SCDs for ppx Found to have acute DVT of left common femoral vein found on 1.28.23. History of multiple DVTs requiring IVC filter placement.   - resume eliquis 5mg BID

## 2023-05-09 NOTE — PROGRESS NOTE ADULT - PROBLEM SELECTOR PLAN 5
Patient with ESRD 2/2 PKD, normally on dialysis outpatient. Access L TDC.   - received HD on 5/4, 5/6  - nephrology consulted, f/u recs  - renal diet  - replete electrolytes with caution given ESRD    #Renal bone disease  Pt with hx of hypercalcemia. Calcium 11.1 on admission.  - resumed home phosphate binders, cinacalcet  - increased renvela to 1600mg TID with meals  - increased sensipar to 120mg qd

## 2023-05-09 NOTE — PROGRESS NOTE ADULT - PROBLEM SELECTOR PLAN 4
Pt complaining of 10/10 pain between gluteal folds. Per daughter, patient has had altered mental status, not at baseline, however patient appears aware of situation but focusing primarily on severity of pain. Patient protecting airway, opens eyes easily with light sternal rub, however not participating in some discussions.  - pain control as follows: Acetaminophen 650mg q6h for mild pain, Dilaudid 2mg PO q3h prn for moderate pain, Dilaudid 4mg PO q3h prn for severe pain  - continue to monitor mental status  - palliative consult for recommendations regarding pain management and also to discuss code status  - continue to treat constipation as above

## 2023-05-19 NOTE — H&P ADULT - PROBLEM SELECTOR PLAN 2
- Continue home Vitamin C 500mg QD Multiple wounds located on the skin, present on admission. Most notable in the bilateral breast area with serous drainage. Sacral ulcers present with sero-sanguinous drainage. Seen by wound care on prior admission on 5/5 with similar appearance of wounds. No exposed bone and do not appear infection.     - Continue home Vitamin C 500mg QD  - Wound care consulted  - Per last wound care recommendations: bilateral gluteal wounds with Triad ointment QD, bilateral breast wound with Vashe cleanser

## 2023-05-19 NOTE — CONSULT NOTE ADULT - SUBJECTIVE AND OBJECTIVE BOX
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     Patient did receive her eliquis dose and further doses have been held since.     Was recently admitted from 5/3-5/9 at [facility] for anemia requiring 2 u pRBC and discharged with Hgb 8.0. GI consulted and EGD/Colonoscopy deemed to be indicated for NIKHIL, however pt noted to have formed, hard brown stool during admission and Hgb was stable. As HCP unable to be reached and pt unable to participate in colon prep, medical mgmt pursued.  Pt discharged with PPI 40mg oral twice daily.    ICU team consulted for UGIB and possible telemetry disposition. Per ICU consult note, patient A&OX1-2 (to self and [facility]) and asking to be left alone. ROS difficult to obtain, however, stating she currently does not have abdominal pain. Pt documented as saying "doctor please leave me alone".        Allergies    sulfa drugs (Angioedema)  Ancef (Rash; Urticaria)  iodine (Hives; Pruritus)  DDAVP (Hypotension)  penicillin (Swelling)    Intolerances      Home Medications:  apixaban 5 mg oral tablet: 1 tab(s) orally every 12 hours (19 May 2023 15:04)  atorvastatin 40 mg oral tablet: 1 tab(s) orally once a day (19 May 2023 15:04)  bisacodyl 5 mg oral delayed release tablet: 1 tab(s) orally every 12 hours (19 May 2023 15:04)  calamine topical lotion: 1 application topically 3 times a day, As needed, Itching (19 May 2023 15:04)  cinacalcet: 120 milligram(s) orally every 24 hours (19 May 2023 15:04)  folic acid 1 mg oral tablet: 1 tab(s) orally once a day (19 May 2023 15:04)  HYDROmorphone 2 mg oral tablet: 1 tab(s) orally every 3 hours, As needed, Moderate Pain (4 - 6) (19 May 2023 15:04)  HYDROmorphone 4 mg oral tablet: 1 tab(s) orally every 3 hours, As needed, Severe Pain (7 - 10) (19 May 2023 15:04)  lidocaine 4% topical cream: 1 application topically 3 times a day, As needed, Breast pain (19 May 2023 15:04)  midodrine 10 mg oral tablet: 4 tab(s) orally every 8 hours (19 May 2023 15:04)  Nephro-Natasha oral tablet: 1 tab(s) orally once a day (19 May 2023 15:04)  ocular lubricant ophthalmic solution: 1 drop(s) to each affected eye 4 times a day (19 May 2023 15:04)  pantoprazole 40 mg oral delayed release tablet: 1 tab(s) orally every 12 hours (19 May 2023 15:04)  polyethylene glycol 3350 oral powder for reconstitution: 17 gram(s) orally every 12 hours (19 May 2023 15:04)  senna leaf extract oral tablet: 2 tab(s) orally once a day (at bedtime) (19 May 2023 15:04)  sevelamer: 1,600 milligram(s) orally 3 times a day (with meals) (19 May 2023 15:04)  Vitamin C 500 mg oral tablet: 1 tab(s) orally once a day (19 May 2023 15:04)    MEDICATIONS:  MEDICATIONS  (STANDING):    MEDICATIONS  (PRN):  acetaminophen     Tablet .. 650 milliGRAM(s) Oral every 6 hours PRN Temp greater or equal to 38C (100.4F), Mild Pain (1 - 3)    PAST MEDICAL & SURGICAL HISTORY:  HTN (hypertension)      HLD (hyperlipidemia)      CAD (coronary atherosclerotic disease)      ESRD on dialysis  last 11/15/22      H/O ventral hernia      Brown tumor  brain      DVT, lower extremity  left      History of surgery  IVC filter      AVF (arteriovenous fistula)  right left      S/P AIDEN-BSO  2003      History of surgery  RLE PTA stent / atherectomy  7/2021      History of atherectomy  stent    SOCIAL HISTORY:  Unknown    REVIEW OF SYSTEMS:  Unable to obtain    Vital Signs Last 24 Hrs  T(C): 37.4 (19 May 2023 12:16), Max: 37.4 (19 May 2023 12:16)  T(F): 99.3 (19 May 2023 12:16), Max: 99.3 (19 May 2023 12:16)  HR: 107 (19 May 2023 13:48) (70 - 107)  BP: 114/65 (19 May 2023 13:48) (105/50 - 114/65)  BP(mean): 70 (19 May 2023 11:37) (70 - 70)  RR: 18 (19 May 2023 13:48) (18 - 18)  SpO2: 91% (19 May 2023 13:48) (91% - 91%)    Parameters below as of 19 May 2023 13:48  Patient On (Oxygen Delivery Method): nasal cannula  O2 Flow (L/min): 2        PHYSICAL EXAM:    General: Uncomfortably, chronically ill appearing.   HENT: Dry mucous membranes  Neck: Trachea midline, supple  Lungs: Normal respiratory effort and no intercostal retractions  Cardiovascular: HR low 100s on tele  Abdomen: Obese abdomen Soft, non-tender large ventral hernia  Neurological: Alert and oriented to person  Rectal exam: Internal exam deferred. Brown stool on bed with streaks of blood when RN wiping.    Sacral exam: Numerous areas of ulcerations/skin breakdown with oozing bright red blood    LABS:                        8.7    14.33 )-----------( 203      ( 19 May 2023 11:32 )             31.4     05-19    138  |  100  |  19  ----------------------------<  70  4.0   |  25  |  3.06<H>    Ca    10.5      19 May 2023 11:32    TPro  5.4<L>  /  Alb  2.3<L>  /  TBili  0.4  /  DBili  x   /  AST  28  /  ALT  7<L>  /  AlkPhos  134<H>  05-19        PT/INR - ( 19 May 2023 11:32 )   PT: 43.1 sec;   INR: 3.58          PTT - ( 19 May 2023 11:32 )  PTT:41.5 sec    RADIOLOGY & ADDITIONAL STUDIES:

## 2023-05-19 NOTE — ED ADULT NURSE REASSESSMENT NOTE - NS ED NURSE REASSESS COMMENT FT1
attempted to contact [facility] for patient K level 2.2. patient assessed, pt in no acute distress at this time. attempted to contact [facility] for patient K level 2.2 as reported to RN via phone call from lab. patient assessed, pt in no acute distress at this time.

## 2023-05-19 NOTE — ED ADULT NURSE REASSESSMENT NOTE - NS ED NURSE REASSESS COMMENT FT1
pt examined by GI doctor. After examined cleaned up, noted with small amount of dark red/brown stool.  Pt noted with b/l breast wounds covered with saturated telfa-like dressing covered by disposable briefs, serosanguinous/pus drainage noted. Skin breakdown noted across buttocks, sacral wound noted stage 2. Wound noted to top of right hand covered in telfa dressing.

## 2023-05-19 NOTE — ED PROVIDER NOTE - CLINICAL SUMMARY MEDICAL DECISION MAKING FREE TEXT BOX
here w/ melena in diaper, hypotension  gi consulted, icu team consulted  due for dialysis tomorrow - renal consulted, pt appears overloaded with anasarca but cxr without fluid, does not need immediate HD, potassium normal on labs  unable to get history from patient    spoke with daughter, states pt has had hemorrhoidal bleeding in the past   updated about plan of care  Jasmina: [phone], cell phone, can reach any time, leave voicemail if she doesn't

## 2023-05-19 NOTE — H&P ADULT - PROBLEM SELECTOR PLAN 7
Yes - Continue home cinacalcet 120mg Q24 Known secondary hyperparathyroidism 2/2 ESRD.    - Continue home cinacalcet 120mg Q24

## 2023-05-19 NOTE — H&P ADULT - PROBLEM SELECTOR PLAN 5
- Continue home midodrine 40mg Q8 Hx of hypotension, on midodrine 40mg Q8 started on prior admissions. On current admission, sBP ranging 100-110s. Difficult to measure BP due to body habitus and diffuse anasarca but mentating at baseline and otherwise stable. ICU consulted in ED, deemed no need for telemetry/ICU at this time.     - Continue home midodrine 40mg Q8

## 2023-05-19 NOTE — H&P ADULT - PROBLEM SELECTOR PLAN 6
- Holding home Eliquis 5mg Q12 Hx of DVT in the L femoral vein complicated by PE. s/p IVC filter and on Eliquis 5mg Q12, last dose taken 5/17.    - Holding home Eliquis 5mg Q12

## 2023-05-19 NOTE — H&P ADULT - PROBLEM SELECTOR PLAN 8
F: None  E: Caution in ESRD  N: Diet, Clear Liquid  DVT ppx: SCDs  GI ppx: Protonix  Bowel: None  Dispo: [facility]    FULL CODE

## 2023-05-19 NOTE — H&P ADULT - PROBLEM/PLAN-5
Patient watching TV in bed. Alert and oriented. Denies pain. Breakfast eaten. Dressing on left foot clean, dry, and intact. Patient voids per urinal. Bed alarm on. Call light and belongings within reach. Will continue to monitor. DISPLAY PLAN FREE TEXT

## 2023-05-19 NOTE — H&P ADULT - HISTORY OF PRESENT ILLNESS
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     Of note, patient with recent admission at [facility] (5/3-5/9) during which time she was admitted for similar presentation after being found to have Hb 6.9 on nursing home labwork, recieved 2U pRBC while admitted and discharged on PPI. GI consulted on that admission but patient remained stable without further GI so no EGD/scope performed. Hospital course at that time c/b hypotension however pt chronically on midodrine 40mg PO Q8.     ED COURSE  VS: T 98.5F (oral), HR , /50, RR 18, SpO2 91% (2L NC)  Labs: 14.33, Hb 8.7 (MCV 90), PT 43.1, INR 3.58, aPTT 41.5, BUN/Cr 19/3.06, TPro 5.4, Alb 2.3, AlkPhos 134   Orders: Protonix 80mh IVP x1, Kcentra 4000IU x1, NS 500cc bolus x1  Imaging:  - CXR: Possible small R pleural effusion. Tortuous aorta. No clear consolidations. No PTX.    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     Of note, patient with recent admission at [facility] (5/3-5/9) during which time she was admitted for similar presentation after being found to have Hb 6.9 on nursing home labwork, recieved 2U pRBC while admitted and discharged on PPI. GI consulted on that admission but patient remained stable without further GI so no EGD/scope performed. Hospital course at that time c/b hypotension however pt chronically on midodrine 40mg PO Q8.     On current admission, pt noted to have multiple episodes of melena in ED. Initially thought to be originating from multiple sacral ulcers however pt subsequently had large melena originating from the rectum after ulcers were cleaned/dressed. Patient AOx1 (self) and unable to answer further questions or follow any other commands, at baseline per prior documentation from recent admission.      ED COURSE  VS: T 98.5F (oral), HR , /50, RR 18, SpO2 91% (2L NC)  Labs: 14.33, Hb 8.7 (MCV 90), PT 43.1, INR 3.58, aPTT 41.5, BUN/Cr 19/3.06, TPro 5.4, Alb 2.3, AlkPhos 134   Orders: Protonix 80mh IVP x1, Kcentra 4000IU x1, NS 500cc bolus x1  Imaging:  - CXR: Possible small R pleural effusion. Tortuous aorta. No clear consolidations. No PTX.    Consults: GI

## 2023-05-19 NOTE — CONSULT NOTE ADULT - ASSESSMENT
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 Found to have maroon colored stool. ICU team consulted for possible telemetry disposition regarding likely UGIB.     NEURO  #Memory impairment/underlying dementia  Patient A&Ox1-2 on arrival (to [facility]). Per prior hospitalization, patient also with similar mentation and unable to provide reliable history.   - would defer to HCP Jasmina for assistance with decision making     CARDIOVASCULAR  #Tachycardia  -105 on arrival. On tele monitor, appears sinus tachycardia. Likely i/s/o UGIB bleed picture  - receiving 500cc NS bolus in the ED  - continue to monitor  - see GI plan    #Hypotension  Known history for which she takes midodrine 40mg q8h. BPs in the ED -114/50-65.   - c/w home medication pending dysphagia screen    PULMONARY  #history of pulmonary embolism and DVTs  Diagnosed in 2018 and acute DVT of L common femoral vein 1/28/23. She is s/p IVC filter and on eliquis  - holding AC in setting of GIB picture    RENAL  #ESRD  Patient with ESRD 2/2 PKD, normally on dialysis outpatient. Access L TDC. Last HD session reportedly yesterday, 5/18 but per ED provider patient has been known for incomplete session. Pt with hx of hypercalcemia 2/2 CKD.   - home med includes renvela to 1600mg TID with meals and sensipar to 120mg qd  - will need HD consult while admitted    ENDO  #Hyperparathyroidism  Per endocrine evaluations in the past, likely 2/2 hyperparathyroidism vs. other process causing osteoclastic tumors  - would continue sensipar as above    GI  #UGIB  Patient presented from rehab with hemoglobin of 8.7. She was recently admitted from 5/3-5/9 at [facility] for anemia requiring 2 u pRBC and discharged with Hgb 8.0. GI team consulted for which they felt endoscopic evaluation was warranted. However, HCP was difficult to reach and since she did not experience further episodes of bleeding, the decision was made to pursue outpatient workup. She was sent home on PPI 40mg oral twice daily. Currently with maroon colored stool per ED provider.   - Hgb 8.7 here   - maintain active T&S, transfuse for Hgb < 7  - would start PPI gtt  - appreciate GI reccs  - would maintain NPO except meds status with q6h FSG    HEME/ONC  #Anemia  Hgb 8.7 as noted above. Appears relatively unchanged since discharge from 5/9.   - see GI plan  - active T&S    #coagulopathy  INR 3.58, PT 43.1, PTT 41. Takes eliquis 5mg BID for her VTEs.   - s/p K centra in the ED  - f/up repeat labs after infusion     ID  No active issues    Nutrition & Prophylaxis  F: s/p 500cc NS bolus in the ED  E: ESRD patient  N: NPO except meds until GI reccs  DVT ppx: SCDs for now (holding i/s/o GIB)  Dispo: [facility] as patient is currently hemodynamically stable    Case discussed with Dr. Elizondo.

## 2023-05-19 NOTE — ED PROVIDER NOTE - PHYSICAL EXAMINATION
CONSTITUTIONAL: Well-appearing; in no apparent distress.   HEAD: Normocephalic; atraumatic.   EYES:  conjunctiva and sclera clear  ENT: normal nose; no rhinorrhea; normal pharynx with no erythema or lesions.   NECK: Supple; non-tender;   CARDIOVASCULAR: Normal S1, S2; no murmurs, rubs, or gallops. Regular rate and rhythm.   RESPIRATORY: Breathing easily; breath sounds clear and equal bilaterally; no wheezes, rhonchi, or rales.  GI: Soft; non-distended; non-tender; no palpable organomegaly.   EXT: No cyanosis or edema; N/V intact  SKIN: Normal for age and race; warm; dry; good turgor; no apparent lesions or rash.   NEURO: A & O x 1, doesn't answer questions but alert, upset with procedure. moving all extremities equally  PSYCHOLOGICAL: The patient’s mood and manner are appropriate. CONSTITUTIONAL: Well-appearing; in no apparent distress.   HEAD: Normocephalic; atraumatic.   EYES:  conjunctiva and sclera clear  ENT: normal nose; no rhinorrhea; normal pharynx with no erythema or lesions.   NECK: Supple; non-tender; R neck w/ permacath  CARDIOVASCULAR: Normal S1, S2; no murmurs, rubs, or gallops. Regular rate and rhythm.   RESPIRATORY: Breathing easily; breath sounds clear and equal bilaterally; no wheezes, rhonchi, or rales.  GI: Soft; non-distended; non-tender; no palpable organomegaly.   EXT: difficult to palpate pulses, diffuse edema/anasarca, multiple bruises, multiple ulcerations/lesions over breast and back. bilateral clotted avf  SKIN: Normal for age and race; warm; dry; good turgor; no apparent lesions or rash.   NEURO: A & O x 1, doesn't answer questions but alert, upset with procedure. moving all extremities equally  PSYCHOLOGICAL: The patient’s mood and manner are appropriate. CONSTITUTIONAL: Well-appearing; in no apparent distress.   HEAD: Normocephalic; atraumatic.   EYES:  conjunctiva and sclera clear  ENT: normal nose; no rhinorrhea; normal pharynx with no erythema or lesions.   NECK: Supple; non-tender; R neck w/ permacath  CARDIOVASCULAR: Normal S1, S2; no murmurs, rubs, or gallops. Regular rate and rhythm.   RESPIRATORY: Breathing easily; breath sounds clear and equal bilaterally; no wheezes, rhonchi, or rales.  GI: Soft; non-distended; non-tender; no palpable organomegaly.   RECTAL: maroon stool  EXT: difficult to palpate pulses, diffuse edema/anasarca, multiple bruises, multiple ulcerations/lesions over breast and back. bilateral clotted avf  SKIN: Normal for age and race; warm; dry; good turgor; no apparent lesions or rash.   NEURO: A & O x 1, doesn't answer questions but alert, upset with procedure. moving all extremities equally  PSYCHOLOGICAL: The patient’s mood and manner are appropriate.

## 2023-05-19 NOTE — H&P ADULT - PROBLEM SELECTOR PLAN 4
Last TTE (5/2023) showing normal LV/RV size and systolic function, grade I LV diastolic dysfunction, moderately dilated LE, moderate AS.     - Continue home Atorvastatin 40mg QD  - Limitations to GDMT given hypotension

## 2023-05-19 NOTE — CONSULT NOTE ADULT - ASSESSMENT
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    Assessment/Plan:   #ESRD on HD  #Calciphylaxis  Usual RX time 3:30 hrs, EDW TBD  next HD 5/20  K noted   Renal Diet  Give sodium thiosulfate with HD     #Hypotension  Pt on previous admission noted to have hypotension, was on midodrine 40mg Q8h at one point w/ florinef  -midodrine     #access   L TDC    #anemia  Hgb not at goal  -Transfuse to Hgb > 7  GI consult noted  Epo w/ HD    #renal bone disease   Ca ~10.5  phos pending  trend phos twice weekly to ensure <5.5   sensipar to 120mg daily  renvela  1600 TID w/ meals   Trend phos daily         Thank you for the opportunity to participate in the care of your patient. The nephrology service remains available to assist with any questions or concerns. Please feel free to reach us by paging the on-call nephrology fellow for urgent issues or as below.     Saumya Jo D.O  PGY-5, Nephrology Fellow    P: [phone]

## 2023-05-19 NOTE — H&P ADULT - NSHPPHYSICALEXAM_GEN_ALL_CORE
.  VITAL SIGNS:  T(C): 37.4 (05-19-23 @ 12:16), Max: 37.4 (05-19-23 @ 12:16)  T(F): 99.3 (05-19-23 @ 12:16), Max: 99.3 (05-19-23 @ 12:16)  HR: 107 (05-19-23 @ 13:48) (70 - 107)  BP: 114/65 (05-19-23 @ 13:48) (105/50 - 114/65)  BP(mean): 70 (05-19-23 @ 11:37) (70 - 70)  RR: 18 (05-19-23 @ 13:48) (18 - 18)    PHYSICAL EXAM:  Constitutional: Resting comfortably in bed; NAD  Head: NC/AT  Eyes: PERRL, EOMI, clear conjunctiva  ENT: no nasal discharge; uvula midline, no oropharyngeal erythema or exudates; MMM  Neck: supple; no JVD or thyromegaly  Respiratory: CTA B/L; no W/R/R, no retractions  Cardiac: +S1/S2; RRR; no M/R/G;  Gastrointestinal: soft, NT/ND; no rebound or guarding; +BSx4  Extremities: WWP, no clubbing or cyanosis; no peripheral edema  Musculoskeletal: NROM x4; no joint swelling, tenderness or erythema  Vascular: 2+ radial, femoral, DP/PT pulses B/L  Dermatologic: skin warm, dry and intact; no rashes, wounds, or scars  Neurologic: AAOx3; CNII-XII grossly intact; no focal deficits  Psychiatric: affect and characteristics of appearance, verbalizations, behaviors are appropriate VITAL SIGNS:  T(C): 37.4 (05-19-23 @ 12:16), Max: 37.4 (05-19-23 @ 12:16)  T(F): 99.3 (05-19-23 @ 12:16), Max: 99.3 (05-19-23 @ 12:16)  HR: 107 (05-19-23 @ 13:48) (70 - 107)  BP: 114/65 (05-19-23 @ 13:48) (105/50 - 114/65)  BP(mean): 70 (05-19-23 @ 11:37) (70 - 70)  RR: 18 (05-19-23 @ 13:48) (18 - 18)    PHYSICAL EXAM:  Constitutional: Laying in hospital ED stretcher. Appears mildly distressed.  Head: NC/AT  Eyes: PERRL, EOMI, clear conjunctiva  ENT: no nasal discharge; uvula midline, no oropharyngeal erythema or exudates; MMM  Neck: supple; no JVD or thyromegaly  Respiratory: CTA B/L; no W/R/R, no retractions  Cardiac: +S1/S2; RRR; no M/R/G;  Gastrointestinal: soft, NT/ND; no rebound or guarding; +BSx4  Extremities: WWP, no clubbing or cyanosis; no peripheral edema  Musculoskeletal: NROM x4; no joint swelling, tenderness or erythema  Vascular: 2+ radial, femoral, DP/PT pulses B/L  Dermatologic: skin warm, dry and intact; no rashes, wounds, or scars  Neurologic: AAOx3; CNII-XII grossly intact; no focal deficits  Psychiatric: affect and characteristics of appearance, verbalizations, behaviors are appropriate VITAL SIGNS:  T(C): 37.4 (05-19-23 @ 12:16), Max: 37.4 (05-19-23 @ 12:16)  T(F): 99.3 (05-19-23 @ 12:16), Max: 99.3 (05-19-23 @ 12:16)  HR: 107 (05-19-23 @ 13:48) (70 - 107)  BP: 114/65 (05-19-23 @ 13:48) (105/50 - 114/65)  BP(mean): 70 (05-19-23 @ 11:37) (70 - 70)  RR: 18 (05-19-23 @ 13:48) (18 - 18)    PHYSICAL EXAM:  Constitutional: Laying in hospital ED stretcher. Appears mildly distressed.  Eyes: PERRL, clear conjunctiva  ENT: Dry MM with poor dentition  Neck: supple; no JVD   Respiratory: CTA B/L; no W/R/R, no retractions. Satting well on 2L NC.   Cardiac: +S1/S2; RRR; no M/R/G; HD catheter over L chest.   Gastrointestinal: soft, obese, NT/ND; no rebound or guarding; +BSx4. Large ventral hernia.   Extremities: Diffuse anasarca, worsened in bilateral upper extremities.   Musculoskeletal: Withdraws extremities to painful stimuli.   Vascular: 2+ radial, DP/PT pulses B/L  Dermatologic: Bilateral breast wounds covered in dressing with oozing of serous drainage. Dressing over sacrum, large with sero-sanguinous drainage.   Neurologic: AAOx1 (self). Withdrawing extremities to painful stimuli. Not able to answer any questions or follow any commands. VITAL SIGNS:  T(C): 37.4 (05-19-23 @ 12:16), Max: 37.4 (05-19-23 @ 12:16)  T(F): 99.3 (05-19-23 @ 12:16), Max: 99.3 (05-19-23 @ 12:16)  HR: 107 (05-19-23 @ 13:48) (70 - 107)  BP: 114/65 (05-19-23 @ 13:48) (105/50 - 114/65)  BP(mean): 70 (05-19-23 @ 11:37) (70 - 70)  RR: 18 (05-19-23 @ 13:48) (18 - 18)    PHYSICAL EXAM:  Constitutional: Laying in hospital ED stretcher. Appears mildly distressed.  Eyes: PERRL, clear conjunctiva  ENT: Dry MM with poor dentition  Neck: supple; no JVD   Respiratory: CTA B/L; no W/R/R, no retractions. Satting well on 2L NC.   Cardiac: +S1/S2; RRR; no M/R/G; HD catheter over L chest.   Gastrointestinal: soft, obese, NT/ND; no rebound or guarding; +BSx4. Large ventral hernia.   Extremities: Diffuse anasarca, worsened in bilateral upper extremities. Bilateral feet cold with blue appearing toes.  Musculoskeletal: Withdraws extremities to painful stimuli.   Vascular: 2+ radial pulses B/L  Dermatologic: Bilateral breast wounds covered in dressing with oozing of serous drainage. Dressing over sacrum, large with sero-sanguinous drainage.   Neurologic: AAOx1 (self). Withdrawing extremities to painful stimuli. Not able to answer any questions or follow any commands.

## 2023-05-19 NOTE — ED PROVIDER NOTE - CRITICAL CARE ATTENDING CONTRIBUTION TO CARE
Upon my evaluation, this patient had a high probability of imminent or life-threatening deterioration due to hypotension, gi bleeding which required my direct attention, intervention, and personal management.    I have personally provided critical care time exclusive of time spent on separately billable procedures. Time includes review of laboratory data, radiology results, discussion with consultants, and monitoring for potential decompensation. Interventions were performed as documented above.

## 2023-05-19 NOTE — CONSULT NOTE ADULT - ASSESSMENT
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     GI was consulted with concern for GIB with maroon colored stool.    Pt is hemodynamically stable. Exam at bedside demonstrated brown stool with bright red streaks of blood, which appear to be from sacral wounds. Though discrepancy noted with observations by ED team. On my prior exam during most recent admission, pt had formed, hard brown stool.    Hgb has uptrended from discharge CBC on 5/7 (8-->8.7) which is reassuring and argues against acute blood loss.  Not c/w with hemoconcentration as plt have demonstrated an interval decrease.    Further reassuring against UGIB is BUN:Cr has improved with normal BUN.      Low suspicion for active GIB to explain her presentation though pt does warrant EGD/Colonoscopy.  Unfortunately, very poor functional status would make prep for colonoscopy quite difficult (pt non ambulatory, does not engage with exam).  I suspect she will have a great deal of difficulty completing prep.  We have also been unable to reach HCP (attempts made by ED attending and by me) which raises additional ethical questions regarding her care.    Recommendations  - Repeat CBC at 1600 to trend  - C/w PPI gtt as empiric tx pending further clinical clarity  - Please engage wound care for sacral wounds  - Please engage ethics given difficulty contacting HCP to help guide decisions.  Pt does not appear to have capacity  - Transfuse as indicated; maintain active T&S  - Check KUB to assess stool burden (previously constipated)  - Clear liquids for now pending clinical clarity  - May give pt golytely prep to drink at her own pace as laxative  - Replete iron deficiency with IV iron  - Avoid opioids  - Consider EGD/Colonoscopy pending discussion with HCP regarding goals of care and clinical course.  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     GI was consulted with concern for GIB with maroon colored stool.    Pt is hemodynamically stable. Exam at bedside demonstrated brown stool with bright red streaks of blood, which appear to be from sacral wounds. Though discrepancy noted with observations by ED team. On my prior exam during most recent admission, pt had formed, hard brown stool.    Hgb has uptrended from discharge CBC on 5/7 (8-->8.7) which is reassuring and argues against acute blood loss.  Not c/w with hemoconcentration as plt have demonstrated an interval decrease.    Further reassuring against UGIB is BUN:Cr has improved with normal BUN.      Curiously, INR >3 elevated.  No known hx of liver disease. No recent imaging of liver.  Alk phos elevated, though AST/ALT bili nml.  S/p Kcentra 4000IU x1    Low suspicion for active GIB to explain her presentation though pt does warrant EGD/Colonoscopy.  Unfortunately, very poor functional status would make prep for colonoscopy quite difficult (pt non ambulatory, does not engage with exam).  I suspect she will have a great deal of difficulty completing prep.  We have also been unable to reach HCP (attempts made by ED attending and by me) which raises additional ethical questions regarding her care.    Recommendations  - Repeat CBC at 1600 to trend  - C/w PPI gtt as empiric tx pending further clinical clarity  - Obtain abdominal US with doppler.  Further eval of elevated INR per primary  - Please engage wound care for sacral wounds  - Please engage ethics given difficulty contacting HCP to help guide decisions.  Pt does not appear to have capacity  - Transfuse as indicated; maintain active T&S.  Resusc per primary  - Check KUB to assess stool burden (previously constipated)  - Daily CBC, CMP, INR  - Clear liquids for now pending clinical clarity  - May give pt golytely prep to drink at her own pace as laxative  - Replete iron deficiency with IV iron  - Avoid opioids  - Consider EGD/Colonoscopy pending discussion with HCP regarding goals of care, medical optimization and clinical course. Timing TBD Continue medical mgmt for now as above.     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     GI was consulted with concern for GIB with maroon colored stool.    Pt is hemodynamically stable. Exam at bedside demonstrated brown stool with bright red streaks of blood, which appear to be from sacral wounds. Though discrepancy noted with observations by ED team. On my prior exam during most recent admission, pt had formed, hard brown stool.    Hgb has uptrended from discharge CBC on 5/7 (8-->8.7) which is reassuring and argues against acute blood loss.  Not c/w with hemoconcentration as plt have demonstrated an interval decrease.    Further reassuring against UGIB is BUN:Cr has improved with normal BUN.   Blood in stool may also be explained by stercoral ulcer given recent severe constipation    Curiously, INR >3 elevated.  No known hx of liver disease. No recent imaging of liver.  Alk phos elevated, though AST/ALT bili nml.  S/p Kcentra 4000IU x1    Low suspicion for active GIB to explain her presentation though pt does warrant EGD/Colonoscopy.  Unfortunately, very poor functional status would make prep for colonoscopy quite difficult (pt non ambulatory, does not engage with exam).  I suspect she will have a great deal of difficulty completing prep.  We have also been unable to reach HCP (attempts made by ED attending and by me) which raises additional ethical questions regarding her care.    Recommendations  - Repeat CBC at 1600 to trend  - C/w PPI gtt as empiric tx pending further clinical clarity  - Obtain abdominal US with doppler.  Further eval of elevated INR per primary  - Please engage wound care for sacral wounds  - Please engage ethics given difficulty contacting HCP to help guide decisions.  Pt does not appear to have capacity  - Transfuse as indicated; maintain active T&S.  Resusc per primary  - Check KUB to assess stool burden (previously constipated)  - May give pt golytely prep to drink at her own pace as laxative  - Daily CBC, CMP, INR  - Clear liquids for now pending clinical clarity  - Replete iron deficiency with IV iron as indicated (check iron studies)  - Avoid opioids  - Consider EGD/Colonoscopy pending discussion with HCP regarding goals of care, medical optimization and clinical course. Timing TBD Continue medical mgmt for now as above.     Clive Rizzo DO  Gastroenterology Fellow  Pager: [phone]  After 5PM or on weekends, please contact [place]  for fellow on call

## 2023-05-19 NOTE — H&P ADULT - PROBLEM SELECTOR PLAN 1
- Holding home bowel regimen (senna/miralax/dulcolax)  - Continue Pantoprazole 40mg IVP Q12   - Diet, clear liquid for now  - GI consulted in ED, recommendations appreciated Px to [facility] ED from nursing home due to concern for blood in diaper. In ED, 2-3 bowel movements consistent with melena-appearance. Last Eliquis taken Wednesday 5/17. VS notable for , BP fluctuating 100-110. ICU consulted in ED, deemed no need for telemetry/ICU level of care. Hb 8.7 (at baseline per prior admission).     - Holding home Eliquis Q12 (last dose 5/17)   - Holding home bowel regimen (senna/miralax/dulcolax)  - Continue Pantoprazole 40mg IVP Q12   - Diet, clear liquid for now  - GI consulted in ED, recommendations appreciated  - Per GI recommendations, ordered US Abd and KUB  - IV iron sucrose 200mg Q24 x5d Px to [facility] ED from nursing home due to concern for blood in diaper. In ED, 2-3 bowel movements consistent with melena-appearance. Last Eliquis taken Wednesday 5/17. VS notable for , BP fluctuating 100-110. ICU consulted in ED, deemed no need for telemetry/ICU level of care. Hb 8.7 (at baseline per prior admission). Alternate source of bleed potentially due to chronic wounds over breasts and sacrum however appear clean/serous drainage after cleaned and examined.     - Holding home Eliquis Q12 (last dose 5/17)   - Holding home bowel regimen (senna/miralax/dulcolax)  - Continue Pantoprazole 40mg IVP Q12   - Diet, clear liquid for now  - GI consulted in ED, recommendations appreciated  - Per GI recommendations, ordered US Abd and KUB  - IV iron sucrose 200mg Q24 x5d Px to [facility] ED from nursing home due to concern for blood in diaper. In ED, 2-3 bowel movements consistent with melena-appearance. Last Eliquis taken Wednesday 5/17. VS notable for , BP fluctuating 100-110. ICU consulted in ED, deemed no need for telemetry/ICU level of care. Hb 8.7 (at baseline per prior admission). Alternate source of bleed potentially due to chronic wounds over breasts and sacrum however appear clean/serous drainage after cleaned and examined.     - Holding home Eliquis Q12 (last dose 5/17)   - Holding home bowel regimen (senna/miralax/dulcolax)  - Continue Pantoprazole 40mg IVP Q12   - Diet, clear liquid for now  - GI consulted in ED, recommendations appreciated  - Per GI recommendations, ordered US Abd and KUB  - Ethics consulted (may need 2PC consent for procedures/interventions) Px to [facility] ED from nursing home due to concern for blood in diaper. In ED, 2-3 bowel movements consistent with melena-appearance. Last Eliquis taken Wednesday 5/17. VS notable for , BP fluctuating 100-110. ICU consulted in ED, deemed no need for telemetry/ICU level of care. Hb 8.7 (at baseline per prior admission). Alternate source of bleed potentially due to chronic wounds over breasts and sacrum however appear clean/serous drainage after cleaned and examined.     - Holding home Eliquis Q12 (last dose 5/17)   - Holding home bowel regimen (senna/miralax/dulcolax)  - Continue Pantoprazole 40mg IVP Q12   - Diet, clear liquid for now  - GI consulted in ED, recommendations appreciated  - Per GI recommendations, ordered US Abd and KUB

## 2023-05-19 NOTE — H&P ADULT - PROBLEM SELECTOR PLAN 3
- Renal dialysis fellow consulted, recommendations appreciated  - Continue home Sevelamer 1600mg Q8 with meals  - Continue home Nephro-Natasha Q24  - Continue home folic acid 1mg Q24 ESRD 2/2 polycystic kidney disease on HD TThSat via catheter in L upper chest. Catheter site appears clean/dry. Last HD session tolerated well on Thursday 5/18.     - Renal dialysis fellow consulted, recommendations appreciated  - Continue home Sevelamer 1600mg Q8 with meals  - Continue home Nephro-Natasha Q24  - Continue home folic acid 1mg Q24

## 2023-05-19 NOTE — ED PROVIDER NOTE - OBJECTIVE STATEMENT
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 pt unable to give any history  no missed HD as per EMS, and pt declines to finish whole sessions and refused labs today  unclear if any capacity, no [facility]ST in [place] papers

## 2023-05-19 NOTE — CONSULT NOTE ADULT - SUBJECTIVE AND OBJECTIVE BOX
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     # Anemia due to acute blood loss.   ·  Plan: Patient presented from rehab with hemoglobin of 6.9. Per chart review, patient has been more confused lately. JUAN on admission, showing maroon stool. FOBT positive. Hgb 6.3 on admission. s/p 1u pRBC on 5/4, with Hgb 6.7. Received a 2nd unit with repeat Hgb 8.0. GI consulted for UGIB and possible EGD/colonoscopy. Patient had no further episodes of melena since admission and Hgb remained stable over 3 days. Patient was started on IV protonix 40mg BID; transition to PO since no IV access. Obtained iron studies appears to have AoCD likely 2/2 ESRD.  - c/w protonix 40mg PO BID x2 weeks (through 5/17), decrease to 40mg PO daily thereafter      ED Course  Vitals: 99.3F rectal, HR 70, -114/50-65, RR 18  Labs: notable for Hgb 8.7/Hct 31.4, BUN 19/Cr 3.06, Alb 2.3, , eGFR 16, lactate 1.2, INR 3.58, PT 43.1, PTT 41.  Imaging: CXR - slight blunting of R costophrenic angle, no PTX, tortuous aorta  Management: PPI 80mg IVP x1, Kcentra 4000IU, NS 500cc bolus  Consults: gastroenterology    ROS: See HPI      PAST MEDICAL & SURGICAL HISTORY:  HTN (hypertension)      HLD (hyperlipidemia)      CAD (coronary atherosclerotic disease)      ESRD on dialysis  last 11/15/22      H/O ventral hernia      Brown tumor  brain      DVT, lower extremity  left      History of surgery  IVC filter      AVF (arteriovenous fistula)  right left      S/P AIDEN-BSO  2003      History of surgery  RLE PTA stent / atherectomy  7/2021      History of atherectomy  stent          Home Medications:  apixaban 5 mg oral tablet: 1 tab(s) orally every 12 hours (02 Feb 2023 16:33)  atorvastatin 40 mg oral tablet: 1 tab(s) orally once a day (02 Feb 2023 10:53)  bisacodyl 5 mg oral delayed release tablet: 1 tab(s) orally every 12 hours (09 May 2023 13:41)  calamine topical lotion: 1 application topically 3 times a day, As needed, Itching (02 Feb 2023 16:33)  cinacalcet: 120 milligram(s) orally every 24 hours (09 May 2023 13:41)  folic acid 1 mg oral tablet: 1 tab(s) orally once a day (02 Feb 2023 10:53)  HYDROmorphone 2 mg oral tablet: 1 tab(s) orally every 3 hours, As needed, Moderate Pain (4 - 6) (02 Feb 2023 16:33)  HYDROmorphone 4 mg oral tablet: 1 tab(s) orally every 3 hours, As needed, Severe Pain (7 - 10) (02 Feb 2023 16:33)  lidocaine 4% topical cream: 1 application topically 3 times a day, As needed, Breast pain (02 Feb 2023 16:33)  midodrine 10 mg oral tablet: 4 tab(s) orally every 8 hours (02 Feb 2023 16:33)  Nephro-Natasha oral tablet: 1 tab(s) orally once a day (02 Feb 2023 10:53)  ocular lubricant ophthalmic solution: 1 drop(s) to each affected eye 4 times a day (02 Feb 2023 16:33)  pantoprazole 40 mg oral delayed release tablet: 1 tab(s) orally every 12 hours (09 May 2023 13:41)  polyethylene glycol 3350 oral powder for reconstitution: 17 gram(s) orally every 12 hours (02 Feb 2023 16:33)  senna leaf extract oral tablet: 2 tab(s) orally once a day (at bedtime) (02 Feb 2023 16:33)  sevelamer: 1,600 milligram(s) orally 3 times a day (with meals) (09 May 2023 13:41)  Vitamin C 500 mg oral tablet: 1 tab(s) orally once a day (02 Feb 2023 10:53)    MEDICATIONS  (STANDING):  sodium chloride 0.9% Bolus 500 milliLiter(s) IV Bolus once    MEDICATIONS  (PRN):      Allergies    sulfa drugs (Angioedema)  Ancef (Rash; Urticaria)  iodine (Hives; Pruritus)  DDAVP (Hypotension)  penicillin (Swelling)    Intolerances        SOCIAL HX:         PHYSICAL EXAM:    ICU Vital Signs Last 24 Hrs  T(C): 37.4 (19 May 2023 12:16), Max: 37.4 (19 May 2023 12:16)  T(F): 99.3 (19 May 2023 12:16), Max: 99.3 (19 May 2023 12:16)  HR: 70 (19 May 2023 11:29) (70 - 70)  BP: 105/50 (19 May 2023 11:37) (105/50 - 105/50)  BP(mean): 70 (19 May 2023 11:37) (70 - 70)  ABP: --  ABP(mean): --  RR: 18 (19 May 2023 11:29) (18 - 18)  SpO2: --        General: morbidly obese, lying in bed with pink eyeglasses  HEENT:  NC/AT, EOMI  Lungs: Bilateral breath sounds, no wheezing, no rales/rhonchi, no accessory muscle use  Cardiovascular: tachycardic to 105; +s1, s2, no murmurs  Gastrointestinal: abdomen soft, NTND, bowel sounds present  Genitourinary: no suprapubic tenderness  Musculoskeletal: moves all extremities spontaneously    Extremities: 2-3+ bilateral UE edema, no LE edema, LE cool to touch  Skin: warm up until the feet, which are cool to touch; +skin wounds on R dorsal hand 4x3cm and bilateral breast regions (covered with gauze and wound dressing)  Vascular: 2+ DP/PT pulses bilaterally  Neurological: A&Ox1-2 (to person and [place] only), moves extremities spontaneously and turns to examiner on questioning  Lines: +L anterior HD tunnel catheter, +18 gauge PIV R anterior chest        LABS                          8.7    14.33 )-----------( 203      ( 19 May 2023 11:32 )             31.4                                               05-19    138  |  100  |  19  ----------------------------<  70  4.0   |  25  |  3.06<H>    Ca    10.5      19 May 2023 11:32    TPro  5.4<L>  /  Alb  2.3<L>  /  TBili  0.4  /  DBili  x   /  AST  28  /  ALT  7<L>  /  AlkPhos  134<H>  05-19      PT/INR - ( 19 May 2023 11:32 )   PT: 43.1 sec;   INR: 3.58          PTT - ( 19 May 2023 11:32 )  PTT:41.5 sec                                                                                     LIVER FUNCTIONS - ( 19 May 2023 11:32 )  Alb: 2.3 g/dL / Pro: 5.4 g/dL / ALK PHOS: 134 U/L / ALT: 7 U/L / AST: 28 U/L / GGT: x                                                                                                                                        66 y/o Female PMHx ESRD 2/2 PCKD (on HD T/Th/Sat, via L anterior HD cath), nonischemic cardiomyopathy, HTN, HLD, PE (2018) s/p IVC filter, mild AS, PAD, OA, h/o thrombus in vascular access x3 (R AVG, R AVF, L AVG), ventral hernia, 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 Patient did receive her eliquis dose and further doses have been held since. Of note, patient was recently admitted from 5/3-5/9 at [facility] for anemia requiring 2 u pRBC and discharged with Hgb 8.0. GI team consulted for which they felt endoscopic evaluation was warranted. However, HCP was difficult to reach and since she did not experience further episodes of bleeding, the decision was made to pursue outpatient workup. She was sent home on PPI 40mg oral twice daily. ICU team consulted for UGIB and possible telemetry disposition. Upon our assessment, patient A&OX1-2 (to self and [facility]) and asking to be left alone. ROS difficult to obtain, however, stating she currently does not have abdominal pain at this time. On multiple writer attempts, patient re-interating "doctor please leave me alone".      ED Course  Vitals: 99.3F rectal, HR 70, -114/50-65, RR 18  Labs: notable for Hgb 8.7/Hct 31.4, BUN 19/Cr 3.06, Alb 2.3, , eGFR 16, lactate 1.2, INR 3.58, PT 43.1, PTT 41.  Imaging: CXR - slight blunting of R costophrenic angle, no PTX, tortuous aorta  Management: PPI 80mg IVP x1, Kcentra 4000IU, NS 500cc bolus  Consults: gastroenterology    ROS: See HPI      PAST MEDICAL & SURGICAL HISTORY:  HTN (hypertension)      HLD (hyperlipidemia)      CAD (coronary atherosclerotic disease)      ESRD on dialysis  last 11/15/22      H/O ventral hernia      Brown tumor  brain      DVT, lower extremity  left      History of surgery  IVC filter      AVF (arteriovenous fistula)  right left      S/P AIDEN-BSO  2003      History of surgery  RLE PTA stent / atherectomy  7/2021      History of atherectomy  stent          Home Medications:  apixaban 5 mg oral tablet: 1 tab(s) orally every 12 hours (02 Feb 2023 16:33)  atorvastatin 40 mg oral tablet: 1 tab(s) orally once a day (02 Feb 2023 10:53)  bisacodyl 5 mg oral delayed release tablet: 1 tab(s) orally every 12 hours (09 May 2023 13:41)  calamine topical lotion: 1 application topically 3 times a day, As needed, Itching (02 Feb 2023 16:33)  cinacalcet: 120 milligram(s) orally every 24 hours (09 May 2023 13:41)  folic acid 1 mg oral tablet: 1 tab(s) orally once a day (02 Feb 2023 10:53)  HYDROmorphone 2 mg oral tablet: 1 tab(s) orally every 3 hours, As needed, Moderate Pain (4 - 6) (02 Feb 2023 16:33)  HYDROmorphone 4 mg oral tablet: 1 tab(s) orally every 3 hours, As needed, Severe Pain (7 - 10) (02 Feb 2023 16:33)  lidocaine 4% topical cream: 1 application topically 3 times a day, As needed, Breast pain (02 Feb 2023 16:33)  midodrine 10 mg oral tablet: 4 tab(s) orally every 8 hours (02 Feb 2023 16:33)  Nephro-Natasha oral tablet: 1 tab(s) orally once a day (02 Feb 2023 10:53)  ocular lubricant ophthalmic solution: 1 drop(s) to each affected eye 4 times a day (02 Feb 2023 16:33)  pantoprazole 40 mg oral delayed release tablet: 1 tab(s) orally every 12 hours (09 May 2023 13:41)  polyethylene glycol 3350 oral powder for reconstitution: 17 gram(s) orally every 12 hours (02 Feb 2023 16:33)  senna leaf extract oral tablet: 2 tab(s) orally once a day (at bedtime) (02 Feb 2023 16:33)  sevelamer: 1,600 milligram(s) orally 3 times a day (with meals) (09 May 2023 13:41)  Vitamin C 500 mg oral tablet: 1 tab(s) orally once a day (02 Feb 2023 10:53)    MEDICATIONS  (STANDING):  sodium chloride 0.9% Bolus 500 milliLiter(s) IV Bolus once    MEDICATIONS  (PRN):      Allergies    sulfa drugs (Angioedema)  Ancef (Rash; Urticaria)  iodine (Hives; Pruritus)  DDAVP (Hypotension)  penicillin (Swelling)    Intolerances        SOCIAL HX:         PHYSICAL EXAM:    ICU Vital Signs Last 24 Hrs  T(C): 37.4 (19 May 2023 12:16), Max: 37.4 (19 May 2023 12:16)  T(F): 99.3 (19 May 2023 12:16), Max: 99.3 (19 May 2023 12:16)  HR: 70 (19 May 2023 11:29) (70 - 70)  BP: 105/50 (19 May 2023 11:37) (105/50 - 105/50)  BP(mean): 70 (19 May 2023 11:37) (70 - 70)  ABP: --  ABP(mean): --  RR: 18 (19 May 2023 11:29) (18 - 18)  SpO2: --        General: morbidly obese, lying in bed with pink eyeglasses  HEENT:  NC/AT, EOMI  Lungs: Bilateral breath sounds, no wheezing, no rales/rhonchi, no accessory muscle use  Cardiovascular: tachycardic to 105; +s1, s2, no murmurs  Gastrointestinal: abdomen soft, NTND, bowel sounds present  Genitourinary: no suprapubic tenderness  Musculoskeletal: moves all extremities spontaneously    Extremities: 2-3+ bilateral UE edema, no LE edema, LE cool to touch  Skin: warm up until the feet, which are cool to touch; +skin wounds on R dorsal hand 4x3cm and bilateral breast regions (covered with gauze and wound dressing)  Vascular: 2+ DP/PT pulses bilaterally  Neurological: A&Ox1-2 (to person and [place] only), moves extremities spontaneously and turns to examiner on questioning  Lines: +L anterior HD tunnel catheter, +18 gauge PIV R anterior chest        LABS                          8.7    14.33 )-----------( 203      ( 19 May 2023 11:32 )             31.4                                               05-19    138  |  100  |  19  ----------------------------<  70  4.0   |  25  |  3.06<H>    Ca    10.5      19 May 2023 11:32    TPro  5.4<L>  /  Alb  2.3<L>  /  TBili  0.4  /  DBili  x   /  AST  28  /  ALT  7<L>  /  AlkPhos  134<H>  05-19      PT/INR - ( 19 May 2023 11:32 )   PT: 43.1 sec;   INR: 3.58          PTT - ( 19 May 2023 11:32 )  PTT:41.5 sec                                                                                     LIVER FUNCTIONS - ( 19 May 2023 11:32 )  Alb: 2.3 g/dL / Pro: 5.4 g/dL / ALK PHOS: 134 U/L / ALT: 7 U/L / AST: 28 U/L / GGT: x                                                                                                                                        68 y/o Female PMHx ESRD 2/2 PCKD (on HD T/Th/Sat, via L anterior HD cath), nonischemic cardiomyopathy, HTN, HLD, PE (2018) s/p IVC filter, mild AS, PAD, OA, h/o thrombus in vascular access x3 (R AVG, R AVF, L AVG), ventral hernia, 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 Patient did receive her eliquis dose and further doses have been held since. Of note, patient was recently admitted from 5/3-5/9 at [facility] for anemia requiring 2 u pRBC and discharged with Hgb 8.0. GI team consulted for which they felt endoscopic evaluation was warranted. However, HCP was difficult to reach and since she did not experience further episodes of bleeding, the decision was made to pursue outpatient workup. She was sent home on PPI 40mg oral twice daily. ICU team consulted for UGIB and possible telemetry disposition. Upon our assessment, patient A&OX1-2 (to self and [facility]) and asking to be left alone. ROS difficult to obtain, however, stating she currently does not have abdominal pain at this time. On multiple writer attempts, patient re-interating "doctor please leave me alone".      ED Course  Vitals: 99.3F rectal, HR 70, -114/50-65, RR 18, 2L 91%  Labs: notable for Hgb 8.7/Hct 31.4, BUN 19/Cr 3.06, Alb 2.3, , eGFR 16, lactate 1.2, INR 3.58, PT 43.1, PTT 41.  Imaging: CXR - slight blunting of R costophrenic angle, no PTX, tortuous aorta  Management: PPI 80mg IVP x1, Kcentra 4000IU, NS 500cc bolus  Consults: gastroenterology    ROS: See HPI      PAST MEDICAL & SURGICAL HISTORY:  HTN (hypertension)      HLD (hyperlipidemia)      CAD (coronary atherosclerotic disease)      ESRD on dialysis  last 11/15/22      H/O ventral hernia      Brown tumor  brain      DVT, lower extremity  left      History of surgery  IVC filter      AVF (arteriovenous fistula)  right left      S/P AIDEN-BSO  2003      History of surgery  RLE PTA stent / atherectomy  7/2021      History of atherectomy  stent          Home Medications:  apixaban 5 mg oral tablet: 1 tab(s) orally every 12 hours (02 Feb 2023 16:33)  atorvastatin 40 mg oral tablet: 1 tab(s) orally once a day (02 Feb 2023 10:53)  bisacodyl 5 mg oral delayed release tablet: 1 tab(s) orally every 12 hours (09 May 2023 13:41)  calamine topical lotion: 1 application topically 3 times a day, As needed, Itching (02 Feb 2023 16:33)  cinacalcet: 120 milligram(s) orally every 24 hours (09 May 2023 13:41)  folic acid 1 mg oral tablet: 1 tab(s) orally once a day (02 Feb 2023 10:53)  HYDROmorphone 2 mg oral tablet: 1 tab(s) orally every 3 hours, As needed, Moderate Pain (4 - 6) (02 Feb 2023 16:33)  HYDROmorphone 4 mg oral tablet: 1 tab(s) orally every 3 hours, As needed, Severe Pain (7 - 10) (02 Feb 2023 16:33)  lidocaine 4% topical cream: 1 application topically 3 times a day, As needed, Breast pain (02 Feb 2023 16:33)  midodrine 10 mg oral tablet: 4 tab(s) orally every 8 hours (02 Feb 2023 16:33)  Nephro-Natasha oral tablet: 1 tab(s) orally once a day (02 Feb 2023 10:53)  ocular lubricant ophthalmic solution: 1 drop(s) to each affected eye 4 times a day (02 Feb 2023 16:33)  pantoprazole 40 mg oral delayed release tablet: 1 tab(s) orally every 12 hours (09 May 2023 13:41)  polyethylene glycol 3350 oral powder for reconstitution: 17 gram(s) orally every 12 hours (02 Feb 2023 16:33)  senna leaf extract oral tablet: 2 tab(s) orally once a day (at bedtime) (02 Feb 2023 16:33)  sevelamer: 1,600 milligram(s) orally 3 times a day (with meals) (09 May 2023 13:41)  Vitamin C 500 mg oral tablet: 1 tab(s) orally once a day (02 Feb 2023 10:53)    MEDICATIONS  (STANDING):  sodium chloride 0.9% Bolus 500 milliLiter(s) IV Bolus once    MEDICATIONS  (PRN):      Allergies    sulfa drugs (Angioedema)  Ancef (Rash; Urticaria)  iodine (Hives; Pruritus)  DDAVP (Hypotension)  penicillin (Swelling)    Intolerances        SOCIAL HX:         PHYSICAL EXAM:    ICU Vital Signs Last 24 Hrs  T(C): 37.4 (19 May 2023 12:16), Max: 37.4 (19 May 2023 12:16)  T(F): 99.3 (19 May 2023 12:16), Max: 99.3 (19 May 2023 12:16)  HR: 70 (19 May 2023 11:29) (70 - 70)  BP: 105/50 (19 May 2023 11:37) (105/50 - 105/50)  BP(mean): 70 (19 May 2023 11:37) (70 - 70)  ABP: --  ABP(mean): --  RR: 18 (19 May 2023 11:29) (18 - 18)  SpO2: --        General: morbidly obese, lying in bed with pink eyeglasses  HEENT:  NC/AT, EOMI  Lungs: Bilateral breath sounds, no wheezing, no rales/rhonchi, no accessory muscle use  Cardiovascular: tachycardic to 105; +s1, s2, no murmurs  Gastrointestinal: abdomen soft, NTND, bowel sounds present  Genitourinary: no suprapubic tenderness  Musculoskeletal: moves all extremities spontaneously    Extremities: 2-3+ bilateral UE edema, no LE edema, LE cool to touch  Skin: warm up until the feet, which are cool to touch; +skin wounds on R dorsal hand 4x3cm and bilateral breast regions (covered with gauze and wound dressing)  Vascular: 2+ DP/PT pulses bilaterally  Neurological: A&Ox1-2 (to person and [place] only), moves extremities spontaneously and turns to examiner on questioning  Lines: +L anterior HD tunnel catheter, +18 gauge PIV R anterior chest        LABS                          8.7    14.33 )-----------( 203      ( 19 May 2023 11:32 )             31.4                                               05-19    138  |  100  |  19  ----------------------------<  70  4.0   |  25  |  3.06<H>    Ca    10.5      19 May 2023 11:32    TPro  5.4<L>  /  Alb  2.3<L>  /  TBili  0.4  /  DBili  x   /  AST  28  /  ALT  7<L>  /  AlkPhos  134<H>  05-19      PT/INR - ( 19 May 2023 11:32 )   PT: 43.1 sec;   INR: 3.58          PTT - ( 19 May 2023 11:32 )  PTT:41.5 sec                                                                                     LIVER FUNCTIONS - ( 19 May 2023 11:32 )  Alb: 2.3 g/dL / Pro: 5.4 g/dL / ALK PHOS: 134 U/L / ALT: 7 U/L / AST: 28 U/L / GGT: x

## 2023-05-19 NOTE — H&P ADULT - ASSESSMENT
68 yo F with PMHx ESRD (2/2 PKD, HD TThSat), HFrEF (LVEF 65% in 5/2023), non-ischemic CM, HTN, HLD, PE (s/p IVC filter, 2018), 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

## 2023-05-19 NOTE — H&P ADULT - ATTENDING COMMENTS
Patient was seen and examined with the resident team today.  I agree with Dr. Draper's assessment and plan with the following exceptions/additions:     Briefly, 68yo woman with a PMH of cognitive impairment (AOx1-2 at baseline), PCKD c/b ESRD on HD s/p multiple clotted fistulas (Tues/Thurs/Sat, chest port), chronic HFrEF, HTN, HLD, PE s/p IVC filter (but on A/C), mild AS/MR, PAD, ventral hernia, sacral decubs and osteoclastoma who p/w melena (not BRBPR) after recent admission for anemia, when an EGD was not obtained. HCP difficult to reach on that admission; still unable to obtain.  On PPI IV.  Rejected by ICU triage.     -- hold DOAC  -- serial CBC q4-6h  -- c/w PPI IV BID   -- GI consult   -- will call [facility] to see if can reach HCP; if unable to reach, Ethics consult  -- Pall Care consult   -- Of note, BRBPR reported by ED likely from sacral wounds.     Remainder of plan as per above.     Richelle Soto  [phone] Patient was seen and examined with the resident team today.  I agree with Dr. Draper's assessment and plan with the following exceptions/additions:     Briefly, 68yo woman with a PMH of cognitive impairment (AOx1-2 at baseline), PCKD c/b ESRD on HD s/p multiple clotted fistulas (Tues/Thurs/Sat, chest port), chronic HFrEF, HTN, HLD, PE s/p IVC filter (but on A/C), mild AS/MR, PAD, ventral hernia, sacral decubs, hemorrhoids and osteoclastoma who p/w melena (not BRBPR) after recent admission for anemia, when an EGD was not obtained. HCP difficult to reach on that admission; still unable to obtain.  On PPI IV.  Rejected by ICU triage.     -- hold DOAC  -- serial CBC q4-6h  -- c/w PPI IV BID   -- GI consult   -- will call [facility] to see if can reach HCP; if unable to reach, Ethics consult  -- Pall Care consult   -- Of note, BRBPR reported by ED likely from sacral wounds.     Remainder of plan as per above.     Richelle Soto  [phone]

## 2023-05-19 NOTE — CONSULT NOTE ADULT - SUBJECTIVE AND OBJECTIVE BOX
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     Of note, patient with recent admission at [facility] (5/3-5/9) during which time she was admitted for similar presentation after being found to have Hb 6.9 on nursing home labwork, recieved 2U pRBC while admitted and discharged on PPI. GI consulted on that admission but patient remained stable without further GI so no EGD/scope performed. Hospital course at that time c/b hypotension however pt chronically on midodrine 40mg PO Q8.     ED COURSE  VS: T 98.5F (oral), HR , /50, RR 18, SpO2 91% (2L NC)  Labs: 14.33, Hb 8.7 (MCV 90), PT 43.1, INR 3.58, aPTT 41.5, BUN/Cr 19/3.06, TPro 5.4, Alb 2.3, AlkPhos 134   Orders: Protonix 80mh IVP x1, Kcentra 4000IU x1, NS 500cc bolus x1  Imaging:  - CXR: Possible small R pleural effusion. Tortuous aorta. No clear consolidations. No PTX.    Consults: GI  (19 May 2023 14:55)   Nephrology consulted for dialysis.     PAST MEDICAL & SURGICAL HISTORY:  HTN (hypertension)      HLD (hyperlipidemia)      CAD (coronary atherosclerotic disease)      ESRD on dialysis  last 11/15/22      H/O ventral hernia      Brown tumor  brain      DVT, lower extremity  left      History of surgery  IVC filter      AVF (arteriovenous fistula)  right left      S/P AIDEN-BSO  2003      History of surgery  RLE PTA stent / atherectomy  7/2021      History of atherectomy  stent            Allergies:  sulfa drugs (Angioedema)  Ancef (Rash; Urticaria)  iodine (Hives; Pruritus)  DDAVP (Hypotension)  penicillin (Swelling)      Home Medications:   acetaminophen     Tablet .. 650 milliGRAM(s) Oral every 6 hours PRN  ascorbic acid 500 milliGRAM(s) Oral daily  atorvastatin 40 milliGRAM(s) Oral at bedtime  cinacalcet 120 milliGRAM(s) Oral every 24 hours  folic acid 1 milliGRAM(s) Oral daily  iron sucrose IVPB 200 milliGRAM(s) IV Intermittent every 24 hours  midodrine 40 milliGRAM(s) Oral every 8 hours  Nephro-deborah 1 Tablet(s) Oral daily  pantoprazole  Injectable 40 milliGRAM(s) IV Push every 12 hours  sevelamer carbonate 1600 milliGRAM(s) Oral three times a day with meals      Hospital Medications:   MEDICATIONS  (STANDING):  ascorbic acid 500 milliGRAM(s) Oral daily  atorvastatin 40 milliGRAM(s) Oral at bedtime  cinacalcet 120 milliGRAM(s) Oral every 24 hours  folic acid 1 milliGRAM(s) Oral daily  iron sucrose IVPB 200 milliGRAM(s) IV Intermittent every 24 hours  midodrine 40 milliGRAM(s) Oral every 8 hours  Nephro-deborah 1 Tablet(s) Oral daily  pantoprazole  Injectable 40 milliGRAM(s) IV Push every 12 hours  sevelamer carbonate 1600 milliGRAM(s) Oral three times a day with meals      SOCIAL HISTORY:  Denies ETOh, Smoking,     Family History:  FAMILY HISTORY:        VITALS:  T(F): 99.3 (05-19-23 @ 12:16), Max: 99.3 (05-19-23 @ 12:16)  HR: 107 (05-19-23 @ 13:48)  BP: 99/59 (05-19-23 @ 15:30)  RR: 18 (05-19-23 @ 13:48)  SpO2: 91% (05-19-23 @ 13:48)  Wt(kg): --      Weight (kg): 79.4 (05-19 @ 11:29)  CAPILLARY BLOOD GLUCOSE          Review of Systems:  CONSTITUTIONAL: No fever   RESPIRATORY: No shortness of breath, cough  CARDIOVASCULAR: No Chest pain, shortness of breath  GASTROINTESTINAL: No abdominal pain, nausea, vomiting, diarrhea  GENITOURINARY: No urinary frequency, gross hematuria, dysuria  NEUROLOGICAL: No headache, weakness  SKIN: No rash or skin lesion  MUSCULOSKELETAL: No swelling  Psych: Denies suicidal or homicidal ideation    PHYSICAL EXAM:  GENERAL: Alert, awake, oriented x3 on RA   HEENT: ZOHAIB, EOMI, neck supple, no JVP  CHEST/LUNG: Bilateral clear breath sounds  HEART: Regular rate and rhythm, no murmur, no gallops, no rub   ABDOMEN: Soft, nontender, non distended  : No flank or supra pubic tenderness.  EXTREMITIES: no pedal edema  Neurology: AAOx3, no asterixis   SKIN: No rash or skin lesion   ACCESS:     LABS:  05-19    138  |  100  |  19  ----------------------------<  70  4.0   |  25  |  3.06<H>    Ca    10.5      19 May 2023 11:32    TPro  5.4<L>  /  Alb  2.3<L>  /  TBili  0.4  /  DBili      /  AST  28  /  ALT  7<L>  /  AlkPhos  134<H>  05-19    Creatinine Trend: 3.06 <--                        8.7    14.33 )-----------( 203      ( 19 May 2023 11:32 )             31.4     Urine Studies:            68M PMHx ESRD on HD, multiple myeloma, chronic lymphedema, LLE DVT with IVC filter, c dif s/p fecal transplant, who presented to the [facility] ED after being found hypotensive and hypothermic when he went for dialysis. Consult for hemodialysis     Assessment/Plan:     #ESRD on HD MWF @[address] [place] [address] [facility]   usual Rx 3h 3L   last hemodialysis 7/19 per schedule   no acute indication for hemodialysis   next hemodialysis 7/24 per schedule   electrolytes at goal   euvolemic, UF w/HD   dry weight TBD     #HTN   BP at goal   continue with norvasc, metoprolol, clonidine     #access   LUE AVF functional     #anemia  Hb at goal   obtain iron studies including ferritin, transferrin, iron, and TIBC to be able to calculate % saturation   no indication for EPO or IV Iron at this time   transfusion as per primary team     #renal bone disease   Ca ~  Phos ~   PTH pending   VitD25/1,25 pending   no indication for Hectorol at this time     Thank you for the opportunity to participate in the care of your patient. The nephrology service remains available to assist with any questions or concerns. Please feel free to reach us by paging the on-call nephrology fellow for urgent issues or as below.     Toñito Hodges M.D.   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         PAST MEDICAL & SURGICAL HISTORY:  HTN (hypertension)      HLD (hyperlipidemia)      CAD (coronary atherosclerotic disease)      ESRD on dialysis  last 11/15/22      H/O ventral hernia      Brown tumor  brain      DVT, lower extremity  left      History of surgery  IVC filter      AVF (arteriovenous fistula)  right left      S/P AIDEN-BSO  2003      History of surgery  RLE PTA stent / atherectomy  7/2021      History of atherectomy  stent            Allergies:  sulfa drugs (Angioedema)  Ancef (Rash; Urticaria)  iodine (Hives; Pruritus)  DDAVP (Hypotension)  penicillin (Swelling)      Home Medications:   acetaminophen     Tablet .. 650 milliGRAM(s) Oral every 6 hours PRN  ascorbic acid 500 milliGRAM(s) Oral daily  atorvastatin 40 milliGRAM(s) Oral at bedtime  cinacalcet 120 milliGRAM(s) Oral every 24 hours  folic acid 1 milliGRAM(s) Oral daily  iron sucrose IVPB 200 milliGRAM(s) IV Intermittent every 24 hours  midodrine 40 milliGRAM(s) Oral every 8 hours  Nephro-deborah 1 Tablet(s) Oral daily  pantoprazole  Injectable 40 milliGRAM(s) IV Push every 12 hours  sevelamer carbonate 1600 milliGRAM(s) Oral three times a day with meals      Hospital Medications:   MEDICATIONS  (STANDING):  ascorbic acid 500 milliGRAM(s) Oral daily  atorvastatin 40 milliGRAM(s) Oral at bedtime  cinacalcet 120 milliGRAM(s) Oral every 24 hours  folic acid 1 milliGRAM(s) Oral daily  iron sucrose IVPB 200 milliGRAM(s) IV Intermittent every 24 hours  midodrine 40 milliGRAM(s) Oral every 8 hours  Nephro-deborah 1 Tablet(s) Oral daily  pantoprazole  Injectable 40 milliGRAM(s) IV Push every 12 hours  sevelamer carbonate 1600 milliGRAM(s) Oral three times a day with meals      SOCIAL HISTORY:  Denies ETOh, Smoking,     Family History:  FAMILY HISTORY:        VITALS:  T(F): 99.3 (05-19-23 @ 12:16), Max: 99.3 (05-19-23 @ 12:16)  HR: 107 (05-19-23 @ 13:48)  BP: 99/59 (05-19-23 @ 15:30)  RR: 18 (05-19-23 @ 13:48)  SpO2: 91% (05-19-23 @ 13:48)  Wt(kg): --      Weight (kg): 79.4 (05-19 @ 11:29)  CAPILLARY BLOOD GLUCOSE          Review of Systems:  all oter ROS negative     PHYSICAL EXAM:  GENERAL: Alert, awake, oriented x3 on NC   HEENT: ZOHAIB, EOMI, neck supple, no JVP  CHEST/LUNG: Bilateral clear breath sounds  HEART: Regular rate and rhythm, no murmur, no gallops, no rub   ABDOMEN: Soft, nontender, non distended  : No flank or supra pubic tenderness.  EXTREMITIES: no pedal edema  Neurology: AAOx3, no asterixis   SKIN: No rash or skin lesion   ACCESS:     LABS:  05-19    138  |  100  |  19  ----------------------------<  70  4.0   |  25  |  3.06<H>    Ca    10.5      19 May 2023 11:32    TPro  5.4<L>  /  Alb  2.3<L>  /  TBili  0.4  /  DBili      /  AST  28  /  ALT  7<L>  /  AlkPhos  134<H>  05-19    Creatinine Trend: 3.06 <--                        8.7    14.33 )-----------( 203      ( 19 May 2023 11:32 )             31.4     Urine Studies:            68M PMHx ESRD on HD, multiple myeloma, chronic lymphedema, LLE DVT with IVC filter, c dif s/p fecal transplant, who presented to the [facility] ED after being found hypotensive and hypothermic when he went for dialysis. Consult for hemodialysis     Assessment/Plan:     #ESRD on HD MWF @[address] [place] [address] [facility]   usual Rx 3h 3L   last hemodialysis 7/19 per schedule   no acute indication for hemodialysis   next hemodialysis 7/24 per schedule   electrolytes at goal   euvolemic, UF w/HD   dry weight TBD     #HTN   BP at goal   continue with norvasc, metoprolol, clonidine     #access   LUE AVF functional     #anemia  Hb at goal   obtain iron studies including ferritin, transferrin, iron, and TIBC to be able to calculate % saturation   no indication for EPO or IV Iron at this time   transfusion as per primary team     #renal bone disease   Ca ~  Phos ~   PTH pending   VitD25/1,25 pending   no indication for Hectorol at this time     Thank you for the opportunity to participate in the care of your patient. The nephrology service remains available to assist with any questions or concerns. Please feel free to reach us by paging the on-call nephrology fellow for urgent issues or as below.     Toñito Hodges M.D.   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, HD TThSat), calciphylaxis of breast  HTN HLD recent admission for UGIB presented to the ED from nursing home with one day history of bloody stools ; pateint on last admisison received 2 units prbcs inpatient scope deferred and patient sent home on protonix hospital course c/b hypotension - in the ED labs notable for Hgb 8.7 bp 99/59 K 4.0 bicarb 25 nephrology consulted        PAST MEDICAL & SURGICAL HISTORY:  HTN (hypertension)      HLD (hyperlipidemia)      CAD (coronary atherosclerotic disease)      ESRD on dialysis  last 11/15/22      H/O ventral hernia      Brown tumor  brain      DVT, lower extremity  left      History of surgery  IVC filter      AVF (arteriovenous fistula)  right left      S/P AIDEN-BSO  2003      History of surgery  RLE PTA stent / atherectomy  7/2021      History of atherectomy  stent            Allergies:  sulfa drugs (Angioedema)  Ancef (Rash; Urticaria)  iodine (Hives; Pruritus)  DDAVP (Hypotension)  penicillin (Swelling)      Home Medications:   acetaminophen     Tablet .. 650 milliGRAM(s) Oral every 6 hours PRN  ascorbic acid 500 milliGRAM(s) Oral daily  atorvastatin 40 milliGRAM(s) Oral at bedtime  cinacalcet 120 milliGRAM(s) Oral every 24 hours  folic acid 1 milliGRAM(s) Oral daily  iron sucrose IVPB 200 milliGRAM(s) IV Intermittent every 24 hours  midodrine 40 milliGRAM(s) Oral every 8 hours  Nephro-deborah 1 Tablet(s) Oral daily  pantoprazole  Injectable 40 milliGRAM(s) IV Push every 12 hours  sevelamer carbonate 1600 milliGRAM(s) Oral three times a day with meals      Hospital Medications:   MEDICATIONS  (STANDING):  ascorbic acid 500 milliGRAM(s) Oral daily  atorvastatin 40 milliGRAM(s) Oral at bedtime  cinacalcet 120 milliGRAM(s) Oral every 24 hours  folic acid 1 milliGRAM(s) Oral daily  iron sucrose IVPB 200 milliGRAM(s) IV Intermittent every 24 hours  midodrine 40 milliGRAM(s) Oral every 8 hours  Nephro-deborah 1 Tablet(s) Oral daily  pantoprazole  Injectable 40 milliGRAM(s) IV Push every 12 hours  sevelamer carbonate 1600 milliGRAM(s) Oral three times a day with meals      SOCIAL HISTORY:  Denies ETOh, Smoking,     Family History:  FAMILY HISTORY:        VITALS:  T(F): 99.3 (05-19-23 @ 12:16), Max: 99.3 (05-19-23 @ 12:16)  HR: 107 (05-19-23 @ 13:48)  BP: 99/59 (05-19-23 @ 15:30)  RR: 18 (05-19-23 @ 13:48)  SpO2: 91% (05-19-23 @ 13:48)  Wt(kg): --      Weight (kg): 79.4 (05-19 @ 11:29)  CAPILLARY BLOOD GLUCOSE          Review of Systems:  all oter ROS negative     PHYSICAL EXAM:  GENERAL: Alert, awake, oriented x3 on NC   CHEST/LUNG: decreased breath sounds at the bases   HEART: Regular rate and rhythm, no murmur, no gallops, no rub   ABDOMEN: Soft, nontender, non distended.  EXTREMITIES: no pedal edema  SKIN: No rash or skin lesion   ACCESS: TDC     LABS:  05-19    138  |  100  |  19  ----------------------------<  70  4.0   |  25  |  3.06<H>    Ca    10.5      19 May 2023 11:32    TPro  5.4<L>  /  Alb  2.3<L>  /  TBili  0.4  /  DBili      /  AST  28  /  ALT  7<L>  /  AlkPhos  134<H>  05-19    Creatinine Trend: 3.06 <--                        8.7    14.33 )-----------( 203      ( 19 May 2023 11:32 )             31.4     Urine Studies:

## 2023-05-19 NOTE — ED ADULT TRIAGE NOTE - CHIEF COMPLAINT QUOTE
BIBEMS from rehab facility reporting bright red blood per rectum since 2pm yesterday. Pt on eliquis, dose held yesterday d/t bleeding. Pt Aox2, unable to provide collateral, co rectal and b/l LE pain.

## 2023-05-19 NOTE — ED ADULT NURSE REASSESSMENT NOTE - NS ED NURSE REASSESS COMMENT FT1
MD Gloria aware of previous K result.  no repeat CMP or BMP was sent on patient, possible miscommunication.  MD Gloria aware of situation.  patient repeat CBC shows hgb 8.7 - dropped to 8.2.  MD Gloria aware, no further orders or instruction at this time. none

## 2023-05-19 NOTE — H&P ADULT - NSHPLABSRESULTS_GEN_ALL_CORE
LABS:                         8.7    14.33 )-----------( 203      ( 19 May 2023 11:32 )             31.4     05-19    138  |  100  |  19  ----------------------------<  70  4.0   |  25  |  3.06<H>    Ca    10.5      19 May 2023 11:32    TPro  5.4<L>  /  Alb  2.3<L>  /  TBili  0.4  /  DBili  x   /  AST  28  /  ALT  7<L>  /  AlkPhos  134<H>  05-19    PT/INR - ( 19 May 2023 11:32 )   PT: 43.1 sec;   INR: 3.58          PTT - ( 19 May 2023 11:32 )  PTT:41.5 sec      Lactate, Blood: 1.2 mmol/L (05-19 @ 12:07)      RADIOLOGY, EKG & ADDITIONAL TESTS: Reviewed.     < from: Xray Chest 1 View- PORTABLE-Urgent (Xray Chest 1 View- PORTABLE-Urgent .) (05.19.23 @ 13:45) >  No acute lung disease.  No areas of airspace consolidation. Slight blunting of right costophrenic   angle; cannot exclude small right pleural effusion.  No pneumothorax.  Tubes and lines are stable.  Tortuous aorta.

## 2023-05-20 NOTE — PROGRESS NOTE ADULT - SUBJECTIVE AND OBJECTIVE BOX
Pt seen and examined at bedside.  Pt resting comfortably, but not participating in exam  Per RN, no significant BM overnight, though stool noted to be brown and non bloody  Unable to obtain ROS    Allergies    sulfa drugs (Angioedema)  Ancef (Rash; Urticaria)  iodine (Hives; Pruritus)  DDAVP (Hypotension)  penicillin (Swelling)    Intolerances        MEDICATIONS:  MEDICATIONS  (STANDING):  ascorbic acid 500 milliGRAM(s) Oral daily  atorvastatin 40 milliGRAM(s) Oral at bedtime  cinacalcet 120 milliGRAM(s) Oral every 24 hours  epoetin александр-epbx (RETACRIT) Injectable 8000 Unit(s) IV Push once  folic acid 1 milliGRAM(s) Oral daily  midodrine 40 milliGRAM(s) Oral every 8 hours  Nephro-edborah 1 Tablet(s) Oral daily  pantoprazole  Injectable 40 milliGRAM(s) IV Push every 12 hours  polyethylene glycol/electrolyte Solution. 4000 milliLiter(s) Oral once  sevelamer carbonate 1600 milliGRAM(s) Oral three times a day with meals    MEDICATIONS  (PRN):  acetaminophen     Tablet .. 650 milliGRAM(s) Oral every 6 hours PRN Temp greater or equal to 38C (100.4F), Mild Pain (1 - 3)      Vital Signs Last 24 Hrs  T(C): 36.9 (20 May 2023 06:00), Max: 37.4 (19 May 2023 12:16)  T(F): 98.5 (20 May 2023 06:00), Max: 99.3 (19 May 2023 12:16)  HR: 98 (20 May 2023 06:00) (98 - 107)  BP: 98/59 (20 May 2023 06:00) (98/59 - 114/65)  BP(mean): 70 (19 May 2023 11:37) (70 - 70)  RR: 17 (20 May 2023 06:00) (17 - 18)  SpO2: 91% (19 May 2023 13:48) (91% - 91%)    Parameters below as of 20 May 2023 06:00  Patient On (Oxygen Delivery Method): nasal cannula  O2 Flow (L/min): 2        PHYSICAL EXAM:    General: No acute distress  Pulm: Normal resp effort  Gastrointestinal: Obese abd, non tender, large ventral hernia  Skin: Warm and dry. No obvious rash    LABS:  CBC Full  -  ( 20 May 2023 11:00 )  WBC Count : 13.38 K/uL  RBC Count : 3.17 M/uL  Hemoglobin : 8.0 g/dL  Hematocrit : 29.1 %  Platelet Count - Automated : 121 K/uL  Mean Cell Volume : 91.8 fl  Mean Cell Hemoglobin : 25.2 pg  Mean Cell Hemoglobin Concentration : 27.5 gm/dL  Auto Neutrophil # : x  Auto Lymphocyte # : x  Auto Monocyte # : x  Auto Eosinophil # : x  Auto Basophil # : x  Auto Neutrophil % : x  Auto Lymphocyte % : x  Auto Monocyte % : x  Auto Eosinophil % : x  Auto Basophil % : x    05-19    138  |  100  |  19  ----------------------------<  70  4.0   |  25  |  3.06<H>    Ca    10.5      19 May 2023 11:32    TPro  5.4<L>  /  Alb  2.3<L>  /  TBili  0.4  /  DBili  x   /  AST  28  /  ALT  7<L>  /  AlkPhos  134<H>  05-19    PT/INR - ( 19 May 2023 11:32 )   PT: 43.1 sec;   INR: 3.58          PTT - ( 19 May 2023 11:32 )  PTT:41.5 sec                  RADIOLOGY & ADDITIONAL STUDIES:    ACC: [id number] EXAM:  XR ABDOMEN 1 VIEW   ORDERED BY: NORMA MALDONADO     PROCEDURE DATE:  05/19/2023          INTERPRETATION:  X-RAY ABDOMEN    HISTORY: KUB assess stool burden.    COMPARISON: Abdominal x-ray dated 5/3/2023.    TECHNIQUE: One view of the abdomen.    FINDINGS:  Since 5/3/2023, there is again large stool burden in the rectal vault.   Nonobstructive bowel gas pattern. Limited assessment for free air due to   supine portable technique. There are a few calcified masses in the right   upper quadrant of the abdomen measuring up to 4.2 cm, possibly represent   calcified arterial aneurysms. 0.6 cm round calcific opacity in the left   upper quadrant unchanged from prior imaging. Possibly this stated stool   splenic flexure region. There are diffuse atherosclerotic calcification   of the abdominal and pelvic vasculature. There are again scattered lytic   lesions throughout the osseous structures of the spine and pelvis. There   may be an impacted left femoral neck fracture There is again a chronic   fracture of the left inferior pubic ramus. There is S-shaped scoliosis   thoracolumbar spine.    IMPRESSION:  Since 5/3/2023, there is again large stool burden in the rectal vault.    --- End of Report ---          ALBERTO JACOME MD; Resident Radiologist  This document has been electronically signed.  JOSE EDWARDS MD; Attending Radiologist  This document has been electronically signed. May 19 2023  6:30PM

## 2023-05-20 NOTE — PROVIDER CONTACT NOTE (CHANGE IN STATUS NOTIFICATION) - SITUATION
Rapid response called due to bp reading being 76/43.Tachycardic of 115. oral temp 98.6. oxygen saturation at 100% on 3L NC. Fingerstick 69.

## 2023-05-20 NOTE — PROGRESS NOTE ADULT - SUBJECTIVE AND OBJECTIVE BOX
CC: GASTRONTESTINAL BLEEDING        INTERVAL HISTORY:  lying in bed in NAD      ROS: No chest pain, no sob, no abd pain. No n/v/d    PAST MEDICAL & SURGICAL HISTORY:  HTN (hypertension)    HLD (hyperlipidemia)    CAD (coronary atherosclerotic disease)    ESRD on dialysis  last 11/15/22    H/O ventral hernia    Brown tumor  brain    DVT, lower extremity  left    Stented coronary artery    History of surgery  IVC filter    AVF (arteriovenous fistula)  right left    S/P AIDEN-BSO  2003    History of surgery  RLE PTA stent / atherectomy  7/2021    History of atherectomy  stent        PHYSICAL EXAM:  T(C): 36.9 (05-20-23 @ 06:00), Max: 37.4 (05-19-23 @ 12:16)  HR: 98 (05-20-23 @ 06:00)  BP: 98/59 (05-20-23 @ 06:00) (98/59 - 114/65)  RR: 17 (05-20-23 @ 06:00)  SpO2: 91% (05-19-23 @ 13:48)  Wt(kg): --  I&O's Summary    Weight 79.4 (05-19 @ 11:29)  General: AAO x 3,  NAD.  HEENT: moist mucous membranes, no pallor/cyanosis.  Neck: no JVD visible.  Cardiac: S1, S2. RRR. No murmurs   Respratory: CTA b/l, no access muscle use.   Abdomen: soft. nontender. nondistended  Skin: no rashes.  Extremities: no LE edema b/l  Access: L IJ      DATA:                        8.2<L>  13.18<H> )-----------( 151      ( 19 May 2023 16:12 )             29.1<L>    Ferritin, Serum: 1857 ng/mL *H* (05-09 @ 11:08)      138    |  100    |  19     ----------------------------<  70     Ca:10.5  (19 May 2023 11:32)  4.0     |  25     |  3.06<H>        TPro  5.4<L>  /  Alb  2.3<L>  /  TBili  0.4    /  DBili  x      /  AST  28     /  ALT  7<L>   /  AlkPhos  134<H>  19 May 2023 11:32                    MEDICATIONS  (STANDING):  ascorbic acid 500 milliGRAM(s) Oral daily  atorvastatin 40 milliGRAM(s) Oral at bedtime  cinacalcet 120 milliGRAM(s) Oral every 24 hours  epoetin александр-epbx (RETACRIT) Injectable 8000 Unit(s) IV Push once  folic acid 1 milliGRAM(s) Oral daily  midodrine 40 milliGRAM(s) Oral every 8 hours  Nephro-deborah 1 Tablet(s) Oral daily  pantoprazole  Injectable 40 milliGRAM(s) IV Push every 12 hours  sevelamer carbonate 1600 milliGRAM(s) Oral three times a day with meals    MEDICATIONS  (PRN):  acetaminophen     Tablet .. 650 milliGRAM(s) Oral every 6 hours PRN Temp greater or equal to 38C (100.4F), Mild Pain (1 - 3)

## 2023-05-20 NOTE — PROVIDER CONTACT NOTE (CHANGE IN STATUS NOTIFICATION) - ACTION/TREATMENT ORDERED:
Patient given dextrose amp 50 IV PUSH. Patient given IV tylenols for right shoulder pain. Patient given two rounds of albumin 25%.IV heplock 20 gauge placed in right lower leg. Patient stepped up to 8 east. Patient on cardiac monitor during rapid response and transferred to rapid response.

## 2023-05-20 NOTE — PROGRESS NOTE ADULT - SUBJECTIVE AND OBJECTIVE BOX
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 Patient was found to be tachycardic w hypotension in ED, resulting in ICU consult however VS stabilized with stable hemoglobin. Patient received anticoagulation reversal and was transferred to [facility]. KUB was administered noting large stool burden in rectal vault w GI consulted for EGD/colonoscopy however determined that melena more likely explained by bleeding associated with stercoral ulcers/hemorrhoids iso severe fecal impaction, rather than acute GIB. The patient received scheduled HD session 5/20 with 0.5L added during session, found to be hypotensive 80/60s, after receiving home midodrine dose (40mg q8), the patient had improvement of /30s. Once returning to floors, the patient was rechecked for vitals w BP 70/30s w complaints of L shoulder pain, however difficult to obtain consistent vitals due to edema on extremities. Rapid response was called, the patient received fluid resuscitation and albumin, deemed appropriate to step up to ICU with potential femoral line placement and stabilization of BP.     SUBJECTIVE: Patient seen and examined at bedside. AAOx1-2. Blood pressures are not measurable, inaccurate. Fellow made aware. Patient with intact mental status. AAOx1-2. Patient and daughter bedside reports she is at or near baseline mental status. Aware of hospital, and got dialysis today. Reports she does not take blood pressure medication at home. Otherwise is reporting headache but no other symptoms. Is denying any new wakness or fatigue. Denies fever, chills, chest pain, palpitations, abdominal pain, nausea, vomiting.      OBJECTIVE:    VITAL SIGNS:  ICU Vital Signs Last 24 Hrs  T(C): 37 (20 May 2023 17:29), Max: 37.1 (19 May 2023 21:28)  T(F): 98.6 (20 May 2023 17:29), Max: 98.8 (19 May 2023 21:28)  HR: 94 (20 May 2023 19:29) (73 - 126)  BP: 85/43 (20 May 2023 17:51) (64/49 - 156/54)  BP(mean): --  ABP: --  ABP(mean): --  RR: 19 (20 May 2023 17:51) (17 - 20)  SpO2: 99% (20 May 2023 19:29) (95% - 100%)    O2 Parameters below as of 20 May 2023 17:51  Patient On (Oxygen Delivery Method): nasal cannula  O2 Flow (L/min): 3            05-20 @ 07:01  -  05-20 @ 19:53  --------------------------------------------------------  IN: 555 mL / OUT: 0 mL / NET: 555 mL      CAPILLARY BLOOD GLUCOSE      POCT Blood Glucose.: 100 mg/dL (20 May 2023 19:02)      PHYSICAL EXAM:  General: large body habitus, AAO1-2. NAD.  Eyes: PERRL, clear conjunctiva  ENT: Dry MM with poor dentition  Respiratory: Decreased BS b/l lung fields; no W/R/R, no retractions. Satting well on 2L NC.   Cardiac: +S1/S2; RRR; no M/R/G; HD catheter over L chest.   Gastrointestinal: soft, obese, NT/ND; no rebound or guarding; +BSx4. Large ventral hernia.   Extremities: Diffuse anasarca, worsened in bilateral upper extremities. Bilateral feet cold with blue appearing toes.  Vascular: 2+ radial pulses B/L  Dermatologic: Bilateral breast wounds covered in dressing with oozing of serous drainage. Dressing over sacrum, large with sero-sanguinous drainage.   Neurologic: AAOx1-2. Moves extremities. +2/5.      MEDICATIONS:  MEDICATIONS  (STANDING):  ascorbic acid 500 milliGRAM(s) Oral daily  atorvastatin 40 milliGRAM(s) Oral at bedtime  cinacalcet 120 milliGRAM(s) Oral every 24 hours  folic acid 1 milliGRAM(s) Oral daily  midodrine 40 milliGRAM(s) Oral every 8 hours  Nephro-deborah 1 Tablet(s) Oral daily  pantoprazole  Injectable 40 milliGRAM(s) IV Push every 12 hours  polyethylene glycol/electrolyte Solution. 4000 milliLiter(s) Oral once  sevelamer carbonate 1600 milliGRAM(s) Oral three times a day with meals    MEDICATIONS  (PRN):  acetaminophen     Tablet .. 650 milliGRAM(s) Oral every 6 hours PRN Temp greater or equal to 38C (100.4F), Mild Pain (1 - 3)      ALLERGIES:  Allergies    sulfa drugs (Angioedema)  Ancef (Rash; Urticaria)  iodine (Hives; Pruritus)  DDAVP (Hypotension)  penicillin (Swelling)    Intolerances        LABS:                        8.7    14.75 )-----------( 153      ( 20 May 2023 16:00 )             31.2     05-20    133<L>  |  100  |  18  ----------------------------<  127<H>  See Note   |  20<L>  |  2.49<H>    Ca    10.0      20 May 2023 18:21  Phos  3.3     05-20  Mg     1.9     05-20    TPro  5.6<L>  /  Alb  1.9<L>  /  TBili  0.5  /  DBili  x   /  AST  See Note  /  ALT  See Note  /  AlkPhos  175<H>  05-20    PT/INR - ( 20 May 2023 16:00 )   PT: 29.8 sec;   INR: 2.48          PTT - ( 20 May 2023 16:00 )  PTT:>200.0 sec      RADIOLOGY & ADDITIONAL TESTS: Reviewed.

## 2023-05-20 NOTE — PROVIDER CONTACT NOTE (CHANGE IN STATUS NOTIFICATION) - BACKGROUND
Patient admitted for GI bleed. Patient went to dialysis today. Patient gained 0.5 L in dialysis as report from  RN  from dialysis.

## 2023-05-20 NOTE — DIETITIAN INITIAL EVALUATION ADULT - OTHER INFO
66 yo F with PMHx ESRD (2/2 PKD, HD TThSat), HFrEF (LVEF 65% in 5/2023), non-ischemic CM, HTN, HLD, PE (s/p IVC filter, 2018), 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 Per GI, would like to reduce stool burden prior to EGD/colonscopy w manual disimpaction and golytely.    Attempted to see pt x2 but remains out of the room for HD. RD to re-attempt full nutrition assessment at f/u prn. Per chart review, pt with fair PO intake pta (from NH) but unknown wt hx. Unable to obtain intake patterns in-house given recent admission. RD to monitor as able and adjust diet order prn. Pt currently receives clear liquid diet order suggesting meeting < 75% EER. Will recommend adding ONS regimen to better meet pt estimated nutritional needs. Per chart review, noted generalized edema 3+; edema 4+ to LR arm. Noted multiple PUs - nephrovite + 500mg ascorbic acid ordered. Skin in bruised; artemio scale 13. FSBG 193, 82, 60; no other labs to review. Phosph binder ordered. No chewing/swallowing difficulty. Endorses fecal incontinence; LBM today 5/20; obese appearance. Pain scale 0. View full recs below. Clinical nutrition services will continue to follow up pt per organizational policy. Please place new consult for any acute nutrition-related issues that may arise prior to follow up

## 2023-05-20 NOTE — DIETITIAN INITIAL EVALUATION ADULT - PERTINENT MEDS FT
MEDICATIONS  (STANDING):  ascorbic acid 500 milliGRAM(s) Oral daily  atorvastatin 40 milliGRAM(s) Oral at bedtime  cinacalcet 120 milliGRAM(s) Oral every 24 hours  epoetin александр-epbx (RETACRIT) Injectable 8000 Unit(s) IV Push once  folic acid 1 milliGRAM(s) Oral daily  midodrine 40 milliGRAM(s) Oral every 8 hours  Nephro-deborah 1 Tablet(s) Oral daily  pantoprazole  Injectable 40 milliGRAM(s) IV Push every 12 hours  polyethylene glycol/electrolyte Solution. 4000 milliLiter(s) Oral once  sevelamer carbonate 1600 milliGRAM(s) Oral three times a day with meals    MEDICATIONS  (PRN):  acetaminophen     Tablet .. 650 milliGRAM(s) Oral every 6 hours PRN Temp greater or equal to 38C (100.4F), Mild Pain (1 - 3)

## 2023-05-20 NOTE — PROVIDER CONTACT NOTE (HYPOGLYCEMIA EVENT) - NS PROVIDER CONTACT BACKGROUND-HYPO
Age: 67y    Gender: Female    POCT Blood Glucose:  193 mg/dL (05-20-23 @ 11:05)  82 mg/dL (05-20-23 @ 09:38)  60 mg/dL (05-20-23 @ 09:01)    Patient given oral orange juice . Patient finger sticked rechecked. Finger stick 82. Patient ordered 10% dextrose bolus. Bolus 10% dextrose given. Patient arousable when spoken too. Patient somnolent at times. Patient tolerated moderate appetite for breakfast.   eMAR:atorvastatin   40 milliGRAM(s) Oral (05-19-23 @ 23:35)    cinacalcet   120 milliGRAM(s) Oral (05-19-23 @ 23:52)

## 2023-05-20 NOTE — DIETITIAN INITIAL EVALUATION ADULT - PROBLEM SELECTOR PLAN 6
Hx of DVT in the L femoral vein complicated by PE. s/p IVC filter and on Eliquis 5mg Q12, last dose taken 5/17.    - Holding home Eliquis 5mg Q12

## 2023-05-20 NOTE — DIETITIAN INITIAL EVALUATION ADULT - PROBLEM SELECTOR PLAN 1
Px to [facility] ED from nursing home due to concern for blood in diaper. In ED, 2-3 bowel movements consistent with melena-appearance. Last Eliquis taken Wednesday 5/17. VS notable for , BP fluctuating 100-110. ICU consulted in ED, deemed no need for telemetry/ICU level of care. Hb 8.7 (at baseline per prior admission). Alternate source of bleed potentially due to chronic wounds over breasts and sacrum however appear clean/serous drainage after cleaned and examined.     - Holding home Eliquis Q12 (last dose 5/17)   - Holding home bowel regimen (senna/miralax/dulcolax)  - Continue Pantoprazole 40mg IVP Q12   - Diet, clear liquid for now  - GI consulted in ED, recommendations appreciated  - Per GI recommendations, ordered US Abd and KUB

## 2023-05-20 NOTE — PATIENT PROFILE ADULT - FALL HARM RISK - FALLEN IN PAST
Hospitalist Discharge Summary     Patient ID:  Jesus Winter  [id number]  89 y.o.  1/18/1930 1/14/2022    PCP on record: Michele Gregorio MD    Admit date: 1/14/2022  Discharge date and time: 1/16/2022    DISCHARGE DIAGNOSIS:    COVID-19 pneumonia  Nausea and vomiting  Elevated troponin-type II MI  Hyponatremia  Hypertension    CONSULTATIONS:  IP CONSULT TO CARDIOLOGY    Excerpted HPI from H&P of Mau Osullivan MD:  Jesus Winter is a 82 y.o.  female past medical history hypertension, chronic back pain who presents with worsening nausea since yesterday without vomiting. She denies any abdominal pain, diarrhea, fever or chills. She also denies any chest pain  In the ED, she was found to be hypertensive, her labs showed normal CBC and mild hyponatremia on the BMP along with elevated troponin. Her EKG showed T wave inversion on V5 and aVL  Her chest x-ray showed no acute pathology. Her rapid COVID test came back positive  CT abdomen pelvis showed  . Bilateral lower lobe pneumonia more likely than atelectasis. Consider  aspiration given the moderate hiatal hernia. Recommend chest views. 2. 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 Small bowel enteritis is  infectious, inflammatory, or early ischemic. No pneumatosis or pneumoperitoneum  at this time. 3. 2.1 cm left breast nonspecific mass. Consider dedicated diagnostic breast  imaging as an outpatient if results would change clinical management. 4. Constipation pattern on CT. No bowel obstruction. 5. 2 cm pancreatic head cystic lesion is of doubtful clinical significance in  this age group.    ______________________________________________________________________  DISCHARGE SUMMARY/HOSPITAL COURSE:  for full details see H&P, daily progress notes, labs, consult notes. COVID-19 positive  Bilateral pneumonia  Nausea  We will admit to telemetry, as needed Zofran, IV fluid.   Procalcitonin <0.05, so discontinue the antibiotics. Patient on room air so no need for the dexamethasone. CT abdomen pelvis showed  . Bilateral lower lobe pneumonia more likely than atelectasis. Consider  aspiration given the moderate hiatal hernia. Recommend chest views. 2. 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 Small bowel enteritis is  infectious, inflammatory, or early ischemic. No pneumatosis or pneumoperitoneum  at this time. 3. 2.1 cm left breast nonspecific mass. Consider dedicated diagnostic breast  imaging as an outpatient if results would change clinical management. 4. Constipation pattern on CT. No bowel obstruction. 5. 2 cm pancreatic head cystic lesion is of doubtful clinical significance in  this age group.     Elevated troponin  No chest pain, troponin elevated. Status post Lovenox 0.5 mg by the ED  Cardiology consulted-recommended medical management, no intervention, as per cardiology not a NSTEMI. Echo ordered.     Hyponatremia, mild  Likely due to use of hydrochlorothiazide. Discontinue the HCTZ on discharge.     Hypertension  Resume home medication-losartan.        _______________________________________________________________________  Patient seen and examined by me on discharge day. Pertinent Findings:  Gen:    Not in distress  Chest: Clear lungs  CVS:   Regular rhythm. No edema  Abd:  Soft, not distended, not tender  Neuro:  Alert,   _______________________________________________________________________  DISCHARGE MEDICATIONS:   Discharge Medication List as of 1/15/2022  4:57 PM      START taking these medications    Details   ascorbic acid, vitamin C, (VITAMIN C) 500 mg tablet Take 1 Tablet by mouth two (2) times a day for 30 days. , Normal, Disp-60 Tablet, R-0         CONTINUE these medications which have NOT CHANGED    Details   traMADol-acetaminophen (ULTRACET) 37.5-325 mg per tablet Take 1 Tab by mouth every eight (8) hours as needed for Pain.  Max Daily Amount: 3 Tabs., PrintDo not take with flexeril or hydrocodone. Disp-30 Tab, R-0      losartan (COZAAR) 50 mg tablet Take 1 Tab by mouth daily. For blood pressure, Normal, Disp-90 Tab, R-2      cyclobenzaprine (FLEXERIL) 5 mg tablet Take 1 Tab by mouth nightly. Indications: MUSCLE SPASM, Normal, Disp-20 Tab, R-1      pravastatin (PRAVACHOL) 20 mg tablet Take 1 tablet by mouth nightly. For cholesterol, Normal, Disp-90 tablet, R-3      aspirin delayed-release 81 mg tablet Take 1 Tab by mouth daily. , Historical Med         STOP taking these medications       hydrochlorothiazide (HYDRODIURIL) 12.5 mg tablet Comments:   Reason for Stopping:         methylPREDNISolone (MEDROL DOSEPACK) 4 mg tablet Comments:   Reason for Stopping:         diclofenac EC (VOLTAREN) 50 mg EC tablet Comments:   Reason for Stopping:         HYDROcodone-acetaminophen (NORCO) 7.5-325 mg per tablet Comments:   Reason for Stopping:                 Patient Follow Up Instructions:    Activity: Activity as tolerated  Diet: Cardiac Diet  Wound Care: None needed    Follow-up Information     Follow up With Specialties Details Why Contact Info    Ramiro Comlenares MD Family Medicine   [address]  [facility] [address]  [facility].  [phone]          ________________________________________________________________    Risk of deterioration: Low    Condition at Discharge:  Stable  __________________________________________________________________    Disposition  Home with family, no needs    ____________________________________________________________________    Code Status: DNR/DNI  ___________________________________________________________________      Total time in minutes spent coordinating this discharge (includes going over instructions, follow-up, prescriptions, and preparing report for sign off to her PCP) :  >30 minutes    Signed:  Jo Ann Joiner MD No

## 2023-05-20 NOTE — PROGRESS NOTE ADULT - ASSESSMENT
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     GI was consulted with concern for GIB with maroon colored stool.    Pt remains hemodynamically stable. Brown stool again reported overnight, with intermittently described episodes of blood in stool.  Hgb remains stable.  KUB noting large stool in rectal vault in pt with known severe constipation     Presentation can likely be explained by bleeding associated with stercoral ulcers/ hemorrhoids given severe fecal impaction, rather than acute GIB (ie. actively bleeding lesion in lower GI tract). I suspect there is episodic oozing from rectum which explains intermittent BRBPR with constipation and stability of Hgb.  The primary treatment for this is aggressive bowel regimen.    Recommendations  - Continue to trend Hgb. CBC, CMP, INR should be checked daily  - Agree with IV PPI BID for now  - Please continue to give golytely prep as laxative.  - Regular manual disimpaction is strongly advised  - Caution with enemas given concern for stercoral ulcers  - Daily KUB to trend response given difficult exam  - Pending abdominal US with doppler.  Further eval of elevated INR per primary  - F/u wound care for sacral ulcers  - Transfuse as indicated; maintain active T&S.  Resusc per primary  - Advance diet as tolerated given low suspicion for active GIB with Hgb stability  - Avoid opioids  - Consider non urgent EGD/Colonoscopy during this hospitalization however pt will need prolonged bowel prep    Clive Rizzo,   Gastroenterology Fellow  Pager: [phone]  After 5PM or on weekends, please contact [place]  for fellow on call

## 2023-05-20 NOTE — DIETITIAN INITIAL EVALUATION ADULT - PROBLEM SELECTOR PLAN 5
Hx of hypotension, on midodrine 40mg Q8 started on prior admissions. On current admission, sBP ranging 100-110s. Difficult to measure BP due to body habitus and diffuse anasarca but mentating at baseline and otherwise stable. ICU consulted in ED, deemed no need for telemetry/ICU at this time.     - Continue home midodrine 40mg Q8

## 2023-05-20 NOTE — PROGRESS NOTE ADULT - SUBJECTIVE AND OBJECTIVE BOX
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 Patient was found to be tachycardic w hypotension in ED, resulting in ICU consult however VS stabilized with stable hemoglobin. Patient received anticoagulation reversal and was transferred to [facility]. KUB was administered noting large stool burden in rectal vault w GI consulted for EGD/colonoscopy however determined that melena more likely explained by bleeding associated with stercoral ulcers/hemorrhoids iso severe fecal impaction, rather than acute GIB. The patient received scheduled HD session 5/20 with 0.5L added during session, found to be hypotensive 80/60s, after receiving home midodrine dose (40mg q8), the patient had improvement of /30s. Once returning to floors, the patient was rechecked for vitals w BP 70/30s w complaints of L shoulder pain, however difficult to obtain consistent vitals due to edema on extremities. Rapid response was called, the patient received fluid resuscitation and albumin, deemed appropriate to step up to ICU with potential femoral line placement and stabilization of BP.           T(C): 36.4 (05-20-23 @ 16:20), Max: 37.1 (05-19-23 @ 21:28)  HR: 110 (05-20-23 @ 16:20) (73 - 110)  BP: 99/70 (05-20-23 @ 16:20) (98/59 - 156/54)  RR: 18 (05-20-23 @ 16:20) (17 - 19)  SpO2: 100% (05-20-23 @ 16:20) (100% - 100%)  Wt(kg): --Vital Signs Last 24 Hrs  T(C): 36.4 (20 May 2023 16:20), Max: 37.1 (19 May 2023 21:28)  T(F): 97.5 (20 May 2023 16:20), Max: 98.8 (19 May 2023 21:28)  HR: 110 (20 May 2023 16:20) (73 - 110)  BP: 99/70 (20 May 2023 16:20) (98/59 - 156/54)  BP(mean): --  RR: 18 (20 May 2023 16:20) (17 - 19)  SpO2: 100% (20 May 2023 16:20) (100% - 100%)    Parameters below as of 20 May 2023 16:20  Patient On (Oxygen Delivery Method): nasal cannula  O2 Flow (L/min): 2      PHYSICAL EXAM:  GENERAL: NAD; well-groomed; well-developed; AAO x 3; good concentration   Neuro: CN2-12 grossly intact; speech clear; +2/4 DTR in UE and LE b/l; light touch sensation intact of UE and le b/l;  negative Romberg test; no pronator drift; intact tandem gait; intact heel and toe walking; intact finger to nose testing; intact rapid alternating movements  HEENT: NC/AT; MMM; neck supple; good dentition; no nystagmus; no scleral icterus; nasal passages clear; no throid or LN enlargement  Heart: RRR; +s1 and s2; no MRG (or grade 2 _ murmur appreciated at _ ICS); non displaced PMI; no JVD  Lungs: CTA b/l; normal effort; no accesory muscle use; symmetric inhalation and exhalation; vesicular breath sounds; no WWR; normal tactile fremitus; persussion resonant  GI: nondistended; nontender; normal bowel sounds [id number]; percussion typmanic; no organomegaly   Extremities: +2 pulses in UE and LE b/l; no clubbing, cyanosis, or edema b/l, capillary refill <2 sec b/l  Skin: skin dry and warm; no skin tenting; no rashes or lesions  MSK: normal tone; +5/5 strength in upper and lower extremities b/l    Consultant(s) Notes Reviewed:  [x ] YES  [ ] NO  Care Discussed with Consultants/Other Providers [ x] YES  [ ] NO    LABS:                        8.7    14.75 )-----------( 153      ( 20 May 2023 16:00 )             31.2     05-20    133<L>  |  100  |  18  ----------------------------<  127<H>  See Note   |  20<L>  |  2.49<H>    Ca    10.0      20 May 2023 18:21  Phos  3.3     05-20  Mg     1.9     05-20    TPro  5.6<L>  /  Alb  1.9<L>  /  TBili  0.5  /  DBili  x   /  AST  See Note  /  ALT  See Note  /  AlkPhos  175<H>  05-20    Iron 24, TIBC 68, %Sat 35, Ferritin 1938/ 05-20-23 @ 16:00    PT/INR - ( 20 May 2023 16:00 )   PT: 29.8 sec;   INR: 2.48          PTT - ( 20 May 2023 16:00 )  PTT:>200.0 sec    CAPILLARY BLOOD GLUCOSE      POCT Blood Glucose.: 118 mg/dL (20 May 2023 17:45)  POCT Blood Glucose.: 69 mg/dL (20 May 2023 17:36)  POCT Blood Glucose.: 193 mg/dL (20 May 2023 11:05)  POCT Blood Glucose.: 82 mg/dL (20 May 2023 09:38)  POCT Blood Glucose.: 60 mg/dL (20 May 2023 09:01)            RADIOLOGY & ADDITIONAL TESTS:    Imaging Personally Reviewed:  [ ] YES  [ ] NO   SALOMON MUNSON  67y  Female    -----TRANSFER FROM [facility] TO ICU ------    Hospital Course:     Patient is a 66 yo F with PMHx ESRD (2/2 PKD, HD TThSat), HFpEF (EF 65-70% most recently), nonischemic cardiomyopathy, 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 Patient was found to be tachycardic w hypotension in ED, resulting in ICU consult however VS stabilized with stable hemoglobin. Patient received anticoagulation reversal and was transferred to [facility]. KUB was administered noting large stool burden in rectal vault w GI consulted for EGD/colonoscopy however determined that melena more likely explained by bleeding associated with stercoral ulcers/hemorrhoids iso severe fecal impaction, rather than acute GIB. The patient received scheduled HD session 5/20 with 0.5L added during session, found to be hypotensive 80/60s, after receiving home midodrine dose (40mg q8), the patient had improvement of /30s. Once returning to floors, the patient was rechecked for vitals w BP 70/30s w complaints of L shoulder pain, however difficult to obtain consistent vitals due to edema on extremities. Rapid response was called, the patient received fluid resuscitation and albumin, deemed appropriate to step up to ICU with potential femoral line placement and stabilization of BP.     Subjective:   Patient AO-1 unable to examine subjective exam    T(C): 37 (05-20-23 @ 17:29), Max: 37.1 (05-19-23 @ 21:28)  HR: 126 (05-20-23 @ 17:51) (73 - 126)  BP: 85/43 (05-20-23 @ 17:51) (64/49 - 156/54)  RR: 19 (05-20-23 @ 17:51) (17 - 20)  SpO2: 95% (05-20-23 @ 17:51) (95% - 100%)  Wt(kg): --Vital Signs Last 24 Hrs  T(C): 37 (20 May 2023 17:29), Max: 37.1 (19 May 2023 21:28)  T(F): 98.6 (20 May 2023 17:29), Max: 98.8 (19 May 2023 21:28)  HR: 126 (20 May 2023 17:51) (73 - 126)  BP: 85/43 (20 May 2023 17:51) (64/49 - 156/54)  BP(mean): --  RR: 19 (20 May 2023 17:51) (17 - 20)  SpO2: 95% (20 May 2023 17:51) (95% - 100%)    Parameters below as of 20 May 2023 17:51  Patient On (Oxygen Delivery Method): nasal cannula  O2 Flow (L/min): 3      PHYSICAL EXAM:  General: +intermittently agitated w episodes of lethargy   Eyes: PERRL, clear conjunctiva  ENT: Dry MM with poor dentition  Neck: supple; no JVD   Respiratory: CTA B/L; no W/R/R, no retractions. Satting well on 2L NC.   Cardiac: +S1/S2; RRR; no M/R/G; HD catheter over L chest.   Gastrointestinal: soft, obese, NT/ND; no rebound or guarding; +BSx4. Large ventral hernia.   Extremities: Diffuse anasarca, worsened in bilateral upper extremities. Bilateral feet cold with blue appearing toes.  Musculoskeletal: Withdraws extremities to painful stimuli.   Vascular: 2+ radial pulses B/L  Dermatologic: Bilateral breast wounds covered in dressing with oozing of serous drainage. Dressing over sacrum, large with sero-sanguinous drainage.   Neurologic: AAOx1 (self).     Consultant(s) Notes Reviewed:  [x ] YES  [ ] NO  Care Discussed with Consultants/Other Providers [ x] YES  [ ] NO    LABS:                        8.7    14.75 )-----------( 153      ( 20 May 2023 16:00 )             31.2     05-20    133<L>  |  100  |  18  ----------------------------<  127<H>  See Note   |  20<L>  |  2.49<H>    Ca    10.0      20 May 2023 18:21  Phos  3.3     05-20  Mg     1.9     05-20    TPro  5.6<L>  /  Alb  1.9<L>  /  TBili  0.5  /  DBili  x   /  AST  See Note  /  ALT  See Note  /  AlkPhos  175<H>  05-20    Iron 24, TIBC 68, %Sat 35, Ferritin 1938/ 05-20-23 @ 16:00    PT/INR - ( 20 May 2023 16:00 )   PT: 29.8 sec;   INR: 2.48          PTT - ( 20 May 2023 16:00 )  PTT:>200.0 sec    CAPILLARY BLOOD GLUCOSE      POCT Blood Glucose.: 100 mg/dL (20 May 2023 19:02)  POCT Blood Glucose.: 118 mg/dL (20 May 2023 17:45)  POCT Blood Glucose.: 69 mg/dL (20 May 2023 17:36)  POCT Blood Glucose.: 193 mg/dL (20 May 2023 11:05)  POCT Blood Glucose.: 82 mg/dL (20 May 2023 09:38)  POCT Blood Glucose.: 60 mg/dL (20 May 2023 09:01)            RADIOLOGY & ADDITIONAL TESTS:    Imaging Personally Reviewed:  [ ] YES  [ ] NO            T(C): 36.4 (05-20-23 @ 16:20), Max: 37.1 (05-19-23 @ 21:28)  HR: 110 (05-20-23 @ 16:20) (73 - 110)  BP: 99/70 (05-20-23 @ 16:20) (98/59 - 156/54)  RR: 18 (05-20-23 @ 16:20) (17 - 19)  SpO2: 100% (05-20-23 @ 16:20) (100% - 100%)  Wt(kg): --Vital Signs Last 24 Hrs  T(C): 36.4 (20 May 2023 16:20), Max: 37.1 (19 May 2023 21:28)  T(F): 97.5 (20 May 2023 16:20), Max: 98.8 (19 May 2023 21:28)  HR: 110 (20 May 2023 16:20) (73 - 110)  BP: 99/70 (20 May 2023 16:20) (98/59 - 156/54)  BP(mean): --  RR: 18 (20 May 2023 16:20) (17 - 19)  SpO2: 100% (20 May 2023 16:20) (100% - 100%)    Parameters below as of 20 May 2023 16:20  Patient On (Oxygen Delivery Method): nasal cannula  O2 Flow (L/min): 2      PHYSICAL EXAM:  GENERAL: NAD; well-groomed; well-developed; AAO x 3; good concentration   Neuro: CN2-12 grossly intact; speech clear; +2/4 DTR in UE and LE b/l; light touch sensation intact of UE and le b/l;  negative Romberg test; no pronator drift; intact tandem gait; intact heel and toe walking; intact finger to nose testing; intact rapid alternating movements  HEENT: NC/AT; MMM; neck supple; good dentition; no nystagmus; no scleral icterus; nasal passages clear; no throid or LN enlargement  Heart: RRR; +s1 and s2; no MRG (or grade 2 _ murmur appreciated at _ ICS); non displaced PMI; no JVD  Lungs: CTA b/l; normal effort; no accesory muscle use; symmetric inhalation and exhalation; vesicular breath sounds; no WWR; normal tactile fremitus; persussion resonant  GI: nondistended; nontender; normal bowel sounds [id number]; percussion typmanic; no organomegaly   Extremities: +2 pulses in UE and LE b/l; no clubbing, cyanosis, or edema b/l, capillary refill <2 sec b/l  Skin: skin dry and warm; no skin tenting; no rashes or lesions  MSK: normal tone; +5/5 strength in upper and lower extremities b/l    Consultant(s) Notes Reviewed:  [x ] YES  [ ] NO  Care Discussed with Consultants/Other Providers [ x] YES  [ ] NO    LABS:                        8.7    14.75 )-----------( 153      ( 20 May 2023 16:00 )             31.2     05-20    133<L>  |  100  |  18  ----------------------------<  127<H>  See Note   |  20<L>  |  2.49<H>    Ca    10.0      20 May 2023 18:21  Phos  3.3     05-20  Mg     1.9     05-20    TPro  5.6<L>  /  Alb  1.9<L>  /  TBili  0.5  /  DBili  x   /  AST  See Note  /  ALT  See Note  /  AlkPhos  175<H>  05-20    Iron 24, TIBC 68, %Sat 35, Ferritin 1938/ 05-20-23 @ 16:00    PT/INR - ( 20 May 2023 16:00 )   PT: 29.8 sec;   INR: 2.48          PTT - ( 20 May 2023 16:00 )  PTT:>200.0 sec    CAPILLARY BLOOD GLUCOSE      POCT Blood Glucose.: 118 mg/dL (20 May 2023 17:45)  POCT Blood Glucose.: 69 mg/dL (20 May 2023 17:36)  POCT Blood Glucose.: 193 mg/dL (20 May 2023 11:05)  POCT Blood Glucose.: 82 mg/dL (20 May 2023 09:38)  POCT Blood Glucose.: 60 mg/dL (20 May 2023 09:01)            RADIOLOGY & ADDITIONAL TESTS:    Imaging Personally Reviewed:  [ ] YES  [ ] NO

## 2023-05-20 NOTE — PROGRESS NOTE ADULT - ASSESSMENT
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 Subsequently with HD today and drop in pressures, transferred to MICU for line placement and better accuracy of blood pressure control.    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     #HfrEf, non-ischemic cardiomyopathy  - history of HfpEF, normal RV and LV function  - On No HF meds  - C.w statin    Pulmonary  - Patient w no pulmonology history  - On 2 L O2, comfortable  - good airway control, controlling secretions  - Continue to monitor secretions.    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 Subsequently with HD today and drop in pressures, transferred to MICU for line placement and better accuracy of blood pressure control.    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   - Will consider levophed if low pressures w arterial line    #HfrEf, non-ischemic cardiomyopathy  - history of HfpEF, normal RV and LV function  - On No HF meds  - C.w statin    Pulmonary  - Patient w no pulmonology history  - On 2 L O2, comfortable  - good airway control, controlling secretions  - Continue to monitor secretions.    Renal  - ESRD w Dialysis, rapid response during HD session  - C/w HD as tolerated, c/w Renevela 1600 TID    GI  - Came in w GI bleed deemed to be 2/2 to constipation and stercoral ulcers  - C/w PPi IV BID  - C/w golytely prep as laxative  - No enemas  - Daily KUB  - F/u abdominal US w doppler  - Active T and S, Keep HgB >7    ID  - Afebrile w mild leukocytosis  - BCx NGTD  - Consider septic shock if necessitating pressors, and would need repeat work-up  - UA, and CXR if acute change, and differential of septic shock    Heme  - HgB stable, currently, came in at 6.2  - See above for GI    Prophylaxis:  F: No IVF, s/p albumin resuscitation during RR  E: Replete cautiously given ESRD  N: Clear Liquid Diet  DVT: none as previous GIB

## 2023-05-20 NOTE — PROGRESS NOTE ADULT - ASSESSMENT
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

## 2023-05-20 NOTE — DIETITIAN INITIAL EVALUATION ADULT - PROBLEM SELECTOR PLAN 2
Multiple wounds located on the skin, present on admission. Most notable in the bilateral breast area with serous drainage. Sacral ulcers present with sero-sanguinous drainage. Seen by wound care on prior admission on 5/5 with similar appearance of wounds. No exposed bone and do not appear infection.     - Continue home Vitamin C 500mg QD  - Wound care consulted  - Per last wound care recommendations: bilateral gluteal wounds with Triad ointment QD, bilateral breast wound with Vashe cleanser

## 2023-05-20 NOTE — DIETITIAN INITIAL EVALUATION ADULT - ADD RECOMMEND
1. Continue current diet order  > diet progression per team   2. Add recommended ONS regimen  3. Continue daily nephrovite; vitamin C 500mg to promote better wound healing   4. Continue phosph binder per team  5. Monitor chemistry, GI fxn, skin integrity, post HD wts   6. RD to reattempt full nutrition assessment at f/u prn   7. RD to remain available for recs adjustment prn or will follow up pt per organizational policy

## 2023-05-20 NOTE — DIETITIAN INITIAL EVALUATION ADULT - PERTINENT LABORATORY DATA
05-19    138  |  100  |  19  ----------------------------<  70  4.0   |  25  |  3.06<H>    Ca    10.5      19 May 2023 11:32    TPro  5.4<L>  /  Alb  2.3<L>  /  TBili  0.4  /  DBili  x   /  AST  28  /  ALT  7<L>  /  AlkPhos  134<H>  05-19  POCT Blood Glucose.: 193 mg/dL (05-20-23 @ 11:05)  A1C with Estimated Average Glucose Result: 5.3 % (11-18-22 @ 05:23)

## 2023-05-20 NOTE — DIETITIAN INITIAL EVALUATION ADULT - NSFNSPHYEXAMSKINFT_GEN_A_CORE
Pressure Injury 1: sacrum, Stage II  Pressure Injury 2: left breast, Unstageable  Pressure Injury 3: right breast, Unstageable  Pressure Injury 4: right wrist, Stage II  Pressure Injury 5: none, none  Pressure Injury 6: none, none  Pressure Injury 7: none, none  Pressure Injury 8: none, none  Pressure Injury 9: none, none  Pressure Injury 10: none, none  Pressure Injury 11: none, none, Pressure Injury 1: sacrum, Stage II  Pressure Injury 2: Left:, breast, Unstageable  Pressure Injury 3: Right:, breast, Unstageable  Pressure Injury 4: Right:, wrist, Stage III  Pressure Injury 5: none, none  Pressure Injury 6: none, none  Pressure Injury 7: none, none  Pressure Injury 8: none, none  Pressure Injury 9: none, none  Pressure Injury 10: none, none  Pressure Injury 11: none, none

## 2023-05-20 NOTE — DIETITIAN INITIAL EVALUATION ADULT - OTHER CALCULATIONS
217
Actual body weight used to calculate energy needs. Unable to assess IBW; ht not recorded. Adjust for ESRD on HD, CA, hospital course, age  *Fluids per team

## 2023-05-21 NOTE — PROGRESS NOTE ADULT - ASSESSMENT
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 Subsequently with HD today and drop in pressures, transferred to MICU for line placement and better accuracy of blood pressure control.    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   - Will consider levophed if low pressures w arterial line    #HfrEf, non-ischemic cardiomyopathy  - history of HfpEF, normal RV and LV function  - On No HF meds  - C.w statin    Pulmonary  - Patient w no pulmonology history  - On 2 L O2, comfortable  - good airway control, controlling secretions  - Continue to monitor secretions.    Renal  - ESRD w Dialysis, rapid response during HD session  - C/w HD as tolerated, c/w Renevela 1600 TID    GI  - Came in w GI bleed deemed to be 2/2 to constipation and stercoral ulcers  - C/w PPi IV BID  - C/w golytely prep as laxative  - No enemas  - Daily KUB  - F/u abdominal US w doppler  - Active T and S, Keep HgB >7    ID  - Afebrile w mild leukocytosis  - BCx NGTD  - Consider septic shock if necessitating pressors, and would need repeat work-up  - UA, and CXR if acute change, and differential of septic shock    Heme  - HgB stable, currently, came in at 6.2  - See above for GI    Endo  5/21 received d10 bolus for glucose 66, on q4 glucose checks, then started d10 gtt at 30 cc/hr    Prophylaxis:  F: No IVF, s/p albumin resuscitation during RR  E: Replete cautiously given ESRD  N: Clear Liquid Diet  DVT: none as previous GIB

## 2023-05-21 NOTE — PROGRESS NOTE ADULT - ASSESSMENT
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     GI was consulted with concern for GIB with maroon colored stool, though hospital course c/b hypotension post HD without overt GIB    Brown stool again reported overnight, with intermittently described episodes of blood in stool.  Hgb slight downtrend today but overall stable and not suggestive of ongoing GIB  KUB noting large stool in rectal vault in pt with known severe constipation, largely unchanged today    Presentation can likely be explained by bleeding associated with stercoral ulcers/ hemorrhoids given severe fecal impaction, rather than acute GIB (ie. actively bleeding lesion in lower GI tract). I suspect there is episodic oozing from rectum which explains intermittent BRBPR with constipation and stability of Hgb.  The primary treatment for this is aggressive bowel regimen.    Abd doppler done, though without abdominal US.  No PV thrombus/hypertension noted.  However unable to assess parenchyma    Recommendations  - Please continue to give golytely prep as laxative.  - Regular manual disimpaction is strongly advised  - Caution with enemas given concern for stercoral ulcers though trial today is reasonable  - Please obtain abdominal US to assess parenchyma which is not evaluated with doppler alone  - Trend CBC, CMP, INR daily  - Continue IV PPI BID for now  - Daily KUB to trend response given difficult exam  - Transfuse as indicated; maintain active T&S.  Resusc per primary  - Advance diet as tolerated given low suspicion for active GIB with Hgb stability  - Avoid opioids  - Consider non urgent EGD/Colonoscopy during this hospitalization however pt will need prolonged bowel prep    Clive Rizzo,   Gastroenterology Fellow  Pager: [phone]  After 5PM or on weekends, please contact [place]  for fellow on call

## 2023-05-21 NOTE — PROGRESS NOTE ADULT - SUBJECTIVE AND OBJECTIVE BOX
OVERNIGHT EVENTS: K 3.0, gave 20 PO powder (ESRD). MAPs > 65 without pressors. afebrile.     SUBJECTIVE / INTERVAL HPI: Patient seen and examined at bedside.     VITAL SIGNS:  Vital Signs Last 24 Hrs  T(C): 36.4 (21 May 2023 17:49), Max: 36.9 (20 May 2023 21:42)  T(F): 97.5 (21 May 2023 17:49), Max: 98.5 (20 May 2023 21:42)  HR: 105 (21 May 2023 17:00) (78 - 118)  BP: 67/29 (20 May 2023 20:35) (35/20 - 88/46)  BP(mean): 42 (20 May 2023 20:35) (22 - 54)  RR: 19 (21 May 2023 17:00) (16 - 20)  SpO2: 100% (21 May 2023 17:00) (92% - 100%)    Parameters below as of 21 May 2023 17:00  Patient On (Oxygen Delivery Method): room air      I&O's Summary    20 May 2023 07:01  -  21 May 2023 07:00  --------------------------------------------------------  IN: 655 mL / OUT: 0 mL / NET: 655 mL    21 May 2023 07:01  -  21 May 2023 18:00  --------------------------------------------------------  IN: 880 mL / OUT: 0 mL / NET: 880 mL        PHYSICAL EXAM:  General: large body habitus, AAO1-2. NAD.  Eyes: PERRL, clear conjunctiva  ENT: Dry MM with poor dentition  Respiratory: Decreased BS b/l lung fields; no W/R/R, no retractions. Satting well on 2L NC.   Cardiac: +S1/S2; RRR; no M/R/G; HD catheter over L chest.   Gastrointestinal: soft, obese, NT/ND; no rebound or guarding; +BSx4. Large ventral hernia.   Extremities: Diffuse anasarca, worsened in bilateral upper extremities. Bilateral feet cold with blue appearing toes.  Vascular: 2+ radial pulses B/L  Dermatologic: Bilateral breast wounds covered in dressing with oozing of serous drainage. Dressing over sacrum, large with sero-sanguinous drainage.   Neurologic: AAOx1-2. Moves extremities. +2/5.      MEDICATIONS:  MEDICATIONS  (STANDING):  ascorbic acid 500 milliGRAM(s) Oral daily  atorvastatin 40 milliGRAM(s) Oral at bedtime  chlorhexidine 2% Cloths 1 Application(s) Topical <User Schedule>  cinacalcet 120 milliGRAM(s) Oral every 24 hours  dextrose 10%. 1000 milliLiter(s) (30 mL/Hr) IV Continuous <Continuous>  folic acid 1 milliGRAM(s) Oral daily  midodrine 40 milliGRAM(s) Oral every 8 hours  Nephro-deborah 1 Tablet(s) Oral daily  pantoprazole  Injectable 40 milliGRAM(s) IV Push every 12 hours  sevelamer carbonate 1600 milliGRAM(s) Oral three times a day with meals    MEDICATIONS  (PRN):  acetaminophen     Tablet .. 650 milliGRAM(s) Oral every 6 hours PRN Temp greater or equal to 38C (100.4F), Mild Pain (1 - 3)  sodium chloride 0.9% lock flush 10 milliLiter(s) IV Push every 1 hour PRN Pre/post blood products, medications, blood draw, and to maintain line patency      ALLERGIES:  Allergies    sulfa drugs (Angioedema)  Ancef (Rash; Urticaria)  iodine (Hives; Pruritus)  DDAVP (Hypotension)  penicillin (Swelling)    Intolerances        LABS:                        8.3    14.30 )-----------( 142      ( 21 May 2023 15:43 )             29.8     05-21    139  |  102  |  18  ----------------------------<  59<L>  3.4<L>   |  26  |  2.85<H>    Ca    9.5      21 May 2023 04:56  Phos  2.4     05-21  Mg     1.7     05-21    TPro  4.7<L>  /  Alb  2.4<L>  /  TBili  0.4  /  DBili  x   /  AST  10  /  ALT  <5<L>  /  AlkPhos  111  05-21    PT/INR - ( 20 May 2023 16:00 )   PT: 29.8 sec;   INR: 2.48          PTT - ( 20 May 2023 16:00 )  PTT:>200.0 sec    CAPILLARY BLOOD GLUCOSE      POCT Blood Glucose.: 139 mg/dL (21 May 2023 17:13)      RADIOLOGY & ADDITIONAL TESTS: Reviewed.

## 2023-05-21 NOTE — PROGRESS NOTE ADULT - SUBJECTIVE AND OBJECTIVE BOX
CC: GASTRONTESTINAL BLEEDING        INTERVAL HISTORY: lying in bed in NAD      ROS: No chest pain, no sob, no abd pain. No n/v/d    PAST MEDICAL & SURGICAL HISTORY:  HTN (hypertension)    HLD (hyperlipidemia)    CAD (coronary atherosclerotic disease)    ESRD on dialysis  last 11/15/22    H/O ventral hernia    Brown tumor  brain    DVT, lower extremity  left    Stented coronary artery    History of surgery  IVC filter    AVF (arteriovenous fistula)  right left    S/P AIDEN-BSO  2003    History of surgery  RLE PTA stent / atherectomy  7/2021    History of atherectomy  stent        PHYSICAL EXAM:  T(C): 36.4 (05-21-23 @ 05:15), Max: 37.1 (05-20-23 @ 14:20)  HR: 94 (05-21-23 @ 07:00)  BP: 67/29 (05-20-23 @ 20:35) (35/20 - 156/54)  RR: 18 (05-21-23 @ 07:00)  SpO2: 95% (05-21-23 @ 07:00)  Wt(kg): --  I&O's Summary    20 May 2023 07:01  -  21 May 2023 07:00  --------------------------------------------------------  IN: 655 mL / OUT: 0 mL / NET: 655 mL      Weight 79.4 (05-19 @ 11:29)  General: ,  NAD.  HEENT: moist mucous membranes, no pallor/cyanosis.  Neck: no JVD visible.  Cardiac: S1, S2. RRR. No murmurs   Respratory: CTA b/l, no access muscle use.   Abdomen: soft. nontender. nondistended  Skin: no rashes.  Extremities: + LE edema b/l  Access:       DATA:                        7.5<L>  10.47 )-----------( 115<L>    ( 21 May 2023 04:56 )             27.2<L>    Ferritin, Serum: 1938 ng/mL *H* (05-20 @ 16:00)   Iron Total, Serum: 24 ug/dL *L* (05-20 @ 16:00)     139    |  102    |  18     ----------------------------<  59<L>  Ca:9.5   (21 May 2023 04:56)  3.4<L>   |  26     |  2.85<H>        TPro  4.7<L>  /  Alb  2.4<L>  /  TBili  0.4    /  DBili  x      /  AST  10     /  ALT  <5<L>  /  AlkPhos  111    21 May 2023 04:56  Lactate Dehydrogenase, Serum: 171 U/L (05-20 @ 16:00)                    MEDICATIONS  (STANDING):  ascorbic acid 500 milliGRAM(s) Oral daily  atorvastatin 40 milliGRAM(s) Oral at bedtime  chlorhexidine 2% Cloths 1 Application(s) Topical <User Schedule>  cinacalcet 120 milliGRAM(s) Oral every 24 hours  folic acid 1 milliGRAM(s) Oral daily  midodrine 40 milliGRAM(s) Oral every 8 hours  Nephro-deborah 1 Tablet(s) Oral daily  pantoprazole  Injectable 40 milliGRAM(s) IV Push every 12 hours  polyethylene glycol/electrolyte Solution. 4000 milliLiter(s) Oral once  sevelamer carbonate 1600 milliGRAM(s) Oral three times a day with meals    MEDICATIONS  (PRN):  acetaminophen     Tablet .. 650 milliGRAM(s) Oral every 6 hours PRN Temp greater or equal to 38C (100.4F), Mild Pain (1 - 3)  sodium chloride 0.9% lock flush 10 milliLiter(s) IV Push every 1 hour PRN Pre/post blood products, medications, blood draw, and to maintain line patency

## 2023-05-21 NOTE — PROGRESS NOTE ADULT - SUBJECTIVE AND OBJECTIVE BOX
Pt seen and examined at bedside.  Transferred to ICU for hypotension noted after HD, overall net positive  Not participating in conversation this AM, though stable overnight. On midodrine with stable BP  Bedpan with small streak of brown stool without blood.  No significant BM overnight    Allergies    sulfa drugs (Angioedema)  Ancef (Rash; Urticaria)  iodine (Hives; Pruritus)  DDAVP (Hypotension)  penicillin (Swelling)    Intolerances        MEDICATIONS:  MEDICATIONS  (STANDING):  ascorbic acid 500 milliGRAM(s) Oral daily  atorvastatin 40 milliGRAM(s) Oral at bedtime  chlorhexidine 2% Cloths 1 Application(s) Topical <User Schedule>  cinacalcet 120 milliGRAM(s) Oral every 24 hours  folic acid 1 milliGRAM(s) Oral daily  midodrine 40 milliGRAM(s) Oral every 8 hours  Nephro-deborah 1 Tablet(s) Oral daily  pantoprazole  Injectable 40 milliGRAM(s) IV Push every 12 hours  sevelamer carbonate 1600 milliGRAM(s) Oral three times a day with meals    MEDICATIONS  (PRN):  acetaminophen     Tablet .. 650 milliGRAM(s) Oral every 6 hours PRN Temp greater or equal to 38C (100.4F), Mild Pain (1 - 3)  sodium chloride 0.9% lock flush 10 milliLiter(s) IV Push every 1 hour PRN Pre/post blood products, medications, blood draw, and to maintain line patency      Vital Signs Last 24 Hrs  T(C): 36.4 (21 May 2023 09:17), Max: 37.1 (20 May 2023 14:20)  T(F): 97.6 (21 May 2023 09:17), Max: 98.8 (20 May 2023 14:20)  HR: 92 (21 May 2023 11:00) (73 - 126)  BP: 67/29 (20 May 2023 20:35) (35/20 - 156/54)  BP(mean): 42 (20 May 2023 20:35) (22 - 54)  RR: 19 (21 May 2023 11:00) (16 - 20)  SpO2: 94% (21 May 2023 11:00) (93% - 100%)    Parameters below as of 21 May 2023 11:00  Patient On (Oxygen Delivery Method): room air        05-20 @ 07:01  -  05-21 @ 07:00  --------------------------------------------------------  IN: 655 mL / OUT: 0 mL / NET: 655 mL    05-21 @ 07:01  -  05-21 @ 11:56  --------------------------------------------------------  IN: 490 mL / OUT: 0 mL / NET: 490 mL        PHYSICAL EXAM:    General: No acute distress    Cardiac: Regular rate on tele  Pulm: Normal resp effort  Gastrointestinal:Obese abd, large ventral hernia, non tender. No rebound or guarding  Skin: Warm and dry. No obvious rash    LABS:  CBC Full  -  ( 21 May 2023 04:56 )  WBC Count : 10.47 K/uL  RBC Count : 3.03 M/uL  Hemoglobin : 7.5 g/dL  Hematocrit : 27.2 %  Platelet Count - Automated : 115 K/uL  Mean Cell Volume : 89.8 fl  Mean Cell Hemoglobin : 24.8 pg  Mean Cell Hemoglobin Concentration : 27.6 gm/dL  Auto Neutrophil # : 9.65 K/uL  Auto Lymphocyte # : 0.54 K/uL  Auto Monocyte # : 0.09 K/uL  Auto Eosinophil # : 0.00 K/uL  Auto Basophil # : 0.00 K/uL  Auto Neutrophil % : 92.2 %  Auto Lymphocyte % : 5.2 %  Auto Monocyte % : 0.9 %  Auto Eosinophil % : 0.0 %  Auto Basophil % : 0.0 %    05-21    139  |  102  |  18  ----------------------------<  59<L>  3.4<L>   |  26  |  2.85<H>    Ca    9.5      21 May 2023 04:56  Phos  2.4     05-21  Mg     1.7     05-21    TPro  4.7<L>  /  Alb  2.4<L>  /  TBili  0.4  /  DBili  x   /  AST  10  /  ALT  <5<L>  /  AlkPhos  111  05-21    PT/INR - ( 20 May 2023 16:00 )   PT: 29.8 sec;   INR: 2.48          PTT - ( 20 May 2023 16:00 )  PTT:>200.0 sec                  RADIOLOGY & ADDITIONAL STUDIES:

## 2023-05-22 NOTE — ADVANCED PRACTICE NURSE CONSULT - ASSESSMENT
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 Subsequently with HD today and drop in pressures, transferred to MICU for line placement and better accuracy of blood pressure control.    Patient known to me from previous admission. Seen ICU, awake, alertx1. Very sleepy. C/o pain with wound care and any movement.     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 Subsequently with HD today and drop in pressures, transferred to MICU for line placement and better accuracy of blood pressure control.    Patient known to me from previous admission. Seen ICU, awake, alertx1. Very sleepy. C/o pain with wound care and any movement.    Left breast: 7x16cm wound, known calciphylaxis. Wound bed 70% soft lifting black eschar, 30% thin yellow slough and liquifying necrosis.  Area with slough is 3cm deep.  Periwound indurated. Moderate purulent drainage, +odor. Very tender with wound care.     Right breast: 7x10cm wound, known calciphylaxis. Wound bed 75% soft lifting black eschar, 25% yellow slough and liquifying necrosis. Area with slough with 2cm depth. Periwound intact without irritation. Moderate purulent and green drainage with mild pseudomonas odor.     Bilateral upper gluteals: 3 scattered Stage 3 pressure injuries, largest is 1x1cm. Wounds 75% clean pink, 25% yellow slough. Tender with cleansing.    Bilateral heels intact. Heel offloading boots in place.          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 Subsequently with HD today and drop in pressures, transferred to MICU for line placement and better accuracy of blood pressure control.    Patient known to me from previous admission. Seen in ICU, very sleepy. C/o pain with wound care and any movement. Requires 2 person assist for turning. Anuric, incontinent of stool    Left breast: 7x16cm wound, known calciphylaxis. Wound bed 70% soft lifting black eschar, 30% thin yellow slough and liquifying necrosis.  Area with slough is 3cm deep.  Periwound indurated. Moderate purulent drainage, +odor. Very tender with wound care.     Right breast: 7x10cm wound, known calciphylaxis. Wound bed 75% soft lifting black eschar, 25% yellow slough and liquifying necrosis. Area with slough with 2cm depth. Periwound intact without irritation. Moderate purulent and green drainage with mild pseudomonas odor.     Bilateral upper gluteals: 3 scattered Stage 3 pressure injuries, largest is 1x1cm. Wounds 75% clean pink, 25% yellow slough. Tender with cleansing.    Bilateral heels intact. Heel offloading boots in place.

## 2023-05-22 NOTE — PROGRESS NOTE ADULT - ASSESSMENT
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    Assessment/Plan:   #ESRD on HD  #Calciphylaxis  Usual RX time 3:30 hrs, EDW TBD  next HD   K noted   Renal Diet  Give sodium thiosulfate with HD     #Hypotension   on levophed i/s/o  bleed and also known orthostatic hypotension  -midodrine   -maintain MAP?70 for adequate renal perfusion    #access   L TDC    #anemia  Hgb not at goal  -Transfuse to Hgb > 7  Epo w/ HD    #renal bone disease   Ca ~9.8  phos 2.4  trend phos twice weekly to ensure <5.5   sensipar to 120mg daily  would  renvela to 800 TID w/ meals   Trend phos daily         Thank you for the opportunity to participate in the care of your patient. The nephrology service remains available to assist with any questions or concerns. Please feel free to reach us by paging the on-call nephrology fellow for urgent issues or as below.     Saumya Jo D.O  PGY-5, Nephrology Fellow    P: [phone]

## 2023-05-22 NOTE — CONSULT NOTE ADULT - PROBLEM SELECTOR RECOMMENDATION 4
- Patient with numerous Advanced Illness comorbidities.   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   - Given extremely guarded prognosis, palliative care consulted to assist with goals of care discussions.   - ICU team will assist in setting up a family meeting with daughter to discuss Advanced Directives, Goals of care.   - Although patient does not meet hospice criteria, as she remains on HD, code status discussion is integral, especially as this condition progresses.

## 2023-05-22 NOTE — PROGRESS NOTE ADULT - ASSESSMENT
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 Subsequently with HD today and drop in pressures, transferred to MICU for line placement and better accuracy of blood pressure control.    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  - Arterial Line Placement for accurate Bp measurement  - On levophed .05, wean as tolerated  - Midodrine 40mg q 8, home medication  - C/w HD as needed.   - Will consider levophed if low pressures w arterial line    #HfrEf, non-ischemic cardiomyopathy  - history of HfpEF, normal RV and LV function  - On No HF meds  - C.w statin      Pulmonary  - Patient w no pulmonology history  - good airway control, controlling secretions  - Continue to monitor secretions.    Renal  - ESRD w Dialysis, rapid response during HD session  - C/w HD as tolerated, c/w Renevela 1600 TID    GI  - Came in w GI bleed deemed to be 2/2 to constipation and stercoral ulcers  - C/w PPi IV BID  - C/w golytely prep as laxative  - No enemas  - Daily KUB  - F/u abdominal US w doppler, no evidence of portal HTN  - Non urgent EGD this admission as permits  - F/u GI recs  - Active T and S, Keep HgB >7    ID  - Afebrile w mild leukocytosis  - BCx NGTD  - Consider septic shock if necessitating pressors, and would need repeat work-up  - UA, and CXR if acute change, and differential of septic shock    Heme  - HgB stable, currently, came in at 6.2  - See above for GI    Endo  5/21 received d10 bolus for glucose 66, on q4 glucose checks, then started d10 gtt at 30 cc/hr    Prophylaxis:  F: No IVF, s/p albumin resuscitation during RR  E: Replete cautiously given ESRD  N: Clear Liquid Diet  DVT: none as previous GIB 66 yo F with PMHx ESRD (2/2 PKD, HD TThSat), HFpEF (LVEF 65% in 5/2023), non-ischemic CM, HTN, HLD, PE (s/p IVC filter, 2018), 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 Subsequently with HD today and drop in pressures, transferred to MICU for line placement and better accuracy of blood pressure control.    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  - Arterial Line in place  - On levophed .05, wean as tolerated  - Midodrine 40mg q 8, home medication  - C/w HD as needed.   - Florinef .1mg x 1 ordered for today    #HfrEf, non-ischemic cardiomyopathy  - history of HfpEF, normal RV and LV function  - On No HF meds  - C.w statin      Pulmonary  - Patient w no pulmonology history  - good airway control, controlling secretions  - Continue to monitor secretions.    Renal  - ESRD w Dialysis, rapid response during HD session  - C/w HD as tolerated, c/w Renevela 1600 TID    GI  - Came in w GI bleed deemed to be 2/2 to constipation and stercoral ulcers  - C/w PPi IV BID  - C/w golytely prep as laxative  - Tap water enema ordered for today  - Daily KUB  - F/u abdominal US w doppler, no evidence of portal HTN  - Non urgent EGD this admission as permits  - F/u GI recs  - Active T and S, Keep HgB >7    ID  - Afebrile w mild leukocytosis  - BCx NGTD  - Consider septic shock if necessitating pressors, and would need repeat work-up  - UA, and CXR if acute change, and differential of septic shock    Heme  - HgB stable, currently, came in at 6.2  - See above for GI    Endo  5/21 received d10 bolus for glucose 66, on q4 glucose checks, then started d10 gtt at 30 cc/hr  - C/w gtt    Prophylaxis:  F: D10 gtt 30cc/hr  E: Replete cautiously given ESRD  N: Soft and Bite sized (renal diet)  DVT: none as previous GIB

## 2023-05-22 NOTE — PROGRESS NOTE ADULT - SUBJECTIVE AND OBJECTIVE BOX
Patient is a 67y Female seen and evaluated at bedside. no acute events overnight remains on low dose levophed for pressor support no bloody BM hgb 8.2 K 3.4 bicarb 24 Na 134       Meds:    acetaminophen     Tablet .. 650 every 6 hours PRN  ascorbic acid 500 daily  atorvastatin 40 at bedtime  chlorhexidine 2% Cloths 1 <User Schedule>  cinacalcet 120 every 24 hours  dextrose 10%. 1000 <Continuous>  folic acid 1 daily  midodrine 40 every 8 hours  Nephro-deborah 1 daily  norepinephrine Infusion 0.05 <Continuous>  pantoprazole  Injectable 40 every 12 hours  sevelamer carbonate 1600 three times a day with meals  sodium chloride 0.9% lock flush 10 every 1 hour PRN      T(C): , Max: 36.4 (05-21-23 @ 14:35)  T(F): , Max: 97.6 (05-21-23 @ 14:35)  HR: 78 (05-22-23 @ 11:00)  BP: --  BP(mean): --  RR: 14 (05-22-23 @ 11:00)  SpO2: 98% (05-22-23 @ 11:00)  Wt(kg): --    05-21 @ 07:01  -  05-22 @ 07:00  --------------------------------------------------------  IN: 1422.6 mL / OUT: 0 mL / NET: 1422.6 mL    05-22 @ 07:01  -  05-22 @ 11:19  --------------------------------------------------------  IN: 490.4 mL / OUT: 0 mL / NET: 490.4 mL          Review of Systems:  all other ROS negative       PHYSICAL EXAM:  GENERAL: NAD resting in bed  CHEST/LUNG: decreased breath sounds at the bases; no accessory muscle use   HEART: normal S1S2, RRR  ABDOMEN: Soft, Nontender, +BS,   EXTREMITIES: + edema   ACCESS: TDC       LABS:                        8.2    12.11 )-----------( 125      ( 22 May 2023 04:47 )             28.8     05-22    134<L>  |  98  |  22  ----------------------------<  81  3.4<L>   |  24  |  3.30<H>    Ca    9.8      22 May 2023 04:47  Phos  2.4     05-22  Mg     2.0     05-22    TPro  5.0<L>  /  Alb  2.4<L>  /  TBili  0.4  /  DBili  x   /  AST  10  /  ALT  5<L>  /  AlkPhos  126<H>  05-22      PT/INR - ( 20 May 2023 16:00 )   PT: 29.8 sec;   INR: 2.48          PTT - ( 20 May 2023 16:00 )  PTT:>200.0 sec          RADIOLOGY & ADDITIONAL STUDIES:

## 2023-05-22 NOTE — CHART NOTE - NSCHARTNOTEFT_GEN_A_CORE
Pt's daughter/HCP (Jasmina) called and case discussed at length.    She was told that Ms. Johansen was sent to the hospital because she was refusing lab draws at her nursing facility. She was also told there was some old blood in her stool.      I explained that the pt's Hgb have been stable and we have not seen any obvious signs of a significant GIB, but feel that the patient has a severe fecal impaction causing scant rectal bleeding, either from stercoral colitis or hemorrhoids.  Jasmina has been aware that the pt has had severe constipation and expressed concerns that the nursing facility is not aggressively treating it.    We discussed the role of EGD/Colonoscopy for the evaluation of NIKHIL, however Jasmina clearly expressed that she wanted to avoid sedation and that she doesn't believe she will tolerate the procedure well which we agree with.  She also expressed that the patient would not want these procedures done either.    At this point, Jasmina expressed that her primary concerns are centered upon relieving severe fecal impaction, pain control, wound control and her diet.      Recommendations:  -In absence of active GIB and stability of Hgb, no plan for EGD/Colonoscopy based on GOC discussion with pt's HCP  -Continue aggressive bowel regimen. Golytely along with cautious use of enemas and manual disimpactions is advised  -Can monitor progress with daily KUB given difficult abd exam  -Once relieved, would benefit from miralax daily along with dulcolax 10 mg daily, titrated to soft stool  -Palliative care recommendations are appreciated with respect to patient centered decision making in light of multiple active and complex medical issues  -ICU team care appreciated    Gastroenterology service will sign off  Case discussed with attending physician  Please recall as needed with any GI related questions or with obvious GIB    Thank you for this consult.     Clive Rizzo,   Gastroenterology Fellow  Pager: [phone]  After 5PM or on weekends, please contact Cassidy Hill  for fellow on call

## 2023-05-22 NOTE — CONSULT NOTE ADULT - PROBLEM SELECTOR RECOMMENDATION 9
- Patient on long standing HD.  - Patient transitioned to ICU for blood pressure monitoring, especially with concern over GIB.  - Currently on Midodrine and Levophed.

## 2023-05-22 NOTE — PROGRESS NOTE ADULT - ASSESSMENT
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     GI was consulted with concern for GIB with maroon colored stool, though hospital course c/b hypotension post HD without overt GIB    Brown stool again noted this AM on bedside exam.  Melena as reported by RN was based on smell rather than appearance.  Blood streaks on wipes along with stable Hgb continues to suggest etiology is 2/2 rectal lesion (ie hemorrhoids or stercoral ulcerations) given severe fecal impaction, rather than acute GIB (ie. actively bleeding lesion in lower GI tract).    KUB this AM looks grossly unchanged and minimal relief noted with golytely.  The primary treatment remains aggressive bowel regimen.    Abd doppler done, though without abdominal US.  No PV thrombus/hypertension noted.  However unable to assess parenchyma    Recommendations  - Add dulcolax 10 mg PO daily  - Please continue to give golytely prep as laxative.  - Regular manual disimpaction is strongly advised  - Caution with enemas given concern for stercoral ulcers though use at this point remains reasonable  - Please obtain abdominal US to assess parenchyma which is not evaluated with doppler alone  - Trend CBC, CMP, INR daily  - Continue IV PPI BID for now  - Daily KUB to trend response given difficult exam  - Transfuse as indicated; maintain active T&S.  Resusc per primary  - Advance diet as tolerated given low suspicion for active GIB with Hgb stability  - Avoid opioids  - Consider non urgent EGD/Colonoscopy for eval of NIKHIL during this hospitalization however pt will need prolonged bowel prep    Clive Rizzo DO  Gastroenterology Fellow  Pager: [phone]  After 5PM or on weekends, please contact [place]  for fellow on call

## 2023-05-22 NOTE — PROGRESS NOTE ADULT - SUBJECTIVE AND OBJECTIVE BOX
OVERNIGHT EVENTS: patient with MAPS in 50s, so levophed was initiated at .05.     SUBJECTIVE / INTERVAL HPI: Patient seen and examined at bedside. As per RN patient with brown bowel movement with small streaks of blood. Otherwise, GI caame by this AM and reported no blood in stool. Patient reports she is feeling well but otherwise is reporting pain in heel and believes an ulcer is forming. Otherwise, has not had any issues with breathing. Is AAOx1. Denies any other complaints. Denies fever, chills, c/p, palpitations, abdominal pain, nausea, vomiting, diarrhea, dysuria, hematuria.    ICU Vital Signs Last 24 Hrs  T(C): 36.4 (22 May 2023 05:34), Max: 36.4 (21 May 2023 09:17)  T(F): 97.5 (22 May 2023 05:34), Max: 97.6 (21 May 2023 09:17)  HR: 74 (22 May 2023 08:00) (72 - 118)  BP: --  BP(mean): --  ABP: 103/44 (22 May 2023 08:00) (84/45 - 116/66)  ABP(mean): 67 (22 May 2023 08:00) (57 - 85)  RR: 16 (22 May 2023 08:00) (16 - 20)  SpO2: 98% (22 May 2023 08:00) (92% - 100%)    O2 Parameters below as of 22 May 2023 08:00  Patient On (Oxygen Delivery Method): room air      PHYSICAL EXAM:  General: large body habitus, AAO1-2. NAD.  Eyes: PERRL, clear conjunctiva  ENT: Dry MM with poor dentition  Respiratory: Decreased BS b/l lung fields; no W/R/R  Cardiac: +S1/S2; RRR; no M/R/G; HD catheter over L chest.   Gastrointestinal: soft, obese, NT/ND; no rebound or guarding; +BSx4. Large ventral hernia.   Extremities: Diffuse anasarca, worsened in bilateral upper extremities. No blisters on heels or toes.  Vascular: 2+ radial pulses B/L  Dermatologic: Bilateral breast wounds covered in dressing with oozing of serous drainage.  Neurologic: AAOx1-2. Moves extremities. +2/5.      MEDICATIONS:  MEDICATIONS  (STANDING):  ascorbic acid 500 milliGRAM(s) Oral daily  atorvastatin 40 milliGRAM(s) Oral at bedtime  chlorhexidine 2% Cloths 1 Application(s) Topical <User Schedule>  cinacalcet 120 milliGRAM(s) Oral every 24 hours  dextrose 10%. 1000 milliLiter(s) (30 mL/Hr) IV Continuous <Continuous>  folic acid 1 milliGRAM(s) Oral daily  midodrine 40 milliGRAM(s) Oral every 8 hours  Nephro-deborah 1 Tablet(s) Oral daily  pantoprazole  Injectable 40 milliGRAM(s) IV Push every 12 hours  sevelamer carbonate 1600 milliGRAM(s) Oral three times a day with meals    MEDICATIONS  (PRN):  acetaminophen     Tablet .. 650 milliGRAM(s) Oral every 6 hours PRN Temp greater or equal to 38C (100.4F), Mild Pain (1 - 3)  sodium chloride 0.9% lock flush 10 milliLiter(s) IV Push every 1 hour PRN Pre/post blood products, medications, blood draw, and to maintain line patency      ALLERGIES:  Allergies    sulfa drugs (Angioedema)  Ancef (Rash; Urticaria)  iodine (Hives; Pruritus)  DDAVP (Hypotension)  penicillin (Swelling)    Intolerances        LABS:                         8.2    12.11 )-----------( 125      ( 22 May 2023 04:47 )             28.8     05-22    134<L>  |  98  |  22  ----------------------------<  81  3.4<L>   |  24  |  3.30<H>    Ca    9.8      22 May 2023 04:47  Phos  2.4     05-22  Mg     2.0     05-22    TPro  5.0<L>  /  Alb  2.4<L>  /  TBili  0.4  /  DBili  x   /  AST  10  /  ALT  5<L>  /  AlkPhos  126<H>  05-22    PT/INR - ( 20 May 2023 16:00 )   PT: 29.8 sec;   INR: 2.48          PTT - ( 20 May 2023 16:00 )  PTT:>200.0 sec              RADIOLOGY, EKG & ADDITIONAL TESTS:      PTT - ( 20 May 2023 16:00 )  PTT:>200.0 sec    CAPILLARY BLOOD GLUCOSE      POCT Blood Glucose.: 139 mg/dL (21 May 2023 17:13)      RADIOLOGY & ADDITIONAL TESTS: Reviewed.

## 2023-05-22 NOTE — CONSULT NOTE ADULT - PROBLEM SELECTOR RECOMMENDATION 2
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   - In the past, pain has been a major symptom, requiring IV Dilaudid.   - Currently, reports minimal pain.  - Supportive care.  - Given multiple comorbidities, prognosis is quite guarded.

## 2023-05-22 NOTE — PROGRESS NOTE ADULT - SUBJECTIVE AND OBJECTIVE BOX
Pt seen and examined at bedside.  Pt denies abd pain  On levophed for hypotension  1 small BM noted this AM per RN. No obvious bleeding.    Allergies    sulfa drugs (Angioedema)  Ancef (Rash; Urticaria)  iodine (Hives; Pruritus)  DDAVP (Hypotension)  penicillin (Swelling)    Intolerances        MEDICATIONS:  MEDICATIONS  (STANDING):  ascorbic acid 500 milliGRAM(s) Oral daily  atorvastatin 40 milliGRAM(s) Oral at bedtime  chlorhexidine 2% Cloths 1 Application(s) Topical <User Schedule>  cinacalcet 120 milliGRAM(s) Oral every 24 hours  dextrose 10%. 1000 milliLiter(s) (30 mL/Hr) IV Continuous <Continuous>  folic acid 1 milliGRAM(s) Oral daily  midodrine 40 milliGRAM(s) Oral every 8 hours  Nephro-deborah 1 Tablet(s) Oral daily  norepinephrine Infusion 0.05 MICROgram(s)/kG/Min (7.44 mL/Hr) IV Continuous <Continuous>  pantoprazole  Injectable 40 milliGRAM(s) IV Push every 12 hours  sevelamer carbonate 1600 milliGRAM(s) Oral three times a day with meals    MEDICATIONS  (PRN):  acetaminophen     Tablet .. 650 milliGRAM(s) Oral every 6 hours PRN Temp greater or equal to 38C (100.4F), Mild Pain (1 - 3)  sodium chloride 0.9% lock flush 10 milliLiter(s) IV Push every 1 hour PRN Pre/post blood products, medications, blood draw, and to maintain line patency      Vital Signs Last 24 Hrs  T(C): 36.4 (22 May 2023 05:34), Max: 36.4 (21 May 2023 09:17)  T(F): 97.5 (22 May 2023 05:34), Max: 97.6 (21 May 2023 09:17)  HR: 74 (22 May 2023 08:00) (72 - 118)  BP: --  BP(mean): --  RR: 16 (22 May 2023 08:00) (16 - 20)  SpO2: 98% (22 May 2023 08:00) (92% - 100%)    Parameters below as of 22 May 2023 08:00  Patient On (Oxygen Delivery Method): room air        05-21 @ 07:01  -  05-22 @ 07:00  --------------------------------------------------------  IN: 1422.6 mL / OUT: 0 mL / NET: 1422.6 mL    05-22 @ 07:01  -  05-22 @ 08:25  --------------------------------------------------------  IN: 70.4 mL / OUT: 0 mL / NET: 70.4 mL        PHYSICAL EXAM:    General: No acute distress  HEENT: MMM, conjunctiva and sclera clear  Cardiac: Regular rate on tele  Pulm: Normal resp effort  Gastrointestinal: Soft, obese abd, large ventral hernia. non-tender No rebound or guarding  Skin: Warm and dry. No obvious rash  Brown stool on charles with red streaks on wipes    LABS:  CBC Full  -  ( 22 May 2023 04:47 )  WBC Count : 12.11 K/uL  RBC Count : 3.24 M/uL  Hemoglobin : 8.2 g/dL  Hematocrit : 28.8 %  Platelet Count - Automated : 125 K/uL  Mean Cell Volume : 88.9 fl  Mean Cell Hemoglobin : 25.3 pg  Mean Cell Hemoglobin Concentration : 28.5 gm/dL  Auto Neutrophil # : 10.29 K/uL  Auto Lymphocyte # : 1.17 K/uL  Auto Monocyte # : 0.51 K/uL  Auto Eosinophil # : 0.03 K/uL  Auto Basophil # : 0.03 K/uL  Auto Neutrophil % : 85.0 %  Auto Lymphocyte % : 9.7 %  Auto Monocyte % : 4.2 %  Auto Eosinophil % : 0.2 %  Auto Basophil % : 0.2 %    05-22    134<L>  |  98  |  22  ----------------------------<  81  3.4<L>   |  24  |  3.30<H>    Ca    9.8      22 May 2023 04:47  Phos  2.4     05-22  Mg     2.0     05-22    TPro  5.0<L>  /  Alb  2.4<L>  /  TBili  0.4  /  DBili  x   /  AST  10  /  ALT  5<L>  /  AlkPhos  126<H>  05-22    PT/INR - ( 20 May 2023 16:00 )   PT: 29.8 sec;   INR: 2.48          PTT - ( 20 May 2023 16:00 )  PTT:>200.0 sec                  RADIOLOGY & ADDITIONAL STUDIES:

## 2023-05-22 NOTE — ADVANCED PRACTICE NURSE CONSULT - RECOMMEDATIONS
Bilateral breast: recommend BID quarter strength Dakins, cover with ABD pad, secure with tape. Depending on goals of care, consider surgical consult for debridement.   Bilateral upper gluteals: cleanse with bath wipes. Apply Triad ointment, cover with Foam dressing. Change eveyr other day and prn soiled.    Recommend continue use of [employer] MIGUEL mattress for pressure redistribution and microclimate management  Continue frequent repositioning for offloading with air fluidized repositioner and/or pillows  Offload heels at all times with pillows or heel boots  Support nutrition and hydration    Discussed with primary RN and Dr Rodriguez    Please reconsult if the wound deteriorates or new concerns develop. Bilateral breast: recommend BID quarter strength Dakins, cover with ABD pad, secure with tape. Depending on goals of care, consider surgical consult for debridement.   Bilateral upper gluteals: cleanse with bath wipes. Apply Triad ointment, cover with Foam dressing. Change eveyr other day and prn soiled.    Recommend continue use of [employer] MIGUEL mattress for pressure redistribution and microclimate management  Continue frequent repositioning for offloading with air fluidized repositioner and/or pillows  Offload heels at all times with pillows or heel boots  Support nutrition and hydration    Discussed with primary RN and Dr Callaway    Please reconsult if the wound deteriorates or new concerns develop.

## 2023-05-22 NOTE — PROGRESS NOTE ADULT - PROVIDER SPECIALTY LIST ADULT
Date of Service: 9/5/2018    BRIEF HISTORY OF PRESENT ILLNESS:  The patient is a 46 year old woman who had numbness and tingling in left hand. She was found to have left carpal tunnel syndrome. She had failed conservative management and was brought to the operating room today for left endoscopic carpal tunnel release.    PREOPERATIVE DIAGNOSIS:  left carpal tunnel syndrome.    POSTOPERATIVE DIAGNOSIS:  left carpal tunnel syndrome.     PROCEDURE PERFORMED:  left endoscopic carpal tunnel release.    SURGEON:  Sohail Jacinto MD.    ANESTHESIA:  MAC sedation and local.    COMPLICATIONS:  None.    TOURNIQUET TIME:  6 minutes.    DESCRIPTION OF PROCEDURE:  The patient was brought to the operating room, placed in the supine position on the operating table. A timeout was taken to verify patient, site and procedure. The patient was given MAC sedation and local anesthesia as well as preoperative antibiotics.     There were 2 areas that were anesthetized in the patient's left upper extremity. The first area was near the insertion of the palmaris longus along the distal palmar crease of the wrist. The second was in the palmar aspect of the hand in line with the ring finger near the proximal palmar crease.    After adequate anesthesia, the arm was exsanguinated and the tourniquet was insufflated. I made a transverse incision over the palmar aspect of the patient's left wrist. Subcutaneous tissues were divided in line with the incision. I then split the antebrachial fascia. An instrument was placed directly underneath the antebrachial fascia and I then was able to palpate the transverse carpal ligament. The cannula and slotted guide were then placed through the proximal incision underneath the transverse carpal ligament and then in line with the ring finger. A separate incision was made in the palmar aspect of the patient's hand and the trocar and slotted guide were then introduced back out of the skin. The trocar was removed  Nephrology from the cannula and the endoscope was introduced.    With the endoscope in place,  I was able to directly visualize the transverse carpal ligament. This was palpated with a probe and a rasp was used to remove tenosynovium from the undersurface of the transverse carpal ligament. I then introduced the blade and under direct visualization with the endoscope I cut the transverse carpal ligament. The incision was begun in the antebrachial fascia and then carried through the transverse carpal ligament until retraction of the transverse carpal ligament was visualized. Instrumentation was then removed from the wrist.     I irrigated the wrist with normal saline and closed the wounds with 4-0 nylon. The patient was placed into a soft dressing. The tourniquet was let down. The fingers pinked up immediately. The patient was then awakened from anesthesia and then taken to the recovery room without complication.

## 2023-05-22 NOTE — CONSULT NOTE ADULT - ASSESSMENT
67 F, well known to Palliative Care service, with history of long standing ESRD on HD, calciphylaxis, debility, encounter for palliative care.

## 2023-05-22 NOTE — CONSULT NOTE ADULT - SUBJECTIVE AND OBJECTIVE BOX
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     Of note, patient with recent admission at [facility] (5/3-5/9) during which time she was admitted for similar presentation after being found to have Hb 6.9 on nursing home labwork, recieved 2U pRBC while admitted and discharged on PPI. GI consulted on that admission but patient remained stable without further GI so no EGD/scope performed. Hospital course at that time c/b hypotension however pt chronically on midodrine 40mg PO Q8.     On current admission, pt noted to have multiple episodes of melena in ED. Initially thought to be originating from multiple sacral ulcers however pt subsequently had large melena originating from the rectum after ulcers were cleaned/dressed. Patient AOx1 (self) and unable to answer further questions or follow any other commands, at baseline per prior documentation from recent admission.      ED COURSE  VS: T 98.5F (oral), HR , /50, RR 18, SpO2 91% (2L NC)  Labs: 14.33, Hb 8.7 (MCV 90), PT 43.1, INR 3.58, aPTT 41.5, BUN/Cr 19/3.06, TPro 5.4, Alb 2.3, AlkPhos 134   Orders: Protonix 80mh IVP x1, Kcentra 4000IU x1, NS 500cc bolus x1  Imaging:  - CXR: Possible small R pleural effusion. Tortuous aorta. No clear consolidations. No PTX.    Consults: GI  (19 May 2023 14:55)    PERTINENT PM/SXH:   HTN (hypertension)    HLD (hyperlipidemia)    CAD (coronary atherosclerotic disease)    ESRD on dialysis    H/O ventral hernia    Brown tumor    DVT, lower extremity      Stented coronary artery    History of surgery    AVF (arteriovenous fistula)    S/P AIDEN-BSO    History of surgery    History of atherectomy      FAMILY HISTORY:    ITEMS NOT CHECKED ARE NOT PRESENT    SOCIAL HISTORY:   Significant other/partner:  []  Children:  [x]   Substance hx:  []   Tobacco hx:  []   Alcohol hx: []   Home Opioid hx:  [] I-Stop Reference No:  - no active Rx's / see chart note  Living Situation: []Home  [x]Long term care  []Rehab []Other  Jew/Spiritual practice: ; Role of organized Episcopal [ ] important [ x] some [ ] unable to assess  Coping: [ ] well [ ] with difficulty [ x] poor coping [ ] unable to assess  Support system: [ ] strong [ ] adequate [x ] inadequate    ADVANCE DIRECTIVES:    []MOLST  []Living Will  DECISION MAKER(s):  [] Health Care Proxy(s)  [x] Surrogate(s)  [] Guardian           Name(s)/Phone Number(s): Jasmina Mendoza (daughter)     BASELINE (I)ADLs (prior to admission):  [place]: []Total  [] Moderate [x]Dependent    ALLERGIES:  sulfa drugs (Angioedema)  Ancef (Rash; Urticaria)  iodine (Hives; Pruritus)  DDAVP (Hypotension)  penicillin (Swelling)    MEDICATIONS  (STANDING):  ascorbic acid 500 milliGRAM(s) Oral daily  atorvastatin 40 milliGRAM(s) Oral at bedtime  chlorhexidine 2% Cloths 1 Application(s) Topical <User Schedule>  cinacalcet 120 milliGRAM(s) Oral every 24 hours  dextrose 10%. 1000 milliLiter(s) (30 mL/Hr) IV Continuous <Continuous>  fludroCORTISONE 0.1 milliGRAM(s) Oral every 24 hours  folic acid 1 milliGRAM(s) Oral daily  midodrine 40 milliGRAM(s) Oral every 8 hours  Nephro-deborah 1 Tablet(s) Oral daily  norepinephrine Infusion 0.05 MICROgram(s)/kG/Min (7.44 mL/Hr) IV Continuous <Continuous>  pantoprazole  Injectable 40 milliGRAM(s) IV Push every 12 hours  sevelamer carbonate 1600 milliGRAM(s) Oral three times a day with meals    MEDICATIONS  (PRN):  acetaminophen     Tablet .. 650 milliGRAM(s) Oral every 6 hours PRN Temp greater or equal to 38C (100.4F), Mild Pain (1 - 3)  sodium chloride 0.9% lock flush 10 milliLiter(s) IV Push every 1 hour PRN Pre/post blood products, medications, blood draw, and to maintain line patency    PRESENT SYMPTOMS: [x]Unable to obtain due to poor mentation/encephalopathy  Source if other than patient:  []Family   []Team     Pain: [x ] yes [ ] no  QOL impact - Unable to do ADLs.  Location -  Generalized                  Aggravating Factors -  Quality -  Radiation -  Timing -  Severity (0-10 scale) -   Minimal Acceptable Level (0-10 scale) -    PAIN AD Score:  [email or web address] (press ctrl +  left click to view)    Dyspnea:                           [ ]Mild  [ ]Moderate [ ]Severe  Anxiety:                             [ ]Mild [ ]Moderate [ ]Severe  Fatigue:                             [ ]Mild [ ]Moderate [ ]Severe  Nausea:                             [ ]Mild [ ]Moderate [ ]Severe  Loss of Appetite:             [ ]Mild [ ]Moderate [ ]Severe  Constipation:                   [ ]Mild [ ]Moderate [ ]Severe    Other Symptoms:  [x ]All other review of systems negative     Palliative Performance Status Version 2:  30 %    http://npcrc.org/files/news/palliative_performance_scale_[email or web address].pdf    PHYSICAL EXAM:  GENERAL:  [x ]Alert  [ x]Oriented x 1-2  [ ]Lethargic  [ ]Cachexia  [ ]Unarousable  [ ]Verbal  [ ]Non-Verbal  Behavioral:   [ ]Anxiety  [ ]Delirium [ ]Agitation [x ]Cooperative  HEENT:  [ ]Normal   [x ]Dry mouth   [ ]ET Tube/Trach  [ ]Oral lesions  PULMONARY:   [x ]Clear []Tachypnea  []Audible excessive secretions   [ ]Rhonchi        [ ]Right [ ]Left [ ]Bilateral  [ ]Crackles        [ ]Right [ ]Left [ ]Bilateral  [ ]Wheezing     [ ]Right [ ]Left [ ]Bilateral  CARDIOVASCULAR:    [x ]Regular [ ]Irregular [ ]Tachy  [ ]Arjun [ ]Murmur [ ]Other  GASTROINTESTINAL:  [ x]Soft  [ ]Distended   [ x]+BS  [ x]Non tender [ ]Tender  [ ]PEG [ ]OGT/ NGT  Last BM:     GENITOURINARY:  [ ]Normal [ ] Incontinent   [ x]Oliguria/Anuria   [ ]Wu  MUSCULOSKELETAL:   [ ]Normal   [ ]Weakness  [ x]Bed/Wheelchair bound [ ]Edema  NEUROLOGIC:   [ ]No focal deficits  [x ]Cognitive impairment  [ ]Dysphagia [ ]Dysarthria [ ]Paresis [ x]Encephalopathic   SKIN:   [ ]Normal   [ ]Pressure ulcer(s)  [ ]Rash [x] other    CRITICAL CARE:  [x ]Shock Present  [ ]Septic [ ]Cardiogenic [ ]Neurologic [ ]Hypovolemic  [ ]Vasopressors [ ]Inotropes   [ ]Respiratory failure present [ ]Mechanical Ventilation [ ]Non-invasive ventilatory support [ ]High-Flow  [ ]Acute  [ ]Chronic [ ]Hypoxic  [ ]Hypercarbic  [ ]Other organ failure    Vital Signs Last 24 Hrs  T(C): 36.3 (22 May 2023 09:00), Max: 36.4 (21 May 2023 14:35)  T(F): 97.4 (22 May 2023 09:00), Max: 97.6 (21 May 2023 14:35)  HR: 80 (22 May 2023 12:00) (72 - 118)  BP: --  BP(mean): --  RR: 15 (22 May 2023 12:00) (14 - 20)  SpO2: 99% (22 May 2023 12:00) (92% - 100%)    Parameters below as of 22 May 2023 12:00  Patient On (Oxygen Delivery Method): room air     I&O's Summary    21 May 2023 07:01  -  22 May 2023 07:00  --------------------------------------------------------  IN: 1422.6 mL / OUT: 0 mL / NET: 1422.6 mL    22 May 2023 07:01  -  22 May 2023 14:05  --------------------------------------------------------  IN: 1037.9 mL / OUT: 0 mL / NET: 1037.9 mL        LABS:                        8.2    12.11 )-----------( 125      ( 22 May 2023 04:47 )             28.8   05-22    134<L>  |  98  |  22  ----------------------------<  81  3.4<L>   |  24  |  3.30<H>    Ca    9.8      22 May 2023 04:47  Phos  2.4     05-22  Mg     2.0     05-22    TPro  5.0<L>  /  Alb  2.4<L>  /  TBili  0.4  /  DBili  x   /  AST  10  /  ALT  5<L>  /  AlkPhos  126<H>  05-22      RADIOLOGY & ADDITIONAL STUDIES:      PROTEIN CALORIE MALNUTRITION PRESENT: [ ]mild [ ]moderate [ ]severe [ ]underweight [ ]morbid obesity  [ ]PPSV2 < or = to 30% [ ]significant weight loss  [ ]poor nutritional intake [ ]catabolic state [ ]anasarca     Artificial Nutrition [ ]     REFERRALS:  [x]Social Work  [ ]Case management [ ]PT/OT [ ]Chaplaincy  [ ]Hospice  [ ]Patient/Family Support    DISCUSSION OF CASE: Family - to obtain additional history and to provide emotional support; ( ) -

## 2023-05-23 NOTE — PROGRESS NOTE ADULT - SUBJECTIVE AND OBJECTIVE BOX
Patient is a 67y Female seen and evaluated at bedside. no acute evnets overnight patient remains on low dose levophed this AM hgb 8.4- GI does think active bleed is occurring likely 2/2 fecal impaction Na 130 bicarb 23 phos 2.5      Meds:    acetaminophen     Tablet .. 650 every 6 hours PRN  ascorbic acid 500 daily  atorvastatin 40 at bedtime  chlorhexidine 2% Cloths 1 <User Schedule>  cinacalcet 120 every 24 hours  Dakins Solution - 1/4 Strength 1 every 12 hours  dextrose 10%. 1000 <Continuous>  epoetin александр-epbx (RETACRIT) Injectable 8000 once  fludroCORTISONE 0.1 every 24 hours  folic acid 1 daily  midodrine 40 every 8 hours  Nephro-deborah 1 daily  norepinephrine Infusion 0.05 <Continuous>  pantoprazole  Injectable 40 every 12 hours  sevelamer carbonate 800 three times a day with meals  sodium chloride 0.9% lock flush 10 every 1 hour PRN  sodium thiosulfate IVPB 25 once      T(C): , Max: 37.1 (23 @ 22:13)  T(F): , Max: 98.7 (23 @ 22:13)  HR: 77 (23 @ 10:00)  BP: --  BP(mean): --  RR: 19 (23 @ 10:00)  SpO2: 100% (23 @ 10:00)  Wt(kg): --     @ 07:  -   @ 07:00  --------------------------------------------------------  IN: 1956.9 mL / OUT: 0 mL / NET: 1956.9 mL     @ 07:01  -   @ 10:30  --------------------------------------------------------  IN: 360 mL / OUT: 0 mL / NET: 360 mL          Review of Systems:  all other ROS negative       PHYSICAL EXAM:  GENERAL: NAD resting in bed  CHEST/LUNG: dcreased breath sounds at the bases   HEART: normal S1S2, RRR  ABDOMEN: Soft, Nontender, +BS, No flank tenderness bilateral  EXTREMITIES: diffuse anasarca UE and LE  SKIN: bl breast calciphylaxis  ACCESS: TDC       LABS:                        8.0    9.83  )-----------( 94       ( 23 May 2023 05:28 )             27.5         130<L>  |  95<L>  |  24<H>  ----------------------------<  80  3.5   |  23  |  3.56<H>    Ca    9.7      23 May 2023 05:28  Phos  2.5       Mg     1.9         TPro  4.4<L>  /  Alb  2.1<L>  /  TBili  0.3  /  DBili  x   /  AST  11  /  ALT  <5<L>  /  AlkPhos  113            Hemoglobin: 8.0 g/dL (23 @ 05:28)  Phosphorus Level, Serum: 2.5 mg/dL (23 @ 05:28)  Hemoglobin: 8.2 g/dL (23 @ 04:47)  Phosphorus Level, Serum: 2.4 mg/dL (23 @ 04:47)    Albumin, Serum: 2.1 g/dL (23 @ 05:28)  Albumin, Serum: 2.4 g/dL (23 @ 04:47)    cinacalcet 120 milliGRAM(s) Oral every 24 hours, 23 @ 15:07, Routine  epoetin александр-epbx (RETACRIT) Injectable 8000 Unit(s) IV Push once, 23 @ 07:56, Routine, Stop order after: 1 Doses  epoetin александр-epbx (RETACRIT) Injectable 8000 Unit(s) IV Push once, 23 @ 07:08, Routine, Stop order after: 1 Doses  sevelamer carbonate 1600 milliGRAM(s) Oral three times a day with meals, 23 @ 15:07, Routine  sevelamer carbonate 800 milliGRAM(s) Oral three times a day with meals, 23 @ 16:11,     Hemodialysis Treatment.:     Schedule: Once, Modality: Hemodialysis, Access: Internal Jugular Central Venous Catheter    Dialyzer: Optiflux [id number], Time: 180 Min    Blood Flow: 400 mL/Min , Dialysate Flow: 500 mL/Min, Dialysate Temp: 36.5, Tubinmm (Adult)    Target Fluid Removal: 1 Liters    Dialysate Electrolytes (mEq/L): Potassium 3, Calcium 2.5, Sodium 138, Bicarbonate 35 (23 @ 07:08) [Active]        RADIOLOGY & ADDITIONAL STUDIES:

## 2023-05-23 NOTE — PROGRESS NOTE ADULT - SUBJECTIVE AND OBJECTIVE BOX
OVERNIGHT EVENTS: IV tylenol x 1 for pain.    SUBJECTIVE / INTERVAL HPI: Patient seen and examined at bedside. Patient is reporting she is feeling fairly well. Reports breathing is "so-so." Patient otherwise is denying problems with blisters that she reports yesterday.   Is AAOx1 and at her baseline mental status. Denies any other complaints. Denies fever, chills, c/p, palpitations, abdominal pain, nausea, vomiting, diarrhea, dysuria, hematuria.    ICU Vital Signs Last 24 Hrs  T(C): 36.5 (23 May 2023 14:40), Max: 37.1 (22 May 2023 22:13)  T(F): 97.7 (23 May 2023 14:40), Max: 98.7 (22 May 2023 22:13)  HR: 101 (23 May 2023 15:00) (67 - 110)  BP: --  BP(mean): --  ABP: 103/56 (23 May 2023 15:00) (91/44 - 125/69)  ABP(mean): 73 (23 May 2023 15:00) (61 - 90)  RR: 14 (23 May 2023 15:00) (14 - 19)  SpO2: 100% (23 May 2023 15:00) (96% - 100%)    O2 Parameters below as of 23 May 2023 15:00  Patient On (Oxygen Delivery Method): room air      PHYSICAL EXAM:  General: large body habitus, AAO1-2. NAD.  Eyes: PERRL, clear conjunctiva  ENT: Dry MM with poor dentition  Respiratory: Decreased BS b/l lung fields; no W/R/R  Cardiac: +S1/S2; RRR; no M/R/G; HD catheter over L chest.   Gastrointestinal: soft, obese, NT/ND; no rebound or guarding; +BSx4. Large ventral hernia.   Extremities: Diffuse anasarca, worsened in bilateral upper extremities. No blisters on heels or toes.  Vascular: 2+ radial pulses B/L  Dermatologic: Bilateral breast wounds covered in dressing.  Neurologic: AAOx1-2. Moves extremities. +2/5.      MEDICATIONS:  MEDICATIONS  (STANDING):  ascorbic acid 500 milliGRAM(s) Oral daily  atorvastatin 40 milliGRAM(s) Oral at bedtime  chlorhexidine 2% Cloths 1 Application(s) Topical <User Schedule>  cinacalcet 120 milliGRAM(s) Oral every 24 hours  dextrose 10%. 1000 milliLiter(s) (30 mL/Hr) IV Continuous <Continuous>  folic acid 1 milliGRAM(s) Oral daily  midodrine 40 milliGRAM(s) Oral every 8 hours  Nephro-deborah 1 Tablet(s) Oral daily  pantoprazole  Injectable 40 milliGRAM(s) IV Push every 12 hours  sevelamer carbonate 1600 milliGRAM(s) Oral three times a day with meals    MEDICATIONS  (PRN):  acetaminophen     Tablet .. 650 milliGRAM(s) Oral every 6 hours PRN Temp greater or equal to 38C (100.4F), Mild Pain (1 - 3)  sodium chloride 0.9% lock flush 10 milliLiter(s) IV Push every 1 hour PRN Pre/post blood products, medications, blood draw, and to maintain line patency      ALLERGIES:  Allergies    sulfa drugs (Angioedema)  Ancef (Rash; Urticaria)  iodine (Hives; Pruritus)  DDAVP (Hypotension)  penicillin (Swelling)    Intolerances      LABS:                         8.0    9.83  )-----------( 94       ( 23 May 2023 05:28 )             27.5     05-23    130<L>  |  95<L>  |  24<H>  ----------------------------<  80  3.5   |  23  |  3.56<H>    Ca    9.7      23 May 2023 05:28  Phos  2.5     05-23  Mg     1.9     05-23    TPro  4.4<L>  /  Alb  2.1<L>  /  TBili  0.3  /  DBili  x   /  AST  11  /  ALT  <5<L>  /  AlkPhos  113  05-23                  RADIOLOGY, EKG & ADDITIONAL TESTS:

## 2023-05-23 NOTE — PROGRESS NOTE ADULT - ASSESSMENT
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 Subsequently with HD today and drop in pressures, transferred to MICU for line placement and better accuracy of blood pressure control.    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  - Arterial Line in place  - Weaned off levophed  - Midodrine 40mg q 8, home medication  - C/w HD as needed.   - Florinef .1mg qd, additional .1mg ordered today  - Florinef .2mg starting tomorrow    #HfrEf, non-ischemic cardiomyopathy  - history of HfpEF, normal RV and LV function  - On No HF meds  - C.w statin      Pulmonary  - Patient w no pulmonology history  - good airway control, controlling secretions  - Continue to monitor secretions.    Renal  - ESRD w Dialysis, rapid response during HD session  - C/w HD as tolerated, c/w Renevela 1600 TID    GI  #Stercoral Ulcers  - Came in w GI bleed deemed to be 2/2 to constipation and stercoral ulcers  - C/w PPi IV BID  - C/w golytely prep as laxative  - Daily KUB  - F/u abdominal US w doppler, no evidence of portal HTN  - Non urgent EGD this admission as permits  - F/u GI recs, as per HCP and pt GOC, no role for egd this admission  - Active T and S, Keep HgB >7    ID  - Afebrile w mild leukocytosis  - BCx NGTD    Heme  - HgB stable, currently, came in at 6.2  - See above for GI    Endo  5/21 received d10 bolus for glucose 66, on q4 glucose checks, then started d10 gtt at 30 cc/hr  - C/w gtt, 30cc//hr.    Prophylaxis:  F: D10 gtt 30cc/hr  E: Replete cautiously given ESRD  N: Soft and Bite sized (renal diet)  DVT: none as previous GIB

## 2023-05-23 NOTE — PROGRESS NOTE ADULT - ASSESSMENT
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    Assessment/Plan:   #ESRD on HD  #Calciphylaxis  Usual RX time 3:30 hrs, EDW TBD  HD today for clearance and minimal UF  K noted   Renal Diet  Give sodium thiosulfate with HD     #Hypotension   on levophed i/s/o  bleed and also known orthostatic hypotension  -midodrine   -maintain MAP?70 for adequate renal perfusion    #access   L TDC    #anemia  Hgb not at goal  -Transfuse to Hgb > 7  Epo w/ HD    #renal bone disease   Ca ~9.8  phos 2.4  trend phos twice weekly to ensure <5.5   sensipar to 120mg daily  would  renvela to 800 TID w/ meals   Trend phos daily         Thank you for the opportunity to participate in the care of your patient. The nephrology service remains available to assist with any questions or concerns. Please feel free to reach us by paging the on-call nephrology fellow for urgent issues or as below.     Saumya Jo D.O  PGY-5, Nephrology Fellow    P: [phone]

## 2023-05-24 NOTE — PROGRESS NOTE ADULT - ASSESSMENT
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 Subsequently with HD today and drop in pressures, transferred to MICU for line placement and better accuracy of blood pressure control. Patient has remained hemodynamically stable, off pressors.    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  - Arterial Line in place  - Weaned off levophed  - Midodrine 40mg q 8, home medication  - C/w HD as needed.   - Florinef .2mg qd.    #HfrEf, non-ischemic cardiomyopathy  - history of HfpEF, TTE 5/5/23, normal RV and LV function  - On No HF meds  - C.w statin      Pulmonary  - Patient w no pulmonology history  - good airway control, controlling secretions  - Continue to monitor secretions.    Renal  - ESRD w Dialysis, rapid response during HD session  - C/w HD as tolerated, c/w Renevela 1600 TID    GI  #Stercoral Ulcers  - Came in w GI bleed deemed to be 2/2 to constipation and stercoral ulcers  - C/w PPi IV BID  - C/w golytely prep as laxative  - Daily KUB  - F/u abdominal US w doppler, no evidence of portal HTN  - F/u GI recs, as per HCP and pt GOC, no role for egd this admission  - Active T and S, Keep HgB >7    ID  - Afebrile w mild leukocytosis  - BCx NGTD    Heme  - HgB stable, currently, came in at 6.2  - See above for GI    Endo  5/21 received d10 bolus for glucose 66, on q4 glucose checks, then started d10 gtt at 30 cc/hr  - C/w gtt, 30cc//hr.    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 Subsequently with HD today and drop in pressures, transferred to MICU for line placement and better accuracy of blood pressure control. Patient has remained hemodynamically stable, off pressors.    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  - Arterial Line in place  - Weaned off levophed  - Midodrine 40mg q 8, home medication  - C/w HD as needed.   - Florinef .2mg qd.    #HfrEf, non-ischemic cardiomyopathy  - history of HfpEF, TTE 5/5/23, normal RV and LV function  - On No HF meds  - C.w statin      Pulmonary  - Patient w no pulmonology history  - good airway control, controlling secretions  - Continue to monitor secretions.    Renal  - ESRD w Dialysis, rapid response during HD session  - C/w HD as tolerated, c/w Renevela 1600 TID    GI  #Stercoral Ulcers  - Came in w GI bleed deemed to be 2/2 to constipation and stercoral ulcers  - C/w PPi IV BID  - C/w golytely prep as laxative  - Daily KUB  - F/u abdominal US w doppler, no evidence of portal HTN  - F/u GI recs, as per HCP and pt GOC, no role for egd this admission  - Active T and S, Keep HgB >7    ID  - Afebrile w mild leukocytosis  - BCx NGTD    Heme  - HgB stable, currently, came in at 6.2  - See above for GI    Endo  5/21 received d10 bolus for glucose 66, on q4 glucose checks, then started d10 gtt at 30 cc/hr  - C/w gtt, 30cc//hr.    Prophylaxis:  F: D10 gtt 30cc/hr  E: Replete cautiously given ESRD  N: Soft and Bite sized (renal diet)  DVT: none as previous GIB    Lines: R femoral a-line and central catheter to be removed  Peripheral IV to be placed-> f/u ACNP peripheral IV procedure note.  No Wu.

## 2023-05-24 NOTE — CHART NOTE - NSCHARTNOTEFT_GEN_A_CORE
PALLIATIVE MEDICINE COORDINATION OF CARE NOTE FOR SALOMON MUNSON  [  ] ED Trigger   [  ] MICU Trigger     [  x] Consult    Patient last assessed: _5/22/23____  to manage: GOC/AD, Symptoms, and Support was recommended:_5/22/23_____    __40____ Minutes; Start: __1100___  End: _1140___, of non-face-to-face prolonged service provided that relates to (face-to-face) care that has or will occur and ongoing patient management, including one or more of the following:   - Reviewed records from other physicians or other health care professional services, including one or more of the following: other medical records and diagnostic / radiology study results     HPI:  68 yo F with PMHx ESRD (2/2 PKD, HD TThSat, via L chest catheter), HFrEF (LVEF 65% in 5/2023), non-ischemic CM, HTN, HLD, PE (s/p IVC filter, 2018), 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     Of note, patient with recent admission at [facility] (5/3-5/9) during which time she was admitted for similar presentation after being found to have Hb 6.9 on nursing home labwork, recieved 2U pRBC while admitted and discharged on PPI. GI consulted on that admission but patient remained stable without further GI so no EGD/scope performed. Hospital course at that time c/b hypotension however pt chronically on midodrine 40mg PO Q8.     On current admission, pt noted to have multiple episodes of melena in ED. Initially thought to be originating from multiple sacral ulcers however pt subsequently had large melena originating from the rectum after ulcers were cleaned/dressed. Patient AOx1 (self) and unable to answer further questions or follow any other commands, at baseline per prior documentation from recent admission.      ED COURSE  VS: T 98.5F (oral), HR , /50, RR 18, SpO2 91% (2L NC)  Labs: 14.33, Hb 8.7 (MCV 90), PT 43.1, INR 3.58, aPTT 41.5, BUN/Cr 19/3.06, TPro 5.4, Alb 2.3, AlkPhos 134   Orders: Protonix 80mh IVP x1, Kcentra 4000IU x1, NS 500cc bolus x1  Imaging:  - CXR: Possible small R pleural effusion. Tortuous aorta. No clear consolidations. No PTX.    Consults: GI  (19 May 2023 14:55)      - Other: iStop reviewed.    - Other: Medication reviewed.    The patient HAS NOT used PRN's in the last 24h.    MEDICATIONS  (STANDING):    MEDICATIONS  (PRN):      - Other: Advanced directives     Full Code     No documented MOLST form found on [facility]     No documented HCP form found on [facility]     No Living will / POA / Advance directives found on [facility] / [facility].     No documented GOC notes on [facility]    - Other: Coordination/Plan of care     __3__ admissions in 1 year     Current admission LOS: __5_ days     LACE score: __18__ ADVANCE ILLNESS PATIENT.     Patient NOT previously seen by palliative medicine consult service.      Discussed with  Consult request for: " ESRD with Calciphylaxis, multiorgan dysfunction   "    Patient is an Advanced Illness patient hence the primary team will need to complete GOC document of any prior GOC discussions that were done during this admission so far as well as information available for Proxy/surrogates/NOK/guardians prior to a palliative contact.

## 2023-05-24 NOTE — PROGRESS NOTE ADULT - SUBJECTIVE AND OBJECTIVE BOX
OVERNIGHT EVENTS:  1x BP check on popliteal region overnight, was 10 mm Hg less on SBP and DBP upon check.      SUBJECTIVE / INTERVAL HPI: Patient seen and examined at bedside. Waking up from sleep. Is denying any complaints currently. Is non-specific in conversation and is slow to speak which has been her noticed baseline. Denies fever, chills, c/p, palpitations, abdominal pain, nausea, vomiting, diarrhea, dysuria, hematuria.    ICU Vital Signs Last 24 Hrs  T(C): 36.6 (24 May 2023 05:55), Max: 36.9 (23 May 2023 17:46)  T(F): 97.9 (24 May 2023 05:55), Max: 98.4 (23 May 2023 17:46)  HR: 68 (24 May 2023 07:00) (68 - 109)  BP: --  BP(mean): --  ABP: 90/41 (24 May 2023 07:00) (87/42 - 124/68)  ABP(mean): 59 (24 May 2023 07:00) (57 - 85)  RR: 18 (24 May 2023 07:00) (13 - 19)  SpO2: 100% (24 May 2023 07:00) (92% - 100%)    O2 Parameters below as of 24 May 2023 07:00  Patient On (Oxygen Delivery Method): room air      PHYSICAL EXAM:  General: large body habitus, AAO1-2. NAD.  Eyes: PERRL, clear conjunctiva  ENT: Dry MM with poor dentition  Respiratory: Decreased BS b/l lung fields; no W/R/R  Cardiac: +S1/S2; RRR; no M/R/G; HD catheter over L chest.   Gastrointestinal: soft, obese, NT/ND; no rebound or guarding; +BSx4. Large ventral hernia.   Extremities: Diffuse anasarca, worsened in bilateral upper extremities. +2 pitting b/l UE. +1 b/l LE.  No blisters on heels or toes.  Vascular: 2+ radial pulses B/L  Dermatologic: Bilateral breast wounds covered in dressing.  Neurologic: AAOx1-2. Moves extremities. +2/5.      MEDICATIONS:  MEDICATIONS  (STANDING):  ascorbic acid 500 milliGRAM(s) Oral daily  atorvastatin 40 milliGRAM(s) Oral at bedtime  chlorhexidine 2% Cloths 1 Application(s) Topical <User Schedule>  cinacalcet 120 milliGRAM(s) Oral every 24 hours  dextrose 10%. 1000 milliLiter(s) (30 mL/Hr) IV Continuous <Continuous>  folic acid 1 milliGRAM(s) Oral daily  midodrine 40 milliGRAM(s) Oral every 8 hours  Nephro-deborah 1 Tablet(s) Oral daily  pantoprazole  Injectable 40 milliGRAM(s) IV Push every 12 hours  sevelamer carbonate 1600 milliGRAM(s) Oral three times a day with meals    MEDICATIONS  (PRN):  acetaminophen     Tablet .. 650 milliGRAM(s) Oral every 6 hours PRN Temp greater or equal to 38C (100.4F), Mild Pain (1 - 3)  sodium chloride 0.9% lock flush 10 milliLiter(s) IV Push every 1 hour PRN Pre/post blood products, medications, blood draw, and to maintain line patency      ALLERGIES:  Allergies    sulfa drugs (Angioedema)  Ancef (Rash; Urticaria)  iodine (Hives; Pruritus)  DDAVP (Hypotension)  penicillin (Swelling)    Intolerances      LABS:                         7.3    8.66  )-----------( 81       ( 24 May 2023 05:30 )             24.9     05-24    130<L>  |  95<L>  |  18  ----------------------------<  80  3.4<L>   |  24  |  2.94<H>    Ca    9.9      24 May 2023 05:30  Phos  1.4     05-24  Mg     1.7     05-24    TPro  4.6<L>  /  Alb  2.0<L>  /  TBili  0.3  /  DBili  x   /  AST  9<L>  /  ALT  <5<L>  /  AlkPhos  114  05-24                  RADIOLOGY, EKG & ADDITIONAL TESTS:  STEPDOWN FROM MICU TO [facility]  Patient is a 66 yo F with PMHx 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 for anemia, received 2 units prbcs inpatient scope deferred due to inability reach HCP and patient sent home on protonix) in the ED labs notable for Hgb 8.7 bp 99/59 K 4.0 bicarb 25. Patient was found to be tachycardic w hypotension in ED, resulting in ICU consult however VS stabilized with stable hemoglobin. Patient received anticoagulation reversal and was transferred to [facility]. KUB was administered noting large stool burden in rectal vault w GI consulted for EGD/colonoscopy however determined that melena more likely explained by bleeding associated with stercoral ulcers/hemorrhoids iso severe fecal impaction, rather than acute GIB. The patient received scheduled HD session 5/20 with 0.5L added during session, found to be hypotensive 80/60s, after receiving home midodrine dose (40mg q8), the patient had improvement of /30s. Once returning to floors, the patient was rechecked for vitals w BP 70/30s w complaints of L shoulder pain, however difficult to obtain consistent vitals due to edema on extremities. Rapid response was called, the patient received fluid resuscitation and albumin, deemed appropriate to step up to ICU with potential femoral line placement and stabilization of BP. Patient with no septic signs or differential leading to etiology of drop in blood pressure. Was continued on midodrine 40mg TID, and levophed intermittently on and off, but now off >48h. Florinef .2mg qd was started. Otherwise,       OVERNIGHT EVENTS:  1x BP check on popliteal region overnight, was 10 mm Hg less on SBP and DBP upon check, 80/60.      SUBJECTIVE / INTERVAL HPI: Patient seen and examined at bedside. Waking up from sleep. Is denying any complaints currently. Is non-specific in conversation and is slow to speak which has been her noticed baseline. Denies fever, chills, c/p, palpitations, abdominal pain, nausea, vomiting, diarrhea, dysuria, hematuria.    ICU Vital Signs Last 24 Hrs  T(C): 36.6 (24 May 2023 05:55), Max: 36.9 (23 May 2023 17:46)  T(F): 97.9 (24 May 2023 05:55), Max: 98.4 (23 May 2023 17:46)  HR: 68 (24 May 2023 07:00) (68 - 109)  BP: --  BP(mean): --  ABP: 90/41 (24 May 2023 07:00) (87/42 - 124/68)  ABP(mean): 59 (24 May 2023 07:00) (57 - 85)  RR: 18 (24 May 2023 07:00) (13 - 19)  SpO2: 100% (24 May 2023 07:00) (92% - 100%)    O2 Parameters below as of 24 May 2023 07:00  Patient On (Oxygen Delivery Method): room air      PHYSICAL EXAM:  General: large body habitus, AAO1-2. NAD.  Eyes: PERRL, clear conjunctiva  ENT: Dry MM with poor dentition  Respiratory: Decreased BS b/l lung fields; no W/R/R  Cardiac: +S1/S2; RRR; no M/R/G; HD catheter over L chest.   Gastrointestinal: soft, obese, NT/ND; no rebound or guarding; +BSx4. Large ventral hernia.   Extremities: Diffuse anasarca, worsened in bilateral upper extremities. +2 pitting b/l UE. +1 b/l LE.  No blisters on heels or toes.  Vascular: 2+ radial pulses B/L  Dermatologic: Bilateral breast wounds covered in dressing.  Neurologic: AAOx1-2. Moves extremities. +2/5.      MEDICATIONS:  MEDICATIONS  (STANDING):  ascorbic acid 500 milliGRAM(s) Oral daily  atorvastatin 40 milliGRAM(s) Oral at bedtime  chlorhexidine 2% Cloths 1 Application(s) Topical <User Schedule>  cinacalcet 120 milliGRAM(s) Oral every 24 hours  dextrose 10%. 1000 milliLiter(s) (30 mL/Hr) IV Continuous <Continuous>  folic acid 1 milliGRAM(s) Oral daily  midodrine 40 milliGRAM(s) Oral every 8 hours  Nephro-deborah 1 Tablet(s) Oral daily  pantoprazole  Injectable 40 milliGRAM(s) IV Push every 12 hours  sevelamer carbonate 1600 milliGRAM(s) Oral three times a day with meals    MEDICATIONS  (PRN):  acetaminophen     Tablet .. 650 milliGRAM(s) Oral every 6 hours PRN Temp greater or equal to 38C (100.4F), Mild Pain (1 - 3)  sodium chloride 0.9% lock flush 10 milliLiter(s) IV Push every 1 hour PRN Pre/post blood products, medications, blood draw, and to maintain line patency      ALLERGIES:  Allergies    sulfa drugs (Angioedema)  Ancef (Rash; Urticaria)  iodine (Hives; Pruritus)  DDAVP (Hypotension)  penicillin (Swelling)    Intolerances      LABS:                         7.3    8.66  )-----------( 81       ( 24 May 2023 05:30 )             24.9     05-24    130<L>  |  95<L>  |  18  ----------------------------<  80  3.4<L>   |  24  |  2.94<H>    Ca    9.9      24 May 2023 05:30  Phos  1.4     05-24  Mg     1.7     05-24    TPro  4.6<L>  /  Alb  2.0<L>  /  TBili  0.3  /  DBili  x   /  AST  9<L>  /  ALT  <5<L>  /  AlkPhos  114  05-24                  RADIOLOGY, EKG & ADDITIONAL TESTS:  STEPDOWN FROM MICU   Patient is a 66 yo F with PMHx ESRD (2/2 PKD, HD TThSat), HFpEF (EF 65-70% most recently), nonischemic cardiomyopathy, 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 Patient was found to be tachycardic w hypotension in ED, resulting in ICU consult however VS stabilized with stable hemoglobin. Patient received anticoagulation reversal and was transferred to [facility]. KUB was administered noting large stool burden in rectal vault w GI consulted for EGD/colonoscopy however determined that melena more likely explained by bleeding associated with stercoral ulcers/hemorrhoids iso severe fecal impaction, rather than acute GIB. The patient received scheduled HD session 5/20 with 0.5L added during session, found to be hypotensive 80/60s, after receiving home midodrine dose (40mg q8), the patient had improvement of /30s. Once returning to floors, the patient was rechecked for vitals w BP 70/30s w complaints of L shoulder pain, however difficult to obtain consistent vitals due to edema on extremities. Rapid response was called, the patient received fluid resuscitation and albumin, deemed appropriate to step up to ICU with potential femoral line placement and stabilization of BP. Patient with no septic signs or differential leading to etiology of drop in blood pressure. Was continued on midodrine 40mg TID, and levophed intermittently on and off, but now off >48h. Florinef .2mg qd was started. Phosphate binder was discontinued. Otherwise, arterial line and femoral line were removed. Got cuff pressures from R popliteal artery. Goal BP and MAPS >50, as that is patient baseline. Palliative is following. Hemodynamically at baseline and stable for stepdown.      OVERNIGHT EVENTS:  1x BP check on popliteal region overnight, was 10 mm Hg less on SBP and DBP upon check, 80/60.      SUBJECTIVE / INTERVAL HPI: Patient seen and examined at bedside. Waking up from sleep. Is denying any complaints currently. Is non-specific in conversation and is slow to speak which has been her noticed baseline. Denies fever, chills, c/p, palpitations, abdominal pain, nausea, vomiting, diarrhea, dysuria, hematuria.    ICU Vital Signs Last 24 Hrs  T(C): 36.6 (24 May 2023 05:55), Max: 36.9 (23 May 2023 17:46)  T(F): 97.9 (24 May 2023 05:55), Max: 98.4 (23 May 2023 17:46)  HR: 68 (24 May 2023 07:00) (68 - 109)  BP: --  BP(mean): --  ABP: 90/41 (24 May 2023 07:00) (87/42 - 124/68)  ABP(mean): 59 (24 May 2023 07:00) (57 - 85)  RR: 18 (24 May 2023 07:00) (13 - 19)  SpO2: 100% (24 May 2023 07:00) (92% - 100%)    O2 Parameters below as of 24 May 2023 07:00  Patient On (Oxygen Delivery Method): room air      PHYSICAL EXAM:  General: large body habitus, AAO1-2. NAD.  Eyes: PERRL, clear conjunctiva  ENT: Dry MM with poor dentition  Respiratory: Decreased BS b/l lung fields; no W/R/R  Cardiac: +S1/S2; RRR; no M/R/G; HD catheter over L chest.   Gastrointestinal: soft, obese, NT/ND; no rebound or guarding; +BSx4. Large ventral hernia.   Extremities: Diffuse anasarca, worsened in bilateral upper extremities. +2 pitting b/l UE. +1 b/l LE.  No blisters on heels or toes.  Vascular: 2+ radial pulses B/L  Dermatologic: Bilateral breast wounds covered in dressing.  Neurologic: AAOx1-2. Moves extremities. +2/5.      MEDICATIONS:  MEDICATIONS  (STANDING):  ascorbic acid 500 milliGRAM(s) Oral daily  atorvastatin 40 milliGRAM(s) Oral at bedtime  chlorhexidine 2% Cloths 1 Application(s) Topical <User Schedule>  cinacalcet 120 milliGRAM(s) Oral every 24 hours  dextrose 10%. 1000 milliLiter(s) (30 mL/Hr) IV Continuous <Continuous>  folic acid 1 milliGRAM(s) Oral daily  midodrine 40 milliGRAM(s) Oral every 8 hours  Nephro-deborah 1 Tablet(s) Oral daily  pantoprazole  Injectable 40 milliGRAM(s) IV Push every 12 hours  sevelamer carbonate 1600 milliGRAM(s) Oral three times a day with meals    MEDICATIONS  (PRN):  acetaminophen     Tablet .. 650 milliGRAM(s) Oral every 6 hours PRN Temp greater or equal to 38C (100.4F), Mild Pain (1 - 3)  sodium chloride 0.9% lock flush 10 milliLiter(s) IV Push every 1 hour PRN Pre/post blood products, medications, blood draw, and to maintain line patency      ALLERGIES:  Allergies    sulfa drugs (Angioedema)  Ancef (Rash; Urticaria)  iodine (Hives; Pruritus)  DDAVP (Hypotension)  penicillin (Swelling)    Intolerances      LABS:                         7.3    8.66  )-----------( 81       ( 24 May 2023 05:30 )             24.9     05-24    130<L>  |  95<L>  |  18  ----------------------------<  80  3.4<L>   |  24  |  2.94<H>    Ca    9.9      24 May 2023 05:30  Phos  1.4     05-24  Mg     1.7     05-24    TPro  4.6<L>  /  Alb  2.0<L>  /  TBili  0.3  /  DBili  x   /  AST  9<L>  /  ALT  <5<L>  /  AlkPhos  114  05-24                  RADIOLOGY, EKG & ADDITIONAL TESTS:

## 2023-05-24 NOTE — PROGRESS NOTE ADULT - ASSESSMENT
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    Assessment/Plan:   #ESRD on HD  #Calciphylaxis  Usual RX time 3:30 hrs, EDW TBD  next HD 5/25  K noted   Renal Diet  Give sodium thiosulfate with HD     #Hypotension   on levophed i/s/o  bleed and also known orthostatic hypotension  -midodrine   -maintain MAP?70 for adequate renal perfusion    #access   L TDC    #anemia  Hgb not at goal  7.3  Epo w/ HD    #renal bone disease   Ca ~9.8  phos 1.7  would hold renvela   trend phos daily     Saumya GRIFFIN>O  PGY 5 nephrology fellow  [phone]         Thank you for the opportunity to participate in the care of your patient. The nephrology service remains available to assist with any questions or concerns. Please feel free to reach us by paging the on-call nephrology fellow for urgent issues or as below.     Saumya Jo D.O  PGY-5, Nephrology Fellow    P: [phone]

## 2023-05-24 NOTE — PROVIDER CONTACT NOTE (MEDICATION) - SITUATION
Pt received 650 mg tylenol at 2223. Pt still complaining of pain at 2330. MD Villarreal ordered 1g IV tylenol. Confirmed with MD weber to give IV tylenol.

## 2023-05-24 NOTE — CHART NOTE - NSCHARTNOTEFT_GEN_A_CORE
R femoral A-line and R femoral TLC removed and hemostasis achieved after approx 10min direct pressure; no hematoma/bleeding, no complications.

## 2023-05-24 NOTE — PROGRESS NOTE ADULT - SUBJECTIVE AND OBJECTIVE BOX
Patient is a 67y Female seen and evaluated at bedside. no acute events overnight patient off of levophed- had no UF w/ HD yesterday hgb 7.3  k 3,4 BICARB 24       Meds:    acetaminophen     Tablet .. 650 every 6 hours PRN  ascorbic acid 500 daily  atorvastatin 40 at bedtime  bisacodyl 5 at bedtime  chlorhexidine 2% Cloths 1 <User Schedule>  cinacalcet 120 every 24 hours  Dakins Solution - 1/4 Strength 1 every 12 hours  dextrose 10%. 1000 <Continuous>  fludroCORTISONE 0.2 every 24 hours  folic acid 1 daily  magnesium sulfate  IVPB 1 once  midodrine 40 every 8 hours  Nephro-deborah 1 daily  pantoprazole  Injectable 40 every 12 hours  polyethylene glycol 3350 17 daily  sevelamer carbonate 800 three times a day with meals  sodium chloride 0.9% lock flush 10 every 1 hour PRN      T(C): , Max: 36.9 (05-23-23 @ 17:46)  T(F): , Max: 98.4 (05-23-23 @ 17:46)  HR: 87 (05-24-23 @ 09:00)  BP: --  BP(mean): --  RR: 19 (05-24-23 @ 09:00)  SpO2: 98% (05-24-23 @ 09:00)  Wt(kg): --    05-23 @ 07:01  -  05-24 @ 07:00  --------------------------------------------------------  IN: 1293.8 mL / OUT: 0 mL / NET: 1293.8 mL          Review of Systems:  all other RIS negative       PHYSICAL EXAM:  GENERAL: resting comfortably   CHEST/LUNG: decreased breath sounds at the bases   HEART: normal S1S2, RRR  ABDOMEN: Soft, Nontender, +BS, No flank tenderness bilateral  EXTREMITIES: + anasarca  ACCESS: TDC      LABS:                        7.3    8.66  )-----------( 81       ( 24 May 2023 05:30 )             24.9     05-24    130<L>  |  95<L>  |  18  ----------------------------<  80  3.4<L>   |  24  |  2.94<H>    Ca    9.9      24 May 2023 05:30  Phos  1.4     05-24  Mg     1.7     05-24    TPro  4.6<L>  /  Alb  2.0<L>  /  TBili  0.3  /  DBili  x   /  AST  9<L>  /  ALT  <5<L>  /  AlkPhos  114  05-24                RADIOLOGY & ADDITIONAL STUDIES:

## 2023-05-25 NOTE — PROGRESS NOTE ADULT - ASSESSMENT
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 Subsequently with HD today and drop in pressures, transferred to MICU for line placement and better accuracy of blood pressure control. Patient has remained hemodynamically stable, off pressors.

## 2023-05-25 NOTE — PROGRESS NOTE ADULT - ASSESSMENT
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    Assessment/Plan:   #ESRD on HD  #Calciphylaxis  Usual RX time 3:30 hrs, EDW TBD  HD today will try to aim for 1.5L UF given improvement in BP  K noted   Renal Diet  Give sodium thiosulfate with HD     #Hypotension   improving off of levohed  will try to aim for 1.5L UF given improvement   -midodrine   -maintain MAP 70 for adequate renal perfusion    #access   L TDC    #anemia  Hgb not at goal  7.8  Epo w/ HD    #renal bone disease   Ca ~9.8  phos 2.5  continue to hold   trend phos daily     Saumya GRIFFIN>O  PGY 5 nephrology fellow  [phone]         Thank you for the opportunity to participate in the care of your patient. The nephrology service remains available to assist with any questions or concerns. Please feel free to reach us by paging the on-call nephrology fellow for urgent issues or as below.     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    Assessment/Plan:   #ESRD on HD  #Calciphylaxis  Usual RX time 3:30 hrs, EDW TBD  HD today will try to aim for 1.5-2L UF given improvement in BP  K noted   Renal Diet  Give sodium thiosulfate with HD     #Hypotension   improving off of levohed  will try to aim for 1.5-2L UF given improvement   -midodrine   -maintain MAP 70 for adequate renal perfusion    #access   L TDC    #anemia  Hgb not at goal  7.8  Epo w/ HD    #renal bone disease   Ca ~9.8  phos 2.5  continue to hold   trend phos daily     Saumya POLANCOO  PGY 5 nephrology fellow  [phone]         Thank you for the opportunity to participate in the care of your patient. The nephrology service remains available to assist with any questions or concerns. Please feel free to reach us by paging the on-call nephrology fellow for urgent issues or as below.     Saumya Jo D.O  PGY-5, Nephrology Fellow    P: [phone]

## 2023-05-25 NOTE — CHART NOTE - NSCHARTNOTEFT_GEN_A_CORE
Admitting Diagnosis:   Patient is a 67y old  Female who presents with a chief complaint of GIB (25 May 2023 13:59)      PAST MEDICAL & SURGICAL HISTORY:  HTN (hypertension)      HLD (hyperlipidemia)      CAD (coronary atherosclerotic disease)      ESRD on dialysis  last 11/15/22      H/O ventral hernia      Brown tumor  brain      DVT, lower extremity  left      History of surgery  IVC filter      AVF (arteriovenous fistula)  right left      S/P AIDEN-BSO        History of surgery  RLE PTA stent / atherectomy  2021      History of atherectomy  stent          Current Nutrition Order:  Renal (soft, bite-sized)    PO Intake: Good (%) [   ]  Fair (50-75%) [   ] Poor (<25%) [ X ]    GI Issues:   Abd with obese appearance; LBM today    - remains on bowel regimen     Pain: no pain noted during assessment     Skin Integrity:  artemio scale 15; with multiple PUs (Unstg bilateral breast; PU stg III to sacrum; stg II to R hand; stg II to trochanter)  Remains with generalized 1+ edema; edema 4+ to LR arms    Labs:       128<L>  |  95<L>  |  21  ----------------------------<  50<LL>  see note   |  19<L>  |  2.71<H>    Ca    10.2      25 May 2023 05:30  Phos  3.1       Mg     2.0         TPro  5.9<L>  /  Alb  2.1<L>  /  TBili  0.4  /  DBili  x   /  AST  see note  /  ALT  see note  /  AlkPhos  see note      CAPILLARY BLOOD GLUCOSE      POCT Blood Glucose.: 51 mg/dL (25 May 2023 14:19)  POCT Blood Glucose.: 65 mg/dL (25 May 2023 12:49)      Medications:  MEDICATIONS  (STANDING):  ascorbic acid 500 milliGRAM(s) Oral daily  atorvastatin 40 milliGRAM(s) Oral at bedtime  bisacodyl 5 milliGRAM(s) Oral at bedtime  chlorhexidine 2% Cloths 1 Application(s) Topical <User Schedule>  cinacalcet 120 milliGRAM(s) Oral every 24 hours  Dakins Solution - 1/4 Strength 1 Application(s) Topical every 12 hours  dextrose 10%. 1000 milliLiter(s) (50 mL/Hr) IV Continuous <Continuous>  dextrose 50% Injectable 50 milliLiter(s) IV Push once  fludroCORTISONE 0.2 milliGRAM(s) Oral every 24 hours  folic acid 1 milliGRAM(s) Oral daily  midodrine 40 milliGRAM(s) Oral every 8 hours  Nephro-deborah 1 Tablet(s) Oral daily  pantoprazole  Injectable 40 milliGRAM(s) IV Push every 12 hours  polyethylene glycol 3350 17 Gram(s) Oral daily  sodium thiosulfate IVPB 25 Gram(s) IV Intermittent once    MEDICATIONS  (PRN):  acetaminophen     Tablet .. 650 milliGRAM(s) Oral every 6 hours PRN Temp greater or equal to 38C (100.4F), Mild Pain (1 - 3)  HYDROmorphone   Tablet 4 milliGRAM(s) Oral every 3 hours PRN Severe Pain (7 - 10)  HYDROmorphone   Tablet 2 milliGRAM(s) Oral every 3 hours PRN Moderate Pain (4 - 6)  lidocaine 2% Gel 1 Application(s) Topical every 6 hours PRN pain  sodium chloride 0.9% lock flush 10 milliLiter(s) IV Push every 1 hour PRN Pre/post blood products, medications, blood draw, and to maintain line patency      Weight:  Daily     Daily Weight in k.1 (25 May 2023 13:15)    Weight Change:    93/7kg   101.9kg   103.1kg   101.1kg  - noted wt fluctuations likely r/t fluid shifts     Estimated energy needs:   Weight used for calculations	current weight  Estimated Energy Needs Weight (lbs)	175 lb  Estimated Energy Needs Weight (kg)	79.4 kg    Estimated Energy Needs From (anatoliy/kg) 28  Estimated Energy Needs To (anatoliy/kg)	33  Estimated Energy Needs Calculated From (anatoliy/kg) 2223  Estimated Energy Needs Calculated To (anatoliy/kg)	2620    Estimated Protein Needs From (g/kg)	1.2  Estimated Protein Needs To (g/kg)	1.3  Estimated Protein Needs Calculated From (g/kg) 95.16  Estimated Protein Needs Calculated To (g/kg)	103.09    Other Calculations	Actual body weight used to calculate energy needs. Unable to assess IBW; ht not recorded. 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 Per GI, would like to reduce stool burden prior to EGD/colonscopy w manual disimpaction and golytely.  transfer to ICU for monitoring of pressures/potential fem line placement.  Pt reporting severe abdominal pain abdomen soft TTP RUQ    Visited pt at bedside this am. A&Ox1-2. Remains on HD. Per RN, remains with poor appetite. Now on soft bite-sized diet with dietary renal restriction - prior receiving clear liquid diet order. Recommend adding ONS regimen to better meet pt estimated nutrition needs. View recs below. Remains with multiple PUs. Receives daily nephrovite, ascorbic acid. Nutrition related labs reviewed; FSBG 51, 65, 135;  low Na (133) - continue fluids per team; K and phosph wnl - low prior. RD to remain available for recs adjustment prn or will follow up pt per organizational policy     Previous Nutrition Diagnosis:  Increased Nutrient Needs r/t increased metabolic needs for Kcal/protein AEB multiple PUs, ESRD on HD    Active [ X ]  Resolved [   ]    Goal: Consistently meets > 75% EER     Recommendations:  1. Continue current diet order with diet progression per team   2. Add Nepro Carbsteady ONS BID (420Kcal, 19g protein per serving)  3. Continue daily nephrovite; vitamin C 500mg to promote better wound healing   4. Continue phosph binder per team  5. Monitor chemistry, GI fxn, skin integrity, post HD wts   6. RD to reattempt full nutrition assessment at f/u prn   7. RD to remain available for recs adjustment prn or will follow up pt per organizational policy    Education: deferred given AMS    Risk Level: High [ X ] Moderate [   ] Low [   ]

## 2023-05-25 NOTE — PROGRESS NOTE ADULT - SUBJECTIVE AND OBJECTIVE BOX
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 for anemia, received 2 units prbcs inpatient scope deferred due to inability reach HCP and patient sent home on protonix) in the ED labs notable for Hgb 8.7 bp 99/59 K 4.0 bicarb 25. Patient was found to be tachycardic w hypotension in ED, resulting in ICU consult however VS stabilized with stable hemoglobin. Patient received anticoagulation reversal and was transferred to [facility]. KUB was administered noting large stool burden in rectal vault w GI consulted for EGD/colonoscopy however determined that melena more likely explained by bleeding associated with stercoral ulcers/hemorrhoids iso severe fecal impaction, rather than acute GIB. The patient received scheduled HD session 5/20 with 0.5L added during session, found to be hypotensive 80/60s, after receiving home midodrine dose (40mg q8), the patient had improvement of /30s. Once returning to floors, the patient was rechecked for vitals w BP 70/30s w complaints of L shoulder pain, however difficult to obtain consistent vitals due to edema on extremities. Rapid response was called, the patient received fluid resuscitation and albumin, deemed appropriate to step up to ICU with potential femoral line placement and stabilization of BP. Patient with no septic signs or differential leading to etiology of drop in blood pressure. Was continued on midodrine 40mg TID, and levophed intermittently on and off, but now off >72h. Florinef .2mg qd was started. Phosphate binder was discontinued. Otherwise, arterial line and femoral line were removed. Got cuff pressures from R popliteal artery. Goal BP and MAPS >50, as that is patient baseline. Palliative is following. Patient went into afib w RVR 5/24 evening and found to be febrile. Treated with tylenol, and HR resolved. Has remained afebrile since initial episode. Blood cultures and Wound Culture collected 5/25 AM. CXR with no infiltrates. Patient not making urine. Remains hemodynamically stable, stable for transfer to [facility].    SUBJECTIVE / INTERVAL HPI: Patient seen and examined at bedside. Patient denying chest pain, SOB, palpitations, cough. Patient denies fever, chills, HA, Dizziness, change in vision/hearing, N/V, abdominal pain, diarrhea, constipation, hematochezia/melena, dysuria, hematuria, new onset weakness/numbness, LE pain and/or swelling.    Remaining ROS negative     PHYSICAL EXAM:  General: WDWN  HEENT: NC/AT; PERRL, anicteric sclera; MMM  Neck: supple  Cardiovascular: +S1/S2, RRR  Respiratory: CTA B/L; no W/R/R  Gastrointestinal: soft, NT/ND; +BSx4  Extremities: WWP; no edema, clubbing or cyanosis  Vascular: 2+ radial, DP/PT pulses B/L  Neurological: AAOx3; no focal deficits  Psychiatric: pleasant mood and affect  Dermatologic: no appreciable wounds or damage to the skin    VITAL SIGNS:  Vital Signs Last 24 Hrs  T(C): 36.1 (25 May 2023 13:15), Max: 38.4 (24 May 2023 22:37)  T(F): 97 (25 May 2023 13:15), Max: 101.1 (24 May 2023 22:37)  HR: 82 (25 May 2023 13:15) (66 - 138)  BP: 134/61 (25 May 2023 13:15) (92/49 - 171/65)  BP(mean): 84 (25 May 2023 13:00) (65 - 110)  RR: 16 (25 May 2023 13:15) (11 - 18)  SpO2: 100% (25 May 2023 13:15) (93% - 100%)    Parameters below as of 25 May 2023 13:15  Patient On (Oxygen Delivery Method): room air    MEDICATIONS:  MEDICATIONS  (STANDING):  ascorbic acid 500 milliGRAM(s) Oral daily  atorvastatin 40 milliGRAM(s) Oral at bedtime  bisacodyl 5 milliGRAM(s) Oral at bedtime  chlorhexidine 2% Cloths 1 Application(s) Topical <User Schedule>  cinacalcet 120 milliGRAM(s) Oral every 24 hours  Dakins Solution - 1/4 Strength 1 Application(s) Topical every 12 hours  dextrose 10%. 1000 milliLiter(s) (30 mL/Hr) IV Continuous <Continuous>  fludroCORTISONE 0.2 milliGRAM(s) Oral every 24 hours  folic acid 1 milliGRAM(s) Oral daily  midodrine 40 milliGRAM(s) Oral every 8 hours  Nephro-deborah 1 Tablet(s) Oral daily  pantoprazole  Injectable 40 milliGRAM(s) IV Push every 12 hours  polyethylene glycol 3350 17 Gram(s) Oral daily  sodium thiosulfate IVPB 25 Gram(s) IV Intermittent once    MEDICATIONS  (PRN):  acetaminophen     Tablet .. 650 milliGRAM(s) Oral every 6 hours PRN Temp greater or equal to 38C (100.4F), Mild Pain (1 - 3)  HYDROmorphone   Tablet 2 milliGRAM(s) Oral every 3 hours PRN Moderate Pain (4 - 6)  HYDROmorphone   Tablet 4 milliGRAM(s) Oral every 3 hours PRN Severe Pain (7 - 10)  lidocaine 2% Gel 1 Application(s) Topical every 6 hours PRN pain  sodium chloride 0.9% lock flush 10 milliLiter(s) IV Push every 1 hour PRN Pre/post blood products, medications, blood draw, and to maintain line patency    ALLERGIES:  Allergies    sulfa drugs (Angioedema)  Ancef (Rash; Urticaria)  iodine (Hives; Pruritus)  DDAVP (Hypotension)  penicillin (Swelling)    Intolerances    LABS:                        8.5    13.21 )-----------( 105      ( 25 May 2023 11:43 )             28.4     05-25    128<L>  |  95<L>  |  21  ----------------------------<  50<LL>  see note   |  19<L>  |  2.71<H>    Ca    10.2      25 May 2023 05:30  Phos  3.1     05-25  Mg     2.0     05-25    TPro  5.9<L>  /  Alb  2.1<L>  /  TBili  0.4  /  DBili  x   /  AST  see note  /  ALT  see note  /  AlkPhos  see note  05-25    PT/INR - ( 25 May 2023 11:43 )   PT: 24.3 sec;   INR: 2.03          PTT - ( 25 May 2023 11:43 )  PTT:37.5 sec    CAPILLARY BLOOD GLUCOSE      POCT Blood Glucose.: 65 mg/dL (25 May 2023 12:49)      RADIOLOGY & ADDITIONAL TESTS: Reviewed.

## 2023-05-25 NOTE — PROGRESS NOTE ADULT - SUBJECTIVE AND OBJECTIVE BOX
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 for anemia, received 2 units prbcs inpatient scope deferred due to inability reach HCP and patient sent home on protonix) in the ED labs notable for Hgb 8.7 bp 99/59 K 4.0 bicarb 25. Patient was found to be tachycardic w hypotension in ED, resulting in ICU consult however VS stabilized with stable hemoglobin. Patient received anticoagulation reversal and was transferred to [facility]. KUB was administered noting large stool burden in rectal vault w GI consulted for EGD/colonoscopy however determined that melena more likely explained by bleeding associated with stercoral ulcers/hemorrhoids iso severe fecal impaction, rather than acute GIB. The patient received scheduled HD session 5/20 with 0.5L added during session, found to be hypotensive 80/60s, after receiving home midodrine dose (40mg q8), the patient had improvement of /30s. Once returning to floors, the patient was rechecked for vitals w BP 70/30s w complaints of L shoulder pain, however difficult to obtain consistent vitals due to edema on extremities. Rapid response was called, the patient received fluid resuscitation and albumin, deemed appropriate to step up to ICU with potential femoral line placement and stabilization of BP. Patient with no septic signs or differential leading to etiology of drop in blood pressure. Was continued on midodrine 40mg TID, and levophed intermittently on and off, but now off >72h. Florinef .2mg qd was started. Phosphate binder was discontinued. Otherwise, arterial line and femoral line were removed. Got cuff pressures from R popliteal artery. Goal BP and MAPS >50, as that is patient baseline. Palliative is following. Patient went into afib w RVR 5/24 evening and found to be febrile. Treated with tylenol, and HR resolved. Has remained afebrile since initial episode. Blood cultures and Wound Culture collected 5/25 AM. CXR with no infiltrates. Patient not making urine. Remains hemodynamically stable, stable for transfer to [facility].    OVERNIGHT EVENTS:  Afib w RVR, and fever curve downtrended. Wound Cultures and Blood cultures collected 5/25 AM.     SUBJECTIVE / INTERVAL HPI: Patient seen and examined at bedside. Lethargic and is AAOx1. RN reported patient is in pain but when assessed bedside patient is currently denying pain. Is non-specific in conversation and is slow to speak which has been her noticed baseline. Denies fever, chills, c/p, palpitations, abdominal pain, nausea, vomiting, diarrhea, dysuria, hematuria.    ICU Vital Signs Last 24 Hrs  T(C): 35.9 (25 May 2023 10:15), Max: 38.4 (24 May 2023 22:37)  T(F): 96.6 (25 May 2023 10:15), Max: 101.1 (24 May 2023 22:37)  HR: 76 (25 May 2023 10:15) (66 - 138)  BP: 155/63 (25 May 2023 10:15) (92/49 - 171/65)  BP(mean): 91 (25 May 2023 10:00) (65 - 110)  ABP: --  ABP(mean): --  RR: 17 (25 May 2023 10:15) (11 - 18)  SpO2: 99% (25 May 2023 10:15) (93% - 100%)    O2 Parameters below as of 25 May 2023 10:15  Patient On (Oxygen Delivery Method): room air      PHYSICAL EXAM:  General: large body habitus, AAO1. In NAD.  Eyes: PERRL, clear conjunctiva  ENT: Moist mucus membranes, poor dentition.  Respiratory: Decreased BS b/l lung fields; no W/R/R  Cardiac: +S1/S2; RRR; no M/R/G; HD catheter over L chest.   Gastrointestinal: soft, obese, NT/ND; no rebound or guarding; +BSx4. Large ventral hernia.   Extremities: Diffuse anasarca, worsened in bilateral upper extremities. +2 pitting b/l UE. +2 b/l LE.  No blisters on heels or toes.  Vascular: 2+ radial pulses B/L  Dermatologic: Bilateral breast wounds covered in dressing.  Neurologic: AAOx1-2. Moves extremities. +2/5 b/l LE and UE, consistent more so with patients mental status.      MEDICATIONS:  MEDICATIONS  (STANDING):  ascorbic acid 500 milliGRAM(s) Oral daily  atorvastatin 40 milliGRAM(s) Oral at bedtime  chlorhexidine 2% Cloths 1 Application(s) Topical <User Schedule>  cinacalcet 120 milliGRAM(s) Oral every 24 hours  dextrose 10%. 1000 milliLiter(s) (30 mL/Hr) IV Continuous <Continuous>  folic acid 1 milliGRAM(s) Oral daily  midodrine 40 milliGRAM(s) Oral every 8 hours  Nephro-deborah 1 Tablet(s) Oral daily  pantoprazole  Injectable 40 milliGRAM(s) IV Push every 12 hours  sevelamer carbonate 1600 milliGRAM(s) Oral three times a day with meals    MEDICATIONS  (PRN):  acetaminophen     Tablet .. 650 milliGRAM(s) Oral every 6 hours PRN Temp greater or equal to 38C (100.4F), Mild Pain (1 - 3)  sodium chloride 0.9% lock flush 10 milliLiter(s) IV Push every 1 hour PRN Pre/post blood products, medications, blood draw, and to maintain line patency      ALLERGIES:  Allergies    sulfa drugs (Angioedema)  Ancef (Rash; Urticaria)  iodine (Hives; Pruritus)  DDAVP (Hypotension)  penicillin (Swelling)    Intolerances      LABS:                         7.3    8.66  )-----------( 81       ( 24 May 2023 05:30 )             24.9     05-24    133<L>  |  96  |  21  ----------------------------<  105<H>  3.6   |  24  |  2.97<H>    Ca    9.8      24 May 2023 12:43  Phos  2.5     05-24  Mg     2.0     05-24    TPro  4.6<L>  /  Alb  2.0<L>  /  TBili  0.3  /  DBili  x   /  AST  9<L>  /  ALT  <5<L>  /  AlkPhos  114  05-24                  RADIOLOGY, EKG & ADDITIONAL TESTS:

## 2023-05-25 NOTE — PROGRESS NOTE ADULT - SUBJECTIVE AND OBJECTIVE BOX
Patient is a 67y Female seen and evaluated at bedside. no acute events overnight patient with improving blood pressures this /91 hgb 7.3 K 3.6 bicarb 24 phos 2.5        Meds:    acetaminophen     Tablet .. 650 every 6 hours PRN  ascorbic acid 500 daily  atorvastatin 40 at bedtime  bisacodyl 5 at bedtime  chlorhexidine 2% Cloths 1 <User Schedule>  cinacalcet 120 every 24 hours  Dakins Solution - 1/4 Strength 1 every 12 hours  epoetin александр-epbx (RETACRIT) Injectable 8000 once  fludroCORTISONE 0.2 every 24 hours  folic acid 1 daily  lactated ringers Bolus 250 once  midodrine 40 every 8 hours  Nephro-deborah 1 daily  pantoprazole  Injectable 40 every 12 hours  polyethylene glycol 3350 17 daily  sodium chloride 0.9% lock flush 10 every 1 hour PRN      T(C): , Max: 38.4 (23 @ 22:37)  T(F): , Max: 101.1 (23 @ 22:37)  HR: 74 (23 @ 09:00)  BP: 135/61 (23 @ 09:00)  BP(mean): 88 (23 @ 09:00)  RR: 17 (23 @ 09:00)  SpO2: 100% (23 @ 09:00)  Wt(kg): --     @ 07:01  -   @ 07:00  --------------------------------------------------------  IN: 750 mL / OUT: 0 mL / NET: 750 mL          Review of Systems:  all other ROS negative       PHYSICAL EXAM:  GENERAL: NAD resting in bed   CHEST/LUNG: decreased breath sounds at the bases   HEART: normal S1S2, RRR  ABDOMEN: Soft, Nontender, +BS,   EXTREMITIES: anasarca throughout   SKIN: no lesions or rashes   ACCESS: TDC       LABS:                        7.3    8.66  )-----------( 81       ( 24 May 2023 05:30 )             24.9         133<L>  |  96  |  21  ----------------------------<  105<H>  3.6   |  24  |  2.97<H>    Ca    9.8      24 May 2023 12:43  Phos  2.5       Mg     2.0         TPro  4.6<L>  /  Alb  2.0<L>  /  TBili  0.3  /  DBili  x   /  AST  9<L>  /  ALT  <5<L>  /  AlkPhos  114          Phosphorus Level, Serum: 2.5 mg/dL (23 @ 12:43)  Hemoglobin: 7.3 g/dL (23 @ 05:30)  Phosphorus Level, Serum: 1.4 mg/dL (23 @ 05:30)  Hemoglobin: 8.0 g/dL (23 @ 05:28)    Albumin, Serum: 2.0 g/dL (23 @ 05:30)  Albumin, Serum: 2.1 g/dL (23 @ 05:28)    cinacalcet 120 milliGRAM(s) Oral every 24 hours, 23 @ 15:07, Routine  epoetin александр-epbx (RETACRIT) Injectable 8000 Unit(s) IV Push once, 23 @ 07:56, Routine, Stop order after: 1 Doses  epoetin александр-epbx (RETACRIT) Injectable 8000 Unit(s) IV Push once, 23 @ 07:08, Routine, Stop order after: 1 Doses  epoetin александр-epbx (RETACRIT) Injectable 8000 Unit(s) IV Push once, 23 @ 05:42, Routine, Stop order after: 1 Doses  sevelamer carbonate 1600 milliGRAM(s) Oral three times a day with meals, 23 @ 15:07, Routine  sevelamer carbonate 800 milliGRAM(s) Oral three times a day with meals, 23 @ 16:11,     Hemodialysis Treatment.:     Schedule: Once, Modality: Hemodialysis, Access: Internal Jugular Central Venous Catheter    Dialyzer: Optiflux [id number], Time: 180 Min    Blood Flow: 400 mL/Min , Dialysate Flow: 500 mL/Min, Dialysate Temp: 36.5, Tubinmm (Adult)    Target Fluid Removal: 1 Liters    Dialysate Electrolytes (mEq/L): Potassium 3, Calcium 2.5, Sodium 138, Bicarbonate 35 (23 @ 05:42) [Active]          RADIOLOGY & ADDITIONAL STUDIES:

## 2023-05-26 NOTE — PROGRESS NOTE ADULT - SUBJECTIVE AND OBJECTIVE BOX
Patient is a 67y Female seen and evaluated at bedside. no aucte events overnight BP much improved  K 3.7 bicarb 26 phos 2.6 patient pending transfer out of MICU      Meds:    acetaminophen     Tablet .. 650 every 6 hours PRN  ascorbic acid 500 daily  atorvastatin 40 at bedtime  bisacodyl 5 at bedtime  chlorhexidine 2% Cloths 1 <User Schedule>  cinacalcet 120 every 24 hours  Dakins Solution - 1/4 Strength 1 every 12 hours  dextrose 10% Bolus 250 once  dextrose 10%. 1000 <Continuous>  dextrose 50% Injectable 25 once  dextrose 50% Injectable 12.5 once  dextrose 50% Injectable 25 once  dextrose Oral Gel 15 once  fludroCORTISONE 0.2 every 24 hours  folic acid 1 daily  glucagon  Injectable 1 once  HYDROmorphone   Tablet 2 every 3 hours PRN  HYDROmorphone   Tablet 4 every 3 hours PRN  lidocaine 2% Gel 1 every 6 hours PRN  midodrine 40 every 8 hours  Nephro-deborah 1 daily  pantoprazole  Injectable 40 every 12 hours  polyethylene glycol 3350 17 daily  sodium chloride 0.9% lock flush 10 every 1 hour PRN  sodium thiosulfate IVPB 25 once      T(C): , Max: 36.7 (05-26-23 @ 01:44)  T(F): , Max: 98 (05-26-23 @ 01:44)  HR: 88 (05-26-23 @ 10:00)  BP: 112/54 (05-26-23 @ 10:00)  BP(mean): 78 (05-26-23 @ 10:00)  RR: 14 (05-26-23 @ 10:00)  SpO2: 100% (05-26-23 @ 10:00)  Wt(kg): --    05-25 @ 07:01  -  05-26 @ 07:00  --------------------------------------------------------  IN: 1330 mL / OUT: 2000 mL / NET: -670 mL          Review of Systems:  all other ROS negative       PHYSICAL EXAM:  GENERAL: well-developed, well nourished, alert, no acute distress at present  CHEST/LUNG: decreased breath sounds at the bases   HEART: normal S1S2, RRR  ABDOMEN: Soft, Nontender, +BS,   EXTREMITIES: anasarca throughout    SKIN: no lesions or rashes   ACCESS: TDC       LABS:                        8.5    13.21 )-----------( 105      ( 25 May 2023 11:43 )             28.4     05-26    129<L>  |  94<L>  |  19  ----------------------------<  75  3.7   |  26  |  2.68<H>    Ca    10.2      26 May 2023 05:30  Phos  2.6     05-26  Mg     1.8     05-26    TPro  5.0<L>  /  Alb  2.2<L>  /  TBili  0.4  /  DBili  x   /  AST  12  /  ALT  5<L>  /  AlkPhos  119  05-26      PT/INR - ( 25 May 2023 11:43 )   PT: 24.3 sec;   INR: 2.03          PTT - ( 25 May 2023 11:43 )  PTT:37.5 sec          RADIOLOGY & ADDITIONAL STUDIES:

## 2023-05-26 NOTE — PROGRESS NOTE ADULT - ASSESSMENT
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    Assessment/Plan:   #ESRD on HD  #Calciphylaxis  Usual RX time 3:30 hrs, EDW TBD  next HD 5/27  given improvement in hemodynamics herman aim for 2.5-3L  Renal Diet  Give sodium thiosulfate with HD     #Hypotension   improving off of levohed  -midodrine   -maintain MAP 70 for adequate renal perfusion    #access   L TDC    #anemia  Hgb not at goal  7.8  Epo w/ HD    #renal bone disease   Ca ~10.2  phos 2.5  c/w sensipar  would restart renvela 800 TID w/ meals   trend phos daily     Saumya GRIFFIN>O  PGY 5 nephrology fellow  [phone]         Thank you for the opportunity to participate in the care of your patient. The nephrology service remains available to assist with any questions or concerns. Please feel free to reach us by paging the on-call nephrology fellow for urgent issues or as below.     Saumya Jo D.O  PGY-5, Nephrology Fellow    P: [phone]

## 2023-05-26 NOTE — PROGRESS NOTE ADULT - SUBJECTIVE AND OBJECTIVE BOX
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 Patient was found to be tachycardic w hypotension in ED, resulting in ICU consult however VS stabilized with stable hemoglobin. Patient received anticoagulation reversal and was transferred to [facility]. KUB was administered noting large stool burden in rectal vault w GI consulted for EGD/colonoscopy however determined that melena more likely explained by bleeding associated with stercoral ulcers/hemorrhoids iso severe fecal impaction, rather than acute GIB. The patient received scheduled HD session 5/20 with 0.5L added during session, found to be hypotensive 80/60s, after receiving home midodrine dose (40mg q8), the patient had improvement of /30s. Once returning to floors, the patient was rechecked for vitals w BP 70/30s w complaints of L shoulder pain, however difficult to obtain consistent vitals due to edema on extremities. Rapid response was called, the patient received fluid resuscitation and albumin, deemed appropriate to step up to ICU with potential femoral line placement and stabilization of BP. Patient with no septic signs or differential leading to etiology of drop in blood pressure. Was continued on midodrine 40mg TID, and levophed intermittently on and off, but now off >72h. Florinef .2mg qd was started. Phosphate binder was discontinued. Otherwise, arterial line and femoral line were removed. Got cuff pressures from R popliteal artery. Goal BP and MAPS >50, as that is patient baseline. Palliative is following. Patient went into afib w RVR 5/24 evening and found to be febrile. Treated with tylenol, and HR resolved. Has remained afebrile since initial episode. Blood cultures and Wound Culture collected 5/25 AM. CXR with no infiltrates. Patient not making urine. Remains hemodynamically stable, stable for transfer to [facility].    OVERNIGHT EVENTS:  Borderline FSG overnight but no hypoglycemia. Patient with breast pain, EKG ordered, low voltage qrs, no ischemic changes. ACNP simi AM labs.    SUBJECTIVE / INTERVAL HPI: Patient seen and examined at bedside. Lethargic and is AAOx1. Is non-specific in conversation and is slow to speak which has been her noticed baseline. Generalized moaning. Unable to elucidate full ROS due to mental status.    ICU Vital Signs Last 24 Hrs  T(C): 36.4 (26 May 2023 09:02), Max: 36.7 (26 May 2023 01:44)  T(F): 97.6 (26 May 2023 09:02), Max: 98 (26 May 2023 01:44)  HR: 89 (26 May 2023 08:00) (66 - 107)  BP: 146/88 (26 May 2023 08:00) (101/59 - 182/91)  BP(mean): 109 (26 May 2023 08:00) (72 - 121)  ABP: --  ABP(mean): --  RR: 10 (26 May 2023 08:00) (10 - 19)  SpO2: 100% (26 May 2023 08:00) (95% - 100%)    O2 Parameters below as of 26 May 2023 08:00  Patient On (Oxygen Delivery Method): room air        PHYSICAL EXAM:  General: large body habitus, AAO1. In NAD.  Eyes: PERRL, clear conjunctiva  ENT: Moist mucus membranes, poor dentition.  Respiratory: Decreased BS b/l lung fields; no W/R/R  Cardiac: +S1/S2; irregular rate; no M/R/G; HD catheter over L chest.   Gastrointestinal: soft, obese, NT/ND; no rebound or guarding; +BSx4. Large ventral hernia.   Extremities: Diffuse anasarca, worsened in bilateral upper extremities. +2 pitting b/l UE. +2 b/l LE.  No blisters on heels or toes.  Vascular: 2+ radial pulses B/L  Dermatologic: Bilateral breast wounds covered in dressing.  Neurologic: AAOx1-2. Moves extremities. +2/5 b/l LE and UE, consistent more so with patients mental status.      MEDICATIONS:  MEDICATIONS  (STANDING):  ascorbic acid 500 milliGRAM(s) Oral daily  atorvastatin 40 milliGRAM(s) Oral at bedtime  chlorhexidine 2% Cloths 1 Application(s) Topical <User Schedule>  cinacalcet 120 milliGRAM(s) Oral every 24 hours  dextrose 10%. 1000 milliLiter(s) (30 mL/Hr) IV Continuous <Continuous>  folic acid 1 milliGRAM(s) Oral daily  midodrine 40 milliGRAM(s) Oral every 8 hours  Nephro-deborah 1 Tablet(s) Oral daily  pantoprazole  Injectable 40 milliGRAM(s) IV Push every 12 hours  sevelamer carbonate 1600 milliGRAM(s) Oral three times a day with meals    MEDICATIONS  (PRN):  acetaminophen     Tablet .. 650 milliGRAM(s) Oral every 6 hours PRN Temp greater or equal to 38C (100.4F), Mild Pain (1 - 3)  sodium chloride 0.9% lock flush 10 milliLiter(s) IV Push every 1 hour PRN Pre/post blood products, medications, blood draw, and to maintain line patency      ALLERGIES:  Allergies    sulfa drugs (Angioedema)  Ancef (Rash; Urticaria)  iodine (Hives; Pruritus)  DDAVP (Hypotension)  penicillin (Swelling)    Intolerances      LABS:                         7.3    8.66  )-----------( 81       ( 24 May 2023 05:30 )             24.9     05-24    133<L>  |  96  |  21  ----------------------------<  105<H>  3.6   |  24  |  2.97<H>    Ca    9.8      24 May 2023 12:43  Phos  2.5     05-24  Mg     2.0     05-24    TPro  4.6<L>  /  Alb  2.0<L>  /  TBili  0.3  /  DBili  x   /  AST  9<L>  /  ALT  <5<L>  /  AlkPhos  114  05-24                  RADIOLOGY, EKG & ADDITIONAL TESTS:  OVERNIGHT EVENTS:  Borderline FSG overnight but no hypoglycemia. Patient with breast pain, EKG ordered, low voltage qrs, no ischemic changes. ACNP simi AM labs.    SUBJECTIVE / INTERVAL HPI: Patient seen and examined at bedside. Lethargic and is AAOx1. Is non-specific in conversation and is slow to speak which has been her noticed baseline. Generalized moaning. Unable to elucidate full ROS due to mental status.    ICU Vital Signs Last 24 Hrs  T(C): 36.4 (26 May 2023 09:02), Max: 36.7 (26 May 2023 01:44)  T(F): 97.6 (26 May 2023 09:02), Max: 98 (26 May 2023 01:44)  HR: 89 (26 May 2023 08:00) (66 - 107)  BP: 146/88 (26 May 2023 08:00) (101/59 - 182/91)  BP(mean): 109 (26 May 2023 08:00) (72 - 121)  ABP: --  ABP(mean): --  RR: 10 (26 May 2023 08:00) (10 - 19)  SpO2: 100% (26 May 2023 08:00) (95% - 100%)    O2 Parameters below as of 26 May 2023 08:00  Patient On (Oxygen Delivery Method): room air        PHYSICAL EXAM:  General: large body habitus, AAO1. In NAD.  Eyes: PERRL, clear conjunctiva  ENT: Moist mucus membranes, poor dentition.  Respiratory: Decreased BS b/l lung fields; no W/R/R  Cardiac: +S1/S2; irregular rate; no M/R/G; HD catheter over L chest.   Gastrointestinal: soft, obese, NT/ND; no rebound or guarding; +BSx4. Large ventral hernia.   Extremities: Diffuse anasarca, worsened in bilateral upper extremities. +2 pitting b/l UE. +2 b/l LE.  No blisters on heels or toes.  Vascular: 2+ radial pulses B/L  Dermatologic: Bilateral breast wounds covered in dressing.  Neurologic: AAOx1-2. Moves extremities. +2/5 b/l LE and UE, consistent more so with patients mental status.      MEDICATIONS:  MEDICATIONS  (STANDING):  ascorbic acid 500 milliGRAM(s) Oral daily  atorvastatin 40 milliGRAM(s) Oral at bedtime  chlorhexidine 2% Cloths 1 Application(s) Topical <User Schedule>  cinacalcet 120 milliGRAM(s) Oral every 24 hours  dextrose 10%. 1000 milliLiter(s) (30 mL/Hr) IV Continuous <Continuous>  folic acid 1 milliGRAM(s) Oral daily  midodrine 40 milliGRAM(s) Oral every 8 hours  Nephro-deborah 1 Tablet(s) Oral daily  pantoprazole  Injectable 40 milliGRAM(s) IV Push every 12 hours  sevelamer carbonate 1600 milliGRAM(s) Oral three times a day with meals    MEDICATIONS  (PRN):  acetaminophen     Tablet .. 650 milliGRAM(s) Oral every 6 hours PRN Temp greater or equal to 38C (100.4F), Mild Pain (1 - 3)  sodium chloride 0.9% lock flush 10 milliLiter(s) IV Push every 1 hour PRN Pre/post blood products, medications, blood draw, and to maintain line patency      ALLERGIES:  Allergies    sulfa drugs (Angioedema)  Ancef (Rash; Urticaria)  iodine (Hives; Pruritus)  DDAVP (Hypotension)  penicillin (Swelling)    Intolerances    LABS:                         8.5    13.21 )-----------( 105      ( 25 May 2023 11:43 )             28.4     05-26    129<L>  |  94<L>  |  19  ----------------------------<  75  3.7   |  26  |  2.68<H>    Ca    10.2      26 May 2023 05:30  Phos  2.6     05-26  Mg     1.8     05-26    TPro  5.0<L>  /  Alb  2.2<L>  /  TBili  0.4  /  DBili  x   /  AST  12  /  ALT  5<L>  /  AlkPhos  119  05-26    PT/INR - ( 25 May 2023 11:43 )   PT: 24.3 sec;   INR: 2.03          PTT - ( 25 May 2023 11:43 )  PTT:37.5 sec              RADIOLOGY, EKG & ADDITIONAL TESTS:

## 2023-05-26 NOTE — CONSULT NOTE ADULT - ASSESSMENT
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 Of note, patient with recent admission at [facility] (5/3-5/9) during which time she was admitted for similar presentation after being found to have Hb 6.9 on nursing home labwork, recieved 2U pRBC while admitted and discharged on PPI. GI consulted on that admission but patient remained stable without further GI so no EGD/scope performed. Hospital course at that time c/b hypotension however pt chronically on midodrine 40mg PO Q8. On current admission, pt noted to have multiple episodes of melena in ED. Initially thought to be originating from multiple sacral ulcers however pt subsequently had large melena originating from the rectum after ulcers were cleaned/dressed. Patient AOx1 (self) and unable to answer further questions or follow any other commands, at baseline per prior documentation from recent admission.    General surgery consulted for evaluation of bilateral breast wounds 2/2 to severe calciphalaxis    Recommendations:  No acute surgical intervention as goals of care discussion ongoing  Recommend continued wound care with Dakins  Appreciate wound care recommendations  Team 5C will continue to follow    Plan discussed with chief resident and Dr. 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 General surgery consulted on 5/26 for evaluation of bilateral breast wounds and possible debridement. Pt unable to provide subjective history 2/2 current mentation. Bilateral breast wounds with fibrinous slough, necrotic tissue, and purulent drainage underlying necrotic eschar. Would consider bedside debridement vs debridement in OR based on patient's clinical status and goals of care discussion.    Recommendations:  No acute surgical intervention as goals of care discussion ongoing  Recommend continued wound care with Dakins  Appreciate wound care recommendations  Rest of care per MICU  Team 5C will continue to follow    Plan discussed with chief resident and Dr. 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 General surgery consulted on 5/26 for evaluation of bilateral breast wounds and possible debridement. Pt unable to provide subjective history 2/2 current mentation. Bilateral breast wounds with fibrinous slough, necrotic tissue, and purulent drainage underlying necrotic eschar. Would consider bedside debridement vs debridement in OR based on patient's clinical status and goals of care discussion.    Recommendations:  No acute surgical intervention as goals of care discussion ongoing  Recommend continued wound care with Dakins  Appreciate wound care recommendations  Rest of care per MICU  Team 5C will continue to follow    Plan discussed with chief resident and Dr. Hatch    CHIEF RESIDENT ADDENDUM  Agree with above. Chronic necrotic bilateral breast wounds. No active infection; not warm, no discharge, no foul smell. Wound care nursing this weekend, will re-evaluate need for operative debridement next week. Will likely not tolerate bedside debridement. 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 General surgery consulted on 5/26 for evaluation of bilateral breast wounds and possible debridement. Pt unable to provide subjective history 2/2 current mentation. Bilateral breast wounds with fibrinous slough, necrotic tissue, and purulent drainage underlying necrotic eschar. Would consider bedside debridement vs debridement in OR based on patient's clinical status and goals of care discussion.    Recommendations:  No acute surgical intervention as goals of care discussion ongoing  Recommend continued wound care with Dakins  Appreciate wound care recommendations  Rest of care per MICU  Team 5C will continue to follow    Plan discussed with chief resident and Dr. Hatch    CHIEF RESIDENT ADDENDUM  Agree with above. Chronic necrotic bilateral breast wounds. Not warm, no foul smell. Wound care nursing this weekend, will re-evaluate need for operative debridement next week. Will likely not tolerate bedside debridement.

## 2023-05-26 NOTE — PROGRESS NOTE ADULT - ASSESSMENT
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 Subsequently with HD today and drop in pressures, transferred to MICU for line placement and better accuracy of blood pressure control. Patient has remained hemodynamically stable, off pressors.    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    Cardio  Patient w persistently low blood pressures, w 80s/40s during RR and inability to accurately measure BP due to diffuse edema during RR  Less concern for shock on differential given no infectious etiology, was requiring low dose levophed intermittently, but patient known to have baseline low blood pressure.  - Arterial Line removed 5/24/23.  - Weaned off levophed  - Midodrine 40mg q 8, home medication  - C/w HD as needed. HD today 5/25.  - Florinef .2mg qd.    #HfrEf, non-ischemic cardiomyopathy  - history of HfpEF, TTE 5/5/23, normal RV and LV function  - On No HF meds  - C.w statin      Pulmonary  - Patient w no pulmonology history  - good airway control, controlling secretions  - Continue to monitor secretions.    Renal  - ESRD w Dialysis, rapid response during HD session  - C/w HD as tolerated  - renvela discontinued as pt w normal P.    GI  #Stercoral Ulcers  - Came in w GI bleed deemed to be 2/2 to constipation and stercoral ulcers  - C/w PPi IV BID  - C/w golytely prep as laxative  - Daily KUB  - F/u abdominal US w doppler, no evidence of portal HTN  - F/u GI recs, as per HCP and pt GOC, no role for egd this admission  - Active T and S, Keep HgB >7  - Caution w NSAIDS, prefer GANN-2 inhibitors    ID  -Patient w F to 101.1 on 5/24/23, and concomitant afib w RVR, resolved after defervescence  - Afebrile currently  - F/u BCx, F/u breast wound culture    Heme  - HgB stable, currently, came in at 6.2  - See above for GI    Endo  5/21 received d10 bolus for glucose 66, on q4 glucose checks, then started d10 gtt at 30 cc/hr  - Off gtt now, re-initiate as necessary  - FSG q6.    Prophylaxis:  F: Off IVF currently.  E: Replete cautiously given ESRD  N: Soft and Bite sized (renal diet)  DVT: none as previous GIB    Lines: No lines.  Peripheral IV to be placed-> Foot IV.   No Wu. 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 Subsequently with HD today and drop in pressures, transferred to MICU for line placement and better accuracy of blood pressure control. Patient has remained hemodynamically stable, off pressors.    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    Cardio  Patient w persistently low blood pressures, w 80s/40s during RR and inability to accurately measure BP due to diffuse edema during RR  Less concern for shock on differential given no infectious etiology, was requiring low dose levophed intermittently, but patient known to have baseline low blood pressure.  - Arterial Line removed 5/24/23.  - Weaned off levophed  - Midodrine 40mg q 8, home medication  - C/w HD as needed. HD yesterday 5/25.  - Florinef .2mg qd.    #HfrEf, non-ischemic cardiomyopathy  - history of HfpEF, TTE 5/5/23, normal RV and LV function  - On No HF meds  - C.w statin      Pulmonary  - Patient w no pulmonology history  - good airway control, controlling secretions  - Continue to monitor secretions.    Renal  - ESRD w Dialysis, rapid response during HD session  - C/w HD as tolerated  - renvela discontinued as pt w normal Phosphorus.    GI  #Stercoral Ulcers, 2 BM nonbloody 5/26 AM.  - Came in w GI bleed deemed to be 2/2 to constipation and stercoral ulcers  - C/w PPi IV BID  - C/w golytely prep as laxative  - Daily KUB  - F/u abdominal US w doppler, no evidence of portal HTN  - F/u GI recs, as per HCP and pt GOC, no role for egd this admission  - Active T and S, Keep HgB >7  - Caution w NSAIDS, prefer GANN-2 inhibitors    ID  -Patient w F to 101.1 on 5/24/23, and concomitant afib w RVR, resolved after defervescence  - Afebrile currently  - F/u BCx, F/u breast wound culture-> gram negative rods. and gram positive cocci  - Will order Vancomycin 1x dose, and Cefepime 1 x dose.  - Gen Surgery consulted, f/u recs for debridement.    Heme  - HgB stable, currently, came in at 6.2  - See above for GI    Endo  5/21 received d10 bolus for glucose 66, on q4 glucose checks, then started d10 gtt at 50 cc/hr  - Off gtt now, re-initiate as necessary  - FSG q6.    Prophylaxis:  F: d10 gtt 50cc/hr.  E: Replete cautiously given ESRD  N: Soft and Bite sized (renal diet)  DVT: none as previous GIB    Lines: No lines.  Peripheral IV to be placed-> R external jugular vein  No Wu.

## 2023-05-26 NOTE — CONSULT NOTE ADULT - ATTENDING COMMENTS
Necrotic wound left breast. Patient seems to be stable.  Will consider debridement in OR or bedside when and if family gives consent.
Pt well known to Crit care service. VSS and Hb stable. No evidence of further bleeding. Anticoagulation reversal done in Ed. I am told pt has IVC filter by history. Consult GI vs colorectal surgery.

## 2023-05-26 NOTE — CHART NOTE - NSCHARTNOTEFT_GEN_A_CORE
- spoke with pt's daughter Jasmina who said:  - pt had good response to wound vacs at [facility] when wounds seemed to be improving when she was on it continuously throughout her hospital course ~1 month in February or March; no known prior surgery to breasts; was on antibiotics for breast wounds then  - allergy to penicillin: unsure what it is or when she had it  - updated Jasmina, discussed ongoing workup and surgery consult for wounds - spoke with pt's daughter Jasmina who said:  - pt had good response to wound vacs at [facility] when wounds seemed to be improving when she was on it continuously throughout her hospital course ~1 month in February or March; no known prior surgery to breasts; was on antibiotics for breast wounds then  - allergy to penicillin: unsure what it is or when she had it  - updated Jasmina, discussed ongoing workup and surgery consult for wounds    ADDENDUM:  - called daughter back with surgery and explained that pt would likely need wound debridement; daughter said she'd want the procedure to happen but would want to talk to her mother and does not want to put her through more strain than needed; she would like to think about it for now - spoke with pt's daughter Jasmina who said:  - pt had good response to wound vacs at [facility] when wounds seemed to be improving when she was on it continuously throughout her hospital course ~1 month in February or March; no known prior surgery to breasts; was on antibiotics for breast wounds then  - allergy to penicillin: unsure what it is or when she had it  - updated Jasmina, discussed ongoing workup and surgery consult for wounds    ADDENDUM:  - called daughter back in conjunction with surgery resident and explained that pt would likely need wound debridement; daughter said she'd want the procedure to happen but would want to talk to her mother and does not want to put her through more strain than needed; she would like to think about it for now

## 2023-05-26 NOTE — CHART NOTE - NSCHARTNOTEFT_GEN_A_CORE
Infectious Diseases Anti-infective Approval Note    Medication:  Cefepime  Dose:  1 g  Route:  IV  Frequency:  q24h  Duration**:  3 d    Dose may be adjusted as needed for alterations in renal function.    *THIS IS NOT AN INFECTIOUS DISEASES CONSULTATION*    **Indicates duration of approval, not necessarily duration of treatment

## 2023-05-26 NOTE — CONSULT NOTE ADULT - SUBJECTIVE AND OBJECTIVE BOX
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     Of note, patient with recent admission at [facility] (5/3-5/9) during which time she was admitted for similar presentation after being found to have Hb 6.9 on nursing home labwork, recieved 2U pRBC while admitted and discharged on PPI. GI consulted on that admission but patient remained stable without further GI so no EGD/scope performed. Hospital course at that time c/b hypotension however pt chronically on midodrine 40mg PO Q8.     On current admission, pt noted to have multiple episodes of melena in ED. Initially thought to be originating from multiple sacral ulcers however pt subsequently had large melena originating from the rectum after ulcers were cleaned/dressed. Patient AOx1 (self) and unable to answer further questions or follow any other commands, at baseline per prior documentation from recent admission.      ED COURSE  VS: T 98.5F (oral), HR , /50, RR 18, SpO2 91% (2L NC)  Labs: 14.33, Hb 8.7 (MCV 90), PT 43.1, INR 3.58, aPTT 41.5, BUN/Cr 19/3.06, TPro 5.4, Alb 2.3, AlkPhos 134   Orders: Protonix 80mh IVP x1, Kcentra 4000IU x1, NS 500cc bolus x1  Imaging:  - CXR: Possible small R pleural effusion. Tortuous aorta. No clear consolidations. No PTX.    Consults: GI  (19 May 2023 14:55)      GENERAL SURGERY ADDENDUM:    PMH:     acetaminophen     Tablet .. 650 milliGRAM(s) Oral every 6 hours PRN  ascorbic acid 500 milliGRAM(s) Oral daily  atorvastatin 40 milliGRAM(s) Oral at bedtime  bisacodyl 5 milliGRAM(s) Oral at bedtime  chlorhexidine 2% Cloths 1 Application(s) Topical <User Schedule>  cinacalcet 120 milliGRAM(s) Oral every 24 hours  Dakins Solution - 1/4 Strength 1 Application(s) Topical every 12 hours  dextrose 10% Bolus 250 milliLiter(s) IV Bolus once  dextrose 10%. 1000 milliLiter(s) IV Continuous <Continuous>  dextrose 50% Injectable 25 Gram(s) IV Push once  dextrose 50% Injectable 12.5 Gram(s) IV Push once  dextrose 50% Injectable 25 Gram(s) IV Push once  dextrose Oral Gel 15 Gram(s) Oral once  EPINEPHrine     1 mG/mL Injectable 0.3 milliGRAM(s) IntraMuscular once PRN  fludroCORTISONE 0.2 milliGRAM(s) Oral every 24 hours  folic acid 1 milliGRAM(s) Oral daily  glucagon  Injectable 1 milliGRAM(s) IntraMuscular once  heparin   Injectable 5000 Unit(s) SubCutaneous every 8 hours  HYDROmorphone   Tablet 4 milliGRAM(s) Oral every 3 hours PRN  HYDROmorphone   Tablet 2 milliGRAM(s) Oral every 3 hours PRN  lidocaine 2% Gel 1 Application(s) Topical every 6 hours PRN  midodrine 40 milliGRAM(s) Oral every 8 hours  Nephro-deborah 1 Tablet(s) Oral daily  pantoprazole  Injectable 40 milliGRAM(s) IV Push every 12 hours  polyethylene glycol 3350 17 Gram(s) Oral daily  sodium chloride 0.9% lock flush 10 milliLiter(s) IV Push every 1 hour PRN  sodium thiosulfate IVPB 25 Gram(s) IV Intermittent once      Allergies    sulfa drugs (Angioedema)  Ancef (Rash; Urticaria)  iodine (Hives; Pruritus)  DDAVP (Hypotension)  penicillin (Swelling)    Intolerances        ICU Vital Signs Last 24 Hrs  T(F): 97.8 (05-26-23 @ 17:28), Max: 98 (05-26-23 @ 01:44)  HR: 103 (05-26-23 @ 20:00) (74 - 107)  BP: 131/56 (05-26-23 @ 20:00) (112/54 - 161/54)  BP(mean): 80 (05-26-23 @ 20:00) (74 - 109)  ABP: --  RR: 6 (05-26-23 @ 20:00) (6 - 19)  SpO2: 100% (05-26-23 @ 20:00) (96% - 100%)    General: AAOx3, NAD, WDWN, laying comfortably in bed  Cardio: S1,S2, No MRG, RRR  Pulm: Lungs bilaterally clear to auscultation  Abdomen:  Extremities: WWP, peripheral pulses appreciated  LABS:    05-26    129<L>  |  94<L>  |  19  ----------------------------<  75  3.7   |  26  |  2.68<H>    Ca    10.2      26 May 2023 05:30  Phos  2.6     05-26  Mg     1.8     05-26    TPro  5.0<L>  /  Alb  2.2<L>  /  TBili  0.4  /  DBili  x   /  AST  12  /  ALT  5<L>  /  AlkPhos  119  05-26  LIVER FUNCTIONS - ( 26 May 2023 05:30 )  Alb: 2.2 g/dL / Pro: 5.0 g/dL / ALK PHOS: 119 U/L / ALT: 5 U/L / AST: 12 U/L / GGT: x                               7.8    12.35 )-----------( 91       ( 26 May 2023 05:30 )             27.7   PT/INR - ( 25 May 2023 11:43 )   PT: 24.3 sec;   INR: 2.03          PTT - ( 25 May 2023 11:43 )  PTT:37.5 sec  CAPILLARY BLOOD GLUCOSE      POCT Blood Glucose.: 99 mg/dL (26 May 2023 20:09)  POCT Blood Glucose.: 88 mg/dL (26 May 2023 16:17)  POCT Blood Glucose.: 94 mg/dL (26 May 2023 13:48)  POCT Blood Glucose.: 90 mg/dL (26 May 2023 11:32)  POCT Blood Glucose.: 91 mg/dL (26 May 2023 09:37)  POCT Blood Glucose.: 81 mg/dL (26 May 2023 07:24)  POCT Blood Glucose.: 98 mg/dL (26 May 2023 04:27)  POCT Blood Glucose.: 78 mg/dL (26 May 2023 02:11)  POCT Blood Glucose.: 102 mg/dL (25 May 2023 23:30)  POCT Blood Glucose.: 90 mg/dL (25 May 2023 22:27)  POCT Blood Glucose.: 94 mg/dL (25 May 2023 21:28)  POCT Blood Glucose.: 73 mg/dL (25 May 2023 20:30)         HPI:  68 yo F with PMHx ESRD (2/2 PKD, HD TThSat, via L chest catheter), HFrEF (LVEF 65% in 5/2023), non-ischemic CM, HTN, HLD, PE (s/p IVC filter, 2018), 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     Of note, patient with recent admission at [facility] (5/3-5/9) during which time she was admitted for similar presentation after being found to have Hb 6.9 on nursing home labwork, recieved 2U pRBC while admitted and discharged on PPI. GI consulted on that admission but patient remained stable without further GI so no EGD/scope performed. Hospital course at that time c/b hypotension however pt chronically on midodrine 40mg PO Q8.     On current admission, pt noted to have multiple episodes of melena in ED. Initially thought to be originating from multiple sacral ulcers however pt subsequently had large melena originating from the rectum after ulcers were cleaned/dressed. Patient AOx1 (self) and unable to answer further questions or follow any other commands, at baseline per prior documentation from recent admission.      ED COURSE  VS: T 98.5F (oral), HR , /50, RR 18, SpO2 91% (2L NC)  Labs: 14.33, Hb 8.7 (MCV 90), PT 43.1, INR 3.58, aPTT 41.5, BUN/Cr 19/3.06, TPro 5.4, Alb 2.3, AlkPhos 134   Orders: Protonix 80mh IVP x1, Kcentra 4000IU x1, NS 500cc bolus x1  Imaging:  - CXR: Possible small R pleural effusion. Tortuous aorta. No clear consolidations. No PTX.    Consults: GI  (19 May 2023 14:55)      GENERAL SURGERY ADDENDUM: HPI as above. 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 General surgery consulted on 5/26 for evaluation of bilateral breast wounds and possible debridement. Pt currently AAOx1 and unable to provide subjective history other than report that her breasts hurt.       acetaminophen     Tablet .. 650 milliGRAM(s) Oral every 6 hours PRN  ascorbic acid 500 milliGRAM(s) Oral daily  atorvastatin 40 milliGRAM(s) Oral at bedtime  bisacodyl 5 milliGRAM(s) Oral at bedtime  chlorhexidine 2% Cloths 1 Application(s) Topical <User Schedule>  cinacalcet 120 milliGRAM(s) Oral every 24 hours  Dakins Solution - 1/4 Strength 1 Application(s) Topical every 12 hours  dextrose 10% Bolus 250 milliLiter(s) IV Bolus once  dextrose 10%. 1000 milliLiter(s) IV Continuous <Continuous>  dextrose 50% Injectable 25 Gram(s) IV Push once  dextrose 50% Injectable 12.5 Gram(s) IV Push once  dextrose 50% Injectable 25 Gram(s) IV Push once  dextrose Oral Gel 15 Gram(s) Oral once  EPINEPHrine     1 mG/mL Injectable 0.3 milliGRAM(s) IntraMuscular once PRN  fludroCORTISONE 0.2 milliGRAM(s) Oral every 24 hours  folic acid 1 milliGRAM(s) Oral daily  glucagon  Injectable 1 milliGRAM(s) IntraMuscular once  heparin   Injectable 5000 Unit(s) SubCutaneous every 8 hours  HYDROmorphone   Tablet 4 milliGRAM(s) Oral every 3 hours PRN  HYDROmorphone   Tablet 2 milliGRAM(s) Oral every 3 hours PRN  lidocaine 2% Gel 1 Application(s) Topical every 6 hours PRN  midodrine 40 milliGRAM(s) Oral every 8 hours  Nephro-deborah 1 Tablet(s) Oral daily  pantoprazole  Injectable 40 milliGRAM(s) IV Push every 12 hours  polyethylene glycol 3350 17 Gram(s) Oral daily  sodium chloride 0.9% lock flush 10 milliLiter(s) IV Push every 1 hour PRN  sodium thiosulfate IVPB 25 Gram(s) IV Intermittent once      Allergies    sulfa drugs (Angioedema)  Ancef (Rash; Urticaria)  iodine (Hives; Pruritus)  DDAVP (Hypotension)  penicillin (Swelling)    Intolerances        ICU Vital Signs Last 24 Hrs  T(F): 97.8 (05-26-23 @ 17:28), Max: 98 (05-26-23 @ 01:44)  HR: 103 (05-26-23 @ 20:00) (74 - 107)  BP: 131/56 (05-26-23 @ 20:00) (112/54 - 161/54)  BP(mean): 80 (05-26-23 @ 20:00) (74 - 109)  ABP: --  RR: 6 (05-26-23 @ 20:00) (6 - 19)  SpO2: 100% (05-26-23 @ 20:00) (96% - 100%)    General: AAOx1, NAD, WDWN, laying comfortably in bed  Breast: b/l subareolar necrotic breast wounds appreciated extending into axilla. Left breast with overlying necrotic eschar and wound smelling necrotic purulent and fibrinous tissue towards axilla.   Cardio: S1,S2, No MRG, RRR  Pulm: Lungs bilaterally clear to auscultation  Abdomen: soft, ntnd, no rebound, no guarding, large ventral hernia easily reducible  Extremities: WWP, peripheral pulses appreciated  LABS:    05-26    129<L>  |  94<L>  |  19  ----------------------------<  75  3.7   |  26  |  2.68<H>    Ca    10.2      26 May 2023 05:30  Phos  2.6     05-26  Mg     1.8     05-26    TPro  5.0<L>  /  Alb  2.2<L>  /  TBili  0.4  /  DBili  x   /  AST  12  /  ALT  5<L>  /  AlkPhos  119  05-26  LIVER FUNCTIONS - ( 26 May 2023 05:30 )  Alb: 2.2 g/dL / Pro: 5.0 g/dL / ALK PHOS: 119 U/L / ALT: 5 U/L / AST: 12 U/L / GGT: x                               7.8    12.35 )-----------( 91       ( 26 May 2023 05:30 )             27.7   PT/INR - ( 25 May 2023 11:43 )   PT: 24.3 sec;   INR: 2.03          PTT - ( 25 May 2023 11:43 )  PTT:37.5 sec  CAPILLARY BLOOD GLUCOSE      POCT Blood Glucose.: 99 mg/dL (26 May 2023 20:09)  POCT Blood Glucose.: 88 mg/dL (26 May 2023 16:17)  POCT Blood Glucose.: 94 mg/dL (26 May 2023 13:48)  POCT Blood Glucose.: 90 mg/dL (26 May 2023 11:32)  POCT Blood Glucose.: 91 mg/dL (26 May 2023 09:37)  POCT Blood Glucose.: 81 mg/dL (26 May 2023 07:24)  POCT Blood Glucose.: 98 mg/dL (26 May 2023 04:27)  POCT Blood Glucose.: 78 mg/dL (26 May 2023 02:11)  POCT Blood Glucose.: 102 mg/dL (25 May 2023 23:30)  POCT Blood Glucose.: 90 mg/dL (25 May 2023 22:27)  POCT Blood Glucose.: 94 mg/dL (25 May 2023 21:28)  POCT Blood Glucose.: 73 mg/dL (25 May 2023 20:30)

## 2023-05-27 NOTE — PROGRESS NOTE ADULT - ASSESSMENT
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 General surgery consulted on 5/26 for evaluation of bilateral breast wounds and possible debridement. Pt unable to provide subjective history 2/2 current mentation. Bilateral breast wounds with fibrinous slough, necrotic tissue, and purulent drainage underlying necrotic eschar. Would consider bedside debridement vs debridement in OR based on patient's clinical status and goals of care discussion.    Recommendations:  No acute surgical intervention as goals of care discussion ongoing  Recommend continued wound care with Dakins  Appreciate wound care recommendations  Rest of care per MICU  Team 5C will continue to follow

## 2023-05-27 NOTE — PROCEDURE NOTE - NSUSCPTCODES_ED_ALL
93110 Echocardiography Transthoracic with Image 2D (Echo/FAST)
06760 US Chest (PTX, Pleural Effussion/CHF vs COPD)

## 2023-05-27 NOTE — PROGRESS NOTE ADULT - SUBJECTIVE AND OBJECTIVE BOX
GENERAL SURGERY ADDENDUM: HPI as above. 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 General surgery consulted on 5/26 for evaluation of bilateral breast wounds and possible debridement. Pt currently AAOx1 and unable to provide subjective history other than report that her breasts hurt.     Vital Signs Last 24 Hrs  T(C): 36.5 (27 May 2023 09:00), Max: 37.1 (27 May 2023 00:25)  T(F): 97.7 (27 May 2023 09:00), Max: 98.7 (27 May 2023 00:25)  HR: 86 (27 May 2023 08:00) (77 - 106)  BP: 101/63 (27 May 2023 08:00) (101/63 - 154/74)  BP(mean): 76 (27 May 2023 08:00) (74 - 107)  RR: 16 (27 May 2023 08:00) (6 - 17)  SpO2: 100% (27 May 2023 08:00) (96% - 100%)    Parameters below as of 27 May 2023 08:00  Patient On (Oxygen Delivery Method): nasal cannula  O2 Flow (L/min): 2      General: AAOx1, NAD, WDWN, laying comfortably in bed  Breast: b/l subareolar necrotic breast wounds appreciated extending into axilla. Left breast with overlying necrotic eschar and wound smelling necrotic purulent and fibrinous tissue towards axilla.   Cardio: S1,S2, No MRG, RRR  Pulm: Lungs bilaterally clear to auscultation  Abdomen: soft, ntnd, no rebound, no guarding, large ventral hernia easily reducible  Extremities: WWP, peripheral pulses appreciated        LABS:                          7.8    12.35 )-----------( 91       ( 26 May 2023 05:30 )             27.7     05-26    129<L>  |  94<L>  |  19  ----------------------------<  75  3.7   |  26  |  2.68<H>    Ca    10.2      26 May 2023 05:30  Phos  2.6     05-26  Mg     1.8     05-26    TPro  5.0<L>  /  Alb  2.2<L>  /  TBili  0.4  /  DBili  x   /  AST  12  /  ALT  5<L>  /  AlkPhos  119  05-26    LIVER FUNCTIONS - ( 26 May 2023 05:30 )  Alb: 2.2 g/dL / Pro: 5.0 g/dL / ALK PHOS: 119 U/L / ALT: 5 U/L / AST: 12 U/L / GGT: x           PT/INR - ( 25 May 2023 11:43 )   PT: 24.3 sec;   INR: 2.03          PTT - ( 25 May 2023 11:43 )  PTT:37.5 sec

## 2023-05-27 NOTE — CHART NOTE - NSCHARTNOTEFT_GEN_A_CORE
Infectious Diseases Anti-infective Approval Note    Medication:  Meropenem   Dose:  500 mg    Route:  IV  Frequency: daily (on the days she receives HD, give after HD)  Duration:  7 days     Duration refers to duration of approval, not recommended duration of treatment     Dose may be adjusted as needed for alterations in renal function.    *THIS IS NOT AN INFECTIOUS DISEASES CONSULTATION*

## 2023-05-27 NOTE — PROGRESS NOTE ADULT - SUBJECTIVE AND OBJECTIVE BOX
CC: GASTRONTESTINAL BLEEDING        INTERVAL HISTORY: lying in bed groaning  awake but non-conversant      ROS: No chest pain, no sob, no abd pain. No n/v/d    PAST MEDICAL & SURGICAL HISTORY:  HTN (hypertension)    HLD (hyperlipidemia)    CAD (coronary atherosclerotic disease)    ESRD on dialysis  last 11/15/22    H/O ventral hernia    Brown tumor  brain    DVT, lower extremity  left    Stented coronary artery    History of surgery  IVC filter    AVF (arteriovenous fistula)  right left    S/P AIDEN-BSO  2003    History of surgery  RLE PTA stent / atherectomy  7/2021    History of atherectomy  stent        PHYSICAL EXAM:  T(C): 37 (05-27-23 @ 05:42), Max: 37.1 (05-27-23 @ 00:25)  HR: 86 (05-27-23 @ 08:00)  BP: 101/63 (05-27-23 @ 08:00) (101/63 - 154/74)  RR: 16 (05-27-23 @ 08:00)  SpO2: 100% (05-27-23 @ 08:00)  Wt(kg): --  I&O's Summary    26 May 2023 07:01  -  27 May 2023 07:00  --------------------------------------------------------  IN: 1650 mL / OUT: 0 mL / NET: 1650 mL      Weight   General: NAD.  HEENT: moist mucous membranes, no pallor/cyanosis.  Neck: no JVD visible.  Cardiac: S1, S2. RRR. No murmurs   Respratory: CTA b/l, no access muscle use.   Abdomen: soft. nontender. nondistended  Skin: no rashes.  Extremities: +UE/ LE edema b/l  Access:       DATA:                        7.8<L>  12.35<H> )-----------( 91<L>    ( 26 May 2023 05:30 )             27.7<L>    Ferritin, Serum: 1938 ng/mL *H* (05-20 @ 16:00)      129<L>  |  94<L>  |  19     ----------------------------<  75     Ca:10.2  (26 May 2023 05:30)  3.7     |  26     |  2.68<H>        TPro  5.0<L>  /  Alb  2.2<L>  /  TBili  0.4    /  DBili  x      /  AST  12     /  ALT  5<L>   /  AlkPhos  119    26 May 2023 05:30                    MEDICATIONS  (STANDING):  ascorbic acid 500 milliGRAM(s) Oral daily  atorvastatin 40 milliGRAM(s) Oral at bedtime  bisacodyl 5 milliGRAM(s) Oral at bedtime  cefepime   IVPB 1000 milliGRAM(s) IV Intermittent every 24 hours  chlorhexidine 2% Cloths 1 Application(s) Topical <User Schedule>  cinacalcet 120 milliGRAM(s) Oral every 24 hours  Dakins Solution - 1/4 Strength 1 Application(s) Topical every 12 hours  dextrose 10% Bolus 250 milliLiter(s) IV Bolus once  dextrose 10%. 1000 milliLiter(s) (50 mL/Hr) IV Continuous <Continuous>  dextrose 50% Injectable 12.5 Gram(s) IV Push once  dextrose 50% Injectable 25 Gram(s) IV Push once  dextrose 50% Injectable 25 Gram(s) IV Push once  dextrose Oral Gel 15 Gram(s) Oral once  dextrose Oral Gel 15 Gram(s) Oral once  fludroCORTISONE 0.2 milliGRAM(s) Oral every 24 hours  folic acid 1 milliGRAM(s) Oral daily  heparin   Injectable 5000 Unit(s) SubCutaneous every 8 hours  midodrine 40 milliGRAM(s) Oral every 8 hours  Nephro-deborah 1 Tablet(s) Oral daily  pantoprazole    Tablet 40 milliGRAM(s) Oral every 12 hours  polyethylene glycol 3350 17 Gram(s) Oral daily  sodium thiosulfate IVPB 25 Gram(s) IV Intermittent once    MEDICATIONS  (PRN):  acetaminophen     Tablet .. 650 milliGRAM(s) Oral every 6 hours PRN Temp greater or equal to 38C (100.4F), Mild Pain (1 - 3)  EPINEPHrine     1 mG/mL Injectable 0.3 milliGRAM(s) IntraMuscular once PRN anaphylaxis  HYDROmorphone   Tablet 4 milliGRAM(s) Oral every 3 hours PRN Severe Pain (7 - 10)  HYDROmorphone   Tablet 2 milliGRAM(s) Oral every 3 hours PRN Moderate Pain (4 - 6)  lidocaine 2% Gel 1 Application(s) Topical every 6 hours PRN pain  sodium chloride 0.9% lock flush 10 milliLiter(s) IV Push every 1 hour PRN Pre/post blood products, medications, blood draw, and to maintain line patency

## 2023-05-27 NOTE — PROGRESS NOTE ADULT - SUBJECTIVE AND OBJECTIVE BOX
OVERNIGHT EVENTS:  Borderline FSG overnight which required glucagon and dextrose gel several times. Previous R EJ kinked. RUE U/S ordered.    SUBJECTIVE / INTERVAL HPI: Patient seen and examined at bedside. Lethargic and is AAOx1.  Remains at same mental status throughout the week. Is aware she is in hospital. Generalized moaning, does not answer questions.    ICU Vital Signs Last 24 Hrs  T(C): 36.4 (27 May 2023 15:45), Max: 37.1 (27 May 2023 00:25)  T(F): 97.6 (27 May 2023 15:45), Max: 98.7 (27 May 2023 00:25)  HR: 96 (27 May 2023 16:45) (75 - 107)  BP: 105/55 (27 May 2023 16:45) (89/51 - 140/61)  BP(mean): 78 (27 May 2023 16:45) (64 - 107)  ABP: --  ABP(mean): --  RR: 17 (27 May 2023 16:45) (6 - 24)  SpO2: 94% (27 May 2023 16:45) (94% - 100%)    O2 Parameters below as of 27 May 2023 16:45  Patient On (Oxygen Delivery Method): room air      PHYSICAL EXAM:  General: large body habitus, AAO1. In NAD.  Eyes: PERRL, clear conjunctiva  ENT: Moist mucus membranes, poor dentition.  Respiratory: Decreased BS b/l lung fields; no W/R/R  Cardiac: +S1/S2; irregular rate; no M/R/G; HD catheter over L chest.   Gastrointestinal: soft, obese, NT/ND; no rebound or guarding; +BSx4. Large ventral hernia.   Extremities: Diffuse anasarca, worsened in bilateral upper extremities. +3 pitting edema RUE. +2 LUE. +3 b/l pitting edema le.  No blisters on heels or toes.  Dermatologic: Bilateral breast wounds covered in dressing. L breast w eschar, R breast copious granulation tissue. malodorous.  Neurologic: AAOx1-2. Moves extremities. +2/5 b/l LE and UE, consistent more so with patients mental status.      MEDICATIONS:  MEDICATIONS  (STANDING):  ascorbic acid 500 milliGRAM(s) Oral daily  atorvastatin 40 milliGRAM(s) Oral at bedtime  chlorhexidine 2% Cloths 1 Application(s) Topical <User Schedule>  cinacalcet 120 milliGRAM(s) Oral every 24 hours  dextrose 10%. 1000 milliLiter(s) (30 mL/Hr) IV Continuous <Continuous>  folic acid 1 milliGRAM(s) Oral daily  midodrine 40 milliGRAM(s) Oral every 8 hours  Nephro-deborah 1 Tablet(s) Oral daily  pantoprazole  Injectable 40 milliGRAM(s) IV Push every 12 hours  sevelamer carbonate 1600 milliGRAM(s) Oral three times a day with meals    MEDICATIONS  (PRN):  acetaminophen     Tablet .. 650 milliGRAM(s) Oral every 6 hours PRN Temp greater or equal to 38C (100.4F), Mild Pain (1 - 3)  sodium chloride 0.9% lock flush 10 milliLiter(s) IV Push every 1 hour PRN Pre/post blood products, medications, blood draw, and to maintain line patency      ALLERGIES:  Allergies    sulfa drugs (Angioedema)  Ancef (Rash; Urticaria)  iodine (Hives; Pruritus)  DDAVP (Hypotension)  penicillin (Swelling)    Intolerances    LABS:                         8.0    14.66 )-----------( 96       ( 27 May 2023 05:30 )             27.6     05-27    127<L>  |  91<L>  |  21  ----------------------------<  106<H>  4.0   |  23  |  3.06<H>    Ca    9.9      27 May 2023 05:30  Phos  2.7     05-27  Mg     1.7     05-27    TPro  4.7<L>  /  Alb  2.2<L>  /  TBili  0.4  /  DBili  x   /  AST  15  /  ALT  6<L>  /  AlkPhos  132<H>  05-27    PT/INR - ( 27 May 2023 14:42 )   PT: 21.0 sec;   INR: 1.75          PTT - ( 27 May 2023 14:42 )  PTT:38.6 sec              RADIOLOGY, EKG & ADDITIONAL TESTS:

## 2023-05-27 NOTE — PROGRESS NOTE ADULT - ASSESSMENT
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  transferred to ICU last weekend following hypotension after hemodialysis  she has been hemodynamically stable after discontinuation of IV Levophed- known to have low BP readings for which she receives Midodrine

## 2023-05-27 NOTE — PROVIDER CONTACT NOTE (OTHER) - BACKGROUND
HD terminated early per HD RN d/t decreased SBP's (79-low 80's). MAP's remained >55 during HD and pt maintained her neuro status.
Patient w. history of renal diseases. s/p dyalasis treatment. pt refused PO meals for breakfast and lunch. Team made aware.
pt admitted for GI bleed
admitted for GI bleed
pt admitted for upper GI bleed, hemoglobin 7.5 this AM. At baseline, pt has soft b/p systolic 90s-100s. Ordered for 40mg midodrine q8h, last dose taken @ 1515.

## 2023-05-27 NOTE — PROVIDER CONTACT NOTE (OTHER) - ACTION/TREATMENT ORDERED:
Levo standby at bedside. Repeat hemoglobin 8.3, and stat water enema ordered. No new interventions at this time.
Bolusx2 of D10% at 250ml/hr, [id number]%, D10% drip at 30ml/hr. with MD assessment.
250 ml bolus of D10% given over 30mins per MD order. Will continue to monitor FS q2h. No further interventions.
12 lead EKG done. MD Callaway plans to come to bedside to assess; awaiting.
0.5mg Dilaudid ordered by MD Cherelle. No further interventions at this time. Will continue to monitor.

## 2023-05-27 NOTE — PROGRESS NOTE ADULT - ASSESSMENT
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 Subsequently with HD today and drop in pressures, transferred to MICU for line placement and better accuracy of blood pressure control. Patient has remained hemodynamically stable, off pressors.    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    Cardio  Patient w persistently low blood pressures, w 80s/40s during RR and inability to accurately measure BP due to diffuse edema during RR  Less concern for shock on differential given no infectious etiology, was requiring low dose levophed intermittently, but patient known to have baseline low blood pressure.  - Arterial Line removed 5/24/23.  - Weaned off levophed  - Midodrine 40mg q 8, home medication  - C/w HD as needed. HD today.  - Florinef .2mg qd.  - R femoral TLC placed, due to access issue in patient.    #HfrEf, non-ischemic cardiomyopathy  - history of HfpEF, TTE 5/5/23, normal RV and LV function  - On No HF meds  - C.w statin      Pulmonary  - Patient w no pulmonology history  - good airway control, controlling secretions  - Continue to monitor secretions.    Renal  - ESRD w Dialysis, rapid response during HD session  - C/w HD as tolerated  - renvela discontinued as pt w normal Phosphorus.    GI  #Stercoral Ulcers, 2 BM nonbloody 5/26 AM.  - Came in w GI bleed deemed to be 2/2 to constipation and stercoral ulcers  - C/w PPi IV BID  - C/w golytely prep as laxative  - Daily KUB  - F/u abdominal US w doppler, no evidence of portal HTN  - F/u GI recs, as per HCP and pt GOC, no role for egd this admission  - Active T and S, Keep HgB >7  - Caution w NSAIDS, prefer GANN-2 inhibitors    ID  -Patient w F to 101.1 on 5/24/23, and concomitant afib w RVR, resolved after defervescence  - Afebrile currently  - F/u BCx, F/u breast wound culture-> gram negative rods. and gram positive cocci, Pseudomonas, Providencia, and ESBL Proteus  - Cefepime d/c, Meropenem 500mg qd, on HD days dose after HD, ID approved for 7 day course  - If aligns w GOC pt will need port.  - Will order Vancomycin 1x dose, vanc trough 13.2.   - Additional 1.25g dose today, level at 5PM tomorrow.  - no source control w/out surgical intervention, surgery is following.    Heme  - HgB stable, currently, came in at 6.2  - See above for GI    #Leukocytosis  - Likely 2/2 to breast infection, slowly uptrending, no source control w/out surgical intervention  - Surgery is following    Endo  5/21 received d10 bolus for glucose 66, on q4 glucose checks, then started d10 gtt at 50 cc/hr  - C/w gtt at 30cc/hr  - FSG q 1  - Reduce to FSG q2, if consistently >100.    Prophylaxis:  F: d10 gtt 30cc/hr.  E: Replete cautiously given ESRD  N: Soft and Bite sized (renal diet)  DVT: none as previous GIB    Lines: R femoral TLC placed 5/27/23.  Peripheral IV to be placed-> R external jugular vein  No Wu.

## 2023-05-27 NOTE — PROVIDER CONTACT NOTE (OTHER) - SITUATION
Pt FS 80
Pt appeared to have 19 beats of vtach on tele strip after finishing HD. Denied chest pain. Neurologically at baseline. BP stable, see flowsheet
pt's b/p systolic 80s
Low blood glucose noted
Pt crying out in pain

## 2023-05-28 NOTE — PROGRESS NOTE ADULT - SUBJECTIVE AND OBJECTIVE BOX
CC: GASTRONTESTINAL BLEEDING        INTERVAL HISTORY: resting comfortably in NAD      ROS: No chest pain, no sob, no abd pain. No n/v/d    PAST MEDICAL & SURGICAL HISTORY:  HTN (hypertension)    HLD (hyperlipidemia)    CAD (coronary atherosclerotic disease)    ESRD on dialysis  last 11/15/22    H/O ventral hernia    Brown tumor  brain    DVT, lower extremity  left    Stented coronary artery    History of surgery  IVC filter    AVF (arteriovenous fistula)  right left    S/P AIDEN-BSO  2003    History of surgery  RLE PTA stent / atherectomy  7/2021    History of atherectomy  stent        PHYSICAL EXAM:  T(C): 36.6 (05-28-23 @ 05:38), Max: 36.8 (05-28-23 @ 01:41)  HR: 79 (05-28-23 @ 07:00)  BP: 74/36 (05-28-23 @ 07:00) (69/35 - 140/87)  RR: 13 (05-28-23 @ 07:00)  SpO2: 94% (05-28-23 @ 07:00)  Wt(kg): --  I&O's Summary    27 May 2023 07:01  -  28 May 2023 07:00  --------------------------------------------------------  IN: 1793 mL / OUT: 400 mL / NET: 1393 mL    28 May 2023 07:01  -  28 May 2023 07:42  --------------------------------------------------------  IN: 30 mL / OUT: 0 mL / NET: 30 mL      Weight   General:  NAD.  HEENT: moist mucous membranes, no pallor/cyanosis.  Neck: no JVD visible.  Cardiac: S1, S2. RRR. No murmurs   Respratory:rhonchi  Abdomen: soft. nontender. nondistended  Skin: no rashes.  Extremities: + LE edema b/l  Access: L Permacath      DATA:                        8.1<L>  13.21<H> )-----------( 81<L>    ( 28 May 2023 04:28 )             27.4<L>    Ferritin, Serum: 1938 ng/mL *H* (05-20 @ 16:00)      129<L>  |  94<L>  |  16     ----------------------------<  109<H>  Ca:9.0   (28 May 2023 04:28)  3.9     |  25     |  2.29<H>        TPro  4.5<L>  /  Alb  2.0<L>  /  TBili  0.4    /  DBili  x      /  AST  18     /  ALT  8<L>   /  AlkPhos  129<H>  28 May 2023 04:28                    MEDICATIONS  (STANDING):  ascorbic acid 500 milliGRAM(s) Oral daily  atorvastatin 40 milliGRAM(s) Oral at bedtime  bisacodyl 5 milliGRAM(s) Oral at bedtime  chlorhexidine 2% Cloths 1 Application(s) Topical <User Schedule>  chlorhexidine 2% Cloths 1 Application(s) Topical <User Schedule>  cinacalcet 120 milliGRAM(s) Oral every 24 hours  Dakins Solution - 1/4 Strength 1 Application(s) Topical every 12 hours  dextrose 10% Bolus 250 milliLiter(s) IV Bolus once  dextrose 10%. 1000 milliLiter(s) (30 mL/Hr) IV Continuous <Continuous>  dextrose 50% Injectable 25 Gram(s) IV Push once  dextrose 50% Injectable 12.5 Gram(s) IV Push once  dextrose 50% Injectable 25 Gram(s) IV Push once  dextrose Oral Gel 15 Gram(s) Oral once  dextrose Oral Gel 15 Gram(s) Oral once  fludroCORTISONE 0.2 milliGRAM(s) Oral every 24 hours  folic acid 1 milliGRAM(s) Oral daily  heparin   Injectable 5000 Unit(s) SubCutaneous every 8 hours  meropenem  IVPB 500 milliGRAM(s) IV Intermittent every 24 hours  midodrine 40 milliGRAM(s) Oral every 8 hours  Nephro-deborah 1 Tablet(s) Oral daily  norepinephrine Infusion 0.05 MICROgram(s)/kG/Min (7.44 mL/Hr) IV Continuous <Continuous>  pantoprazole    Tablet 40 milliGRAM(s) Oral every 12 hours  polyethylene glycol 3350 17 Gram(s) Oral daily  sodium thiosulfate IVPB 25 Gram(s) IV Intermittent once    MEDICATIONS  (PRN):  acetaminophen     Tablet .. 650 milliGRAM(s) Oral every 6 hours PRN Temp greater or equal to 38C (100.4F), Mild Pain (1 - 3)  EPINEPHrine     1 mG/mL Injectable 0.3 milliGRAM(s) IntraMuscular once PRN anaphylaxis  HYDROmorphone   Tablet 4 milliGRAM(s) Oral every 3 hours PRN Severe Pain (7 - 10)  HYDROmorphone   Tablet 2 milliGRAM(s) Oral every 3 hours PRN Moderate Pain (4 - 6)  sodium chloride 0.9% lock flush 10 milliLiter(s) IV Push every 1 hour PRN Pre/post blood products, medications, blood draw, and to maintain line patency

## 2023-05-28 NOTE — PROGRESS NOTE ADULT - ASSESSMENT
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 Subsequently with HD today and drop in pressures, transferred to MICU for line placement and better accuracy of blood pressure control. Patient has remained hemodynamically stable, off pressors.    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    Cardio  Patient w persistently low blood pressures, w 80s/40s during RR and inability to accurately measure BP due to diffuse edema during RR  Less concern for shock on differential given no infectious etiology, was requiring low dose levophed intermittently, but patient known to have baseline low blood pressure.  5/28: o/n patient needed levophed  - Arterial Line removed 5/24/23.  - Midodrine 40mg q 8, home medication  - C/w HD as needed. HD today.  - Florinef .2mg qd.  - R femoral TLC placed, due to access issue in patient.  - Access: IR Tuesday for port iso vasculopath     #HfrEf, non-ischemic cardiomyopathy  - history of HfpEF, TTE 5/5/23, normal RV and LV function  - On No HF meds  - C.w statin      Pulmonary  - Patient w no pulmonology history  - good airway control, controlling secretions  - Continue to monitor secretions.    Renal  - ESRD w Dialysis, rapid response during HD session  - C/w HD as tolerated- can give levophed as needed to allow for HD  - renvela discontinued as pt w normal Phosphorus.    GI  #Stercoral Ulcers, 2 BM nonbloody 5/26 AM.  - Came in w GI bleed deemed to be 2/2 to constipation and stercoral ulcers  - C/w PPi IV BID  - C/w golytely prep as laxative  - Daily KUB  - F/u abdominal US w doppler, no evidence of portal HTN  - F/u GI recs, as per HCP and pt GOC, no role for egd this admission  - Active T and S, Keep HgB >7  - Caution w NSAIDS, prefer GANN-2 inhibitors  - patient with copious rectal output- place rectal tube and wound care consult    ID  -Patient w F to 101.1 on 5/24/23, and concomitant afib w RVR, resolved after defervescence  - Afebrile currently  - F/u BCx, F/u breast wound culture-> gram negative rods. and gram positive cocci, Pseudomonas, Providencia, and ESBL Proteus  - Cefepime d/c, Meropenem 500mg qd, on HD days dose after HD, ID approved for 7 day course  - If aligns w GOC pt will need port.  - Will order Vancomycin 1x dose, vanc trough 13.2.   - Additional 1.25g dose today, level at 5PM tomorrow.  - no source control w/out surgical intervention, surgery is following.    Heme  - HgB stable, currently, came in at 6.2  - See above for GI    #Leukocytosis  - Likely 2/2 to breast infection, slowly uptrending, no source control w/out surgical intervention  - Surgery is following    #Thrombocytopenia   Patient with slow steady decline in platelets, more compatible with sepsis vs HIT  HIT score: intermediate  - c/w heparin     Endo  5/21 received d10 bolus for glucose 66, on q4 glucose checks, then started d10 gtt at 50 cc/hr  - C/w gtt at 30cc/hr  - FSG q 1  - Reduce to FSG q2, if consistently >100.    Prophylaxis:  F: d10 gtt 30cc/hr.  E: Replete cautiously given ESRD  N: Soft and Bite sized (renal diet)  DVT: none as previous GIB    Lines: R femoral TLC placed 5/27/23.  Peripheral IV to be placed-> R external jugular vein  No Wu.

## 2023-05-28 NOTE — PROVIDER CONTACT NOTE (OTHER) - RECOMMENDATIONS
MD at bedside assessing pt
start Levo, repeat hemoglobin
gave midodrine 40mg PO now.
MD to assess
Bolus of dextrose fluids and team re-assessment.

## 2023-05-28 NOTE — PROGRESS NOTE ADULT - SUBJECTIVE AND OBJECTIVE BOX
SUBJECTIVE: Patient seen and examined at bedside. Did not cooperate with exam 2/2 lethargy. No acute distress.      EPINEPHrine     1 mG/mL Injectable 0.3 milliGRAM(s) IntraMuscular once PRN  heparin   Injectable 5000 Unit(s) SubCutaneous every 8 hours  meropenem  IVPB 500 milliGRAM(s) IV Intermittent every 24 hours  midodrine 40 milliGRAM(s) Oral every 8 hours  norepinephrine Infusion 0.05 MICROgram(s)/kG/Min IV Continuous <Continuous>      Vital Signs Last 24 Hrs  T(C): 35.8 (28 May 2023 14:01), Max: 36.8 (28 May 2023 01:41)  T(F): 96.4 (28 May 2023 14:01), Max: 98.3 (28 May 2023 01:41)  HR: 94 (28 May 2023 17:00) (70 - 115)  BP: 85/47 (28 May 2023 17:00) (69/35 - 140/87)  BP(mean): 60 (28 May 2023 17:00) (45 - 108)  RR: 12 (28 May 2023 17:00) (10 - 19)  SpO2: 98% (28 May 2023 17:00) (94% - 100%)    Parameters below as of 28 May 2023 17:00  Patient On (Oxygen Delivery Method): room air      I&O's Detail    27 May 2023 07:01  -  28 May 2023 07:00  --------------------------------------------------------  IN:    dextrose 10%: 480 mL    IV PiggyBack: 300 mL    IV PiggyBack: 250 mL    Norepinephrine: 3 mL    Oral Fluid: 760 mL  Total IN: 1793 mL    OUT:    Other (mL): 400 mL  Total OUT: 400 mL    Total NET: 1393 mL      28 May 2023 07:01  -  28 May 2023 18:21  --------------------------------------------------------  IN:    dextrose 10%: 285 mL    IV PiggyBack: 250 mL    Norepinephrine: 22.2 mL    Oral Fluid: 280 mL  Total IN: 837.2 mL    OUT:  Total OUT: 0 mL    Total NET: 837.2 mL          General: NAD, resting comfortably in bed  C/V: NSR  Pulm: Nonlabored breathing, no respiratory distress  Breast: soft, exquisitely tender. b/l subareolar necrotic foul-smelling breast wounds extending into axilla. Left breast with overlying necrotic eschar and fibrinous tissue towards axilla.   Abd: soft, NT/ND.  Extrem: WWP, no edema, SCDs in place      LABS:                        8.1    13.21 )-----------( 81       ( 28 May 2023 04:28 )             27.4     05-28    129<L>  |  94<L>  |  16  ----------------------------<  109<H>  3.9   |  25  |  2.29<H>    Ca    9.0      28 May 2023 04:28  Phos  2.2     05-28  Mg     1.7     05-28    TPro  4.5<L>  /  Alb  2.0<L>  /  TBili  0.4  /  DBili  x   /  AST  18  /  ALT  8<L>  /  AlkPhos  129<H>  05-28    PT/INR - ( 27 May 2023 14:42 )   PT: 21.0 sec;   INR: 1.75          PTT - ( 27 May 2023 14:42 )  PTT:38.6 sec      RADIOLOGY & ADDITIONAL STUDIES:

## 2023-05-28 NOTE — PROVIDER CONTACT NOTE (OTHER) - REASON
Pt verbalizing pain
b/p systolic 80s
Pt FS 78
SBP 60s, MAP < 50
Hypoglycemia ; Glucose 51mg/dl
Pt appeared to have 19 beats of vtach on tele strip after finishing HD

## 2023-05-28 NOTE — PROGRESS NOTE ADULT - ASSESSMENT
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  transferred to ICU last weekend following hypotension after hemodialysis  IV Levophed re-started yesterday for persistent hypotension which started during hemodialysis

## 2023-05-28 NOTE — PROGRESS NOTE ADULT - ASSESSMENT
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 General surgery consulted on 5/26 for evaluation of bilateral breast wounds and possible debridement. Pt unable to provide subjective history 2/2 current mentation. Bilateral breast wounds with fibrinous slough, necrotic tissue, and purulent drainage underlying necrotic eschar. Overnight course signficant for hypothermia and hypotension requiring pressor support, now resolved.    Recommendations:  Recommend surgical debridement of breast wounds if patient and family become amenable  Dr. Sullivan available for OR debridement on Tuesday 5/30  Recommend continued wound care with Dakins  Appreciate wound care recommendations  Rest of care per MICU  Team 5C will continue to follow

## 2023-05-28 NOTE — PROGRESS NOTE ADULT - SUBJECTIVE AND OBJECTIVE BOX
SALOMON MUNSON  67y  Female    Patient is a 67y old  Female who presents with a chief complaint of GIB (28 May 2023 08:15)      INTERVAL HPI/OVERNIGHT EVENTS:  As per night team,  pt BP soft after HD session today (removed 400cc HD), gave midodrine early 11pm. Pt reporting back pain, lidocaine patch x1, BG 80- gave d10 bolus and juice. Nurse reported 2x melena- CBC stable. MAP 50- started on levo  Patient seen and examined at bedside. Patient conversive, currently not in any pain. Otherwise neg ROS.     T(C): 35 (05-28-23 @ 11:22), Max: 36.8 (05-28-23 @ 01:41)  HR: 115 (05-28-23 @ 16:00) (70 - 115)  BP: 108/57 (05-28-23 @ 16:00) (69/35 - 140/87)  RR: 19 (05-28-23 @ 16:00) (10 - 19)  SpO2: 95% (05-28-23 @ 16:00) (94% - 100%)  Wt(kg): --Vital Signs Last 24 Hrs  T(C): 35 (28 May 2023 11:22), Max: 36.8 (28 May 2023 01:41)  T(F): 95 (28 May 2023 11:22), Max: 98.3 (28 May 2023 01:41)  HR: 115 (28 May 2023 16:00) (70 - 115)  BP: 108/57 (28 May 2023 16:00) (69/35 - 140/87)  BP(mean): 73 (28 May 2023 16:00) (45 - 108)  RR: 19 (28 May 2023 16:00) (10 - 19)  SpO2: 95% (28 May 2023 16:00) (94% - 100%)    Parameters below as of 28 May 2023 16:00  Patient On (Oxygen Delivery Method): room air        PHYSICAL EXAM:  General: large body habitus, AAO1. In NAD.  Eyes: PERRL, clear conjunctiva  ENT: Moist mucus membranes, poor dentition.  Respiratory: Decreased BS b/l lung fields; no W/R/R  Cardiac: +S1/S2; irregular rate; no M/R/G; HD catheter over L chest.   Gastrointestinal: soft, protuberant, NT/ND; no rebound or guarding; +BSx4. Large ventral hernia.   Extremities: Diffuse anasarca, worsened in bilateral upper extremities. +3 pitting edema RUE. +2 LUE. +3 b/l pitting edema le.  No blisters on heels or toes.  skin: Bilateral breast wounds covered in dressing. L breast w eschar, R breast copious granulation tissue. malodorous.  Neurologic: AAOx1-2; answers questions; +2/5 b/l LE and UE, consistent more so with patients mental status.  Consultant(s) Notes Reviewed:  [x ] YES  [ ] NO  Care Discussed with Consultants/Other Providers [ x] YES  [ ] NO    LABS:                        8.1    13.21 )-----------( 81       ( 28 May 2023 04:28 )             27.4     05-28    129<L>  |  94<L>  |  16  ----------------------------<  109<H>  3.9   |  25  |  2.29<H>    Ca    9.0      28 May 2023 04:28  Phos  2.2     05-28  Mg     1.7     05-28    TPro  4.5<L>  /  Alb  2.0<L>  /  TBili  0.4  /  DBili  x   /  AST  18  /  ALT  8<L>  /  AlkPhos  129<H>  05-28      PT/INR - ( 27 May 2023 14:42 )   PT: 21.0 sec;   INR: 1.75          PTT - ( 27 May 2023 14:42 )  PTT:38.6 sec    CAPILLARY BLOOD GLUCOSE      POCT Blood Glucose.: 98 mg/dL (28 May 2023 15:16)  POCT Blood Glucose.: 114 mg/dL (28 May 2023 13:07)  POCT Blood Glucose.: 128 mg/dL (28 May 2023 10:58)  POCT Blood Glucose.: 72 mg/dL (28 May 2023 09:52)  POCT Blood Glucose.: 112 mg/dL (28 May 2023 07:06)  POCT Blood Glucose.: 80 mg/dL (28 May 2023 04:20)  POCT Blood Glucose.: 84 mg/dL (28 May 2023 02:34)  POCT Blood Glucose.: 107 mg/dL (27 May 2023 23:30)  POCT Blood Glucose.: 123 mg/dL (27 May 2023 22:00)  POCT Blood Glucose.: 93 mg/dL (27 May 2023 19:19)  POCT Blood Glucose.: 104 mg/dL (27 May 2023 17:12)            MEDICATIONS  (STANDING):  ascorbic acid 500 milliGRAM(s) Oral daily  atorvastatin 40 milliGRAM(s) Oral at bedtime  bisacodyl 5 milliGRAM(s) Oral at bedtime  chlorhexidine 2% Cloths 1 Application(s) Topical <User Schedule>  chlorhexidine 2% Cloths 1 Application(s) Topical <User Schedule>  cinacalcet 120 milliGRAM(s) Oral every 24 hours  Dakins Solution - 1/4 Strength 1 Application(s) Topical every 12 hours  dextrose 10% Bolus 250 milliLiter(s) IV Bolus once  dextrose 10%. 1000 milliLiter(s) (30 mL/Hr) IV Continuous <Continuous>  dextrose 50% Injectable 12.5 Gram(s) IV Push once  dextrose 50% Injectable 25 Gram(s) IV Push once  dextrose 50% Injectable 25 Gram(s) IV Push once  dextrose Oral Gel 15 Gram(s) Oral once  dextrose Oral Gel 15 Gram(s) Oral once  fludroCORTISONE 0.2 milliGRAM(s) Oral every 24 hours  folic acid 1 milliGRAM(s) Oral daily  heparin   Injectable 5000 Unit(s) SubCutaneous every 8 hours  magnesium sulfate  IVPB 2 Gram(s) IV Intermittent once  meropenem  IVPB 500 milliGRAM(s) IV Intermittent every 24 hours  midodrine 40 milliGRAM(s) Oral every 8 hours  Nephro-deborah 1 Tablet(s) Oral daily  norepinephrine Infusion 0.05 MICROgram(s)/kG/Min (7.44 mL/Hr) IV Continuous <Continuous>  pantoprazole    Tablet 40 milliGRAM(s) Oral every 12 hours  polyethylene glycol 3350 17 Gram(s) Oral daily  sodium thiosulfate IVPB 25 Gram(s) IV Intermittent once    MEDICATIONS  (PRN):  acetaminophen     Tablet .. 650 milliGRAM(s) Oral every 6 hours PRN Temp greater or equal to 38C (100.4F), Mild Pain (1 - 3)  EPINEPHrine     1 mG/mL Injectable 0.3 milliGRAM(s) IntraMuscular once PRN anaphylaxis  HYDROmorphone   Tablet 4 milliGRAM(s) Oral every 3 hours PRN Severe Pain (7 - 10)  HYDROmorphone   Tablet 2 milliGRAM(s) Oral every 3 hours PRN Moderate Pain (4 - 6)  sodium chloride 0.9% lock flush 10 milliLiter(s) IV Push every 1 hour PRN Pre/post blood products, medications, blood draw, and to maintain line patency      RADIOLOGY & ADDITIONAL TESTS:    Imaging Personally Reviewed:  [ ] YES  [ ] NO

## 2023-05-28 NOTE — PROVIDER CONTACT NOTE (OTHER) - ASSESSMENT
Pt tachycardic, tachypneic & visually in distress from pain
, BP 65/39 (46), RR 19 95% on RA. appears comfortable. c/o of pain in the butt d/t pressure injuries. repositioned completed and Tylenol was administered.
Called tele to confirm, per tele this was artifact - not in all leads? Full disclosure and tele strip printed
pt asymptomatic at this time
Blood glucose 51mg/dl, responsive to vigorous stimulus. pt refused verbal commands. extremities some effort against gravity. Pupils 3mm and brisk.
Pt VSS, neuro at baseline. No complaints of pain

## 2023-05-28 NOTE — PROVIDER CONTACT NOTE (OTHER) - DATE AND TIME:
28-May-2023 04:19
25-May-2023 14:54
26-May-2023 02:15
27-May-2023 18:20
25-May-2023 02:45
21-May-2023 15:37

## 2023-05-29 NOTE — CHART NOTE - NSCHARTNOTEFT_GEN_A_CORE
- reached out to daughter Jasmina to discuss updates and GOC  - Jasmina understands that options include to let infection run its course vs. getting surgery; says she discussed this with her mother who kept saying no to surgery, so she would like to continue nonsurgical route and do Abx as much as possible; she would like to continue talking to her about surgical option; concern was that protein intake is poor relative to her needs and to recover; would not like to do feeding tube as she has tendency to pull things out; her mother wants infection under control and to be over, but doesn't want surgery, and does want antibiotics, but also wants better pain control  - discussed risk of infection with BP monitoring via arterial line given its location in groin, as well as infection risk from breast wounds  - discussed importance of trying to reach decision on full intervention with surgery, etc. vs. comfort-based approach in a timely fashion as infection could worsen in interim  - daughter verbalized understanding, questions answered - author reached out to daughter Jasmina to discuss updates and GOC  - Jasmina understands that options include to let infection run its course vs. getting surgery; says she discussed this with her mother who kept saying no to surgery, so she would like to continue nonsurgical route and do Abx as much as possible; she would like to continue talking to her about surgical option; concern was that protein intake is poor relative to her needs and to recover; would not like to do feeding tube as she has tendency to pull things out; her mother wants infection under control and to be over, but doesn't want surgery, and does want antibiotics, but also wants better pain control  - discussed risk of infection with BP monitoring via arterial line given its location in groin, as well as infection risk from breast wounds  - discussed importance of trying to reach decision on full intervention with surgery, etc. vs. comfort-based approach in a timely fashion as infection could worsen in interim  - daughter verbalized understanding, questions answered; daughter still contemplating various options at this point in time

## 2023-05-29 NOTE — CHART NOTE - NSCHARTNOTEFT_GEN_A_CORE
Infectious Diseases Anti-infective Approval Note    Medication:  Daptomycin  Dose:  400 mg  Route:  IV  Frequency: q48hrs  Duration:  7 days    Duration refers to duration of approval, not recommended duration of treatment     Dose may be adjusted as needed for alterations in renal function.    *THIS IS NOT AN INFECTIOUS DISEASES CONSULTATION*

## 2023-05-29 NOTE — PROGRESS NOTE ADULT - SUBJECTIVE AND OBJECTIVE BOX
CC: GASTRONTESTINAL BLEEDING        INTERVAL HISTORY: lying in bed in NAD      ROS: No chest pain, no sob, no abd pain. No n/v/d    PAST MEDICAL & SURGICAL HISTORY:  HTN (hypertension)    HLD (hyperlipidemia)    CAD (coronary atherosclerotic disease)    ESRD on dialysis      H/O ventral hernia    Brown tumor  brain    DVT, lower extremity  left    Stented coronary artery    History of surgery  IVC filter    AVF (arteriovenous fistula)  right left    S/P AIDEN-BSO  2003    History of surgery  RLE PTA stent / atherectomy  7/2021    History of atherectomy  stent        PHYSICAL EXAM:  T(C): 36.4 (05-29-23 @ 07:00), Max: 36.5 (05-29-23 @ 01:00)  HR: 70 (05-29-23 @ 08:00)  BP: 85/49 (05-29-23 @ 05:00) (48/27 - 123/59)  RR: 12 (05-29-23 @ 08:00)  SpO2: 100% (05-29-23 @ 08:00)  Wt(kg): --  I&O's Summary    28 May 2023 07:01  -  29 May 2023 07:00  --------------------------------------------------------  IN: 1494.6 mL / OUT: 0 mL / NET: 1494.6 mL    29 May 2023 07:01  -  29 May 2023 09:15  --------------------------------------------------------  IN: 158 mL / OUT: 0 mL / NET: 158 mL      Weight   General: ,  NAD.  HEENT: moist mucous membranes, no pallor/cyanosis.  Neck: no JVD visible.  Cardiac: S1, S2. RRR. No murmurs   Respratory: CTA b/l, no access muscle use.   Abdomen: soft. nontender. nondistended  Skin: no rashes.  Extremities: + LE edema b/l  Access: L Permacath      DATA:                        8.9<L>  17.71<H> )-----------( 84<L>    ( 29 May 2023 06:27 )             29.7<L>    Ferritin, Serum: 1938 ng/mL *H* (05-20 @ 16:00)      126<L>  |  91<L>  |  21     ----------------------------<  102<H>  Ca:9.7   (29 May 2023 06:27)  3.8     |  21<L>  |  2.57<H>        TPro  4.8<L>  /  Alb  2.1<L>  /  TBili  0.5    /  DBili  x      /  AST  18     /  ALT  9<L>   /  AlkPhos  151<H>  29 May 2023 00:58                    MEDICATIONS  (STANDING):  ascorbic acid 500 milliGRAM(s) Oral daily  atorvastatin 40 milliGRAM(s) Oral at bedtime  bisacodyl 5 milliGRAM(s) Oral at bedtime  chlorhexidine 2% Cloths 1 Application(s) Topical <User Schedule>  chlorhexidine 2% Cloths 1 Application(s) Topical <User Schedule>  cinacalcet 120 milliGRAM(s) Oral every 24 hours  Dakins Solution - 1/4 Strength 1 Application(s) Topical every 12 hours  dextrose 10% Bolus 250 milliLiter(s) IV Bolus once  dextrose 10%. 1000 milliLiter(s) (30 mL/Hr) IV Continuous <Continuous>  dextrose 50% Injectable 12.5 Gram(s) IV Push once  dextrose 50% Injectable 25 Gram(s) IV Push once  dextrose 50% Injectable 25 Gram(s) IV Push once  dextrose Oral Gel 15 Gram(s) Oral once  dextrose Oral Gel 15 Gram(s) Oral once  fludroCORTISONE 0.2 milliGRAM(s) Oral every 24 hours  folic acid 1 milliGRAM(s) Oral daily  heparin   Injectable 5000 Unit(s) SubCutaneous every 8 hours  meropenem  IVPB 500 milliGRAM(s) IV Intermittent every 24 hours  midodrine 40 milliGRAM(s) Oral every 8 hours  Nephro-deborah 1 Tablet(s) Oral daily  norepinephrine Infusion 0.05 MICROgram(s)/kG/Min (3.72 mL/Hr) IV Continuous <Continuous>  pantoprazole    Tablet 40 milliGRAM(s) Oral every 12 hours  polyethylene glycol 3350 17 Gram(s) Oral daily  sodium thiosulfate IVPB 25 Gram(s) IV Intermittent once  vasopressin Infusion 0.02 Unit(s)/Min (3 mL/Hr) IV Continuous <Continuous>    MEDICATIONS  (PRN):  acetaminophen     Tablet .. 650 milliGRAM(s) Oral every 6 hours PRN Temp greater or equal to 38C (100.4F), Mild Pain (1 - 3)  EPINEPHrine     1 mG/mL Injectable 0.3 milliGRAM(s) IntraMuscular once PRN anaphylaxis  HYDROmorphone   Tablet 4 milliGRAM(s) Oral every 3 hours PRN Severe Pain (7 - 10)  HYDROmorphone   Tablet 2 milliGRAM(s) Oral every 3 hours PRN Moderate Pain (4 - 6)  sodium chloride 0.9% lock flush 10 milliLiter(s) IV Push every 1 hour PRN Pre/post blood products, medications, blood draw, and to maintain line patency

## 2023-05-29 NOTE — PROGRESS NOTE ADULT - SUBJECTIVE AND OBJECTIVE BOX
SALOMON MUNSON  67y  Female    Patient is a 67y old  Female who presents with a chief complaint of GIB (28 May 2023 08:15)      INTERVAL HPI/OVERNIGHT EVENTS:  As per night team, patient with labile blood pressures overnight. Decision was made to place L femoral a-line. Patient with persistent low blood pressures, levophed was added, and vasopressin added. Solucortef dose x 1 given. Patient still mentating at baseline. Assessed bedside this AM, remains AAOx1, reports generalized pain everywhere. Does not anser other questions to elucidate full ROS.    ICU Vital Signs Last 24 Hrs  T(C): 36.4 (29 May 2023 07:00), Max: 36.5 (29 May 2023 01:00)  T(F): 97.5 (29 May 2023 07:00), Max: 97.7 (29 May 2023 01:00)  HR: 63 (29 May 2023 07:00) (63 - 115)  BP: 85/49 (29 May 2023 05:00) (48/27 - 123/59)  BP(mean): 61 (29 May 2023 05:00) (33 - 87)  ABP: 122/44 (29 May 2023 07:00) (100/48 - 123/56)  ABP(mean): 72 (29 May 2023 07:00) (63 - 81)  RR: 16 (29 May 2023 07:00) (8 - 19)  SpO2: 100% (29 May 2023 07:00) (92% - 100%)    O2 Parameters below as of 29 May 2023 08:00  Patient On (Oxygen Delivery Method): room air        PHYSICAL EXAM:  General: large body habitus, AAO1. In NAD.  Eyes: PERRL, clear conjunctiva  ENT: Poor dentition.  Respiratory: Decreased BS b/l lung fields; no W/R/R  Cardiac: +S1/S2; irregular rate; no M/R/G; HD catheter over L chest.   Gastrointestinal: soft, protuberant, NT/ND; no rebound or guarding; +BSx4. Large ventral hernia.   Extremities: Diffuse anasarca, worsened in bilateral upper extremities. +3 pitting edema RUE. +2 LUE. +3 b/l pitting edema le.  No blisters on heels or toes.  Skin: Bilateral breast wounds covered in dressing. L breast w eschar, R breast copious granulation tissue. malodorous.  Neurologic: AAOx1-2; does not participate in strength exam today.    Consultant(s) Notes Reviewed:  [x ] YES  [ ] NO  Care Discussed with Consultants/Other Providers [ x] YES  [ ] NO      LABS:                         8.9    17.71 )-----------( 84       ( 29 May 2023 06:27 )             29.7     05-29    126<L>  |  91<L>  |  21  ----------------------------<  102<H>  3.8   |  21<L>  |  2.57<H>    Ca    9.7      29 May 2023 06:27  Phos  2.6     05-29  Mg     1.9     05-29    TPro  4.8<L>  /  Alb  2.1<L>  /  TBili  0.5  /  DBili  x   /  AST  18  /  ALT  9<L>  /  AlkPhos  151<H>  05-29    PT/INR - ( 27 May 2023 14:42 )   PT: 21.0 sec;   INR: 1.75          PTT - ( 27 May 2023 14:42 )  PTT:38.6 sec          Lactate, Blood: 1.5 mmol/L (05-29 @ 06:27)  Lactate, Blood: 1.4 mmol/L (05-29 @ 00:58)      RADIOLOGY, EKG & ADDITIONAL TESTS:     CAPILLARY BLOOD GLUCOSE      POCT Blood Glucose.: 98 mg/dL (28 May 2023 15:16)  POCT Blood Glucose.: 114 mg/dL (28 May 2023 13:07)  POCT Blood Glucose.: 128 mg/dL (28 May 2023 10:58)  POCT Blood Glucose.: 72 mg/dL (28 May 2023 09:52)  POCT Blood Glucose.: 112 mg/dL (28 May 2023 07:06)  POCT Blood Glucose.: 80 mg/dL (28 May 2023 04:20)  POCT Blood Glucose.: 84 mg/dL (28 May 2023 02:34)  POCT Blood Glucose.: 107 mg/dL (27 May 2023 23:30)  POCT Blood Glucose.: 123 mg/dL (27 May 2023 22:00)  POCT Blood Glucose.: 93 mg/dL (27 May 2023 19:19)  POCT Blood Glucose.: 104 mg/dL (27 May 2023 17:12)            MEDICATIONS  (STANDING):  ascorbic acid 500 milliGRAM(s) Oral daily  atorvastatin 40 milliGRAM(s) Oral at bedtime  bisacodyl 5 milliGRAM(s) Oral at bedtime  chlorhexidine 2% Cloths 1 Application(s) Topical <User Schedule>  chlorhexidine 2% Cloths 1 Application(s) Topical <User Schedule>  cinacalcet 120 milliGRAM(s) Oral every 24 hours  Dakins Solution - 1/4 Strength 1 Application(s) Topical every 12 hours  dextrose 10% Bolus 250 milliLiter(s) IV Bolus once  dextrose 10%. 1000 milliLiter(s) (30 mL/Hr) IV Continuous <Continuous>  dextrose 50% Injectable 12.5 Gram(s) IV Push once  dextrose 50% Injectable 25 Gram(s) IV Push once  dextrose 50% Injectable 25 Gram(s) IV Push once  dextrose Oral Gel 15 Gram(s) Oral once  dextrose Oral Gel 15 Gram(s) Oral once  fludroCORTISONE 0.2 milliGRAM(s) Oral every 24 hours  folic acid 1 milliGRAM(s) Oral daily  heparin   Injectable 5000 Unit(s) SubCutaneous every 8 hours  magnesium sulfate  IVPB 2 Gram(s) IV Intermittent once  meropenem  IVPB 500 milliGRAM(s) IV Intermittent every 24 hours  midodrine 40 milliGRAM(s) Oral every 8 hours  Nephro-deborah 1 Tablet(s) Oral daily  norepinephrine Infusion 0.05 MICROgram(s)/kG/Min (7.44 mL/Hr) IV Continuous <Continuous>  pantoprazole    Tablet 40 milliGRAM(s) Oral every 12 hours  polyethylene glycol 3350 17 Gram(s) Oral daily  sodium thiosulfate IVPB 25 Gram(s) IV Intermittent once    MEDICATIONS  (PRN):  acetaminophen     Tablet .. 650 milliGRAM(s) Oral every 6 hours PRN Temp greater or equal to 38C (100.4F), Mild Pain (1 - 3)  EPINEPHrine     1 mG/mL Injectable 0.3 milliGRAM(s) IntraMuscular once PRN anaphylaxis  HYDROmorphone   Tablet 4 milliGRAM(s) Oral every 3 hours PRN Severe Pain (7 - 10)  HYDROmorphone   Tablet 2 milliGRAM(s) Oral every 3 hours PRN Moderate Pain (4 - 6)  sodium chloride 0.9% lock flush 10 milliLiter(s) IV Push every 1 hour PRN Pre/post blood products, medications, blood draw, and to maintain line patency      RADIOLOGY & ADDITIONAL TESTS:    Imaging Personally Reviewed:  [ ] YES  [ ] NO

## 2023-05-29 NOTE — PROGRESS NOTE ADULT - ASSESSMENT
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 Subsequently with HD today and drop in pressures, transferred to MICU for line placement and better accuracy of blood pressure control. Patient has remained hemodynamically stable, off pressors.    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    Cardio  Patient w persistently low blood pressures, w 80s/40s during RR and inability to accurately measure BP due to diffuse edema during RR  Less concern for shock on differential given no infectious etiology, was requiring low dose levophed intermittently, but patient known to have baseline low blood pressure.  5/28: o/n patient needed levophed  - Arterial Line removed 5/24/23.  - Midodrine 40mg q 8, home medication  - C/w HD as needed. HD today.  - Florinef .2mg qd.  - R femoral TLC placed, due to access issue in patient.  - Access: IR Tuesday for port iso vasculopath     #Shock, now concern for underlying septic shock 2/2 to poor source control from b/l breast wounds  - Lactate remains negative, uptrending WBC count  - Otherwise, patient on meropenem and vanc dosing by level  - Surgery is consulted, daughter reports mother would not want debridement, will attempt repeat GOC convo today.    #HfrEf, non-ischemic cardiomyopathy  - history of HfpEF, TTE 5/5/23, normal RV and LV function  - On No HF meds  - C.w statin      Pulmonary  - Patient w no pulmonology history  - good airway control, controlling secretions  - Continue to monitor secretions.    Renal  - ESRD w Dialysis, rapid response during HD session  - C/w HD as tolerated- can give levophed as needed to allow for HD  - renvela discontinued as pt w normal Phosphorus.    GI  #Stercoral Ulcers, 2 BM nonbloody 5/26 AM.  - Came in w GI bleed deemed to be 2/2 to constipation and stercoral ulcers  - C/w PPi IV BID  - C/w golytely prep as laxative  - Abdominal US w doppler, no evidence of portal HTN  - F/u GI recs, as per HCP and pt GOC, no role for egd this admission  - Active T and S, Keep HgB >7  - Caution w NSAIDS, prefer GANN-2 inhibitors  - patient with copious rectal output- place rectal tube and wound care consult    ID  -Patient w F to 101.1 on 5/24/23, and concomitant afib w RVR, resolved after defervescence  - Afebrile currently  - F/u BCx, F/u breast wound culture-> gram negative rods. and gram positive cocci, Pseudomonas, Providencia, and ESBL Proteus  - Cefepime d/c, Meropenem 500mg qd, on HD days dose after HD, ID approved for 7 day course  - If aligns w GOC pt will need port.  - Will order Vancomycin 1x dose, vanc trough 13.2.   - Additional 1.25g dose today, level at 5PM tomorrow.  - no source control w/out surgical intervention, surgery is following.    Heme  - HgB stable, currently, came in at 6.2  - See above for GI    #Leukocytosis  - Likely 2/2 to breast infection, slowly uptrending, no source control w/out surgical intervention  - Surgery is following    #Thrombocytopenia   Patient with slow steady decline in platelets, more compatible with sepsis vs HIT  HIT score: intermediate  - c/w heparin     Endo  5/21 received d10 bolus for glucose 66, on q4 glucose checks, then started d10 gtt at 50 cc/hr  - C/w gtt at 30cc/hr  - FSG q 1  - Reduce to FSG q2, if consistently >100.    Prophylaxis:  F: d10 gtt 30cc/hr.  E: Replete cautiously given ESRD  N: Soft and Bite sized (renal diet)  DVT: none as previous GIB    Lines: R femoral TLC placed 5/27/23.  Peripheral IV to be placed-> R external jugular vein  No Wu. 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 Subsequently with HD today and drop in pressures, transferred to MICU for line placement and better accuracy of blood pressure control. Patient has remained hemodynamically stable, off pressors.    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    Cardio  Patient w persistently low blood pressures, w 80s/40s during RR and inability to accurately measure BP due to diffuse edema during RR  Less concern for shock on differential given no infectious etiology, was requiring low dose levophed intermittently, but patient known to have baseline low blood pressure.  5/28: o/n patient needed levophed  - Arterial Line removed 5/24/23.  - Midodrine 40mg q 8, home medication  - C/w HD as needed. HD today.  - Florinef .2mg qd.  - R femoral TLC placed, due to access issue in patient.  - Access: IR Tuesday for port iso vasculopath     #Shock, now concern for underlying septic shock 2/2 to poor source control from b/l breast wounds  - Lactate remains negative, uptrending WBC count  - Otherwise, patient on meropenem and vanc dosing by level  - Surgery is consulted, daughter reports mother would not want debridement, will attempt repeat GOC convo today.  - Patient is now on levophed and vasopressin, wean as tolerated, Goal MAP >55.    #HfrEf, non-ischemic cardiomyopathy  - history of HfpEF, TTE 5/5/23, normal RV and LV function  - On No HF meds  - C.w statin      Pulmonary  - Patient w no pulmonology history  - good airway control, controlling secretions  - Continue to monitor secretions.    Renal  - ESRD w Dialysis, rapid response during HD session  - C/w HD as tolerated- can give levophed as needed to allow for HD  - renvela discontinued as pt w normal Phosphorus.    GI  #Stercoral Ulcers, 2 BM nonbloody 5/26 AM.  - Came in w GI bleed deemed to be 2/2 to constipation and stercoral ulcers  - C/w PPi IV BID  - C/w golytely prep as laxative  - Abdominal US w doppler, no evidence of portal HTN  - F/u GI recs, as per HCP and pt GOC, no role for egd this admission  - Active T and S, Keep HgB >7  - Caution w NSAIDS, prefer GANN-2 inhibitors  - patient with copious rectal output- place rectal tube and wound care consult    ID  -Patient w F to 101.1 on 5/24/23, and concomitant afib w RVR, resolved after defervescence  - Afebrile currently  - F/u BCx, F/u breast wound culture-> gram negative rods. and gram positive cocci, Pseudomonas, Providencia, and ESBL Proteus  - Cefepime d/c, Meropenem 500mg qd, on HD days dose after HD, ID approved for 7 day course  - If aligns w GOC pt will need port. IR consult ordered.  - F/u vancomycin level at 5PM, re-dose vanc as per level.  - no source control w/out surgical intervention, surgery is following. f.u GOC.    Heme  - HgB stable, currently, came in at 6.2  - See above for GI    #Leukocytosis  - Likely 2/2 to breast infection, slowly uptrending, no source control w/out surgical intervention  - Surgery is following    #Thrombocytopenia   Patient with slow steady decline in platelets, more compatible with sepsis vs HIT  HIT score: intermediate  - Hep 5000U q8.    Endo  5/21 received d10 bolus for glucose 66, on q4 glucose checks, then started d10 gtt at 50 cc/hr  - C/w gtt at 30cc/hr  - FSG q2, if consistently >100.    Prophylaxis:  F: d10 gtt 30cc/hr.  E: Replete cautiously given ESRD  N: Soft and Bite sized (renal diet)  DVT: Heparin 5000 subq q8    Lines: R femoral TLC placed 5/27/23. L femoral a-line placed 5/29/23.  No Wu.

## 2023-05-30 NOTE — CHART NOTE - NSCHARTNOTEFT_GEN_A_CORE
Talked to patient's daughter at bedside at 7:30 Am and she agreed for surgery plan for breast wounds. Surgery aware. Surgery will reach out to daughter for plan surgery after HD today vs tomorrow.

## 2023-05-30 NOTE — PROCEDURE NOTE - ADDITIONAL PROCEDURE DETAILS
USPIV 20g 1.88inch inserted, x 1 attempt
US guided lab draw and blood cultures obtained
Bilateral eschars removed, debrided to healthy tissue. Hemostasis achieved with figure of 8 vicryl. Wounds packed with Dakins.    WOUND CARE INSTRUCTIONS: BID WTD packing with Dakins.

## 2023-05-30 NOTE — PROGRESS NOTE ADULT - CONVERSATION DETAILS
Spoke over phone with patient's daughter and Dr. Sullivan spoke with daughter. We spoke about risks and benefits of the breast debridement and the risk of cardiovascular collapse during procedure. Jasmina endorsed understanding and confirmed that she is in agreement with debridement and understands the risks of her mother's heart stopping during the procedure. She also endorsed that she wishes her mother to be full code and resuscitated if her heart stops beating and intubated if necessary.

## 2023-05-30 NOTE — CHART NOTE - NSCHARTNOTEFT_GEN_A_CORE
Admitting Diagnosis:   Patient is a 67y old  Female who presents with a chief complaint of GIB (30 May 2023 08:02)      PAST MEDICAL & SURGICAL HISTORY:  HTN (hypertension)      HLD (hyperlipidemia)      CAD (coronary atherosclerotic disease)      ESRD on dialysis  last 11/15/22      H/O ventral hernia      Brown tumor  brain      DVT, lower extremity  left      History of surgery  IVC filter      AVF (arteriovenous fistula)  right left      S/P AIDEN-BSO        History of surgery  RLE PTA stent / atherectomy  2021      History of atherectomy  stent    Current Nutrition Order:   Diet, NPO:   Except Medications (23 @ 11:58)  Diet, NPO (23 @ 08:44)    PO Intake: N/A    GI Issues: Abdomen ND/NT, +BS x4, LBM     Pain: 6/10, unspecified     Skin Integrity: PI stg II sacrum, PI unstagelable B/L breasts, PI stg II R hand, PI stg II L hip, SDTI B/L heels, +4 BLE, NP edema to BUE     Labs:       125<L>  |  93<L>  |  24<H>  ----------------------------<  99  4.0   |  20<L>  |  2.83<H>    Ca    9.5      30 May 2023 05:30  Phos  3.2       Mg     2.0         TPro  4.5<L>  /  Alb  2.1<L>  /  TBili  0.3  /  DBili  x   /  AST  14  /  ALT  8<L>  /  AlkPhos  152<H>      CAPILLARY BLOOD GLUCOSE      POCT Blood Glucose.: 83 mg/dL (30 May 2023 11:03)  POCT Blood Glucose.: 89 mg/dL (30 May 2023 09:05)  POCT Blood Glucose.: 140 mg/dL (30 May 2023 02:59)  POCT Blood Glucose.: 68 mg/dL (30 May 2023 02:10)  POCT Blood Glucose.: 105 mg/dL (29 May 2023 22:43)  POCT Blood Glucose.: 113 mg/dL (29 May 2023 17:33)  POCT Blood Glucose.: 107 mg/dL (29 May 2023 14:36)      Medications:  MEDICATIONS  (STANDING):  ascorbic acid 500 milliGRAM(s) Oral daily  atorvastatin 40 milliGRAM(s) Oral at bedtime  bisacodyl 5 milliGRAM(s) Oral at bedtime  chlorhexidine 2% Cloths 1 Application(s) Topical <User Schedule>  cinacalcet 120 milliGRAM(s) Oral every 24 hours  Dakins Solution - 1/4 Strength 1 Application(s) Topical every 12 hours  DAPTOmycin IVPB 400 milliGRAM(s) IV Intermittent every 48 hours  dextrose 10% Bolus 250 milliLiter(s) IV Bolus once  dextrose 10%. 1000 milliLiter(s) (30 mL/Hr) IV Continuous <Continuous>  dextrose 50% Injectable 25 Gram(s) IV Push once  dextrose 50% Injectable 12.5 Gram(s) IV Push once  dextrose 50% Injectable 25 Gram(s) IV Push once  dextrose Oral Gel 15 Gram(s) Oral once  dextrose Oral Gel 15 Gram(s) Oral once  fludroCORTISONE 0.3 milliGRAM(s) Oral every 24 hours  folic acid 1 milliGRAM(s) Oral daily  heparin   Injectable 5000 Unit(s) SubCutaneous every 8 hours  meropenem  IVPB 500 milliGRAM(s) IV Intermittent every 24 hours  midodrine 40 milliGRAM(s) Oral every 8 hours  Nephro-deborah 1 Tablet(s) Oral daily  norepinephrine Infusion 0.05 MICROgram(s)/kG/Min (3.72 mL/Hr) IV Continuous <Continuous>  pantoprazole    Tablet 40 milliGRAM(s) Oral every 12 hours  polyethylene glycol 3350 17 Gram(s) Oral daily    MEDICATIONS  (PRN):  acetaminophen     Tablet .. 650 milliGRAM(s) Oral every 6 hours PRN Temp greater or equal to 38C (100.4F), Mild Pain (1 - 3)  EPINEPHrine     1 mG/mL Injectable 0.3 milliGRAM(s) IntraMuscular once PRN anaphylaxis  HYDROmorphone   Tablet 4 milliGRAM(s) Oral every 3 hours PRN Severe Pain (7 - 10)  HYDROmorphone   Tablet 2 milliGRAM(s) Oral every 3 hours PRN Moderate Pain (4 - 6)  sodium chloride 0.9% lock flush 10 milliLiter(s) IV Push every 1 hour PRN Pre/post blood products, medications, blood draw, and to maintain line patency    Weight:  Daily     Daily Weight in k.1 (25 May 2023 13:15)    Weight Change:    93/7kg   101.9kg   103.1kg   101.1kg  - noted wt fluctuations likely r/t fluid shifts     Estimated energy needs:   Weight used for calculations	current weight  Estimated Energy Needs Weight (lbs)	175 lb  Estimated Energy Needs Weight (kg)	79.4 kg    Estimated Energy Needs From (anatoliy/kg) 28  Estimated Energy Needs To (anatoliy/kg)	33  Estimated Energy Needs Calculated From (anatoliy/kg) 2223  Estimated Energy Needs Calculated To (anatoliy/kg)	2620    Estimated Protein Needs From (g/kg)	1.2  Estimated Protein Needs To (g/kg)	1.3  Estimated Protein Needs Calculated From (g/kg) 95.16  Estimated Protein Needs Calculated To (g/kg)	103.09    Other Calculations	Actual body weight used to calculate energy needs. Unable to assess IBW; ht not recorded. 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 Per GI, would like to reduce stool burden prior to EGD/colonscopy w manual disimpaction and golytely.  transfer to ICU for monitoring of pressures/potential fem line placement.  Pt reporting severe abdominal pain abdomen soft TTP RUQWeight:  Daily     Daily Weight in k.1 (25 May 2023 13:15)    Weight Change:    93/7kg   101.9kg   103.1kg   101.1kg   - 103.4 kg   - noted wt fluctuations likely r/t fluid shifts     Estimated energy needs:   Weight used for calculations	current weight  Estimated Energy Needs Weight (lbs)	175 lb  Estimated Energy Needs Weight (kg)	79.4 kg    Estimated Energy Needs From (anatoliy/kg) 28  Estimated Energy Needs To (anatoliy/kg)	33  Estimated Energy Needs Calculated From (anatoliy/kg) 2223  Estimated Energy Needs Calculated To (anatoliy/kg)	2620    Estimated Protein Needs From (g/kg)	1.2  Estimated Protein Needs To (g/kg)	1.3  Estimated Protein Needs Calculated From (g/kg) 95.16  Estimated Protein Needs Calculated To (g/kg)	103.09    Other Calculations	Actual body weight used to calculate energy needs. Unable to assess IBW; ht not recorded. 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 Per GI, would like to reduce stool burden prior to EGD/colonscopy w manual disimpaction and golytely.  transfer to ICU for monitoring of pressures/potential fem line placement.  Pt reporting severe abdominal pain abdomen soft TTP RUQ.     Pt care discussed in IDT rounds. Rx and labs reviewed. Pt familiar to this clinician from previous admissions. Pt generally dislikes food provided by facility. Pt with impaired renal function requiring a renal diet and HD. Previously on Nepro ONS with variable compliance to ONS regimen. Pt with significant wounds; general surgery following for possible need for debridement, pt NPO today. Ongoing GOC conversations; align nutrition with GOC at all times. RDN will continue to reassess, intervene, and monitor as appropriate.     Previous Nutrition Diagnosis:  Increased Nutrient Needs r/t increased metabolic needs for Kcal/protein AEB multiple PUs, ESRD on HD    Active [ X ]  Resolved [   ]    Goal: Consistently meets > 75% EER     Recommendations:  -Restart PO diet as medically able  -Consider addition of Nepro TID    *Provide increased dietary protein for wound healing.   -Align nutrition with GOC at all times  -Encourage good PO intake when appropriate   -Monitor chemistry, GI fxn, and skin integrity     Risk Level: High [ x ] Moderate [   ] Low [   ]

## 2023-05-30 NOTE — PROGRESS NOTE ADULT - SUBJECTIVE AND OBJECTIVE BOX
SUBJECTIVE:  pt seen this morning at bedside, did not answer questions when asked    MEDICATIONS  (STANDING):  ascorbic acid 500 milliGRAM(s) Oral daily  atorvastatin 40 milliGRAM(s) Oral at bedtime  bisacodyl 5 milliGRAM(s) Oral at bedtime  chlorhexidine 2% Cloths 1 Application(s) Topical <User Schedule>  cinacalcet 120 milliGRAM(s) Oral every 24 hours  Dakins Solution - 1/4 Strength 1 Application(s) Topical every 12 hours  DAPTOmycin IVPB 400 milliGRAM(s) IV Intermittent every 48 hours  dextrose 10% Bolus 250 milliLiter(s) IV Bolus once  dextrose 10%. 1000 milliLiter(s) (30 mL/Hr) IV Continuous <Continuous>  dextrose 50% Injectable 12.5 Gram(s) IV Push once  dextrose 50% Injectable 25 Gram(s) IV Push once  dextrose 50% Injectable 25 Gram(s) IV Push once  dextrose Oral Gel 15 Gram(s) Oral once  dextrose Oral Gel 15 Gram(s) Oral once  fludroCORTISONE 0.3 milliGRAM(s) Oral every 24 hours  folic acid 1 milliGRAM(s) Oral daily  heparin   Injectable 5000 Unit(s) SubCutaneous every 8 hours  meropenem  IVPB 500 milliGRAM(s) IV Intermittent every 24 hours  midodrine 40 milliGRAM(s) Oral every 8 hours  Nephro-deborah 1 Tablet(s) Oral daily  norepinephrine Infusion 0.05 MICROgram(s)/kG/Min (3.72 mL/Hr) IV Continuous <Continuous>  pantoprazole    Tablet 40 milliGRAM(s) Oral every 12 hours  polyethylene glycol 3350 17 Gram(s) Oral daily    MEDICATIONS  (PRN):  acetaminophen     Tablet .. 650 milliGRAM(s) Oral every 6 hours PRN Temp greater or equal to 38C (100.4F), Mild Pain (1 - 3)  EPINEPHrine     1 mG/mL Injectable 0.3 milliGRAM(s) IntraMuscular once PRN anaphylaxis  HYDROmorphone   Tablet 4 milliGRAM(s) Oral every 3 hours PRN Severe Pain (7 - 10)  HYDROmorphone   Tablet 2 milliGRAM(s) Oral every 3 hours PRN Moderate Pain (4 - 6)  sodium chloride 0.9% lock flush 10 milliLiter(s) IV Push every 1 hour PRN Pre/post blood products, medications, blood draw, and to maintain line patency      Vital Signs Last 24 Hrs  T(C): 35.9 (30 May 2023 11:20), Max: 35.9 (29 May 2023 14:22)  T(F): 96.6 (30 May 2023 11:20), Max: 96.6 (29 May 2023 14:22)  HR: 100 (30 May 2023 13:00) (54 - 107)  BP: --  BP(mean): --  RR: 17 (30 May 2023 13:00) (10 - 22)  SpO2: 100% (30 May 2023 13:00) (93% - 100%)    Parameters below as of 30 May 2023 13:00  Patient On (Oxygen Delivery Method): room air        PHYSICAL EXAM:      Constitutional: A&Ox3    Breasts: left breast wound with large area of necrotic tissue, purulent fluid noted at bottom of wound, tender, fibrinous exudate noted around necrotic tissue    Respiratory: non labored breathing, no respiratory distress    Cardiovascular: NSR, RRR    Gastrointestinal: soft, nontender nodistended    Extremities: (-) edema                  I&O's Detail    29 May 2023 07:01  -  30 May 2023 07:00  --------------------------------------------------------  IN:    dextrose 10%: 720 mL    IV PiggyBack: 200 mL    Norepinephrine: 66 mL    Norepinephrine: 455.4 mL    Oral Fluid: 20 mL    Vasopressin: 18 mL  Total IN: 1479.4 mL    OUT:  Total OUT: 0 mL    Total NET: 1479.4 mL      30 May 2023 07:01  -  30 May 2023 14:03  --------------------------------------------------------  IN:    dextrose 10%: 180 mL    Norepinephrine: 185 mL  Total IN: 365 mL    OUT:  Total OUT: 0 mL    Total NET: 365 mL          LABS:                        8.1    15.57 )-----------( 66       ( 30 May 2023 06:12 )             27.7     05-30    125<L>  |  93<L>  |  24<H>  ----------------------------<  99  4.0   |  20<L>  |  2.83<H>    Ca    9.5      30 May 2023 05:30  Phos  3.2     05-30  Mg     2.0     05-30    TPro  4.5<L>  /  Alb  2.1<L>  /  TBili  0.3  /  DBili  x   /  AST  14  /  ALT  8<L>  /  AlkPhos  152<H>  05-30          RADIOLOGY & ADDITIONAL STUDIES:

## 2023-05-30 NOTE — PROGRESS NOTE ADULT - SUBJECTIVE AND OBJECTIVE BOX
Patient is a 67y Female seen and evaluated at bedside. patient remains on levophed for hypotension with breast infxn  cultures growing multiple organisms Na 125 bicarb 20 K 4.0 phos 3.2       Meds:    acetaminophen     Tablet .. 650 every 6 hours PRN  ascorbic acid 500 daily  atorvastatin 40 at bedtime  bisacodyl 5 at bedtime  chlorhexidine 2% Cloths 1 <User Schedule>  chlorhexidine 2% Cloths 1 <User Schedule>  cinacalcet 120 every 24 hours  Dakins Solution - 1/4 Strength 1 every 12 hours  DAPTOmycin IVPB 400 every 48 hours  dextrose 10% Bolus 250 once  dextrose 10%. 1000 <Continuous>  dextrose 50% Injectable 12.5 once  dextrose 50% Injectable 25 once  dextrose 50% Injectable 25 once  dextrose Oral Gel 15 once  dextrose Oral Gel 15 once  EPINEPHrine     1 mG/mL Injectable 0.3 once PRN  epoetin александр-epbx (RETACRIT) Injectable 8000 once  fludroCORTISONE 0.3 every 24 hours  folic acid 1 daily  heparin   Injectable 5000 every 8 hours  HYDROmorphone   Tablet 4 every 3 hours PRN  HYDROmorphone   Tablet 2 every 3 hours PRN  meropenem  IVPB 500 every 24 hours  midodrine 40 every 8 hours  Nephro-deborah 1 daily  norepinephrine Infusion 0.05 <Continuous>  pantoprazole    Tablet 40 every 12 hours  polyethylene glycol 3350 17 daily  sodium chloride 0.9% lock flush 10 every 1 hour PRN  sodium thiosulfate IVPB 25 once  sodium thiosulfate IVPB 25 once      T(C): , Max: 36.2 (05-29-23 @ 10:15)  T(F): , Max: 97.2 (05-29-23 @ 10:15)  HR: 64 (05-30-23 @ 08:00)  BP: --  BP(mean): --  RR: 17 (05-30-23 @ 08:00)  SpO2: 98% (05-30-23 @ 08:00)  Wt(kg): --    05-29 @ 07:01  -  05-30 @ 07:00  --------------------------------------------------------  IN: 1479.4 mL / OUT: 0 mL / NET: 1479.4 mL    05-30 @ 07:01  -  05-30 @ 08:02  --------------------------------------------------------  IN: 52 mL / OUT: 0 mL / NET: 52 mL      Height (cm): 168 (05-29 @ 12:17)    Review of Systems:  all other ROS negative       PHYSICAL EXAM:  GENERAL: NAD laying in bed ; moaning   CHEST/LUNG: decreased breath sounds at the bases   HEART: normal S1S2, RRR  ABDOMEN: Soft, Nontender, +BS,   EXTREMITIES: + anasarca    SKIN: breast calciphylaxis   ACCESS: [facility]       LABS:                        8.1    15.57 )-----------( 66       ( 30 May 2023 06:12 )             27.7     05-30    125<L>  |  93<L>  |  24<H>  ----------------------------<  99  4.0   |  20<L>  |  2.83<H>    Ca    9.5      30 May 2023 05:30  Phos  3.2     05-30  Mg     2.0     05-30    TPro  4.5<L>  /  Alb  2.1<L>  /  TBili  0.3  /  DBili  x   /  AST  14  /  ALT  8<L>  /  AlkPhos  152<H>  05-30                RADIOLOGY & ADDITIONAL STUDIES:           Patient is a 67y Female seen and evaluated at bedside. patient remains on levophed for hypotension with breast infxn  cultures growing multiple organisms Na 125 bicarb 20 K 4.0 phos 3.2       Meds:    acetaminophen     Tablet .. 650 every 6 hours PRN  ascorbic acid 500 daily  atorvastatin 40 at bedtime  bisacodyl 5 at bedtime  chlorhexidine 2% Cloths 1 <User Schedule>  chlorhexidine 2% Cloths 1 <User Schedule>  cinacalcet 120 every 24 hours  Dakins Solution - 1/4 Strength 1 every 12 hours  DAPTOmycin IVPB 400 every 48 hours  dextrose 10% Bolus 250 once  dextrose 10%. 1000 <Continuous>  dextrose 50% Injectable 12.5 once  dextrose 50% Injectable 25 once  dextrose 50% Injectable 25 once  dextrose Oral Gel 15 once  dextrose Oral Gel 15 once  EPINEPHrine     1 mG/mL Injectable 0.3 once PRN  epoetin александр-epbx (RETACRIT) Injectable 8000 once  fludroCORTISONE 0.3 every 24 hours  folic acid 1 daily  heparin   Injectable 5000 every 8 hours  HYDROmorphone   Tablet 4 every 3 hours PRN  HYDROmorphone   Tablet 2 every 3 hours PRN  meropenem  IVPB 500 every 24 hours  midodrine 40 every 8 hours  Nephro-deborah 1 daily  norepinephrine Infusion 0.05 <Continuous>  pantoprazole    Tablet 40 every 12 hours  polyethylene glycol 3350 17 daily  sodium chloride 0.9% lock flush 10 every 1 hour PRN  sodium thiosulfate IVPB 25 once  sodium thiosulfate IVPB 25 once      T(C): , Max: 36.2 (23 @ 10:15)  T(F): , Max: 97.2 (23 @ 10:15)  HR: 64 (23 @ 08:00)  BP: --  BP(mean): --  RR: 17 (23 @ 08:00)  SpO2: 98% (23 @ 08:00)  Wt(kg): --     @ 07:01  -   @ 07:00  --------------------------------------------------------  IN: 1479.4 mL / OUT: 0 mL / NET: 1479.4 mL     @ 07:01  -   @ 08:02  --------------------------------------------------------  IN: 52 mL / OUT: 0 mL / NET: 52 mL      Height (cm): 168 ( @ 12:17)    Review of Systems:  all other ROS negative       PHYSICAL EXAM:  GENERAL: NAD laying in bed ; moaning   CHEST/LUNG: decreased breath sounds at the bases   HEART: normal S1S2, RRR  ABDOMEN: Soft, Nontender, +BS,   EXTREMITIES: + anasarca    SKIN: breast calciphylaxis   ACCESS: [facility]       LABS:                        8.1    15.57 )-----------( 66       ( 30 May 2023 06:12 )             27.7         125<L>  |  93<L>  |  24<H>  ----------------------------<  99  4.0   |  20<L>  |  2.83<H>    Ca    9.5      30 May 2023 05:30  Phos  3.2       Mg     2.0         TPro  4.5<L>  /  Alb  2.1<L>  /  TBili  0.3  /  DBili  x   /  AST  14  /  ALT  8<L>  /  AlkPhos  152<H>                  RADIOLOGY & ADDITIONAL STUDIES:        Hemoglobin: 8.1 g/dL (23 @ 06:12)  Phosphorus Level, Serum: 3.2 mg/dL (23 @ 05:30)  Hemoglobin: 8.9 g/dL (23 @ 06:27)  Phosphorus Level, Serum: 2.6 mg/dL (23 @ 06:27)    Albumin, Serum: 2.1 g/dL (23 @ 05:30)  Albumin, Serum: 2.1 g/dL (23 @ 00:58)    cinacalcet 120 milliGRAM(s) Oral every 24 hours, 23 @ 15:07, Routine  epoetin александр-epbx (RETACRIT) Injectable 8000 Unit(s) IV Push once, 23 @ 05:42, Routine, Stop order after: 1 Doses  epoetin александр-epbx (RETACRIT) Injectable 8000 Unit(s) IV Push once, 23 @ 07:08, Routine, Stop order after: 1 Doses  epoetin александр-epbx (RETACRIT) Injectable 8000 Unit(s) IV Push once, 23 @ 08:09, Routine, Stop order after: 1 Doses  epoetin александр-epbx (RETACRIT) Injectable 8000 Unit(s) IV Push once, 23 @ 07:56, Routine, Stop order after: 1 Doses  epoetin александр-epbx (RETACRIT) Injectable 8000 Unit(s) IV Push once, 23 @ 06:21, Routine, Stop order after: 1 Doses  sevelamer carbonate 1600 milliGRAM(s) Oral three times a day with meals, 23 @ 15:07, Routine  sevelamer carbonate 800 milliGRAM(s) Oral three times a day with meals, 23 @ 16:11,     Hemodialysis Treatment.:     Schedule: Once, Modality: Hemodialysis, Access: Internal Jugular Central Venous Catheter    Dialyzer: Optiflux [id number], Time: 180 Min    Blood Flow: 400 mL/Min , Dialysate Flow: 500 mL/Min, Dialysate Temp: 36.5, Tubinmm (Adult)    Target Fluid Removal: 2 Liters    Dialysate Electrolytes (mEq/L): Potassium 3, Calcium 2.5, Sodium 134, Bicarbonate 35 (23 @ 08:07) [Active]  Hemodialysis Treatment.:     Schedule: Once, Modality: Hemodialysis, Access: Internal Jugular Central Venous Catheter    Dialyzer: Optiflux [id number], Time: 180 Min    Blood Flow: 400 mL/Min , Dialysate Flow: 500 mL/Min, Dialysate Temp: 36.5, Tubinmm (Adult)    Target Fluid Removal: 1 Liters    Dialysate Electrolytes (mEq/L): Potassium 3, Calcium 2.5, Sodium 138, Bicarbonate 35 (23 @ 06:21) [Discontinued]

## 2023-05-30 NOTE — PROGRESS NOTE ADULT - ASSESSMENT
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, Calciphylaxis of bilateral breasts, HTN, HLD presenting to the ER in setting of anemia now with breast infection    Assessment/Plan:   #ESRD on HD  #Calciphylaxis  Usual RX time 3:30 hrs, EDW TBD  HD today for minimal UF and clearance as patient remains on levophed for   Give sodium thiosulfate with HD     #Hypotension   back on levophed for pressor support  -c/w levophed and midodrine  -maintain MAP 70 for adequate renal perfusion    #Hyponatremia  1L fluid restriction  will lower NA concentration in dialysate bath  ensure drips are in NS not D5W  ifNa drops any further would suggest running hypertonic saline for a few hours     #access   L TDC    #anemia  Hgb not at goal  Epo w/ HD    #renal bone disease   Ca ~9.5  phos 3.2  c/w sensipar  trend phos daily            Thank you for the opportunity to participate in the care of your patient. The nephrology service remains available to assist with any questions or concerns. Please feel free to reach us by paging the on-call nephrology fellow for urgent issues or as below.     Saumya Jo D.O  PGY-5, Nephrology Fellow    P: [phone]

## 2023-05-30 NOTE — PROGRESS NOTE ADULT - SUBJECTIVE AND OBJECTIVE BOX
SALOMON MUNSON  67y  Female    Patient is a 67y old  Female who presents with a chief complaint of GIB (28 May 2023 08:15)      INTERVAL HPI/OVERNIGHT EVENTS:  FSG 68 overnight, and given an amp of D50. Senior resident spoke with HCP, daughter Jasmina as well as author and family is now amenable to surgical debridement for bilateral breast wounds. Otherwise, patient remains at baseline mental status. AAO1-2. Patient is complaining of poor sleep, is currently denying any pain. Denies any other issues or complaints.    ICU Vital Signs Last 24 Hrs  T(C): 35.9 (30 May 2023 11:20), Max: 35.9 (29 May 2023 14:22)  T(F): 96.6 (30 May 2023 11:20), Max: 96.6 (29 May 2023 14:22)  HR: 77 (30 May 2023 12:00) (54 - 107)  BP: --  BP(mean): --  ABP: 93/42 (30 May 2023 12:00) (86/38 - 110/50)  ABP(mean): 59 (30 May 2023 12:00) (55 - 73)  RR: 10 (30 May 2023 12:00) (10 - 22)  SpO2: 97% (30 May 2023 12:00) (93% - 100%)    O2 Parameters below as of 30 May 2023 12:00  Patient On (Oxygen Delivery Method): room air      PHYSICAL EXAM:  General: large body habitus, AAO1. in NAD.  Eyes: PERRL, clear conjunctiva  ENT: Poor dentition.  Respiratory: Decreased BS b/l lung fields; no W/R/R  Cardiac: +S1/S2; irregular rate; no M/R/G; HD catheter over L chest.   Gastrointestinal: soft, protuberant, NT/ND; no rebound or guarding; +BSx4. Large ventral hernia.   Extremities: Diffuse anasarca, worsened in bilateral upper extremities. +3 pitting edema RUE. +2 LUE. +3 b/l pitting edema le.  No blisters on heels or toes.  Skin: Bilateral breast wounds covered in dressing. L breast w eschar, R breast copious granulation tissue. malodorous.  Neurologic: AAOx1-2; does not participate in strength exam today.    Consultant(s) Notes Reviewed:  [x ] YES  [ ] NO  Care Discussed with Consultants/Other Providers [ x] YES  [ ] NO    LABS:                         8.1    15.57 )-----------( 66       ( 30 May 2023 06:12 )             27.7     05-30    125<L>  |  93<L>  |  24<H>  ----------------------------<  99  4.0   |  20<L>  |  2.83<H>    Ca    9.5      30 May 2023 05:30  Phos  3.2     05-30  Mg     2.0     05-30    TPro  4.5<L>  /  Alb  2.1<L>  /  TBili  0.3  /  DBili  x   /  AST  14  /  ALT  8<L>  /  AlkPhos  152<H>  05-30        CARDIAC MARKERS ( 30 May 2023 05:30 )  x     / x     / 13 U/L / x     / x      CARDIAC MARKERS ( 29 May 2023 06:27 )  x     / x     / 21 U/L / x     / x                RADIOLOGY, EKG & ADDITIONAL TESTS:     RADIOLOGY, EKG & ADDITIONAL TESTS:     CAPILLARY BLOOD GLUCOSE      POCT Blood Glucose.: 98 mg/dL (28 May 2023 15:16)  POCT Blood Glucose.: 114 mg/dL (28 May 2023 13:07)  POCT Blood Glucose.: 128 mg/dL (28 May 2023 10:58)  POCT Blood Glucose.: 72 mg/dL (28 May 2023 09:52)  POCT Blood Glucose.: 112 mg/dL (28 May 2023 07:06)  POCT Blood Glucose.: 80 mg/dL (28 May 2023 04:20)  POCT Blood Glucose.: 84 mg/dL (28 May 2023 02:34)  POCT Blood Glucose.: 107 mg/dL (27 May 2023 23:30)  POCT Blood Glucose.: 123 mg/dL (27 May 2023 22:00)  POCT Blood Glucose.: 93 mg/dL (27 May 2023 19:19)  POCT Blood Glucose.: 104 mg/dL (27 May 2023 17:12)            MEDICATIONS  (STANDING):  ascorbic acid 500 milliGRAM(s) Oral daily  atorvastatin 40 milliGRAM(s) Oral at bedtime  bisacodyl 5 milliGRAM(s) Oral at bedtime  chlorhexidine 2% Cloths 1 Application(s) Topical <User Schedule>  chlorhexidine 2% Cloths 1 Application(s) Topical <User Schedule>  cinacalcet 120 milliGRAM(s) Oral every 24 hours  Dakins Solution - 1/4 Strength 1 Application(s) Topical every 12 hours  dextrose 10% Bolus 250 milliLiter(s) IV Bolus once  dextrose 10%. 1000 milliLiter(s) (30 mL/Hr) IV Continuous <Continuous>  dextrose 50% Injectable 12.5 Gram(s) IV Push once  dextrose 50% Injectable 25 Gram(s) IV Push once  dextrose 50% Injectable 25 Gram(s) IV Push once  dextrose Oral Gel 15 Gram(s) Oral once  dextrose Oral Gel 15 Gram(s) Oral once  fludroCORTISONE 0.2 milliGRAM(s) Oral every 24 hours  folic acid 1 milliGRAM(s) Oral daily  heparin   Injectable 5000 Unit(s) SubCutaneous every 8 hours  magnesium sulfate  IVPB 2 Gram(s) IV Intermittent once  meropenem  IVPB 500 milliGRAM(s) IV Intermittent every 24 hours  midodrine 40 milliGRAM(s) Oral every 8 hours  Nephro-deborah 1 Tablet(s) Oral daily  norepinephrine Infusion 0.05 MICROgram(s)/kG/Min (7.44 mL/Hr) IV Continuous <Continuous>  pantoprazole    Tablet 40 milliGRAM(s) Oral every 12 hours  polyethylene glycol 3350 17 Gram(s) Oral daily  sodium thiosulfate IVPB 25 Gram(s) IV Intermittent once    MEDICATIONS  (PRN):  acetaminophen     Tablet .. 650 milliGRAM(s) Oral every 6 hours PRN Temp greater or equal to 38C (100.4F), Mild Pain (1 - 3)  EPINEPHrine     1 mG/mL Injectable 0.3 milliGRAM(s) IntraMuscular once PRN anaphylaxis  HYDROmorphone   Tablet 4 milliGRAM(s) Oral every 3 hours PRN Severe Pain (7 - 10)  HYDROmorphone   Tablet 2 milliGRAM(s) Oral every 3 hours PRN Moderate Pain (4 - 6)  sodium chloride 0.9% lock flush 10 milliLiter(s) IV Push every 1 hour PRN Pre/post blood products, medications, blood draw, and to maintain line patency      RADIOLOGY & ADDITIONAL TESTS:    Imaging Personally Reviewed:  [ ] YES  [ ] NO

## 2023-05-30 NOTE — PROGRESS NOTE ADULT - SUBJECTIVE AND OBJECTIVE BOX
SUBJECTIVE AND OBJECTIVE:  Indication for Geriatrics and Palliative Care Services: [facility]    OVERNIGHT EVENTS: B/l breast wounds concerning for source of sepsis and hypotension. Surgery consulted. Patient seen post-HD, extremely lethargic.    Allergies    sulfa drugs (Angioedema)  Ancef (Rash; Urticaria)  iodine (Hives; Pruritus)  DDAVP (Hypotension)  penicillin (Swelling)    Intolerances    MEDICATIONS  (STANDING):  ascorbic acid 500 milliGRAM(s) Oral daily  atorvastatin 40 milliGRAM(s) Oral at bedtime  bisacodyl 5 milliGRAM(s) Oral at bedtime  chlorhexidine 2% Cloths 1 Application(s) Topical <User Schedule>  cinacalcet 120 milliGRAM(s) Oral every 24 hours  Dakins Solution - 1/4 Strength 1 Application(s) Topical every 12 hours  DAPTOmycin IVPB 400 milliGRAM(s) IV Intermittent every 48 hours  dextrose 10% Bolus 250 milliLiter(s) IV Bolus once  dextrose 10%. 1000 milliLiter(s) (30 mL/Hr) IV Continuous <Continuous>  dextrose 50% Injectable 25 Gram(s) IV Push once  dextrose 50% Injectable 12.5 Gram(s) IV Push once  dextrose 50% Injectable 25 Gram(s) IV Push once  dextrose Oral Gel 15 Gram(s) Oral once  dextrose Oral Gel 15 Gram(s) Oral once  fludroCORTISONE 0.3 milliGRAM(s) Oral every 24 hours  folic acid 1 milliGRAM(s) Oral daily  heparin   Injectable 5000 Unit(s) SubCutaneous every 8 hours  lidocaine 1% Injectable 20 milliLiter(s) Local Injection once  meropenem  IVPB 500 milliGRAM(s) IV Intermittent every 24 hours  midodrine 40 milliGRAM(s) Oral every 8 hours  Nephro-deborah 1 Tablet(s) Oral daily  norepinephrine Infusion 0.05 MICROgram(s)/kG/Min (3.72 mL/Hr) IV Continuous <Continuous>  pantoprazole    Tablet 40 milliGRAM(s) Oral every 12 hours  polyethylene glycol 3350 17 Gram(s) Oral daily    MEDICATIONS  (PRN):  acetaminophen     Tablet .. 650 milliGRAM(s) Oral every 6 hours PRN Temp greater or equal to 38C (100.4F), Mild Pain (1 - 3)  EPINEPHrine     1 mG/mL Injectable 0.3 milliGRAM(s) IntraMuscular once PRN anaphylaxis  HYDROmorphone   Tablet 4 milliGRAM(s) Oral every 3 hours PRN Severe Pain (7 - 10)  HYDROmorphone   Tablet 2 milliGRAM(s) Oral every 3 hours PRN Moderate Pain (4 - 6)  sodium chloride 0.9% lock flush 10 milliLiter(s) IV Push every 1 hour PRN Pre/post blood products, medications, blood draw, and to maintain line patency      ITEMS UNCHECKED ARE NOT PRESENT    PRESENT SYMPTOMS: [X]Unable to self-report  Source if other than patient:  [ ]Family   [X]Team     Pain:  [X]yes [ ]no  QOL impact -   Location -                    Aggravating factors -  Quality -  Radiation -  Timing-  Severity (0-10 scale):  Minimal acceptable level (0-10 scale):     Dyspnea:                           [ ]Mild [ ]Moderate [ ]Severe  Anxiety:                             [ ]Mild [ ]Moderate [ ]Severe  Fatigue:                             [ ]Mild [ ]Moderate [X]Severe  Nausea:                             [ ]Mild [ ]Moderate [ ]Severe  Loss of appetite:              [ ]Mild [ ]Moderate [ ]Severe  Constipation:                    [ ]Mild [ ]Moderate [ ]Severe    Other Symptoms:  [X]All other review of systems negative     Palliative Performance Status Version 2:         30%      http://npcrc.org/files/news/palliative_performance_scale_ppsv2.pdf    PHYSICAL EXAM:  Vital Signs Last 24 Hrs  T(C): 36.2 (30 May 2023 17:54), Max: 36.2 (30 May 2023 14:20)  T(F): 97.2 (30 May 2023 17:54), Max: 97.2 (30 May 2023 14:20)  HR: 112 (30 May 2023 17:00) (54 - 126)  BP: --  BP(mean): --  RR: 22 (30 May 2023 17:00) (10 - 22)  SpO2: 99% (30 May 2023 17:00) (92% - 100%)    Parameters below as of 30 May 2023 17:00  Patient On (Oxygen Delivery Method): nasal cannula  O2 Flow (L/min): 2   I&O's Summary    29 May 2023 07:01  -  30 May 2023 07:00  --------------------------------------------------------  IN: 1479.4 mL / OUT: 0 mL / NET: 1479.4 mL    30 May 2023 07:01  -  30 May 2023 18:01  --------------------------------------------------------  IN: 663.4 mL / OUT: 0 mL / NET: 663.4 mL       GENERAL: [ ]Cachexia    [ ]Alert  [ ]Oriented x   [X]Lethargic  [ ]Unarousable  [X]Verbal  [ ]Non-Verbal  Behavioral:   [ ]Anxiety  [ ]Delirium [ ]Agitation [X]Calm  HEENT:  [X]Normal   [ ]Dry mouth   [ ]ET Tube/Trach  [ ]Oral lesions  PULMONARY:   [X]Clear [ ]Tachypnea  [ ]Audible excessive secretions   [ ]Rhonchi        [ ]Right [ ]Left [ ]Bilateral  [ ]Crackles        [ ]Right [ ]Left [ ]Bilateral  [ ]Wheezing     [ ]Right [ ]Left [ ]Bilateral  [ ]Diminished BS [ ] Right [ ]Left [ ]Bilateral  CARDIOVASCULAR:    [X]Regular [ ]Irregular [ ]Tachy  [ ]Arjun [ ]Murmur [ ]Other  GASTROINTESTINAL:  [X]Soft  [ ]Distended   [ ]+BS  [X]Non tender [ ]Tender  [ ]Other [ ]PEG [ ]OGT/ NGT   Last BM:   GENITOURINARY:  [ ]Normal [ ]Incontinent   [X]Oliguria/Anuria   [ ]Wu  MUSCULOSKELETAL:   [ ]Normal   [ ]Weakness  [X]Bed/Wheelchair bound [ ]Edema  NEUROLOGIC:   [ ]No focal deficits  [X] Cognitive impairment  [ ] Dysphagia [ ]Dysarthria [ ] Paresis [ ]Other   SKIN:   []Normal  [ ]Rash  [X]Other: b/l breast wounds  [ ]Pressure ulcer(s) [ ]y [ ]n present on admission    CRITICAL CARE:  [X]Shock Present  [X]Septic [ ]Cardiogenic [ ]Neurologic [ ]Hypovolemic  [ ]Vasopressors [ ]Inotropes  [ ]Respiratory failure present [ ]Mechanical Ventilation [ ]Non-invasive ventilatory support [ ]High-Flow   [ ]Acute  [ ]Chronic [ ]Hypoxic  [ ]Hypercarbic [ ]Other  [ ]Other organ failure     LABS:                        8.1    15.57 )-----------( 66       ( 30 May 2023 06:12 )             27.7   05-30    125<L>  |  93<L>  |  24<H>  ----------------------------<  99  4.0   |  20<L>  |  2.83<H>    Ca    9.5      30 May 2023 05:30  Phos  3.2     05-30  Mg     2.0     05-30    TPro  4.5<L>  /  Alb  2.1<L>  /  TBili  0.3  /  DBili  x   /  AST  14  /  ALT  8<L>  /  AlkPhos  152<H>  05-30    RADIOLOGY & ADDITIONAL STUDIES:    Protein Calorie Malnutrition Present: [ ]mild [ ]moderate [ ]severe [ ]underweight [ ]morbid obesity  https://www.andeal.org/vault/2440/web/files/ONC/Table_Clinical%20Characteristics%20to%20Document%20Malnutrition-White%20JV%20et%20al%202012.pdf    Height (cm): 168 (05-29-23 @ 12:17), 177.8 (01-01-23 @ 20:52), 177.8 (11-20-22 @ 08:23)  Weight (kg): 79.4 (05-19-23 @ 11:29), 94.5 (05-04-23 @ 16:00), 85.3 (01-01-23 @ 20:52)  BMI (kg/m2): 28.1 (05-29-23 @ 12:17), 25.1 (05-19-23 @ 11:29), 29.9 (05-04-23 @ 16:00)    [X]PPSV2 < or = 30%  [ ]significant weight loss [ ]poor nutritional intake [ ]anasarca[ ]Artificial Nutrition    REFERRALS:   [ ]Chaplaincy  [ ]Hospice  [ ]Child Life  [ ]Social Work [ ]Patient and Family Support [ ]Case management [ ]Holistic Therapy [ ] Music therapy  [ ] Massage Therapy    DISCUSSION OF CASE: Family - obtained additional history and to provide emotional support;  Primary team - discussed plan of care; Surgical team - discussed risks and benefits of procedures and plan of care

## 2023-05-30 NOTE — PROGRESS NOTE ADULT - ASSESSMENT
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 General surgery consulted on 5/26 for evaluation of bilateral breast wounds and possible debridement. Pt unable to provide subjective history 2/2 current mentation. Bilateral breast wounds with fibrinous slough, necrotic tissue, and purulent drainage underlying necrotic eschar. Overnight course signficant for hypothermia and hypotension requiring pressor support, now resolved.    Recommendations:  Recommend surgical debridement of breast wounds if patient and family become amenable  Recommend continued wound care with Dakins  Appreciate wound care recommendations  Rest of care per MICU  Dr. Sullivan to see patient to discuss surgical timing, follow up discussion; discussed with chief resident  Team 5C will continue to follow

## 2023-05-30 NOTE — PROGRESS NOTE ADULT - ASSESSMENT
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 Subsequently with HD today and drop in pressures, transferred to MICU for line placement and better accuracy of blood pressure control. Patient has remained hemodynamically stable, off pressors.    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 Patient will     Cardio  Patient w persistently low blood pressures, w 80s/40s during RR and inability to accurately measure BP due to diffuse edema during RR  Less concern for shock on differential given no infectious etiology, was requiring low dose levophed intermittently, but patient known to have baseline low blood pressure.  5/28: o/n patient needed levophed  - Arterial Line removed 5/24/23.  - Midodrine 40mg q 8, home medication  - C/w HD as needed. HD today.  - Florinef .2mg qd.  - R femoral TLC placed, due to access issue in patient.  - Access: IR Tuesday for port iso vasculopath     #Shock, now concern for underlying septic shock 2/2 to poor source control from b/l breast wounds  - Lactate remains negative, uptrending WBC count  - Otherwise, patient on meropenem and vanc dosing by level  - Surgery is consulted, daughter reports mother would not want debridement, will attempt repeat GOC convo today.  - Patient is now on levophed and vasopressin, wean as tolerated, Goal MAP >55.    #HfrEf, non-ischemic cardiomyopathy  - history of HfpEF, TTE 5/5/23, normal RV and LV function  - On No HF meds  - C.w statin      Pulmonary  - Patient w no pulmonology history  - good airway control, controlling secretions  - Continue to monitor secretions.    Renal  - ESRD w Dialysis, rapid response during HD session  - C/w HD as tolerated- can give levophed as needed to allow for HD  - renvela discontinued as pt w normal Phosphorus.    GI  #Stercoral Ulcers, 2 BM nonbloody 5/26 AM.  - Came in w GI bleed deemed to be 2/2 to constipation and stercoral ulcers  - C/w PPi IV BID  - C/w golytely prep as laxative  - Abdominal US w doppler, no evidence of portal HTN  - F/u GI recs, as per HCP and pt GOC, no role for egd this admission  - Active T and S, Keep HgB >7  - Caution w NSAIDS, prefer GANN-2 inhibitors  - patient with copious rectal output- place rectal tube and wound care consult    ID  -Patient w F to 101.1 on 5/24/23, and concomitant afib w RVR, resolved after defervescence  - Afebrile currently  - F/u BCx, F/u breast wound culture-> gram negative rods. and gram positive cocci, Pseudomonas, Providencia, and ESBL Proteus  - Cefepime d/c, Meropenem 500mg qd, on HD days dose after HD, ID approved for 7 day course  - If aligns w GOC pt will need port. IR consult ordered.  - F/u vancomycin level at 5PM, re-dose vanc as per level.  - no source control w/out surgical intervention, surgery is following. f.u GOC.    Heme  - HgB stable, currently, came in at 6.2  - See above for GI    #Leukocytosis  - Likely 2/2 to breast infection, slowly uptrending, no source control w/out surgical intervention  - Surgery is following    #Thrombocytopenia   Patient with slow steady decline in platelets, more compatible with sepsis vs HIT  HIT score: intermediate  - Hep 5000U q8.    Endo  5/21 received d10 bolus for glucose 66, on q4 glucose checks, then started d10 gtt at 50 cc/hr  - C/w gtt at 30cc/hr  - FSG q2, if consistently >100.    Prophylaxis:  F: d10 gtt 30cc/hr.  E: Replete cautiously given ESRD  N: Soft and Bite sized (renal diet)  DVT: Heparin 5000 subq q8    Lines: R femoral TLC placed 5/27/23. L femoral a-line placed 5/29/23.  No Wu. 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 Subsequently with HD today and drop in pressures, transferred to MICU for line placement and better accuracy of blood pressure control. Patient has remained hemodynamically stable, off pressors.    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 Patient will     Cardio  Patient w persistently low blood pressures, w 80s/40s during RR and inability to accurately measure BP due to diffuse edema during RR  Less concern for shock on differential given no infectious etiology, was requiring low dose levophed intermittently, but patient known to have baseline low blood pressure.  5/28: o/n patient needed levophed  Plan:  - Midodrine 40mg q 8, home medication  - C/w HD as needed. HD today.  - Florinef .3mg qd.  - R femoral TLC placed, due to access issue in patient.    #Shock, now concern for underlying septic shock 2/2 to poor source control from b/l breast wounds  - Lactate remains negative, uptrending WBC count  - Otherwise, patient on meropenem and vanc dosing by level  - Surgery is consulted, daughter now amenable to surgery.  - Surgery today vs tomorrow, 5C following, pending convo w attending surrounding timing  - Patient is now on levophed, wean as tolerated for goal SBP >90    #HfrEf, non-ischemic cardiomyopathy  - history of HfpEF, TTE 5/5/23, normal RV and LV function  - On No HF meds  - C.w statin      Pulmonary  - Patient w no pulmonology history  - good airway control, controlling secretions  - Continue to monitor secretions.    Renal  - ESRD w Dialysis, rapid response during HD session  - C/w HD as tolerated- can uptitrate levophed as needed to allow for HD  - renvela discontinued as pt w normal Phosphorus.    GI  #Stercoral Ulcers, 2 BM nonbloody 5/26 AM.  - Came in w GI bleed deemed to be 2/2 to constipation and stercoral ulcers  - C/w PPi IV BID  - C/w golytely prep as laxative  - Abdominal US w doppler, no evidence of portal HTN  - F/u GI recs, as per HCP and pt GOC, no role for egd this admission  - Active T and S, Keep HgB >7  - Caution w NSAIDS, prefer GANN-2 inhibitors  - patient with copious rectal output- place rectal tube and wound care consult    ID  -Patient w F to 101.1 on 5/24/23, and concomitant afib w RVR, resolved after defervescence  - Afebrile currently  - F/u BCx, F/u breast wound culture-> gram negative rods. and gram positive cocci,  ESBL Proteus, VRE E. Faecium  - C/w Meropenem and Daptomycin 400mg q48h.  - no source control w/out surgical intervention, surgery is following. f.u recs today.    Heme  - HgB stable, currently, came in at 6.2  - See above for GI    #Leukocytosis  - Likely 2/2 to breast infection, now slight downtrend, no source control w/out surgical intervention  - Surgery is following    #Thrombocytopenia   Patient with slow steady decline in platelets, more compatible with sepsis vs HIT  HIT score: intermediate  - Hep 5000U q8.  - Platelets 66k this AM, f/u surgery/anesthesia recs if need platelet transfusion    Endo  5/21 received d10 bolus for glucose 66, on q4 glucose checks, then started d10 gtt at 50 cc/hr  - C/w gtt at 30cc/hr  - FSG q2, if consistently >100.  - NPO except meds currently, but if surgery tomorrow, will allow for diet and NPO at [facility].t    Prophylaxis:  F: d10 gtt 30cc/hr.  E: Replete cautiously given ESRD  N: Soft and Bite sized (renal diet)  DVT: Heparin 5000 subq q8    Lines: R femoral TLC placed 5/27/23. L femoral a-line placed 5/29/23.  No Wu.

## 2023-05-31 NOTE — PROGRESS NOTE ADULT - ASSESSMENT
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 General surgery consulted on 5/26 for evaluation of bilateral breast wounds and possible debridement. Pt unable to provide subjective history 2/2 current mentation. Bilateral breast wounds with fibrinous slough, necrotic tissue, and purulent drainage underlying necrotic eschar now s/p debridement on 5/30.     Recommendations:  WOUND CARE: daily dressing changing with Dakins WTD. Dakins gauze should be damp not soaked  Appreciate wound care recs  Rest of care per MICU  Team 5C will continue to follow

## 2023-05-31 NOTE — PROGRESS NOTE ADULT - SUBJECTIVE AND OBJECTIVE BOX
SUBJECTIVE: Pt seen and examined at bedside this am by surgery team. Patient is lying in bed complaining of pain in her toes. Decreased pressor requirement. Denies fever, nausea, vomiting, chest pain, and shortness of breath. Endorses mild pain during dressing change, but states its still primarily in her feet/toes.     MEDICATIONS  (STANDING):  ascorbic acid 500 milliGRAM(s) Oral daily  atorvastatin 40 milliGRAM(s) Oral at bedtime  bisacodyl 5 milliGRAM(s) Oral at bedtime  chlorhexidine 2% Cloths 1 Application(s) Topical <User Schedule>  cinacalcet 120 milliGRAM(s) Oral every 24 hours  Dakins Solution - 1/4 Strength 1 Application(s) Topical every 12 hours  DAPTOmycin IVPB 400 milliGRAM(s) IV Intermittent every 48 hours  dextrose 10% Bolus 250 milliLiter(s) IV Bolus once  dextrose 10%. 1000 milliLiter(s) (30 mL/Hr) IV Continuous <Continuous>  dextrose 50% Injectable 25 Gram(s) IV Push once  dextrose 50% Injectable 12.5 Gram(s) IV Push once  dextrose 50% Injectable 25 Gram(s) IV Push once  dextrose Oral Gel 15 Gram(s) Oral once  dextrose Oral Gel 15 Gram(s) Oral once  fludroCORTISONE 0.3 milliGRAM(s) Oral every 24 hours  folic acid 1 milliGRAM(s) Oral daily  heparin   Injectable 5000 Unit(s) SubCutaneous every 8 hours  meropenem  IVPB 500 milliGRAM(s) IV Intermittent every 24 hours  midodrine 40 milliGRAM(s) Oral every 8 hours  Nephro-deborah 1 Tablet(s) Oral daily  norepinephrine Infusion 0.05 MICROgram(s)/kG/Min (3.72 mL/Hr) IV Continuous <Continuous>  pantoprazole    Tablet 40 milliGRAM(s) Oral every 12 hours  polyethylene glycol 3350 17 Gram(s) Oral daily    MEDICATIONS  (PRN):  acetaminophen     Tablet .. 650 milliGRAM(s) Oral every 6 hours PRN Temp greater or equal to 38C (100.4F), Mild Pain (1 - 3)  EPINEPHrine     1 mG/mL Injectable 0.3 milliGRAM(s) IntraMuscular once PRN anaphylaxis  HYDROmorphone   Tablet 4 milliGRAM(s) Oral every 3 hours PRN Severe Pain (7 - 10)  HYDROmorphone   Tablet 2 milliGRAM(s) Oral every 3 hours PRN Moderate Pain (4 - 6)  HYDROmorphone  Injectable 0.5 milliGRAM(s) IV Push every 2 hours PRN Severe Pain (7 - 10)  sodium chloride 0.9% lock flush 10 milliLiter(s) IV Push every 1 hour PRN Pre/post blood products, medications, blood draw, and to maintain line patency      Vital Signs Last 24 Hrs  T(C): 35.4 (31 May 2023 09:41), Max: 36.2 (30 May 2023 14:20)  T(F): 95.7 (31 May 2023 09:41), Max: 97.2 (30 May 2023 14:20)  HR: 76 (31 May 2023 10:00) (65 - 126)  BP: --  BP(mean): --  RR: 14 (31 May 2023 10:00) (6 - 22)  SpO2: 100% (31 May 2023 10:00) (92% - 100%)    Parameters below as of 31 May 2023 10:00  Patient On (Oxygen Delivery Method): nasal cannula  O2 Flow (L/min): 2      Physical Exam  General: Patient is doing well and lying in bed comfortably  Constitutional: alert and oriented   Pulm: Nonlabored breathing, no respiratory distress  CV: Regular rate and rhythm, normal sinus rhythm  Breast: b/l debridement sites without necrotic tissue, mild exudate noted upon removing dressing. No active bleeding. Tissue appears pink and moist. Dressing changed with dakins WTD gauze.   Abd:  soft, nontender, nondistended. No rebound, no guarding.   Extremities: warm, well perfused, no edema    I&O's Detail    30 May 2023 07:01  -  31 May 2023 07:00  --------------------------------------------------------  IN:    dextrose 10%: 720 mL    Norepinephrine: 750.2 mL  Total IN: 1470.2 mL    OUT:    Other (mL): 0 mL  Total OUT: 0 mL    Total NET: 1470.2 mL      31 May 2023 07:01  -  31 May 2023 11:04  --------------------------------------------------------  IN:    dextrose 10%: 90 mL    Norepinephrine: 54.8 mL  Total IN: 144.8 mL    OUT:  Total OUT: 0 mL    Total NET: 144.8 mL        LABS:                        8.1    11.72 )-----------( 46       ( 31 May 2023 05:30 )             26.3     05-31    128<L>  |  93<L>  |  18  ----------------------------<  76  3.8   |  24  |  2.36<H>    Ca    9.6      31 May 2023 05:30  Phos  2.8     05-31  Mg     1.9     05-31    TPro  4.6<L>  /  Alb  2.0<L>  /  TBili  0.4  /  DBili  x   /  AST  11  /  ALT  8<L>  /  AlkPhos  154<H>  05-31    PT/INR - ( 31 May 2023 05:30 )   PT: 16.1 sec;   INR: 1.35          PTT - ( 31 May 2023 05:30 )  PTT:35.3 sec

## 2023-05-31 NOTE — ADVANCED PRACTICE NURSE CONSULT - ASSESSMENT
Bilateral buttocks fungal rash with severely denuded skin.   Left upper buttock unstageable pressure injury with 75% adherent eschar and 25% red, non-granulating tissue; wound measuring 2 x 2.5 cm draining moderate amount of serosanguinous exudate.  Rectal tube in place.    Patient required 4 person assist to turn in bed. Pillows being used to turn and reposition the patient. Heel protectors on to offload heels.

## 2023-05-31 NOTE — PROGRESS NOTE ADULT - SUBJECTIVE AND OBJECTIVE BOX
Patient is a 67y Female seen and evaluated at bedside. patient s/p breast debridement yesterday after HD remains on levophed on [id number] for hypoglycemia Na 128 K 3.8 bicarb 24       Meds:    acetaminophen     Tablet .. 650 every 6 hours PRN  ascorbic acid 500 daily  atorvastatin 40 at bedtime  bisacodyl 5 at bedtime  chlorhexidine 2% Cloths 1 <User Schedule>  cinacalcet 120 every 24 hours  Dakins Solution - 1/4 Strength 1 every 12 hours  DAPTOmycin IVPB 400 every 48 hours  dextrose 10% Bolus 250 once  dextrose 10%. 1000 <Continuous>  dextrose 50% Injectable 12.5 once  dextrose 50% Injectable 25 once  dextrose 50% Injectable 25 once  dextrose Oral Gel 15 once  dextrose Oral Gel 15 once  EPINEPHrine     1 mG/mL Injectable 0.3 once PRN  fludroCORTISONE 0.3 every 24 hours  folic acid 1 daily  heparin   Injectable 5000 every 8 hours  HYDROmorphone   Tablet 4 every 3 hours PRN  HYDROmorphone   Tablet 2 every 3 hours PRN  HYDROmorphone  Injectable 0.5 every 2 hours PRN  meropenem  IVPB 500 every 24 hours  midodrine 40 every 8 hours  Nephro-deborah 1 daily  norepinephrine Infusion 0.05 <Continuous>  pantoprazole    Tablet 40 every 12 hours  polyethylene glycol 3350 17 daily  sodium chloride 0.9% lock flush 10 every 1 hour PRN      T(C): , Max: 36.2 (05-30-23 @ 14:20)  T(F): , Max: 97.2 (05-30-23 @ 14:20)  HR: 93 (05-31-23 @ 07:00)  BP: --  BP(mean): --  RR: 12 (05-31-23 @ 07:00)  SpO2: 98% (05-31-23 @ 07:00)  Wt(kg): --    05-30 @ 07:01  -  05-31 @ 07:00  --------------------------------------------------------  IN: 1470.2 mL / OUT: 0 mL / NET: 1470.2 mL    05-31 @ 07:01  -  05-31 @ 07:52  --------------------------------------------------------  IN: 30 mL / OUT: 0 mL / NET: 30 mL          Review of Systems:  all other ROS negative     PHYSICAL EXAM:  GENERAL: NAD resting in bed  CHEST/LUNG: decreased breath sounds at the bases   HEART: normal S1S2, RRR  ABDOMEN: Soft, Nontender, +BS, No flank tenderness bilateral  EXTREMITIES: + edema   SKIN: bl breast calciphylaxis   ACCESS:[facility]       LABS:                        8.1    11.72 )-----------( 46       ( 31 May 2023 05:30 )             26.3     05-31    128<L>  |  93<L>  |  18  ----------------------------<  76  3.8   |  24  |  2.36<H>    Ca    9.6      31 May 2023 05:30  Phos  2.8     05-31  Mg     1.9     05-31    TPro  4.6<L>  /  Alb  2.0<L>  /  TBili  0.4  /  DBili  x   /  AST  11  /  ALT  8<L>  /  AlkPhos  154<H>  05-31      PT/INR - ( 31 May 2023 05:30 )   PT: 16.1 sec;   INR: 1.35          PTT - ( 31 May 2023 05:30 )  PTT:35.3 sec          RADIOLOGY & ADDITIONAL STUDIES:

## 2023-05-31 NOTE — PROGRESS NOTE ADULT - SUBJECTIVE AND OBJECTIVE BOX
SALOMON MUNSON  67y  Female    Patient is a 67y old  Female who presents with a chief complaint of GIB (28 May 2023 08:15)    INTERVAL HPI/OVERNIGHT EVENTS:  NAEO. Pt seen and examined at bedside     ICU Vital Signs Last 24 Hrs  T(C): 35.9 (30 May 2023 11:20), Max: 35.9 (29 May 2023 14:22)  T(F): 96.6 (30 May 2023 11:20), Max: 96.6 (29 May 2023 14:22)  HR: 77 (30 May 2023 12:00) (54 - 107)  BP: --  BP(mean): --  ABP: 93/42 (30 May 2023 12:00) (86/38 - 110/50)  ABP(mean): 59 (30 May 2023 12:00) (55 - 73)  RR: 10 (30 May 2023 12:00) (10 - 22)  SpO2: 97% (30 May 2023 12:00) (93% - 100%)    O2 Parameters below as of 30 May 2023 12:00  Patient On (Oxygen Delivery Method): room air      PHYSICAL EXAM:  General: large body habitus, AAO1. in NAD.  Eyes: PERRL, clear conjunctiva  ENT: Poor dentition.  Respiratory: Decreased BS b/l lung fields; no W/R/R  Cardiac: +S1/S2; irregular rate; no M/R/G; HD catheter over L chest.   Gastrointestinal: soft, protuberant, NT/ND; no rebound or guarding; +BSx4. Large ventral hernia.   Extremities: Diffuse anasarca, worsened in bilateral upper extremities. +3 pitting edema RUE. +2 LUE. +3 b/l pitting edema le.  No blisters on heels or toes.  Skin: Bilateral breast wounds covered in dressing. L breast w eschar, R breast copious granulation tissue. malodorous.  Neurologic: AAOx1-2; does not participate in strength exam today.    Consultant(s) Notes Reviewed:  [x ] YES  [ ] NO  Care Discussed with Consultants/Other Providers [ x] YES  [ ] NO    LABS:                         8.1    15.57 )-----------( 66       ( 30 May 2023 06:12 )             27.7     05-30    125<L>  |  93<L>  |  24<H>  ----------------------------<  99  4.0   |  20<L>  |  2.83<H>    Ca    9.5      30 May 2023 05:30  Phos  3.2     05-30  Mg     2.0     05-30    TPro  4.5<L>  /  Alb  2.1<L>  /  TBili  0.3  /  DBili  x   /  AST  14  /  ALT  8<L>  /  AlkPhos  152<H>  05-30        CARDIAC MARKERS ( 30 May 2023 05:30 )  x     / x     / 13 U/L / x     / x      CARDIAC MARKERS ( 29 May 2023 06:27 )  x     / x     / 21 U/L / x     / x                RADIOLOGY, EKG & ADDITIONAL TESTS:     RADIOLOGY, EKG & ADDITIONAL TESTS:     CAPILLARY BLOOD GLUCOSE      POCT Blood Glucose.: 98 mg/dL (28 May 2023 15:16)  POCT Blood Glucose.: 114 mg/dL (28 May 2023 13:07)  POCT Blood Glucose.: 128 mg/dL (28 May 2023 10:58)  POCT Blood Glucose.: 72 mg/dL (28 May 2023 09:52)  POCT Blood Glucose.: 112 mg/dL (28 May 2023 07:06)  POCT Blood Glucose.: 80 mg/dL (28 May 2023 04:20)  POCT Blood Glucose.: 84 mg/dL (28 May 2023 02:34)  POCT Blood Glucose.: 107 mg/dL (27 May 2023 23:30)  POCT Blood Glucose.: 123 mg/dL (27 May 2023 22:00)  POCT Blood Glucose.: 93 mg/dL (27 May 2023 19:19)  POCT Blood Glucose.: 104 mg/dL (27 May 2023 17:12)            MEDICATIONS  (STANDING):  ascorbic acid 500 milliGRAM(s) Oral daily  atorvastatin 40 milliGRAM(s) Oral at bedtime  bisacodyl 5 milliGRAM(s) Oral at bedtime  chlorhexidine 2% Cloths 1 Application(s) Topical <User Schedule>  chlorhexidine 2% Cloths 1 Application(s) Topical <User Schedule>  cinacalcet 120 milliGRAM(s) Oral every 24 hours  Dakins Solution - 1/4 Strength 1 Application(s) Topical every 12 hours  dextrose 10% Bolus 250 milliLiter(s) IV Bolus once  dextrose 10%. 1000 milliLiter(s) (30 mL/Hr) IV Continuous <Continuous>  dextrose 50% Injectable 12.5 Gram(s) IV Push once  dextrose 50% Injectable 25 Gram(s) IV Push once  dextrose 50% Injectable 25 Gram(s) IV Push once  dextrose Oral Gel 15 Gram(s) Oral once  dextrose Oral Gel 15 Gram(s) Oral once  fludroCORTISONE 0.2 milliGRAM(s) Oral every 24 hours  folic acid 1 milliGRAM(s) Oral daily  heparin   Injectable 5000 Unit(s) SubCutaneous every 8 hours  magnesium sulfate  IVPB 2 Gram(s) IV Intermittent once  meropenem  IVPB 500 milliGRAM(s) IV Intermittent every 24 hours  midodrine 40 milliGRAM(s) Oral every 8 hours  Nephro-deborah 1 Tablet(s) Oral daily  norepinephrine Infusion 0.05 MICROgram(s)/kG/Min (7.44 mL/Hr) IV Continuous <Continuous>  pantoprazole    Tablet 40 milliGRAM(s) Oral every 12 hours  polyethylene glycol 3350 17 Gram(s) Oral daily  sodium thiosulfate IVPB 25 Gram(s) IV Intermittent once    MEDICATIONS  (PRN):  acetaminophen     Tablet .. 650 milliGRAM(s) Oral every 6 hours PRN Temp greater or equal to 38C (100.4F), Mild Pain (1 - 3)  EPINEPHrine     1 mG/mL Injectable 0.3 milliGRAM(s) IntraMuscular once PRN anaphylaxis  HYDROmorphone   Tablet 4 milliGRAM(s) Oral every 3 hours PRN Severe Pain (7 - 10)  HYDROmorphone   Tablet 2 milliGRAM(s) Oral every 3 hours PRN Moderate Pain (4 - 6)  sodium chloride 0.9% lock flush 10 milliLiter(s) IV Push every 1 hour PRN Pre/post blood products, medications, blood draw, and to maintain line patency      RADIOLOGY & ADDITIONAL TESTS:    Imaging Personally Reviewed:  [ ] YES  [ ] NO   SALOMON MUNSON  67y  Female    Patient is a 67y old  Female who presents with a chief complaint of GIB (28 May 2023 08:15)    INTERVAL HPI/OVERNIGHT EVENTS:  NAEO. Pt seen and examined at bedside, was in significant pain after bilateral dressing changes of her breasts, required IV dilaudid for pain control. Otherwise mentation intact. ROS otherwise negative     ICU Vital Signs Last 24 Hrs  T(C): 35.9 (30 May 2023 11:20), Max: 35.9 (29 May 2023 14:22)  T(F): 96.6 (30 May 2023 11:20), Max: 96.6 (29 May 2023 14:22)  HR: 77 (30 May 2023 12:00) (54 - 107)  BP: --  BP(mean): --  ABP: 93/42 (30 May 2023 12:00) (86/38 - 110/50)  ABP(mean): 59 (30 May 2023 12:00) (55 - 73)  RR: 10 (30 May 2023 12:00) (10 - 22)  SpO2: 97% (30 May 2023 12:00) (93% - 100%)    O2 Parameters below as of 30 May 2023 12:00  Patient On (Oxygen Delivery Method): room air      PHYSICAL EXAM:  General: large body habitus, AAO1. in NAD.  Eyes: PERRL, clear conjunctiva  ENT: Poor dentition.  Respiratory: Decreased BS b/l lung fields; no W/R/R  Cardiac: +S1/S2; irregular rate; no M/R/G; HD catheter over L chest.   Gastrointestinal: soft, protuberant, NT/ND; no rebound or guarding; +BSx4. Large ventral hernia.   Extremities: Diffuse anasarca, worsened in bilateral upper extremities. +3 pitting edema RUE. +2 LUE. +3 b/l pitting edema le.  No blisters on heels or toes.  Skin: Bilateral breast wounds covered in dressing. L breast w eschar, R breast copious granulation tissue. malodorous.  Neurologic: AAOx1-2; does not participate in strength exam today.    Consultant(s) Notes Reviewed:  [x ] YES  [ ] NO  Care Discussed with Consultants/Other Providers [ x] YES  [ ] NO    LABS:                         8.1    15.57 )-----------( 66       ( 30 May 2023 06:12 )             27.7     05-30    125<L>  |  93<L>  |  24<H>  ----------------------------<  99  4.0   |  20<L>  |  2.83<H>    Ca    9.5      30 May 2023 05:30  Phos  3.2     05-30  Mg     2.0     05-30    TPro  4.5<L>  /  Alb  2.1<L>  /  TBili  0.3  /  DBili  x   /  AST  14  /  ALT  8<L>  /  AlkPhos  152<H>  05-30        CARDIAC MARKERS ( 30 May 2023 05:30 )  x     / x     / 13 U/L / x     / x      CARDIAC MARKERS ( 29 May 2023 06:27 )  x     / x     / 21 U/L / x     / x                RADIOLOGY, EKG & ADDITIONAL TESTS:     RADIOLOGY, EKG & ADDITIONAL TESTS:     CAPILLARY BLOOD GLUCOSE      POCT Blood Glucose.: 98 mg/dL (28 May 2023 15:16)  POCT Blood Glucose.: 114 mg/dL (28 May 2023 13:07)  POCT Blood Glucose.: 128 mg/dL (28 May 2023 10:58)  POCT Blood Glucose.: 72 mg/dL (28 May 2023 09:52)  POCT Blood Glucose.: 112 mg/dL (28 May 2023 07:06)  POCT Blood Glucose.: 80 mg/dL (28 May 2023 04:20)  POCT Blood Glucose.: 84 mg/dL (28 May 2023 02:34)  POCT Blood Glucose.: 107 mg/dL (27 May 2023 23:30)  POCT Blood Glucose.: 123 mg/dL (27 May 2023 22:00)  POCT Blood Glucose.: 93 mg/dL (27 May 2023 19:19)  POCT Blood Glucose.: 104 mg/dL (27 May 2023 17:12)            MEDICATIONS  (STANDING):  ascorbic acid 500 milliGRAM(s) Oral daily  atorvastatin 40 milliGRAM(s) Oral at bedtime  bisacodyl 5 milliGRAM(s) Oral at bedtime  chlorhexidine 2% Cloths 1 Application(s) Topical <User Schedule>  chlorhexidine 2% Cloths 1 Application(s) Topical <User Schedule>  cinacalcet 120 milliGRAM(s) Oral every 24 hours  Dakins Solution - 1/4 Strength 1 Application(s) Topical every 12 hours  dextrose 10% Bolus 250 milliLiter(s) IV Bolus once  dextrose 10%. 1000 milliLiter(s) (30 mL/Hr) IV Continuous <Continuous>  dextrose 50% Injectable 12.5 Gram(s) IV Push once  dextrose 50% Injectable 25 Gram(s) IV Push once  dextrose 50% Injectable 25 Gram(s) IV Push once  dextrose Oral Gel 15 Gram(s) Oral once  dextrose Oral Gel 15 Gram(s) Oral once  fludroCORTISONE 0.2 milliGRAM(s) Oral every 24 hours  folic acid 1 milliGRAM(s) Oral daily  heparin   Injectable 5000 Unit(s) SubCutaneous every 8 hours  magnesium sulfate  IVPB 2 Gram(s) IV Intermittent once  meropenem  IVPB 500 milliGRAM(s) IV Intermittent every 24 hours  midodrine 40 milliGRAM(s) Oral every 8 hours  Nephro-deborah 1 Tablet(s) Oral daily  norepinephrine Infusion 0.05 MICROgram(s)/kG/Min (7.44 mL/Hr) IV Continuous <Continuous>  pantoprazole    Tablet 40 milliGRAM(s) Oral every 12 hours  polyethylene glycol 3350 17 Gram(s) Oral daily  sodium thiosulfate IVPB 25 Gram(s) IV Intermittent once    MEDICATIONS  (PRN):  acetaminophen     Tablet .. 650 milliGRAM(s) Oral every 6 hours PRN Temp greater or equal to 38C (100.4F), Mild Pain (1 - 3)  EPINEPHrine     1 mG/mL Injectable 0.3 milliGRAM(s) IntraMuscular once PRN anaphylaxis  HYDROmorphone   Tablet 4 milliGRAM(s) Oral every 3 hours PRN Severe Pain (7 - 10)  HYDROmorphone   Tablet 2 milliGRAM(s) Oral every 3 hours PRN Moderate Pain (4 - 6)  sodium chloride 0.9% lock flush 10 milliLiter(s) IV Push every 1 hour PRN Pre/post blood products, medications, blood draw, and to maintain line patency      RADIOLOGY & ADDITIONAL TESTS:    Imaging Personally Reviewed:  [ ] YES  [ ] NO

## 2023-05-31 NOTE — PROGRESS NOTE ADULT - ASSESSMENT
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, Calciphylaxis of bilateral breasts, HTN, HLD presenting to the ER in setting of anemia now with breast infection    Assessment/Plan:   #ESRD on HD  #Calciphylaxis  Usual RX time 3:30 hrs, EDW TBD  next HD 6/1   Give sodium thiosulfate with HD     #Hypotension   back on levophed for pressor support  -c/w levophed and midodrine  -maintain MAP 70 for adequate renal perfusion    #Hyponatremia  1L fluid restriction  would give 30 cc x 4 hours of hypertonic saline      #access   L TDC    #anemia  Hgb not at goal  Epo w/ HD    #renal bone disease   Ca ~9.5  phos 2.8  c/w sensipar  encourage protein intake   trend phos daily            Thank you for the opportunity to participate in the care of your patient. The nephrology service remains available to assist with any questions or concerns. Please feel free to reach us by paging the on-call nephrology fellow for urgent issues or as below.     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, Calciphylaxis of bilateral breasts, HTN, HLD presenting to the ER in setting of anemia now with breast infection    Assessment/Plan:   #ESRD on HD  #Calciphylaxis  Usual RX time 3:30 hrs, EDW TBD  next HD 6/1   Give sodium thiosulfate with HD     #Hypotension   back on levophed for pressor support  -c/w levophed and midodrine  -maintain MAP 70 for adequate renal perfusion    #Hyponatremia  1L fluid restriction  would start hypertonic saline for @50cc/hour for a total of 500cc to bring up serum Na to 134      #access   L TDC    #anemia  Hgb not at goal  Epo w/ HD    #renal bone disease   Ca ~9.5  phos 2.8  c/w sensipar  encourage protein intake   trend phos daily            Thank you for the opportunity to participate in the care of your patient. The nephrology service remains available to assist with any questions or concerns. Please feel free to reach us by paging the on-call nephrology fellow for urgent issues or as below.     Saumya Jo D.O  PGY-5, Nephrology Fellow    P: [phone]

## 2023-05-31 NOTE — ADVANCED PRACTICE NURSE CONSULT - RECOMMEDATIONS
Bilateral buttocks fungal rash and denuded skin - cleanse with cleansing wipe, apply Nystatin powder every 12 hours alternating with [employer] antifungal cream every 12 hours.   Left upper buttock pressure injury - cleanse with normal saline, apply Triad cream, cover with foam dressing every other day and prn if soiled.  WOCN discussed assessment and recommendations with RN and house staff Dr Chaidez.  Bilateral buttocks fungal rash and denuded skin - cleanse with cleansing wipe, apply Nystatin powder every 12 hours alternating with [employer] antifungal cream every 12 hours.   Left upper buttock pressure injury - cleanse with normal saline, apply Triad cream, cover with foam dressing every other day and prn if soiled.  WOCN discussed assessment and recommendations with RNBrea and house staff Dr Chaidez.

## 2023-05-31 NOTE — PROGRESS NOTE ADULT - ASSESSMENT
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 Patient will     Cardio  Patient w persistently low blood pressures, w 80s/40s during RR and inability to accurately measure BP due to diffuse edema during RR  Less concern for shock on differential given no infectious etiology, was requiring low dose levophed intermittently, but patient known to have baseline low blood pressure.  5/28: o/n patient needed levophed  Plan:  - Midodrine 40mg q 8, home medication  - C/w HD as needed. HD today.  - Florinef .3mg qd.  - R femoral TLC placed, due to access issue in patient.    #Shock, now concern for underlying septic shock 2/2 to poor source control from b/l breast wounds  - Lactate remains negative, uptrending WBC count  - Otherwise, patient on meropenem and vanc dosing by level  - Surgery is consulted, daughter now amenable to surgery.  - Surgery today vs tomorrow, 5C following, pending convo w attending surrounding timing  - Patient is now on levophed, wean as tolerated for goal SBP >90, currently not using MAP as perfusion marker     #HfrEf, non-ischemic cardiomyopathy  - history of HfpEF, TTE 5/5/23, normal RV and LV function  - On No HF meds  - C.w statin      Pulmonary  - Patient w no pulmonology history  - good airway control, controlling secretions  - Continue to monitor secretions.    Renal  - ESRD w Dialysis, rapid response during HD session  - C/w HD as tolerated- can uptitrate levophed as needed to allow for HD  - renvela discontinued as pt w normal Phosphorus.    GI  #Stercoral Ulcers, 2 BM nonbloody 5/26 AM.  - Came in w GI bleed deemed to be 2/2 to constipation and stercoral ulcers  - C/w PPI V BID  - C/w golytely prep as laxative  - Abdominal US w doppler, no evidence of portal HTN  - F/u GI recs, as per HCP and pt GOC, no role for egd this admission  - Active T and S, Keep HgB >7  - Caution w NSAIDS, prefer GANN-2 inhibitors  - patient with copious rectal output- place rectal tube and wound care consult    ID  -Patient w F to 101.1 on 5/24/23, and concomitant afib w RVR, resolved after defervescence  - Afebrile currently  - F/u BCx, F/u breast wound culture-> gram negative rods. and gram positive cocci,  ESBL Proteus, VRE E. Faecium  - C/w Meropenem and Daptomycin 400mg q48h.  - no source control w/out surgical intervention, surgery is following. f.u recs today.    Heme  - HgB stable, currently, came in at 6.2  - See above for GI    #Leukocytosis  - Likely 2/2 to breast infection, now slight downtrend, no source control w/out surgical intervention  - Surgery is following    #Thrombocytopenia   Patient with slow steady decline in platelets, more compatible with sepsis vs HIT  HIT score: intermediate  - Hep 5000U q8.  - Platelets 66k -> 46 this AM, f/u surgery/anesthesia recs if need platelet transfusion    Endo  5/21 received d10 bolus for glucose 66, on q4 glucose checks, then started d10 gtt at 50 cc/hr  - C/w gtt at 30cc/hr  - FSG q2, if consistently >100.  -Soft and bite sized diet     Prophylaxis:  F: d10 gtt 30cc/hr.  E: Replete cautiously given ESRD  N: Soft and Bite sized (renal diet)  DVT: Heparin 5000 subq q8    Lines: R femoral TLC placed 5/27/23. L femoral a-line placed 5/29/23.  No Wu.

## 2023-05-31 NOTE — ADVANCED PRACTICE NURSE CONSULT - REASON FOR CONSULT
WOC nurse consult to assess bilateral buttocks skin breakdown. 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

## 2023-06-01 NOTE — PROGRESS NOTE ADULT - SUBJECTIVE AND OBJECTIVE BOX
Patient is a 67y Female seen and evaluated at bedside. patient is POD #2 s/p breast debridement patient complaining of buttock pain-       Meds:    acetaminophen     Tablet .. 650 every 6 hours PRN  ascorbic acid 500 daily  atorvastatin 40 at bedtime  bisacodyl 5 at bedtime  chlorhexidine 2% Cloths 1 <User Schedule>  cinacalcet 120 every 24 hours  Dakins Solution - 1/4 Strength 1 every 12 hours  DAPTOmycin IVPB 400 every 48 hours  dextrose 10% Bolus 250 once  dextrose 10%. 1000 <Continuous>  dextrose 50% Injectable 12.5 once  dextrose 50% Injectable 25 once  dextrose 50% Injectable 25 once  dextrose Oral Gel 15 once  dextrose Oral Gel 15 once  EPINEPHrine     1 mG/mL Injectable 0.3 once PRN  epoetin александр-epbx (RETACRIT) Injectable 51153 once  fludroCORTISONE 0.3 every 24 hours  folic acid 1 daily  heparin   Injectable 5000 every 8 hours  HYDROmorphone   Tablet 4 every 3 hours PRN  HYDROmorphone   Tablet 2 every 3 hours PRN  HYDROmorphone  Injectable 0.5 every 2 hours PRN  meropenem  IVPB 500 every 24 hours  midodrine 40 every 8 hours  Nephro-deborah 1 daily  norepinephrine Infusion 0.05 <Continuous>  nystatin Powder 1 two times a day  pantoprazole    Tablet 40 every 12 hours  polyethylene glycol 3350 17 daily  sodium chloride 0.9% lock flush 10 every 1 hour PRN      T(C): , Max: 37 (05-31-23 @ 22:32)  T(F): , Max: 98.6 (05-31-23 @ 22:32)  HR: 95 (06-01-23 @ 08:00)  BP: --  BP(mean): --  RR: 4 (06-01-23 @ 08:00)  SpO2: 100% (06-01-23 @ 08:00)  Wt(kg): --    05-31 @ 07:01  -  06-01 @ 07:00  --------------------------------------------------------  IN: 1362 mL / OUT: 50 mL / NET: 1312 mL    06-01 @ 07:01  -  06-01 @ 08:37  --------------------------------------------------------  IN: 43.1 mL / OUT: 0 mL / NET: 43.1 mL        Weight (kg): 116.5 (05-31 @ 09:47)    Review of Systems:  CONSTITUTIONAL: No fever or chills, No fatigue or tiredness  RESPIRATORY: No shortness of breath, cough, hemoptysis  CARDIOVASCULAR: No chest pain or shortness of breath  GASTROINTESTINAL: No abdominal or flank pain, No nausea or vomiting, No diarrhea  GENITOURINARY: No dysuria or urinary burning, No difficulty passing urine, No hematuria  NEUROLOGICAL: No headaches or blurred vision  SKIN: No skin rashes   MUSCULOSKELETAL: No arthralgia, No leg edema, No muscle pain      PHYSICAL EXAM:  GENERAL: well-developed, well nourished, alert, no acute distress at present  NECK: supple, No JVD  CHEST/LUNG: Clear to auscultation bilaterally  HEART: normal S1S2, RRR  ABDOMEN: Soft, Nontender, +BS, No flank tenderness bilateral  EXTREMITIES: No clubbing, cyanosis, or edema   NEUROLOGY: AAO x3, no focal neurological deficit  ACCESS: good thrill and bruit appreciated      LABS:                        7.8    10.73 )-----------( 44       ( 01 Jun 2023 05:30 )             26.1     06-01    129<L>  |  93<L>  |  20  ----------------------------<  83  3.7   |  24  |  2.72<H>    Ca    9.1      01 Jun 2023 05:30  Phos  2.8     06-01  Mg     1.9     06-01    TPro  4.4<L>  /  Alb  2.0<L>  /  TBili  0.4  /  DBili  x   /  AST  12  /  ALT  7<L>  /  AlkPhos  143<H>  06-01      PT/INR - ( 31 May 2023 05:30 )   PT: 16.1 sec;   INR: 1.35          PTT - ( 31 May 2023 05:30 )  PTT:35.3 sec          RADIOLOGY & ADDITIONAL STUDIES:           Patient is a 67y Female seen and evaluated at bedside. patient is POD #2 s/p breast debridement patient complaining of buttock pain- remains on levophed for low dose pressor support  K 3.7 bicarb 24 hgb 7.8       Meds:    acetaminophen     Tablet .. 650 every 6 hours PRN  ascorbic acid 500 daily  atorvastatin 40 at bedtime  bisacodyl 5 at bedtime  chlorhexidine 2% Cloths 1 <User Schedule>  cinacalcet 120 every 24 hours  Dakins Solution - 1/4 Strength 1 every 12 hours  DAPTOmycin IVPB 400 every 48 hours  dextrose 10% Bolus 250 once  dextrose 10%. 1000 <Continuous>  dextrose 50% Injectable 12.5 once  dextrose 50% Injectable 25 once  dextrose 50% Injectable 25 once  dextrose Oral Gel 15 once  dextrose Oral Gel 15 once  EPINEPHrine     1 mG/mL Injectable 0.3 once PRN  epoetin александр-epbx (RETACRIT) Injectable 62656 once  fludroCORTISONE 0.3 every 24 hours  folic acid 1 daily  heparin   Injectable 5000 every 8 hours  HYDROmorphone   Tablet 4 every 3 hours PRN  HYDROmorphone   Tablet 2 every 3 hours PRN  HYDROmorphone  Injectable 0.5 every 2 hours PRN  meropenem  IVPB 500 every 24 hours  midodrine 40 every 8 hours  Nephro-deborah 1 daily  norepinephrine Infusion 0.05 <Continuous>  nystatin Powder 1 two times a day  pantoprazole    Tablet 40 every 12 hours  polyethylene glycol 3350 17 daily  sodium chloride 0.9% lock flush 10 every 1 hour PRN      T(C): , Max: 37 (23 @ 22:32)  T(F): , Max: 98.6 (23 @ 22:32)  HR: 95 (23 @ 08:00)  BP: --  BP(mean): --  RR: 4 (23 @ 08:00)  SpO2: 100% (23 @ 08:00)  Wt(kg): --     @ 07:01  -   @ 07:00  --------------------------------------------------------  IN: 1362 mL / OUT: 50 mL / NET: 1312 mL     @ 07:01  -  06 @ 08:37  --------------------------------------------------------  IN: 43.1 mL / OUT: 0 mL / NET: 43.1 mL        Weight (kg): 116.5 ( @ 09:47)    Review of Systems:  all other ROS negative       PHYSICAL EXAM:  GENERAL: NAD resting in bed  CHEST/LUNG: decreased breath sounds at the bases   HEART: normal S1S2, RRR  ABDOMEN: Soft, Nontender, +BS, No flank tenderness bilateral  EXTREMITIES: + anasarca   SKIN: no lesions or rashes   ACCESS:[facility]       LABS:                        7.8    10.73 )-----------( 44       ( 2023 05:30 )             26.1         129<L>  |  93<L>  |  20  ----------------------------<  83  3.7   |  24  |  2.72<H>    Ca    9.1      2023 05:30  Phos  2.8       Mg     1.9         TPro  4.4<L>  /  Alb  2.0<L>  /  TBili  0.4  /  DBili  x   /  AST  12  /  ALT  7<L>  /  AlkPhos  143<H>        PT/INR - ( 31 May 2023 05:30 )   PT: 16.1 sec;   INR: 1.35          PTT - ( 31 May 2023 05:30 )  PTT:35.3 sec          RADIOLOGY & ADDITIONAL STUDIES:          Hemoglobin: 7.8 g/dL (23 @ 05:30)  Phosphorus Level, Serum: 2.8 mg/dL (23 @ 05:30)  Hemoglobin: 8.1 g/dL (23 @ 05:30)  Phosphorus Level, Serum: 2.8 mg/dL (23 @ 05:30)    Albumin, Serum: 2.0 g/dL (23 @ 05:30)  Albumin, Serum: 2.0 g/dL (23 @ 05:30)    cinacalcet 120 milliGRAM(s) Oral every 24 hours, 23 @ 15:07, Routine  epoetin александр-epbx (RETACRIT) Injectable 8000 Unit(s) IV Push once, 23 @ 06:21, Routine, Stop order after: 1 Doses  epoetin александр-epbx (RETACRIT) Injectable 8000 Unit(s) IV Push once, 23 @ 07:08, Routine, Stop order after: 1 Doses  epoetin александр-epbx (RETACRIT) Injectable 8000 Unit(s) IV Push once, 23 @ 05:42, Routine, Stop order after: 1 Doses  epoetin александр-epbx (RETACRIT) Injectable 09161 Unit(s) IV Push once, 23 @ 06:21, Routine, Stop order after: 1 Doses  epoetin александр-epbx (RETACRIT) Injectable 8000 Unit(s) IV Push once, 23 @ 08:09, Routine, Stop order after: 1 Doses  epoetin александр-epbx (RETACRIT) Injectable 8000 Unit(s) IV Push once, 23 @ 07:56, Routine, Stop order after: 1 Doses  sevelamer carbonate 1600 milliGRAM(s) Oral three times a day with meals, 23 @ 15:07, Routine  sevelamer carbonate 800 milliGRAM(s) Oral three times a day with meals, 23 @ 16:11,     Hemodialysis Treatment.:     Schedule: Once, Modality: Hemodialysis, Access: Internal Jugular Central Venous Catheter    Dialyzer: Optiflux [id number], Time: 180 Min    Blood Flow: 400 mL/Min , Dialysate Flow: 500 mL/Min, Dialysate Temp: 36.5, Tubinmm (Adult)    Target Fluid Removal: 1 Liters    Dialysate Electrolytes (mEq/L): Potassium 3, Calcium 2.5, Sodium 135, Bicarbonate 35 (23 @ 06:21) [Active]

## 2023-06-01 NOTE — PROGRESS NOTE ADULT - ASSESSMENT
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 General surgery consulted on 5/26 for evaluation of bilateral breast wounds and possible debridement. Pt unable to provide subjective history 2/2 current mentation. Bilateral breast wounds with fibrinous slough, necrotic tissue, and purulent drainage underlying necrotic eschar now s/p debridement on 5/30.     Recommendations:  WOUND CARE: daily dressing changing with Dakins WTD. 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 General surgery consulted on 5/26 for evaluation of bilateral breast wounds and possible debridement. Pt unable to provide subjective history 2/2 current mentation. Bilateral breast wounds with fibrinous slough, necrotic tissue, and purulent drainage underlying necrotic eschar now s/p debridement on 5/30.     Recommendations:  WOUND CARE: daily dressing changing with Dakins WTD. Dakins kerlix should be damp not soaked; PLEASE ORDER MORE DAKINS  Appreciate wound care recs  Rest of care per MICU  Team 5C will continue to follow

## 2023-06-01 NOTE — PROGRESS NOTE ADULT - ASSESSMENT
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 Patient will     Cardio  Patient w persistently low blood pressures, w 80s/40s during RR and inability to accurately measure BP due to diffuse edema during RR  Less concern for shock on differential given no infectious etiology, was requiring low dose levophed intermittently, but patient known to have baseline low blood pressure.  5/28: o/n patient needed levophed  Plan:  - Midodrine 40mg q 8, home medication  - C/w HD as needed. HD today.  - Florinef .3mg qd.  - R femoral TLC placed, due to access issue in patient.    #Shock, now concern for underlying septic shock 2/2 to poor source control from b/l breast wounds  - Lactate remains negative, uptrending WBC count  - Otherwise, patient on meropenem and vanc dosing by level  - Surgery is consulted, daughter now amenable to surgery.  - Surgery today vs tomorrow, 5C following, pending convo w attending surrounding timing  - Patient is now on levophed, wean as tolerated for goal SBP >90, currently not using MAP as perfusion marker     #HfrEf, non-ischemic cardiomyopathy  - history of HfpEF, TTE 5/5/23, normal RV and LV function  - On No HF meds  - C.w statin      Pulmonary  - Patient w no pulmonology history  - good airway control, controlling secretions  - Continue to monitor secretions.    Renal  - ESRD w Dialysis, rapid response during HD session  - C/w HD as tolerated- can uptitrate levophed as needed to allow for HD  - renvela discontinued as pt w normal Phosphorus.    GI  #Stercoral Ulcers, 2 BM nonbloody 5/26 AM.  - Came in w GI bleed deemed to be 2/2 to constipation and stercoral ulcers  - C/w PPI V BID  - C/w golytely prep as laxative  - Abdominal US w doppler, no evidence of portal HTN  - F/u GI recs, as per HCP and pt GOC, no role for egd this admission  - Active T and S, Keep HgB >7  - Caution w NSAIDS, prefer GANN-2 inhibitors  - patient with copious rectal output- place rectal tube and wound care consult    ID  -Patient w F to 101.1 on 5/24/23, and concomitant afib w RVR, resolved after defervescence  - Afebrile currently  - F/u BCx, F/u breast wound culture-> gram negative rods. and gram positive cocci,  ESBL Proteus, VRE E. Faecium  - C/w Meropenem and Daptomycin 400mg q48h.  - no source control w/out surgical intervention, surgery is following. f.u recs today.    Heme  - HgB stable, currently, came in at 6.2  - See above for GI    #Leukocytosis  - Likely 2/2 to breast infection, now slight downtrend, no source control w/out surgical intervention  - Surgery is following    #Thrombocytopenia   Patient with slow steady decline in platelets, more compatible with sepsis vs HIT  HIT score: intermediate  - Hep 5000U q8.  - Platelets 66k -> 46 this AM, f/u surgery/anesthesia recs if need platelet transfusion    Endo  5/21 received d10 bolus for glucose 66, on q4 glucose checks, then started d10 gtt at 50 cc/hr  - C/w gtt at 30cc/hr  - FSG q2, if consistently >100.  -Soft and bite sized diet     Prophylaxis:  F: d10 gtt 30cc/hr.  E: Replete cautiously given ESRD  N: Soft and Bite sized (renal diet)  DVT: Heparin 5000 subq q8    Lines: R femoral TLC placed 5/27/23. L femoral a-line placed 5/29/23.  No Wu. 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 Patient will     Cardio  Patient w persistently low blood pressures, w 80s/40s during RR and inability to accurately measure BP due to diffuse edema during RR  Less concern for shock on differential given no infectious etiology, was requiring low dose levophed intermittently, but patient known to have baseline low blood pressure.  5/28: o/n patient needed levophed, weaned overnight   Plan:  - Midodrine increased to 40mg q8h -> q6h   -Starting hydrocortisone 100 mg TID for further pressor support for increased levophed weaning  - C/w HD as needed. HD today.  - Florinef .3mg qd.  - R femoral TLC placed, due to access issue in patient.    #Shock, now concern for underlying septic shock 2/2 to poor source control from b/l breast wounds  - Lactate remains negative, uptrending WBC count  - Otherwise, patient on meropenem and vanc dosing by level  - Surgery is consulted, daughter now amenable to surgery.  - Surgery today vs tomorrow, 5C following, pending convo w attending surrounding timing  - Patient is now on levophed, wean as tolerated for goal SBP >90, currently not using MAP as perfusion marker     #HfrEf, non-ischemic cardiomyopathy  - history of HfpEF, TTE 5/5/23, normal RV and LV function  - On No HF meds  - C.w statin      Pulmonary  - Patient w no pulmonology history  - good airway control, controlling secretions  - Continue to monitor secretions.    Renal  - ESRD w Dialysis, rapid response during HD session  - C/w HD as tolerated- can uptitrate levophed as needed to allow for HD  - renvela discontinued as pt w normal Phosphorus.    GI  #Stercoral Ulcers, 2 BM nonbloody 5/26 AM.  - Came in w GI bleed deemed to be 2/2 to constipation and stercoral ulcers  - C/w PPI V BID  - C/w golytely prep as laxative  - Abdominal US w doppler, no evidence of portal HTN  - F/u GI recs, as per HCP and pt GOC, no role for egd this admission  - Active T and S, Keep HgB >7  - Caution w NSAIDS, prefer GANN-2 inhibitors  - patient with copious rectal output- place rectal tube and wound care consult    ID  -Patient w F to 101.1 on 5/24/23, and concomitant afib w RVR, resolved after defervescence  - Afebrile currently  - F/u BCx, F/u breast wound culture-> gram negative rods. and gram positive cocci,  ESBL Proteus, VRE E. Faecium  - C/w Meropenem and Daptomycin 400mg q48h.  - no source control w/out surgical intervention, surgery is following. f.u recs today.    Heme  - HgB stable, currently, came in at 6.2  - See above for GI    #Leukocytosis  - Likely 2/2 to breast infection, now slight downtrend, no source control w/out surgical intervention  - Surgery is following    #Thrombocytopenia   Patient with slow steady decline in platelets, more compatible with sepsis vs HIT  HIT score: intermediate  - Hep 5000U q8.  - Platelets 66k -> 46 this AM, f/u surgery/anesthesia recs if need platelet transfusion    Endo  5/21 received d10 bolus for glucose 66, on q4 glucose checks, then started d10 gtt at 50 cc/hr  - C/w gtt at 30cc/hr  - FSG q2, if consistently >100.  -Soft and bite sized diet     Prophylaxis:  F: d10 gtt 30cc/hr.  E: Replete cautiously given ESRD  N: Soft and Bite sized (renal diet)  DVT: Heparin 5000 subq q8    Lines: R femoral TLC placed 5/27/23. L femoral a-line placed 5/29/23.  No Wu.

## 2023-06-01 NOTE — PROGRESS NOTE ADULT - SUBJECTIVE AND OBJECTIVE BOX
SALOMON MUNSON  67y  Female    Patient is a 67y old  Female who presents with a chief complaint of GIB (28 May 2023 08:15)    INTERVAL HPI/OVERNIGHT EVENTS:  NAEO. Pt seen and examined at bedside    ICU Vital Signs Last 24 Hrs  T(C): 35.9 (30 May 2023 11:20), Max: 35.9 (29 May 2023 14:22)  T(F): 96.6 (30 May 2023 11:20), Max: 96.6 (29 May 2023 14:22)  HR: 77 (30 May 2023 12:00) (54 - 107)  BP: --  BP(mean): --  ABP: 93/42 (30 May 2023 12:00) (86/38 - 110/50)  ABP(mean): 59 (30 May 2023 12:00) (55 - 73)  RR: 10 (30 May 2023 12:00) (10 - 22)  SpO2: 97% (30 May 2023 12:00) (93% - 100%)    O2 Parameters below as of 30 May 2023 12:00  Patient On (Oxygen Delivery Method): room air      PHYSICAL EXAM:  General: large body habitus, AAO1. in NAD.  Eyes: PERRL, clear conjunctiva  ENT: Poor dentition.  Respiratory: Decreased BS b/l lung fields; no W/R/R  Cardiac: +S1/S2; irregular rate; no M/R/G; HD catheter over L chest.   Gastrointestinal: soft, protuberant, NT/ND; no rebound or guarding; +BSx4. Large ventral hernia.   Extremities: Diffuse anasarca, worsened in bilateral upper extremities. +3 pitting edema RUE. +2 LUE. +3 b/l pitting edema le.  No blisters on heels or toes.  Skin: Bilateral breast wounds covered in dressing. L breast w eschar, R breast copious granulation tissue. malodorous.  Neurologic: AAOx1-2; does not participate in strength exam today.    Consultant(s) Notes Reviewed:  [x ] YES  [ ] NO  Care Discussed with Consultants/Other Providers [ x] YES  [ ] NO    LABS:                         8.1    15.57 )-----------( 66       ( 30 May 2023 06:12 )             27.7     05-30    125<L>  |  93<L>  |  24<H>  ----------------------------<  99  4.0   |  20<L>  |  2.83<H>    Ca    9.5      30 May 2023 05:30  Phos  3.2     05-30  Mg     2.0     05-30    TPro  4.5<L>  /  Alb  2.1<L>  /  TBili  0.3  /  DBili  x   /  AST  14  /  ALT  8<L>  /  AlkPhos  152<H>  05-30        CARDIAC MARKERS ( 30 May 2023 05:30 )  x     / x     / 13 U/L / x     / x      CARDIAC MARKERS ( 29 May 2023 06:27 )  x     / x     / 21 U/L / x     / x                RADIOLOGY, EKG & ADDITIONAL TESTS:     RADIOLOGY, EKG & ADDITIONAL TESTS:     CAPILLARY BLOOD GLUCOSE      POCT Blood Glucose.: 98 mg/dL (28 May 2023 15:16)  POCT Blood Glucose.: 114 mg/dL (28 May 2023 13:07)  POCT Blood Glucose.: 128 mg/dL (28 May 2023 10:58)  POCT Blood Glucose.: 72 mg/dL (28 May 2023 09:52)  POCT Blood Glucose.: 112 mg/dL (28 May 2023 07:06)  POCT Blood Glucose.: 80 mg/dL (28 May 2023 04:20)  POCT Blood Glucose.: 84 mg/dL (28 May 2023 02:34)  POCT Blood Glucose.: 107 mg/dL (27 May 2023 23:30)  POCT Blood Glucose.: 123 mg/dL (27 May 2023 22:00)  POCT Blood Glucose.: 93 mg/dL (27 May 2023 19:19)  POCT Blood Glucose.: 104 mg/dL (27 May 2023 17:12)            MEDICATIONS  (STANDING):  ascorbic acid 500 milliGRAM(s) Oral daily  atorvastatin 40 milliGRAM(s) Oral at bedtime  bisacodyl 5 milliGRAM(s) Oral at bedtime  chlorhexidine 2% Cloths 1 Application(s) Topical <User Schedule>  chlorhexidine 2% Cloths 1 Application(s) Topical <User Schedule>  cinacalcet 120 milliGRAM(s) Oral every 24 hours  Dakins Solution - 1/4 Strength 1 Application(s) Topical every 12 hours  dextrose 10% Bolus 250 milliLiter(s) IV Bolus once  dextrose 10%. 1000 milliLiter(s) (30 mL/Hr) IV Continuous <Continuous>  dextrose 50% Injectable 12.5 Gram(s) IV Push once  dextrose 50% Injectable 25 Gram(s) IV Push once  dextrose 50% Injectable 25 Gram(s) IV Push once  dextrose Oral Gel 15 Gram(s) Oral once  dextrose Oral Gel 15 Gram(s) Oral once  fludroCORTISONE 0.2 milliGRAM(s) Oral every 24 hours  folic acid 1 milliGRAM(s) Oral daily  heparin   Injectable 5000 Unit(s) SubCutaneous every 8 hours  magnesium sulfate  IVPB 2 Gram(s) IV Intermittent once  meropenem  IVPB 500 milliGRAM(s) IV Intermittent every 24 hours  midodrine 40 milliGRAM(s) Oral every 8 hours  Nephro-deborah 1 Tablet(s) Oral daily  norepinephrine Infusion 0.05 MICROgram(s)/kG/Min (7.44 mL/Hr) IV Continuous <Continuous>  pantoprazole    Tablet 40 milliGRAM(s) Oral every 12 hours  polyethylene glycol 3350 17 Gram(s) Oral daily  sodium thiosulfate IVPB 25 Gram(s) IV Intermittent once    MEDICATIONS  (PRN):  acetaminophen     Tablet .. 650 milliGRAM(s) Oral every 6 hours PRN Temp greater or equal to 38C (100.4F), Mild Pain (1 - 3)  EPINEPHrine     1 mG/mL Injectable 0.3 milliGRAM(s) IntraMuscular once PRN anaphylaxis  HYDROmorphone   Tablet 4 milliGRAM(s) Oral every 3 hours PRN Severe Pain (7 - 10)  HYDROmorphone   Tablet 2 milliGRAM(s) Oral every 3 hours PRN Moderate Pain (4 - 6)  sodium chloride 0.9% lock flush 10 milliLiter(s) IV Push every 1 hour PRN Pre/post blood products, medications, blood draw, and to maintain line patency      RADIOLOGY & ADDITIONAL TESTS:    Imaging Personally Reviewed:  [ ] YES  [ ] NO   INTERVAL HPI/OVERNIGHT EVENTS: Weaned levophed from .18 to .14  NAEO. Pt seen and examined at bedside    ICU Vital Signs Last 24 Hrs  T(C): 35.9 (30 May 2023 11:20), Max: 35.9 (29 May 2023 14:22)  T(F): 96.6 (30 May 2023 11:20), Max: 96.6 (29 May 2023 14:22)  HR: 77 (30 May 2023 12:00) (54 - 107)  BP: --  BP(mean): --  ABP: 93/42 (30 May 2023 12:00) (86/38 - 110/50)  ABP(mean): 59 (30 May 2023 12:00) (55 - 73)  RR: 10 (30 May 2023 12:00) (10 - 22)  SpO2: 97% (30 May 2023 12:00) (93% - 100%)    O2 Parameters below as of 30 May 2023 12:00  Patient On (Oxygen Delivery Method): room air      PHYSICAL EXAM:  General: large body habitus, AAO1. in NAD.  Eyes: PERRL, clear conjunctiva  ENT: Poor dentition.  Respiratory: Decreased BS b/l lung fields; no W/R/R  Cardiac: +S1/S2; irregular rate; no M/R/G; HD catheter over L chest.   Gastrointestinal: soft, protuberant, NT/ND; no rebound or guarding; +BSx4. Large ventral hernia.   Extremities: Diffuse anasarca, worsened in bilateral upper extremities. +3 pitting edema RUE. +2 LUE. +3 b/l pitting edema le.  No blisters on heels or toes.  Skin: Bilateral breast wounds covered in dressing. L breast w eschar, R breast copious granulation tissue. malodorous.  Neurologic: AAOx1-2; does not participate in strength exam today.    Consultant(s) Notes Reviewed:  [x ] YES  [ ] NO  Care Discussed with Consultants/Other Providers [ x] YES  [ ] NO    LABS:                         8.1    15.57 )-----------( 66       ( 30 May 2023 06:12 )             27.7     05-30    125<L>  |  93<L>  |  24<H>  ----------------------------<  99  4.0   |  20<L>  |  2.83<H>    Ca    9.5      30 May 2023 05:30  Phos  3.2     05-30  Mg     2.0     05-30    TPro  4.5<L>  /  Alb  2.1<L>  /  TBili  0.3  /  DBili  x   /  AST  14  /  ALT  8<L>  /  AlkPhos  152<H>  05-30        CARDIAC MARKERS ( 30 May 2023 05:30 )  x     / x     / 13 U/L / x     / x      CARDIAC MARKERS ( 29 May 2023 06:27 )  x     / x     / 21 U/L / x     / x                RADIOLOGY, EKG & ADDITIONAL TESTS:     RADIOLOGY, EKG & ADDITIONAL TESTS:     CAPILLARY BLOOD GLUCOSE      POCT Blood Glucose.: 98 mg/dL (28 May 2023 15:16)  POCT Blood Glucose.: 114 mg/dL (28 May 2023 13:07)  POCT Blood Glucose.: 128 mg/dL (28 May 2023 10:58)  POCT Blood Glucose.: 72 mg/dL (28 May 2023 09:52)  POCT Blood Glucose.: 112 mg/dL (28 May 2023 07:06)  POCT Blood Glucose.: 80 mg/dL (28 May 2023 04:20)  POCT Blood Glucose.: 84 mg/dL (28 May 2023 02:34)  POCT Blood Glucose.: 107 mg/dL (27 May 2023 23:30)  POCT Blood Glucose.: 123 mg/dL (27 May 2023 22:00)  POCT Blood Glucose.: 93 mg/dL (27 May 2023 19:19)  POCT Blood Glucose.: 104 mg/dL (27 May 2023 17:12)            MEDICATIONS  (STANDING):  ascorbic acid 500 milliGRAM(s) Oral daily  atorvastatin 40 milliGRAM(s) Oral at bedtime  bisacodyl 5 milliGRAM(s) Oral at bedtime  chlorhexidine 2% Cloths 1 Application(s) Topical <User Schedule>  chlorhexidine 2% Cloths 1 Application(s) Topical <User Schedule>  cinacalcet 120 milliGRAM(s) Oral every 24 hours  Dakins Solution - 1/4 Strength 1 Application(s) Topical every 12 hours  dextrose 10% Bolus 250 milliLiter(s) IV Bolus once  dextrose 10%. 1000 milliLiter(s) (30 mL/Hr) IV Continuous <Continuous>  dextrose 50% Injectable 12.5 Gram(s) IV Push once  dextrose 50% Injectable 25 Gram(s) IV Push once  dextrose 50% Injectable 25 Gram(s) IV Push once  dextrose Oral Gel 15 Gram(s) Oral once  dextrose Oral Gel 15 Gram(s) Oral once  fludroCORTISONE 0.2 milliGRAM(s) Oral every 24 hours  folic acid 1 milliGRAM(s) Oral daily  heparin   Injectable 5000 Unit(s) SubCutaneous every 8 hours  magnesium sulfate  IVPB 2 Gram(s) IV Intermittent once  meropenem  IVPB 500 milliGRAM(s) IV Intermittent every 24 hours  midodrine 40 milliGRAM(s) Oral every 8 hours  Nephro-deborah 1 Tablet(s) Oral daily  norepinephrine Infusion 0.05 MICROgram(s)/kG/Min (7.44 mL/Hr) IV Continuous <Continuous>  pantoprazole    Tablet 40 milliGRAM(s) Oral every 12 hours  polyethylene glycol 3350 17 Gram(s) Oral daily  sodium thiosulfate IVPB 25 Gram(s) IV Intermittent once    MEDICATIONS  (PRN):  acetaminophen     Tablet .. 650 milliGRAM(s) Oral every 6 hours PRN Temp greater or equal to 38C (100.4F), Mild Pain (1 - 3)  EPINEPHrine     1 mG/mL Injectable 0.3 milliGRAM(s) IntraMuscular once PRN anaphylaxis  HYDROmorphone   Tablet 4 milliGRAM(s) Oral every 3 hours PRN Severe Pain (7 - 10)  HYDROmorphone   Tablet 2 milliGRAM(s) Oral every 3 hours PRN Moderate Pain (4 - 6)  sodium chloride 0.9% lock flush 10 milliLiter(s) IV Push every 1 hour PRN Pre/post blood products, medications, blood draw, and to maintain line patency      RADIOLOGY & ADDITIONAL TESTS:    Imaging Personally Reviewed:  [ ] YES  [ ] NO   INTERVAL HPI/OVERNIGHT EVENTS: Weaned levophed from .18 to .14  NAEO. Pt seen and examined at bedside. Pain controlled at this time. No further complaints     ICU Vital Signs Last 24 Hrs  T(C): 35.9 (30 May 2023 11:20), Max: 35.9 (29 May 2023 14:22)  T(F): 96.6 (30 May 2023 11:20), Max: 96.6 (29 May 2023 14:22)  HR: 77 (30 May 2023 12:00) (54 - 107)  BP: --  BP(mean): --  ABP: 93/42 (30 May 2023 12:00) (86/38 - 110/50)  ABP(mean): 59 (30 May 2023 12:00) (55 - 73)  RR: 10 (30 May 2023 12:00) (10 - 22)  SpO2: 97% (30 May 2023 12:00) (93% - 100%)    O2 Parameters below as of 30 May 2023 12:00  Patient On (Oxygen Delivery Method): room air      PHYSICAL EXAM:  General: large body habitus, AAO1. in NAD.  Eyes: PERRL, clear conjunctiva  ENT: Poor dentition.  Respiratory: Decreased BS b/l lung fields; no W/R/R  Cardiac: +S1/S2; irregular rate; no M/R/G; HD catheter over L chest.   Gastrointestinal: soft, protuberant, NT/ND; no rebound or guarding; +BSx4. Large ventral hernia.   Extremities: Diffuse anasarca, worsened in bilateral upper extremities. +3 pitting edema RUE. +2 LUE. +3 b/l pitting edema le.  No blisters on heels or toes.  Skin: Bilateral breast wounds covered in dressing. L breast w eschar, R breast copious granulation tissue. malodorous.  Neurologic: AAOx1-2; does not participate in strength exam today.    Consultant(s) Notes Reviewed:  [x ] YES  [ ] NO  Care Discussed with Consultants/Other Providers [ x] YES  [ ] NO    LABS:                         8.1    15.57 )-----------( 66       ( 30 May 2023 06:12 )             27.7     05-30    125<L>  |  93<L>  |  24<H>  ----------------------------<  99  4.0   |  20<L>  |  2.83<H>    Ca    9.5      30 May 2023 05:30  Phos  3.2     05-30  Mg     2.0     05-30    TPro  4.5<L>  /  Alb  2.1<L>  /  TBili  0.3  /  DBili  x   /  AST  14  /  ALT  8<L>  /  AlkPhos  152<H>  05-30        CARDIAC MARKERS ( 30 May 2023 05:30 )  x     / x     / 13 U/L / x     / x      CARDIAC MARKERS ( 29 May 2023 06:27 )  x     / x     / 21 U/L / x     / x                RADIOLOGY, EKG & ADDITIONAL TESTS:     RADIOLOGY, EKG & ADDITIONAL TESTS:     CAPILLARY BLOOD GLUCOSE      POCT Blood Glucose.: 98 mg/dL (28 May 2023 15:16)  POCT Blood Glucose.: 114 mg/dL (28 May 2023 13:07)  POCT Blood Glucose.: 128 mg/dL (28 May 2023 10:58)  POCT Blood Glucose.: 72 mg/dL (28 May 2023 09:52)  POCT Blood Glucose.: 112 mg/dL (28 May 2023 07:06)  POCT Blood Glucose.: 80 mg/dL (28 May 2023 04:20)  POCT Blood Glucose.: 84 mg/dL (28 May 2023 02:34)  POCT Blood Glucose.: 107 mg/dL (27 May 2023 23:30)  POCT Blood Glucose.: 123 mg/dL (27 May 2023 22:00)  POCT Blood Glucose.: 93 mg/dL (27 May 2023 19:19)  POCT Blood Glucose.: 104 mg/dL (27 May 2023 17:12)            MEDICATIONS  (STANDING):  ascorbic acid 500 milliGRAM(s) Oral daily  atorvastatin 40 milliGRAM(s) Oral at bedtime  bisacodyl 5 milliGRAM(s) Oral at bedtime  chlorhexidine 2% Cloths 1 Application(s) Topical <User Schedule>  chlorhexidine 2% Cloths 1 Application(s) Topical <User Schedule>  cinacalcet 120 milliGRAM(s) Oral every 24 hours  Dakins Solution - 1/4 Strength 1 Application(s) Topical every 12 hours  dextrose 10% Bolus 250 milliLiter(s) IV Bolus once  dextrose 10%. 1000 milliLiter(s) (30 mL/Hr) IV Continuous <Continuous>  dextrose 50% Injectable 12.5 Gram(s) IV Push once  dextrose 50% Injectable 25 Gram(s) IV Push once  dextrose 50% Injectable 25 Gram(s) IV Push once  dextrose Oral Gel 15 Gram(s) Oral once  dextrose Oral Gel 15 Gram(s) Oral once  fludroCORTISONE 0.2 milliGRAM(s) Oral every 24 hours  folic acid 1 milliGRAM(s) Oral daily  heparin   Injectable 5000 Unit(s) SubCutaneous every 8 hours  magnesium sulfate  IVPB 2 Gram(s) IV Intermittent once  meropenem  IVPB 500 milliGRAM(s) IV Intermittent every 24 hours  midodrine 40 milliGRAM(s) Oral every 8 hours  Nephro-deborah 1 Tablet(s) Oral daily  norepinephrine Infusion 0.05 MICROgram(s)/kG/Min (7.44 mL/Hr) IV Continuous <Continuous>  pantoprazole    Tablet 40 milliGRAM(s) Oral every 12 hours  polyethylene glycol 3350 17 Gram(s) Oral daily  sodium thiosulfate IVPB 25 Gram(s) IV Intermittent once    MEDICATIONS  (PRN):  acetaminophen     Tablet .. 650 milliGRAM(s) Oral every 6 hours PRN Temp greater or equal to 38C (100.4F), Mild Pain (1 - 3)  EPINEPHrine     1 mG/mL Injectable 0.3 milliGRAM(s) IntraMuscular once PRN anaphylaxis  HYDROmorphone   Tablet 4 milliGRAM(s) Oral every 3 hours PRN Severe Pain (7 - 10)  HYDROmorphone   Tablet 2 milliGRAM(s) Oral every 3 hours PRN Moderate Pain (4 - 6)  sodium chloride 0.9% lock flush 10 milliLiter(s) IV Push every 1 hour PRN Pre/post blood products, medications, blood draw, and to maintain line patency      RADIOLOGY & ADDITIONAL TESTS:    Imaging Personally Reviewed:  [ ] YES  [ ] NO

## 2023-06-01 NOTE — PROGRESS NOTE ADULT - SUBJECTIVE AND OBJECTIVE BOX
STATUS POST: Bedside breast debridement, bilateral  POST PROCEDURE DAY: 2    SUBJECTIVE: Pt seen and examined at bedside this am by surgery team. Patient is lying in bed complaining of pain in her toes. Decreased pressor requirement since yesterday. Denies fever, nausea, vomiting, chest pain, and shortness of breath. Endorses mild pain during dressing change, but states its still primarily in her feet/toes.     MEDICATIONS  (STANDING):  ascorbic acid 500 milliGRAM(s) Oral daily  atorvastatin 40 milliGRAM(s) Oral at bedtime  bisacodyl 5 milliGRAM(s) Oral at bedtime  chlorhexidine 2% Cloths 1 Application(s) Topical <User Schedule>  cinacalcet 120 milliGRAM(s) Oral every 24 hours  Dakins Solution - 1/4 Strength 1 Application(s) Topical every 12 hours  DAPTOmycin IVPB 400 milliGRAM(s) IV Intermittent every 48 hours  dextrose 10% Bolus 250 milliLiter(s) IV Bolus once  dextrose 10%. 1000 milliLiter(s) (30 mL/Hr) IV Continuous <Continuous>  dextrose 50% Injectable 12.5 Gram(s) IV Push once  dextrose 50% Injectable 25 Gram(s) IV Push once  dextrose 50% Injectable 25 Gram(s) IV Push once  dextrose Oral Gel 15 Gram(s) Oral once  dextrose Oral Gel 15 Gram(s) Oral once  epoetin александр-epbx (RETACRIT) Injectable 93205 Unit(s) IV Push once  fludroCORTISONE 0.3 milliGRAM(s) Oral every 24 hours  folic acid 1 milliGRAM(s) Oral daily  heparin   Injectable 5000 Unit(s) SubCutaneous every 8 hours  meropenem  IVPB 500 milliGRAM(s) IV Intermittent every 24 hours  midodrine 40 milliGRAM(s) Oral every 8 hours  Nephro-deborah 1 Tablet(s) Oral daily  norepinephrine Infusion 0.05 MICROgram(s)/kG/Min (3.72 mL/Hr) IV Continuous <Continuous>  nystatin Powder 1 Application(s) Topical two times a day  pantoprazole    Tablet 40 milliGRAM(s) Oral every 12 hours  polyethylene glycol 3350 17 Gram(s) Oral daily    MEDICATIONS  (PRN):  acetaminophen     Tablet .. 650 milliGRAM(s) Oral every 6 hours PRN Temp greater or equal to 38C (100.4F), Mild Pain (1 - 3)  EPINEPHrine     1 mG/mL Injectable 0.3 milliGRAM(s) IntraMuscular once PRN anaphylaxis  HYDROmorphone   Tablet 2 milliGRAM(s) Oral every 3 hours PRN Moderate Pain (4 - 6)  HYDROmorphone   Tablet 4 milliGRAM(s) Oral every 3 hours PRN Severe Pain (7 - 10)  HYDROmorphone  Injectable 0.5 milliGRAM(s) IV Push every 2 hours PRN Severe Pain (7 - 10)  sodium chloride 0.9% lock flush 10 milliLiter(s) IV Push every 1 hour PRN Pre/post blood products, medications, blood draw, and to maintain line patency      Vital Signs Last 24 Hrs  T(C): 35.7 (01 Jun 2023 06:03), Max: 37 (31 May 2023 22:32)  T(F): 96.3 (01 Jun 2023 06:03), Max: 98.6 (31 May 2023 22:32)  HR: 71 (01 Jun 2023 06:00) (68 - 112)  BP: --  BP(mean): --  RR: 7 (01 Jun 2023 06:00) (7 - 18)  SpO2: 100% (01 Jun 2023 06:00) (99% - 100%)    Parameters below as of 01 Jun 2023 06:00  Patient On (Oxygen Delivery Method): nasal cannula  O2 Flow (L/min): 2      Physical Exam  General: Patient is doing well and lying in bed comfortably  Constitutional: alert and oriented   Pulm: Nonlabored breathing, no respiratory distress  CV: Regular rate and rhythm, normal sinus rhythm  Breast: b/l debridement sites without necrotic tissue, no active bleeding when dressing removed. Some serous fluid noted. Tissue appears pink and moist. Dressing changed with dakins WTD kerlix/abd pad.   Abd:  soft, nontender, nondistended. No rebound, no guarding.   Extremities: no edema    I&O's Detail    31 May 2023 07:01  -  01 Jun 2023 07:00  --------------------------------------------------------  IN:    dextrose 10%: 690 mL    IV PiggyBack: 50 mL    Norepinephrine: 418.9 mL    Oral Fluid: 100 mL  Total IN: 1258.9 mL    OUT:  Total OUT: 0 mL    Total NET: 1258.9 mL        LABS:                        7.8    10.73 )-----------( 44       ( 01 Jun 2023 05:30 )             26.1     06-01    129<L>  |  93<L>  |  20  ----------------------------<  83  3.7   |  24  |  2.72<H>    Ca    9.1      01 Jun 2023 05:30  Phos  2.8     06-01  Mg     1.9     06-01    TPro  4.4<L>  /  Alb  2.0<L>  /  TBili  0.4  /  DBili  x   /  AST  12  /  ALT  7<L>  /  AlkPhos  143<H>  06-01    PT/INR - ( 31 May 2023 05:30 )   PT: 16.1 sec;   INR: 1.35          PTT - ( 31 May 2023 05:30 )  PTT:35.3 sec   STATUS POST: Bedside breast debridement, bilateral  POST PROCEDURE DAY: 2    SUBJECTIVE: Pt seen and examined at bedside this am by surgery team. Patient is lying in bed complaining of pain. Decreased pressor requirement since yesterday. Denies fever, nausea, vomiting, chest pain, and shortness of breath. Endorses mild pain during dressing change, increased response from yesterday.    MEDICATIONS  (STANDING):  ascorbic acid 500 milliGRAM(s) Oral daily  atorvastatin 40 milliGRAM(s) Oral at bedtime  bisacodyl 5 milliGRAM(s) Oral at bedtime  chlorhexidine 2% Cloths 1 Application(s) Topical <User Schedule>  cinacalcet 120 milliGRAM(s) Oral every 24 hours  Dakins Solution - 1/4 Strength 1 Application(s) Topical every 12 hours  DAPTOmycin IVPB 400 milliGRAM(s) IV Intermittent every 48 hours  dextrose 10% Bolus 250 milliLiter(s) IV Bolus once  dextrose 10%. 1000 milliLiter(s) (30 mL/Hr) IV Continuous <Continuous>  dextrose 50% Injectable 12.5 Gram(s) IV Push once  dextrose 50% Injectable 25 Gram(s) IV Push once  dextrose 50% Injectable 25 Gram(s) IV Push once  dextrose Oral Gel 15 Gram(s) Oral once  dextrose Oral Gel 15 Gram(s) Oral once  epoetin александр-epbx (RETACRIT) Injectable 02945 Unit(s) IV Push once  fludroCORTISONE 0.3 milliGRAM(s) Oral every 24 hours  folic acid 1 milliGRAM(s) Oral daily  heparin   Injectable 5000 Unit(s) SubCutaneous every 8 hours  meropenem  IVPB 500 milliGRAM(s) IV Intermittent every 24 hours  midodrine 40 milliGRAM(s) Oral every 8 hours  Nephro-deborah 1 Tablet(s) Oral daily  norepinephrine Infusion 0.05 MICROgram(s)/kG/Min (3.72 mL/Hr) IV Continuous <Continuous>  nystatin Powder 1 Application(s) Topical two times a day  pantoprazole    Tablet 40 milliGRAM(s) Oral every 12 hours  polyethylene glycol 3350 17 Gram(s) Oral daily    MEDICATIONS  (PRN):  acetaminophen     Tablet .. 650 milliGRAM(s) Oral every 6 hours PRN Temp greater or equal to 38C (100.4F), Mild Pain (1 - 3)  EPINEPHrine     1 mG/mL Injectable 0.3 milliGRAM(s) IntraMuscular once PRN anaphylaxis  HYDROmorphone   Tablet 2 milliGRAM(s) Oral every 3 hours PRN Moderate Pain (4 - 6)  HYDROmorphone   Tablet 4 milliGRAM(s) Oral every 3 hours PRN Severe Pain (7 - 10)  HYDROmorphone  Injectable 0.5 milliGRAM(s) IV Push every 2 hours PRN Severe Pain (7 - 10)  sodium chloride 0.9% lock flush 10 milliLiter(s) IV Push every 1 hour PRN Pre/post blood products, medications, blood draw, and to maintain line patency      Vital Signs Last 24 Hrs  T(C): 35.7 (01 Jun 2023 06:03), Max: 37 (31 May 2023 22:32)  T(F): 96.3 (01 Jun 2023 06:03), Max: 98.6 (31 May 2023 22:32)  HR: 71 (01 Jun 2023 06:00) (68 - 112)  BP: --  BP(mean): --  RR: 7 (01 Jun 2023 06:00) (7 - 18)  SpO2: 100% (01 Jun 2023 06:00) (99% - 100%)    Parameters below as of 01 Jun 2023 06:00  Patient On (Oxygen Delivery Method): nasal cannula  O2 Flow (L/min): 2      Physical Exam  General: Patient is doing well and lying in bed comfortably  Constitutional: alert and oriented   Pulm: Nonlabored breathing, no respiratory distress  CV: Regular rate and rhythm, normal sinus rhythm  Breast: b/l debridement sites with minimal necrotic tissue, sloughed off when dakins gauze removed, no active bleeding when dressing removed. Some serous fluid noted. Tissue appears pink and moist. Dressing changed with dakins WTD kerlix/abd pad.   Abd:  soft, nontender, nondistended. No rebound, no guarding.     I&O's Detail    31 May 2023 07:01  -  01 Jun 2023 07:00  --------------------------------------------------------  IN:    dextrose 10%: 690 mL    IV PiggyBack: 50 mL    Norepinephrine: 418.9 mL    Oral Fluid: 100 mL  Total IN: 1258.9 mL    OUT:  Total OUT: 0 mL    Total NET: 1258.9 mL        LABS:                        7.8    10.73 )-----------( 44       ( 01 Jun 2023 05:30 )             26.1     06-01    129<L>  |  93<L>  |  20  ----------------------------<  83  3.7   |  24  |  2.72<H>    Ca    9.1      01 Jun 2023 05:30  Phos  2.8     06-01  Mg     1.9     06-01    TPro  4.4<L>  /  Alb  2.0<L>  /  TBili  0.4  /  DBili  x   /  AST  12  /  ALT  7<L>  /  AlkPhos  143<H>  06-01    PT/INR - ( 31 May 2023 05:30 )   PT: 16.1 sec;   INR: 1.35          PTT - ( 31 May 2023 05:30 )  PTT:35.3 sec

## 2023-06-01 NOTE — PROGRESS NOTE ADULT - ASSESSMENT
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, Calciphylaxis of bilateral breasts, HTN, HLD presenting to the ER in setting of anemia now with breast infection s/p debridement     Assessment/Plan:   #ESRD on HD  #Calciphylaxis  Usual RX time 3:30 hrs,  HD today for clearance and minimal UF   Give sodium thiosulfate with HD     #Hypotension   back on levophed for pressor support  -c/w levophed and midodrine and fludrocortisone   -maintain MAP 70 for adequate renal perfusion  -would obtain AM cortisol     #Hyponatremia  i//so continuous d10 drip   1L fluid restriction  will lower Na concentration in dialysate       #access   L TDC    #anemia  Hgb not at goal  Epo w/ HD    #renal bone disease   Ca ~9.1  phos 2.8  would lower sensipar to 90mg daily   encourage protein intake   trend phos daily            Thank you for the opportunity to participate in the care of your patient. The nephrology service remains available to assist with any questions or concerns. Please feel free to reach us by paging the on-call nephrology fellow for urgent issues or as below.     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, Calciphylaxis of bilateral breasts, HTN, HLD presenting to the ER in setting of anemia now with breast infection s/p debridement     Assessment/Plan:   #ESRD on HD  #Calciphylaxis  Usual RX time 3:30 hrs,  HD today for clearance and minimal UF   Give sodium thiosulfate with HD   please obtain vascular surgery consult for breast calciphylaxis     #Hypotension   back on levophed for pressor support  -c/w levophed and midodrine and fludrocortisone   -maintain MAP 70 for adequate renal perfusion      #Hyponatremia  i//so continuous d10 drip   1L fluid restriction  will lower Na concentration in dialysate       #access   L TDC    #anemia  Hgb not at goal  Epo w/ HD    #renal bone disease   Ca ~9.1  phos 2.8  c/w sensipar and phos binders  encourage protein intake   trend phos daily            Thank you for the opportunity to participate in the care of your patient. The nephrology service remains available to assist with any questions or concerns. Please feel free to reach us by paging the on-call nephrology fellow for urgent issues or as below.     Saumya Jo D.O  PGY-5, Nephrology Fellow    P: [phone]

## 2023-06-02 NOTE — PROGRESS NOTE ADULT - ASSESSMENT
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 Patient will     Cardio  Patient w persistently low blood pressures, w 80s/40s during RR and inability to accurately measure BP due to diffuse edema during RR  Less concern for shock on differential given no infectious etiology, was requiring low dose levophed intermittently, but patient known to have baseline low blood pressure.  5/28: o/n patient needed levophed, weaned overnight   Plan:  - Midodrine increased to 40mg q8h -> q6h   -Starting hydrocortisone 100 mg TID for further pressor support for increased levophed weaning  - C/w HD as needed. HD today.  - Florinef .3mg qd.  - R femoral TLC placed, due to access issue in patient.    #Shock, now concern for underlying septic shock 2/2 to poor source control from b/l breast wounds  - Lactate remains negative, uptrending WBC count  - Otherwise, patient on meropenem and vanc dosing by level  - Surgery is consulted, daughter now amenable to surgery.  - Surgery today vs tomorrow, 5C following, pending convo w attending surrounding timing  - Patient is now on levophed, wean as tolerated for goal SBP >90, currently not using MAP as perfusion marker     #HfrEf, non-ischemic cardiomyopathy  - history of HfpEF, TTE 5/5/23, normal RV and LV function  - On No HF meds  - C.w statin      Pulmonary  - Patient w no pulmonology history  - good airway control, controlling secretions  - Continue to monitor secretions.    Renal  - ESRD w Dialysis, rapid response during HD session  - C/w HD as tolerated- can uptitrate levophed as needed to allow for HD  - renvela discontinued as pt w normal Phosphorus.    GI  #Stercoral Ulcers, 2 BM nonbloody 5/26 AM.  - Came in w GI bleed deemed to be 2/2 to constipation and stercoral ulcers  - C/w PPI V BID  - C/w golytely prep as laxative  - Abdominal US w doppler, no evidence of portal HTN  - F/u GI recs, as per HCP and pt GOC, no role for egd this admission  - Active T and S, Keep HgB >7  - Caution w NSAIDS, prefer GANN-2 inhibitors  - patient with copious rectal output- place rectal tube and wound care consult    ID  -Patient w F to 101.1 on 5/24/23, and concomitant afib w RVR, resolved after defervescence  - Afebrile currently  - F/u BCx, F/u breast wound culture-> gram negative rods. and gram positive cocci,  ESBL Proteus, VRE E. Faecium  - C/w Meropenem and Daptomycin 400mg q48h.  - no source control w/out surgical intervention, surgery is following. f.u recs today.    Heme  - HgB stable, currently, came in at 6.2  - See above for GI    #Leukocytosis  - Likely 2/2 to breast infection, now slight downtrend, no source control w/out surgical intervention  - Surgery is following    #Thrombocytopenia   Patient with slow steady decline in platelets, more compatible with sepsis vs HIT  HIT score: intermediate  - Hep 5000U q8.  - Platelets 66k -> 46 this AM, f/u surgery/anesthesia recs if need platelet transfusion    Endo  5/21 received d10 bolus for glucose 66, on q4 glucose checks, then started d10 gtt at 50 cc/hr  - C/w gtt at 30cc/hr  - FSG q2, if consistently >100.  -Soft and bite sized diet     Prophylaxis:  F: d10 gtt 30cc/hr.  E: Replete cautiously given ESRD  N: Soft and Bite sized (renal diet)  DVT: Heparin 5000 subq q8    Lines: R femoral TLC placed 5/27/23. L femoral a-line placed 5/29/23.  No Wu.

## 2023-06-02 NOTE — CHART NOTE - NSCHARTNOTEFT_GEN_A_CORE
Discussed with Patient tiffanie to clarify the hx of breast cancer. Per daughter, her mom never diagnosed with breast cancer. She does have family hx of breast cancer. Also, patient has hx of breast biopsy at [facility] which the pathology report in in following.       ________________________________________________________   Clinical Information   bilateral breast swelling   Gross Description   Received: In formalin labeled "right breast biopsy"   Size: Three cores ranging from 1.1 x 0.1 x 0.1 cm to 2.2 x 0.1 x 0.1 cm   Description: Yellow-tan, soft, fibrofatty   Additional Comments: Also present is a 0.8 x 0.4 x 0.2 cmtan brown skin   strip   Submitted: Entirely in two cassettes: 1A-1B   Exposed to Formalin: 3:50 pm 1/9/2023   Total Fixation Time in Formalin: 7.17 hours   Note: The breast specimen processed meets the fixation time standards   of 6-72 hrs., which have been set by the American Society of Clinical   Oncology (ASCO) and the College of American Pathologists (CAP), to ensure   appropriate tissue quality for effective Her2 receptor testing.   ALTAGRACIA Chaparro (ASCP) 01/09/2023 06:17 PM   Disclaimer   In addition to other data that may appear on the specimen containers, all   labels have been inspected to confirm the presence of the patient's name   and date of birth.   Histological Processing Performed at [facility], Department of   Pathology, [facility] [address] [place] [address]. (01.09.23 @ 15:50)       Historical Values  Surgical Pathology Report:   ACCESSION No: [id number]   Patient: SALOMON MUNSON   Accession: [id number]   Collected Date/Time: 1/9/2023 15:50 EST   Received Date/Time: 1/9/2023 16:06 EST   Surgical Pathology Report - Auth (Verified)   Specimen(s) Submitted   1 Right breast biopsy   Final Diagnosis   1. Right breast incisional biopsy skin:   - Epidermal and dermal necrosis with fibrinoid occlusion and necrosis   of blood vessels, focal necrotizingsuppurative inflammation involving   the fibroadipose tissue and blood vessels in the subcutis, see note.   Note: These coagulopathic changes may be secondary to an infectious   etiology, however other causes of hypercoagulability should be explored.   Preliminary findings were discussed with Dr Hatch and Dr Reza on   1/12/2023. Multiple deeper sections have been examined. PAS stain   is negative for hyphae. PAS stain highlights fibrin deposits within   blood vessels. While Von Kossa special stain does not highlight any   intravascular calcium, there is a focus of eccrine glands with positive   fine stippling. Thus, in the correct clinical setting, evolving   calciphylaxis cannot be excluded. Representative sections were reviewed   by one or more pathologists, at the intradepartmental consensus   conference, who concur with this interpretation. Clinical correlation   is   necessary.   Verified by: Jesus Tan M.D., Dermatopathologist   (Electronic Signature)   Reported on: 01/24/23 12:43 EST, Dermatopathology, [address], [address], [place] [address]   Phone: [phone] Fax: [phone]

## 2023-06-02 NOTE — PROGRESS NOTE ADULT - ASSESSMENT
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 General surgery consulted on 5/26 for evaluation of bilateral breast wounds and possible debridement. Pt unable to provide subjective history 2/2 current mentation. Bilateral breast wounds with fibrinous slough, necrotic tissue, and purulent drainage underlying necrotic eschar now s/p debridement on 5/30.     Recommendations:  WOUND CARE: daily dressing changing with Dakins WTD. 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 General surgery consulted on 5/26 for evaluation of bilateral breast wounds and possible debridement. Pt unable to provide subjective history 2/2 current mentation. Bilateral breast wounds with fibrinous slough, necrotic tissue, and purulent drainage underlying necrotic eschar now s/p debridement on 5/30.     Recommendations:  WOUND CARE: daily dressing changing with Dakins WTD. Dakins kerlix should be damp not soaked;   c/w Abx  Appreciate wound care recs  Rest of care per MICU  Team 5C will continue to follow

## 2023-06-02 NOTE — PROGRESS NOTE ADULT - ASSESSMENT
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, Calciphylaxis of bilateral breasts, HTN, HLD presenting to the ER in setting of anemia additionally w/ breast calciphylaxis s/p debridement     Assessment/Plan:   #ESRD on HD  #Calciphylaxis  Usual RX time 3:30 hrs,  next HD 6/3     #Breast Calciphylaxis  discussed w/ outpatient HD unit patient was not currently receiving STS  appreciate surgery consult    please obtain vascular surgery consult for breast calciphylaxis     #Hypotension   back on levophed for pressor support  -c/w levophed and midodrine and fludrocortisone / hydrocortisone   -maintain MAP 70 for adequate renal perfusion      #Hyponatremia  i//so continuous d10 drip   1L fluid restriction  can give hypertonic saaline 30cc/hour x 4 hours       #access   L TDC    #anemia  Hgb not at goal  Epo w/ HD    #renal bone disease   Ca ~9.4  phos 2.8  c/w sensipar and renvela   encourage protein intake   trend phos daily            Thank you for the opportunity to participate in the care of your patient. The nephrology service remains available to assist with any questions or concerns. Please feel free to reach us by paging the on-call nephrology fellow for urgent issues or as below.     Saumya Jo D.O  PGY-5, Nephrology Fellow    P: [phone]

## 2023-06-02 NOTE — PROGRESS NOTE ADULT - SUBJECTIVE AND OBJECTIVE BOX
INTERVAL HPI/OVERNIGHT EVENTS: Weaned off levophed  NAEO. Pt seen and examined at bedside. Pain controlled at this time. No further complaints     ICU Vital Signs Last 24 Hrs  T(C): 35.9 (30 May 2023 11:20), Max: 35.9 (29 May 2023 14:22)  T(F): 96.6 (30 May 2023 11:20), Max: 96.6 (29 May 2023 14:22)  HR: 77 (30 May 2023 12:00) (54 - 107)  BP: --  BP(mean): --  ABP: 93/42 (30 May 2023 12:00) (86/38 - 110/50)  ABP(mean): 59 (30 May 2023 12:00) (55 - 73)  RR: 10 (30 May 2023 12:00) (10 - 22)  SpO2: 97% (30 May 2023 12:00) (93% - 100%)    O2 Parameters below as of 30 May 2023 12:00  Patient On (Oxygen Delivery Method): room air      PHYSICAL EXAM:  General: large body habitus, AAO1. in NAD.  Eyes: PERRL, clear conjunctiva  ENT: Poor dentition.  Respiratory: Decreased BS b/l lung fields; no W/R/R  Cardiac: +S1/S2; irregular rate; no M/R/G; HD catheter over L chest.   Gastrointestinal: soft, protuberant, NT/ND; no rebound or guarding; +BSx4. Large ventral hernia.   Extremities: Diffuse anasarca, worsened in bilateral upper extremities. +3 pitting edema RUE. +2 LUE. +3 b/l pitting edema le.  No blisters on heels or toes.  Skin: Bilateral breast wounds covered in dressing. L breast w eschar, R breast copious granulation tissue. malodorous.  Neurologic: AAOx1-2; does not participate in strength exam today.    Consultant(s) Notes Reviewed:  [x ] YES  [ ] NO  Care Discussed with Consultants/Other Providers [ x] YES  [ ] NO    LABS:                         8.1    15.57 )-----------( 66       ( 30 May 2023 06:12 )             27.7     05-30    125<L>  |  93<L>  |  24<H>  ----------------------------<  99  4.0   |  20<L>  |  2.83<H>    Ca    9.5      30 May 2023 05:30  Phos  3.2     05-30  Mg     2.0     05-30    TPro  4.5<L>  /  Alb  2.1<L>  /  TBili  0.3  /  DBili  x   /  AST  14  /  ALT  8<L>  /  AlkPhos  152<H>  05-30        CARDIAC MARKERS ( 30 May 2023 05:30 )  x     / x     / 13 U/L / x     / x      CARDIAC MARKERS ( 29 May 2023 06:27 )  x     / x     / 21 U/L / x     / x                RADIOLOGY, EKG & ADDITIONAL TESTS:     RADIOLOGY, EKG & ADDITIONAL TESTS:     CAPILLARY BLOOD GLUCOSE      POCT Blood Glucose.: 98 mg/dL (28 May 2023 15:16)  POCT Blood Glucose.: 114 mg/dL (28 May 2023 13:07)  POCT Blood Glucose.: 128 mg/dL (28 May 2023 10:58)  POCT Blood Glucose.: 72 mg/dL (28 May 2023 09:52)  POCT Blood Glucose.: 112 mg/dL (28 May 2023 07:06)  POCT Blood Glucose.: 80 mg/dL (28 May 2023 04:20)  POCT Blood Glucose.: 84 mg/dL (28 May 2023 02:34)  POCT Blood Glucose.: 107 mg/dL (27 May 2023 23:30)  POCT Blood Glucose.: 123 mg/dL (27 May 2023 22:00)  POCT Blood Glucose.: 93 mg/dL (27 May 2023 19:19)  POCT Blood Glucose.: 104 mg/dL (27 May 2023 17:12)            MEDICATIONS  (STANDING):  ascorbic acid 500 milliGRAM(s) Oral daily  atorvastatin 40 milliGRAM(s) Oral at bedtime  bisacodyl 5 milliGRAM(s) Oral at bedtime  chlorhexidine 2% Cloths 1 Application(s) Topical <User Schedule>  chlorhexidine 2% Cloths 1 Application(s) Topical <User Schedule>  cinacalcet 120 milliGRAM(s) Oral every 24 hours  Dakins Solution - 1/4 Strength 1 Application(s) Topical every 12 hours  dextrose 10% Bolus 250 milliLiter(s) IV Bolus once  dextrose 10%. 1000 milliLiter(s) (30 mL/Hr) IV Continuous <Continuous>  dextrose 50% Injectable 12.5 Gram(s) IV Push once  dextrose 50% Injectable 25 Gram(s) IV Push once  dextrose 50% Injectable 25 Gram(s) IV Push once  dextrose Oral Gel 15 Gram(s) Oral once  dextrose Oral Gel 15 Gram(s) Oral once  fludroCORTISONE 0.2 milliGRAM(s) Oral every 24 hours  folic acid 1 milliGRAM(s) Oral daily  heparin   Injectable 5000 Unit(s) SubCutaneous every 8 hours  magnesium sulfate  IVPB 2 Gram(s) IV Intermittent once  meropenem  IVPB 500 milliGRAM(s) IV Intermittent every 24 hours  midodrine 40 milliGRAM(s) Oral every 8 hours  Nephro-deborah 1 Tablet(s) Oral daily  norepinephrine Infusion 0.05 MICROgram(s)/kG/Min (7.44 mL/Hr) IV Continuous <Continuous>  pantoprazole    Tablet 40 milliGRAM(s) Oral every 12 hours  polyethylene glycol 3350 17 Gram(s) Oral daily  sodium thiosulfate IVPB 25 Gram(s) IV Intermittent once    MEDICATIONS  (PRN):  acetaminophen     Tablet .. 650 milliGRAM(s) Oral every 6 hours PRN Temp greater or equal to 38C (100.4F), Mild Pain (1 - 3)  EPINEPHrine     1 mG/mL Injectable 0.3 milliGRAM(s) IntraMuscular once PRN anaphylaxis  HYDROmorphone   Tablet 4 milliGRAM(s) Oral every 3 hours PRN Severe Pain (7 - 10)  HYDROmorphone   Tablet 2 milliGRAM(s) Oral every 3 hours PRN Moderate Pain (4 - 6)  sodium chloride 0.9% lock flush 10 milliLiter(s) IV Push every 1 hour PRN Pre/post blood products, medications, blood draw, and to maintain line patency      RADIOLOGY & ADDITIONAL TESTS:    Imaging Personally Reviewed:  [ ] YES  [ ] NO

## 2023-06-02 NOTE — PROGRESS NOTE ADULT - SUBJECTIVE AND OBJECTIVE BOX
STATUS POST: Bedside breast debridement, bilateral  POST PROCEDURE DAY: 3    SUBJECTIVE: Pt seen and examined at bedside this am by surgery team. Patient is lying in bed comfortably. No complaints initially. Continued pressor requirement, added midodrine and hydrocortisone. During dressing change, patient complains of pain/discomfort however tolerates it well.    MEDICATIONS  (STANDING):  ascorbic acid 500 milliGRAM(s) Oral daily  atorvastatin 40 milliGRAM(s) Oral at bedtime  bisacodyl 5 milliGRAM(s) Oral at bedtime  chlorhexidine 2% Cloths 1 Application(s) Topical <User Schedule>  cinacalcet 120 milliGRAM(s) Oral every 24 hours  Dakins Solution - 1/4 Strength 1 Application(s) Topical every 12 hours  DAPTOmycin IVPB 400 milliGRAM(s) IV Intermittent every 48 hours  dextrose 10% Bolus 250 milliLiter(s) IV Bolus once  dextrose 10%. 1000 milliLiter(s) (30 mL/Hr) IV Continuous <Continuous>  dextrose 50% Injectable 12.5 Gram(s) IV Push once  dextrose 50% Injectable 25 Gram(s) IV Push once  dextrose 50% Injectable 25 Gram(s) IV Push once  dextrose Oral Gel 15 Gram(s) Oral once  dextrose Oral Gel 15 Gram(s) Oral once  fludroCORTISONE 0.3 milliGRAM(s) Oral every 24 hours  folic acid 1 milliGRAM(s) Oral daily  heparin   Injectable 7500 Unit(s) SubCutaneous every 8 hours  hydrocortisone 100 milliGRAM(s) Oral every 8 hours  meropenem  IVPB 500 milliGRAM(s) IV Intermittent every 24 hours  midodrine 40 milliGRAM(s) Oral <User Schedule>  Nephro-deborah 1 Tablet(s) Oral daily  norepinephrine Infusion 0.05 MICROgram(s)/kG/Min (3.72 mL/Hr) IV Continuous <Continuous>  nystatin Powder 1 Application(s) Topical two times a day  pantoprazole    Tablet 40 milliGRAM(s) Oral every 12 hours  polyethylene glycol 3350 17 Gram(s) Oral daily    MEDICATIONS  (PRN):  acetaminophen     Tablet .. 650 milliGRAM(s) Oral every 6 hours PRN Temp greater or equal to 38C (100.4F), Mild Pain (1 - 3)  EPINEPHrine     1 mG/mL Injectable 0.3 milliGRAM(s) IntraMuscular once PRN anaphylaxis  HYDROmorphone   Tablet 2 milliGRAM(s) Oral every 3 hours PRN Moderate Pain (4 - 6)  HYDROmorphone   Tablet 4 milliGRAM(s) Oral every 3 hours PRN Severe Pain (7 - 10)  HYDROmorphone  Injectable 0.5 milliGRAM(s) IV Push every 2 hours PRN Severe Pain (7 - 10)  sodium chloride 0.9% lock flush 10 milliLiter(s) IV Push every 1 hour PRN Pre/post blood products, medications, blood draw, and to maintain line patency      Vital Signs Last 24 Hrs  T(C): 35.8 (02 Jun 2023 05:25), Max: 36.5 (01 Jun 2023 19:15)  T(F): 96.4 (02 Jun 2023 05:25), Max: 97.7 (01 Jun 2023 19:15)  HR: 79 (02 Jun 2023 07:00) (68 - 130)  BP: --  BP(mean): --  RR: 0 (02 Jun 2023 07:00) (0 - 22)  SpO2: 100% (02 Jun 2023 07:00) (92% - 100%)    Parameters below as of 02 Jun 2023 08:00  Patient On (Oxygen Delivery Method): nasal cannula  O2 Flow (L/min): 2      Physical Exam  General: Patient is doing well and lying in bed comfortably  Constitutional: alert and oriented   Pulm: Nonlabored breathing, no respiratory distress  CV: Regular rate and rhythm, normal sinus rhythm  Breast: b/l debridement sites with minimal necrotic tissue, sloughed off when dakins gauze removed, no active bleeding when dressing removed. Tissue appears pink and moist, less serous fluid noted than yesterday. Dressing changed with dakins WTD kerlix/abd pad.   Abd:  soft, nontender, nondistended. No rebound, no guarding.        I&O's Detail    01 Jun 2023 07:01  -  02 Jun 2023 07:00  --------------------------------------------------------  IN:    dextrose 10%: 510 mL    Norepinephrine: 319 mL  Total IN: 829 mL    OUT:    Other (mL): 1000 mL  Total OUT: 1000 mL    Total NET: -171 mL        LABS:                        8.4    12.59 )-----------( 52       ( 02 Jun 2023 05:30 )             27.4     06-02    128<L>  |  91<L>  |  16  ----------------------------<  138<H>  3.6   |  23  |  2.23<H>    Ca    9.4      02 Jun 2023 05:30  Phos  2.7     06-02  Mg     1.7     06-02    TPro  5.0<L>  /  Alb  2.1<L>  /  TBili  0.4  /  DBili  x   /  AST  12  /  ALT  9<L>  /  AlkPhos  167<H>  06-02

## 2023-06-02 NOTE — PROGRESS NOTE ADULT - SUBJECTIVE AND OBJECTIVE BOX
Patient is a 67y Female seen and evaluated at bedside. patient remains on levophed for pressor support started on hydrocortisone yesterday tolerated 1L off w/ HD yesterday spoke with patients HD unit was not actively receiving treatment for calciphylaxis MAPS in the 60's  K 3.8 biacrb 24       Meds:    acetaminophen     Tablet .. 650 every 6 hours PRN  ascorbic acid 500 daily  atorvastatin 40 at bedtime  bisacodyl 5 at bedtime  chlorhexidine 2% Cloths 1 <User Schedule>  cinacalcet 120 every 24 hours  Dakins Solution - 1/4 Strength 1 every 12 hours  DAPTOmycin IVPB 400 every 48 hours  dextrose 10% Bolus 250 once  dextrose 10%. 1000 <Continuous>  dextrose 50% Injectable 12.5 once  dextrose 50% Injectable 25 once  dextrose 50% Injectable 25 once  dextrose Oral Gel 15 once  dextrose Oral Gel 15 once  EPINEPHrine     1 mG/mL Injectable 0.3 once PRN  fludroCORTISONE 0.3 every 24 hours  folic acid 1 daily  heparin   Injectable 7500 every 8 hours  hydrocortisone 100 every 8 hours  HYDROmorphone   Tablet 4 every 3 hours PRN  HYDROmorphone   Tablet 2 every 3 hours PRN  HYDROmorphone  Injectable 0.5 every 2 hours PRN  meropenem  IVPB 500 every 24 hours  midodrine 40 <User Schedule>  Nephro-deborah 1 daily  norepinephrine Infusion 0.05 <Continuous>  nystatin Powder 1 two times a day  pantoprazole    Tablet 40 every 12 hours  polyethylene glycol 3350 17 daily  sodium chloride 0.9% lock flush 10 every 1 hour PRN      T(C): , Max: 36.5 (06-01-23 @ 19:15)  T(F): , Max: 97.7 (06-01-23 @ 19:15)  HR: 88 (06-02-23 @ 08:00)  BP: --  BP(mean): --  RR: 0 (06-02-23 @ 08:00)  SpO2: 100% (06-02-23 @ 08:00)  Wt(kg): --    06-01 @ 07:01  -  06-02 @ 07:00  --------------------------------------------------------  IN: 1169 mL / OUT: 1100 mL / NET: 69 mL    06-02 @ 07:01  -  06-02 @ 08:42  --------------------------------------------------------  IN: 51 mL / OUT: 0 mL / NET: 51 mL          Review of Systems:  unable to participate     PHYSICAL EXAM:  GENERAL: moaning in pain  CHEST/LUNG: Clear to auscultation bilaterally  HEART: normal S1S2, RRR  ABDOMEN: Soft, Nontender, +BS, No flank tenderness bilateral  EXTREMITIES: + edema (total body)  SKIN: breast calciphylaxis   ACCESS:[facility]       LABS:                        8.4    12.59 )-----------( 52       ( 02 Jun 2023 05:30 )             27.4     06-02    128<L>  |  91<L>  |  16  ----------------------------<  138<H>  3.6   |  23  |  2.23<H>    Ca    9.4      02 Jun 2023 05:30  Phos  2.7     06-02  Mg     1.7     06-02    TPro  5.0<L>  /  Alb  2.1<L>  /  TBili  0.4  /  DBili  x   /  AST  12  /  ALT  9<L>  /  AlkPhos  167<H>  06-02                RADIOLOGY & ADDITIONAL STUDIES:

## 2023-06-03 NOTE — PROGRESS NOTE ADULT - SUBJECTIVE AND OBJECTIVE BOX
Hemoglobin: 8.4 g/dL (23 @ 05:30)  Phosphorus Level, Serum: 2.7 mg/dL (23 @ 05:30)  Hemoglobin: 7.8 g/dL (23 @ 05:30)  Phosphorus Level, Serum: 2.8 mg/dL (23 @ 05:30)    Albumin, Serum: 2.1 g/dL (23 @ 05:30)  Albumin, Serum: 2.0 g/dL (23 @ 05:30)    T(C): 36.3 (23 @ 12:05), Max: 36.3 (23 @ 12:05)  HR: 100 (23 @ 13:00) (52 - 105)  BP: --  RR: 12 (23 @ 13:00) (7 - 15)  SpO2: 100% (23 @ 13:00) (100% - 100%)  cinacalcet 120 milliGRAM(s) Oral every 24 hours, 23 @ 15:07, Routine  epoetin александр-epbx (RETACRIT) Injectable 82878 Unit(s) IV Push once, 23 @ 07:01, Routine, Stop order after: 1 Doses  epoetin александр-epbx (RETACRIT) Injectable 34979 Unit(s) IV Push once, 23 @ 06:21, Routine, Stop order after: 1 Doses  epoetin александр-epbx (RETACRIT) Injectable 8000 Unit(s) IV Push once, 23 @ 08:09, Routine, Stop order after: 1 Doses  epoetin александр-epbx (RETACRIT) Injectable 8000 Unit(s) IV Push once, 23 @ 07:56, Routine, Stop order after: 1 Doses  epoetin александр-epbx (RETACRIT) Injectable 8000 Unit(s) IV Push once, 23 @ 06:21, Routine, Stop order after: 1 Doses  epoetin александр-epbx (RETACRIT) Injectable 8000 Unit(s) IV Push once, 23 @ 07:08, Routine, Stop order after: 1 Doses  epoetin александр-epbx (RETACRIT) Injectable 8000 Unit(s) IV Push once, 23 @ 05:42, Routine, Stop order after: 1 Doses  sevelamer carbonate 1600 milliGRAM(s) Oral three times a day with meals, 23 @ 15:07, Routine  sevelamer carbonate 800 milliGRAM(s) Oral three times a day with meals, 23 @ 16:11,       Hemodialysis Treatment.:     Schedule: Once, Modality: Hemodialysis, Access: Internal Jugular Central Venous Catheter    Dialyzer: Optiflux [id number], Time: 180 Min    Blood Flow: 400 mL/Min , Dialysate Flow: 500 mL/Min, Dialysate Temp: 35, Tubinmm (Adult)    Target Fluid Removal: 1.5 Liters    Dialysate Electrolytes (mEq/L): Potassium 3, Calcium 2.5, Sodium 135, Bicarbonate 35 (23 @ 07:02) [Completed]    Seen on HD UF goal changed to 0.5L as Pt HR went up to 110 Hr.  Hemoglobin: 8.4 g/dL (23 @ 05:30)  Phosphorus Level, Serum: 2.7 mg/dL (23 @ 05:30)  Hemoglobin: 7.8 g/dL (23 @ 05:30)  Phosphorus Level, Serum: 2.8 mg/dL (23 @ 05:30)    Albumin, Serum: 2.1 g/dL (23 @ 05:30)  Albumin, Serum: 2.0 g/dL (23 @ 05:30)    T(C): 36.3 (23 @ 12:05), Max: 36.3 (23 @ 12:05)  HR: 100 (23 @ 13:00) (52 - 105)  BP: -- 98/48  RR: 12 (23 @ 13:00) (7 - 15)  SpO2: 100% (23 @ 13:00) (100% - 100%)  cinacalcet 120 milliGRAM(s) Oral every 24 hours, 23 @ 15:07, Routine  epoetin александр-epbx (RETACRIT) Injectable 29509 Unit(s) IV Push once, 23 @ 07:01, Routine, Stop order after: 1 Doses  epoetin александр-epbx (RETACRIT) Injectable 67351 Unit(s) IV Push once, 23 @ 06:21, Routine, Stop order after: 1 Doses  epoetin александр-epbx (RETACRIT) Injectable 8000 Unit(s) IV Push once, 23 @ 08:09, Routine, Stop order after: 1 Doses  epoetin александр-epbx (RETACRIT) Injectable 8000 Unit(s) IV Push once, 23 @ 07:56, Routine, Stop order after: 1 Doses  epoetin александр-epbx (RETACRIT) Injectable 8000 Unit(s) IV Push once, 23 @ 06:21, Routine, Stop order after: 1 Doses  epoetin александр-epbx (RETACRIT) Injectable 8000 Unit(s) IV Push once, 23 @ 07:08, Routine, Stop order after: 1 Doses  epoetin александр-epbx (RETACRIT) Injectable 8000 Unit(s) IV Push once, 23 @ 05:42, Routine, Stop order after: 1 Doses  sevelamer carbonate 1600 milliGRAM(s) Oral three times a day with meals, 23 @ 15:07, Routine  sevelamer carbonate 800 milliGRAM(s) Oral three times a day with meals, 23 @ 16:11,       Hemodialysis Treatment.:     Schedule: Once, Modality: Hemodialysis, Access: Internal Jugular Central Venous Catheter    Dialyzer: Optiflux [id number], Time: 180 Min    Blood Flow: 400 mL/Min , Dialysate Flow: 500 mL/Min, Dialysate Temp: 35, Tubinmm (Adult)    Target Fluid Removal: 1.5 Liters    Dialysate Electrolytes (mEq/L): Potassium 3, Calcium 2.5, Sodium 135, Bicarbonate 35 (23 @ 07:02) [Completed]    Seen on HD UF goal changed to 0.5L as Pt HR went up to 110 Hr.

## 2023-06-03 NOTE — PROGRESS NOTE ADULT - SUBJECTIVE AND OBJECTIVE BOX
STATUS POST:  Bedside breast debridement, bilateral  POST OPERATIVE DAY #: 4      SUBJECTIVE: Pt seen and examined at bedside this am by surgery team. Lying in bed comfortable. No initial complaints. Continued pressors, midodrine, hydrocortisone. C/o pain during dressing change but tolerated well.     Vital Signs Last 24 Hrs  T(C): 35.7 (03 Jun 2023 09:49), Max: 35.8 (03 Jun 2023 09:00)  T(F): 96.3 (03 Jun 2023 09:49), Max: 96.4 (03 Jun 2023 09:00)  HR: 73 (03 Jun 2023 09:00) (52 - 105)  BP: --  BP(mean): --  RR: 15 (03 Jun 2023 09:00) (7 - 15)  SpO2: 100% (03 Jun 2023 09:00) (96% - 100%)    Parameters below as of 03 Jun 2023 09:00  Patient On (Oxygen Delivery Method): room air        PHYSICAL EXAM:   Gen: Awake, alert, NAD  CV: RRR  Pulm: non labored breathing, no respiratory distress  Breast: s/p b/l breast debridement with minimal necrotic tissue, sloughed off when dakins gauze removed, no active bleeding, tissue moist, dressing changed with dakins WTD kerlix and abd pad   Abd: soft, ND, NTTP, no rebound or guarding     I&O's Detail    02 Jun 2023 07:01  -  03 Jun 2023 07:00  --------------------------------------------------------  IN:    dextrose 10%: 720 mL    IV PiggyBack: 50 mL    Norepinephrine: 403.2 mL  Total IN: 1173.2 mL    OUT:    Rectal Tube (mL): 50 mL  Total OUT: 50 mL    Total NET: 1123.2 mL      03 Jun 2023 07:01  -  03 Jun 2023 10:33  --------------------------------------------------------  IN:    dextrose 10%: 60 mL    Norepinephrine: 26 mL  Total IN: 86 mL    OUT:  Total OUT: 0 mL    Total NET: 86 mL          LABS:                        8.4    12.59 )-----------( 52       ( 02 Jun 2023 05:30 )             27.4     06-02    128<L>  |  91<L>  |  16  ----------------------------<  138<H>  3.6   |  23  |  2.23<H>    Ca    9.4      02 Jun 2023 05:30  Phos  2.7     06-02  Mg     1.7     06-02    TPro  5.0<L>  /  Alb  2.1<L>  /  TBili  0.4  /  DBili  x   /  AST  12  /  ALT  9<L>  /  AlkPhos  167<H>  06-02    LIVER FUNCTIONS - ( 02 Jun 2023 05:30 )  Alb: 2.1 g/dL / Pro: 5.0 g/dL / ALK PHOS: 167 U/L / ALT: 9 U/L / AST: 12 U/L / GGT: x             CAPILLARY BLOOD GLUCOSE      POCT Blood Glucose.: 96 mg/dL (03 Jun 2023 07:09)  POCT Blood Glucose.: 113 mg/dL (03 Jun 2023 00:00)  POCT Blood Glucose.: 129 mg/dL (02 Jun 2023 19:29)  POCT Blood Glucose.: 126 mg/dL (02 Jun 2023 13:02)      RADIOLOGY & ADDITIONAL STUDIES:

## 2023-06-03 NOTE — PROGRESS NOTE ADULT - SUBJECTIVE AND OBJECTIVE BOX
--------------------------------------------------------------------------------  Chief Complaint: ESRD/Ongoing hemodialysis requirement    24 hour events/subjective:    Seen this morning remains stable. On multiple medications to augment BP.     PAST HISTORY  --------------------------------------------------------------------------------  No significant changes to PMH, PSH, FHx, SHx, unless otherwise noted    ALLERGIES & MEDICATIONS  --------------------------------------------------------------------------------  Allergies    sulfa drugs (Angioedema)  Ancef (Rash; Urticaria)  iodine (Hives; Pruritus)  DDAVP (Hypotension)  penicillin (Swelling)    Intolerances      Standing Inpatient Medications  albumin human 25% IVPB 50 milliLiter(s) IV Intermittent every 1 hour  ascorbic acid 500 milliGRAM(s) Oral daily  atorvastatin 40 milliGRAM(s) Oral at bedtime  bisacodyl 5 milliGRAM(s) Oral at bedtime  chlorhexidine 2% Cloths 1 Application(s) Topical <User Schedule>  cinacalcet 120 milliGRAM(s) Oral every 24 hours  Dakins Solution - 1/4 Strength 1 Application(s) Topical every 12 hours  DAPTOmycin IVPB 400 milliGRAM(s) IV Intermittent every 48 hours  dextrose 10% Bolus 250 milliLiter(s) IV Bolus once  dextrose 10%. 1000 milliLiter(s) IV Continuous <Continuous>  dextrose 50% Injectable 12.5 Gram(s) IV Push once  dextrose 50% Injectable 25 Gram(s) IV Push once  dextrose 50% Injectable 25 Gram(s) IV Push once  dextrose Oral Gel 15 Gram(s) Oral once  dextrose Oral Gel 15 Gram(s) Oral once  epoetin александр-epbx (RETACRIT) Injectable 60876 Unit(s) IV Push once  fludroCORTISONE 0.5 milliGRAM(s) Oral every 24 hours  folic acid 1 milliGRAM(s) Oral daily  heparin   Injectable 7500 Unit(s) SubCutaneous every 8 hours  hydrocortisone 100 milliGRAM(s) Oral every 8 hours  midodrine 40 milliGRAM(s) Oral every 8 hours  Nephro-deborah 1 Tablet(s) Oral daily  norepinephrine Infusion 0.05 MICROgram(s)/kG/Min IV Continuous <Continuous>  nystatin Powder 1 Application(s) Topical two times a day  pantoprazole    Tablet 40 milliGRAM(s) Oral every 12 hours  polyethylene glycol 3350 17 Gram(s) Oral daily    PRN Inpatient Medications  acetaminophen     Tablet .. 650 milliGRAM(s) Oral every 6 hours PRN  EPINEPHrine     1 mG/mL Injectable 0.3 milliGRAM(s) IntraMuscular once PRN  HYDROmorphone   Tablet 4 milliGRAM(s) Oral every 3 hours PRN  HYDROmorphone   Tablet 2 milliGRAM(s) Oral every 3 hours PRN  HYDROmorphone  Injectable 0.5 milliGRAM(s) IV Push every 2 hours PRN  sodium chloride 0.9% lock flush 10 milliLiter(s) IV Push every 1 hour PRN      REVIEW OF SYSTEMS  -------------------------------------------------------------------------------  All other systems were reviewed and are negative, except as noted.    VITALS/PHYSICAL EXAM  --------------------------------------------------------------------------------  T(C): 35.7 (23 @ 09:49), Max: 35.8 (23 @ 09:00)  HR: 73 (23 @ 09:00) (52 - 105)  BP: 91/43 on A-line  RR: 15 (23 @ 09:00) (7 - 15)  SpO2: 100% (23 @ 09:00) (96% - 100%)  Wt(kg): --  Drug Dosing Weight  Height (cm): 168 (29 May 2023 12:17)  Weight (kg): 116.5 (31 May 2023 09:47)  BMI (kg/m2): 41.3 (31 May 2023 09:47)  BSA (m2): 2.23 (31 May 2023 09:47)        23 @ 07:01  -  23 @ 07:00  --------------------------------------------------------  IN: 1173.2 mL / OUT: 50 mL / NET: 1123.2 mL    23 @ 07:01  -  23 @ 10:38  --------------------------------------------------------  IN: 86 mL / OUT: 0 mL / NET: 86 mL      PHYSICAL EXAM:  GENERAL: sleeping  CHEST/LUNG: Clear to auscultation bilaterally  HEART: normal S1S2, RRR  ABDOMEN: Soft, Nontender, +BS, No flank tenderness bilateral  EXTREMITIES: + edema (total body)  SKIN: breast calciphylaxis   ACCESS:[facility]       LABS/STUDIES  --------------------------------------------------------------------------------              8.4    12.59 >-----------<  52       [23 05:30]              27.4     128  |  91  |  16  ----------------------------<  138      [23 05:30]  3.6   |  23  |  2.23        Ca     9.4     [23 05:30]      Mg     1.7     [23 05:30]      Phos  2.7     [23:30]    TPro  5.0  /  Alb  2.1  /  TBili  0.4  /  DBili  x   /  AST  12  /  ALT  9   /  AlkPhos  167  [23 05:30]          Iron 24, TIBC 68, %sat 35      [23 @ 16:00]  Ferritin 1938      [23 16:00]  PTH -- (Ca --)      [23 @ 12:00]   799  PTH -- (Ca 8.3)      [23 @ 06:47]   492  PTH -- (Ca 8.5)      [23 05:30]   351  PTH -- (Ca 7.6)      [23 @ 04:35]   532  PTH -- (Ca 8.8)      [23 @ 14:24]   479  Vitamin D (25OH) 38.5      [23 05:30]  TSH 3.240      [23 05:30]    HBsAb Nonreact      [23 16:19]  HBsAg Nonreact      [23 16:19]  HBcAb Nonreact      [23 16:19]  HCV 0.10, Nonreact      [23 16:19]      RADIOLOGY:  --------------------------------------------------------------------------------------    Hemoglobin: 8.4 g/dL (23 @ 05:30)  Phosphorus Level, Serum: 2.7 mg/dL (23 @ 05:30)  Hemoglobin: 7.8 g/dL (23 @ 05:30)  Phosphorus Level, Serum: 2.8 mg/dL (23 @ 05:30)    Albumin, Serum: 2.1 g/dL (23 @ 05:30)  Albumin, Serum: 2.0 g/dL (23 @ 05:30)    T(C): 35.7 (23 @ 09:49), Max: 35.8 (23 @ 09:00)  HR: 73 (23 @ 09:00) (52 - 105)  BP: --  RR: 15 (23 @ 09:00) (7 - 15)  SpO2: 100% (23 @ 09:00) (96% - 100%)  cinacalcet 120 milliGRAM(s) Oral every 24 hours, 23 @ 15:07, Routine  epoetin александр-epbx (RETACRIT) Injectable 8000 Unit(s) IV Push once, 23 @ 07:56, Routine, Stop order after: 1 Doses  epoetin александр-epbx (RETACRIT) Injectable 04796 Unit(s) IV Push once, 23 @ 07:01, Routine, Stop order after: 1 Doses  epoetin александр-epbx (RETACRIT) Injectable 8000 Unit(s) IV Push once, 23 @ 06:21, Routine, Stop order after: 1 Doses  epoetin александр-epbx (RETACRIT) Injectable 81202 Unit(s) IV Push once, 23 @ 06:21, Routine, Stop order after: 1 Doses  epoetin александр-epbx (RETACRIT) Injectable 8000 Unit(s) IV Push once, 23 @ 05:42, Routine, Stop order after: 1 Doses  epoetin александр-epbx (RETACRIT) Injectable 8000 Unit(s) IV Push once, 23 @ 08:09, Routine, Stop order after: 1 Doses  epoetin александр-epbx (RETACRIT) Injectable 8000 Unit(s) IV Push once, 23 @ 07:08, Routine, Stop order after: 1 Doses  sevelamer carbonate 1600 milliGRAM(s) Oral three times a day with meals, 23 @ 15:07, Routine  sevelamer carbonate 800 milliGRAM(s) Oral three times a day with meals, 23 @ 16:11,       Hemodialysis Treatment.:     Schedule: Once, Modality: Hemodialysis, Access: Internal Jugular Central Venous Catheter    Dialyzer: Optiflux [id number], Time: 180 Min    Blood Flow: 400 mL/Min , Dialysate Flow: 500 mL/Min, Dialysate Temp: 35, Tubinmm (Adult)    Target Fluid Removal: 1.5 Liters    Dialysate Electrolytes (mEq/L): Potassium 3, Calcium 2.5, Sodium 135, Bicarbonate 35 (23 @ 07:02) [Active]   --------------------------------------------------------------------------------  Chief Complaint: ESRD/Ongoing hemodialysis requirement    24 hour events/subjective:    Seen this morning remains stable. On multiple medications to augment BP.     PAST HISTORY  --------------------------------------------------------------------------------  No significant changes to PMH, PSH, FHx, SHx, unless otherwise noted    ALLERGIES & MEDICATIONS  --------------------------------------------------------------------------------  Allergies    sulfa drugs (Angioedema)  Ancef (Rash; Urticaria)  iodine (Hives; Pruritus)  DDAVP (Hypotension)  penicillin (Swelling)    Intolerances      Standing Inpatient Medications  albumin human 25% IVPB 50 milliLiter(s) IV Intermittent every 1 hour  ascorbic acid 500 milliGRAM(s) Oral daily  atorvastatin 40 milliGRAM(s) Oral at bedtime  bisacodyl 5 milliGRAM(s) Oral at bedtime  chlorhexidine 2% Cloths 1 Application(s) Topical <User Schedule>  cinacalcet 120 milliGRAM(s) Oral every 24 hours  Dakins Solution - 1/4 Strength 1 Application(s) Topical every 12 hours  DAPTOmycin IVPB 400 milliGRAM(s) IV Intermittent every 48 hours  dextrose 10% Bolus 250 milliLiter(s) IV Bolus once  dextrose 10%. 1000 milliLiter(s) IV Continuous <Continuous>  dextrose 50% Injectable 12.5 Gram(s) IV Push once  dextrose 50% Injectable 25 Gram(s) IV Push once  dextrose 50% Injectable 25 Gram(s) IV Push once  dextrose Oral Gel 15 Gram(s) Oral once  dextrose Oral Gel 15 Gram(s) Oral once  epoetin александр-epbx (RETACRIT) Injectable 84522 Unit(s) IV Push once  fludroCORTISONE 0.5 milliGRAM(s) Oral every 24 hours  folic acid 1 milliGRAM(s) Oral daily  heparin   Injectable 7500 Unit(s) SubCutaneous every 8 hours  hydrocortisone 100 milliGRAM(s) Oral every 8 hours  midodrine 40 milliGRAM(s) Oral every 8 hours  Nephro-deborah 1 Tablet(s) Oral daily  norepinephrine Infusion 0.05 MICROgram(s)/kG/Min IV Continuous <Continuous>  nystatin Powder 1 Application(s) Topical two times a day  pantoprazole    Tablet 40 milliGRAM(s) Oral every 12 hours  polyethylene glycol 3350 17 Gram(s) Oral daily    PRN Inpatient Medications  acetaminophen     Tablet .. 650 milliGRAM(s) Oral every 6 hours PRN  EPINEPHrine     1 mG/mL Injectable 0.3 milliGRAM(s) IntraMuscular once PRN  HYDROmorphone   Tablet 4 milliGRAM(s) Oral every 3 hours PRN  HYDROmorphone   Tablet 2 milliGRAM(s) Oral every 3 hours PRN  HYDROmorphone  Injectable 0.5 milliGRAM(s) IV Push every 2 hours PRN  sodium chloride 0.9% lock flush 10 milliLiter(s) IV Push every 1 hour PRN      REVIEW OF SYSTEMS  -------------------------------------------------------------------------------  All other systems were reviewed and are negative, except as noted.    VITALS/PHYSICAL EXAM  --------------------------------------------------------------------------------  T(C): 35.7 (23 @ 09:49), Max: 35.8 (23 @ 09:00)  HR: 73 (23 @ 09:00) (52 - 105)  BP: 91/43 on A-line  RR: 15 (23 @ 09:00) (7 - 15)  SpO2: 100% (23 @ 09:00) (96% - 100%)  Wt(kg): --  Drug Dosing Weight  Height (cm): 168 (29 May 2023 12:17)  Weight (kg): 116.5 (31 May 2023 09:47)  BMI (kg/m2): 41.3 (31 May 2023 09:47)  BSA (m2): 2.23 (31 May 2023 09:47)        23 @ 07:01  -  23 @ 07:00  --------------------------------------------------------  IN: 1173.2 mL / OUT: 50 mL / NET: 1123.2 mL    23 @ 07:01  -  23 @ 10:38  --------------------------------------------------------  IN: 86 mL / OUT: 0 mL / NET: 86 mL      PHYSICAL EXAM:  GENERAL: NAD, lethargic on RAO2  CHEST/LUNG: Clear to auscultation bilaterally  HEART: normal S1S2, RRR  ABDOMEN: Soft, Nontender, +BS, No flank tenderness bilateral  EXTREMITIES: + edema (total body anasarca)  SKIN: breast calciphylaxis   ACCESS:[facility]       LABS/STUDIES  --------------------------------------------------------------------------------              8.4    12.59 >-----------<  52       [23 05:30]              27.4     128  |  91  |  16  ----------------------------<  138      [23 05:30]  3.6   |  23  |  2.23        Ca     9.4     [23 05:30]      Mg     1.7     [23 05:30]      Phos  2.7     [23 05:30]    TPro  5.0  /  Alb  2.1  /  TBili  0.4  /  DBili  x   /  AST  12  /  ALT  9   /  AlkPhos  167  [23 05:30]          Iron 24, TIBC 68, %sat 35      [23 @ 16:00]  Ferritin 1938      [23 16:00]  PTH -- (Ca --)      [23 @ 12:00]   799  PTH -- (Ca 8.3)      [23 @ 06:47]   492  PTH -- (Ca 8.5)      [23 @ 05:30]   351  PTH -- (Ca 7.6)      [23 @ 04:35]   532  PTH -- (Ca 8.8)      [23 @ 14:24]   479  Vitamin D (25OH) 38.5      [23 05:30]  TSH 3.240      [23 05:30]    HBsAb Nonreact      [23 16:19]  HBsAg Nonreact      [23 16:19]  HBcAb Nonreact      [23 16:19]  HCV 0.10, Nonreact      [23 16:19]      RADIOLOGY:  --------------------------------------------------------------------------------------    Hemoglobin: 8.4 g/dL (23 @ 05:30)  Phosphorus Level, Serum: 2.7 mg/dL (23 @ 05:30)  Hemoglobin: 7.8 g/dL (23 @ 05:30)  Phosphorus Level, Serum: 2.8 mg/dL (23 @ 05:30)    Albumin, Serum: 2.1 g/dL (23 05:30)  Albumin, Serum: 2.0 g/dL (23 @ 05:30)    T(C): 35.7 (23 @ 09:49), Max: 35.8 (23 @ 09:00)  HR: 73 (23 @ 09:00) (52 - 105)  BP: --  RR: 15 (23 @ 09:00) (7 - 15)  SpO2: 100% (23 @ 09:00) (96% - 100%)  cinacalcet 120 milliGRAM(s) Oral every 24 hours, 23 @ 15:07, Routine  epoetin александр-epbx (RETACRIT) Injectable 8000 Unit(s) IV Push once, 23 @ 07:56, Routine, Stop order after: 1 Doses  epoetin александр-epbx (RETACRIT) Injectable 56999 Unit(s) IV Push once, 23 @ 07:01, Routine, Stop order after: 1 Doses  epoetin александр-epbx (RETACRIT) Injectable 8000 Unit(s) IV Push once, 23 @ 06:21, Routine, Stop order after: 1 Doses  epoetin александр-epbx (RETACRIT) Injectable 05338 Unit(s) IV Push once, 23 @ 06:21, Routine, Stop order after: 1 Doses  epoetin александр-epbx (RETACRIT) Injectable 8000 Unit(s) IV Push once, 23 @ 05:42, Routine, Stop order after: 1 Doses  epoetin александр-epbx (RETACRIT) Injectable 8000 Unit(s) IV Push once, 23 @ 08:09, Routine, Stop order after: 1 Doses  epoetin александр-epbx (RETACRIT) Injectable 8000 Unit(s) IV Push once, 23 @ 07:08, Routine, Stop order after: 1 Doses  sevelamer carbonate 1600 milliGRAM(s) Oral three times a day with meals, 23 @ 15:07, Routine  sevelamer carbonate 800 milliGRAM(s) Oral three times a day with meals, 23 @ 16:11,       Hemodialysis Treatment.:     Schedule: Once, Modality: Hemodialysis, Access: Internal Jugular Central Venous Catheter    Dialyzer: Optiflux [id number], Time: 180 Min    Blood Flow: 400 mL/Min , Dialysate Flow: 500 mL/Min, Dialysate Temp: 35, Tubinmm (Adult)    Target Fluid Removal: 1.5 Liters    Dialysate Electrolytes (mEq/L): Potassium 3, Calcium 2.5, Sodium 135, Bicarbonate 35 (23 @ 07:02) [Active]

## 2023-06-03 NOTE — PROGRESS NOTE ADULT - ASSESSMENT
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, Calciphylaxis of bilateral breasts, HTN, HLD presenting to the ER in setting of anemia additionally w/ breast calciphylaxis s/p debridement     Assessment/Plan:   #ESRD on HD  #Calciphylaxis  Usual RX time 3:30 hrs,  next HD 6/3     #Breast Calciphylaxis  discussed w/ outpatient HD unit patient was not currently receiving STS  appreciate surgery consult    please obtain vascular surgery consult for breast calciphylaxis     #Hypotension   back on levophed for pressor support  -c/w levophed and midodrine and fludrocortisone / hydrocortisone   -maintain MAP 70 for adequate renal perfusion      #Hyponatremia  i//so continuous d10 drip   1L fluid restriction  can give hypertonic saaline 30cc/hour x 4 hours       #access   L TDC    #anemia  Hgb not at goal  Epo w/ HD    #renal bone disease   Ca ~9.4  phos 2.8  c/w sensipar and renvela   encourage protein intake   trend phos daily      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, Calciphylaxis of bilateral breasts, HTN, HLD presenting to the ER in setting of anemia additionally w/ breast calciphylaxis s/p debridement     Assessment/Plan:   #ESRD on HD  #Calciphylaxis  Usual RX time 3:30 hrs,  Hd today per schedule  K of 3.6  Renal diet    #Breast Calciphylaxis  discussed w/ outpatient HD unit patient was not currently receiving STS  appreciate surgery consult   Vascular following    #Hypotension   back on levophed for pressor support  -c/w levophed and midodrine and fludrocortisone / hydrocortisone   -maintain MAP 70 for adequate renal perfusion      #Hyponatremia  i//so continuous d10 drip   1L fluid restriction  Repeat BMP today      #access   L TDC    #anemia  Hgb not at goal  Epo w/ HD    #renal bone disease   Ca ~9.4  phos 2.7  c/w sensipar and renvela   encourage protein intake   trend phos daily      [facility]

## 2023-06-03 NOTE — PROGRESS NOTE ADULT - ASSESSMENT
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 General surgery consulted on 5/26 for evaluation of bilateral breast wounds and possible debridement. Pt unable to provide subjective history 2/2 current mentation. Bilateral breast wounds with fibrinous slough, necrotic tissue, and purulent drainage underlying necrotic eschar now s/p debridement on 5/30.     Recommendations:  WOUND CARE: daily dressing changing with Dakins WTD. Dakins kerlix should be damp not soaked;   c/w Abx  Appreciate wound care recs  Rest of care per MICU  Team 5C will continue to follow

## 2023-06-03 NOTE — PROGRESS NOTE ADULT - ASSESSMENT
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 Patient will     Cardio  Patient w persistently low blood pressures, w 80s/40s during RR and inability to accurately measure BP due to diffuse edema during RR  Less concern for shock on differential given no infectious etiology, was requiring low dose levophed intermittently, but patient known to have baseline low blood pressure.  5/28: o/n patient needed levophed, weaned overnight   Plan:  - c/w Midodrine 40mg q8h    -c/w hydrocortisone 100 mg TID for further pressor support for increased levophed weaning  - C/w HD as needed. HD today.  - c/w Florinef .3mg qd.  - R femoral TLC placed, due to access issue in patient.    #Shock, now concern for underlying septic shock 2/2 to poor source control from b/l breast wounds  - Lactate remains negative, uptrending WBC count  - Otherwise, patient on meropenem and vanc dosing by level  - Surgery is consulted, daughter now amenable to surgery.  - s/p debridement  - Patient is now on levophed, wean as tolerated for goal SBP >90, currently not using MAP as perfusion marker     #HfrEf, non-ischemic cardiomyopathy  - history of HfpEF, TTE 5/5/23, normal RV and LV function  - On No HF meds  - C.w statin      Pulmonary  - Patient w no pulmonology history  - good airway control, controlling secretions  - Continue to monitor secretions.    Renal  - ESRD w Dialysis, rapid response during HD session  - C/w HD as tolerated- can uptitrate levophed as needed to allow for HD  - renvela discontinued as pt w normal Phosphorus.    GI  #Stercoral Ulcers, 2 BM nonbloody 5/26 AM.  - Came in w GI bleed deemed to be 2/2 to constipation and stercoral ulcers  - C/w PPI BID  - Abdominal US w doppler, no evidence of portal HTN  - F/u GI recs, as per HCP and pt GOC, no role for egd this admission  - Active T and S, Keep HgB >7  - Caution w NSAIDS, prefer GANN-2 inhibitors  - patient with copious rectal output- place rectal tube and wound care consult    ID  -Patient w F to 101.1 on 5/24/23, and concomitant afib w RVR, resolved after defervescence  - Afebrile currently  - F/u BCx, F/u breast wound culture-> gram negative rods. and gram positive cocci,  ESBL Proteus, VRE E. Faecium  - C/w  Daptomycin 400mg q48h.  -s/p debridement 5/30    Heme  - HgB stable, currently, came in at 6.2  - See above for GI    #Leukocytosis  - Likely 2/2 to breast infection, overall downtrend,  - s/p meropenum  -c/w dapto  - s/p debridement    #Thrombocytopenia   Patient with slow steady decline in platelets, more compatible with sepsis vs HIT  HIT score: intermediate  - Hep 5000U q8.      Endo  5/21 received d10 bolus for glucose 66, on q4 glucose checks, then started d10 gtt at 50 cc/hr  - C/w gtt at 30cc/hr  - FSG q2, if consistently >100.  -Soft and bite sized diet     Prophylaxis:  F: d10 gtt 30cc/hr.  E: Replete cautiously given ESRD  N: Soft and Bite sized (renal diet)  DVT: Heparin 5000 subq q8    Lines: R femoral TLC placed 5/27/23. L femoral a-line placed 5/29/23.  No Wu. 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 Patient will     Cardio  Patient w persistently low blood pressures, w 80s/40s during RR and inability to accurately measure BP due to diffuse edema during RR  Less concern for shock on differential given no infectious etiology, was requiring low dose levophed intermittently, but patient known to have baseline low blood pressure.  5/28: o/n patient needed levophed, weaned overnight   Plan:  - c/w Midodrine 40mg q8h    -c/w hydrocortisone 100 mg TID for further pressor support for increased levophed weaning  - C/w HD as needed. HD today.  - c/w Florinef .3mg qd.  - R femoral TLC placed, due to access issue in patient.    #Shock, now concern for underlying septic shock 2/2 to poor source control from b/l breast wounds  - Lactate remains negative, uptrending WBC count  - Otherwise, patient on meropenem and vanc dosing by level  - Surgery is consulted, daughter now amenable to surgery.  - s/p debridement  - Patient is now on levophed, wean as tolerated for goal SBP >90, currently not using MAP as perfusion marker     #HfrEf, non-ischemic cardiomyopathy  - history of HfpEF, TTE 5/5/23, normal RV and LV function  - On No HF meds  - C.w statin      Pulmonary  - Patient w no pulmonology history  - good airway control, controlling secretions  - Continue to monitor secretions.    Renal  - ESRD w Dialysis, rapid response during HD session  - C/w HD as tolerated- can uptitrate levophed as needed to allow for HD  - renvela discontinued as pt w normal Phosphorus.    GI  #Stercoral Ulcers, 2 BM nonbloody 5/26 AM.  - Came in w GI bleed deemed to be 2/2 to constipation and stercoral ulcers  - C/w PPI BID  - Abdominal US w doppler, no evidence of portal HTN  - F/u GI recs, as per HCP and pt GOC, no role for egd this admission  - Active T and S, Keep HgB >7  - Caution w NSAIDS, prefer GANN-2 inhibitors  - patient with copious rectal output- place rectal tube and wound care consult    ID  -Patient w F to 101.1 on 5/24/23, and concomitant afib w RVR, resolved after defervescence  - Afebrile currently  - F/u BCx, F/u breast wound culture-> gram negative rods. and gram positive cocci,  ESBL Proteus, VRE E. Faecium  - C/w  Daptomycin 400mg q48h.  -s/p debridement 5/30    Heme  - HgB stable, currently, came in at 6.2  - See above for GI    #Leukocytosis  - Likely 2/2 to breast infection, overall downtrend,  - s/p meropenum  -c/w dapto  - s/p debridement    #Thrombocytopenia   Patient with slow steady decline in platelets, more compatible with sepsis vs HIT  HIT score: intermediate  - Hep 5000U q8.      Endo  5/21 received d10 bolus for glucose 66, on q4 glucose checks, then started d10 gtt at 50 cc/hr  - C/w gtt at 30cc/hr  - FSG q6 if consistently >100.  -Soft and bite sized diet     Prophylaxis:  F: d10 gtt 30cc/hr.  E: Replete cautiously given ESRD  N: Soft and Bite sized (renal diet)  DVT: Heparin 5000 subq q8    Lines: R femoral TLC placed 5/27/23. L femoral a-line placed 5/29/23.  No Wu.

## 2023-06-03 NOTE — PROGRESS NOTE ADULT - SUBJECTIVE AND OBJECTIVE BOX
INTERVAL HPI/OVERNIGHT EVENTS:  levo demends are downtrending, off NC. levo .06 at 4AM.    SUBJECTIVE: Patient seen and examined at bedside. Patient was in pain in her breasts from the breast wounds since the dressings were just changed. Otherwise no other complaints. Denies fevers, chills, abdominal pain.     VITAL SIGNS:  ICU Vital Signs Last 24 Hrs  T(C): 36.3 (03 Jun 2023 12:05), Max: 36.3 (03 Jun 2023 12:05)  T(F): 97.3 (03 Jun 2023 12:05), Max: 97.3 (03 Jun 2023 12:05)  HR: 100 (03 Jun 2023 13:00) (52 - 105)  BP: --  BP(mean): --  ABP: 87/39 (03 Jun 2023 13:00) (87/39 - 125/55)  ABP(mean): 54 (03 Jun 2023 13:00) (54 - 85)  RR: 12 (03 Jun 2023 13:00) (7 - 15)  SpO2: 100% (03 Jun 2023 13:00) (100% - 100%)    O2 Parameters below as of 03 Jun 2023 13:00  Patient On (Oxygen Delivery Method): room air              06-02 @ 07:01  -  06-03 @ 07:00  --------------------------------------------------------  IN: 1173.2 mL / OUT: 50 mL / NET: 1123.2 mL    06-03 @ 07:01  -  06-03 @ 13:32  --------------------------------------------------------  IN: 132 mL / OUT: 0 mL / NET: 132 mL      CAPILLARY BLOOD GLUCOSE      POCT Blood Glucose.: 144 mg/dL (03 Jun 2023 11:28)      PHYSICAL EXAM:    General: large body habitus, AAO1. in NAD.  Eyes: PERRL, clear conjunctiva  ENT: Poor dentition.  Respiratory: Decreased BS b/l lung fields; no W/R/R  Cardiac: +S1/S2; irregular rate; no M/R/G; HD catheter over L chest.   Gastrointestinal: soft, protuberant, NT/ND; no rebound or guarding; +BSx4. Large ventral hernia.   Extremities: Diffuse anasarca, worsened in bilateral upper extremities. +3 pitting edema RUE. +2 LUE. +3 b/l pitting edema le.  No blisters on heels or toes.  Skin: Bilateral breast wounds covered in dressing. L breast w eschar, R breast copious granulation tissue. malodorous.  Neurologic: AAOx1-2; does not participate in strength exam today.    MEDICATIONS:  MEDICATIONS  (STANDING):  albumin human 25% IVPB 50 milliLiter(s) IV Intermittent every 1 hour  ascorbic acid 500 milliGRAM(s) Oral daily  atorvastatin 40 milliGRAM(s) Oral at bedtime  bisacodyl 5 milliGRAM(s) Oral at bedtime  chlorhexidine 2% Cloths 1 Application(s) Topical <User Schedule>  cinacalcet 120 milliGRAM(s) Oral every 24 hours  Dakins Solution - 1/4 Strength 1 Application(s) Topical every 12 hours  DAPTOmycin IVPB 400 milliGRAM(s) IV Intermittent every 48 hours  dextrose 10% Bolus 250 milliLiter(s) IV Bolus once  dextrose 10%. 1000 milliLiter(s) (30 mL/Hr) IV Continuous <Continuous>  dextrose 50% Injectable 12.5 Gram(s) IV Push once  dextrose 50% Injectable 25 Gram(s) IV Push once  dextrose 50% Injectable 25 Gram(s) IV Push once  dextrose Oral Gel 15 Gram(s) Oral once  dextrose Oral Gel 15 Gram(s) Oral once  fludroCORTISONE 0.5 milliGRAM(s) Oral every 24 hours  folic acid 1 milliGRAM(s) Oral daily  heparin   Injectable 7500 Unit(s) SubCutaneous every 8 hours  hydrocortisone 100 milliGRAM(s) Oral every 8 hours  midodrine 40 milliGRAM(s) Oral every 8 hours  Nephro-deborah 1 Tablet(s) Oral daily  norepinephrine Infusion 0.05 MICROgram(s)/kG/Min (3.72 mL/Hr) IV Continuous <Continuous>  nystatin Powder 1 Application(s) Topical two times a day  pantoprazole    Tablet 40 milliGRAM(s) Oral every 12 hours  polyethylene glycol 3350 17 Gram(s) Oral daily    MEDICATIONS  (PRN):  acetaminophen     Tablet .. 650 milliGRAM(s) Oral every 6 hours PRN Temp greater or equal to 38C (100.4F), Mild Pain (1 - 3)  EPINEPHrine     1 mG/mL Injectable 0.3 milliGRAM(s) IntraMuscular once PRN anaphylaxis  HYDROmorphone   Tablet 4 milliGRAM(s) Oral every 3 hours PRN Severe Pain (7 - 10)  HYDROmorphone   Tablet 2 milliGRAM(s) Oral every 3 hours PRN Moderate Pain (4 - 6)  HYDROmorphone  Injectable 0.5 milliGRAM(s) IV Push every 2 hours PRN Severe Pain (7 - 10)  sodium chloride 0.9% lock flush 10 milliLiter(s) IV Push every 1 hour PRN Pre/post blood products, medications, blood draw, and to maintain line patency      ALLERGIES:  Allergies    sulfa drugs (Angioedema)  Ancef (Rash; Urticaria)  iodine (Hives; Pruritus)  DDAVP (Hypotension)  penicillin (Swelling)    Intolerances        LABS:                        8.4    12.59 )-----------( 52       ( 02 Jun 2023 05:30 )             27.4     06-02    128<L>  |  91<L>  |  16  ----------------------------<  138<H>  3.6   |  23  |  2.23<H>    Ca    9.4      02 Jun 2023 05:30  Phos  2.7     06-02  Mg     1.7     06-02    TPro  5.0<L>  /  Alb  2.1<L>  /  TBili  0.4  /  DBili  x   /  AST  12  /  ALT  9<L>  /  AlkPhos  167<H>  06-02          RADIOLOGY & ADDITIONAL TESTS: Reviewed.

## 2023-06-04 NOTE — PROGRESS NOTE ADULT - ASSESSMENT
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 Patient will     Cardio  Patient w previously persistently low blood pressures, w 80s/40s during RR and inability to accurately measure BP due to diffuse edema during RR, currently now SBP is >100  Less concern for shock on differential given no infectious etiology, was requiring low dose levophed intermittently, but patient known to have baseline low blood pressure.  Continues to have less levophed requirements  Plan:  - c/w Midodrine 40mg q8h -> q6h  -c/w hydrocortisone 100 mg TID for further pressor support for increased levophed weaning  - C/w HD as needed. HD today.  - c/w Florinef .3mg qd.  - R femoral TLC placed, due to access issue in patient  -Attempted PICC 6/2 to femoral TLC can be potentially removed, procedure aborted as pt was unable to tolerate. Will reattempt 6/5 possibly under sedation     #Shock, now concern for underlying septic shock 2/2 to poor source control from b/l breast wounds  - Lactate remains negative, uptrending WBC count  - Otherwise, patient on meropenem and vanc dosing by level  - Surgery is consulted, daughter now amenable to surgery.  - s/p debridement  - Patient is now on levophed, wean as tolerated for goal SBP >90, currently not using MAP as perfusion marker     #HfrEf, non-ischemic cardiomyopathy  - history of HfpEF, TTE 5/5/23, normal RV and LV function  - On No HF meds  - C.w statin      Pulmonary  - Patient w no pulmonology history  - good airway control, controlling secretions  - Continue to monitor secretions.    Renal  - ESRD w Dialysis, rapid response during HD session  - C/w HD as tolerated- can uptitrate levophed as needed to allow for HD  - renvela discontinued as pt w normal Phosphorus.    GI  #Stercoral Ulcers, 2 BM nonbloody 5/26 AM.  - Came in w GI bleed deemed to be 2/2 to constipation and stercoral ulcers  - C/w PPI BID  - Abdominal US w doppler, no evidence of portal HTN  - F/u GI recs, as per HCP and pt GOC, no role for egd this admission  - Active T and S, Keep HgB >7  - Caution w NSAIDS, prefer GANN-2 inhibitors  - patient with copious rectal output- place rectal tube and wound care consult    ID  -Patient w F to 101.1 on 5/24/23, and concomitant afib w RVR, resolved after defervescence  - Afebrile currently  - F/u BCx, F/u breast wound culture-> gram negative rods. and gram positive cocci,  ESBL Proteus, VRE E. Faecium  -S/p meropenem   - C/w  Daptomycin 400mg q48h until 6/5  -s/p debridement 5/30    Heme  - HgB stable, currently, came in at 6.2  - See above for GI    #Leukocytosis  - Likely 2/2 to breast infection, overall downtrend,  - s/p meropenum  -c/w dapto  - s/p debridement    #Thrombocytopenia   Patient with slow steady decline in platelets, more compatible with sepsis vs HIT  HIT score: intermediate  - Hep 5000U q8.      Endo  5/21 received d10 bolus for glucose 66, on q4 glucose checks, then started d10 gtt at 50 cc/hr  - C/w gtt at 30cc/hr  - FSG q6 if consistently >100.  -Soft and bite sized diet     Prophylaxis:  F: d10 gtt 30cc/hr.  E: Replete cautiously given ESRD  N: Soft and Bite sized (renal diet)  DVT: Heparin 5000 subq q8    Lines: R femoral TLC placed 5/27/23. L femoral a-line placed 5/29/23.  No Wu.

## 2023-06-04 NOTE — PROGRESS NOTE ADULT - SUBJECTIVE AND OBJECTIVE BOX
STATUS POST:       SUBJECTIVE: Patient seen and examined bedside by chief resident. Lying in bed comfortable. No initial complaints. Continued pressors, midodrine, hydrocortisone. C/o pain during dressing change but tolerated well.       DAPTOmycin IVPB 400 milliGRAM(s) IV Intermittent every 48 hours  heparin   Injectable 7500 Unit(s) SubCutaneous every 8 hours  midodrine 40 milliGRAM(s) Oral every 8 hours  norepinephrine Infusion 0.05 MICROgram(s)/kG/Min IV Continuous <Continuous>      Vital Signs Last 24 Hrs  T(C): 35.5 (04 Jun 2023 09:51), Max: 36.3 (03 Jun 2023 12:05)  T(F): 95.9 (04 Jun 2023 09:51), Max: 97.3 (03 Jun 2023 12:05)  HR: 67 (04 Jun 2023 11:00) (60 - 116)  BP: 76/52 (03 Jun 2023 18:00) (76/52 - 76/52)  BP(mean): 60 (03 Jun 2023 18:00) (60 - 60)  RR: 12 (04 Jun 2023 11:00) (8 - 16)  SpO2: 100% (04 Jun 2023 11:00) (100% - 100%)    Parameters below as of 04 Jun 2023 11:00  Patient On (Oxygen Delivery Method): room air      I&O's Detail    03 Jun 2023 07:01  -  04 Jun 2023 07:00  --------------------------------------------------------  IN:    dextrose 10%: 680 mL    Norepinephrine: 192 mL  Total IN: 872 mL    OUT:    Other (mL): 1000 mL    Rectal Tube (mL): 50 mL  Total OUT: 1050 mL    Total NET: -178 mL      04 Jun 2023 07:01  -  04 Jun 2023 11:36  --------------------------------------------------------  IN:    dextrose 10%: 150 mL    Norepinephrine: 55 mL  Total IN: 205 mL    OUT:  Total OUT: 0 mL    Total NET: 205 mL        PHYSICAL EXAM:   Gen: Awake, alert, NAD  CV: RRR  Pulm: non labored breathing, no respiratory distress  Breast: s/p b/l breast debridement with minimal necrotic tissue, sloughed off when dakins gauze removed, no active bleeding, tissue moist, dressing changed with dakins WTD kerlix and abd pad   Abd: soft, ND, NTTP, no rebound or guarding       LABS:                        8.4    10.43 )-----------( 53       ( 04 Jun 2023 05:30 )             27.3     06-04    132<L>  |  94<L>  |  15  ----------------------------<  92  3.6   |  24  |  2.06<H>    Ca    9.9      04 Jun 2023 05:30  Phos  2.8     06-04  Mg     1.7     06-04    TPro  5.0<L>  /  Alb  2.8<L>  /  TBili  0.5  /  DBili  x   /  AST  13  /  ALT  9<L>  /  AlkPhos  135<H>  06-04    PT/INR - ( 03 Jun 2023 17:15 )   PT: 15.1 sec;   INR: 1.27          PTT - ( 03 Jun 2023 17:15 )  PTT:35.7 sec      RADIOLOGY & ADDITIONAL STUDIES:

## 2023-06-04 NOTE — PROGRESS NOTE ADULT - SUBJECTIVE AND OBJECTIVE BOX
INTERVAL HPI/OVERNIGHT EVENTS:      SUBJECTIVE: Patient seen and examined at bedside. Patient was in pain in her breasts from the breast wounds since the dressings were just changed. Otherwise no other complaints. Denies fevers, chills, abdominal pain.     VITAL SIGNS:  ICU Vital Signs Last 24 Hrs  T(C): 36.3 (03 Jun 2023 12:05), Max: 36.3 (03 Jun 2023 12:05)  T(F): 97.3 (03 Jun 2023 12:05), Max: 97.3 (03 Jun 2023 12:05)  HR: 100 (03 Jun 2023 13:00) (52 - 105)  BP: --  BP(mean): --  ABP: 87/39 (03 Jun 2023 13:00) (87/39 - 125/55)  ABP(mean): 54 (03 Jun 2023 13:00) (54 - 85)  RR: 12 (03 Jun 2023 13:00) (7 - 15)  SpO2: 100% (03 Jun 2023 13:00) (100% - 100%)    O2 Parameters below as of 03 Jun 2023 13:00  Patient On (Oxygen Delivery Method): room air              06-02 @ 07:01  -  06-03 @ 07:00  --------------------------------------------------------  IN: 1173.2 mL / OUT: 50 mL / NET: 1123.2 mL    06-03 @ 07:01  -  06-03 @ 13:32  --------------------------------------------------------  IN: 132 mL / OUT: 0 mL / NET: 132 mL      CAPILLARY BLOOD GLUCOSE      POCT Blood Glucose.: 144 mg/dL (03 Jun 2023 11:28)      PHYSICAL EXAM:    General: large body habitus, AAO1. in NAD.  Eyes: PERRL, clear conjunctiva  ENT: Poor dentition.  Respiratory: Decreased BS b/l lung fields; no W/R/R  Cardiac: +S1/S2; irregular rate; no M/R/G; HD catheter over L chest.   Gastrointestinal: soft, protuberant, NT/ND; no rebound or guarding; +BSx4. Large ventral hernia.   Extremities: Diffuse anasarca, worsened in bilateral upper extremities. +3 pitting edema RUE. +2 LUE. +3 b/l pitting edema le.  No blisters on heels or toes.  Skin: Bilateral breast wounds covered in dressing. L breast w eschar, R breast copious granulation tissue. malodorous.  Neurologic: AAOx1-2; does not participate in strength exam today.    MEDICATIONS:  MEDICATIONS  (STANDING):  albumin human 25% IVPB 50 milliLiter(s) IV Intermittent every 1 hour  ascorbic acid 500 milliGRAM(s) Oral daily  atorvastatin 40 milliGRAM(s) Oral at bedtime  bisacodyl 5 milliGRAM(s) Oral at bedtime  chlorhexidine 2% Cloths 1 Application(s) Topical <User Schedule>  cinacalcet 120 milliGRAM(s) Oral every 24 hours  Dakins Solution - 1/4 Strength 1 Application(s) Topical every 12 hours  DAPTOmycin IVPB 400 milliGRAM(s) IV Intermittent every 48 hours  dextrose 10% Bolus 250 milliLiter(s) IV Bolus once  dextrose 10%. 1000 milliLiter(s) (30 mL/Hr) IV Continuous <Continuous>  dextrose 50% Injectable 12.5 Gram(s) IV Push once  dextrose 50% Injectable 25 Gram(s) IV Push once  dextrose 50% Injectable 25 Gram(s) IV Push once  dextrose Oral Gel 15 Gram(s) Oral once  dextrose Oral Gel 15 Gram(s) Oral once  fludroCORTISONE 0.5 milliGRAM(s) Oral every 24 hours  folic acid 1 milliGRAM(s) Oral daily  heparin   Injectable 7500 Unit(s) SubCutaneous every 8 hours  hydrocortisone 100 milliGRAM(s) Oral every 8 hours  midodrine 40 milliGRAM(s) Oral every 8 hours  Nephro-deborah 1 Tablet(s) Oral daily  norepinephrine Infusion 0.05 MICROgram(s)/kG/Min (3.72 mL/Hr) IV Continuous <Continuous>  nystatin Powder 1 Application(s) Topical two times a day  pantoprazole    Tablet 40 milliGRAM(s) Oral every 12 hours  polyethylene glycol 3350 17 Gram(s) Oral daily    MEDICATIONS  (PRN):  acetaminophen     Tablet .. 650 milliGRAM(s) Oral every 6 hours PRN Temp greater or equal to 38C (100.4F), Mild Pain (1 - 3)  EPINEPHrine     1 mG/mL Injectable 0.3 milliGRAM(s) IntraMuscular once PRN anaphylaxis  HYDROmorphone   Tablet 4 milliGRAM(s) Oral every 3 hours PRN Severe Pain (7 - 10)  HYDROmorphone   Tablet 2 milliGRAM(s) Oral every 3 hours PRN Moderate Pain (4 - 6)  HYDROmorphone  Injectable 0.5 milliGRAM(s) IV Push every 2 hours PRN Severe Pain (7 - 10)  sodium chloride 0.9% lock flush 10 milliLiter(s) IV Push every 1 hour PRN Pre/post blood products, medications, blood draw, and to maintain line patency      ALLERGIES:  Allergies    sulfa drugs (Angioedema)  Ancef (Rash; Urticaria)  iodine (Hives; Pruritus)  DDAVP (Hypotension)  penicillin (Swelling)    Intolerances        LABS:                        8.4    12.59 )-----------( 52       ( 02 Jun 2023 05:30 )             27.4     06-02    128<L>  |  91<L>  |  16  ----------------------------<  138<H>  3.6   |  23  |  2.23<H>    Ca    9.4      02 Jun 2023 05:30  Phos  2.7     06-02  Mg     1.7     06-02    TPro  5.0<L>  /  Alb  2.1<L>  /  TBili  0.4  /  DBili  x   /  AST  12  /  ALT  9<L>  /  AlkPhos  167<H>  06-02          RADIOLOGY & ADDITIONAL TESTS: Reviewed.   INTERVAL HPI/OVERNIGHT EVENTS: NAEO    SUBJECTIVE: Patient seen and examined at bedside. Wound dressing changes by surgery this AM, followed by severe pain which was amenable to dilaudid. Reports ongoing toe pain, however less dusky and cold on exam today. ROS otherwise negative     VITAL SIGNS:  ICU Vital Signs Last 24 Hrs  T(C): 36.3 (03 Jun 2023 12:05), Max: 36.3 (03 Jun 2023 12:05)  T(F): 97.3 (03 Jun 2023 12:05), Max: 97.3 (03 Jun 2023 12:05)  HR: 100 (03 Jun 2023 13:00) (52 - 105)  BP: --  BP(mean): --  ABP: 87/39 (03 Jun 2023 13:00) (87/39 - 125/55)  ABP(mean): 54 (03 Jun 2023 13:00) (54 - 85)  RR: 12 (03 Jun 2023 13:00) (7 - 15)  SpO2: 100% (03 Jun 2023 13:00) (100% - 100%)    O2 Parameters below as of 03 Jun 2023 13:00  Patient On (Oxygen Delivery Method): room air              06-02 @ 07:01  -  06-03 @ 07:00  --------------------------------------------------------  IN: 1173.2 mL / OUT: 50 mL / NET: 1123.2 mL    06-03 @ 07:01  -  06-03 @ 13:32  --------------------------------------------------------  IN: 132 mL / OUT: 0 mL / NET: 132 mL      CAPILLARY BLOOD GLUCOSE      POCT Blood Glucose.: 144 mg/dL (03 Jun 2023 11:28)      PHYSICAL EXAM:    General: large body habitus, AAO1. in NAD.  Eyes: PERRL, clear conjunctiva  ENT: Poor dentition.  Respiratory: Decreased BS b/l lung fields; no W/R/R  Cardiac: +S1/S2; irregular rate; no M/R/G; HD catheter over L chest.   Gastrointestinal: soft, protuberant, NT/ND; no rebound or guarding; +BSx4. Large ventral hernia.   Extremities: Diffuse anasarca, worsened in bilateral upper extremities. +3 pitting edema RUE. +2 LUE. +3 b/l pitting edema le.  No blisters on heels or toes.  Skin: Bilateral breast wounds covered in dressing. L breast w eschar, R breast copious granulation tissue. malodorous.  Neurologic: AAOx1-2; does not participate in strength exam today.    MEDICATIONS:  MEDICATIONS  (STANDING):  albumin human 25% IVPB 50 milliLiter(s) IV Intermittent every 1 hour  ascorbic acid 500 milliGRAM(s) Oral daily  atorvastatin 40 milliGRAM(s) Oral at bedtime  bisacodyl 5 milliGRAM(s) Oral at bedtime  chlorhexidine 2% Cloths 1 Application(s) Topical <User Schedule>  cinacalcet 120 milliGRAM(s) Oral every 24 hours  Dakins Solution - 1/4 Strength 1 Application(s) Topical every 12 hours  DAPTOmycin IVPB 400 milliGRAM(s) IV Intermittent every 48 hours  dextrose 10% Bolus 250 milliLiter(s) IV Bolus once  dextrose 10%. 1000 milliLiter(s) (30 mL/Hr) IV Continuous <Continuous>  dextrose 50% Injectable 12.5 Gram(s) IV Push once  dextrose 50% Injectable 25 Gram(s) IV Push once  dextrose 50% Injectable 25 Gram(s) IV Push once  dextrose Oral Gel 15 Gram(s) Oral once  dextrose Oral Gel 15 Gram(s) Oral once  fludroCORTISONE 0.5 milliGRAM(s) Oral every 24 hours  folic acid 1 milliGRAM(s) Oral daily  heparin   Injectable 7500 Unit(s) SubCutaneous every 8 hours  hydrocortisone 100 milliGRAM(s) Oral every 8 hours  midodrine 40 milliGRAM(s) Oral every 8 hours  Nephro-deborah 1 Tablet(s) Oral daily  norepinephrine Infusion 0.05 MICROgram(s)/kG/Min (3.72 mL/Hr) IV Continuous <Continuous>  nystatin Powder 1 Application(s) Topical two times a day  pantoprazole    Tablet 40 milliGRAM(s) Oral every 12 hours  polyethylene glycol 3350 17 Gram(s) Oral daily    MEDICATIONS  (PRN):  acetaminophen     Tablet .. 650 milliGRAM(s) Oral every 6 hours PRN Temp greater or equal to 38C (100.4F), Mild Pain (1 - 3)  EPINEPHrine     1 mG/mL Injectable 0.3 milliGRAM(s) IntraMuscular once PRN anaphylaxis  HYDROmorphone   Tablet 4 milliGRAM(s) Oral every 3 hours PRN Severe Pain (7 - 10)  HYDROmorphone   Tablet 2 milliGRAM(s) Oral every 3 hours PRN Moderate Pain (4 - 6)  HYDROmorphone  Injectable 0.5 milliGRAM(s) IV Push every 2 hours PRN Severe Pain (7 - 10)  sodium chloride 0.9% lock flush 10 milliLiter(s) IV Push every 1 hour PRN Pre/post blood products, medications, blood draw, and to maintain line patency      ALLERGIES:  Allergies    sulfa drugs (Angioedema)  Ancef (Rash; Urticaria)  iodine (Hives; Pruritus)  DDAVP (Hypotension)  penicillin (Swelling)    Intolerances        LABS:                        8.4    12.59 )-----------( 52       ( 02 Jun 2023 05:30 )             27.4     06-02    128<L>  |  91<L>  |  16  ----------------------------<  138<H>  3.6   |  23  |  2.23<H>    Ca    9.4      02 Jun 2023 05:30  Phos  2.7     06-02  Mg     1.7     06-02    TPro  5.0<L>  /  Alb  2.1<L>  /  TBili  0.4  /  DBili  x   /  AST  12  /  ALT  9<L>  /  AlkPhos  167<H>  06-02          RADIOLOGY & ADDITIONAL TESTS: Reviewed.

## 2023-06-05 NOTE — CONSULT NOTE ADULT - CONSULT REQUESTED DATE/TIME
05-Jun-2023 15:25
19-May-2023 15:07
26-May-2023 20:20
19-May-2023 13:15
19-May-2023 16:07
22-May-2023 14:05

## 2023-06-05 NOTE — ADVANCED PRACTICE NURSE CONSULT - REASON FOR CONSULT
WOC nurse follow up to re-assess sacral/bilateral buttocks fungal rash and denuded skin.  WOC nurse follow up to re-assess bilateral buttocks fungal rash and denuded skin.

## 2023-06-05 NOTE — CONSULT NOTE ADULT - ASSESSMENT
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 Pt unable to provide subjective history 2/2 current mentation. Bilateral breast wounds (calciphylaxis) with fibrinous slough, necrotic tissue, and purulent drainage underlying necrotic eschar now s/p debridement on 5/30. Patient w previously persistently low blood pressures requiring low dose levophed intermittently, but patient known to have baseline low blood pressure. IR consulted for PICC line for access, pt unable to tolerate without sedation. IR consulted for tunneled CVC. Case reviewed with Dr. Shetty, plan for procedure once pt NPO x8 hours.     Recommendations: Maintain NPO x 8 hours prior to procedure.     Communicated with: Dr. Toledo primary team

## 2023-06-05 NOTE — ADVANCED PRACTICE NURSE CONSULT - RECOMMEDATIONS
Sacral and bilateral buttocks fungal rash and denuded skin- continue current orders to apply Nystatin powder twice daily, alternating with [employer] antifungal cream twice daily.   WOCN discussed assessment and recommendations with Gisselle MCKEON.  Bilateral buttocks fungal rash and denuded skin - continue current orders to apply Nystatin powder twice daily, alternating with [employer] antifungal cream twice daily.   WOCN discussed assessment and recommendations with Gisselle MCKEON.

## 2023-06-05 NOTE — PROGRESS NOTE ADULT - SUBJECTIVE AND OBJECTIVE BOX
STATUS POST: Bedside breast debridement, bilateral  POST PROCEDURE DAY: 5    SUBJECTIVE: Pt seen and examined at bedside this am by surgery team. Patient is lying in bed, complaining of persistent right foot pain. Decreased pressor and midodrine requirement. Dressing change done at bedside, patient complained of mild discomfort but tolerated it well. Primary complaint was foot pain.     MEDICATIONS  (STANDING):  ascorbic acid 500 milliGRAM(s) Oral daily  atorvastatin 40 milliGRAM(s) Oral at bedtime  bisacodyl 5 milliGRAM(s) Oral at bedtime  chlorhexidine 2% Cloths 1 Application(s) Topical <User Schedule>  cinacalcet 120 milliGRAM(s) Oral every 24 hours  Dakins Solution - 1/4 Strength 1 Application(s) Topical every 12 hours  dextrose 10% Bolus 250 milliLiter(s) IV Bolus once  dextrose 10%. 1000 milliLiter(s) (30 mL/Hr) IV Continuous <Continuous>  dextrose 50% Injectable 12.5 Gram(s) IV Push once  dextrose 50% Injectable 25 Gram(s) IV Push once  dextrose 50% Injectable 25 Gram(s) IV Push once  dextrose Oral Gel 15 Gram(s) Oral once  dextrose Oral Gel 15 Gram(s) Oral once  fludroCORTISONE 0.5 milliGRAM(s) Oral every 24 hours  folic acid 1 milliGRAM(s) Oral daily  heparin   Injectable 7500 Unit(s) SubCutaneous every 8 hours  hydrocortisone 100 milliGRAM(s) Oral every 8 hours  midodrine 30 milliGRAM(s) Oral every 8 hours  Nephro-deborah 1 Tablet(s) Oral daily  norepinephrine Infusion 0.05 MICROgram(s)/kG/Min (3.72 mL/Hr) IV Continuous <Continuous>  nystatin Powder 1 Application(s) Topical two times a day  pantoprazole    Tablet 40 milliGRAM(s) Oral every 12 hours  polyethylene glycol 3350 17 Gram(s) Oral daily    MEDICATIONS  (PRN):  acetaminophen     Tablet .. 650 milliGRAM(s) Oral every 6 hours PRN Temp greater or equal to 38C (100.4F), Mild Pain (1 - 3)  HYDROmorphone   Tablet 4 milliGRAM(s) Oral every 3 hours PRN Severe Pain (7 - 10)  HYDROmorphone   Tablet 2 milliGRAM(s) Oral every 3 hours PRN Moderate Pain (4 - 6)  HYDROmorphone  Injectable 0.5 milliGRAM(s) IV Push every 2 hours PRN Severe Pain (7 - 10)  sodium chloride 0.9% lock flush 10 milliLiter(s) IV Push every 1 hour PRN Pre/post blood products, medications, blood draw, and to maintain line patency      Vital Signs Last 24 Hrs  T(C): 35.2 (05 Jun 2023 06:03), Max: 35.5 (04 Jun 2023 09:51)  T(F): 95.4 (05 Jun 2023 06:03), Max: 95.9 (04 Jun 2023 09:51)  HR: 104 (05 Jun 2023 06:00) (57 - 116)  BP: --  BP(mean): --  RR: 13 (05 Jun 2023 06:00) (8 - 18)  SpO2: 100% (05 Jun 2023 06:00) (100% - 100%)    Parameters below as of 05 Jun 2023 07:00  Patient On (Oxygen Delivery Method): room air      Physical Exam  Constitutional: awake and alert  Pulm: Nonlabored breathing, no respiratory distress  CV: Regular rate and rhythm, normal sinus rhythm  Breast: b/l debridement sites with minimal necrotic tissue, no active bleeding when dressing removed. Tissue appears pink and moist with some fibrinous tissue, Dressing changed with dakins WTD kerlix/abd pad.   Abd:  soft, nontender, nondistended. No rebound, no guarding.   Extremities: edema b/l lower and upper extremities    I&O's Detail    03 Jun 2023 07:01  -  04 Jun 2023 07:00  --------------------------------------------------------  IN:    dextrose 10%: 680 mL    Norepinephrine: 192 mL  Total IN: 872 mL    OUT:    Other (mL): 1000 mL    Rectal Tube (mL): 50 mL  Total OUT: 1050 mL    Total NET: -178 mL      04 Jun 2023 07:01  -  05 Jun 2023 06:51  --------------------------------------------------------  IN:    dextrose 10%: 510 mL    IV PiggyBack: 50 mL    Norepinephrine: 149.8 mL  Total IN: 709.8 mL    OUT:  Total OUT: 0 mL    Total NET: 709.8 mL        LABS:                        8.5    12.34 )-----------( 51       ( 05 Jun 2023 04:13 )             28.7     06-05    128<L>  |  91<L>  |  17  ----------------------------<  81  3.9   |  24  |  2.28<H>    Ca    9.7      05 Jun 2023 04:13  Phos  3.2     06-05  Mg     1.8     06-05    TPro  5.0<L>  /  Alb  2.8<L>  /  TBili  0.5  /  DBili  x   /  AST  13  /  ALT  9<L>  /  AlkPhos  135<H>  06-04    PT/INR - ( 03 Jun 2023 17:15 )   PT: 15.1 sec;   INR: 1.27          PTT - ( 03 Jun 2023 17:15 )  PTT:35.7 sec

## 2023-06-05 NOTE — ADVANCED PRACTICE NURSE CONSULT - ASSESSMENT
Sacral and bilateral fungal rash improving. Rectal tube remains in place. Pillows being used to turn and reposition the patient and offload heels.  Bilateral fungal rash improving. Rectal tube remains in place. Pillows being used to turn and reposition the patient and offload heels.

## 2023-06-05 NOTE — PROGRESS NOTE ADULT - ASSESSMENT
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 Patient will     Cardio  Patient w previously persistently low blood pressures, w 80s/40s during RR and inability to accurately measure BP due to diffuse edema during RR, currently now SBP is >100  Less concern for shock on differential given no infectious etiology, was requiring low dose levophed intermittently, but patient known to have baseline low blood pressure.  Continues to have less levophed requirements  Plan:  - c/w Midodrine 40mg q6h  -c/w hydrocortisone 100 mg TID for further pressor support for increased levophed weaning  - C/w HD as needed. HD today.  - c/w Florinef .3mg qd.  - R femoral TLC placed, due to access issue in patient  -Attempted PICC 6/2 so femoral TLC can be potentially removed, procedure aborted as pt was unable to tolerate. Will reattempt 6/5 possibly under sedation     #Shock, now concern for underlying septic shock 2/2 to poor source control from b/l breast wounds  - Lactate remains negative, uptrending WBC count  - Otherwise, patient on meropenem and vanc dosing by level  - Surgery is consulted, daughter now amenable to surgery.  - s/p debridement  - Patient is now on levophed, wean as tolerated for goal SBP >90, currently not using MAP as perfusion marker     #HfrEf, non-ischemic cardiomyopathy  - history of HfpEF, TTE 5/5/23, normal RV and LV function  - On No HF meds  - C.w statin      Pulmonary  - Patient w no pulmonology history  - good airway control, controlling secretions  - Continue to monitor secretions.    Renal  - ESRD w Dialysis, rapid response during HD session  - C/w HD as tolerated- can uptitrate levophed as needed to allow for HD  - renvela discontinued as pt w normal Phosphorus.    #Hyponatremia   2/2 D10 gtt for hypoglycemia   -Discontinue D10 gtt  -Starting D5NS maintenance fluids     GI  #Stercoral Ulcers, 2 BM nonbloody 5/26 AM.  - Came in w GI bleed deemed to be 2/2 to constipation and stercoral ulcers  - C/w PPI BID  - Abdominal US w doppler, no evidence of portal HTN  - F/u GI recs, as per HCP and pt GOC, no role for egd this admission  - Active T and S, Keep HgB >7  - Caution w NSAIDS, prefer GANN-2 inhibitors    ID  -Patient w F to 101.1 on 5/24/23, and concomitant afib w RVR, resolved after defervescence  - Afebrile currently  - F/u BCx, F/u breast wound culture-> gram negative rods. and gram positive cocci,  ESBL Proteus, VRE E. Faecium  -S/p meropenem   -S/p daptomycin/meropenem antibiotic regimen   -s/p debridement 5/30    Heme  - HgB stable, currently, came in at 6.2  - See above for GI    #Leukocytosis  - Likely 2/2 to breast infection, overall downtrend  - s/p meropenum, meropenem   - s/p debridement    #Thrombocytopenia   Patient with slow steady decline in platelets, more compatible with sepsis vs HIT  HIT score: intermediate  - Hep 5000U q8.      Endo  5/21 received d10 bolus for glucose 66, on q4 glucose checks, then started d10 gtt at 50 cc/hr  - C/w gtt at 30cc/hr  - FSG q6 if consistently >100.  -Soft and bite sized diet     Prophylaxis:  F: D10NS  E: Replete cautiously given ESRD  N: Soft and Bite sized (renal diet)  DVT: Heparin 5000 subq q8    Lines: R femoral TLC placed 5/27/23. L femoral a-line placed 5/29/23.  No Wu.

## 2023-06-05 NOTE — PROGRESS NOTE ADULT - SUBJECTIVE AND OBJECTIVE BOX
INTERVAL HPI/OVERNIGHT EVENTS: NAEO    SUBJECTIVE: Patient seen and examined at bedside.     VITAL SIGNS:  ICU Vital Signs Last 24 Hrs  T(C): 36.3 (03 Jun 2023 12:05), Max: 36.3 (03 Jun 2023 12:05)  T(F): 97.3 (03 Jun 2023 12:05), Max: 97.3 (03 Jun 2023 12:05)  HR: 100 (03 Jun 2023 13:00) (52 - 105)  BP: --  BP(mean): --  ABP: 87/39 (03 Jun 2023 13:00) (87/39 - 125/55)  ABP(mean): 54 (03 Jun 2023 13:00) (54 - 85)  RR: 12 (03 Jun 2023 13:00) (7 - 15)  SpO2: 100% (03 Jun 2023 13:00) (100% - 100%)    O2 Parameters below as of 03 Jun 2023 13:00  Patient On (Oxygen Delivery Method): room air              06-02 @ 07:01 - 06-03 @ 07:00  --------------------------------------------------------  IN: 1173.2 mL / OUT: 50 mL / NET: 1123.2 mL    06-03 @ 07:01  -  06-03 @ 13:32  --------------------------------------------------------  IN: 132 mL / OUT: 0 mL / NET: 132 mL      CAPILLARY BLOOD GLUCOSE      POCT Blood Glucose.: 144 mg/dL (03 Jun 2023 11:28)      PHYSICAL EXAM:    General: large body habitus, AAO1. in NAD.  Eyes: PERRL, clear conjunctiva  ENT: Poor dentition.  Respiratory: Decreased BS b/l lung fields; no W/R/R  Cardiac: +S1/S2; irregular rate; no M/R/G; HD catheter over L chest.   Gastrointestinal: soft, protuberant, NT/ND; no rebound or guarding; +BSx4. Large ventral hernia.   Extremities: Diffuse anasarca, worsened in bilateral upper extremities. +3 pitting edema RUE. +2 LUE. +3 b/l pitting edema le.  No blisters on heels or toes.  Skin: Bilateral breast wounds covered in dressing. L breast w eschar, R breast copious granulation tissue. malodorous.  Neurologic: AAOx1-2; does not participate in strength exam today.    MEDICATIONS:  MEDICATIONS  (STANDING):  albumin human 25% IVPB 50 milliLiter(s) IV Intermittent every 1 hour  ascorbic acid 500 milliGRAM(s) Oral daily  atorvastatin 40 milliGRAM(s) Oral at bedtime  bisacodyl 5 milliGRAM(s) Oral at bedtime  chlorhexidine 2% Cloths 1 Application(s) Topical <User Schedule>  cinacalcet 120 milliGRAM(s) Oral every 24 hours  Dakins Solution - 1/4 Strength 1 Application(s) Topical every 12 hours  DAPTOmycin IVPB 400 milliGRAM(s) IV Intermittent every 48 hours  dextrose 10% Bolus 250 milliLiter(s) IV Bolus once  dextrose 10%. 1000 milliLiter(s) (30 mL/Hr) IV Continuous <Continuous>  dextrose 50% Injectable 12.5 Gram(s) IV Push once  dextrose 50% Injectable 25 Gram(s) IV Push once  dextrose 50% Injectable 25 Gram(s) IV Push once  dextrose Oral Gel 15 Gram(s) Oral once  dextrose Oral Gel 15 Gram(s) Oral once  fludroCORTISONE 0.5 milliGRAM(s) Oral every 24 hours  folic acid 1 milliGRAM(s) Oral daily  heparin   Injectable 7500 Unit(s) SubCutaneous every 8 hours  hydrocortisone 100 milliGRAM(s) Oral every 8 hours  midodrine 40 milliGRAM(s) Oral every 8 hours  Nephro-deborah 1 Tablet(s) Oral daily  norepinephrine Infusion 0.05 MICROgram(s)/kG/Min (3.72 mL/Hr) IV Continuous <Continuous>  nystatin Powder 1 Application(s) Topical two times a day  pantoprazole    Tablet 40 milliGRAM(s) Oral every 12 hours  polyethylene glycol 3350 17 Gram(s) Oral daily    MEDICATIONS  (PRN):  acetaminophen     Tablet .. 650 milliGRAM(s) Oral every 6 hours PRN Temp greater or equal to 38C (100.4F), Mild Pain (1 - 3)  EPINEPHrine     1 mG/mL Injectable 0.3 milliGRAM(s) IntraMuscular once PRN anaphylaxis  HYDROmorphone   Tablet 4 milliGRAM(s) Oral every 3 hours PRN Severe Pain (7 - 10)  HYDROmorphone   Tablet 2 milliGRAM(s) Oral every 3 hours PRN Moderate Pain (4 - 6)  HYDROmorphone  Injectable 0.5 milliGRAM(s) IV Push every 2 hours PRN Severe Pain (7 - 10)  sodium chloride 0.9% lock flush 10 milliLiter(s) IV Push every 1 hour PRN Pre/post blood products, medications, blood draw, and to maintain line patency      ALLERGIES:  Allergies    sulfa drugs (Angioedema)  Ancef (Rash; Urticaria)  iodine (Hives; Pruritus)  DDAVP (Hypotension)  penicillin (Swelling)    Intolerances        LABS:                        8.4    12.59 )-----------( 52       ( 02 Jun 2023 05:30 )             27.4     06-02    128<L>  |  91<L>  |  16  ----------------------------<  138<H>  3.6   |  23  |  2.23<H>    Ca    9.4      02 Jun 2023 05:30  Phos  2.7     06-02  Mg     1.7     06-02    TPro  5.0<L>  /  Alb  2.1<L>  /  TBili  0.4  /  DBili  x   /  AST  12  /  ALT  9<L>  /  AlkPhos  167<H>  06-02          RADIOLOGY & ADDITIONAL TESTS: Reviewed.   INTERVAL HPI/OVERNIGHT EVENTS: NAEO    SUBJECTIVE: Patient seen and examined at bedside. Currently in pain, reporting full body pain, mostly localized to breasts and feet.     VITAL SIGNS:  ICU Vital Signs Last 24 Hrs  T(C): 36.3 (03 Jun 2023 12:05), Max: 36.3 (03 Jun 2023 12:05)  T(F): 97.3 (03 Jun 2023 12:05), Max: 97.3 (03 Jun 2023 12:05)  HR: 100 (03 Jun 2023 13:00) (52 - 105)  BP: --  BP(mean): --  ABP: 87/39 (03 Jun 2023 13:00) (87/39 - 125/55)  ABP(mean): 54 (03 Jun 2023 13:00) (54 - 85)  RR: 12 (03 Jun 2023 13:00) (7 - 15)  SpO2: 100% (03 Jun 2023 13:00) (100% - 100%)    O2 Parameters below as of 03 Jun 2023 13:00  Patient On (Oxygen Delivery Method): room air              06-02 @ 07:01  -  06-03 @ 07:00  --------------------------------------------------------  IN: 1173.2 mL / OUT: 50 mL / NET: 1123.2 mL    06-03 @ 07:01  -  06-03 @ 13:32  --------------------------------------------------------  IN: 132 mL / OUT: 0 mL / NET: 132 mL      CAPILLARY BLOOD GLUCOSE      POCT Blood Glucose.: 144 mg/dL (03 Jun 2023 11:28)      PHYSICAL EXAM:    General: large body habitus, AAO1-2. in NAD.  Eyes: PERRL, clear conjunctiva  ENT: Poor dentition.  Respiratory: Decreased BS b/l lung fields; no W/R/R  Cardiac: +S1/S2; irregular rate; no M/R/G; HD catheter over L chest.   Gastrointestinal: soft, protuberant, NT/ND; no rebound or guarding; +BSx4. Large ventral hernia.   Extremities: Diffuse anasarca, worsened in bilateral upper extremities. +3 pitting edema RUE. +2 LUE. +3 b/l pitting edema le.  No blisters on heels or toes.  Skin: Bilateral breast wounds covered in dressing. L breast w eschar, R breast granulation tissue. malodorous.  Neurologic: AAOx1-2; does not participate in strength exam     MEDICATIONS:  MEDICATIONS  (STANDING):  albumin human 25% IVPB 50 milliLiter(s) IV Intermittent every 1 hour  ascorbic acid 500 milliGRAM(s) Oral daily  atorvastatin 40 milliGRAM(s) Oral at bedtime  bisacodyl 5 milliGRAM(s) Oral at bedtime  chlorhexidine 2% Cloths 1 Application(s) Topical <User Schedule>  cinacalcet 120 milliGRAM(s) Oral every 24 hours  Dakins Solution - 1/4 Strength 1 Application(s) Topical every 12 hours  DAPTOmycin IVPB 400 milliGRAM(s) IV Intermittent every 48 hours  dextrose 10% Bolus 250 milliLiter(s) IV Bolus once  dextrose 10%. 1000 milliLiter(s) (30 mL/Hr) IV Continuous <Continuous>  dextrose 50% Injectable 12.5 Gram(s) IV Push once  dextrose 50% Injectable 25 Gram(s) IV Push once  dextrose 50% Injectable 25 Gram(s) IV Push once  dextrose Oral Gel 15 Gram(s) Oral once  dextrose Oral Gel 15 Gram(s) Oral once  fludroCORTISONE 0.5 milliGRAM(s) Oral every 24 hours  folic acid 1 milliGRAM(s) Oral daily  heparin   Injectable 7500 Unit(s) SubCutaneous every 8 hours  hydrocortisone 100 milliGRAM(s) Oral every 8 hours  midodrine 40 milliGRAM(s) Oral every 8 hours  Nephro-deborah 1 Tablet(s) Oral daily  norepinephrine Infusion 0.05 MICROgram(s)/kG/Min (3.72 mL/Hr) IV Continuous <Continuous>  nystatin Powder 1 Application(s) Topical two times a day  pantoprazole    Tablet 40 milliGRAM(s) Oral every 12 hours  polyethylene glycol 3350 17 Gram(s) Oral daily    MEDICATIONS  (PRN):  acetaminophen     Tablet .. 650 milliGRAM(s) Oral every 6 hours PRN Temp greater or equal to 38C (100.4F), Mild Pain (1 - 3)  EPINEPHrine     1 mG/mL Injectable 0.3 milliGRAM(s) IntraMuscular once PRN anaphylaxis  HYDROmorphone   Tablet 4 milliGRAM(s) Oral every 3 hours PRN Severe Pain (7 - 10)  HYDROmorphone   Tablet 2 milliGRAM(s) Oral every 3 hours PRN Moderate Pain (4 - 6)  HYDROmorphone  Injectable 0.5 milliGRAM(s) IV Push every 2 hours PRN Severe Pain (7 - 10)  sodium chloride 0.9% lock flush 10 milliLiter(s) IV Push every 1 hour PRN Pre/post blood products, medications, blood draw, and to maintain line patency      ALLERGIES:  Allergies    sulfa drugs (Angioedema)  Ancef (Rash; Urticaria)  iodine (Hives; Pruritus)  DDAVP (Hypotension)  penicillin (Swelling)    Intolerances        LABS:                        8.4    12.59 )-----------( 52       ( 02 Jun 2023 05:30 )             27.4     06-02    128<L>  |  91<L>  |  16  ----------------------------<  138<H>  3.6   |  23  |  2.23<H>    Ca    9.4      02 Jun 2023 05:30  Phos  2.7     06-02  Mg     1.7     06-02    TPro  5.0<L>  /  Alb  2.1<L>  /  TBili  0.4  /  DBili  x   /  AST  12  /  ALT  9<L>  /  AlkPhos  167<H>  06-02          RADIOLOGY & ADDITIONAL TESTS: Reviewed.

## 2023-06-05 NOTE — CONSULT NOTE ADULT - SUBJECTIVE AND OBJECTIVE BOX
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 Pt unable to provide subjective history 2/2 current mentation. Bilateral breast wounds (calciphylaxis) with fibrinous slough, necrotic tissue, and purulent drainage underlying necrotic eschar now s/p debridement on 5/30. Patient w previously persistently low blood pressures requiring low dose levophed intermittently, but patient known to have baseline low blood pressure. IR consulted for PICC line for access, pt unable to tolerate without sedation. IR consulted for tunneled CVC.     Clinical History: GASTRONTESTINAL BLEEDING    No pertinent family history in first degree relatives    Handoff    MEWS Score    HTN (hypertension)    HLD (hyperlipidemia)    CAD (coronary atherosclerotic disease)    ESRD on dialysis    H/O ventral hernia    Brown tumor    DVT, lower extremity    Gastrointestinal bleeding    UGIB (upper gastrointestinal bleed)    ESRD on dialysis    Chronic systolic congestive heart failure    Sepsis associated hypotension    Hypotension    Pulmonary embolism    Nutrition, metabolism, and development symptoms    Multiple wounds of skin    Hyperparathyroidism    Melena    Calciphylaxis    Debility    Encounter for palliative care    HLD (hyperlipidemia)    Pain    Advanced care planning/counseling discussion    Insertion, needle, vein    Ultrasound guided venous access    Stented coronary artery    History of surgery    AVF (arteriovenous fistula)    S/P AIDEN-BSO    History of surgery    History of atherectomy    RECTAL BLEEDING    9    Room Service Assist    Room Service Assist    [email or web address]        Meds:acetaminophen     Tablet .. 650 milliGRAM(s) Oral every 6 hours PRN  ascorbic acid 500 milliGRAM(s) Oral daily  atorvastatin 40 milliGRAM(s) Oral at bedtime  bisacodyl 5 milliGRAM(s) Oral at bedtime  chlorhexidine 2% Cloths 1 Application(s) Topical <User Schedule>  cinacalcet 120 milliGRAM(s) Oral every 24 hours  Dakins Solution - 1/4 Strength 1 Application(s) Topical every 12 hours  dextrose 10% Bolus 250 milliLiter(s) IV Bolus once  dextrose 5% + sodium chloride 0.9%. 1000 milliLiter(s) IV Continuous <Continuous>  dextrose 50% Injectable 25 Gram(s) IV Push once  dextrose 50% Injectable 12.5 Gram(s) IV Push once  dextrose 50% Injectable 25 Gram(s) IV Push once  dextrose Oral Gel 15 Gram(s) Oral once  dextrose Oral Gel 15 Gram(s) Oral once  fludroCORTISONE 0.5 milliGRAM(s) Oral every 24 hours  folic acid 1 milliGRAM(s) Oral daily  heparin   Injectable 7500 Unit(s) SubCutaneous every 8 hours  hydrocortisone 100 milliGRAM(s) Oral every 8 hours  HYDROmorphone   Tablet 2 milliGRAM(s) Oral every 3 hours PRN  HYDROmorphone   Tablet 4 milliGRAM(s) Oral every 3 hours PRN  HYDROmorphone  Injectable 1 milliGRAM(s) IV Push every 4 hours PRN  midodrine 30 milliGRAM(s) Oral every 8 hours  Nephro-deborah 1 Tablet(s) Oral daily  norepinephrine Infusion 0.05 MICROgram(s)/kG/Min IV Continuous <Continuous>  nystatin Powder 1 Application(s) Topical two times a day  pantoprazole    Tablet 40 milliGRAM(s) Oral every 12 hours  polyethylene glycol 3350 17 Gram(s) Oral daily  sodium chloride 0.9% lock flush 10 milliLiter(s) IV Push every 1 hour PRN      Allergies:sulfa drugs (Angioedema)  Ancef (Rash; Urticaria)  iodine (Hives; Pruritus)  DDAVP (Hypotension)  penicillin (Swelling)        Labs:                           8.5    12.34 )-----------( 51       ( 05 Jun 2023 04:13 )             28.7     PT/INR - ( 03 Jun 2023 17:15 )   PT: 15.1 sec;   INR: 1.27          PTT - ( 03 Jun 2023 17:15 )  PTT:35.7 sec  06-05    128<L>  |  91<L>  |  17  ----------------------------<  81  3.9   |  24  |  2.28<H>    Ca    9.7      05 Jun 2023 04:13  Phos  3.2     06-05  Mg     1.8     06-05    TPro  5.0<L>  /  Alb  2.8<L>  /  TBili  0.5  /  DBili  x   /  AST  13  /  ALT  9<L>  /  AlkPhos  135<H>  06-04

## 2023-06-05 NOTE — CONSULT NOTE ADULT - CONSULT REASON
ESRD on HD
UGIB
Suspected GIB
Evaluation of bilateral breast wounds
Advanced Calciphylaxis
request for tunneled CVC line

## 2023-06-06 NOTE — CHART NOTE - NSCHARTNOTEFT_GEN_A_CORE
DC'd L fem A-line. Hemostasis achieved after 10min direct pressure, no hematoma/bleeding, site dressed w/ sterile gauze and tegaderm.

## 2023-06-06 NOTE — PROCEDURE NOTE - NSPROCDETAILS_GEN_ALL_CORE
guidewire recovered/lumen(s) aspirated and flushed/sterile dressing applied/sterile technique, catheter placed/ultrasound guidance with use of sterile gel and probe cove
location identified, draped/prepped, sterile technique used/blood seen on insertion/dressing applied/flushes easily/secured in place/sterile technique, catheter placed
location identified, draped/prepped, sterile technique used, needle inserted/introduced/positive blood return obtained via catheter/connected to a pressurized flush line/sutured in place/hemostasis with direct pressure, dressing applied/Seldinger technique/all materials/supplies accounted for at end of procedure
guidewire recovered/lumen(s) aspirated and flushed/sterile dressing applied/sterile technique, catheter placed/ultrasound guidance with use of sterile gel and probe cove
location identified, draped/prepped, sterile technique used, needle inserted/introduced/positive blood return obtained via catheter/connected to a pressurized flush line/sutured in place/hemostasis with direct pressure, dressing applied/Seldinger technique/all materials/supplies accounted for at end of procedure
location identified, draped/prepped, sterile technique used/blood seen on insertion/dressing applied/flushes easily/secured in place/sterile technique, catheter placed
location identified, draped/prepped, sterile technique used, needle inserted/introduced/positive blood return obtained via catheter/connected to a pressurized flush line/sutured in place/Seldinger technique/all materials/supplies accounted for at end of procedure
location identified, draped/prepped, sterile technique used/blood seen on insertion/dressing applied/flushes easily/secured in place/sterile technique, catheter placed

## 2023-06-06 NOTE — PROGRESS NOTE ADULT - ASSESSMENT
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, Calciphylaxis of bilateral breasts, HTN, HLD presenting to the ER in setting of anemia additionally w/ breast calciphylaxis s/p debridement     Assessment/Plan:   #ESRD on HD  #Calciphylaxis  Team actively discussing withdrawal of HD, will discuss further with patient.   Usual RX time 3:30 hrs,  Hd today per schedule  K of 4.0  Renal diet    #Breast Calciphylaxis  discussed w/ outpatient HD unit patient was not currently receiving STS  s/p debridement with general surgery  Vascular following    #Hypotension   back on levophed for pressor support  -c/w levophed and midodrine and fludrocortisone / hydrocortisone   -maintain MAP 70 for adequate renal perfusion    #Hyponatremia  i//so continuous d10 drip   1L fluid restriction  Repeat BMP today    #access   L TDC    #anemia  Hgb not at goal  Epo w/ HD    #renal bone disease   Ca ~9.4  phos 3.7  c/w sensipar and renvela   encourage protein intake   trend phos daily

## 2023-06-06 NOTE — PROGRESS NOTE ADULT - CONVERSATION DETAILS
Following MICU team's discussion with patient's daughter regarding patient's poor prognosis, I spoke to patient and daughter at bedside. We discussed patient's overall clinical condition, specifically her inability to maintain a blood pressure off IV pressors and how HD drops her BP. We spoke about her calciphylaxis and how it is the likely cause of her breast wounds and the reason they are recurrently infected. I explained that the reason we are unable to treat her pain adequately is because the pain medications drop her blood pressure and she is already requiring pressor support. I also explained that the continued use of pressors has caused ischemia of her toes which is causing her new pain and that there is no way to fix that problem but that continued pressors will make the problem worse. She shook her head expressing her understanding when I explained each part of her medical condition but when I asked her if she wants to focus our treatment on her pain and keeping her comfortable, she was unable to decide. Specifically, I asked her if she was ready to stop dialysis to focus on her comfort and though she was very slow to answer questions, she did state that she needed more time to think about it. I attempted to discuss code status as well but patient endorsed wanting to be resuscitated at this time. Following MICU team's discussion with patient's daughter regarding patient's poor prognosis, I spoke to patient and daughter at bedside. We discussed patient's overall clinical condition, specifically her inability to maintain a blood pressure off IV pressors and how HD drops her BP. We spoke about her calciphylaxis and how it is the likely cause of her breast wounds and the reason they are recurrently infected. I explained that the reason we are unable to treat her pain adequately is because the pain medications drop her blood pressure and she is already requiring pressor support. I also explained that the continued use of pressors has caused ischemia of her toes which is causing her new pain and that there is no way to fix that problem but that continued pressors will make the problem worse. She shook her head expressing her understanding when I explained each part of her medical condition but when I asked her if she wants to focus our treatment on her pain and keeping her comfortable, she was unable to decide. Specifically, I asked her if she was ready to stop dialysis to focus on her comfort and though she was very slow to answer questions, she did state that she needed more time to think about it. I attempted to discuss code status as well but patient endorsed wanting to be resuscitated at this time.    On returning after dialysis to discuss further, patient appeared sleepy and would not engage in further GOC and would only endorse that she had pain.

## 2023-06-06 NOTE — PROCEDURE NOTE - NSANESTHESIA_GEN_A_CORE
1% lidocaine
no anesthesia administered
1% lidocaine
1% lidocaine
no anesthesia administered
5/1% lidocaine
1% lidocaine
no anesthesia administered
1% lidocaine
no anesthesia administered

## 2023-06-06 NOTE — PROCEDURE NOTE - PROCEDURE DATE TIME, MLM
06-Jun-2023 17:32
24-May-2023 15:07
30-May-2023 16:39
29-May-2023 02:19
25-May-2023 15:19
20-May-2023
25-May-2023 12:28
20-May-2023
27-May-2023 15:43
20-May-2023 17:40

## 2023-06-06 NOTE — PROCEDURE NOTE - NSINFORMCONSENT_GEN_A_CORE

## 2023-06-06 NOTE — PROCEDURE NOTE - NSSITEPREP_SKIN_A_CORE
chlorhexidine
povidone iodine
chlorhexidine/Adherence to aseptic technique: hand hygiene prior to donning barriers (gown, gloves), don cap and mask, sterile drape over patient
chlorhexidine
chlorhexidine
chlorhexidine/Adherence to aseptic technique: hand hygiene prior to donning barriers (gown, gloves), don cap and mask, sterile drape over patient

## 2023-06-06 NOTE — PROGRESS NOTE ADULT - SUBJECTIVE AND OBJECTIVE BOX
SUBJECTIVE AND OBJECTIVE:  Indication for Geriatrics and Palliative Care Services: Complex medical decision making    OVERNIGHT EVENTS: Pt continues on levophed. PICC line placed yesterday as vascular access has been an issue. Patient continues to endorse pain.    Allergies    sulfa drugs (Angioedema)  Ancef (Rash; Urticaria)  iodine (Hives; Pruritus)  DDAVP (Hypotension)  penicillin (Swelling)    Intolerances    MEDICATIONS  (STANDING):  albumin human 25% IVPB 50 milliLiter(s) IV Intermittent every 1 hour  ascorbic acid 500 milliGRAM(s) Oral daily  atorvastatin 40 milliGRAM(s) Oral at bedtime  bisacodyl 5 milliGRAM(s) Oral at bedtime  chlorhexidine 2% Cloths 1 Application(s) Topical <User Schedule>  cinacalcet 120 milliGRAM(s) Oral every 24 hours  Dakins Solution - 1/4 Strength 1 Application(s) Topical every 12 hours  dextrose 10% Bolus 250 milliLiter(s) IV Bolus once  dextrose 5% + sodium chloride 0.9%. 1000 milliLiter(s) (30 mL/Hr) IV Continuous <Continuous>  dextrose 50% Injectable 25 Gram(s) IV Push once  dextrose 50% Injectable 12.5 Gram(s) IV Push once  dextrose 50% Injectable 25 Gram(s) IV Push once  dextrose Oral Gel 15 Gram(s) Oral once  fludroCORTISONE 0.5 milliGRAM(s) Oral every 24 hours  folic acid 1 milliGRAM(s) Oral daily  heparin   Injectable 7500 Unit(s) SubCutaneous every 8 hours  hydrocortisone 100 milliGRAM(s) Oral every 8 hours  midodrine 30 milliGRAM(s) Oral every 8 hours  Nephro-deborah 1 Tablet(s) Oral daily  norepinephrine Infusion 0.05 MICROgram(s)/kG/Min (3.72 mL/Hr) IV Continuous <Continuous>  nystatin Powder 1 Application(s) Topical two times a day  pantoprazole    Tablet 40 milliGRAM(s) Oral every 12 hours  polyethylene glycol 3350 17 Gram(s) Oral daily  sodium chloride 1 Gram(s) Oral every 12 hours    MEDICATIONS  (PRN):  acetaminophen     Tablet .. 650 milliGRAM(s) Oral every 6 hours PRN Temp greater or equal to 38C (100.4F), Mild Pain (1 - 3)  HYDROmorphone   Tablet 4 milliGRAM(s) Oral every 3 hours PRN Severe Pain (7 - 10)  HYDROmorphone   Tablet 2 milliGRAM(s) Oral every 3 hours PRN Moderate Pain (4 - 6)  HYDROmorphone  Injectable 1 milliGRAM(s) IV Push every 4 hours PRN Severe Pain (7 - 10)  sodium chloride 0.9% lock flush 10 milliLiter(s) IV Push every 1 hour PRN Pre/post blood products, medications, blood draw, and to maintain line patency      ITEMS UNCHECKED ARE NOT PRESENT    PRESENT SYMPTOMS: [ ]Unable to self-report  Source if other than patient:  [ ]Family   [ ]Team     Pain:  [X]yes [ ]no  QOL impact -   Location - B/l breasts                   Aggravating factors - Touching breast wounds  Quality - Unable to describe  Radiation - None  Timing- Constant  Severity (0-10 scale): Unable to quantify  Minimal acceptable level (0-10 scale): Unable to quantify    Dyspnea:                           [ ]Mild [ ]Moderate [ ]Severe  Anxiety:                             [ ]Mild [ ]Moderate [ ]Severe  Fatigue:                             [ ]Mild [ ]Moderate [ ]Severe  Nausea:                             [ ]Mild [ ]Moderate [ ]Severe  Loss of appetite:              [ ]Mild [ ]Moderate [ ]Severe  Constipation:                    [ ]Mild [ ]Moderate [ ]Severe    Other Symptoms:  [X]All other review of systems negative     Palliative Performance Status Version 2:         20%      http://npcrc.org/files/news/palliative_performance_scale_ppsv2.pdf    PHYSICAL EXAM:  Vital Signs Last 24 Hrs  T(C): 35.3 (06 Jun 2023 11:10), Max: 35.9 (05 Jun 2023 14:54)  T(F): 95.5 (06 Jun 2023 11:10), Max: 96.6 (05 Jun 2023 14:54)  HR: 87 (06 Jun 2023 11:10) (57 - 98)  BP: --  BP(mean): --  RR: 12 (06 Jun 2023 11:10) (12 - 16)  SpO2: 98% (06 Jun 2023 11:10) (92% - 100%)    Parameters below as of 06 Jun 2023 11:10  Patient On (Oxygen Delivery Method): nasal cannula w/ humidification  O2 Flow (L/min): 2   I&O's Summary    05 Jun 2023 07:01  -  06 Jun 2023 07:00  --------------------------------------------------------  IN: 905.7 mL / OUT: 0 mL / NET: 905.7 mL    06 Jun 2023 07:01  -  06 Jun 2023 12:17  --------------------------------------------------------  IN: 166.9 mL / OUT: 0 mL / NET: 166.9 mL       GENERAL: [ ]Cachexia    [X]Alert  [ ]Oriented x   [ ]Lethargic  [ ]Unarousable  [X]Verbal  [ ]Non-Verbal  Behavioral:   [ ]Anxiety  [ ]Delirium [ ]Agitation [X]Cooperative  HEENT:  [X]Normal   [ ]Dry mouth   [ ]ET Tube/Trach  [ ]Oral lesions  PULMONARY:   [X]Clear [ ]Tachypnea  [ ]Audible excessive secretions   [ ]Rhonchi        [ ]Right [ ]Left [ ]Bilateral  [ ]Crackles        [ ]Right [ ]Left [ ]Bilateral  [ ]Wheezing     [ ]Right [ ]Left [ ]Bilateral  [ ]Diminished BS [ ] Right [ ]Left [ ]Bilateral  CARDIOVASCULAR:    [X]Regular [ ]Irregular [ ]Tachy  [ ]Arjun [ ]Murmur [ ]Other  GASTROINTESTINAL:  [X]Soft  [ ]Distended   [ ]+BS  [X]Non tender [ ]Tender  [ ]Other [ ]PEG [ ]OGT/ NGT   Last BM:   GENITOURINARY:  [ ]Normal [X]Incontinent   [ ]Oliguria/Anuria   [ ]Wu  MUSCULOSKELETAL:   [ ]Normal   [ ]Weakness  [X]Bed/Wheelchair bound [X]Edema, diffusely  NEUROLOGIC:   [ ]No focal deficits  [ ] Cognitive impairment  [ ] Dysphagia [ ]Dysarthria [ ] Paresis [X]Answers questions appropriately though very slowly  SKIN:   [ ]Normal  [ ]Rash  [X]B/l breast wounds, covered in gauze dressing with purulent drainage  [ ]Pressure ulcer(s) [ ]y [ ]n present on admission    CRITICAL CARE:  [X]Shock Present  [ ]Septic [ ]Cardiogenic [ ]Neurologic [ ]Hypovolemic  [X]Vasopressors [ ]Inotropes  [ ]Respiratory failure present [ ]Mechanical Ventilation [ ]Non-invasive ventilatory support [ ]High-Flow   [ ]Acute  [ ]Chronic [ ]Hypoxic  [ ]Hypercarbic [ ]Other  [ ]Other organ failure     LABS:                        8.7    17.83 )-----------( 46       ( 06 Jun 2023 04:45 )             28.7   06-06    128<L>  |  92<L>  |  22  ----------------------------<  69<L>  4.0   |  22  |  2.57<H>    Ca    9.4      06 Jun 2023 04:45  Phos  3.7     06-06  Mg     1.7     06-06    TPro  4.8<L>  /  Alb  2.5<L>  /  TBili  0.6  /  DBili  x   /  AST  14  /  ALT  9<L>  /  AlkPhos  132<H>  06-06      RADIOLOGY & ADDITIONAL STUDIES:    Protein Calorie Malnutrition Present: [ ]mild [ ]moderate [ ]severe [ ]underweight [ ]morbid obesity  https://www.andeal.org/vault/2440/web/files/ONC/Table_Clinical%20Characteristics%20to%20Document%20Malnutrition-White%20JV%20et%20al%202012.pdf    Height (cm): 168 (05-29-23 @ 12:17), 177.8 (01-01-23 @ 20:52), 177.8 (11-20-22 @ 08:23)  Weight (kg): 116.5 (05-31-23 @ 09:47), 94.5 (05-04-23 @ 16:00), 85.3 (01-01-23 @ 20:52)  BMI (kg/m2): 41.3 (05-31-23 @ 09:47), 33.5 (05-29-23 @ 12:17), 29.9 (05-04-23 @ 16:00)    [X]PPSV2 < or = 30%  [ ]significant weight loss [ ]poor nutritional intake [ ]anasarca[ ]Artificial Nutrition    REFERRALS:   [ ]Chaplaincy  [ ]Hospice  [ ]Child Life  [X]Social Work [ ]Patient and Family Support [ ]Case management [ ]Holistic Therapy [ ] Music therapy  [ ] Massage Therapy    DISCUSSION OF CASE: Family - obtained additional history and to provide emotional support;  Primary team - discussed plan of care

## 2023-06-06 NOTE — PROCEDURE NOTE - NSICDXPROCEDURE_GEN_ALL_CORE_FT
PROCEDURES:  Insertion, needle, vein 20-May-2023 21:51:22  Julio Josue  Ultrasound guided venous access 24-May-2023 15:08:36  Ganesh Jeffers  
PROCEDURES:  Ultrasound guided venous access 24-May-2023 15:08:36  Ganesh Jeffers  Percutaneous insertion of arterial line 06-Jun-2023 17:33:06  Ganesh Jeffers  
PROCEDURES:  Insertion, needle, vein 20-May-2023 21:51:22  Julio Josue  
PROCEDURES:  Ultrasound guided venous access 24-May-2023 15:08:36  Ganesh Jeffers  
PROCEDURES:  Ultrasound guided venous access 24-May-2023 15:08:36  Ganesh Jeffers

## 2023-06-06 NOTE — PROGRESS NOTE ADULT - ASSESSMENT
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 General surgery consulted on 5/26 for evaluation of bilateral breast wounds and possible debridement. Pt unable to provide subjective history 2/2 current mentation. Bilateral breast wounds with fibrinous slough, necrotic tissue, and purulent drainage underlying necrotic eschar now s/p debridement on 5/30.     Recommendations:  WOUND CARE: daily dressing changing with Dakins WTD. Dakins kerlix should be damp not soaked;   c/w Abx  Wean pressors as appropriate  Appreciate wound care recs  Rest of care per MICU  Team 5C will continue to follow

## 2023-06-06 NOTE — PROGRESS NOTE ADULT - SUBJECTIVE AND OBJECTIVE BOX
Patient is a 67y Female seen and evaluated at bedside and during HD. BP remains stable on pressors. Palliative care spoke with patient and tried to discuss if patient would like to withdraw HD, at this time patient not willing to make decision, and so will continue on HD.       Meds:    acetaminophen     Tablet .. 650 every 6 hours PRN  albumin human 25% IVPB 50 every 1 hour  ascorbic acid 500 daily  atorvastatin 40 at bedtime  bisacodyl 5 at bedtime  chlorhexidine 2% Cloths 1 <User Schedule>  cinacalcet 120 every 24 hours  Dakins Solution - 1/4 Strength 1 every 12 hours  dextrose 10% Bolus 250 once  dextrose 5% + sodium chloride 0.9%. 1000 <Continuous>  dextrose 50% Injectable 25 once  dextrose 50% Injectable 12.5 once  dextrose 50% Injectable 25 once  dextrose Oral Gel 15 once  fludroCORTISONE 0.5 every 24 hours  folic acid 1 daily  heparin   Injectable 7500 every 8 hours  hydrocortisone 100 every 8 hours  HYDROmorphone   Tablet 2 every 3 hours PRN  HYDROmorphone   Tablet 4 every 3 hours PRN  HYDROmorphone  Injectable 1 every 4 hours PRN  midodrine 30 every 8 hours  Nephro-deborah 1 daily  norepinephrine Infusion 0.05 <Continuous>  nystatin Powder 1 two times a day  pantoprazole    Tablet 40 every 12 hours  polyethylene glycol 3350 17 daily  sodium chloride 1 every 12 hours  sodium chloride 0.9% lock flush 10 every 1 hour PRN      T(C): , Max: 35.9 (06-05-23 @ 14:54)  T(F): , Max: 96.6 (06-05-23 @ 14:54)  HR: 87 (06-06-23 @ 11:10)  BP: --  BP(mean): --  RR: 12 (06-06-23 @ 11:10)  SpO2: 98% (06-06-23 @ 11:10)  Wt(kg): --    06-05 @ 07:01  -  06-06 @ 07:00  --------------------------------------------------------  IN: 905.7 mL / OUT: 0 mL / NET: 905.7 mL    06-06 @ 07:01  -  06-06 @ 13:09  --------------------------------------------------------  IN: 166.9 mL / OUT: 0 mL / NET: 166.9 mL      Review of Systems:  ROS negative except as per HPI      PHYSICAL EXAM:  GENERAL: awake, tired on RA  CHEST/LUNG: Clear to auscultation bilaterally  HEART: normal S1S2, RRR  ABDOMEN: Soft, Nontender, +BS, No flank tenderness bilateral  EXTREMITIES: + edema (total body anasarca)  SKIN: breast calciphylaxis   ACCESS:[facility]     LABS:                        8.7    17.83 )-----------( 46       ( 06 Jun 2023 04:45 )             28.7     06-06    128<L>  |  92<L>  |  22  ----------------------------<  69<L>  4.0   |  22  |  2.57<H>    Ca    9.4      06 Jun 2023 04:45  Phos  3.7     06-06  Mg     1.7     06-06    TPro  4.8<L>  /  Alb  2.5<L>  /  TBili  0.6  /  DBili  x   /  AST  14  /  ALT  9<L>  /  AlkPhos  132<H>  06-06                RADIOLOGY & ADDITIONAL STUDIES:           Patient is a 67y Female seen and evaluated at bedside and during HD. BP remains stable on pressors. Palliative care spoke with patient and tried to discuss if patient would like to withdraw HD, at this time patient not willing to make decision, and so will continue on HD.       Meds:    acetaminophen     Tablet .. 650 every 6 hours PRN  albumin human 25% IVPB 50 every 1 hour  ascorbic acid 500 daily  atorvastatin 40 at bedtime  bisacodyl 5 at bedtime  chlorhexidine 2% Cloths 1 <User Schedule>  cinacalcet 120 every 24 hours  Dakins Solution - 1/4 Strength 1 every 12 hours  dextrose 10% Bolus 250 once  dextrose 5% + sodium chloride 0.9%. 1000 <Continuous>  dextrose 50% Injectable 25 once  dextrose 50% Injectable 12.5 once  dextrose 50% Injectable 25 once  dextrose Oral Gel 15 once  fludroCORTISONE 0.5 every 24 hours  folic acid 1 daily  heparin   Injectable 7500 every 8 hours  hydrocortisone 100 every 8 hours  HYDROmorphone   Tablet 2 every 3 hours PRN  HYDROmorphone   Tablet 4 every 3 hours PRN  HYDROmorphone  Injectable 1 every 4 hours PRN  midodrine 30 every 8 hours  Nephro-deborah 1 daily  norepinephrine Infusion 0.05 <Continuous>  nystatin Powder 1 two times a day  pantoprazole    Tablet 40 every 12 hours  polyethylene glycol 3350 17 daily  sodium chloride 1 every 12 hours  sodium chloride 0.9% lock flush 10 every 1 hour PRN      T(C): , Max: 35.9 (23 @ 14:54)  T(F): , Max: 96.6 (23 @ 14:54)  HR: 87 (23 @ 11:10)  BP: --  BP(mean): --  RR: 12 (23 @ 11:10)  SpO2: 98% (23 @ 11:10)  Wt(kg): --     @ 07:01  -   @ 07:00  --------------------------------------------------------  IN: 905.7 mL / OUT: 0 mL / NET: 905.7 mL     @ 07:01  -   @ 13:09  --------------------------------------------------------  IN: 166.9 mL / OUT: 0 mL / NET: 166.9 mL      Review of Systems:  ROS negative except as per HPI      PHYSICAL EXAM:  GENERAL: awake, tired on RA  CHEST/LUNG: Clear to auscultation bilaterally  HEART: normal S1S2, RRR  ABDOMEN: Soft, Nontender, +BS, No flank tenderness bilateral  EXTREMITIES: + edema (total body anasarca)  SKIN: breast calciphylaxis   ACCESS:[facility]     LABS:                        8.7    17.83 )-----------( 46       ( 2023 04:45 )             28.7         128<L>  |  92<L>  |  22  ----------------------------<  69<L>  4.0   |  22  |  2.57<H>    Ca    9.4      2023 04:45  Phos  3.7       Mg     1.7         TPro  4.8<L>  /  Alb  2.5<L>  /  TBili  0.6  /  DBili  x   /  AST  14  /  ALT  9<L>  /  AlkPhos  132<H>                  RADIOLOGY & ADDITIONAL STUDIES:          Hemoglobin: 8.7 g/dL (23 @ 04:45)  Phosphorus Level, Serum: 3.7 mg/dL (23 @ 04:45)  Hemoglobin: 8.5 g/dL (23 @ 04:13)  Phosphorus Level, Serum: 3.2 mg/dL (23 @ 04:13)    Albumin, Serum: 2.5 g/dL (23 @ 04:45)  Albumin, Serum: 2.8 g/dL (23 @ 05:30)    T(C): 35.3 (23 @ 11:10), Max: 35.9 (23 @ 14:54)  HR: 100 (23 @ 13:00) (57 - 100)  BP: --  RR: 12 (23 @ 13:00) (12 - 16)  SpO2: 95% (23 @ 13:00) (92% - 100%)  cinacalcet 120 milliGRAM(s) Oral every 24 hours, 23 @ 15:07, Routine  epoetin александр-epbx (RETACRIT) Injectable 16784 Unit(s) IV Push once, 23 @ 06:21, Routine, Stop order after: 1 Doses  epoetin александр-epbx (RETACRIT) Injectable 8000 Unit(s) IV Push once, 23 @ 08:09, Routine, Stop order after: 1 Doses  epoetin александр-epbx (RETACRIT) Injectable 8000 Unit(s) IV Push once, 23 @ 07:08, Routine, Stop order after: 1 Doses  epoetin александр-epbx (RETACRIT) Injectable 8000 Unit(s) IV Push once, 23 @ 05:42, Routine, Stop order after: 1 Doses  epoetin александр-epbx (RETACRIT) Injectable 8000 Unit(s) IV Push once, 23 @ 07:56, Routine, Stop order after: 1 Doses  epoetin александр-epbx (RETACRIT) Injectable 8000 Unit(s) IV Push once, 23 @ 06:21, Routine, Stop order after: 1 Doses  epoetin александр-epbx (RETACRIT) Injectable 43955 Unit(s) IV Push once, 23 @ 09:00, Routine, Stop order after: 1 Doses  epoetin александр-epbx (RETACRIT) Injectable 68364 Unit(s) IV Push once, 23 @ 07:01, Routine, Stop order after: 1 Doses  sevelamer carbonate 1600 milliGRAM(s) Oral three times a day with meals, 23 @ 15:07, Routine  sevelamer carbonate 800 milliGRAM(s) Oral three times a day with meals, 23 @ 16:11,       Hemodialysis Treatment.:     Schedule: Once, Modality: Hemodialysis, Access: Internal Jugular Central Venous Catheter    Dialyzer: Optiflux [id number], Time: 180 Min    Blood Flow: 400 mL/Min , Dialysate Flow: 500 mL/Min, Dialysate Temp: 35, Tubinmm (Adult)    Target Fluid Removal: 1.5 Liters    Dialysate Electrolytes (mEq/L): Potassium 3, Calcium 2.5, Sodium 135, Bicarbonate 35 (23 @ 09:00) [Completed]    Seen on HD, with colder dialysate as well as albumin PRN To help with BP.

## 2023-06-06 NOTE — PROCEDURE NOTE - NSPROCNAME_GEN_A_CORE
Arterial Puncture/Cannulation
Peripheral Line Insertion
Point of Care Ultrasound Cardiac
Central Line Insertion
Point of Care Ultrasound Lung
Peripheral Line Insertion
Peripheral Line Insertion
Arterial Puncture/Cannulation
General
Debridement
Arterial Puncture/Cannulation
Central Line Insertion

## 2023-06-06 NOTE — PROGRESS NOTE ADULT - SUBJECTIVE AND OBJECTIVE BOX
INTERVAL HPI/OVERNIGHT EVENTS: NAEO    SUBJECTIVE: Patient seen and examined at bedside. Remains in pain, reporting full body pain, mostly localized to breasts and feet.     VITAL SIGNS:  ICU Vital Signs Last 24 Hrs  T(C): 36.3 (03 Jun 2023 12:05), Max: 36.3 (03 Jun 2023 12:05)  T(F): 97.3 (03 Jun 2023 12:05), Max: 97.3 (03 Jun 2023 12:05)  HR: 100 (03 Jun 2023 13:00) (52 - 105)  BP: --  BP(mean): --  ABP: 87/39 (03 Jun 2023 13:00) (87/39 - 125/55)  ABP(mean): 54 (03 Jun 2023 13:00) (54 - 85)  RR: 12 (03 Jun 2023 13:00) (7 - 15)  SpO2: 100% (03 Jun 2023 13:00) (100% - 100%)    O2 Parameters below as of 03 Jun 2023 13:00  Patient On (Oxygen Delivery Method): room air              06-02 @ 07:01  -  06-03 @ 07:00  --------------------------------------------------------  IN: 1173.2 mL / OUT: 50 mL / NET: 1123.2 mL    06-03 @ 07:01  -  06-03 @ 13:32  --------------------------------------------------------  IN: 132 mL / OUT: 0 mL / NET: 132 mL      CAPILLARY BLOOD GLUCOSE      POCT Blood Glucose.: 144 mg/dL (03 Jun 2023 11:28)      PHYSICAL EXAM:    General: large body habitus, AAO1-2. in NAD.  Eyes: PERRL, clear conjunctiva  ENT: Poor dentition.  Respiratory: Decreased BS b/l lung fields; no W/R/R  Cardiac: +S1/S2; irregular rate; no M/R/G; HD catheter over L chest.   Gastrointestinal: soft, protuberant, NT/ND; no rebound or guarding; +BSx4. Large ventral hernia.   Extremities: Diffuse anasarca, worsened in bilateral upper extremities. +3 pitting edema RUE. +2 LUE. +3 b/l pitting edema le.  No blisters on heels or toes.  Skin: Bilateral breast wounds covered in dressing. L breast w eschar, R breast granulation tissue. malodorous.  Neurologic: AAOx1-2; does not participate in strength exam     MEDICATIONS:  MEDICATIONS  (STANDING):  albumin human 25% IVPB 50 milliLiter(s) IV Intermittent every 1 hour  ascorbic acid 500 milliGRAM(s) Oral daily  atorvastatin 40 milliGRAM(s) Oral at bedtime  bisacodyl 5 milliGRAM(s) Oral at bedtime  chlorhexidine 2% Cloths 1 Application(s) Topical <User Schedule>  cinacalcet 120 milliGRAM(s) Oral every 24 hours  Dakins Solution - 1/4 Strength 1 Application(s) Topical every 12 hours  DAPTOmycin IVPB 400 milliGRAM(s) IV Intermittent every 48 hours  dextrose 10% Bolus 250 milliLiter(s) IV Bolus once  dextrose 10%. 1000 milliLiter(s) (30 mL/Hr) IV Continuous <Continuous>  dextrose 50% Injectable 12.5 Gram(s) IV Push once  dextrose 50% Injectable 25 Gram(s) IV Push once  dextrose 50% Injectable 25 Gram(s) IV Push once  dextrose Oral Gel 15 Gram(s) Oral once  dextrose Oral Gel 15 Gram(s) Oral once  fludroCORTISONE 0.5 milliGRAM(s) Oral every 24 hours  folic acid 1 milliGRAM(s) Oral daily  heparin   Injectable 7500 Unit(s) SubCutaneous every 8 hours  hydrocortisone 100 milliGRAM(s) Oral every 8 hours  midodrine 40 milliGRAM(s) Oral every 8 hours  Nephro-deborah 1 Tablet(s) Oral daily  norepinephrine Infusion 0.05 MICROgram(s)/kG/Min (3.72 mL/Hr) IV Continuous <Continuous>  nystatin Powder 1 Application(s) Topical two times a day  pantoprazole    Tablet 40 milliGRAM(s) Oral every 12 hours  polyethylene glycol 3350 17 Gram(s) Oral daily    MEDICATIONS  (PRN):  acetaminophen     Tablet .. 650 milliGRAM(s) Oral every 6 hours PRN Temp greater or equal to 38C (100.4F), Mild Pain (1 - 3)  EPINEPHrine     1 mG/mL Injectable 0.3 milliGRAM(s) IntraMuscular once PRN anaphylaxis  HYDROmorphone   Tablet 4 milliGRAM(s) Oral every 3 hours PRN Severe Pain (7 - 10)  HYDROmorphone   Tablet 2 milliGRAM(s) Oral every 3 hours PRN Moderate Pain (4 - 6)  HYDROmorphone  Injectable 0.5 milliGRAM(s) IV Push every 2 hours PRN Severe Pain (7 - 10)  sodium chloride 0.9% lock flush 10 milliLiter(s) IV Push every 1 hour PRN Pre/post blood products, medications, blood draw, and to maintain line patency      ALLERGIES:  Allergies    sulfa drugs (Angioedema)  Ancef (Rash; Urticaria)  iodine (Hives; Pruritus)  DDAVP (Hypotension)  penicillin (Swelling)    Intolerances        LABS:                        8.4    12.59 )-----------( 52       ( 02 Jun 2023 05:30 )             27.4     06-02    128<L>  |  91<L>  |  16  ----------------------------<  138<H>  3.6   |  23  |  2.23<H>    Ca    9.4      02 Jun 2023 05:30  Phos  2.7     06-02  Mg     1.7     06-02    TPro  5.0<L>  /  Alb  2.1<L>  /  TBili  0.4  /  DBili  x   /  AST  12  /  ALT  9<L>  /  AlkPhos  167<H>  06-02          RADIOLOGY & ADDITIONAL TESTS: Reviewed.   INTERVAL HPI/OVERNIGHT EVENTS: PICC line placed, TLC removed    SUBJECTIVE: Patient seen and examined at bedside. Remains in pain, reporting full body pain, mostly localized to breasts and feet. Still mainly only responsive when in pain     VITAL SIGNS:  ICU Vital Signs Last 24 Hrs  T(C): 36.3 (03 Jun 2023 12:05), Max: 36.3 (03 Jun 2023 12:05)  T(F): 97.3 (03 Jun 2023 12:05), Max: 97.3 (03 Jun 2023 12:05)  HR: 100 (03 Jun 2023 13:00) (52 - 105)  BP: --  BP(mean): --  ABP: 87/39 (03 Jun 2023 13:00) (87/39 - 125/55)  ABP(mean): 54 (03 Jun 2023 13:00) (54 - 85)  RR: 12 (03 Jun 2023 13:00) (7 - 15)  SpO2: 100% (03 Jun 2023 13:00) (100% - 100%)    O2 Parameters below as of 03 Jun 2023 13:00  Patient On (Oxygen Delivery Method): room air              06-02 @ 07:01  -  06-03 @ 07:00  --------------------------------------------------------  IN: 1173.2 mL / OUT: 50 mL / NET: 1123.2 mL    06-03 @ 07:01  -  06-03 @ 13:32  --------------------------------------------------------  IN: 132 mL / OUT: 0 mL / NET: 132 mL      CAPILLARY BLOOD GLUCOSE      POCT Blood Glucose.: 144 mg/dL (03 Jun 2023 11:28)      PHYSICAL EXAM:    General: large body habitus, AAO1-2. in NAD.  Eyes: PERRL, clear conjunctiva  ENT: Poor dentition.  Respiratory: Decreased BS b/l lung fields; no W/R/R  Cardiac: +S1/S2; irregular rate; no M/R/G; HD catheter over L chest.   Gastrointestinal: soft, protuberant, NT/ND; no rebound or guarding; +BSx4. Large ventral hernia.   Extremities: Diffuse anasarca, worsened in bilateral upper extremities. +3 pitting edema RUE. +2 LUE. +3 b/l pitting edema le.  No blisters on heels or toes.  Skin: Bilateral breast wounds covered in dressing. L breast w eschar, R breast granulation tissue. malodorous.  Neurologic: AAOx1-2; does not participate in strength exam     MEDICATIONS:  MEDICATIONS  (STANDING):  albumin human 25% IVPB 50 milliLiter(s) IV Intermittent every 1 hour  ascorbic acid 500 milliGRAM(s) Oral daily  atorvastatin 40 milliGRAM(s) Oral at bedtime  bisacodyl 5 milliGRAM(s) Oral at bedtime  chlorhexidine 2% Cloths 1 Application(s) Topical <User Schedule>  cinacalcet 120 milliGRAM(s) Oral every 24 hours  Dakins Solution - 1/4 Strength 1 Application(s) Topical every 12 hours  DAPTOmycin IVPB 400 milliGRAM(s) IV Intermittent every 48 hours  dextrose 10% Bolus 250 milliLiter(s) IV Bolus once  dextrose 10%. 1000 milliLiter(s) (30 mL/Hr) IV Continuous <Continuous>  dextrose 50% Injectable 12.5 Gram(s) IV Push once  dextrose 50% Injectable 25 Gram(s) IV Push once  dextrose 50% Injectable 25 Gram(s) IV Push once  dextrose Oral Gel 15 Gram(s) Oral once  dextrose Oral Gel 15 Gram(s) Oral once  fludroCORTISONE 0.5 milliGRAM(s) Oral every 24 hours  folic acid 1 milliGRAM(s) Oral daily  heparin   Injectable 7500 Unit(s) SubCutaneous every 8 hours  hydrocortisone 100 milliGRAM(s) Oral every 8 hours  midodrine 40 milliGRAM(s) Oral every 8 hours  Nephro-deborah 1 Tablet(s) Oral daily  norepinephrine Infusion 0.05 MICROgram(s)/kG/Min (3.72 mL/Hr) IV Continuous <Continuous>  nystatin Powder 1 Application(s) Topical two times a day  pantoprazole    Tablet 40 milliGRAM(s) Oral every 12 hours  polyethylene glycol 3350 17 Gram(s) Oral daily    MEDICATIONS  (PRN):  acetaminophen     Tablet .. 650 milliGRAM(s) Oral every 6 hours PRN Temp greater or equal to 38C (100.4F), Mild Pain (1 - 3)  EPINEPHrine     1 mG/mL Injectable 0.3 milliGRAM(s) IntraMuscular once PRN anaphylaxis  HYDROmorphone   Tablet 4 milliGRAM(s) Oral every 3 hours PRN Severe Pain (7 - 10)  HYDROmorphone   Tablet 2 milliGRAM(s) Oral every 3 hours PRN Moderate Pain (4 - 6)  HYDROmorphone  Injectable 0.5 milliGRAM(s) IV Push every 2 hours PRN Severe Pain (7 - 10)  sodium chloride 0.9% lock flush 10 milliLiter(s) IV Push every 1 hour PRN Pre/post blood products, medications, blood draw, and to maintain line patency      ALLERGIES:  Allergies    sulfa drugs (Angioedema)  Ancef (Rash; Urticaria)  iodine (Hives; Pruritus)  DDAVP (Hypotension)  penicillin (Swelling)    Intolerances        LABS:                        8.4    12.59 )-----------( 52       ( 02 Jun 2023 05:30 )             27.4     06-02    128<L>  |  91<L>  |  16  ----------------------------<  138<H>  3.6   |  23  |  2.23<H>    Ca    9.4      02 Jun 2023 05:30  Phos  2.7     06-02  Mg     1.7     06-02    TPro  5.0<L>  /  Alb  2.1<L>  /  TBili  0.4  /  DBili  x   /  AST  12  /  ALT  9<L>  /  AlkPhos  167<H>  06-02          RADIOLOGY & ADDITIONAL TESTS: Reviewed.

## 2023-06-06 NOTE — PROCEDURE NOTE - NSPOSTCAREGUIDE_GEN_A_CORE
Care for catheter as per unit/ICU protocols
Verbal/written post procedure instructions were given to patient/caregiver
Care for catheter as per unit/ICU protocols
Care for catheter as per unit/ICU protocols
Verbal/written post procedure instructions were given to patient/caregiver/Instructed patient/caregiver to follow-up with primary care physician/Instructed patient/caregiver regarding signs and symptoms of infection/Keep the cast/splint/dressing clean and dry/Care for catheter as per unit/ICU protocols
Verbal/written post procedure instructions were given to patient/caregiver/Instructed patient/caregiver to follow-up with primary care physician/Instructed patient/caregiver regarding signs and symptoms of infection/Keep the cast/splint/dressing clean and dry/Care for catheter as per unit/ICU protocols
Care for catheter as per unit/ICU protocols

## 2023-06-06 NOTE — PROGRESS NOTE ADULT - SUBJECTIVE AND OBJECTIVE BOX
STATUS POST: Bedside breast debridement, bilateral  POST PROCEDURE DAY: 6    SUBJECTIVE: Pt seen and examined at bedside this am by surgery team. Decreased pressor and midodrine requirement from yesterday. Dressing change done at bedside, patient complained of mild discomfort but tolerated it well.     MEDICATIONS  (STANDING):  ascorbic acid 500 milliGRAM(s) Oral daily  atorvastatin 40 milliGRAM(s) Oral at bedtime  bisacodyl 5 milliGRAM(s) Oral at bedtime  chlorhexidine 2% Cloths 1 Application(s) Topical <User Schedule>  cinacalcet 120 milliGRAM(s) Oral every 24 hours  Dakins Solution - 1/4 Strength 1 Application(s) Topical every 12 hours  dextrose 10% Bolus 250 milliLiter(s) IV Bolus once  dextrose 5% + sodium chloride 0.9%. 1000 milliLiter(s) (30 mL/Hr) IV Continuous <Continuous>  dextrose 50% Injectable 25 Gram(s) IV Push once  dextrose 50% Injectable 12.5 Gram(s) IV Push once  dextrose 50% Injectable 25 Gram(s) IV Push once  dextrose Oral Gel 15 Gram(s) Oral once  fludroCORTISONE 0.5 milliGRAM(s) Oral every 24 hours  folic acid 1 milliGRAM(s) Oral daily  heparin   Injectable 7500 Unit(s) SubCutaneous every 8 hours  hydrocortisone 100 milliGRAM(s) Oral every 8 hours  midodrine 30 milliGRAM(s) Oral every 8 hours  Nephro-deborah 1 Tablet(s) Oral daily  norepinephrine Infusion 0.05 MICROgram(s)/kG/Min (3.72 mL/Hr) IV Continuous <Continuous>  nystatin Powder 1 Application(s) Topical two times a day  pantoprazole    Tablet 40 milliGRAM(s) Oral every 12 hours  polyethylene glycol 3350 17 Gram(s) Oral daily  sodium chloride 1 Gram(s) Oral every 12 hours    MEDICATIONS  (PRN):  acetaminophen     Tablet .. 650 milliGRAM(s) Oral every 6 hours PRN Temp greater or equal to 38C (100.4F), Mild Pain (1 - 3)  HYDROmorphone   Tablet 4 milliGRAM(s) Oral every 3 hours PRN Severe Pain (7 - 10)  HYDROmorphone   Tablet 2 milliGRAM(s) Oral every 3 hours PRN Moderate Pain (4 - 6)  HYDROmorphone  Injectable 1 milliGRAM(s) IV Push every 4 hours PRN Severe Pain (7 - 10)  sodium chloride 0.9% lock flush 10 milliLiter(s) IV Push every 1 hour PRN Pre/post blood products, medications, blood draw, and to maintain line patency      Vital Signs Last 24 Hrs  T(C): 35.6 (06 Jun 2023 04:47), Max: 36.2 (05 Jun 2023 11:00)  T(F): 96 (06 Jun 2023 04:47), Max: 97.1 (05 Jun 2023 11:00)  HR: 72 (06 Jun 2023 07:00) (57 - 95)  BP: --  BP(mean): --  RR: 12 (06 Jun 2023 07:00) (12 - 18)  SpO2: 100% (06 Jun 2023 07:00) (92% - 100%)    Parameters below as of 06 Jun 2023 08:00  Patient On (Oxygen Delivery Method): nasal cannula    O2 Concentration (%): 2    Physical Exam  Constitutional: awake and alert  Pulm: Nonlabored breathing, no respiratory distress  CV: Regular rate and rhythm, normal sinus rhythm  Breast: b/l debridement sites with minimal necrotic tissue, no active bleeding when dressing removed. Tissue appears pink with fibrinous tissue, some sloughing with chemical debridement with Dakins. Dressing changed with dakins WTD kerlix/abd pad.   Abd:  soft, nontender, nondistended. No rebound, no guarding.   Extremities: edema b/l lower and upper extremities    I&O's Detail    05 Jun 2023 07:01  -  06 Jun 2023 07:00  --------------------------------------------------------  IN:    dextrose 10%: 90 mL    dextrose 5% + sodium chloride 0.9%: 570 mL    Norepinephrine: 245.7 mL  Total IN: 905.7 mL    OUT:    Rectal Tube (mL): 0 mL    Voided (mL): 0 mL  Total OUT: 0 mL    Total NET: 905.7 mL      06 Jun 2023 07:01  -  06 Jun 2023 07:12  --------------------------------------------------------  IN:    dextrose 5% + sodium chloride 0.9%: 30 mL    Norepinephrine: 10.9 mL  Total IN: 40.9 mL    OUT:  Total OUT: 0 mL    Total NET: 40.9 mL        LABS:                        8.7    17.83 )-----------( 46       ( 06 Jun 2023 04:45 )             28.7     06-06    128<L>  |  92<L>  |  22  ----------------------------<  69<L>  4.0   |  22  |  2.57<H>    Ca    9.4      06 Jun 2023 04:45  Phos  3.7     06-06  Mg     1.7     06-06    TPro  4.8<L>  /  Alb  2.5<L>  /  TBili  0.6  /  DBili  x   /  AST  14  /  ALT  9<L>  /  AlkPhos  132<H>  06-06

## 2023-06-06 NOTE — PROGRESS NOTE ADULT - ASSESSMENT
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 Patient will     Cardio  Patient w previously persistently low blood pressures, w 80s/40s during RR and inability to accurately measure BP due to diffuse edema during RR, currently now SBP is >100  Less concern for shock on differential given no infectious etiology, was requiring low dose levophed intermittently, but patient known to have baseline low blood pressure.  Continues to have less levophed requirements  Plan:  - c/w Midodrine 40mg q6h  -c/w hydrocortisone 100 mg TID for further pressor support for increased levophed weaning  - C/w HD as needed. HD today.  - c/w Florinef .3mg qd.  -S/p PICC placement 6/5. R. femoral TLC removed     #Shock, now concern for underlying septic shock 2/2 to poor source control from b/l breast wounds  - Lactate remains negative, uptrending WBC count  - Otherwise, patient on meropenem and vanc dosing by level  - Surgery is consulted, daughter now amenable to surgery.  - s/p debridement  - Patient is now on levophed, wean as tolerated for goal SBP >90, currently not using MAP as perfusion marker     #HfrEf, non-ischemic cardiomyopathy  - history of HfpEF, TTE 5/5/23, normal RV and LV function  - On No HF meds  - C.w statin      Pulmonary  - Patient w no pulmonology history  - good airway control, controlling secretions  - Continue to monitor secretions.    Renal  - ESRD w Dialysis, rapid response during HD session  - C/w HD as tolerated- can uptitrate levophed as needed to allow for HD  - renvela discontinued as pt w normal Phosphorus.    #Hyponatremia   2/2 D10 gtt for hypoglycemia   -Discontinue D10 gtt  -Continue D5NS maintenance fluids   -Continue salt tabs 1 g q12h    GI  #Stercoral Ulcers, 2 BM nonbloody 5/26 AM.  - Came in w GI bleed deemed to be 2/2 to constipation and stercoral ulcers  - C/w PPI BID  - Abdominal US w doppler, no evidence of portal HTN  - F/u GI recs, as per HCP and pt GOC, no role for egd this admission  - Active T and S, Keep HgB >7  - Caution w NSAIDS, prefer GANN-2 inhibitors    ID  -Patient w F to 101.1 on 5/24/23, and concomitant afib w RVR, resolved after defervescence  - Afebrile currently  - F/u BCx, F/u breast wound culture-> gram negative rods. and gram positive cocci,  ESBL Proteus, VRE E. Faecium  -S/p meropenem   -S/p daptomycin/meropenem antibiotic regimen   -s/p breast wound debridement 5/30    Heme  - HgB stable, currently, came in at 6.2  - See above for GI    #Leukocytosis  - Likely 2/2 to breast infection, overall downtrend  - s/p meropenum, meropenem   - s/p debridement    #Thrombocytopenia   Patient with slow steady decline in platelets, more compatible with sepsis vs HIT  HIT score: intermediate  - Hep 5000U q8.      Endo  5/21 received d10 bolus for glucose 66, on q4 glucose checks, then started d10 gtt at 50 cc/hr  - C/w gtt at 30cc/hr  - FSG q6 if consistently >100.  -Soft and bite sized diet    Prophylaxis:  F: D10NS  E: Replete cautiously given ESRD  N: Soft and Bite sized (renal diet)  DVT: Heparin 5000 subq q8    #GOC  Ongoing discussions, palliative involved, appreciate their input and help   -See palliative note for details     Lines:  L femoral a-line placed 5/29/23, L PICC line placed 6/5  No Wu. Rectal tube placed

## 2023-06-06 NOTE — PROCEDURE NOTE - NSINDICATIONS_GEN_A_CORE
Remained in bed resting quietly x 7hrs , monitored q15 minutes
critical illness/venous access
emergency venous access
arterial puncture to obtain ABG's/blood sampling/cannulation purposes/critical patient/monitoring purposes
critical patient/monitoring purposes
critical patient/monitoring purposes
critical illness

## 2023-06-07 NOTE — PROGRESS NOTE ADULT - ASSESSMENT
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 General surgery consulted on 5/26 for evaluation of bilateral breast wounds and possible debridement. Pt unable to provide subjective history 2/2 current mentation. Bilateral breast wounds with fibrinous slough, necrotic tissue, and purulent drainage underlying necrotic eschar now s/p debridement on 5/30.     Recommendations:  Possible bedside debridement of left breast, pending discussion with family.   WOUND CARE: daily dressing changing with Dakins WTD. Dakins kerlix should be damp not soaked;   c/w Abx  Wean pressors as appropriate  Appreciate wound care recs  Rest of care per MICU  Team 5C will continue to follow

## 2023-06-07 NOTE — PROGRESS NOTE ADULT - SUBJECTIVE AND OBJECTIVE BOX
INTERVAL HPI/OVERNIGHT EVENTS: A-line replaced    SUBJECTIVE: Patient seen and examined at bedside.     VITAL SIGNS:  ICU Vital Signs Last 24 Hrs  T(C): 36.3 (03 Jun 2023 12:05), Max: 36.3 (03 Jun 2023 12:05)  T(F): 97.3 (03 Jun 2023 12:05), Max: 97.3 (03 Jun 2023 12:05)  HR: 100 (03 Jun 2023 13:00) (52 - 105)  BP: --  BP(mean): --  ABP: 87/39 (03 Jun 2023 13:00) (87/39 - 125/55)  ABP(mean): 54 (03 Jun 2023 13:00) (54 - 85)  RR: 12 (03 Jun 2023 13:00) (7 - 15)  SpO2: 100% (03 Jun 2023 13:00) (100% - 100%)    O2 Parameters below as of 03 Jun 2023 13:00  Patient On (Oxygen Delivery Method): room air              06-02 @ 07:01 - 06-03 @ 07:00  --------------------------------------------------------  IN: 1173.2 mL / OUT: 50 mL / NET: 1123.2 mL    06-03 @ 07:01  -  06-03 @ 13:32  --------------------------------------------------------  IN: 132 mL / OUT: 0 mL / NET: 132 mL      CAPILLARY BLOOD GLUCOSE      POCT Blood Glucose.: 144 mg/dL (03 Jun 2023 11:28)      PHYSICAL EXAM:    General: large body habitus, AAO1-2. in NAD.  Eyes: PERRL, clear conjunctiva  ENT: Poor dentition.  Respiratory: Decreased BS b/l lung fields; no W/R/R  Cardiac: +S1/S2; irregular rate; no M/R/G; HD catheter over L chest.   Gastrointestinal: soft, protuberant, NT/ND; no rebound or guarding; +BSx4. Large ventral hernia.   Extremities: Diffuse anasarca, worsened in bilateral upper extremities. +3 pitting edema RUE. +2 LUE. +3 b/l pitting edema le.  No blisters on heels or toes.  Skin: Bilateral breast wounds covered in dressing. L breast w eschar, R breast granulation tissue. malodorous.  Neurologic: AAOx1-2; does not participate in strength exam     MEDICATIONS:  MEDICATIONS  (STANDING):  albumin human 25% IVPB 50 milliLiter(s) IV Intermittent every 1 hour  ascorbic acid 500 milliGRAM(s) Oral daily  atorvastatin 40 milliGRAM(s) Oral at bedtime  bisacodyl 5 milliGRAM(s) Oral at bedtime  chlorhexidine 2% Cloths 1 Application(s) Topical <User Schedule>  cinacalcet 120 milliGRAM(s) Oral every 24 hours  Dakins Solution - 1/4 Strength 1 Application(s) Topical every 12 hours  DAPTOmycin IVPB 400 milliGRAM(s) IV Intermittent every 48 hours  dextrose 10% Bolus 250 milliLiter(s) IV Bolus once  dextrose 10%. 1000 milliLiter(s) (30 mL/Hr) IV Continuous <Continuous>  dextrose 50% Injectable 12.5 Gram(s) IV Push once  dextrose 50% Injectable 25 Gram(s) IV Push once  dextrose 50% Injectable 25 Gram(s) IV Push once  dextrose Oral Gel 15 Gram(s) Oral once  dextrose Oral Gel 15 Gram(s) Oral once  fludroCORTISONE 0.5 milliGRAM(s) Oral every 24 hours  folic acid 1 milliGRAM(s) Oral daily  heparin   Injectable 7500 Unit(s) SubCutaneous every 8 hours  hydrocortisone 100 milliGRAM(s) Oral every 8 hours  midodrine 40 milliGRAM(s) Oral every 8 hours  Nephro-deborah 1 Tablet(s) Oral daily  norepinephrine Infusion 0.05 MICROgram(s)/kG/Min (3.72 mL/Hr) IV Continuous <Continuous>  nystatin Powder 1 Application(s) Topical two times a day  pantoprazole    Tablet 40 milliGRAM(s) Oral every 12 hours  polyethylene glycol 3350 17 Gram(s) Oral daily    MEDICATIONS  (PRN):  acetaminophen     Tablet .. 650 milliGRAM(s) Oral every 6 hours PRN Temp greater or equal to 38C (100.4F), Mild Pain (1 - 3)  EPINEPHrine     1 mG/mL Injectable 0.3 milliGRAM(s) IntraMuscular once PRN anaphylaxis  HYDROmorphone   Tablet 4 milliGRAM(s) Oral every 3 hours PRN Severe Pain (7 - 10)  HYDROmorphone   Tablet 2 milliGRAM(s) Oral every 3 hours PRN Moderate Pain (4 - 6)  HYDROmorphone  Injectable 0.5 milliGRAM(s) IV Push every 2 hours PRN Severe Pain (7 - 10)  sodium chloride 0.9% lock flush 10 milliLiter(s) IV Push every 1 hour PRN Pre/post blood products, medications, blood draw, and to maintain line patency      ALLERGIES:  Allergies    sulfa drugs (Angioedema)  Ancef (Rash; Urticaria)  iodine (Hives; Pruritus)  DDAVP (Hypotension)  penicillin (Swelling)    Intolerances        LABS:                        8.4    12.59 )-----------( 52       ( 02 Jun 2023 05:30 )             27.4     06-02    128<L>  |  91<L>  |  16  ----------------------------<  138<H>  3.6   |  23  |  2.23<H>    Ca    9.4      02 Jun 2023 05:30  Phos  2.7     06-02  Mg     1.7     06-02    TPro  5.0<L>  /  Alb  2.1<L>  /  TBili  0.4  /  DBili  x   /  AST  12  /  ALT  9<L>  /  AlkPhos  167<H>  06-02          RADIOLOGY & ADDITIONAL TESTS: Reviewed.   INTERVAL HPI/OVERNIGHT EVENTS: A-line replaced    SUBJECTIVE: Patient seen and examined at bedside. Continues to intermittently be in pain, amenable to dilaudid regimen    VITAL SIGNS:  ICU Vital Signs Last 24 Hrs  T(C): 36.3 (03 Jun 2023 12:05), Max: 36.3 (03 Jun 2023 12:05)  T(F): 97.3 (03 Jun 2023 12:05), Max: 97.3 (03 Jun 2023 12:05)  HR: 100 (03 Jun 2023 13:00) (52 - 105)  BP: --  BP(mean): --  ABP: 87/39 (03 Jun 2023 13:00) (87/39 - 125/55)  ABP(mean): 54 (03 Jun 2023 13:00) (54 - 85)  RR: 12 (03 Jun 2023 13:00) (7 - 15)  SpO2: 100% (03 Jun 2023 13:00) (100% - 100%)    O2 Parameters below as of 03 Jun 2023 13:00  Patient On (Oxygen Delivery Method): room air        06-02 @ 07:01  -  06-03 @ 07:00  --------------------------------------------------------  IN: 1173.2 mL / OUT: 50 mL / NET: 1123.2 mL    06-03 @ 07:01  -  06-03 @ 13:32  --------------------------------------------------------  IN: 132 mL / OUT: 0 mL / NET: 132 mL      CAPILLARY BLOOD GLUCOSE      POCT Blood Glucose.: 144 mg/dL (03 Jun 2023 11:28)      PHYSICAL EXAM:    General: large body habitus, AAO1-2. in NAD.  Eyes: PERRL, clear conjunctiva  ENT: Poor dentition.  Respiratory: Decreased BS b/l lung fields; no W/R/R  Cardiac: +S1/S2; irregular rate; no M/R/G; HD catheter over L chest.   Gastrointestinal: soft, protuberant, NT/ND; no rebound or guarding; +BSx4. Large ventral hernia.   Extremities: Diffuse anasarca, worsened in bilateral upper extremities. +3 pitting edema RUE. +2 LUE. +3 b/l pitting edema le.  No blisters on heels or toes.  Skin: Bilateral breast wounds covered in dressing. L breast w eschar, R breast granulation tissue. malodorous.  Neurologic: AAOx1-2; does not participate in strength exam     MEDICATIONS:  MEDICATIONS  (STANDING):  albumin human 25% IVPB 50 milliLiter(s) IV Intermittent every 1 hour  ascorbic acid 500 milliGRAM(s) Oral daily  atorvastatin 40 milliGRAM(s) Oral at bedtime  bisacodyl 5 milliGRAM(s) Oral at bedtime  chlorhexidine 2% Cloths 1 Application(s) Topical <User Schedule>  cinacalcet 120 milliGRAM(s) Oral every 24 hours  Dakins Solution - 1/4 Strength 1 Application(s) Topical every 12 hours  DAPTOmycin IVPB 400 milliGRAM(s) IV Intermittent every 48 hours  dextrose 10% Bolus 250 milliLiter(s) IV Bolus once  dextrose 10%. 1000 milliLiter(s) (30 mL/Hr) IV Continuous <Continuous>  dextrose 50% Injectable 12.5 Gram(s) IV Push once  dextrose 50% Injectable 25 Gram(s) IV Push once  dextrose 50% Injectable 25 Gram(s) IV Push once  dextrose Oral Gel 15 Gram(s) Oral once  dextrose Oral Gel 15 Gram(s) Oral once  fludroCORTISONE 0.5 milliGRAM(s) Oral every 24 hours  folic acid 1 milliGRAM(s) Oral daily  heparin   Injectable 7500 Unit(s) SubCutaneous every 8 hours  hydrocortisone 100 milliGRAM(s) Oral every 8 hours  midodrine 40 milliGRAM(s) Oral every 8 hours  Nephro-deborah 1 Tablet(s) Oral daily  norepinephrine Infusion 0.05 MICROgram(s)/kG/Min (3.72 mL/Hr) IV Continuous <Continuous>  nystatin Powder 1 Application(s) Topical two times a day  pantoprazole    Tablet 40 milliGRAM(s) Oral every 12 hours  polyethylene glycol 3350 17 Gram(s) Oral daily    MEDICATIONS  (PRN):  acetaminophen     Tablet .. 650 milliGRAM(s) Oral every 6 hours PRN Temp greater or equal to 38C (100.4F), Mild Pain (1 - 3)  EPINEPHrine     1 mG/mL Injectable 0.3 milliGRAM(s) IntraMuscular once PRN anaphylaxis  HYDROmorphone   Tablet 4 milliGRAM(s) Oral every 3 hours PRN Severe Pain (7 - 10)  HYDROmorphone   Tablet 2 milliGRAM(s) Oral every 3 hours PRN Moderate Pain (4 - 6)  HYDROmorphone  Injectable 0.5 milliGRAM(s) IV Push every 2 hours PRN Severe Pain (7 - 10)  sodium chloride 0.9% lock flush 10 milliLiter(s) IV Push every 1 hour PRN Pre/post blood products, medications, blood draw, and to maintain line patency      ALLERGIES:  Allergies    sulfa drugs (Angioedema)  Ancef (Rash; Urticaria)  iodine (Hives; Pruritus)  DDAVP (Hypotension)  penicillin (Swelling)    Intolerances        LABS:                        8.4    12.59 )-----------( 52       ( 02 Jun 2023 05:30 )             27.4     06-02    128<L>  |  91<L>  |  16  ----------------------------<  138<H>  3.6   |  23  |  2.23<H>    Ca    9.4      02 Jun 2023 05:30  Phos  2.7     06-02  Mg     1.7     06-02    TPro  5.0<L>  /  Alb  2.1<L>  /  TBili  0.4  /  DBili  x   /  AST  12  /  ALT  9<L>  /  AlkPhos  167<H>  06-02          RADIOLOGY & ADDITIONAL TESTS: Reviewed.

## 2023-06-07 NOTE — PROGRESS NOTE ADULT - ASSESSMENT
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 Patient will     Cardio  Patient w previously persistently low blood pressures, w 80s/40s during RR and inability to accurately measure BP due to diffuse edema during RR, currently now SBP is >100  Less concern for shock on differential given no infectious etiology, was requiring low dose levophed intermittently, but patient known to have baseline low blood pressure.  Continues to have less levophed requirements  Plan:  - c/w Midodrine 40mg q6h  -c/w hydrocortisone 100 mg TID for further pressor support for increased levophed weaning  - C/w HD as needed. HD today.  - c/w Florinef .3mg qd.  -S/p PICC placement 6/5. R. femoral TLC removed     #Shock, now concern for underlying septic shock 2/2 to poor source control from b/l breast wounds  - Lactate remains negative, uptrending WBC count  - Otherwise, patient on meropenem and vanc dosing by level  - Surgery is consulted, daughter now amenable to surgery.  - s/p debridement  - Patient is now on levophed, wean as tolerated for goal SBP >90, currently not using MAP as perfusion marker     #HfrEf, non-ischemic cardiomyopathy  - history of HfpEF, TTE 5/5/23, normal RV and LV function  - On No HF meds  - C.w statin      Pulmonary  - Patient w no pulmonology history  - good airway control, controlling secretions  - Continue to monitor secretions.    Renal  - ESRD w Dialysis, rapid response during HD session  - C/w HD as tolerated- can uptitrate levophed as needed to allow for HD  - renvela discontinued as pt w normal Phosphorus.    #Hyponatremia   2/2 D10 gtt for hypoglycemia   -Discontinue D10 gtt  -Continue D5NS maintenance fluids   -Continue salt tabs 1 g q12h    GI  #Stercoral Ulcers, 2 BM nonbloody 5/26 AM.  - Came in w GI bleed deemed to be 2/2 to constipation and stercoral ulcers  - C/w PPI BID  - Abdominal US w doppler, no evidence of portal HTN  - F/u GI recs, as per HCP and pt GOC, no role for egd this admission  - Active T and S, Keep HgB >7  - Caution w NSAIDS, prefer GANN-2 inhibitors    ID  -Patient w F to 101.1 on 5/24/23, and concomitant afib w RVR, resolved after defervescence  - Afebrile currently  - F/u BCx, F/u breast wound culture-> gram negative rods. and gram positive cocci,  ESBL Proteus, VRE E. Faecium  -S/p meropenem   -S/p daptomycin/meropenem antibiotic regimen   -s/p breast wound debridement 5/30    Heme  - HgB stable, currently, came in at 6.2  - See above for GI    #Leukocytosis  - Likely 2/2 to breast infection, overall downtrend  - s/p meropenum, meropenem   - s/p debridement    #Thrombocytopenia   Patient with slow steady decline in platelets, more compatible with sepsis vs HIT  HIT score: intermediate  - Hep 5000U q8.      Endo  5/21 received d10 bolus for glucose 66, on q4 glucose checks, then started d10 gtt at 50 cc/hr  - C/w gtt at 30cc/hr  - FSG q6 if consistently >100.  -Soft and bite sized diet    Prophylaxis:  F: D10NS  E: Replete cautiously given ESRD  N: Soft and Bite sized (renal diet)  DVT: Heparin 5000 subq q8    #GOC  Ongoing discussions, palliative involved, appreciate their input and help. Per palliative, ongoing discussions if patient will continue HD, however still wishes to be full code   -See palliative note for details     Lines:  R femoral a-line placed 6/6/23, L PICC line placed 6/5  No Wu  Rectal tube in place

## 2023-06-07 NOTE — PROGRESS NOTE ADULT - SUBJECTIVE AND OBJECTIVE BOX
STATUS POST: Bedside breast debridement, bilateral  POST PROCEDURE DAY: 7    SUBJECTIVE: Pt seen and examined at bedside this am by surgery team. Remains on levophed, decreased requirement overnight. Dressing change done at bedside, patient complained of mild discomfort but overall tolerated it well.     MEDICATIONS  (STANDING):  ascorbic acid 500 milliGRAM(s) Oral daily  atorvastatin 40 milliGRAM(s) Oral at bedtime  bisacodyl 5 milliGRAM(s) Oral at bedtime  chlorhexidine 2% Cloths 1 Application(s) Topical <User Schedule>  cinacalcet 120 milliGRAM(s) Oral every 24 hours  Dakins Solution - 1/4 Strength 1 Application(s) Topical every 12 hours  dextrose 10% Bolus 250 milliLiter(s) IV Bolus once  dextrose 5% + sodium chloride 0.9%. 1000 milliLiter(s) (30 mL/Hr) IV Continuous <Continuous>  dextrose 50% Injectable 25 Gram(s) IV Push once  dextrose 50% Injectable 12.5 Gram(s) IV Push once  dextrose 50% Injectable 25 Gram(s) IV Push once  dextrose Oral Gel 15 Gram(s) Oral once  fludroCORTISONE 0.5 milliGRAM(s) Oral every 24 hours  folic acid 1 milliGRAM(s) Oral daily  heparin   Injectable 7500 Unit(s) SubCutaneous every 8 hours  hydrocortisone 100 milliGRAM(s) Oral every 8 hours  midodrine 30 milliGRAM(s) Oral every 8 hours  Nephro-deborah 1 Tablet(s) Oral daily  norepinephrine Infusion 0.05 MICROgram(s)/kG/Min (3.72 mL/Hr) IV Continuous <Continuous>  nystatin Powder 1 Application(s) Topical two times a day  pantoprazole    Tablet 40 milliGRAM(s) Oral every 12 hours  polyethylene glycol 3350 17 Gram(s) Oral daily  sodium chloride 1 Gram(s) Oral every 12 hours    MEDICATIONS  (PRN):  acetaminophen     Tablet .. 650 milliGRAM(s) Oral every 6 hours PRN Temp greater or equal to 38C (100.4F), Mild Pain (1 - 3)  HYDROmorphone   Tablet 2 milliGRAM(s) Oral every 3 hours PRN Moderate Pain (4 - 6)  HYDROmorphone   Tablet 4 milliGRAM(s) Oral every 3 hours PRN Severe Pain (7 - 10)  HYDROmorphone  Injectable 1 milliGRAM(s) IV Push every 4 hours PRN Severe Pain (7 - 10)  sodium chloride 0.9% lock flush 10 milliLiter(s) IV Push every 1 hour PRN Pre/post blood products, medications, blood draw, and to maintain line patency      Vital Signs Last 24 Hrs  T(C): 35.7 (07 Jun 2023 09:29), Max: 36 (07 Jun 2023 06:00)  T(F): 96.3 (07 Jun 2023 09:29), Max: 96.8 (07 Jun 2023 06:00)  HR: 88 (07 Jun 2023 10:00) (78 - 106)  BP: --  BP(mean): --  RR: 10 (07 Jun 2023 10:00) (9 - 16)  SpO2: 100% (07 Jun 2023 10:00) (64% - 100%)    Parameters below as of 07 Jun 2023 10:00  Patient On (Oxygen Delivery Method): nasal cannula w/ humidification  O2 Flow (L/min): 2      Physical Exam  Constitutional: awake and alert  Pulm: Nonlabored breathing, no respiratory distress  CV: Regular rate and rhythm, normal sinus rhythm  Breast: R debridement site, dry, some necrotic tissue with no active bleeding when dressing removed. Tissue appears pink with granulation tissue, some exudate at periphery. L debridement site with increased purulence today, primarily in dependent area of wound. Increased granulation tissue. Small area of necrotic tissue with exudate - plan for debridement.   Tissue pink, no active bleeding. Continue with chemical debridement with Dakins. Dressing changed with dakins WTD kerlix/abd pad.   Abd:  soft, nontender, nondistended. No rebound, no guarding.   Extremities: edema b/l lower and upper extremities    I&O's Detail    06 Jun 2023 07:01  -  07 Jun 2023 07:00  --------------------------------------------------------  IN:    dextrose 5% + sodium chloride 0.9%: 720 mL    Norepinephrine: 213.7 mL  Total IN: 933.7 mL    OUT:    Other (mL): 1500 mL  Total OUT: 1500 mL    Total NET: -566.3 mL      07 Jun 2023 07:01 - 07 Jun 2023 12:00  --------------------------------------------------------  IN:    dextrose 5% + sodium chloride 0.9%: 90 mL    Norepinephrine: 7.7 mL  Total IN: 97.7 mL    OUT:  Total OUT: 0 mL    Total NET: 97.7 mL        LABS:                        8.1    14.80 )-----------( 35       ( 07 Jun 2023 05:30 )             27.0     06-07    133<L>  |  96  |  18  ----------------------------<  64<L>  3.7   |  23  |  2.25<H>    Ca    9.4      07 Jun 2023 05:30  Phos  3.0     06-07  Mg     1.9     06-07    TPro  4.8<L>  /  Alb  2.5<L>  /  TBili  0.6  /  DBili  x   /  AST  14  /  ALT  9<L>  /  AlkPhos  132<H>  06-06

## 2023-06-08 NOTE — PROGRESS NOTE ADULT - ASSESSMENT
67F with 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, Calciphylaxis of bilateral breasts, HTN, HLD presenting to the ER in setting of anemia additionally w/ breast calciphylaxis s/p debridement. Nephrology following for HD     Assessment/Plan:   #Seen on HD  #ESRD on HD TTS  Usual RX time 3:30 hrs,  Hd today per schedule. Seen on HD. 15 minutes into Tx, pt with BP dropping into 80s. Increased levophed to 0.14 (from 0.04). BP slightly improved to 90s. Albumin ordered. Will further increase and cap levo at 0.2 (same dose as last HD session). If BP drops despite these efforts, will finish HD early  Renal diet  Next HD 6/10    #Breast Calciphylaxis  discussed w/ outpatient HD unit patient was not currently receiving STS  s/p debridement with general surgery  Vascular following  STS w/ HD    #Hypotension   on levophed for pressor support  -c/w levophed and midodrine and fludrocortisone / hydrocortisone   -maintain MAP 70 for adequate renal perfusion    #Hyponatremia  i//so continuous d10 drip   1L fluid restriction  Repeat BMP today    #access   L TDC    #anemia  Hgb not at goal  Epo w/ HD    #renal bone disease   Ca ~8.3  phos 3.1. Goal 3-5.5  c/w sensipar  encourage protein intake   trend phos daily

## 2023-06-08 NOTE — PROGRESS NOTE ADULT - ASSESSMENT
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 Patient will     Cardio  Patient w previously persistently low blood pressures, w 80s/40s during RR and inability to accurately measure BP due to diffuse edema during RR, currently now SBP is >100  Less concern for shock on differential given no infectious etiology, was requiring low dose levophed intermittently, but patient known to have baseline low blood pressure.  Continues to have less levophed requirements  Plan:  - c/w Midodrine 40mg q6h  -c/w hydrocortisone 100 mg TID for further pressor support for increased levophed weaning  - C/w HD as needed. HD today.  - c/w Florinef .3mg qd.  -S/p PICC placement 6/5. R. femoral TLC removed     #Shock, now concern for underlying septic shock 2/2 to poor source control from b/l breast wounds  - Lactate remains negative, uptrending WBC count  - Otherwise, patient on meropenem and vanc dosing by level  - Surgery is consulted, daughter now amenable to surgery.  - s/p debridement  - Patient is now on levophed, wean as tolerated for goal SBP >90, currently not using MAP as perfusion marker   -Remains difficult to wean from levophed with poor clinical prognosis     #HfrEf, non-ischemic cardiomyopathy  - history of HfpEF, TTE 5/5/23, normal RV and LV function  - On No HF meds  - C.w statin      Pulmonary  - Patient w no pulmonology history  - good airway control, controlling secretions  - Continue to monitor secretions.    Renal  - ESRD w Dialysis, rapid response during HD session  - C/w HD as tolerated- can uptitrate levophed as needed to allow for HD  - renvela discontinued as pt w normal Phosphorus.    #Hyponatremia   2/2 D10 gtt for hypoglycemia   -Discontinue D10 gtt  -Continue D5NS maintenance fluids   -Continue salt tabs 1 g q12h    GI  #Stercoral Ulcers, 2 BM nonbloody 5/26 AM.  - Came in w GI bleed deemed to be 2/2 to constipation and stercoral ulcers  - C/w PPI BID  - Abdominal US w doppler, no evidence of portal HTN  - F/u GI recs, as per HCP and pt GOC, no role for egd this admission  - Active T and S, Keep HgB >7  - Caution w NSAIDS, prefer GANN-2 inhibitors    ID  -Patient w F to 101.1 on 5/24/23, and concomitant afib w RVR, resolved after defervescence  - Afebrile currently  - F/u BCx, F/u breast wound culture-> gram negative rods. and gram positive cocci,  ESBL Proteus, VRE E. Faecium  -S/p meropenem   -S/p daptomycin/meropenem antibiotic regimen   -s/p breast wound debridement 5/30    Heme  - HgB stable, currently, came in at 6.2  - See above for GI    #Leukocytosis  - Likely 2/2 to breast infection, overall downtrend  - s/p meropenum, meropenem   - s/p debridement    #Thrombocytopenia   Patient with slow steady decline in platelets, more compatible with sepsis vs HIT  HIT score: intermediate  - Hep 5000U q8.      Endo  5/21 received d10 bolus for glucose 66, on q4 glucose checks, then started d10 gtt at 50 cc/hr  - C/w gtt at 30cc/hr  - FSG q6 if consistently >100.  -Soft and bite sized diet    Prophylaxis:  F: D10NS  E: Replete cautiously given ESRD  N: Soft and Bite sized (renal diet)  DVT: Heparin 5000 subq q8    #GOC  Ongoing discussions, palliative involved, appreciate their input and help. Per palliative, ongoing discussions if patient will continue HD, however still wishes to be full code   -See palliative note for details   -Palliative to reassess GOC given clinical futility and poor prognosis    Lines:  R femoral a-line placed 6/6/23, L PICC line placed 6/5  No Wu  Rectal tube in place

## 2023-06-08 NOTE — PROGRESS NOTE ADULT - CONVERSATION DETAILS
Attempted to follow-up conversation from 2 days ago with patient but patient non-verbal either 2/2 encephalopathy or voluntarily opting to not engage in conversation. Discussed with Jasmina, patient's HCP and daughter over the phone. I reiterated patient's poor prognosis and discussed some of the options in front of us. She explained that she was able to speak with her mother one-on-one after our conversation 2 days ago and she felt her mother understood how seriously ill she is but is disengaging to avoid it. Jasmina endorsed that her mother told her she wants to keep getting treatment but that she also wants Jasmina to make the decision for her. Jasmina explained to me that though her mother deferred the decision to her, she wants to respect her mother's wishes and wants to continue treatment and wants her mother to be full code despite how sick she is.

## 2023-06-08 NOTE — PROGRESS NOTE ADULT - SUBJECTIVE AND OBJECTIVE BOX
INTERVAL HPI/OVERNIGHT EVENTS: Levophed 0.02 --> 0.03. Given D5 amp x1 for hypoglycemia     SUBJECTIVE: Patient seen and examined at bedside. Continues to intermittently be in pain, amenable to dilaudid regimen    VITAL SIGNS:  ICU Vital Signs Last 24 Hrs  T(C): 36.3 (03 Jun 2023 12:05), Max: 36.3 (03 Jun 2023 12:05)  T(F): 97.3 (03 Jun 2023 12:05), Max: 97.3 (03 Jun 2023 12:05)  HR: 100 (03 Jun 2023 13:00) (52 - 105)  BP: --  BP(mean): --  ABP: 87/39 (03 Jun 2023 13:00) (87/39 - 125/55)  ABP(mean): 54 (03 Jun 2023 13:00) (54 - 85)  RR: 12 (03 Jun 2023 13:00) (7 - 15)  SpO2: 100% (03 Jun 2023 13:00) (100% - 100%)    O2 Parameters below as of 03 Jun 2023 13:00  Patient On (Oxygen Delivery Method): room air        06-02 @ 07:01  -  06-03 @ 07:00  --------------------------------------------------------  IN: 1173.2 mL / OUT: 50 mL / NET: 1123.2 mL    06-03 @ 07:01  -  06-03 @ 13:32  --------------------------------------------------------  IN: 132 mL / OUT: 0 mL / NET: 132 mL      CAPILLARY BLOOD GLUCOSE      POCT Blood Glucose.: 144 mg/dL (03 Jun 2023 11:28)      PHYSICAL EXAM:    General: large body habitus, AAO1-2. in NAD.  Eyes: PERRL, clear conjunctiva  ENT: Poor dentition.  Respiratory: Decreased BS b/l lung fields; no W/R/R  Cardiac: +S1/S2; irregular rate; no M/R/G; HD catheter over L chest.   Gastrointestinal: soft, protuberant, NT/ND; no rebound or guarding; +BSx4. Large ventral hernia.   Extremities: Diffuse anasarca, worsened in bilateral upper extremities. +3 pitting edema RUE. +2 LUE. +3 b/l pitting edema le.  No blisters on heels or toes.  Skin: Bilateral breast wounds covered in dressing. L breast w eschar, R breast granulation tissue. malodorous.  Neurologic: AAOx1-2; does not participate in strength exam     MEDICATIONS:  MEDICATIONS  (STANDING):  albumin human 25% IVPB 50 milliLiter(s) IV Intermittent every 1 hour  ascorbic acid 500 milliGRAM(s) Oral daily  atorvastatin 40 milliGRAM(s) Oral at bedtime  bisacodyl 5 milliGRAM(s) Oral at bedtime  chlorhexidine 2% Cloths 1 Application(s) Topical <User Schedule>  cinacalcet 120 milliGRAM(s) Oral every 24 hours  Dakins Solution - 1/4 Strength 1 Application(s) Topical every 12 hours  DAPTOmycin IVPB 400 milliGRAM(s) IV Intermittent every 48 hours  dextrose 10% Bolus 250 milliLiter(s) IV Bolus once  dextrose 10%. 1000 milliLiter(s) (30 mL/Hr) IV Continuous <Continuous>  dextrose 50% Injectable 12.5 Gram(s) IV Push once  dextrose 50% Injectable 25 Gram(s) IV Push once  dextrose 50% Injectable 25 Gram(s) IV Push once  dextrose Oral Gel 15 Gram(s) Oral once  dextrose Oral Gel 15 Gram(s) Oral once  fludroCORTISONE 0.5 milliGRAM(s) Oral every 24 hours  folic acid 1 milliGRAM(s) Oral daily  heparin   Injectable 7500 Unit(s) SubCutaneous every 8 hours  hydrocortisone 100 milliGRAM(s) Oral every 8 hours  midodrine 40 milliGRAM(s) Oral every 8 hours  Nephro-deborah 1 Tablet(s) Oral daily  norepinephrine Infusion 0.05 MICROgram(s)/kG/Min (3.72 mL/Hr) IV Continuous <Continuous>  nystatin Powder 1 Application(s) Topical two times a day  pantoprazole    Tablet 40 milliGRAM(s) Oral every 12 hours  polyethylene glycol 3350 17 Gram(s) Oral daily    MEDICATIONS  (PRN):  acetaminophen     Tablet .. 650 milliGRAM(s) Oral every 6 hours PRN Temp greater or equal to 38C (100.4F), Mild Pain (1 - 3)  EPINEPHrine     1 mG/mL Injectable 0.3 milliGRAM(s) IntraMuscular once PRN anaphylaxis  HYDROmorphone   Tablet 4 milliGRAM(s) Oral every 3 hours PRN Severe Pain (7 - 10)  HYDROmorphone   Tablet 2 milliGRAM(s) Oral every 3 hours PRN Moderate Pain (4 - 6)  HYDROmorphone  Injectable 0.5 milliGRAM(s) IV Push every 2 hours PRN Severe Pain (7 - 10)  sodium chloride 0.9% lock flush 10 milliLiter(s) IV Push every 1 hour PRN Pre/post blood products, medications, blood draw, and to maintain line patency      ALLERGIES:  Allergies    sulfa drugs (Angioedema)  Ancef (Rash; Urticaria)  iodine (Hives; Pruritus)  DDAVP (Hypotension)  penicillin (Swelling)    Intolerances        LABS:                        8.4    12.59 )-----------( 52       ( 02 Jun 2023 05:30 )             27.4     06-02    128<L>  |  91<L>  |  16  ----------------------------<  138<H>  3.6   |  23  |  2.23<H>    Ca    9.4      02 Jun 2023 05:30  Phos  2.7     06-02  Mg     1.7     06-02    TPro  5.0<L>  /  Alb  2.1<L>  /  TBili  0.4  /  DBili  x   /  AST  12  /  ALT  9<L>  /  AlkPhos  167<H>  06-02          RADIOLOGY & ADDITIONAL TESTS: Reviewed.   INTERVAL HPI/OVERNIGHT EVENTS: Levophed 0.02 --> 0.03. Given D5 amp x2 for hypoglycemia     SUBJECTIVE: Patient seen and examined at bedside. Continues to intermittently be in pain, amenable to Dilaudid regimen    VITAL SIGNS:  ICU Vital Signs Last 24 Hrs  T(C): 36.3 (03 Jun 2023 12:05), Max: 36.3 (03 Jun 2023 12:05)  T(F): 97.3 (03 Jun 2023 12:05), Max: 97.3 (03 Jun 2023 12:05)  HR: 100 (03 Jun 2023 13:00) (52 - 105)  BP: --  BP(mean): --  ABP: 87/39 (03 Jun 2023 13:00) (87/39 - 125/55)  ABP(mean): 54 (03 Jun 2023 13:00) (54 - 85)  RR: 12 (03 Jun 2023 13:00) (7 - 15)  SpO2: 100% (03 Jun 2023 13:00) (100% - 100%)    O2 Parameters below as of 03 Jun 2023 13:00  Patient On (Oxygen Delivery Method): room air        06-02 @ 07:01  -  06-03 @ 07:00  --------------------------------------------------------  IN: 1173.2 mL / OUT: 50 mL / NET: 1123.2 mL    06-03 @ 07:01  -  06-03 @ 13:32  --------------------------------------------------------  IN: 132 mL / OUT: 0 mL / NET: 132 mL      CAPILLARY BLOOD GLUCOSE      POCT Blood Glucose.: 144 mg/dL (03 Jun 2023 11:28)      PHYSICAL EXAM:    General: large body habitus, AAO1-2. in NAD.  Eyes: PERRL, clear conjunctiva  ENT: Poor dentition.  Respiratory: Decreased BS b/l lung fields; no W/R/R  Cardiac: +S1/S2; irregular rate; no M/R/G; HD catheter over L chest.   Gastrointestinal: soft, protuberant, NT/ND; no rebound or guarding; +BSx4. Large ventral hernia.   Extremities: Diffuse anasarca, worsened in bilateral upper extremities. +3 pitting edema RUE. +2 LUE. +3 b/l pitting edema le.  No blisters on heels or toes.  Skin: Bilateral breast wounds covered in dressing. L breast w eschar, R breast granulation tissue. malodorous.  Neurologic: AAOx1-2; does not participate in strength exam     MEDICATIONS:  MEDICATIONS  (STANDING):  albumin human 25% IVPB 50 milliLiter(s) IV Intermittent every 1 hour  ascorbic acid 500 milliGRAM(s) Oral daily  atorvastatin 40 milliGRAM(s) Oral at bedtime  bisacodyl 5 milliGRAM(s) Oral at bedtime  chlorhexidine 2% Cloths 1 Application(s) Topical <User Schedule>  cinacalcet 120 milliGRAM(s) Oral every 24 hours  Dakins Solution - 1/4 Strength 1 Application(s) Topical every 12 hours  DAPTOmycin IVPB 400 milliGRAM(s) IV Intermittent every 48 hours  dextrose 10% Bolus 250 milliLiter(s) IV Bolus once  dextrose 10%. 1000 milliLiter(s) (30 mL/Hr) IV Continuous <Continuous>  dextrose 50% Injectable 12.5 Gram(s) IV Push once  dextrose 50% Injectable 25 Gram(s) IV Push once  dextrose 50% Injectable 25 Gram(s) IV Push once  dextrose Oral Gel 15 Gram(s) Oral once  dextrose Oral Gel 15 Gram(s) Oral once  fludroCORTISONE 0.5 milliGRAM(s) Oral every 24 hours  folic acid 1 milliGRAM(s) Oral daily  heparin   Injectable 7500 Unit(s) SubCutaneous every 8 hours  hydrocortisone 100 milliGRAM(s) Oral every 8 hours  midodrine 40 milliGRAM(s) Oral every 8 hours  Nephro-deborah 1 Tablet(s) Oral daily  norepinephrine Infusion 0.05 MICROgram(s)/kG/Min (3.72 mL/Hr) IV Continuous <Continuous>  nystatin Powder 1 Application(s) Topical two times a day  pantoprazole    Tablet 40 milliGRAM(s) Oral every 12 hours  polyethylene glycol 3350 17 Gram(s) Oral daily    MEDICATIONS  (PRN):  acetaminophen     Tablet .. 650 milliGRAM(s) Oral every 6 hours PRN Temp greater or equal to 38C (100.4F), Mild Pain (1 - 3)  EPINEPHrine     1 mG/mL Injectable 0.3 milliGRAM(s) IntraMuscular once PRN anaphylaxis  HYDROmorphone   Tablet 4 milliGRAM(s) Oral every 3 hours PRN Severe Pain (7 - 10)  HYDROmorphone   Tablet 2 milliGRAM(s) Oral every 3 hours PRN Moderate Pain (4 - 6)  HYDROmorphone  Injectable 0.5 milliGRAM(s) IV Push every 2 hours PRN Severe Pain (7 - 10)  sodium chloride 0.9% lock flush 10 milliLiter(s) IV Push every 1 hour PRN Pre/post blood products, medications, blood draw, and to maintain line patency      ALLERGIES:  Allergies    sulfa drugs (Angioedema)  Ancef (Rash; Urticaria)  iodine (Hives; Pruritus)  DDAVP (Hypotension)  penicillin (Swelling)    Intolerances        LABS:                        8.4    12.59 )-----------( 52       ( 02 Jun 2023 05:30 )             27.4     06-02    128<L>  |  91<L>  |  16  ----------------------------<  138<H>  3.6   |  23  |  2.23<H>    Ca    9.4      02 Jun 2023 05:30  Phos  2.7     06-02  Mg     1.7     06-02    TPro  5.0<L>  /  Alb  2.1<L>  /  TBili  0.4  /  DBili  x   /  AST  12  /  ALT  9<L>  /  AlkPhos  167<H>  06-02          RADIOLOGY & ADDITIONAL TESTS: Reviewed.

## 2023-06-08 NOTE — PROGRESS NOTE ADULT - NS ATTEST RISK PROBLEM GEN_ALL_CORE FT
Acute Illness That Poses A Threat To Life  Chronic Illness With Severe Exacerbation/Progression  Prescription Of Parenteral Controlled Substances

## 2023-06-08 NOTE — PROGRESS NOTE ADULT - SUBJECTIVE AND OBJECTIVE BOX
Patient is a 67y Female seen and evaluated at bedside during dialysis. Laying in bed without any obvious distress.      Meds:    acetaminophen     Tablet .. 650 every 6 hours PRN  albumin human 25% IVPB 50 once  ascorbic acid 500 daily  atorvastatin 40 at bedtime  bisacodyl 5 at bedtime  chlorhexidine 2% Cloths 1 <User Schedule>  cinacalcet 120 every 24 hours  Dakins Solution - 1/4 Strength 1 every 12 hours  dextrose 10% Bolus 250 once  dextrose 5% + sodium chloride 0.9%. 1000 <Continuous>  dextrose 50% Injectable 25 once  dextrose 50% Injectable 12.5 once  dextrose 50% Injectable 25 once  dextrose Oral Gel 15 once  fludroCORTISONE 0.5 every 24 hours  folic acid 1 daily  heparin   Injectable 7500 every 8 hours  hydrocortisone 100 every 8 hours  HYDROmorphone   Tablet 2 every 3 hours PRN  HYDROmorphone   Tablet 4 every 3 hours PRN  HYDROmorphone  Injectable 1 every 4 hours PRN  midodrine 30 every 8 hours  Nephro-deborah 1 daily  norepinephrine Infusion 0.05 <Continuous>  nystatin Powder 1 two times a day  pantoprazole    Tablet 40 every 12 hours  polyethylene glycol 3350 17 daily  sodium chloride 1 every 12 hours  sodium chloride 0.9% lock flush 10 every 1 hour PRN  sodium thiosulfate IVPB 25 once      T(C): , Max: 36.5 (06-08-23 @ 03:00)  T(F): , Max: 97.7 (06-08-23 @ 03:00)  HR: 84 (06-08-23 @ 13:00)  BP: --  BP(mean): --  RR: 10 (06-08-23 @ 13:00)  SpO2: 100% (06-08-23 @ 13:00)  Wt(kg): --    06-07 @ 07:01  -  06-08 @ 07:00  --------------------------------------------------------  IN: 788.5 mL / OUT: 0 mL / NET: 788.5 mL    06-08 @ 07:01  -  06-08 @ 15:48  --------------------------------------------------------  IN: 226.4 mL / OUT: 0 mL / NET: 226.4 mL          Review of Systems:  ROS negative except as per HPI      PHYSICAL EXAM:  GENERAL: awake, NAD, on NC O2  CHEST/LUNG: Occasional crackles  HEART: normal S1S2, RRR  ABDOMEN: Soft, Nontender  EXTREMITIES: + edema (total body anasarca)  SKIN: breast calciphylaxis   ACCESS:TDC         LABS:                        7.5    15.88 )-----------( 32       ( 08 Jun 2023 05:30 )             25.2     06-08    130<L>  |  94<L>  |  19  ----------------------------<  228<H>  3.8   |  22  |  2.54<H>    Ca    8.3<L>      08 Jun 2023 05:30  Phos  3.1     06-08  Mg     1.7     06-08    TPro  4.1<L>  /  Alb  2.5<L>  /  TBili  0.6  /  DBili  x   /  AST  15  /  ALT  8<L>  /  AlkPhos  103  06-08                RADIOLOGY & ADDITIONAL STUDIES:

## 2023-06-08 NOTE — PROGRESS NOTE ADULT - SUBJECTIVE AND OBJECTIVE BOX
STATUS POST: Bedside breast debridement, bilateral  POST PROCEDURE DAY: 8    SUBJECTIVE: Pt seen and examined at bedside this am by surgery team. Remains on levophed, slightly. Dressing change done at bedside, pain medicine given prior.     MEDICATIONS  (STANDING):  ascorbic acid 500 milliGRAM(s) Oral daily  atorvastatin 40 milliGRAM(s) Oral at bedtime  bisacodyl 5 milliGRAM(s) Oral at bedtime  chlorhexidine 2% Cloths 1 Application(s) Topical <User Schedule>  cinacalcet 120 milliGRAM(s) Oral every 24 hours  Dakins Solution - 1/4 Strength 1 Application(s) Topical every 12 hours  dextrose 10% Bolus 250 milliLiter(s) IV Bolus once  dextrose 5% + sodium chloride 0.9%. 1000 milliLiter(s) (30 mL/Hr) IV Continuous <Continuous>  dextrose 50% Injectable 25 Gram(s) IV Push once  dextrose 50% Injectable 12.5 Gram(s) IV Push once  dextrose 50% Injectable 25 Gram(s) IV Push once  dextrose Oral Gel 15 Gram(s) Oral once  fludroCORTISONE 0.5 milliGRAM(s) Oral every 24 hours  folic acid 1 milliGRAM(s) Oral daily  heparin   Injectable 7500 Unit(s) SubCutaneous every 8 hours  hydrocortisone 100 milliGRAM(s) Oral every 8 hours  midodrine 30 milliGRAM(s) Oral every 8 hours  Nephro-deborah 1 Tablet(s) Oral daily  norepinephrine Infusion 0.05 MICROgram(s)/kG/Min (3.72 mL/Hr) IV Continuous <Continuous>  nystatin Powder 1 Application(s) Topical two times a day  pantoprazole    Tablet 40 milliGRAM(s) Oral every 12 hours  polyethylene glycol 3350 17 Gram(s) Oral daily    MEDICATIONS  (PRN):  acetaminophen     Tablet .. 650 milliGRAM(s) Oral every 6 hours PRN Temp greater or equal to 38C (100.4F), Mild Pain (1 - 3)  HYDROmorphone   Tablet 2 milliGRAM(s) Oral every 3 hours PRN Moderate Pain (4 - 6)  HYDROmorphone   Tablet 4 milliGRAM(s) Oral every 3 hours PRN Severe Pain (7 - 10)  HYDROmorphone  Injectable 1 milliGRAM(s) IV Push every 4 hours PRN Severe Pain (7 - 10)  sodium chloride 0.9% lock flush 10 milliLiter(s) IV Push every 1 hour PRN Pre/post blood products, medications, blood draw, and to maintain line patency      Vital Signs Last 24 Hrs  T(C): 36.1 (08 Jun 2023 06:03), Max: 36.5 (08 Jun 2023 03:00)  T(F): 97 (08 Jun 2023 06:03), Max: 97.7 (08 Jun 2023 03:00)  HR: 88 (08 Jun 2023 06:00) (60 - 112)  BP: --  BP(mean): --  RR: 10 (08 Jun 2023 06:00) (8 - 16)  SpO2: 100% (08 Jun 2023 06:00) (92% - 100%)    Parameters below as of 08 Jun 2023 06:00  Patient On (Oxygen Delivery Method): nasal cannula w/ humidification  O2 Flow (L/min): 2      Physical Exam  Constitutional: awake and alert  Pulm: Nonlabored breathing, no respiratory distress  CV: Regular rate and rhythm, normal sinus rhythm  Breast: R debridement site, dry, some necrotic tissue with no active bleeding when dressing removed. Tissue appears pink with granulation tissue, some exudate at periphery. L debridement site with venous bleeding, suture ligated with 2-0 silk at bedside. Surgicell placed. Hemostasis achieved. Increased granulation tissue. Packed with WTD Kerlix w/ saline. Continue with chemical debridement with Dakins. Dressing changed with dakins WTD kerlix/abd pad.   Abd:  soft, nontender, nondistended. No rebound, no guarding.   Extremities: edema b/l lower and upper extremities      I&O's Detail    07 Jun 2023 07:01  -  08 Jun 2023 07:00  --------------------------------------------------------  IN:    dextrose 5% + sodium chloride 0.9%: 690 mL    Norepinephrine: 63 mL  Total IN: 753 mL    OUT:  Total OUT: 0 mL    Total NET: 753 mL        LABS:                        7.5    15.88 )-----------( 32       ( 08 Jun 2023 05:30 )             25.2     06-07    133<L>  |  96  |  18  ----------------------------<  64<L>  3.7   |  23  |  2.25<H>    Ca    9.4      07 Jun 2023 05:30  Phos  3.1     06-08  Mg     1.7     06-08

## 2023-06-08 NOTE — PROGRESS NOTE ADULT - ASSESSMENT
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 General surgery consulted on 5/26 for evaluation of bilateral breast wounds and possible debridement. Pt unable to provide subjective history 2/2 current mentation. Bilateral breast wounds with fibrinous slough, necrotic tissue, and purulent drainage underlying necrotic eschar now s/p debridement on 5/30.     Recommendations:  Possible bedside debridement of left breast, pending discussion with family and improved platelet count  WOUND CARE: daily dressing changing with Dakins WTD. 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 General surgery consulted on 5/26 for evaluation of bilateral breast wounds and possible debridement. Pt unable to provide subjective history 2/2 current mentation. Bilateral breast wounds with fibrinous slough, necrotic tissue, and purulent drainage underlying necrotic eschar now s/p debridement on 5/30.     Recommendations:  Plan for bedside debridement tomorrow, 6/9  Transfuse 1u plt today  WOUND CARE: bid dressing changing with Dakins WTD. Dakins kerlix should be damp not soaked;   c/w Abx  Wean pressors as appropriate  Appreciate wound care recs  Rest of care per MICU  Team 5C will continue to follow

## 2023-06-08 NOTE — PROGRESS NOTE ADULT - SUBJECTIVE AND OBJECTIVE BOX
SUBJECTIVE AND OBJECTIVE:  Indication for Geriatrics and Palliative Care Services: GOC    OVERNIGHT EVENTS: Pt's levo requirements came down yesterday to 0.01mcg/kg/min then increased this morning to 0.05. Surgery evaluating for further debridement of breast wound for possible would vac.    Allergies    sulfa drugs (Angioedema)  Ancef (Rash; Urticaria)  iodine (Hives; Pruritus)  DDAVP (Hypotension)  penicillin (Swelling)    Intolerances    MEDICATIONS  (STANDING):  ascorbic acid 500 milliGRAM(s) Oral daily  atorvastatin 40 milliGRAM(s) Oral at bedtime  bisacodyl 5 milliGRAM(s) Oral at bedtime  chlorhexidine 2% Cloths 1 Application(s) Topical <User Schedule>  cinacalcet 120 milliGRAM(s) Oral every 24 hours  Dakins Solution - 1/4 Strength 1 Application(s) Topical every 12 hours  dextrose 10% Bolus 250 milliLiter(s) IV Bolus once  dextrose 5% + sodium chloride 0.9%. 1000 milliLiter(s) (30 mL/Hr) IV Continuous <Continuous>  dextrose 50% Injectable 25 Gram(s) IV Push once  dextrose 50% Injectable 12.5 Gram(s) IV Push once  dextrose 50% Injectable 25 Gram(s) IV Push once  dextrose Oral Gel 15 Gram(s) Oral once  epoetin александр-epbx (RETACRIT) Injectable 19652 Unit(s) IV Push once  fludroCORTISONE 0.5 milliGRAM(s) Oral every 24 hours  folic acid 1 milliGRAM(s) Oral daily  heparin   Injectable 7500 Unit(s) SubCutaneous every 8 hours  hydrocortisone 100 milliGRAM(s) Oral every 8 hours  midodrine 30 milliGRAM(s) Oral every 8 hours  Nephro-deborah 1 Tablet(s) Oral daily  norepinephrine Infusion 0.05 MICROgram(s)/kG/Min (3.72 mL/Hr) IV Continuous <Continuous>  nystatin Powder 1 Application(s) Topical two times a day  pantoprazole    Tablet 40 milliGRAM(s) Oral every 12 hours  polyethylene glycol 3350 17 Gram(s) Oral daily  sodium chloride 1 Gram(s) Oral every 12 hours    MEDICATIONS  (PRN):  acetaminophen     Tablet .. 650 milliGRAM(s) Oral every 6 hours PRN Temp greater or equal to 38C (100.4F), Mild Pain (1 - 3)  HYDROmorphone   Tablet 2 milliGRAM(s) Oral every 3 hours PRN Moderate Pain (4 - 6)  HYDROmorphone   Tablet 4 milliGRAM(s) Oral every 3 hours PRN Severe Pain (7 - 10)  HYDROmorphone  Injectable 1 milliGRAM(s) IV Push every 4 hours PRN Severe Pain (7 - 10)  sodium chloride 0.9% lock flush 10 milliLiter(s) IV Push every 1 hour PRN Pre/post blood products, medications, blood draw, and to maintain line patency      ITEMS UNCHECKED ARE NOT PRESENT    PRESENT SYMPTOMS: [X]Unable to self-report  Source if other than patient:  [ ]Family   [ ]Team     Pain:  [ ]yes [ ]no  QOL impact -   Location -                    Aggravating factors -  Quality -  Radiation -  Timing-  Severity (0-10 scale):  Minimal acceptable level (0-10 scale):     Dyspnea:                           [ ]Mild [ ]Moderate [ ]Severe  Anxiety:                             [ ]Mild [ ]Moderate [ ]Severe  Fatigue:                             [ ]Mild [ ]Moderate [ ]Severe  Nausea:                             [ ]Mild [ ]Moderate [ ]Severe  Loss of appetite:              [ ]Mild [ ]Moderate [ ]Severe  Constipation:                    [ ]Mild [ ]Moderate [ ]Severe    Other Symptoms:  [ ]All other review of systems negative     Palliative Performance Status Version 2:        20 %      http://npcrc.org/files/news/palliative_performance_scale_ppsv2.pdf    PHYSICAL EXAM:  Vital Signs Last 24 Hrs  T(C): 35.8 (08 Jun 2023 15:00), Max: 36.5 (08 Jun 2023 03:00)  T(F): 96.4 (08 Jun 2023 15:00), Max: 97.7 (08 Jun 2023 03:00)  HR: 91 (08 Jun 2023 16:00) (72 - 112)  BP: --  BP(mean): --  RR: 9 (08 Jun 2023 16:00) (8 - 16)  SpO2: 100% (08 Jun 2023 16:00) (92% - 100%)    Parameters below as of 08 Jun 2023 17:00  Patient On (Oxygen Delivery Method): nasal cannula w/ humidification  O2 Flow (L/min): 2   I&O's Summary    07 Jun 2023 07:01  -  08 Jun 2023 07:00  --------------------------------------------------------  IN: 788.5 mL / OUT: 0 mL / NET: 788.5 mL    08 Jun 2023 07:01  -  08 Jun 2023 17:19  --------------------------------------------------------  IN: 226.4 mL / OUT: 0 mL / NET: 226.4 mL       GENERAL: [ ]Cachexia    [ ]Alert  [ ]Oriented x   [X]Lethargic  [ ]Unarousable  [ ]Verbal  [X]Non-Verbal  Behavioral:   [ ]Anxiety  [ ]Delirium [ ]Agitation [X]Uncooperative  HEENT:  [X]Normal   [ ]Dry mouth   [ ]ET Tube/Trach  [ ]Oral lesions  PULMONARY:   [X]Clear [ ]Tachypnea  [ ]Audible excessive secretions   [ ]Rhonchi        [ ]Right [ ]Left [ ]Bilateral  [ ]Crackles        [ ]Right [ ]Left [ ]Bilateral  [ ]Wheezing     [ ]Right [ ]Left [ ]Bilateral  [ ]Diminished BS [ ] Right [ ]Left [ ]Bilateral  CARDIOVASCULAR:    [X]Regular [ ]Irregular [ ]Tachy  [ ]Arjun [ ]Murmur [ ]Other  GASTROINTESTINAL:  [X]Soft  [ ]Distended   [ ]+BS  [X]Non tender [ ]Tender  [ ]Other [ ]PEG [ ]OGT/ NGT   Last BM:   GENITOURINARY:  [ ]Normal [ ]Incontinent   [X]Oliguria/Anuria   [ ]Wu  MUSCULOSKELETAL:   [ ]Normal   [ ]Weakness  [X]Bed/Wheelchair bound [ ]Edema  NEUROLOGIC:   [X]No focal deficits  [ ] Cognitive impairment  [ ] Dysphagia [ ]Dysarthria [ ] Paresis [ ]Other   SKIN:   [X]Normal  [ ]Rash  [ ]Other  [ ]Pressure ulcer(s) [ ]y [ ]n present on admission    CRITICAL CARE:  [X]Shock Present  [ ]Septic [ ]Cardiogenic [ ]Neurologic [ ]Hypovolemic  [X]Vasopressors [ ]Inotropes  [ ]Respiratory failure present [ ]Mechanical Ventilation [ ]Non-invasive ventilatory support [ ]High-Flow   [ ]Acute  [ ]Chronic [ ]Hypoxic  [ ]Hypercarbic [ ]Other  [X]Other organ failure: renal failure, on HD    LABS:                        7.5    15.88 )-----------( 32       ( 08 Jun 2023 05:30 )             25.2   06-08    130<L>  |  94<L>  |  19  ----------------------------<  228<H>  3.8   |  22  |  2.54<H>    Ca    8.3<L>      08 Jun 2023 05:30  Phos  3.1     06-08  Mg     1.7     06-08    TPro  4.1<L>  /  Alb  2.5<L>  /  TBili  0.6  /  DBili  x   /  AST  15  /  ALT  8<L>  /  AlkPhos  103  06-08    RADIOLOGY & ADDITIONAL STUDIES:    Protein Calorie Malnutrition Present: [ ]mild [ ]moderate [ ]severe [ ]underweight [ ]morbid obesity  https://www.andeal.org/vault/2440/web/files/ONC/Table_Clinical%20Characteristics%20to%20Document%20Malnutrition-White%20JV%20et%20al%202012.pdf    Height (cm): 168 (05-29-23 @ 12:17), 177.8 (01-01-23 @ 20:52), 177.8 (11-20-22 @ 08:23)  Weight (kg): 116.5 (05-31-23 @ 09:47), 94.5 (05-04-23 @ 16:00), 85.3 (01-01-23 @ 20:52)  BMI (kg/m2): 41.3 (05-31-23 @ 09:47), 33.5 (05-29-23 @ 12:17), 29.9 (05-04-23 @ 16:00)    [X]PPSV2 < or = 30%  [ ]significant weight loss [X]poor nutritional intake [ ]anasarca[ ]Artificial Nutrition    REFERRALS:   [ ]Chaplaincy  [ ]Hospice  [ ]Child Life  [X]Social Work [ ]Patient and Family Support [ ]Case management [ ]Holistic Therapy [ ] Music therapy  [ ] Massage Therapy    DISCUSSION OF CASE: Family - obtained additional history and to provide emotional support;  Primary team - discussed plan of care

## 2023-06-09 NOTE — SWALLOW BEDSIDE ASSESSMENT ADULT - SLP GENERAL OBSERVATIONS
Pt received awake, alert, did not respond to basic wh- or yes/no questions, was yelling out in pain (?burning in her throat, pain in back). RN present, aware. MD also notified. Pt did not follow 1-step directives.

## 2023-06-09 NOTE — PROGRESS NOTE ADULT - PROBLEM SELECTOR PROBLEM 1
ESRD on dialysis
Pain
ESRD on dialysis
Pain
Hypotension
ESRD on dialysis
UGIB (upper gastrointestinal bleed)
Pain
Pain

## 2023-06-09 NOTE — PROGRESS NOTE ADULT - SUBJECTIVE AND OBJECTIVE BOX
STATUS POST: Bedside breast debridement, bilateral  POST PROCEDURE DAY: 9    SUBJECTIVE: Pt seen and examined at bedside this am by surgery team. Patient moaning in pain. Dressing change and debridement deferred by primary team due to pain.     MEDICATIONS  (STANDING):  ascorbic acid 500 milliGRAM(s) Oral daily  atorvastatin 40 milliGRAM(s) Oral at bedtime  bisacodyl 5 milliGRAM(s) Oral at bedtime  chlorhexidine 2% Cloths 1 Application(s) Topical <User Schedule>  cinacalcet 120 milliGRAM(s) Oral every 24 hours  Dakins Solution - 1/4 Strength 1 Application(s) Topical every 12 hours  dextrose 10% + sodium chloride 0.9%. 1000 milliLiter(s) (30 mL/Hr) IV Continuous <Continuous>  dextrose 10% Bolus 250 milliLiter(s) IV Bolus once  dextrose 50% Injectable 25 Gram(s) IV Push once  dextrose 50% Injectable 12.5 Gram(s) IV Push once  dextrose 50% Injectable 25 Gram(s) IV Push once  dextrose Oral Gel 15 Gram(s) Oral once  epoetin александр-epbx (RETACRIT) Injectable 70920 Unit(s) IV Push once  fludroCORTISONE 0.5 milliGRAM(s) Oral every 24 hours  folic acid 1 milliGRAM(s) Oral daily  heparin   Injectable 7500 Unit(s) SubCutaneous every 8 hours  hydrocortisone 100 milliGRAM(s) Oral every 8 hours  HYDROmorphone   Tablet 2 milliGRAM(s) Oral every 3 hours  midodrine 30 milliGRAM(s) Oral every 8 hours  Nephro-deborah 1 Tablet(s) Oral daily  norepinephrine Infusion 0.05 MICROgram(s)/kG/Min (5.46 mL/Hr) IV Continuous <Continuous>  nystatin Powder 1 Application(s) Topical two times a day  pantoprazole    Tablet 40 milliGRAM(s) Oral every 12 hours  polyethylene glycol 3350 17 Gram(s) Oral daily  sodium chloride 1 Gram(s) Oral every 12 hours    MEDICATIONS  (PRN):  acetaminophen     Tablet .. 650 milliGRAM(s) Oral every 6 hours PRN Temp greater or equal to 38C (100.4F), Mild Pain (1 - 3)  HYDROmorphone  Injectable 1 milliGRAM(s) IV Push every 4 hours PRN Severe Pain (7 - 10)  sodium chloride 0.9% lock flush 10 milliLiter(s) IV Push every 1 hour PRN Pre/post blood products, medications, blood draw, and to maintain line patency      Vital Signs Last 24 Hrs  T(C): 35.9 (09 Jun 2023 09:55), Max: 35.9 (09 Jun 2023 06:28)  T(F): 96.6 (09 Jun 2023 09:55), Max: 96.6 (09 Jun 2023 06:28)  HR: 108 (09 Jun 2023 10:00) (67 - 110)  BP: --  BP(mean): --  RR: 20 (09 Jun 2023 10:00) (8 - 34)  SpO2: 84% (09 Jun 2023 10:00) (72% - 100%)    Parameters below as of 09 Jun 2023 10:00  Patient On (Oxygen Delivery Method): nasal cannula w/ humidification  O2 Flow (L/min): 2      Physical Exam  Constitutional: awake and alert, visibly distressed  Pulm: Nonlabored breathing, no respiratory distress  CV: Regular rate and rhythm, normal sinus rhythm  Breast: exam deferred   Abd:  soft, nontender, nondistended. No rebound, no guarding.   Extremities: edema b/l lower and upper extremities    I&O's Detail    08 Jun 2023 07:01  -  09 Jun 2023 07:00  --------------------------------------------------------  IN:    Albumin 25%  -  50 mL: 50 mL    dextrose 5% + sodium chloride 0.9%: 720 mL    IV PiggyBack: 150 mL    Norepinephrine: 171 mL    Other (mL): 600 mL  Total IN: 1691 mL    OUT:    Other (mL): 650 mL  Total OUT: 650 mL    Total NET: 1041 mL      09 Jun 2023 07:01  -  09 Jun 2023 10:22  --------------------------------------------------------  IN:    dextrose 10% + sodium chloride 0.9%: 90 mL    Norepinephrine: 22.8 mL  Total IN: 112.8 mL    OUT:  Total OUT: 0 mL    Total NET: 112.8 mL        LABS:                        7.5    19.27 )-----------( 33       ( 09 Jun 2023 05:30 )             24.3     06-08    130<L>  |  94<L>  |  19  ----------------------------<  228<H>  3.8   |  22  |  2.54<H>    Ca    8.3<L>      08 Jun 2023 05:30  Phos  2.6     06-09  Mg     2.0     06-09    TPro  4.1<L>  /  Alb  2.5<L>  /  TBili  0.6  /  DBili  x   /  AST  15  /  ALT  8<L>  /  AlkPhos  103  06-08

## 2023-06-09 NOTE — PROGRESS NOTE ADULT - PROBLEM SELECTOR PROBLEM 4
Chronic systolic congestive heart failure
ESRD on dialysis
Debility
Other shock
Debility

## 2023-06-09 NOTE — PROGRESS NOTE ADULT - PROBLEM SELECTOR PLAN 3
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 Surgery team to debride b/l breasts in MICU.  - Pain management as above  - Given multiple comorbidities, prognosis is quite guarded.
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   - Currently, reports poor pain control when awake.  - Pain management as above  - Given multiple comorbidities, prognosis is quite guarded.
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 Surgery team to debride b/l breasts in MICU.  - Pain management as above  - Given multiple comorbidities, prognosis is quite guarded.
- Uncontrolled pain.  - Patient with difficulty tolerating PO meds.   - Discontinue oral Dilaudid.   - Dilaudid 1mg IV Q4 hours ATC.  - Dilaudid 1mg IV Q2 hours prn.  - Low threshold for Dilaudid gtt.  - Ativan 0.5mg IV Q4 hours prn.
- C/w HD as tolerated  - renvela discontinued as pt w normal P.
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 Surgery team to debride b/l breasts in MICU.  - Pain management as above  - Given multiple comorbidities, prognosis is quite guarded.
Multiple wounds located on the skin, present on admission. Most notable in the bilateral breast area with serous drainage. Sacral ulcers present with sero-sanguinous drainage. Seen by wound care on prior admission on 5/5 with similar appearance of wounds. No exposed bone and do not appear infection.     - Continue home Vitamin C 500mg QD  - Wound care consulted  - Per last wound care recommendations: bilateral gluteal wounds with Triad ointment QD, bilateral breast wound with Vashe cleanser

## 2023-06-09 NOTE — PROGRESS NOTE ADULT - PROBLEM SELECTOR PROBLEM 2
ESRD on dialysis
ESRD on dialysis
Hypotension
Lela
ESRD on dialysis
ESRD on dialysis
Encounter for palliative care

## 2023-06-09 NOTE — PROGRESS NOTE ADULT - CONVERSATION DETAILS
Extensive discussion with patients daughter, extended family. Patient unable to participate due to multiorgan failure, encephalopathy. Described all active comorbidities and that life sustaining therapies are no longer effective and are simply prolonging the dying process.  Family is in agreement for comfort care, discontinuation of HD and sole focus on comfort care.   Prognosis is hours to days.   Family is open to hospice care, but unfortunately, patient remains on pressors and cannot be transferred out of ICU.

## 2023-06-09 NOTE — PROGRESS NOTE ADULT - ASSESSMENT
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 Family meeting 6/9 determining with palliative team that patient's GOC is in line with DNR/DNI, MEWS exempt (comfort care). Extensive conversation discussing poor clinical progress and futility.      Cardio  Patient w previously persistently low blood pressures, w 80s/40s during RR and inability to accurately measure BP due to diffuse edema during RR, currently now SBP is >100  Less concern for shock on differential given no infectious etiology, was requiring low dose levophed intermittently, but patient known to have baseline low blood pressure.  Continues to have less levophed requirements  Plan:  -c/w Midodrine 40mg q6h  -c/w hydrocortisone 100 mg TID for further pressor support for increased levophed weaning  -No further Hemodialysis at this time   -C/w Florinef .3mg QD  -S/p PICC placement 6/5. R. femoral TLC removed   -Pressors capped. See "#goals of care"    #Shock, now concern for underlying septic shock 2/2 to poor source control from b/l breast wounds  - Lactate remains negative, uptrending WBC count  - Otherwise, patient on meropenem and vanc dosing by level  - Surgery is consulted, daughter now amenable to surgery.  - s/p debridement  -Pressors capped. See "#goals of care"    #HfrEf, non-ischemic cardiomyopathy  - history of HfpEF, TTE 5/5/23, normal RV and LV function  - On No HF meds  - C.w statin    Pulmonary  - Patient w no pulmonology history  - good airway control, controlling secretions  - Continue to monitor secretions    Renal  - ESRD w Dialysis, rapid response during HD session  - C/w HD as tolerated- can uptitrate levophed as needed to allow for HD  - renvela discontinued as pt w normal Phosphorus.    #Hyponatremia   2/2 D10 gtt for hypoglycemia   -Discontinue D10 gtt  -Continue D5NS maintenance fluids   -Continue salt tabs 1 g q12h    GI  #Stercoral Ulcers, 2 BM nonbloody 5/26 AM.  - Came in w GI bleed deemed to be 2/2 to constipation and stercoral ulcers  - C/w PPI BID    ID  -Patient w F to 101.1 on 5/24/23, and concomitant afib w RVR, resolved after defervescence  - Afebrile currently  - F/u BCx, F/u breast wound culture-> gram negative rods. and gram positive cocci,  ESBL Proteus, VRE E. Faecium  -S/p meropenem   -S/p daptomycin/meropenem antibiotic regimen   -s/p breast wound debridement 5/30    Heme  - HgB stable, currently, came in at 6.2  - See above for GI    #Leukocytosis  - Likely 2/2 to breast infection, overall downtrend  - s/p meropenum, meropenem   - s/p debridement    #Thrombocytopenia   Patient with slow steady decline in platelets, more compatible with sepsis vs HIT  HIT score: intermediate  - Hep 5000U q8.  -F/u HIT labs     Endo  5/21 received d10 bolus for glucose 66, on q4 glucose checks, then started d10 gtt at 50 cc/hr  - C/w gtt at 30cc/hr  - FSG q6 if consistently >100.  -Soft and bite sized diet    Prophylaxis:  F: D10NS  E: Replete cautiously given ESRD  N: Soft and Bite sized (renal diet)  DVT: Heparin 5000 subq q8    #GOC  Ongoing discussions, palliative involved, appreciate their input and help. Per palliative, ongoing discussions if patient will continue HD, however still wishes to be full code   -See palliative note for details   - Poor prognosis. Family meeting 6/9 determining with palliative team that patient's GOC is in line with DNR/DNI, MEWS exempt (comfort care), pressors capped. Extensive conversation discussing poor clinical progress and futility.      Lines:  R femoral a-line placed 6/6/23, L PICC line placed 6/5  No Wu  Rectal tube in place

## 2023-06-09 NOTE — PROGRESS NOTE ADULT - ASSESSMENT
67 F with multiorgan failure, ESRD, no longer tolerating HD, shock, generalized pain, functional quadriplegia, encounter for palliative care.

## 2023-06-09 NOTE — PROGRESS NOTE ADULT - PROBLEM SELECTOR PLAN 1
hemodialysis today for UF~1L (as tolerated) and clearance  continue Sensipar and Sevelamar  EPO 8000U IV with HD    For GI work-up for persistent melena
hypotensive on hemodialysis yesterday afternoon curtailing her session  currently BP low but stable- received 500cc fluid resuscitation during hypotensive episode yesterday  continue Midodrine  will repeat hemodialysis most probably tomorrow, mainly for clearance  continue Sensipar and Jodi  received EPO 8000U IV with shortened HD session yesterday
hemodialysis yesterday  became hypotensive midway through requiring IV Levophed  still on IV Levophed this AM   continue Midodrine  supplement Phosphorus  continue Sensipar
Unclear etiology as patient has difficulty describing pain and on examination is often lethargic. Etiology may be 2/2 calciphylaxis which can be extremely painful. Patient also with previous uncontrolled pain 2/2 mastitis. Tylenol providing inadequate relief. NSAIDs to be avoided 2/2 GIB.  - Would restart previous pain regimen: dilaudid 2mg PO q4h PRN moderate pain and 4mg PO q4h severe pain
Came in w GI bleed deemed to be 2/2 to constipation and stercoral ulcers  - C/w PPi IV BID  - C/w golytely prep as laxative  - Daily KUB  - F/u abdominal US w doppler, no evidence of portal HTN  - F/u GI recs, as per HCP and pt GOC, no role for egd this admission  - Active T and S, Keep HgB >7  - Caution w NSAIDS, prefer GANN-2 inhibitors
hemodialysis today for 3 hours for clearance and UF  plan goal UF ~2.5-3.0L as tolerated  EPO 8000U IV  Midodrine for hypotension
still on IV Levophed for hypotension  plan for hemodialysis tomorrow  continue Midodrine  continue Sensipar
Hx of hypotension, on midodrine 40mg Q8 started on prior admissions. On current admission, sBP ranging 100-110s. Difficult to measure BP due to body habitus and diffuse anasarca but mentating at baseline and otherwise stable. ICU consulted in ED, deemed no need for telemetry/ICU at this time. RR s/p HD w albumin given.     - Continue home midodrine 40mg Q8, pending eval BP   - Potential fem line   - Consider addition of pressors if continues to be hypotensive
Pt with pain mostly localized to her b/l breast wounds. Unable to assess patient's pain well given her limited engagement with providers  - C/w dilaudid 2mg PO q4h PRN moderate pain and 4mg PO q4h severe pain  - Can also give Dilaudid 1mg IV q4h PRN for severe pain.
- Patient with long standing ESRD, no longer able to tolerate HD.  - Profoundly hypotensive, in shock.  - Prognosis is grave.  - As per goals of care discussion, dialysis will be discontinued.
Pt now endorsing pain mostly localized to her b/l breast wounds.  - C/w dilaudid 2mg PO q4h PRN moderate pain and 4mg PO q4h severe pain  - Can also give Dilaudid 1mg IV q4h PRN for severe pain.
Unclear etiology as patient has difficulty describing pain and on examination is often lethargic. Some areas of pain such as toes are 2/2 poor perfusion. B/l breast pain is likely 2/2 breast wounds and calciphylaxis. Tylenol providing inadequate relief. NSAIDs to be avoided 2/2 GIB.  - C/w dilaudid 2mg PO q4h PRN moderate pain and 4mg PO q4h severe pain

## 2023-06-09 NOTE — PROGRESS NOTE ADULT - ASSESSMENT
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 General surgery consulted on 5/26 for evaluation of bilateral breast wounds and possible debridement. Pt unable to provide subjective history 2/2 current mentation. Bilateral breast wounds with fibrinous slough, necrotic tissue, and purulent drainage underlying necrotic eschar now s/p debridement on 5/30.     Recommendations:  Planned for debridement today - deferred by primary team and family   WOUND CARE: dressing changing with Dakins WTD. Dakins kerlix should be damp not soaked  c/w Abx  Wean pressors as appropriate  Care per MICU  Surgery Team 5C will sign off, please reconsult as necessary

## 2023-06-09 NOTE — PROGRESS NOTE ADULT - PROBLEM SELECTOR PROBLEM 6
Encounter for palliative care
Nutrition, metabolism, and development symptoms
Pulmonary embolism
Encounter for palliative care

## 2023-06-09 NOTE — SWALLOW BEDSIDE ASSESSMENT ADULT - SLP PERTINENT HISTORY OF CURRENT PROBLEM
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 Subsequently with HD today and drop in pressures, transferred to MICU for line placement and better accuracy of blood pressure control, currently attempting to wean pressor requirements and GOC discussions with palliative

## 2023-06-09 NOTE — PROGRESS NOTE ADULT - PROBLEM SELECTOR PROBLEM 3
ESRD on dialysis
Calciphylaxis
Calciphylaxis
Pain
Calciphylaxis
Multiple wounds of skin
Calciphylaxis

## 2023-06-09 NOTE — DIETITIAN NUTRITION RISK NOTIFICATION - TREATMENT: THE FOLLOWING DIET HAS BEEN RECOMMENDED
Diet, Soft and Bite Sized:   For patients receiving Renal Replacement - No Protein Restr, No Conc K, No Conc Phos, Low Sodium (RENAL) (05-30-23 @ 18:03) [Active]

## 2023-06-09 NOTE — DIETITIAN NUTRITION RISK NOTIFICATION - ADDITIONAL COMMENTS/DIETITIAN RECOMMENDATIONS
Recommendations:   -Align nutrition with GOC  -Establish route and initiate tube feeding    *Recommend Nepro @40 ml/hr with LPS x1/day to provide 960 ml TF, 1828 kcal, 93 gProt., and 698 ml FW. This is 24.6 non-protein kcal and 1.57 gProt. per kg IBW 59.1 kg.   -If able to tolerate PO, recommend Renal diet with appropriate textures/consistencies per SLP  -Maintain aspiration precautions at all times  -Monitor chemistry, GI fxn, and skin integrity

## 2023-06-09 NOTE — SWALLOW BEDSIDE ASSESSMENT ADULT - SWALLOW EVAL: DIAGNOSIS
Limited evaluation given poor pt participation due to pain. With limited trials, pt presented with overt signs of aspiration with thin liquids. Per MD, GOC are on-going. If GOC are for comfort care/pleasure feeds, continue with baseline diet at this time. If GOC are to avoid any risk of aspiration via oral diet, make pt NPO with short-term alternate means of nutrition/hydration. SLP will f/u to re-assess as appropriate.

## 2023-06-09 NOTE — CHART NOTE - NSCHARTNOTESELECT_GEN_ALL_CORE
Event Note
GI
anti-infective approval/Event Note
A-line discontinued/Event Note
Anti-infective Approval Note/Event Note
Collateral/Event Note
DC central and arterial lines/Event Note
Event Note
GOC/Event Note
Nutrition Services
Palliative Care Attending
anti-infective approval/Event Note

## 2023-06-09 NOTE — PROGRESS NOTE ADULT - SUBJECTIVE AND OBJECTIVE BOX
SUBJECTIVE AND OBJECTIVE:  Agitated, yelling in pain   INTERVAL HPI/OVERNIGHT EVENTS:  Requiring several pressors for blood pressure support.  Unable to tolerate HD.    DNR on chart:   Allergies    sulfa drugs (Angioedema)  Ancef (Rash; Urticaria)  iodine (Hives; Pruritus)  DDAVP (Hypotension)  penicillin (Swelling)    Intolerances    MEDICATIONS  (STANDING):  chlorhexidine 2% Cloths 1 Application(s) Topical <User Schedule>  Dakins Solution - 1/4 Strength 1 Application(s) Topical every 12 hours  dextrose 10% + sodium chloride 0.9%. 1000 milliLiter(s) (30 mL/Hr) IV Continuous <Continuous>  dextrose 10% Bolus 250 milliLiter(s) IV Bolus once  dextrose 50% Injectable 25 Gram(s) IV Push once  dextrose 50% Injectable 12.5 Gram(s) IV Push once  dextrose 50% Injectable 25 Gram(s) IV Push once  dextrose Oral Gel 15 Gram(s) Oral once  epoetin александр-epbx (RETACRIT) Injectable 11625 Unit(s) IV Push once  HYDROmorphone  Injectable 1 milliGRAM(s) IV Push every 4 hours  midodrine 30 milliGRAM(s) Oral every 8 hours  norepinephrine Infusion 0.05 MICROgram(s)/kG/Min (5.46 mL/Hr) IV Continuous <Continuous>  nystatin Powder 1 Application(s) Topical two times a day    MEDICATIONS  (PRN):  acetaminophen     Tablet .. 650 milliGRAM(s) Oral every 6 hours PRN Temp greater or equal to 38C (100.4F), Mild Pain (1 - 3)  HYDROmorphone  Injectable 1 milliGRAM(s) IV Push every 2 hours PRN Severe Pain (7 - 10)  LORazepam   Injectable 0.5 milliGRAM(s) IV Push every 4 hours PRN Anxiety  sodium chloride 0.9% lock flush 10 milliLiter(s) IV Push every 1 hour PRN Pre/post blood products, medications, blood draw, and to maintain line patency      ITEMS UNCHECKED ARE NOT PRESENT    PRESENT SYMPTOMS: [ x]Unable to obtain due to poor mentation   Source if other than patient:  [ ]Family   [ ]Team     Pain (Impact on QOL):  Generalized  Location: Generalized  Minimal acceptable level (0-10 scale):                   Aggravating factors: unable  Quality: unable  Radiation: unable  Severity (0-10 scale): unable    Timing: unable    Dyspnea:                           [ ]Mild [ ]Moderate [ ]Severe  Anxiety:                             [ ]Mild [ ]Moderate [ ]Severe  Fatigue:                             [ ]Mild [ ]Moderate [ ]Severe  Nausea:                             [ ]Mild [ ]Moderate [ ]Severe  Loss of appetite:              [ ]Mild [ ]Moderate [ ]Severe  Constipation:                    [ ]Mild [ ]Moderate [ ]Severe  Grief Present                    [ ] Yes  [ ] No   PAIN AD Score:	  [email or web address] (Ctrl + left click to view)    Other Symptoms:  [x ]All other review of systems negative     Karnofsky Performance Score/Palliative Performance Status Version 2:     10    %    [email or web address]://[email or web address]  PHYSICAL EXAM:  Vital Signs Last 24 Hrs  T(C): 35.5 (10 Evens 2023 06:47), Max: 35.9 (09 Jun 2023 09:55)  T(F): 95.9 (10 Evens 2023 06:47), Max: 96.6 (09 Jun 2023 09:55)  HR: 112 (10 Evens 2023 07:30) (74 - 112)  BP: --  BP(mean): --  RR: 16 (10 Evens 2023 07:30) (0 - 20)  SpO2: 96% (10 Evens 2023 07:30) (91% - 100%)    Parameters below as of 10 Evens 2023 07:30  Patient On (Oxygen Delivery Method): nasal cannula w/ humidification  O2 Flow (L/min): 2   I&O's Summary    09 Jun 2023 07:01  -  10 Evens 2023 07:00  --------------------------------------------------------  IN: 714.4 mL / OUT: 0 mL / NET: 714.4 mL     GENERAL:  [ ]Alert  [ ]Oriented x   [ ]Lethargic  [ ]Cachexia  [ x]Unarousable  [ ]Verbal  [ ]Non-Verbal  Behavioral:   [ ] Anxiety  [ ] Delirium [ ] Agitation [ x] Other  HEENT:  [ ]Normal   [x ]Dry mouth   [ ]ET Tube/Trach  [ ]Oral lesions  PULMONARY:   [ ]Clear [ ]Tachypnea  [ ]Audible excessive secretions   [x ]Rhonchi        [ ]Right [ ]Left [ x]Bilateral  [ ]Crackles        [ ]Right [ ]Left [ ]Bilateral  [ ]Wheezing     [ ]Right [ ]Left [ ]Bilateral  CARDIOVASCULAR:    [x ]Regular [ ]Irregular [ ]Tachy  [ ]Arjun [ ]Murmur [ ]Other  GASTROINTESTINAL:  [x ]Soft  [ ]Distended   [ x]+BS  [x ]Non tender [ ]Tender  [ ]PEG [ ]OGT/ NGT   Last BM:  GENITOURINARY:  [ ]Normal [ ] Incontinent   [x ]Oliguria/Anuria   [ ]Wu  MUSCULOSKELETAL:   [ ]Normal   [ ]Weakness  [ x]Bed/Wheelchair bound [ ]Edema  NEUROLOGIC:   [ ]No focal deficits  [x ] Cognitive impairment  [ ] Dysphagia [ ]Dysarthria [ ] Paresis [ ]Other   SKIN:   [ ]Normal   [ ]Pressure ulcer(s)  [ ]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    CRITICAL CARE:  [x ] Shock Present  [x ]Septic [ ]Cardiogenic [ ]Neurologic [ ]Hypovolemic  [ ]  Vasopressors [ ]  Inotropes   [ ] Respiratory failure present  [ ] Acute  [ ] Chronic [ ] Hypoxic  [ ] Hypercarbic [ ] Other  [ ] Other organ failure     LABS:                        7.5    19.27 )-----------( 33       ( 09 Jun 2023 05:30 )             24.3     Phos  2.6     06-09  Mg     2.0     06-09          RADIOLOGY & ADDITIONAL STUDIES:    Protein Calorie Malnutrition Present: [ ] yes [ ] no  [ ] PPSV2 < or = 30%  [ ] significant weight loss [ ] poor nutritional intake [ ] anasarca [ ] catabolic state Artificial Nutrition [ ]     REFERRALS:   [ ]Chaplaincy  [ ] Hospice  [ ]Child Life  [ ]Social Work  [ ]Case management [ ]Holistic Therapy   Goals of Care Document:

## 2023-06-09 NOTE — PROGRESS NOTE ADULT - PROBLEM SELECTOR PLAN 2
ESRD on HD with lethargy following HD sessions.  - HD per nephrology
Patient w persistently low blood pressures, w 80s/40s during RR and inability to accurately measure BP due to diffuse edema during RR  Less concern for shock on differential given no infectious etiology, was requiring low dose levophed intermittently, but patient known to have baseline low blood pressure.  - Arterial Line removed 5/24/23.  - Weaned off levophed  - Midodrine 40mg q 8, home medication  - C/w HD as needed. HD today 5/25.  - Florinef .2mg qd.
- Patient with multiorgan failure, critically ill in ICU.   - Palliative care following to assist with symptom management and goals of care.   - Goals of care discussions/ACP as above.  - Comfort care.  - HD to be discontinued.  - Symptom management.
ESRD on HD with lethargy following HD sessions.  - HD per nephrology  - Will continue goc with patient and family given extremely poor prognosis
ESRD on HD with lethargy following HD sessions.  - HD per nephrology
Px to [facility] ED from nursing home due to concern for blood in diaper. In ED, 2-3 bowel movements consistent with melena-appearance. Last Eliquis taken Wednesday 5/17. VS notable for , BP fluctuating 100-110. ICU consulted in ED, deemed no need for telemetry/ICU level of care. Hb 8.7 (at baseline per prior admission). Alternate source of bleed potentially due to chronic wounds over breasts and sacrum however appear clean/serous drainage after cleaned and examined.     - IV PPI BID   - Golytely prep as laxative, when stable BP  - Regular manual disimpaction   - Daily KUB  - Trend INR per primary  - F/u wound care for sacral ulcers  - Transfuse <7   - Active Type and Screen    - Avoid opioids  - f/u GI recs, consider EGD/colonoscopy w improved mental status
ESRD on HD with lethargy following HD sessions.  - HD per nephrology

## 2023-06-09 NOTE — CHART NOTE - NSCHARTNOTEFT_GEN_A_CORE
Admitting Diagnosis:   Patient is a 67y old  Female who presents with a chief complaint of GIB (09 Jun 2023 10:43)      PAST MEDICAL & SURGICAL HISTORY:  HTN (hypertension)      HLD (hyperlipidemia)      CAD (coronary atherosclerotic disease)      ESRD on dialysis  last 11/15/22      H/O ventral hernia      Brown tumor  brain      DVT, lower extremity  left      History of surgery  IVC filter      AVF (arteriovenous fistula)  right left      S/P AIDEN-BSO  2003      History of surgery  RLE PTA stent / atherectomy  7/2021      History of atherectomy  stent    Current Nutrition Order:   Diet, NPO after Midnight:      NPO Start Date: 08-Jun-2023,   NPO Start Time: 23:59 (06-08-23 @ 20:05)    PO Intake: Good (%) [   ]  Fair (50-75%) [   ] Poor (<25%) [ x ]    GI Issues: Abdomen ND/NT, +BS x4, LBM 6/8    Pain: Pt moaning at time of assessment and appears in distress     Skin Integrity: PI stg II sacrum, s/p wound debridement B/L breasts, PI stg II L hip, +4 BUE, +2 gen.     Labs:   06-08    130<L>  |  94<L>  |  19  ----------------------------<  228<H>  3.8   |  22  |  2.54<H>    Ca    8.3<L>      08 Jun 2023 05:30  Phos  2.6     06-09  Mg     2.0     06-09    TPro  4.1<L>  /  Alb  2.5<L>  /  TBili  0.6  /  DBili  x   /  AST  15  /  ALT  8<L>  /  AlkPhos  103  06-08    CAPILLARY BLOOD GLUCOSE      POCT Blood Glucose.: 98 mg/dL (09 Jun 2023 11:25)  POCT Blood Glucose.: 126 mg/dL (09 Jun 2023 08:45)  POCT Blood Glucose.: 71 mg/dL (09 Jun 2023 06:16)  POCT Blood Glucose.: 90 mg/dL (09 Jun 2023 01:05)  POCT Blood Glucose.: 69 mg/dL (09 Jun 2023 01:01)  POCT Blood Glucose.: 87 mg/dL (08 Jun 2023 16:55)      Medications:  MEDICATIONS  (STANDING):  ascorbic acid 500 milliGRAM(s) Oral daily  atorvastatin 40 milliGRAM(s) Oral at bedtime  bisacodyl 5 milliGRAM(s) Oral at bedtime  chlorhexidine 2% Cloths 1 Application(s) Topical <User Schedule>  cinacalcet 120 milliGRAM(s) Oral every 24 hours  Dakins Solution - 1/4 Strength 1 Application(s) Topical every 12 hours  dextrose 10% + sodium chloride 0.9%. 1000 milliLiter(s) (30 mL/Hr) IV Continuous <Continuous>  dextrose 10% Bolus 250 milliLiter(s) IV Bolus once  dextrose 50% Injectable 25 Gram(s) IV Push once  dextrose 50% Injectable 12.5 Gram(s) IV Push once  dextrose 50% Injectable 25 Gram(s) IV Push once  dextrose Oral Gel 15 Gram(s) Oral once  fludroCORTISONE 0.5 milliGRAM(s) Oral every 24 hours  folic acid 1 milliGRAM(s) Oral daily  heparin   Injectable 7500 Unit(s) SubCutaneous every 8 hours  hydrocortisone 100 milliGRAM(s) Oral every 8 hours  HYDROmorphone   Tablet 2 milliGRAM(s) Oral every 3 hours  midodrine 30 milliGRAM(s) Oral every 8 hours  Nephro-deborah 1 Tablet(s) Oral daily  norepinephrine Infusion 0.05 MICROgram(s)/kG/Min (5.46 mL/Hr) IV Continuous <Continuous>  nystatin Powder 1 Application(s) Topical two times a day  pantoprazole    Tablet 40 milliGRAM(s) Oral every 12 hours  polyethylene glycol 3350 17 Gram(s) Oral daily  sodium chloride 1 Gram(s) Oral every 12 hours    MEDICATIONS  (PRN):  acetaminophen     Tablet .. 650 milliGRAM(s) Oral every 6 hours PRN Temp greater or equal to 38C (100.4F), Mild Pain (1 - 3)  HYDROmorphone  Injectable 1 milliGRAM(s) IV Push every 4 hours PRN Severe Pain (7 - 10)  sodium chloride 0.9% lock flush 10 milliLiter(s) IV Push every 1 hour PRN Pre/post blood products, medications, blood draw, and to maintain line patency    Weight Change:   5/20 93/7kg  5/23 101.9kg  25/5 103.1kg  25/5 101.1kg  5/27 - 103.4 kg   5/30 - 107.1 kg  6/1 - 110 kg   6/3 - 110.3 kg   6/6 - 112.8 kg  6/8 - 121 kg   - noted wt fluctuations likely r/t fluid shifts     Nutrition Focused Physical Exam: Pt with insufficient nutrition intake x7 days with persistent edema. Pt meets ASPEN criteria for severe PCM.     Estimated energy needs: Updated 6/9 to reflect PCM, HD, wounds using IBW of 59.1 kg.  EEN: 0703-0644 kcal (30-35 kcal/kg)  EPN:  gProt. (1.2-2.0 gProt./kg)  EFN: Fluids per team.     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 Per GI, would like to reduce stool burden prior to EGD/colonscopy w manual disimpaction and golytely. 5/20 transfer to ICU for monitoring of pressures/potential fem line placement. 5/25 Pt reporting severe abdominal pain abdomen soft TTP RUQ. 5/30: Surgical debridement of b/l breast wounds. 6/2: PICC attempted, pt in distress. 6/5: PICC placed. 6/6: Started salt tabs iso hyponatremia. 6/8: Surgery considering wound vac.     Pt care discussed in IDT rounds. Rx and labs reviewed. Pt with PO intake <50% x 7 days with persistent edema; pt meet ASPEN criteria for severe PCM. Pt with snacks at bedside of puddings and applesauce; largely untouched or small bites taken. Family brought in ProStat Renal Care which pt takes at home which provides 100 kcal and 15 gProt. with 50 mg Na, 20 mg K, and 50 mg Phos per serving. Spoke with RN who notes pt takes about ~25% of ONS several times a day; not providing substantial nutrition support. Continue ONS from home per pt preference. Ongoing GOC discussion with family given pt critical condition and poor prognosis. Given state of PCM and persistent inability to meet metabolic demands, recommend establish route and initiate tube feeding for nutrition support if within GOC; see recs below. Pt seen by SLP today who notes if within GOC to recommend NPO with short-term artificial means of nutrition. No other reports GI distress or further nutritional concerns at this time. RDN will continue to reassess, intervene, and monitor as appropriate.     Previous Nutrition Diagnosis:  Increased Nutrient Needs r/t increased metabolic needs for Kcal/protein AEB multiple PUs, ESRD on HD, and PCM     Active [ X ]  Resolved [   ]    Goal: Pt will meet 75% or more of protein/energy needs via most appropriate route for nutrition.     Recommendations:   -Align nutrition with GOC  -Establish route and initiate tube feeding    *Recommend Nepro @40 ml/hr with LPS x1/day to provide 960 ml TF, 1828 kcal, 93 gProt., and 698 ml FW. This is 24.6 non-protein kcal and 1.57 gProt. per kg IBW 59.1 kg.   -If able to tolerate PO, recommend Renal diet with appropriate textures/consistencies per SLP  -Maintain aspiration precautions at all times  -Monitor chemistry, GI fxn, and skin integrity     Risk Level: High [ x ] Moderate [   ] Low [   ]

## 2023-06-09 NOTE — PROGRESS NOTE ADULT - PROBLEM SELECTOR PROBLEM 5
Advanced care planning/counseling discussion
Chronic systolic congestive heart failure
HLD (hyperlipidemia)
Encounter for palliative care
Advanced care planning/counseling discussion
Functional quadriplegia
Advanced care planning/counseling discussion

## 2023-06-09 NOTE — PROGRESS NOTE ADULT - SUBJECTIVE AND OBJECTIVE BOX
INTERVAL HPI/OVERNIGHT EVENTS:     SUBJECTIVE: Patient seen and examined at bedside.    VITAL SIGNS:  ICU Vital Signs Last 24 Hrs  T(C): 36.3 (03 Jun 2023 12:05), Max: 36.3 (03 Jun 2023 12:05)  T(F): 97.3 (03 Jun 2023 12:05), Max: 97.3 (03 Jun 2023 12:05)  HR: 100 (03 Jun 2023 13:00) (52 - 105)  BP: --  BP(mean): --  ABP: 87/39 (03 Jun 2023 13:00) (87/39 - 125/55)  ABP(mean): 54 (03 Jun 2023 13:00) (54 - 85)  RR: 12 (03 Jun 2023 13:00) (7 - 15)  SpO2: 100% (03 Jun 2023 13:00) (100% - 100%)    O2 Parameters below as of 03 Jun 2023 13:00  Patient On (Oxygen Delivery Method): room air        06-02 @ 07:01 - 06-03 @ 07:00  --------------------------------------------------------  IN: 1173.2 mL / OUT: 50 mL / NET: 1123.2 mL    06-03 @ 07:01  -  06-03 @ 13:32  --------------------------------------------------------  IN: 132 mL / OUT: 0 mL / NET: 132 mL      CAPILLARY BLOOD GLUCOSE      POCT Blood Glucose.: 144 mg/dL (03 Jun 2023 11:28)      PHYSICAL EXAM:    General: large body habitus, AAO1-2. in NAD.  Eyes: PERRL, clear conjunctiva  ENT: Poor dentition.  Respiratory: Decreased BS b/l lung fields; no W/R/R  Cardiac: +S1/S2; irregular rate; no M/R/G; HD catheter over L chest.   Gastrointestinal: soft, protuberant, NT/ND; no rebound or guarding; +BSx4. Large ventral hernia.   Extremities: Diffuse anasarca, worsened in bilateral upper extremities. +3 pitting edema RUE. +2 LUE. +3 b/l pitting edema le.  No blisters on heels or toes.  Skin: Bilateral breast wounds covered in dressing. L breast w eschar, R breast granulation tissue. malodorous.  Neurologic: AAOx1-2; does not participate in strength exam     MEDICATIONS:  MEDICATIONS  (STANDING):  albumin human 25% IVPB 50 milliLiter(s) IV Intermittent every 1 hour  ascorbic acid 500 milliGRAM(s) Oral daily  atorvastatin 40 milliGRAM(s) Oral at bedtime  bisacodyl 5 milliGRAM(s) Oral at bedtime  chlorhexidine 2% Cloths 1 Application(s) Topical <User Schedule>  cinacalcet 120 milliGRAM(s) Oral every 24 hours  Dakins Solution - 1/4 Strength 1 Application(s) Topical every 12 hours  DAPTOmycin IVPB 400 milliGRAM(s) IV Intermittent every 48 hours  dextrose 10% Bolus 250 milliLiter(s) IV Bolus once  dextrose 10%. 1000 milliLiter(s) (30 mL/Hr) IV Continuous <Continuous>  dextrose 50% Injectable 12.5 Gram(s) IV Push once  dextrose 50% Injectable 25 Gram(s) IV Push once  dextrose 50% Injectable 25 Gram(s) IV Push once  dextrose Oral Gel 15 Gram(s) Oral once  dextrose Oral Gel 15 Gram(s) Oral once  fludroCORTISONE 0.5 milliGRAM(s) Oral every 24 hours  folic acid 1 milliGRAM(s) Oral daily  heparin   Injectable 7500 Unit(s) SubCutaneous every 8 hours  hydrocortisone 100 milliGRAM(s) Oral every 8 hours  midodrine 40 milliGRAM(s) Oral every 8 hours  Nephro-deborah 1 Tablet(s) Oral daily  norepinephrine Infusion 0.05 MICROgram(s)/kG/Min (3.72 mL/Hr) IV Continuous <Continuous>  nystatin Powder 1 Application(s) Topical two times a day  pantoprazole    Tablet 40 milliGRAM(s) Oral every 12 hours  polyethylene glycol 3350 17 Gram(s) Oral daily    MEDICATIONS  (PRN):  acetaminophen     Tablet .. 650 milliGRAM(s) Oral every 6 hours PRN Temp greater or equal to 38C (100.4F), Mild Pain (1 - 3)  EPINEPHrine     1 mG/mL Injectable 0.3 milliGRAM(s) IntraMuscular once PRN anaphylaxis  HYDROmorphone   Tablet 4 milliGRAM(s) Oral every 3 hours PRN Severe Pain (7 - 10)  HYDROmorphone   Tablet 2 milliGRAM(s) Oral every 3 hours PRN Moderate Pain (4 - 6)  HYDROmorphone  Injectable 0.5 milliGRAM(s) IV Push every 2 hours PRN Severe Pain (7 - 10)  sodium chloride 0.9% lock flush 10 milliLiter(s) IV Push every 1 hour PRN Pre/post blood products, medications, blood draw, and to maintain line patency      ALLERGIES:  Allergies    sulfa drugs (Angioedema)  Ancef (Rash; Urticaria)  iodine (Hives; Pruritus)  DDAVP (Hypotension)  penicillin (Swelling)    Intolerances        LABS:                        8.4    12.59 )-----------( 52       ( 02 Jun 2023 05:30 )             27.4     06-02    128<L>  |  91<L>  |  16  ----------------------------<  138<H>  3.6   |  23  |  2.23<H>    Ca    9.4      02 Jun 2023 05:30  Phos  2.7     06-02  Mg     1.7     06-02    TPro  5.0<L>  /  Alb  2.1<L>  /  TBili  0.4  /  DBili  x   /  AST  12  /  ALT  9<L>  /  AlkPhos  167<H>  06-02          RADIOLOGY & ADDITIONAL TESTS: Reviewed.   INTERVAL HPI/OVERNIGHT EVENTS: NAEO    SUBJECTIVE: Patient seen and examined at bedside. In pain, groaning and lethargic     VITAL SIGNS:  ICU Vital Signs Last 24 Hrs  T(C): 36.3 (03 Jun 2023 12:05), Max: 36.3 (03 Jun 2023 12:05)  T(F): 97.3 (03 Jun 2023 12:05), Max: 97.3 (03 Jun 2023 12:05)  HR: 100 (03 Jun 2023 13:00) (52 - 105)  BP: --  BP(mean): --  ABP: 87/39 (03 Jun 2023 13:00) (87/39 - 125/55)  ABP(mean): 54 (03 Jun 2023 13:00) (54 - 85)  RR: 12 (03 Jun 2023 13:00) (7 - 15)  SpO2: 100% (03 Jun 2023 13:00) (100% - 100%)    O2 Parameters below as of 03 Jun 2023 13:00  Patient On (Oxygen Delivery Method): room air        06-02 @ 07:01  -  06-03 @ 07:00  --------------------------------------------------------  IN: 1173.2 mL / OUT: 50 mL / NET: 1123.2 mL    06-03 @ 07:01  -  06-03 @ 13:32  --------------------------------------------------------  IN: 132 mL / OUT: 0 mL / NET: 132 mL      CAPILLARY BLOOD GLUCOSE      POCT Blood Glucose.: 144 mg/dL (03 Jun 2023 11:28)      PHYSICAL EXAM:    General: large body habitus, AAO1-2. in NAD.  Eyes: PERRL, clear conjunctiva  ENT: Poor dentition.  Respiratory: Decreased BS b/l lung fields; no W/R/R  Cardiac: +S1/S2; irregular rate; no M/R/G; HD catheter over L chest.   Gastrointestinal: soft, protuberant, NT/ND; no rebound or guarding; +BSx4. Large ventral hernia.   Extremities: Diffuse anasarca, worsened in bilateral upper extremities. +3 pitting edema RUE. +2 LUE. +3 b/l pitting edema le.  No blisters on heels or toes.  Skin: Bilateral breast wounds covered in dressing. L breast w eschar, R breast granulation tissue. malodorous.  Neurologic: AAOx1-2; does not participate in strength exam     MEDICATIONS:  MEDICATIONS  (STANDING):  albumin human 25% IVPB 50 milliLiter(s) IV Intermittent every 1 hour  ascorbic acid 500 milliGRAM(s) Oral daily  atorvastatin 40 milliGRAM(s) Oral at bedtime  bisacodyl 5 milliGRAM(s) Oral at bedtime  chlorhexidine 2% Cloths 1 Application(s) Topical <User Schedule>  cinacalcet 120 milliGRAM(s) Oral every 24 hours  Dakins Solution - 1/4 Strength 1 Application(s) Topical every 12 hours  DAPTOmycin IVPB 400 milliGRAM(s) IV Intermittent every 48 hours  dextrose 10% Bolus 250 milliLiter(s) IV Bolus once  dextrose 10%. 1000 milliLiter(s) (30 mL/Hr) IV Continuous <Continuous>  dextrose 50% Injectable 12.5 Gram(s) IV Push once  dextrose 50% Injectable 25 Gram(s) IV Push once  dextrose 50% Injectable 25 Gram(s) IV Push once  dextrose Oral Gel 15 Gram(s) Oral once  dextrose Oral Gel 15 Gram(s) Oral once  fludroCORTISONE 0.5 milliGRAM(s) Oral every 24 hours  folic acid 1 milliGRAM(s) Oral daily  heparin   Injectable 7500 Unit(s) SubCutaneous every 8 hours  hydrocortisone 100 milliGRAM(s) Oral every 8 hours  midodrine 40 milliGRAM(s) Oral every 8 hours  Nephro-deborah 1 Tablet(s) Oral daily  norepinephrine Infusion 0.05 MICROgram(s)/kG/Min (3.72 mL/Hr) IV Continuous <Continuous>  nystatin Powder 1 Application(s) Topical two times a day  pantoprazole    Tablet 40 milliGRAM(s) Oral every 12 hours  polyethylene glycol 3350 17 Gram(s) Oral daily    MEDICATIONS  (PRN):  acetaminophen     Tablet .. 650 milliGRAM(s) Oral every 6 hours PRN Temp greater or equal to 38C (100.4F), Mild Pain (1 - 3)  EPINEPHrine     1 mG/mL Injectable 0.3 milliGRAM(s) IntraMuscular once PRN anaphylaxis  HYDROmorphone   Tablet 4 milliGRAM(s) Oral every 3 hours PRN Severe Pain (7 - 10)  HYDROmorphone   Tablet 2 milliGRAM(s) Oral every 3 hours PRN Moderate Pain (4 - 6)  HYDROmorphone  Injectable 0.5 milliGRAM(s) IV Push every 2 hours PRN Severe Pain (7 - 10)  sodium chloride 0.9% lock flush 10 milliLiter(s) IV Push every 1 hour PRN Pre/post blood products, medications, blood draw, and to maintain line patency      ALLERGIES:  Allergies    sulfa drugs (Angioedema)  Ancef (Rash; Urticaria)  iodine (Hives; Pruritus)  DDAVP (Hypotension)  penicillin (Swelling)    Intolerances        LABS:                        8.4    12.59 )-----------( 52       ( 02 Jun 2023 05:30 )             27.4     06-02    128<L>  |  91<L>  |  16  ----------------------------<  138<H>  3.6   |  23  |  2.23<H>    Ca    9.4      02 Jun 2023 05:30  Phos  2.7     06-02  Mg     1.7     06-02    TPro  5.0<L>  /  Alb  2.1<L>  /  TBili  0.4  /  DBili  x   /  AST  12  /  ALT  9<L>  /  AlkPhos  167<H>  06-02          RADIOLOGY & ADDITIONAL TESTS: Reviewed.

## 2023-06-09 NOTE — PROGRESS NOTE ADULT - PROBLEM SELECTOR PLAN 4
- history of HfpEF, TTE 5/5/23, normal RV and LV function  - On No HF meds  - C.w statin
- PPSv2 is 30%.  - Assist with all ADLs.
ESRD 2/2 polycystic kidney disease on HD TThSat via catheter in L upper chest. Catheter site appears clean/dry. Last HD session tolerated well on Thursday 5/20 w hypotensive episode requiring MICU     - Renal dialysis fellow consulted, recommendations appreciated  - Continue home Sevelamer 1600mg Q8 with meals  - Continue home Nephro-Natasha Q24  - Continue home folic acid 1mg Q24
- PPSv2 is 30%.  - Assist with all ADLs.
- PPSv2 is 20%.  - Assist with all ADLs.
- PPSv2 is 30%.  - Assist with all ADLs.
- Remains on Levophed gtt.  - Pressors are capped.  - Comfort care.

## 2023-06-09 NOTE — PROGRESS NOTE ADULT - PROBLEM SELECTOR PLAN 5
- Patient with numerous Advanced Illness comorbidities.   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  - Palliative has attempted GOC and advanced care planning discussions previously but patient and daughter having difficulty with decision making.  - Extremely guarded prognosis, palliative care consulted to assist with goals of care discussions. ICU team helping plan goc with daughter who was open to discussion today but patient too lethargic to participate. Will attempt family meeting for another time.
- PPSV2 is 10-20%.  - Full assist with all ADLs.
See [facility] note above  - Full code  - HCP: Jasmina Zimmer    In addition to the E/M visit, an advance care planning meeting was performed. Start time: 10:45am; End time: 11:10am; Total time: 25min. A face to face meeting to discuss advance care planning was held today regarding: SALOMON MUNSON. Primary decision maker: Patient. Discussed advance directives including, but not limited to, healthcare proxy and code status. Decision regarding code status: FULL CODE; Documentation completed today: [facility] note
See GOC note above  - Full code  - HCP: Jasmina Zimmer
See GOC note above  - Full code  - HCP: Jasmina Zimmer
Last TTE (5/2023) showing normal LV/RV size and systolic function, grade I LV diastolic dysfunction, moderately dilated LE, moderate AS.     - Continue home Atorvastatin 40mg QD  - Limitations to GDMT given hypotension

## 2023-06-10 NOTE — PROGRESS NOTE ADULT - ATTENDING COMMENTS
68yo woman with a PMH of cognitive impairment (AOx1-2 at baseline), PCKD c/b ESRD on HD s/p multiple clotted fistulas (Tues/Thurs/Sat, chest port), chronic HFrEF, HTN, HLD, PE s/p IVC filter (but on A/C), mild AS/MR, PAD, ventral hernia, sacral decubs, hemorrhoids and osteoclastoma who p/w ?melena (not BRBPR) after recent admission for anemia (no EGD performed).     Plan:    -GI consulted, bleeding may be related to stercoral ulcers/hemorrhoids given fecal impaction  -currently on IV PPI with possible non-urgent EGD/colonoscopy this admission, will give golytely as laxative  -hold DOAC  -follow up INR after [facility], coagulopathy possibly in setting of nutritional deficiency, consider vitamin K pending repeat labs  -obtain haptoglobin, LDH and RC  -daily KUB, CBC, CMP and coags   -follow up iron studies and treat with IV iron as indicated   -obtain abdominal US with doppler and BLE dopplers    -nephrology consulted for HD  -palliative care consult on Monday     Rest of plan as per resident note.
Chronic hypotension with ESRD on HD with GI bleed (resolved). She was admitted for hypotension (undifferentiated). Off of levophed. Increase the dose of fludrocortisone to 0.2. Continue midodrine. plan to remove TLC from femoral area. Rest as above
Chronic hypotension with ESRD on HD with GI bleed (resolved). She was admitted for hypotension (undifferentiated, resolved). Off of levophed. Continue fludrocortisone and midodrine. plan to do blood pressure on RLE. Remove A line and TLC were removed. Episode of fever overnight. blood culture sent. Waiting for results Rest as above
ESRD on HD adm with GIB c/b breast abscess in the setting of breast calciphylaxis for which she gets IV thiosulfate with each dialysis session.   Dialyzed yesterday  s/p debridement of breast wound.   Left message with her outpatient nephrologist re: calciphylaxis treatment plan  Still on pressors, hydrocortisone started today in addition to fludro, midodrine for hypotension
I agree with the fellow's findings and plans as written above with the following additions/amendments:    Seen and examined at bedside, no acute indication for HD, resting well in bed. Further recs as above
I agree with the fellow's findings and plans as written above with the following additions/amendments:    Seen and examined on HD, tolerating, continue HD as above, further recs as above
Patient seen and d/w Dr. Rizzo. Agree with plan above.  Patient with multiple chronic comorbidity and constipation here with rectal bleeding 2/2 stercoral colitis. Continue aggressive bowel regimen.
Patient seen and examined with house-staff during bedside rounds.  Resident note read, including vitals, physical findings, laboratory data, and radiological reports.   Revisions included below.  Direct personal management at bed side and extensive interpretation of the data.  Plan was outlined and discussed in details with the housestaff.  Decision making of high complexity  Action taken for acute disease activity to reflect the level of care provided:  - medication reconciliation  - review laboratory data  Discomfort care with knowing the hemodialysis.  The patient is In the Process.  Patient Is Not in Any Distress or Not in Any Pain.  Will Discuss with Palliative Care Whether with the Decision to Discontinue Pressors and Transferred to the Floor.
seen on HD, agree with above  tolerating rx on pressors-  VS ok - HR better   aiming for 1 L UF
Chronic hypotension with ESRD on HD with GI bleed (resolved). Breast wound abscess with septic shock (on daptomycin). Plan to continue fludrocortisone and further taper of midodrine (toe minimal cyanosis). Continue levophed.
Chronic hypotension with ESRD on HD with GI bleed (resolved). Breast wound abscess with septic shock (on daptomycin). Plan to continue fludrocortisone and taper midodrine (toe minimal cyanosis). Continue levophed.
Chronic hypotension with ESRD on HD with GI bleed (resolved). She was admitted for hypotension (undifferentiated, resolved). Off of levophed. Continue fludrocortisone and midodrine. plan to do blood pressure on RLE.   Meropenem and Daptomycin for breast wound abscess. GOC discussion with family and patient regarding surgical resection.
ESRD on HD with chroni hypotension on midodrine and fluorinex with septic shock secondary to left breat infected wound refusing surgical management.    Continue HD  Contnue abx.  patient with very poor venous access, will place femoral TLC as she does not have good upper body venous access.   Plan for port on tuesday.  D/w daughter about GOC as treatment is suboptimal considering refusal of procedure which may help with source control. If not able to control infection, patient will continue to decline. She has poor functional capacity, likley will not tolerate any decompensation.  Palliative management has been discuss with daughter, who is not in favor of palliation at this time.
ESRD on HD with chronic hypotension on midodrine and fluorinex with septic shock secondary to left breat infected wound refusing surgical management.    Continue HD tomorrow  Continue abx.  patient with very poor venous access, now with new fem TLC as she does not have good upper body venous access.   Plan for port on tuesday.  continue to discuss GOC with family.
ESRD, HFpEF, calciphylaxis, cardiomyopathy, LGI bleed felt to be stercoral ulcer in nature, hypotension  physical as aobve  continue midodrine   try to taper NE, arterial line in place  no evidence of sepsis  manual disimpaction, try enema  HD  decision making of high complexity
ESRD, HFpEF, calciphylaxis, cardiomyopathy, LGI bleed felt to be stercoral ulcer in nature, hypotension  physical as aobve  continue midodrine, add florinef  try to taper NE, arterial line in place  no evidence of sepsis  manual disimpaction, continue enema, laxatives  HD  decision making of high complexity
I agree with the fellow's findings and plans as written above with the following additions/amendments:    Seen and examined at bedside, continues to require pressors, monitoring closely. Will plan on HD for tomorrow if hemodynamically stable. Further recs as above
I agree with the fellow's findings and plans as written above with the following additions/amendments:    Seen and examined at bedside, no acute indication for HD today, monitoring, next HD 5/25, further recs as above
I agree with the fellow's findings and plans as written above with the following additions/amendments:    Seen and examined at bedside, somewhat lethargic, complaining of pain in buttocks, no other issues. Next HD tomorrow, no acute indication for HD now, further recs as above
Severe sepsis due to progressive breast infection. Broad spectrum abx. Bedside debridement of breast wounds by surgery. Very poor prognosis with multiorgan failure. Rest of plan as per above.
Severe sepsis due to progressive breast infection. Broad spectrum abx. S/p successful bedside debridement of breast wounds by surgery. Pressor requirements are decreasing. Very poor prognosis with multiorgan failure. Rest of plan as per above.
plan HD for gentle UF and clearance as tolerates  requiring pressors  overall poor prognosis
ESRD on HD adm with GIB c/b breast abscess in the setting of breast calciphylaxis for which she gets IV thiosulfate with each dialysis session.   Seen on HD today, goal 1L UF  s/p debridement of breast wound.
I agree with the fellow's findings and plans as written above with the following additions/amendments:    Seen and examined at bedside on HD, tolerating on pressor without rising, monitoring closely. Continue HD as above, further recs as above
I agree with the fellow's findings and plans as written above with the following additions/amendments:    Seen and examined at bedside on HD, tolerating well, continue HD as above, further recs as above
Severe sepsis due to progressive breast infection. Broad spectrum abx. S/p successful bedside debridement of breast wounds by surgery. Pressor requirements are decreasing but she has a significant baseline vasculopathy and will most likely be pressor dependent. Very poor prognosis. Rest of plan as per above.
dialyzed at bedside today, UF goal lowered due to hypotension, given albumin with HD to aid in UF as she's severely extravascularly overloaded. d/w daughter who d/w pt today her goals of care, patient wants to continue hemodialysis at this time (had discussed option of palliative withdrawl of dialysis). Appreciate surgical debridement and recommendations re: breast abscess and breast calciphylaxis. Continues to get Nathiosulfate with HD, optimizing secondary hyperparathyroidism management.
Chronic hypotension with ESRD on HD with GI bleed (resolved). She was admitted for hypotension (undifferentiated). Off of levophed. Continue fludrocortisone and midodrine. plan to do blood pressure on RLE. Remove A line and TLC from femoral area. Rest as above
Chronic hypotension with ESRD on HD with GI bleed (resolved). She was admitted for hypotension (undifferentiated, resolved). Off of levophed. Continue fludrocortisone and midodrine. plan to do blood pressure on RLE.   Fever episode with oozing around breast surgical site. Most likely cellulitis/abscess. Zosyn and vancomycin. Send culture. Surgery consultation. Hold off on transfer to tele.
ESRD, calcphylaxis, HFpEF, on midodrine, skull osteoclastoma, admitted with bloody stools and now hypotensive during HD  physical as above  to admit to MICU for placement of arterial line as accurate BP cannot be obtained. Only peripheral BP area is in RLE and there is too much variability in noninvasive and manual measurements  follow off antibiotics unless clear sepsis  GI fu re workup   no evidence of bleeding right now, follow H/H
Severe sepsis due to progressive breast infection. Broad spectrum abx. S/p successful bedside debridement of breast wounds by surgery. Pressor requirements are decreasing but she has a significant baseline vasculopathy and will most likely be pressor dependent. Very poor prognosis.  Rest of plan as per above.

## 2023-06-10 NOTE — PROGRESS NOTE ADULT - ATTENDING SUPERVISION STATEMENT
Fellow
Resident
Fellow
Fellow
Resident
Resident/Fellow
Fellow
Resident
Fellow
Resident
Fellow
Fellow
Resident
Resident/Fellow
Resident/Fellow
Resident
Resident

## 2023-06-10 NOTE — PROGRESS NOTE ADULT - NUTRITIONAL ASSESSMENT
This patient has been assessed with a concern for Malnutrition and has been determined to have a diagnosis/diagnoses of Severe protein-calorie malnutrition.    This patient is being managed with:   Diet Soft and Bite Sized-  For patients receiving Renal Replacement - No Protein Restr No Conc K No Conc Phos Low Sodium (RENAL)  Entered: May 30 2023  8:47AM

## 2023-06-10 NOTE — PROGRESS NOTE ADULT - ASSESSMENT
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 Family meeting 6/9 determining with palliative team that patient's GOC is in line with DNR/DNI, MEWS exempt (comfort care). Extensive conversation discussing poor clinical progress and futility.      Cardio  Patient w previously persistently low blood pressures, w 80s/40s during RR and inability to accurately measure BP due to diffuse edema during RR, currently now SBP is >100  Less concern for shock on differential given no infectious etiology, was requiring low dose levophed intermittently, but patient known to have baseline low blood pressure.  Continues to have less levophed requirements  Plan:  -No further HD   -Midodrine, hydrocortisone, florinef discontinued   -S/p PICC placement 6/5. R. femoral TLC removed   -Pressors capped. See "#goals of care"    #Shock, now concern for underlying septic shock 2/2 to poor source control from b/l breast wounds  - Lactate remains negative, uptrending WBC count  - Otherwise, patient on meropenem and vanc dosing by level  - Surgery is consulted, daughter now amenable to surgery.  - s/p debridement  -Pressors capped. See "#goals of care"    #HfrEf, non-ischemic cardiomyopathy  - history of HfpEF, TTE 5/5/23, normal RV and LV function  - On No HF meds  - Discontinued statin    Pulmonary  - Patient w no pulmonology history  - good airway control, controlling secretions  - Continue to monitor secretions    Renal  - ESRD w Dialysis, rapid response during HD session  - Discontinued HD    #Hyponatremia   2/2 D10 gtt for hypoglycemia   -Discontinued all management     GI  COLLIN    ID  -Patient w F to 101.1 on 5/24/23, and concomitant afib w RVR, resolved after defervescence  - Afebrile currently  - F/u BCx, F/u breast wound culture-> gram negative rods. and gram positive cocci,  ESBL Proteus, VRE E. Faecium  -S/p meropenem   -S/p daptomycin/meropenem antibiotic regimen   -s/p breast wound debridement 5/30  -No further intervention     Heme  No further intervention     #Leukocytosis  - Likely 2/2 to breast infection, overall downtrend  - s/p meropenum, meropenem   - s/p debridement    #Thrombocytopenia   Patient with slow steady decline in platelets, more compatible with sepsis vs HIT  HIT score: intermediate  - Hep 5000U q8.  -F/u HIT labs   -No further management     Endo  5/21 received d10 bolus for glucose 66, on q4 glucose checks, then started d10 gtt at 50 cc/hr  -Discontinued D10 gtt, no further management     Prophylaxis:  F: D10NS  E: Replete cautiously given ESRD  N: Soft and Bite sized (renal diet)  DVT: Heparin 5000 subq q8    #GOC  Ongoing discussions, palliative involved, appreciate their input and help. Per palliative, ongoing discussions if patient will continue HD, however still wishes to be full code   -See palliative note for details   - Poor prognosis. Family meeting 6/9 determining with palliative team that patient's GOC is in line with DNR/DNI, MEWS exempt (comfort care), pressors capped. Extensive conversation discussing poor clinical progress and futility  -Pain management per palliative       Lines:  R femoral a-line placed 6/6/23, L PICC line placed 6/5  No Wu  Rectal tube in place

## 2023-06-10 NOTE — PROGRESS NOTE ADULT - SUBJECTIVE AND OBJECTIVE BOX
INTERVAL HPI/OVERNIGHT EVENTS: NAEO    SUBJECTIVE: Patient seen and examined at bedside. In pain, groaning and lethargic     VITAL SIGNS:  ICU Vital Signs Last 24 Hrs  T(C): 36.3 (03 Jun 2023 12:05), Max: 36.3 (03 Jun 2023 12:05)  T(F): 97.3 (03 Jun 2023 12:05), Max: 97.3 (03 Jun 2023 12:05)  HR: 100 (03 Jun 2023 13:00) (52 - 105)  BP: --  BP(mean): --  ABP: 87/39 (03 Jun 2023 13:00) (87/39 - 125/55)  ABP(mean): 54 (03 Jun 2023 13:00) (54 - 85)  RR: 12 (03 Jun 2023 13:00) (7 - 15)  SpO2: 100% (03 Jun 2023 13:00) (100% - 100%)    O2 Parameters below as of 03 Jun 2023 13:00  Patient On (Oxygen Delivery Method): room air        06-02 @ 07:01  -  06-03 @ 07:00  --------------------------------------------------------  IN: 1173.2 mL / OUT: 50 mL / NET: 1123.2 mL    06-03 @ 07:01  -  06-03 @ 13:32  --------------------------------------------------------  IN: 132 mL / OUT: 0 mL / NET: 132 mL      CAPILLARY BLOOD GLUCOSE      POCT Blood Glucose.: 144 mg/dL (03 Jun 2023 11:28)      PHYSICAL EXAM:    General: large body habitus, AAO1-2. in NAD.  Eyes: PERRL, clear conjunctiva  ENT: Poor dentition.  Respiratory: Decreased BS b/l lung fields; no W/R/R  Cardiac: +S1/S2; irregular rate; no M/R/G; HD catheter over L chest.   Gastrointestinal: soft, protuberant, NT/ND; no rebound or guarding; +BSx4. Large ventral hernia.   Extremities: Diffuse anasarca, worsened in bilateral upper extremities. +3 pitting edema RUE. +2 LUE. +3 b/l pitting edema le.  No blisters on heels or toes.  Skin: Bilateral breast wounds covered in dressing. L breast w eschar, R breast granulation tissue. malodorous.  Neurologic: AAOx1-2; does not participate in strength exam     MEDICATIONS:  MEDICATIONS  (STANDING):  albumin human 25% IVPB 50 milliLiter(s) IV Intermittent every 1 hour  ascorbic acid 500 milliGRAM(s) Oral daily  atorvastatin 40 milliGRAM(s) Oral at bedtime  bisacodyl 5 milliGRAM(s) Oral at bedtime  chlorhexidine 2% Cloths 1 Application(s) Topical <User Schedule>  cinacalcet 120 milliGRAM(s) Oral every 24 hours  Dakins Solution - 1/4 Strength 1 Application(s) Topical every 12 hours  DAPTOmycin IVPB 400 milliGRAM(s) IV Intermittent every 48 hours  dextrose 10% Bolus 250 milliLiter(s) IV Bolus once  dextrose 10%. 1000 milliLiter(s) (30 mL/Hr) IV Continuous <Continuous>  dextrose 50% Injectable 12.5 Gram(s) IV Push once  dextrose 50% Injectable 25 Gram(s) IV Push once  dextrose 50% Injectable 25 Gram(s) IV Push once  dextrose Oral Gel 15 Gram(s) Oral once  dextrose Oral Gel 15 Gram(s) Oral once  fludroCORTISONE 0.5 milliGRAM(s) Oral every 24 hours  folic acid 1 milliGRAM(s) Oral daily  heparin   Injectable 7500 Unit(s) SubCutaneous every 8 hours  hydrocortisone 100 milliGRAM(s) Oral every 8 hours  midodrine 40 milliGRAM(s) Oral every 8 hours  Nephro-deborah 1 Tablet(s) Oral daily  norepinephrine Infusion 0.05 MICROgram(s)/kG/Min (3.72 mL/Hr) IV Continuous <Continuous>  nystatin Powder 1 Application(s) Topical two times a day  pantoprazole    Tablet 40 milliGRAM(s) Oral every 12 hours  polyethylene glycol 3350 17 Gram(s) Oral daily    MEDICATIONS  (PRN):  acetaminophen     Tablet .. 650 milliGRAM(s) Oral every 6 hours PRN Temp greater or equal to 38C (100.4F), Mild Pain (1 - 3)  EPINEPHrine     1 mG/mL Injectable 0.3 milliGRAM(s) IntraMuscular once PRN anaphylaxis  HYDROmorphone   Tablet 4 milliGRAM(s) Oral every 3 hours PRN Severe Pain (7 - 10)  HYDROmorphone   Tablet 2 milliGRAM(s) Oral every 3 hours PRN Moderate Pain (4 - 6)  HYDROmorphone  Injectable 0.5 milliGRAM(s) IV Push every 2 hours PRN Severe Pain (7 - 10)  sodium chloride 0.9% lock flush 10 milliLiter(s) IV Push every 1 hour PRN Pre/post blood products, medications, blood draw, and to maintain line patency      ALLERGIES:  Allergies    sulfa drugs (Angioedema)  Ancef (Rash; Urticaria)  iodine (Hives; Pruritus)  DDAVP (Hypotension)  penicillin (Swelling)    Intolerances        LABS:                        8.4    12.59 )-----------( 52       ( 02 Jun 2023 05:30 )             27.4     06-02    128<L>  |  91<L>  |  16  ----------------------------<  138<H>  3.6   |  23  |  2.23<H>    Ca    9.4      02 Jun 2023 05:30  Phos  2.7     06-02  Mg     1.7     06-02    TPro  5.0<L>  /  Alb  2.1<L>  /  TBili  0.4  /  DBili  x   /  AST  12  /  ALT  9<L>  /  AlkPhos  167<H>  06-02          RADIOLOGY & ADDITIONAL TESTS: Reviewed.   INTERVAL HPI/OVERNIGHT EVENTS: NAEO    SUBJECTIVE: Patient seen and examined at bedside. Resting comfortably, not in distress or pain    VITAL SIGNS:  ICU Vital Signs Last 24 Hrs  T(C): 36.3 (03 Jun 2023 12:05), Max: 36.3 (03 Jun 2023 12:05)  T(F): 97.3 (03 Jun 2023 12:05), Max: 97.3 (03 Jun 2023 12:05)  HR: 100 (03 Jun 2023 13:00) (52 - 105)  BP: --  BP(mean): --  ABP: 87/39 (03 Jun 2023 13:00) (87/39 - 125/55)  ABP(mean): 54 (03 Jun 2023 13:00) (54 - 85)  RR: 12 (03 Jun 2023 13:00) (7 - 15)  SpO2: 100% (03 Jun 2023 13:00) (100% - 100%)    O2 Parameters below as of 03 Jun 2023 13:00  Patient On (Oxygen Delivery Method): room air        06-02 @ 07:01  -  06-03 @ 07:00  --------------------------------------------------------  IN: 1173.2 mL / OUT: 50 mL / NET: 1123.2 mL    06-03 @ 07:01  -  06-03 @ 13:32  --------------------------------------------------------  IN: 132 mL / OUT: 0 mL / NET: 132 mL      CAPILLARY BLOOD GLUCOSE      POCT Blood Glucose.: 144 mg/dL (03 Jun 2023 11:28)      PHYSICAL EXAM:    General: large body habitus, AAO1-2. in NAD.  Eyes: PERRL, clear conjunctiva  ENT: Poor dentition.  Respiratory: Decreased BS b/l lung fields; no W/R/R  Cardiac: +S1/S2; irregular rate; no M/R/G; HD catheter over L chest.   Gastrointestinal: soft, protuberant, NT/ND; no rebound or guarding; +BSx4. Large ventral hernia.   Extremities: Diffuse anasarca, worsened in bilateral upper extremities. +3 pitting edema RUE. +2 LUE. +3 b/l pitting edema le.  No blisters on heels or toes.  Skin: Bilateral breast wounds covered in dressing. L breast w eschar, R breast granulation tissue. malodorous.  Neurologic: AAOx1-2; does not participate in strength exam     MEDICATIONS:  MEDICATIONS  (STANDING):  albumin human 25% IVPB 50 milliLiter(s) IV Intermittent every 1 hour  ascorbic acid 500 milliGRAM(s) Oral daily  atorvastatin 40 milliGRAM(s) Oral at bedtime  bisacodyl 5 milliGRAM(s) Oral at bedtime  chlorhexidine 2% Cloths 1 Application(s) Topical <User Schedule>  cinacalcet 120 milliGRAM(s) Oral every 24 hours  Dakins Solution - 1/4 Strength 1 Application(s) Topical every 12 hours  DAPTOmycin IVPB 400 milliGRAM(s) IV Intermittent every 48 hours  dextrose 10% Bolus 250 milliLiter(s) IV Bolus once  dextrose 10%. 1000 milliLiter(s) (30 mL/Hr) IV Continuous <Continuous>  dextrose 50% Injectable 12.5 Gram(s) IV Push once  dextrose 50% Injectable 25 Gram(s) IV Push once  dextrose 50% Injectable 25 Gram(s) IV Push once  dextrose Oral Gel 15 Gram(s) Oral once  dextrose Oral Gel 15 Gram(s) Oral once  fludroCORTISONE 0.5 milliGRAM(s) Oral every 24 hours  folic acid 1 milliGRAM(s) Oral daily  heparin   Injectable 7500 Unit(s) SubCutaneous every 8 hours  hydrocortisone 100 milliGRAM(s) Oral every 8 hours  midodrine 40 milliGRAM(s) Oral every 8 hours  Nephro-deborah 1 Tablet(s) Oral daily  norepinephrine Infusion 0.05 MICROgram(s)/kG/Min (3.72 mL/Hr) IV Continuous <Continuous>  nystatin Powder 1 Application(s) Topical two times a day  pantoprazole    Tablet 40 milliGRAM(s) Oral every 12 hours  polyethylene glycol 3350 17 Gram(s) Oral daily    MEDICATIONS  (PRN):  acetaminophen     Tablet .. 650 milliGRAM(s) Oral every 6 hours PRN Temp greater or equal to 38C (100.4F), Mild Pain (1 - 3)  EPINEPHrine     1 mG/mL Injectable 0.3 milliGRAM(s) IntraMuscular once PRN anaphylaxis  HYDROmorphone   Tablet 4 milliGRAM(s) Oral every 3 hours PRN Severe Pain (7 - 10)  HYDROmorphone   Tablet 2 milliGRAM(s) Oral every 3 hours PRN Moderate Pain (4 - 6)  HYDROmorphone  Injectable 0.5 milliGRAM(s) IV Push every 2 hours PRN Severe Pain (7 - 10)  sodium chloride 0.9% lock flush 10 milliLiter(s) IV Push every 1 hour PRN Pre/post blood products, medications, blood draw, and to maintain line patency      ALLERGIES:  Allergies    sulfa drugs (Angioedema)  Ancef (Rash; Urticaria)  iodine (Hives; Pruritus)  DDAVP (Hypotension)  penicillin (Swelling)    Intolerances        LABS:                        8.4    12.59 )-----------( 52       ( 02 Jun 2023 05:30 )             27.4     06-02    128<L>  |  91<L>  |  16  ----------------------------<  138<H>  3.6   |  23  |  2.23<H>    Ca    9.4      02 Jun 2023 05:30  Phos  2.7     06-02  Mg     1.7     06-02    TPro  5.0<L>  /  Alb  2.1<L>  /  TBili  0.4  /  DBili  x   /  AST  12  /  ALT  9<L>  /  AlkPhos  167<H>  06-02          RADIOLOGY & ADDITIONAL TESTS: Reviewed.

## 2023-06-11 NOTE — PROGRESS NOTE ADULT - TIME BILLING
Patient's condition is unchanged.  I will await the family decision about comfort care.  Continue present to the family comes in and discontinue.  No hemodialysis.  No blood work.  Patient is not any distress.  Patient seen and examined with house-staff during bedside rounds.  Resident note read, including vitals, physical findings, laboratory data, and radiological reports.   Revisions included below.  Direct personal management at bed side and extensive interpretation of the data.  Plan was outlined and discussed in details with the housestaff.  Decision making of high complexity  Action taken for acute disease activity to reflect the level of care provided:  - medication reconciliation  - review laboratory data
Emotional Support/Supportive Care and Clarification of Potential Disease Trajectory related to septic shock.  Exploration of GOC including advanced directives such as code status.  Education and Monitoring of Opiates including titration and management of high risk medications.
Education and Monitoring of Opiates including titration and management of high risk medications.
Excludes time spent on ACP discussions.  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

## 2023-06-11 NOTE — PROGRESS NOTE ADULT - SUBJECTIVE AND OBJECTIVE BOX
OVERNIGHT EVENTS:    SUBJECTIVE / INTERVAL HPI: Patient seen and examined at bedside.     VITAL SIGNS:  Vital Signs Last 24 Hrs  T(C): 35.6 (10 Evens 2023 09:41), Max: 35.6 (10 Evens 2023 09:41)  T(F): 96.1 (10 Evens 2023 09:41), Max: 96.1 (10 Evens 2023 09:41)  HR: 107 (10 Evens 2023 11:00) (100 - 113)  BP: --  BP(mean): --  RR: 13 (10 Evens 2023 19:30) (0 - 16)  SpO2: 92% (10 Evens 2023 11:00) (89% - 97%)    Parameters below as of 10 Eevns 2023 11:00  Patient On (Oxygen Delivery Method): nasal cannula w/ humidification  O2 Flow (L/min): 2    I&O's Summary    09 Jun 2023 07:01  -  10 Evens 2023 07:00  --------------------------------------------------------  IN: 827.2 mL / OUT: 0 mL / NET: 827.2 mL    10 Evens 2023 07:01  -  11 Jun 2023 06:01  --------------------------------------------------------  IN: 526.4 mL / OUT: 0 mL / NET: 526.4 mL        PHYSICAL EXAM:    General: NAD  HEENT: NC/AT; PERRL, anicteric sclera; MMM  Neck: supple  Cardiovascular: +S1/S2; RRR  Respiratory: CTA B/L; no W/R/R  Gastrointestinal: soft, NT/ND; +BSx4  Extremities: WWP; no edema, clubbing or cyanosis  Vascular: 2+ radial, DP/PT pulses B/L  Neurological: AAOx3; no focal deficits    MEDICATIONS:  MEDICATIONS  (STANDING):  chlorhexidine 2% Cloths 1 Application(s) Topical <User Schedule>  dextrose 10% + sodium chloride 0.9%. 1000 milliLiter(s) (30 mL/Hr) IV Continuous <Continuous>  HYDROmorphone  Injectable 1 milliGRAM(s) IV Push every 4 hours  norepinephrine Infusion 0.05 MICROgram(s)/kG/Min (5.46 mL/Hr) IV Continuous <Continuous>    MEDICATIONS  (PRN):  acetaminophen     Tablet .. 650 milliGRAM(s) Oral every 6 hours PRN Temp greater or equal to 38C (100.4F), Mild Pain (1 - 3)  HYDROmorphone  Injectable 1 milliGRAM(s) IV Push every 2 hours PRN Severe Pain (7 - 10)  LORazepam   Injectable 0.5 milliGRAM(s) IV Push every 4 hours PRN Anxiety  sodium chloride 0.9% lock flush 10 milliLiter(s) IV Push every 1 hour PRN Pre/post blood products, medications, blood draw, and to maintain line patency      ALLERGIES:  Allergies    sulfa drugs (Angioedema)  Ancef (Rash; Urticaria)  iodine (Hives; Pruritus)  DDAVP (Hypotension)  penicillin (Swelling)    Intolerances        LABS:              CAPILLARY BLOOD GLUCOSE      POCT Blood Glucose.: 88 mg/dL (09 Jun 2023 17:12)      RADIOLOGY & ADDITIONAL TESTS: Reviewed.   OVERNIGHT EVENTS:  SHAISTA  SUBJECTIVE / INTERVAL HPI: Patient seen and examined at bedside. Resting comfortably this morning, not in distress or pain      VITAL SIGNS:  Vital Signs Last 24 Hrs  T(C): 35.6 (10 Evens 2023 09:41), Max: 35.6 (10 Evens 2023 09:41)  T(F): 96.1 (10 Evens 2023 09:41), Max: 96.1 (10 Evens 2023 09:41)  HR: 107 (10 Evens 2023 11:00) (100 - 113)  BP: --  BP(mean): --  RR: 13 (10 Evens 2023 19:30) (0 - 16)  SpO2: 92% (10 Evens 2023 11:00) (89% - 97%)    Parameters below as of 10 Evens 2023 11:00  Patient On (Oxygen Delivery Method): nasal cannula w/ humidification  O2 Flow (L/min): 2    I&O's Summary    09 Jun 2023 07:01  -  10 Evens 2023 07:00  --------------------------------------------------------  IN: 827.2 mL / OUT: 0 mL / NET: 827.2 mL    10 Evens 2023 07:01  -  11 Jun 2023 06:01  --------------------------------------------------------  IN: 526.4 mL / OUT: 0 mL / NET: 526.4 mL        PHYSICAL EXAM:    General: large body habitus, AAO1-2. in NAD.  Eyes: PERRL, clear conjunctiva  ENT: Poor dentition.  Respiratory: Decreased BS b/l lung fields; no W/R/R  Cardiac: +S1/S2; irregular rate; no M/R/G; HD catheter over L chest.   Gastrointestinal: soft, protuberant, NT/ND; no rebound or guarding; +BSx4. Large ventral hernia.   Extremities: Diffuse anasarca, worsened in bilateral upper extremities. +3 pitting edema RUE. +2 LUE. +3 b/l pitting edema le.  No blisters on heels or toes.  Skin: Bilateral breast wounds covered in dressing. L breast w eschar, R breast granulation tissue. malodorous.  Neurologic: AAOx1-2; does not participate in strength exam     MEDICATIONS:  MEDICATIONS  (STANDING):  chlorhexidine 2% Cloths 1 Application(s) Topical <User Schedule>  dextrose 10% + sodium chloride 0.9%. 1000 milliLiter(s) (30 mL/Hr) IV Continuous <Continuous>  HYDROmorphone  Injectable 1 milliGRAM(s) IV Push every 4 hours  norepinephrine Infusion 0.05 MICROgram(s)/kG/Min (5.46 mL/Hr) IV Continuous <Continuous>    MEDICATIONS  (PRN):  acetaminophen     Tablet .. 650 milliGRAM(s) Oral every 6 hours PRN Temp greater or equal to 38C (100.4F), Mild Pain (1 - 3)  HYDROmorphone  Injectable 1 milliGRAM(s) IV Push every 2 hours PRN Severe Pain (7 - 10)  LORazepam   Injectable 0.5 milliGRAM(s) IV Push every 4 hours PRN Anxiety  sodium chloride 0.9% lock flush 10 milliLiter(s) IV Push every 1 hour PRN Pre/post blood products, medications, blood draw, and to maintain line patency      ALLERGIES:  Allergies    sulfa drugs (Angioedema)  Ancef (Rash; Urticaria)  iodine (Hives; Pruritus)  DDAVP (Hypotension)  penicillin (Swelling)    Intolerances        LABS:              CAPILLARY BLOOD GLUCOSE      POCT Blood Glucose.: 88 mg/dL (09 Jun 2023 17:12)      RADIOLOGY & ADDITIONAL TESTS: Reviewed.

## 2023-06-12 NOTE — PROGRESS NOTE ADULT - REASON FOR ADMISSION
GIB
Calcyphylaxis
GIB
Multiorgan Failure
GIB
GIB

## 2023-06-12 NOTE — PROGRESS NOTE ADULT - SUBJECTIVE AND OBJECTIVE BOX
OVERNIGHT EVENTS:  SHAISTA  SUBJECTIVE / INTERVAL HPI: Patient seen and examined at bedside. Resting comfortably this morning, not in distress or pain      VITAL SIGNS:  Vital Signs Last 24 Hrs  T(C): 35.6 (10 Evens 2023 09:41), Max: 35.6 (10 Evens 2023 09:41)  T(F): 96.1 (10 Evens 2023 09:41), Max: 96.1 (10 Evens 2023 09:41)  HR: 107 (10 Evens 2023 11:00) (100 - 113)  BP: --  BP(mean): --  RR: 13 (10 Evens 2023 19:30) (0 - 16)  SpO2: 92% (10 Evens 2023 11:00) (89% - 97%)    Parameters below as of 10 Evens 2023 11:00  Patient On (Oxygen Delivery Method): nasal cannula w/ humidification  O2 Flow (L/min): 2    I&O's Summary    09 Jun 2023 07:01  -  10 Evens 2023 07:00  --------------------------------------------------------  IN: 827.2 mL / OUT: 0 mL / NET: 827.2 mL    10 Evens 2023 07:01  -  11 Jun 2023 06:01  --------------------------------------------------------  IN: 526.4 mL / OUT: 0 mL / NET: 526.4 mL        PHYSICAL EXAM:    General: large body habitus, AAO1-2. in NAD.  Eyes: PERRL, clear conjunctiva  ENT: Poor dentition.  Respiratory: Decreased BS b/l lung fields; no W/R/R  Cardiac: +S1/S2; irregular rate; no M/R/G; HD catheter over L chest.   Gastrointestinal: soft, protuberant, NT/ND; no rebound or guarding; +BSx4. Large ventral hernia.   Extremities: Diffuse anasarca, worsened in bilateral upper extremities. +3 pitting edema RUE. +2 LUE. +3 b/l pitting edema le.  No blisters on heels or toes.  Skin: Bilateral breast wounds covered in dressing. L breast w eschar, R breast granulation tissue. malodorous.  Neurologic: AAOx1-2; does not participate in strength exam     MEDICATIONS:  MEDICATIONS  (STANDING):  chlorhexidine 2% Cloths 1 Application(s) Topical <User Schedule>  dextrose 10% + sodium chloride 0.9%. 1000 milliLiter(s) (30 mL/Hr) IV Continuous <Continuous>  HYDROmorphone  Injectable 1 milliGRAM(s) IV Push every 4 hours  norepinephrine Infusion 0.05 MICROgram(s)/kG/Min (5.46 mL/Hr) IV Continuous <Continuous>    MEDICATIONS  (PRN):  acetaminophen     Tablet .. 650 milliGRAM(s) Oral every 6 hours PRN Temp greater or equal to 38C (100.4F), Mild Pain (1 - 3)  HYDROmorphone  Injectable 1 milliGRAM(s) IV Push every 2 hours PRN Severe Pain (7 - 10)  LORazepam   Injectable 0.5 milliGRAM(s) IV Push every 4 hours PRN Anxiety  sodium chloride 0.9% lock flush 10 milliLiter(s) IV Push every 1 hour PRN Pre/post blood products, medications, blood draw, and to maintain line patency      ALLERGIES:  Allergies    sulfa drugs (Angioedema)  Ancef (Rash; Urticaria)  iodine (Hives; Pruritus)  DDAVP (Hypotension)  penicillin (Swelling)    Intolerances        LABS:              CAPILLARY BLOOD GLUCOSE      POCT Blood Glucose.: 88 mg/dL (09 Jun 2023 17:12)      RADIOLOGY & ADDITIONAL TESTS: Reviewed.

## 2023-06-12 NOTE — DISCHARGE NOTE FOR THE EXPIRED PATIENT - NS PATIENT DEATH CRITERIA
Patient is dead based on Cardiopulmonary criteria including absent breath sounds, pulselessness and/or asystole AandV paced rhythm with appropriate discordance

## 2023-06-12 NOTE — PROGRESS NOTE ADULT - ASSESSMENT
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 Family meeting 6/9 determining with palliative team that patient's GOC is in line with DNR/DNI, MEWS exempt (comfort care). Extensive conversation discussing poor clinical progress and futility.      Cardio  Patient w previously persistently low blood pressures, w 80s/40s during RR and inability to accurately measure BP due to diffuse edema during RR, currently now SBP is >100  Less concern for shock on differential given no infectious etiology, was requiring low dose levophed intermittently, but patient known to have baseline low blood pressure.  Continues to have less levophed requirements  Plan:  -No further HD   -Midodrine, hydrocortisone, florinef discontinued   -S/p PICC placement 6/5. R. femoral TLC removed   -Pressors capped. See "#goals of care"    #Shock, now concern for underlying septic shock 2/2 to poor source control from b/l breast wounds  - Lactate remains negative, uptrending WBC count  - Otherwise, patient on meropenem and vanc dosing by level  - Surgery is consulted, daughter now amenable to surgery.  - s/p debridement  -Plan to discontinue levophed today. Expect imminent death today     #HfrEf, non-ischemic cardiomyopathy  - history of HfpEF, TTE 5/5/23, normal RV and LV function  - On No HF meds  - Discontinued statin    Pulmonary  - Patient w no pulmonology history  - good airway control, controlling secretions  - Continue to monitor secretions    Renal  - ESRD w Dialysis, rapid response during HD session  - Discontinued HD    #Hyponatremia   2/2 D10 gtt for hypoglycemia   -Discontinued all management     GI  COLLIN    ID  -Patient w F to 101.1 on 5/24/23, and concomitant afib w RVR, resolved after defervescence  - Afebrile currently  - F/u BCx, F/u breast wound culture-> gram negative rods. and gram positive cocci,  ESBL Proteus, VRE E. Faecium  -S/p meropenem   -S/p daptomycin/meropenem antibiotic regimen   -s/p breast wound debridement 5/30  -No further intervention     Heme  No further intervention     #Leukocytosis  - Likely 2/2 to breast infection, overall downtrend  - s/p meropenum, meropenem   - s/p debridement    #Thrombocytopenia   Patient with slow steady decline in platelets, more compatible with sepsis vs HIT  HIT score: intermediate  - Hep 5000U q8.  -F/u HIT labs   -No further management     Endo  5/21 received d10 bolus for glucose 66, on q4 glucose checks, then started d10 gtt at 50 cc/hr  -Discontinued D10 gtt, no further management     Prophylaxis:  F: D10NS  E: Replete cautiously given ESRD  N: Soft and Bite sized (renal diet)  DVT: Heparin 5000 subq q8    #GOC  GOC conversation with palliative, primary team and patient deemed GOC are in line with comfort care  -See palliative note for details   - Poor prognosis. Family meeting 6/9 determining with palliative team that patient's GOC is in line with DNR/DNI, MEWS exempt (comfort care), pressors capped. Extensive conversation discussing poor clinical progress and futility  -Pain management per palliative       Lines:  R femoral a-line placed 6/6/23, L PICC line placed 6/5  No Wu  Rectal tube in place

## 2023-06-12 NOTE — DISCHARGE NOTE FOR THE EXPIRED PATIENT - HOSPITAL COURSE
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 Unable to wean pressors despite treatment and adding midodrine, hydrocortisone, and florinef. Decision was made with palliative, patient, and family that goals of care for patient was to make patient comfort care due to worsening clinical status with clinical futility despite care, levophed was discontinued on 6/12 so that family could be present for her passing.     At approximately 4:34 PM, RN was called to bedside after stating patient had passed. No spontaneous movements were present. There was not response to verbal, tactile, or painful stimuli. Pupils were mid-dilated and fixed. No breath sounds were appreciated over both lung fields. No femoral, radial, or carotid pulses were palpable. No heart sounds were auscultated over entire precordium. Patient pronounced dead at 4:36 PM. Family was at bedside and was notified immediately. Resident and attending physician (Dr. Sirisha Yañez), were notified.

## 2023-06-13 PROCEDURE — 83550 IRON BINDING TEST: CPT

## 2023-06-13 PROCEDURE — C1894: CPT

## 2023-06-13 PROCEDURE — 85027 COMPLETE CBC AUTOMATED: CPT

## 2023-06-13 PROCEDURE — 83036 HEMOGLOBIN GLYCOSYLATED A1C: CPT

## 2023-06-13 PROCEDURE — 86900 BLOOD TYPING SEROLOGIC ABO: CPT

## 2023-06-13 PROCEDURE — 0275U HEM HEPRN NDUC TRMBCTPNA SRM: CPT

## 2023-06-13 PROCEDURE — C1751: CPT

## 2023-06-13 PROCEDURE — 87075 CULTR BACTERIA EXCEPT BLOOD: CPT

## 2023-06-13 PROCEDURE — 83605 ASSAY OF LACTIC ACID: CPT

## 2023-06-13 PROCEDURE — 86850 RBC ANTIBODY SCREEN: CPT

## 2023-06-13 PROCEDURE — 82330 ASSAY OF CALCIUM: CPT

## 2023-06-13 PROCEDURE — 80053 COMPREHEN METABOLIC PANEL: CPT

## 2023-06-13 PROCEDURE — 93971 EXTREMITY STUDY: CPT

## 2023-06-13 PROCEDURE — 93975 VASCULAR STUDY: CPT

## 2023-06-13 PROCEDURE — 84443 ASSAY THYROID STIM HORMONE: CPT

## 2023-06-13 PROCEDURE — 74018 RADEX ABDOMEN 1 VIEW: CPT

## 2023-06-13 PROCEDURE — 90935 HEMODIALYSIS ONE EVALUATION: CPT

## 2023-06-13 PROCEDURE — 83615 LACTATE (LD) (LDH) ENZYME: CPT

## 2023-06-13 PROCEDURE — 84100 ASSAY OF PHOSPHORUS: CPT

## 2023-06-13 PROCEDURE — 86022 PLATELET ANTIBODIES: CPT

## 2023-06-13 PROCEDURE — 85025 COMPLETE CBC W/AUTO DIFF WBC: CPT

## 2023-06-13 PROCEDURE — 99285 EMERGENCY DEPT VISIT HI MDM: CPT

## 2023-06-13 PROCEDURE — 87040 BLOOD CULTURE FOR BACTERIA: CPT

## 2023-06-13 PROCEDURE — 87070 CULTURE OTHR SPECIMN AEROBIC: CPT

## 2023-06-13 PROCEDURE — 71045 X-RAY EXAM CHEST 1 VIEW: CPT

## 2023-06-13 PROCEDURE — 93005 ELECTROCARDIOGRAM TRACING: CPT

## 2023-06-13 PROCEDURE — 82550 ASSAY OF CK (CPK): CPT

## 2023-06-13 PROCEDURE — 77001 FLUOROGUIDE FOR VEIN DEVICE: CPT

## 2023-06-13 PROCEDURE — P9047: CPT

## 2023-06-13 PROCEDURE — 87205 SMEAR GRAM STAIN: CPT

## 2023-06-13 PROCEDURE — 84295 ASSAY OF SERUM SODIUM: CPT

## 2023-06-13 PROCEDURE — P9037: CPT

## 2023-06-13 PROCEDURE — 96375 TX/PRO/DX INJ NEW DRUG ADDON: CPT

## 2023-06-13 PROCEDURE — 76937 US GUIDE VASCULAR ACCESS: CPT

## 2023-06-13 PROCEDURE — P9100: CPT

## 2023-06-13 PROCEDURE — 83540 ASSAY OF IRON: CPT

## 2023-06-13 PROCEDURE — 83010 ASSAY OF HAPTOGLOBIN QUANT: CPT

## 2023-06-13 PROCEDURE — 87181 SC STD AGAR DILUTION PER AGT: CPT

## 2023-06-13 PROCEDURE — 85730 THROMBOPLASTIN TIME PARTIAL: CPT

## 2023-06-13 PROCEDURE — 87186 SC STD MICRODIL/AGAR DIL: CPT

## 2023-06-13 PROCEDURE — 80202 ASSAY OF VANCOMYCIN: CPT

## 2023-06-13 PROCEDURE — 83735 ASSAY OF MAGNESIUM: CPT

## 2023-06-13 PROCEDURE — 82533 TOTAL CORTISOL: CPT

## 2023-06-13 PROCEDURE — 82728 ASSAY OF FERRITIN: CPT

## 2023-06-13 PROCEDURE — 87340 HEPATITIS B SURFACE AG IA: CPT

## 2023-06-13 PROCEDURE — 86901 BLOOD TYPING SEROLOGIC RH(D): CPT

## 2023-06-13 PROCEDURE — C1769: CPT

## 2023-06-13 PROCEDURE — 96374 THER/PROPH/DIAG INJ IV PUSH: CPT

## 2023-06-13 PROCEDURE — 36430 TRANSFUSION BLD/BLD COMPNT: CPT

## 2023-06-13 PROCEDURE — 92610 EVALUATE SWALLOWING FUNCTION: CPT

## 2023-06-13 PROCEDURE — 80048 BASIC METABOLIC PNL TOTAL CA: CPT

## 2023-06-13 PROCEDURE — 84132 ASSAY OF SERUM POTASSIUM: CPT

## 2023-06-13 PROCEDURE — 85610 PROTHROMBIN TIME: CPT

## 2023-06-13 PROCEDURE — 82962 GLUCOSE BLOOD TEST: CPT

## 2023-06-13 PROCEDURE — 82803 BLOOD GASES ANY COMBINATION: CPT

## 2023-06-13 PROCEDURE — 36415 COLL VENOUS BLD VENIPUNCTURE: CPT

## 2023-06-13 PROCEDURE — 36558 INSERT TUNNELED CV CATH: CPT

## 2023-06-13 PROCEDURE — 0225U NFCT DS DNA&RNA 21 SARSCOV2: CPT

## 2023-06-14 LAB
UNFRACTIONATED HEPARIN INTERPRETATION: SIGNIFICANT CHANGE UP
UNFRACTIONATED HEPARIN RESULT: NEGATIVE — SIGNIFICANT CHANGE UP
UNFRACTIONATED HEPARIN-HIGH DOSE: 0 % — SIGNIFICANT CHANGE UP
UNFRACTIONATED HEPARIN-LOW DOSE: 0 % — SIGNIFICANT CHANGE UP

## 2023-09-13 NOTE — PROGRESS NOTE ADULT - SUBJECTIVE AND OBJECTIVE BOX
SUBJECTIVE AND OBJECTIVE:  Indication for Geriatrics and Palliative Care Services: Symptom management and GOC    OVERNIGHT EVENTS: HD this morning. Patient lethargic on examination, minimally verbal, similar to other post-HD visit.s    Allergies    sulfa drugs (Angioedema)  Ancef (Rash; Urticaria)  iodine (Hives; Pruritus)  DDAVP (Hypotension)  penicillin (Swelling)    Intolerances    MEDICATIONS  (STANDING):  ascorbic acid 500 milliGRAM(s) Oral daily  atorvastatin 40 milliGRAM(s) Oral at bedtime  bisacodyl 5 milliGRAM(s) Oral at bedtime  chlorhexidine 2% Cloths 1 Application(s) Topical <User Schedule>  cinacalcet 120 milliGRAM(s) Oral every 24 hours  Dakins Solution - 1/4 Strength 1 Application(s) Topical every 12 hours  dextrose 10%. 1000 milliLiter(s) (30 mL/Hr) IV Continuous <Continuous>  fludroCORTISONE 0.2 milliGRAM(s) Oral every 24 hours  folic acid 1 milliGRAM(s) Oral daily  midodrine 40 milliGRAM(s) Oral every 8 hours  Nephro-deborah 1 Tablet(s) Oral daily  pantoprazole  Injectable 40 milliGRAM(s) IV Push every 12 hours  polyethylene glycol 3350 17 Gram(s) Oral daily  sodium thiosulfate IVPB 25 Gram(s) IV Intermittent once    MEDICATIONS  (PRN):  acetaminophen     Tablet .. 650 milliGRAM(s) Oral every 6 hours PRN Temp greater or equal to 38C (100.4F), Mild Pain (1 - 3)  HYDROmorphone   Tablet 2 milliGRAM(s) Oral every 3 hours PRN Moderate Pain (4 - 6)  HYDROmorphone   Tablet 4 milliGRAM(s) Oral every 3 hours PRN Severe Pain (7 - 10)  lidocaine 2% Gel 1 Application(s) Topical every 6 hours PRN pain  sodium chloride 0.9% lock flush 10 milliLiter(s) IV Push every 1 hour PRN Pre/post blood products, medications, blood draw, and to maintain line patency      ITEMS UNCHECKED ARE NOT PRESENT    PRESENT SYMPTOMS: [X]Unable to self-report  Source if other than patient:  [ ]Family   [X]Team     Pain:  [X]yes [ ]no - Per team, patient endorsing generalized pain. Cannot localize it. Tylenol not adequately treating pain.  QOL impact -   Location -                    Aggravating factors -  Quality -  Radiation -  Timing-  Severity (0-10 scale):  Minimal acceptable level (0-10 scale):     Dyspnea:                           [ ]Mild [ ]Moderate [ ]Severe  Anxiety:                             [ ]Mild [ ]Moderate [ ]Severe  Fatigue:                             [ ]Mild [ ]Moderate [ ]Severe  Nausea:                             [ ]Mild [ ]Moderate [ ]Severe  Loss of appetite:              [ ]Mild [ ]Moderate [ ]Severe  Constipation:                    [ ]Mild [ ]Moderate [ ]Severe    Other Symptoms:  [X]All other review of systems negative     Palliative Performance Status Version 2:         30%      http://npcrc.org/files/news/palliative_performance_scale_ppsv2.pdf    PHYSICAL EXAM:  Vital Signs Last 24 Hrs  T(C): 36.1 (25 May 2023 14:05), Max: 38.4 (24 May 2023 22:37)  T(F): 97 (25 May 2023 14:05), Max: 101.1 (24 May 2023 22:37)  HR: 75 (25 May 2023 16:00) (66 - 138)  BP: 160/65 (25 May 2023 16:00) (92/49 - 182/91)  BP(mean): 93 (25 May 2023 16:00) (65 - 121)  RR: 13 (25 May 2023 16:00) (10 - 18)  SpO2: 99% (25 May 2023 16:00) (93% - 100%)    Parameters below as of 25 May 2023 16:00  Patient On (Oxygen Delivery Method): room air     I&O's Summary    24 May 2023 07:01  -  25 May 2023 07:00  --------------------------------------------------------  IN: 750 mL / OUT: 0 mL / NET: 750 mL    25 May 2023 07:01  -  25 May 2023 17:08  --------------------------------------------------------  IN: 640 mL / OUT: 2000 mL / NET: -1360 mL       GENERAL: [ ]Cachexia    [ ]Alert  [ ]Oriented x   [X]Lethargic  [ ]Unarousable  [X]Verbal  [ ]Non-Verbal  Behavioral:   [ ]Anxiety  [ ]Delirium [ ]Agitation [X]Calm  HEENT:  [X]Normal   [ ]Dry mouth   [ ]ET Tube/Trach  [ ]Oral lesions  PULMONARY:   [X]Clear [ ]Tachypnea  [ ]Audible excessive secretions   [ ]Rhonchi        [ ]Right [ ]Left [ ]Bilateral  [ ]Crackles        [ ]Right [ ]Left [ ]Bilateral  [ ]Wheezing     [ ]Right [ ]Left [ ]Bilateral  [ ]Diminished BS [ ] Right [ ]Left [ ]Bilateral  CARDIOVASCULAR:    [X]Regular [ ]Irregular [ ]Tachy  [ ]Arjun [ ]Murmur [ ]Other  GASTROINTESTINAL:  [X]Soft  [ ]Distended   [ ]+BS  [X]Non tender [ ]Tender  [ ]Other [ ]PEG [ ]OGT/ NGT   Last BM:   GENITOURINARY:  [ ]Normal [ ]Incontinent   [ ]Oliguria/Anuria   [X]Wu  MUSCULOSKELETAL:   [ ]Normal   [ ]Weakness  [X]Bed/Wheelchair bound [X]Edema  NEUROLOGIC:   [X]No focal deficits  [ ] Cognitive impairment  [ ] Dysphagia [ ]Dysarthria [ ] Paresis [ ]Other   SKIN:   [ ]Normal  [ ]Rash  [X]Skin lesions  [ ]Pressure ulcer(s) [ ]y [ ]n present on admission    CRITICAL CARE:  [ ]Shock Present  [ ]Septic [ ]Cardiogenic [ ]Neurologic [ ]Hypovolemic  [ ]Vasopressors [ ]Inotropes  [ ]Respiratory failure present [ ]Mechanical Ventilation [ ]Non-invasive ventilatory support [ ]High-Flow   [ ]Acute  [ ]Chronic [ ]Hypoxic  [ ]Hypercarbic [ ]Other  [ ]Other organ failure     LABS:                        8.5    13.21 )-----------( 105      ( 25 May 2023 11:43 )             28.4   05-25    133<L>  |  95<L>  |  14  ----------------------------<  108<H>  3.6   |  26  |  2.18<H>    Ca    9.7      25 May 2023 15:02  Phos  3.1     05-25  Mg     2.0     05-25    TPro  4.7<L>  /  Alb  2.2<L>  /  TBili  0.4  /  DBili  x   /  AST  15  /  ALT  5<L>  /  AlkPhos  121<H>  05-25  PT/INR - ( 25 May 2023 11:43 )   PT: 24.3 sec;   INR: 2.03     PTT - ( 25 May 2023 11:43 )  PTT:37.5 sec      RADIOLOGY & ADDITIONAL STUDIES:    Protein Calorie Malnutrition Present: [ ]mild [ ]moderate [ ]severe [ ]underweight [ ]morbid obesity  https://www.andeal.org/vault/2440/web/files/ONC/Table_Clinical%20Characteristics%20to%20Document%20Malnutrition-White%20JV%20et%20al%202012.pdf    Height (cm): 177.8 (01-01-23 @ 20:52), 177.8 (11-20-22 @ 08:23)  Weight (kg): 79.4 (05-19-23 @ 11:29), 94.5 (05-04-23 @ 16:00), 85.3 (01-01-23 @ 20:52)  BMI (kg/m2): 25.1 (05-19-23 @ 11:29), 29.9 (05-04-23 @ 16:00), 27 (01-01-23 @ 20:52)    [ ]PPSV2 < or = 30%  [ ]significant weight loss [ ]poor nutritional intake [ ]anasarca[ ]Artificial Nutrition    REFERRALS:   [ ]Chaplaincy  [ ]Hospice  [ ]Child Life  [X]Social Work [ ]Patient and Family Support [ ]Case management [ ]Holistic Therapy [ ] Music therapy  [ ] Massage Therapy    DISCUSSION OF CASE: Primary team - discussed plan of care Cheilitis Aggressive Treatment: I recommended application of Vaseline or Aquaphor numerous times a day (as often as every hour) and before going to bed. I also prescribed a topical steroid for twice daily use.

## 2023-09-15 NOTE — PROCEDURE NOTE - NSUS ED PHYSICIAN PRESENCE1
Restorative Technician Note      Patient Name: Taras Kelly     Kartik Vogel  DPT, Restorative Technician
I was present during the key portion of the procedure.
I was present during the key portion of the procedure.

## 2024-07-19 NOTE — PROGRESS NOTE ADULT - PROBLEM SELECTOR PLAN 6
No Vaccines Administered.
F:   E:  N:  D:    Code:   Dispo:
Hx of DVT in the L femoral vein complicated by PE. s/p IVC filter and on Eliquis 5mg Q12, last dose taken 5/17.    - Holding home Eliquis 5mg Q12
Following for GOC. Prognosis extremely guarded. Will continue to follow.    Alyse Zhao MD  Palliative Care Attending  Geriatrics and Palliative Consult Service

## 2024-07-25 NOTE — PROGRESS NOTE ADULT - PROBLEM SELECTOR PLAN 2
May is here today for   Chief Complaint   Patient presents with    Office Visit    Anxiety     Fatigue     Back Pain     Back pain, started 2 weeks ago     Eye Pain   .        Medication Refills needed today?  NO,   if you receive a prescription today would you like it to be sent to [facility]? NO    Medications: medications verified and updated    Patient would like communication of their results via:    Cell Phone:   Telephone Information:   Mobile [phone]   Mobile Not on file.     Okay to leave a message containing results? Yes    Tobacco history: verified         Health Maintenance       DTaP/Tdap/Td Vaccine (2 - Td or Tdap)  Overdue since 7/15/2023    Hepatitis B Vaccine (3 of 3 - 19+ 3-dose series)  Overdue since 8/15/2023    COVID-19 Vaccine (5 - 2023-24 season)  Overdue since 9/1/2023           Following review of the above:  Patient is not proceeding with: COVID-19, Dtap/Tdap/Td, and Hep B    Note: Refer to final orders and clinician documentation.            COVID-19 Vaccine Interest Assessment:   Interested in receiving COVID-19 vaccine but has barriers.  If the patient reports an outside immunization, please update the WIR/ICARE information in the Immunizations activity          Patient presented after missed HD for emergent dialysis on 1/2. Since admission, patient has been persistently hypotensive. Has been on phenylephrine for >1 week. Started on fluorinef and midodrine as well and now off IV pressors.  - Continue care as per primary team

## 2024-09-09 NOTE — PATIENT PROFILE ADULT - DEAF OR HARD OF HEARING?
[Time Spent: ___ minutes] : I have spent [unfilled] minutes of time on the encounter which excludes teaching and separately reported services.
no

## 2024-09-28 NOTE — PHYSICAL THERAPY INITIAL EVALUATION ADULT - LEVEL OF INDEPENDENCE: SCOOT/BRIDGE, REHAB EVAL
Goal Outcome Evaluation:  Plan of Care Reviewed With: patient        Progress: no change  Outcome Evaluation: Patient admitted today from the ER. Admitted for frequent falls. Orthostatic blood pressures negative. Amadou consulted. Screened for candida auris. [facility] records obtained and placed in the chart. Daughter at bedside throughout the shift. Patient expresses no other needs at this time. Call light within reach.                                dependent (less than 25% patients effort)

## 2024-10-10 NOTE — PROCEDURE NOTE - NSINDICATIONS_GEN_A_CORE
Do you want pt to schedule f/u? Please advise, thank you.    Pt had also inquired about the 2nd shingles vaccine yesterday, she got first vaccine with pharmacy 8/20/24. With her insurance, she should get through pharmacy. Should she wait until her cough is resolved to get the 2nd dose?   antibiotic therapy/fluid administration

## 2025-02-25 NOTE — DISCHARGE NOTE NURSING/CASE MANAGEMENT/SOCIAL WORK - NSDCPEELIQUISREACT_GEN_ALL_CORE
Apixaban/Eliquis increases your risk for bleeding. Notify your doctor if you experience any of the following side effects: bleeding, coughing or vomiting blood, red or black stool, unexpected pain or swelling, itching or hives, chest pain, chest tightness, trouble breathing, changes in how much or how often you urinate, red or pink urine, numbness or tingling in your feet, or unusual muscle weakness. When Apixaban/Eliquis is taken with other medicines, they can affect how it works. Taking other medications such as aspirin, blood thinners, nonsteroidal anti-inflammatories, and medications that treat depression can increase your risk of bleeding. It is very important to tell your health care provider about all of the other medicines, including over-the-counter medications, herbs, and vitamins you are taking. DO NOT start, stop, or change the dosage of any medicine, including over-the-counter medicines, vitamins, and herbal products without your doctor’s approval. Any products containing aspirin or are nonsteroidal anti-inflammatories lessen the blood’s ability to form clots and add to the effect of Apixaban/Eliquis. Never take aspirin or medicines that contain aspirin without speaking to your doctor. normal... Well appearing, awake, alert, oriented to person, place, time/situation and in no apparent distress.

## 2025-03-27 NOTE — PROGRESS NOTE ADULT - TIME-BASED BILLING (NON-CRITICAL CARE)
Time-based billing (NON-critical care)
5 (moderate pain)

## 2025-06-12 NOTE — CHART NOTE - NSCHARTNOTEFT_GEN_A_CORE
Admitting Diagnosis:   Patient is a 67y old  Female who presents with a chief complaint of GIB (05 Jun 2023 06:51)      PAST MEDICAL & SURGICAL HISTORY:  HTN (hypertension)      HLD (hyperlipidemia)      CAD (coronary atherosclerotic disease)      ESRD on dialysis  last 11/15/22      H/O ventral hernia      Brown tumor  brain      DVT, lower extremity  left      History of surgery  IVC filter      AVF (arteriovenous fistula)  right left      S/P AIDEN-BSO  2003      History of surgery  RLE PTA stent / atherectomy  7/2021      History of atherectomy  stent    Current Nutrition Order:   Diet, NPO:   NPO for Procedure/Test     NPO Start Date: 05-Jun-2023,   NPO Start Time: 10:00 (06-05-23 @ 07:58)    PO Intake: N/A    GI Issues: Abdomen ND/NT, +BS x4, LBM 6/4    Pain: 10/10, generalized    Skin Integrity: PI stg II sacrum, s/p wound debridement B/L breasts, PI stg II R hand, PI stg II L hip, SDTI B/L heels, +4 gen.     Labs:   06-05    128<L>  |  91<L>  |  17  ----------------------------<  81  3.9   |  24  |  2.28<H>    Ca    9.7      05 Jun 2023 04:13  Phos  3.2     06-05  Mg     1.8     06-05    TPro  5.0<L>  /  Alb  2.8<L>  /  TBili  0.5  /  DBili  x   /  AST  13  /  ALT  9<L>  /  AlkPhos  135<H>  06-04    CAPILLARY BLOOD GLUCOSE      POCT Blood Glucose.: 80 mg/dL (05 Jun 2023 11:57)  POCT Blood Glucose.: 92 mg/dL (05 Jun 2023 07:49)  POCT Blood Glucose.: 84 mg/dL (05 Jun 2023 04:05)  POCT Blood Glucose.: 79 mg/dL (05 Jun 2023 04:03)  POCT Blood Glucose.: 103 mg/dL (04 Jun 2023 23:09)      Medications:  MEDICATIONS  (STANDING):  ascorbic acid 500 milliGRAM(s) Oral daily  atorvastatin 40 milliGRAM(s) Oral at bedtime  bisacodyl 5 milliGRAM(s) Oral at bedtime  chlorhexidine 2% Cloths 1 Application(s) Topical <User Schedule>  cinacalcet 120 milliGRAM(s) Oral every 24 hours  Dakins Solution - 1/4 Strength 1 Application(s) Topical every 12 hours  dextrose 10% Bolus 250 milliLiter(s) IV Bolus once  dextrose 5% + sodium chloride 0.9%. 1000 milliLiter(s) (30 mL/Hr) IV Continuous <Continuous>  dextrose 50% Injectable 25 Gram(s) IV Push once  dextrose 50% Injectable 12.5 Gram(s) IV Push once  dextrose 50% Injectable 25 Gram(s) IV Push once  dextrose Oral Gel 15 Gram(s) Oral once  dextrose Oral Gel 15 Gram(s) Oral once  fludroCORTISONE 0.5 milliGRAM(s) Oral every 24 hours  folic acid 1 milliGRAM(s) Oral daily  heparin   Injectable 7500 Unit(s) SubCutaneous every 8 hours  hydrocortisone 100 milliGRAM(s) Oral every 8 hours  midodrine 30 milliGRAM(s) Oral every 8 hours  Nephro-deborah 1 Tablet(s) Oral daily  norepinephrine Infusion 0.05 MICROgram(s)/kG/Min (3.72 mL/Hr) IV Continuous <Continuous>  nystatin Powder 1 Application(s) Topical two times a day  pantoprazole    Tablet 40 milliGRAM(s) Oral every 12 hours  polyethylene glycol 3350 17 Gram(s) Oral daily    MEDICATIONS  (PRN):  acetaminophen     Tablet .. 650 milliGRAM(s) Oral every 6 hours PRN Temp greater or equal to 38C (100.4F), Mild Pain (1 - 3)  HYDROmorphone   Tablet 4 milliGRAM(s) Oral every 3 hours PRN Severe Pain (7 - 10)  HYDROmorphone   Tablet 2 milliGRAM(s) Oral every 3 hours PRN Moderate Pain (4 - 6)  HYDROmorphone  Injectable 1 milliGRAM(s) IV Push every 4 hours PRN Severe Pain (7 - 10)  sodium chloride 0.9% lock flush 10 milliLiter(s) IV Push every 1 hour PRN Pre/post blood products, medications, blood draw, and to maintain line patency    Weight Change:   5/20 93/7kg  5/23 101.9kg  25/5 103.1kg  25/5 101.1kg  5/27 - 103.4 kg   5/30 - 107.1 kg  6/1 - 110 kg   6/3 - 110.3 kg   - noted wt fluctuations likely r/t fluid shifts     Estimated energy needs:   Weight used for calculations	current weight  Estimated Energy Needs Weight (lbs)	175 lb  Estimated Energy Needs Weight (kg)	79.4 kg    Estimated Energy Needs From (anatoliy/kg) 28  Estimated Energy Needs To (anatoliy/kg)	33  Estimated Energy Needs Calculated From (anatoliy/kg) 2223  Estimated Energy Needs Calculated To (anatoliy/kg)	2620    Estimated Protein Needs From (g/kg)	1.2  Estimated Protein Needs To (g/kg)	1.3  Estimated Protein Needs Calculated From (g/kg) 95.16  Estimated Protein Needs Calculated To (g/kg)	103.09    Other Calculations	Actual body weight used to calculate energy needs. Unable to assess IBW; ht not recorded. 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 Per GI, would like to reduce stool burden prior to EGD/colonscopy w manual disimpaction and golytely. 5/20 transfer to ICU for monitoring of pressures/potential fem line placement. 5/25 Pt reporting severe abdominal pain abdomen soft TTP RUQ. 5/30: Surgical debridement of b/l breast wounds. 6/2: PICC attempted, pt in distress.     Pt care discussed in IDT rounds. Rx and labs reviewed. Pt NPO today for PICC placement. Previously, pt on renal diet with soft/bite-sized diet with limited PO intake. Pt with tunnelled catheter in need of removal per chart review. Plan for PICC; attempted previously, but pt in distress and unable to place. Continues on HD; continue to monitor electrolytes and fluid flux. No other reports GI distress or further nutritional concerns at this time. RDN will continue to reassess, intervene, and monitor as appropriate.     Previous Nutrition Diagnosis:  Increased Nutrient Needs r/t increased metabolic needs for Kcal/protein AEB multiple PUs, ESRD on HD    Active [ X ]  Resolved [   ]    Goal: Consistently meets > 75% EER     Recommendations:  -Return to PO diet with appropriate textures/consistencies per SLP as medically able  -Consider Nepro TID when returned to PO diet  -Align nutrition with GOC at all times  -Encourage good PO intake when appropriate   -Monitor chemistry, GI fxn, and skin integrity     Risk Level: High [ x ] Moderate [   ] Low [   ] 14

## (undated) DEVICE — VENODYNE/SCD SLEEVE CALF MEDIUM

## (undated) DEVICE — SUT PROLENE 7-0 30" BV-1

## (undated) DEVICE — MARKING PEN W RULER

## (undated) DEVICE — TOURNIQUET ESMARK 4"

## (undated) DEVICE — DRSG TELFA 3 X 8

## (undated) DEVICE — DRAPE TOWEL BLUE 17" X 24"

## (undated) DEVICE — CATH IV SAFE BC 20G X 1.16" (PINK)

## (undated) DEVICE — SPECIMEN CONTAINER 4OZ

## (undated) DEVICE — Device

## (undated) DEVICE — CATH IV SAFE BC 24G X 0.75" (YELLOW)

## (undated) DEVICE — SUT VICRYL 4-0 27" SH UNDYED

## (undated) DEVICE — DRAPE PROBE COVER LATEX FREE 3X96"

## (undated) DEVICE — SYR LUER LOK 30CC

## (undated) DEVICE — NDL HYPO SAFE 22G X 1.5" (BLACK)

## (undated) DEVICE — DRAPE 1/2 SHEET 40X57"

## (undated) DEVICE — GLV 7.5 PROTEXIS (WHITE)

## (undated) DEVICE — DRAIN PENROSE .5" X 18" LATEX

## (undated) DEVICE — PACK VASCULAR MINOR

## (undated) DEVICE — DRAPE HAND 77" X 146"

## (undated) DEVICE — GLV 7 PROTEXIS (WHITE)

## (undated) DEVICE — WARMING BLANKET LOWER ADULT

## (undated) DEVICE — DRAPE 3/4 SHEET 52X76"

## (undated) DEVICE — SUT PROLENE 7-0 18" BV

## (undated) DEVICE — SUT MONOCRYL 4-0 18" PS-2

## (undated) DEVICE — STAPLER SKIN PROXIMATE

## (undated) DEVICE — SUT SILK 4-0 18" TIES

## (undated) DEVICE — SYR LUER LOK 10CC

## (undated) DEVICE — LAP PAD 18 X 18"

## (undated) DEVICE — GEL AQUSNC PACKET 20GR

## (undated) DEVICE — BLADE SURGICAL #15 CARBON

## (undated) DEVICE — DRSG DERMABOND 0.7ML

## (undated) DEVICE — URETERAL CATH RED RUBBER 14FR (GREEN)

## (undated) DEVICE — DRAPE SPLIT SHEET 77" X 120"

## (undated) DEVICE — DRSG KERLIX ROLL 4.5"